# Patient Record
Sex: FEMALE | Race: WHITE | Employment: OTHER | URBAN - METROPOLITAN AREA
[De-identification: names, ages, dates, MRNs, and addresses within clinical notes are randomized per-mention and may not be internally consistent; named-entity substitution may affect disease eponyms.]

---

## 2017-02-09 ENCOUNTER — APPOINTMENT (OUTPATIENT)
Dept: GENERAL RADIOLOGY | Age: 72
DRG: 189 | End: 2017-02-09
Attending: EMERGENCY MEDICINE
Payer: MEDICARE

## 2017-02-09 ENCOUNTER — HOSPITAL ENCOUNTER (INPATIENT)
Age: 72
LOS: 5 days | Discharge: HOME | DRG: 189 | End: 2017-02-14
Attending: EMERGENCY MEDICINE | Admitting: INTERNAL MEDICINE
Payer: MEDICARE

## 2017-02-09 DIAGNOSIS — J44.1 COPD EXACERBATION (HCC): Primary | ICD-10-CM

## 2017-02-09 PROBLEM — J18.9 PNA (PNEUMONIA): Status: ACTIVE | Noted: 2017-02-09

## 2017-02-09 LAB
ALBUMIN SERPL BCP-MCNC: 3.6 G/DL (ref 3.5–5)
ALBUMIN/GLOB SERPL: 0.9 {RATIO} (ref 1.1–2.2)
ALP SERPL-CCNC: 92 U/L (ref 45–117)
ALT SERPL-CCNC: 41 U/L (ref 12–78)
ANION GAP BLD CALC-SCNC: 11 MMOL/L (ref 5–15)
AST SERPL W P-5'-P-CCNC: 38 U/L (ref 15–37)
ATRIAL RATE: 75 BPM
BASOPHILS # BLD AUTO: 0.1 K/UL (ref 0–0.1)
BASOPHILS # BLD: 1 % (ref 0–1)
BILIRUB SERPL-MCNC: 0.4 MG/DL (ref 0.2–1)
BNP SERPL-MCNC: 132 PG/ML (ref 0–100)
BUN SERPL-MCNC: 26 MG/DL (ref 6–20)
BUN/CREAT SERPL: 20 (ref 12–20)
CALCIUM SERPL-MCNC: 8.8 MG/DL (ref 8.5–10.1)
CALCULATED R AXIS, ECG10: -43 DEGREES
CALCULATED T AXIS, ECG11: 73 DEGREES
CHLORIDE SERPL-SCNC: 101 MMOL/L (ref 97–108)
CO2 SERPL-SCNC: 24 MMOL/L (ref 21–32)
CREAT SERPL-MCNC: 1.3 MG/DL (ref 0.55–1.02)
D DIMER PPP FEU-MCNC: 0.64 MG/L FEU (ref 0–0.65)
DIAGNOSIS, 93000: NORMAL
EOSINOPHIL # BLD: 0.3 K/UL (ref 0–0.4)
EOSINOPHIL NFR BLD: 3 % (ref 0–7)
ERYTHROCYTE [DISTWIDTH] IN BLOOD BY AUTOMATED COUNT: 15.4 % (ref 11.5–14.5)
GLOBULIN SER CALC-MCNC: 3.9 G/DL (ref 2–4)
GLUCOSE BLD STRIP.AUTO-MCNC: 247 MG/DL (ref 65–100)
GLUCOSE BLD STRIP.AUTO-MCNC: 397 MG/DL (ref 65–100)
GLUCOSE SERPL-MCNC: 345 MG/DL (ref 65–100)
HCT VFR BLD AUTO: 39.6 % (ref 35–47)
HGB BLD-MCNC: 12.6 G/DL (ref 11.5–16)
INR PPP: 1.2 (ref 0.9–1.1)
LYMPHOCYTES # BLD AUTO: 17 % (ref 12–49)
LYMPHOCYTES # BLD: 1.6 K/UL (ref 0.8–3.5)
MAGNESIUM SERPL-MCNC: 2.2 MG/DL (ref 1.6–2.4)
MCH RBC QN AUTO: 27.7 PG (ref 26–34)
MCHC RBC AUTO-ENTMCNC: 31.8 G/DL (ref 30–36.5)
MCV RBC AUTO: 87 FL (ref 80–99)
MONOCYTES # BLD: 0.6 K/UL (ref 0–1)
MONOCYTES NFR BLD AUTO: 7 % (ref 5–13)
NEUTS SEG # BLD: 6.8 K/UL (ref 1.8–8)
NEUTS SEG NFR BLD AUTO: 72 % (ref 32–75)
PLATELET # BLD AUTO: 198 K/UL (ref 150–400)
POTASSIUM SERPL-SCNC: 4 MMOL/L (ref 3.5–5.1)
PROT SERPL-MCNC: 7.5 G/DL (ref 6.4–8.2)
PROTHROMBIN TIME: 12.4 SEC (ref 9–11.1)
Q-T INTERVAL, ECG07: 370 MS
QRS DURATION, ECG06: 98 MS
QTC CALCULATION (BEZET), ECG08: 460 MS
RBC # BLD AUTO: 4.55 M/UL (ref 3.8–5.2)
SERVICE CMNT-IMP: ABNORMAL
SERVICE CMNT-IMP: ABNORMAL
SODIUM SERPL-SCNC: 136 MMOL/L (ref 136–145)
TROPONIN I SERPL-MCNC: <0.04 NG/ML
VENTRICULAR RATE, ECG03: 93 BPM
WBC # BLD AUTO: 9.3 K/UL (ref 3.6–11)

## 2017-02-09 PROCEDURE — 93005 ELECTROCARDIOGRAM TRACING: CPT

## 2017-02-09 PROCEDURE — 94761 N-INVAS EAR/PLS OXIMETRY MLT: CPT

## 2017-02-09 PROCEDURE — 77030029684 HC NEB SM VOL KT MONA -A

## 2017-02-09 PROCEDURE — 36415 COLL VENOUS BLD VENIPUNCTURE: CPT | Performed by: EMERGENCY MEDICINE

## 2017-02-09 PROCEDURE — 84484 ASSAY OF TROPONIN QUANT: CPT | Performed by: EMERGENCY MEDICINE

## 2017-02-09 PROCEDURE — A9270 NON-COVERED ITEM OR SERVICE: HCPCS | Performed by: EMERGENCY MEDICINE

## 2017-02-09 PROCEDURE — 99285 EMERGENCY DEPT VISIT HI MDM: CPT

## 2017-02-09 PROCEDURE — 85610 PROTHROMBIN TIME: CPT | Performed by: EMERGENCY MEDICINE

## 2017-02-09 PROCEDURE — 74011000250 HC RX REV CODE- 250: Performed by: INTERNAL MEDICINE

## 2017-02-09 PROCEDURE — 94640 AIRWAY INHALATION TREATMENT: CPT

## 2017-02-09 PROCEDURE — 74011636637 HC RX REV CODE- 636/637: Performed by: EMERGENCY MEDICINE

## 2017-02-09 PROCEDURE — 83880 ASSAY OF NATRIURETIC PEPTIDE: CPT | Performed by: EMERGENCY MEDICINE

## 2017-02-09 PROCEDURE — 74011250636 HC RX REV CODE- 250/636: Performed by: INTERNAL MEDICINE

## 2017-02-09 PROCEDURE — 74011250637 HC RX REV CODE- 250/637: Performed by: INTERNAL MEDICINE

## 2017-02-09 PROCEDURE — 80053 COMPREHEN METABOLIC PANEL: CPT | Performed by: EMERGENCY MEDICINE

## 2017-02-09 PROCEDURE — 82962 GLUCOSE BLOOD TEST: CPT

## 2017-02-09 PROCEDURE — 74011000250 HC RX REV CODE- 250: Performed by: EMERGENCY MEDICINE

## 2017-02-09 PROCEDURE — 74011636637 HC RX REV CODE- 636/637: Performed by: INTERNAL MEDICINE

## 2017-02-09 PROCEDURE — 94660 CPAP INITIATION&MGMT: CPT

## 2017-02-09 PROCEDURE — 83735 ASSAY OF MAGNESIUM: CPT | Performed by: EMERGENCY MEDICINE

## 2017-02-09 PROCEDURE — 85025 COMPLETE CBC W/AUTO DIFF WBC: CPT | Performed by: EMERGENCY MEDICINE

## 2017-02-09 PROCEDURE — 65660000000 HC RM CCU STEPDOWN

## 2017-02-09 PROCEDURE — 85379 FIBRIN DEGRADATION QUANT: CPT | Performed by: EMERGENCY MEDICINE

## 2017-02-09 PROCEDURE — 71020 XR CHEST PA LAT: CPT

## 2017-02-09 PROCEDURE — 77030012879 HC MSK CPAP FLL FAC PHIL -B

## 2017-02-09 RX ORDER — ARFORMOTEROL TARTRATE 15 UG/2ML
15 SOLUTION RESPIRATORY (INHALATION)
Status: DISCONTINUED | OUTPATIENT
Start: 2017-02-09 | End: 2017-02-09 | Stop reason: SDUPTHER

## 2017-02-09 RX ORDER — DEXTROSE 50 % IN WATER (D50W) INTRAVENOUS SYRINGE
12.5-25 AS NEEDED
Status: DISCONTINUED | OUTPATIENT
Start: 2017-02-09 | End: 2017-02-14 | Stop reason: HOSPADM

## 2017-02-09 RX ORDER — ALBUTEROL SULFATE 0.83 MG/ML
2.5 SOLUTION RESPIRATORY (INHALATION)
Status: DISCONTINUED | OUTPATIENT
Start: 2017-02-09 | End: 2017-02-14 | Stop reason: HOSPADM

## 2017-02-09 RX ORDER — ALBUTEROL SULFATE 0.83 MG/ML
2.5 SOLUTION RESPIRATORY (INHALATION)
Status: COMPLETED | OUTPATIENT
Start: 2017-02-09 | End: 2017-02-09

## 2017-02-09 RX ORDER — ONDANSETRON 2 MG/ML
4 INJECTION INTRAMUSCULAR; INTRAVENOUS
Status: DISCONTINUED | OUTPATIENT
Start: 2017-02-09 | End: 2017-02-14 | Stop reason: HOSPADM

## 2017-02-09 RX ORDER — MELATONIN
2000 DAILY
Status: DISCONTINUED | OUTPATIENT
Start: 2017-02-10 | End: 2017-02-14 | Stop reason: HOSPADM

## 2017-02-09 RX ORDER — SERTRALINE HYDROCHLORIDE 50 MG/1
50 TABLET, FILM COATED ORAL
Status: DISCONTINUED | OUTPATIENT
Start: 2017-02-09 | End: 2017-02-14 | Stop reason: HOSPADM

## 2017-02-09 RX ORDER — FLUTICASONE PROPIONATE AND SALMETEROL 250; 50 UG/1; UG/1
1 POWDER RESPIRATORY (INHALATION) EVERY 12 HOURS
Status: DISCONTINUED | OUTPATIENT
Start: 2017-02-09 | End: 2017-02-09

## 2017-02-09 RX ORDER — LEVOFLOXACIN 5 MG/ML
500 INJECTION, SOLUTION INTRAVENOUS EVERY 24 HOURS
Status: DISCONTINUED | OUTPATIENT
Start: 2017-02-09 | End: 2017-02-12

## 2017-02-09 RX ORDER — ALBUTEROL SULFATE 0.83 MG/ML
2.5 SOLUTION RESPIRATORY (INHALATION)
Status: DISCONTINUED | OUTPATIENT
Start: 2017-02-09 | End: 2017-02-12

## 2017-02-09 RX ORDER — MAGNESIUM SULFATE 100 %
4 CRYSTALS MISCELLANEOUS AS NEEDED
Status: DISCONTINUED | OUTPATIENT
Start: 2017-02-09 | End: 2017-02-14 | Stop reason: HOSPADM

## 2017-02-09 RX ORDER — POTASSIUM CHLORIDE 750 MG/1
20 TABLET, FILM COATED, EXTENDED RELEASE ORAL
Status: DISCONTINUED | OUTPATIENT
Start: 2017-02-10 | End: 2017-02-14 | Stop reason: HOSPADM

## 2017-02-09 RX ORDER — BUDESONIDE 0.5 MG/2ML
500 INHALANT ORAL
Status: DISCONTINUED | OUTPATIENT
Start: 2017-02-09 | End: 2017-02-09 | Stop reason: SDUPTHER

## 2017-02-09 RX ORDER — ALPRAZOLAM 0.25 MG/1
.25-.5 TABLET ORAL
Status: DISCONTINUED | OUTPATIENT
Start: 2017-02-09 | End: 2017-02-14 | Stop reason: HOSPADM

## 2017-02-09 RX ORDER — MONTELUKAST SODIUM 10 MG/1
10 TABLET ORAL EVERY EVENING
Status: DISCONTINUED | OUTPATIENT
Start: 2017-02-09 | End: 2017-02-14 | Stop reason: HOSPADM

## 2017-02-09 RX ORDER — BUDESONIDE 0.5 MG/2ML
500 INHALANT ORAL
Status: DISCONTINUED | OUTPATIENT
Start: 2017-02-09 | End: 2017-02-14 | Stop reason: HOSPADM

## 2017-02-09 RX ORDER — METOPROLOL SUCCINATE 25 MG/1
25 TABLET, EXTENDED RELEASE ORAL DAILY
Status: DISCONTINUED | OUTPATIENT
Start: 2017-02-10 | End: 2017-02-14 | Stop reason: HOSPADM

## 2017-02-09 RX ORDER — AZITHROMYCIN 500 MG/1
500 TABLET, FILM COATED ORAL SEE ADMIN INSTRUCTIONS
COMMUNITY
End: 2017-02-14

## 2017-02-09 RX ORDER — THERA TABS 400 MCG
1 TAB ORAL DAILY
Status: DISCONTINUED | OUTPATIENT
Start: 2017-02-10 | End: 2017-02-14 | Stop reason: HOSPADM

## 2017-02-09 RX ORDER — IPRATROPIUM BROMIDE 0.5 MG/2.5ML
0.5 SOLUTION RESPIRATORY (INHALATION)
Status: DISCONTINUED | OUTPATIENT
Start: 2017-02-09 | End: 2017-02-14 | Stop reason: HOSPADM

## 2017-02-09 RX ORDER — FUROSEMIDE 40 MG/1
40 TABLET ORAL EVERY EVENING
Status: DISCONTINUED | OUTPATIENT
Start: 2017-02-09 | End: 2017-02-14 | Stop reason: HOSPADM

## 2017-02-09 RX ORDER — WARFARIN SODIUM 5 MG/1
5 TABLET ORAL ONCE
Status: COMPLETED | OUTPATIENT
Start: 2017-02-09 | End: 2017-02-09

## 2017-02-09 RX ORDER — DILTIAZEM HYDROCHLORIDE 120 MG/1
120 CAPSULE, COATED, EXTENDED RELEASE ORAL DAILY
Status: DISCONTINUED | OUTPATIENT
Start: 2017-02-10 | End: 2017-02-14 | Stop reason: HOSPADM

## 2017-02-09 RX ORDER — INSULIN LISPRO 100 [IU]/ML
INJECTION, SOLUTION INTRAVENOUS; SUBCUTANEOUS
Status: DISCONTINUED | OUTPATIENT
Start: 2017-02-09 | End: 2017-02-11

## 2017-02-09 RX ORDER — MONTELUKAST SODIUM 10 MG/1
10 TABLET ORAL DAILY
COMMUNITY
End: 2018-06-25

## 2017-02-09 RX ORDER — FUROSEMIDE 40 MG/1
80 TABLET ORAL DAILY
Status: DISCONTINUED | OUTPATIENT
Start: 2017-02-10 | End: 2017-02-14 | Stop reason: HOSPADM

## 2017-02-09 RX ORDER — GABAPENTIN 300 MG/1
300 CAPSULE ORAL EVERY EVENING
Status: DISCONTINUED | OUTPATIENT
Start: 2017-02-09 | End: 2017-02-14 | Stop reason: HOSPADM

## 2017-02-09 RX ORDER — NAPROXEN SODIUM 220 MG
880 TABLET ORAL DAILY
COMMUNITY
End: 2017-05-01

## 2017-02-09 RX ORDER — PREDNISONE 20 MG/1
40 TABLET ORAL
Status: COMPLETED | OUTPATIENT
Start: 2017-02-09 | End: 2017-02-09

## 2017-02-09 RX ORDER — ALBUTEROL SULFATE 0.83 MG/ML
SOLUTION RESPIRATORY (INHALATION)
Status: DISPENSED
Start: 2017-02-09 | End: 2017-02-10

## 2017-02-09 RX ORDER — ARFORMOTEROL TARTRATE 15 UG/2ML
15 SOLUTION RESPIRATORY (INHALATION)
Status: DISCONTINUED | OUTPATIENT
Start: 2017-02-09 | End: 2017-02-14 | Stop reason: HOSPADM

## 2017-02-09 RX ADMIN — GABAPENTIN 300 MG: 300 CAPSULE ORAL at 18:46

## 2017-02-09 RX ADMIN — ALBUTEROL SULFATE 2.5 MG: 2.5 SOLUTION RESPIRATORY (INHALATION) at 21:00

## 2017-02-09 RX ADMIN — PREDNISONE 40 MG: 20 TABLET ORAL at 11:49

## 2017-02-09 RX ADMIN — INSULIN LISPRO 6 UNITS: 100 INJECTION, SOLUTION INTRAVENOUS; SUBCUTANEOUS at 21:10

## 2017-02-09 RX ADMIN — ALBUTEROL SULFATE 2.5 MG: 2.5 SOLUTION RESPIRATORY (INHALATION) at 17:30

## 2017-02-09 RX ADMIN — METHYLPREDNISOLONE SODIUM SUCCINATE 125 MG: 125 INJECTION, POWDER, FOR SOLUTION INTRAMUSCULAR; INTRAVENOUS at 18:41

## 2017-02-09 RX ADMIN — ONDANSETRON 4 MG: 2 INJECTION INTRAMUSCULAR; INTRAVENOUS at 21:58

## 2017-02-09 RX ADMIN — ALPRAZOLAM 0.5 MG: 0.25 TABLET ORAL at 21:58

## 2017-02-09 RX ADMIN — IPRATROPIUM BROMIDE 0.5 MG: 0.5 SOLUTION RESPIRATORY (INHALATION) at 21:00

## 2017-02-09 RX ADMIN — ALBUTEROL SULFATE 2.5 MG: 2.5 SOLUTION RESPIRATORY (INHALATION) at 12:37

## 2017-02-09 RX ADMIN — MONTELUKAST SODIUM 10 MG: 10 TABLET, FILM COATED ORAL at 18:46

## 2017-02-09 RX ADMIN — WARFARIN SODIUM 5 MG: 5 TABLET ORAL at 21:01

## 2017-02-09 RX ADMIN — ALBUTEROL SULFATE 2.5 MG: 2.5 SOLUTION RESPIRATORY (INHALATION) at 14:25

## 2017-02-09 RX ADMIN — ALBUTEROL SULFATE 2.5 MG: 2.5 SOLUTION RESPIRATORY (INHALATION) at 11:30

## 2017-02-09 RX ADMIN — FUROSEMIDE 40 MG: 40 TABLET ORAL at 18:40

## 2017-02-09 RX ADMIN — GUAIFENESIN 1200 MG: 600 TABLET, EXTENDED RELEASE ORAL at 18:40

## 2017-02-09 RX ADMIN — LEVOFLOXACIN 500 MG: 5 INJECTION, SOLUTION INTRAVENOUS at 19:06

## 2017-02-09 RX ADMIN — SERTRALINE HYDROCHLORIDE 50 MG: 50 TABLET ORAL at 21:01

## 2017-02-09 RX ADMIN — INSULIN LISPRO 3 UNITS: 100 INJECTION, SOLUTION INTRAVENOUS; SUBCUTANEOUS at 16:16

## 2017-02-09 RX ADMIN — HUMAN INSULIN 20 UNITS: 100 INJECTION, SUSPENSION SUBCUTANEOUS at 21:10

## 2017-02-09 RX ADMIN — BUDESONIDE 500 MCG: 0.5 INHALANT RESPIRATORY (INHALATION) at 21:00

## 2017-02-09 NOTE — PROGRESS NOTES
Care Management-Patient being admitted for asthma exacerbation and possible pneumonia. Met with patient and her daughter-Enma Vences (([phone]) in the room. Patient lives alone, is independent with her ADLs, and still drives. She has a CPAP, nebulizer, and cane at home. She only occasionally uses the cane. Patient said she has used [facility] after her back surgery in July 2015. She has never been to SNF or inpatient rehab. Her family will transport her at discharge. No transition of care needs identified or verbalized, but care manager will be available if needs arise. Care Management Interventions  PCP Verified by CM: Yes (Dr. Edith Metcalf)  Last Visit to PCP: 02/16/17  Mode of Transport at Discharge:  Other (see comment) (family)  Current Support Network: Lives Alone  Confirm Follow Up Transport: Family  Discharge Location  Discharge Placement: [facility]

## 2017-02-09 NOTE — PROGRESS NOTES
Bedside report received from  Harjit Morfin, [address] [place] [address] (offgoing nurse). Assumed care of patient. No immediate concerns, patient resting with call bell within reach.

## 2017-02-09 NOTE — IP AVS SNAPSHOT
[address] [facility] Emerald Herrmann  
[phone] Patient: [facility] MRN: [id number] PJS:[id number] You are allergic to the following Allergen Reactions Latex Itching Codeine Rash Lisinopril Other (comments) Cough, vomiting, Visual disturbances Statins-Hmg-Coa Reductase Inhibitors Other (comments) Muscle enzyme problems Morphine Itching Recent Documentation Height Weight BMI OB Status Smoking Status 1.676 m 137.7 kg 49 kg/m2 Postmenopausal Former Smoker Unresulted Labs Order Current Status CULTURE, RESPIRATORY/SPUTUM/BRONCH W GRAM STAIN Preliminary result Emergency Contacts Name Discharge Info Relation Home Work Mobile Abhijit Richardson  Child [2] [phone] Di Simpson  Child [2]   [phone] About your hospitalization You were admitted on:  February 9, 2017 You last received care in the:  [facility] You were discharged on:  February 14, 2017 Unit phone number:  [phone] Why you were hospitalized Your primary diagnosis was:  Respiratory Failure (Hcc) Your diagnoses also included:  Asthma Exacerbation, Type 2 Diabetes Mellitus Without Complication (Hcc), Pna (Pneumonia), Essential Hypertension With Goal Blood Pressure Less Than 140/90, Morbid Obesity (Hcc), Chronic Atrial Fibrillation (Hcc) Providers Seen During Your Hospitalizations Provider Role Specialty Primary office phone Daxa Moran MD Attending Provider Emergency Medicine [phone] Corbin Corcoran MD Attending Provider Internal Medicine [phone] Your Primary Care Physician (PCP) Primary Care Physician Office Phone Office Fax Odette Cortés [phone] Follow-up Information Follow up With Details Comments Contact Info MD Patricia Huff [place] [address] 
[phone] Your Appointments Friday February 17, 2017  9:00 AM EST  
ESTABLISHED PATIENT with MD Ralph Huff GELA White, [address] [facility] ([address]) [address] [place] [address]  
[phone] Current Discharge Medication List  
  
START taking these medications Dose & Instructions Dispensing Information Comments Morning Noon Evening Bedtime  
 levoFLOXacin 500 mg tablet Commonly known as:  Kristina Radon Your next dose is: Today, Tomorrow Other:  _________ Dose:  500 mg Take 1 Tab by mouth daily for 5 days. Quantity:  5 Tab Refills:  0  
     
   
   
   
  
 predniSONE 10 mg tablet Commonly known as:  Ralf Burden Your next dose is: Today, Tomorrow Other:  _________ Take 6t every day for 4d, then 5t every day for 4d, then 4t QD for 4d, then 3t QD for 4d, then 2t QD for 4d, then 1t QD for 4d. Quantity:  84 Tab Refills:  0 CONTINUE these medications which have CHANGED Dose & Instructions Dispensing Information Comments Morning Noon Evening Bedtime HumuLIN N 100 unit/mL injection Generic drug:  insulin NPH What changed:  See the new instructions. Your next dose is: Today, Tomorrow Other:  _________ INJECT 35 UNITS UNDER THE SKIN IN THE MORNING AND INJECT 25 UNITS AT BEDTIME OR AS DIRECTED Quantity:  30 mL Refills:  11  
     
   
   
   
  
 insulin regular 100 unit/mL injection Commonly known as:  ROHINI Varela What changed:  additional instructions Your next dose is: Today, Tomorrow Other:  _________ Sliding scale:   For -300 use 4 units, 301-400 use 8 units, greater than 400 use 12 units, greater than 500 call MD.  
 Quantity:  3 Vial  
Refills:  11  
     
   
   
   
  
 JARDIANCE 25 mg tablet Generic drug:  empagliflozin What changed:  Another medication with the same name was removed. Continue taking this medication, and follow the directions you see here. Your next dose is: Today, Tomorrow Other:  _________ Dose:  12.5 mg Take 12.5 mg by mouth daily. Refills:  0  
     
   
   
   
  
 potassium chloride 20 mEq tablet Commonly known as:  K-DUR, KLOR-CON What changed:  See the new instructions. Your next dose is: Today, Tomorrow Other:  _________ TAKE 1 TABLET BY MOUTH TWICE DAILY Quantity:  60 Tab Refills:  11  
     
   
   
   
  
 * PROVENTIL HFA 90 mcg/actuation inhaler Generic drug:  albuterol What changed:  Another medication with the same name was added. Make sure you understand how and when to take each. Your next dose is: Today, Tomorrow Other:  _________  
   
   
 inhale 2 puffs four times a day if needed for wheezing Quantity:  3 Inhaler Refills:  3  
     
   
   
   
  
 * albuterol 2.5 mg /3 mL (0.083 %) nebulizer solution Commonly known as:  PROVENTIL VENTOLIN What changed:  Another medication with the same name was added. Make sure you understand how and when to take each. Your next dose is: Today, Tomorrow Other:  _________ Dose:  2.5 mg  
3 mL by Nebulization route four (4) times daily as needed for Wheezing or Shortness of Breath. Quantity:  100 Each Refills:  5  
     
   
   
   
  
 * albuterol 2.5 mg /3 mL (0.083 %) nebulizer solution Commonly known as:  PROVENTIL VENTOLIN What changed: You were already taking a medication with the same name, and this prescription was added. Make sure you understand how and when to take each. Your next dose is: Today, Tomorrow Other:  _________ Dose:  2.5 mg  
3 mL by Nebulization route four (4) times daily as needed for Wheezing or Shortness of Breath. Quantity:  48 Each Refills:  6  
     
   
   
   
  
 sertraline 100 mg tablet Commonly known as:  ZOLOFT What changed:  how much to take Your next dose is: Today, Tomorrow Other:  _________ Dose:  100 mg Take 1 Tab by mouth nightly. Quantity:  90 Tab Refills:  3  
     
   
   
   
  
 warfarin 5 mg tablet Commonly known as:  COUMADIN What changed:  See the new instructions. Your next dose is: Today, Tomorrow Other:  _________ TAKE 1 TABLET BY MOUTH THREE TIMES A WEEK THEN TAKE 1/2 TABLET THE OTHER FOUR DAYS OF THE WEEK Quantity:  30 Tab Refills:  6  
     
   
   
   
  
 * Notice: This list has 3 medication(s) that are the same as other medications prescribed for you. Read the directions carefully, and ask your doctor or other care provider to review them with you. CONTINUE these medications which have NOT CHANGED Dose & Instructions Dispensing Information Comments Morning Noon Evening Bedtime ADVAIR DISKUS 250-50 mcg/dose diskus inhaler Generic drug:  fluticasone-salmeterol Your next dose is: Today, Tomorrow Other:  _________ Dose:  1 Puff Take 1 Puff by inhalation every twelve (12) hours. Refills:  0 ALPRAZolam 0.5 mg tablet Commonly known as:  Star Carp Your next dose is: Today, Tomorrow Other:  _________ Dose:  0.25-0.5 mg Take 0.25-0.5 mg by mouth daily as needed for Anxiety. Refills:  0  
     
   
   
   
  
 ARNUITY ELLIPTA 100 mcg/actuation Dsdv inhaler Generic drug:  fluticasone furoate Your next dose is: Today, Tomorrow Other:  _________ Dose:  1 Puff Take 1 Puff by inhalation daily. Refills:  0  
     
   
   
   
  
 CARTIA  mg ER capsule Generic drug:  dilTIAZem CD Your next dose is: Today, Tomorrow Other:  _________ TAKE 3 CAPSULES BY MOUTH DAILY Quantity:  90 Cap Refills:  11  
     
   
   
   
  
 diclofenac 1 % Gel Commonly known as:  VOLTAREN Your next dose is: Today, Tomorrow Other:  _________ Apply  to affected area four (4) times daily. Quantity:  1500 g Refills:  6 FISH OIL PO Your next dose is: Today, Tomorrow Other:  _________ Dose:  1 Cap Take 1 Cap by mouth daily. Refills:  0  
     
   
   
   
  
 furosemide 40 mg tablet Commonly known as:  LASIX Your next dose is: Today, Tomorrow Other:  _________  
   
   
 take 2 tablets by mouth every morning and take 1 tablet by mouth every evening Quantity:  270 Tab Refills:  3  
     
   
   
   
  
 gabapentin 300 mg capsule Commonly known as:  NEURONTIN Your next dose is: Today, Tomorrow Other:  _________ Dose:  300 mg Take 300 mg by mouth every evening. Refills:  0  
     
   
   
   
  
 metoprolol succinate 25 mg XL tablet Commonly known as:  TOPROL-XL Your next dose is: Today, Tomorrow Other:  _________ TAKE 1 TABLET BY MOUTH EVERY DAY Quantity:  90 Tab Refills:  3  
     
   
   
   
  
 naproxen sodium 220 mg tablet Commonly known as:  NAPROSYN Your next dose is: Today, Tomorrow Other:  _________ Dose:  880 mg Take 880 mg by mouth daily. Indications: OSTEOARTHRITIS Refills:  0 SINGULAIR 10 mg tablet Generic drug:  montelukast  
   
Your next dose is: Today, Tomorrow Other:  _________ Dose:  10 mg Take 10 mg by mouth daily. Refills:  0 SPIRIVA WITH HANDIHALER 18 mcg inhalation capsule Generic drug:  tiotropium Your next dose is: Today, Tomorrow Other:  _________ Dose:  1 Cap Take 1 Cap by inhalation daily. Refills:  0  
     
   
   
   
  
 therapeutic multivitamin tablet Commonly known as:  [facility] Your next dose is: Today, Tomorrow Other:  _________ Dose:  1 Tab Take 1 Tab by mouth daily. Refills:  0  
     
   
   
   
  
 VITAMIN D3 2,000 unit Tab Generic drug:  cholecalciferol (vitamin D3) Your next dose is: Today, Tomorrow Other:  _________ Dose:  2000 Units Take 2,000 Units by mouth daily. Refills:  0  
     
   
   
   
  
 XOLAIR 150 mg Solr Generic drug:  omalizumab Your next dose is: Today, Tomorrow Other:  _________ Dose:  150 mg  
150 mg by SubCUTAneous route every fourteen (14) days. Next dose due  Refills:  0 STOP taking these medications   
 azithromycin 500 mg Tab Commonly known as:  Ananya Delgado Where to Get Your Medications These medications were sent to [address] [place] [address] [id number] [place] [id number] Phone:  [phone]  
  albuterol 2.5 mg /3 mL (0.083 %) nebulizer solution  
 levoFLOXacin 500 mg tablet  
 predniSONE 10 mg tablet Discharge Instructions Patient Discharge Instructions Shauna Montana / [id number] : 1945 Admitted 2017 Discharged: 2017 Take Home Medications · It is important that you take the medication exactly as they are prescribed. · Keep your medication in the bottles provided by the pharmacist and keep a list of the medication names, dosages, and times to be taken in your wallet. · Do not take other medications without consulting your doctor. What to do at [facility] Recommended diet: Diabetic Diet. Recommended activity: Activity as tolerated. Follow-up with Dr. Cha Guerrero in 1 week. Information obtained by : 
I understand that if any problems occur once I am at home I am to contact my physician. I understand and acknowledge receipt of the instructions indicated above. Physician's or R.N.'s Signature                                                                  Date/Time Patient or Representative Signature                                                          Date/Time Discharge Orders None [employer] Announcement We are excited to announce that we are making your provider's discharge notes available to you in [employer]. You will see these notes when they are completed and signed by the physician that discharged you from your recent hospital stay. If you have any questions or concerns about any information you see in Fit[employer], please call the Health Information Department where you were seen or reach out to your Primary Care Provider for more information about your plan of care. Introducing [facility]! Dear [facility]: 
Thank you for requesting a Fitbay account. Our records indicate that you already have an active Fitbay account. You can access your account anytime at [email or web address] Did you know that you can access your hospital and ER discharge instructions at any time in Fitbay? You can also review all of your test results from your hospital stay or ER visit. Additional Information If you have questions, please visit the Frequently Asked Questions section of the Fitbay website at [email or web address] Remember, [employer] is NOT to be used for urgent needs. For medical emergencies, dial 911. Now available from your iPhone and Android! General Information Please provide this summary of care documentation to your next provider. Patient Signature:  ____________________________________________________________ Date:  ____________________________________________________________  
  
Rani Beaver Dams Provider Signature:  ____________________________________________________________ Date:  ____________________________________________________________

## 2017-02-09 NOTE — ED NOTES
Bedside and Verbal shift change report given to Gonzalo Parmar (oncoming nurse) by Ale Castillo RN (offgoing nurse). Report included the following information SBAR, ED Summary, MAR and Recent Results.

## 2017-02-09 NOTE — Clinical Note
Status[de-identified] Inpatient [101] Type of Bed: Medical [8] Inpatient Hospitalization Certified Necessary for the Following Reasons: 1. Patient Failed outpatient treatment (further clarification in H&P documentation) Admitting Diagnosis: Respiratory failure ([facility] [id number] Admitting Physician: Jesse Toscano Attending Physician: Jesse Toscano Estimated Length of Stay: > or = to 2 Midnights Discharge Plan[de-identified] Home with Office Follow-up

## 2017-02-09 NOTE — ED PROVIDER NOTES
HPI Comments: 66 y.o. female with past medical history significant for atrial fibrillation, HTN, DM, hyperlipidemia, depression, neuropathy, LITA on CPAP, uterine cancer, appendectomy, gastric bypass and cholecystectomy who presents via EMS with chief complaint of shortness of breath. Patient reports that she has felt chronically short of breath since September of last year (5 months ago) and was hospitalized at onset but has never completely felt back to normal. She says that she has been on 3 different courses of antibiotics since onset. Patient arrives today after developing severe shortness of breath which is worse on exertion shortly after waking up this morning. She currently sleeps with a CPAP machine and states that after using the restroom when she returned to the bed she felt short of breath almost to the point of fainting with associated cough. Per EMS, initial sats were in the high 80%s to low 90%s with a blood pressure hovering aroudn 96 systolic. En route, patient was given a nebulizer treatment and was placed on 3L O2; sats improved to ~97%. She was also given IV fluids. Patient reports history of chronic atrial fibrillation for which she takes coumadin. There are no other acute medical concerns at this time. Social hx: Former smoker, 12.5 pack years, quit 25 years ago. PCP: Piper Rahman MD    Note written by edgar Vale, as dictated by Loly Ruelas MD 11:11 AM      The history is provided by the patient.         Past Medical History:   Diagnosis Date    Atrial fibrillation ([facility]     Depression     DM (diabetes mellitus) ([facility]     HTN (hypertension)     Hyperlipidemia     Neuropathy     LITA on CPAP     Other ill-defined conditions(799.89) pneumonia       Past Surgical History:   Procedure Laterality Date    Hx efe and bso       For uterine CA    Hx appendectomy      Hx gastric bypass       Jejunal bypass with subsequent reversal..  Lab Band since    Hx cholecystectomy  Hx other surgical  D & C         Family History:   Problem Relation Age of Onset    Stroke Mother     Diabetes Other     Heart Disease Other        Social History     Social History    Marital status:      Spouse name: N/A    Number of children: N/A    Years of education: N/A     Occupational History    Not on file. Social History Main Topics    Smoking status: Former Smoker     Quit date: 1/1/1992    Smokeless tobacco: Not on file    Alcohol use Yes      Comment: occ wine    Drug use: Not on file    Sexual activity: Not on file     Other Topics Concern    Not on file     Social History Narrative         ALLERGIES: Latex; Codeine; Lisinopril; Statins-hmg-coa reductase inhibitors; and Morphine    Review of Systems   Constitutional: Negative for appetite change, chills and fever. HENT: Negative for rhinorrhea, sore throat and trouble swallowing. Eyes: Negative for photophobia. Respiratory: Positive for cough, shortness of breath and wheezing. Cardiovascular: Negative for chest pain and palpitations. Gastrointestinal: Negative for abdominal pain, nausea and vomiting. Genitourinary: Negative for dysuria, frequency and hematuria. Musculoskeletal: Negative for arthralgias. Neurological: Negative for dizziness, syncope and weakness. Psychiatric/Behavioral: Negative for behavioral problems. The patient is not nervous/anxious. There were no vitals filed for this visit. Physical Exam   Constitutional: She appears well-developed and well-nourished. Obese   HENT:   Head: Normocephalic and atraumatic. Mouth/Throat: Oropharynx is clear and moist.   Eyes: EOM are normal. Pupils are equal, round, and reactive to light. Neck: Normal range of motion. Neck supple. Cardiovascular: Normal rate, regular rhythm, normal heart sounds and intact distal pulses. Exam reveals no gallop and no friction rub. No murmur heard.   Pulmonary/Chest: She is in respiratory distress (Mild to moderate). She has wheezes (Diffuse expiratory wheezes). She has no rales. Abdominal: Soft. There is no tenderness. There is no rebound. Musculoskeletal: Normal range of motion. She exhibits no tenderness. Neurological: She is alert. No cranial nerve deficit. Motor; symmetric   Skin: No erythema. Psychiatric: She has a normal mood and affect. Her behavior is normal.   Nursing note and vitals reviewed. Note written by edgar Raphael, as dictated by Humberto Peterson MD 11:56 AM      MDM  Number of Diagnoses or Management Options  COPD exacerbation [facility]):   Critical Care  Total time providing critical care: 30-74 minutes  Total critical care time spend exclusive of procedures:  40 minutes    ED Course       Procedures  12:39 PM  Patient scooted self up in bed and became extremely short of breath. Pulse ox on 3L 89%. Beginning second nebulizer. Calling hospitalist to admit. CONSULT NOTE:  1:21 PM Humberot Peterson MD spoke with Dr. Brandon Martinez, Consult for Primary Care. Discussed available diagnostic tests and clinical findings. He is in agreement with care plans as outlined and will admit. CONSULT NOTE:  1:28 PM Humberto Peterson MD spoke with Dr. Leopoldo Corado, Consult for Primary Care. Discussed available diagnostic tests and clinical findings. He is in agreement with care plans as outlined. Note: Patient feels a bit more short of breath. This is especially true with minimal exertion. Patient received 2 nebulizer treatments and prednisone. Another nebulizer treatment will be ordered. Patient may need to go to the ICU and I will recontact her primary care physician.  Pulse ox 96 % 3 liters  Humberto Peterson MD  2:18 PM

## 2017-02-09 NOTE — CONSULTS
Initial Pulmonary / Critical Care    Assessment / Plan:      1. Acute on chronic resp failure - acute asthmatic bronchitis with moderate underlying persistant asthma on xolair as outpatient. Possible RLL asdz on CXR  2. LITA on nocturnal cpap  3. afib  4. DM    · Agree with standard care for acute asthma exacerbation   · Agree with levaquin  · Have changed advair to brovana and pulmicort  · mucinex  · Ordered CPAP at 14 based on review of her prior sleep study  · Do not feel that she needs an ICU bed at this time    D/w Dr Lemon Bowdle    History / Subjective:  Hunter Acosta is a 66 y.o.  female who  has a past medical history of Atrial fibrillation ([address] Depression; DM (diabetes mellitus) ([facility] [address] HTN (hypertension); Hyperlipidemia; Neuropathy; LITA on CPAP; and Other ill-defined conditions(799.89) (pneumonia). admitted 2/9/2017 with cough and shortness of breath. Has a h/o asthma followed by Dr Ashwini Yuan and is on xolair injections. Was started on a zpack earlier this week for cough and yellow sputum but has not really improved. Denies fevers or chills. No chest pain. She has a cpap machine that she uses nightly and says she had been using her nebulize more frequently without improvement. CXR shows right base atx vs possible asdz. Has been started on standard care for acute asthmatic bronchitis and possible CAP. On 2 L sats are 98%. She is in a fib with a controlled ventricular rate and BP is stable. Because of her elevated work of breathing it was felt by ER that she might need a higher level bed than medical and an ICU bed has been requested.     Allergies   Allergen Reactions    Latex Itching    Codeine Rash    Lisinopril Other (comments)     Cough, vomiting, Visual disturbances    Statins-Hmg-Coa Reductase Inhibitors Other (comments)     Muscle enzyme problems    Morphine Itching     Past Medical History   Diagnosis Date    Atrial fibrillation (HCC)     Depression     DM (diabetes mellitus) [address] [address]     HTN (hypertension)     Hyperlipidemia     Neuropathy     LITA on CPAP     Other ill-defined conditions(799.89) pneumonia      Past Surgical History   Procedure Laterality Date    Hx efe and bso       For uterine CA    Hx appendectomy      Hx gastric bypass       Jejunal bypass with subsequent reversal..  Lab Band since    Hx cholecystectomy      Hx other surgical  D & C      Prior to Admission medications    Medication Sig Start Date End Date Taking? Authorizing Provider   empagliflozin (JARDIANCE) 25 mg tablet Take 12.5 mg by mouth daily. Yes Historical Provider   fluticasone furoate (ARNUITY ELLIPTA) 100 mcg/actuation dsdv inhaler Take 1 Puff by inhalation daily. Yes Historical Provider   azithromycin (ZITHROMAX) 500 mg tab Take 500 mg by mouth See Admin Instructions. 2 TABLETS DAY 1, THEN ONE TABLET DAILY X4 DAYS, STARTED ON 2/7/17   Yes Historical Provider   montelukast (SINGULAIR) 10 mg tablet Take 10 mg by mouth daily. Yes Historical Provider   naproxen sodium (NAPROSYN) 220 mg tablet Take 880 mg by mouth daily. Indications: OSTEOARTHRITIS   Yes Historical Provider   omalizumab Stuart Bulls) 150 mg solr 150 mg by SubCUTAneous route every fourteen (14) days. Next dose due 2/17   Yes Historical Provider   metoprolol succinate (TOPROL-XL) 25 mg XL tablet TAKE 1 TABLET BY MOUTH EVERY DAY 12/30/16  Yes JOSHUA Metcalf MD   potassium chloride (K-DUR, KLOR-CON) 20 mEq tablet TAKE 1 TABLET BY MOUTH TWICE DAILY  Patient taking differently: TAKE 1 TABLET BY MOUTH DAILY 11/6/16  Yes Edith Metcalf MD   fluticasone-salmeterol (ADVAIR DISKUS) 250-50 mcg/dose diskus inhaler Take 1 Puff by inhalation every twelve (12) hours. Yes Historical Provider   tiotropium (SPIRIVA WITH HANDIHALER) 18 mcg inhalation capsule Take 1 Cap by inhalation daily. Yes Historical Provider   diclofenac (VOLTAREN) 1 % gel Apply  to affected area four (4) times daily.  11/4/16  Yes Edith Metcalf MD albuterol (PROVENTIL VENTOLIN) 2.5 mg /3 mL (0.083 %) nebulizer solution 3 mL by Nebulization route four (4) times daily as needed for Wheezing or Shortness of Breath. 10/8/16  Yes Isabella Fothergill, MD   therapeutic multivitamin [facility]) tablet Take 1 Tab by mouth daily. Yes Historical Provider   CARTIA  mg ER capsule TAKE 3 CAPSULES BY MOUTH DAILY 5/24/16  Yes Sol Alanis MD   sertraline (ZOLOFT) 100 mg tablet Take 1 Tab by mouth nightly. Patient taking differently: Take 50 mg by mouth nightly. 3/16/16  Yes Sol Alanis MD   furosemide (LASIX) 40 mg tablet take 2 tablets by mouth every morning and take 1 tablet by mouth every evening 3/16/16  Yes Sol Alanis MD   insulin regular (NOVOLIN R, HUMULIN R) 100 unit/mL injection Sliding scale: For -300 use 4 units, 301-400 use 8 units, greater than 400 use 12 units, greater than 500 call MD.  Patient taking differently: Inject 18 units in the morning and 15 units in the evening. 8/14/15  Yes JOSHUA Alanis MD   gabapentin (NEURONTIN) 300 mg capsule Take 300 mg by mouth every evening. Yes Historical Provider   PROVENTIL HFA 90 mcg/actuation inhaler inhale 2 puffs four times a day if needed for wheezing 1/17/15  Yes JOSHUA Alanis MD   ALPRAZolam Sherrilyn Cindy) 0.5 mg tablet Take 0.25-0.5 mg by mouth daily as needed for Anxiety. Yes Historical Provider   cholecalciferol, vitamin D3, (VITAMIN D3) 2,000 unit tab Take 2,000 Units by mouth daily. Yes Historical Provider   DOCOSAHEXANOIC ACID/EPA (FISH OIL PO) Take 1 Cap by mouth daily.    Yes Historical Provider   warfarin (COUMADIN) 5 mg tablet TAKE 1 TABLET BY MOUTH THREE TIMES A WEEK THEN TAKE 1/2 TABLET THE OTHER FOUR DAYS OF THE WEEK  Patient taking differently: TAKE 1 TABLET (5 MG)  BY MOUTH EVERY EVENING AT 6PM 11/16/16   Sol Alanis MD   HUMULIN N 100 unit/mL injection INJECT 35 UNITS UNDER THE SKIN IN THE MORNING AND INJECT 25 UNITS AT BEDTIME OR AS DIRECTED  Patient taking differently: INJECT 40 UNITS UNDER THE SKIN IN THE MORNING AND INJECT 20 UNITS AT BEDTIME OR AS DIRECTED 10/9/16   JOSHUA Stewart MD      Family History   Problem Relation Age of Onset    Stroke Mother     Diabetes Other     Heart Disease Other      Social History   Substance Use Topics    Smoking status: Former Smoker     Quit date: 1992    Smokeless tobacco: Not on file    Alcohol use Yes      Comment: occ wine      ROS:  A comprehensive review of systems was negative except for that written in the HPI. Objective:  Patient Vitals for the past 4 hrs:   BP Temp Pulse Resp SpO2   17 1530 117/63 98.8 °F (37.1 °C) 90 23 94 %   17 1425 - - - - 95 %   17 1408 112/81 98.6 °F (37 °C) 84 13 94 %   17 1345 171/59 98.6 °F (37 °C) 82 26 95 %   17 1315 133/46 - 83 27 95 %   17 1300 133/67 - 83 24 94 %     Temp (24hrs), Av.9 °F (37.2 °C), Min:98.6 °F (37 °C), Max:99.4 °F (37.4 °C)    CVP:        No intake or output data in the 24 hours ending 17 1647  Blood Sugar:  Glucose   Date Value Ref Range Status   2017 345 (H) 65 - 100 mg/dL Final   2016 313 (H) 65 - 99 mg/dL Final   10/17/2016 65 65 - 99 mg/dL Final   10/04/2016 315 (H) 65 - 100 mg/dL Final   10/03/2016 381 (H) 65 - 100 mg/dL Final     Exam:  Heavy alert  Mild tachypnea  No accessory use  Able to speak full sentences  Rhonchi with exp wheezes and some exp wheezing over throat  Irreg  Protuberant soft  Trace edema    Lab data was reviewed. Radiology images were independently viewed and available reports were reviewed.     CXR - right base atx    Recent Labs      17   1113   WBC  9.3   HGB  12.6   HCT  39.6   PLT  198     Recent Labs      17   1113   NA  136   K  4.0   CL  101   CO2  24   GLU  345*   BUN  26*   CREA  1.30*   CA  8.8   MG  2.2   ALB  3.6   SGOT  38*   ALT  41   INR  1.2*       Recent Labs      17   1113   TROIQ  <0.04 ABG:  No results for input(s): PHI, PCO2I, PO2I, HCO3I, SO2I, FIO2I in the last 72 hours.       Wili Toussaint MD

## 2017-02-09 NOTE — PROGRESS NOTES
TRANSFER - OUT REPORT:    Verbal report given to PADMINI Vital (name) on [place]  being transferred to [facility](unit) for routine progression of care       Report consisted of patients Situation, Background, Assessment and   Recommendations(SBAR). Information from the following report(s) SBAR, Kardex, ED Summary, Intake/Output, Recent Results and Cardiac Rhythm Afib was reviewed with the receiving nurse. Lines:   Peripheral IV 02/09/17 Right Forearm (Active)   Site Assessment Clean, dry, & intact 2/9/2017  3:47 PM   Phlebitis Assessment 0 2/9/2017  3:47 PM   Infiltration Assessment 0 2/9/2017  3:47 PM   Dressing Status Clean, dry, & intact 2/9/2017  3:47 PM   Dressing Type Tape;Transparent 2/9/2017  3:47 PM   Hub Color/Line Status Pink;Capped 2/9/2017  3:47 PM   Action Taken Open ports on tubing capped 2/9/2017  3:47 PM   Alcohol Cap Used Yes 2/9/2017  3:47 PM        Opportunity for questions and clarification was provided.       Patient transported with:   Monitor  O2 @ 2 liters  Tech

## 2017-02-09 NOTE — H&P
Orders put in for pt with asthma exac & possible PNA by CXR. I will see pt later this afternoon -- Full H&P to follow.

## 2017-02-09 NOTE — IP AVS SNAPSHOT
Current Discharge Medication List  
  
Take these medications at their scheduled times Dose & Instructions Dispensing Information Comments Morning Noon Evening Bedtime ADVAIR DISKUS 250-50 mcg/dose diskus inhaler Generic drug:  fluticasone-salmeterol Your next dose is: Today, Tomorrow Other:  ____________ Dose:  1 Puff Take 1 Puff by inhalation every twelve (12) hours. Refills:  0  
     
   
   
   
  
 ARNUITY ELLIPTA 100 mcg/actuation Dsdv inhaler Generic drug:  fluticasone furoate Your next dose is: Today, Tomorrow Other:  ____________ Dose:  1 Puff Take 1 Puff by inhalation daily. Refills:  0  
     
   
   
   
  
 diclofenac 1 % Gel Commonly known as:  VOLTAREN Your next dose is: Today, Tomorrow Other:  ____________ Apply  to affected area four (4) times daily. Quantity:  1500 g Refills:  6 FISH OIL PO Your next dose is: Today, Tomorrow Other:  ____________ Dose:  1 Cap Take 1 Cap by mouth daily. Refills:  0  
     
   
   
   
  
 gabapentin 300 mg capsule Commonly known as:  NEURONTIN Your next dose is: Today, Tomorrow Other:  ____________ Dose:  300 mg Take 300 mg by mouth every evening. Refills:  0 JARDIANCE 25 mg tablet Generic drug:  empagliflozin Your next dose is: Today, Tomorrow Other:  ____________ Dose:  12.5 mg Take 12.5 mg by mouth daily. Refills:  0  
     
   
   
   
  
 levoFLOXacin 500 mg tablet Commonly known as:  Kristina Radon Your next dose is: Today, Tomorrow Other:  ____________ Dose:  500 mg Take 1 Tab by mouth daily for 5 days. Quantity:  5 Tab Refills:  0  
     
   
   
   
  
 naproxen sodium 220 mg tablet Commonly known as:  NAPROSYN Your next dose is: Today, Tomorrow Other:  ____________ Dose:  880 mg Take 880 mg by mouth daily. Indications: OSTEOARTHRITIS Refills:  0  
     
   
   
   
  
 sertraline 100 mg tablet Commonly known as:  ZOLOFT Your next dose is: Today, Tomorrow Other:  ____________ Dose:  100 mg Take 1 Tab by mouth nightly. Quantity:  90 Tab Refills:  3 SINGULAIR 10 mg tablet Generic drug:  montelukast  
   
Your next dose is: Today, Tomorrow Other:  ____________ Dose:  10 mg Take 10 mg by mouth daily. Refills:  0 SPIRIVA WITH HANDIHALER 18 mcg inhalation capsule Generic drug:  tiotropium Your next dose is: Today, Tomorrow Other:  ____________ Dose:  1 Cap Take 1 Cap by inhalation daily. Refills:  0  
     
   
   
   
  
 therapeutic multivitamin tablet Commonly known as:  [facility] Your next dose is: Today, Tomorrow Other:  ____________ Dose:  1 Tab Take 1 Tab by mouth daily. Refills:  0  
     
   
   
   
  
 VITAMIN D3 2,000 unit Tab Generic drug:  cholecalciferol (vitamin D3) Your next dose is: Today, Tomorrow Other:  ____________ Dose:  2000 Units Take 2,000 Units by mouth daily. Refills:  0  
     
   
   
   
  
 XOLAIR 150 mg Solr Generic drug:  omalizumab Your next dose is: Today, Tomorrow Other:  ____________ Dose:  150 mg  
150 mg by SubCUTAneous route every fourteen (14) days. Next dose due 2/17 Refills:  0 Take these medications as needed Dose & Instructions Dispensing Information Comments Morning Noon Evening Bedtime * albuterol 2.5 mg /3 mL (0.083 %) nebulizer solution Commonly known as:  PROVENTIL VENTOLIN Your next dose is: Today, Tomorrow Other:  ____________  Dose:  2.5 mg  
 3 mL by Nebulization route four (4) times daily as needed for Wheezing or Shortness of Breath. Quantity:  100 Each Refills:  5  
     
   
   
   
  
 * albuterol 2.5 mg /3 mL (0.083 %) nebulizer solution Commonly known as:  PROVENTIL VENTOLIN Your next dose is: Today, Tomorrow Other:  ____________ Dose:  2.5 mg  
3 mL by Nebulization route four (4) times daily as needed for Wheezing or Shortness of Breath. Quantity:  48 Each Refills:  6 ALPRAZolam 0.5 mg tablet Commonly known as:  Everet Kill Your next dose is: Today, Tomorrow Other:  ____________ Dose:  0.25-0.5 mg Take 0.25-0.5 mg by mouth daily as needed for Anxiety. Refills:  0  
     
   
   
   
  
 * Notice: This list has 2 medication(s) that are the same as other medications prescribed for you. Read the directions carefully, and ask your doctor or other care provider to review them with you. Take these medications as directed Dose & Instructions Dispensing Information Comments Morning Noon Evening Bedtime * PROVENTIL HFA 90 mcg/actuation inhaler Generic drug:  albuterol Your next dose is: Today, Tomorrow Other:  ____________  
   
   
 inhale 2 puffs four times a day if needed for wheezing Quantity:  3 Inhaler Refills:  3 CARTIA  mg ER capsule Generic drug:  dilTIAZem CD Your next dose is: Today, Tomorrow Other:  ____________ TAKE 3 CAPSULES BY MOUTH DAILY Quantity:  90 Cap Refills:  11  
     
   
   
   
  
 furosemide 40 mg tablet Commonly known as:  LASIX Your next dose is: Today, Tomorrow Other:  ____________  
   
   
 take 2 tablets by mouth every morning and take 1 tablet by mouth every evening Quantity:  270 Tab Refills:  3 HumuLIN N 100 unit/mL injection Generic drug:  insulin NPH  
   
 Your next dose is: Today, Tomorrow Other:  ____________ INJECT 35 UNITS UNDER THE SKIN IN THE MORNING AND INJECT 25 UNITS AT BEDTIME OR AS DIRECTED Quantity:  30 mL Refills:  11  
     
   
   
   
  
 insulin regular 100 unit/mL injection Commonly known as:  ROHINI Shah Your next dose is: Today, Tomorrow Other:  ____________ Sliding scale: For -300 use 4 units, 301-400 use 8 units, greater than 400 use 12 units, greater than 500 call MD.  
 Quantity:  3 Vial  
Refills:  11  
     
   
   
   
  
 metoprolol succinate 25 mg XL tablet Commonly known as:  TOPROL-XL Your next dose is: Today, Tomorrow Other:  ____________ TAKE 1 TABLET BY MOUTH EVERY DAY Quantity:  90 Tab Refills:  3  
     
   
   
   
  
 potassium chloride 20 mEq tablet Commonly known as:  K-DUR KLOR-CON Your next dose is: Today, Tomorrow Other:  ____________ TAKE 1 TABLET BY MOUTH TWICE DAILY Quantity:  60 Tab Refills:  11  
     
   
   
   
  
 predniSONE 10 mg tablet Commonly known as:  Shade Ok Your next dose is: Today, Tomorrow Other:  ____________ Take 6t every day for 4d, then 5t every day for 4d, then 4t QD for 4d, then 3t QD for 4d, then 2t QD for 4d, then 1t QD for 4d. Quantity:  84 Tab Refills:  0  
     
   
   
   
  
 warfarin 5 mg tablet Commonly known as:  COUMADIN Your next dose is: Today, Tomorrow Other:  ____________ TAKE 1 TABLET BY MOUTH THREE TIMES A WEEK THEN TAKE 1/2 TABLET THE OTHER FOUR DAYS OF THE WEEK Quantity:  30 Tab Refills:  6  
     
   
   
   
  
 * Notice: This list has 1 medication(s) that are the same as other medications prescribed for you. Read the directions carefully, and ask your doctor or other care provider to review them with you. Where to Get Your Medications These medications were sent to [address] [place] [address] [phone] [facility]  [address] [facility], [address] [place] [id number] Phone:  [phone]  
  albuterol 2.5 mg /3 mL (0.083 %) nebulizer solution  
 levoFLOXacin 500 mg tablet  
 predniSONE 10 mg tablet

## 2017-02-09 NOTE — PROGRESS NOTES
Admission Medication Reconciliation:    Information obtained from: patient interview, rx query    Significant PMH/Disease States:   Past Medical History   Diagnosis Date    Atrial fibrillation ([facility] [address]     Depression     DM (diabetes mellitus) ([facility] [address]     HTN (hypertension)     Hyperlipidemia     Neuropathy     LITA on CPAP     Other ill-defined conditions(799.89) pneumonia       Chief Complaint for this Admission:  SOB    Allergies:  Latex; Codeine; Lisinopril; Statins-hmg-coa reductase inhibitors; and Morphine    Prior to Admission Medications:   Prior to Admission Medications   Prescriptions Last Dose Informant Patient Reported? Taking? ALPRAZolam (XANAX) 0.5 mg tablet 2/2/2017 at Unknown time  Yes Yes   Sig: Take 0.25-0.5 mg by mouth daily as needed for Anxiety. CARTIA  mg ER capsule 2/9/2017 at Unknown time  No Yes   Sig: TAKE 3 CAPSULES BY MOUTH DAILY   DOCOSAHEXANOIC ACID/EPA (FISH OIL PO) 2/9/2017 at Unknown time  Yes Yes   Sig: Take 1 Cap by mouth daily. HUMULIN N 100 unit/mL injection   No No   Sig: INJECT 35 UNITS UNDER THE SKIN IN THE MORNING AND INJECT 25 UNITS AT BEDTIME OR AS DIRECTED   Patient taking differently: INJECT 40 UNITS UNDER THE SKIN IN THE MORNING AND INJECT 20 UNITS AT BEDTIME OR AS DIRECTED   PROVENTIL HFA 90 mcg/actuation inhaler 2/9/2017 at Unknown time  No Yes   Sig: inhale 2 puffs four times a day if needed for wheezing   albuterol (PROVENTIL VENTOLIN) 2.5 mg /3 mL (0.083 %) nebulizer solution 2/9/2017 at Unknown time  No Yes   Sig: 3 mL by Nebulization route four (4) times daily as needed for Wheezing or Shortness of Breath. azithromycin (ZITHROMAX) 500 mg tab 2/9/2017 at Unknown time  Yes Yes   Sig: Take 500 mg by mouth See Admin Instructions. 2 TABLETS DAY 1, THEN ONE TABLET DAILY X4 DAYS, STARTED ON 2/7/17   cholecalciferol, vitamin D3, (VITAMIN D3) 2,000 unit tab 2/9/2017 at Unknown time  Yes Yes   Sig: Take 2,000 Units by mouth daily.    diclofenac (VOLTAREN) 1 % gel 2017 at Unknown time  No Yes   Sig: Apply  to affected area four (4) times daily. empagliflozin (JARDIANCE) 25 mg tablet 2017 at Unknown time  Yes Yes   Sig: Take 12.5 mg by mouth daily. fluticasone furoate (ARNUITY ELLIPTA) 100 mcg/actuation dsdv inhaler 2017 at Unknown time  Yes Yes   Sig: Take 1 Puff by inhalation daily. fluticasone-salmeterol (ADVAIR DISKUS) 250-50 mcg/dose diskus inhaler 2017 at Unknown time  Yes Yes   Sig: Take 1 Puff by inhalation every twelve (12) hours. furosemide (LASIX) 40 mg tablet 2017 at Unknown time  No Yes   Sig: take 2 tablets by mouth every morning and take 1 tablet by mouth every evening   gabapentin (NEURONTIN) 300 mg capsule 2017 at Unknown time  Yes Yes   Sig: Take 300 mg by mouth every evening. insulin regular (NOVOLIN R, HUMULIN R) 100 unit/mL injection 2017 at Unknown time  No Yes   Sig: Sliding scale: For -300 use 4 units, 301-400 use 8 units, greater than 400 use 12 units, greater than 500 call MD.   Patient taking differently: Inject 18 units in the morning and 15 units in the evening. metoprolol succinate (TOPROL-XL) 25 mg XL tablet 2017 at Unknown time  No Yes   Sig: TAKE 1 TABLET BY MOUTH EVERY DAY   montelukast (SINGULAIR) 10 mg tablet 2017 at Unknown time  Yes Yes   Sig: Take 10 mg by mouth daily. naproxen sodium (NAPROSYN) 220 mg tablet 2017 at Unknown time  Yes Yes   Sig: Take 880 mg by mouth daily. Indications: OSTEOARTHRITIS   omalizumab Wu Buys) 150 mg solr 2/3/2017  Yes Yes   Si mg by SubCUTAneous route every fourteen (14) days. Next dose due    potassium chloride (K-DUR, KLOR-CON) 20 mEq tablet 2017 at Unknown time  No Yes   Sig: TAKE 1 TABLET BY MOUTH TWICE DAILY   Patient taking differently: TAKE 1 TABLET BY MOUTH DAILY   sertraline (ZOLOFT) 100 mg tablet 2017 at Unknown time  No Yes   Sig: Take 1 Tab by mouth nightly. Patient taking differently: Take 50 mg by mouth nightly. therapeutic multivitamin (THERAGRAN) tablet 2/9/2017 at Unknown time  Yes Yes   Sig: Take 1 Tab by mouth daily. tiotropium (SPIRIVA WITH HANDIHALER) 18 mcg inhalation capsule 2/9/2017 at Unknown time  Yes Yes   Sig: Take 1 Cap by inhalation daily. warfarin (COUMADIN) 5 mg tablet   No No   Sig: TAKE 1 TABLET BY MOUTH THREE TIMES A WEEK THEN TAKE 1/2 TABLET THE OTHER FOUR DAYS OF THE WEEK   Patient taking differently: TAKE 1 TABLET (5 MG)  BY MOUTH EVERY EVENING AT 6PM      Facility-Administered Medications: None     Comments/Recommendations: All medications/allergies have been reviewed and updated; last medication administration times reviewed and recorded. Changes made to Prior to Admission (PTA) Medication List:     Medications Added:  - Arnuity Ellipta (pt is using sample inhaler), Xolair (every 2 week injections), Azithromycin x5 days, Singulair, Aleve     Medications Changed:  - Advair diskus strength updated  - Gabapentin frequency updated  - Warfarin dose updated  - Insulin R + N dose updated     Medications Removed:  - Robitussin DM, Ibuprofen  - None    Thank you for allowing me to participate in the care of this patient. If there are any further questions, please contact the pharmacy at  or the medication reconciliation pharmacist at . Ranjit Gannon., BCPS

## 2017-02-09 NOTE — H&P
History and Physical    Subjective:     [place] is a 66 y.o. white female with asthma who presents with several days of increasing cough & dyspnea. No f/c. She came to the ER where she was given nebs, but she remains \"tight. \"  Pt is being admitted for inpatient management. Pt has a h/o admission for asthma exacerbations in the past.  She is f/b Dr. Jocelyn Jha as an outpt. Pt has a h/o chronic atrial fibrillation for which she is maintained on coumadin. Past Medical History   Diagnosis Date    At[facility] ([facility]     Depression     DM (diabetes mellitus) ([facility]     HTN (hypertension)     Hyperlipidemia     Neuropathy     LITA on CPAP     Other ill-defined conditions(799.63) pneumonia     Allergies   Allergen Reactions    Latex Itching    Codeine Rash    Lisinopril Other (comments)     Cough, vomiting, Visual disturbances    Statins-Hmg-Coa Reductase Inhibitors Other (comments)     Muscle enzyme problems    Morphine Itching     Prior to Admission medications    Medication Sig Start Date End Date Taking? Authorizing Provider   empagliflozin (JARDIANCE) 25 mg tablet Take 12.5 mg by mouth daily. Yes Historical Provider   fluticasone furoate (ARNUITY ELLIPTA) 100 mcg/actuation dsdv inhaler Take 1 Puff by inhalation daily. Yes Historical Provider   azithromycin (ZITHROMAX) 500 mg tab Take 500 mg by mouth See Admin Instructions. 2 TABLETS DAY 1, THEN ONE TABLET DAILY X4 DAYS, STARTED ON 2/7/17   Yes Historical Provider   montelukast (SINGULAIR) 10 mg tablet Take 10 mg by mouth daily. Yes Historical Provider   naproxen sodium (NAPROSYN) 220 mg tablet Take 880 mg by mouth daily. Indications: OSTEOARTHRITIS   Yes Historical Provider   omalizumab Forrestine Pulse) 150 mg solr 150 mg by SubCUTAneous route every fourteen (14) days.  Next dose due 2/17   Yes Historical Provider   metoprolol succinate (TOPROL-XL) 25 mg XL tablet TAKE 1 TABLET BY MOUTH EVERY DAY 12/30/16  Yes JOSHUA Menjivar MD   potassium chloride (K-DUR, KLOR-CON) 20 mEq tablet TAKE 1 TABLET BY MOUTH TWICE DAILY  Patient taking differently: TAKE 1 TABLET BY MOUTH DAILY 11/6/16  Yes Rohit Raymundo MD   fluticasone-salmeterol (ADVAIR DISKUS) 250-50 mcg/dose diskus inhaler Take 1 Puff by inhalation every twelve (12) hours. Yes Historical Provider   tiotropium (SPIRIVA WITH HANDIHALER) 18 mcg inhalation capsule Take 1 Cap by inhalation daily. Yes Historical Provider   diclofenac (VOLTAREN) 1 % gel Apply  to affected area four (4) times daily. 11/4/16  Yes JOSHUA Raymundo MD   albuterol (PROVENTIL VENTOLIN) 2.5 mg /3 mL (0.083 %) nebulizer solution 3 mL by Nebulization route four (4) times daily as needed for Wheezing or Shortness of Breath. 10/8/16  Yes Mónica Church MD   therapeutic multivitamin [facility]) tablet Take 1 Tab by mouth daily. Yes Historical Provider   CARTIA  mg ER capsule TAKE 3 CAPSULES BY MOUTH DAILY 5/24/16  Yes Rohit Raymundo MD   sertraline (ZOLOFT) 100 mg tablet Take 1 Tab by mouth nightly. Patient taking differently: Take 50 mg by mouth nightly. 3/16/16  Yes Rohit Raymundo MD   furosemide (LASIX) 40 mg tablet take 2 tablets by mouth every morning and take 1 tablet by mouth every evening 3/16/16  Yes Rohit Raymundo MD   insulin regular (NOVOLIN R, HUMULIN R) 100 unit/mL injection Sliding scale: For -300 use 4 units, 301-400 use 8 units, greater than 400 use 12 units, greater than 500 call MD.  Patient taking differently: Inject 18 units in the morning and 15 units in the evening. 8/14/15  Yes JOSHUA Raymundo MD   gabapentin (NEURONTIN) 300 mg capsule Take 300 mg by mouth every evening. Yes Historical Provider   PROVENTIL HFA 90 mcg/actuation inhaler inhale 2 puffs four times a day if needed for wheezing 1/17/15  Yes JOSHUA Raymundo MD   ALPRAZolam Dasatif Dilan) 0.5 mg tablet Take 0.25-0.5 mg by mouth daily as needed for Anxiety.    Yes Historical Provider   cholecalciferol, vitamin D3, (VITAMIN D3) 2,000 unit tab Take 2,000 Units by mouth daily. Yes Historical Provider   DOCOSAHEXANOIC ACID/EPA (FISH OIL PO) Take 1 Cap by mouth daily. Yes Historical Provider   warfarin (COUMADIN) 5 mg tablet TAKE 1 TABLET BY MOUTH THREE TIMES A WEEK THEN TAKE 1/2 TABLET THE OTHER FOUR DAYS OF THE WEEK  Patient taking differently: TAKE 1 TABLET (5 MG)  BY MOUTH EVERY EVENING AT 6PM 11/16/16   Adelaide Cooley MD   HUMULIN N 100 unit/mL injection INJECT 35 UNITS UNDER THE SKIN IN THE MORNING AND INJECT 25 UNITS AT BEDTIME OR AS DIRECTED  Patient taking differently: INJECT 40 UNITS UNDER THE SKIN IN THE MORNING AND INJECT 20 UNITS AT BEDTIME OR AS DIRECTED 10/9/16   JOSHUA Cooley MD     Social History   Substance Use Topics    Smoking status: Former Smoker     Quit date: 1/1/1992    Smokeless tobacco: Not on file    Alcohol use Yes      Comment: occ wine     Family History   Problem Relation Age of Onset    Stroke Mother     Diabetes Other     Heart Disease Other                Review of Systems:  As above. Objective:       Physical Exam:   In NAD. A&O. HEENT -- Unremarkable. Neck -- Supple. No JVD. Heart -- Irr Irr (Rate OK). No R/M/G. Lungs -- Few scattered wheezes. .  Abdomen -- Soft. Non-tender. Non-distended. No masses. Bowel sounds present. Extremeties -- Trace to 1+ LE edema b/l.         Data Review:   Recent Results (from the past 24 hour(s))   EKG, 12 LEAD, INITIAL    Collection Time: 02/09/17 11:07 AM   Result Value Ref Range    Ventricular Rate 93 BPM    Atrial Rate 75 BPM    QRS Duration 98 ms    Q-T Interval 370 ms    QTC Calculation (Bezet) 460 ms    Calculated R Axis -43 degrees    Calculated T Axis 73 degrees    Diagnosis       Atrial fibrillation  Left axis deviation  Nonspecific ST and T wave abnormality  When compared with ECG of 30-SEP-2016 20:03,  No significant change    Confirmed by Camelia Flores MD, Sharon Knox ([id number]) on 2/9/2017 4:19:43 PM     CBC WITH AUTOMATED DIFF    Collection Time: 02/09/17 11:13 AM   Result Value Ref Range    WBC 9.3 3.6 - 11.0 K/uL    RBC 4.55 3.80 - 5.20 M/uL    HGB 12.6 11.5 - 16.0 g/dL    HCT 39.6 35.0 - 47.0 %    MCV 87.0 80.0 - 99.0 FL    MCH 27.7 26.0 - 34.0 PG    MCHC 31.8 30.0 - 36.5 g/dL    RDW 15.4 (H) 11.5 - 14.5 %    PLATELET 507 281 - 600 K/uL    NEUTROPHILS 72 32 - 75 %    LYMPHOCYTES 17 12 - 49 %    MONOCYTES 7 5 - 13 %    EOSINOPHILS 3 0 - 7 %    BASOPHILS 1 0 - 1 %    ABS. NEUTROPHILS 6.8 1.8 - 8.0 K/UL    ABS. LYMPHOCYTES 1.6 0.8 - 3.5 K/UL    ABS. MONOCYTES 0.6 0.0 - 1.0 K/UL    ABS. EOSINOPHILS 0.3 0.0 - 0.4 K/UL    ABS. BASOPHILS 0.1 0.0 - 0.1 K/UL   METABOLIC PANEL, COMPREHENSIVE    Collection Time: 02/09/17 11:13 AM   Result Value Ref Range    Sodium 136 136 - 145 mmol/L    Potassium 4.0 3.5 - 5.1 mmol/L    Chloride 101 97 - 108 mmol/L    CO2 24 21 - 32 mmol/L    Anion gap 11 5 - 15 mmol/L    Glucose 345 (H) 65 - 100 mg/dL    BUN 26 (H) 6 - 20 MG/DL    Creatinine 1.30 (H) 0.55 - 1.02 MG/DL    BUN/Creatinine ratio 20 12 - 20      GFR est AA 49 (L) >60 ml/min/1.73m2    GFR est non-AA 40 (L) >60 ml/min/1.73m2    Calcium 8.8 8.5 - 10.1 MG/DL    Bilirubin, total 0.4 0.2 - 1.0 MG/DL    ALT (SGPT) 41 12 - 78 U/L    AST (SGOT) 38 (H) 15 - 37 U/L    Alk.  phosphatase 92 45 - 117 U/L    Protein, total 7.5 6.4 - 8.2 g/dL    Albumin 3.6 3.5 - 5.0 g/dL    Globulin 3.9 2.0 - 4.0 g/dL    A-G Ratio 0.9 (L) 1.1 - 2.2     TROPONIN I    Collection Time: 02/09/17 11:13 AM   Result Value Ref Range    Troponin-I, Qt. <0.04 <0.05 ng/mL   BNP    Collection Time: 02/09/17 11:13 AM   Result Value Ref Range     (H) 0 - 100 pg/mL   PROTHROMBIN TIME + INR    Collection Time: 02/09/17 11:13 AM   Result Value Ref Range    INR 1.2 (H) 0.9 - 1.1      Prothrombin time 12.4 (H) 9.0 - 11.1 sec   D DIMER    Collection Time: 02/09/17 11:13 AM   Result Value Ref Range    D-dimer 0.64 0.00 - 0.65 mg/L FEU   MAGNESIUM Collection Time: 02/09/17 11:13 AM   Result Value Ref Range    Magnesium 2.2 1.6 - 2.4 mg/dL   GLUCOSE, POC    Collection Time: 02/09/17  3:59 PM   Result Value Ref Range    Glucose (POC) 247 (H) 65 - 100 mg/dL    Performed by Harini Stevenson            Chest x-ray -- Possible minimal patch of airspace disease posterior lung base. Assessment:     Principal Problem:    Respiratory failure due to asthma exacerbation      Active Problems:    Asthma exacerbation (1/21/2014)      Morbid obesity ([address] (2/18/2014)      Type 2 diabetes mellitus without complication (HCC) (6/18/4214)      Chronic atrial fibrillation (HCC) (7/17/2015)      Essential hypertension with goal blood pressure less than 140/90 (5/24/2016)      PNA (pneumonia) (2/9/2017)        Plan:     1. Admit tele. (I agree there is not a need for ICU care at this time). 2.  Nebs, IV Solu-medrol, & IV Levaquin. 3.  Accuchecks with SSI. Cont NPH as at home. 4.  Cont Coumadin -- Pharmacy to dose (will prob need a lower dose while on Levaquin).           Signed By: Juany Quigley MD     February 9, 2017

## 2017-02-10 LAB
GLUCOSE BLD STRIP.AUTO-MCNC: 359 MG/DL (ref 65–100)
GLUCOSE BLD STRIP.AUTO-MCNC: 423 MG/DL (ref 65–100)
GLUCOSE BLD STRIP.AUTO-MCNC: 463 MG/DL (ref 65–100)
GLUCOSE BLD STRIP.AUTO-MCNC: 588 MG/DL (ref 65–100)
INR PPP: 1.3 (ref 0.9–1.1)
PROTHROMBIN TIME: 13.3 SEC (ref 9–11.1)
SERVICE CMNT-IMP: ABNORMAL

## 2017-02-10 PROCEDURE — 74011250637 HC RX REV CODE- 250/637: Performed by: INTERNAL MEDICINE

## 2017-02-10 PROCEDURE — 85610 PROTHROMBIN TIME: CPT | Performed by: INTERNAL MEDICINE

## 2017-02-10 PROCEDURE — 94640 AIRWAY INHALATION TREATMENT: CPT

## 2017-02-10 PROCEDURE — 74011250636 HC RX REV CODE- 250/636: Performed by: INTERNAL MEDICINE

## 2017-02-10 PROCEDURE — 65660000000 HC RM CCU STEPDOWN

## 2017-02-10 PROCEDURE — 74011636637 HC RX REV CODE- 636/637: Performed by: INTERNAL MEDICINE

## 2017-02-10 PROCEDURE — 36415 COLL VENOUS BLD VENIPUNCTURE: CPT | Performed by: INTERNAL MEDICINE

## 2017-02-10 PROCEDURE — 77030013140 HC MSK NEB VYRM -A

## 2017-02-10 PROCEDURE — 74011000250 HC RX REV CODE- 250: Performed by: INTERNAL MEDICINE

## 2017-02-10 PROCEDURE — 82962 GLUCOSE BLOOD TEST: CPT

## 2017-02-10 PROCEDURE — 94660 CPAP INITIATION&MGMT: CPT

## 2017-02-10 RX ORDER — INSULIN LISPRO 100 [IU]/ML
25 INJECTION, SOLUTION INTRAVENOUS; SUBCUTANEOUS ONCE
Status: COMPLETED | OUTPATIENT
Start: 2017-02-10 | End: 2017-02-10

## 2017-02-10 RX ORDER — INSULIN LISPRO 100 [IU]/ML
17 INJECTION, SOLUTION INTRAVENOUS; SUBCUTANEOUS ONCE
Status: COMPLETED | OUTPATIENT
Start: 2017-02-10 | End: 2017-02-10

## 2017-02-10 RX ORDER — WARFARIN SODIUM 5 MG/1
5 TABLET ORAL ONCE
Status: COMPLETED | OUTPATIENT
Start: 2017-02-10 | End: 2017-02-10

## 2017-02-10 RX ADMIN — IPRATROPIUM BROMIDE 0.5 MG: 0.5 SOLUTION RESPIRATORY (INHALATION) at 13:08

## 2017-02-10 RX ADMIN — METHYLPREDNISOLONE SODIUM SUCCINATE 125 MG: 125 INJECTION, POWDER, FOR SOLUTION INTRAMUSCULAR; INTRAVENOUS at 06:39

## 2017-02-10 RX ADMIN — IPRATROPIUM BROMIDE 0.5 MG: 0.5 SOLUTION RESPIRATORY (INHALATION) at 20:51

## 2017-02-10 RX ADMIN — ALPRAZOLAM 0.5 MG: 0.25 TABLET ORAL at 23:20

## 2017-02-10 RX ADMIN — FUROSEMIDE 40 MG: 40 TABLET ORAL at 17:12

## 2017-02-10 RX ADMIN — VITAMIN D, TAB 1000IU (100/BT) 2000 UNITS: 25 TAB at 08:33

## 2017-02-10 RX ADMIN — METHYLPREDNISOLONE SODIUM SUCCINATE 90 MG: 125 INJECTION, POWDER, FOR SOLUTION INTRAMUSCULAR; INTRAVENOUS at 17:12

## 2017-02-10 RX ADMIN — ONDANSETRON 4 MG: 2 INJECTION INTRAMUSCULAR; INTRAVENOUS at 23:40

## 2017-02-10 RX ADMIN — ALBUTEROL SULFATE 2.5 MG: 2.5 SOLUTION RESPIRATORY (INHALATION) at 13:08

## 2017-02-10 RX ADMIN — FUROSEMIDE 80 MG: 40 TABLET ORAL at 08:33

## 2017-02-10 RX ADMIN — SERTRALINE HYDROCHLORIDE 50 MG: 50 TABLET ORAL at 23:20

## 2017-02-10 RX ADMIN — INSULIN LISPRO 8 UNITS: 100 INJECTION, SOLUTION INTRAVENOUS; SUBCUTANEOUS at 23:11

## 2017-02-10 RX ADMIN — BUDESONIDE 500 MCG: 0.5 INHALANT RESPIRATORY (INHALATION) at 20:00

## 2017-02-10 RX ADMIN — INSULIN LISPRO 17 UNITS: 100 INJECTION, SOLUTION INTRAVENOUS; SUBCUTANEOUS at 12:54

## 2017-02-10 RX ADMIN — WARFARIN SODIUM 5 MG: 5 TABLET ORAL at 17:11

## 2017-02-10 RX ADMIN — THERA TABS 1 TABLET: TAB at 08:33

## 2017-02-10 RX ADMIN — IPRATROPIUM BROMIDE 0.5 MG: 0.5 SOLUTION RESPIRATORY (INHALATION) at 08:45

## 2017-02-10 RX ADMIN — MONTELUKAST SODIUM 10 MG: 10 TABLET, FILM COATED ORAL at 17:11

## 2017-02-10 RX ADMIN — METHYLPREDNISOLONE SODIUM SUCCINATE 90 MG: 125 INJECTION, POWDER, FOR SOLUTION INTRAMUSCULAR; INTRAVENOUS at 23:21

## 2017-02-10 RX ADMIN — ALBUTEROL SULFATE 2.5 MG: 2.5 SOLUTION RESPIRATORY (INHALATION) at 08:45

## 2017-02-10 RX ADMIN — ALBUTEROL SULFATE 2.5 MG: 2.5 SOLUTION RESPIRATORY (INHALATION) at 17:40

## 2017-02-10 RX ADMIN — GUAIFENESIN 1200 MG: 600 TABLET, EXTENDED RELEASE ORAL at 17:11

## 2017-02-10 RX ADMIN — Medication 1 CAPSULE: at 08:33

## 2017-02-10 RX ADMIN — METOPROLOL SUCCINATE 25 MG: 25 TABLET, EXTENDED RELEASE ORAL at 08:33

## 2017-02-10 RX ADMIN — POTASSIUM CHLORIDE 20 MEQ: 750 TABLET, FILM COATED, EXTENDED RELEASE ORAL at 08:33

## 2017-02-10 RX ADMIN — LEVOFLOXACIN 500 MG: 5 INJECTION, SOLUTION INTRAVENOUS at 17:11

## 2017-02-10 RX ADMIN — METHYLPREDNISOLONE SODIUM SUCCINATE 90 MG: 125 INJECTION, POWDER, FOR SOLUTION INTRAMUSCULAR; INTRAVENOUS at 11:57

## 2017-02-10 RX ADMIN — INSULIN LISPRO 25 UNITS: 100 INJECTION, SOLUTION INTRAVENOUS; SUBCUTANEOUS at 17:23

## 2017-02-10 RX ADMIN — HUMAN INSULIN 35 UNITS: 100 INJECTION, SUSPENSION SUBCUTANEOUS at 23:11

## 2017-02-10 RX ADMIN — ALBUTEROL SULFATE 2.5 MG: 2.5 SOLUTION RESPIRATORY (INHALATION) at 20:51

## 2017-02-10 RX ADMIN — GUAIFENESIN 1200 MG: 600 TABLET, EXTENDED RELEASE ORAL at 08:33

## 2017-02-10 RX ADMIN — BUDESONIDE 500 MCG: 0.5 INHALANT RESPIRATORY (INHALATION) at 08:45

## 2017-02-10 RX ADMIN — METHYLPREDNISOLONE SODIUM SUCCINATE 125 MG: 125 INJECTION, POWDER, FOR SOLUTION INTRAMUSCULAR; INTRAVENOUS at 01:03

## 2017-02-10 RX ADMIN — NIFEDIPINE 120 MG: 10 CAPSULE ORAL at 08:33

## 2017-02-10 RX ADMIN — IPRATROPIUM BROMIDE 0.5 MG: 0.5 SOLUTION RESPIRATORY (INHALATION) at 02:31

## 2017-02-10 RX ADMIN — HUMAN INSULIN 40 UNITS: 100 INJECTION, SUSPENSION SUBCUTANEOUS at 08:38

## 2017-02-10 RX ADMIN — GABAPENTIN 300 MG: 300 CAPSULE ORAL at 17:11

## 2017-02-10 RX ADMIN — INSULIN LISPRO 10 UNITS: 100 INJECTION, SOLUTION INTRAVENOUS; SUBCUTANEOUS at 08:37

## 2017-02-10 NOTE — PROGRESS NOTES
Transition RN to the Bedside to assist Primary RN with patient education, medication educations, discharge instructions, and stroke core measure compliance. Discharge concerns initiated and discussed with patient, including clarification on \"who\" assists the patient at their home and instructions for when the home going patient should call their provider after discharge. Opportunity for questions and clarification was provided. Patient receptive to education: YES  Patient stated: \"Oh! It's the steroids that are making me cry? \"  Barriers to Education: none  Diagnosis Education given:  YES    Length of stay: 1  Expected Day of Discharge: TBD    Education Day #: 1    Medication Education Given:  YES  M in the box Medication name: methylprednisone, levoquin      Stroke Education documented in Patient Education: NO  Core Measures Documented in [facility]:  Risk Factors: NO  Warning signs of stroke: NO  When to Activate 911: NO  Medication Education for Risk Factors: NO  Smoking cessation if applicable: NO  Written Education Given:  NO    Discharge NIH Completed: NO  Score: na    BRAINS: NO    Follow Up Appointment Made: NO  Date/Time if applicable: TBD at [place].

## 2017-02-10 NOTE — PROGRESS NOTES
Pharmacist Note - Warfarin Dosing  Consult provided for this 66 y. o.female to manage warfarin for Atrial Fibrillation    INR Goal: 2 - 3    Home regimen/ tablet size: 5mg po daily per [facility] pharmacist    Drugs that may increase INR: Macrolides and Quinolones  Drugs that may decrease INR: None  Other current anticoagulants/ drugs that may increase bleeding risk: NSAID  Risk factors: Age > 65  Daily INR ordered: YES    Recent Labs      02/09/17   1113   HGB  12.6   INR  1.2*     Date               INR                  Dose  2/1  1.2                                                                                  Assessment/ Plan: Will order warfarin 5 mg PO x 1 dose    Pharmacy will continue to monitor daily and adjust therapy as indicated. Please contact the pharmacist at  for outpatient recommendations if needed.

## 2017-02-10 NOTE — PROGRESS NOTES
Pharmacist Note - Warfarin Dosing  Consult provided for this 66 y. o.female to manage warfarin for Atrial Fibrillation     INR Goal: 2 - 3     Home regimen/ tablet size: 5mg po daily per [facility] pharmacist     Drugs that may increase INR: Macrolides and Quinolones, methylprednisolone  Drugs that may decrease INR: None  Other current anticoagulants/ drugs that may increase bleeding risk: NSAID  Risk factors: Age > 65  Daily INR ordered: YES  Recent Labs      02/10/17   0314  02/09/17   1113   HGB   --   12.6   INR  1.3*  1.2*     Date               INR                  Dose  2/9                 1.2                    5 mg  2/10           1.3  5 mg                                                                                 Assessment/ Plan: Will order warfarin 5 mg PO x 1 dose. Pharmacy will continue to monitor daily and adjust therapy as indicated. Please contact the pharmacist at x [phone] or  for outpatient recommendations if needed.      Devon Garcia, PharmD

## 2017-02-10 NOTE — PROGRESS NOTES
Pt's BS over 500. Candance Huger ... Will give a now dose of 25 units Humalog. Increase NPH to 50 in am, 35 at HS (at least while she is on the steroids).

## 2017-02-10 NOTE — DIABETES MGMT
DTC Progress Note    Recommendations/ Comments: Chart reviewed for hyperglycemia with patient on steroids. If appropriate, please consider changing NPH dosing to 40 units qid to be administered along with q 6 hour solumedrol. Also, please consider changing correction scale to resistant. Patient is a 66 y.o. female with a documented history of diabetes on insulin injections: NPH: 40 units am, 20 units pm, Jardiance and regular insulin (scale) at home. A1c:   Lab Results   Component Value Date/Time    Hemoglobin A1c 10.4 06/11/2015 03:57 PM       Recent Glucose Results:   Lab Results   Component Value Date/Time    GLUCPOC 463 (H) 02/10/2017 11:33 AM    GLUCPOC 359 (H) 02/10/2017 07:24 AM    GLUCPOC 397 (H) 02/09/2017 09:05 PM        Lab Results   Component Value Date/Time    Creatinine 1.30 02/09/2017 11:13 AM       Active Orders   Diet    DIET DIABETIC CONSISTENT CARB Regular        PO intake: No data found. Current hospital DM medication: NPH 40 units am, 20 units pm    Will continue to follow as needed.     Thank you  Keyur Us, MS, RN, CDE

## 2017-02-10 NOTE — PROGRESS NOTES
Bedside shift change report given to [address] [place] [address] Line Jeffery VILLA (oncoming nurse) by Malaika Tomlinson (offgoing nurse). Report included the following information SBAR, Intake/Output, MAR and Cardiac Rhythm Afib.

## 2017-02-10 NOTE — PROGRESS NOTES
Problem: Asthma: Day 1  Goal: Activity/Safety  Outcome: Progressing Towards Goal  Call light left within reach  Goal: Discharge Planning  Outcome: Progressing Towards Goal  Discharge planning initiated

## 2017-02-10 NOTE — INTERDISCIPLINARY ROUNDS
IDR/SLIDR Summary          Patient: Marjorie Park MRN: [id number]    Age: 66 y.o. YOB: 1945 Room/Bed: [facility]   Admit Diagnosis: Respiratory failure ([facility]  Respiratory failure ([facility])  Principal Diagnosis: Respiratory failure ([facility]   Goals: Antibiotics/Nebs  Readmission: NO  Quality Measure: Not applicable  VTE Prophylaxis: Chemical  Influenza Vaccine screening completed? YES  Pneumococcal Vaccine screening completed? YES  Mobility needs: No   Nutrition plan:No  Consults:Respiratory    Financial concerns:No  Escalated to CM? NO  RRAT Score: 16   Interventions:  Testing due for pt today?  NO  LOS: 1 days Expected length of stay 3 days  Discharge plan: Home   PCP: Ivet Field MD  Transportation needs: No    Days before discharge:two or more days before discharge   Discharge disposition: Home    Signed:     Stella Treadwell RN  2/10/2017  9:00 AM

## 2017-02-10 NOTE — PROGRESS NOTES
's Caprice Flores, Lewis Littlejohn & Mj    Admit Date: 2/9/2017    Subjective:     She says her breathing is a little better. No new complaints. Current Facility-Administered Medications   Medication Dose Route Frequency    methylPREDNISolone (PF) (SOLU-MEDROL) injection 90 mg  90 mg IntraVENous Q6H    albuterol (PROVENTIL VENTOLIN) nebulizer solution 2.5 mg  2.5 mg Nebulization Q3H PRN    albuterol (PROVENTIL VENTOLIN) nebulizer solution 2.5 mg  2.5 mg Nebulization QID RT    ALPRAZolam (XANAX) tablet 0.25-0.5 mg  0.25-0.5 mg Oral DAILY PRN    dilTIAZem CD (CARDIZEM CD) capsule 120 mg  120 mg Oral DAILY    cholecalciferol (VITAMIN D3) tablet 2,000 Units  2,000 Units Oral DAILY    . PHARMACY TO SUBSTITUTE PER PROTOCOL    Per Protocol    fish oil-omega-3 fatty acids 340-1,000 mg capsule 1 Cap  1 Cap Oral DAILY    . PHARMACY TO SUBSTITUTE PER PROTOCOL    Per Protocol    furosemide (LASIX) tablet 80 mg  80 mg Oral DAILY    furosemide (LASIX) tablet 40 mg  40 mg Oral QPM    gabapentin (NEURONTIN) capsule 300 mg  300 mg Oral QPM    insulin NPH (NOVOLIN N, HUMULIN N) injection 40 Units  40 Units SubCUTAneous DAILY    insulin NPH (NOVOLIN N, HUMULIN N) injection 20 Units  20 Units SubCUTAneous QHS    metoprolol succinate (TOPROL-XL) XL tablet 25 mg  25 mg Oral DAILY    montelukast (SINGULAIR) tablet 10 mg  10 mg Oral QPM    potassium chloride SR (KLOR-CON 10) tablet 20 mEq  20 mEq Oral DAILY WITH BREAKFAST    sertraline (ZOLOFT) tablet 50 mg  50 mg Oral QHS    therapeutic multivitamin (THERAGRAN) tablet 1 Tab  1 Tab Oral DAILY    ipratropium (ATROVENT) 0.02 % nebulizer solution 0.5 mg  0.5 mg Nebulization Q6H RT    levoFLOXacin (LEVAQUIN) 500 mg in D5W IVPB  500 mg IntraVENous Q24H    insulin lispro (HUMALOG) injection   SubCUTAneous AC&HS    glucose chewable tablet 16 g  4 Tab Oral PRN    dextrose (D50W) injection syrg 12.5-25 g  12.5-25 g IntraVENous PRN    glucagon (GLUCAGEN) injection 1 mg  1 mg IntraMUSCular PRN    guaiFENesin SR (MUCINEX) tablet 1,200 mg  1,200 mg Oral BID    arformoterol (BROVANA) neb solution 15 mcg  15 mcg Nebulization BID RT    And    budesonide (PULMICORT) 500 mcg/2 ml nebulizer suspension  500 mcg Nebulization BID RT    Warfarin dosing per pharmacy   Other Rx Dosing/Monitoring    ondansetron (ZOFRAN) injection 4 mg  4 mg IntraVENous Q4H PRN          Objective:     Patient Vitals for the past 8 hrs:   BP Temp Pulse Resp SpO2 Weight   02/10/17 0700 135/56 98 °F (36.7 °C) 88 18 95 % -   02/10/17 0407 - - - - 94 % -   02/10/17 0300 106/54 98.1 °F (36.7 °C) 79 14 94 % 296 lb 8.3 oz (134.5 kg)   02/09/17 2327 - - - - 99 % -        02/08 1901 - 02/10 0700  In: 240 [P.O.:240]  Out: -     Physical Exam: NAD. A&O. Neck -- Supple. No JVD. Heart -- Irr Irr (Rate OK). Lungs -- Some exp wheezing with decent air movement. Abd -- Benign. Ext -- Trace to 1+ LE edema, b/l.       Data Review   Recent Results (from the past 24 hour(s))   EKG, 12 LEAD, INITIAL    Collection Time: 02/09/17 11:07 AM   Result Value Ref Range    Ventricular Rate 93 BPM    Atrial Rate 75 BPM    QRS Duration 98 ms    Q-T Interval 370 ms    QTC Calculation (Bezet) 460 ms    Calculated R Axis -43 degrees    Calculated T Axis 73 degrees    Diagnosis       Atrial fibrillation  Left axis deviation  Nonspecific ST and T wave abnormality  When compared with ECG of 30-SEP-2016 20:03,  No significant change    Confirmed by Nga Malagon MD, [facility] ([id number]) on 2/9/2017 4:19:43 PM     CBC WITH AUTOMATED DIFF    Collection Time: 02/09/17 11:13 AM   Result Value Ref Range    WBC 9.3 3.6 - 11.0 K/uL    RBC 4.55 3.80 - 5.20 M/uL    HGB 12.6 11.5 - 16.0 g/dL    HCT 39.6 35.0 - 47.0 %    MCV 87.0 80.0 - 99.0 FL    MCH 27.7 26.0 - 34.0 PG    MCHC 31.8 30.0 - 36.5 g/dL    RDW 15.4 (H) 11.5 - 14.5 %    PLATELET 810 314 - 414 K/uL    NEUTROPHILS 72 32 - 75 %    LYMPHOCYTES 17 12 - 49 %    MONOCYTES 7 5 - 13 %    EOSINOPHILS 3 0 - 7 %    BASOPHILS 1 0 - 1 %    ABS. NEUTROPHILS 6.8 1.8 - 8.0 K/UL    ABS. LYMPHOCYTES 1.6 0.8 - 3.5 K/UL    ABS. MONOCYTES 0.6 0.0 - 1.0 K/UL    ABS. EOSINOPHILS 0.3 0.0 - 0.4 K/UL    ABS. BASOPHILS 0.1 0.0 - 0.1 K/UL   METABOLIC PANEL, COMPREHENSIVE    Collection Time: 02/09/17 11:13 AM   Result Value Ref Range    Sodium 136 136 - 145 mmol/L    Potassium 4.0 3.5 - 5.1 mmol/L    Chloride 101 97 - 108 mmol/L    CO2 24 21 - 32 mmol/L    Anion gap 11 5 - 15 mmol/L    Glucose 345 (H) 65 - 100 mg/dL    BUN 26 (H) 6 - 20 MG/DL    Creatinine 1.30 (H) 0.55 - 1.02 MG/DL    BUN/Creatinine ratio 20 12 - 20      GFR est AA 49 (L) >60 ml/min/1.73m2    GFR est non-AA 40 (L) >60 ml/min/1.73m2    Calcium 8.8 8.5 - 10.1 MG/DL    Bilirubin, total 0.4 0.2 - 1.0 MG/DL    ALT (SGPT) 41 12 - 78 U/L    AST (SGOT) 38 (H) 15 - 37 U/L    Alk.  phosphatase 92 45 - 117 U/L    Protein, total 7.5 6.4 - 8.2 g/dL    Albumin 3.6 3.5 - 5.0 g/dL    Globulin 3.9 2.0 - 4.0 g/dL    A-G Ratio 0.9 (L) 1.1 - 2.2     TROPONIN I    Collection Time: 02/09/17 11:13 AM   Result Value Ref Range    Troponin-I, Qt. <0.04 <0.05 ng/mL   BNP    Collection Time: 02/09/17 11:13 AM   Result Value Ref Range     (H) 0 - 100 pg/mL   PROTHROMBIN TIME + INR    Collection Time: 02/09/17 11:13 AM   Result Value Ref Range    INR 1.2 (H) 0.9 - 1.1      Prothrombin time 12.4 (H) 9.0 - 11.1 sec   D DIMER    Collection Time: 02/09/17 11:13 AM   Result Value Ref Range    D-dimer 0.64 0.00 - 0.65 mg/L FEU   MAGNESIUM    Collection Time: 02/09/17 11:13 AM   Result Value Ref Range    Magnesium 2.2 1.6 - 2.4 mg/dL   GLUCOSE, POC    Collection Time: 02/09/17  3:59 PM   Result Value Ref Range    Glucose (POC) 247 (H) 65 - 100 mg/dL    Performed by 1 Eugenio Turcios, POC    Collection Time: 02/09/17  9:05 PM   Result Value Ref Range    Glucose (POC) 397 (H) 65 - 100 mg/dL    Performed by Susan Low    PROTHROMBIN TIME + INR    Collection Time: 02/10/17  3:14 AM   Result Value Ref Range    INR 1.3 (H) 0.9 - 1.1      Prothrombin time 13.3 (H) 9.0 - 11.1 sec           Assessment:     Principal Problem:    Respiratory failure due to asthma exacerbation & probable PNA. Active Problems:    Asthma exacerbation (1/21/2014)      Morbid obesity ([facility] (2/18/2014)      Type 2 diabetes mellitus without complication ([facility] (6/80/1362)      Chronic atrial fibrillation ([facility] (7/17/2015)      Essential hypertension with goal blood pressure less than 140/90 (5/24/2016)      PNA (pneumonia) (2/9/2017)        Plan:     1. Cont Nebs & Levaquin. 2. Decrease Solu-medrol to 90 mg IV QID -- Try to wean to PO Prednisone over w/e.  3. Pharmacy dosing coumadin. 4. Can probably d/c tele tomorrow.           Matthew Naik MD

## 2017-02-10 NOTE — PROGRESS NOTES
Pulmonary, Critical Care, and Sleep Medicine~Progress Note    Name: Harleen Wiseman MRN: [id number]   : 1945 Hospital: [facility]   Date: 2/10/2017 9:31 AM Admission: 2017     IMPRESSION:   · Acute on chronic resp failure secondary to acute asthmatic bronchitis after pediatric sick contact. Possible RLL infiltrate on chest film  · Xolair patient. Followed by Dr Georgiana Faulkner   · LITA, CPAP use   · A fib  · DM II      PLAN:   · On allergy shots, xolair and conventional therapy for asthma. We discussed possibly removing herself from her rodrigo sick contact. If that does not help could consider for Nucala, she may qualify for this   · Nebs  · Diuretics   · Solu medrol, might be able to convert to PO over the weekend   · O2 titration above 90%  · On levaquin  · Warfarin   · PRN over the weekend       Daily Progression:    Breathing better today. + wheeze and congestion    OBJECTIVE:     Vital Signs:       Visit Vitals    /62    Pulse 91    Temp 98 °F (36.7 °C)    Resp 18    Ht 5' 6\" (1.676 m)    Wt 134.5 kg (296 lb 8.3 oz)    SpO2 95%    BMI 47.86 kg/m2      Temp (24hrs), Av.5 °F (36.9 °C), Min:97.9 °F (36.6 °C), Max:99.4 °F (37.4 °C)     Intake/Output:     Last shift:      Last 3 shifts:  1901 - 02/10 0700  In: 240 [P.O.:240]  Out: -         Intake/Output Summary (Last 24 hours) at 02/10/17 0931  Last data filed at 17 1814   Gross per 24 hour   Intake              240 ml   Output                0 ml   Net              240 ml       Imaging:  I have personally reviewed these radiographic films and reports:  17: chest x-ray    \"IMPRESSION:   Possible minimal patch of airspace disease posterior lung base. \"   I have personally reviewed PFTs:   n/a       Ventilation:   Mode Rate Tidal Volume Pressure Suppport FiO2/LPM PEEP Other   NC/CPAP             Physical Exam: Exam Findings Other   General: No resp distress noted, appears stated age    [de-identified]:  No ulcers, JVD not elevated, no cervical LAD    Chest: No pectus deformity, normal chest rise b/l    HEART:  RRR, no murmurs/rubs/gallops    Lungs:  Mild rhonchi     ABD: Soft/NT, non rigid mildly distended    EXT: No cyanosis/clubbing/edema, normal peripheral pulses    Skin: No rashes or ulcers, no mottling    Neuro: A/O x 3        Medications:  Current Facility-Administered Medications   Medication Dose Route Frequency    methylPREDNISolone (PF) (SOLU-MEDROL) injection 90 mg  90 mg IntraVENous Q6H    albuterol (PROVENTIL VENTOLIN) nebulizer solution 2.5 mg  2.5 mg Nebulization Q3H PRN    albuterol (PROVENTIL VENTOLIN) nebulizer solution 2.5 mg  2.5 mg Nebulization QID RT    ALPRAZolam (XANAX) tablet 0.25-0.5 mg  0.25-0.5 mg Oral DAILY PRN    dilTIAZem CD (CARDIZEM CD) capsule 120 mg  120 mg Oral DAILY    cholecalciferol (VITAMIN D3) tablet 2,000 Units  2,000 Units Oral DAILY    . PHARMACY TO SUBSTITUTE PER PROTOCOL    Per Protocol    fish oil-omega-3 fatty acids 340-1,000 mg capsule 1 Cap  1 Cap Oral DAILY    . PHARMACY TO SUBSTITUTE PER PROTOCOL    Per Protocol    furosemide (LASIX) tablet 80 mg  80 mg Oral DAILY    furosemide (LASIX) tablet 40 mg  40 mg Oral QPM    gabapentin (NEURONTIN) capsule 300 mg  300 mg Oral QPM    insulin NPH (NOVOLIN N, HUMULIN N) injection 40 Units  40 Units SubCUTAneous DAILY    insulin NPH (NOVOLIN N, HUMULIN N) injection 20 Units  20 Units SubCUTAneous QHS    metoprolol succinate (TOPROL-XL) XL tablet 25 mg  25 mg Oral DAILY    montelukast (SINGULAIR) tablet 10 mg  10 mg Oral QPM    potassium chloride SR (KLOR-CON 10) tablet 20 mEq  20 mEq Oral DAILY WITH BREAKFAST    sertraline (ZOLOFT) tablet 50 mg  50 mg Oral QHS    therapeutic multivitamin (THERAGRAN) tablet 1 Tab  1 Tab Oral DAILY    ipratropium (ATROVENT) 0.02 % nebulizer solution 0.5 mg  0.5 mg Nebulization Q6H RT    levoFLOXacin (LEVAQUIN) 500 mg in D5W IVPB  500 mg IntraVENous Q24H    insulin lispro (HUMALOG) injection   SubCUTAneous AC&HS    glucose chewable tablet 16 g  4 Tab Oral PRN    dextrose (D50W) injection syrg 12.5-25 g  12.5-25 g IntraVENous PRN    glucagon (GLUCAGEN) injection 1 mg  1 mg IntraMUSCular PRN    guaiFENesin SR (MUCINEX) tablet 1,200 mg  1,200 mg Oral BID    arformoterol (BROVANA) neb solution 15 mcg  15 mcg Nebulization BID RT    And    budesonide (PULMICORT) 500 mcg/2 ml nebulizer suspension  500 mcg Nebulization BID RT    Warfarin dosing per pharmacy   Other Rx Dosing/Monitoring    ondansetron (ZOFRAN) injection 4 mg  4 mg IntraVENous Q4H PRN       Labs:  ABG No results for input(s): PHI, PCO2I, PO2I, HCO3I, SO2I, FIO2I in the last 72 hours.      CBC Recent Labs      02/09/17   1113   WBC  9.3   HGB  12.6   HCT  39.6   PLT  198   MCV  87.0   MCH  93.7        Metabolic  Panel Recent Labs      02/10/17   0314  02/09/17   1113   NA   --   136   K   --   4.0   CL   --   101   CO2   --   24   GLU   --   345*   BUN   --   26*   CREA   --   1.30*   CA   --   8.8   MG   --   2.2   ALB   --   3.6   SGOT   --   38*   ALT   --   41   INR  1.3*  1.2*        Pertinent Labs n/a               MAYTE Chong  2/10/2017

## 2017-02-10 NOTE — PROGRESS NOTES
Bedside and Verbal shift change report given to Lissy Rosa (oncoming nurse) by Colette Mireles (offgoing nurse). Report included the following information SBAR, Kardex, Intake/Output, MAR and Cardiac Rhythm Afib.

## 2017-02-10 NOTE — PROGRESS NOTES
Primary Nurse Faizan Simpson RN and Adams RN, RN performed a dual skin assessment on this patient No impairment noted  Kobi score is 21  Scattered ecchymosis to BUE,R abdomen, R thigh, BLE due to coumadin use

## 2017-02-11 LAB
ANION GAP BLD CALC-SCNC: 10 MMOL/L (ref 5–15)
BUN SERPL-MCNC: 43 MG/DL (ref 6–20)
BUN/CREAT SERPL: 27 (ref 12–20)
CALCIUM SERPL-MCNC: 9.2 MG/DL (ref 8.5–10.1)
CHLORIDE SERPL-SCNC: 98 MMOL/L (ref 97–108)
CO2 SERPL-SCNC: 26 MMOL/L (ref 21–32)
CREAT SERPL-MCNC: 1.57 MG/DL (ref 0.55–1.02)
GLUCOSE BLD STRIP.AUTO-MCNC: 341 MG/DL (ref 65–100)
GLUCOSE BLD STRIP.AUTO-MCNC: 341 MG/DL (ref 65–100)
GLUCOSE BLD STRIP.AUTO-MCNC: 382 MG/DL (ref 65–100)
GLUCOSE BLD STRIP.AUTO-MCNC: 403 MG/DL (ref 65–100)
GLUCOSE SERPL-MCNC: 402 MG/DL (ref 65–100)
INR PPP: 1.7 (ref 0.9–1.1)
POTASSIUM SERPL-SCNC: 4.3 MMOL/L (ref 3.5–5.1)
PROTHROMBIN TIME: 17 SEC (ref 9–11.1)
SERVICE CMNT-IMP: ABNORMAL
SODIUM SERPL-SCNC: 134 MMOL/L (ref 136–145)

## 2017-02-11 PROCEDURE — 74011250637 HC RX REV CODE- 250/637: Performed by: INTERNAL MEDICINE

## 2017-02-11 PROCEDURE — 80048 BASIC METABOLIC PNL TOTAL CA: CPT | Performed by: INTERNAL MEDICINE

## 2017-02-11 PROCEDURE — 87070 CULTURE OTHR SPECIMN AEROBIC: CPT | Performed by: INTERNAL MEDICINE

## 2017-02-11 PROCEDURE — 82962 GLUCOSE BLOOD TEST: CPT

## 2017-02-11 PROCEDURE — 74011636637 HC RX REV CODE- 636/637: Performed by: INTERNAL MEDICINE

## 2017-02-11 PROCEDURE — 74011000250 HC RX REV CODE- 250: Performed by: INTERNAL MEDICINE

## 2017-02-11 PROCEDURE — 77030013140 HC MSK NEB VYRM -A

## 2017-02-11 PROCEDURE — 65660000000 HC RM CCU STEPDOWN

## 2017-02-11 PROCEDURE — 74011250636 HC RX REV CODE- 250/636: Performed by: INTERNAL MEDICINE

## 2017-02-11 PROCEDURE — 87077 CULTURE AEROBIC IDENTIFY: CPT | Performed by: INTERNAL MEDICINE

## 2017-02-11 PROCEDURE — 36415 COLL VENOUS BLD VENIPUNCTURE: CPT | Performed by: INTERNAL MEDICINE

## 2017-02-11 PROCEDURE — 85610 PROTHROMBIN TIME: CPT | Performed by: INTERNAL MEDICINE

## 2017-02-11 PROCEDURE — 87186 SC STD MICRODIL/AGAR DIL: CPT | Performed by: INTERNAL MEDICINE

## 2017-02-11 PROCEDURE — 94640 AIRWAY INHALATION TREATMENT: CPT

## 2017-02-11 RX ORDER — INSULIN LISPRO 100 [IU]/ML
INJECTION, SOLUTION INTRAVENOUS; SUBCUTANEOUS
Status: DISCONTINUED | OUTPATIENT
Start: 2017-02-11 | End: 2017-02-14 | Stop reason: HOSPADM

## 2017-02-11 RX ORDER — WARFARIN 2.5 MG/1
2.5 TABLET ORAL ONCE
Status: COMPLETED | OUTPATIENT
Start: 2017-02-11 | End: 2017-02-11

## 2017-02-11 RX ORDER — DEXTROSE 50 % IN WATER (D50W) INTRAVENOUS SYRINGE
12.5-25 AS NEEDED
Status: DISCONTINUED | OUTPATIENT
Start: 2017-02-11 | End: 2017-02-14 | Stop reason: HOSPADM

## 2017-02-11 RX ORDER — MAGNESIUM SULFATE 100 %
4 CRYSTALS MISCELLANEOUS AS NEEDED
Status: DISCONTINUED | OUTPATIENT
Start: 2017-02-11 | End: 2017-02-14 | Stop reason: HOSPADM

## 2017-02-11 RX ADMIN — IPRATROPIUM BROMIDE 0.5 MG: 0.5 SOLUTION RESPIRATORY (INHALATION) at 01:50

## 2017-02-11 RX ADMIN — METHYLPREDNISOLONE SODIUM SUCCINATE 90 MG: 125 INJECTION, POWDER, FOR SOLUTION INTRAMUSCULAR; INTRAVENOUS at 07:10

## 2017-02-11 RX ADMIN — VITAMIN D, TAB 1000IU (100/BT) 2000 UNITS: 25 TAB at 08:40

## 2017-02-11 RX ADMIN — BENZOCAINE AND MENTHOL 1 LOZENGE: 15; 3.6 LOZENGE ORAL at 10:51

## 2017-02-11 RX ADMIN — THERA TABS 1 TABLET: TAB at 08:40

## 2017-02-11 RX ADMIN — NIFEDIPINE 120 MG: 10 CAPSULE ORAL at 08:39

## 2017-02-11 RX ADMIN — METOPROLOL SUCCINATE 25 MG: 25 TABLET, EXTENDED RELEASE ORAL at 08:39

## 2017-02-11 RX ADMIN — GABAPENTIN 300 MG: 300 CAPSULE ORAL at 17:32

## 2017-02-11 RX ADMIN — WARFARIN SODIUM 2.5 MG: 2.5 TABLET ORAL at 17:32

## 2017-02-11 RX ADMIN — FUROSEMIDE 40 MG: 40 TABLET ORAL at 17:33

## 2017-02-11 RX ADMIN — GUAIFENESIN 1200 MG: 600 TABLET, EXTENDED RELEASE ORAL at 17:33

## 2017-02-11 RX ADMIN — MONTELUKAST SODIUM 10 MG: 10 TABLET, FILM COATED ORAL at 17:32

## 2017-02-11 RX ADMIN — INSULIN LISPRO 10 UNITS: 100 INJECTION, SOLUTION INTRAVENOUS; SUBCUTANEOUS at 17:31

## 2017-02-11 RX ADMIN — METHYLPREDNISOLONE SODIUM SUCCINATE 90 MG: 125 INJECTION, POWDER, FOR SOLUTION INTRAMUSCULAR; INTRAVENOUS at 12:23

## 2017-02-11 RX ADMIN — IPRATROPIUM BROMIDE 0.5 MG: 0.5 SOLUTION RESPIRATORY (INHALATION) at 16:37

## 2017-02-11 RX ADMIN — IPRATROPIUM BROMIDE 0.5 MG: 0.5 SOLUTION RESPIRATORY (INHALATION) at 20:50

## 2017-02-11 RX ADMIN — ALBUTEROL SULFATE 2.5 MG: 2.5 SOLUTION RESPIRATORY (INHALATION) at 20:50

## 2017-02-11 RX ADMIN — HUMAN INSULIN 40 UNITS: 100 INJECTION, SUSPENSION SUBCUTANEOUS at 23:42

## 2017-02-11 RX ADMIN — FUROSEMIDE 80 MG: 40 TABLET ORAL at 08:39

## 2017-02-11 RX ADMIN — IPRATROPIUM BROMIDE 0.5 MG: 0.5 SOLUTION RESPIRATORY (INHALATION) at 07:34

## 2017-02-11 RX ADMIN — SERTRALINE HYDROCHLORIDE 50 MG: 50 TABLET ORAL at 23:39

## 2017-02-11 RX ADMIN — BUDESONIDE 500 MCG: 0.5 INHALANT RESPIRATORY (INHALATION) at 20:49

## 2017-02-11 RX ADMIN — ALBUTEROL SULFATE 2.5 MG: 2.5 SOLUTION RESPIRATORY (INHALATION) at 16:37

## 2017-02-11 RX ADMIN — ALBUTEROL SULFATE 2.5 MG: 2.5 SOLUTION RESPIRATORY (INHALATION) at 07:34

## 2017-02-11 RX ADMIN — LEVOFLOXACIN 500 MG: 5 INJECTION, SOLUTION INTRAVENOUS at 17:32

## 2017-02-11 RX ADMIN — Medication 1 CAPSULE: at 08:39

## 2017-02-11 RX ADMIN — INSULIN LISPRO 15 UNITS: 100 INJECTION, SOLUTION INTRAVENOUS; SUBCUTANEOUS at 12:39

## 2017-02-11 RX ADMIN — ALPRAZOLAM 0.5 MG: 0.25 TABLET ORAL at 23:36

## 2017-02-11 RX ADMIN — HUMAN INSULIN 50 UNITS: 100 INJECTION, SUSPENSION SUBCUTANEOUS at 08:38

## 2017-02-11 RX ADMIN — ALBUTEROL SULFATE 2.5 MG: 2.5 SOLUTION RESPIRATORY (INHALATION) at 11:58

## 2017-02-11 RX ADMIN — BENZOCAINE AND MENTHOL 1 LOZENGE: 15; 3.6 LOZENGE ORAL at 16:23

## 2017-02-11 RX ADMIN — INSULIN LISPRO 10 UNITS: 100 INJECTION, SOLUTION INTRAVENOUS; SUBCUTANEOUS at 08:38

## 2017-02-11 RX ADMIN — GUAIFENESIN 1200 MG: 600 TABLET, EXTENDED RELEASE ORAL at 08:39

## 2017-02-11 RX ADMIN — BUDESONIDE 500 MCG: 0.5 INHALANT RESPIRATORY (INHALATION) at 07:34

## 2017-02-11 RX ADMIN — POTASSIUM CHLORIDE 20 MEQ: 750 TABLET, FILM COATED, EXTENDED RELEASE ORAL at 08:39

## 2017-02-11 RX ADMIN — METHYLPREDNISOLONE SODIUM SUCCINATE 90 MG: 125 INJECTION, POWDER, FOR SOLUTION INTRAMUSCULAR; INTRAVENOUS at 17:31

## 2017-02-11 NOTE — PROGRESS NOTES
Bedside shift change report given to Nicole Florian (oncoming nurse) by Madelaine Michele (offgoing nurse). Report included the following information SBAR, Intake/Output, MAR, Recent Results and Cardiac Rhythm Afib.

## 2017-02-11 NOTE — PROGRESS NOTES
Pharmacist Note - Warfarin Dosing  Consult provided for this 66 y. o.female to manage warfarin for Atrial Fibrillation     INR Goal: 2 - 3     Home regimen/ tablet size: 5mg po daily per medrec     Drugs that may increase INR: Quinolones, methylprednisolone  Drugs that may decrease INR: None  Other current anticoagulants/ drugs that may increase bleeding risk: NSAID  Risk factors: Age > 65  Daily INR ordered: YES  Recent Labs      02/11/17   0347  02/10/17   0314  02/09/17   1113   HGB   --    --   12.6   INR  1.7*  1.3*  1.2*     Date               INR                  Dose  2/9                 1.2                    5 mg  2/10           1.3  5 mg    2/11               1.7                    2.5 mg                                                                             Assessment/ Plan: Will order warfarin 2.5 mg PO x 1 dose. Pharmacy will continue to monitor daily and adjust therapy as indicated. Please contact the pharmacist at x [phone] or  for outpatient recommendations if needed.

## 2017-02-12 LAB
GLUCOSE BLD STRIP.AUTO-MCNC: 234 MG/DL (ref 65–100)
GLUCOSE BLD STRIP.AUTO-MCNC: 297 MG/DL (ref 65–100)
GLUCOSE BLD STRIP.AUTO-MCNC: 308 MG/DL (ref 65–100)
GLUCOSE BLD STRIP.AUTO-MCNC: 327 MG/DL (ref 65–100)
INR PPP: 2 (ref 0.9–1.1)
PROTHROMBIN TIME: 20.6 SEC (ref 9–11.1)
SERVICE CMNT-IMP: ABNORMAL

## 2017-02-12 PROCEDURE — 85610 PROTHROMBIN TIME: CPT | Performed by: INTERNAL MEDICINE

## 2017-02-12 PROCEDURE — 74011250637 HC RX REV CODE- 250/637: Performed by: INTERNAL MEDICINE

## 2017-02-12 PROCEDURE — 74011000250 HC RX REV CODE- 250: Performed by: INTERNAL MEDICINE

## 2017-02-12 PROCEDURE — 74011636637 HC RX REV CODE- 636/637: Performed by: INTERNAL MEDICINE

## 2017-02-12 PROCEDURE — 82962 GLUCOSE BLOOD TEST: CPT

## 2017-02-12 PROCEDURE — 65660000000 HC RM CCU STEPDOWN

## 2017-02-12 PROCEDURE — 94640 AIRWAY INHALATION TREATMENT: CPT

## 2017-02-12 PROCEDURE — 74011250636 HC RX REV CODE- 250/636: Performed by: INTERNAL MEDICINE

## 2017-02-12 PROCEDURE — 36415 COLL VENOUS BLD VENIPUNCTURE: CPT | Performed by: INTERNAL MEDICINE

## 2017-02-12 RX ORDER — LEVOFLOXACIN 250 MG/1
250 TABLET ORAL EVERY 24 HOURS
Status: DISCONTINUED | OUTPATIENT
Start: 2017-02-12 | End: 2017-02-13

## 2017-02-12 RX ORDER — WARFARIN 2.5 MG/1
2.5 TABLET ORAL ONCE
Status: COMPLETED | OUTPATIENT
Start: 2017-02-12 | End: 2017-02-12

## 2017-02-12 RX ORDER — ALBUTEROL SULFATE 0.83 MG/ML
2.5 SOLUTION RESPIRATORY (INHALATION)
Status: DISCONTINUED | OUTPATIENT
Start: 2017-02-12 | End: 2017-02-14 | Stop reason: HOSPADM

## 2017-02-12 RX ADMIN — GABAPENTIN 300 MG: 300 CAPSULE ORAL at 18:53

## 2017-02-12 RX ADMIN — IPRATROPIUM BROMIDE 0.5 MG: 0.5 SOLUTION RESPIRATORY (INHALATION) at 20:45

## 2017-02-12 RX ADMIN — ONDANSETRON 4 MG: 2 INJECTION INTRAMUSCULAR; INTRAVENOUS at 21:05

## 2017-02-12 RX ADMIN — ALBUTEROL SULFATE 2.5 MG: 2.5 SOLUTION RESPIRATORY (INHALATION) at 20:45

## 2017-02-12 RX ADMIN — SERTRALINE HYDROCHLORIDE 50 MG: 50 TABLET ORAL at 22:00

## 2017-02-12 RX ADMIN — FUROSEMIDE 80 MG: 40 TABLET ORAL at 08:26

## 2017-02-12 RX ADMIN — INSULIN LISPRO 55 UNITS: 100 INJECTION, SOLUTION INTRAVENOUS; SUBCUTANEOUS at 08:24

## 2017-02-12 RX ADMIN — IPRATROPIUM BROMIDE 0.5 MG: 0.5 SOLUTION RESPIRATORY (INHALATION) at 01:27

## 2017-02-12 RX ADMIN — THERA TABS 1 TABLET: TAB at 08:26

## 2017-02-12 RX ADMIN — GUAIFENESIN 1200 MG: 600 TABLET, EXTENDED RELEASE ORAL at 08:26

## 2017-02-12 RX ADMIN — GUAIFENESIN 1200 MG: 600 TABLET, EXTENDED RELEASE ORAL at 18:53

## 2017-02-12 RX ADMIN — ONDANSETRON 4 MG: 2 INJECTION INTRAMUSCULAR; INTRAVENOUS at 07:21

## 2017-02-12 RX ADMIN — METHYLPREDNISOLONE SODIUM SUCCINATE 90 MG: 125 INJECTION, POWDER, FOR SOLUTION INTRAMUSCULAR; INTRAVENOUS at 00:00

## 2017-02-12 RX ADMIN — BUDESONIDE 500 MCG: 0.5 INHALANT RESPIRATORY (INHALATION) at 09:15

## 2017-02-12 RX ADMIN — HUMAN INSULIN 45 UNITS: 100 INJECTION, SUSPENSION SUBCUTANEOUS at 21:58

## 2017-02-12 RX ADMIN — METHYLPREDNISOLONE SODIUM SUCCINATE 90 MG: 125 INJECTION, POWDER, FOR SOLUTION INTRAMUSCULAR; INTRAVENOUS at 22:01

## 2017-02-12 RX ADMIN — MONTELUKAST SODIUM 10 MG: 10 TABLET, FILM COATED ORAL at 18:53

## 2017-02-12 RX ADMIN — IPRATROPIUM BROMIDE 0.5 MG: 0.5 SOLUTION RESPIRATORY (INHALATION) at 09:15

## 2017-02-12 RX ADMIN — ONDANSETRON 4 MG: 2 INJECTION INTRAMUSCULAR; INTRAVENOUS at 00:10

## 2017-02-12 RX ADMIN — Medication 1 CAPSULE: at 08:25

## 2017-02-12 RX ADMIN — METHYLPREDNISOLONE SODIUM SUCCINATE 90 MG: 125 INJECTION, POWDER, FOR SOLUTION INTRAMUSCULAR; INTRAVENOUS at 07:21

## 2017-02-12 RX ADMIN — INSULIN LISPRO 7 UNITS: 100 INJECTION, SOLUTION INTRAVENOUS; SUBCUTANEOUS at 12:14

## 2017-02-12 RX ADMIN — ARFORMOTEROL TARTRATE 15 MCG: 15 SOLUTION RESPIRATORY (INHALATION) at 09:15

## 2017-02-12 RX ADMIN — ALBUTEROL SULFATE 2.5 MG: 2.5 SOLUTION RESPIRATORY (INHALATION) at 09:17

## 2017-02-12 RX ADMIN — WARFARIN SODIUM 2.5 MG: 2.5 TABLET ORAL at 18:53

## 2017-02-12 RX ADMIN — HUMAN INSULIN 55 UNITS: 100 INJECTION, SUSPENSION SUBCUTANEOUS at 08:24

## 2017-02-12 RX ADMIN — VITAMIN D, TAB 1000IU (100/BT) 2000 UNITS: 25 TAB at 08:25

## 2017-02-12 RX ADMIN — INSULIN LISPRO 10 UNITS: 100 INJECTION, SOLUTION INTRAVENOUS; SUBCUTANEOUS at 18:53

## 2017-02-12 RX ADMIN — FUROSEMIDE 40 MG: 40 TABLET ORAL at 18:53

## 2017-02-12 RX ADMIN — NIFEDIPINE 120 MG: 10 CAPSULE ORAL at 08:26

## 2017-02-12 RX ADMIN — METOPROLOL SUCCINATE 25 MG: 25 TABLET, EXTENDED RELEASE ORAL at 08:26

## 2017-02-12 RX ADMIN — BUDESONIDE 500 MCG: 0.5 INHALANT RESPIRATORY (INHALATION) at 20:45

## 2017-02-12 RX ADMIN — LEVOFLOXACIN 250 MG: 250 TABLET, FILM COATED ORAL at 12:14

## 2017-02-12 RX ADMIN — POTASSIUM CHLORIDE 20 MEQ: 750 TABLET, FILM COATED, EXTENDED RELEASE ORAL at 08:26

## 2017-02-12 NOTE — ROUTINE PROCESS
Bedside shift change report given to Yeny Mendez RN (oncoming nurse) by Natalia Bhakta RN (offgoing nurse). Report included the following information SBAR.

## 2017-02-12 NOTE — PROGRESS NOTES
Day #4 of Levaquin  Indication:  CAP  Current regimen:  500mg PO daily  ID Following? NO  Recent Labs      17   0347  17   1113   WBC   --   9.3   CREA  1.57*  1.30*   BUN  43*  26*     Est CrCl: 46.5 ml/min  Temp (24hrs), Av.2 °F (36.8 °C), Min:97.7 °F (36.5 °C), Max:98.7 °F (37.1 °C)    Cultures:    resp - pending    Plan:  Will adjust dose to 250mg PO Q24h CrCl 20-49 ml/min

## 2017-02-12 NOTE — PROGRESS NOTES
Medical Progress Note      NAME: [facility]   :  1945  MRM:  [id number]    Date/Time: 2017  10:26 AM         Problem List:     Principal Problem:    Respiratory failure (2014)    Active Problems:    Asthma exacerbation (2014)      Morbid obesity ([facility] (2014)      Type 2 diabetes mellitus without complication ([facility] ()      Chronic atrial fibrillation ([facility] (2015)      Essential hypertension with goal blood pressure less than 140/90 (2016)      PNA (pneumonia) (2017)             Subjective:     Pt feeling better, but still sob    Past Medical History   Diagnosis Date    Atrial fibrillation ([facility]     Depression     DM (diabetes mellitus) ([facility]     HTN (hypertension)     Hyperlipidemia     Neuropathy     LITA on CPAP     Other ill-defined conditions(799.89) pneumonia       ROS:  General: negative for fever, chills, sweats, weakness  Respiratory:  positive for cough and sob  Cardiology:  negative for chest pain, palpitations, orthopnea, PND, edema, syncope   Gastrointestinal: negative for abdominal pain, N/V, dysphagia, change in bowel habits, bleeding         Objective:       Vitals:          Last 24hrs VS reviewed since prior progress note.  Most recent are:    Visit Vitals    /82 (BP 1 Location: Left arm, BP Patient Position: At rest)    Pulse 100    Temp 98.7 °F (37.1 °C)    Resp 18    Ht 5' 6\" (1.676 m)    Wt 304 lb 10.8 oz (138.2 kg)    SpO2 98%    BMI 49.18 kg/m2     SpO2 Readings from Last 6 Encounters:   17 98%   10/04/16 98%   07/10/15 94%   07/07/15 100%   14 98%   14 92%    O2 Flow Rate (L/min): 2 l/min   No intake or output data in the 24 hours ending 17 1026       Exam:     General   Obese 67 yo wf  Respiratory   occas wheeze  Cardiology  irreg  Abdominal  Soft, non-tender, non-distended, positive bowel sounds, no hepatosplenomegaly  Extremities  +1 edema    Lab Data Reviewed: (see below)    Medications Reviewed: (see below)    ______________________________________________________________________    Medications:     Current Facility-Administered Medications   Medication Dose Route Frequency    warfarin (COUMADIN) tablet 2.5 mg  2.5 mg Oral ONCE    insulin NPH (NOVOLIN N, HUMULIN N) injection 45 Units  45 Units SubCUTAneous QHS    albuterol (PROVENTIL VENTOLIN) nebulizer solution 2.5 mg  2.5 mg Nebulization TID RT    methylPREDNISolone (PF) (SOLU-MEDROL) injection 90 mg  90 mg IntraVENous Q12H    levoFLOXacin (LEVAQUIN) tablet 500 mg  500 mg Oral Q24H    insulin NPH (NOVOLIN N, HUMULIN N) injection 55 Units  55 Units SubCUTAneous DAILY    insulin lispro (HUMALOG) injection   SubCUTAneous TIDAC    glucose chewable tablet 16 g  4 Tab Oral PRN    dextrose (D50W) injection syrg 12.5-25 g  12.5-25 g IntraVENous PRN    glucagon (GLUCAGEN) injection 1 mg  1 mg IntraMUSCular PRN    benzocaine-menthol (CEPACOL) lozenge 1 Lozenge  1 Lozenge Mucous Membrane PRN    albuterol (PROVENTIL VENTOLIN) nebulizer solution 2.5 mg  2.5 mg Nebulization Q3H PRN    ALPRAZolam (XANAX) tablet 0.25-0.5 mg  0.25-0.5 mg Oral DAILY PRN    dilTIAZem CD (CARDIZEM CD) capsule 120 mg  120 mg Oral DAILY    cholecalciferol (VITAMIN D3) tablet 2,000 Units  2,000 Units Oral DAILY    fish oil-omega-3 fatty acids 340-1,000 mg capsule 1 Cap  1 Cap Oral DAILY    . PHARMACY TO SUBSTITUTE PER PROTOCOL    Per Protocol    furosemide (LASIX) tablet 80 mg  80 mg Oral DAILY    furosemide (LASIX) tablet 40 mg  40 mg Oral QPM    gabapentin (NEURONTIN) capsule 300 mg  300 mg Oral QPM    metoprolol succinate (TOPROL-XL) XL tablet 25 mg  25 mg Oral DAILY    montelukast (SINGULAIR) tablet 10 mg  10 mg Oral QPM    potassium chloride SR (KLOR-CON 10) tablet 20 mEq  20 mEq Oral DAILY WITH BREAKFAST    sertraline (ZOLOFT) tablet 50 mg  50 mg Oral QHS    therapeutic multivitamin (THERAGRAN) tablet 1 Tab  1 Tab Oral DAILY    ipratropium (ATROVENT) 0.02 % nebulizer solution 0.5 mg  0.5 mg Nebulization Q6H RT    glucose chewable tablet 16 g  4 Tab Oral PRN    dextrose (D50W) injection syrg 12.5-25 g  12.5-25 g IntraVENous PRN    glucagon (GLUCAGEN) injection 1 mg  1 mg IntraMUSCular PRN    guaiFENesin SR (MUCINEX) tablet 1,200 mg  1,200 mg Oral BID    arformoterol (BROVANA) neb solution 15 mcg  15 mcg Nebulization BID RT    And    budesonide (PULMICORT) 500 mcg/2 ml nebulizer suspension  500 mcg Nebulization BID RT    Warfarin dosing per pharmacy   Other Rx Dosing/Monitoring    ondansetron (ZOFRAN) injection 4 mg  4 mg IntraVENous Q4H PRN            Lab Review:     Recent Labs      02/09/17   1113   WBC  9.3   HGB  12.6   HCT  39.6   PLT  198     Recent Labs      02/12/17   0438  02/11/17   0347  02/10/17   0314  02/09/17   1113   NA   --   134*   --   136   K   --   4.3   --   4.0   CL   --   98   --   101   CO2   --   26   --   24   GLU   --   402*   --   345*   BUN   --   43*   --   26*   CREA   --   1.57*   --   1.30*   CA   --   9.2   --   8.8   MG   --    --    --   2.2   ALB   --    --    --   3.6   TBILI   --    --    --   0.4   SGOT   --    --    --   38*   ALT   --    --    --   41   INR  2.0*  1.7*  1.3*  1.2*     Lab Results   Component Value Date/Time    Glucose (POC) 327 02/12/2017 06:57 AM    Glucose (POC) 341 02/11/2017 09:55 PM    Glucose (POC) 341 02/11/2017 04:31 PM    Glucose (POC) 403 02/11/2017 11:43 AM    Glucose (POC) 382 02/11/2017 07:29 AM     No results for input(s): PH, PCO2, PO2, HCO3, FIO2 in the last 72 hours. Recent Labs      02/12/17   0438  02/11/17   0347  02/10/17   0314   INR  2.0*  1.7*  1.3*       Other pertinent lab: NA         Assessment:     Patient Active Problem List   Diagnosis Code    Hyperlipidemia E78.5    Depression F32.9    Neuropathy G62.9    Respiratory failure J96.90    Asthma exacerbation J45. 901    Anemia D64.9    Dizziness R42    Weakness R53.1    Morbid obesity (HCC) E66.01    Type 2 diabetes mellitus without complication (HCC) Z99.3    Cervical stenosis of spine M48.02    Hematoma of neck S10.93XA    Chronic atrial fibrillation (HCC) I48.2    Encounter for long-term (current) drug use Z79.899    Microalbuminuria R80.9    Essential hypertension with goal blood pressure less than 140/90 I10    Asthmatic bronchitis with acute exacerbation J45. 901    PNA (pneumonia) J18.9          Plan:                 1. A fib- on coumadin, inr therapeutic  2. pna- /asthma bronchitis- change levaquin to po and taper steroids  3.  Diabetes- adjust insulin                ___________________________________________________    Attending Physician: Lupillo Garzon MD

## 2017-02-12 NOTE — PROGRESS NOTES
Pharmacist Note - Warfarin Dosing  Consult provided for this 66 y. o.female to manage warfarin for Atrial Fibrillation     INR Goal: 2 - 3     Home regimen/ tablet size: 5mg po daily per medrec     Drugs that may increase INR: Quinolones, methylprednisolone  Drugs that may decrease INR: None  Other current anticoagulants/ drugs that may increase bleeding risk: NSAID  Risk factors: Age > 65  Daily INR ordered: YES  Recent Labs      02/12/17   0438  02/11/17   0347  02/10/17   0314  02/09/17   1113   HGB   --    --    --   12.6   INR  2.0*  1.7*  1.3*  1.2*     Date               INR                  Dose  2/9                 1.2                    5 mg  2/10           1.3  5 mg    2/11               1.7                    2.5 mg   2/12               2.0                    2.5 mg                                                                            Assessment/ Plan: Will order warfarin 2.5 mg PO x 1 dose. Pharmacy will continue to monitor daily and adjust therapy as indicated. Please contact the pharmacist at x [phone] or  for outpatient recommendations if needed.

## 2017-02-13 ENCOUNTER — APPOINTMENT (OUTPATIENT)
Dept: GENERAL RADIOLOGY | Age: 72
DRG: 189 | End: 2017-02-13
Attending: INTERNAL MEDICINE
Payer: MEDICARE

## 2017-02-13 LAB
ANION GAP BLD CALC-SCNC: 10 MMOL/L (ref 5–15)
BUN SERPL-MCNC: 46 MG/DL (ref 6–20)
BUN/CREAT SERPL: 33 (ref 12–20)
CALCIUM SERPL-MCNC: 8.6 MG/DL (ref 8.5–10.1)
CHLORIDE SERPL-SCNC: 104 MMOL/L (ref 97–108)
CO2 SERPL-SCNC: 26 MMOL/L (ref 21–32)
CREAT SERPL-MCNC: 1.39 MG/DL (ref 0.55–1.02)
GLUCOSE BLD STRIP.AUTO-MCNC: 163 MG/DL (ref 65–100)
GLUCOSE BLD STRIP.AUTO-MCNC: 196 MG/DL (ref 65–100)
GLUCOSE BLD STRIP.AUTO-MCNC: 240 MG/DL (ref 65–100)
GLUCOSE BLD STRIP.AUTO-MCNC: 277 MG/DL (ref 65–100)
GLUCOSE SERPL-MCNC: 229 MG/DL (ref 65–100)
INR PPP: 2.1 (ref 0.9–1.1)
POTASSIUM SERPL-SCNC: 4.4 MMOL/L (ref 3.5–5.1)
PROTHROMBIN TIME: 21.5 SEC (ref 9–11.1)
SERVICE CMNT-IMP: ABNORMAL
SODIUM SERPL-SCNC: 140 MMOL/L (ref 136–145)

## 2017-02-13 PROCEDURE — 85610 PROTHROMBIN TIME: CPT | Performed by: INTERNAL MEDICINE

## 2017-02-13 PROCEDURE — 82962 GLUCOSE BLOOD TEST: CPT

## 2017-02-13 PROCEDURE — 74011250637 HC RX REV CODE- 250/637: Performed by: INTERNAL MEDICINE

## 2017-02-13 PROCEDURE — 74011000250 HC RX REV CODE- 250: Performed by: INTERNAL MEDICINE

## 2017-02-13 PROCEDURE — 65270000032 HC RM SEMIPRIVATE

## 2017-02-13 PROCEDURE — 77030029684 HC NEB SM VOL KT MONA -A

## 2017-02-13 PROCEDURE — 74011636637 HC RX REV CODE- 636/637: Performed by: INTERNAL MEDICINE

## 2017-02-13 PROCEDURE — 94640 AIRWAY INHALATION TREATMENT: CPT

## 2017-02-13 PROCEDURE — 77030012879 HC MSK CPAP FLL FAC PHIL -B

## 2017-02-13 PROCEDURE — 36415 COLL VENOUS BLD VENIPUNCTURE: CPT | Performed by: INTERNAL MEDICINE

## 2017-02-13 PROCEDURE — 71020 XR CHEST PA LAT: CPT

## 2017-02-13 PROCEDURE — 77010033678 HC OXYGEN DAILY

## 2017-02-13 PROCEDURE — 80048 BASIC METABOLIC PNL TOTAL CA: CPT | Performed by: INTERNAL MEDICINE

## 2017-02-13 RX ORDER — WARFARIN 4 MG/1
4 TABLET ORAL ONCE
Status: COMPLETED | OUTPATIENT
Start: 2017-02-13 | End: 2017-02-13

## 2017-02-13 RX ORDER — LEVOFLOXACIN 500 MG/1
500 TABLET, FILM COATED ORAL EVERY 24 HOURS
Status: DISCONTINUED | OUTPATIENT
Start: 2017-02-13 | End: 2017-02-14 | Stop reason: HOSPADM

## 2017-02-13 RX ORDER — PREDNISONE 20 MG/1
60 TABLET ORAL
Status: DISCONTINUED | OUTPATIENT
Start: 2017-02-13 | End: 2017-02-14 | Stop reason: HOSPADM

## 2017-02-13 RX ADMIN — HUMAN INSULIN 55 UNITS: 100 INJECTION, SUSPENSION SUBCUTANEOUS at 09:10

## 2017-02-13 RX ADMIN — WARFARIN SODIUM 4 MG: 4 TABLET ORAL at 17:18

## 2017-02-13 RX ADMIN — INSULIN LISPRO 4 UNITS: 100 INJECTION, SOLUTION INTRAVENOUS; SUBCUTANEOUS at 09:09

## 2017-02-13 RX ADMIN — THERA TABS 1 TABLET: TAB at 09:09

## 2017-02-13 RX ADMIN — IPRATROPIUM BROMIDE 0.5 MG: 0.5 SOLUTION RESPIRATORY (INHALATION) at 01:59

## 2017-02-13 RX ADMIN — HUMAN INSULIN 45 UNITS: 100 INJECTION, SUSPENSION SUBCUTANEOUS at 22:05

## 2017-02-13 RX ADMIN — GABAPENTIN 300 MG: 300 CAPSULE ORAL at 17:18

## 2017-02-13 RX ADMIN — INSULIN LISPRO 7 UNITS: 100 INJECTION, SOLUTION INTRAVENOUS; SUBCUTANEOUS at 12:27

## 2017-02-13 RX ADMIN — INSULIN LISPRO 3 UNITS: 100 INJECTION, SOLUTION INTRAVENOUS; SUBCUTANEOUS at 17:17

## 2017-02-13 RX ADMIN — VITAMIN D, TAB 1000IU (100/BT) 2000 UNITS: 25 TAB at 09:09

## 2017-02-13 RX ADMIN — ARFORMOTEROL TARTRATE 15 MCG: 15 SOLUTION RESPIRATORY (INHALATION) at 20:45

## 2017-02-13 RX ADMIN — IPRATROPIUM BROMIDE 0.5 MG: 0.5 SOLUTION RESPIRATORY (INHALATION) at 14:15

## 2017-02-13 RX ADMIN — ALPRAZOLAM 0.5 MG: 0.25 TABLET ORAL at 22:51

## 2017-02-13 RX ADMIN — GUAIFENESIN 1200 MG: 600 TABLET, EXTENDED RELEASE ORAL at 17:17

## 2017-02-13 RX ADMIN — FUROSEMIDE 80 MG: 40 TABLET ORAL at 09:09

## 2017-02-13 RX ADMIN — BUDESONIDE 500 MCG: 0.5 INHALANT RESPIRATORY (INHALATION) at 20:45

## 2017-02-13 RX ADMIN — ALBUTEROL SULFATE 2.5 MG: 2.5 SOLUTION RESPIRATORY (INHALATION) at 14:18

## 2017-02-13 RX ADMIN — LEVOFLOXACIN 500 MG: 500 TABLET, FILM COATED ORAL at 12:28

## 2017-02-13 RX ADMIN — PREDNISONE 60 MG: 20 TABLET ORAL at 09:58

## 2017-02-13 RX ADMIN — FUROSEMIDE 40 MG: 40 TABLET ORAL at 17:17

## 2017-02-13 RX ADMIN — Medication 1 CAPSULE: at 09:08

## 2017-02-13 RX ADMIN — SERTRALINE HYDROCHLORIDE 50 MG: 50 TABLET ORAL at 21:44

## 2017-02-13 RX ADMIN — GUAIFENESIN 1200 MG: 600 TABLET, EXTENDED RELEASE ORAL at 09:08

## 2017-02-13 RX ADMIN — MONTELUKAST SODIUM 10 MG: 10 TABLET, FILM COATED ORAL at 17:18

## 2017-02-13 RX ADMIN — METOPROLOL SUCCINATE 25 MG: 25 TABLET, EXTENDED RELEASE ORAL at 09:08

## 2017-02-13 RX ADMIN — NIFEDIPINE 120 MG: 10 CAPSULE ORAL at 09:08

## 2017-02-13 RX ADMIN — POTASSIUM CHLORIDE 20 MEQ: 750 TABLET, FILM COATED, EXTENDED RELEASE ORAL at 09:08

## 2017-02-13 RX ADMIN — IPRATROPIUM BROMIDE 0.5 MG: 0.5 SOLUTION RESPIRATORY (INHALATION) at 20:44

## 2017-02-13 NOTE — DIABETES MGMT
DTC Progress Note    Recommendations/ Comments: Chart reviewed for hyperglycemia with patient on steroids. Noted increase in NPH doses and noted that patient is now on a lower dose of po steroids. DTC will follow. Patient is a 66 y.o. female with a documented history of diabetes on insulin injections: NPH: 40 units am, 20 units pm, Jardiance and regular insulin (scale) at home. A1c:   Lab Results   Component Value Date/Time    Hemoglobin A1c 10.4 06/11/2015 03:57 PM       Recent Glucose Results:   Lab Results   Component Value Date/Time     (H) 02/13/2017 03:25 AM    GLUCPOC 277 (H) 02/13/2017 11:25 AM    GLUCPOC 240 (H) 02/13/2017 07:01 AM    GLUCPOC 234 (H) 02/12/2017 09:39 PM        Lab Results   Component Value Date/Time    Creatinine 1.39 02/13/2017 03:25 AM       Active Orders   Diet    DIET DIABETIC CONSISTENT CARB Regular        PO intake:   Patient Vitals for the past 72 hrs:   % Diet Eaten   02/13/17 0800 75 %       Current hospital DM medication: NPH 55 units am, 45 units pm    Will continue to follow as needed.     Thank you  Phillip Quezada, MS, RN, CDE

## 2017-02-13 NOTE — INTERDISCIPLINARY ROUNDS
IDR/SLIDR Summary          Patient: Romeo Silveira MRN: [id number]    Age: 66 y.o. YOB: 1945 Room/Bed: [facility]   Admit Diagnosis: Respiratory failure (HCC)  Respiratory failure (HCC)  Principal Diagnosis: Respiratory failure ([address]   Goals: Improved breathing  Readmission: NO  Quality Measure: Not applicable  VTE Prophylaxis: Chemical  Influenza Vaccine screening completed? YES  Pneumococcal Vaccine screening completed? YES  Mobility needs: No   Nutrition plan:No  Consults:Respiratory and Case Management    Financial concerns:No  Escalated to CM? NO  RRAT Score: 16   Interventions:Home Health  Testing due for pt today?  NO  LOS: 4 days Expected length of stay 4 days  Discharge plan: Home   PCP: Adonis Campos MD  Transportation needs: Yes    Days before discharge:one day until discharge   Discharge disposition: Home    Signed:     Kenyetta Hawley RN  2/13/2017  1:03 AM

## 2017-02-13 NOTE — PROGRESS NOTES
Pharmacist Note - Warfarin Dosing  Consult provided for this 66 y. o.female to manage warfarin for Atrial Fibrillation     INR Goal: 2 - 3     Home regimen/ tablet size: 5mg po daily per medrec     Drugs that may increase INR: Quinolones, methylprednisolone  Drugs that may decrease INR: None  Other current anticoagulants/ drugs that may increase bleeding risk: NSAID  Risk factors: Age > 65  Daily INR ordered: YES  Recent Labs      02/13/17   0325  02/12/17   0438  02/11/17   0347   INR  2.1*  2.0*  1.7*     Date               INR                  Dose  2/9                 1.2                    5 mg  2/10           1.3  5 mg    2/11               1.7                    2.5 mg   2/12               2.0                    2.5 mg     2/13               2.0                    4 mg                                                                               Assessment/ Plan: Will order warfarin 4 mg PO x 1 dose. Pharmacy will continue to monitor daily and adjust therapy as indicated. Please contact the pharmacist at   for outpatient recommendations if needed.

## 2017-02-13 NOTE — PROGRESS NOTES
Bedside shift change report given to Julio C Mike RN (oncoming nurse) by Keila Salgado RN (offgoing nurse). Report included the following information SBAR and MAR. Primary Nurse Lamonte Homans and Loly Vazquez RN performed a dual skin assessment on this patient No impairment noted  Kobi score is 22    Patient has scattered bruising on abdomen, arms and legs. 1350- Called respiratory to request CPAP.

## 2017-02-13 NOTE — PROGRESS NOTES
Rocio Light    Admit Date: 2/9/2017    Subjective:     Still quite a bit of coughing & wheezing, but overall probably improving. No new complaints. Current Facility-Administered Medications   Medication Dose Route Frequency    levoFLOXacin (LEVAQUIN) tablet 500 mg  500 mg Oral Q24H    predniSONE (DELTASONE) tablet 60 mg  60 mg Oral DAILY WITH BREAKFAST    insulin NPH (NOVOLIN N, HUMULIN N) injection 45 Units  45 Units SubCUTAneous QHS    albuterol (PROVENTIL VENTOLIN) nebulizer solution 2.5 mg  2.5 mg Nebulization TID RT    insulin NPH (NOVOLIN N, HUMULIN N) injection 55 Units  55 Units SubCUTAneous DAILY    insulin lispro (HUMALOG) injection   SubCUTAneous TIDAC    glucose chewable tablet 16 g  4 Tab Oral PRN    dextrose (D50W) injection syrg 12.5-25 g  12.5-25 g IntraVENous PRN    glucagon (GLUCAGEN) injection 1 mg  1 mg IntraMUSCular PRN    benzocaine-menthol (CEPACOL) lozenge 1 Lozenge  1 Lozenge Mucous Membrane PRN    albuterol (PROVENTIL VENTOLIN) nebulizer solution 2.5 mg  2.5 mg Nebulization Q3H PRN    ALPRAZolam (XANAX) tablet 0.25-0.5 mg  0.25-0.5 mg Oral DAILY PRN    dilTIAZem CD (CARDIZEM CD) capsule 120 mg  120 mg Oral DAILY    cholecalciferol (VITAMIN D3) tablet 2,000 Units  2,000 Units Oral DAILY    fish oil-omega-3 fatty acids 340-1,000 mg capsule 1 Cap  1 Cap Oral DAILY    . PHARMACY TO SUBSTITUTE PER PROTOCOL    Per Protocol    furosemide (LASIX) tablet 80 mg  80 mg Oral DAILY    furosemide (LASIX) tablet 40 mg  40 mg Oral QPM    gabapentin (NEURONTIN) capsule 300 mg  300 mg Oral QPM    metoprolol succinate (TOPROL-XL) XL tablet 25 mg  25 mg Oral DAILY    montelukast (SINGULAIR) tablet 10 mg  10 mg Oral QPM    potassium chloride SR (KLOR-CON 10) tablet 20 mEq  20 mEq Oral DAILY WITH BREAKFAST    sertraline (ZOLOFT) tablet 50 mg  50 mg Oral QHS    therapeutic multivitamin (THERAGRAN) tablet 1 Tab  1 Tab Oral DAILY    ipratropium (ATROVENT) 0.02 % nebulizer solution 0.5 mg  0.5 mg Nebulization Q6H RT    glucose chewable tablet 16 g  4 Tab Oral PRN    dextrose (D50W) injection syrg 12.5-25 g  12.5-25 g IntraVENous PRN    glucagon (GLUCAGEN) injection 1 mg  1 mg IntraMUSCular PRN    guaiFENesin SR (MUCINEX) tablet 1,200 mg  1,200 mg Oral BID    arformoterol (BROVANA) neb solution 15 mcg  15 mcg Nebulization BID RT    And    budesonide (PULMICORT) 500 mcg/2 ml nebulizer suspension  500 mcg Nebulization BID RT    Warfarin dosing per pharmacy   Other Rx Dosing/Monitoring    ondansetron (ZOFRAN) injection 4 mg  4 mg IntraVENous Q4H PRN          Objective:     Patient Vitals for the past 8 hrs:   BP Temp Pulse Resp SpO2   02/13/17 0300 128/74 98 °F (36.7 °C) 86 15 97 %   02/13/17 0200 - - - - 97 %             Physical Exam: NAD. A&O. Neck -- Supple. No JVD. Heart -- Irr Irr (Rate OK). Lungs -- Continued exp wheezing with decent air movement. Abd -- Benign. Ext -- Trace to 1+ LE edema, b/l.       Data Review   Recent Results (from the past 24 hour(s))   GLUCOSE, POC    Collection Time: 02/12/17 11:40 AM   Result Value Ref Range    Glucose (POC) 297 (H) 65 - 100 mg/dL    Performed by Tammie Clark    GLUCOSE, POC    Collection Time: 02/12/17  5:10 PM   Result Value Ref Range    Glucose (POC) 308 (H) 65 - 100 mg/dL    Performed by Yousif Sandhu, POC    Collection Time: 02/12/17  9:39 PM   Result Value Ref Range    Glucose (POC) 234 (H) 65 - 100 mg/dL    Performed by [facility]    METABOLIC PANEL, BASIC    Collection Time: 02/13/17  3:25 AM   Result Value Ref Range    Sodium 140 136 - 145 mmol/L    Potassium 4.4 3.5 - 5.1 mmol/L    Chloride 104 97 - 108 mmol/L    CO2 26 21 - 32 mmol/L    Anion gap 10 5 - 15 mmol/L    Glucose 229 (H) 65 - 100 mg/dL    BUN 46 (H) 6 - 20 MG/DL    Creatinine 1.39 (H) 0.55 - 1.02 MG/DL    BUN/Creatinine ratio 33 (H) 12 - 20      GFR est AA 45 (L) >60 ml/min/1.73m2    GFR est non-AA 37 (L) >60 ml/min/1.73m2    Calcium 8.6 8.5 - 10.1 MG/DL   PROTHROMBIN TIME + INR    Collection Time: 02/13/17  3:25 AM   Result Value Ref Range    INR 2.1 (H) 0.9 - 1.1      Prothrombin time 21.5 (H) 9.0 - 11.1 sec   GLUCOSE, POC    Collection Time: 02/13/17  7:01 AM   Result Value Ref Range    Glucose (POC) 240 (H) 65 - 100 mg/dL    Performed by Bhavana Luna            Assessment:     Principal Problem:    Respiratory failure due to asthma exacerbation & probable PNA. Active Problems:    Asthma exacerbation (1/21/2014)      Morbid obesity ([facility] (2/18/2014)      Type 2 diabetes mellitus without complication ([facility] [facility] (2/81/5699)      Chronic atrial fibrillation ([facility] (7/17/2015)      Essential hypertension with goal blood pressure less than 140/90 (5/24/2016)      PNA (pneumonia) (2/9/2017)        Plan:     1. Cont Nebs & Levaquin. 2. Change to PO prednisone 60 mg every day. 3. Recheck CXR. 4. Pharmacy dosing coumadin. 5. On higher dose of insulin, due to high blood sugars with steroids. On SSI. 6. D/c tele.           Denise Leigh MD

## 2017-02-13 NOTE — PROGRESS NOTES
Pulmonary, Critical Care, and Sleep Medicine~Progress Note    Name: Jorge Luis Lyon MRN: [id number]   : 1945 Hospital: . [address]   Date: 2017 9:31 AM Admission: 2017     IMPRESSION:   · Acute on chronic resp failure secondary to acute asthmatic bronchitis after pediatric sick contact. Possible RLL infiltrate on chest film  · Xolair patient. Followed by Dr Allen Laurent   · LITA, CPAP use   · A fib  · DM II      PLAN:   · On allergy shots, xolair and conventional therapy for asthma. We discussed possibly removing herself from her rodrigo sick contact. Will need to consider Poppy Reyes, she appears to qualify for this. This can be done on outpt basis   · Nebs  · Diuretics; already on high dose diuretics    · PO steroids   · O2 titration above 90%  · On levaquin  · Warfarin   · Encourage mobility!!  · PRN for tomorrow, we will check back on Wednesday      Daily Progression:    Chest film looks ok today. Breathing appears to be waxing and waning. OBJECTIVE:     Vital Signs:       Visit Vitals    /90 (BP 1 Location: Left arm, BP Patient Position: At rest)    Pulse 88    Temp 97.7 °F (36.5 °C)    Resp 18    Ht 5' 6\" (1.676 m)    Wt 137.7 kg (303 lb 9.6 oz)    SpO2 97%    BMI 49 kg/m2      Temp (24hrs), Av °F (36.7 °C), Min:97.7 °F (36.5 °C), Max:98.2 °F (36.8 °C)     Intake/Output:     Last shift:      Last 3 shifts:        No intake or output data in the 24 hours ending 17 1106    Imaging:  I have personally reviewed these radiographic films and reports:  17: chest x-ray    \"IMPRESSION:   Possible minimal patch of airspace disease posterior lung base. \"   I have personally reviewed PFTs:   n/a       Ventilation:   Mode Rate Tidal Volume Pressure Suppport FiO2/LPM PEEP Other   NC/CPAP             Physical Exam:                                        Exam Findings Other   General: No resp distress noted, appears stated age    [de-identified]:  No ulcers, JVD not elevated, no cervical LAD    Chest: No pectus deformity, normal chest rise b/l    HEART:  RRR, no murmurs/rubs/gallops    Lungs:  Mild rhonchi     ABD: Soft/NT, non rigid mildly distended    EXT: No cyanosis/clubbing/edema, normal peripheral pulses    Skin: No rashes or ulcers, no mottling    Neuro: A/O x 3        Medications:  Current Facility-Administered Medications   Medication Dose Route Frequency    levoFLOXacin (LEVAQUIN) tablet 500 mg  500 mg Oral Q24H    predniSONE (DELTASONE) tablet 60 mg  60 mg Oral DAILY WITH BREAKFAST    insulin NPH (NOVOLIN N, HUMULIN N) injection 45 Units  45 Units SubCUTAneous QHS    albuterol (PROVENTIL VENTOLIN) nebulizer solution 2.5 mg  2.5 mg Nebulization TID RT    insulin NPH (NOVOLIN N, HUMULIN N) injection 55 Units  55 Units SubCUTAneous DAILY    insulin lispro (HUMALOG) injection   SubCUTAneous TIDAC    glucose chewable tablet 16 g  4 Tab Oral PRN    dextrose (D50W) injection syrg 12.5-25 g  12.5-25 g IntraVENous PRN    glucagon (GLUCAGEN) injection 1 mg  1 mg IntraMUSCular PRN    benzocaine-menthol (CEPACOL) lozenge 1 Lozenge  1 Lozenge Mucous Membrane PRN    albuterol (PROVENTIL VENTOLIN) nebulizer solution 2.5 mg  2.5 mg Nebulization Q3H PRN    ALPRAZolam (XANAX) tablet 0.25-0.5 mg  0.25-0.5 mg Oral DAILY PRN    dilTIAZem CD (CARDIZEM CD) capsule 120 mg  120 mg Oral DAILY    cholecalciferol (VITAMIN D3) tablet 2,000 Units  2,000 Units Oral DAILY    fish oil-omega-3 fatty acids 340-1,000 mg capsule 1 Cap  1 Cap Oral DAILY    . PHARMACY TO SUBSTITUTE PER PROTOCOL    Per Protocol    furosemide (LASIX) tablet 80 mg  80 mg Oral DAILY    furosemide (LASIX) tablet 40 mg  40 mg Oral QPM    gabapentin (NEURONTIN) capsule 300 mg  300 mg Oral QPM    metoprolol succinate (TOPROL-XL) XL tablet 25 mg  25 mg Oral DAILY    montelukast (SINGULAIR) tablet 10 mg  10 mg Oral QPM    potassium chloride SR (KLOR-CON 10) tablet 20 mEq  20 mEq Oral DAILY WITH BREAKFAST    sertraline (ZOLOFT) tablet 50 mg  50 mg Oral QHS    therapeutic multivitamin (THERAGRAN) tablet 1 Tab  1 Tab Oral DAILY    ipratropium (ATROVENT) 0.02 % nebulizer solution 0.5 mg  0.5 mg Nebulization Q6H RT    glucose chewable tablet 16 g  4 Tab Oral PRN    dextrose (D50W) injection syrg 12.5-25 g  12.5-25 g IntraVENous PRN    glucagon (GLUCAGEN) injection 1 mg  1 mg IntraMUSCular PRN    guaiFENesin SR (MUCINEX) tablet 1,200 mg  1,200 mg Oral BID    arformoterol (BROVANA) neb solution 15 mcg  15 mcg Nebulization BID RT    And    budesonide (PULMICORT) 500 mcg/2 ml nebulizer suspension  500 mcg Nebulization BID RT    Warfarin dosing per pharmacy   Other Rx Dosing/Monitoring    ondansetron (ZOFRAN) injection 4 mg  4 mg IntraVENous Q4H PRN       Labs:  ABG No results for input(s): PHI, PCO2I, PO2I, HCO3I, SO2I, FIO2I in the last 72 hours. CBC No results for input(s): WBC, HGB, HCT, PLT, MCV, MCH, HGBEXT, HCTEXT, PLTEXT, HGBEXT, HCTEXT, PLTEXT in the last 72 hours.      Metabolic  Panel Recent Labs      02/13/17   0325  02/12/17   0438  02/11/17   0347   NA  140   --   134*   K  4.4   --   4.3   CL  104   --   98   CO2  26   --   26   GLU  229*   --   402*   BUN  46*   --   43*   CREA  1.39*   --   1.57*   CA  8.6   --   9.2   INR  2.1*  2.0*  1.7*        Pertinent Labs n/a               MAYTE Esquivel  2/13/2017

## 2017-02-13 NOTE — PROGRESS NOTES
TRANSFER - OUT REPORT:    Verbal report given to Carla Zavaleta RN(name) on [address]  being transferred to (unit) for routine progression of care       Report consisted of patients Situation, Background, Assessment and   Recommendations(SBAR). Information from the following report(s) SBAR, Kardex, Intake/Output and MAR was reviewed with the receiving nurse. Lines:   Peripheral IV 02/11/17 Right Antecubital (Active)   Site Assessment Clean, dry, & intact 2/13/2017  4:00 AM   Phlebitis Assessment 0 2/13/2017  4:00 AM   Infiltration Assessment 0 2/13/2017  4:00 AM   Dressing Status Clean, dry, & intact 2/13/2017  4:00 AM   Dressing Type Tape;Transparent 2/13/2017  4:00 AM   Hub Color/Line Status Pink;Capped 2/13/2017  4:00 AM   Action Taken Open ports on tubing capped 2/13/2017  4:00 AM   Alcohol Cap Used Yes 2/13/2017  4:00 AM        Opportunity for questions and clarification was provided.       Patient transported with:   [employer]

## 2017-02-14 VITALS
SYSTOLIC BLOOD PRESSURE: 137 MMHG | DIASTOLIC BLOOD PRESSURE: 97 MMHG | RESPIRATION RATE: 16 BRPM | HEART RATE: 93 BPM | OXYGEN SATURATION: 98 % | WEIGHT: 293 LBS | TEMPERATURE: 97.6 F | HEIGHT: 66 IN | BODY MASS INDEX: 47.09 KG/M2

## 2017-02-14 LAB
GLUCOSE BLD STRIP.AUTO-MCNC: 53 MG/DL (ref 65–100)
GLUCOSE BLD STRIP.AUTO-MCNC: 60 MG/DL (ref 65–100)
GLUCOSE BLD STRIP.AUTO-MCNC: 94 MG/DL (ref 65–100)
INR PPP: 2.4 (ref 0.9–1.1)
PROTHROMBIN TIME: 24.4 SEC (ref 9–11.1)
SERVICE CMNT-IMP: ABNORMAL
SERVICE CMNT-IMP: ABNORMAL
SERVICE CMNT-IMP: NORMAL

## 2017-02-14 PROCEDURE — 74011636637 HC RX REV CODE- 636/637: Performed by: INTERNAL MEDICINE

## 2017-02-14 PROCEDURE — 77030013140 HC MSK NEB VYRM -A

## 2017-02-14 PROCEDURE — 74011250637 HC RX REV CODE- 250/637: Performed by: INTERNAL MEDICINE

## 2017-02-14 PROCEDURE — 82962 GLUCOSE BLOOD TEST: CPT

## 2017-02-14 PROCEDURE — 94640 AIRWAY INHALATION TREATMENT: CPT

## 2017-02-14 PROCEDURE — 94660 CPAP INITIATION&MGMT: CPT

## 2017-02-14 PROCEDURE — 74011000250 HC RX REV CODE- 250: Performed by: INTERNAL MEDICINE

## 2017-02-14 PROCEDURE — 36415 COLL VENOUS BLD VENIPUNCTURE: CPT | Performed by: INTERNAL MEDICINE

## 2017-02-14 PROCEDURE — 85610 PROTHROMBIN TIME: CPT | Performed by: INTERNAL MEDICINE

## 2017-02-14 RX ORDER — PREDNISONE 10 MG/1
TABLET ORAL
Qty: 84 TAB | Refills: 0 | Status: ON HOLD | OUTPATIENT
Start: 2017-02-14 | End: 2017-05-01

## 2017-02-14 RX ORDER — LEVOFLOXACIN 500 MG/1
500 TABLET, FILM COATED ORAL DAILY
Qty: 5 TAB | Refills: 0 | Status: SHIPPED | OUTPATIENT
Start: 2017-02-14 | End: 2017-02-19

## 2017-02-14 RX ORDER — ALBUTEROL SULFATE 0.83 MG/ML
2.5 SOLUTION RESPIRATORY (INHALATION)
Qty: 48 EACH | Refills: 6 | Status: SHIPPED | OUTPATIENT
Start: 2017-02-14 | End: 2017-04-23

## 2017-02-14 RX ORDER — WARFARIN 4 MG/1
4 TABLET ORAL ONCE
Status: ACTIVE | OUTPATIENT
Start: 2017-02-14 | End: 2017-02-15

## 2017-02-14 RX ADMIN — ALBUTEROL SULFATE 2.5 MG: 2.5 SOLUTION RESPIRATORY (INHALATION) at 08:20

## 2017-02-14 RX ADMIN — Medication 1 CAPSULE: at 09:08

## 2017-02-14 RX ADMIN — HUMAN INSULIN 40 UNITS: 100 INJECTION, SUSPENSION SUBCUTANEOUS at 09:49

## 2017-02-14 RX ADMIN — THERA TABS 1 TABLET: TAB at 09:08

## 2017-02-14 RX ADMIN — BUDESONIDE 500 MCG: 0.5 INHALANT RESPIRATORY (INHALATION) at 08:15

## 2017-02-14 RX ADMIN — NIFEDIPINE 120 MG: 10 CAPSULE ORAL at 09:08

## 2017-02-14 RX ADMIN — GUAIFENESIN 1200 MG: 600 TABLET, EXTENDED RELEASE ORAL at 09:08

## 2017-02-14 RX ADMIN — IPRATROPIUM BROMIDE 0.5 MG: 0.5 SOLUTION RESPIRATORY (INHALATION) at 01:42

## 2017-02-14 RX ADMIN — POTASSIUM CHLORIDE 20 MEQ: 750 TABLET, FILM COATED, EXTENDED RELEASE ORAL at 07:25

## 2017-02-14 RX ADMIN — PREDNISONE 60 MG: 20 TABLET ORAL at 07:25

## 2017-02-14 RX ADMIN — FUROSEMIDE 80 MG: 40 TABLET ORAL at 09:08

## 2017-02-14 RX ADMIN — VITAMIN D, TAB 1000IU (100/BT) 2000 UNITS: 25 TAB at 09:08

## 2017-02-14 RX ADMIN — ARFORMOTEROL TARTRATE 15 MCG: 15 SOLUTION RESPIRATORY (INHALATION) at 08:15

## 2017-02-14 RX ADMIN — METOPROLOL SUCCINATE 25 MG: 25 TABLET, EXTENDED RELEASE ORAL at 09:08

## 2017-02-14 RX ADMIN — IPRATROPIUM BROMIDE 0.5 MG: 0.5 SOLUTION RESPIRATORY (INHALATION) at 08:15

## 2017-02-14 NOTE — DISCHARGE INSTRUCTIONS
Patient Discharge Instructions    Rosemarie Little / [id number] : 1945    Admitted 2017 Discharged: 2017     Take Home Medications       · It is important that you take the medication exactly as they are prescribed. · Keep your medication in the bottles provided by the pharmacist and keep a list of the medication names, dosages, and times to be taken in your wallet. · Do not take other medications without consulting your doctor. What to do at Home    Recommended diet: Diabetic Diet. Recommended activity: Activity as tolerated. Follow-up with Dr. Georgia Dobbs in 1 week. Information obtained by :  I understand that if any problems occur once I am at home I am to contact my physician. I understand and acknowledge receipt of the instructions indicated above.                                                                                                                                            Physician's or R.N.'s Signature                                                                  Date/Time                                                                                                                                              Patient or Representative Signature                                                          Date/Time

## 2017-02-14 NOTE — PROGRESS NOTES
Pharmacist Note - Warfarin Dosing  Consult provided for this 66 y. o.female to manage warfarin for Atrial Fibrillation     INR Goal: 2 - 3     Home regimen/ tablet size: 5mg po daily per medrec     Drugs that may increase INR: Quinolones, methylprednisolone  Drugs that may decrease INR: None  Other current anticoagulants/ drugs that may increase bleeding risk: NSAID  Risk factors: Age > 65  Daily INR ordered: YES  Recent Labs      02/14/17   0727  02/13/17   0325  02/12/17   0438   INR  2.4*  2.1*  2.0*     Date               INR                  Dose  2/9                 1.2                    5 mg  2/10           1.3  5 mg    2/11               1.7                    2.5 mg   2/12               2                       2.5 mg     2/13               2.1                    4 mg      2/14               2.4                    4 mg                                                                             Assessment/ Plan: Will order warfarin 4 mg PO x 1 dose in the event that discharge is delayed today. Pharmacy will continue to monitor daily and adjust therapy as indicated. Please contact the pharmacist at   for outpatient recommendations if needed.

## 2017-02-14 NOTE — PROGRESS NOTES
Bedside shift change report given to [facility] (oncoming nurse) by Alexandra Gooden (offgoing nurse). Report included the following information SBAR and Kardex.

## 2017-02-14 NOTE — DISCHARGE SUMMARY
Physician Discharge Summary     Patient ID:  Romeo Silveira  [id number]  66 y.o.  1945    Admit date: 2/9/2017    Discharge date and time: 2/14/2017    Briefly, pt was admitted with Respiratory failure ([facility] [address] Respiratory failure ([facility] [address] For details of admission, see H&P. Hospital Course:  Pt was admitted & given nebs. She was started on IV Solu-medrol and Levaquin. She clinically improved, and her steroids were changed to PO Prednisone. Pulmonary followed pt in the hospital.  Pt's BS's were high with the IV steroids, requiring a higher dose of Insulin. I think she can go home on insulin as she was taking at home, as I expect the BS's will improve with tapering of the prednisone. Discharge Dx: Resp failure due to asthma exacerbation. Condition at discharge: improved. Disposition: home    Patient Instructions:   Current Discharge Medication List      START taking these medications    Details   levoFLOXacin (LEVAQUIN) 500 mg tablet Take 1 Tab by mouth daily for 5 days. Qty: 5 Tab, Refills: 0      predniSONE (DELTASONE) 10 mg tablet Take 6t every day for 4d, then 5t every day for 4d, then 4t QD for 4d, then 3t QD for 4d, then 2t QD for 4d, then 1t QD for 4d. Qty: 84 Tab, Refills: 0         CONTINUE these medications which have CHANGED    Details   !! albuterol (PROVENTIL VENTOLIN) 2.5 mg /3 mL (0.083 %) nebulizer solution 3 mL by Nebulization route four (4) times daily as needed for Wheezing or Shortness of Breath. Qty: 48 Each, Refills: 6       !! - Potential duplicate medications found. Please discuss with provider. CONTINUE these medications which have NOT CHANGED    Details   empagliflozin (JARDIANCE) 25 mg tablet Take 12.5 mg by mouth daily. fluticasone furoate (ARNUITY ELLIPTA) 100 mcg/actuation dsdv inhaler Take 1 Puff by inhalation daily. montelukast (SINGULAIR) 10 mg tablet Take 10 mg by mouth daily.       naproxen sodium (NAPROSYN) 220 mg tablet Take 880 mg by mouth daily. Indications: OSTEOARTHRITIS      omalizumab (XOLAIR) 150 mg solr 150 mg by SubCUTAneous route every fourteen (14) days. Next dose due 2/17      metoprolol succinate (TOPROL-XL) 25 mg XL tablet TAKE 1 TABLET BY MOUTH EVERY DAY  Qty: 90 Tab, Refills: 3      potassium chloride (K-DUR, KLOR-CON) 20 mEq tablet TAKE 1 TABLET BY MOUTH TWICE DAILY  Qty: 60 Tab, Refills: 11      fluticasone-salmeterol (ADVAIR DISKUS) 250-50 mcg/dose diskus inhaler Take 1 Puff by inhalation every twelve (12) hours. tiotropium (SPIRIVA WITH HANDIHALER) 18 mcg inhalation capsule Take 1 Cap by inhalation daily. diclofenac (VOLTAREN) 1 % gel Apply  to affected area four (4) times daily. Qty: 1500 g, Refills: 6      !! albuterol (PROVENTIL VENTOLIN) 2.5 mg /3 mL (0.083 %) nebulizer solution 3 mL by Nebulization route four (4) times daily as needed for Wheezing or Shortness of Breath. Qty: 100 Each, Refills: 5      therapeutic multivitamin (THERAGRAN) tablet Take 1 Tab by mouth daily. CARTIA  mg ER capsule TAKE 3 CAPSULES BY MOUTH DAILY  Qty: 90 Cap, Refills: 11      sertraline (ZOLOFT) 100 mg tablet Take 1 Tab by mouth nightly. Qty: 90 Tab, Refills: 3      furosemide (LASIX) 40 mg tablet take 2 tablets by mouth every morning and take 1 tablet by mouth every evening  Qty: 270 Tab, Refills: 3      insulin regular (NOVOLIN R, HUMULIN R) 100 unit/mL injection Sliding scale: For -300 use 4 units, 301-400 use 8 units, greater than 400 use 12 units, greater than 500 call MD. [place] Ego: 3 Vial, Refills: 11      gabapentin (NEURONTIN) 300 mg capsule Take 300 mg by mouth every evening. PROVENTIL HFA 90 mcg/actuation inhaler inhale 2 puffs four times a day if needed for wheezing  Qty: 3 Inhaler, Refills: 3      ALPRAZolam (XANAX) 0.5 mg tablet Take 0.25-0.5 mg by mouth daily as needed for Anxiety. cholecalciferol, vitamin D3, (VITAMIN D3) 2,000 unit tab Take 2,000 Units by mouth daily.       DOCOSAHEXANOIC ACID/EPA (FISH OIL PO) Take 1 Cap by mouth daily. warfarin (COUMADIN) 5 mg tablet TAKE 1 TABLET BY MOUTH THREE TIMES A WEEK THEN TAKE 1/2 TABLET THE OTHER FOUR DAYS OF THE WEEK  Qty: 30 Tab, Refills: 6      HUMULIN N 100 unit/mL injection INJECT 35 UNITS UNDER THE SKIN IN THE MORNING AND INJECT 25 UNITS AT BEDTIME OR AS DIRECTED  Qty: 30 mL, Refills: 11       !! - Potential duplicate medications found. Please discuss with provider. STOP taking these medications       azithromycin (ZITHROMAX) 500 mg tab Comments:   Reason for Stopping: Follow-up with Dr. Georgia Dobbs in 1 week.       Signed:  Sharon Whatley MD  2/14/2017  7:10 AM

## 2017-02-14 NOTE — PROGRESS NOTES
HYPOGLYCEMIC EPISODE DOCUMENTATION    Patient with hypoglycemic episode(s) at 0721(time) on 2/14/17(date). BG value(s) pre-treatment 48   Was patient symptomatic? [] yes, [x] no  Patient was treated with the following rescue medications/treatments: [] D50                [] Glucose tablets                [] Glucagon                [x] 4oz juice                [] 6oz reg soda                [] 8oz low fat milk  BG value post-treatment: 94  Once BG treated and value greater than 80mg/dl, pt was provided with the following:  [x] snack  [x] meal  Name of MD notified:   The following orders were received:

## 2017-02-14 NOTE — PROGRESS NOTES
Pt continues to improve, and she feels ready to go home. Less wheezing on exam.  Home today on slow prednisone taper, and complete course of Levaquin.

## 2017-02-14 NOTE — PROGRESS NOTES
Bedside shift change report given to Zak Ny RN (oncoming nurse) by Yoanna Manuel RN (offgoing nurse). Report included the following information SBAR and MAR .        0932- Paged Dr. Stacey Sandy to notify him of patient's hypoglycemic episode and to clarify this morning's NPH Insulin dose. Per Dr. Stacey Sandy, decrease dose to 40 Units from 55 Units and administer prior to discharge.

## 2017-02-17 LAB
BACTERIA SPEC CULT: ABNORMAL
GRAM STN SPEC: ABNORMAL
SERVICE CMNT-IMP: ABNORMAL

## 2017-02-20 PROBLEM — I15.2 HYPERTENSION COMPLICATING DIABETES (HCC): Status: ACTIVE | Noted: 2017-02-20

## 2017-02-20 PROBLEM — E11.59 HYPERTENSION COMPLICATING DIABETES (HCC): Status: ACTIVE | Noted: 2017-02-20

## 2017-03-20 ENCOUNTER — HOSPITAL ENCOUNTER (EMERGENCY)
Age: 72
Discharge: HOME | End: 2017-03-20
Attending: EMERGENCY MEDICINE
Payer: MEDICARE

## 2017-03-20 ENCOUNTER — APPOINTMENT (OUTPATIENT)
Dept: GENERAL RADIOLOGY | Age: 72
End: 2017-03-20
Attending: EMERGENCY MEDICINE
Payer: MEDICARE

## 2017-03-20 VITALS
TEMPERATURE: 98.9 F | RESPIRATION RATE: 16 BRPM | HEART RATE: 89 BPM | SYSTOLIC BLOOD PRESSURE: 123 MMHG | WEIGHT: 293 LBS | OXYGEN SATURATION: 100 % | BODY MASS INDEX: 45.99 KG/M2 | DIASTOLIC BLOOD PRESSURE: 52 MMHG | HEIGHT: 67 IN
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
## 2017-03-20 LAB
ANION GAP BLD CALC-SCNC: 10 MMOL/L (ref 5–15)
APTT PPP: 38 SEC (ref 22.1–32.5)
BASOPHILS # BLD AUTO: 0.1 K/UL (ref 0–0.1)
BASOPHILS # BLD: 1 % (ref 0–1)
BUN SERPL-MCNC: 23 MG/DL (ref 6–20)
BUN/CREAT SERPL: 15 (ref 12–20)
CALCIUM SERPL-MCNC: 9.5 MG/DL (ref 8.5–10.1)
CHLORIDE SERPL-SCNC: 100 MMOL/L (ref 97–108)
CO2 SERPL-SCNC: 28 MMOL/L (ref 21–32)
CREAT SERPL-MCNC: 1.54 MG/DL (ref 0.55–1.02)
EOSINOPHIL # BLD: 0.2 K/UL (ref 0–0.4)
EOSINOPHIL NFR BLD: 2 % (ref 0–7)
ERYTHROCYTE [DISTWIDTH] IN BLOOD BY AUTOMATED COUNT: 16.9 % (ref 11.5–14.5)
GLUCOSE SERPL-MCNC: 206 MG/DL (ref 65–100)
HCT VFR BLD AUTO: 38.9 % (ref 35–47)
HGB BLD-MCNC: 12.6 G/DL (ref 11.5–16)
INR PPP: 2 (ref 0.9–1.1)
LYMPHOCYTES # BLD AUTO: 19 % (ref 12–49)
LYMPHOCYTES # BLD: 1.8 K/UL (ref 0.8–3.5)
MCH RBC QN AUTO: 28.1 PG (ref 26–34)
MCHC RBC AUTO-ENTMCNC: 32.4 G/DL (ref 30–36.5)
MCV RBC AUTO: 86.6 FL (ref 80–99)
MONOCYTES # BLD: 0.9 K/UL (ref 0–1)
MONOCYTES NFR BLD AUTO: 10 % (ref 5–13)
NEUTS SEG # BLD: 6.7 K/UL (ref 1.8–8)
NEUTS SEG NFR BLD AUTO: 68 % (ref 32–75)
PLATELET # BLD AUTO: 230 K/UL (ref 150–400)
POTASSIUM SERPL-SCNC: 3.5 MMOL/L (ref 3.5–5.1)
PROTHROMBIN TIME: 20.7 SEC (ref 9–11.1)
RBC # BLD AUTO: 4.49 M/UL (ref 3.8–5.2)
SODIUM SERPL-SCNC: 138 MMOL/L (ref 136–145)
THERAPEUTIC RANGE,PTTT: ABNORMAL SECS (ref 58–77)
WBC # BLD AUTO: 9.7 K/UL (ref 3.6–11)

## 2017-03-20 PROCEDURE — 85610 PROTHROMBIN TIME: CPT | Performed by: EMERGENCY MEDICINE

## 2017-03-20 PROCEDURE — 72100 X-RAY EXAM L-S SPINE 2/3 VWS: CPT

## 2017-03-20 PROCEDURE — 85730 THROMBOPLASTIN TIME PARTIAL: CPT | Performed by: EMERGENCY MEDICINE

## 2017-03-20 PROCEDURE — 36415 COLL VENOUS BLD VENIPUNCTURE: CPT | Performed by: EMERGENCY MEDICINE

## 2017-03-20 PROCEDURE — 80048 BASIC METABOLIC PNL TOTAL CA: CPT | Performed by: EMERGENCY MEDICINE

## 2017-03-20 PROCEDURE — 99282 EMERGENCY DEPT VISIT SF MDM: CPT

## 2017-03-20 PROCEDURE — 85025 COMPLETE CBC W/AUTO DIFF WBC: CPT | Performed by: EMERGENCY MEDICINE

## 2017-03-20 NOTE — ED NOTES
Pt given discharge instructions. Questions answered and pt states understanding, no distress noted, pt wheeled out of unit.

## 2017-03-20 NOTE — PROGRESS NOTES
SSED/CM consult received and appreciated. EMR reviewed. History significant for DM, Afib, HTN, hyperlipidemia and neuropathy. Patient presents to the ED w/ leg weakness. Call received from Nursing. CM met w/patient and daughter Le Ma - introduced to role.  verbalizes falls 3 in past 72 hours. Manuel Olvera lives alone [place] is 12 minutes from home and daughter Chun Coreas lives in [place]. Patient has resided in [address] [facility] [facility] since 2013 previously [place]. Prior to neck surgery received OP therapy (patient shared having to drive a long distance to location) and after surgery was able to benefit from [id number] /PT (preference). Manuel Olvera has been able to perform her ADLs independently. DME includes quad cane, CPAP and grabber. Patient verbalized she would \"duck tape her cell on her person . \" CM asked about consideration of a med alert device - patient acknowledged EMS inquired also. Manuel Olvera states she will consider obtaining device.  is PCP. Patient states contacted office prior to ED visit. CM will communicate w/ Nathaniel Nicholas PT regarding PT recommendations. Demographics verified. No additional transition care needs verbalized. Updates provided to  and Linda Guy. Care Management Interventions  PCP Verified by CM: Yes  Last Visit to PCP: 03/06/17  Mode of Transport at Discharge:  Other (see comment) (family vehicle)  Hospital Transport Time of Discharge: 1638  Transition of Care Consult (CM Consult): Discharge Planning  MyChart Signup: Yes  Discharge Durable Medical Equipment: No  Physical Therapy Consult: Yes  Occupational Therapy Consult: No  Speech Therapy Consult: No  Current Support Network: Lives Alone  Confirm Follow Up Transport: Family  Plan discussed with Pt/Family/Caregiver: Yes  Freedom of Choice Offered: Yes  Discharge Location  Discharge Placement: Home

## 2017-03-20 NOTE — ED TRIAGE NOTES
Pt states that her legs have given out on her 3-4 times since Saturday with no warning. Pt states that she has also been taking flexeril since Saturday for a sore back. Godwin Diamond

## 2017-03-20 NOTE — Clinical Note
Please exercise caution when walking around home. Use walker. You should expect a phone call from our senior services team to set up a home physical therapy visit.

## 2017-03-20 NOTE — ED PROVIDER NOTES
HPI Comments: 67 y.o. female with past medical history significant for A-fib, HTN, diabetes, hyperlipidemia, and neuropathy who presents from home with chief complaint of leg weakness. Pt states that she has had increased leg weakness for the past 3 days. She says that her legs have buckled 4 times during the last 3 days. She is also complaining of some back pain that started 4 days ago. When the back pain started she began taking flexeril. She stopped taking the flexeril when her leg weakness began. She has also noticed some tingling in her legs(more than her normal tingling from the neuropathy), some increased leg swelling(last 3 weeks), and some R heel pain. Last time she had the falling incident was when she was taking 2 blood pressure medications and she had some light headedness. She is not experiencing any light headedness currently. She is also denying difficulty urinating, increased urinary frequency, change in BM, chest pain, or SOB. There are no other acute medical concerns at this time. PCP: Maurice Membreno MD    Note written by Lee Rivero, as dictated by Eliza Friend MD 5:25 PM      The history is provided by the patient. No  was used.         Past Medical History:   Diagnosis Date    Atrial fibrillation ([address]     Depression     DM (diabetes mellitus) ([address]     HTN (hypertension)     Hyperlipidemia     Neuropathy     LITA on CPAP     Other ill-defined conditions(799.89) pneumonia       Past Surgical History:   Procedure Laterality Date    HX APPENDECTOMY      HX CHOLECYSTECTOMY      HX GASTRIC BYPASS      Jejunal bypass with subsequent reversal..  Lab Band since    HX OTHER SURGICAL  D & C    HX EMILIANO AND BSO      For uterine CA         Family History:   Problem Relation Age of Onset    Stroke Mother     Diabetes Other     Heart Disease Other        Social History     Social History    Marital status:      Spouse name: N/A    Number of children: N/A    Years of education: N/A     Occupational History    Not on file. Social History Main Topics    Smoking status: Former Smoker     Quit date: 1/1/1992    Smokeless tobacco: Not on file    Alcohol use Yes      Comment: occ wine    Drug use: No    Sexual activity: Not on file     Other Topics Concern    Not on file     Social History Narrative         ALLERGIES: Latex; Codeine; Lisinopril; Statins-hmg-coa reductase inhibitors; and Morphine    Review of Systems   Constitutional: Negative for fever. HENT: Negative for facial swelling. Eyes: Negative for visual disturbance. Respiratory: Negative for chest tightness and shortness of breath. Cardiovascular: Positive for leg swelling. Negative for chest pain. Gastrointestinal: Negative for abdominal pain. Genitourinary: Negative for difficulty urinating, dysuria and frequency. Musculoskeletal: Positive for arthralgias and back pain. Skin: Negative for rash. Neurological: Positive for weakness. Negative for dizziness and light-headedness. Tingling in both legs   Hematological: Negative for adenopathy. Psychiatric/Behavioral: Negative for suicidal ideas. Vitals:    03/20/17 1652   BP: 119/89   Pulse: 86   Resp: 16   Temp: 99.1 °F (37.3 °C)   SpO2: 100%   Weight: 133.4 kg (294 lb)   Height: 5' 6.5\" (1.689 m)            Physical Exam   Constitutional: She is oriented to person, place, and time. She appears well-developed and well-nourished. No distress. Morbidly obese   HENT:   Head: Normocephalic and atraumatic. Mouth/Throat: Oropharynx is clear and moist.   Eyes: Pupils are equal, round, and reactive to light. No scleral icterus. Neck: Normal range of motion. Neck supple. No thyromegaly present. Cardiovascular: Normal rate, regular rhythm, normal heart sounds and intact distal pulses. No murmur heard. Pulmonary/Chest: Effort normal and breath sounds normal. No respiratory distress. Abdominal: Soft. Bowel sounds are normal. She exhibits no distension. There is no tenderness. Musculoskeletal: Normal range of motion. 1+ pitting edema of both LE. Good strength in straight leg raises bilaterally. Normal reflexes. Neurological: She is alert and oriented to person, place, and time. Skin: Skin is warm and dry. No rash noted. She is not diaphoretic. Nursing note and vitals reviewed. Note written by Lee Gutierrez, as dictated by Liliana Ny MD 5:35 PM      MDM  Number of Diagnoses or Management Options  Diagnosis management comments: A:  78yo F with legs collapsing on her over past 3 days. VS normal.  Exam unremarkable. No pain or focal weakness at this time. Pt denies any sig injuries from fall. Pt has h/o chronic back problems and has been taking flexeril for past 3 days. DDx:  Electrolyte problem, msk weakness, lumbar back disease/sciatica. P:  Labs  Lumber xray  Ambulate patient    ED Course       Procedures    Labs unremarkable. Xray with no acute changes. Pt's symptoms possibly related to flexeril use. Pt advised to stop flexeril. Ambulated independently in ED with nurse. Safe for discharge home.   Senior service referral placed for home PT.

## 2017-03-20 NOTE — DISCHARGE INSTRUCTIONS
Preventing Falls: Care Instructions  Your Care Instructions  Getting around your home safely can be a challenge if you have injuries or health problems that make it easy for you to fall. Loose rugs and furniture in walkways are among the dangers for many older people who have problems walking or who have poor eyesight. People who have conditions such as arthritis, osteoporosis, or dementia also have to be careful not to fall. You can make your home safer with a few simple measures. Follow-up care is a key part of your treatment and safety. Be sure to make and go to all appointments, and call your doctor if you are having problems. It's also a good idea to know your test results and keep a list of the medicines you take. How can you care for yourself at home? Taking care of yourself  · You may get dizzy if you do not drink enough water. To prevent dehydration, drink plenty of fluids, enough so that your urine is light yellow or clear like water. Choose water and other caffeine-free clear liquids. If you have kidney, heart, or liver disease and have to limit fluids, talk with your doctor before you increase the amount of fluids you drink. · Exercise regularly to improve your strength, muscle tone, and balance. Walk if you can. Swimming may be a good choice if you cannot walk easily. · Have your vision and hearing checked each year or any time you notice a change. If you have trouble seeing and hearing, you might not be able to avoid objects and could lose your balance. · Know the side effects of the medicines you take. Ask your doctor or pharmacist whether the medicines you take can affect your balance. Sleeping pills or sedatives can affect your balance. · Limit the amount of alcohol you drink. Alcohol can impair your balance and other senses. · Ask your doctor whether calluses or corns on your feet need to be removed.  If you wear loose-fitting shoes because of calluses or corns, you can lose your balance and fall. · Talk to your doctor if you have numbness in your feet. Preventing falls at home  · Remove raised doorway thresholds, throw rugs, and clutter. Repair loose carpet or raised areas in the floor. · Move furniture and electrical cords to keep them out of walking paths. · Use nonskid floor wax, and wipe up spills right away, especially on ceramic tile floors. · If you use a walker or cane, put rubber tips on it. If you use crutches, clean the bottoms of them regularly with an abrasive pad, such as steel wool. · Keep your house well lit, especially [place], and outside walkways. Use night-lights in areas such as hallways and bathrooms. Add extra light switches or use remote switches (such as switches that go on or off when you clap your hands) to make it easier to turn lights on if you have to get up during the night. · Install sturdy handrails on stairways. · Move items in your cabinets so that the things you use a lot are on the lower shelves (about waist level). · Keep a cordless phone and a flashlight with new batteries by your bed. If possible, put a phone in each of the main rooms of your house, or carry a cell phone in case you fall and cannot reach a phone. Or, you can wear a device around your neck or wrist. You push a button that sends a signal for help. · Wear low-heeled shoes that fit well and give your feet good support. Use footwear with nonskid soles. Check the heels and soles of your shoes for wear. Repair or replace worn heels or soles. · Do not wear socks without shoes on wood floors. · Walk on the grass when the sidewalks are slippery. If you live in an area that gets snow and ice in the winter, sprinkle salt on slippery steps and sidewalks. Preventing falls in the bath  · Install grab bars and nonskid mats inside and outside your shower or tub and near the toilet and sinks. · Use shower chairs and bath benches.   · Use a hand-held shower head that will allow you to sit while showering. · Get into a tub or shower by putting the weaker leg in first. Get out of a tub or shower with your strong side first.  · Repair loose toilet seats and consider installing a raised toilet seat to make getting on and off the toilet easier. · Keep your bathroom door unlocked while you are in the shower. Where can you learn more? Go to http://nitesh-juan carlos.info/. Enter [id number] [id number] [phone] in the search box to learn more about \"Preventing Falls: Care Instructions. \"  Current as of: August 4, 2016  Content Version: 11.1  © 6896-8565 [employer]. Care instructions adapted under license by [employer] (which disclaims liability or warranty for this information). If you have questions about a medical condition or this instruction, always ask your healthcare professional. [place] [address] any warranty or liability for your use of this information. We hope that we have addressed all of your medical concerns. The examination and treatment you received in the Emergency Department were for an emergent problem and were not intended as complete care. It is important that you follow up with your healthcare provider(s) for ongoing care. If your symptoms worsen or do not improve as expected, and you are unable to reach your usual health care provider(s), you should return to the Emergency Department. Today's healthcare is undergoing tremendous change, and patient satisfaction surveys are one of the many tools to assess the quality of medical care. You may receive a survey from the [employer] organization regarding your experience in the Emergency Department. I hope that your experience has been completely positive, particularly the medical care that I provided. As such, please participate in the survey; anything less than excellent does not meet my expectations or intentions.         [facility] and [employer] participate in nationally recognized quality of care measures. If your blood pressure is greater than 120/80, as reported below, we urge that you seek medical care to address the potential of high blood pressure, commonly known as hypertension. Hypertension can be hereditary or can be caused by certain medical conditions, pain, stress, or \"white coat syndrome. \"       Please make an appointment with your health care provider(s) for follow up of your Emergency Department visit. VITALS:   Patient Vitals for the past 8 hrs:   Temp Pulse Resp BP SpO2   03/20/17 1652 99.1 °F (37.3 °C) 86 16 119/89 100 %          Thank you for allowing us to provide you with medical care today. We realize that you have many choices for your emergency care needs. Please choose us in the future for any continued health care needs. Sivakumar Todd MD    [facility], [facility].   Office: [phone]            Recent Results (from the past 24 hour(s))   CBC WITH AUTOMATED DIFF    Collection Time: 03/20/17  5:09 PM   Result Value Ref Range    WBC 9.7 3.6 - 11.0 K/uL    RBC 4.49 3.80 - 5.20 M/uL    HGB 12.6 11.5 - 16.0 g/dL    HCT 38.9 35.0 - 47.0 %    MCV 86.6 80.0 - 99.0 FL    MCH 28.1 26.0 - 34.0 PG    MCHC 32.4 30.0 - 36.5 g/dL    RDW 16.9 (H) 11.5 - 14.5 %    PLATELET 220 303 - 810 K/uL    NEUTROPHILS 68 32 - 75 %    LYMPHOCYTES 19 12 - 49 %    MONOCYTES 10 5 - 13 %    EOSINOPHILS 2 0 - 7 %    BASOPHILS 1 0 - 1 %    ABS. NEUTROPHILS 6.7 1.8 - 8.0 K/UL    ABS. LYMPHOCYTES 1.8 0.8 - 3.5 K/UL    ABS. MONOCYTES 0.9 0.0 - 1.0 K/UL    ABS. EOSINOPHILS 0.2 0.0 - 0.4 K/UL    ABS.  BASOPHILS 0.1 0.0 - 0.1 K/UL   METABOLIC PANEL, BASIC    Collection Time: 03/20/17  5:09 PM   Result Value Ref Range    Sodium 138 136 - 145 mmol/L    Potassium 3.5 3.5 - 5.1 mmol/L    Chloride 100 97 - 108 mmol/L    CO2 28 21 - 32 mmol/L    Anion gap 10 5 - 15 mmol/L    Glucose 206 (H) 65 - 100 mg/dL    BUN 23 (H) 6 - 20 MG/DL    Creatinine 1.54 (H) 0.55 - 1.02 MG/DL    BUN/Creatinine ratio 15 12 - 20      GFR est AA 40 (L) >60 ml/min/1.73m2    GFR est non-AA 33 (L) >60 ml/min/1.73m2    Calcium 9.5 8.5 - 10.1 MG/DL   PROTHROMBIN TIME + INR    Collection Time: 03/20/17  5:09 PM   Result Value Ref Range    INR 2.0 (H) 0.9 - 1.1      Prothrombin time 20.7 (H) 9.0 - 11.1 sec   PTT    Collection Time: 03/20/17  5:09 PM   Result Value Ref Range    aPTT 38.0 (H) 22.1 - 32.5 sec    aPTT, therapeutic range     58.0 - 77.0 SECS       Xr Spine Lumb 2 Or 3 V    Result Date: 3/20/2017  EXAM:  XR SPINE LUMB 2 OR 3 V INDICATION:   low back pain, falls COMPARISON: MRI 5/29/2014 FINDINGS: AP, lateral and spot lateral views of the lumbar spine. There is grade 2 anterolisthesis of L5 on S1 measuring approximately 10 mm. There are chronic appearing bilateral L5 pars defects. Mild spondylosis is seen from T11 to L4. There is severe disc space narrowing with near fusion at L5-S1. Vertebral body heights are maintained without evidence of fracture. Surgical anastomosis is seen in the left midabdomen. A hub overlies the right abdomen with a catheter extending to the left. IMPRESSION:  1. Unchanged grade 2 anterolisthesis of L5 on S1 with bilateral L5 chronic pars defects and severe disc space narrowing. 2. Unchanged mild spondylosis from T11 to L4.

## 2017-03-21 NOTE — SENIOR SERVICES NOTE
3/21 1110 After hours [facility] PT consult received and appreciated. Per chart review, patient present to ED with c/o legs \"giving way\" resulting in three falls over a 72hr period. Patient reportedly started taking Flexeril for back pain around the time the LE weakness began. Patient observed amb with steady gait, per RN note, and patient d/c home. Patient contacted via phone ([phone]) today and this writer left . Will await return call. Dayne Gonzales PT, DPT       3/22, 1520  Second phone call attempt a success. Spoke with patient who reports, \"I'm doing just fine now. \"  Pt reports that, with discussion and problem solving with PCP, it was found that she was being \"overmedicated\" with BP meds. \"They decreased my medications and I could tell a difference within 24hrs! \"  Pt reports she was able to drive out for errands today and never had a moment of dizziness or feelings of muscle weakness. Patient took her cane with her due to FOF but did not need it at any point. Patient reportedly has a membership at the [employer] and plans to return there for aquatic aerobics. \"I don't think I need to go to therapy anymore. I feel like a success. \"   Patient reports no further needs or concerns but is quite appreciative of follow up call. Consult to be completed at this time.

## 2017-04-23 ENCOUNTER — HOSPITAL ENCOUNTER (INPATIENT)
Age: 72
LOS: 8 days | Discharge: HOME HEALTH | DRG: 871 | End: 2017-05-01
Attending: EMERGENCY MEDICINE | Admitting: INTERNAL MEDICINE
Payer: MEDICARE

## 2017-04-23 ENCOUNTER — APPOINTMENT (OUTPATIENT)
Dept: GENERAL RADIOLOGY | Age: 72
DRG: 871 | End: 2017-04-23
Attending: EMERGENCY MEDICINE
Payer: MEDICARE

## 2017-04-23 ENCOUNTER — APPOINTMENT (OUTPATIENT)
Dept: CT IMAGING | Age: 72
DRG: 871 | End: 2017-04-23
Attending: EMERGENCY MEDICINE
Payer: MEDICARE

## 2017-04-23 DIAGNOSIS — J18.9 PNEUMONIA DUE TO INFECTIOUS ORGANISM, UNSPECIFIED LATERALITY, UNSPECIFIED PART OF LUNG: Primary | ICD-10-CM

## 2017-04-23 PROBLEM — N17.9 AKI (ACUTE KIDNEY INJURY) (HCC): Status: ACTIVE | Noted: 2017-04-23

## 2017-04-23 LAB
ALBUMIN SERPL BCP-MCNC: 3.4 G/DL (ref 3.5–5)
ALBUMIN/GLOB SERPL: 1.1 {RATIO} (ref 1.1–2.2)
ALP SERPL-CCNC: 78 U/L (ref 45–117)
ALT SERPL-CCNC: 21 U/L (ref 12–78)
ANION GAP BLD CALC-SCNC: 9 MMOL/L (ref 5–15)
APPEARANCE UR: ABNORMAL
APTT PPP: 37.1 SEC (ref 22.1–32.5)
AST SERPL W P-5'-P-CCNC: 8 U/L (ref 15–37)
ATRIAL RATE: 76 BPM
BASOPHILS # BLD AUTO: 0 K/UL (ref 0–0.1)
BASOPHILS # BLD: 0 % (ref 0–1)
BILIRUB SERPL-MCNC: 0.4 MG/DL (ref 0.2–1)
BILIRUB UR QL: NEGATIVE
BUN SERPL-MCNC: 33 MG/DL (ref 6–20)
BUN/CREAT SERPL: 19 (ref 12–20)
CALCIUM SERPL-MCNC: 8.4 MG/DL (ref 8.5–10.1)
CALCULATED R AXIS, ECG10: -38 DEGREES
CALCULATED T AXIS, ECG11: 94 DEGREES
CHLORIDE SERPL-SCNC: 99 MMOL/L (ref 97–108)
CO2 SERPL-SCNC: 27 MMOL/L (ref 21–32)
COLOR UR: ABNORMAL
CREAT SERPL-MCNC: 1.75 MG/DL (ref 0.55–1.02)
DIAGNOSIS, 93000: NORMAL
DIFFERENTIAL METHOD BLD: ABNORMAL
DIGOXIN SERPL-MCNC: 1.2 NG/ML (ref 0.9–2)
EOSINOPHIL # BLD: 0 K/UL (ref 0–0.4)
EOSINOPHIL NFR BLD: 0 % (ref 0–7)
ERYTHROCYTE [DISTWIDTH] IN BLOOD BY AUTOMATED COUNT: 16.2 % (ref 11.5–14.5)
GLOBULIN SER CALC-MCNC: 3.2 G/DL (ref 2–4)
GLUCOSE BLD STRIP.AUTO-MCNC: 242 MG/DL (ref 65–100)
GLUCOSE BLD STRIP.AUTO-MCNC: 267 MG/DL (ref 65–100)
GLUCOSE SERPL-MCNC: 301 MG/DL (ref 65–100)
GLUCOSE UR STRIP.AUTO-MCNC: >1000 MG/DL
HCT VFR BLD AUTO: 41.7 % (ref 35–47)
HGB BLD-MCNC: 13.9 G/DL (ref 11.5–16)
HGB UR QL STRIP: NEGATIVE
INR PPP: 3 (ref 0.9–1.1)
KETONES UR QL STRIP.AUTO: NEGATIVE MG/DL
LACTATE SERPL-SCNC: 2.7 MMOL/L (ref 0.4–2)
LACTATE SERPL-SCNC: 3.2 MMOL/L (ref 0.4–2)
LEUKOCYTE ESTERASE UR QL STRIP.AUTO: NEGATIVE
LYMPHOCYTES # BLD AUTO: 2 % (ref 12–49)
LYMPHOCYTES # BLD: 0.6 K/UL (ref 0.8–3.5)
MCH RBC QN AUTO: 29.2 PG (ref 26–34)
MCHC RBC AUTO-ENTMCNC: 33.3 G/DL (ref 30–36.5)
MCV RBC AUTO: 87.6 FL (ref 80–99)
MONOCYTES # BLD: 1.9 K/UL (ref 0–1)
MONOCYTES NFR BLD AUTO: 6 % (ref 5–13)
NEUTS BAND NFR BLD MANUAL: 11 % (ref 0–6)
NEUTS SEG # BLD: 29.3 K/UL (ref 1.8–8)
NEUTS SEG NFR BLD AUTO: 81 % (ref 32–75)
NITRITE UR QL STRIP.AUTO: NEGATIVE
PH UR STRIP: 5.5 [PH] (ref 5–8)
PLATELET # BLD AUTO: 227 K/UL (ref 150–400)
POTASSIUM SERPL-SCNC: 3.3 MMOL/L (ref 3.5–5.1)
PROT SERPL-MCNC: 6.6 G/DL (ref 6.4–8.2)
PROT UR STRIP-MCNC: NEGATIVE MG/DL
PROTHROMBIN TIME: 31 SEC (ref 9–11.1)
Q-T INTERVAL, ECG07: 334 MS
QRS DURATION, ECG06: 98 MS
QTC CALCULATION (BEZET), ECG08: 433 MS
RBC # BLD AUTO: 4.76 M/UL (ref 3.8–5.2)
RBC MORPH BLD: ABNORMAL
SERVICE CMNT-IMP: ABNORMAL
SERVICE CMNT-IMP: ABNORMAL
SODIUM SERPL-SCNC: 135 MMOL/L (ref 136–145)
SP GR UR REFRACTOMETRY: 1.03 (ref 1–1.03)
THERAPEUTIC RANGE,PTTT: ABNORMAL SECS (ref 58–77)
UROBILINOGEN UR QL STRIP.AUTO: 1 EU/DL (ref 0.2–1)
VENTRICULAR RATE, ECG03: 101 BPM
WBC # BLD AUTO: 31.8 K/UL (ref 3.6–11)

## 2017-04-23 PROCEDURE — 80162 ASSAY OF DIGOXIN TOTAL: CPT | Performed by: EMERGENCY MEDICINE

## 2017-04-23 PROCEDURE — 82962 GLUCOSE BLOOD TEST: CPT

## 2017-04-23 PROCEDURE — 93005 ELECTROCARDIOGRAM TRACING: CPT

## 2017-04-23 PROCEDURE — 77030029684 HC NEB SM VOL KT MONA -A

## 2017-04-23 PROCEDURE — 65660000000 HC RM CCU STEPDOWN

## 2017-04-23 PROCEDURE — 83605 ASSAY OF LACTIC ACID: CPT | Performed by: EMERGENCY MEDICINE

## 2017-04-23 PROCEDURE — 87077 CULTURE AEROBIC IDENTIFY: CPT | Performed by: EMERGENCY MEDICINE

## 2017-04-23 PROCEDURE — 36415 COLL VENOUS BLD VENIPUNCTURE: CPT | Performed by: EMERGENCY MEDICINE

## 2017-04-23 PROCEDURE — 71010 XR CHEST PORT: CPT

## 2017-04-23 PROCEDURE — 74011250636 HC RX REV CODE- 250/636: Performed by: INTERNAL MEDICINE

## 2017-04-23 PROCEDURE — 80053 COMPREHEN METABOLIC PANEL: CPT | Performed by: EMERGENCY MEDICINE

## 2017-04-23 PROCEDURE — 72072 X-RAY EXAM THORAC SPINE 3VWS: CPT

## 2017-04-23 PROCEDURE — 71250 CT THORAX DX C-: CPT

## 2017-04-23 PROCEDURE — 85610 PROTHROMBIN TIME: CPT | Performed by: EMERGENCY MEDICINE

## 2017-04-23 PROCEDURE — 74011250636 HC RX REV CODE- 250/636: Performed by: EMERGENCY MEDICINE

## 2017-04-23 PROCEDURE — 87186 SC STD MICRODIL/AGAR DIL: CPT | Performed by: EMERGENCY MEDICINE

## 2017-04-23 PROCEDURE — 85730 THROMBOPLASTIN TIME PARTIAL: CPT | Performed by: EMERGENCY MEDICINE

## 2017-04-23 PROCEDURE — 74011000258 HC RX REV CODE- 258: Performed by: EMERGENCY MEDICINE

## 2017-04-23 PROCEDURE — 96365 THER/PROPH/DIAG IV INF INIT: CPT

## 2017-04-23 PROCEDURE — 77030011943

## 2017-04-23 PROCEDURE — 74011000250 HC RX REV CODE- 250: Performed by: EMERGENCY MEDICINE

## 2017-04-23 PROCEDURE — 74011250637 HC RX REV CODE- 250/637: Performed by: INTERNAL MEDICINE

## 2017-04-23 PROCEDURE — 74011636637 HC RX REV CODE- 636/637: Performed by: INTERNAL MEDICINE

## 2017-04-23 PROCEDURE — 99284 EMERGENCY DEPT VISIT MOD MDM: CPT

## 2017-04-23 PROCEDURE — 85025 COMPLETE CBC W/AUTO DIFF WBC: CPT | Performed by: EMERGENCY MEDICINE

## 2017-04-23 PROCEDURE — 87040 BLOOD CULTURE FOR BACTERIA: CPT | Performed by: EMERGENCY MEDICINE

## 2017-04-23 PROCEDURE — 87147 CULTURE TYPE IMMUNOLOGIC: CPT | Performed by: EMERGENCY MEDICINE

## 2017-04-23 PROCEDURE — 81003 URINALYSIS AUTO W/O SCOPE: CPT | Performed by: EMERGENCY MEDICINE

## 2017-04-23 PROCEDURE — 72110 X-RAY EXAM L-2 SPINE 4/>VWS: CPT

## 2017-04-23 PROCEDURE — 94640 AIRWAY INHALATION TREATMENT: CPT

## 2017-04-23 RX ORDER — ONDANSETRON 2 MG/ML
4 INJECTION INTRAMUSCULAR; INTRAVENOUS
Status: DISCONTINUED | OUTPATIENT
Start: 2017-04-23 | End: 2017-04-26

## 2017-04-23 RX ORDER — GUAIFENESIN 600 MG/1
600 TABLET, EXTENDED RELEASE ORAL EVERY 12 HOURS
Status: DISCONTINUED | OUTPATIENT
Start: 2017-04-23 | End: 2017-05-01 | Stop reason: HOSPADM

## 2017-04-23 RX ORDER — SODIUM CHLORIDE 0.9 % (FLUSH) 0.9 %
5-10 SYRINGE (ML) INJECTION AS NEEDED
Status: DISCONTINUED | OUTPATIENT
Start: 2017-04-23 | End: 2017-05-01 | Stop reason: HOSPADM

## 2017-04-23 RX ORDER — WARFARIN 2.5 MG/1
2.5 TABLET ORAL ONCE
Status: COMPLETED | OUTPATIENT
Start: 2017-04-23 | End: 2017-04-23

## 2017-04-23 RX ORDER — POTASSIUM CHLORIDE 750 MG/1
20 TABLET, FILM COATED, EXTENDED RELEASE ORAL DAILY
COMMUNITY
End: 2017-10-21 | Stop reason: SDUPTHER

## 2017-04-23 RX ORDER — MONTELUKAST SODIUM 10 MG/1
10 TABLET ORAL DAILY
Status: DISCONTINUED | OUTPATIENT
Start: 2017-04-24 | End: 2017-05-01 | Stop reason: HOSPADM

## 2017-04-23 RX ORDER — FUROSEMIDE 10 MG/ML
40 INJECTION INTRAMUSCULAR; INTRAVENOUS DAILY
Status: DISCONTINUED | OUTPATIENT
Start: 2017-04-24 | End: 2017-04-30

## 2017-04-23 RX ORDER — SERTRALINE HYDROCHLORIDE 50 MG/1
100 TABLET, FILM COATED ORAL EVERY EVENING
Status: DISCONTINUED | OUTPATIENT
Start: 2017-04-23 | End: 2017-05-01 | Stop reason: HOSPADM

## 2017-04-23 RX ORDER — INSULIN LISPRO 100 [IU]/ML
INJECTION, SOLUTION INTRAVENOUS; SUBCUTANEOUS
Status: DISCONTINUED | OUTPATIENT
Start: 2017-04-23 | End: 2017-04-23

## 2017-04-23 RX ORDER — FLUTICASONE PROPIONATE AND SALMETEROL 100; 50 UG/1; UG/1
1 POWDER RESPIRATORY (INHALATION) EVERY 12 HOURS
COMMUNITY
End: 2017-05-07 | Stop reason: SDUPTHER

## 2017-04-23 RX ORDER — POTASSIUM CHLORIDE 750 MG/1
20 TABLET, FILM COATED, EXTENDED RELEASE ORAL DAILY
Status: DISCONTINUED | OUTPATIENT
Start: 2017-04-24 | End: 2017-05-01 | Stop reason: HOSPADM

## 2017-04-23 RX ORDER — GUAIFENESIN 600 MG/1
600 TABLET, EXTENDED RELEASE ORAL EVERY 12 HOURS
Status: DISCONTINUED | OUTPATIENT
Start: 2017-04-23 | End: 2017-04-23 | Stop reason: SDUPTHER

## 2017-04-23 RX ORDER — PANTOPRAZOLE SODIUM 40 MG/1
40 TABLET, DELAYED RELEASE ORAL
Status: DISCONTINUED | OUTPATIENT
Start: 2017-04-23 | End: 2017-05-01 | Stop reason: HOSPADM

## 2017-04-23 RX ORDER — ACETAMINOPHEN 325 MG/1
650 TABLET ORAL
Status: COMPLETED | OUTPATIENT
Start: 2017-04-23 | End: 2017-04-23

## 2017-04-23 RX ORDER — POTASSIUM CHLORIDE 750 MG/1
10 TABLET, FILM COATED, EXTENDED RELEASE ORAL
Status: COMPLETED | OUTPATIENT
Start: 2017-04-23 | End: 2017-04-23

## 2017-04-23 RX ORDER — DEXTROSE 50 % IN WATER (D50W) INTRAVENOUS SYRINGE
12.5-25 AS NEEDED
Status: DISCONTINUED | OUTPATIENT
Start: 2017-04-23 | End: 2017-05-01 | Stop reason: HOSPADM

## 2017-04-23 RX ORDER — SODIUM CHLORIDE 0.9 % (FLUSH) 0.9 %
5-10 SYRINGE (ML) INJECTION EVERY 8 HOURS
Status: DISCONTINUED | OUTPATIENT
Start: 2017-04-23 | End: 2017-05-01 | Stop reason: HOSPADM

## 2017-04-23 RX ORDER — MAGNESIUM SULFATE 100 %
4 CRYSTALS MISCELLANEOUS AS NEEDED
Status: DISCONTINUED | OUTPATIENT
Start: 2017-04-23 | End: 2017-05-01 | Stop reason: HOSPADM

## 2017-04-23 RX ORDER — FLUTICASONE PROPIONATE AND SALMETEROL 100; 50 UG/1; UG/1
1 POWDER RESPIRATORY (INHALATION)
Status: DISCONTINUED | OUTPATIENT
Start: 2017-04-23 | End: 2017-05-01 | Stop reason: HOSPADM

## 2017-04-23 RX ORDER — INSULIN LISPRO 100 [IU]/ML
INJECTION, SOLUTION INTRAVENOUS; SUBCUTANEOUS
Status: DISCONTINUED | OUTPATIENT
Start: 2017-04-24 | End: 2017-04-24

## 2017-04-23 RX ORDER — MAGNESIUM SULFATE 100 %
4 CRYSTALS MISCELLANEOUS AS NEEDED
Status: DISCONTINUED | OUTPATIENT
Start: 2017-04-23 | End: 2017-04-23

## 2017-04-23 RX ORDER — ALPRAZOLAM 0.25 MG/1
.25-.5 TABLET ORAL
Status: DISCONTINUED | OUTPATIENT
Start: 2017-04-23 | End: 2017-05-01 | Stop reason: HOSPADM

## 2017-04-23 RX ORDER — ALBUTEROL SULFATE 0.83 MG/ML
2.5 SOLUTION RESPIRATORY (INHALATION)
Status: DISCONTINUED | OUTPATIENT
Start: 2017-04-23 | End: 2017-04-24 | Stop reason: SDUPTHER

## 2017-04-23 RX ORDER — DIGOXIN 125 MCG
0.12 TABLET ORAL DAILY
Status: DISCONTINUED | OUTPATIENT
Start: 2017-04-24 | End: 2017-05-01 | Stop reason: HOSPADM

## 2017-04-23 RX ORDER — DILTIAZEM HYDROCHLORIDE 120 MG/1
120 CAPSULE, COATED, EXTENDED RELEASE ORAL DAILY
Status: DISCONTINUED | OUTPATIENT
Start: 2017-04-24 | End: 2017-05-01 | Stop reason: HOSPADM

## 2017-04-23 RX ORDER — ACETAMINOPHEN 325 MG/1
650 TABLET ORAL
Status: DISCONTINUED | OUTPATIENT
Start: 2017-04-23 | End: 2017-05-01 | Stop reason: HOSPADM

## 2017-04-23 RX ADMIN — METHYLPREDNISOLONE SODIUM SUCCINATE 125 MG: 125 INJECTION, POWDER, FOR SOLUTION INTRAMUSCULAR; INTRAVENOUS at 22:25

## 2017-04-23 RX ADMIN — GUAIFENESIN 600 MG: 600 TABLET, EXTENDED RELEASE ORAL at 22:25

## 2017-04-23 RX ADMIN — ONDANSETRON 4 MG: 2 INJECTION INTRAMUSCULAR; INTRAVENOUS at 19:49

## 2017-04-23 RX ADMIN — ALBUTEROL SULFATE 1 DOSE: 2.5 SOLUTION RESPIRATORY (INHALATION) at 10:06

## 2017-04-23 RX ADMIN — Medication 10 ML: at 14:42

## 2017-04-23 RX ADMIN — Medication 10 ML: at 22:28

## 2017-04-23 RX ADMIN — WARFARIN SODIUM 2.5 MG: 2.5 TABLET ORAL at 16:47

## 2017-04-23 RX ADMIN — GUAIFENESIN 600 MG: 600 TABLET, EXTENDED RELEASE ORAL at 14:47

## 2017-04-23 RX ADMIN — FLUTICASONE PROPIONATE AND SALMETEROL 1 PUFF: 100; 50 POWDER RESPIRATORY (INHALATION) at 22:27

## 2017-04-23 RX ADMIN — AZITHROMYCIN MONOHYDRATE 500 MG: 500 INJECTION, POWDER, LYOPHILIZED, FOR SOLUTION INTRAVENOUS at 11:39

## 2017-04-23 RX ADMIN — POTASSIUM CHLORIDE 10 MEQ: 750 TABLET, FILM COATED, EXTENDED RELEASE ORAL at 22:25

## 2017-04-23 RX ADMIN — SERTRALINE HYDROCHLORIDE 100 MG: 50 TABLET ORAL at 17:38

## 2017-04-23 RX ADMIN — CEFTRIAXONE 2 G: 2 INJECTION, POWDER, FOR SOLUTION INTRAMUSCULAR; INTRAVENOUS at 10:06

## 2017-04-23 RX ADMIN — ACETAMINOPHEN 650 MG: 325 TABLET, FILM COATED ORAL at 13:24

## 2017-04-23 RX ADMIN — INSULIN LISPRO 5 UNITS: 100 INJECTION, SOLUTION INTRAVENOUS; SUBCUTANEOUS at 16:19

## 2017-04-23 RX ADMIN — HUMAN INSULIN 20 UNITS: 100 INJECTION, SUSPENSION SUBCUTANEOUS at 13:24

## 2017-04-23 RX ADMIN — SODIUM CHLORIDE 3930 ML: 900 INJECTION, SOLUTION INTRAVENOUS at 09:45

## 2017-04-23 NOTE — H&P
History and Physical    Subjective:     [place] is a 67 y.o.  female who presents with a fall today, c/o that her legs gave out. She has been coughing since her last admission in February- and has been on a steroid taper for asthmatic bronchitis. as well as amoxicillinl  Today she had fever and cough productive of blood in her sputum. She has been more sob. Past Medical History:   Diagnosis Date    Asthma     [facility]     Depression     [facility]     HTN (hypertension)     Hyperlipidemia     Morbid obesity [facility]     Neuropathy     LITA on CPAP     Other ill-defined conditions pneumonia     Allergies   Allergen Reactions    Latex Itching    Codeine Rash    Lisinopril Other (comments)     Cough, vomiting, Visual disturbances    Morphine Itching    Statins-Hmg-Coa Reductase Inhibitors Other (comments)     Muscle enzyme problems     Prior to Admission medications    Medication Sig Start Date End Date Taking? Authorizing Provider   potassium chloride SR (KLOR-CON 10) 10 mEq tablet Take 20 mEq by mouth daily. Yes Historical Provider   fluticasone-salmeterol (ADVAIR DISKUS) 100-50 mcg/dose diskus inhaler Take 1 Puff by inhalation every twelve (12) hours. Yes Historical Provider   digoxin (LANOXIN) 0.25 mg tablet Take 1 Tab by mouth daily. 3/29/17  Yes Magdalene Tay MD   sertraline (ZOLOFT) 100 mg tablet TAKE 1 TABLET BY MOUTH EVERY EVENING 3/13/17  Yes JOSHUA Tay MD   gabapentin (NEURONTIN) 300 mg capsule TAKE ONE CAPSULE BY MOUTH THREE TIMES DAILY 3/13/17  Yes JOSHUA Tay MD   predniSONE (DELTASONE) 10 mg tablet Take 6t every day for 4d, then 5t every day for 4d, then 4t QD for 4d, then 3t QD for 4d, then 2t QD for 4d, then 1t QD for 4d. 2/14/17  Yes Magdalene Tay MD   empagliflozin (JARDIANCE) 25 mg tablet Take 12.5 mg by mouth daily.    Yes Historical Provider   montelukast (SINGULAIR) 10 mg tablet Take 10 mg by mouth daily. Yes Historical Provider   naproxen sodium (NAPROSYN) 220 mg tablet Take 880 mg by mouth daily. Indications: OSTEOARTHRITIS   Yes Historical Provider   warfarin (COUMADIN) 5 mg tablet TAKE 1 TABLET BY MOUTH THREE TIMES A WEEK THEN TAKE 1/2 TABLET THE OTHER FOUR DAYS OF THE WEEK 11/16/16  Yes Rj Rubin MD   tiotropium (SPIRIVA WITH HANDIHALER) 18 mcg inhalation capsule Take 1 Cap by inhalation daily. Yes Historical Provider   HUMULIN N 100 unit/mL injection INJECT 35 UNITS UNDER THE SKIN IN THE MORNING AND INJECT 25 UNITS AT BEDTIME OR AS DIRECTED 10/9/16  Yes JOSHUA Rubin MD   albuterol (PROVENTIL VENTOLIN) 2.5 mg /3 mL (0.083 %) nebulizer solution 3 mL by Nebulization route four (4) times daily as needed for Wheezing or Shortness of Breath. 10/8/16  Yes Carolina Adame MD   therapeutic multivitamin [facility]) tablet Take 1 Tab by mouth daily. Yes Historical Provider   CARTIA  mg ER capsule TAKE 3 CAPSULES BY MOUTH DAILY 5/24/16  Yes JOSHUA Rubin MD   furosemide (LASIX) 40 mg tablet take 2 tablets by mouth every morning and take 1 tablet by mouth every evening 3/16/16  Yes Rj Rubin MD   insulin regular (NOVOLIN R, HUMULIN R) 100 unit/mL injection Sliding scale: For -300 use 4 units, 301-400 use 8 units, greater than 400 use 12 units, greater than 500 call MD. 8/14/15  Yes Rj Rubin MD   ALPRAZolam Penne Macho) 0.5 mg tablet Take 0.25-0.5 mg by mouth daily as needed for Anxiety. Yes Historical Provider   cholecalciferol, vitamin D3, (VITAMIN D3) 2,000 unit tab Take 2,000 Units by mouth daily. Yes Historical Provider   DOCOSAHEXANOIC ACID/EPA (FISH OIL PO) Take 1 Cap by mouth daily. Yes Historical Provider   omalizumab Marylou Clap) 150 mg solr 150 mg by SubCUTAneous route every fourteen (14) days. Next dose due 2/17    Historical Provider   diclofenac (VOLTAREN) 1 % gel Apply  to affected area four (4) times daily. 11/4/16   Chaparro Esquivel MD   PROVENTIL HFA 90 mcg/actuation inhaler inhale 2 puffs four times a day if needed for wheezing 1/17/15   JOSHUA Esquivel MD     Social History   Substance Use Topics    Smoking status: Former Smoker     Quit date: 1/1/1992    Smokeless tobacco: Not on file    Alcohol use 1.2 oz/week     2 Glasses of wine per week      Comment: occ wine     Family History   Problem Relation Age of Onset    Stroke Mother     Diabetes Other     Heart Disease Other     Heart Disease Father      congestive heart failure               Review of Systems:  As above. Objective:       Physical Exam: heavy 68 yo wf mild respiratory distress. HEENT -- Pupils round. O/P dry mucosa  Neck -- Supple. No JVD. Heart -- irreg  Lungs -- bilat wheezing and rhonchi  Abdomen -- Soft. Non-tender. Non-distended. No masses. Bowel sounds present. Extremities -- minimal edema.         Data Review:   Recent Results (from the past 24 hour(s))   EKG, 12 LEAD, INITIAL    Collection Time: 04/23/17  9:20 AM   Result Value Ref Range    Ventricular Rate 101 BPM    Atrial Rate 76 BPM    QRS Duration 98 ms    Q-T Interval 334 ms    QTC Calculation (Bezet) 433 ms    Calculated R Axis -38 degrees    Calculated T Axis 94 degrees    Diagnosis       Atrial fibrillation  Left axis deviation  ST & T wave abnormality, consider lateral ischemia  When compared with ECG of 09-FEB-2017 11:07,  Inverted T waves have replaced nonspecific T wave abnormality in Lateral   leads  Confirmed by Ramiro Young M.D., Meeta Standing ([id number]) on 4/23/2017 2:07:34 PM     CBC WITH AUTOMATED DIFF    Collection Time: 04/23/17  9:32 AM   Result Value Ref Range    WBC 31.8 (H) 3.6 - 11.0 K/uL    RBC 4.76 3.80 - 5.20 M/uL    HGB 13.9 11.5 - 16.0 g/dL    HCT 41.7 35.0 - 47.0 %    MCV 87.6 80.0 - 99.0 FL    MCH 29.2 26.0 - 34.0 PG    MCHC 33.3 30.0 - 36.5 g/dL    RDW 16.2 (H) 11.5 - 14.5 %    PLATELET 263 027 - 877 K/uL    NEUTROPHILS 81 (H) 32 - 75 %    BAND NEUTROPHILS 11 (H) 0 - 6 %    LYMPHOCYTES 2 (L) 12 - 49 %    MONOCYTES 6 5 - 13 %    EOSINOPHILS 0 0 - 7 %    BASOPHILS 0 0 - 1 %    ABS. NEUTROPHILS 29.3 (H) 1.8 - 8.0 K/UL    ABS. LYMPHOCYTES 0.6 (L) 0.8 - 3.5 K/UL    ABS. MONOCYTES 1.9 (H) 0.0 - 1.0 K/UL    ABS. EOSINOPHILS 0.0 0.0 - 0.4 K/UL    ABS. BASOPHILS 0.0 0.0 - 0.1 K/UL    DF MANUAL      RBC COMMENTS ANISOCYTOSIS  1+       METABOLIC PANEL, COMPREHENSIVE    Collection Time: 04/23/17  9:32 AM   Result Value Ref Range    Sodium 135 (L) 136 - 145 mmol/L    Potassium 3.3 (L) 3.5 - 5.1 mmol/L    Chloride 99 97 - 108 mmol/L    CO2 27 21 - 32 mmol/L    Anion gap 9 5 - 15 mmol/L    Glucose 301 (H) 65 - 100 mg/dL    BUN 33 (H) 6 - 20 MG/DL    Creatinine 1.75 (H) 0.55 - 1.02 MG/DL    BUN/Creatinine ratio 19 12 - 20      GFR est AA 35 (L) >60 ml/min/1.73m2    GFR est non-AA 28 (L) >60 ml/min/1.73m2    Calcium 8.4 (L) 8.5 - 10.1 MG/DL    Bilirubin, total 0.4 0.2 - 1.0 MG/DL    ALT (SGPT) 21 12 - 78 U/L    AST (SGOT) 8 (L) 15 - 37 U/L    Alk.  phosphatase 78 45 - 117 U/L    Protein, total 6.6 6.4 - 8.2 g/dL    Albumin 3.4 (L) 3.5 - 5.0 g/dL    Globulin 3.2 2.0 - 4.0 g/dL    A-G Ratio 1.1 1.1 - 2.2     LACTIC ACID, PLASMA    Collection Time: 04/23/17  9:32 AM   Result Value Ref Range    Lactic acid 2.7 (HH) 0.4 - 2.0 MMOL/L   PROTHROMBIN TIME + INR    Collection Time: 04/23/17  9:32 AM   Result Value Ref Range    INR 3.0 (H) 0.9 - 1.1      Prothrombin time 31.0 (H) 9.0 - 11.1 sec   PTT    Collection Time: 04/23/17  9:32 AM   Result Value Ref Range    aPTT 37.1 (H) 22.1 - 32.5 sec    aPTT, therapeutic range     58.0 - 77.0 SECS   DIGOXIN    Collection Time: 04/23/17  9:32 AM   Result Value Ref Range    DIGOXIN 1.2 0.9 - 2.0 NG/ML   URINALYSIS W/ RFLX MICROSCOPIC    Collection Time: 04/23/17 10:03 AM   Result Value Ref Range    Color YELLOW/STRAW      Appearance CLOUDY (A) CLEAR      Specific gravity 1.027 1.003 - 1.030      pH (UA) 5.5 5.0 - 8.0      Protein NEGATIVE NEG mg/dL    Glucose >1000 (A) NEG mg/dL    Ketone NEGATIVE  NEG mg/dL    Bilirubin NEGATIVE  NEG      Blood NEGATIVE  NEG      Urobilinogen 1.0 0.2 - 1.0 EU/dL    Nitrites NEGATIVE  NEG      Leukocyte Esterase NEGATIVE  NEG     GLUCOSE, POC    Collection Time: 04/23/17 12:07 PM   Result Value Ref Range    Glucose (POC) 267 (H) 65 - 100 mg/dL    Performed by JOO CREWS    LACTIC ACID, PLASMA    Collection Time: 04/23/17  1:25 PM   Result Value Ref Range    Lactic acid 3.2 (HH) 0.4 - 2.0 MMOL/L   GLUCOSE, POC    Collection Time: 04/23/17  4:06 PM   Result Value Ref Range    Glucose (POC) 242 (H) 65 - 100 mg/dL    Performed by REYES ALEXIS        Chest ct- rll air space disease    Assessment:     Principal Problem:    Pneumonia (4/23/2017)    Active Problems:    Hyperlipidemia ()      Depression ()      Asthma exacerbation (1/21/2014)      Anemia (1/22/2014)      Type 2 diabetes mellitus without complication ([facility] [facility] (7/06/3780)      Chronic atrial fibrillation ([facility] [facility] (7/17/2015)      [place] (acute kidney injury) ([facility] [facility] (4/23/2017)        Plan:P     Sepsis due to pneumonia- with asthma exacerbation- iv rocephin and azithromycin  Steroids, nebs, mucinex. dennys- monitor, decrease lasix, \  Hypotension- decrease cardizem dose.   Check dig level  Diabetes- insulin and ssi  Leukocytosis- probably due to steroids  Atrial fibrillation- hold coumadin due to hemoptysis tonight, pharmacy to dose in am.  Patient requests DNR, does not want to be placed on ventilator      Signed By: Silas Ma MD     April 23, 2017       History and Physical

## 2017-04-23 NOTE — PROGRESS NOTES
Pharmacist Note - Warfarin Dosing  Consult provided for this 67 y. o.female to manage warfarin for Atrial Fibrillation    INR Goal: 2 - 3    Home regimen/ tablet size: 5mg on three days of the week, 2.5mg on all others (last dose confirmed as 4/22/2017)    Drugs that may increase INR: Macrolides  Drugs that may decrease INR: None  Other current anticoagulants/ drugs that may increase bleeding risk: NSAID  Risk factors: Age > 65  Daily INR ordered: YES    Recent Labs      04/23/17   0932   HGB  13.9   INR  3.0*     Date               INR                  Dose  4/23  3                                                                                  Assessment/ Plan: Will order warfarin 2.5 mg PO x 1 dose. Pharmacy will continue to monitor daily and adjust therapy as indicated. Please contact the pharmacist at  for outpatient recommendations if needed.

## 2017-04-23 NOTE — PROGRESS NOTES
notified of patient's lactic acid level after arrival to [facility]. Stated that she was already aware from ED. Also made aware of patient's BP and receiving last ordered liter  bolus from ED. Patient A&Ox3. Easily aroused bout sleepy at times. Able to cough up thick bloody sputum.

## 2017-04-23 NOTE — ED PROVIDER NOTES
HPI Comments: 67 y.o. female with past medical history significant for A-fib, HTN, DM, hyperlipidemia, neuropathy, and pneumonia who presents from home with chief complaint of cough. Pt reports she has been on amoxicillin for bronchitis for the last 2 months, and her cough worsened 1 day ago by producing dark mucus mixed w/ blood. She also c/o fever of 100 F and severe SOB. Pt also reports a recent fall in which her \"legs became wobbly\" and they \"went out from under (her)\". She states she fell directly onto her mid-back on a concrete floor, and she currently c/o 5/10 middle to lower back pain. When asked about leg injuries, Pt notes she \"hurt (her) legs while trying to pull (her)self back up\". Pt states she is on Coumadin. There are no other acute medical concerns at this time. PCP: Sharon Purcell MD    Note written by Lee Beach, as dictated by Adriano Naik MD 9:40 AM    The history is provided by the patient and a relative. Past Medical History:   Diagnosis Date    Atrial fibrillation ([address]     Depression     DM (diabetes mellitus) ([address]     HTN (hypertension)     Hyperlipidemia     Neuropathy     LITA on CPAP     Other ill-defined conditions pneumonia       Past Surgical History:   Procedure Laterality Date    HX APPENDECTOMY      HX CHOLECYSTECTOMY      HX GASTRIC BYPASS      Jejunal bypass with subsequent reversal..  Lab Band since    HX OTHER SURGICAL  D & C    HX EMILIANO AND BSO      For uterine CA         Family History:   Problem Relation Age of Onset    Stroke Mother     Diabetes Other     Heart Disease Other        Social History     Social History    Marital status:      Spouse name: N/A    Number of children: N/A    Years of education: N/A     Occupational History    Not on file.      Social History Main Topics    Smoking status: Former Smoker     Quit date: 1/1/1992    Smokeless tobacco: Not on file    Alcohol use Yes      Comment: occ wine    Drug use: No    Sexual activity: Not on file     Other Topics Concern    Not on file     Social History Narrative         ALLERGIES: Latex; Codeine; Lisinopril; Statins-hmg-coa reductase inhibitors; and Morphine    Review of Systems   Constitutional: Positive for fever. Negative for activity change, appetite change and fatigue. HENT: Negative for ear pain, facial swelling, sore throat and trouble swallowing. Eyes: Negative for pain, discharge and visual disturbance. Respiratory: Positive for cough and shortness of breath. Negative for chest tightness and wheezing. Cardiovascular: Negative for chest pain and palpitations. Gastrointestinal: Negative for abdominal pain, blood in stool, nausea and vomiting. Genitourinary: Negative for difficulty urinating, flank pain and hematuria. Musculoskeletal: Positive for back pain. Negative for arthralgias, joint swelling, myalgias and neck pain. Skin: Negative for color change and rash. Neurological: Negative for dizziness, weakness, numbness and headaches. Hematological: Negative for adenopathy. Does not bruise/bleed easily. Psychiatric/Behavioral: Negative for behavioral problems, confusion and sleep disturbance. All other systems reviewed and are negative. There were no vitals filed for this visit. Physical Exam   Constitutional: She is oriented to person, place, and time. She appears well-developed and well-nourished. No distress. HENT:   Head: Normocephalic and atraumatic. Nose: Nose normal.   Mouth/Throat: Oropharynx is clear and moist.   Eyes: Conjunctivae and EOM are normal. Pupils are equal, round, and reactive to light. No scleral icterus. Neck: Normal range of motion. Neck supple. No JVD present. No tracheal deviation present. No thyromegaly present. No carotid bruits noted. Cardiovascular: Normal rate and intact distal pulses. An irregular rhythm present. Exam reveals no gallop and no friction rub.     No murmur heard.  Pulmonary/Chest: Effort normal. No respiratory distress. She has no wheezes. She has rhonchi. She has no rales. She exhibits no tenderness. Coarse rhonchi w/o consolidation bilaterally. Oxygen saturation 97% on room air (wearing mask). Abdominal: Soft. Bowel sounds are normal. She exhibits no distension and no mass. There is no tenderness. There is no rebound and no guarding. Musculoskeletal: Normal range of motion. She exhibits edema (bilateral LE's) and tenderness. Tenderness over T-spine between shoulder blades, no step-off or spasm. Tender over L-spine, no step-off or spasm. Lymphadenopathy:     She has no cervical adenopathy. Neurological: She is alert and oriented to person, place, and time. She has normal reflexes. No cranial nerve deficit. Coordination normal.   Skin: Skin is warm and dry. No rash noted. No erythema. Small 1-1.5 cm laceration over R arm. Psychiatric: She has a normal mood and affect. Her behavior is normal. Judgment and thought content normal.   Nursing note and vitals reviewed. Note written by Lee Ayala, as dictated by Laureano Moody MD 9:40 AM    MDM  Number of Diagnoses or Management Options     Amount and/or Complexity of Data Reviewed  Clinical lab tests: ordered and reviewed  Tests in the radiology section of CPT®: ordered and reviewed  Decide to obtain previous medical records or to obtain history from someone other than the patient: yes  Review and summarize past medical records: yes  Discuss the patient with other providers: yes  Independent visualization of images, tracings, or specimens: yes    Risk of Complications, Morbidity, and/or Mortality  Presenting problems: high  Diagnostic procedures: high  Management options: high    Patient Progress  Patient progress: stable    ED Course       Procedures    ED MD EKG interpretation: Atrial fib with vent rate 101. LAD noted. IVCD. Poor R wave progression. ST depression mild laterally.  No other acute changes noted. Gopal Fragoso MD    The patient has a fever, coarse rhonchi from bedside, sats t 97 % RA and clinically SOB with fever and initial hypotension. With hemoptysis will rule out coagulopathy vs infectious vs traumatic etiology. Will need to exclude pneumonia and sepsis. She will be treated accordingly pending labs/x rays. Patient will require admission and will treat for suspected sepsis and consult PCP for admission. Patient is speaking in full sentences. CONSULT NOTE:  11:11 AM Gopal Fragoso MD spoke with Dr. Vicente Day, Consult for PCP. Discussed available diagnostic tests and clinical findings. She is in agreement with care plans as outlined. Dr. Vicente Day will admit Pt.

## 2017-04-23 NOTE — PROGRESS NOTES
Admission Medication Reconciliation:    Information obtained from: patient    Significant PMH/Disease States:   Past Medical History:   Diagnosis Date    [facility]     Depression     DM (diabetes mellitus) [facility]     HTN (hypertension)     Hyperlipidemia     Neuropathy     LITA on CPAP     Other ill-defined conditions pneumonia       Chief Complaint for this Admission:    Chief Complaint   Patient presents with    Hemoptysis    Fever    Fatigue    Shortness of Breath         Allergies:  Latex; Codeine; Lisinopril; Morphine; and Statins-hmg-coa reductase inhibitors    Prior to Admission Medications:   Prior to Admission Medications   Prescriptions Last Dose Informant Patient Reported? Taking? ALPRAZolam (XANAX) 0.5 mg tablet   Yes Yes   Sig: Take 0.25-0.5 mg by mouth daily as needed for Anxiety. CARTIA  mg ER capsule 4/22/2017 at Unknown time  No Yes   Sig: TAKE 3 CAPSULES BY MOUTH DAILY   DOCOSAHEXANOIC ACID/EPA (FISH OIL PO) 4/22/2017 at Unknown time  Yes Yes   Sig: Take 1 Cap by mouth daily. HUMULIN N 100 unit/mL injection 4/22/2017 at Unknown time  No Yes   Sig: INJECT 35 UNITS UNDER THE SKIN IN THE MORNING AND INJECT 25 UNITS AT BEDTIME OR AS DIRECTED   PROVENTIL HFA 90 mcg/actuation inhaler   No Yes   Sig: inhale 2 puffs four times a day if needed for wheezing   albuterol (PROVENTIL VENTOLIN) 2.5 mg /3 mL (0.083 %) nebulizer solution 4/23/2017 at Unknown time  No Yes   Sig: 3 mL by Nebulization route four (4) times daily as needed for Wheezing or Shortness of Breath. cholecalciferol, vitamin D3, (VITAMIN D3) 2,000 unit tab 4/22/2017 at Unknown time  Yes Yes   Sig: Take 2,000 Units by mouth daily. diclofenac (VOLTAREN) 1 % gel   No No   Sig: Apply  to affected area four (4) times daily. digoxin (LANOXIN) 0.25 mg tablet 4/22/2017 at Unknown time  No Yes   Sig: Take 1 Tab by mouth daily.    empagliflozin (JARDIANCE) 25 mg tablet 4/22/2017 at Unknown time  Yes Yes   Sig: Take 12.5 mg by mouth daily. fluticasone furoate (ARNUITY ELLIPTA) 100 mcg/actuation dsdv inhaler   Yes Yes   Sig: Take 1 Puff by inhalation daily. fluticasone-salmeterol (ADVAIR DISKUS) 250-50 mcg/dose diskus inhaler   Yes Yes   Sig: Take 1 Puff by inhalation every twelve (12) hours. furosemide (LASIX) 40 mg tablet 2017 at Unknown time  No Yes   Sig: take 2 tablets by mouth every morning and take 1 tablet by mouth every evening   gabapentin (NEURONTIN) 300 mg capsule 2017 at Unknown time  No Yes   Sig: TAKE ONE CAPSULE BY MOUTH THREE TIMES DAILY   insulin regular (NOVOLIN R, HUMULIN R) 100 unit/mL injection 2017 at Unknown time  No Yes   Sig: Sliding scale: For -300 use 4 units, 301-400 use 8 units, greater than 400 use 12 units, greater than 500 call MD.   montelukast (SINGULAIR) 10 mg tablet 2017 at Unknown time  Yes Yes   Sig: Take 10 mg by mouth daily. naproxen sodium (NAPROSYN) 220 mg tablet 2017 at Unknown time  Yes Yes   Sig: Take 880 mg by mouth daily. Indications: OSTEOARTHRITIS   omalizumab Lanora Kub) 150 mg solr 2017 at am  Yes No   Si mg by SubCUTAneous route every fourteen (14) days. Next dose due    potassium chloride SR (KLOR-CON 10) 10 mEq tablet 2017 at Unknown time  Yes Yes   Sig: Take 20 mEq by mouth daily. predniSONE (DELTASONE) 10 mg tablet 2017 at Unknown time  No Yes   Sig: Take 6t every day for 4d, then 5t every day for 4d, then 4t QD for 4d, then 3t QD for 4d, then 2t QD for 4d, then 1t QD for 4d. sertraline (ZOLOFT) 100 mg tablet 2017 at Unknown time  No Yes   Sig: TAKE 1 TABLET BY MOUTH EVERY EVENING   therapeutic multivitamin (THERAGRAN) tablet 2017 at Unknown time  Yes Yes   Sig: Take 1 Tab by mouth daily. tiotropium (SPIRIVA WITH HANDIHALER) 18 mcg inhalation capsule   Yes Yes   Sig: Take 1 Cap by inhalation daily.    warfarin (COUMADIN) 5 mg tablet 2017 at Unknown time  No Yes   Sig: TAKE 1 TABLET BY MOUTH THREE TIMES A WEEK THEN TAKE 1/2 TABLET THE OTHER FOUR DAYS OF THE WEEK      Facility-Administered Medications: None         Comments/Recommendations:   Patient is somewhat sure she has not taken any medications today.   Verified warfarin dose

## 2017-04-23 NOTE — IP AVS SNAPSHOT
[address] 
[phone] Patient: Anna Mullen MRN: [id number] AHU:[birth year] You are allergic to the following Allergen Reactions Latex Itching Codeine Rash Lisinopril Other (comments) Cough, vomiting, Visual disturbances Morphine Itching Statins-Hmg-Coa Reductase Inhibitors Other (comments) Muscle enzyme problems Recent Documentation Height Weight Breastfeeding? BMI OB Status Smoking Status 1.689 m 139.1 kg No 48.75 kg/m2 Postmenopausal Former Smoker Emergency Contacts Name Discharge Info Relation Home Work Mobile [address] [facility] CAREGIVER [3] Child [2] [phone] Jo Chavez  Child [2]   [phone] About your hospitalization You were admitted on:  April 23, 2017 You last received care in the:  [place] [id number] You were discharged on:  May 1, 2017 Unit phone number:  [phone] Why you were hospitalized Your primary diagnosis was:  Pneumonia Your diagnoses also included:  Asthma Exacerbation, Chronic Atrial Fibrillation (Hcc), Type 2 Diabetes Mellitus Without Complication (Hcc), Erasto (Acute Kidney Injury) (Hcc), Hemoptysis Providers Seen During Your Hospitalizations Provider Role Specialty Primary office phone Marcela Singh MD Attending Provider Emergency Medicine [phone] Jacqueline Clifford MD Attending Provider Internal Medicine [phone] Your Primary Care Physician (PCP) Primary Care Physician Office Phone Office Fax Jin Ange [phone] Follow-up Information Follow up With Details Comments Contact Info Jacqueline Clifford MD   [place] [address] 
[phone] [address] On 5/1/2017 For home nursing for IV antibiotics. [address] [place] [address] 
[phone] [place] On 5/1/2017 For home IV antibiotics  Current Discharge Medication List  
  
CONTINUE these medications which have CHANGED Dose & Instructions Dispensing Information Comments Morning Noon Evening Bedtime  
 predniSONE 10 mg tablet Commonly known as:  Moniquelyne Crea What changed:  additional instructions Your last dose was: Your next dose is: Take 4t QD for 4d, then 3t QD for 4d, then 2t QD for 4d, then 1t QD for 4d. Quantity:  40 Tab Refills:  0  
     
   
   
   
  
 warfarin 5 mg tablet Commonly known as:  COUMADIN What changed:  See the new instructions. Your last dose was: Your next dose is:    
   
   
 1/2 tablet 5 days/week (Skip Monday & Friday). Quantity:  30 Tab Refills:  2 CONTINUE these medications which have NOT CHANGED Dose & Instructions Dispensing Information Comments Morning Noon Evening Bedtime ADVAIR DISKUS 100-50 mcg/dose diskus inhaler Generic drug:  fluticasone-salmeterol Your last dose was: Your next dose is:    
   
   
 Dose:  1 Puff Take 1 Puff by inhalation every twelve (12) hours. Refills:  0 ALPRAZolam 0.5 mg tablet Commonly known as:  Ericayenni Riley Your last dose was: Your next dose is:    
   
   
 Dose:  0.25-0.5 mg Take 0.25-0.5 mg by mouth daily as needed for Anxiety. Refills:  0  
     
   
   
   
  
 CARTIA  mg ER capsule Generic drug:  dilTIAZem CD Your last dose was: Your next dose is: TAKE 3 CAPSULES BY MOUTH DAILY Quantity:  90 Cap Refills:  11  
     
   
   
   
  
 diclofenac 1 % Gel Commonly known as:  VOLTAREN Your last dose was: Your next dose is:    
   
   
 Apply  to affected area four (4) times daily. Quantity:  1500 g Refills:  6 digoxin 0.25 mg tablet Commonly known as:  LANOXIN Your last dose was: Your next dose is:    
   
   
 Dose:  0.25 mg Take 1 Tab by mouth daily. Quantity:  30 Tab Refills:  6 FISH OIL PO Your last dose was: Your next dose is:    
   
   
 Dose:  1 Cap Take 1 Cap by mouth daily. Refills:  0  
     
   
   
   
  
 furosemide 40 mg tablet Commonly known as:  LASIX Your last dose was: Your next dose is:    
   
   
 take 2 tablets by mouth every morning and take 1 tablet by mouth every evening Quantity:  270 Tab Refills:  3  
     
   
   
   
  
 gabapentin 300 mg capsule Commonly known as:  NEURONTIN Your last dose was: Your next dose is: TAKE ONE CAPSULE BY MOUTH THREE TIMES DAILY Quantity:  90 Cap Refills:  3 HumuLIN N 100 unit/mL injection Generic drug:  insulin NPH Your last dose was: Your next dose is: INJECT 35 UNITS UNDER THE SKIN IN THE MORNING AND INJECT 25 UNITS AT BEDTIME OR AS DIRECTED Quantity:  30 mL Refills:  11  
     
   
   
   
  
 insulin regular 100 unit/mL injection Commonly known as:  Maxim Carlos HUMULIN R Your last dose was: Your next dose is:    
   
   
 Sliding scale: For -300 use 4 units, 301-400 use 8 units, greater than 400 use 12 units, greater than 500 call MD.  
 Quantity:  3 Vial  
Refills:  11 JARDIANCE 25 mg tablet Generic drug:  empagliflozin Your last dose was: Your next dose is:    
   
   
 Dose:  12.5 mg Take 12.5 mg by mouth daily. Refills:  0  
     
   
   
   
  
 potassium chloride SR 10 mEq tablet Commonly known as:  KLOR-CON 10 Your last dose was: Your next dose is:    
   
   
 Dose:  20 mEq Take 20 mEq by mouth daily. Refills:  0 * PROVENTIL HFA 90 mcg/actuation inhaler Generic drug:  albuterol Your last dose was: Your next dose is:    
   
   
 inhale 2 puffs four times a day if needed for wheezing Quantity:  3 Inhaler Refills:  3  
     
   
   
   
  
 * albuterol 2.5 mg /3 mL (0.083 %) nebulizer solution Commonly known as:  PROVENTIL VENTOLIN Your last dose was: Your next dose is:    
   
   
 Dose:  2.5 mg  
3 mL by Nebulization route four (4) times daily as needed for Wheezing or Shortness of Breath. Quantity:  100 Each Refills:  5  
     
   
   
   
  
 sertraline 100 mg tablet Commonly known as:  ZOLOFT Your last dose was: Your next dose is: TAKE 1 TABLET BY MOUTH EVERY EVENING Quantity:  90 Tab Refills:  3 SINGULAIR 10 mg tablet Generic drug:  montelukast  
   
Your last dose was: Your next dose is:    
   
   
 Dose:  10 mg Take 10 mg by mouth daily. Refills:  0 SPIRIVA WITH HANDIHALER 18 mcg inhalation capsule Generic drug:  tiotropium Your last dose was: Your next dose is:    
   
   
 Dose:  1 Cap Take 1 Cap by inhalation daily. Refills:  0  
     
   
   
   
  
 therapeutic multivitamin tablet Commonly known as:  [facility] Your last dose was: Your next dose is:    
   
   
 Dose:  1 Tab Take 1 Tab by mouth daily. Refills:  0  
     
   
   
   
  
 VITAMIN D3 2,000 unit Tab Generic drug:  cholecalciferol (vitamin D3) Your last dose was: Your next dose is:    
   
   
 Dose:  2000 Units Take 2,000 Units by mouth daily. Refills:  0  
     
   
   
   
  
 XOLAIR 150 mg Solr Generic drug:  omalizumab Your last dose was: Your next dose is:    
   
   
 Dose:  150 mg  
150 mg by SubCUTAneous route every fourteen (14) days. Next dose due 2/17 Refills:  0 * Notice: This list has 2 medication(s) that are the same as other medications prescribed for you. Read the directions carefully, and ask your doctor or other care provider to review them with you. STOP taking these medications   
 naproxen sodium 220 mg tablet Commonly known as:  NAPROSYN Where to Get Your Medications Information on where to get these meds will be given to you by the nurse or doctor. ! Ask your nurse or doctor about these medications  
  predniSONE 10 mg tablet  
 warfarin 5 mg tablet Discharge Instructions Patient Discharge Instructions Mary Ann Becerra / [id number] : 1945 Admitted 2017 Discharged: 2017 Take Home Medications · It is important that you take the medication exactly as they are prescribed. · Keep your medication in the bottles provided by the pharmacist and keep a list of the medication names, dosages, and times to be taken in your wallet. · Do not take other medications without consulting your doctor. What to do at [facility] Recommended diet: Diabetic Diet. Recommended activity: Activity as tolerated. Follow-up with Dr. Gustavo Oakes in 2 weeks. Home health to check INR in 1 week. Home IV Antibiotic Orders 
  
1. Diagnosis: MSSA pneumonia with bacteremia 2. Routine PICC care 3. Antibiotic: Cefazolin 2 grams IV Q 8 hours 4. Lab each Monday: CBC/diff/platelets BMP 5. Lab each Thursday: CBC/diff/platelets BMP 6. Fax lab to Dr. Kena Hendrix @ [phone]. 
7. Call Dr. Kena Hendrix @ [phone] for WBC under 4 or creatinine over 1.9 8. Duration of therapy: 2 weeks 9. Allergies: Latex 10. Call [phone] with name of [address] [place] [address] and to arrange follow up appointment in 10 days 
  Information obtained by : 
I understand that if any problems occur once I am at home I am to contact my physician. I understand and acknowledge receipt of the instructions indicated above. Physician's or R.N.'s Signature                                                                  Date/Time Patient or Representative Signature                                                          Date/Time Discharge Orders None [employer] Announcement We are excited to announce that we are making your provider's discharge notes available to you in Enliken. You will see these notes when they are completed and signed by the physician that discharged you from your recent hospital stay. If you have any questions or concerns about any information you see in Enliken, please call the Health Information Department where you were seen or reach out to your Primary Care Provider for more information about your plan of care. Introducing [facility]! Dear [place]: 
Thank you for requesting a Enliken account. Our records indicate that you already have an active Enliken account. You can access your account anytime at [email or web address] Did you know that you can access your hospital and ER discharge instructions at any time in Enliken? You can also review all of your test results from your hospital stay or ER visit. Additional Information If you have questions, please visit the Frequently Asked Questions section of the Enliken website at [email or web address] Remember, Enliken is NOT to be used for urgent needs. For medical emergencies, dial 911. Now available from your iPhone and Android! General Information Please provide this summary of care documentation to your next provider. Patient Signature:  ____________________________________________________________ Date:  ____________________________________________________________  
  
Ulyess Salaam Provider Signature:  ____________________________________________________________ Date:  ____________________________________________________________

## 2017-04-23 NOTE — ROUTINE PROCESS
TRANSFER - OUT REPORT:    Verbal report given to [employer]) on [place]  being transferred to [facility](unit) for routine progression of care       Report consisted of patients Situation, Background, Assessment and   Recommendations(SBAR). Information from the following report(s) SBAR and ED Summary was reviewed with the receiving nurse. Lines:   Peripheral IV 04/23/17 Right Forearm (Active)   Site Assessment Clean, dry, & intact 4/23/2017  9:37 AM   Phlebitis Assessment 0 4/23/2017  9:37 AM   Infiltration Assessment 0 4/23/2017  9:37 AM   Dressing Status Clean, dry, & intact 4/23/2017  9:37 AM   Dressing Type Transparent 4/23/2017  9:37 AM   Hub Color/Line Status Pink 4/23/2017  9:37 AM        Opportunity for questions and clarification was provided.

## 2017-04-23 NOTE — PROGRESS NOTES
TRANSFER - IN REPORT:    Verbal report received from Ashvin WASHINGTON on [facility]  being received from ER for routine progression of care      Report consisted of patients Situation, Background, Assessment and   Recommendations(SBAR). Information from the following report(s) SBAR, Procedure Summary, MAR, Recent Results and Cardiac Rhythm afib was reviewed with the receiving nurse. Opportunity for questions and clarification was provided. Assessment completed upon patients arrival to unit and care assumed.

## 2017-04-23 NOTE — ED TRIAGE NOTES
Tx for bronchitis since 2/17. Symptoms progressively getting worse with Hemoptysis, SOB, fever, and generalized weakness. Pt placed in negative pressure room.

## 2017-04-23 NOTE — PROGRESS NOTES
Primary Nurse Caitlin Yee RN and Rohit Phillips RN performed a dual skin assessment on this patient No impairment noted  Kobi score is 19

## 2017-04-23 NOTE — PROGRESS NOTES
Problem: Pressure Ulcer - Risk of  Goal: *Prevention of pressure ulcer  Outcome: Progressing Towards Goal  No signs of skin break down from pressure; dual skin check by two RN

## 2017-04-23 NOTE — ED NOTES
Pt states that she did not take her insulin this morning, a telephone order was received from Dr. Neto Da Silva to give her 20 units of insulin NPH subq.

## 2017-04-23 NOTE — ED NOTES
Pt was able to use the bedside commode to have BM, pt got mildly SOB upon transferring, however, pt states that she was able to tolerate it and pt had no complaints.

## 2017-04-24 PROBLEM — R04.2 HEMOPTYSIS: Status: ACTIVE | Noted: 2017-04-24

## 2017-04-24 LAB
ALBUMIN SERPL BCP-MCNC: 2.7 G/DL (ref 3.5–5)
ALBUMIN/GLOB SERPL: 0.8 {RATIO} (ref 1.1–2.2)
ALP SERPL-CCNC: 74 U/L (ref 45–117)
ALT SERPL-CCNC: 17 U/L (ref 12–78)
ANION GAP BLD CALC-SCNC: 8 MMOL/L (ref 5–15)
AST SERPL W P-5'-P-CCNC: 8 U/L (ref 15–37)
BASOPHILS # BLD AUTO: 0 K/UL (ref 0–0.1)
BASOPHILS # BLD: 0 % (ref 0–1)
BILIRUB SERPL-MCNC: 0.6 MG/DL (ref 0.2–1)
BUN SERPL-MCNC: 29 MG/DL (ref 6–20)
BUN/CREAT SERPL: 21 (ref 12–20)
CALCIUM SERPL-MCNC: 8 MG/DL (ref 8.5–10.1)
CHLORIDE SERPL-SCNC: 105 MMOL/L (ref 97–108)
CO2 SERPL-SCNC: 26 MMOL/L (ref 21–32)
CREAT SERPL-MCNC: 1.36 MG/DL (ref 0.55–1.02)
DIFFERENTIAL METHOD BLD: ABNORMAL
DIGOXIN SERPL-MCNC: 1.4 NG/ML (ref 0.9–2)
EOSINOPHIL # BLD: 0 K/UL (ref 0–0.4)
EOSINOPHIL NFR BLD: 0 % (ref 0–7)
ERYTHROCYTE [DISTWIDTH] IN BLOOD BY AUTOMATED COUNT: 16.6 % (ref 11.5–14.5)
GLOBULIN SER CALC-MCNC: 3.4 G/DL (ref 2–4)
GLUCOSE BLD STRIP.AUTO-MCNC: 204 MG/DL (ref 65–100)
GLUCOSE BLD STRIP.AUTO-MCNC: 286 MG/DL (ref 65–100)
GLUCOSE BLD STRIP.AUTO-MCNC: 311 MG/DL (ref 65–100)
GLUCOSE BLD STRIP.AUTO-MCNC: 338 MG/DL (ref 65–100)
GLUCOSE SERPL-MCNC: 211 MG/DL (ref 65–100)
HCT VFR BLD AUTO: 40.9 % (ref 35–47)
HGB BLD-MCNC: 13.3 G/DL (ref 11.5–16)
INR PPP: 2.7 (ref 0.9–1.1)
LACTATE SERPL-SCNC: 1 MMOL/L (ref 0.4–2)
LYMPHOCYTES # BLD AUTO: 4 % (ref 12–49)
LYMPHOCYTES # BLD: 1 K/UL (ref 0.8–3.5)
MCH RBC QN AUTO: 29.4 PG (ref 26–34)
MCHC RBC AUTO-ENTMCNC: 32.5 G/DL (ref 30–36.5)
MCV RBC AUTO: 90.3 FL (ref 80–99)
MONOCYTES # BLD: 0.8 K/UL (ref 0–1)
MONOCYTES NFR BLD AUTO: 3 % (ref 5–13)
NEUTS BAND NFR BLD MANUAL: 5 % (ref 0–6)
NEUTS SEG # BLD: 23.9 K/UL (ref 1.8–8)
NEUTS SEG NFR BLD AUTO: 88 % (ref 32–75)
PLATELET # BLD AUTO: 106 K/UL (ref 150–400)
POTASSIUM SERPL-SCNC: 3.9 MMOL/L (ref 3.5–5.1)
PROT SERPL-MCNC: 6.1 G/DL (ref 6.4–8.2)
PROTHROMBIN TIME: 27.8 SEC (ref 9–11.1)
RBC # BLD AUTO: 4.53 M/UL (ref 3.8–5.2)
RBC MORPH BLD: ABNORMAL
SERVICE CMNT-IMP: ABNORMAL
SODIUM SERPL-SCNC: 139 MMOL/L (ref 136–145)
TROPONIN I SERPL-MCNC: <0.04 NG/ML
WBC # BLD AUTO: 25.7 K/UL (ref 3.6–11)

## 2017-04-24 PROCEDURE — 74011250636 HC RX REV CODE- 250/636: Performed by: INTERNAL MEDICINE

## 2017-04-24 PROCEDURE — 87077 CULTURE AEROBIC IDENTIFY: CPT | Performed by: INTERNAL MEDICINE

## 2017-04-24 PROCEDURE — 80162 ASSAY OF DIGOXIN TOTAL: CPT | Performed by: INTERNAL MEDICINE

## 2017-04-24 PROCEDURE — 84484 ASSAY OF TROPONIN QUANT: CPT | Performed by: INTERNAL MEDICINE

## 2017-04-24 PROCEDURE — 87070 CULTURE OTHR SPECIMN AEROBIC: CPT | Performed by: INTERNAL MEDICINE

## 2017-04-24 PROCEDURE — 74011000258 HC RX REV CODE- 258: Performed by: INTERNAL MEDICINE

## 2017-04-24 PROCEDURE — 82962 GLUCOSE BLOOD TEST: CPT

## 2017-04-24 PROCEDURE — 85025 COMPLETE CBC W/AUTO DIFF WBC: CPT | Performed by: INTERNAL MEDICINE

## 2017-04-24 PROCEDURE — 77030029684 HC NEB SM VOL KT MONA -A

## 2017-04-24 PROCEDURE — 77010033678 HC OXYGEN DAILY

## 2017-04-24 PROCEDURE — 87186 SC STD MICRODIL/AGAR DIL: CPT | Performed by: INTERNAL MEDICINE

## 2017-04-24 PROCEDURE — 97161 PT EVAL LOW COMPLEX 20 MIN: CPT

## 2017-04-24 PROCEDURE — 85610 PROTHROMBIN TIME: CPT | Performed by: INTERNAL MEDICINE

## 2017-04-24 PROCEDURE — 74011250637 HC RX REV CODE- 250/637: Performed by: INTERNAL MEDICINE

## 2017-04-24 PROCEDURE — 74011636637 HC RX REV CODE- 636/637: Performed by: INTERNAL MEDICINE

## 2017-04-24 PROCEDURE — 83605 ASSAY OF LACTIC ACID: CPT | Performed by: INTERNAL MEDICINE

## 2017-04-24 PROCEDURE — 80053 COMPREHEN METABOLIC PANEL: CPT | Performed by: INTERNAL MEDICINE

## 2017-04-24 PROCEDURE — 65660000000 HC RM CCU STEPDOWN

## 2017-04-24 PROCEDURE — 36415 COLL VENOUS BLD VENIPUNCTURE: CPT | Performed by: INTERNAL MEDICINE

## 2017-04-24 PROCEDURE — 94640 AIRWAY INHALATION TREATMENT: CPT

## 2017-04-24 PROCEDURE — 74011000250 HC RX REV CODE- 250: Performed by: INTERNAL MEDICINE

## 2017-04-24 RX ORDER — WARFARIN 2.5 MG/1
2.5 TABLET ORAL ONCE
Status: COMPLETED | OUTPATIENT
Start: 2017-04-24 | End: 2017-04-24

## 2017-04-24 RX ORDER — ALBUTEROL SULFATE 0.83 MG/ML
2.5 SOLUTION RESPIRATORY (INHALATION)
Status: DISCONTINUED | OUTPATIENT
Start: 2017-04-24 | End: 2017-04-27

## 2017-04-24 RX ORDER — INSULIN LISPRO 100 [IU]/ML
INJECTION, SOLUTION INTRAVENOUS; SUBCUTANEOUS
Status: DISCONTINUED | OUTPATIENT
Start: 2017-04-24 | End: 2017-05-01 | Stop reason: HOSPADM

## 2017-04-24 RX ADMIN — FLUTICASONE PROPIONATE AND SALMETEROL 1 PUFF: 100; 50 POWDER RESPIRATORY (INHALATION) at 21:27

## 2017-04-24 RX ADMIN — MONTELUKAST SODIUM 10 MG: 10 TABLET, FILM COATED ORAL at 08:41

## 2017-04-24 RX ADMIN — INSULIN LISPRO 5 UNITS: 100 INJECTION, SOLUTION INTRAVENOUS; SUBCUTANEOUS at 21:27

## 2017-04-24 RX ADMIN — INSULIN LISPRO 10 UNITS: 100 INJECTION, SOLUTION INTRAVENOUS; SUBCUTANEOUS at 12:28

## 2017-04-24 RX ADMIN — NIFEDIPINE 120 MG: 10 CAPSULE ORAL at 08:41

## 2017-04-24 RX ADMIN — SERTRALINE HYDROCHLORIDE 100 MG: 50 TABLET ORAL at 17:48

## 2017-04-24 RX ADMIN — ONDANSETRON 4 MG: 2 INJECTION INTRAMUSCULAR; INTRAVENOUS at 12:29

## 2017-04-24 RX ADMIN — INSULIN LISPRO 4 UNITS: 100 INJECTION, SOLUTION INTRAVENOUS; SUBCUTANEOUS at 07:13

## 2017-04-24 RX ADMIN — WARFARIN SODIUM 2.5 MG: 2.5 TABLET ORAL at 17:48

## 2017-04-24 RX ADMIN — ALBUTEROL SULFATE 2.5 MG: 2.5 SOLUTION RESPIRATORY (INHALATION) at 22:48

## 2017-04-24 RX ADMIN — ALBUTEROL SULFATE 2.5 MG: 2.5 SOLUTION RESPIRATORY (INHALATION) at 16:19

## 2017-04-24 RX ADMIN — CEFTRIAXONE 2 G: 2 INJECTION, POWDER, FOR SOLUTION INTRAMUSCULAR; INTRAVENOUS at 11:06

## 2017-04-24 RX ADMIN — POTASSIUM CHLORIDE 20 MEQ: 750 TABLET, FILM COATED, EXTENDED RELEASE ORAL at 08:39

## 2017-04-24 RX ADMIN — FLUTICASONE PROPIONATE AND SALMETEROL 1 PUFF: 100; 50 POWDER RESPIRATORY (INHALATION) at 08:40

## 2017-04-24 RX ADMIN — DIGOXIN 0.12 MG: 125 TABLET ORAL at 08:39

## 2017-04-24 RX ADMIN — FUROSEMIDE 40 MG: 10 INJECTION, SOLUTION INTRAMUSCULAR; INTRAVENOUS at 08:40

## 2017-04-24 RX ADMIN — GUAIFENESIN 600 MG: 600 TABLET, EXTENDED RELEASE ORAL at 21:26

## 2017-04-24 RX ADMIN — METHYLPREDNISOLONE SODIUM SUCCINATE 125 MG: 125 INJECTION, POWDER, FOR SOLUTION INTRAMUSCULAR; INTRAVENOUS at 21:27

## 2017-04-24 RX ADMIN — Medication 10 ML: at 14:00

## 2017-04-24 RX ADMIN — PANTOPRAZOLE SODIUM 40 MG: 40 TABLET, DELAYED RELEASE ORAL at 07:13

## 2017-04-24 RX ADMIN — HUMAN INSULIN 40 UNITS: 100 INJECTION, SUSPENSION SUBCUTANEOUS at 07:13

## 2017-04-24 RX ADMIN — ALBUTEROL SULFATE 2.5 MG: 2.5 SOLUTION RESPIRATORY (INHALATION) at 13:23

## 2017-04-24 RX ADMIN — Medication 10 ML: at 21:27

## 2017-04-24 RX ADMIN — METHYLPREDNISOLONE SODIUM SUCCINATE 125 MG: 125 INJECTION, POWDER, FOR SOLUTION INTRAMUSCULAR; INTRAVENOUS at 14:33

## 2017-04-24 RX ADMIN — AZITHROMYCIN MONOHYDRATE 500 MG: 500 INJECTION, POWDER, LYOPHILIZED, FOR SOLUTION INTRAVENOUS at 12:34

## 2017-04-24 RX ADMIN — GUAIFENESIN 600 MG: 600 TABLET, EXTENDED RELEASE ORAL at 08:41

## 2017-04-24 RX ADMIN — INSULIN LISPRO 7 UNITS: 100 INJECTION, SOLUTION INTRAVENOUS; SUBCUTANEOUS at 17:47

## 2017-04-24 NOTE — PROGRESS NOTES
Pharmacist Note - Warfarin Dosing  Consult provided for this 67 y. o.female to manage warfarin for Atrial Fibrillation    INR Goal: 2 - 3  Home regimen/ tablet size: 5mg on three days of the week, 2.5mg on all others (last dose confirmed as 4/22/2017)    Drugs that may increase INR: Macrolides  Drugs that may decrease INR: None  Other current anticoagulants/ drugs that may increase bleeding risk: NSAID  Risk factors: Age > 65  Daily INR ordered: YES  Recent Labs      04/24/17   0130  04/23/17   0932   HGB  13.3  13.9   INR  2.7*  3.0*     Date               INR                  Dose  4/23  3.0                   2.5 mg  4/24  2.7  2.5 mg                                                                                 Assessment/ Plan: Will order warfarin 2.5 mg PO x 1 dose. Pharmacy will continue to monitor daily and adjust therapy as indicated. Please contact the pharmacist at  for outpatient recommendations if needed.

## 2017-04-24 NOTE — CDMP QUERY
Dr. Roselyn Brunner,     Please clarify if this patient is being treated/managed for:    =>Possible Streptococcus pneumonia in the setting of asthma & LITA requiring IV abxs  =>Other Explanation of clinical findings  =>Unable to Determine (no explanation of clinical findings)    The medical record reflects the following clinical findings, treatment, and risk factors:    Risk Factors: 66 y/o pt with hx of asthma admitted with CAP  Clinical Indicators: +bcx, hemoptysis  Treatment: IV abxs    Please clarify and document your clinical opinion in the progress notes and discharge summary including the definitive and/or presumptive diagnosis, (suspected or probable), related to the above clinical findings. Please include clinical findings supporting your diagnosis.     Thank you,   Jenaro Landeros, [address] [facility]

## 2017-04-24 NOTE — CONSULTS
Initial Pulmonary / Critical Care Consultation    Assessment / Plan:      1. Hemoptysis - secondary to pneumonia and in setting of anticoagulation with INR 3 on admission - clinically better with abx and holding coumadin   2. CAP - has been on abx fairly recently - was having cough prior to fall - but suppose with her fall on her back may have sustained a pulmonary contusion (while on anticoagulation)  3. Asthma - chronic - on xolair, advair, spiriva, singulair, and albuterol - followed by Dr Balbir Traore - not actively bronchospastic  4. LITA on nocturnal cpap  5. Blood cx 1/4 Gm pos cocci - follow up final identification of organism (? 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  female who  has a past medical history of Asthma; Atrial fibrillation ([facility] Depression; DM (diabetes mellitus) ([facility] HTN (hypertension); Hyperlipidemia; Morbid obesity ([facility] Neuropathy; LITA on CPAP; and Other ill-defined conditions (pneumonia). admitted 4/23/2017 with cough and hemoptysis. Pt has had recent URI sx and is on coumadin for afib and was coughing up bloody mucus. Less than a cup by patient report and has decreased since she has been in hospital.  INR was elevated a bit. No fevers or chills. Chest CT with RLL infiltrate. No chest pain. No nausea, vomiting, or difficulty swallowing. She is normally followed by Dr Balbir Traore and is maintained on xolair, advair 100/50, singulair, spiriva, and albuterol for her asthma.   She is also on an autotitrating CPAP (5-20 cm H2O with average pressure of 10 on last cpap report). Allergies   Allergen Reactions    Latex Itching    Codeine Rash    Lisinopril Other (comments)     Cough, vomiting, Visual disturbances    Morphine Itching    Statins-Hmg-Coa Reductase Inhibitors Other (comments)     Muscle enzyme problems     Past Medical History:   Diagnosis Date    Asthma     Atrial fibrillation ([facility]     Depression     DM (diabetes mellitus) ([facility]     HTN (hypertension)     Hyperlipidemia     Morbid obesity ([facility]     Neuropathy     LITA on CPAP     Other ill-defined conditions pneumonia      Past Surgical History:   Procedure Laterality Date    HX APPENDECTOMY      HX CHOLECYSTECTOMY      HX GASTRIC BYPASS      Jejunal bypass with subsequent reversal..  Lab Band since    HX OTHER SURGICAL  D & C    HX EMILIANO AND BSO      For uterine CA      Prior to Admission medications    Medication Sig Start Date End Date Taking? Authorizing Provider   potassium chloride SR (KLOR-CON 10) 10 mEq tablet Take 20 mEq by mouth daily. Yes Historical Provider   fluticasone-salmeterol (ADVAIR DISKUS) 100-50 mcg/dose diskus inhaler Take 1 Puff by inhalation every twelve (12) hours. Yes Historical Provider   digoxin (LANOXIN) 0.25 mg tablet Take 1 Tab by mouth daily. 3/29/17  Yes Nicki Li MD   sertraline (ZOLOFT) 100 mg tablet TAKE 1 TABLET BY MOUTH EVERY EVENING 3/13/17  Yes JOSHUA Li MD   gabapentin (NEURONTIN) 300 mg capsule TAKE ONE CAPSULE BY MOUTH THREE TIMES DAILY 3/13/17  Yes JOSHUA Li MD   predniSONE (DELTASONE) 10 mg tablet Take 6t every day for 4d, then 5t every day for 4d, then 4t QD for 4d, then 3t QD for 4d, then 2t QD for 4d, then 1t QD for 4d. 2/14/17  Yes Nicki Li MD   empagliflozin (JARDIANCE) 25 mg tablet Take 12.5 mg by mouth daily. Yes Historical Provider   montelukast (SINGULAIR) 10 mg tablet Take 10 mg by mouth daily.    Yes Historical Provider   naproxen sodium (NAPROSYN) 220 mg tablet Take 880 mg by mouth daily. Indications: OSTEOARTHRITIS   Yes Historical Provider   warfarin (COUMADIN) 5 mg tablet TAKE 1 TABLET BY MOUTH THREE TIMES A WEEK THEN TAKE 1/2 TABLET THE OTHER FOUR DAYS OF THE WEEK 11/16/16  Yes Chris Shook MD   tiotropium (SPIRIVA WITH HANDIHALER) 18 mcg inhalation capsule Take 1 Cap by inhalation daily. Yes Historical Provider   HUMULIN N 100 unit/mL injection INJECT 35 UNITS UNDER THE SKIN IN THE MORNING AND INJECT 25 UNITS AT BEDTIME OR AS DIRECTED 10/9/16  Yes JOSHUA Shook MD   therapeutic multivitamin [facility]) tablet Take 1 Tab by mouth daily. Yes Historical Provider   CARTIA  mg ER capsule TAKE 3 CAPSULES BY MOUTH DAILY 5/24/16  Yes JOSHUA Shook MD   furosemide (LASIX) 40 mg tablet take 2 tablets by mouth every morning and take 1 tablet by mouth every evening 3/16/16  Yes Chris Shook MD   insulin regular (NOVOLIN R, HUMULIN R) 100 unit/mL injection Sliding scale: For -300 use 4 units, 301-400 use 8 units, greater than 400 use 12 units, greater than 500 call MD. 8/14/15  Yes Chris Shook MD   ALPRAZolam Gerlene Arbour) 0.5 mg tablet Take 0.25-0.5 mg by mouth daily as needed for Anxiety. Yes Historical Provider   cholecalciferol, vitamin D3, (VITAMIN D3) 2,000 unit tab Take 2,000 Units by mouth daily. Yes Historical Provider   DOCOSAHEXANOIC ACID/EPA (FISH OIL PO) Take 1 Cap by mouth daily. Yes Historical Provider   omalizumab Benjy Alfredo) 150 mg solr 150 mg by SubCUTAneous route every fourteen (14) days. Next dose due 2/17    Historical Provider   diclofenac (VOLTAREN) 1 % gel Apply  to affected area four (4) times daily. 11/4/16   Chris Shook MD   albuterol (PROVENTIL VENTOLIN) 2.5 mg /3 mL (0.083 %) nebulizer solution 3 mL by Nebulization route four (4) times daily as needed for Wheezing or Shortness of Breath.  10/8/16   MD ARELI Lopes HFA 90 mcg/actuation inhaler inhale 2 puffs four times a day if needed for wheezing 1/17/15   Kylie Pulido MD      Family History   Problem Relation Age of Onset    Stroke Mother     Diabetes Other     Heart Disease Other     Heart Disease Father      congestive heart failure     Social History   Substance Use Topics    Smoking status: Former Smoker     Quit date: 1992    Smokeless tobacco: Not on file    Alcohol use 1.2 oz/week     2 Glasses of wine per week      Comment: occ wine      ROS:  A comprehensive review of systems was negative except for that written in the HPI. Objective:  Patient Vitals for the past 4 hrs:   BP Temp Pulse Resp SpO2   17 0754 119/62 97.6 °F (36.4 °C) 93 18 95 %     Temp (24hrs), Av °F (37.2 °C), Min:97.6 °F (36.4 °C), Max:100.6 °F (38.1 °C)    CVP:          Intake/Output Summary (Last 24 hours) at 17 1003  Last data filed at 17 2321   Gross per 24 hour   Intake              310 ml   Output              500 ml   Net             -190 ml     Blood Sugar:  Glucose   Date Value Ref Range Status   2017 211 (H) 65 - 100 mg/dL Final   2017 301 (H) 65 - 100 mg/dL Final   2017 206 (H) 65 - 100 mg/dL Final   2017 229 (H) 65 - 100 mg/dL Final   2017 402 (H) 65 - 100 mg/dL Final     Exam:     General:  Heavy, No acute distress   HEENT:  Sclera anicteric, Mucous membranes moist   Neck:  No accessory muscle use, No JVD, Supple   Chest:  Symmetrical in expansion   Lungs: Rhonchi   Heart:  Irreg   Abdomen:  Soft, ND / NT, Bowel sounds present   Extremities:  No cyanosis, No clubbing, No edema   Skin:  Warm / Dry   Neuro:  Alert, Grossly non-focal    Lab data was reviewed. Radiology images were independently viewed and available reports were reviewed.     CXR - no acute process    Chest CT - RLL asdz, left base atx, evidence of old granulomatous disease    Recent Labs      17   0130  17   0932   WBC  25.7*  31.8*   HGB  13.3  13.9   HCT  40.9  41.7   PLT  106*  227     Recent Labs 04/24/17   0130  04/23/17   0932   NA  139  135*   K  3.9  3.3*   CL  105  99   CO2  26  27   GLU  211*  301*   BUN  29*  33*   CREA  1.36*  1.75*   CA  8.0*  8.4*   ALB  2.7*  3.4*   SGOT  8*  8*   ALT  17  21   INR  2.7*  3.0*       Recent Labs      04/24/17   0130   TROIQ  <0.04         Clemente Gonzalez MD

## 2017-04-24 NOTE — PROGRESS NOTES
Bedside shift change report given to Vivian Leyva (oncoming nurse) by Callie Carlos (offgoing nurse). Report included the following information SBAR, Procedure Summary, Intake/Output, MAR and Cardiac Rhythm A fib.

## 2017-04-24 NOTE — PROGRESS NOTES
Rocio Worley, [facility]    Admit Date: 4/23/2017    Subjective:     Events noted. The gross hemoptysis seems to be improving. No new complaints.       Current Facility-Administered Medications   Medication Dose Route Frequency    methylPREDNISolone (PF) (SOLU-MEDROL) injection 125 mg  125 mg IntraVENous Q8H    insulin lispro (HUMALOG) injection   SubCUTAneous AC&HS    sodium chloride (NS) flush 5-10 mL  5-10 mL IntraVENous PRN    cefTRIAXone (ROCEPHIN) 2 g in 0.9% sodium chloride (MBP/ADV) 50 mL  2 g IntraVENous Q24H    albuterol (PROVENTIL VENTOLIN) nebulizer solution 2.5 mg  2.5 mg Nebulization QID PRN    ALPRAZolam (XANAX) tablet 0.25-0.5 mg  0.25-0.5 mg Oral DAILY PRN    dilTIAZem CD (CARDIZEM CD) capsule 120 mg  120 mg Oral DAILY    digoxin (LANOXIN) tablet 0.125 mg  0.125 mg Oral DAILY    insulin NPH (NOVOLIN N, HUMULIN N) injection 40 Units  40 Units SubCUTAneous 7am    montelukast (SINGULAIR) tablet 10 mg  10 mg Oral DAILY    potassium chloride SR (KLOR-CON 10) tablet 20 mEq  20 mEq Oral DAILY    sertraline (ZOLOFT) tablet 100 mg  100 mg Oral QPM    tiotropium bromide (SPIRIVA RESPIMAT) 1.25 mcg/ actuation  2 Puff Inhalation DAILY    sodium chloride (NS) flush 5-10 mL  5-10 mL IntraVENous Q8H    sodium chloride (NS) flush 5-10 mL  5-10 mL IntraVENous PRN    azithromycin (ZITHROMAX) 500 mg in 0.9% sodium chloride (MBP/ADV) 250 mL  500 mg IntraVENous Q24H    guaiFENesin ER (MUCINEX) tablet 600 mg  600 mg Oral Q12H    acetaminophen (TYLENOL) tablet 650 mg  650 mg Oral Q4H PRN    dextrose (D50W) injection syrg 12.5-25 g  12.5-25 g IntraVENous PRN    pantoprazole (PROTONIX) tablet 40 mg  40 mg Oral ACB    Warfarin dosing per pharmacy   Other Rx Dosing/Monitoring    glucose chewable tablet 16 g  4 Tab Oral PRN    dextrose (D50W) injection syrg 12.5-25 g  12.5-25 g IntraVENous PRN    glucagon (GLUCAGEN) injection 1 mg  1 mg IntraMUSCular PRN    furosemide (LASIX) injection 40 mg  40 mg IntraVENous DAILY    ondansetron (ZOFRAN) injection 4 mg  4 mg IntraVENous Q6H PRN    fluticasone-salmeterol (ADVAIR) 100mcg-50mcg/puff  1 Puff Inhalation BID RT          Objective:     Patient Vitals for the past 8 hrs:   BP Temp Pulse Resp SpO2 Weight   04/24/17 0754 119/62 97.6 °F (36.4 °C) 93 18 95 % -   04/24/17 0354 148/58 97.8 °F (36.6 °C) 97 18 97 % 302 lb 14.6 oz (137.4 kg)        04/22 1901 - 04/24 0700  In: 310 [P.O.:60; I.V.:250]  Out: 500 [Urine:500]    Physical Exam: NAD. A&O. Neck -- Supple. No JVD. Heart -- Irr Irr (Rate OK). Lungs -- Crackles/bronchial sounds at RLL. Abd -- Benign. Ext -- No LE edema, b/l.       Data Review   Recent Results (from the past 24 hour(s))   EKG, 12 LEAD, INITIAL    Collection Time: 04/23/17  9:20 AM   Result Value Ref Range    Ventricular Rate 101 BPM    Atrial Rate 76 BPM    QRS Duration 98 ms    Q-T Interval 334 ms    QTC Calculation (Bezet) 433 ms    Calculated R Axis -38 degrees    Calculated T Axis 94 degrees    Diagnosis       Atrial fibrillation  Left axis deviation  ST & T wave abnormality, consider lateral ischemia  When compared with ECG of 09-FEB-2017 11:07,  Inverted T waves have replaced nonspecific T wave abnormality in Lateral   leads  Confirmed by Malena Power M.D., Alondra Love ([id number]) on 4/23/2017 2:07:34 PM     CBC WITH AUTOMATED DIFF    Collection Time: 04/23/17  9:32 AM   Result Value Ref Range    WBC 31.8 (H) 3.6 - 11.0 K/uL    RBC 4.76 3.80 - 5.20 M/uL    HGB 13.9 11.5 - 16.0 g/dL    HCT 41.7 35.0 - 47.0 %    MCV 87.6 80.0 - 99.0 FL    MCH 29.2 26.0 - 34.0 PG    MCHC 33.3 30.0 - 36.5 g/dL    RDW 16.2 (H) 11.5 - 14.5 %    PLATELET 771 982 - 207 K/uL    NEUTROPHILS 81 (H) 32 - 75 %    BAND NEUTROPHILS 11 (H) 0 - 6 %    LYMPHOCYTES 2 (L) 12 - 49 %    MONOCYTES 6 5 - 13 %    EOSINOPHILS 0 0 - 7 % BASOPHILS 0 0 - 1 %    ABS. NEUTROPHILS 29.3 (H) 1.8 - 8.0 K/UL    ABS. LYMPHOCYTES 0.6 (L) 0.8 - 3.5 K/UL    ABS. MONOCYTES 1.9 (H) 0.0 - 1.0 K/UL    ABS. EOSINOPHILS 0.0 0.0 - 0.4 K/UL    ABS. BASOPHILS 0.0 0.0 - 0.1 K/UL    DF MANUAL      RBC COMMENTS ANISOCYTOSIS  1+       METABOLIC PANEL, COMPREHENSIVE    Collection Time: 04/23/17  9:32 AM   Result Value Ref Range    Sodium 135 (L) 136 - 145 mmol/L    Potassium 3.3 (L) 3.5 - 5.1 mmol/L    Chloride 99 97 - 108 mmol/L    CO2 27 21 - 32 mmol/L    Anion gap 9 5 - 15 mmol/L    Glucose 301 (H) 65 - 100 mg/dL    BUN 33 (H) 6 - 20 MG/DL    Creatinine 1.75 (H) 0.55 - 1.02 MG/DL    BUN/Creatinine ratio 19 12 - 20      GFR est AA 35 (L) >60 ml/min/1.73m2    GFR est non-AA 28 (L) >60 ml/min/1.73m2    Calcium 8.4 (L) 8.5 - 10.1 MG/DL    Bilirubin, total 0.4 0.2 - 1.0 MG/DL    ALT (SGPT) 21 12 - 78 U/L    AST (SGOT) 8 (L) 15 - 37 U/L    Alk.  phosphatase 78 45 - 117 U/L    Protein, total 6.6 6.4 - 8.2 g/dL    Albumin 3.4 (L) 3.5 - 5.0 g/dL    Globulin 3.2 2.0 - 4.0 g/dL    A-G Ratio 1.1 1.1 - 2.2     LACTIC ACID, PLASMA    Collection Time: 04/23/17  9:32 AM   Result Value Ref Range    Lactic acid 2.7 (HH) 0.4 - 2.0 MMOL/L   PROTHROMBIN TIME + INR    Collection Time: 04/23/17  9:32 AM   Result Value Ref Range    INR 3.0 (H) 0.9 - 1.1      Prothrombin time 31.0 (H) 9.0 - 11.1 sec   PTT    Collection Time: 04/23/17  9:32 AM   Result Value Ref Range    aPTT 37.1 (H) 22.1 - 32.5 sec    aPTT, therapeutic range     58.0 - 77.0 SECS   DIGOXIN    Collection Time: 04/23/17  9:32 AM   Result Value Ref Range    DIGOXIN 1.2 0.9 - 2.0 NG/ML   CULTURE, BLOOD, PAIRED    Collection Time: 04/23/17  9:46 AM   Result Value Ref Range    Special Requests: NO SPECIAL REQUESTS      Culture result: (A)       GRAM POSITIVE COCCI  IN CLUSTERS  GROWING IN 1 OF 4 BOTTLES DRAWN  SITE= RFA AT 0931      Culture result: REMAINING BOTTLE(S) HAS/HAVE NO GROWTH SO FAR      Culture result: (A)       PRELIMINARY REPORT OF  GRAM POSITIVE COCCI  IN CLUSTERS  GROWING IN 1 OF 4 BOTTLES DRAWN  CALLED TO AND READ BACK BY  PADMINI ATWOOD AT [facility] 4/24/17 LAS     URINALYSIS W/ RFLX MICROSCOPIC    Collection Time: 04/23/17 10:03 AM   Result Value Ref Range    Color YELLOW/STRAW      Appearance CLOUDY (A) CLEAR      Specific gravity 1.027 1.003 - 1.030      pH (UA) 5.5 5.0 - 8.0      Protein NEGATIVE  NEG mg/dL    Glucose >1000 (A) NEG mg/dL    Ketone NEGATIVE  NEG mg/dL    Bilirubin NEGATIVE  NEG      Blood NEGATIVE  NEG      Urobilinogen 1.0 0.2 - 1.0 EU/dL    Nitrites NEGATIVE  NEG      Leukocyte Esterase NEGATIVE  NEG     GLUCOSE, POC    Collection Time: 04/23/17 12:07 PM   Result Value Ref Range    Glucose (POC) 267 (H) 65 - 100 mg/dL    Performed by JOO CREWS    LACTIC ACID, PLASMA    Collection Time: 04/23/17  1:25 PM   Result Value Ref Range    Lactic acid 3.2 (HH) 0.4 - 2.0 MMOL/L   GLUCOSE, POC    Collection Time: 04/23/17  4:06 PM   Result Value Ref Range    Glucose (POC) 242 (H) 65 - 100 mg/dL    Performed by REYES ALEXIS    TROPONIN I    Collection Time: 04/24/17  1:30 AM   Result Value Ref Range    Troponin-I, Qt. <0.04 <7.06 ng/mL   METABOLIC PANEL, COMPREHENSIVE    Collection Time: 04/24/17  1:30 AM   Result Value Ref Range    Sodium 139 136 - 145 mmol/L    Potassium 3.9 3.5 - 5.1 mmol/L    Chloride 105 97 - 108 mmol/L    CO2 26 21 - 32 mmol/L    Anion gap 8 5 - 15 mmol/L    Glucose 211 (H) 65 - 100 mg/dL    BUN 29 (H) 6 - 20 MG/DL    Creatinine 1.36 (H) 0.55 - 1.02 MG/DL    BUN/Creatinine ratio 21 (H) 12 - 20      GFR est AA 46 (L) >60 ml/min/1.73m2    GFR est non-AA 38 (L) >60 ml/min/1.73m2    Calcium 8.0 (L) 8.5 - 10.1 MG/DL    Bilirubin, total 0.6 0.2 - 1.0 MG/DL    ALT (SGPT) 17 12 - 78 U/L    AST (SGOT) 8 (L) 15 - 37 U/L    Alk.  phosphatase 74 45 - 117 U/L    Protein, total 6.1 (L) 6.4 - 8.2 g/dL    Albumin 2.7 (L) 3.5 - 5.0 g/dL    Globulin 3.4 2.0 - 4.0 g/dL    A-G Ratio 0.8 (L) 1.1 - 2.2     CBC WITH AUTOMATED DIFF    Collection Time: 04/24/17  1:30 AM   Result Value Ref Range    WBC 25.7 (H) 3.6 - 11.0 K/uL    RBC 4.53 3.80 - 5.20 M/uL    HGB 13.3 11.5 - 16.0 g/dL    HCT 40.9 35.0 - 47.0 %    MCV 90.3 80.0 - 99.0 FL    MCH 29.4 26.0 - 34.0 PG    MCHC 32.5 30.0 - 36.5 g/dL    RDW 16.6 (H) 11.5 - 14.5 %    PLATELET 940 (L) 060 - 400 K/uL    NEUTROPHILS 88 (H) 32 - 75 %    BAND NEUTROPHILS 5 0 - 6 %    LYMPHOCYTES 4 (L) 12 - 49 %    MONOCYTES 3 (L) 5 - 13 %    EOSINOPHILS 0 0 - 7 %    BASOPHILS 0 0 - 1 %    ABS. NEUTROPHILS 23.9 (H) 1.8 - 8.0 K/UL    ABS. LYMPHOCYTES 1.0 0.8 - 3.5 K/UL    ABS. MONOCYTES 0.8 0.0 - 1.0 K/UL    ABS. EOSINOPHILS 0.0 0.0 - 0.4 K/UL    ABS. BASOPHILS 0.0 0.0 - 0.1 K/UL    DF MANUAL      RBC COMMENTS ANISOCYTOSIS  1+       PROTHROMBIN TIME + INR    Collection Time: 04/24/17  1:30 AM   Result Value Ref Range    INR 2.7 (H) 0.9 - 1.1      Prothrombin time 27.8 (H) 9.0 - 11.1 sec   DIGOXIN    Collection Time: 04/24/17  1:30 AM   Result Value Ref Range    DIGOXIN 1.4 0.9 - 2.0 NG/ML   LACTIC ACID, PLASMA    Collection Time: 04/24/17  1:30 AM   Result Value Ref Range    Lactic acid 1.0 0.4 - 2.0 MMOL/L   GLUCOSE, POC    Collection Time: 04/24/17  7:08 AM   Result Value Ref Range    Glucose (POC) 204 (H) 65 - 100 mg/dL    Performed by Nika Echavarria            Assessment:     Principal Problem:    Pneumonia (4/23/2017)      Active Problems:    Asthma exacerbation (1/21/2014)      Type 2 diabetes mellitus without complication ([facility] (9/67/6147)      Chronic atrial fibrillation ([facility] (7/17/2015)      [place] (acute kidney injury) -- Due to volume depletion, sepsis. Better. Hemoptysis (4/24/2017)        Plan:     1. Cont Rocephin & IV Zithromax. 2. Cont IV Solu-medrol. 3. Will ask pulmonary to see -- apparently, the hemoptysis was quite significant. ... ? need for bronch? 4. She will eventually need f/u chest CT.   5. PT.          Carline Abel MD              For coding/billing purposes:  =>Possible Streptococcus pneumonia in the setting of asthma & LITA requiring IV abxs

## 2017-04-24 NOTE — PROGRESS NOTES
Bedside and Verbal shift change report given to [facility] [id number] (oncoming nurse) by Yoon Nolan (offgoing nurse). Report included the following information SBAR, Kardex, Intake/Output, MAR, Recent Results and Cardiac Rhythm afib.

## 2017-04-24 NOTE — PROGRESS NOTES
Problem: Pressure Ulcer - Risk of  Goal: *Prevention of pressure ulcer  Outcome: Progressing Towards Goal  Skin on pressure points cdi with no apparent breakdown

## 2017-04-24 NOTE — PROGRESS NOTES
TRANSFER - OUT REPORT:    Verbal report given to PADMINI Hurley(name) on [place]  being transferred to [address] [place] [address] room 515(unit) for routine progression of care       Report consisted of patients Situation, Background, Assessment and   Recommendations(SBAR). Information from the following report(s) SBAR, Kardex, Intake/Output, MAR, Accordion, Recent Results and Cardiac Rhythm a-fib was reviewed with the receiving nurse. Lines:   Peripheral IV 04/23/17 Right Forearm (Active)   Site Assessment Clean, dry, & intact 4/24/2017  3:54 AM   Phlebitis Assessment 0 4/24/2017  3:54 AM   Infiltration Assessment 0 4/24/2017  3:54 AM   Dressing Status Clean, dry, & intact 4/24/2017  3:54 AM   Dressing Type Transparent;Tape 4/24/2017  3:54 AM   Hub Color/Line Status Pink;Capped 4/24/2017  3:54 AM   Action Taken Open ports on tubing capped 4/24/2017  3:54 AM   Alcohol Cap Used Yes 4/24/2017  3:54 AM        Opportunity for questions and clarification was provided.       Patient transported with:   [employer]

## 2017-04-24 NOTE — PROGRESS NOTES
Problem: Falls - Risk of  Goal: *Absence of falls  Outcome: Progressing Towards Goal  Pt alert and oriented. Call bell and belongings within reach. Bed alarm in place. Family member at bedside.

## 2017-04-24 NOTE — DIABETES MGMT
DTC Progress Note    Recommendations/ Comments: Chart reviewed due to hyperglycemia likely due to steroids. Pt has required 9 unit of correction insulin over the last 24 hours and blood sugars have been >200. Noted correction scale was changed to resistant scale and NPH was increases to 40 unit at breakfast. DTC to continue to follow. Chart reviewed on [address]. Patient is a 67 y.o. female with known diabetes on Jardiance 12.5 mg and Regular Insulin sliding scale at home. A1c:   Lab Results   Component Value Date/Time    Hemoglobin A1c 10.4 06/11/2015 03:57 PM       Recent Glucose Results:   Lab Results   Component Value Date/Time     (H) 04/24/2017 01:30 AM    GLUCPOC 311 (H) 04/24/2017 11:43 AM    GLUCPOC 204 (H) 04/24/2017 07:08 AM    GLUCPOC 242 (H) 04/23/2017 04:06 PM        Lab Results   Component Value Date/Time    Creatinine 1.36 04/24/2017 01:30 AM       Active Orders   Diet    DIET DIABETIC CONSISTENT CARB Regular        PO intake: No data found. Current hospital DM medication: NPH 40 units at breakfast and correction scale Humalog, resistant scale. Will continue to follow as needed.     Thank you  Sarah Sanchez RD, CDE

## 2017-04-24 NOTE — PROGRESS NOTES
Spiritual Care Partner Volunteer visited patient in [facility]. on 04/24/17. Documented by:    Micha Waller M.S. M.Div.   [address] [place] [address] [phone])

## 2017-04-24 NOTE — PROGRESS NOTES
physical Therapy EVALUATION/DISCHARGE  Patient: Carlos Bishop (67 y.o. female)  Date: 4/24/2017  Primary Diagnosis: Pneumonia        Precautions: fall     ASSESSMENT :  Based on the objective data described below, the patient presents with overall modified independence with mobility as seen in bed mobility, transfers and gait. Gait is slow but steady without any assistive device. Main complaint at this time is right mid thoracis back pain/spasms. This was very sensitive to touch and at end of session ice packs positioned. Instructed patient to work on shoulder rolls and ROM of UE's to assist with pain. Feel she is safe to ambulate with staff in halls and to the bathroom and that skilled PT is not indicated. Monitored oxygen saturations on room air with gait and upon return to room, patient SOB but saturation 94% just as when on oxygen. .    Skilled physical therapy is not indicated at this time.      PLAN :  Discharge Recommendations: None  Further Equipment Recommendations for Discharge: none     SUBJECTIVE:   Patient stated I can walk with nursing can't I?  .    OBJECTIVE DATA SUMMARY:   HISTORY:    Past Medical History:   Diagnosis Date    Asthma     [facility]     Depression     DM [facility]diabetes mellitus) ([facility]     HTN (hypertension)     Hyperlipidemia     Morbid obesity ([facility]     Neuropathy     LITA on CPAP     Other ill-defined conditions pneumonia     Past Surgical History:   Procedure Laterality Date    HX APPENDECTOMY      HX CHOLECYSTECTOMY      HX GASTRIC BYPASS      Jejunal bypass with subsequent reversal..  Lab Band since    HX OTHER SURGICAL  D & C    HX EMILIANO AND BSO      For uterine CA     Prior Level of Function/Home Situation: modified independent  Personal factors and/or comorbidities impacting plan of care:     Home Situation  Home Environment: Apartment  # Steps to Enter: 0  One/Two Story Residence: One story  Living Alone: Yes  Support Systems: Friends \ neighbors  Patient Expects to be Discharged to[de-identified] Apartment  Current DME Used/Available at Home: Cane, straight  Tub or Shower Type: Shower    EXAMINATION/PRESENTATION/DECISION MAKING:   Critical Behavior:  Neurologic State: Alert, Appropriate for age  Orientation Level: Oriented X4  Cognition: Appropriate decision making, Appropriate safety awareness     Hearing:   Auditory  Auditory Impairment: None  Skin:  See nursing notes    Range Of Motion:  AROM: Generally decreased, functional           PROM: Generally decreased, functional           Strength:    Strength: Generally decreased, functional                    Tone & Sensation:   Tone: Normal              Sensation: Intact               Coordination:  Coordination: Within functional limits  Vision:      Functional Mobility:  Bed Mobility:     Supine to Sit: Modified independent  Sit to Supine: Modified independent     Transfers:  Sit to Stand: Modified independent  Stand to Sit: Modified independent                       Balance:   Sitting: Intact  Standing: Intact  Ambulation/Gait Training:  Distance (ft): 50 Feet (ft)  Assistive Device: Gait belt  Ambulation - Level of Assistance: Supervision     Gait Description (WDL): Exceptions to WDL           Base of Support: Widened     Speed/Liz: Slow  Step Length: Right shortened;Left shortened            Therapeutic Exercises:   Instructed on shoulder rolls and UE ROM     Functional Measure:  Tinetti test:    Sitting Balance: 1  Arises: 1  Attempts to Rise: 2  Immediate Standing Balance: 2  Standing Balance: 1  Nudged: 2  Eyes Closed: 1  Turn 360 Degrees - Continuous/Discontinuous: 1  Turn 360 Degrees - Steady/Unsteady: 1  Sitting Down: 2  Balance Score: 14  Indication of Gait: 1  R Step Length/Height: 1  L Step Length/Height: 1  R Foot Clearance: 1  L Foot Clearance: 1  Step Symmetry: 1  Step Continuity: 1  Path: 2  Trunk: 2  Walking Time: 0  Gait Score: 11  Total Score: 25       Tinetti Test and G-code impairment scale:  Percentage of Impairment CH    0%   CI    1-19% CJ    20-39% CK    40-59% CL    60-79% CM    80-99% CN     100%   Tinetti  Score 0-28 28 23-27 17-22 12-16 6-11 1-5 0       Tinetti Tool Score Risk of Falls  <19 = High Fall Risk  19-24 = Moderate Fall Risk  25-28 = Low Fall Risk  Tinetti ME. Performance-Oriented Assessment of Mobility Problems in Elderly Patients. [facility]; [id number]. (Scoring Description: PT Bulletin Feb. 10, 1993)    Older adults: Josiah Naik et al, 2009; n = 1000 [facility] elderly evaluated with ABC, JOSE, ADL, and IADL)  · Mean JOSE score for males aged 69-68 years = 26.21(3.40)  · Mean JOSE score for females age 69-68 years = 25.16(4.30)  · Mean JOSE score for males over 80 years = 23.29(6.02)  · Mean JOSE score for females over 80 years = 17.20(8.32)         G codes: In compliance with CMSs Claims Based Outcome Reporting, the following G-code set was chosen for this patient based on their primary functional limitation being treated: The outcome measure chosen to determine the severity of the functional limitation was the Tinetti with a score of 25/28 which was correlated with the impairment scale. ? Mobility - Walking and Moving Around:     - CURRENT STATUS: CI - 1%-19% impaired, limited or restricted    - GOAL STATUS: CI - 1%-19% impaired, limited or restricted    - D/C STATUS:  CI - 1%-19% impaired, limited or restricted      Pain:  Pain Scale 1: Numeric (0 - 10)  Pain Intensity 1: 0     Activity Tolerance:   VSS  Please refer to the flowsheet for vital signs taken during this treatment.   After treatment:   []   Patient left in no apparent distress sitting up in chair  [x]   Patient left in no apparent distress in bed  [x]   Call bell left within reach  [x]   Nursing notified  []   Caregiver present  []   Bed alarm activated    COMMUNICATION/EDUCATION:   Communication/Collaboration:  []   Fall prevention education was provided and the patient/caregiver indicated understanding. [x]   Patient/family have participated as able and agree with findings and recommendations. []   Patient is unable to participate in plan of care at this time.   Findings and recommendations were discussed with: Registered Nurse    Thank you for this referral.  Azalea Stiles, PT   Time Calculation: 18 mins

## 2017-04-24 NOTE — PROGRESS NOTES
SPEECH THERAPY SCREENING:  SERVICES ARE NOT INDICATED AT THIS TIME    [facility] triggered for speech therapy based on results obtained during the nursing admission assessment. The patients chart was reviewed and the patient is not appropriate for a skilled therapy evaluation at this time. Please consult speech therapy if any therapy needs arise. Thank you.     Noel Navas, SLP

## 2017-04-25 LAB
ANION GAP BLD CALC-SCNC: 10 MMOL/L (ref 5–15)
BASOPHILS # BLD AUTO: 0 K/UL (ref 0–0.1)
BASOPHILS # BLD: 0 % (ref 0–1)
BUN SERPL-MCNC: 32 MG/DL (ref 6–20)
BUN/CREAT SERPL: 23 (ref 12–20)
CALCIUM SERPL-MCNC: 9.2 MG/DL (ref 8.5–10.1)
CHLORIDE SERPL-SCNC: 102 MMOL/L (ref 97–108)
CO2 SERPL-SCNC: 26 MMOL/L (ref 21–32)
CREAT SERPL-MCNC: 1.41 MG/DL (ref 0.55–1.02)
DIFFERENTIAL METHOD BLD: ABNORMAL
EOSINOPHIL # BLD: 0 K/UL (ref 0–0.4)
EOSINOPHIL NFR BLD: 0 % (ref 0–7)
ERYTHROCYTE [DISTWIDTH] IN BLOOD BY AUTOMATED COUNT: 16.1 % (ref 11.5–14.5)
GLUCOSE BLD STRIP.AUTO-MCNC: 340 MG/DL (ref 65–100)
GLUCOSE BLD STRIP.AUTO-MCNC: 418 MG/DL (ref 65–100)
GLUCOSE BLD STRIP.AUTO-MCNC: 448 MG/DL (ref 65–100)
GLUCOSE BLD STRIP.AUTO-MCNC: 456 MG/DL (ref 65–100)
GLUCOSE SERPL-MCNC: 340 MG/DL (ref 65–100)
HCT VFR BLD AUTO: 36.1 % (ref 35–47)
HGB BLD-MCNC: 12 G/DL (ref 11.5–16)
INR PPP: 2.1 (ref 0.9–1.1)
LYMPHOCYTES # BLD AUTO: 1 % (ref 12–49)
LYMPHOCYTES # BLD: 0.2 K/UL (ref 0.8–3.5)
MCH RBC QN AUTO: 28.6 PG (ref 26–34)
MCHC RBC AUTO-ENTMCNC: 33.2 G/DL (ref 30–36.5)
MCV RBC AUTO: 86 FL (ref 80–99)
MONOCYTES # BLD: 0.2 K/UL (ref 0–1)
MONOCYTES NFR BLD AUTO: 1 % (ref 5–13)
NEUTS BAND NFR BLD MANUAL: 6 % (ref 0–6)
NEUTS SEG # BLD: 20.5 K/UL (ref 1.8–8)
NEUTS SEG NFR BLD AUTO: 92 % (ref 32–75)
PLATELET # BLD AUTO: 162 K/UL (ref 150–400)
POTASSIUM SERPL-SCNC: 4 MMOL/L (ref 3.5–5.1)
PROTHROMBIN TIME: 21.1 SEC (ref 9–11.1)
RBC # BLD AUTO: 4.2 M/UL (ref 3.8–5.2)
RBC MORPH BLD: ABNORMAL
SERVICE CMNT-IMP: ABNORMAL
SODIUM SERPL-SCNC: 138 MMOL/L (ref 136–145)
WBC # BLD AUTO: 20.9 K/UL (ref 3.6–11)

## 2017-04-25 PROCEDURE — 80048 BASIC METABOLIC PNL TOTAL CA: CPT | Performed by: INTERNAL MEDICINE

## 2017-04-25 PROCEDURE — 85025 COMPLETE CBC W/AUTO DIFF WBC: CPT | Performed by: INTERNAL MEDICINE

## 2017-04-25 PROCEDURE — 82962 GLUCOSE BLOOD TEST: CPT

## 2017-04-25 PROCEDURE — 74011250637 HC RX REV CODE- 250/637: Performed by: INTERNAL MEDICINE

## 2017-04-25 PROCEDURE — 74011250636 HC RX REV CODE- 250/636: Performed by: INTERNAL MEDICINE

## 2017-04-25 PROCEDURE — 74011636637 HC RX REV CODE- 636/637: Performed by: INTERNAL MEDICINE

## 2017-04-25 PROCEDURE — 65660000000 HC RM CCU STEPDOWN

## 2017-04-25 PROCEDURE — 77010033678 HC OXYGEN DAILY

## 2017-04-25 PROCEDURE — 74011000258 HC RX REV CODE- 258: Performed by: INTERNAL MEDICINE

## 2017-04-25 PROCEDURE — 74011000250 HC RX REV CODE- 250: Performed by: INTERNAL MEDICINE

## 2017-04-25 PROCEDURE — 85610 PROTHROMBIN TIME: CPT | Performed by: INTERNAL MEDICINE

## 2017-04-25 PROCEDURE — 36415 COLL VENOUS BLD VENIPUNCTURE: CPT | Performed by: INTERNAL MEDICINE

## 2017-04-25 PROCEDURE — 94640 AIRWAY INHALATION TREATMENT: CPT

## 2017-04-25 PROCEDURE — 74011636637 HC RX REV CODE- 636/637: Performed by: FAMILY MEDICINE

## 2017-04-25 RX ORDER — WARFARIN SODIUM 5 MG/1
5 TABLET ORAL ONCE
Status: COMPLETED | OUTPATIENT
Start: 2017-04-25 | End: 2017-04-25

## 2017-04-25 RX ORDER — VANCOMYCIN 2 GRAM/500 ML IN 0.9 % SODIUM CHLORIDE INTRAVENOUS
2000 ONCE
Status: COMPLETED | OUTPATIENT
Start: 2017-04-25 | End: 2017-04-25

## 2017-04-25 RX ORDER — VANCOMYCIN 1.75 GRAM/500 ML IN 0.9 % SODIUM CHLORIDE INTRAVENOUS
1750 EVERY 24 HOURS
Status: DISCONTINUED | OUTPATIENT
Start: 2017-04-26 | End: 2017-04-28

## 2017-04-25 RX ADMIN — ACETAMINOPHEN 650 MG: 325 TABLET, FILM COATED ORAL at 14:46

## 2017-04-25 RX ADMIN — METHYLPREDNISOLONE SODIUM SUCCINATE 125 MG: 125 INJECTION, POWDER, FOR SOLUTION INTRAMUSCULAR; INTRAVENOUS at 06:56

## 2017-04-25 RX ADMIN — PANTOPRAZOLE SODIUM 40 MG: 40 TABLET, DELAYED RELEASE ORAL at 06:56

## 2017-04-25 RX ADMIN — DIGOXIN 0.12 MG: 125 TABLET ORAL at 09:00

## 2017-04-25 RX ADMIN — GUAIFENESIN 600 MG: 600 TABLET, EXTENDED RELEASE ORAL at 09:00

## 2017-04-25 RX ADMIN — GUAIFENESIN 600 MG: 600 TABLET, EXTENDED RELEASE ORAL at 21:00

## 2017-04-25 RX ADMIN — FLUTICASONE PROPIONATE AND SALMETEROL 1 PUFF: 100; 50 POWDER RESPIRATORY (INHALATION) at 10:13

## 2017-04-25 RX ADMIN — VANCOMYCIN HYDROCHLORIDE 2000 MG: 10 INJECTION, POWDER, LYOPHILIZED, FOR SOLUTION INTRAVENOUS at 12:03

## 2017-04-25 RX ADMIN — Medication 10 ML: at 14:47

## 2017-04-25 RX ADMIN — METHYLPREDNISOLONE SODIUM SUCCINATE 90 MG: 125 INJECTION, POWDER, FOR SOLUTION INTRAMUSCULAR; INTRAVENOUS at 21:18

## 2017-04-25 RX ADMIN — POTASSIUM CHLORIDE 20 MEQ: 750 TABLET, FILM COATED, EXTENDED RELEASE ORAL at 09:00

## 2017-04-25 RX ADMIN — ALBUTEROL SULFATE 2.5 MG: 2.5 SOLUTION RESPIRATORY (INHALATION) at 17:44

## 2017-04-25 RX ADMIN — ALBUTEROL SULFATE 2.5 MG: 2.5 SOLUTION RESPIRATORY (INHALATION) at 10:12

## 2017-04-25 RX ADMIN — FUROSEMIDE 40 MG: 10 INJECTION, SOLUTION INTRAMUSCULAR; INTRAVENOUS at 09:00

## 2017-04-25 RX ADMIN — ALBUTEROL SULFATE 2.5 MG: 2.5 SOLUTION RESPIRATORY (INHALATION) at 12:43

## 2017-04-25 RX ADMIN — METHYLPREDNISOLONE SODIUM SUCCINATE 90 MG: 125 INJECTION, POWDER, FOR SOLUTION INTRAMUSCULAR; INTRAVENOUS at 14:47

## 2017-04-25 RX ADMIN — INSULIN LISPRO 14 UNITS: 100 INJECTION, SOLUTION INTRAVENOUS; SUBCUTANEOUS at 16:30

## 2017-04-25 RX ADMIN — Medication 10 ML: at 21:19

## 2017-04-25 RX ADMIN — Medication 10 ML: at 06:57

## 2017-04-25 RX ADMIN — HUMAN INSULIN 20 UNITS: 100 INJECTION, SUSPENSION SUBCUTANEOUS at 22:34

## 2017-04-25 RX ADMIN — HUMAN INSULIN 40 UNITS: 100 INJECTION, SUSPENSION SUBCUTANEOUS at 06:57

## 2017-04-25 RX ADMIN — AZITHROMYCIN MONOHYDRATE 500 MG: 500 INJECTION, POWDER, LYOPHILIZED, FOR SOLUTION INTRAVENOUS at 11:58

## 2017-04-25 RX ADMIN — MONTELUKAST SODIUM 10 MG: 10 TABLET, FILM COATED ORAL at 09:00

## 2017-04-25 RX ADMIN — WARFARIN SODIUM 5 MG: 5 TABLET ORAL at 17:40

## 2017-04-25 RX ADMIN — INSULIN LISPRO 10 UNITS: 100 INJECTION, SOLUTION INTRAVENOUS; SUBCUTANEOUS at 06:57

## 2017-04-25 RX ADMIN — INSULIN LISPRO 16 UNITS: 100 INJECTION, SOLUTION INTRAVENOUS; SUBCUTANEOUS at 11:56

## 2017-04-25 RX ADMIN — CEFTRIAXONE 2 G: 2 INJECTION, POWDER, FOR SOLUTION INTRAMUSCULAR; INTRAVENOUS at 09:00

## 2017-04-25 RX ADMIN — ALBUTEROL SULFATE 2.5 MG: 2.5 SOLUTION RESPIRATORY (INHALATION) at 22:49

## 2017-04-25 RX ADMIN — ALPRAZOLAM 0.25 MG: 0.25 TABLET ORAL at 22:33

## 2017-04-25 RX ADMIN — FLUTICASONE PROPIONATE AND SALMETEROL 1 PUFF: 100; 50 POWDER RESPIRATORY (INHALATION) at 22:50

## 2017-04-25 RX ADMIN — NIFEDIPINE 120 MG: 10 CAPSULE ORAL at 09:00

## 2017-04-25 RX ADMIN — INSULIN LISPRO 14 UNITS: 100 INJECTION, SOLUTION INTRAVENOUS; SUBCUTANEOUS at 22:33

## 2017-04-25 RX ADMIN — SERTRALINE HYDROCHLORIDE 100 MG: 50 TABLET ORAL at 17:41

## 2017-04-25 NOTE — PROGRESS NOTES
Rocio Machado [facility]    Admit Date: 4/23/2017    Subjective:     She seems to be breathing better. No more hemoptysis. No new complaints.       Current Facility-Administered Medications   Medication Dose Route Frequency    methylPREDNISolone (PF) (SOLU-MEDROL) injection 90 mg  90 mg IntraVENous Q8H    insulin NPH (NOVOLIN N, HUMULIN N) injection 20 Units  20 Units SubCUTAneous QHS    insulin lispro (HUMALOG) injection   SubCUTAneous AC&HS    albuterol (PROVENTIL VENTOLIN) nebulizer solution 2.5 mg  2.5 mg Nebulization QID RT    sodium chloride (NS) flush 5-10 mL  5-10 mL IntraVENous PRN    cefTRIAXone (ROCEPHIN) 2 g in 0.9% sodium chloride (MBP/ADV) 50 mL  2 g IntraVENous Q24H    ALPRAZolam (XANAX) tablet 0.25-0.5 mg  0.25-0.5 mg Oral DAILY PRN    dilTIAZem CD (CARDIZEM CD) capsule 120 mg  120 mg Oral DAILY    digoxin (LANOXIN) tablet 0.125 mg  0.125 mg Oral DAILY    insulin NPH (NOVOLIN N, HUMULIN N) injection 40 Units  40 Units SubCUTAneous 7am    montelukast (SINGULAIR) tablet 10 mg  10 mg Oral DAILY    potassium chloride SR (KLOR-CON 10) tablet 20 mEq  20 mEq Oral DAILY    sertraline (ZOLOFT) tablet 100 mg  100 mg Oral QPM    tiotropium bromide (SPIRIVA RESPIMAT) 1.25 mcg/ actuation  2 Puff Inhalation DAILY    sodium chloride (NS) flush 5-10 mL  5-10 mL IntraVENous Q8H    sodium chloride (NS) flush 5-10 mL  5-10 mL IntraVENous PRN    azithromycin (ZITHROMAX) 500 mg in 0.9% sodium chloride (MBP/ADV) 250 mL  500 mg IntraVENous Q24H    guaiFENesin ER (MUCINEX) tablet 600 mg  600 mg Oral Q12H    acetaminophen (TYLENOL) tablet 650 mg  650 mg Oral Q4H PRN    dextrose (D50W) injection syrg 12.5-25 g  12.5-25 g IntraVENous PRN    pantoprazole (PROTONIX) tablet 40 mg  40 mg Oral ACB    Warfarin dosing per pharmacy   Other Rx Dosing/Monitoring    glucose chewable tablet 16 g  4 Tab Oral PRN    dextrose (D50W) injection syrg 12.5-25 g  12.5-25 g IntraVENous PRN    glucagon (GLUCAGEN) injection 1 mg  1 mg IntraMUSCular PRN    furosemide (LASIX) injection 40 mg  40 mg IntraVENous DAILY    ondansetron (ZOFRAN) injection 4 mg  4 mg IntraVENous Q6H PRN    fluticasone-salmeterol (ADVAIR) 100mcg-50mcg/puff  1 Puff Inhalation BID RT          Objective:     Patient Vitals for the past 8 hrs:   BP Temp Pulse Resp SpO2 Weight   04/25/17 0633 - - - - - 294 lb 14.4 oz (133.8 kg)   04/25/17 0439 104/66 97.8 °F (36.6 °C) 80 24 96 % -   04/25/17 0029 126/75 - 87 - - -   04/25/17 0007 96/49 98.6 °F (37 °C) 90 18 94 % -        04/23 0701 - 04/24 1900  In: 550 [P.O.:300; I.V.:250]  Out: 500 [Urine:500]    Physical Exam: NAD. A&O. Neck -- Supple. No JVD. Heart -- Irr Irr (Rate OK). Lungs -- CTA (RLL sounds better). Abd -- Benign. Ext -- No significant LE edema, b/l.       Data Review   Recent Results (from the past 24 hour(s))   GLUCOSE, POC    Collection Time: 04/24/17  7:08 AM   Result Value Ref Range    Glucose (POC) 204 (H) 65 - 100 mg/dL    Performed by [address] [place] [address], POC    Collection Time: 04/24/17 11:43 AM   Result Value Ref Range    Glucose (POC) 311 (H) 65 - 100 mg/dL    Performed by [address] [place] [address], RESPIRATORY/SPUTUM/BRONCH Heather Bares STAIN    Collection Time: 04/24/17 12:27 PM   Result Value Ref Range    Special Requests: NO SPECIAL REQUESTS      GRAM STAIN 3+  WBCS SEEN        GRAM STAIN 2+  GRAM POSITIVE COCCI  IN CLUSTERS        GRAM STAIN 2+  APPARENT  GRAM NEGATIVE RODS        Culture result: PENDING    GLUCOSE, POC    Collection Time: 04/24/17  4:48 PM   Result Value Ref Range    Glucose (POC) 286 (H) 65 - 100 mg/dL    Performed by Henry Vivas    GLUCOSE, POC    Collection Time: 04/24/17  9:03 PM   Result Value Ref Range    Glucose (POC) 338 (H) 65 - 100 mg/dL    Performed by LUPE WHITEHEAD    CBC WITH AUTOMATED DIFF    Collection Time: 04/25/17  3:40 AM   Result Value Ref Range    WBC 20.9 (H) 3.6 - 11.0 K/uL    RBC 4.20 3.80 - 5.20 M/uL    HGB 12.0 11.5 - 16.0 g/dL    HCT 36.1 35.0 - 47.0 %    MCV 86.0 80.0 - 99.0 FL    MCH 28.6 26.0 - 34.0 PG    MCHC 33.2 30.0 - 36.5 g/dL    RDW 16.1 (H) 11.5 - 14.5 %    PLATELET 802 184 - 224 K/uL    NEUTROPHILS 92 (H) 32 - 75 %    BAND NEUTROPHILS 6 0 - 6 %    LYMPHOCYTES 1 (L) 12 - 49 %    MONOCYTES 1 (L) 5 - 13 %    EOSINOPHILS 0 0 - 7 %    BASOPHILS 0 0 - 1 %    ABS. NEUTROPHILS 20.5 (H) 1.8 - 8.0 K/UL    ABS. LYMPHOCYTES 0.2 (L) 0.8 - 3.5 K/UL    ABS. MONOCYTES 0.2 0.0 - 1.0 K/UL    ABS. EOSINOPHILS 0.0 0.0 - 0.4 K/UL    ABS. BASOPHILS 0.0 0.0 - 0.1 K/UL    DF MANUAL      RBC COMMENTS ANISOCYTOSIS  1+       METABOLIC PANEL, BASIC    Collection Time: 04/25/17  3:40 AM   Result Value Ref Range    Sodium 138 136 - 145 mmol/L    Potassium 4.0 3.5 - 5.1 mmol/L    Chloride 102 97 - 108 mmol/L    CO2 26 21 - 32 mmol/L    Anion gap 10 5 - 15 mmol/L    Glucose 340 (H) 65 - 100 mg/dL    BUN 32 (H) 6 - 20 MG/DL    Creatinine 1.41 (H) 0.55 - 1.02 MG/DL    BUN/Creatinine ratio 23 (H) 12 - 20      GFR est AA 44 (L) >60 ml/min/1.73m2    GFR est non-AA 37 (L) >60 ml/min/1.73m2    Calcium 9.2 8.5 - 10.1 MG/DL   PROTHROMBIN TIME + INR    Collection Time: 04/25/17  3:40 AM   Result Value Ref Range    INR 2.1 (H) 0.9 - 1.1      Prothrombin time 21.1 (H) 9.0 - 11.1 sec   GLUCOSE, POC    Collection Time: 04/25/17  6:16 AM   Result Value Ref Range    Glucose (POC) 340 (H) 65 - 100 mg/dL    Performed by Jean-Claude Lerner            Assessment:     Principal Problem:    Pneumonia (4/23/2017)      Active Problems:    Asthma exacerbation (1/21/2014)      Type 2 diabetes mellitus without complication ([facility] (8/93/7483)      Chronic atrial fibrillation (HCC) (7/17/2015)      DENNYS (acute kidney injury) -- Due to volume depletion, sepsis. Better.       Hemoptysis (4/24/2017)      1/4 GPC in clusters on blood cx -- Suspect contaminate, doubt significance. Plan:     1. Cont Rocephin & IV Zithromax. 2. Decrease IV Solu-medrol to 90 mg TID. Zulma Apley Dr. Lieutenant Amsler input. 4. She will eventually need f/u chest CT. 5. Resume bedtime NPH (was not ordered). Cont SSI. 6. Pharmacy dosing Coumadin.           Godwin Otero MD

## 2017-04-25 NOTE — PROGRESS NOTES
Respiratory Educator    Pneumonia, COPD, Asthma education provided to this 76yo female to review home respiratory care regimen with purpose to prevent exacerbation / pneumonia with goal to prevent readmission for the same. S: \"I'm breathing much better than when I came in but still having a had time\". Pt appears SOB simply lying ion bed. O/A: Pt awake in bed on RA with O2 Sats of 92%, HR: 88, RR: 18, BS: Diminished t/o. No wheezes, No rhonchi, No Rales. Cough is suppressed by pt. Pt states she suppresses her cough because it hurts and it is difficult to stop once she begins. Pt states she no longer has of blood in her sputum. Pts previous admissions:    2/9/2017 Resp. Failure,    9/26/2017 Asthma  Pts home medications per pt:   Advair, 1puff by inhalation, Q 12h   Spiriva, 1 capsule, by inhalation, Q day   Albuterol inhaler, 2puffs, QID PRN for wheezing   Albuterol Nebulizer, 2.5mg, QID PRN for SOB  Pt state she is also receiving Xolair    Discussion of identifying possible exacerbation / pneumonia trigering factors or events occurred. Pt states she feels that her home may have a role in her respiratory conditioning worsening over the last few years. Pt states she lived in [place] and the climate was dryer. States she did not have any respiratory problems as she does here in [place]. States her respiratory problems began when she moved to [place] and then to [place] to liver near her daughters. 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 States she uses HEPA filters in her [facility] and air purifiers. States that the filters become black in a short period of time. Also states he floor is concrete and when she steams her floor a black substance forms on the surface. Pt states she feels this may play in role in her worsening respiratory condition. Pt wants to find a way to determine if this presents a danger to her.     Pt stated she has not not provided this information to her physicians. P: COPD / Pneumonia packets provided  1. Advised pt. To communicate her home environmental concerns and suspicions to her Drs. Pt stated she would do so  2. Reviewed pts home respiratory medication schedule. Pt appears to be informed and active in her respiratory care at home. 3. Discussed possible avenues to have her environmental concerns checked  4. Discussed need to continue to adhere to physician's orders and to f/up with Drs as soon as possible after discharge. 5. Discussed the continued telephone COPD f/up calls.      Mason Romero, RRT  Respiratory Care  Pulmonary Disease case Manager

## 2017-04-25 NOTE — DIABETES MGMT
DTC Progress Note    Recommendations/ Comments: Chart reviewed due to hyperglycemia likely due to steroids. Pt has required 38 unit of correction insulin over the last 24 hours and blood sugars have been >300. Noted that 20 units of  NPH was added hs. Also note steroids are being weaned. . DTC to continue to follow. Chart reviewed on [address]. Patient is a 67 y.o. female with known diabetes on Jardiance 12.5 mg and Regular Insulin sliding scale at home. A1c:   Lab Results   Component Value Date/Time    Hemoglobin A1c 10.4 06/11/2015 03:57 PM       Recent Glucose Results:   Lab Results   Component Value Date/Time     (H) 04/25/2017 03:40 AM    GLUCPOC 448 (H) 04/25/2017 11:20 AM    GLUCPOC 340 (H) 04/25/2017 06:16 AM    GLUCPOC 338 (H) 04/24/2017 09:03 PM        Lab Results   Component Value Date/Time    Creatinine 1.41 04/25/2017 03:40 AM       Active Orders   Diet    DIET DIABETIC CONSISTENT CARB Regular        PO intake:   Patient Vitals for the past 72 hrs:   % Diet Eaten   04/25/17 0907 100 %   04/24/17 1228 100 %       Current hospital DM medication: NPH 40 units at breakfast and 20 units at bedtime and correction scale Humalog, resistant scale. Will continue to follow as needed.     Thank you  Blessing Steiner RD, CDE

## 2017-04-25 NOTE — PROGRESS NOTES
Bedside shift change report given to PADMINI Abad (oncoming nurse) by Karin He (offgoing nurse). Report included the following information SBAR, Kardex, Intake/Output, MAR and Recent Results.

## 2017-04-25 NOTE — PROGRESS NOTES
Care Management: patient resting in bed with oxygen on. Alert and oriented with concern about CPAP machine no connected. CPAP machine from home, need ES to check out system.  on [address] called and has ticket #. Patient using CPAP for naps and night time. Patient lives alone with daughter close by for shopping and transportation. Obtains medications from [facility] . Will follow for transition of care and any further needs. Care Management Interventions  PCP Verified by CM: Yes (next week visit for coumadin eval)  Last Visit to PCP: 04/14/17  Mode of Transport at Discharge: Other (see comment) (auto with daughter)  Transition of Care Consult (CM Consult):  Other (home / daughter lives near by)  [address]: No  Discharge Durable Medical Equipment: No  Physical Therapy Consult: Yes (seen by PT / read note)  Occupational Therapy Consult: No  Speech Therapy Consult: No  Current Support Network: Lives Alone (daughter near by)  Confirm Follow Up Transport: Other (see comment) (auto / daughter)  Plan discussed with Pt/Family/Caregiver: Yes  Discharge Location  Discharge Placement: [address] Barragan RN BSN CM

## 2017-04-25 NOTE — PROGRESS NOTES
Pharmacist Note - Warfarin Dosing  Consult provided for this 67 y. o.female to manage warfarin for Atrial Fibrillation    INR Goal: 2 - 3  Home regimen/ tablet size: 5mg on three days of the week, 2.5mg on all others (last dose confirmed as 4/22/2017)    Drugs that may increase INR: Macrolides  Drugs that may decrease INR: None  Other current anticoagulants/ drugs that may increase bleeding risk: NSAID  Risk factors: Age > 65  Daily INR ordered: YES  Recent Labs      04/25/17   0340  04/24/17   0130  04/23/17   0932   HGB  12.0  13.3  13.9   INR  2.1*  2.7*  3.0*     Date               INR                  Dose  4/23  3.0                   2.5 mg  4/24  2.7  2.5 mg  4/25  2.1  5 mg                                                                                 Assessment/ Plan: Will order warfarin 5 mg PO x 1 dose. INR dropping likely d/t to one missed dose. Pharmacy will continue to monitor daily and adjust therapy as indicated. Please contact the pharmacist at  for outpatient recommendations if needed.

## 2017-04-25 NOTE — PROGRESS NOTES
Pharmacist Note - Vancomycin Dosing    Consult provided for this 67 y.o. female for indication of S.aureus bacteremia. Antibiotic regimen(s): Vancomycin, Rocephin, and Azithromycin  - \"Hemoptysis secondary to RLL pneumonia with staph aureus in sputum and 1/4 blood cx staph aureus - clinically better on rocephin and azithromycin. Alison Velázquez Continue abx - add vancomycin pending sensitivity pattern on staph species\"    Recent Labs      17   0340  17   0130  17   0932   WBC  20.9*  25.7*  31.8*   CREA  1.41*  1.36*  1.75*   BUN  32*  29*  33*   Frequency of BMP: Daily  Height: 168.9 cm  Weight: 133.8 kg  Est CrCl: 34.1 ml/min (using IBW); UO: Not assessed  Temp (24hrs), Av.3 °F (36.8 °C), Min:97.8 °F (36.6 °C), Max:98.6 °F (37 °C)    Cultures:   - Blood - Probable S.aureus in 1/4 bottles - pending   - Sputum - Heavy apparent S.aureus and 2+ GNR - pending    Goal trough = 15 - 20 mcg/mL    Therapy will be initiated with a loading dose of 2000 mg IV x 1 to be followed by a maintenance dose of 1750 mg IV every 24 hours. Pharmacy to follow patient daily and order levels / make dose adjustments as appropriate.

## 2017-04-25 NOTE — ROUTINE PROCESS
Bedside and Verbal shift change report given to Corinne RN (oncoming nurse) by Leydi Renteria (offgoing nurse).  Report included the following information SBAR, Kardex, Intake/Output, MAR, Accordion and Recent Results. '

## 2017-04-25 NOTE — PROGRESS NOTES
Intensivist / Pulmonary / Critical Care  Assessment / Plan:      1. Hemoptysis secondary to RLL pneumonia with staph aureus in sputum and  blood cx staph aureus - clinically better on rocephin and azithromycin  2. CAP  3. Staph aureus bacteremia with staph aureus in sputum  4. Asthma - clinically controlled - normally on xolair, singulair, advair, spiriva and albuterol - Dr Poly Betancur  5. LITA on nocturnal cpap  6. Hyperglycemia on steroids    · Continue abx - add vancomycin pending sensitivity pattern on staph species  · Continue inhaled meds and singulair  · Agree with decreased steroids per PCP  · May need ID eval for duration of IV therapy for staph aureus bacteremia    D/w Dr Jet Hunt    History / Subjective:  Hospital Day: 3 - Interval history and notes reviewed   Says she is feeling better  No further hemoptysis  Afebrile. WBC trending down  Blood culture  Staph aureus  Sputum cx - staph aureus    Objective:  Patient Vitals for the past 4 hrs:   BP Temp Pulse Resp SpO2 Weight   17 0833 107/73 98.2 °F (36.8 °C) 94 20 92 % -   17 0633 - - - - - 133.8 kg (294 lb 14.4 oz)   Temp (24hrs), Av.3 °F (36.8 °C), Min:97.8 °F (36.6 °C), Max:98.6 °F (37 °C)    Intake/Output Summary (Last 24 hours) at 17 0921  Last data filed at 17 2369   Gross per 24 hour   Intake              740 ml   Output                0 ml   Net              740 ml     Lab Results   Component Value Date/Time    Glucose (POC) 340 2017 06:16 AM    Glucose (POC) 338 2017 09:03 PM    Glucose (POC) 286 2017 04:48 PM    Glucose (POC) 311 2017 11:43 AM    Glucose (POC) 204 2017 07:08 AM     Exam:  Up in chair on RA  No distress  No accessory use  Few rhonchi  Irreg  Soft  Warm and dry    Data:   Interval lab and diagnostic data was reviewed. Interval radiology images were independently viewed and available reports were reviewed.      Recent Labs      17   0340  17   0130 04/23/17   0932   WBC  20.9*  25.7*  31.8*   HGB  12.0  13.3  13.9   HCT  36.1  40.9  41.7   PLT  162  106*  227     Recent Labs      04/25/17   0340  04/24/17   0130  04/23/17   0932   NA  138  139  135*   K  4.0  3.9  3.3*   CL  102  105  99   CO2  26  26  27   GLU  340*  211*  301*   BUN  32*  29*  33*   CREA  1.41*  1.36*  1.75*   CA  9.2  8.0*  8.4*   ALB   --   2.7*  3.4*   SGOT   --   8*  8*   ALT   --   17 21   INR  2.1*  2.7*  3.0*       Recent Labs      04/24/17   0130   TROIQ  <0.04     All Micro Results     Procedure Component Value Units Date/Time    CULTURE, RESPIRATORY/SPUTUM/BRONCH Olivier Delude STAIN [[id number]]  (Abnormal) Collected:  04/24/17 1227    Order Status:  Completed Specimen:  Sputum from Sputum Updated:  04/25/17 0839     Special Requests: NO SPECIAL REQUESTS        GRAM STAIN 3+  WBCS SEEN         2+  GRAM POSITIVE COCCI  IN CLUSTERS         2+  APPARENT  GRAM NEGATIVE RODS        Culture result: HEAVY  PROBABLE  STAPHYLOCOCCUS AUREUS   (A)       MODERATE  NORMAL RESPIRATORY DEBBIE       CULTURE, BLOOD, PAIRED [[id number]]  (Abnormal) Collected:  04/23/17 0946    Order Status:  Completed Specimen:  Blood Updated:  04/25/17 0827     Special Requests: NO SPECIAL REQUESTS        Culture result:       PROBABLE  STAPHYLOCOCCUS AUREUS  GROWING IN 1 OF 4 BOTTLES DRAWN  SITE= [facility] AT 0931   (A)            PRELIMINARY REPORT OF  GRAM POSITIVE COCCI  IN CLUSTERS  GROWING IN 1 OF 4 BOTTLES DRAWN  CALLED TO AND READ BACK BY  PADMINI ATWOOD AT 7493 4/24/17 LAS   (A)              REMAINING BOTTLE(S) HAS/HAVE NO GROWTH SO FAR    LEGIONELLA PNEUMOPHILA AG, URINE  [[id number]]     Order Status:  Sent Specimen:  Urine from Urine     MYCOPLASMA AB, IGG/IGM [[id number]]     Order Status:  Sent Specimen:  Eri Olivares MD

## 2017-04-26 ENCOUNTER — APPOINTMENT (OUTPATIENT)
Dept: GENERAL RADIOLOGY | Age: 72
DRG: 871 | End: 2017-04-26
Attending: PHYSICIAN ASSISTANT
Payer: MEDICARE

## 2017-04-26 LAB
ANION GAP BLD CALC-SCNC: 6 MMOL/L (ref 5–15)
BUN SERPL-MCNC: 30 MG/DL (ref 6–20)
BUN/CREAT SERPL: 24 (ref 12–20)
CALCIUM SERPL-MCNC: 9.1 MG/DL (ref 8.5–10.1)
CHLORIDE SERPL-SCNC: 103 MMOL/L (ref 97–108)
CO2 SERPL-SCNC: 28 MMOL/L (ref 21–32)
CREAT SERPL-MCNC: 1.24 MG/DL (ref 0.55–1.02)
GLUCOSE BLD STRIP.AUTO-MCNC: 263 MG/DL (ref 65–100)
GLUCOSE BLD STRIP.AUTO-MCNC: 329 MG/DL (ref 65–100)
GLUCOSE BLD STRIP.AUTO-MCNC: 395 MG/DL (ref 65–100)
GLUCOSE BLD STRIP.AUTO-MCNC: 424 MG/DL (ref 65–100)
GLUCOSE SERPL-MCNC: 317 MG/DL (ref 65–100)
INR PPP: 2.5 (ref 0.9–1.1)
POTASSIUM SERPL-SCNC: 3.8 MMOL/L (ref 3.5–5.1)
PROTHROMBIN TIME: 25.7 SEC (ref 9–11.1)
SERVICE CMNT-IMP: ABNORMAL
SODIUM SERPL-SCNC: 137 MMOL/L (ref 136–145)

## 2017-04-26 PROCEDURE — 74011250637 HC RX REV CODE- 250/637: Performed by: INTERNAL MEDICINE

## 2017-04-26 PROCEDURE — 36415 COLL VENOUS BLD VENIPUNCTURE: CPT | Performed by: INTERNAL MEDICINE

## 2017-04-26 PROCEDURE — 71010 XR CHEST PORT: CPT

## 2017-04-26 PROCEDURE — 74011636637 HC RX REV CODE- 636/637: Performed by: INTERNAL MEDICINE

## 2017-04-26 PROCEDURE — 74011250636 HC RX REV CODE- 250/636: Performed by: INTERNAL MEDICINE

## 2017-04-26 PROCEDURE — 74011000258 HC RX REV CODE- 258: Performed by: INTERNAL MEDICINE

## 2017-04-26 PROCEDURE — 74011636637 HC RX REV CODE- 636/637: Performed by: FAMILY MEDICINE

## 2017-04-26 PROCEDURE — 74011000250 HC RX REV CODE- 250: Performed by: INTERNAL MEDICINE

## 2017-04-26 PROCEDURE — 82962 GLUCOSE BLOOD TEST: CPT

## 2017-04-26 PROCEDURE — 65660000000 HC RM CCU STEPDOWN

## 2017-04-26 PROCEDURE — 80048 BASIC METABOLIC PNL TOTAL CA: CPT | Performed by: INTERNAL MEDICINE

## 2017-04-26 PROCEDURE — 94640 AIRWAY INHALATION TREATMENT: CPT

## 2017-04-26 PROCEDURE — 77010033678 HC OXYGEN DAILY

## 2017-04-26 PROCEDURE — 85610 PROTHROMBIN TIME: CPT | Performed by: INTERNAL MEDICINE

## 2017-04-26 RX ORDER — ONDANSETRON 2 MG/ML
4 INJECTION INTRAMUSCULAR; INTRAVENOUS
Status: DISCONTINUED | OUTPATIENT
Start: 2017-04-26 | End: 2017-05-01 | Stop reason: HOSPADM

## 2017-04-26 RX ADMIN — METHYLPREDNISOLONE SODIUM SUCCINATE 90 MG: 125 INJECTION, POWDER, FOR SOLUTION INTRAMUSCULAR; INTRAVENOUS at 07:06

## 2017-04-26 RX ADMIN — GUAIFENESIN 600 MG: 600 TABLET, EXTENDED RELEASE ORAL at 09:50

## 2017-04-26 RX ADMIN — ONDANSETRON 4 MG: 2 INJECTION INTRAMUSCULAR; INTRAVENOUS at 18:37

## 2017-04-26 RX ADMIN — INSULIN LISPRO 14 UNITS: 100 INJECTION, SOLUTION INTRAVENOUS; SUBCUTANEOUS at 11:51

## 2017-04-26 RX ADMIN — CEFTRIAXONE 2 G: 2 INJECTION, POWDER, FOR SOLUTION INTRAMUSCULAR; INTRAVENOUS at 09:46

## 2017-04-26 RX ADMIN — GUAIFENESIN 600 MG: 600 TABLET, EXTENDED RELEASE ORAL at 22:01

## 2017-04-26 RX ADMIN — Medication 10 ML: at 22:02

## 2017-04-26 RX ADMIN — DIGOXIN 0.12 MG: 125 TABLET ORAL at 09:59

## 2017-04-26 RX ADMIN — WARFARIN SODIUM 3 MG: 2 TABLET ORAL at 17:18

## 2017-04-26 RX ADMIN — METHYLPREDNISOLONE SODIUM SUCCINATE 90 MG: 125 INJECTION, POWDER, FOR SOLUTION INTRAMUSCULAR; INTRAVENOUS at 15:27

## 2017-04-26 RX ADMIN — ALBUTEROL SULFATE 2.5 MG: 2.5 SOLUTION RESPIRATORY (INHALATION) at 13:29

## 2017-04-26 RX ADMIN — FLUTICASONE PROPIONATE AND SALMETEROL 1 PUFF: 100; 50 POWDER RESPIRATORY (INHALATION) at 20:37

## 2017-04-26 RX ADMIN — AZITHROMYCIN MONOHYDRATE 500 MG: 500 INJECTION, POWDER, LYOPHILIZED, FOR SOLUTION INTRAVENOUS at 13:12

## 2017-04-26 RX ADMIN — HUMAN INSULIN 20 UNITS: 100 INJECTION, SUSPENSION SUBCUTANEOUS at 22:01

## 2017-04-26 RX ADMIN — ONDANSETRON 4 MG: 2 INJECTION INTRAMUSCULAR; INTRAVENOUS at 22:19

## 2017-04-26 RX ADMIN — METHYLPREDNISOLONE SODIUM SUCCINATE 90 MG: 125 INJECTION, POWDER, FOR SOLUTION INTRAMUSCULAR; INTRAVENOUS at 21:59

## 2017-04-26 RX ADMIN — ALBUTEROL SULFATE 2.5 MG: 2.5 SOLUTION RESPIRATORY (INHALATION) at 16:09

## 2017-04-26 RX ADMIN — ALBUTEROL SULFATE 2.5 MG: 2.5 SOLUTION RESPIRATORY (INHALATION) at 10:40

## 2017-04-26 RX ADMIN — ALBUTEROL SULFATE 2.5 MG: 2.5 SOLUTION RESPIRATORY (INHALATION) at 20:39

## 2017-04-26 RX ADMIN — Medication 10 ML: at 15:29

## 2017-04-26 RX ADMIN — SERTRALINE HYDROCHLORIDE 100 MG: 50 TABLET ORAL at 18:02

## 2017-04-26 RX ADMIN — PANTOPRAZOLE SODIUM 40 MG: 40 TABLET, DELAYED RELEASE ORAL at 07:04

## 2017-04-26 RX ADMIN — FLUTICASONE PROPIONATE AND SALMETEROL 1 PUFF: 100; 50 POWDER RESPIRATORY (INHALATION) at 09:55

## 2017-04-26 RX ADMIN — HUMAN INSULIN 40 UNITS: 100 INJECTION, SUSPENSION SUBCUTANEOUS at 07:04

## 2017-04-26 RX ADMIN — FUROSEMIDE 40 MG: 10 INJECTION, SOLUTION INTRAMUSCULAR; INTRAVENOUS at 10:02

## 2017-04-26 RX ADMIN — INSULIN LISPRO 5 UNITS: 100 INJECTION, SOLUTION INTRAVENOUS; SUBCUTANEOUS at 22:01

## 2017-04-26 RX ADMIN — POTASSIUM CHLORIDE 20 MEQ: 750 TABLET, FILM COATED, EXTENDED RELEASE ORAL at 09:49

## 2017-04-26 RX ADMIN — INSULIN LISPRO 7 UNITS: 100 INJECTION, SOLUTION INTRAVENOUS; SUBCUTANEOUS at 07:05

## 2017-04-26 RX ADMIN — Medication 10 ML: at 18:37

## 2017-04-26 RX ADMIN — Medication 10 ML: at 07:06

## 2017-04-26 RX ADMIN — INSULIN LISPRO 12 UNITS: 100 INJECTION, SOLUTION INTRAVENOUS; SUBCUTANEOUS at 17:16

## 2017-04-26 RX ADMIN — VANCOMYCIN HYDROCHLORIDE 1750 MG: 10 INJECTION, POWDER, LYOPHILIZED, FOR SOLUTION INTRAVENOUS at 11:06

## 2017-04-26 RX ADMIN — NIFEDIPINE 120 MG: 10 CAPSULE ORAL at 09:50

## 2017-04-26 RX ADMIN — ACETAMINOPHEN 650 MG: 325 TABLET, FILM COATED ORAL at 07:12

## 2017-04-26 RX ADMIN — MONTELUKAST SODIUM 10 MG: 10 TABLET, FILM COATED ORAL at 09:50

## 2017-04-26 NOTE — ROUTINE PROCESS
Bedside shift change report given to Henrry Murguia RN (oncoming nurse) by Lynnette Larson RN (offgoing nurse). Report included the following information SBAR, Kardex, MAR, Accordion and Recent Results.

## 2017-04-26 NOTE — PROGRESS NOTES
Pt  at 5pm; spoke to Dr Abreu Members and rcvd order for 14units Humalog now and to modify scale 350-400 12u; 400-450 14u and greater than 450 call MD

## 2017-04-26 NOTE — PROGRESS NOTES
Pulmonary, Critical Care, and Sleep Medicine~Progress Note    Name: Helene Anne MRN: [id number]   : 1945 Hospital: [address]   Date: 2017 10:32 AM Admission: 2017     IMPRESSION:   · RLL PNA with staph aureus  · Submassive Hemoptysis  · Asthma; Xolair, bronchodilators, Singulair   · LITA, CPAP compliant  · DENNYS, improving    · Chronic afib       PLAN:   · Sputum cultures with pending sensitivities; ID consulted  · Reducing steroids as able  · Blood in sputum is still infrequent and mixed in with regular sputum. Continue to monitor   · O2 titration above 90%  · On lasix  · Bronchodilators   · Zithromax/rocephin/vanc  · Coumadin      Daily Progression:    Doing ok. But not back to baseline     OBJECTIVE:     Vital Signs:       Visit Vitals    /71 (BP 1 Location: Left arm, BP Patient Position: At rest;Sitting)    Pulse 90    Temp 99.8 °F (37.7 °C)    Resp 18    Ht 5' 6.5\" (1.689 m)    Wt 134 kg (295 lb 6.4 oz)    SpO2 96%    Breastfeeding No    BMI 46.96 kg/m2      Temp (24hrs), Av.8 °F (37.1 °C), Min:98 °F (36.7 °C), Max:99.8 °F (37.7 °C)     Intake/Output:     Last shift:      Last 3 shifts:  1901 -  0700  In: 2200 [P.O.:1600; I.V.:600]  Out: 1 [Urine:1]        Intake/Output Summary (Last 24 hours) at 17 1032  Last data filed at 17 2041   Gross per 24 hour   Intake             1700 ml   Output                1 ml   Net             1699 ml       Imaging:  I have personally reviewed these radiographic films and reports:  17 chest x-ray   \"IMPRESSION:     Increased small right pleural effusion and right basilar opacity representing infection/atelectasis. Follow-up to resolution is recommended. \"   I have personally reviewed PFTs:   n/a        Physical Exam:                                        Exam Findings Other   General: No resp distress noted, appears stated age HEENT:  No ulcers, JVD not elevated, no cervical LAD    Chest: No pectus deformity, normal chest rise b/l    HEART:  RRR, no murmurs/rubs/gallops    Lungs:  CTA b/l, no rhonchi/crackles/wheeze, diminished BS at bases    ABD: Soft/NT, non rigid mildly distended    EXT: No cyanosis/clubbing/edema, normal peripheral pulses    Skin: No rashes or ulcers, no mottling    Neuro: A/O x 3        Medications:  Current Facility-Administered Medications   Medication Dose Route Frequency    methylPREDNISolone (PF) (SOLU-MEDROL) injection 90 mg  90 mg IntraVENous Q8H    insulin NPH (NOVOLIN N, HUMULIN N) injection 20 Units  20 Units SubCUTAneous QHS    Vancomycin - Pharmacy to Dose   Other Rx Dosing/Monitoring    vancomycin (VANCOCIN) 1750 mg in  ml infusion  1,750 mg IntraVENous Q24H    insulin lispro (HUMALOG) injection   SubCUTAneous AC&HS    albuterol (PROVENTIL VENTOLIN) nebulizer solution 2.5 mg  2.5 mg Nebulization QID RT    sodium chloride (NS) flush 5-10 mL  5-10 mL IntraVENous PRN    cefTRIAXone (ROCEPHIN) 2 g in 0.9% sodium chloride (MBP/ADV) 50 mL  2 g IntraVENous Q24H    ALPRAZolam (XANAX) tablet 0.25-0.5 mg  0.25-0.5 mg Oral DAILY PRN    dilTIAZem CD (CARDIZEM CD) capsule 120 mg  120 mg Oral DAILY    digoxin (LANOXIN) tablet 0.125 mg  0.125 mg Oral DAILY    insulin NPH (NOVOLIN N, HUMULIN N) injection 40 Units  40 Units SubCUTAneous 7am    montelukast (SINGULAIR) tablet 10 mg  10 mg Oral DAILY    potassium chloride SR (KLOR-CON 10) tablet 20 mEq  20 mEq Oral DAILY    sertraline (ZOLOFT) tablet 100 mg  100 mg Oral QPM    tiotropium bromide (SPIRIVA RESPIMAT) 1.25 mcg/ actuation  2 Puff Inhalation DAILY    sodium chloride (NS) flush 5-10 mL  5-10 mL IntraVENous Q8H    sodium chloride (NS) flush 5-10 mL  5-10 mL IntraVENous PRN    azithromycin (ZITHROMAX) 500 mg in 0.9% sodium chloride (MBP/ADV) 250 mL  500 mg IntraVENous Q24H    guaiFENesin ER (MUCINEX) tablet 600 mg  600 mg Oral Q12H    acetaminophen (TYLENOL) tablet 650 mg  650 mg Oral Q4H PRN    dextrose (D50W) injection syrg 12.5-25 g  12.5-25 g IntraVENous PRN    pantoprazole (PROTONIX) tablet 40 mg  40 mg Oral ACB    Warfarin dosing per pharmacy   Other Rx Dosing/Monitoring    glucose chewable tablet 16 g  4 Tab Oral PRN    dextrose (D50W) injection syrg 12.5-25 g  12.5-25 g IntraVENous PRN    glucagon (GLUCAGEN) injection 1 mg  1 mg IntraMUSCular PRN    furosemide (LASIX) injection 40 mg  40 mg IntraVENous DAILY    ondansetron (ZOFRAN) injection 4 mg  4 mg IntraVENous Q6H PRN    fluticasone-salmeterol (ADVAIR) 100mcg-50mcg/puff  1 Puff Inhalation BID RT       Labs:  ABG No results for input(s): PHI, PCO2I, PO2I, HCO3I, SO2I, FIO2I in the last 72 hours.      CBC Recent Labs      04/25/17   0340  04/24/17   0130   WBC  20.9*  25.7*   HGB  12.0  13.3   HCT  36.1  40.9   PLT  162  106*   MCV  86.0  90.3   MCH  28.6  99.3        Metabolic  Panel Recent Labs      04/26/17   0338  04/25/17   0340  04/24/17   0130   NA  137  138  139   K  3.8  4.0  3.9   CL  103  102  105   CO2  28  26  26   GLU  317*  340*  211*   BUN  30*  32*  29*   CREA  1.24*  1.41*  1.36*   CA  9.1  9.2  8.0*   ALB   --    --   2.7*   SGOT   --    --   8*   ALT   --    --   17   INR  2.5*  2.1*  2.7*        Pertinent Labs                MAYTE Dorsey  4/26/2017

## 2017-04-26 NOTE — PROGRESS NOTES
Rocio Royal, [facility]    Admit Date: 4/23/2017    Subjective:     Pt does not feel as well today. She says she had a little more hemoptysis. ... Probable Staph aureus growing in 1/4 blood cx, and same in sputum. Sensitivities pending.         Current Facility-Administered Medications   Medication Dose Route Frequency    methylPREDNISolone (PF) (SOLU-MEDROL) injection 90 mg  90 mg IntraVENous Q8H    insulin NPH (NOVOLIN N, HUMULIN N) injection 20 Units  20 Units SubCUTAneous QHS    Vancomycin - Pharmacy to Dose   Other Rx Dosing/Monitoring    vancomycin (VANCOCIN) 1750 mg in  ml infusion  1,750 mg IntraVENous Q24H    insulin lispro (HUMALOG) injection   SubCUTAneous AC&HS    albuterol (PROVENTIL VENTOLIN) nebulizer solution 2.5 mg  2.5 mg Nebulization QID RT    sodium chloride (NS) flush 5-10 mL  5-10 mL IntraVENous PRN    cefTRIAXone (ROCEPHIN) 2 g in 0.9% sodium chloride (MBP/ADV) 50 mL  2 g IntraVENous Q24H    ALPRAZolam (XANAX) tablet 0.25-0.5 mg  0.25-0.5 mg Oral DAILY PRN    dilTIAZem CD (CARDIZEM CD) capsule 120 mg  120 mg Oral DAILY    digoxin (LANOXIN) tablet 0.125 mg  0.125 mg Oral DAILY    insulin NPH (NOVOLIN N, HUMULIN N) injection 40 Units  40 Units SubCUTAneous 7am    montelukast (SINGULAIR) tablet 10 mg  10 mg Oral DAILY    potassium chloride SR (KLOR-CON 10) tablet 20 mEq  20 mEq Oral DAILY    sertraline (ZOLOFT) tablet 100 mg  100 mg Oral QPM    tiotropium bromide (SPIRIVA RESPIMAT) 1.25 mcg/ actuation  2 Puff Inhalation DAILY    sodium chloride (NS) flush 5-10 mL  5-10 mL IntraVENous Q8H    sodium chloride (NS) flush 5-10 mL  5-10 mL IntraVENous PRN    azithromycin (ZITHROMAX) 500 mg in 0.9% sodium chloride (MBP/ADV) 250 mL  500 mg IntraVENous Q24H    guaiFENesin ER (MUCINEX) tablet 600 mg  600 mg Oral Q12H    acetaminophen (TYLENOL) tablet 650 mg  650 mg Oral Q4H PRN    dextrose (D50W) injection syrg 12.5-25 g  12.5-25 g IntraVENous PRN    pantoprazole (PROTONIX) tablet 40 mg  40 mg Oral ACB    Warfarin dosing per pharmacy   Other Rx Dosing/Monitoring    glucose chewable tablet 16 g  4 Tab Oral PRN    dextrose (D50W) injection syrg 12.5-25 g  12.5-25 g IntraVENous PRN    glucagon (GLUCAGEN) injection 1 mg  1 mg IntraMUSCular PRN    furosemide (LASIX) injection 40 mg  40 mg IntraVENous DAILY    ondansetron (ZOFRAN) injection 4 mg  4 mg IntraVENous Q6H PRN    fluticasone-salmeterol (ADVAIR) 100mcg-50mcg/puff  1 Puff Inhalation BID RT          Objective:     Patient Vitals for the past 8 hrs:   BP Temp Pulse Resp SpO2   04/26/17 0353 120/72 98 °F (36.7 °C) 85 16 94 %   04/25/17 2312 131/74 98.7 °F (37.1 °C) 85 16 93 %   04/25/17 2251 - - - - 95 %     04/25 1901 - 04/26 0700  In: 600 [I.V.:600]  Out: 1 [Urine:1]  04/24 0701 - 04/25 1900  In: 6209 [P.O.:1840]  Out: -     Physical Exam: NAD. A&O. Neck -- Supple. No JVD. Heart -- Irr Irr (Rate OK). Lungs -- Relatively CTA. Abd -- Benign. Ext -- No significant LE edema, b/l.       Data Review   Recent Results (from the past 24 hour(s))   GLUCOSE, POC    Collection Time: 04/25/17 11:20 AM   Result Value Ref Range    Glucose (POC) 448 (H) 65 - 100 mg/dL    Performed by Robinson Coronado    GLUCOSE, POC    Collection Time: 04/25/17  4:53 PM   Result Value Ref Range    Glucose (POC) 456 (H) 65 - 100 mg/dL    Performed by [facility] [address], POC    Collection Time: 04/25/17  9:12 PM   Result Value Ref Range    Glucose (POC) 418 (H) 65 - 100 mg/dL    Performed by WHITNEY MAN    PROTHROMBIN TIME + INR    Collection Time: 04/26/17  3:38 AM   Result Value Ref Range    INR 2.5 (H) 0.9 - 1.1      Prothrombin time 25.7 (H) 9.0 - 36.8 sec   METABOLIC PANEL, BASIC    Collection Time: 04/26/17  3:38 AM   Result Value Ref Range    Sodium 137 136 - 145 mmol/L    Potassium 3.8 3.5 - 5.1 mmol/L    Chloride 103 97 - 108 mmol/L    CO2 28 21 - 32 mmol/L    Anion gap 6 5 - 15 mmol/L    Glucose 317 (H) 65 - 100 mg/dL    BUN 30 (H) 6 - 20 MG/DL    Creatinine 1.24 (H) 0.55 - 1.02 MG/DL    BUN/Creatinine ratio 24 (H) 12 - 20      GFR est AA 51 (L) >60 ml/min/1.73m2    GFR est non-AA 42 (L) >60 ml/min/1.73m2    Calcium 9.1 8.5 - 10.1 MG/DL   GLUCOSE, POC    Collection Time: 04/26/17  6:22 AM   Result Value Ref Range    Glucose (POC) 263 (H) 65 - 100 mg/dL    Performed by Con Foyer            Assessment:     Principal Problem:    Pneumonia (4/23/2017)      Active Problems:    Asthma exacerbation (1/21/2014)      Type 2 diabetes mellitus without complication (HCC) (8/79/8155)      Chronic atrial fibrillation (HCC) (7/17/2015)      DENNYS (acute kidney injury) -- Due to volume depletion, sepsis. Better. Hemoptysis (4/24/2017)      1/4 probable staph aureus in blood cx with same in spurum. Plan:     1. Cont Rocephin & IV Zithromax. 2. Agree with addition of Vanc pending ID/sensitivities. 3. Will ask ID to see -- Further w/u? Duration of IV abx? 4. Continue IV Solu-medrol to 90 mg TID. 5. She will eventually need f/u chest CT. 6. Cont NPH. Cont SSI. 7. Pharmacy dosing Coumadin.           Akilah Escudero MD

## 2017-04-26 NOTE — CONSULTS
Infectious Disease Consult    Today's Date: 4/26/2017   Admit Date: 4/23/2017    Impression:   · Hemoptysis  · MSSA from sputum and 1/4 blood cultures  · Will need couple weeks IV therapy    Plan:   · Adjust meds based on cultures  · Will need PICC, etc  · Follow up blood cultures to be sent    Anti-infectives:     Inpat Anti-Infectives     Start     Ordered Stop    04/26/17 1000  vancomycin (VANCOCIN) 1750 mg in  ml infusion  1,750 mg,   IntraVENous,   EVERY 24 HOURS      04/25/17 0956 --    04/25/17 0927  Vancomycin - Pharmacy to Dose  Other,   RX DOSING/MONITORING      04/25/17 0927 --    04/24/17 1200  azithromycin (ZITHROMAX) 500 mg in 0.9% sodium chloride (MBP/ADV) 250 mL  500 mg,   IntraVENous,   EVERY 24 HOURS      04/23/17 1439 --    04/24/17 1000  cefTRIAXone (ROCEPHIN) 2 g in 0.9% sodium chloride (MBP/ADV) 50 mL  2 g,   IntraVENous,   EVERY 24 HOURS     Comments:  First dose Stat    04/23/17 1439 --          Subjective:   Date of Consultation:  April 26, 2017  Referring Physician: Dr Lieutenant Mckeon    Patient is a 67 y.o. female admitted with a fall and submassive hemoptysis. She has been found to have staph in sputum and blood. She is on broad antibiotic coverage and we are asked to see her in consultation. She has a long history of asthma and has been a smoker in the past, as well. Patient Active Problem List   Diagnosis Code    Hyperlipidemia E78.5    Depression F32.9    Neuropathy G62.9    Respiratory failure J96.90    Asthma exacerbation J45. 901    Anemia D64.9    Dizziness R42    Weakness R53.1    Morbid obesity ([facility]) E66.01    Type 2 diabetes mellitus without complication ([facility]) Q80.2    Cervical stenosis of spine M48.02    Hematoma of neck S10.93XA    Chronic atrial fibrillation ([facility]) I48.2    Encounter for long-term (current) drug use Z79.899    Microalbuminuria R80.9    Asthmatic bronchitis with acute exacerbation J45. 901    PNA (pneumonia) J18.9    Hypertension complicating diabetes [facility] E11.59, I10    Pneumonia J18.9    DENNYS (acute kidney injury) ([facility] N17.9    Hemoptysis R04.2     Past Medical History:   Diagnosis Date    Asthma     Atrial fibrillation ([facility]     Depression     DM (diabetes mellitus) ([facility]     HTN (hypertension)     Hyperlipidemia     Morbid obesity ([facility]     Neuropathy     LITA on CPAP     Other ill-defined conditions pneumonia      Family History   Problem Relation Age of Onset    Stroke Mother     Diabetes Other     Heart Disease Other     Heart Disease Father      congestive heart failure      Social History   Substance Use Topics    Smoking status: Former Smoker     Quit date: 1/1/1992    Smokeless tobacco: Not on file    Alcohol use 1.2 oz/week     2 Glasses of wine per week      Comment: occ wine     Past Surgical History:   Procedure Laterality Date    HX APPENDECTOMY      HX CHOLECYSTECTOMY      HX GASTRIC BYPASS      Jejunal bypass with subsequent reversal..  Lab Band since    HX OTHER SURGICAL  D & C    HX EMILIANO AND BSO      For uterine CA      Prior to Admission medications    Medication Sig Start Date End Date Taking? Authorizing Provider   potassium chloride SR (KLOR-CON 10) 10 mEq tablet Take 20 mEq by mouth daily. Yes Historical Provider   fluticasone-salmeterol (ADVAIR DISKUS) 100-50 mcg/dose diskus inhaler Take 1 Puff by inhalation every twelve (12) hours. Yes Historical Provider   digoxin (LANOXIN) 0.25 mg tablet Take 1 Tab by mouth daily.  3/29/17  Yes Kylie Pulido MD   sertraline (ZOLOFT) 100 mg tablet TAKE 1 TABLET BY MOUTH EVERY EVENING 3/13/17  Yes JOSHUA Pulido MD   gabapentin (NEURONTIN) 300 mg capsule TAKE ONE CAPSULE BY MOUTH THREE TIMES DAILY 3/13/17  Yes JOSHUA Pulido MD   predniSONE (DELTASONE) 10 mg tablet Take 6t every day for 4d, then 5t every day for 4d, then 4t QD for 4d, then 3t QD for 4d, then 2t QD for 4d, then 1t QD for 4d. 2/14/17  Yes Kylie Pulido MD empagliflozin (JARDIANCE) 25 mg tablet Take 12.5 mg by mouth daily. Yes Historical Provider   montelukast (SINGULAIR) 10 mg tablet Take 10 mg by mouth daily. Yes Historical Provider   naproxen sodium (NAPROSYN) 220 mg tablet Take 880 mg by mouth daily. Indications: OSTEOARTHRITIS   Yes Historical Provider   warfarin (COUMADIN) 5 mg tablet TAKE 1 TABLET BY MOUTH THREE TIMES A WEEK THEN TAKE 1/2 TABLET THE OTHER FOUR DAYS OF THE WEEK 11/16/16  Yes Tuyet Bailon MD   tiotropium (SPIRIVA WITH HANDIHALER) 18 mcg inhalation capsule Take 1 Cap by inhalation daily. Yes Historical Provider   HUMULIN N 100 unit/mL injection INJECT 35 UNITS UNDER THE SKIN IN THE MORNING AND INJECT 25 UNITS AT BEDTIME OR AS DIRECTED 10/9/16  Yes JOSHUA Bailon MD   therapeutic multivitamin [facility]) tablet Take 1 Tab by mouth daily. Yes Historical Provider   CARTIA  mg ER capsule TAKE 3 CAPSULES BY MOUTH DAILY 5/24/16  Yes JOSHUA Bailon MD   furosemide (LASIX) 40 mg tablet take 2 tablets by mouth every morning and take 1 tablet by mouth every evening 3/16/16  Yes Tuyet Bailon MD   insulin regular (NOVOLIN R, HUMULIN R) 100 unit/mL injection Sliding scale: For -300 use 4 units, 301-400 use 8 units, greater than 400 use 12 units, greater than 500 call MD. 8/14/15  Yes Tuyet Bailon MD   ALPRAZolam Cydne Chester) 0.5 mg tablet Take 0.25-0.5 mg by mouth daily as needed for Anxiety. Yes Historical Provider   cholecalciferol, vitamin D3, (VITAMIN D3) 2,000 unit tab Take 2,000 Units by mouth daily. Yes Historical Provider   DOCOSAHEXANOIC ACID/EPA (FISH OIL PO) Take 1 Cap by mouth daily. Yes Historical Provider   omalizumab [facility]) 150 mg solr 150 mg by SubCUTAneous route every fourteen (14) days. Next dose due 2/17    Historical Provider   diclofenac (VOLTAREN) 1 % gel Apply  to affected area four (4) times daily.  11/4/16   Tuyet Bailon MD   albuterol (PROVENTIL VENTOLIN) 2.5 mg /3 mL (0.083 %) nebulizer solution 3 mL by Nebulization route four (4) times daily as needed for Wheezing or Shortness of Breath. 10/8/16   Lila Grey MD   PROVENTIL HFA 90 mcg/actuation inhaler inhale 2 puffs four times a day if needed for wheezing 1/17/15   Heather Collier MD       Allergies   Allergen Reactions    Latex Itching    Codeine Rash    Lisinopril Other (comments)     Cough, vomiting, Visual disturbances    Morphine Itching    Statins-Hmg-Coa Reductase Inhibitors Other (comments)     Muscle enzyme problems        Review of Systems:  A comprehensive review of systems was negative except for: Constitutional: positive for fevers  Respiratory: positive for cough or hemoptysis    Objective:     Visit Vitals    /79 (BP 1 Location: Left arm, BP Patient Position: At rest)    Pulse 91    Temp 99 °F (37.2 °C)    Resp 18    Ht 5' 6.5\" (1.689 m)    Wt 134 kg (295 lb 6.4 oz)    SpO2 98%    Breastfeeding No    BMI 46.96 kg/m2     Temp (24hrs), Av.9 °F (37.2 °C), Min:98 °F (36.7 °C), Max:99.8 °F (37.7 °C)       Lines:  Peripheral IV:            Physical Exam:  Neck:  normal and no erythema or exudates noted. Lungs:  rales R base, L base  Heart:  regular rate and rhythm  Abdomen:  soft, non-tender.  Bowel sounds normal. No masses,  no organomegaly  Skin:  no rash or abnormalities    Data Review:     CBC:  Recent Labs      17   WBC  20.9*  25.7*   GRANS  92*  88*   MONOS  1*  3*   EOS  0  0   ANEU  20.5*  23.9*   ABL  0.2*  1.0   HGB  12.0  13.3   HCT  36.1  40.9   PLT  162  106*       BMP:  Recent Labs      17   0338  17   013   CREA  1.24*  1.41*  1.36*   BUN  30*  32*  29*   NA  137  138  139   K  3.8  4.0  3.9   CL  103  102  105   CO2  28  26  26   AGAP  6  10  8   GLU  317*  340*  211*       LFTS:  Recent Labs      17   0130   TBILI  0.6   ALT  17   SGOT  8*   AP  74   TP  6.1*   ALB  2.7* Microbiology:     All Micro Results     Procedure Component Value Units Date/Time    CULTURE, RESPIRATORY/SPUTUM/BRONCH Bina Wilburn STAIN [[id number]]  (Abnormal)  (Susceptibility) Collected:  04/24/17 1227    Order Status:  Completed Specimen:  Sputum from Sputum Updated:  04/26/17 1108     Special Requests: NO SPECIAL REQUESTS        GRAM STAIN 3+  WBCS SEEN         2+  GRAM POSITIVE COCCI  IN CLUSTERS         2+  APPARENT  GRAM NEGATIVE RODS        Culture result: HEAVY  STAPHYLOCOCCUS AUREUS   (A)       HEAVY  POSSIBLE  GRAM NEGATIVE RODS   (A)       HEAVY  NORMAL RESPIRATORY DEBBIE       CULTURE, BLOOD, PAIRED [[id number]]  (Abnormal)  (Susceptibility) Collected:  04/23/17 0946    Order Status:  Completed Specimen:  Blood Updated:  04/26/17 1034     Special Requests: NO SPECIAL REQUESTS        Culture result:       STAPHYLOCOCCUS AUREUS  GROWING IN 1 OF 4 BOTTLES DRAWN  (SITE= R FA AT 09:31)   (A)            PRELIMINARY REPORT OF  GRAM POSITIVE COCCI  IN CLUSTERS  GROWING IN 1 OF 4 BOTTLES DRAWN  CALLED TO AND READ BACK BY  PADMINI ATWOOD AT 9402 4/24/17 LAS   (A)              REMAINING BOTTLE(S) HAS/HAVE NO GROWTH SO FAR    LEGIONELLA PNEUMOPHILA AG, URINE  [[id number]]     Order Status:  Sent Specimen:  Urine from Urine     MYCOPLASMA AB, IGG/IGM [[id number]]     Order Status:  Sent Specimen:  Serum           Imaging:   Results noted    Signed By: Dottie Hankins MD     April 26, 2017

## 2017-04-26 NOTE — PROGRESS NOTES
Pharmacist Note - Warfarin Dosing  Consult provided for this 67 y. o.female to manage warfarin for Atrial Fibrillation    INR Goal: 2 - 3  Home regimen/ tablet size: 5mg on three days of the week, 2.5mg on all others (last dose confirmed as 4/22/2017)    Drugs that may increase INR: Macrolides  Drugs that may decrease INR: None  Other current anticoagulants/ drugs that may increase bleeding risk: NSAID  Risk factors: Age > 65  Daily INR ordered: YES  Recent Labs      04/26/17   0338  04/25/17   0340  04/24/17   0130   HGB   --   12.0  13.3   INR  2.5*  2.1*  2.7*     Date               INR                  Dose  4/23  3.0                   2.5 mg  4/24  2.7  2.5 mg  4/25  2.1  5 mg  4/26  2.5  3 mg                                                                                 Assessment/ Plan: Will order warfarin 3 mg PO x 1 dose. Pharmacy will continue to monitor daily and adjust therapy as indicated. Please contact the pharmacist at  for outpatient recommendations if needed.

## 2017-04-26 NOTE — PROGRESS NOTES
Call and spoke with Dr Miller Blank concerning clarification concerningf insulin dose for blood sugar result was 418. New order given to give humalog insulin sliding scale same for day and night for the following parameters.   350-400=12 units  400-450=14 units  Blood sugar greater than 450 call MD

## 2017-04-26 NOTE — DIABETES MGMT
DTC Progress Note    Recommendations/ Comments: Pt's chart reviewed due to hyperglycemia. Pt has required 49 units of correction insulin over the last 24 hours and blood sugars have been 263-456 mg/dl. If appropriate, please consider:  1. If patient is going to continue to Solumederol 90 mg three times per day- consider increasing NPH to 55 units in the morning and 35 units in the evening. 2. Adding meal time Humalog, 10 units at meals. Chart reviewed on [address] [facility]. Patient is a 67 y.o. female with known diabetes on Jardiance 12.5 mg and Regular Insulin sliding scale at home.     A1c:   Lab Results   Component Value Date/Time    Hemoglobin A1c 10.4 06/11/2015 03:57 PM       Recent Glucose Results: Lab Results   Component Value Date/Time     (H) 04/26/2017 03:38 AM    GLUCPOC 424 (H) 04/26/2017 11:35 AM    GLUCPOC 263 (H) 04/26/2017 06:22 AM    GLUCPOC 418 (H) 04/25/2017 09:12 PM        Lab Results   Component Value Date/Time    Creatinine 1.24 04/26/2017 03:38 AM       Active Orders   Diet    DIET DIABETIC CONSISTENT CARB Regular        PO intake: Patient Vitals for the past 72 hrs:   % Diet Eaten   04/25/17 1745 100 %   04/25/17 1437 100 %   04/25/17 0907 100 %   04/24/17 1228 100 %       Current hospital DM medication: NPH 40 units at breakfast and 20 units at bedtime and correction scale Humalog, resistant scale. Will continue to follow as needed.     Thank you  Nila Nolan, NABIL, CDE

## 2017-04-27 LAB
ANION GAP BLD CALC-SCNC: 8 MMOL/L (ref 5–15)
BASOPHILS # BLD AUTO: 0 K/UL (ref 0–0.1)
BASOPHILS # BLD: 0 % (ref 0–1)
BUN SERPL-MCNC: 32 MG/DL (ref 6–20)
BUN/CREAT SERPL: 26 (ref 12–20)
CALCIUM SERPL-MCNC: 9.3 MG/DL (ref 8.5–10.1)
CHLORIDE SERPL-SCNC: 105 MMOL/L (ref 97–108)
CO2 SERPL-SCNC: 26 MMOL/L (ref 21–32)
CREAT SERPL-MCNC: 1.25 MG/DL (ref 0.55–1.02)
DIFFERENTIAL METHOD BLD: ABNORMAL
EOSINOPHIL # BLD: 0 K/UL (ref 0–0.4)
EOSINOPHIL NFR BLD: 0 % (ref 0–7)
ERYTHROCYTE [DISTWIDTH] IN BLOOD BY AUTOMATED COUNT: 16.3 % (ref 11.5–14.5)
GLUCOSE BLD STRIP.AUTO-MCNC: 162 MG/DL (ref 65–100)
GLUCOSE BLD STRIP.AUTO-MCNC: 221 MG/DL (ref 65–100)
GLUCOSE BLD STRIP.AUTO-MCNC: 242 MG/DL (ref 65–100)
GLUCOSE BLD STRIP.AUTO-MCNC: 282 MG/DL (ref 65–100)
GLUCOSE SERPL-MCNC: 241 MG/DL (ref 65–100)
HCT VFR BLD AUTO: 38.9 % (ref 35–47)
HGB BLD-MCNC: 13 G/DL (ref 11.5–16)
INR PPP: 4.6 (ref 0.9–1.1)
LYMPHOCYTES # BLD AUTO: 6 % (ref 12–49)
LYMPHOCYTES # BLD: 0.8 K/UL (ref 0.8–3.5)
MCH RBC QN AUTO: 28.5 PG (ref 26–34)
MCHC RBC AUTO-ENTMCNC: 33.4 G/DL (ref 30–36.5)
MCV RBC AUTO: 85.3 FL (ref 80–99)
MONOCYTES # BLD: 0.5 K/UL (ref 0–1)
MONOCYTES NFR BLD AUTO: 4 % (ref 5–13)
NEUTS SEG # BLD: 11.4 K/UL (ref 1.8–8)
NEUTS SEG NFR BLD AUTO: 90 % (ref 32–75)
PLATELET # BLD AUTO: 201 K/UL (ref 150–400)
POTASSIUM SERPL-SCNC: 4.2 MMOL/L (ref 3.5–5.1)
PROTHROMBIN TIME: 47 SEC (ref 9–11.1)
RBC # BLD AUTO: 4.56 M/UL (ref 3.8–5.2)
RBC MORPH BLD: ABNORMAL
SERVICE CMNT-IMP: ABNORMAL
SODIUM SERPL-SCNC: 139 MMOL/L (ref 136–145)
WBC # BLD AUTO: 12.7 K/UL (ref 3.6–11)

## 2017-04-27 PROCEDURE — 74011636637 HC RX REV CODE- 636/637: Performed by: FAMILY MEDICINE

## 2017-04-27 PROCEDURE — 36415 COLL VENOUS BLD VENIPUNCTURE: CPT | Performed by: INTERNAL MEDICINE

## 2017-04-27 PROCEDURE — 74011250636 HC RX REV CODE- 250/636: Performed by: INTERNAL MEDICINE

## 2017-04-27 PROCEDURE — 80048 BASIC METABOLIC PNL TOTAL CA: CPT | Performed by: INTERNAL MEDICINE

## 2017-04-27 PROCEDURE — 94640 AIRWAY INHALATION TREATMENT: CPT

## 2017-04-27 PROCEDURE — 74011636637 HC RX REV CODE- 636/637: Performed by: INTERNAL MEDICINE

## 2017-04-27 PROCEDURE — 74011250637 HC RX REV CODE- 250/637: Performed by: INTERNAL MEDICINE

## 2017-04-27 PROCEDURE — 74011000258 HC RX REV CODE- 258: Performed by: INTERNAL MEDICINE

## 2017-04-27 PROCEDURE — 36569 INSJ PICC 5 YR+ W/O IMAGING: CPT | Performed by: INTERNAL MEDICINE

## 2017-04-27 PROCEDURE — 77030018846 HC SOL IRR STRL H20 ICUM -A

## 2017-04-27 PROCEDURE — 74011000250 HC RX REV CODE- 250: Performed by: INTERNAL MEDICINE

## 2017-04-27 PROCEDURE — 85025 COMPLETE CBC W/AUTO DIFF WBC: CPT | Performed by: INTERNAL MEDICINE

## 2017-04-27 PROCEDURE — 65660000000 HC RM CCU STEPDOWN

## 2017-04-27 PROCEDURE — 85610 PROTHROMBIN TIME: CPT | Performed by: INTERNAL MEDICINE

## 2017-04-27 PROCEDURE — 82962 GLUCOSE BLOOD TEST: CPT

## 2017-04-27 RX ORDER — PREDNISONE 20 MG/1
60 TABLET ORAL
Status: DISCONTINUED | OUTPATIENT
Start: 2017-04-27 | End: 2017-04-30

## 2017-04-27 RX ORDER — POLYETHYLENE GLYCOL 3350 17 G/17G
17 POWDER, FOR SOLUTION ORAL
Status: DISCONTINUED | OUTPATIENT
Start: 2017-04-27 | End: 2017-05-01 | Stop reason: HOSPADM

## 2017-04-27 RX ORDER — ALBUTEROL SULFATE 0.83 MG/ML
2.5 SOLUTION RESPIRATORY (INHALATION)
Status: DISCONTINUED | OUTPATIENT
Start: 2017-04-27 | End: 2017-04-28

## 2017-04-27 RX ADMIN — ALBUTEROL SULFATE 2.5 MG: 2.5 SOLUTION RESPIRATORY (INHALATION) at 09:55

## 2017-04-27 RX ADMIN — DIGOXIN 0.12 MG: 125 TABLET ORAL at 09:26

## 2017-04-27 RX ADMIN — CEFTRIAXONE 2 G: 2 INJECTION, POWDER, FOR SOLUTION INTRAMUSCULAR; INTRAVENOUS at 09:44

## 2017-04-27 RX ADMIN — FLUTICASONE PROPIONATE AND SALMETEROL 1 PUFF: 100; 50 POWDER RESPIRATORY (INHALATION) at 21:30

## 2017-04-27 RX ADMIN — HUMAN INSULIN 40 UNITS: 100 INJECTION, SUSPENSION SUBCUTANEOUS at 06:32

## 2017-04-27 RX ADMIN — INSULIN LISPRO 7 UNITS: 100 INJECTION, SOLUTION INTRAVENOUS; SUBCUTANEOUS at 12:26

## 2017-04-27 RX ADMIN — NIFEDIPINE 120 MG: 10 CAPSULE ORAL at 09:26

## 2017-04-27 RX ADMIN — PANTOPRAZOLE SODIUM 40 MG: 40 TABLET, DELAYED RELEASE ORAL at 06:32

## 2017-04-27 RX ADMIN — HUMAN INSULIN 10 UNITS: 100 INJECTION, SUSPENSION SUBCUTANEOUS at 09:19

## 2017-04-27 RX ADMIN — POTASSIUM CHLORIDE 20 MEQ: 750 TABLET, FILM COATED, EXTENDED RELEASE ORAL at 09:26

## 2017-04-27 RX ADMIN — FLUTICASONE PROPIONATE AND SALMETEROL 1 PUFF: 100; 50 POWDER RESPIRATORY (INHALATION) at 09:04

## 2017-04-27 RX ADMIN — GUAIFENESIN 600 MG: 600 TABLET, EXTENDED RELEASE ORAL at 09:38

## 2017-04-27 RX ADMIN — POLYETHYLENE GLYCOL 3350 17 G: 17 POWDER, FOR SOLUTION ORAL at 11:18

## 2017-04-27 RX ADMIN — METHYLPREDNISOLONE SODIUM SUCCINATE 90 MG: 125 INJECTION, POWDER, FOR SOLUTION INTRAMUSCULAR; INTRAVENOUS at 06:36

## 2017-04-27 RX ADMIN — ONDANSETRON 4 MG: 2 INJECTION INTRAMUSCULAR; INTRAVENOUS at 06:40

## 2017-04-27 RX ADMIN — SERTRALINE HYDROCHLORIDE 100 MG: 50 TABLET ORAL at 18:14

## 2017-04-27 RX ADMIN — INSULIN LISPRO 4 UNITS: 100 INJECTION, SOLUTION INTRAVENOUS; SUBCUTANEOUS at 17:15

## 2017-04-27 RX ADMIN — INSULIN LISPRO 4 UNITS: 100 INJECTION, SOLUTION INTRAVENOUS; SUBCUTANEOUS at 06:37

## 2017-04-27 RX ADMIN — Medication 10 ML: at 06:37

## 2017-04-27 RX ADMIN — FUROSEMIDE 40 MG: 10 INJECTION, SOLUTION INTRAMUSCULAR; INTRAVENOUS at 09:00

## 2017-04-27 RX ADMIN — ALBUTEROL SULFATE 2.5 MG: 2.5 SOLUTION RESPIRATORY (INHALATION) at 21:28

## 2017-04-27 RX ADMIN — MONTELUKAST SODIUM 10 MG: 10 TABLET, FILM COATED ORAL at 09:26

## 2017-04-27 RX ADMIN — VANCOMYCIN HYDROCHLORIDE 1750 MG: 10 INJECTION, POWDER, LYOPHILIZED, FOR SOLUTION INTRAVENOUS at 11:28

## 2017-04-27 RX ADMIN — PREDNISONE 60 MG: 20 TABLET ORAL at 09:25

## 2017-04-27 RX ADMIN — AZITHROMYCIN MONOHYDRATE 500 MG: 500 INJECTION, POWDER, LYOPHILIZED, FOR SOLUTION INTRAVENOUS at 14:23

## 2017-04-27 NOTE — PROGRESS NOTES
Chart reviewed. CM noted orders for [facility] for IV abx. CM met with pt to offer freedom of choice. Pt prefers to use [facility] and [facility] for infusion. CM sent referral to [facility] and [facility] via [employer]. Anticipate pt may discharge home tomorrow. Daughter will transport her home via car. CM following. CM spoke with Demar Cutler with [facility]. They are on delay for [facility] nursing until Saturday. CM will follow up with [facility] tomorrow to determine if they are able to accept this pt pending HH delays and discharge date.      ABILIO TraceyW/CRM

## 2017-04-27 NOTE — PROGRESS NOTES
Bedside shift change report given to Alvarado Lim RN (oncoming nurse) by Dannie Hook RN (offgoing nurse). Report included the following information SBAR, Kardex, Intake/Output and MAR.

## 2017-04-27 NOTE — PROGRESS NOTES
Pharmacist Note - Warfarin Dosing  Consult provided for this 67 y. o.female to manage warfarin for Atrial Fibrillation    INR Goal: 2 - 3  Home regimen/ tablet size: 5mg on three days of the week, 2.5mg on all others (last dose confirmed as 4/22/2017)    Drugs that may increase INR: Macrolides  Drugs that may decrease INR: None  Other current anticoagulants/ drugs that may increase bleeding risk: NSAID  Risk factors: Age > 65  Daily INR ordered: YES  Recent Labs      04/27/17   0648  04/26/17   0338  04/25/17   0340   HGB  13.0   --   12.0   INR  4.6*  2.5*  2.1*     Date               INR                  Dose  4/23  3.0                   2.5 mg  4/24  2.7  2.5 mg  4/25  2.1  5 mg  4/26  2.5  3 mg  4/27  4.6  HOLD                                                                                 Assessment/ Plan:   INR rise likely due to macrolide therapy, mild renal dysfunction. Given patient weight and home regimen would not have expected such a steep rise. Will HOLD warfarin Today. Pharmacy will continue to monitor daily and adjust therapy as indicated. Please contact the pharmacist at  for outpatient recommendations if needed.

## 2017-04-27 NOTE — PROGRESS NOTES
Have call in to MD regarding elevated pt/inr and picc placement    Spoke to Dr Erica Simpson regarding inr/pt; will hold coumadin today and will hold off on PICC placement until tomorrow  PICC team notified.

## 2017-04-27 NOTE — PROGRESS NOTES
Rocio Saab    Admit Date: 4/23/2017    Subjective:     Pt feeling somewhat better today. MSSA growing in 1/4 blood cx, and same in sputum.         Current Facility-Administered Medications   Medication Dose Route Frequency    predniSONE (DELTASONE) tablet 60 mg  60 mg Oral DAILY WITH BREAKFAST    ondansetron (ZOFRAN) injection 4 mg  4 mg IntraVENous Q4H PRN    insulin NPH (NOVOLIN N, HUMULIN N) injection 20 Units  20 Units SubCUTAneous QHS    Vancomycin - Pharmacy to Dose   Other Rx Dosing/Monitoring    vancomycin (VANCOCIN) 1750 mg in  ml infusion  1,750 mg IntraVENous Q24H    insulin lispro (HUMALOG) injection   SubCUTAneous AC&HS    albuterol (PROVENTIL VENTOLIN) nebulizer solution 2.5 mg  2.5 mg Nebulization QID RT    sodium chloride (NS) flush 5-10 mL  5-10 mL IntraVENous PRN    cefTRIAXone (ROCEPHIN) 2 g in 0.9% sodium chloride (MBP/ADV) 50 mL  2 g IntraVENous Q24H    ALPRAZolam (XANAX) tablet 0.25-0.5 mg  0.25-0.5 mg Oral DAILY PRN    dilTIAZem CD (CARDIZEM CD) capsule 120 mg  120 mg Oral DAILY    digoxin (LANOXIN) tablet 0.125 mg  0.125 mg Oral DAILY    insulin NPH (NOVOLIN N, HUMULIN N) injection 40 Units  40 Units SubCUTAneous 7am    montelukast (SINGULAIR) tablet 10 mg  10 mg Oral DAILY    potassium chloride SR (KLOR-CON 10) tablet 20 mEq  20 mEq Oral DAILY    sertraline (ZOLOFT) tablet 100 mg  100 mg Oral QPM    tiotropium bromide (SPIRIVA RESPIMAT) 1.25 mcg/ actuation  2 Puff Inhalation DAILY    sodium chloride (NS) flush 5-10 mL  5-10 mL IntraVENous Q8H    sodium chloride (NS) flush 5-10 mL  5-10 mL IntraVENous PRN    azithromycin (ZITHROMAX) 500 mg in 0.9% sodium chloride (MBP/ADV) 250 mL  500 mg IntraVENous Q24H    guaiFENesin ER (MUCINEX) tablet 600 mg  600 mg Oral Q12H    acetaminophen (TYLENOL) tablet 650 mg  650 mg Oral Q4H PRN    dextrose (D50W) injection syrg 12.5-25 g  12.5-25 g IntraVENous PRN    pantoprazole (PROTONIX) tablet 40 mg  40 mg Oral ACB    Warfarin dosing per pharmacy   Other Rx Dosing/Monitoring    glucose chewable tablet 16 g  4 Tab Oral PRN    dextrose (D50W) injection syrg 12.5-25 g  12.5-25 g IntraVENous PRN    glucagon (GLUCAGEN) injection 1 mg  1 mg IntraMUSCular PRN    furosemide (LASIX) injection 40 mg  40 mg IntraVENous DAILY    fluticasone-salmeterol (ADVAIR) 100mcg-50mcg/puff  1 Puff Inhalation BID RT          Objective:     Patient Vitals for the past 8 hrs:   BP Temp Pulse Resp SpO2 Weight   04/27/17 0631 - - - - - 299 lb 14.4 oz (136 kg)   04/27/17 0501 147/84 97.9 °F (36.6 °C) 73 18 90 % -   04/27/17 0057 147/80 98.2 °F (36.8 °C) 81 18 91 % -        04/25 1901 - 04/27 0700  In: 600 [I.V.:600]  Out: 1 [Urine:1]    Physical Exam: NAD. A&O. Neck -- Supple. No JVD. Heart -- Irr Irr (Rate OK). Lungs -- Relatively CTA. Abd -- Benign. Ext -- Trace LE edema, b/l.       Data Review   Recent Results (from the past 24 hour(s))   GLUCOSE, POC    Collection Time: 04/26/17 11:35 AM   Result Value Ref Range    Glucose (POC) 424 (H) 65 - 100 mg/dL    Performed by Rashad Roach    GLUCOSE, POC    Collection Time: 04/26/17  4:48 PM   Result Value Ref Range    Glucose (POC) 395 (H) 65 - 100 mg/dL    Performed by Marco A Valadez    GLUCOSE, POC    Collection Time: 04/26/17  9:36 PM   Result Value Ref Range    Glucose (POC) 329 (H) 65 - 100 mg/dL    Performed by Narciso Schumacher    GLUCOSE, POC    Collection Time: 04/27/17  6:28 AM   Result Value Ref Range    Glucose (POC) 242 (H) 65 - 100 mg/dL    Performed by Yanet Perry    CBC WITH AUTOMATED DIFF    Collection Time: 04/27/17  6:48 AM   Result Value Ref Range    WBC 12.7 (H) 3.6 - 11.0 K/uL    RBC 4.56 3.80 - 5.20 M/uL    HGB 13.0 11.5 - 16.0 g/dL    HCT 38.9 35.0 - 47.0 %    MCV 85.3 80.0 - 99.0 FL    MCH 28.5 26.0 - 34.0 PG MCHC 33.4 30.0 - 36.5 g/dL    RDW 16.3 (H) 11.5 - 14.5 %    PLATELET 609 769 - 946 K/uL    NEUTROPHILS PENDING %    LYMPHOCYTES PENDING %    MONOCYTES PENDING %    EOSINOPHILS PENDING %    BASOPHILS PENDING %    ABS. NEUTROPHILS PENDING K/UL    ABS. LYMPHOCYTES PENDING K/UL    ABS. MONOCYTES PENDING K/UL    ABS. EOSINOPHILS PENDING K/UL    ABS. BASOPHILS PENDING K/UL    DF PENDING    METABOLIC PANEL, BASIC    Collection Time: 04/27/17  6:48 AM   Result Value Ref Range    Sodium 139 136 - 145 mmol/L    Potassium 4.2 3.5 - 5.1 mmol/L    Chloride 105 97 - 108 mmol/L    CO2 26 21 - 32 mmol/L    Anion gap 8 5 - 15 mmol/L    Glucose 241 (H) 65 - 100 mg/dL    BUN 32 (H) 6 - 20 MG/DL    Creatinine 1.25 (H) 0.55 - 1.02 MG/DL    BUN/Creatinine ratio 26 (H) 12 - 20      GFR est AA 51 (L) >60 ml/min/1.73m2    GFR est non-AA 42 (L) >60 ml/min/1.73m2    Calcium 9.3 8.5 - 10.1 MG/DL           Assessment:     Principal Problem:    Pneumonia (4/23/2017)      Active Problems:    Asthma exacerbation (1/21/2014)      Type 2 diabetes mellitus without complication (HCC) (2/96/2791)      Chronic atrial fibrillation (HCC) (7/17/2015)      DENNYS (acute kidney injury) -- Due to volume depletion, sepsis. Better. Hemoptysis (4/24/2017)      1/4 probable staph aureus in blood cx with same in spurum. Plan:     1. Cont Rocephin & IV Zithromax. 2. Currently on Vanc. 3. ?what Abx to discharge pt home on, if she goes home tomorrow? 4. PICC line placement today. 5. Aim for discharge home tomorrow with [place] for IV abx.  6. Change steroids to Prednisone 60 mg PO every day. 7. She will eventually need f/u chest CT. 8. Increase morning NPH. 9. Pharmacy dosing Coumadin.           Prakash Drummond MD

## 2017-04-27 NOTE — PROGRESS NOTES
ID Progress Note  2017    Subjective:     Feeling much better today    Objective:     Antibiotics:  1. Vancomycin   2. Rocephin       Vitals:   Visit Vitals    /63    Pulse 81    Temp 98.4 °F (36.9 °C)    Resp 18    Ht 5' 6.5\" (1.689 m)    Wt 136 kg (299 lb 14.4 oz)    SpO2 95%    Breastfeeding No    BMI 47.68 kg/m2        Tmax:  Temp (24hrs), Av.3 °F (36.8 °C), Min:97.9 °F (36.6 °C), Max:98.6 °F (37 °C)      Exam:  Lungs:  clear to auscultation bilaterally  Heart:  regular rate and rhythm  Abdomen:  soft, non-tender. Bowel sounds normal. No masses,  no organomegaly    Labs:      Recent Labs      17   0648  17   0338  17   0340   WBC  12.7*   --   20.9*   HGB  13.0   --   12.0   PLT  201   --   162   BUN  32*  30*  32*   CREA  1.25*  1.24*  1.41*       Cultures:     No results found for: [facility]  Lab Results   Component Value Date/Time    Culture result: HEAVY  STAPHYLOCOCCUS AUREUS   2017 12:27 PM    Culture result: HEAVY  POSSIBLE  GRAM NEGATIVE RODS   2017 12:27 PM    Culture result: HEAVY  NORMAL RESPIRATORY DEBBIE   2017 12:27 PM       Radiology:     Line/Insert Date:           Assessment:     1. Staph pneumonia  2. Staph bacteremia    Objective:     1.  Continue current therapy--final antibiotic recs pending final culture results    Dottie Hankins MD

## 2017-04-27 NOTE — PROGRESS NOTES
Bedside and Verbal shift change report given to Gurdeep Walter (oncoming nurse) by Sterling Denney RN (offgoing nurse). Report included the following information SBAR, Kardex, ED Summary, Intake/Output, MAR and Recent Results.

## 2017-04-27 NOTE — DIABETES MGMT
DTC Progress Note    Recommendations/ Comments: Pt's chart reviewed due to hyperglycemia. Pt has required 38 units of correction insulin over the last 24 hours and blood sugars have been 263-424 mg/dl. If appropriate, please consider:  Steroids - was on Solu Medrol. This changes to Prednisone 60 mg daily today      If appropriate please consider  1. Increasing NPH to 45 units in the morning and 30 units in the evening. 2. Adding meal time Humalog, 10 units at meals. Chart reviewed on [address] [facility]. Patient is a 67 y.o. female with known diabetes on the following at home  Jardiance 12.5 mg,   NPH 35 units each AM and 25 units each PM  and   Regular Insulin sliding scale 200 - 300 - 4 units; 301 - 400 - 8 units; above 400 12 units     A1c:   Lab Results   Component Value Date/Time    Hemoglobin A1c 10.4 06/11/2015 03:57 PM       Recent Glucose Results:   Lab Results   Component Value Date/Time     (H) 04/27/2017 06:48 AM    GLUCPOC 282 (H) 04/27/2017 11:56 AM    GLUCPOC 242 (H) 04/27/2017 06:28 AM    GLUCPOC 329 (H) 04/26/2017 09:36 PM        Lab Results   Component Value Date/Time    Creatinine 1.25 04/27/2017 06:48 AM       Active Orders   Diet    DIET DIABETIC CONSISTENT CARB Regular        PO intake:   Patient Vitals for the past 72 hrs:   % Diet Eaten   04/25/17 1745 100 %   04/25/17 1437 100 %   04/25/17 0907 100 %       Current hospital DM medication: NPH 40 units at breakfast and 20 units at bedtime and correction scale Humalog, resistant scale. Will continue to follow as needed.     Thank you  Freedom Tirado RN, CDE

## 2017-04-27 NOTE — PROGRESS NOTES
Pulmonary, Critical Care, and Sleep Medicine~Progress Note    Name: Asher Ma MRN: [id number]   : 1945 [facility]   Date: 2017 10:32 AM Admission: 2017     IMPRESSION:   · RLL PNA with staph aureus  · Submassive Hemoptysis  · Asthma; Xolair, bronchodilators, Singulair   · LITA, CPAP compliant  · DENNYS, improving    · Chronic afib       PLAN:   · ID involved   · Reducing steroids as able  · Blood in sputum is still infrequent and mixed in with regular sputum. Continue to monitor   · O2 titration above 90%  · On lasix  · Bronchodilators   · Zithromax/rocephin/vanc  · Coumadin, INR up today    · We need follow up in 3-4 wks   · We will be available again to see if needed      Daily Progression:    Doing ok. No blood tinged sputum. Awaiting for Picc placement     OBJECTIVE:     Vital Signs:       Visit Vitals    /68 (BP 1 Location: Left arm, BP Patient Position: At rest;Sitting)    Pulse 92    Temp 98.6 °F (37 °C)    Resp 18    Ht 5' 6.5\" (1.689 m)    Wt 136 kg (299 lb 14.4 oz)    SpO2 98%    Breastfeeding No    BMI 47.68 kg/m2      Temp (24hrs), Av.4 °F (36.9 °C), Min:97.9 °F (36.6 °C), Max:99 °F (37.2 °C)     Intake/Output:     Last shift:      Last 3 shifts:  190 -  0700  In: 600 [I.V.:600]  Out: 1 [Urine:1]      No intake or output data in the 24 hours ending 17 1040    Imaging:  I have personally reviewed these radiographic films and reports:  17 chest x-ray   \"IMPRESSION:     Increased small right pleural effusion and right basilar opacity representing infection/atelectasis. Follow-up to resolution is recommended. \"   I have personally reviewed PFTs:   n/a        Physical Exam:                                        Exam Findings Other   General: No resp distress noted, appears stated age    [de-identified]:  No ulcers, JVD not elevated, no cervical LAD    Chest: No pectus deformity, normal chest rise b/l    HEART:  RRR, no murmurs/rubs/gallops    Lungs:  CTA b/l, no rhonchi/crackles/wheeze, diminished BS at bases    ABD: Soft/NT, non rigid mildly distended    EXT: No cyanosis/clubbing/edema, normal peripheral pulses    Skin: No rashes or ulcers, no mottling    Neuro: A/O x 3        Medications:  Current Facility-Administered Medications   Medication Dose Route Frequency    predniSONE (DELTASONE) tablet 60 mg  60 mg Oral DAILY WITH BREAKFAST    [START ON 4/28/2017] insulin NPH (NOVOLIN N, HUMULIN N) injection 50 Units  50 Units SubCUTAneous 7am    polyethylene glycol (MIRALAX) packet 17 g  17 g Oral BID PRN    Warfarin - HOLD dose Today, Thursday 4/27/2017   Other ONCE    ondansetron (ZOFRAN) injection 4 mg  4 mg IntraVENous Q4H PRN    insulin NPH (NOVOLIN N, HUMULIN N) injection 20 Units  20 Units SubCUTAneous QHS    Vancomycin - Pharmacy to Dose   Other Rx Dosing/Monitoring    vancomycin (VANCOCIN) 1750 mg in  ml infusion  1,750 mg IntraVENous Q24H    insulin lispro (HUMALOG) injection   SubCUTAneous AC&HS    albuterol (PROVENTIL VENTOLIN) nebulizer solution 2.5 mg  2.5 mg Nebulization QID RT    sodium chloride (NS) flush 5-10 mL  5-10 mL IntraVENous PRN    cefTRIAXone (ROCEPHIN) 2 g in 0.9% sodium chloride (MBP/ADV) 50 mL  2 g IntraVENous Q24H    ALPRAZolam (XANAX) tablet 0.25-0.5 mg  0.25-0.5 mg Oral DAILY PRN    dilTIAZem CD (CARDIZEM CD) capsule 120 mg  120 mg Oral DAILY    digoxin (LANOXIN) tablet 0.125 mg  0.125 mg Oral DAILY    montelukast (SINGULAIR) tablet 10 mg  10 mg Oral DAILY    potassium chloride SR (KLOR-CON 10) tablet 20 mEq  20 mEq Oral DAILY    sertraline (ZOLOFT) tablet 100 mg  100 mg Oral QPM    tiotropium bromide (SPIRIVA RESPIMAT) 1.25 mcg/ actuation  2 Puff Inhalation DAILY    sodium chloride (NS) flush 5-10 mL  5-10 mL IntraVENous Q8H    sodium chloride (NS) flush 5-10 mL  5-10 mL IntraVENous PRN    azithromycin (ZITHROMAX) 500 mg in 0.9% sodium chloride (MBP/ADV) 250 mL  500 mg IntraVENous Q24H    guaiFENesin ER (MUCINEX) tablet 600 mg  600 mg Oral Q12H    acetaminophen (TYLENOL) tablet 650 mg  650 mg Oral Q4H PRN    dextrose (D50W) injection syrg 12.5-25 g  12.5-25 g IntraVENous PRN    pantoprazole (PROTONIX) tablet 40 mg  40 mg Oral ACB    Warfarin dosing per pharmacy   Other Rx Dosing/Monitoring    glucose chewable tablet 16 g  4 Tab Oral PRN    dextrose (D50W) injection syrg 12.5-25 g  12.5-25 g IntraVENous PRN    glucagon (GLUCAGEN) injection 1 mg  1 mg IntraMUSCular PRN    furosemide (LASIX) injection 40 mg  40 mg IntraVENous DAILY    fluticasone-salmeterol (ADVAIR) 100mcg-50mcg/puff  1 Puff Inhalation BID RT       Labs:  ABG No results for input(s): PHI, PCO2I, PO2I, HCO3I, SO2I, FIO2I in the last 72 hours.      CBC Recent Labs      04/27/17   0648  04/25/17   0340   WBC  12.7*  20.9*   HGB  13.0  12.0   HCT  38.9  36.1   PLT  201  162   MCV  85.3  86.0   MCH  28.5  48.0        Metabolic  Panel Recent Labs      04/27/17   0648  04/26/17   0338  04/25/17   0340   NA  139  137  138   K  4.2  3.8  4.0   CL  105  103  102   CO2  26  28  26   GLU  241*  317*  340*   BUN  32*  30*  32*   CREA  1.25*  1.24*  1.41*   CA  9.3  9.1  9.2   INR  4.6*  2.5*  2.1*        Pertinent Labs                MAYTE Adkins  4/27/2017

## 2017-04-28 ENCOUNTER — HOME HEALTH ADMISSION (OUTPATIENT)
Dept: HOME HEALTH SERVICES | Facility: HOME HEALTH | Age: 72
End: 2017-04-28
Payer: MEDICARE

## 2017-04-28 LAB
ANION GAP BLD CALC-SCNC: 4 MMOL/L (ref 5–15)
BACTERIA SPEC CULT: ABNORMAL
BUN SERPL-MCNC: 35 MG/DL (ref 6–20)
BUN/CREAT SERPL: 29 (ref 12–20)
CALCIUM SERPL-MCNC: 8.8 MG/DL (ref 8.5–10.1)
CHLORIDE SERPL-SCNC: 107 MMOL/L (ref 97–108)
CO2 SERPL-SCNC: 29 MMOL/L (ref 21–32)
CREAT SERPL-MCNC: 1.19 MG/DL (ref 0.55–1.02)
GLUCOSE BLD STRIP.AUTO-MCNC: 174 MG/DL (ref 65–100)
GLUCOSE BLD STRIP.AUTO-MCNC: 180 MG/DL (ref 65–100)
GLUCOSE BLD STRIP.AUTO-MCNC: 189 MG/DL (ref 65–100)
GLUCOSE BLD STRIP.AUTO-MCNC: 189 MG/DL (ref 65–100)
GLUCOSE SERPL-MCNC: 213 MG/DL (ref 65–100)
INR PPP: 5.4 (ref 0.9–1.1)
POTASSIUM SERPL-SCNC: 4.3 MMOL/L (ref 3.5–5.1)
PROTHROMBIN TIME: 54.3 SEC (ref 9–11.1)
SERVICE CMNT-IMP: ABNORMAL
SODIUM SERPL-SCNC: 140 MMOL/L (ref 136–145)

## 2017-04-28 PROCEDURE — 74011000258 HC RX REV CODE- 258: Performed by: INTERNAL MEDICINE

## 2017-04-28 PROCEDURE — 85610 PROTHROMBIN TIME: CPT | Performed by: INTERNAL MEDICINE

## 2017-04-28 PROCEDURE — 74011636637 HC RX REV CODE- 636/637: Performed by: INTERNAL MEDICINE

## 2017-04-28 PROCEDURE — 77030018846 HC SOL IRR STRL H20 ICUM -A

## 2017-04-28 PROCEDURE — 74011250637 HC RX REV CODE- 250/637: Performed by: INTERNAL MEDICINE

## 2017-04-28 PROCEDURE — 80048 BASIC METABOLIC PNL TOTAL CA: CPT | Performed by: INTERNAL MEDICINE

## 2017-04-28 PROCEDURE — 65660000000 HC RM CCU STEPDOWN

## 2017-04-28 PROCEDURE — 74011000250 HC RX REV CODE- 250: Performed by: INTERNAL MEDICINE

## 2017-04-28 PROCEDURE — 74011250636 HC RX REV CODE- 250/636: Performed by: INTERNAL MEDICINE

## 2017-04-28 PROCEDURE — 36415 COLL VENOUS BLD VENIPUNCTURE: CPT | Performed by: INTERNAL MEDICINE

## 2017-04-28 PROCEDURE — 77030027138 HC INCENT SPIROMETER -A

## 2017-04-28 PROCEDURE — 94640 AIRWAY INHALATION TREATMENT: CPT

## 2017-04-28 PROCEDURE — 74011636637 HC RX REV CODE- 636/637: Performed by: FAMILY MEDICINE

## 2017-04-28 PROCEDURE — 82962 GLUCOSE BLOOD TEST: CPT

## 2017-04-28 RX ORDER — CEFAZOLIN SODIUM IN 0.9 % NACL 2 G/50 ML
2 INTRAVENOUS SOLUTION, PIGGYBACK (ML) INTRAVENOUS EVERY 8 HOURS
Status: DISCONTINUED | OUTPATIENT
Start: 2017-04-28 | End: 2017-05-01 | Stop reason: HOSPADM

## 2017-04-28 RX ORDER — ALBUTEROL SULFATE 0.83 MG/ML
2.5 SOLUTION RESPIRATORY (INHALATION)
Status: DISCONTINUED | OUTPATIENT
Start: 2017-04-28 | End: 2017-05-01 | Stop reason: HOSPADM

## 2017-04-28 RX ADMIN — CEFTRIAXONE 2 G: 2 INJECTION, POWDER, FOR SOLUTION INTRAMUSCULAR; INTRAVENOUS at 09:29

## 2017-04-28 RX ADMIN — ALBUTEROL SULFATE 2.5 MG: 2.5 SOLUTION RESPIRATORY (INHALATION) at 21:14

## 2017-04-28 RX ADMIN — Medication 10 ML: at 13:58

## 2017-04-28 RX ADMIN — MONTELUKAST SODIUM 10 MG: 10 TABLET, FILM COATED ORAL at 08:24

## 2017-04-28 RX ADMIN — VANCOMYCIN HYDROCHLORIDE 1750 MG: 10 INJECTION, POWDER, LYOPHILIZED, FOR SOLUTION INTRAVENOUS at 09:30

## 2017-04-28 RX ADMIN — DIGOXIN 0.12 MG: 125 TABLET ORAL at 08:24

## 2017-04-28 RX ADMIN — ACETAMINOPHEN 650 MG: 325 TABLET, FILM COATED ORAL at 14:06

## 2017-04-28 RX ADMIN — Medication 10 ML: at 08:41

## 2017-04-28 RX ADMIN — ALBUTEROL SULFATE 2.5 MG: 2.5 SOLUTION RESPIRATORY (INHALATION) at 14:05

## 2017-04-28 RX ADMIN — HUMAN INSULIN 50 UNITS: 100 INJECTION, SUSPENSION SUBCUTANEOUS at 08:40

## 2017-04-28 RX ADMIN — GUAIFENESIN 600 MG: 600 TABLET, EXTENDED RELEASE ORAL at 08:24

## 2017-04-28 RX ADMIN — Medication 10 ML: at 19:54

## 2017-04-28 RX ADMIN — CEFAZOLIN 2 G: 1 INJECTION, POWDER, FOR SOLUTION INTRAMUSCULAR; INTRAVENOUS; PARENTERAL at 17:36

## 2017-04-28 RX ADMIN — ALBUTEROL SULFATE 2.5 MG: 2.5 SOLUTION RESPIRATORY (INHALATION) at 10:11

## 2017-04-28 RX ADMIN — PREDNISONE 60 MG: 20 TABLET ORAL at 08:39

## 2017-04-28 RX ADMIN — INSULIN LISPRO 3 UNITS: 100 INJECTION, SOLUTION INTRAVENOUS; SUBCUTANEOUS at 13:58

## 2017-04-28 RX ADMIN — HUMAN INSULIN 20 UNITS: 100 INJECTION, SUSPENSION SUBCUTANEOUS at 01:22

## 2017-04-28 RX ADMIN — INSULIN LISPRO 3 UNITS: 100 INJECTION, SOLUTION INTRAVENOUS; SUBCUTANEOUS at 17:35

## 2017-04-28 RX ADMIN — POTASSIUM CHLORIDE 20 MEQ: 750 TABLET, FILM COATED, EXTENDED RELEASE ORAL at 08:25

## 2017-04-28 RX ADMIN — GUAIFENESIN 600 MG: 600 TABLET, EXTENDED RELEASE ORAL at 22:20

## 2017-04-28 RX ADMIN — FLUTICASONE PROPIONATE AND SALMETEROL 1 PUFF: 100; 50 POWDER RESPIRATORY (INHALATION) at 21:15

## 2017-04-28 RX ADMIN — FLUTICASONE PROPIONATE AND SALMETEROL 1 PUFF: 100; 50 POWDER RESPIRATORY (INHALATION) at 14:00

## 2017-04-28 RX ADMIN — HUMAN INSULIN 20 UNITS: 100 INJECTION, SUSPENSION SUBCUTANEOUS at 22:20

## 2017-04-28 RX ADMIN — ONDANSETRON 4 MG: 2 INJECTION INTRAMUSCULAR; INTRAVENOUS at 19:55

## 2017-04-28 RX ADMIN — PANTOPRAZOLE SODIUM 40 MG: 40 TABLET, DELAYED RELEASE ORAL at 08:39

## 2017-04-28 RX ADMIN — NIFEDIPINE 120 MG: 10 CAPSULE ORAL at 08:24

## 2017-04-28 RX ADMIN — SERTRALINE HYDROCHLORIDE 100 MG: 50 TABLET ORAL at 17:47

## 2017-04-28 RX ADMIN — GUAIFENESIN 600 MG: 600 TABLET, EXTENDED RELEASE ORAL at 01:21

## 2017-04-28 RX ADMIN — FUROSEMIDE 40 MG: 10 INJECTION, SOLUTION INTRAMUSCULAR; INTRAVENOUS at 08:24

## 2017-04-28 RX ADMIN — Medication 10 ML: at 19:55

## 2017-04-28 RX ADMIN — INSULIN LISPRO 3 UNITS: 100 INJECTION, SOLUTION INTRAVENOUS; SUBCUTANEOUS at 08:40

## 2017-04-28 NOTE — PROGRESS NOTES
Bedside and Verbal shift change report given to Esvin Marquez (oncoming nurse) by Steve Bright RN (offgoing nurse). Report included the following information SBAR, Kardex, ED Summary, Intake/Output, MAR and Recent Results.

## 2017-04-28 NOTE — PROGRESS NOTES
Bedside shift change report given to PADMINI High (oncoming nurse) by Wei Aguirre RN (offgoing nurse). Report included the following information SBAR, Kardex, Intake/Output and MAR.

## 2017-04-28 NOTE — PROGRESS NOTES
ID Progress Note  2017    Subjective:     Feeling much better today    Objective:     Antibiotics:  1. Vancomycin   2. Rocephin       Vitals:   Visit Vitals    /74 (BP 1 Location: Left arm, BP Patient Position: At rest)    Pulse 92    Temp 98.6 °F (37 °C)    Resp 19    Ht 5' 6.5\" (1.689 m)    Wt 136.2 kg (300 lb 3.2 oz)    SpO2 94%    Breastfeeding No    BMI 47.73 kg/m2        Tmax:  Temp (24hrs), Av.1 °F (36.7 °C), Min:97.4 °F (36.3 °C), Max:98.6 °F (37 °C)      Exam:  Lungs:  clear to auscultation bilaterally  Heart:  regular rate and rhythm  Abdomen:  soft, non-tender. Bowel sounds normal. No masses,  no organomegaly    Labs:      Recent Labs      17   0531  17   0648  17   0338   WBC   --   12.7*   --    HGB   --   13.0   --    PLT   --   201   --    BUN  35*  32*  30*   CREA  1.19*  1.25*  1.24*       Cultures:     No results found for: [facility]  Lab Results   Component Value Date/Time    Culture result: HEAVY  STAPHYLOCOCCUS AUREUS   2017 12:27 PM    Culture result: HEAVY  POSSIBLE  GRAM NEGATIVE RODS   2017 12:27 PM    Culture result: HEAVY  NORMAL RESPIRATORY DEBBIE   2017 12:27 PM       Radiology:     Line/Insert Date:           Assessment:     1. Staph pneumonia  2. Staph bacteremia    Objective:     1.  Change to cefazolin--will go home on this once arranged--orders written--OK for discharge from my standpoint    Gilma King MD

## 2017-04-28 NOTE — PROGRESS NOTES
Pharmacist Note - Warfarin Dosing  Consult provided for this 67 y. o.female to manage warfarin for Atrial Fibrillation    INR Goal: 2 - 3  Home regimen/ tablet size: 5mg on three days of the week, 2.5mg on all others (last dose confirmed as 4/22/2017)    Drugs that may increase INR: None  Drugs that may decrease INR: None  Other current anticoagulants/ drugs that may increase bleeding risk: NSAID  Risk factors: Age > 65  Daily INR ordered: YES  Recent Labs      04/28/17   0531  04/27/17   0648  04/26/17   0338   HGB   --   13.0   --    INR  5.4*  4.6*  2.5*     Date               INR                  Dose  4/23  3.0                   2.5 mg  4/24  2.7  2.5 mg  4/25  2.1  5 mg  4/26  2.5  3 mg  4/27  4.6  HOLD  4/28  5.4  HOLD                                                                                   Assessment/ Plan:   INR rise likely due to macrolide therapy (now discontinued) and mild renal dysfunction. Given patient weight and home regimen would not have expected such a steep rise. Will HOLD warfarin again Today. Pharmacy will continue to monitor daily and adjust therapy as indicated. Please contact the pharmacist at  for outpatient recommendations if needed.

## 2017-04-28 NOTE — PROGRESS NOTES
[facility]    Admit Date: 4/23/2017    Subjective:     No new c/o's. PICC not placed yesterday due to elev INR. Unfortunately, INR 5.4 this am....           Current Facility-Administered Medications   Medication Dose Route Frequency    predniSONE (DELTASONE) tablet 60 mg  60 mg Oral DAILY WITH BREAKFAST    insulin NPH (NOVOLIN N, HUMULIN N) injection 50 Units  50 Units SubCUTAneous 7am    polyethylene glycol (MIRALAX) packet 17 g  17 g Oral BID PRN    albuterol (PROVENTIL VENTOLIN) nebulizer solution 2.5 mg  2.5 mg Nebulization TID RT    ondansetron (ZOFRAN) injection 4 mg  4 mg IntraVENous Q4H PRN    insulin NPH (NOVOLIN N, HUMULIN N) injection 20 Units  20 Units SubCUTAneous QHS    Vancomycin - Pharmacy to Dose   Other Rx Dosing/Monitoring    vancomycin (VANCOCIN) 1750 mg in  ml infusion  1,750 mg IntraVENous Q24H    insulin lispro (HUMALOG) injection   SubCUTAneous AC&HS    sodium chloride (NS) flush 5-10 mL  5-10 mL IntraVENous PRN    cefTRIAXone (ROCEPHIN) 2 g in 0.9% sodium chloride (MBP/ADV) 50 mL  2 g IntraVENous Q24H    ALPRAZolam (XANAX) tablet 0.25-0.5 mg  0.25-0.5 mg Oral DAILY PRN    dilTIAZem CD (CARDIZEM CD) capsule 120 mg  120 mg Oral DAILY    digoxin (LANOXIN) tablet 0.125 mg  0.125 mg Oral DAILY    montelukast (SINGULAIR) tablet 10 mg  10 mg Oral DAILY    potassium chloride SR (KLOR-CON 10) tablet 20 mEq  20 mEq Oral DAILY    sertraline (ZOLOFT) tablet 100 mg  100 mg Oral QPM    tiotropium bromide (SPIRIVA RESPIMAT) 1.25 mcg/ actuation  2 Puff Inhalation DAILY    sodium chloride (NS) flush 5-10 mL  5-10 mL IntraVENous Q8H    sodium chloride (NS) flush 5-10 mL  5-10 mL IntraVENous PRN    guaiFENesin ER (MUCINEX) tablet 600 mg  600 mg Oral Q12H    acetaminophen (TYLENOL) tablet 650 mg  650 mg Oral Q4H PRN    dextrose (D50W) injection syrg 12.5-25 g  12.5-25 g IntraVENous PRN    pantoprazole (PROTONIX) tablet 40 mg  40 mg Oral ACB  Warfarin dosing per pharmacy   Other Rx Dosing/Monitoring    glucose chewable tablet 16 g  4 Tab Oral PRN    dextrose (D50W) injection syrg 12.5-25 g  12.5-25 g IntraVENous PRN    glucagon (GLUCAGEN) injection 1 mg  1 mg IntraMUSCular PRN    furosemide (LASIX) injection 40 mg  40 mg IntraVENous DAILY    fluticasone-salmeterol (ADVAIR) 100mcg-50mcg/puff  1 Puff Inhalation BID RT          Objective:     Patient Vitals for the past 8 hrs:   BP Temp Pulse Resp SpO2 Weight   04/28/17 0536 - - - - - 300 lb 3.2 oz (136.2 kg)   04/28/17 0526 117/53 97.4 °F (36.3 °C) 88 18 94 % -   04/28/17 0027 144/81 98.2 °F (36.8 °C) 77 16 96 % -             Physical Exam: NAD. A&O. Neck -- Supple. No JVD. Heart -- Irr Irr (Rate OK). Lungs -- Relatively CTA. Abd -- Benign. Ext -- Trace LE edema, b/l.       Data Review   Recent Results (from the past 24 hour(s))   GLUCOSE, POC    Collection Time: 04/27/17 11:56 AM   Result Value Ref Range    Glucose (POC) 282 (H) 65 - 100 mg/dL    Performed by Eligio Stapleton, POC    Collection Time: 04/27/17  5:05 PM   Result Value Ref Range    Glucose (POC) 221 (H) 65 - 100 mg/dL    Performed by Desiree Viramontes    GLUCOSE, POC    Collection Time: 04/27/17  9:36 PM   Result Value Ref Range    Glucose (POC) 162 (H) 65 - 100 mg/dL    Performed by Desiree Viramontes    PROTHROMBIN TIME + INR    Collection Time: 04/28/17  5:31 AM   Result Value Ref Range    INR 5.4 (HH) 0.9 - 1.1      Prothrombin time 54.3 (H) 9.0 - 51.9 sec   METABOLIC PANEL, BASIC    Collection Time: 04/28/17  5:31 AM   Result Value Ref Range    Sodium 140 136 - 145 mmol/L    Potassium 4.3 3.5 - 5.1 mmol/L    Chloride 107 97 - 108 mmol/L    CO2 29 21 - 32 mmol/L    Anion gap 4 (L) 5 - 15 mmol/L    Glucose 213 (H) 65 - 100 mg/dL    BUN 35 (H) 6 - 20 MG/DL    Creatinine 1.19 (H) 0.55 - 1.02 MG/DL    BUN/Creatinine ratio 29 (H) 12 - 20      GFR est AA 54 (L) >60 ml/min/1.73m2    GFR est non-AA 44 (L) >60 ml/min/1.73m2    Calcium 8.8 8.5 - 10.1 MG/DL   GLUCOSE, POC    Collection Time: 04/28/17  6:33 AM   Result Value Ref Range    Glucose (POC) 180 (H) 65 - 100 mg/dL    Performed by Farhana Swanson            Assessment:     Principal Problem:    Pneumonia (4/23/2017)      Active Problems:    Asthma exacerbation (1/21/2014)      Type 2 diabetes mellitus without complication ([facility] (8/63/7828)      Chronic atrial fibrillation ([facility] (7/17/2015)      DENNYS (acute kidney injury) -- Due to volume depletion, sepsis. Better. Hemoptysis (4/24/2017)      1/4 probable staph aureus in blood cx with same in spurum. Plan:     1. Cont Rocephin & IV Zithromax. 2. Currently on Vanc. 3. ?what Abx to discharge pt home on?  (Will need PICC first). 4. PICC line placement when able. ... INR up.  5. [facility].  6. Cont Prednisone 60 mg PO every day. 7. She will eventually need f/u chest CT. 8. Pharmacy dosing Coumadin.           Indy Sanchez MD

## 2017-04-28 NOTE — PROGRESS NOTES
Home IV Antibiotic Orders    1. Diagnosis:  MSSA pneumonia with bacteremia  2. Routine PICC care  3. Antibiotic:  Cefazolin 2 grams IV Q 8 hours  4. Lab each Monday:   CBC/diff/platelets   BMP  5. Lab each Thursday:   CBC/diff/platelets   BMP  6. Fax lab to Dr. Dilan Moralez @ [phone].  7.  Call Dr. Dilan Moralez @ [phone] for WBC under 4 or creatinine over 1.9  8. Duration of therapy: 2 weeks  9. Allergies: Latex   10.  Call [phone] with name of Willie Solano and to arrange follow up appointment in 10 days    Soraya Mitchell MD

## 2017-04-28 NOTE — PROGRESS NOTES
Chart reviewed. DEEPA spoke with [facility] with [facility]. They are willing to accept pt. DEEPA spoke with Otto De La O with [facility] ([phone]). They are following. Pt discussed in rounds. Anticipate pt may be here through the weekend. Care management will continue to follow for discharge planning.     Verlan Babinski, BSW/CRM

## 2017-04-28 NOTE — PROGRESS NOTES
Bedside shift change report given to Chad Gu (oncoming nurse) by Vaibhav Hutner (offgoing nurse). Report included the following information SBAR.

## 2017-04-29 LAB
GLUCOSE BLD STRIP.AUTO-MCNC: 116 MG/DL (ref 65–100)
GLUCOSE BLD STRIP.AUTO-MCNC: 183 MG/DL (ref 65–100)
GLUCOSE BLD STRIP.AUTO-MCNC: 201 MG/DL (ref 65–100)
GLUCOSE BLD STRIP.AUTO-MCNC: 64 MG/DL (ref 65–100)
GLUCOSE BLD STRIP.AUTO-MCNC: 68 MG/DL (ref 65–100)
GLUCOSE BLD STRIP.AUTO-MCNC: 98 MG/DL (ref 65–100)
INR PPP: 3.9 (ref 0.9–1.1)
PROTHROMBIN TIME: 39.5 SEC (ref 9–11.1)
SERVICE CMNT-IMP: ABNORMAL
SERVICE CMNT-IMP: NORMAL
SERVICE CMNT-IMP: NORMAL

## 2017-04-29 PROCEDURE — 74011250637 HC RX REV CODE- 250/637: Performed by: INTERNAL MEDICINE

## 2017-04-29 PROCEDURE — 82962 GLUCOSE BLOOD TEST: CPT

## 2017-04-29 PROCEDURE — 65660000000 HC RM CCU STEPDOWN

## 2017-04-29 PROCEDURE — 74011636637 HC RX REV CODE- 636/637: Performed by: INTERNAL MEDICINE

## 2017-04-29 PROCEDURE — 94640 AIRWAY INHALATION TREATMENT: CPT

## 2017-04-29 PROCEDURE — 74011250636 HC RX REV CODE- 250/636: Performed by: INTERNAL MEDICINE

## 2017-04-29 PROCEDURE — 74011636637 HC RX REV CODE- 636/637: Performed by: FAMILY MEDICINE

## 2017-04-29 PROCEDURE — 36415 COLL VENOUS BLD VENIPUNCTURE: CPT | Performed by: INTERNAL MEDICINE

## 2017-04-29 PROCEDURE — 85610 PROTHROMBIN TIME: CPT | Performed by: INTERNAL MEDICINE

## 2017-04-29 RX ADMIN — GUAIFENESIN 600 MG: 600 TABLET, EXTENDED RELEASE ORAL at 22:23

## 2017-04-29 RX ADMIN — PREDNISONE 60 MG: 20 TABLET ORAL at 07:10

## 2017-04-29 RX ADMIN — CEFAZOLIN 2 G: 1 INJECTION, POWDER, FOR SOLUTION INTRAMUSCULAR; INTRAVENOUS; PARENTERAL at 02:36

## 2017-04-29 RX ADMIN — PANTOPRAZOLE SODIUM 40 MG: 40 TABLET, DELAYED RELEASE ORAL at 07:10

## 2017-04-29 RX ADMIN — POLYETHYLENE GLYCOL 3350 17 G: 17 POWDER, FOR SOLUTION ORAL at 17:39

## 2017-04-29 RX ADMIN — SERTRALINE HYDROCHLORIDE 100 MG: 50 TABLET ORAL at 16:42

## 2017-04-29 RX ADMIN — FUROSEMIDE 40 MG: 10 INJECTION, SOLUTION INTRAMUSCULAR; INTRAVENOUS at 08:36

## 2017-04-29 RX ADMIN — HUMAN INSULIN 50 UNITS: 100 INJECTION, SUSPENSION SUBCUTANEOUS at 08:36

## 2017-04-29 RX ADMIN — FLUTICASONE PROPIONATE AND SALMETEROL 1 PUFF: 100; 50 POWDER RESPIRATORY (INHALATION) at 21:34

## 2017-04-29 RX ADMIN — GUAIFENESIN 600 MG: 600 TABLET, EXTENDED RELEASE ORAL at 08:49

## 2017-04-29 RX ADMIN — MONTELUKAST SODIUM 10 MG: 10 TABLET, FILM COATED ORAL at 08:35

## 2017-04-29 RX ADMIN — Medication 10 ML: at 07:12

## 2017-04-29 RX ADMIN — INSULIN LISPRO 4 UNITS: 100 INJECTION, SOLUTION INTRAVENOUS; SUBCUTANEOUS at 17:39

## 2017-04-29 RX ADMIN — HUMAN INSULIN 20 UNITS: 100 INJECTION, SUSPENSION SUBCUTANEOUS at 22:24

## 2017-04-29 RX ADMIN — ACETAMINOPHEN 650 MG: 325 TABLET, FILM COATED ORAL at 17:38

## 2017-04-29 RX ADMIN — CEFAZOLIN 2 G: 1 INJECTION, POWDER, FOR SOLUTION INTRAMUSCULAR; INTRAVENOUS; PARENTERAL at 17:39

## 2017-04-29 RX ADMIN — CEFAZOLIN 2 G: 1 INJECTION, POWDER, FOR SOLUTION INTRAMUSCULAR; INTRAVENOUS; PARENTERAL at 12:25

## 2017-04-29 RX ADMIN — FLUTICASONE PROPIONATE AND SALMETEROL 1 PUFF: 100; 50 POWDER RESPIRATORY (INHALATION) at 08:50

## 2017-04-29 RX ADMIN — Medication 10 ML: at 22:25

## 2017-04-29 RX ADMIN — Medication 10 ML: at 14:32

## 2017-04-29 RX ADMIN — DIGOXIN 0.12 MG: 125 TABLET ORAL at 08:35

## 2017-04-29 RX ADMIN — NIFEDIPINE 120 MG: 10 CAPSULE ORAL at 08:35

## 2017-04-29 RX ADMIN — POTASSIUM CHLORIDE 20 MEQ: 750 TABLET, FILM COATED, EXTENDED RELEASE ORAL at 08:34

## 2017-04-29 NOTE — PROGRESS NOTES
HYPOGLYCEMIC EPISODE DOCUMENTATION    Patient with hypoglycemic episode(s) at 0704(time) on 04/29(date). BG value(s) pre-treatment 59    Was patient symptomatic? [] yes, [x] no  Patient was treated with the following rescue medications/treatments: [] D50                [] Glucose tablets                [] Glucagon                [x] 4oz juice                [] 6oz reg soda                [] 8oz low fat milk  BG value post-treatment: 86 Paty Varela DOCUMENTATION    Patient with hypoglycemic episode(s) at 0718(time) on 4/29(date). BG value(s) pre-treatment 068    Was patient symptomatic? [] yes, [x] no  Patient was treated with the following rescue medications/treatments: [] D50                [] Glucose tablets                [] Glucagon                [x] 4oz juice                [] 6oz reg soda                [] 8oz low fat milk  BG value post-treatment: 98  Once BG treated and value greater than 80mg/dl, pt was provided with the following:  [] snack  [x] meal      Bedside shift change report given to Rikki Paul RN (oncoming nurse) by Juliana Middleton RN (offgoing nurse). Report included the following information SBAR, Intake/Output and MAR.

## 2017-04-29 NOTE — PROGRESS NOTES
Pharmacist Note - Warfarin Dosing  Consult provided for this 67 y. o.female to manage warfarin for Atrial Fibrillation    INR Goal: 2 - 3  Home regimen/ tablet size: 5mg on three days of the week, 2.5mg on all others (last dose confirmed as 4/22/2017)    Drugs that may increase INR: None  Drugs that may decrease INR: None  Other current anticoagulants/ drugs that may increase bleeding risk: NSAID  Risk factors: Age > 65  Daily INR ordered: YES  Recent Labs      04/29/17   0404  04/28/17   0531  04/27/17   0648   HGB   --    --   13.0   INR  3.9*  5.4*  4.6*     Date               INR                  Dose  4/23  3.0                   2.5 mg  4/24  2.7  2.5 mg  4/25  2.1  5 mg  4/26  2.5  3 mg  4/27  4.6  HOLD  4/28  5.4  HOLD  4/29                3.9                   HOLD                                                                                 Assessment/ Plan:  Warfarin hold x1 today    Pharmacy will continue to monitor daily and adjust therapy as indicated. Please contact the pharmacist at  for outpatient recommendations if needed.

## 2017-04-29 NOTE — PROGRESS NOTES
General Daily Progress Note    Plan:   Awaiting PICC by venous access team      Assessment:   INR 3.9 today    Principal Problem:    Pneumonia (4/23/2017)    Active Problems:    Type 2 diabetes mellitus without complication ([facility] (0/30/0893)      Asthma exacerbation (1/21/2014)      [facility] (7/17/2015)      [place] (acute kidney injury) ([facility] (4/23/2017)      Hemoptysis (4/24/2017)          4/29/2017    Admit Date: 4/23/2017    Subjective:   Anxious to go home where she lives alone with help from neighbors and daughter,  Reports on going hemoptysis with old dark blood      Objective:     Patient Vitals for the past 8 hrs:   BP Temp Pulse Resp SpO2 Weight   04/29/17 0716 - - - - - 304 lb 7.3 oz (138.1 kg)   04/29/17 0354 141/81 97.8 °F (36.6 °C) 65 16 95 % -   04/29/17 0116 141/84 97.8 °F (36.6 °C) 80 16 95 % -             Physical Exam:  Lungs -clear    Heart -a fib  Abdomen-obese  Extremities-    Data Review   Recent Results (from the past 24 hour(s))   GLUCOSE, POC    Collection Time: 04/28/17 11:29 AM   Result Value Ref Range    Glucose (POC) 189 (H) 65 - 100 mg/dL    Performed by Eligio , POC    Collection Time: 04/28/17  4:43 PM   Result Value Ref Range    Glucose (POC) 174 (H) 65 - 100 mg/dL    Performed by [id number], POC    Collection Time: 04/28/17  9:28 PM   Result Value Ref Range    Glucose (POC) 189 (H) 65 - 100 mg/dL    Performed by Kasia 50 + INR    Collection Time: 04/29/17  4:04 AM   Result Value Ref Range    INR 3.9 (H) 0.9 - 1.1      Prothrombin time 39.5 (H) 9.0 - 11.1 sec   GLUCOSE, POC    Collection Time: 04/29/17  7:04 AM   Result Value Ref Range    Glucose (POC) 64 (L) 65 - 100 mg/dL    Performed by [address] [place] [address], POC    Collection Time: 04/29/17  7:18 AM   Result Value Ref Range    Glucose (POC) 68 65 - 100 mg/dL    Performed by Francine 60:    No results found for: MADELYN Lab Results   Component Value Date/Time    Culture result: HEAVY  STAPHYLOCOCCUS AUREUS   04/24/2017 12:27 PM    Culture result: HEAVY  POSSIBLE  GRAM NEGATIVE RODS   04/24/2017 12:27 PM    Culture result: HEAVY  NORMAL RESPIRATORY DEBBIE   04/24/2017 12:27 PM                                                  JESSICA Dao

## 2017-04-30 ENCOUNTER — APPOINTMENT (OUTPATIENT)
Dept: GENERAL RADIOLOGY | Age: 72
DRG: 871 | End: 2017-04-30
Attending: INTERNAL MEDICINE
Payer: MEDICARE

## 2017-04-30 LAB
BACTERIA SPEC CULT: ABNORMAL
GLUCOSE BLD STRIP.AUTO-MCNC: 116 MG/DL (ref 65–100)
GLUCOSE BLD STRIP.AUTO-MCNC: 168 MG/DL (ref 65–100)
GLUCOSE BLD STRIP.AUTO-MCNC: 171 MG/DL (ref 65–100)
GRAM STN SPEC: ABNORMAL
INR PPP: 2.9 (ref 0.9–1.1)
PROTHROMBIN TIME: 30.3 SEC (ref 9–11.1)
SERVICE CMNT-IMP: ABNORMAL

## 2017-04-30 PROCEDURE — 76937 US GUIDE VASCULAR ACCESS: CPT

## 2017-04-30 PROCEDURE — 82962 GLUCOSE BLOOD TEST: CPT

## 2017-04-30 PROCEDURE — 74011250636 HC RX REV CODE- 250/636: Performed by: INTERNAL MEDICINE

## 2017-04-30 PROCEDURE — C1894 INTRO/SHEATH, NON-LASER: HCPCS

## 2017-04-30 PROCEDURE — 77030018719 HC DRSG PTCH ANTIMIC J&J -A

## 2017-04-30 PROCEDURE — 94640 AIRWAY INHALATION TREATMENT: CPT

## 2017-04-30 PROCEDURE — 74011250637 HC RX REV CODE- 250/637: Performed by: INTERNAL MEDICINE

## 2017-04-30 PROCEDURE — 74011636637 HC RX REV CODE- 636/637: Performed by: FAMILY MEDICINE

## 2017-04-30 PROCEDURE — 02HV33Z INSERTION OF INFUSION DEVICE INTO SUPERIOR VENA CAVA, PERCUTANEOUS APPROACH: ICD-10-PCS | Performed by: INTERNAL MEDICINE

## 2017-04-30 PROCEDURE — C1751 CATH, INF, PER/CENT/MIDLINE: HCPCS

## 2017-04-30 PROCEDURE — 77030020847 HC STATLOK BARD -A

## 2017-04-30 PROCEDURE — 74011636637 HC RX REV CODE- 636/637: Performed by: INTERNAL MEDICINE

## 2017-04-30 PROCEDURE — 36415 COLL VENOUS BLD VENIPUNCTURE: CPT | Performed by: INTERNAL MEDICINE

## 2017-04-30 PROCEDURE — 77030020365 HC SOL INJ SOD CL 0.9% 50ML

## 2017-04-30 PROCEDURE — 65660000000 HC RM CCU STEPDOWN

## 2017-04-30 PROCEDURE — 94664 DEMO&/EVAL PT USE INHALER: CPT

## 2017-04-30 PROCEDURE — 85610 PROTHROMBIN TIME: CPT | Performed by: INTERNAL MEDICINE

## 2017-04-30 PROCEDURE — 71010 XR CHEST PORT: CPT

## 2017-04-30 RX ORDER — SODIUM CHLORIDE 0.9 % (FLUSH) 0.9 %
10 SYRINGE (ML) INJECTION EVERY 24 HOURS
Status: DISCONTINUED | OUTPATIENT
Start: 2017-04-30 | End: 2017-05-01 | Stop reason: HOSPADM

## 2017-04-30 RX ORDER — SODIUM CHLORIDE 0.9 % (FLUSH) 0.9 %
10 SYRINGE (ML) INJECTION AS NEEDED
Status: DISCONTINUED | OUTPATIENT
Start: 2017-04-30 | End: 2017-05-01 | Stop reason: HOSPADM

## 2017-04-30 RX ORDER — SODIUM CHLORIDE 0.9 % (FLUSH) 0.9 %
10 SYRINGE (ML) INJECTION EVERY 8 HOURS
Status: DISCONTINUED | OUTPATIENT
Start: 2017-04-30 | End: 2017-05-01 | Stop reason: HOSPADM

## 2017-04-30 RX ORDER — SODIUM CHLORIDE 0.9 % (FLUSH) 0.9 %
20 SYRINGE (ML) INJECTION AS NEEDED
Status: DISCONTINUED | OUTPATIENT
Start: 2017-04-30 | End: 2017-05-01 | Stop reason: HOSPADM

## 2017-04-30 RX ORDER — FUROSEMIDE 40 MG/1
80 TABLET ORAL DAILY
Status: DISCONTINUED | OUTPATIENT
Start: 2017-05-01 | End: 2017-05-01 | Stop reason: HOSPADM

## 2017-04-30 RX ORDER — PREDNISONE 20 MG/1
40 TABLET ORAL
Status: DISCONTINUED | OUTPATIENT
Start: 2017-05-01 | End: 2017-05-01 | Stop reason: HOSPADM

## 2017-04-30 RX ORDER — BACITRACIN 500 UNIT/G
1 PACKET (EA) TOPICAL AS NEEDED
Status: DISCONTINUED | OUTPATIENT
Start: 2017-04-30 | End: 2017-05-01 | Stop reason: HOSPADM

## 2017-04-30 RX ADMIN — HUMAN INSULIN 50 UNITS: 100 INJECTION, SUSPENSION SUBCUTANEOUS at 07:05

## 2017-04-30 RX ADMIN — Medication 10 ML: at 07:07

## 2017-04-30 RX ADMIN — CEFAZOLIN 2 G: 1 INJECTION, POWDER, FOR SOLUTION INTRAMUSCULAR; INTRAVENOUS; PARENTERAL at 02:00

## 2017-04-30 RX ADMIN — SERTRALINE HYDROCHLORIDE 100 MG: 50 TABLET ORAL at 17:23

## 2017-04-30 RX ADMIN — ALPRAZOLAM 0.5 MG: 0.25 TABLET ORAL at 09:41

## 2017-04-30 RX ADMIN — CEFAZOLIN 2 G: 1 INJECTION, POWDER, FOR SOLUTION INTRAMUSCULAR; INTRAVENOUS; PARENTERAL at 17:23

## 2017-04-30 RX ADMIN — FLUTICASONE PROPIONATE AND SALMETEROL 1 PUFF: 100; 50 POWDER RESPIRATORY (INHALATION) at 08:45

## 2017-04-30 RX ADMIN — FLUTICASONE PROPIONATE AND SALMETEROL 1 PUFF: 100; 50 POWDER RESPIRATORY (INHALATION) at 19:50

## 2017-04-30 RX ADMIN — PANTOPRAZOLE SODIUM 40 MG: 40 TABLET, DELAYED RELEASE ORAL at 07:05

## 2017-04-30 RX ADMIN — NIFEDIPINE 120 MG: 10 CAPSULE ORAL at 09:00

## 2017-04-30 RX ADMIN — Medication 10 ML: at 15:00

## 2017-04-30 RX ADMIN — HUMAN INSULIN 20 UNITS: 100 INJECTION, SUSPENSION SUBCUTANEOUS at 22:54

## 2017-04-30 RX ADMIN — MONTELUKAST SODIUM 10 MG: 10 TABLET, FILM COATED ORAL at 09:00

## 2017-04-30 RX ADMIN — GUAIFENESIN 600 MG: 600 TABLET, EXTENDED RELEASE ORAL at 20:40

## 2017-04-30 RX ADMIN — Medication 10 ML: at 20:41

## 2017-04-30 RX ADMIN — GUAIFENESIN 600 MG: 600 TABLET, EXTENDED RELEASE ORAL at 09:00

## 2017-04-30 RX ADMIN — FUROSEMIDE 40 MG: 10 INJECTION, SOLUTION INTRAMUSCULAR; INTRAVENOUS at 08:59

## 2017-04-30 RX ADMIN — CEFAZOLIN 2 G: 1 INJECTION, POWDER, FOR SOLUTION INTRAMUSCULAR; INTRAVENOUS; PARENTERAL at 09:30

## 2017-04-30 RX ADMIN — DIGOXIN 0.12 MG: 125 TABLET ORAL at 08:59

## 2017-04-30 RX ADMIN — INSULIN LISPRO 3 UNITS: 100 INJECTION, SOLUTION INTRAVENOUS; SUBCUTANEOUS at 17:23

## 2017-04-30 RX ADMIN — PREDNISONE 60 MG: 20 TABLET ORAL at 07:05

## 2017-04-30 RX ADMIN — POTASSIUM CHLORIDE 20 MEQ: 750 TABLET, FILM COATED, EXTENDED RELEASE ORAL at 09:00

## 2017-04-30 NOTE — PROGRESS NOTES
Pharmacist Note - Warfarin Dosing  Consult provided for this 67 y. o.female to manage warfarin for Atrial Fibrillation    INR Goal: 2 - 3  Home regimen/ tablet size: 5mg on three days of the week, 2.5mg on all others (last dose confirmed as 4/22/2017)    Drugs that may increase INR: None  Drugs that may decrease INR: None  Other current anticoagulants/ drugs that may increase bleeding risk: NSAID  Risk factors: Age > 65  Daily INR ordered: YES  Recent Labs      04/30/17   0414  04/29/17   0404  04/28/17   0531   INR  2.9*  3.9*  5.4*     Date               INR                  Dose  4/23  3.0                   2.5 mg  4/24  2.7  2.5 mg  4/25  2.1  5 mg  4/26  2.5  3 mg  4/27  4.6  HOLD  4/28  5.4  HOLD  4/29                3.9                   HOLD  4/30                2.9                    Hold                                                                              Assessment/ Plan:  Warfarin hold x1 today. Patient is expected to get a PICC so will hold for one more day to have procedure done. Pharmacy will continue to monitor daily and adjust therapy as indicated. Please contact the pharmacist at  for outpatient recommendations if needed.

## 2017-04-30 NOTE — PROCEDURES
PICC Progress Note. Attempted x2 to place PICC line in the right upper as a second Vascular Acces Team member after JOSEFINA Armstrong Mai, RN was not successful. Case was aborted and decision was made to try the left upper arm. PRE-PROCEDURE VERIFICATION  Correct Procedure: yes  Correct Site:  yes  Temperature: Temp: 98 °F (36.7 °C), Temperature Source: Temp Source: Oral  Recent Labs      04/30/17   0414   04/28/17   0531   BUN   --    --   35*   CREA   --    --   1.19*   INR  2.9*   < >  5.4*    < > = values in this interval not displayed. Allergies: Latex; Codeine; Lisinopril; Morphine; and Statins-hmg-coa reductase inhibitors    PROCEDURE DETAIL   The following documentation is in addition to the PICC properties in the lines/airways flowsheet : The vein was accessed with a 21g introducer needle using ultrasound guidance and a guidewire was not easily thread.    Was xylocaine 1% used intradermally:  Yes x2  Vein Selection for PICC:right basilic  Central Line Bundle followed yes  Complication Related to Insertion: other: Able to acces right basioic vein x2 but resistance felt with advancing the guidewire each time

## 2017-04-30 NOTE — PROCEDURES
PICC Placement Note    PRE-PROCEDURE VERIFICATION  Correct Procedure: yes  Correct Site:  yes  Temperature: Temp: 98 °F (36.7 °C), Temperature Source: Temp Source: Oral  Recent Labs      04/30/17   0414   04/28/17   0531   BUN   --    --   35*   CREA   --    --   1.19*   INR  2.9*   < >  5.4*    < > = values in this interval not displayed. Allergies: Latex; Codeine; Lisinopril; Morphine; and Statins-hmg-coa reductase inhibitors  Education materials, including PICC Booklet, for PICC Care given to patient: yes. See Patient Education activity for further details. PROCEDURE DETAIL  A single lumen PICC line was started for antibiotic therapy. The following documentation is in addition to the PICC properties in the lines/airways flowsheet :  Lot #: [id number]  Was xylocaine 1% used intradermally:  yes  Catheter Length: 47 (cm)  Vein Selection for PICC:left basilic  Central Line Bundle followed yes  Complication Related to Insertion: Attempted right basilic vein. Had trouble accessing and then threading wire. Aborted attempt and placed on left side without difficulty. The placement was verified by X-ray:  The x-ray results state the tip location is on the left side and the tip is in the  superior vena cava. Line is okay to use. Report given to nurse.     Annie Paiz, RN, BSN, [facility]  Sharif Barajas

## 2017-04-30 NOTE — PROGRESS NOTES
0900 Paged MD for PICC line order clarification. [phone] Dr. Mcdonald Patient returned page and patient will be D/c'd after PICC line insertion. Notified PICC team.     [phone] Paged Case management for discharge planning and patient questions. [phone] Case Management returned page and will follow patient, contact [facility] for discharge planning. [phone] notified this RN that patient would not be seen today and would best be discharged in AM when [facility] could deliver and set up home Antibiotics with patient. Notified MD. Patient to receive PICC line and be D/c'd tomorrow. Notified Jagdeep with [facility] of this change at [phone].

## 2017-04-30 NOTE — PROGRESS NOTES
General Daily Progress Note    Plan:   Decrease prednisone 40mg qam  Change lasix 80mg po every day  Defer discharge until Monday as home delivery antibiotics not possible Sunday      Assessment:     Principal Problem:    Pneumonia (4/23/2017)    Active Problems:    Type 2 diabetes mellitus without complication ([facility] (3/27/4655)      Asthma exacerbation (1/21/2014)      Chronic atrial fibrillation ([facility] (7/17/2015)      [place] (acute kidney injury) ([facility] (4/23/2017)      Hemoptysis (4/24/2017)          4/30/2017    Admit Date: 4/23/2017    Subjective:    In good spirits anxious to go home      Objective:     Patient Vitals for the past 8 hrs:   BP Temp Pulse Resp SpO2 Weight   04/30/17 0845 - - - - 98 % -   04/30/17 0815 148/80 98 °F (36.7 °C) 69 16 98 % -   04/30/17 0729 - - - - - 306 lb 10.6 oz (139.1 kg)   04/30/17 0403 150/83 97.5 °F (36.4 °C) 65 16 97 % -     04/30 0701 - 04/30 1900  In: -   Out: 350 [Urine:350]  04/28 1901 - 04/30 0700  In: 240 [P.O.:240]  Out: -     Physical Exam:  Lungs -clear    Heart -a fib  Abdomen-  Extremities-    Data Review   Recent Results (from the past 24 hour(s))   GLUCOSE, POC    Collection Time: 04/29/17 11:14 AM   Result Value Ref Range    Glucose (POC) 116 (H) 65 - 100 mg/dL    Performed by [address] [place] [address] St, POC    Collection Time: 04/29/17  4:53 PM   Result Value Ref Range    Glucose (POC) 201 (H) 65 - 100 mg/dL    Performed by Libertad Hayden    GLUCOSE, POC    Collection Time: 04/29/17  9:03 PM   Result Value Ref Range    Glucose (POC) 183 (H) 65 - 100 mg/dL    Performed by Libertad Hayden    PROTHROMBIN TIME + INR    Collection Time: 04/30/17  4:14 AM   Result Value Ref Range    INR 2.9 (H) 0.9 - 1.1      Prothrombin time 30.3 (H) 9.0 - 11.1 sec   GLUCOSE, POC    Collection Time: 04/30/17  6:28 AM   Result Value Ref Range    Glucose (POC) 116 (H) 65 - 100 mg/dL    Performed by Francine 60:    No results found for: SDES Lab Results   Component Value Date/Time    Culture result: HEAVY  STAPHYLOCOCCUS AUREUS   04/24/2017 12:27 PM    Culture result: HEAVY  ALCALIGENES XYLOSOXIDANS   04/24/2017 12:27 PM    Culture result:  04/24/2017 12:27 PM     HEAVY  ALCALIGENES XYLOSOXIDANS   SECOND STRAIN/COLONY TYPE      Culture result: HEAVY  NORMAL RESPIRATORY DEBBIE   04/24/2017 12:27 PM                                                  JESSICA Diana

## 2017-04-30 NOTE — PROGRESS NOTES
Spiritual Care Partner Volunteer visited patient in [address] [place] [address] on 4/29/17. Documented by:  Mandeep Arteaga M.Div.    Paging Service [phone])

## 2017-04-30 NOTE — PROGRESS NOTES
Nani from IV access team called. Patient INR 2.9 is conducive to PICC placement today if the MD is agreeable for patient DC today. Requests if patient not to be DC'd today to place PICC in AM.  Message passed on to day shift RN in report. RN will discuss with MD.      Bedside shift change report given to Jonh Causey RN (oncoming nurse) by Dean Bentley RN (offgoing nurse). Report included the following information SBAR, Intake/Output and MAR.

## 2017-04-30 NOTE — ROUTINE PROCESS
Bedside and Verbal shift change report given to Kassie (oncoming nurse) by Rachael Rios (offgoing nurse). Report included the following information SBAR, Kardex, Intake/Output, MAR, Accordion and Recent Results. All opportunity for clarification/questions given.

## 2017-04-30 NOTE — PROGRESS NOTES
Cm contacted Jagdeep (on call nurse at [facility]) and informed him that the patient is being discharged this afternoon. Cm gave Melonie Burks the unit's information and staff nurse information to contact the patient to provide information. Luis also contacted [facility] and informed them of the discharge and [facility] this [facility].

## 2017-05-01 VITALS
HEART RATE: 95 BPM | RESPIRATION RATE: 16 BRPM | TEMPERATURE: 98 F | DIASTOLIC BLOOD PRESSURE: 68 MMHG | BODY MASS INDEX: 45.99 KG/M2 | WEIGHT: 293 LBS | HEIGHT: 67 IN | OXYGEN SATURATION: 95 % | SYSTOLIC BLOOD PRESSURE: 119 MMHG

## 2017-05-01 LAB
GLUCOSE BLD STRIP.AUTO-MCNC: 114 MG/DL (ref 65–100)
GLUCOSE BLD STRIP.AUTO-MCNC: 159 MG/DL (ref 65–100)
INR PPP: 2 (ref 0.9–1.1)
PROTHROMBIN TIME: 21 SEC (ref 9–11.1)
SERVICE CMNT-IMP: ABNORMAL
SERVICE CMNT-IMP: ABNORMAL

## 2017-05-01 PROCEDURE — 85610 PROTHROMBIN TIME: CPT | Performed by: INTERNAL MEDICINE

## 2017-05-01 PROCEDURE — 74011250636 HC RX REV CODE- 250/636: Performed by: INTERNAL MEDICINE

## 2017-05-01 PROCEDURE — 74011636637 HC RX REV CODE- 636/637: Performed by: INTERNAL MEDICINE

## 2017-05-01 PROCEDURE — 74011250637 HC RX REV CODE- 250/637: Performed by: INTERNAL MEDICINE

## 2017-05-01 PROCEDURE — 36415 COLL VENOUS BLD VENIPUNCTURE: CPT | Performed by: INTERNAL MEDICINE

## 2017-05-01 PROCEDURE — 82962 GLUCOSE BLOOD TEST: CPT

## 2017-05-01 PROCEDURE — 74011636637 HC RX REV CODE- 636/637: Performed by: FAMILY MEDICINE

## 2017-05-01 RX ORDER — WARFARIN SODIUM 5 MG/1
5 TABLET ORAL ONCE
Status: DISCONTINUED | OUTPATIENT
Start: 2017-05-01 | End: 2017-05-01 | Stop reason: HOSPADM

## 2017-05-01 RX ORDER — WARFARIN SODIUM 5 MG/1
TABLET ORAL
Qty: 30 TAB | Refills: 2 | Status: ON HOLD
Start: 2017-05-01 | End: 2017-05-22

## 2017-05-01 RX ORDER — PREDNISONE 10 MG/1
TABLET ORAL
Qty: 40 TAB | Refills: 0 | Status: SHIPPED | OUTPATIENT
Start: 2017-05-01 | End: 2017-07-10

## 2017-05-01 RX ORDER — FLUCONAZOLE 100 MG/1
150 TABLET ORAL
Status: COMPLETED | OUTPATIENT
Start: 2017-05-01 | End: 2017-05-01

## 2017-05-01 RX ADMIN — INSULIN LISPRO 3 UNITS: 100 INJECTION, SOLUTION INTRAVENOUS; SUBCUTANEOUS at 12:05

## 2017-05-01 RX ADMIN — DIGOXIN 0.12 MG: 125 TABLET ORAL at 08:00

## 2017-05-01 RX ADMIN — GUAIFENESIN 600 MG: 600 TABLET, EXTENDED RELEASE ORAL at 08:00

## 2017-05-01 RX ADMIN — FLUCONAZOLE 150 MG: 100 TABLET ORAL at 08:00

## 2017-05-01 RX ADMIN — Medication 10 ML: at 10:00

## 2017-05-01 RX ADMIN — PANTOPRAZOLE SODIUM 40 MG: 40 TABLET, DELAYED RELEASE ORAL at 07:30

## 2017-05-01 RX ADMIN — CEFAZOLIN 2 G: 1 INJECTION, POWDER, FOR SOLUTION INTRAMUSCULAR; INTRAVENOUS; PARENTERAL at 10:10

## 2017-05-01 RX ADMIN — FLUTICASONE PROPIONATE AND SALMETEROL 1 PUFF: 100; 50 POWDER RESPIRATORY (INHALATION) at 07:45

## 2017-05-01 RX ADMIN — NIFEDIPINE 120 MG: 10 CAPSULE ORAL at 08:00

## 2017-05-01 RX ADMIN — FUROSEMIDE 80 MG: 40 TABLET ORAL at 08:00

## 2017-05-01 RX ADMIN — POTASSIUM CHLORIDE 20 MEQ: 750 TABLET, FILM COATED, EXTENDED RELEASE ORAL at 08:00

## 2017-05-01 RX ADMIN — CEFAZOLIN 2 G: 1 INJECTION, POWDER, FOR SOLUTION INTRAMUSCULAR; INTRAVENOUS; PARENTERAL at 03:00

## 2017-05-01 RX ADMIN — MONTELUKAST SODIUM 10 MG: 10 TABLET, FILM COATED ORAL at 08:00

## 2017-05-01 RX ADMIN — HUMAN INSULIN 50 UNITS: 100 INJECTION, SUSPENSION SUBCUTANEOUS at 08:01

## 2017-05-01 RX ADMIN — PREDNISONE 40 MG: 20 TABLET ORAL at 07:17

## 2017-05-01 RX ADMIN — Medication 10 ML: at 04:00

## 2017-05-01 NOTE — DISCHARGE SUMMARY
Physician Discharge Summary     Patient ID:  Ayaka Johnson  [id number]  67 y.o.  1945    Admit date: 4/23/2017    Discharge date and time: 5/1/2017    Briefly, pt was admitted with Pneumonia. For details of admission, see H&P. Hospital Course:  Pt was placed on IV abx. She had some gross hemoptysis that is felt to be due to the PNA & elevated INR, etc.  Pulmonary followed pt in consultation. The hemoptysis resolved. 1/4 blood cx and sputum cx grew MSSA. ID saw pt, and she will need 2 weeks of IV Ancef -- ordered, to be administered by home health. Pt's breathing/wheezing improved with prednisone, and this will be tapered slowly. Discharge Dx: MSSE PNA with bacteremia. Condition at discharge: improved. Disposition: home    Patient Instructions:   Current Discharge Medication List      CONTINUE these medications which have CHANGED    Details   predniSONE (DELTASONE) 10 mg tablet Take 4t QD for 4d, then 3t QD for 4d, then 2t QD for 4d, then 1t QD for 4d. Qty: 40 Tab, Refills: 0      warfarin (COUMADIN) 5 mg tablet 1/2 tablet 5 days/week (Skip Monday & Friday). Qty: 30 Tab, Refills: 2         CONTINUE these medications which have NOT CHANGED    Details   potassium chloride SR (KLOR-CON 10) 10 mEq tablet Take 20 mEq by mouth daily. fluticasone-salmeterol (ADVAIR DISKUS) 100-50 mcg/dose diskus inhaler Take 1 Puff by inhalation every twelve (12) hours. digoxin (LANOXIN) 0.25 mg tablet Take 1 Tab by mouth daily. Qty: 30 Tab, Refills: 6    Associated Diagnoses: Chronic atrial fibrillation (HCC)      sertraline (ZOLOFT) 100 mg tablet TAKE 1 TABLET BY MOUTH EVERY EVENING  Qty: 90 Tab, Refills: 3      gabapentin (NEURONTIN) 300 mg capsule TAKE ONE CAPSULE BY MOUTH THREE TIMES DAILY  Qty: 90 Cap, Refills: 3      empagliflozin (JARDIANCE) 25 mg tablet Take 12.5 mg by mouth daily. montelukast (SINGULAIR) 10 mg tablet Take 10 mg by mouth daily.       tiotropium ([address] [place] [address]) 18 mcg inhalation capsule Take 1 Cap by inhalation daily. HUMULIN N 100 unit/mL injection INJECT 35 UNITS UNDER THE SKIN IN THE MORNING AND INJECT 25 UNITS AT BEDTIME OR AS DIRECTED  Qty: 30 mL, Refills: 11      therapeutic multivitamin (THERAGRAN) tablet Take 1 Tab by mouth daily. CARTIA  mg ER capsule TAKE 3 CAPSULES BY MOUTH DAILY  Qty: 90 Cap, Refills: 11      furosemide (LASIX) 40 mg tablet take 2 tablets by mouth every morning and take 1 tablet by mouth every evening  Qty: 270 Tab, Refills: 3      insulin regular (NOVOLIN R, HUMULIN R) 100 unit/mL injection Sliding scale: For -300 use 4 units, 301-400 use 8 units, greater than 400 use 12 units, greater than 500 call MD.  Bertram Osvaldo: 3 Vial, Refills: 11      ALPRAZolam (XANAX) 0.5 mg tablet Take 0.25-0.5 mg by mouth daily as needed for Anxiety. cholecalciferol, vitamin D3, (VITAMIN D3) 2,000 unit tab Take 2,000 Units by mouth daily. DOCOSAHEXANOIC ACID/EPA (FISH OIL PO) Take 1 Cap by mouth daily. omalizumab (XOLAIR) 150 mg solr 150 mg by SubCUTAneous route every fourteen (14) days. Next dose due 2/17      diclofenac (VOLTAREN) 1 % gel Apply  to affected area four (4) times daily. Qty: 1500 g, Refills: 6      albuterol (PROVENTIL VENTOLIN) 2.5 mg /3 mL (0.083 %) nebulizer solution 3 mL by Nebulization route four (4) times daily as needed for Wheezing or Shortness of Breath. Qty: 100 Each, Refills: 5      PROVENTIL HFA 90 mcg/actuation inhaler inhale 2 puffs four times a day if needed for wheezing  Qty: 3 Inhaler, Refills: 3         STOP taking these medications       naproxen sodium (NAPROSYN) 220 mg tablet Comments:   Reason for Stopping:         potassium chloride (K-DUR, KLOR-CON) 20 mEq tablet Comments:   Reason for Stopping:             Home health to administer IV Ancef per Dr. Ross Angeles' orders. Follow-up with Dr. Carlos Douglas in 2 weeks.       Signed:  Jacqueline Clifford MD  5/1/2017  7:42 AM

## 2017-05-01 NOTE — PROGRESS NOTES
Chart reviewed. CM noted discharge orders. CM sent IV abx orders to [facility] via [employer]. Awaiting response from [facility] to find out when they are able to send RN for IV teaching. DEEPA called Via Daryl Julian with [facility] and informed her that pt will discharge today. DEEPA met with pt to discuss discharge plan. Pt's daughter will be coming to transport her home today. Pt stated daughter lives close by and will be checking on her. DEEPA spoke with Kerry Glass with [facility]. She will be here at noon to do teaching.        Prince Garzon, BSW/CRM

## 2017-05-01 NOTE — PROGRESS NOTES
Pt improving and is anxious to go home. Home today with IV Ancef orders per Dr. Maria Dolores Mckinley' instructions. Slow Prednisone taper.

## 2017-05-01 NOTE — PROGRESS NOTES
Pharmacist Note - Warfarin Dosing  Consult provided for this 67 y. o.female to manage warfarin for Atrial Fibrillation    INR Goal: 2 - 3  Home regimen/ tablet size: 5mg on three days of the week, 2.5mg on all others (last dose confirmed as 4/22/2017)    Drugs that may increase INR: None  Drugs that may decrease INR: None  Other current anticoagulants/ drugs that may increase bleeding risk: sertraline  Risk factors: Age > 65  Daily INR ordered: YES  Recent Labs      05/01/17   0310  04/30/17   0414  04/29/17   0404   INR  2.0*  2.9*  3.9*     Date               INR                  Dose  4/23  3.0                   2.5 mg  4/24  2.7  2.5 mg  4/25  2.1  5 mg  4/26  2.5  3 mg  4/27  4.6  HOLD  4/28  5.4  HOLD  4/29                3.9                   HOLD  4/30                2.9                    Hold               5/1                  2.0                    5 mg                                                        Assessment/ Plan: Will order 5 mg PO x 1 (dose held yesterday for PICC placement). Pharmacy will continue to monitor daily and adjust therapy as indicated. Please contact the pharmacist at  for outpatient recommendations if needed.      Lili De La Torre, MannyD, BCPS

## 2017-05-01 NOTE — DISCHARGE INSTRUCTIONS
Patient Discharge Instructions    Annika Nita / [id number] : 1945    Admitted 2017 Discharged: 2017     Take Home Medications       · It is important that you take the medication exactly as they are prescribed. · Keep your medication in the bottles provided by the pharmacist and keep a list of the medication names, dosages, and times to be taken in your wallet. · Do not take other medications without consulting your doctor. What to do at Home    Recommended diet: Diabetic Diet. Recommended activity: Activity as tolerated. Follow-up with Dr. Sean Clinton in 2 weeks. Home health to check INR in 1 week. Home IV Antibiotic Orders     1. Diagnosis: MSSA pneumonia with bacteremia  2. Routine PICC care  3. Antibiotic: Cefazolin 2 grams IV Q 8 hours  4. Lab each Monday:  CBC/diff/platelets  BMP  5. Lab each Thursday:  CBC/diff/platelets  BMP  6. Fax lab to Dr. Ashanti Driver @ [phone].  7. Call Dr. Ashanti Driver @ [phone] for WBC under 4 or creatinine over 1.9  8. Duration of therapy: 2 weeks  9. Allergies: Latex   10. Call [phone] with name of [address] [place] [address] and to arrange follow up appointment in 10 days             Information obtained by :  I understand that if any problems occur once I am at home I am to contact my physician. I understand and acknowledge receipt of the instructions indicated above.                                                                                                                                            Physician's or R.N.'s Signature                                                                  Date/Time                                                                                                                                              Patient or Representative Signature                                                          Date/Time

## 2017-05-02 ENCOUNTER — HOME CARE VISIT (OUTPATIENT)
Dept: SCHEDULING | Facility: HOME HEALTH | Age: 72
End: 2017-05-02
Payer: MEDICARE

## 2017-05-02 PROCEDURE — G0299 HHS/HOSPICE OF RN EA 15 MIN: HCPCS

## 2017-05-02 PROCEDURE — 400013 HH SOC

## 2017-05-02 PROCEDURE — 3331090002 HH PPS REVENUE DEBIT

## 2017-05-02 PROCEDURE — 3331090001 HH PPS REVENUE CREDIT

## 2017-05-03 PROCEDURE — 3331090001 HH PPS REVENUE CREDIT

## 2017-05-03 PROCEDURE — 3331090002 HH PPS REVENUE DEBIT

## 2017-05-04 ENCOUNTER — HOME CARE VISIT (OUTPATIENT)
Dept: HOME HEALTH SERVICES | Facility: HOME HEALTH | Age: 72
End: 2017-05-04
Payer: MEDICARE

## 2017-05-04 PROCEDURE — 3331090001 HH PPS REVENUE CREDIT

## 2017-05-04 PROCEDURE — G0299 HHS/HOSPICE OF RN EA 15 MIN: HCPCS

## 2017-05-04 PROCEDURE — 3331090002 HH PPS REVENUE DEBIT

## 2017-05-05 PROCEDURE — 3331090001 HH PPS REVENUE CREDIT

## 2017-05-05 PROCEDURE — 3331090002 HH PPS REVENUE DEBIT

## 2017-05-06 PROCEDURE — 3331090002 HH PPS REVENUE DEBIT

## 2017-05-06 PROCEDURE — 3331090001 HH PPS REVENUE CREDIT

## 2017-05-07 VITALS
RESPIRATION RATE: 18 BRPM | HEART RATE: 84 BPM | SYSTOLIC BLOOD PRESSURE: 150 MMHG | OXYGEN SATURATION: 95 % | DIASTOLIC BLOOD PRESSURE: 80 MMHG | TEMPERATURE: 98.2 F

## 2017-05-07 PROCEDURE — 3331090002 HH PPS REVENUE DEBIT

## 2017-05-07 PROCEDURE — 3331090001 HH PPS REVENUE CREDIT

## 2017-05-08 PROCEDURE — 3331090001 HH PPS REVENUE CREDIT

## 2017-05-08 PROCEDURE — 3331090002 HH PPS REVENUE DEBIT

## 2017-05-09 ENCOUNTER — HOME CARE VISIT (OUTPATIENT)
Dept: SCHEDULING | Facility: HOME HEALTH | Age: 72
End: 2017-05-09
Payer: MEDICARE

## 2017-05-09 PROCEDURE — G0299 HHS/HOSPICE OF RN EA 15 MIN: HCPCS

## 2017-05-09 PROCEDURE — 3331090002 HH PPS REVENUE DEBIT

## 2017-05-09 PROCEDURE — 3331090001 HH PPS REVENUE CREDIT

## 2017-05-10 PROCEDURE — 3331090001 HH PPS REVENUE CREDIT

## 2017-05-10 PROCEDURE — 3331090002 HH PPS REVENUE DEBIT

## 2017-05-11 ENCOUNTER — HOME CARE VISIT (OUTPATIENT)
Dept: SCHEDULING | Facility: HOME HEALTH | Age: 72
End: 2017-05-11
Payer: MEDICARE

## 2017-05-11 PROCEDURE — G0300 HHS/HOSPICE OF LPN EA 15 MIN: HCPCS

## 2017-05-11 PROCEDURE — 3331090002 HH PPS REVENUE DEBIT

## 2017-05-11 PROCEDURE — 3331090001 HH PPS REVENUE CREDIT

## 2017-05-12 VITALS
SYSTOLIC BLOOD PRESSURE: 142 MMHG | TEMPERATURE: 99.4 F | OXYGEN SATURATION: 97 % | RESPIRATION RATE: 20 BRPM | DIASTOLIC BLOOD PRESSURE: 70 MMHG | HEART RATE: 100 BPM

## 2017-05-12 VITALS
RESPIRATION RATE: 18 BRPM | DIASTOLIC BLOOD PRESSURE: 80 MMHG | SYSTOLIC BLOOD PRESSURE: 128 MMHG | OXYGEN SATURATION: 96 % | HEART RATE: 90 BPM | TEMPERATURE: 98.3 F

## 2017-05-12 PROCEDURE — 3331090002 HH PPS REVENUE DEBIT

## 2017-05-12 PROCEDURE — 3331090001 HH PPS REVENUE CREDIT

## 2017-05-13 PROCEDURE — 3331090002 HH PPS REVENUE DEBIT

## 2017-05-13 PROCEDURE — 3331090001 HH PPS REVENUE CREDIT

## 2017-05-14 PROCEDURE — 3331090001 HH PPS REVENUE CREDIT

## 2017-05-14 PROCEDURE — 3331090002 HH PPS REVENUE DEBIT

## 2017-05-15 ENCOUNTER — HOSPITAL ENCOUNTER (OUTPATIENT)
Dept: GENERAL RADIOLOGY | Age: 72
Discharge: HOME | End: 2017-05-15
Attending: INTERNAL MEDICINE
Payer: MEDICARE

## 2017-05-15 DIAGNOSIS — J15.211 PNEUMONIA DUE TO METHICILLIN SUSCEPTIBLE STAPHYLOCOCCUS AUREUS, UNSPECIFIED LATERALITY, UNSPECIFIED PART OF LUNG: ICD-10-CM

## 2017-05-15 PROCEDURE — 71020 XR CHEST PA LAT: CPT

## 2017-05-15 PROCEDURE — 3331090001 HH PPS REVENUE CREDIT

## 2017-05-15 PROCEDURE — 3331090002 HH PPS REVENUE DEBIT

## 2017-05-16 ENCOUNTER — HOME CARE VISIT (OUTPATIENT)
Dept: SCHEDULING | Facility: HOME HEALTH | Age: 72
End: 2017-05-16
Payer: MEDICARE

## 2017-05-16 VITALS
RESPIRATION RATE: 18 BRPM | DIASTOLIC BLOOD PRESSURE: 62 MMHG | TEMPERATURE: 98.5 F | HEART RATE: 82 BPM | SYSTOLIC BLOOD PRESSURE: 128 MMHG | OXYGEN SATURATION: 96 %

## 2017-05-16 PROCEDURE — 3331090002 HH PPS REVENUE DEBIT

## 2017-05-16 PROCEDURE — 3331090001 HH PPS REVENUE CREDIT

## 2017-05-16 PROCEDURE — G0299 HHS/HOSPICE OF RN EA 15 MIN: HCPCS

## 2017-05-17 PROCEDURE — 3331090002 HH PPS REVENUE DEBIT

## 2017-05-17 PROCEDURE — 3331090001 HH PPS REVENUE CREDIT

## 2017-05-18 PROCEDURE — 3331090002 HH PPS REVENUE DEBIT

## 2017-05-18 PROCEDURE — 3331090001 HH PPS REVENUE CREDIT

## 2017-05-19 ENCOUNTER — APPOINTMENT (OUTPATIENT)
Dept: CT IMAGING | Age: 72
DRG: 312 | End: 2017-05-19
Attending: EMERGENCY MEDICINE
Payer: MEDICARE

## 2017-05-19 ENCOUNTER — HOSPITAL ENCOUNTER (INPATIENT)
Age: 72
LOS: 5 days | Discharge: ANOTHER HOSPITAL | DRG: 312 | End: 2017-05-25
Attending: EMERGENCY MEDICINE | Admitting: INTERNAL MEDICINE
Payer: MEDICARE

## 2017-05-19 ENCOUNTER — HOME CARE VISIT (OUTPATIENT)
Dept: SCHEDULING | Facility: HOME HEALTH | Age: 72
End: 2017-05-19
Payer: MEDICARE

## 2017-05-19 DIAGNOSIS — R55 SYNCOPE AND COLLAPSE: Primary | ICD-10-CM

## 2017-05-19 DIAGNOSIS — R90.89 ABNORMAL FINDING ON MRI OF BRAIN: ICD-10-CM

## 2017-05-19 DIAGNOSIS — R93.7 ABNORMAL MRI, THORACIC SPINE: ICD-10-CM

## 2017-05-19 DIAGNOSIS — G62.9 NEUROPATHY: ICD-10-CM

## 2017-05-19 DIAGNOSIS — M51.04 THORACIC DISC DISORDER WITH MYELOPATHY: ICD-10-CM

## 2017-05-19 DIAGNOSIS — E11.42 DIABETIC POLYNEUROPATHY ASSOCIATED WITH TYPE 2 DIABETES MELLITUS (HCC): ICD-10-CM

## 2017-05-19 DIAGNOSIS — R26.9 GAIT ABNORMALITY: ICD-10-CM

## 2017-05-19 LAB
ALBUMIN SERPL BCP-MCNC: 2.6 G/DL (ref 3.5–5)
ALBUMIN/GLOB SERPL: 0.6 {RATIO} (ref 1.1–2.2)
ALP SERPL-CCNC: 95 U/L (ref 45–117)
ALT SERPL-CCNC: 13 U/L (ref 12–78)
ANION GAP BLD CALC-SCNC: 10 MMOL/L (ref 5–15)
APTT PPP: 27.6 SEC (ref 22.1–32.5)
AST SERPL W P-5'-P-CCNC: 19 U/L (ref 15–37)
BASOPHILS # BLD AUTO: 0 K/UL (ref 0–0.1)
BASOPHILS # BLD: 0 % (ref 0–1)
BILIRUB SERPL-MCNC: 0.3 MG/DL (ref 0.2–1)
BUN SERPL-MCNC: 18 MG/DL (ref 6–20)
BUN/CREAT SERPL: 17 (ref 12–20)
CALCIUM SERPL-MCNC: 8.1 MG/DL (ref 8.5–10.1)
CHLORIDE SERPL-SCNC: 101 MMOL/L (ref 97–108)
CK SERPL-CCNC: 59 U/L (ref 26–192)
CO2 SERPL-SCNC: 27 MMOL/L (ref 21–32)
CREAT SERPL-MCNC: 1.09 MG/DL (ref 0.55–1.02)
EOSINOPHIL # BLD: 0.4 K/UL (ref 0–0.4)
EOSINOPHIL NFR BLD: 4 % (ref 0–7)
ERYTHROCYTE [DISTWIDTH] IN BLOOD BY AUTOMATED COUNT: 15.6 % (ref 11.5–14.5)
GLOBULIN SER CALC-MCNC: 4.2 G/DL (ref 2–4)
GLUCOSE SERPL-MCNC: 118 MG/DL (ref 65–100)
HCT VFR BLD AUTO: 36.5 % (ref 35–47)
HGB BLD-MCNC: 11.6 G/DL (ref 11.5–16)
INR PPP: 1.2 (ref 0.9–1.1)
LYMPHOCYTES # BLD AUTO: 20 % (ref 12–49)
LYMPHOCYTES # BLD: 2 K/UL (ref 0.8–3.5)
MCH RBC QN AUTO: 28.3 PG (ref 26–34)
MCHC RBC AUTO-ENTMCNC: 31.8 G/DL (ref 30–36.5)
MCV RBC AUTO: 89 FL (ref 80–99)
MONOCYTES # BLD: 0.7 K/UL (ref 0–1)
MONOCYTES NFR BLD AUTO: 7 % (ref 5–13)
NEUTS SEG # BLD: 7.2 K/UL (ref 1.8–8)
NEUTS SEG NFR BLD AUTO: 69 % (ref 32–75)
PLATELET # BLD AUTO: 277 K/UL (ref 150–400)
POTASSIUM SERPL-SCNC: 3.3 MMOL/L (ref 3.5–5.1)
PROT SERPL-MCNC: 6.8 G/DL (ref 6.4–8.2)
PROTHROMBIN TIME: 12.6 SEC (ref 9–11.1)
RBC # BLD AUTO: 4.1 M/UL (ref 3.8–5.2)
SODIUM SERPL-SCNC: 138 MMOL/L (ref 136–145)
THERAPEUTIC RANGE,PTTT: NORMAL SECS (ref 58–77)
TROPONIN I SERPL-MCNC: <0.04 NG/ML
WBC # BLD AUTO: 10.3 K/UL (ref 3.6–11)

## 2017-05-19 PROCEDURE — 82550 ASSAY OF CK (CPK): CPT | Performed by: EMERGENCY MEDICINE

## 2017-05-19 PROCEDURE — 93005 ELECTROCARDIOGRAM TRACING: CPT

## 2017-05-19 PROCEDURE — 3331090002 HH PPS REVENUE DEBIT

## 2017-05-19 PROCEDURE — G0299 HHS/HOSPICE OF RN EA 15 MIN: HCPCS

## 2017-05-19 PROCEDURE — 36415 COLL VENOUS BLD VENIPUNCTURE: CPT | Performed by: EMERGENCY MEDICINE

## 2017-05-19 PROCEDURE — 85730 THROMBOPLASTIN TIME PARTIAL: CPT | Performed by: EMERGENCY MEDICINE

## 2017-05-19 PROCEDURE — 99285 EMERGENCY DEPT VISIT HI MDM: CPT

## 2017-05-19 PROCEDURE — 84484 ASSAY OF TROPONIN QUANT: CPT | Performed by: EMERGENCY MEDICINE

## 2017-05-19 PROCEDURE — 70450 CT HEAD/BRAIN W/O DYE: CPT

## 2017-05-19 PROCEDURE — 80053 COMPREHEN METABOLIC PANEL: CPT | Performed by: EMERGENCY MEDICINE

## 2017-05-19 PROCEDURE — 3331090001 HH PPS REVENUE CREDIT

## 2017-05-19 PROCEDURE — 85610 PROTHROMBIN TIME: CPT | Performed by: EMERGENCY MEDICINE

## 2017-05-19 PROCEDURE — 85025 COMPLETE CBC W/AUTO DIFF WBC: CPT | Performed by: EMERGENCY MEDICINE

## 2017-05-19 NOTE — ED TRIAGE NOTES
Pt brought by EMS from home for syncopal episode, is on warfarin, does not remember fall. Still c/o dizziness/lightheadedness. States 10 d ago discharged after pneumonia/staph infection. VSS . Pt states she knew she was going to fall, does not remember falling, does not know how long she was on the floor.

## 2017-05-20 ENCOUNTER — APPOINTMENT (OUTPATIENT)
Dept: GENERAL RADIOLOGY | Age: 72
DRG: 312 | End: 2017-05-20
Attending: INTERNAL MEDICINE
Payer: MEDICARE

## 2017-05-20 ENCOUNTER — APPOINTMENT (OUTPATIENT)
Dept: MRI IMAGING | Age: 72
DRG: 312 | End: 2017-05-20
Attending: INTERNAL MEDICINE
Payer: MEDICARE

## 2017-05-20 ENCOUNTER — APPOINTMENT (OUTPATIENT)
Dept: CT IMAGING | Age: 72
DRG: 312 | End: 2017-05-20
Attending: INTERNAL MEDICINE
Payer: MEDICARE

## 2017-05-20 PROBLEM — R55 SYNCOPE: Status: ACTIVE | Noted: 2017-05-20

## 2017-05-20 PROBLEM — R55 SYNCOPE AND COLLAPSE: Status: ACTIVE | Noted: 2017-05-20

## 2017-05-20 LAB
ALBUMIN SERPL BCP-MCNC: 2.5 G/DL (ref 3.5–5)
ALBUMIN/GLOB SERPL: 0.6 {RATIO} (ref 1.1–2.2)
ALP SERPL-CCNC: 96 U/L (ref 45–117)
ALT SERPL-CCNC: 11 U/L (ref 12–78)
ANION GAP BLD CALC-SCNC: 7 MMOL/L (ref 5–15)
APPEARANCE UR: CLEAR
AST SERPL W P-5'-P-CCNC: 17 U/L (ref 15–37)
BACTERIA URNS QL MICRO: NEGATIVE /HPF
BASOPHILS # BLD AUTO: 0.1 K/UL (ref 0–0.1)
BASOPHILS # BLD: 1 % (ref 0–1)
BILIRUB SERPL-MCNC: 0.5 MG/DL (ref 0.2–1)
BILIRUB UR QL: NEGATIVE
BUN SERPL-MCNC: 18 MG/DL (ref 6–20)
BUN/CREAT SERPL: 17 (ref 12–20)
CALCIUM SERPL-MCNC: 8.9 MG/DL (ref 8.5–10.1)
CHLORIDE SERPL-SCNC: 100 MMOL/L (ref 97–108)
CK MB CFR SERPL CALC: NORMAL % (ref 0–2.5)
CK MB SERPL-MCNC: <1 NG/ML (ref 5–25)
CK SERPL-CCNC: 65 U/L (ref 26–192)
CO2 SERPL-SCNC: 31 MMOL/L (ref 21–32)
COLOR UR: ABNORMAL
CREAT SERPL-MCNC: 1.05 MG/DL (ref 0.55–1.02)
DIGOXIN SERPL-MCNC: 0.7 NG/ML (ref 0.9–2)
EOSINOPHIL # BLD: 0.4 K/UL (ref 0–0.4)
EOSINOPHIL NFR BLD: 4 % (ref 0–7)
EPITH CASTS URNS QL MICRO: ABNORMAL /LPF
ERYTHROCYTE [DISTWIDTH] IN BLOOD BY AUTOMATED COUNT: 15.5 % (ref 11.5–14.5)
EST. AVERAGE GLUCOSE BLD GHB EST-MCNC: 240 MG/DL
GLOBULIN SER CALC-MCNC: 4.1 G/DL (ref 2–4)
GLUCOSE BLD STRIP.AUTO-MCNC: 196 MG/DL (ref 65–100)
GLUCOSE BLD STRIP.AUTO-MCNC: 219 MG/DL (ref 65–100)
GLUCOSE BLD STRIP.AUTO-MCNC: 312 MG/DL (ref 65–100)
GLUCOSE BLD STRIP.AUTO-MCNC: 322 MG/DL (ref 65–100)
GLUCOSE SERPL-MCNC: 153 MG/DL (ref 65–100)
GLUCOSE UR STRIP.AUTO-MCNC: >1000 MG/DL
HBA1C MFR BLD: 10 % (ref 4.2–6.3)
HCT VFR BLD AUTO: 36.8 % (ref 35–47)
HGB BLD-MCNC: 11.6 G/DL (ref 11.5–16)
HGB UR QL STRIP: NEGATIVE
HYALINE CASTS URNS QL MICRO: ABNORMAL /LPF (ref 0–5)
KETONES UR QL STRIP.AUTO: NEGATIVE MG/DL
LEUKOCYTE ESTERASE UR QL STRIP.AUTO: NEGATIVE
LYMPHOCYTES # BLD AUTO: 21 % (ref 12–49)
LYMPHOCYTES # BLD: 2.1 K/UL (ref 0.8–3.5)
MAGNESIUM SERPL-MCNC: 2 MG/DL (ref 1.6–2.4)
MCH RBC QN AUTO: 28.1 PG (ref 26–34)
MCHC RBC AUTO-ENTMCNC: 31.5 G/DL (ref 30–36.5)
MCV RBC AUTO: 89.1 FL (ref 80–99)
MONOCYTES # BLD: 0.9 K/UL (ref 0–1)
MONOCYTES NFR BLD AUTO: 9 % (ref 5–13)
NEUTS SEG # BLD: 6.6 K/UL (ref 1.8–8)
NEUTS SEG NFR BLD AUTO: 65 % (ref 32–75)
NITRITE UR QL STRIP.AUTO: NEGATIVE
PH UR STRIP: 5 [PH] (ref 5–8)
PHOSPHATE SERPL-MCNC: 3.8 MG/DL (ref 2.6–4.7)
PLATELET # BLD AUTO: 273 K/UL (ref 150–400)
POTASSIUM SERPL-SCNC: 3.3 MMOL/L (ref 3.5–5.1)
PROT SERPL-MCNC: 6.6 G/DL (ref 6.4–8.2)
PROT UR STRIP-MCNC: NEGATIVE MG/DL
RBC # BLD AUTO: 4.13 M/UL (ref 3.8–5.2)
RBC #/AREA URNS HPF: ABNORMAL /HPF (ref 0–5)
SERVICE CMNT-IMP: ABNORMAL
SODIUM SERPL-SCNC: 138 MMOL/L (ref 136–145)
SP GR UR REFRACTOMETRY: 1.03 (ref 1–1.03)
TROPONIN I SERPL-MCNC: <0.04 NG/ML
TSH SERPL DL<=0.05 MIU/L-ACNC: 1.01 UIU/ML (ref 0.36–3.74)
UROBILINOGEN UR QL STRIP.AUTO: 0.2 EU/DL (ref 0.2–1)
WBC # BLD AUTO: 10.1 K/UL (ref 3.6–11)
WBC URNS QL MICRO: ABNORMAL /HPF (ref 0–4)

## 2017-05-20 PROCEDURE — 84443 ASSAY THYROID STIM HORMONE: CPT | Performed by: INTERNAL MEDICINE

## 2017-05-20 PROCEDURE — 36415 COLL VENOUS BLD VENIPUNCTURE: CPT | Performed by: INTERNAL MEDICINE

## 2017-05-20 PROCEDURE — 70551 MRI BRAIN STEM W/O DYE: CPT

## 2017-05-20 PROCEDURE — 84484 ASSAY OF TROPONIN QUANT: CPT | Performed by: INTERNAL MEDICINE

## 2017-05-20 PROCEDURE — 85025 COMPLETE CBC W/AUTO DIFF WBC: CPT | Performed by: INTERNAL MEDICINE

## 2017-05-20 PROCEDURE — 82550 ASSAY OF CK (CPK): CPT | Performed by: INTERNAL MEDICINE

## 2017-05-20 PROCEDURE — 73610 X-RAY EXAM OF ANKLE: CPT

## 2017-05-20 PROCEDURE — 93306 TTE W/DOPPLER COMPLETE: CPT

## 2017-05-20 PROCEDURE — 74011250637 HC RX REV CODE- 250/637: Performed by: INTERNAL MEDICINE

## 2017-05-20 PROCEDURE — 3331090002 HH PPS REVENUE DEBIT

## 2017-05-20 PROCEDURE — 83735 ASSAY OF MAGNESIUM: CPT | Performed by: INTERNAL MEDICINE

## 2017-05-20 PROCEDURE — 95816 EEG AWAKE AND DROWSY: CPT | Performed by: INTERNAL MEDICINE

## 2017-05-20 PROCEDURE — 80053 COMPREHEN METABOLIC PANEL: CPT | Performed by: INTERNAL MEDICINE

## 2017-05-20 PROCEDURE — 82962 GLUCOSE BLOOD TEST: CPT

## 2017-05-20 PROCEDURE — 84100 ASSAY OF PHOSPHORUS: CPT | Performed by: INTERNAL MEDICINE

## 2017-05-20 PROCEDURE — 93005 ELECTROCARDIOGRAM TRACING: CPT

## 2017-05-20 PROCEDURE — 72192 CT PELVIS W/O DYE: CPT

## 2017-05-20 PROCEDURE — 94660 CPAP INITIATION&MGMT: CPT

## 2017-05-20 PROCEDURE — 73600 X-RAY EXAM OF ANKLE: CPT

## 2017-05-20 PROCEDURE — 70544 MR ANGIOGRAPHY HEAD W/O DYE: CPT

## 2017-05-20 PROCEDURE — 97161 PT EVAL LOW COMPLEX 20 MIN: CPT

## 2017-05-20 PROCEDURE — 83036 HEMOGLOBIN GLYCOSYLATED A1C: CPT | Performed by: INTERNAL MEDICINE

## 2017-05-20 PROCEDURE — 80162 ASSAY OF DIGOXIN TOTAL: CPT | Performed by: INTERNAL MEDICINE

## 2017-05-20 PROCEDURE — 81001 URINALYSIS AUTO W/SCOPE: CPT | Performed by: INTERNAL MEDICINE

## 2017-05-20 PROCEDURE — 93880 EXTRACRANIAL BILAT STUDY: CPT

## 2017-05-20 PROCEDURE — 97116 GAIT TRAINING THERAPY: CPT

## 2017-05-20 PROCEDURE — 72125 CT NECK SPINE W/O DYE: CPT

## 2017-05-20 PROCEDURE — G8979 MOBILITY GOAL STATUS: HCPCS

## 2017-05-20 PROCEDURE — 65660000000 HC RM CCU STEPDOWN

## 2017-05-20 PROCEDURE — G8978 MOBILITY CURRENT STATUS: HCPCS

## 2017-05-20 PROCEDURE — 3331090001 HH PPS REVENUE CREDIT

## 2017-05-20 PROCEDURE — 74011636637 HC RX REV CODE- 636/637: Performed by: INTERNAL MEDICINE

## 2017-05-20 RX ORDER — DEXTROSE 50 % IN WATER (D50W) INTRAVENOUS SYRINGE
12.5-25 AS NEEDED
Status: DISCONTINUED | OUTPATIENT
Start: 2017-05-20 | End: 2017-05-20 | Stop reason: CLARIF

## 2017-05-20 RX ORDER — GABAPENTIN 300 MG/1
300 CAPSULE ORAL 3 TIMES DAILY
Status: DISCONTINUED | OUTPATIENT
Start: 2017-05-20 | End: 2017-05-25 | Stop reason: HOSPADM

## 2017-05-20 RX ORDER — MONTELUKAST SODIUM 10 MG/1
10 TABLET ORAL DAILY
Status: DISCONTINUED | OUTPATIENT
Start: 2017-05-20 | End: 2017-05-25 | Stop reason: HOSPADM

## 2017-05-20 RX ORDER — BUDESONIDE 0.5 MG/2ML
500 INHALANT ORAL
Status: DISCONTINUED | OUTPATIENT
Start: 2017-05-20 | End: 2017-05-25 | Stop reason: HOSPADM

## 2017-05-20 RX ORDER — SODIUM CHLORIDE 0.9 % (FLUSH) 0.9 %
5-10 SYRINGE (ML) INJECTION EVERY 8 HOURS
Status: DISCONTINUED | OUTPATIENT
Start: 2017-05-20 | End: 2017-05-25 | Stop reason: HOSPADM

## 2017-05-20 RX ORDER — FLUTICASONE PROPIONATE AND SALMETEROL 100; 50 UG/1; UG/1
1 POWDER RESPIRATORY (INHALATION)
Status: DISCONTINUED | OUTPATIENT
Start: 2017-05-20 | End: 2017-05-20 | Stop reason: CLARIF

## 2017-05-20 RX ORDER — DOCUSATE SODIUM 100 MG/1
100 CAPSULE, LIQUID FILLED ORAL 2 TIMES DAILY
Status: DISCONTINUED | OUTPATIENT
Start: 2017-05-20 | End: 2017-05-25 | Stop reason: HOSPADM

## 2017-05-20 RX ORDER — MAGNESIUM SULFATE 100 %
4 CRYSTALS MISCELLANEOUS AS NEEDED
Status: DISCONTINUED | OUTPATIENT
Start: 2017-05-20 | End: 2017-05-25 | Stop reason: HOSPADM

## 2017-05-20 RX ORDER — WARFARIN 4 MG/1
4 TABLET ORAL ONCE
Status: COMPLETED | OUTPATIENT
Start: 2017-05-20 | End: 2017-05-20

## 2017-05-20 RX ORDER — SODIUM CHLORIDE 0.9 % (FLUSH) 0.9 %
5-10 SYRINGE (ML) INJECTION AS NEEDED
Status: DISCONTINUED | OUTPATIENT
Start: 2017-05-20 | End: 2017-05-25 | Stop reason: HOSPADM

## 2017-05-20 RX ORDER — ALBUTEROL SULFATE 0.83 MG/ML
2.5 SOLUTION RESPIRATORY (INHALATION)
Status: DISCONTINUED | OUTPATIENT
Start: 2017-05-20 | End: 2017-05-25 | Stop reason: HOSPADM

## 2017-05-20 RX ORDER — SERTRALINE HYDROCHLORIDE 50 MG/1
100 TABLET, FILM COATED ORAL DAILY
Status: DISCONTINUED | OUTPATIENT
Start: 2017-05-20 | End: 2017-05-25 | Stop reason: HOSPADM

## 2017-05-20 RX ORDER — ACETAMINOPHEN 325 MG/1
650 TABLET ORAL
Status: DISCONTINUED | OUTPATIENT
Start: 2017-05-20 | End: 2017-05-25 | Stop reason: HOSPADM

## 2017-05-20 RX ORDER — ONDANSETRON 2 MG/ML
4 INJECTION INTRAMUSCULAR; INTRAVENOUS
Status: DISCONTINUED | OUTPATIENT
Start: 2017-05-20 | End: 2017-05-25 | Stop reason: HOSPADM

## 2017-05-20 RX ORDER — THERA TABS 400 MCG
1 TAB ORAL DAILY
Status: DISCONTINUED | OUTPATIENT
Start: 2017-05-20 | End: 2017-05-25 | Stop reason: HOSPADM

## 2017-05-20 RX ORDER — POTASSIUM CHLORIDE 750 MG/1
40 TABLET, FILM COATED, EXTENDED RELEASE ORAL
Status: COMPLETED | OUTPATIENT
Start: 2017-05-20 | End: 2017-05-20

## 2017-05-20 RX ORDER — ALPRAZOLAM 0.25 MG/1
.25-.5 TABLET ORAL
Status: DISCONTINUED | OUTPATIENT
Start: 2017-05-20 | End: 2017-05-25 | Stop reason: HOSPADM

## 2017-05-20 RX ORDER — FUROSEMIDE 40 MG/1
80 TABLET ORAL DAILY
Status: DISCONTINUED | OUTPATIENT
Start: 2017-05-20 | End: 2017-05-20

## 2017-05-20 RX ORDER — DIGOXIN 250 MCG
0.25 TABLET ORAL DAILY
Status: DISCONTINUED | OUTPATIENT
Start: 2017-05-20 | End: 2017-05-25 | Stop reason: HOSPADM

## 2017-05-20 RX ORDER — ARFORMOTEROL TARTRATE 15 UG/2ML
15 SOLUTION RESPIRATORY (INHALATION)
Status: DISCONTINUED | OUTPATIENT
Start: 2017-05-20 | End: 2017-05-25 | Stop reason: HOSPADM

## 2017-05-20 RX ORDER — DILTIAZEM HYDROCHLORIDE 180 MG/1
360 CAPSULE, COATED, EXTENDED RELEASE ORAL DAILY
Status: DISCONTINUED | OUTPATIENT
Start: 2017-05-20 | End: 2017-05-25 | Stop reason: HOSPADM

## 2017-05-20 RX ORDER — MELATONIN
2000 DAILY
Status: DISCONTINUED | OUTPATIENT
Start: 2017-05-20 | End: 2017-05-25 | Stop reason: HOSPADM

## 2017-05-20 RX ORDER — POTASSIUM CHLORIDE 750 MG/1
20 TABLET, FILM COATED, EXTENDED RELEASE ORAL DAILY
Status: DISCONTINUED | OUTPATIENT
Start: 2017-05-20 | End: 2017-05-25 | Stop reason: HOSPADM

## 2017-05-20 RX ORDER — INSULIN LISPRO 100 [IU]/ML
INJECTION, SOLUTION INTRAVENOUS; SUBCUTANEOUS
Status: DISCONTINUED | OUTPATIENT
Start: 2017-05-20 | End: 2017-05-25 | Stop reason: HOSPADM

## 2017-05-20 RX ADMIN — Medication 10 ML: at 18:13

## 2017-05-20 RX ADMIN — SERTRALINE HYDROCHLORIDE 100 MG: 50 TABLET ORAL at 09:20

## 2017-05-20 RX ADMIN — GABAPENTIN 300 MG: 300 CAPSULE ORAL at 09:20

## 2017-05-20 RX ADMIN — Medication 10 ML: at 06:20

## 2017-05-20 RX ADMIN — MONTELUKAST SODIUM 10 MG: 10 TABLET, FILM COATED ORAL at 09:20

## 2017-05-20 RX ADMIN — GABAPENTIN 300 MG: 300 CAPSULE ORAL at 22:29

## 2017-05-20 RX ADMIN — GABAPENTIN 300 MG: 300 CAPSULE ORAL at 18:13

## 2017-05-20 RX ADMIN — DOCUSATE SODIUM 100 MG: 100 CAPSULE, LIQUID FILLED ORAL at 18:13

## 2017-05-20 RX ADMIN — INSULIN LISPRO 2 UNITS: 100 INJECTION, SOLUTION INTRAVENOUS; SUBCUTANEOUS at 09:19

## 2017-05-20 RX ADMIN — VITAMIN D, TAB 1000IU (100/BT) 2000 UNITS: 25 TAB at 09:20

## 2017-05-20 RX ADMIN — FUROSEMIDE 80 MG: 40 TABLET ORAL at 09:20

## 2017-05-20 RX ADMIN — WARFARIN SODIUM 4 MG: 4 TABLET ORAL at 09:20

## 2017-05-20 RX ADMIN — POTASSIUM CHLORIDE 20 MEQ: 750 TABLET, FILM COATED, EXTENDED RELEASE ORAL at 09:20

## 2017-05-20 RX ADMIN — THERA TABS 1 TABLET: TAB at 09:20

## 2017-05-20 RX ADMIN — POTASSIUM CHLORIDE 40 MEQ: 750 TABLET, FILM COATED, EXTENDED RELEASE ORAL at 06:19

## 2017-05-20 RX ADMIN — DILTIAZEM HYDROCHLORIDE 360 MG: 180 CAPSULE, EXTENDED RELEASE ORAL at 09:20

## 2017-05-20 RX ADMIN — DIGOXIN 0.25 MG: 250 TABLET ORAL at 09:20

## 2017-05-20 RX ADMIN — INSULIN LISPRO 4 UNITS: 100 INJECTION, SOLUTION INTRAVENOUS; SUBCUTANEOUS at 23:42

## 2017-05-20 RX ADMIN — INSULIN LISPRO 7 UNITS: 100 INJECTION, SOLUTION INTRAVENOUS; SUBCUTANEOUS at 18:12

## 2017-05-20 RX ADMIN — Medication 10 ML: at 22:30

## 2017-05-20 NOTE — PROGRESS NOTES
Pharmacist Note - Warfarin Dosing  Consult provided for this 67 y. o.female to manage warfarin for Atrial Fibrillation    INR Goal: 2 - 3    Home regimen/ tablet size: 2.5 mg 5xweek     Drugs that may increase INR: None  Drugs that may decrease INR: None  Other current anticoagulants/ drugs that may increase bleeding risk: None  Risk factors: Age > 65  Daily INR ordered: YES    Recent Labs      05/20/17   0348  05/19/17 1957   HGB  11.6  11.6   INR   --   1.2*     Date               INR                  Dose  5/19  1.2  --  5/20                 --                     4 mg                                                                                Assessment/ Plan: Will order warfarin 4 mg PO x 1 dose. Pharmacy will continue to monitor daily and adjust therapy as indicated. Please contact the pharmacist at  for outpatient recommendations if needed.      Will give dose early (0900) since patient missed dose 5/19/17 and INR subtherapeutic

## 2017-05-20 NOTE — CONSULTS
[address]   [address] [facility], [address]   [address] [facility]       Name:  Bailey Grant   MR#:  [id number]   :  1945   Account #:  [de-identified]    Date of Consultation:  2017   Date of Adm:  2017       REQUESTING PHYSICIAN: Moshe Denise MD    REASON FOR EVALUATION: Syncope. HISTORY: The patient is a 77-year-old female with past medical   history of atrial fibrillation, hypertension, diabetes, dyslipidemia,   depression, neuropathy and morbid obesity. She recently was   discharged from the hospital after she was admitted for pneumonia. She completed a course of antibiotics, and her recent imaging showed   that she may have some residual fluid in her lungs. She was at her   home and had been sitting for some time. She got up to go to the   kitchen, and as she was trying to reach into an overhead head cabinet,   she started to feel very lightheaded. She called out for her sister, who   quickly came, and by then she collapsed onto the floor. She felt her   legs giving out from underneath her. She was not out for more than a   few seconds. Sister noticed that her eyes rolled back, but there was no   convulsion. No tongue bite or bladder incontinence. She came to and   knew where she was, and was making sense. She was brought into   the hospital and was admitted for further workup. There was no   change in her vision or speech. No focal motor or sensory deficits. She reports that she has been having walking difficulties for quite   some time. She has had extensive workup in this regard, including MRI   of the thoracic spine, which showed degenerative disk bulge at T10-  T11, with mild cord compression. There are no changes in bladder or   bowel habits. No change in her recent medication list.    PAST MEDICAL HISTORY: As mentioned above. SOCIAL HISTORY: The patient is . She used to smoke, quit in   , may drink 2 glasses of wine per week.     FAMILY HISTORY: Positive for heart disease in father and stroke in   mother. ALLERGIES   1. LATEX. 2. CODEINE. 3. LISINOPRIL. 4. MORPHINE. 5. STATIN DRUGS. HOME MEDICATION LIST, WHICH INCLUDES   1. Potassium. 2. Digoxin. 3. Sertraline. 4. Gabapentin. 5. Singulair. 6. Cartia  mg capsule 3 capsules daily. 7. Lasix 40 mg 2 tablets by mouth every morning and 1 every evening. 8. Advair Diskus. 9. Vitamin D3.    REVIEW OF SYSTEMS: As mentioned above. She denies any   headache, chest pain, shortness of breath, palpitations, nausea or   vomiting. She reports fatigue and balance issues at baseline. PHYSICAL EXAMINATION   GENERAL: The patient is alert, fully oriented. VITAL SIGNS: Blood pressure 150/65, temperature 98.6, pulse is 74. NEUROLOGIC: Speech is clear. Comprehension is normal. Pupils are   equal, round and reactive. Extraocular movements are full. Face is   symmetric. Tongue is midline. Hearing is baseline. Muscle tone and   bulk is normal. Strength is normal in all extremities. Deep tendon   reflexes are 2/2 and symmetric, except ankle jerks, which were difficult   to elicit. Sensation is impaired to light touch, temperature sensation   and vibration distally in both feet. Romberg is positive. Gait is baseline,   she needs assistance. HEART: Regular rate. CHEST: Clear. ABDOMEN: Soft, nontender, positive bowel sounds. EXTREMITIES: No edema. LABORATORY DATA: CBC is unremarkable. Chemistries: Sodium   138, potassium 3.3, BUN 18, creatinine 1.05. DIAGNOSTIC DATA: CT brain was unremarkable. 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 A followup MRI in 6-12 months was recommended at that   time. MRI of the thoracic spine, as mentioned above.      ASSESSMENT AND PLAN: A 41-year-old female with multiple medical   issues, who is admitted after an episode of brief syncope. Based on   the description, this was most likely due to orthostatic drop in blood   pressure or vasovagal phenomena. She has been deconditioned from   a recent sickness, has underlying diabetes, which can cause   autonomic insufficiency, and is on multiple medications which can drop   blood pressure. She should continue to drink plenty of fluids, should sit   or lie down if she starts to feel lightheaded after standing up. She   should benefit from a course of physical therapy for deconditioning and   balance training. 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 Agree with MRI of the brain to follow up on the abnormalities noted a   couple of years ago, and if the workup is negative, we can discharge   her home. Please feel free to call me with any further questions. Thank you for   this consultation.         Karyle Ivory, MD      AS / [facility]   D:  05/20/2017   09:53   T:  05/20/2017   14:16   Job #:  [id number]

## 2017-05-20 NOTE — PROGRESS NOTES
Events noted. I have placed pt on my svc (Hospitalist's admission appreciated!). I agree that pt's syncope probably due to low BP/orthostasis. Will d/c Lasix. MRI brain & carotid dopplers OK. EEG being done. L ankle pain -- Xray. I spoke with pt's dtr (present). We agree that pt will probably be best served with a stay at SNF for rehab before going home. Order PT/OT.

## 2017-05-20 NOTE — PROGRESS NOTES
TRANSFER - IN REPORT:    Verbal report received from RocíoRN(name) on [place]  being received from ED(unit) for routine progression of care      Report consisted of patients Situation, Background, Assessment and   Recommendations(SBAR). Information from the following report(s) SBAR, Kardex, ED Summary, Intake/Output, MAR, Recent Results and Cardiac Rhythm AFib was reviewed with the receiving nurse. Opportunity for questions and clarification was provided. Assessment completed upon patients arrival to unit and care assumed.

## 2017-05-20 NOTE — PROGRESS NOTES
Problem: Mobility Impaired (Adult and Pediatric)  Goal: *Acute Goals and Plan of Care (Insert Text)  Physical Therapy Goals  Initiated 5/20/2017  1. Patient will move from supine to sit and sit to supine in bed with modified independence within 7 day(s). 2. Patient will transfer from bed to chair and chair to bed with modified independence using the least restrictive device within 7 day(s). 3. Patient will perform sit to stand with modified independence within 7 day(s). 4. Patient will ambulate with modified independence for 150 feet with the least restrictive device within 7 day(s). PHYSICAL THERAPY EVALUATION  Patient: Lj Kimble (28 y.o. female)  Date: 5/20/2017  Primary Diagnosis: Syncope        Precautions: Fall         ASSESSMENT :  Pt is cleared for PT by RN. Based on the objective data described below, the patient presents with decreased activity tolerance due to fatigue and 6/10 pain L foot/ankle, decreased independence with functional mobility, and decreased balance as compared to baseline (14/28 on TInetti). VSS with mobility. Pt states she uses a rollator at home for mobility, but that \"it doesn't help. \" Pt insisted initially that she did not need a RW for mobility, but was unable to safely stand unsupported today. Gait is antalgic with decreased stance time L due to pain L foot/ankle. Tender to palpation dorsal surface of foot and lateral malleolus. Mild edema noted around malleolus. RN alerted. Pt rec'd up in chair and requested to return to bed after Tx. Will continue to follow. Further rehab needs TBD pending pt progress. Patient will benefit from skilled intervention to address the above impairments.   Patients rehabilitation potential is considered to be Good  Factors which may influence rehabilitation potential include:   [ ]         None noted  [ ]         Mental ability/status  [ ]         Medical condition  [ ]         Home/family situation and support systems  [ ] Safety awareness  [X]         Pain tolerance/management  [ ]         Other:        PLAN :  Recommendations and Planned Interventions:  [X]           Bed Mobility Training             [ ]    Neuromuscular Re-Education  [X]           Transfer Training                   [ ]    Orthotic/Prosthetic Training  [X]           Gait Training                         [ ]    Modalities  [X]           Therapeutic Exercises           [ ]    Edema Management/Control  [X]           Therapeutic Activities            [X]    Patient and Family Training/Education  [ ]           Other (comment):     Frequency/Duration: Patient will be followed by physical therapy  5 times a week to address goals. Discharge Recommendations: Rehab vs. Home Health  Further Equipment Recommendations for Discharge: Pt has rollator for home use. SUBJECTIVE:   Patient stated The walker doesn't keep me from falling. I just fall out.       OBJECTIVE DATA SUMMARY:   HISTORY:    Past Medical History:   Diagnosis Date    Asthma      [facility]      Depression      DM (diabetes mellitus) ([facility]      HTN (hypertension)      Hyperlipidemia      Morbid obesity ([facility]      Neuropathy      LITA on CPAP      Other ill-defined conditions pneumonia     Past Surgical History:   Procedure Laterality Date    HX APPENDECTOMY        HX CHOLECYSTECTOMY        HX GASTRIC BYPASS         Jejunal bypass with subsequent reversal..  Lab Band since    HX OTHER SURGICAL   D & C    HX EMILIANO AND BSO         For uterine CA     Prior Level of Function/Home Situation: Pt lives alone in a 1 floor apartment with elevator to enter. Pt uses a rollator for mobility prior to admission. Pt has experienced several falls due to syncopal episodes.    Personal factors and/or comorbidities impacting plan of care:      Home Situation  Home Environment: Apartment  # Steps to Enter: 0  One/Two Story Residence: One story  Living Alone: Yes  Support Systems: Child(alina)  Patient Expects to be Discharged to[de-identified] Apartment  Current DME Used/Available at Home: Geraldine Gala, quad, CPAP     EXAMINATION/PRESENTATION/DECISION MAKING:   Critical Behavior:  Neurologic State: Alert, Eyes open spontaneously  Orientation Level: Oriented X4  Cognition: Appropriate decision making, Appropriate for age attention/concentration, Appropriate safety awareness, Follows commands     Hearing: Auditory  Auditory Impairment: Hard of hearing, bilateral  Skin:  Bruising noted dorsal surface of L foot. Edema: Mild edema noted L lateral malleolus. Range Of Motion:  AROM: Within functional limits                       Strength:    Strength: Generally decreased, functional                  Coordination:  Coordination: Within functional limits  Vision:      Functional Mobility:  Bed Mobility:  Rolling: Supervision     Sit to Supine:  Moderate assistance     Transfers:  Sit to Stand: Contact guard assistance  Stand to Sit: Contact guard assistance  Stand Pivot Transfers: Contact guard assistance                    Balance:   Sitting: Intact  Standing: Impaired  Standing - Static: Fair  Standing - Dynamic : Fair  Ambulation/Gait Training:  Distance (ft): 30 Feet (ft)  Assistive Device: Gait belt;Walker, rolling  Ambulation - Level of Assistance: Contact guard assistance;Minimal assistance        Gait Abnormalities: Antalgic;Decreased step clearance           Stance: Left decreased  Speed/Liz: Pace decreased (<100 feet/min)              Functional Measure:  Tinetti test:      Sitting Balance: 1  Arises: 1  Attempts to Rise: 2  Immediate Standing Balance: 1  Standing Balance: 1  Nudged: 0  Eyes Closed: 0  Turn 360 Degrees - Continuous/Discontinuous: 0  Turn 360 Degrees - Steady/Unsteady: 0  Sitting Down: 1  Balance Score: 7  Indication of Gait: 1  R Step Length/Height: 1  L Step Length/Height: 1  R Foot Clearance: 1  L Foot Clearance: 1  Step Symmetry: 0  Step Continuity: 1  Path: 1  Trunk: 0  Walking Time: 0  Gait Score: 7  Total Score: 14         Tinetti Test and G-code impairment scale:  Percentage of Impairment CH     0%    CI     1-19% CJ     20-39% CK     40-59% CL     60-79% CM     80-99% CN      100%   Tinetti  Score 0-28 28 23-27 17-22 12-16 6-11 1-5 0          Tinetti Tool Score Risk of Falls  <19 = High Fall Risk  19-24 = Moderate Fall Risk  25-28 = Low Fall Risk  Tinetti ME. Performance-Oriented Assessment of Mobility Problems in Elderly Patients. [address]; [id number]. (Scoring Description: PT Bulletin Feb. 10, 1993)     Older adults: Bindu Rinaldi et al, 2009; n = 1000 [facility] elderly evaluated with ABC, JOSE, ADL, and IADL)  · Mean JOSE score for males aged 69-68 years = 26.21(3.40)  · Mean JOSE score for females age 69-68 years = 25.16(4.30)  · Mean JOSE score for males over 80 years = 23.29(6.02)  · Mean JOSE score for females over 80 years = 17.20(8.32)         G codes: In compliance with CMSs Claims Based Outcome Reporting, the following G-code set was chosen for this patient based on their primary functional limitation being treated: The outcome measure chosen to determine the severity of the functional limitation was the Tinetti with a score of 14/28 which was correlated with the impairment scale.       · Mobility - Walking and Moving Around:               - CURRENT STATUS:    CK - 40%-59% impaired, limited or restricted               - GOAL STATUS:           CJ - 20%-39% impaired, limited or restricted               - D/C STATUS:                       ---------------To be determined---------------      Physical Therapy Evaluation Charge Determination   History Examination Presentation Decision-Making   HIGH Complexity :3+ comorbidities / personal factors will impact the outcome/ POC  MEDIUM Complexity : 3 Standardized tests and measures addressing body structure, function, activity limitation and / or participation in recreation  MEDIUM Complexity : Evolving with changing characteristics  Other outcome measures Tinetti  MEDIUM      Based on the above components, the patient evaluation is determined to be of the following complexity level: MEDIUM     Pain:  Pain Scale 1: Numeric (0 - 10)  Pain Intensity 1: 0  Pain Location 1: Back;Knee           Activity Tolerance:   Limited by pain L ankle/foot and fatigue  Please refer to the flowsheet for vital signs taken during this treatment. After treatment:   [ ]         Patient left in no apparent distress sitting up in chair  [X]         Patient left in no apparent distress in bed  [X]         Call bell left within reach  [X]         Nursing notified  [ ]         Caregiver present  [ ]         Bed alarm activated      COMMUNICATION/EDUCATION:   The patients plan of care was discussed with: Registered Nurse and Rehabilitation Attendant. [X]         Fall prevention education was provided and the patient/caregiver indicated understanding. [X]         Patient/family have participated as able in goal setting and plan of care. [X]         Patient/family agree to work toward stated goals and plan of care. [ ]         Patient understands intent and goals of therapy, but is neutral about his/her participation. [ ]         Patient is unable to participate in goal setting and plan of care.      Thank you for this referral.  Nika Godoy, PT   Time Calculation: 21 mins

## 2017-05-20 NOTE — PROGRESS NOTES
Bedside shift change report given to Sofi Kim (oncoming nurse) by Cecelia Grider (offgoing nurse). Report included the following information SBAR, Kardex, Intake/Output, MAR, Recent Results and Cardiac Rhythm NSR.

## 2017-05-20 NOTE — ED NOTES
2341: Received bedside report in SBAR format, updated on [facility], recent results and plan of care from [place], [address] [place] [address]. Assumed care of patient. 0045: RT paged to place pt on home cpap, per family she usually wears Cpap at night. Dr. Sharon Pitts aware, placing orders. 0130: Pt resting comfortably on stretcher. Denies needs at this time. Call light within reach, patient instructed on use.

## 2017-05-20 NOTE — PROGRESS NOTES
Primary Nurse Jeronimo Monzon and PADMINI Guerra performed a dual skin assessment on this patient No impairment noted  Kobi score is 21

## 2017-05-20 NOTE — PROCEDURES
[facility]  *** FINAL REPORT ***    Name: Contreras Almendarez  MRN: [id number]    Inpatient  : 15 Mar 1945  HIS Order #: [id number]  [address] [facility] Visit #: [id number]  Date: 20 May 2017    TYPE OF TEST: Cerebrovascular Duplex    REASON FOR TEST  Syncope    Right Carotid:-             Proximal               Mid                 Distal  cm/s  Systolic  Diastolic  Systolic  Diastolic  Systolic  Diastolic  CCA:     02.1      10.0                            76.0      13.0  Bulb:  ECA:    106.0  ICA:     59.0      15.0                           110.0      26.0  ICA/CCA:  0.8       1.2    ICA Stenosis:    Right Vertebral:-  Finding: Antegrade  Sys:       68.0  Humera:    Right Subclavian:    Left Carotid:-            Proximal                Mid                 Distal  cm/s  Systolic  Diastolic  Systolic  Diastolic  Systolic  Diastolic  CCA:     74.3      16.0                            94.0      11.0  Bulb:  ECA:    113.0  ICA:     76.0      21.0                            85.0      26.0  ICA/CCA:  0.8       1.9    ICA Stenosis:    Left Vertebral:-  Finding: Antegrade  Sys:       61.0  Humera:    Left Subclavian:    INTERPRETATION/FINDINGS  PROCEDURE:  Color duplex ultrasound imaging of extracranial  cerebrovascular arteries. FINDINGS:       Right:  Internal carotid velocity is within normal limits. There   is narrowing of the flow channel on color Doppler imaging and mixed  density plaque on B-mode imaging, consistent with less than 50 percent   stenosis. The common and external carotid arteries are patent and  without evidence of hemodynamically significant stenosis. The  internal carotid is tortuous distally. Left:   Internal carotid velocity is within normal limits. There   is narrowing of the flow channel on color Doppler imaging and mixed  density plaque on B-mode imaging, consistent with less than 50 percent   stenosis.   The common and external carotid arteries are patent and  without evidence of hemodynamically significant stenosis. IMPRESSION:  Consistent with less than 50% stenosis of the right  internal carotid and less than 50% stenosis of the left internal  carotid. Vertebrals are patent with antegrade flow. ADDITIONAL COMMENTS    I have personally reviewed the data relevant to the interpretation of  this  study.     TECHNOLOGIST: Ubaldo Mckeon RVT  Signed: 05/20/2017 12:41 PM    PHYSICIAN: Orly Chopra MD  Signed: 05/22/2017 05:55 PM

## 2017-05-20 NOTE — H&P
[facility] [place] [facility] [address]   HISTORY AND PHYSICAL       Name:  Elisa Elam   MR#:  [id number]   :  1945   Account #:  [de-identified]        Date of Adm:  2017       PRIMARY CARE PHYSICIAN: Dr. Sabrina Lamb. SOURCE OF INFORMATION: The patient and patient's daughter who   was present at the bedside, the patient's sister, who was also present   at the bedside. CHIEF COMPLAINT: Passed out. HISTORY OF PRESENT ILLNESS: This is a 74-year-old woman with   a past medical history significant for chronic atrial fibrillation,   hypertension, type 2 diabetes, dyslipidemia, depression, diabetic   neuropathy, asthma, morbid obesity, obstructive sleep apnea on   CPAP, was in her usual state of health until the day of the presentation   at the emergency room when patient passed out at home. According to   the patient, she was reaching into an overhead cabinet when all of a   sudden she developed dizziness and passed out. The patient passed   out for a few minutes. According to her, she found her sister beside her   trying to lift her off the ground. It was also reported that the patient's   eyes rolled backward and soon after that the patient became   responsive. This is the second episode of syncope within a week. The   patient was last admitted to [facility] and discharged from   the hospital 18 days ago after being treated for pneumonia and a staph   infection in the blood. The patient denies chest pain, no fever, no   rigors, no chills. When the patient arrived at the emergency room, CT   scan of the head was obtained. It was negative for acute pathology. The patient was subsequently referred to the hospitalist service for   evaluation for admission.     PAST MEDICAL HISTORY: Atrial fibrillation, hypertension, type 2   diabetes, dyslipidemia, anxiety/depression, diabetic neuropathy,   asthma, morbid obesity, obstructive sleep apnea on CPAP.    ALLERGIES: THE PATIENT IS ALLERGIC TO   1. CODEINE. 2. LISINOPRIL. 3. MORPHINE. 4. LATEX. MEDICATIONS   1. Advair 100/50 inhalation twice daily. 2. Albuterol nebulizer 4 times daily as needed for wheezing or   shortness of breath. 3. Xanax 0.5 mg daily as needed for anxiety. 4. Cartia 120 mg 3 capsules daily. 5. Digoxin 0.25 mg daily. 6. Lasix 40 mg daily. 7. Neurontin 300 mg 3 times daily. 8. NPH insulin 35 units subcutaneously daily in the morning and 25   units subcutaneously daily at bedtime. 9. Sliding scale with insulin coverage. 10. Singular 10 mg daily. 11. Potassium chloride 20 mEq daily. 12. Prednisone dosage as directed. 13. Zoloft 100 mg daily. 14. Multivitamin 1 tablet daily. 15. Coumadin dosage as directed. FAMILY HISTORY: This was reviewed. Her mother had a stroke. Father had congestive heart failure. PAST SURGICAL HISTORY: This is significant for appendectomy,   cholecystectomy, gastric bypass, total abdominal hysterectomy. SOCIAL HISTORY: The patient is a former smoker, quit tobacco abuse   in January 1992, admits to social consumption of alcohol. REVIEW OF SYSTEMS   HEAD, EYES, EARS, NOSE AND THROAT: This is positive for   syncope. No headache, no blurring of vision, no photophobia. RESPIRATORY SYSTEM: This is positive for shortness of breath. No   cough. No hemoptysis. CARDIOVASCULAR SYSTEM: No chest pain, no orthopnea, no   palpitation. GASTROINTESTINAL SYSTEM: No nausea and vomiting, no   diarrhea, no constipation. GENITOURINARY: No dysuria, no urgency, and no frequency. All other systems are reviewed and they are negative. PHYSICAL EXAMINATION   GENERAL APPEARANCE: The patient appeared ill, in moderate   distress. VITAL SIGNS: On arrival at the emergency room, temperature 98.3,   pulse 91, respiratory rate 12, blood pressure 149/66, oxygen saturation   97% on room air. HEAD: Normocephalic, atraumatic.    EYES: Normal eye movement. No redness, no drainage, no discharge. EARS: Normal external ears with no obvious drainage. NOSE: No deformity and no drainage. MOUTH AND THROAT: No visible oral lesion. NECK: Neck is supple. No JVD, no thyromegaly. CHEST: Few expiratory wheezes. No crackles. HEART: Normal S1 and S2, irregularly irregular. No clinically   appreciable murmur. ABDOMEN: Soft, obese, nontender, normal bowel sounds. CENTRAL NERVOUS SYSTEM: Alert, oriented x3. No gross focal   neurological deficits. EXTREMITIES: No edema and pulses 2+ bilaterally. DIAGNOSTIC DATA: EKG shows atrial fibrillation and a nonspecific   ST and T-wave abnormality. CT scan of the head without contrast   negative for acute pathology. LABORATORY DATA: Chemistry: Sodium 138, potassium 3.3,   chloride 101, CO2 27, glucose 118, BUN 18, creatinine 1.09, calcium   8.1, bilirubin total 0.3, ALT 13, AST 19, alkaline phosphatase 95, total   protein 6.8, albumin level 2.6. Cardiac profile: Troponin less than 0.04. Coagulation profile: INR 1.2, PT 12.6, PTT 27.6. Hematology: WBC   10.3, hemoglobin 11.6, hematocrit 36.5, platelets at 500. ASSESSMENT   1. Syncope. 2. Persistent atrial fibrillation. 3. Hypokalemia. 4. Obstructive sleep apnea. 5. Fall. 6. Type 2 diabetes. 7. Asthma. 8. Depression. 9. Morbid obesity. PLAN   1. Syncope: Will admit the patient for further evaluation and treatment. Will obtain an MRI, MRA of the brain, carotid Doppler of the neck, as   well as an echocardiogram to evaluate the patient for stroke as a   possible cause of her syncope. Will also obtain an EEG to evaluate the   patient for atypical seizure. Neurology consult will be requested to   assist in evaluation and treatment. Will check cardiac markers to rule   out acute myocardial infarction as a possible cause of the syncope. The patient is already on Coumadin for atrial fibrillation.  Because of   that, we will not evaluate the patient for thromboembolism as a   possible cause of her syncope. Will check a TSH level. 2. Persistent atrial fibrillation: Will resume her preadmission medication   including digoxin for rate control. Will check a digoxin level. 3. Hypokalemia: Will replace her potassium. Will repeat a potassium   level. Will also check magnesium level. 4. Obstructive sleep apnea: Will continue with CPAP with home   setting. 5. Fall: The patient has had multiple falls since she was discharged   from the hospital 18 days ago. CT scan of the head is negative. Will   check a CT scan of the cervical spine and pelvis for fracture. 6. Type 2 diabetes: Will resume her preadmission medication. The   patient will also be placed on sliding scale. Will check hemoglobin A1c   level. 7. Asthma: Will resume her home medication. 8. Depression: Will continue with the preadmission medication. 9. Morbid obesity: Dietary consult will be requested for dietary   counseling. OTHER ISSUES   CODE STATUS: The patient is a FULL CODE. The patient is already on Coumadin. Because of that, there is no need   for DVT prophylaxis with Lovenox.         MD ELIDA Ugarte / Ines Bray   D:  05/20/2017   01:09   T:  05/20/2017   02:31   Job #:  [id number]

## 2017-05-20 NOTE — CONSULTS
Consult dictated. Syncope most likely due to orthostatic drop in BP. If w/u negative may DC home.   Kate Lopez MD

## 2017-05-20 NOTE — ROUTINE PROCESS
Patient left on cardiac monitor from ED to inpatient bed, accompanied by RN. Patient's VS at the time of transfer were /56, 99 on RA, denies pain at this time, 98.0 orally, HR 90 rhythm on the monitor. Patient was alert and oriented x 4 and denies further needs at time of transfer. Patient's family went home prior to transfer to floor. TRANSFER - OUT REPORT:    Verbal report given to [facility], RN(name) on [place]  being transferred to NSTU(unit) for routine progression of care       Report consisted of patients Situation, Background, Assessment and   Recommendations(SBAR). Information from the following report(s) SBAR, Kardex, ED Summary, [facility] and Recent Results was reviewed with the receiving nurse. Opportunity for questions and clarification was provided. 0210: Pt transported to CT. Will transport patient to NSTU after CT.

## 2017-05-21 PROBLEM — S93.402A LEFT ANKLE SPRAIN: Status: ACTIVE | Noted: 2017-05-21

## 2017-05-21 PROBLEM — I95.9 HYPOTENSION: Status: ACTIVE | Noted: 2017-05-21

## 2017-05-21 LAB
ANION GAP BLD CALC-SCNC: 8 MMOL/L (ref 5–15)
ATRIAL RATE: 182 BPM
ATRIAL RATE: 96 BPM
BASOPHILS # BLD AUTO: 0 K/UL (ref 0–0.1)
BASOPHILS # BLD: 0 % (ref 0–1)
BUN SERPL-MCNC: 18 MG/DL (ref 6–20)
BUN/CREAT SERPL: 16 (ref 12–20)
CALCIUM SERPL-MCNC: 8.6 MG/DL (ref 8.5–10.1)
CALCULATED R AXIS, ECG10: -37 DEGREES
CALCULATED R AXIS, ECG10: -43 DEGREES
CALCULATED T AXIS, ECG11: 111 DEGREES
CALCULATED T AXIS, ECG11: 84 DEGREES
CHLORIDE SERPL-SCNC: 100 MMOL/L (ref 97–108)
CO2 SERPL-SCNC: 29 MMOL/L (ref 21–32)
CREAT SERPL-MCNC: 1.11 MG/DL (ref 0.55–1.02)
DIAGNOSIS, 93000: NORMAL
DIAGNOSIS, 93000: NORMAL
EOSINOPHIL # BLD: 0.2 K/UL (ref 0–0.4)
EOSINOPHIL NFR BLD: 2 % (ref 0–7)
ERYTHROCYTE [DISTWIDTH] IN BLOOD BY AUTOMATED COUNT: 15.4 % (ref 11.5–14.5)
GLUCOSE BLD STRIP.AUTO-MCNC: 222 MG/DL (ref 65–100)
GLUCOSE BLD STRIP.AUTO-MCNC: 291 MG/DL (ref 65–100)
GLUCOSE BLD STRIP.AUTO-MCNC: 295 MG/DL (ref 65–100)
GLUCOSE BLD STRIP.AUTO-MCNC: 364 MG/DL (ref 65–100)
GLUCOSE SERPL-MCNC: 231 MG/DL (ref 65–100)
HCT VFR BLD AUTO: 36.1 % (ref 35–47)
HGB BLD-MCNC: 11.4 G/DL (ref 11.5–16)
INR PPP: 1.3 (ref 0.9–1.1)
LYMPHOCYTES # BLD AUTO: 17 % (ref 12–49)
LYMPHOCYTES # BLD: 1.5 K/UL (ref 0.8–3.5)
MCH RBC QN AUTO: 28.1 PG (ref 26–34)
MCHC RBC AUTO-ENTMCNC: 31.6 G/DL (ref 30–36.5)
MCV RBC AUTO: 89.1 FL (ref 80–99)
MONOCYTES # BLD: 1 K/UL (ref 0–1)
MONOCYTES NFR BLD AUTO: 11 % (ref 5–13)
NEUTS SEG # BLD: 6.4 K/UL (ref 1.8–8)
NEUTS SEG NFR BLD AUTO: 70 % (ref 32–75)
PLATELET # BLD AUTO: 252 K/UL (ref 150–400)
POTASSIUM SERPL-SCNC: 3.9 MMOL/L (ref 3.5–5.1)
PROTHROMBIN TIME: 12.8 SEC (ref 9–11.1)
Q-T INTERVAL, ECG07: 362 MS
Q-T INTERVAL, ECG07: 370 MS
QRS DURATION, ECG06: 100 MS
QRS DURATION, ECG06: 98 MS
QTC CALCULATION (BEZET), ECG08: 442 MS
QTC CALCULATION (BEZET), ECG08: 455 MS
RBC # BLD AUTO: 4.05 M/UL (ref 3.8–5.2)
SERVICE CMNT-IMP: ABNORMAL
SODIUM SERPL-SCNC: 137 MMOL/L (ref 136–145)
VENTRICULAR RATE, ECG03: 90 BPM
VENTRICULAR RATE, ECG03: 91 BPM
WBC # BLD AUTO: 9.2 K/UL (ref 3.6–11)

## 2017-05-21 PROCEDURE — 74011636637 HC RX REV CODE- 636/637: Performed by: INTERNAL MEDICINE

## 2017-05-21 PROCEDURE — 85610 PROTHROMBIN TIME: CPT | Performed by: INTERNAL MEDICINE

## 2017-05-21 PROCEDURE — 74011250637 HC RX REV CODE- 250/637: Performed by: INTERNAL MEDICINE

## 2017-05-21 PROCEDURE — 74011000250 HC RX REV CODE- 250: Performed by: INTERNAL MEDICINE

## 2017-05-21 PROCEDURE — 77030029684 HC NEB SM VOL KT MONA -A

## 2017-05-21 PROCEDURE — 94640 AIRWAY INHALATION TREATMENT: CPT

## 2017-05-21 PROCEDURE — 3331090001 HH PPS REVENUE CREDIT

## 2017-05-21 PROCEDURE — 65660000000 HC RM CCU STEPDOWN

## 2017-05-21 PROCEDURE — 85025 COMPLETE CBC W/AUTO DIFF WBC: CPT | Performed by: INTERNAL MEDICINE

## 2017-05-21 PROCEDURE — 82962 GLUCOSE BLOOD TEST: CPT

## 2017-05-21 PROCEDURE — 3331090002 HH PPS REVENUE DEBIT

## 2017-05-21 PROCEDURE — 80048 BASIC METABOLIC PNL TOTAL CA: CPT | Performed by: INTERNAL MEDICINE

## 2017-05-21 PROCEDURE — 36415 COLL VENOUS BLD VENIPUNCTURE: CPT | Performed by: INTERNAL MEDICINE

## 2017-05-21 PROCEDURE — 94660 CPAP INITIATION&MGMT: CPT

## 2017-05-21 RX ORDER — WARFARIN 4 MG/1
4 TABLET ORAL ONCE
Status: COMPLETED | OUTPATIENT
Start: 2017-05-21 | End: 2017-05-21

## 2017-05-21 RX ORDER — INSULIN LISPRO 100 [IU]/ML
10 INJECTION, SOLUTION INTRAVENOUS; SUBCUTANEOUS ONCE
Status: COMPLETED | OUTPATIENT
Start: 2017-05-21 | End: 2017-05-21

## 2017-05-21 RX ADMIN — Medication 10 ML: at 22:00

## 2017-05-21 RX ADMIN — GABAPENTIN 300 MG: 300 CAPSULE ORAL at 21:20

## 2017-05-21 RX ADMIN — INSULIN LISPRO 3 UNITS: 100 INJECTION, SOLUTION INTRAVENOUS; SUBCUTANEOUS at 22:26

## 2017-05-21 RX ADMIN — DIGOXIN 0.25 MG: 250 TABLET ORAL at 08:51

## 2017-05-21 RX ADMIN — ACETAMINOPHEN 650 MG: 325 TABLET, FILM COATED ORAL at 06:52

## 2017-05-21 RX ADMIN — Medication 10 ML: at 14:19

## 2017-05-21 RX ADMIN — GABAPENTIN 300 MG: 300 CAPSULE ORAL at 18:23

## 2017-05-21 RX ADMIN — SERTRALINE HYDROCHLORIDE 100 MG: 50 TABLET ORAL at 08:52

## 2017-05-21 RX ADMIN — BUDESONIDE 500 MCG: 0.5 INHALANT RESPIRATORY (INHALATION) at 20:06

## 2017-05-21 RX ADMIN — POTASSIUM CHLORIDE 20 MEQ: 750 TABLET, FILM COATED, EXTENDED RELEASE ORAL at 08:52

## 2017-05-21 RX ADMIN — DILTIAZEM HYDROCHLORIDE 360 MG: 180 CAPSULE, EXTENDED RELEASE ORAL at 08:52

## 2017-05-21 RX ADMIN — MONTELUKAST SODIUM 10 MG: 10 TABLET, FILM COATED ORAL at 08:52

## 2017-05-21 RX ADMIN — INSULIN LISPRO 10 UNITS: 100 INJECTION, SOLUTION INTRAVENOUS; SUBCUTANEOUS at 18:22

## 2017-05-21 RX ADMIN — Medication 10 ML: at 05:16

## 2017-05-21 RX ADMIN — INSULIN LISPRO 5 UNITS: 100 INJECTION, SOLUTION INTRAVENOUS; SUBCUTANEOUS at 14:17

## 2017-05-21 RX ADMIN — GABAPENTIN 300 MG: 300 CAPSULE ORAL at 08:52

## 2017-05-21 RX ADMIN — DOCUSATE SODIUM 100 MG: 100 CAPSULE, LIQUID FILLED ORAL at 08:51

## 2017-05-21 RX ADMIN — VITAMIN D, TAB 1000IU (100/BT) 2000 UNITS: 25 TAB at 08:52

## 2017-05-21 RX ADMIN — INSULIN LISPRO 3 UNITS: 100 INJECTION, SOLUTION INTRAVENOUS; SUBCUTANEOUS at 08:51

## 2017-05-21 RX ADMIN — BUDESONIDE 500 MCG: 0.5 INHALANT RESPIRATORY (INHALATION) at 08:38

## 2017-05-21 RX ADMIN — WARFARIN SODIUM 4 MG: 4 TABLET ORAL at 18:23

## 2017-05-21 RX ADMIN — ARFORMOTEROL TARTRATE 15 MCG: 15 SOLUTION RESPIRATORY (INHALATION) at 20:43

## 2017-05-21 RX ADMIN — DOCUSATE SODIUM 100 MG: 100 CAPSULE, LIQUID FILLED ORAL at 18:23

## 2017-05-21 RX ADMIN — THERA TABS 1 TABLET: TAB at 08:52

## 2017-05-21 RX ADMIN — ARFORMOTEROL TARTRATE 15 MCG: 15 SOLUTION RESPIRATORY (INHALATION) at 08:38

## 2017-05-21 NOTE — PROGRESS NOTES
Bedside and Verbal shift change report given to Sarah Taylor RN (oncoming nurse) by Michelle Sharif RN (offgoing nurse). Report included the following information SBAR, Kardex, Intake/Output, MAR and Recent Results.

## 2017-05-21 NOTE — PROGRESS NOTES
[facility] [place], [facility]    Admit Date: 5/19/2017    Subjective:     Pt says she is probably feeling a little better. No new complaints. MRI/MRA brain unremarkable, carotid dopplers OK, and EEG unremarkable. L ankle Xray suggests a sprain, but no fx.         Current Facility-Administered Medications   Medication Dose Route Frequency    warfarin (COUMADIN) tablet 4 mg  4 mg Oral ONCE    albuterol (PROVENTIL VENTOLIN) nebulizer solution 2.5 mg  2.5 mg Nebulization QID PRN    ALPRAZolam (XANAX) tablet 0.25-0.5 mg  0.25-0.5 mg Oral DAILY PRN    dilTIAZem CD (CARDIZEM CD) capsule 360 mg  360 mg Oral DAILY    cholecalciferol (VITAMIN D3) tablet 2,000 Units  2,000 Units Oral DAILY    digoxin (LANOXIN) tablet 0.25 mg  0.25 mg Oral DAILY    gabapentin (NEURONTIN) capsule 300 mg  300 mg Oral TID    montelukast (SINGULAIR) tablet 10 mg  10 mg Oral DAILY    potassium chloride SR (KLOR-CON 10) tablet 20 mEq  20 mEq Oral DAILY    sertraline (ZOLOFT) tablet 100 mg  100 mg Oral DAILY    therapeutic multivitamin (THERAGRAN) tablet 1 Tab  1 Tab Oral DAILY    sodium chloride (NS) flush 5-10 mL  5-10 mL IntraVENous Q8H    sodium chloride (NS) flush 5-10 mL  5-10 mL IntraVENous PRN    acetaminophen (TYLENOL) tablet 650 mg  650 mg Oral Q4H PRN    ondansetron (ZOFRAN) injection 4 mg  4 mg IntraVENous Q4H PRN    docusate sodium (COLACE) capsule 100 mg  100 mg Oral BID    insulin lispro (HUMALOG) injection   SubCUTAneous AC&HS    glucose chewable tablet 16 g  4 Tab Oral PRN    glucagon (GLUCAGEN) injection 1 mg  1 mg IntraMUSCular PRN    dextrose 10 % infusion 125-250 mL  125-250 mL IntraVENous PRN    arformoterol (BROVANA) neb solution 15 mcg  15 mcg Nebulization BID RT    And    budesonide (PULMICORT) 500 mcg/2 ml nebulizer suspension  500 mcg Nebulization BID RT    Warfarin - Pharmacy dosing   Other Rx Dosing/Monitoring          Objective:     Patient Vitals for the past 8 hrs:   BP Temp Pulse Resp SpO2 Height Weight   05/21/17 0852 111/53 - 86 - - - -   05/21/17 0840 - - - - 99 % - -   05/21/17 0700 126/62 98.3 °F (36.8 °C) 70 18 99 % - -   05/21/17 0300 132/55 98.5 °F (36.9 °C) 77 21 96 % 5' 6\" (1.676 m) 276 lb 3.8 oz (125.3 kg)             Physical Exam: NAD. A&O. Neck -- Supple. No JVD. Heart -- Irr Irr (Rate OK). Lungs -- CTA. Abd -- Benign. Ext -- Trace to 1+ LE edema, b/l. Some L ankle swelling. Data Review   Recent Results (from the past 24 hour(s))   GLUCOSE, POC    Collection Time: 05/20/17 12:02 PM   Result Value Ref Range    Glucose (POC) 219 (H) 65 - 100 mg/dL    Performed by Kaushal Canales    GLUCOSE, POC    Collection Time: 05/20/17  5:01 PM   Result Value Ref Range    Glucose (POC) 312 (H) 65 - 100 mg/dL    Performed by Kaushal Canales    GLUCOSE, POC    Collection Time: 05/20/17 10:28 PM   Result Value Ref Range    Glucose (POC) 322 (H) 65 - 100 mg/dL    Performed by Devante Garza    CBC WITH AUTOMATED DIFF    Collection Time: 05/21/17  2:53 AM   Result Value Ref Range    WBC 9.2 3.6 - 11.0 K/uL    RBC 4.05 3.80 - 5.20 M/uL    HGB 11.4 (L) 11.5 - 16.0 g/dL    HCT 36.1 35.0 - 47.0 %    MCV 89.1 80.0 - 99.0 FL    MCH 28.1 26.0 - 34.0 PG    MCHC 31.6 30.0 - 36.5 g/dL    RDW 15.4 (H) 11.5 - 14.5 %    PLATELET 203 485 - 620 K/uL    NEUTROPHILS 70 32 - 75 %    LYMPHOCYTES 17 12 - 49 %    MONOCYTES 11 5 - 13 %    EOSINOPHILS 2 0 - 7 %    BASOPHILS 0 0 - 1 %    ABS. NEUTROPHILS 6.4 1.8 - 8.0 K/UL    ABS. LYMPHOCYTES 1.5 0.8 - 3.5 K/UL    ABS. MONOCYTES 1.0 0.0 - 1.0 K/UL    ABS. EOSINOPHILS 0.2 0.0 - 0.4 K/UL    ABS.  BASOPHILS 0.0 0.0 - 0.1 K/UL   METABOLIC PANEL, BASIC    Collection Time: 05/21/17  2:53 AM   Result Value Ref Range    Sodium 137 136 - 145 mmol/L    Potassium 3.9 3.5 - 5.1 mmol/L    Chloride 100 97 - 108 mmol/L    CO2 29 21 - 32 mmol/L    Anion gap 8 5 - 15 mmol/L    Glucose 231 (H) 65 - 100 mg/dL    BUN 18 6 - 20 MG/DL    Creatinine 1.11 (H) 0.55 - 1.02 MG/DL    BUN/Creatinine ratio 16 12 - 20      GFR est AA 58 (L) >60 ml/min/1.73m2    GFR est non-AA 48 (L) >60 ml/min/1.73m2    Calcium 8.6 8.5 - 10.1 MG/DL   PROTHROMBIN TIME + INR    Collection Time: 05/21/17  2:53 AM   Result Value Ref Range    INR 1.3 (H) 0.9 - 1.1      Prothrombin time 12.8 (H) 9.0 - 11.1 sec   GLUCOSE, POC    Collection Time: 05/21/17  7:35 AM   Result Value Ref Range    Glucose (POC) 222 (H) 65 - 100 mg/dL    Performed by Giselle Marr            Assessment:     Principal Problem:    Hypotension, probably as the cause of the syncope      Active Problems:    Type 2 diabetes mellitus without complication ([facility]) (8/71/2501)      Chronic atrial fibrillation ([facility] [address] (7/17/2015)      Hypertension complicating diabetes ([facility] [address] (2/20/2017)      Pneumonia with MRSA -- Finished IV Ancef recently. Syncope and collapse (5/20/2017)      Left ankle sprain (5/21/2017)      Recent problems with her \"legs giving out\"      Plan:     1. Continue to hold Lasix. Hopefully, her edema will not worsen. 2. Pt has an appt with neurology on Tuesday for evaluation of her legs recurrently \"giving out\". ... Neurology following here -- ?further w/u here? ..... ? L-spine MRI?  ?EMG? 3. Pharmacy dosing coumadin for chronic atrial fibrillation. 4. PT.  5. Conservative mgmt (ice, etc) to L ankle. 6. I think pt would benefit from some inpatient rehab at discharge. .... Consult [facility]. Considering pt's multiple active medical issues, I think pt fits criteria for inpatient status.       Geneva Dominguez MD

## 2017-05-21 NOTE — PROCEDURES
[address] [facility] [place] [address], [address]   EEG       Name:  Onelia Burdick   MR#:  [id number]   :  1945   Account #:  [de-identified]    Date of Procedure:  2017   Date of Adm:  2017       DATE OF SERVICE: 2017. REQUESTING PHYSICIAN: Jocelynn Le MD.    HISTORY: The patient is a 76-year-old female who has been   evaluated after an episode of syncope. DESCRIPTION: This is an 18 channel EEG performed on an awake   and drowsy patient. During wakefulness the dominant background   rhythm consists of symmetric wave, well modulated, medium voltage   rhythms in the 9 Hz frequency range in the posterior head region, reactive   to eye opening and closure. Drowsiness is characterized by slowing and   vertex waves. Photic stimulation elicits a symmetric driving response,   hypoventilation was not performed. EEG SUMMARY: Normal study. CLINICAL INTERPRETATION: This was a normal EEG during awake   and drowsy states in the patient. No lateralizing or epileptiform features   were noted.         MD PADMINI Marroquin / CYN   D:  2017   16:01   T:  2017   07:14   Job #:  [id number]

## 2017-05-21 NOTE — PROGRESS NOTES
Pharmacist Note - Warfarin Dosing  Consult provided for this 67 y. o.female to manage warfarin for Atrial Fibrillation    INR Goal: 2 - 3    Home regimen/ tablet size: 2.5 mg 5xweek     Drugs that may increase INR: None  Drugs that may decrease INR: None  Other current anticoagulants/ drugs that may increase bleeding risk: None  Risk factors: Age > 65  Daily INR ordered: YES    Recent Labs      05/21/17   0253  05/20/17   0348  05/19/17   1957   HGB  11.4*  11.6  11.6   INR  1.3*   --   1.2*     Date               INR                  Dose  5/19  1.2  --  5/20                 --                     4 mg   5/21                 1.3                   4 mg                                                                               Assessment/ Plan: Will order warfarin 4 mg PO x 1 dose. Pharmacy will continue to monitor daily and adjust therapy as indicated. Please contact the pharmacist at  for outpatient recommendations if needed.

## 2017-05-21 NOTE — PROGRESS NOTES
Bedside shift change report given to Carmen Morris RN (oncoming nurse) by Ajay Burciaga RN (offgoing nurse). Report included the following information SBAR, Kardex, ED Summary, Procedure Summary, Intake/Output, MAR, Accordion, Recent Results, Med Rec Status and Cardiac Rhythm A-Fib.

## 2017-05-22 ENCOUNTER — APPOINTMENT (OUTPATIENT)
Dept: MRI IMAGING | Age: 72
DRG: 312 | End: 2017-05-22
Attending: PSYCHIATRY & NEUROLOGY
Payer: MEDICARE

## 2017-05-22 ENCOUNTER — HOME CARE VISIT (OUTPATIENT)
Dept: HOME HEALTH SERVICES | Facility: HOME HEALTH | Age: 72
End: 2017-05-22
Payer: MEDICARE

## 2017-05-22 PROBLEM — M51.04 THORACIC DISC DISORDER WITH MYELOPATHY: Status: ACTIVE | Noted: 2017-05-22

## 2017-05-22 PROBLEM — R26.9 GAIT ABNORMALITY: Status: ACTIVE | Noted: 2017-05-22

## 2017-05-22 LAB
GLUCOSE BLD STRIP.AUTO-MCNC: 221 MG/DL (ref 65–100)
GLUCOSE BLD STRIP.AUTO-MCNC: 246 MG/DL (ref 65–100)
GLUCOSE BLD STRIP.AUTO-MCNC: 289 MG/DL (ref 65–100)
GLUCOSE BLD STRIP.AUTO-MCNC: 304 MG/DL (ref 65–100)
INR PPP: 1.3 (ref 0.9–1.1)
PROTHROMBIN TIME: 13.2 SEC (ref 9–11.1)
SERVICE CMNT-IMP: ABNORMAL

## 2017-05-22 PROCEDURE — 82962 GLUCOSE BLOOD TEST: CPT

## 2017-05-22 PROCEDURE — 72146 MRI CHEST SPINE W/O DYE: CPT

## 2017-05-22 PROCEDURE — 36415 COLL VENOUS BLD VENIPUNCTURE: CPT | Performed by: INTERNAL MEDICINE

## 2017-05-22 PROCEDURE — 85610 PROTHROMBIN TIME: CPT | Performed by: INTERNAL MEDICINE

## 2017-05-22 PROCEDURE — 74011000250 HC RX REV CODE- 250: Performed by: INTERNAL MEDICINE

## 2017-05-22 PROCEDURE — 3331090001 HH PPS REVENUE CREDIT

## 2017-05-22 PROCEDURE — 74011636637 HC RX REV CODE- 636/637: Performed by: INTERNAL MEDICINE

## 2017-05-22 PROCEDURE — 94660 CPAP INITIATION&MGMT: CPT

## 2017-05-22 PROCEDURE — 74011250637 HC RX REV CODE- 250/637: Performed by: INTERNAL MEDICINE

## 2017-05-22 PROCEDURE — 65660000000 HC RM CCU STEPDOWN

## 2017-05-22 PROCEDURE — 3331090002 HH PPS REVENUE DEBIT

## 2017-05-22 PROCEDURE — 94640 AIRWAY INHALATION TREATMENT: CPT

## 2017-05-22 RX ORDER — WARFARIN SODIUM 5 MG/1
5 TABLET ORAL ONCE
Status: COMPLETED | OUTPATIENT
Start: 2017-05-22 | End: 2017-05-22

## 2017-05-22 RX ORDER — FUROSEMIDE 40 MG/1
20 TABLET ORAL DAILY
Qty: 270 TAB | Refills: 3 | Status: SHIPPED
Start: 2017-05-22 | End: 2017-08-30 | Stop reason: SDUPTHER

## 2017-05-22 RX ORDER — WARFARIN SODIUM 5 MG/1
2.5 TABLET ORAL DAILY
Qty: 30 TAB | Refills: 2 | Status: SHIPPED
Start: 2017-05-22 | End: 2019-10-26 | Stop reason: SDUPTHER

## 2017-05-22 RX ADMIN — ACETAMINOPHEN 650 MG: 325 TABLET, FILM COATED ORAL at 17:22

## 2017-05-22 RX ADMIN — DIGOXIN 0.25 MG: 250 TABLET ORAL at 08:19

## 2017-05-22 RX ADMIN — BUDESONIDE 500 MCG: 0.5 INHALANT RESPIRATORY (INHALATION) at 21:20

## 2017-05-22 RX ADMIN — VITAMIN D, TAB 1000IU (100/BT) 2000 UNITS: 25 TAB at 08:23

## 2017-05-22 RX ADMIN — INSULIN LISPRO 7 UNITS: 100 INJECTION, SOLUTION INTRAVENOUS; SUBCUTANEOUS at 11:31

## 2017-05-22 RX ADMIN — INSULIN LISPRO 3 UNITS: 100 INJECTION, SOLUTION INTRAVENOUS; SUBCUTANEOUS at 08:31

## 2017-05-22 RX ADMIN — ARFORMOTEROL TARTRATE 15 MCG: 15 SOLUTION RESPIRATORY (INHALATION) at 21:20

## 2017-05-22 RX ADMIN — GABAPENTIN 300 MG: 300 CAPSULE ORAL at 22:36

## 2017-05-22 RX ADMIN — DILTIAZEM HYDROCHLORIDE 360 MG: 180 CAPSULE, EXTENDED RELEASE ORAL at 08:23

## 2017-05-22 RX ADMIN — INSULIN LISPRO 3 UNITS: 100 INJECTION, SOLUTION INTRAVENOUS; SUBCUTANEOUS at 17:22

## 2017-05-22 RX ADMIN — THERA TABS 1 TABLET: TAB at 08:20

## 2017-05-22 RX ADMIN — HUMAN INSULIN 30 UNITS: 100 INJECTION, SUSPENSION SUBCUTANEOUS at 08:31

## 2017-05-22 RX ADMIN — GABAPENTIN 300 MG: 300 CAPSULE ORAL at 17:22

## 2017-05-22 RX ADMIN — POTASSIUM CHLORIDE 20 MEQ: 750 TABLET, FILM COATED, EXTENDED RELEASE ORAL at 08:20

## 2017-05-22 RX ADMIN — Medication 10 ML: at 06:00

## 2017-05-22 RX ADMIN — DOCUSATE SODIUM 100 MG: 100 CAPSULE, LIQUID FILLED ORAL at 08:23

## 2017-05-22 RX ADMIN — DOCUSATE SODIUM 100 MG: 100 CAPSULE, LIQUID FILLED ORAL at 17:22

## 2017-05-22 RX ADMIN — BUDESONIDE 500 MCG: 0.5 INHALANT RESPIRATORY (INHALATION) at 10:01

## 2017-05-22 RX ADMIN — INSULIN LISPRO 3 UNITS: 100 INJECTION, SOLUTION INTRAVENOUS; SUBCUTANEOUS at 22:48

## 2017-05-22 RX ADMIN — MONTELUKAST SODIUM 10 MG: 10 TABLET, FILM COATED ORAL at 08:23

## 2017-05-22 RX ADMIN — ARFORMOTEROL TARTRATE 15 MCG: 15 SOLUTION RESPIRATORY (INHALATION) at 10:01

## 2017-05-22 RX ADMIN — HUMAN INSULIN 20 UNITS: 100 INJECTION, SUSPENSION SUBCUTANEOUS at 18:00

## 2017-05-22 RX ADMIN — Medication 5 ML: at 14:00

## 2017-05-22 RX ADMIN — SERTRALINE HYDROCHLORIDE 100 MG: 50 TABLET ORAL at 08:19

## 2017-05-22 RX ADMIN — Medication 10 ML: at 22:36

## 2017-05-22 RX ADMIN — WARFARIN SODIUM 5 MG: 5 TABLET ORAL at 17:22

## 2017-05-22 RX ADMIN — GABAPENTIN 300 MG: 300 CAPSULE ORAL at 08:23

## 2017-05-22 NOTE — PROGRESS NOTES
[facility]    Admit Date: 5/19/2017    Subjective:       No new complaints.           Current Facility-Administered Medications   Medication Dose Route Frequency    insulin NPH (NOVOLIN N, HUMULIN N) injection 30 Units  30 Units SubCUTAneous DAILY    insulin NPH (NOVOLIN N, HUMULIN N) injection 20 Units  20 Units SubCUTAneous QPM    albuterol (PROVENTIL VENTOLIN) nebulizer solution 2.5 mg  2.5 mg Nebulization QID PRN    ALPRAZolam (XANAX) tablet 0.25-0.5 mg  0.25-0.5 mg Oral DAILY PRN    dilTIAZem CD (CARDIZEM CD) capsule 360 mg  360 mg Oral DAILY    cholecalciferol (VITAMIN D3) tablet 2,000 Units  2,000 Units Oral DAILY    digoxin (LANOXIN) tablet 0.25 mg  0.25 mg Oral DAILY    gabapentin (NEURONTIN) capsule 300 mg  300 mg Oral TID    montelukast (SINGULAIR) tablet 10 mg  10 mg Oral DAILY    potassium chloride SR (KLOR-CON 10) tablet 20 mEq  20 mEq Oral DAILY    sertraline (ZOLOFT) tablet 100 mg  100 mg Oral DAILY    therapeutic multivitamin (THERAGRAN) tablet 1 Tab  1 Tab Oral DAILY    sodium chloride (NS) flush 5-10 mL  5-10 mL IntraVENous Q8H    sodium chloride (NS) flush 5-10 mL  5-10 mL IntraVENous PRN    acetaminophen (TYLENOL) tablet 650 mg  650 mg Oral Q4H PRN    ondansetron (ZOFRAN) injection 4 mg  4 mg IntraVENous Q4H PRN    docusate sodium (COLACE) capsule 100 mg  100 mg Oral BID    insulin lispro (HUMALOG) injection   SubCUTAneous AC&HS    glucose chewable tablet 16 g  4 Tab Oral PRN    glucagon (GLUCAGEN) injection 1 mg  1 mg IntraMUSCular PRN    dextrose 10 % infusion 125-250 mL  125-250 mL IntraVENous PRN    arformoterol (BROVANA) neb solution 15 mcg  15 mcg Nebulization BID RT    And    budesonide (PULMICORT) 500 mcg/2 ml nebulizer suspension  500 mcg Nebulization BID RT    Warfarin - Pharmacy dosing   Other Rx Dosing/Monitoring          Objective:     Patient Vitals for the past 8 hrs:   BP Temp Pulse Resp SpO2 Weight   05/22/17 0513 114/44 98.4 °F (36.9 °C) 87 22 95 % -   05/22/17 0235 - - 81 - 96 % 274 lb 0.5 oz (124.3 kg)        05/20 1901 - 05/22 0700  In: 240 [P.O.:240]  Out: -     Physical Exam: NAD. A&O. Neck -- Supple. No JVD. Heart -- Irr Irr (Rate OK). Lungs -- CTA. Abd -- Benign. Ext -- Trace to 1+ LE edema, b/l. Some L ankle swelling. Data Review   Recent Results (from the past 24 hour(s))   GLUCOSE, POC    Collection Time: 05/21/17 11:15 AM   Result Value Ref Range    Glucose (POC) 291 (H) 65 - 100 mg/dL    Performed by Mattie Palacios    GLUCOSE, POC    Collection Time: 05/21/17  5:35 PM   Result Value Ref Range    Glucose (POC) 364 (H) 65 - 100 mg/dL    Performed by Delmer Beaver    GLUCOSE, POC    Collection Time: 05/21/17 10:20 PM   Result Value Ref Range    Glucose (POC) 295 (H) 65 - 100 mg/dL    Performed by [address] [place] [address] + INR    Collection Time: 05/22/17  5:22 AM   Result Value Ref Range    INR 1.3 (H) 0.9 - 1.1      Prothrombin time 13.2 (H) 9.0 - 11.1 sec   GLUCOSE, POC    Collection Time: 05/22/17  7:37 AM   Result Value Ref Range    Glucose (POC) 221 (H) 65 - 100 mg/dL    Performed by Jael Escalera            Assessment:     Principal Problem:    Hypotension, probably as the cause of the syncope      Active Problems:    Type 2 diabetes mellitus without complication ([facility] [address] (9/46/7305)      Chronic atrial fibrillation ([facility] [address] (7/17/2015)      Hypertension complicating diabetes ([facility] [address] (2/20/2017)      Pneumonia with MRSA -- Finished IV Ancef recently. Syncope and collapse (5/20/2017)      Left ankle sprain (5/21/2017)      Recent problems with her \"legs giving out\"      Plan:     1. At discharge, will resume Lasix at only 20 mg every day. Hopefully, her edema will not worsen.   2. Pt has an appt with neurology on Tuesday for evaluation of her legs recurrently \"giving out\". ... Neurology following here -- ?further w/u here? ..... ? L-spine MRI?  ?EMG? 3. Pharmacy dosing coumadin for chronic atrial fibrillation. 4. PT.  5. Conservative mgmt (ice, etc) to L ankle. 6. I think pt would benefit from some inpatient rehab at discharge. .... Consult [facility]. She can be discharged today. Considering pt's multiple active medical issues, I think pt fits criteria for inpatient status.       Elliott Waldron MD

## 2017-05-22 NOTE — ROUTINE PROCESS
Bedside and Verbal shift change report given to Chelsea Ling RN (oncoming nurse) by Angelique Stroud RN (offgoing nurse). Report included the following information SBAR, Kardex, Intake/Output, MAR and Recent Results.

## 2017-05-22 NOTE — DIABETES MGMT
DTC Progress Note    Recommendations/ Comments: Chart reviewed due to hyperglycemia. Blood sugars 221-364 mg/dl and pt have required 14 units of correction insulin over the last 24 hours. Noted NPH 30 units in the morning and 20 units in the evening started. DTC to continue to follow. Chart reviewed on [address]. Patient is a 67 y.o. female with known diabetes on Jardiance 25 mg, Humulin 35 units in the morning and 25 units in the evening and Regular insulin sliding scale at home. A1c:   Lab Results   Component Value Date/Time    Hemoglobin A1c 10.0 05/20/2017 03:48 AM    Hemoglobin A1c 10.4 06/11/2015 03:57 PM       Recent Glucose Results:   Lab Results   Component Value Date/Time    GLUCPOC 221 (H) 05/22/2017 07:37 AM    GLUCPOC 295 (H) 05/21/2017 10:20 PM    GLUCPOC 364 (H) 05/21/2017 05:35 PM        Lab Results   Component Value Date/Time    Creatinine 1.11 05/21/2017 02:53 AM     Estimated Creatinine Clearance: 60.8 mL/min (based on Cr of 1.11). Active Orders   Diet    DIET DIABETIC CONSISTENT CARB Regular        PO intake: No data found. Current hospital DM medication: NPH 30 units in the morning and 20 units in the evening and correction scale Humalog, normal sensitivity. Will continue to follow as needed.     Thank you  Traci Turner RD, CDE

## 2017-05-22 NOTE — INTERDISCIPLINARY ROUNDS
IDR/SLIDR Summary          Patient: Quin Sousa MRN: [id number]    Age: 67 y.o. YOB: 1945 Room/Bed: [facility]   Admit Diagnosis: Syncope  Principal Diagnosis: Hypotension   Goals: Rehab eval  Readmission: YES  Quality Measure: Not applicable  VTE Prophylaxis: Chemical  Influenza Vaccine screening completed? YES  Pneumococcal Vaccine screening completed? YES  Mobility needs: Yes   Nutrition plan:Yes  Consults:P.T, O.T. and Case Management    Financial concerns:Yes  Escalated to CM? YES  RRAT Score: 21  Testing due for pt today?  YES  LOS: 2 days Expected length of stay 2 days  Discharge plan: Rehab   PCP: Godwin Otero MD  Transportation needs: Yes    Days before discharge:ready for discharge  Discharge disposition: Rehab    Signed:     Kamlesh Soler RN  5/22/2017  09:00 AM

## 2017-05-22 NOTE — PROGRESS NOTES
Jose Herrera is a 67 y.o. female admitted after an episode of brief syncope. This was likely due to hypotension. She has had walking difficulty for the past 3 years and this has been progressively getting worse. Had an MRI in 2015 on thoracic spine that showed T10-T11 disc with mass effect on spinal cord. C spine surgery did not help much with walking difficulty. EXAM  Exam:  Visit Vitals    /55 (BP 1 Location: Right arm, BP Patient Position: At rest)    Pulse 93    Temp 98.6 °F (37 °C)    Resp 22    Ht 5' 6\" (1.676 m)    Wt 124.3 kg (274 lb 0.5 oz)    SpO2 95%    Breastfeeding No    BMI 44.23 kg/m2     Gen:Alert  CV: RRR  Lungs: non labored breathing  Abd: non distending  Neuro: A&O x 3, no dysarthria or aphasia  CN II-XII: PERRL, EOMI, face symmetric, tongue/palate midline  Motor: strength 5/5 all four ext. Sensory: intact to LT. Mild impaired vibration. Gait: Ataxic. Walks with a walker. LABS/ IMAGING  MRI Results (most recent):    Results from Hospital Encounter encounter on 05/19/17   MRA BRAIN WO CONT   Narrative CLINICAL HISTORY: Syncope, altered mental status     INDICATION: syncope. COMPARISON:  5/19/2017      CORRELATION:  NA      TECHNIQUE: MR examination of the brain includes axial and sagittal T1, axial T2,  axial FLAIR, axial gradient echo, axial DWI, coronal T2. Coronal T2    Contrast:   None    Next,  3-D time-of-flight MRA of the brain was performed. Multiplanar reconstructions  were obtained. FINDINGS:     There is no intracranial mass, hemorrhage or evidence of acute infarction. Sublenticular cyst left basal ganglia. Minimal sulcal and ventricular  prominence. There is no Chiari or syrinx. The pituitary and infundibulum are grossly  unremarkable. There is no skull base mass. Cerebellopontine angles are grossly  unremarkable. The major intracranial vascular flow-voids are unremarkable. The  cavernous sinuses are symmetric.  Optic chiasm and infundibulum grossly  unremarkable. Orbits are grossly symmetric. Dural venous sinuses are grossly  patent. The brain architecture is normal. There is no evidence of midline shift or  mass-effect. There are no extra-axial fluid collections. The mastoid air cells and are well pneumatized and clear. The paranasal sinuses are clear. The A2 segments are within normal limits. The A1 segment on the right is  hypoplastic. M1 segments within normal limits. Symmetric arborization. Tiny  posterior to indicating artery on the left. The P1 segments are widely patent. The right vertebral artery is slightly larger than the left. . The basilar artery  and its branches are normal. The internal carotid, anterior cerebral, and middle  cerebral arteries are patent. There is no flow-limiting intracranial stenosis. There is no aneurysm. Impression IMPRESSION:   Normal MRI of the brain for patient age. No intracranial mass, hemorrhage or evidence of acute infarction. No aneurysm, dissection or evidence of hemodynamically significant stenosis. ASSESSMENT  Hospital Problems  Date Reviewed: 5/20/2017          Codes Class Noted POA    Thoracic disc disorder with myelopathy ICD-10-CM: M51.04  ICD-9-CM: 722.72  5/22/2017 Unknown        Gait abnormality ICD-10-CM: R26.9  ICD-9-CM: 781.2  5/22/2017 Unknown        Syncope and collapse ICD-10-CM: R55  ICD-9-CM: 780.2  5/20/2017 Yes               PLAN  Gait abnormality is likely multifactorial-body habitus, mild diabetic peripheral neuropathy and possibly thoracic myelopathy due to DDD. Was evaluated by Dr. Ramos Singh in 2015 and had C spine surgery. Will repeat MRI T-spine and have ortho reevaluate re: possible myelopathy causing progressively worsening gait.   There is coexistent diabetic peripheral neuropathy but it should not be causing ataxia to this degree with fairly intact sensory exam.  EMG can be done as OP    Lukasz Room, MD

## 2017-05-22 NOTE — PROGRESS NOTES
Occupational Therapy Note 1419    Orders acknowledged, chart reviewed. Attempted to see patient, currently off the unit to MRI. Will follow. Thank you.     Hardeep Fam, OTR/SHA

## 2017-05-22 NOTE — PROGRESS NOTES
Problem: Discharge Planning  Goal: *Discharge to safe environment  Outcome: Progressing Towards Goal  Discharge to inpatient rehab/ Snf

## 2017-05-22 NOTE — PROGRESS NOTES
Transition RN to the Bedside to assist Primary RN with patient education, medication educations, discharge instructions, and stroke core measure compliance. Discharge concerns initiated and discussed with patient, including clarification on \"who\" assists the patient at their home and instructions for when the home going patient should call their provider after discharge. Opportunity for questions and clarification was provided. Patient receptive to education: YES  Patient stated: im here for low blood pressure  Barriers to Education: none  Diagnosis Education given:  YES    Length of stay: 2  Expected Day of Discharge: today  Ask if they have \"Help at Home\" & add to white board?   YES    Education Day #: 1    Medication Education Given:  YES  M in the box Medication name: coreg, lasix      Stroke Education documented in Patient Education: NO  Core Measures Documented in [facility]:  Risk Factors: NO  Warning signs of stroke: NO  When to Activate 911: NO  Medication Education for Risk Factors: NO  Smoking cessation if applicable: NO  Written Education Given:  NO    Discharge NIH Completed: NO  Score: n/a    BRAINS: NO    Follow Up Appointment Made: yes  Date/Time if applicable: see avs

## 2017-05-22 NOTE — PROGRESS NOTES
CM reviewed chart and noted patient came to ED after passing out at home--second episode of syncope in a week  Patient has hx of as-fib hypertension diabetic neuropathy, asthma, obstructive sleep apnea (CPAP) and diabetes. PCP is Dr Tory Aguilar and he is following patient in the hospital.  Patient AMD in chart  --daughters--Enma [phone] and Srinath Thorne [phone] are MPOA. CM met with patient in her room to introduce self and explain role. Patient was alert and oriented and confirmed the above-  Patient lives in a one level apartment with elevator to the apt on the second floor. Patient is self care and independent prior to admission-- she does continue to drive. She has rollator for mobility. Has CPAP at home as well. Daughter, Olivia Dumont lives in [place] and sister in [place] from [place],  for a short time to assist patient. Her daughter Srinath Thorne lives in [place]. Patient is retired (  in [address] [place] [address]) and moved to [place] 2013 to be closer to her daughters. She has medicare and BCBS supplement insurance. Secures medications at [facility]. Therapy is recommending rehab when discharged. She would like to go to [facility] location. Referral sent to the facility via [employer]. [place] of choice letter completed and placed in chart. Enedelia Crowley is the liaison --[phone] and CM talked with her to confirm the referral received. [facility] liaison will be reviewing medicals  [phone]. If patient is not accepted to inpatient rehab, Snf referrals will be sent out. Patient did not want to discuss choice at this point. CM will follow and assist with transition of care plan-- rehab. Will need ambulance for transport. Care Management Interventions  PCP Verified by CM:  Yes  Palliative Care Consult (Criteria: CHF and RRAT>21): No  Mode of Transport at Discharge:  (TBD)  Transition of Care Consult (CM Consult): Discharge Planning  Physical Therapy Consult: Yes  Occupational Therapy Consult: Yes  Current Support Network: Lives Alone (lives alone in one levle apt-- elevator to second floor.   AMD scanned in chart.  good family suppport-- independent -self care prior to admission)  Confirm Follow Up Transport:  (family and self when able)  Plan discussed with Pt/Family/Caregiver: Yes  Freedom of Choice Offered: Yes (completed freedom of choice letter in chart)  Discharge Location  Discharge Placement:  (rehab--inpatient rehab or Snf)

## 2017-05-22 NOTE — PROGRESS NOTES
Pharmacist Note - Warfarin Dosing  Consult provided for this 67 y. o.female to manage warfarin for Atrial Fibrillation    INR Goal: 2 - 3    Home regimen/ tablet size: 2.5 mg 5 x week    Drugs that may increase INR: None  Drugs that may decrease INR: None  Other current anticoagulants/ drugs that may increase bleeding risk: None  Risk factors: Age > 65  Daily INR ordered: YES    Recent Labs      05/22/17   0522  05/21/17   0253  05/20/17   0348  05/19/17 1957   HGB   --   11.4*  11.6  11.6   INR  1.3*  1.3*   --   1.2*     Date               INR                  Dose  5/19  1.2  --   5/20  --  4 mg  5/21  1.3  4 mg  5/22  1.3  5 mg                                                                                 Assessment/ Plan: Will order warfarin 5 mg PO x 1 dose. Pharmacy will continue to monitor daily and adjust therapy as indicated. Please contact the pharmacist at  for outpatient recommendations if needed.

## 2017-05-23 ENCOUNTER — APPOINTMENT (OUTPATIENT)
Dept: MRI IMAGING | Age: 72
DRG: 312 | End: 2017-05-23
Attending: PHYSICIAN ASSISTANT
Payer: MEDICARE

## 2017-05-23 ENCOUNTER — APPOINTMENT (OUTPATIENT)
Dept: GENERAL RADIOLOGY | Age: 72
DRG: 312 | End: 2017-05-23
Attending: RADIOLOGY
Payer: MEDICARE

## 2017-05-23 ENCOUNTER — APPOINTMENT (OUTPATIENT)
Dept: CT IMAGING | Age: 72
DRG: 312 | End: 2017-05-23
Attending: INTERNAL MEDICINE
Payer: MEDICARE

## 2017-05-23 ENCOUNTER — APPOINTMENT (OUTPATIENT)
Dept: ULTRASOUND IMAGING | Age: 72
DRG: 312 | End: 2017-05-23
Attending: INTERNAL MEDICINE
Payer: MEDICARE

## 2017-05-23 LAB
APPEARANCE FLD: ABNORMAL
COLOR FLD: ABNORMAL
EOSINOPHIL # FLD: 2 %
GLUCOSE BLD STRIP.AUTO-MCNC: 179 MG/DL (ref 65–100)
GLUCOSE BLD STRIP.AUTO-MCNC: 208 MG/DL (ref 65–100)
GLUCOSE BLD STRIP.AUTO-MCNC: 223 MG/DL (ref 65–100)
GLUCOSE BLD STRIP.AUTO-MCNC: 293 MG/DL (ref 65–100)
GLUCOSE FLD-MCNC: 144 MG/DL
INR PPP: 1.4 (ref 0.9–1.1)
LDH FLD L TO P-CCNC: 1012 U/L
LYMPHOCYTES NFR FLD: 4 %
MONOS+MACROS NFR FLD: 25 %
NEUTS SEG NFR FLD: 69 %
NUC CELL # FLD: 333 /CU MM (ref 0–5)
PROT FLD-MCNC: 2.7 G/DL
PROTHROMBIN TIME: 14 SEC (ref 9–11.1)
RBC # FLD: >100 /CU MM
SERVICE CMNT-IMP: ABNORMAL
SPECIMEN SOURCE FLD: ABNORMAL
SPECIMEN SOURCE FLD: NORMAL

## 2017-05-23 PROCEDURE — 71010 XR CHEST PORT: CPT

## 2017-05-23 PROCEDURE — 84157 ASSAY OF PROTEIN OTHER: CPT | Performed by: INTERNAL MEDICINE

## 2017-05-23 PROCEDURE — 97535 SELF CARE MNGMENT TRAINING: CPT

## 2017-05-23 PROCEDURE — 72148 MRI LUMBAR SPINE W/O DYE: CPT

## 2017-05-23 PROCEDURE — 71250 CT THORAX DX C-: CPT

## 2017-05-23 PROCEDURE — 87205 SMEAR GRAM STAIN: CPT | Performed by: INTERNAL MEDICINE

## 2017-05-23 PROCEDURE — 77030012390 HC DRN CHST BTL GTNG -B

## 2017-05-23 PROCEDURE — C1729 CATH, DRAINAGE: HCPCS

## 2017-05-23 PROCEDURE — 82962 GLUCOSE BLOOD TEST: CPT

## 2017-05-23 PROCEDURE — 88112 CYTOPATH CELL ENHANCE TECH: CPT | Performed by: INTERNAL MEDICINE

## 2017-05-23 PROCEDURE — 89050 BODY FLUID CELL COUNT: CPT | Performed by: INTERNAL MEDICINE

## 2017-05-23 PROCEDURE — 65660000000 HC RM CCU STEPDOWN

## 2017-05-23 PROCEDURE — 83615 LACTATE (LD) (LDH) ENZYME: CPT | Performed by: INTERNAL MEDICINE

## 2017-05-23 PROCEDURE — 36415 COLL VENOUS BLD VENIPUNCTURE: CPT | Performed by: INTERNAL MEDICINE

## 2017-05-23 PROCEDURE — 77030005208 HC CATH HLDR GLWC -A

## 2017-05-23 PROCEDURE — 74011636637 HC RX REV CODE- 636/637: Performed by: INTERNAL MEDICINE

## 2017-05-23 PROCEDURE — 87116 MYCOBACTERIA CULTURE: CPT | Performed by: INTERNAL MEDICINE

## 2017-05-23 PROCEDURE — 74011000250 HC RX REV CODE- 250: Performed by: INTERNAL MEDICINE

## 2017-05-23 PROCEDURE — 32555 ASPIRATE PLEURA W/ IMAGING: CPT

## 2017-05-23 PROCEDURE — 74011250637 HC RX REV CODE- 250/637: Performed by: INTERNAL MEDICINE

## 2017-05-23 PROCEDURE — 97166 OT EVAL MOD COMPLEX 45 MIN: CPT

## 2017-05-23 PROCEDURE — 94660 CPAP INITIATION&MGMT: CPT

## 2017-05-23 PROCEDURE — 82945 GLUCOSE OTHER FLUID: CPT | Performed by: INTERNAL MEDICINE

## 2017-05-23 PROCEDURE — 72141 MRI NECK SPINE W/O DYE: CPT

## 2017-05-23 PROCEDURE — 85610 PROTHROMBIN TIME: CPT | Performed by: INTERNAL MEDICINE

## 2017-05-23 PROCEDURE — 0W9930Z DRAINAGE OF RIGHT PLEURAL CAVITY WITH DRAINAGE DEVICE, PERCUTANEOUS APPROACH: ICD-10-PCS | Performed by: RADIOLOGY

## 2017-05-23 PROCEDURE — 94640 AIRWAY INHALATION TREATMENT: CPT

## 2017-05-23 RX ORDER — SODIUM CHLORIDE 0.9 % (FLUSH) 0.9 %
SYRINGE (ML) INJECTION
Status: DISPENSED
Start: 2017-05-23 | End: 2017-05-24

## 2017-05-23 RX ORDER — WARFARIN SODIUM 5 MG/1
5 TABLET ORAL ONCE
Status: COMPLETED | OUTPATIENT
Start: 2017-05-23 | End: 2017-05-23

## 2017-05-23 RX ADMIN — GABAPENTIN 300 MG: 300 CAPSULE ORAL at 17:52

## 2017-05-23 RX ADMIN — BUDESONIDE 500 MCG: 0.5 INHALANT RESPIRATORY (INHALATION) at 22:27

## 2017-05-23 RX ADMIN — ARFORMOTEROL TARTRATE 15 MCG: 15 SOLUTION RESPIRATORY (INHALATION) at 22:27

## 2017-05-23 RX ADMIN — DIGOXIN 0.25 MG: 250 TABLET ORAL at 09:05

## 2017-05-23 RX ADMIN — GABAPENTIN 300 MG: 300 CAPSULE ORAL at 21:13

## 2017-05-23 RX ADMIN — THERA TABS 1 TABLET: TAB at 09:05

## 2017-05-23 RX ADMIN — DOCUSATE SODIUM 100 MG: 100 CAPSULE, LIQUID FILLED ORAL at 08:56

## 2017-05-23 RX ADMIN — Medication 10 ML: at 14:00

## 2017-05-23 RX ADMIN — Medication 10 ML: at 06:00

## 2017-05-23 RX ADMIN — SERTRALINE HYDROCHLORIDE 100 MG: 50 TABLET ORAL at 09:05

## 2017-05-23 RX ADMIN — POTASSIUM CHLORIDE 20 MEQ: 750 TABLET, FILM COATED, EXTENDED RELEASE ORAL at 09:05

## 2017-05-23 RX ADMIN — BUDESONIDE 500 MCG: 0.5 INHALANT RESPIRATORY (INHALATION) at 08:39

## 2017-05-23 RX ADMIN — GABAPENTIN 300 MG: 300 CAPSULE ORAL at 09:05

## 2017-05-23 RX ADMIN — WARFARIN SODIUM 5 MG: 5 TABLET ORAL at 17:52

## 2017-05-23 RX ADMIN — ARFORMOTEROL TARTRATE 15 MCG: 15 SOLUTION RESPIRATORY (INHALATION) at 08:39

## 2017-05-23 RX ADMIN — INSULIN LISPRO 5 UNITS: 100 INJECTION, SOLUTION INTRAVENOUS; SUBCUTANEOUS at 12:32

## 2017-05-23 RX ADMIN — Medication 10 ML: at 22:00

## 2017-05-23 RX ADMIN — ALPRAZOLAM 0.25 MG: 0.25 TABLET ORAL at 21:13

## 2017-05-23 RX ADMIN — INSULIN LISPRO 3 UNITS: 100 INJECTION, SOLUTION INTRAVENOUS; SUBCUTANEOUS at 17:52

## 2017-05-23 RX ADMIN — HUMAN INSULIN 20 UNITS: 100 INJECTION, SUSPENSION SUBCUTANEOUS at 17:53

## 2017-05-23 RX ADMIN — VITAMIN D, TAB 1000IU (100/BT) 2000 UNITS: 25 TAB at 09:05

## 2017-05-23 RX ADMIN — HUMAN INSULIN 30 UNITS: 100 INJECTION, SUSPENSION SUBCUTANEOUS at 09:03

## 2017-05-23 RX ADMIN — DOCUSATE SODIUM 100 MG: 100 CAPSULE, LIQUID FILLED ORAL at 17:52

## 2017-05-23 RX ADMIN — INSULIN LISPRO 3 UNITS: 100 INJECTION, SOLUTION INTRAVENOUS; SUBCUTANEOUS at 09:03

## 2017-05-23 RX ADMIN — DILTIAZEM HYDROCHLORIDE 360 MG: 180 CAPSULE, EXTENDED RELEASE ORAL at 08:56

## 2017-05-23 RX ADMIN — MONTELUKAST SODIUM 10 MG: 10 TABLET, FILM COATED ORAL at 09:05

## 2017-05-23 NOTE — CONSULTS
Initial Pulmonary / Critical Care Consultation    Assessment / Plan:      1. R pleural effusion - possible parapneumonic with recent staph pneumonia   2. Afib  3. LITA - on nocturnal cpap    --ultrasound thoracentesis - diagnostic and therapeutic  --if INR of 1.4 too high will need to hold coumadin but hopefully not    D/w Dr Gustavo Oakes    History / Subjective:  Reason:  Pleural effusion  Requesting Provider:  Dr Iline Sandhoff is a 67 y.o.  female who  has a past medical history of Asthma; Atrial fibrillation ([facility] Depression; DM (diabetes mellitus) ([facility] HTN (hypertension); Hyperlipidemia; Morbid obesity ([facility] Neuropathy; LITA on CPAP; and Other ill-defined conditions (pneumonia). admitted 5/19/2017 with passing out. Has a h/o afib and lita on cpap had recent staph pneumonia  Chest CT shows a R pleural effusion and chronic granulomatous process. She reports some right sided chest discomfort and shortness of breath. Denies hemoptysis. No fevers or chills. Echo EF 55-60% no WMA and normal RV size and function      Allergies   Allergen Reactions    Latex Itching    Codeine Rash    Lisinopril Other (comments)     Cough, vomiting, Visual disturbances    Morphine Itching    Statins-Hmg-Coa Reductase Inhibitors Other (comments)     Muscle enzyme problems     Past Medical History:   Diagnosis Date    Asthma     Atrial fibrillation ([facility]     Depression     DM (diabetes mellitus) ([facility]     HTN (hypertension)     Hyperlipidemia     Morbid obesity ([facility]     Neuropathy     LITA on CPAP     Other ill-defined conditions pneumonia      Past Surgical History:   Procedure Laterality Date    HX APPENDECTOMY      HX CHOLECYSTECTOMY      HX GASTRIC BYPASS      Jejunal bypass with subsequent reversal..  Lab Band since    HX OTHER SURGICAL  D & C    HX EMILIANO AND BSO      For uterine CA      Prior to Admission medications    Medication Sig Start Date End Date Taking?  Authorizing Provider furosemide (LASIX) 40 mg tablet Take 0.5 Tabs by mouth daily. 5/22/17  Yes Indy Sanchez MD   warfarin (COUMADIN) 5 mg tablet Take 1 Tab by mouth daily. 5/22/17  Yes Indy Sanchez MD   ADVAIR DISKUS 100-50 mcg/dose diskus inhaler USE 1 INHALATION BY MOUTH TWICE DAILY 5/7/17  Yes JOSHUA Sanchez MD   potassium chloride SR (KLOR-CON 10) 10 mEq tablet Take 20 mEq by mouth daily. Yes Historical Provider   digoxin (LANOXIN) 0.25 mg tablet Take 1 Tab by mouth daily. 3/29/17  Yes Indy Sanchez MD   sertraline (ZOLOFT) 100 mg tablet TAKE 1 TABLET BY MOUTH EVERY EVENING 3/13/17  Yes Indy Sanchez MD   gabapentin (NEURONTIN) 300 mg capsule TAKE ONE CAPSULE BY MOUTH THREE TIMES DAILY 3/13/17  Yes JOSHUA Sanchez MD   empagliflozin (JARDIANCE) 25 mg tablet Take 12.5 mg by mouth daily. Yes Historical Provider   montelukast (SINGULAIR) 10 mg tablet Take 10 mg by mouth daily. Yes Historical Provider   omalizumab Keyshawn Axon) 150 mg solr 150 mg by SubCUTAneous route every fourteen (14) days. Next dose due 2/17   Yes Historical Provider   tiotropium (SPIRIVA WITH HANDIHALER) 18 mcg inhalation capsule Take 1 Cap by inhalation daily. Yes Historical Provider   diclofenac (VOLTAREN) 1 % gel Apply  to affected area four (4) times daily. 11/4/16  Yes Indy Sanchez MD   HUMULIN N 100 unit/mL injection INJECT 35 UNITS UNDER THE SKIN IN THE MORNING AND INJECT 25 UNITS AT BEDTIME OR AS DIRECTED 10/9/16  Yes JOSHUA Sanchez MD   albuterol (PROVENTIL VENTOLIN) 2.5 mg /3 mL (0.083 %) nebulizer solution 3 mL by Nebulization route four (4) times daily as needed for Wheezing or Shortness of Breath. 10/8/16  Yes Iron Richardson MD   therapeutic multivitamin [facility]) tablet Take 1 Tab by mouth daily.    Yes Historical Provider   CARTIA  mg ER capsule TAKE 3 CAPSULES BY MOUTH DAILY 5/24/16  Yes JOSHUA Sanchez MD   insulin regular (NOVOLIN R, HUMULIN R) 100 unit/mL injection Sliding scale: For -300 use 4 units, 301-400 use 8 units, greater than 400 use 12 units, greater than 500 call MD. 8/14/15  Yes Jacqueline Plata MD   PROVENTIL HFA 90 mcg/actuation inhaler inhale 2 puffs four times a day if needed for wheezing 1/17/15  Yes J Jacqueline Plata MD   cholecalciferol, vitamin D3, (VITAMIN D3) 2,000 unit tab Take 2,000 Units by mouth daily. Yes Historical Provider   DOCOSAHEXANOIC ACID/EPA (FISH OIL PO) Take 1 Cap by mouth daily. Yes Historical Provider   predniSONE (DELTASONE) 10 mg tablet Take 4t QD for 4d, then 3t QD for 4d, then 2t QD for 4d, then 1t QD for 4d. 17   Jacqueline Plata MD   ALPRAZolam Nestor Graces) 0.5 mg tablet Take 0.25-0.5 mg by mouth daily as needed for Anxiety. Historical Provider      Family History   Problem Relation Age of Onset    Stroke Mother     Diabetes Other     Heart Disease Other     Heart Disease Father      congestive heart failure     Social History   Substance Use Topics    Smoking status: Former Smoker     Quit date: 1992    Smokeless tobacco: Not on file    Alcohol use 1.2 oz/week     2 Glasses of wine per week      Comment: occ wine      ROS:  A comprehensive review of systems was negative except for that written in the HPI.     Objective:  Patient Vitals for the past 4 hrs:   BP Temp Pulse Resp SpO2   17 1100 115/48 99.4 °F (37.4 °C) 88 21 99 %     Temp (24hrs), Av.6 °F (37 °C), Min:97.9 °F (36.6 °C), Max:99.4 °F (37.4 °C)    CVP:          Intake/Output Summary (Last 24 hours) at 17 1403  Last data filed at 17 2237   Gross per 24 hour   Intake              360 ml   Output                0 ml   Net              360 ml     Blood Sugar:  Glucose   Date Value Ref Range Status   2017 231 (H) 65 - 100 mg/dL Final   2017 153 (H) 65 - 100 mg/dL Final   2017 118 (H) 65 - 100 mg/dL Final   2017 213 (H) 65 - 100 mg/dL Final   2017 241 (H) 65 - 100 mg/dL Final Exam:  Stocky  No distress  No accessory use  MMM  Anicteric  Diminished bs R base  RRR  Soft   Warm and dry    Lab data was reviewed. Radiology images were independently viewed and available reports were reviewed.     Chest CT - borderline adenopathy calcified nodes c/w chronic granulomatous process and moderate R effusion    Lab:  Recent Labs      05/23/17 0222 05/22/17 0522 05/21/17   0253   WBC   --    --   9.2   HGB   --    --   11.4*   PLT   --    --   252   NA   --    --   137   K   --    --   3.9   CL   --    --   100   CO2   --    --   29   BUN   --    --   18   CREA   --    --   1.11*   GLU   --    --   231*   CA   --    --   8.6   INR  1.4*  1.3*  1.3*     All Micro Results     None            Arya Holman MD

## 2017-05-23 NOTE — PROGRESS NOTES
Chest CT shows increasing R pleural effusion compared to 3 weeks ago. ... I would have expected this to have improved (or at least not worsened), since her recent admission for MSSA PNA. Will ask pulmonary to f/u -- ? Diagnostic thoracentesis?

## 2017-05-23 NOTE — PROGRESS NOTES
CM was informed by [place] [facility] liaison [phone], that patient has been evaluated by Dr Macy Ferrera, physician with [facility], for acceptance to inpatient rehab. At this point she is recommending Snf placement. . If ortho determines patient will need surgery, she will reevaluate patient for admission. Note--patient has cervical fusion in 2015. CM met with patient, daughter, Andrea Burns [phone], and sister, [facility]  in patient's room to inform her of this. . All understood and chose [facility]  [phone] for Snf if patient is discharged without having surgery. Referral sent via CC link to the facility. [place] of choice letter completed and placed in chart    Daughter confirmed that she is looking for another apartment for patient-- she looked at [employer] in the [place] [facility] and is going to [place] as patient can receive meals at both independent livings as well as van service to physician appointments and to grocery store. CM provided daughter with card for [employer], ,  who assist with finding housing for the elderly.       CM will follow patient for completion of discharge plan-- inpatient rehab (if patient has surgery and reevaluation by [facility])  or Snf.

## 2017-05-23 NOTE — CONSULTS
ORTHO CONSULT NOTE    NAME: Yanira Ann   :  1945   MRN:  [id number]   DATE:             2017    CHIEF COMPLAINT: I keep passing out    SUBJECTIVE:  Patient presented to the ED with a complaint of 2 syncopal episodes in the past week. Recent history of pneumonia with sepsis. The last episode involved her reaching up to a kitchen cabinet. She became lightheaded and collapsed onto the kitchen floor. She stated she felt her legs go out. She was out for a couple of seconds per her sister who observed the episode. Ms. Mary Ann Lim is known to us having undergone an anterior cervical fusion C4-6 in . She has done well from that. She was last seen in our office 2016 for a complaint of low back pain. Patient is lying in the bed with the head of the bed elevated and appears well. Bilateral low back pain. No leg pain or numbness, but describes a sense of heaviness in the legs bilaterally. She states she has been unsteady in her gait for some time. No arm pain or numbness. She denies bowel/bladder incontinence, weakness. Afebrile/VSS. Lab Results   Component Value Date/Time    HGB 11.4 2017 02:53 AM       Recent Labs      17   0222   17   0253   HGB   --    --   11.4*   HCT   --    --   36.1   INR  1.4*   < >  1.3*   NA   --    --   137   K   --    --   3.9   CL   --    --   100   CO2   --    --   29   BUN   --    --   18   CREA   --    --   1.11*   GLU   --    --   231*    < > = values in this interval not displayed.      Patient Vitals for the past 12 hrs:   BP Temp Pulse Resp SpO2 Weight   17 0938 (!) 137/37 - 87 - - -   17 0932 152/44 - 86 - - -   17 0903 116/51 - 98 - - -   17 0856 - - 98 - - -   17 0841 - - - - 98 % -   17 0659 119/54 98 °F (36.7 °C) 85 18 96 % -   17 0239 114/49 97.9 °F (36.6 °C) 85 22 98 % 123.6 kg (272 lb 7.8 oz)   17 - - - - 98 % -     EXAM:  Actively moving bilateral upper and lower extremities  Bilateral lower extremity motor function is considered normal and 5/5 in all major muscle groups including hip flexors, extensors, adductors, abductors, quadricpes, and knee flexors. Ankle dorsiflexion, plantar flexion, and eversion demonstrate 5/5 strength. Bilateral upper extremity motor function is considered normal and 5/5 in all major muscle groups including biceps, triceps, deltoid, supraspinatus, hand intrinsics and wrist flexors/extensors. PLAN:  T- spine MRI IMPRESSION:   1. Moderate-sized central disc protrusion T10-11 slightly indents the ventral   spinal cord with equivocal intramedullary edema at this level. 2. Complex probably loculated right-sided pleural effusion.   MRI C-spine and L-spine   Treatment plan will follow accordingly     Vin Hernandez PA-C

## 2017-05-23 NOTE — INTERDISCIPLINARY ROUNDS
IDR/SLIDR Summary          Patient: Mary Ann Becerra MRN: [id number]    Age: 67 y.o. YOB: 1945 Room/Bed: [facility]   Admit Diagnosis: Syncope  Principal Diagnosis: <principal problem not specified>   Goals:PT/OT  Readmission: NO  Quality Measure: Not applicable  VTE Prophylaxis: Chemical  Influenza Vaccine screening completed? YES  Pneumococcal Vaccine screening completed? YES  Mobility needs: Yes   Nutrition plan:No  Consults: P. T and O.T. Financial concerns:No  Escalated to CM? NO  RRAT Score: 21   Interventions:  Testing due for pt today?  YES  LOS: 3 days Expected length of stay 4 days  Discharge plan: Rehab   PCP: Michael Boone MD  Transportation needs: Yes    Days before discharge:one day until discharge   Discharge disposition: Rehab    Signed:     Rani Cuellar RN  5/23/2017  90:00 AM

## 2017-05-23 NOTE — DIABETES MGMT
DTC Progress Note    Recommendations/ Comments: Chart reviewed due to hyperglycemia. Blood sugars 208-289 mg/dl and pt have required 21 units of correction insulin over the last 24 hours. PLease obtain PO intake    If appropriate, consider:  - If pt's PO intake > 50%, consider adding Humalog 5 units AC   - Increasing NPH AM dose to 35 units and PM dose to 25 units  - Changing correction insulin to insulin resistant scale    [facility] Patient is a 67 y.o. female with known diabetes on Jardiance 25 mg, Humulin 35 units in the morning and 25 units in the evening and Regular insulin sliding scale at home. A1c:   Lab Results   Component Value Date/Time    Hemoglobin A1c 10.0 05/20/2017 03:48 AM    Hemoglobin A1c 10.4 06/11/2015 03:57 PM       Recent Glucose Results:   Lab Results   Component Value Date/Time    GLUCPOC 293 (H) 05/23/2017 11:41 AM    GLUCPOC 208 (H) 05/23/2017 07:16 AM    GLUCPOC 289 (H) 05/22/2017 09:35 PM        Lab Results   Component Value Date/Time    Creatinine 1.11 05/21/2017 02:53 AM     Estimated Creatinine Clearance: 60.6 mL/min (based on Cr of 1.11). Active Orders   Diet    DIET DIABETIC CONSISTENT CARB Regular        PO intake: No data found. Current hospital DM medication: NPH 30 units in the morning and 20 units in the evening and correction scale Humalog, normal sensitivity. Will continue to follow as needed.     Thank you  Gómez Oh RD

## 2017-05-23 NOTE — PROGRESS NOTES
Problem: Self Care Deficits Care Plan (Adult)  Goal: *Acute Goals and Plan of Care (Insert Text)  Occupational Therapy Goals  Initiated 5/23/2017  1. Patient will perform grooming in standing with no more than 1 seated rest break with modified independence within 7 day(s). 2. Patient will perform lower body dressing with supervision/set-up using AE for energy conservation within 7 day(s). 3. Patient will perform shower transfer with best AE supervision/set-up within 7 day(s). 4. Patient will perform toilet transfers with modified independence within 7 day(s). 5. Patient will perform all aspects of toileting with modified independence within 7 day(s). 6. Patient will participate in upper extremity therapeutic exercise/activities with supervision/set-up for 15 minutes within 7 day(s). 7. Patient will utilize energy conservation techniques during functional activities with min verbal cues within 7 day(s). 8. Patient will stand for greater than 5 minutes in order to increase safety with all functional transfers within 7 days. OCCUPATIONAL THERAPY EVALUATION  Patient: Camille Hogan (67 y.o. female)  Date: 5/23/2017  Primary Diagnosis: Syncope        Precautions:   Fall  Cardiac monitor      ASSESSMENT :  Based on the objective data described below, the patient presents with very poor activity tolerance, 2 falls in the past week at home, general weakness, and impaired mobility all leading to a need for assistance with basic self care. She lives alone in an elevator accessible 2nd floor apartment. She still drives and does all her own IADL but her sister came in from [facility] to help her after she was discharged from the hospital a few weeks ago with pneumonia. She has a daughter who lives in the [facility] area as well. She will require skilled OT to maximize her safety and independence and return her to a Mod I level for all ADL and IADL. She will require rehab before being able to return home.       Patient will benefit from skilled intervention to address the above impairments. Patients rehabilitation potential is considered to be Good  Factors which may influence rehabilitation potential include:   [ ]             None noted  [ ]             Mental ability/status  [X]             Medical condition -multiple issues  [ ]             Home/family situation and support systems  [ ]             Safety awareness  [ ]             Pain tolerance/management  [ ]             Other:        PLAN :  Recommendations and Planned Interventions:  [X]               Self Care Training                  [X]        Therapeutic Activities  [X]               Functional Mobility Training    [ ]        Cognitive Retraining  [X]               Therapeutic Exercises           [X]        Endurance Activities  [ ]               Balance Training                   [ ]        Neuromuscular Re-Education  [ ]               Visual/Perceptual Training     [X]   Home Safety Training  [X]               Patient Education                 [X]        Family Training/Education  [X]               Other (comment): energy conservation     Frequency/Duration: Patient will be followed by occupational therapy 5 times a week to address goals. Discharge Recommendations: Rehab  Further Equipment Recommendations for Discharge: likely will need a shower chair       SUBJECTIVE:   Patient stated I just get so tired.       OBJECTIVE DATA SUMMARY:   HISTORY:   Past Medical History:   Diagnosis Date    Asthma      Atrial fibrillation ([facility]      Depression      [facility]      HTN (hypertension)      Hyperlipidemia      Morbid obesity ([facility]      Neuropathy      LITA on CPAP      Other ill-defined conditions pneumonia     Past Surgical History:   Procedure Laterality Date    HX APPENDECTOMY        HX CHOLECYSTECTOMY        HX GASTRIC BYPASS         Jejunal bypass with subsequent reversal..  Lab Band since    HX OTHER SURGICAL   D & C    HX EMILIANO AND BSO         For uterine CA        Prior Level of Function/Home Situation: independent living at home alone. Expanded or extensive additional review of patient history:  Patient has limits due to habitus, back pain, neuropathy, and recent pnemonia     Home Situation  Home Environment: Apartment (rented [address])  # Steps to Enter: 0  One/Two Story Residence: One story (second floor condo but has elevator)  Living Alone: Yes (sister is staying to help out temporarily)  Support Systems: Child(alina), Family member(s) (sister from [facility], 2 daughters (1 here, 1 in [place]))  Patient Expects to be Discharged to[de-identified] Rehabilitation facility  Current DME Used/Available at Home: Randy Rimes, rollator, CPAP, Grab bars, Cane, quad, Raised toilet seat  Tub or Shower Type: Tub/Shower combination  [X]  Right hand dominant             [ ]  Left hand dominant     EXAMINATION OF PERFORMANCE DEFICITS:  Cognitive/Behavioral Status:  Neurologic State: Alert  Orientation Level: Oriented to person;Oriented to place;Oriented to situation;Oriented to time  Cognition: Follows commands  Perception: Appears intact     Safety/Judgement: Decreased insight into deficits; Decreased awareness of need for assistance     Skin: dry but intact bilateral UE     Edema: none noted in UE     Hearing: Auditory  Auditory Impairment: Hard of hearing, bilateral (right ear is better)     Vision/Perceptual:                      Diplopia: No    Acuity: Able to read clock/calendar on wall without difficulty; Able to read newspaper without difficulty; Able to read employee name badge without difficulty (plans to get cataract surgery)    Corrective Lenses: Glasses     Range of Motion:  Intact bilateral UE                             Strength:  Fair in UE 4/5 generally but fatigues very fast  Strength: Generally decreased, functional (in UE)                 Coordination:  Coordination: Within functional limits (bilateral UE)  Fine Motor Skills-Upper: Left Intact; Right Intact    Gross Motor Skills-Upper: Left Intact; Right Intact     Tone & Sensation:  Normal in UE- she reports no numbness or tingling in arms or hands; neuropathy in feet                             Balance:  Sitting: Intact     Functional Mobility and Transfers for ADLs:  Bed Mobility:  Rolling: Modified independent  Supine to Sit: Modified independent  Sit to Supine: Modified independent (uses an electric bed at home)     Transfers:  Sit to Stand: Contact guard assistance  Toilet Transfer : Minimum assistance     ADL Assessment:  Feeding: Independent     Oral Facial Hygiene/Grooming: Supervision (i9n standing at the sink)     Bathing: Minimum assistance     Upper Body Dressing: Setup     Lower Body Dressing: Minimum assistance     Toileting: Minimum assistance                 ADL Intervention and task modifications:     Educated on fall prevention and strongly encouraged her to call for the nurse for all bathroom transfers. Educated on potential injuries including hitting her head or breaking her arm and how this would complicate her recovery. She verbalized good understanding. Lower Body Dressing Assistance  Socks: Modified independent (frog leg sat on edge of bed )           Cognitive Retraining  Safety/Judgement: Decreased insight into deficits; Decreased awareness of need for assistance     Therapeutic Exercise:  Need a home program     Functional Measure:  Functional Reach:      Completed:  [X] standing       [ ] seated           Average: 10.83         Functional Reach Test and G-code impairment scale:    For inches: Measure in cm and divide by 2.54  Percentage of Impairment CH     0%    CI     1-19% CJ     20-39% CK     40-59% CL     60-79% CM     80-99% CN      100%   Functional Reach  Score 15-25 cm 25 24 22-23 20-21 18-19 16-17 < 15   Functional Reach  Score 6-10 in 10           < 6           Functional reach: (standing)  Reaches £  6 in = High Fall Risk  Reaches 6-10 in = Moderate Fall Risk Reaches ³  10 in = Low Fall Risk  Flakito Estrella et al. Functional reach: a new clinical measure of balance. [facility] Justa Fields; 39: [id number]. Modified Functional Reach: (seated)  Age: Men: Women:   21-39 17.9\" 17.6\"   40-59 17.5\" 15.9\"   60-79 14.4\" 13.2\"   80-97 14.0\" 12.5\"         Vinita Calloway Forward and Lateral Sitting Functional Reach in Younger, Middle-aged, and Older Adults. J Geriatric Phys Ther. 2007; 30:43-48            G codes: In compliance with CMSs Claims Based Outcome Reporting, the following G-code set was chosen for this patient based on their primary functional limitation being treated: The outcome measure chosen to determine the severity of the functional limitation was the Barthel with a score of 10.83\" which was correlated with the impairment scale, but by the third one she was very fatigued and out of breath      · Self Care:               - CURRENT STATUS:    CH - 0% impaired, limited or restricted               - GOAL STATUS:           CH - 0% impaired, limited or restricted               - D/C STATUS:                       ---------------To be determined---------------      Occupational Therapy Evaluation Charge Determination   History Examination Decision-Making   MEDIUM Complexity : Expanded review of history including physical, cognitive and psychosocial  history  MEDIUM Complexity : 3-5 performance deficits relating to physical, cognitive , or psychosocial skils that result in activity limitations and / or participation restrictions MEDIUM Complexity : Patient may present with comorbidities that affect occupational performnce.  Miniml to moderate modification of tasks or assistance (eg, physical or verbal ) with assesment(s) is necessary to enable patient to complete evaluation       Based on the above components, the patient evaluation is determined to be of the following complexity level: MEDIUM  Pain:  Pain Scale 1: Numeric (0 - 10)  Pain Intensity 1: 0              Activity Tolerance:   Poor- gets short of breath very quickly  Please refer to the flowsheet for vital signs taken during this treatment. After treatment:   [ ] Patient left in no apparent distress sitting up in chair  [X] Patient left in no apparent distress in bed  [X] Call bell left within reach  [X] Nursing notified  [ ] Caregiver present  [ ] Bed alarm activated      COMMUNICATION/EDUCATION:   The patients plan of care was discussed with: Physical Therapist and Registered Nurse.  [X] Home safety education was provided and the patient/caregiver indicated understanding. [X] Patient/family have participated as able in goal setting and plan of care. [X] Patient/family agree to work toward stated goals and plan of care. [ ] Patient understands intent and goals of therapy, but is neutral about his/her participation. [ ] Patient is unable to participate in goal setting and plan of care. This patients plan of care is appropriate for delegation to \[facility] [place] [facility]\"".      Thank you for this referral.  Samara Jesus OT  Time Calculation: 26 mins

## 2017-05-23 NOTE — PROGRESS NOTES
[facility]    Admit Date: 5/19/2017    Subjective:       MRI T-spine shows:  1. Moderate-sized central disc protrusion T10-11 slightly indents the ventral  spinal cord with equivocal intramedullary edema at this level. 2. Complex probably loculated right-sided pleural effusion. No new c/o's.           Current Facility-Administered Medications   Medication Dose Route Frequency    insulin NPH (NOVOLIN N, HUMULIN N) injection 30 Units  30 Units SubCUTAneous DAILY    insulin NPH (NOVOLIN N, HUMULIN N) injection 20 Units  20 Units SubCUTAneous QPM    albuterol (PROVENTIL VENTOLIN) nebulizer solution 2.5 mg  2.5 mg Nebulization QID PRN    ALPRAZolam (XANAX) tablet 0.25-0.5 mg  0.25-0.5 mg Oral DAILY PRN    dilTIAZem CD (CARDIZEM CD) capsule 360 mg  360 mg Oral DAILY    cholecalciferol (VITAMIN D3) tablet 2,000 Units  2,000 Units Oral DAILY    digoxin (LANOXIN) tablet 0.25 mg  0.25 mg Oral DAILY    gabapentin (NEURONTIN) capsule 300 mg  300 mg Oral TID    montelukast (SINGULAIR) tablet 10 mg  10 mg Oral DAILY    potassium chloride SR (KLOR-CON 10) tablet 20 mEq  20 mEq Oral DAILY    sertraline (ZOLOFT) tablet 100 mg  100 mg Oral DAILY    therapeutic multivitamin (THERAGRAN) tablet 1 Tab  1 Tab Oral DAILY    sodium chloride (NS) flush 5-10 mL  5-10 mL IntraVENous Q8H    sodium chloride (NS) flush 5-10 mL  5-10 mL IntraVENous PRN    acetaminophen (TYLENOL) tablet 650 mg  650 mg Oral Q4H PRN    ondansetron (ZOFRAN) injection 4 mg  4 mg IntraVENous Q4H PRN    docusate sodium (COLACE) capsule 100 mg  100 mg Oral BID    insulin lispro (HUMALOG) injection   SubCUTAneous AC&HS    glucose chewable tablet 16 g  4 Tab Oral PRN    glucagon (GLUCAGEN) injection 1 mg  1 mg IntraMUSCular PRN    dextrose 10 % infusion 125-250 mL  125-250 mL IntraVENous PRN    arformoterol (BROVANA) neb solution 15 mcg  15 mcg Nebulization BID RT    And    budesonide (PULMICORT) 500 mcg/2 ml nebulizer suspension  500 mcg Nebulization BID RT    Warfarin - Pharmacy dosing   Other Rx Dosing/Monitoring          Objective:     Patient Vitals for the past 8 hrs:   BP Temp Pulse Resp SpO2 Weight   05/23/17 0239 114/49 97.9 °F (36.6 °C) 85 22 98 % 272 lb 7.8 oz (123.6 kg)   05/23/17 0228 - - - - 98 % -     05/22 1901 - 05/23 0700  In: 360 [P.O.:360]  Out: -   05/21 0701 - 05/22 1900  In: 240 [P.O.:240]  Out: -     Physical Exam: NAD. A&O. Neck -- Supple. No JVD. Heart -- Irr Irr (Rate OK). Lungs -- CTA. Abd -- Benign. Ext -- Trace to 1+ LE edema, b/l. Data Review   Recent Results (from the past 24 hour(s))   GLUCOSE, POC    Collection Time: 05/22/17  7:37 AM   Result Value Ref Range    Glucose (POC) 221 (H) 65 - 100 mg/dL    Performed by Shanita Carrillo    GLUCOSE, POC    Collection Time: 05/22/17 11:26 AM   Result Value Ref Range    Glucose (POC) 304 (H) 65 - 100 mg/dL    Performed by Tuyet Henry    GLUCOSE, POC    Collection Time: 05/22/17  4:25 PM   Result Value Ref Range    Glucose (POC) 246 (H) 65 - 100 mg/dL    Performed by Elmo Bateman    GLUCOSE, POC    Collection Time: 05/22/17  9:35 PM   Result Value Ref Range    Glucose (POC) 289 (H) 65 - 100 mg/dL    Performed by George Butterfield + INR    Collection Time: 05/23/17  2:22 AM   Result Value Ref Range    INR 1.4 (H) 0.9 - 1.1      Prothrombin time 14.0 (H) 9.0 - 11.1 sec           Assessment:     Principal Problem:    Hypotension, probably as the cause of the syncope      Active Problems:    Type 2 diabetes mellitus without complication (HCC) (1/91/8934)      Chronic [facility] fibrillation ([facility] (7/17/2015)      Hypertension complicating diabetes ([facility] (2/20/2017)      Pneumonia with MRSA -- Finished IV Ancef recently.       Syncope and collapse (5/20/2017)      Left ankle sprain (5/21/2017)      Recent problems with her \"legs giving out\"      Plan:     1. Resume Lasix 20 mg every day. Hopefully her edema will not become too bad with this lower dose. 2. Will ask Dr. Benjy Garza to see pt for T-spine finding on MRI -- I am not sure this is much different from a couple of years ago (I believe pt saw Dr. Benjy Garza then); but if it is felt this is contributing to her recent episodes of \"legs giving out,\" then surgical intervention may be appropriate. 3. Pharmacy dosing coumadin for chronic atrial fibrillation. 4. Recheck chest CT for R pleural effusion -- She had recent MSSA PNA there about 3 weeks ago. 5. PT.  6. Conservative mgmt (ice, etc) to L ankle. 7. I think pt would benefit from some inpatient rehab at discharge. .... Consult [facility]. If no surgery planned, could be discharged late today or tomorrow.           Heather Collier MD

## 2017-05-23 NOTE — PROGRESS NOTES
TRANSFER - OUT REPORT:    Verbal report given to Rocio Ardon RN on [facility]  being transferred to [id number] for routine progression of care       Report consisted of patients Situation, Background, Assessment and   Recommendations(SBAR). Information from the following report(s) SBAR and Procedure Summary was reviewed with the receiving nurse. Lines:   Peripheral IV 05/19/17 Left Arm (Active)   Site Assessment Clean, dry, & intact 5/23/2017  4:00 PM   Phlebitis Assessment 0 5/23/2017  4:00 PM   Infiltration Assessment 0 5/23/2017  4:00 PM   Dressing Status Clean, dry, & intact 5/23/2017  4:00 PM   Dressing Type Transparent;Tape 5/23/2017  4:00 PM   Hub Color/Line Status Pink;Capped 5/23/2017  4:00 PM   Action Taken Open ports on tubing capped 5/23/2017  4:00 PM   Alcohol Cap Used Yes 5/23/2017  4:00 PM        Patient transferred to [address] [place] [address],[address] via stretcher for right sided thoracentesis. Less than 5cc of fluid obtained. Dr. Omar Pitts notified by FLROENCE Alexandra of results and Dr. Omar Pitts ordered a chest tube to be placed to pleuravac drainage with recommendation to instill TPA 5/24/17 as ordered by Pulmonary MD. Consult order placed to General Surgery per request Dr. Memo Reeves. Opportunity for questions and clarification was provided.       Patient transported with:   [employer]

## 2017-05-23 NOTE — CONSULTS
PHYSICAL MEDICINE AND REHABILITATION CONSULT      Patient: Doris Hernandez Date: 5/19/2017  Admit Diagnosis: Syncope  Admitting Physician: Nikunj Christianson MD  Referring Physician: Nikunj Christianson MD  Primary Care Physician: Akilah Escudero MD  Treatment Team: Attending Provider: Akilah Escudero MD; Consulting Provider: Nikunj Christianson MD; Consulting Provider: Eri Root MD; Utilization Review: Bahman Collazo RN; Consulting Provider: Akilah Escudero MD; Care Manager: REID Limon; Consulting Provider: Lainey Beranl MD; Consulting Provider: Casi Barnhart MD    Date of Consultation:  May 23, 2017    Reason for consultation: We are being asked to render an opinion regarding the patient's rehab needs. IMPRESSION:   Thoracic myelopathy secondary to [facility] with edema, due to T10-T11 large central disk herniation  Syncope, vasovagal vs orthostatic hypotension  Unsteady gait and balance, with multiple falls, 8 since March 2017, most likely due to #1, though deconditioning and  neuropathy in the setting of uncontrolled DM contributing,   Left ankle sprain  Complex loculated right pleural effusion/granuloma  DM type II, uncontrolled with hyperglycemia, D8F 10, with complications including diabetic neuropathy  Depression, appears aggravated by ongoing medical illness   Morbid obesity, BMI 44  Essential hypertension  Chronic Atrial fibrillation  Asthma, not in AE  Recent MSSA pneumonia and bacteremia  LITA on CPAP    Recommendations:   1) Continue physical therapy and await OT eval.  At this point, patient is not ready for acute in-patient rehab because thoracic cord compression causing multiple falls needs to be addressed. She will continue to have this problem and be at high risk for serious injury until this is treated and intensive rehab will not make a lot of difference. In addition, blood glucose is uncontrolled.   We will follow her course and suggest to optimize her medically to prepare for inevitable spine surgery. Options are either subacute rehab at SNF or  home health/out-patient rehab program to improve endurance and prevent further deconditioning. 2) DVT prophylaxis, Afib: warfarin, mobilization. 3) Bowel program: Continue current bowel regimen. 4) Pain management: continue prn APAP and Gabapentin  5) Await Dr. Arnulfo Rosales input. 6) Defer to Dr. Cheryle Gasman re: adjustment of insulin and hypoglycemics. Discussed with Nursing, Case management, and patient. Thank you for this consultation. .    CHIEF COMPLAINT: falls    HISTORY OF PRESENT ILLNESS:    Patient is a 67 y.o., right handed, , female,admitted for above . Records reviewed. Patient with a PMH of C4-6 fusion, thoracic cord compression T10-T11 due to large disc protrusion (3/27/17 MRI), chronic AFib, HTN, DM, asthma, admitted to [facility] 5/20/17 for syncope. She had been sitting awhile then stood up to reach for something in an overhead cabinet when this occurred, her sister who was with her reports only a few seconds of LOC without confusion, incontinence, seizure or neurologic symptoms after. Imaging including HCT, brain MRI/MRA, cervical spine and pelvic CT scans were normal. Carotid dopplers <50% stenosis of BICA. CXR revealed a right pleural effusion & basilar atelectasis and chest CT done today showed this was c/w granulomatous changes. Neurology consulted and their impression that syncope is due to orthostasis or vasovagal episode. She had twisted her ankle, xray negative for fracture but revealed small effusion and fasciitis. Pain better and is 1/10 currently. -304 past 24h and being on steroids has aggravated this. She has chronic mid-back pain 4/10 and LBP 6-7/10 , with tingling down BLE for at least past 2 years. She cannot tell me if it is worse. She consulted an orthopedist in 2-14 who sent her for PT which did not help.   No numbness, focal weakness, bowel or bladder dysfunction. However, she feels quite unsteady when walking, with multiple falls despite use of rollator , about 8 since March 2017 with fortunately no serious injury. She describes legs as shakey and just suddenly buckle. She is feeling frustrated about all this. Thoracic MRI 5/20/17 revealed once again T10-11 moderate-sized central disc protrusion indenting ventral spinal cord with equivocal intramedullary edema  at this level. Cervical and lumbar MRI ordered as well and pending. Dr. Martha Haro has been consulted. Past Medical History:   Diagnosis Date    Asthma, last hospitalized 2/9 to 2/14/17 at [facility] for exacerbation     [facility]     Depression     DM [facility]diabetes mellitus) [facility]     HTN (hypertension)     Hyperlipidemia     Morbid obesity [facility]     Neuropathy     LITA on CPAP     Other ill-defined conditions  MSSA pneumonia/bacteremia , 4/23 to 5/1/17 at [facility], completed IV Ancef x 2 weeks pneumonia       Past Surgical History:   Procedure Laterality Date    C4-C6 instrumented fusion  HX APPENDECTOMY      HX CHOLECYSTECTOMY      HX GASTRIC BYPASS      Jejunal bypass with subsequent reversal..  Lab Band since    HX OTHER SURGICAL  D & C    HX EMILIANO AND BSO      For uterine CA      Family History   Problem Relation Age of Onset    Stroke Mother     Diabetes Other     Heart Disease Other     Heart Disease Father      congestive heart failure      Social History: lives alone 1 [address]ry apartment, elevator access. Sister from [place] staying with her while she is recovering still and daughter check on her almost daily.     Substance Use Topics    Smoking status: Former Smoker     Quit date: 1/1/1992    Smokeless tobacco: No    Alcohol use 1.2 oz/week     2 Glasses of wine per week      Comment: occ wine       Functional History:   Premorbid - Independent in ADLs and ambulation with RL, driving   Current level of function -  ADLs - pending OT eval    Bed Mobility Training  Rolling: Modified independent  Supine to Sit: Modified independent  Sit to Supine: Modified independent (uses an electric bed at home)  Transfer Training  Sit to Stand: Contact guard assistance  Stand to Sit: Contact guard assistance      Gait Training  Assistive Device: Gait belt, Walker, rolling  Ambulation - Level of Assistance: Contact guard assistance, Minimal assistance  Distance (ft): 30 Feet (ft)                      Allergies   Allergen Reactions    Latex Itching    Codeine Rash    Lisinopril Other (comments)     Cough, vomiting, Visual disturbances    Morphine Itching    Statins-Hmg-Coa Reductase Inhibitors Other (comments)     Muscle enzyme problems      Prior to Admission medications    Medication Sig Start Date End Date Taking? Authorizing Provider   furosemide (LASIX) 40 mg tablet Take 0.5 Tabs by mouth daily. 5/22/17  Yes Chris Mendoza MD   warfarin (COUMADIN) 5 mg tablet Take 1 Tab by mouth daily. 5/22/17  Yes Chris Mendoza MD   ADVAIR DISKUS 100-50 mcg/dose diskus inhaler USE 1 INHALATION BY MOUTH TWICE DAILY 5/7/17  Yes JOSHUA Mendoza MD   potassium chloride SR (KLOR-CON 10) 10 mEq tablet Take 20 mEq by mouth daily. Yes Historical Provider   digoxin (LANOXIN) 0.25 mg tablet Take 1 Tab by mouth daily. 3/29/17  Yes Chris Mendoza MD   sertraline (ZOLOFT) 100 mg tablet TAKE 1 TABLET BY MOUTH EVERY EVENING 3/13/17  Yes Chris Mendoza MD   gabapentin (NEURONTIN) 300 mg capsule TAKE ONE CAPSULE BY MOUTH THREE TIMES DAILY 3/13/17  Yes JOSHUA Mendoza MD   empagliflozin (JARDIANCE) 25 mg tablet Take 12.5 mg by mouth daily. Yes Historical Provider   montelukast (SINGULAIR) 10 mg tablet Take 10 mg by mouth daily. Yes Historical Provider   omalizumab Alfrieda Pittston) 150 mg solr 150 mg by SubCUTAneous route every fourteen (14) days.  Next dose due 2/17   Yes Historical Provider   tiotropium (SPIRIVA WITH HANDIHALER) 18 mcg inhalation capsule Take 1 Cap by inhalation daily. Yes Historical Provider   diclofenac (VOLTAREN) 1 % gel Apply  to affected area four (4) times daily. 11/4/16  Yes Rosetta Stevenson MD   HUMULIN N 100 unit/mL injection INJECT 35 UNITS UNDER THE SKIN IN THE MORNING AND INJECT 25 UNITS AT BEDTIME OR AS DIRECTED 10/9/16  Yes JOSHUA Stevenson MD   albuterol (PROVENTIL VENTOLIN) 2.5 mg /3 mL (0.083 %) nebulizer solution 3 mL by Nebulization route four (4) times daily as needed for Wheezing or Shortness of Breath. 10/8/16  Yes Elizabeth Burger MD   therapeutic multivitamin [facility]) tablet Take 1 Tab by mouth daily. Yes Historical Provider   CARTIA  mg ER capsule TAKE 3 CAPSULES BY MOUTH DAILY 5/24/16  Yes JOSHUA Stevenson MD   insulin regular (NOVOLIN R, HUMULIN R) 100 unit/mL injection Sliding scale: For -300 use 4 units, 301-400 use 8 units, greater than 400 use 12 units, greater than 500 call MD. 8/14/15  Yes Rosetta Stevenson MD   PROVENTIL HFA 90 mcg/actuation inhaler inhale 2 puffs four times a day if needed for wheezing 1/17/15  Yes JOSHUA Stevenson MD   cholecalciferol, vitamin D3, (VITAMIN D3) 2,000 unit tab Take 2,000 Units by mouth daily. Yes Historical Provider   DOCOSAHEXANOIC ACID/EPA (FISH OIL PO) Take 1 Cap by mouth daily. Yes Historical Provider   predniSONE (DELTASONE) 10 mg tablet Take 4t QD for 4d, then 3t QD for 4d, then 2t QD for 4d, then 1t QD for 4d. 5/1/17   Rosetta Stevenson MD   ALPRAZolam Vershikha Velez) 0.5 mg tablet Take 0.25-0.5 mg by mouth daily as needed for Anxiety.     Historical Provider     Current Facility-Administered Medications   Medication Dose Route Frequency    insulin NPH (NOVOLIN N, HUMULIN N) injection 30 Units  30 Units SubCUTAneous DAILY    insulin NPH (NOVOLIN N, HUMULIN N) injection 20 Units  20 Units SubCUTAneous QPM    albuterol (PROVENTIL VENTOLIN) nebulizer solution 2.5 mg  2.5 mg Nebulization QID PRN    ALPRAZolam (XANAX) tablet 0.25-0.5 mg 0. 25-0.5 mg Oral DAILY PRN    dilTIAZem CD (CARDIZEM CD) capsule 360 mg  360 mg Oral DAILY    cholecalciferol (VITAMIN D3) tablet 2,000 Units  2,000 Units Oral DAILY    digoxin (LANOXIN) tablet 0.25 mg  0.25 mg Oral DAILY    gabapentin (NEURONTIN) capsule 300 mg  300 mg Oral TID    montelukast (SINGULAIR) tablet 10 mg  10 mg Oral DAILY    potassium chloride SR (KLOR-CON 10) tablet 20 mEq  20 mEq Oral DAILY    sertraline (ZOLOFT) tablet 100 mg  100 mg Oral DAILY    therapeutic multivitamin (THERAGRAN) tablet 1 Tab  1 Tab Oral DAILY    sodium chloride (NS) flush 5-10 mL  5-10 mL IntraVENous Q8H    sodium chloride (NS) flush 5-10 mL  5-10 mL IntraVENous PRN    acetaminophen (TYLENOL) tablet 650 mg  650 mg Oral Q4H PRN    ondansetron (ZOFRAN) injection 4 mg  4 mg IntraVENous Q4H PRN    docusate sodium (COLACE) capsule 100 mg  100 mg Oral BID    insulin lispro (HUMALOG) injection   SubCUTAneous AC&HS    glucose chewable tablet 16 g  4 Tab Oral PRN    glucagon (GLUCAGEN) injection 1 mg  1 mg IntraMUSCular PRN    dextrose 10 % infusion 125-250 mL  125-250 mL IntraVENous PRN    arformoterol (BROVANA) neb solution 15 mcg  15 mcg Nebulization BID RT    And    budesonide (PULMICORT) 500 mcg/2 ml nebulizer suspension  500 mcg Nebulization BID RT    Warfarin - Pharmacy dosing   Other Rx Dosing/Monitoring       Review of Systems:     []             Unable to obtain ROS due to     []            mental status   []            sedated   []            intubated   [x]             Total of 12 systems reviewed as follows:  Constitutional: negative fever, negative chills, negative weight loss, appetite fair, not very compliant with diet  Eyes:   negative visual changes  ENT:   negative difficulty swallowing, sore throat, tongue or lip swelling  Respiratory:  negative cough, negative dyspnea  Cards:  negative for chest pain, palpitations, lower extremity edema  GI:   negative for nausea, vomiting, diarrhea, and abdominal pain, LBM =5/23  , flatus  :  negative for frequency, dysuria or hematuria  Integument:  negative for rash and pruritus  Heme:  negative for easy bruising and bleeding  Musculoskel: negative for myalgias, neck pain, + back pain   Neuro:  negative for headaches,  vertigo, numbness or tingling, weakness, + dizziness- no recurrence  Psych:  +feelings of anxiety, depression     Physical Exam:  General:  Vital Signs:      Skin:   HEENT:  Neck: Alert, not in distress. Blood pressure (!) 137/37, pulse 87, temperature 98 °F (36.7 °C), resp. rate 18, height 5' 6\" (1.676 m), weight 123.6 kg (272 lb 7.8 oz), SpO2 98 %, not currently breastfeeding., BMI (calculated): 44 (05/23/17 0239)   No rashes, lesions  PERRL, anicteric sclerae, oropharynx clear  Supple, thyroid not palpable   Lungs:   Clear to auscultation bilaterally. Heart:  Irregular rate and rhythm, no murmur, click, rub or gallop. Abdomen:   Soft, non-tender. Bowel sounds present. Genitourinary:  Musculoskeletal: No guadarrama . Cervical, thoracic and lumbar spine nontender. Calves soft, nontender. Very mild tenderness left lateral ankle. Trace  lower extremity edema. ROM within functional limits for all limbs. Neuro:            Psych:     Speech and language clear and fluent, Oriented x4, memory and cognition intact, follows all 1- step verbal directives. Cranial nerves II-XII: intact. Sensory: intact to light touch in all limbs, did not assess proprioception or vibration (impaired per Neuro); Motor: 5/5 in all 4 extremities, no pronator drift or footdrop. Reflexes: bilaterally with down going toes, no ankle clonus, or Tromner's sign. No tremors.   Pleasant and cooperative, became tearful when discussing her medical condition       Labs/Studies:  Recent Labs      05/21/17 0253   WBC  9.2   HGB  11.4*   HCT  36.1   PLT  252     Recent Labs      05/23/17 0222  05/22/17 0522 05/21/17 0253   NA   --    --   137   K   --    --   3.9   CL   -- --   100   CO2   --    --   29   GLU   --    --   231*   BUN   --    --   18   CREA   --    --   1.11*   CA   --    --   8.6   INR  1.4*  1.3*  1.3*     Lab Results   Component Value Date/Time    Hemoglobin A1c 10.0 2017 03:48 AM    Hemoglobin A1c (POC) 8.5 2016 10:37 AM     Lab Results   Component Value Date/Time    Color YELLOW/STRAW 2017 09:31 AM    Appearance CLEAR 2017 09:31 AM    Specific gravity 1.029 2017 09:31 AM    pH (UA) 5.0 2017 09:31 AM    Protein NEGATIVE  2017 09:31 AM    Glucose >1000 2017 09:31 AM    Ketone NEGATIVE  2017 09:31 AM    Bilirubin NEGATIVE  2017 09:31 AM    Urobilinogen 0.2 2017 09:31 AM    Nitrites NEGATIVE  2017 09:31 AM    Leukocyte Esterase NEGATIVE  2017 09:31 AM    Epithelial cells FEW 2017 09:31 AM    Bacteria NEGATIVE  2017 09:31 AM    WBC 0-4 2017 09:31 AM    RBC 0-5 2017 09:31 AM       IMAGIN17 Chest CT - There is right pleural effusion with right basilar atelectasis. Granulomatous changes are noted. There is a borderline enlarged pretracheal lymph node most likely reactive. 17 Thoracic MRI -  1. Moderate-sized central disc protrusion T10-11 slightly indents the ventral spinal cord with equivocal intramedullary edema at this level. 2. Complex probably loculated right-sided pleural effusion     17   Brain MRI/ MRA - normal    Carotid duplex - Consistent with less than 50% stenosis of the bilateral nternal carotid and vertebrals are patent with antegrade flow. CT cervical spine - No acute fracture or subluxation. Stable degenerative changes and anterior fixation hardware at C4-C6    CTpelvis - No acute fracture    Left ankle xray - Acute left lateral ankle sprain and left-sided calcific Plantar fasciitis    17  Head CT - No hemorrhage or acute intracranial abnormality.       Signed By: Kevin Hernandez MD     May 23, 2017

## 2017-05-23 NOTE — PROGRESS NOTES
Bedside shift change report given to [address] [place] [address] (oncoming nurse) by Trace Moss (offgoing nurse). Report included the following information SBAR, Intake/Output, MAR, Recent Results and Cardiac Rhythm afib.

## 2017-05-23 NOTE — PROGRESS NOTES
Edilma Drake is a 67 y.o. female admitted after an episode of brief syncope. This was likely due to hypotension. She has had walking difficulty for the past 3 years and this has been progressively getting worse. Had an MRI in 2015 on thoracic spine that showed T10-T11 disc with mass effect on spinal cord. Repeat MRI yesterday shows similar finding with equivocal edema in the cord. EXAM  Exam:  Visit Vitals    /47 (BP 1 Location: Right arm, BP Patient Position: At rest)    Pulse 88    Temp 99.5 °F (37.5 °C)    Resp 20    Ht 5' 6\" (1.676 m)    Wt 123.6 kg (272 lb 7.8 oz)    SpO2 94%    Breastfeeding No    BMI 43.98 kg/m2     Gen:Alert  CV: RRR  Lungs: non labored breathing  Abd: non distending  Neuro: A&O x 3, no dysarthria or aphasia  CN II-XII: PERRL, EOMI, face symmetric, tongue/palate midline  Motor: strength 5/5 all four ext. Sensory: intact to LT. Mild impaired vibration. Gait: Ataxic. Walks with a walker. LABS/ IMAGING  MRI Results (most recent):    Results from Hospital Encounter encounter on 05/19/17    [facility] CONT   Narrative INDICATION:  DDD/Myelopathy     EXAMINATION:  MR THORACIC SPINE    COMPARISON: None    TECHNIQUE: MR imaging of the thoracic spine was performed with the following  sequences: sagittal T1, T2, stir; axial T1, T2.     CONTRAST:  None. FINDINGS:    ALIGNMENT:  Normal.   VERTEBRAL BODIES:  No compression fracture. MARROW SIGNAL: Mild reactive endplate changes at several levels including T6-7,  T7-8, T10-11. SPINAL CORD:  Possible subtle intramedullary T2 hyperintense signal at T10-11. SPINAL CANAL/FORAMINAL STENOSIS:  Moderate-sized, broad-based central disc  protrusion at T10-11 causes mild spinal canal stenosis and slightly deforms the  ventral spinal cord. No significant spinal canal or foraminal stenosis at any  level.    ADDITIONAL COMMENTS:  Moderate to large, septated and probably loculated right  pleural effusion partly visualized. Impression IMPRESSION:    1. Moderate-sized central disc protrusion T10-11 slightly indents the ventral  spinal cord with equivocal intramedullary edema at this level. 2. Complex probably loculated right-sided pleural effusion. ASSESSMENT  Hospital Problems  Date Reviewed: 5/20/2017          Codes Class Noted POA    Thoracic disc disorder with myelopathy ICD-10-CM: M51.04  ICD-9-CM: 722.72  5/22/2017 Unknown        Gait abnormality ICD-10-CM: R26.9  ICD-9-CM: 781.2  5/22/2017 Unknown        Syncope and collapse ICD-10-CM: R55  ICD-9-CM: 780.2  5/20/2017 Yes               PLAN  Gait abnormality is likely multifactorial-body habitus, obesity, mild diabetic peripheral neuropathy and possibly thoracic myelopathy due to DDD. The mass effect on the cord possibly causing myelopathic picture/ataxia. Ortho evaluation noted  There is coexistent diabetic peripheral neuropathy but it should not be causing ataxia to this degree with fairly intact sensory exam.  EMG as OP.  She already has an appointment    Chel Almonte MD

## 2017-05-23 NOTE — PROGRESS NOTES
Pharmacist Note - Warfarin Dosing  Consult provided for this 67 y. o.female to manage warfarin for Atrial Fibrillation    INR Goal: 2 - 3    Home regimen/ tablet size: 2.5 mg 5 x week    Drugs that may increase INR: None  Drugs that may decrease INR: None  Other current anticoagulants/ drugs that may increase bleeding risk: None  Risk factors: Age > 65  Daily INR ordered: YES    Recent Labs      05/23/17   0222  05/22/17   0522  05/21/17   0253   HGB   --    --   11.4*   INR  1.4*  1.3*  1.3*     Date               INR                  Dose  5/19  1.2  --   5/20  --  4 mg  5/21  1.3  4 mg  5/22  1.3  5 mg  5/23  1.4  5 mg                                                                                   Assessment/ Plan: Will order warfarin 5 mg PO x 1 dose. Pharmacy will continue to monitor daily and adjust therapy as indicated. Please contact the pharmacist at  for outpatient recommendations if needed.

## 2017-05-24 LAB
GLUCOSE BLD STRIP.AUTO-MCNC: 151 MG/DL (ref 65–100)
GLUCOSE BLD STRIP.AUTO-MCNC: 158 MG/DL (ref 65–100)
GLUCOSE BLD STRIP.AUTO-MCNC: 202 MG/DL (ref 65–100)
GLUCOSE BLD STRIP.AUTO-MCNC: 235 MG/DL (ref 65–100)
INR PPP: 1.8 (ref 0.9–1.1)
PROTHROMBIN TIME: 18.4 SEC (ref 9–11.1)
SERVICE CMNT-IMP: ABNORMAL

## 2017-05-24 PROCEDURE — 74011250637 HC RX REV CODE- 250/637: Performed by: INTERNAL MEDICINE

## 2017-05-24 PROCEDURE — 82962 GLUCOSE BLOOD TEST: CPT

## 2017-05-24 PROCEDURE — 94640 AIRWAY INHALATION TREATMENT: CPT

## 2017-05-24 PROCEDURE — 97116 GAIT TRAINING THERAPY: CPT

## 2017-05-24 PROCEDURE — 74011000250 HC RX REV CODE- 250: Performed by: INTERNAL MEDICINE

## 2017-05-24 PROCEDURE — 74011636637 HC RX REV CODE- 636/637: Performed by: INTERNAL MEDICINE

## 2017-05-24 PROCEDURE — 85610 PROTHROMBIN TIME: CPT | Performed by: INTERNAL MEDICINE

## 2017-05-24 PROCEDURE — 36415 COLL VENOUS BLD VENIPUNCTURE: CPT | Performed by: INTERNAL MEDICINE

## 2017-05-24 PROCEDURE — 65660000000 HC RM CCU STEPDOWN

## 2017-05-24 RX ADMIN — ARFORMOTEROL TARTRATE 15 MCG: 15 SOLUTION RESPIRATORY (INHALATION) at 22:00

## 2017-05-24 RX ADMIN — DIGOXIN 0.25 MG: 250 TABLET ORAL at 09:10

## 2017-05-24 RX ADMIN — INSULIN LISPRO 3 UNITS: 100 INJECTION, SOLUTION INTRAVENOUS; SUBCUTANEOUS at 17:50

## 2017-05-24 RX ADMIN — Medication 10 ML: at 21:23

## 2017-05-24 RX ADMIN — GABAPENTIN 300 MG: 300 CAPSULE ORAL at 09:10

## 2017-05-24 RX ADMIN — INSULIN LISPRO 2 UNITS: 100 INJECTION, SOLUTION INTRAVENOUS; SUBCUTANEOUS at 09:08

## 2017-05-24 RX ADMIN — BUDESONIDE 500 MCG: 0.5 INHALANT RESPIRATORY (INHALATION) at 22:00

## 2017-05-24 RX ADMIN — BUDESONIDE 500 MCG: 0.5 INHALANT RESPIRATORY (INHALATION) at 08:14

## 2017-05-24 RX ADMIN — HUMAN INSULIN 30 UNITS: 100 INJECTION, SUSPENSION SUBCUTANEOUS at 09:08

## 2017-05-24 RX ADMIN — VITAMIN D, TAB 1000IU (100/BT) 2000 UNITS: 25 TAB at 09:10

## 2017-05-24 RX ADMIN — Medication 10 ML: at 05:27

## 2017-05-24 RX ADMIN — ACETAMINOPHEN 650 MG: 325 TABLET, FILM COATED ORAL at 11:00

## 2017-05-24 RX ADMIN — INSULIN LISPRO 3 UNITS: 100 INJECTION, SOLUTION INTRAVENOUS; SUBCUTANEOUS at 12:40

## 2017-05-24 RX ADMIN — POTASSIUM CHLORIDE 20 MEQ: 750 TABLET, FILM COATED, EXTENDED RELEASE ORAL at 09:10

## 2017-05-24 RX ADMIN — ACETAMINOPHEN 650 MG: 325 TABLET, FILM COATED ORAL at 21:23

## 2017-05-24 RX ADMIN — DILTIAZEM HYDROCHLORIDE 360 MG: 180 CAPSULE, EXTENDED RELEASE ORAL at 09:10

## 2017-05-24 RX ADMIN — GABAPENTIN 300 MG: 300 CAPSULE ORAL at 15:19

## 2017-05-24 RX ADMIN — SERTRALINE HYDROCHLORIDE 100 MG: 50 TABLET ORAL at 09:10

## 2017-05-24 RX ADMIN — DOCUSATE SODIUM 100 MG: 100 CAPSULE, LIQUID FILLED ORAL at 17:50

## 2017-05-24 RX ADMIN — HUMAN INSULIN 20 UNITS: 100 INJECTION, SUSPENSION SUBCUTANEOUS at 17:50

## 2017-05-24 RX ADMIN — GABAPENTIN 300 MG: 300 CAPSULE ORAL at 21:23

## 2017-05-24 RX ADMIN — DOCUSATE SODIUM 100 MG: 100 CAPSULE, LIQUID FILLED ORAL at 09:10

## 2017-05-24 RX ADMIN — MONTELUKAST SODIUM 10 MG: 10 TABLET, FILM COATED ORAL at 09:10

## 2017-05-24 RX ADMIN — Medication 10 ML: at 14:00

## 2017-05-24 RX ADMIN — ARFORMOTEROL TARTRATE 15 MCG: 15 SOLUTION RESPIRATORY (INHALATION) at 08:14

## 2017-05-24 RX ADMIN — THERA TABS 1 TABLET: TAB at 09:10

## 2017-05-24 NOTE — PROGRESS NOTES
Pulmonary, Critical Care, and Sleep Medicine~Progress Note    Name: Vin Looney MRN: [id number]   : 1945 Hospital: [facility]   Date: 2017 9:04 AM Admission: 2017     IMPRESSION:   · Right loculated pleural effusion; likely sequelae of recent staph PNA  · Asthma. Not bronchospastic. xolair patient   · Chronic A fib   · LITA, CPAP compliant       PLAN:   · Pleural effusion still loculated with minimal output via pigtail. I discussed with patient about potentially doing tPA and dornase through chest tube vs VATS decortication. She verbalized a clear understanding of the situation. She elected to move to thoracic involvement  · Discussed with PCP and Dr Bernardino Lara her primary pulmonologist   · O2 titration above 90%   · IS  · Nebs     Daily Progression:    Still with pleuritic pains     OBJECTIVE:     Vital Signs:       Visit Vitals    /60 (BP 1 Location: Right arm, BP Patient Position: At rest)    Pulse 80    Temp 98.9 °F (37.2 °C)    Resp 18    Ht 5' 6\" (1.676 m)    Wt 123.4 kg (272 lb 0.8 oz)    SpO2 98%    Breastfeeding No    BMI 43.91 kg/m2      Temp (24hrs), Av.6 °F (37.6 °C), Min:98.9 °F (37.2 °C), Max:100.9 °F (38.3 °C)     Intake/Output:     Last shift:      Last 3 shifts:  1901 -  0700  In: 360 [P.O.:360]  Out: 5 [Drains:5]        Intake/Output Summary (Last 24 hours) at 17 8218  Last data filed at 17 0400   Gross per 24 hour   Intake                0 ml   Output                5 ml   Net               -5 ml       Imaging:  I have personally reviewed these radiographic films and reports:  17: chest x-ray    IMPRESSION:     \"Right pleural catheter is in good position without pneumothorax. Small loculated right pleural effusion. Right lower lobe atelectasis versus pneumonia. \"   I have personally reviewed PFTs:          Ventilation:   Mode Rate Tidal Volume Pressure Suppport FiO2/LPM PEEP Other               Peak airway pressure:      Minute ventilation: 10 l/min    Trilogy:        Physical Exam:                                        Exam Findings Other   General: No resp distress noted, appears stated age    [de-identified]:  No ulcers, JVD not elevated, no cervical LAD    Chest: No pectus deformity, normal chest rise b/l    HEART:  RRR, no murmurs/rubs/gallops    Lungs:  diminished BS at bases    ABD: Soft/NT, non rigid mildly distended    EXT: No cyanosis/clubbing/edema, normal peripheral pulses    Skin: No rashes or ulcers, no mottling    Neuro: A/O x 3        Medications:  Current Facility-Administered Medications   Medication Dose Route Frequency    insulin NPH (NOVOLIN N, HUMULIN N) injection 30 Units  30 Units SubCUTAneous DAILY    insulin NPH (NOVOLIN N, HUMULIN N) injection 20 Units  20 Units SubCUTAneous QPM    albuterol (PROVENTIL VENTOLIN) nebulizer solution 2.5 mg  2.5 mg Nebulization QID PRN    ALPRAZolam (XANAX) tablet 0.25-0.5 mg  0.25-0.5 mg Oral DAILY PRN    dilTIAZem CD (CARDIZEM CD) capsule 360 mg  360 mg Oral DAILY    cholecalciferol (VITAMIN D3) tablet 2,000 Units  2,000 Units Oral DAILY    digoxin (LANOXIN) tablet 0.25 mg  0.25 mg Oral DAILY    gabapentin (NEURONTIN) capsule 300 mg  300 mg Oral TID    montelukast (SINGULAIR) tablet 10 mg  10 mg Oral DAILY    potassium chloride SR (KLOR-CON 10) tablet 20 mEq  20 mEq Oral DAILY    sertraline (ZOLOFT) tablet 100 mg  100 mg Oral DAILY    therapeutic multivitamin (THERAGRAN) tablet 1 Tab  1 Tab Oral DAILY    sodium chloride (NS) flush 5-10 mL  5-10 mL IntraVENous Q8H    sodium chloride (NS) flush 5-10 mL  5-10 mL IntraVENous PRN    acetaminophen (TYLENOL) tablet 650 mg  650 mg Oral Q4H PRN    ondansetron (ZOFRAN) injection 4 mg  4 mg IntraVENous Q4H PRN    docusate sodium (COLACE) capsule 100 mg  100 mg Oral BID    insulin lispro (HUMALOG) injection   SubCUTAneous AC&HS    glucose chewable tablet 16 g  4 Tab Oral PRN    glucagon (GLUCAGEN) injection 1 mg  1 mg IntraMUSCular PRN    dextrose 10 % infusion 125-250 mL  125-250 mL IntraVENous PRN    arformoterol (BROVANA) neb solution 15 mcg  15 mcg Nebulization BID RT    And    budesonide (PULMICORT) 500 mcg/2 ml nebulizer suspension  500 mcg Nebulization BID RT       Labs:  ABG No results for input(s): PHI, PCO2I, PO2I, HCO3I, SO2I, FIO2I in the last 72 hours. CBC No results for input(s): WBC, HGB, HCT, PLT, MCV, MCH, HGBEXT, HCTEXT, PLTEXT in the last 72 hours.      Metabolic  Panel Recent Labs      05/24/17   0239  05/23/17   0222  05/22/17   0522   INR  1.8*  1.4*  1.3*        Pertinent Labs                [place], [place]  5/24/2017

## 2017-05-24 NOTE — PROGRESS NOTES
ORTHOPAEDIC PROGRESS NOTE    NAME: Lj Kimble   :  1945   MRN:  [id number]   DATE:  2017    SUBJECTIVE:    Patient is ambulating from the bathroom back to the bed. We were consulted yesterday for evaluation based on the thoracic spine MRI findings. She has a few month history of unsteady gait. No clear leg pain. Although, she notes a heaviness in the bilateral legs with a subjective sense of weakness. She has persistent numbness and tingling in the legs which she has contributed to a peripheral neuropathy. Sensation is decreased below the level of the umbilicus. No arm pain, numbness, paresthesias or weakness. MRI of the cervical and lumbar spine were completed yesterday. OBJECTIVE:  Recent Labs      17   0239   INR  1.8*     Patient Vitals for the past 12 hrs:   BP Temp Pulse Resp SpO2 Weight   17 0700 140/60 98.9 °F (37.2 °C) 80 18 98 % -   17 0300 131/49 99 °F (37.2 °C) 79 19 98 % 123.4 kg (272 lb 0.8 oz)   17 2300 115/53 99.7 °F (37.6 °C) 82 18 99 % -   17 2227 - - - - 98 % -       EXAM:  Positive strength/ROM bilat lower ext. Sensation is decreased at the level of the umbilicus  Patient actively moving bilateral upper extremities without difficulty      IMAGING:  MRI thoracic spine IMPRESSION (17):  1. Moderate-sized central disc protrusion T10-11 slightly indents the ventral  spinal cord with equivocal intramedullary edema at this level. 2. Complex probably loculated right-sided pleural effusion. MRI cervical spine IMPRESSION (17):  1. C6-C7 moderate central spinal canal stenosis and mild bilateral foraminal  stenosis is unchanged given difference in technique. 2. C4-C6 anterior fusion is unchanged. 3. No cord compression or cord signal abnormality. MRI lumbar spine IMPRESSION (17):  1. L2-3 superimposed central disc extrusion causes increased moderate-severe  central spinal canal stenosis.   2. T10-T11 moderate central spinal canal stenosis is partially imaged and likely  unchanged. 3. Unchanged grade 2 anterolisthesis of L5-S1 secondary to bilateral L5  spondylolysis. ASSESSMENT/PLAN:  Unsteady gait   Although findings on all 3 MRI scans, symptoms and history most consistent with thoracic  spine findings of a disc herniation at T10-T11 with cord compression. Will require surgical  intervention at some point in time, although has several other ongoing medical issues. Will  discuss plan of care and possible surgical intervention timing with Dr. Wiley.       MAYTE Osborne

## 2017-05-24 NOTE — PROGRESS NOTES
Reviewed chart. Attended rounds. Consulted with Physician regarding how to access [facility] transfer. Patient now approved for transfer, but we await notice of a bed. CRM following for completion of d/c plan.   Darrell Brantley LCSW, CCM

## 2017-05-24 NOTE — PROGRESS NOTES
Bedside shift change report given to [address] [place] [address] (oncoming nurse) by Mary Alice Zepeda (offgoing nurse). Report included the following information SBAR, Intake/Output, MAR, Recent Results and Cardiac Rhythm Afib.

## 2017-05-24 NOTE — DISCHARGE SUMMARY
Physician Discharge Summary     Patient ID:  Radha Whatley  [id number]  67 y.o.  1945    Admit date: 5/19/2017    Discharge date and time: 5/24/2017    Briefly, pt was admitted with Syncope. For details of admission, see H&P. Hospital Course:  Pt was admitted with a syncopal spell. She ended up having an aggressive w/u with brain YUDI/MRA & carotid dopplers & EEG, all of which were unremarkable. Neurology saw pt in consultation. It is felt pt's syncope was due to orthostasis, and her lasix was held and resumed at a lower dose as below. She had a recent (about 3 weeks ago) L PNA with MSSA bacteremia. It is felt the MSSA bacteremia was from the PNA, and pt was treated with over 2 weeks of IV Ancef (with home health). She had clinically improved, but while in the hospital this time, a chest CT showed enlarging R pleural effusion. Pulmonary saw pt, and an attempted thoracentesis by IR was made; but the effusion is loculated, so this did not work. A pigtail catheter is left in with consideration of administering TPA, but it is felt pt would do best with more aggressive intervention by thoracic surgeon. Since [facility] has no thoracic surgeon at present, pt is being transferred to [facility] for further care of this. She has been on coumadin for atrial fibrillation, and this was held starting today. Unrelated, she has had some recent falls where her legs \"give way\". .... There is probably some contribution from her peripheral neuropathy & body habitus -- But the main issue is a moderately large T10-11 disc protrusion partially indenting the ventral cord. This can be addressed electively later -- Dr. Jonh Chatterjee (orthopedic spine surgeon) has seen pt for this. Discharge Dx: Loculated R pleural effusion, orthostasis, T10-11 disc protrusion. Condition at discharge: same    Disposition: Transferring to [facility].     Patient Instructions:   Current Discharge Medication List      CONTINUE these medications which have CHANGED    Details   furosemide (LASIX) 40 mg tablet Take 0.5 Tabs by mouth daily. Qty: 270 Tab, Refills: 3      warfarin (COUMADIN) 5 mg tablet Take 1 Tab by mouth daily. Qty: 30 Tab, Refills: 2         CONTINUE these medications which have NOT CHANGED    Details   ADVAIR DISKUS 100-50 mcg/dose diskus inhaler USE 1 INHALATION BY MOUTH TWICE DAILY  Qty: 1 Inhaler, Refills: 11      potassium chloride SR (KLOR-CON 10) 10 mEq tablet Take 20 mEq by mouth daily. digoxin (LANOXIN) 0.25 mg tablet Take 1 Tab by mouth daily. Qty: 30 Tab, Refills: 6    Associated Diagnoses: Chronic atrial fibrillation (HCC)      sertraline (ZOLOFT) 100 mg tablet TAKE 1 TABLET BY MOUTH EVERY EVENING  Qty: 90 Tab, Refills: 3      gabapentin (NEURONTIN) 300 mg capsule TAKE ONE CAPSULE BY MOUTH THREE TIMES DAILY  Qty: 90 Cap, Refills: 3      empagliflozin (JARDIANCE) 25 mg tablet Take 12.5 mg by mouth daily. montelukast (SINGULAIR) 10 mg tablet Take 10 mg by mouth daily. omalizumab (XOLAIR) 150 mg solr 150 mg by SubCUTAneous route every fourteen (14) days. Next dose due 2/17      tiotropium (SPIRIVA WITH HANDIHALER) 18 mcg inhalation capsule Take 1 Cap by inhalation daily. diclofenac (VOLTAREN) 1 % gel Apply  to affected area four (4) times daily. Qty: 1500 g, Refills: 6      HUMULIN N 100 unit/mL injection INJECT 35 UNITS UNDER THE SKIN IN THE MORNING AND INJECT 25 UNITS AT BEDTIME OR AS DIRECTED  Qty: 30 mL, Refills: 11      albuterol (PROVENTIL VENTOLIN) 2.5 mg /3 mL (0.083 %) nebulizer solution 3 mL by Nebulization route four (4) times daily as needed for Wheezing or Shortness of Breath. Qty: 100 Each, Refills: 5      therapeutic multivitamin (THERAGRAN) tablet Take 1 Tab by mouth daily. CARTIA  mg ER capsule TAKE 3 CAPSULES BY MOUTH DAILY  Qty: 90 Cap, Refills: 11      insulin regular (NOVOLIN R, HUMULIN R) 100 unit/mL injection Sliding scale:   For -300 use 4 units, 301-400 use 8 units, greater than 400 use 12 units, greater than 500 call MD.  Maury Frisk: 3 Vial, Refills: 11      PROVENTIL HFA 90 mcg/actuation inhaler inhale 2 puffs four times a day if needed for wheezing  Qty: 3 Inhaler, Refills: 3      cholecalciferol, vitamin D3, (VITAMIN D3) 2,000 unit tab Take 2,000 Units by mouth daily. DOCOSAHEXANOIC ACID/EPA (FISH OIL PO) Take 1 Cap by mouth daily. predniSONE (DELTASONE) 10 mg tablet Take 4t QD for 4d, then 3t QD for 4d, then 2t QD for 4d, then 1t QD for 4d. Qty: 40 Tab, Refills: 0      ALPRAZolam (XANAX) 0.5 mg tablet Take 0.25-0.5 mg by mouth daily as needed for Anxiety. Follow-up with Dr. Cheryle Gasman after discharge.       Signed:  Rj Rubin MD  5/24/2017  1:21 PM

## 2017-05-24 NOTE — PROGRESS NOTES
[facility]    Admit Date: 5/19/2017    Subjective:       Events noted. R pleural effusion is loculated. Pigtail catheter in, but there is no thoracic surgeon here that can help with TPA administration, surgery, etc.....           Current Facility-Administered Medications   Medication Dose Route Frequency    insulin NPH (NOVOLIN N, HUMULIN N) injection 30 Units  30 Units SubCUTAneous DAILY    insulin NPH (NOVOLIN N, HUMULIN N) injection 20 Units  20 Units SubCUTAneous QPM    albuterol (PROVENTIL VENTOLIN) nebulizer solution 2.5 mg  2.5 mg Nebulization QID PRN    ALPRAZolam (XANAX) tablet 0.25-0.5 mg  0.25-0.5 mg Oral DAILY PRN    dilTIAZem CD (CARDIZEM CD) capsule 360 mg  360 mg Oral DAILY    cholecalciferol (VITAMIN D3) tablet 2,000 Units  2,000 Units Oral DAILY    digoxin (LANOXIN) tablet 0.25 mg  0.25 mg Oral DAILY    gabapentin (NEURONTIN) capsule 300 mg  300 mg Oral TID    montelukast (SINGULAIR) tablet 10 mg  10 mg Oral DAILY    potassium chloride SR (KLOR-CON 10) tablet 20 mEq  20 mEq Oral DAILY    sertraline (ZOLOFT) tablet 100 mg  100 mg Oral DAILY    therapeutic multivitamin (THERAGRAN) tablet 1 Tab  1 Tab Oral DAILY    sodium chloride (NS) flush 5-10 mL  5-10 mL IntraVENous Q8H    sodium chloride (NS) flush 5-10 mL  5-10 mL IntraVENous PRN    acetaminophen (TYLENOL) tablet 650 mg  650 mg Oral Q4H PRN    ondansetron (ZOFRAN) injection 4 mg  4 mg IntraVENous Q4H PRN    docusate sodium (COLACE) capsule 100 mg  100 mg Oral BID    insulin lispro (HUMALOG) injection   SubCUTAneous AC&HS    glucose chewable tablet 16 g  4 Tab Oral PRN    glucagon (GLUCAGEN) injection 1 mg  1 mg IntraMUSCular PRN    dextrose 10 % infusion 125-250 mL  125-250 mL IntraVENous PRN    arformoterol (BROVANA) neb solution 15 mcg  15 mcg Nebulization BID RT    And    budesonide (PULMICORT) 500 mcg/2 ml nebulizer suspension  500 mcg Nebulization BID RT          Objective: Patient Vitals for the past 8 hrs:   BP Temp Pulse Resp SpO2 Weight   05/24/17 0700 140/60 98.9 °F (37.2 °C) 80 18 98 % -   05/24/17 0300 131/49 99 °F (37.2 °C) 79 19 98 % 272 lb 0.8 oz (123.4 kg)        05/22 1901 - 05/24 0700  In: 360 [P.O.:360]  Out: 5 [Drains:5]    Physical Exam: NAD. A&O. Neck -- Supple. No JVD. Heart -- Irr Irr (Rate OK). Lungs -- CTA. Abd -- Benign. Ext -- Trace to 1+ LE edema, b/l. Data Review   Recent Results (from the past 24 hour(s))   GLUCOSE, POC    Collection Time: 05/23/17 11:41 AM   Result Value Ref Range    Glucose (POC) 293 (H) 65 - 100 mg/dL    Performed by Familia ALLEN, BODY FLUID Marielle Marti STAIN    Collection Time: 05/23/17  4:00 PM   Result Value Ref Range    Special Requests: NO SPECIAL REQUESTS      GRAM STAIN Culture performed on Fluid swab specimen      GRAM STAIN NO WBC'S SEEN      GRAM STAIN NO ORGANISMS SEEN      Culture result: PENDING    CELL COUNT, BODY FLUID    Collection Time: 05/23/17  4:45 PM   Result Value Ref Range    BODY FLUID TYPE PLEURAL FLUID      FLUID COLOR RED      FLUID APPEARANCE TURBID      FLUID RBC COUNT >100 /cu mm    FLD NUCLEATED CELLS 333 (H) 0 - 5 /cu mm    FLD SEGS 69 %    FLD LYMPHS 4 %    FLD MONO/MACROPHAGE 25 %    FLD EOSINS 2 %   LDH, BODY FLUID    Collection Time: 05/23/17  4:45 PM   Result Value Ref Range    Fluid Type: PLEURAL FLUID      LD, body fld. 1012 U/L   PROTEIN TOTAL, FLUID    Collection Time: 05/23/17  4:45 PM   Result Value Ref Range    Fluid Type: PLEURAL FLUID      Protein total, body fld. 2.7 g/dL   GLUCOSE, FLUID    Collection Time: 05/23/17  4:45 PM   Result Value Ref Range    Fluid Type: PLEURAL FLUID      Glucose, body fld.  144 MG/DL   GLUCOSE, POC    Collection Time: 05/23/17  5:47 PM   Result Value Ref Range    Glucose (POC) 223 (H) 65 - 100 mg/dL Performed by Nelida Hardy POC    Collection Time: 05/23/17  9:08 PM   Result Value Ref Range    Glucose (POC) 179 (H) 65 - 100 mg/dL    Performed by Jamila Fontana + INR    Collection Time: 05/24/17  2:39 AM   Result Value Ref Range    INR 1.8 (H) 0.9 - 1.1      Prothrombin time 18.4 (H) 9.0 - 11.1 sec   GLUCOSE, POC    Collection Time: 05/24/17  7:05 AM   Result Value Ref Range    Glucose (POC) 151 (H) 65 - 100 mg/dL    Performed by Mika Field            Assessment:     Principal Problem:    Hypotension, probably as the cause of the syncope      Active Problems:    Type 2 diabetes mellitus without complication ([facility] (7/13/0072)      Chronic atrial fibrillation ([facility] (7/17/2015)      Hypertension complicating diabetes ([facility] (2/20/2017)      Pneumonia with MSSA -- Finished IV Ancef recently. Loculated R pleural effusion after recent MSSA PNA. Syncope and collapse (5/20/2017)      Left ankle sprain (5/21/2017)      Recent problems with her \"legs giving out\" -- I agree this is multifactorial, but the Lumbar/thoracic disc disease/spinal stenosis may well be the main cause. I told her she may eventually need surgery for this, but the lung issue needs to be taken care of first.        Plan:     1. I agree that the loculated pleural effusion probably needs to be more aggressively managed. ... May need to transfer to [facility] for this. Will d/w Dr. Yana Brennan about this later. 2. Note -- I have held coumadin in anticipation for probable surgery.           Rj Rubin MD

## 2017-05-24 NOTE — CONSULTS
Chief Complaint:  Right pleural effusion    HPI:  68 yo woman with hx a-fib, HTN, DM, morbid obesity, LITA who was recently treated for pneumonia and recently has had recurrent syncopal episodes. Pt reported right pleuritic chest pain with deep inspiratory. Pt had chest CT performed which showed a right fluid. Pt had right pleural pigtail catheter placed and General surgery was consulted to assist with management of TPA infusion. Past Medical History:   Diagnosis Date    Asthma     [facility]     Depression     DM [facility]diabetes mellitus) [facility]     HTN (hypertension)     Hyperlipidemia     Morbid obesity [facility]     Neuropathy     LITA on CPAP     Other ill-defined conditions pneumonia       Past Surgical History:   Procedure Laterality Date    HX APPENDECTOMY      HX CHOLECYSTECTOMY      HX GASTRIC BYPASS      Jejunal bypass with subsequent reversal..  Lab Band since    HX OTHER SURGICAL  D & C    HX EMILIANO AND BSO      For uterine CA       No current facility-administered medications on file prior to encounter. Current Outpatient Prescriptions on File Prior to Encounter   Medication Sig Dispense Refill    ADVAIR DISKUS 100-50 mcg/dose diskus inhaler USE 1 INHALATION BY MOUTH TWICE DAILY 1 Inhaler 11    potassium chloride SR (KLOR-CON 10) 10 mEq tablet Take 20 mEq by mouth daily.  digoxin (LANOXIN) 0.25 mg tablet Take 1 Tab by mouth daily. 30 Tab 6    sertraline (ZOLOFT) 100 mg tablet TAKE 1 TABLET BY MOUTH EVERY EVENING 90 Tab 3    gabapentin (NEURONTIN) 300 mg capsule TAKE ONE CAPSULE BY MOUTH THREE TIMES DAILY 90 Cap 3    empagliflozin (JARDIANCE) 25 mg tablet Take 12.5 mg by mouth daily.  montelukast (SINGULAIR) 10 mg tablet Take 10 mg by mouth daily.  omalizumab (XOLAIR) 150 mg solr 150 mg by SubCUTAneous route every fourteen (14) days. Next dose due 2/17      tiotropium (SPIRIVA WITH HANDIHALER) 18 mcg inhalation capsule Take 1 Cap by inhalation daily.       diclofenac (VOLTAREN) 1 % gel Apply  to affected area four (4) times daily. 1500 g 6    HUMULIN N 100 unit/mL injection INJECT 35 UNITS UNDER THE SKIN IN THE MORNING AND INJECT 25 UNITS AT BEDTIME OR AS DIRECTED 30 mL 11    albuterol (PROVENTIL VENTOLIN) 2.5 mg /3 mL (0.083 %) nebulizer solution 3 mL by Nebulization route four (4) times daily as needed for Wheezing or Shortness of Breath. 100 Each 5    therapeutic multivitamin (THERAGRAN) tablet Take 1 Tab by mouth daily.  CARTIA  mg ER capsule TAKE 3 CAPSULES BY MOUTH DAILY 90 Cap 11    insulin regular (NOVOLIN R, HUMULIN R) 100 unit/mL injection Sliding scale: For -300 use 4 units, 301-400 use 8 units, greater than 400 use 12 units, greater than 500 call MD. 3 Vial 11    PROVENTIL HFA 90 mcg/actuation inhaler inhale 2 puffs four times a day if needed for wheezing 3 Inhaler 3    cholecalciferol, vitamin D3, (VITAMIN D3) 2,000 unit tab Take 2,000 Units by mouth daily.  DOCOSAHEXANOIC ACID/EPA (FISH OIL PO) Take 1 Cap by mouth daily.  predniSONE (DELTASONE) 10 mg tablet Take 4t QD for 4d, then 3t QD for 4d, then 2t QD for 4d, then 1t QD for 4d. 40 Tab 0    ALPRAZolam (XANAX) 0.5 mg tablet Take 0.25-0.5 mg by mouth daily as needed for Anxiety.          Allergies   Allergen Reactions    Latex Itching    Codeine Rash    Lisinopril Other (comments)     Cough, vomiting, Visual disturbances    Morphine Itching    Statins-Hmg-Coa Reductase Inhibitors Other (comments)     Muscle enzyme problems       Review of Systems - General ROS: negative  Psychological ROS: negative  Respiratory ROS: dyspnea  Cardiovascular ROS: negative  Gastrointestinal ROS: negative      Visit Vitals    BP (!) 126/33 (BP 1 Location: Right arm, BP Patient Position: During activity)    Pulse 87    Temp (!) 100.9 °F (38.3 °C)    Resp 20    Ht 5' 6\" (1.676 m)    Wt 272 lb 7.8 oz (123.6 kg)    SpO2 98%    Breastfeeding No    BMI 43.98 kg/m2         Physical Exam:    Gen:  NAD  Pulm:  Unlabored/diminished R > L  Abd:  S/ND/Non-TTP    Recent Results (from the past 24 hour(s))   PROTHROMBIN TIME + INR    Collection Time: 05/23/17  2:22 AM   Result Value Ref Range    INR 1.4 (H) 0.9 - 1.1      Prothrombin time 14.0 (H) 9.0 - 11.1 sec   GLUCOSE, POC    Collection Time: 05/23/17  7:16 AM   Result Value Ref Range    Glucose (POC) 208 (H) 65 - 100 mg/dL    Performed by Aditya Fam    GLUCOSE, POC    Collection Time: 05/23/17 11:41 AM   Result Value Ref Range    Glucose (POC) 293 (H) 65 - 100 mg/dL    Performed by Dale Cheng    CULTURE, BODY FLUID Edmonia Georgia STAIN    Collection Time: 05/23/17  4:00 PM   Result Value Ref Range    Special Requests: NO SPECIAL REQUESTS      GRAM STAIN Culture performed on Fluid swab specimen      GRAM STAIN NO WBC'S SEEN      GRAM STAIN NO ORGANISMS SEEN      Culture result: PENDING    CELL COUNT, BODY FLUID    Collection Time: 05/23/17  4:45 PM   Result Value Ref Range    BODY FLUID TYPE PLEURAL FLUID      FLUID COLOR RED      FLUID APPEARANCE TURBID      FLUID RBC COUNT >100 /cu mm    FLD NUCLEATED CELLS 333 (H) 0 - 5 /cu mm    FLD SEGS 69 %    FLD LYMPHS 4 %    FLD MONO/MACROPHAGE 25 %    FLD EOSINS 2 %   LDH, BODY FLUID    Collection Time: 05/23/17  4:45 PM   Result Value Ref Range    Fluid Type: PLEURAL FLUID      LD, body fld. 1012 U/L   PROTEIN TOTAL, FLUID    Collection Time: 05/23/17  4:45 PM   Result Value Ref Range    Fluid Type: PLEURAL FLUID      Protein total, body fld. 2.7 g/dL   GLUCOSE, FLUID    Collection Time: 05/23/17  4:45 PM   Result Value Ref Range    Fluid Type: PLEURAL FLUID      Glucose, body fld.  144 MG/DL   GLUCOSE, POC    Collection Time: 05/23/17  5:47 PM   Result Value Ref Range    Glucose (POC) 223 (H) 65 - 100 mg/dL    Performed by Dale Cheng    GLUCOSE, POC    Collection Time: 05/23/17  9:08 PM   Result Value Ref Range    Glucose (POC) 179 (H) 65 - 100 mg/dL    Performed by FREAY HARO        AP: 72 yo woman with right pleural effusion consulted for assistance with management of TPA infusion through chest tube    - Right pleural effusion:  Pigtail catheter was placed and there is very minimal drainage. There was no evidence PTX.    - Given possible loculated effusion that may evolve into empyema, pt is being considered for TPA treatment. I would not feel comfortable assisting with management of TPA infusion through chest tube as there is limited experience from General Surgery standpoint for this treatment algorthym. Although CT does not suggest urgent need for VATS, I believe patient is better served being transferred to [facility] where thoracic surgeon is available for VATS and decortication if indicated.

## 2017-05-24 NOTE — PROGRESS NOTES
Bedside shift change report given to Chase Tejada (oncoming nurse) by Crystal Mcallister (offgoing nurse). Report included the following information SBAR, Kardex, MAR, Accordion, Recent Results, Cardiac Rhythm A Fib and Alarm Parameters .

## 2017-05-24 NOTE — DIABETES MGMT
DTC Progress Note    Recommendations/ Comments: Chart reviewed due to hyperglycemia. Blood sugars improving, FBS today was 1511 mg/dl. Pt received 16 units of correction insulin in the past 24 hours. Please obtain PO intake    If appropriate, consider:  - Consider adding Humalog 5 units AC   - Changing correction insulin to insulin resistant scale    Chart reviewed on [address]. Patient is a 67 y.o. female with known diabetes on Jardiance 25 mg, Humulin 35 units in the morning and 25 units in the evening and Regular insulin sliding scale at home. A1c:   Lab Results   Component Value Date/Time    Hemoglobin A1c 10.0 05/20/2017 03:48 AM    Hemoglobin A1c 10.4 06/11/2015 03:57 PM       Recent Glucose Results:   Lab Results   Component Value Date/Time    GLUCPOC 235 (H) 05/24/2017 11:27 AM    GLUCPOC 151 (H) 05/24/2017 07:05 AM    GLUCPOC 179 (H) 05/23/2017 09:08 PM        Lab Results   Component Value Date/Time    Creatinine 1.11 05/21/2017 02:53 AM     Estimated Creatinine Clearance: 60.5 mL/min (based on Cr of 1.11). Active Orders   Diet    DIET DIABETIC CONSISTENT CARB Regular        PO intake:   Patient Vitals for the past 72 hrs:   % Diet Eaten   05/24/17 0954 75 %   05/23/17 1859 50 %       Current hospital DM medication: NPH 30 units in the morning and 20 units in the evening and correction scale Humalog, normal sensitivity. Will continue to follow as needed.     Thank you  Ronel Oh RD

## 2017-05-24 NOTE — PROGRESS NOTES
PM & R Progress Note    NAME: Helene Anne   :  1945   MRN:  [id number]   DATE: 2017    Treatment Team: Attending Provider: Olga Shirley MD; Consulting Provider: Chayito Woodruff MD; Consulting Provider: Shira Jacques MD; Utilization Review: Poly Cannon RN; Consulting Provider: Olga Shirley MD; Care Manager: REID Anderson; Consulting Provider: Bebeto Haider MD; Consulting Provider: Bay Mc MD; Consulting Provider: Rea Rendon MD; Consulting Provider: Kami Mejia MD         Assessment :    Plan:  1. 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 L2-L3 stenosis, moderate-severe secondary to disc extrusion  3. Syncope, vasovagal vs orthostatic hypotension  4. Unsteady gait and balance, with multiple falls, 8 since 2017, most likely due to #1 & 2, though deconditioning and neuropathy in the setting of uncontrolled DM contributing,   5. Left ankle sprain, resolving  6. Complex loculated right pleural effusion/granuloma s/p thoracentesis and pigtail cath 17  7. DM type II, uncontrolled with hyperglycemia, G8E 10, with complications including diabetic neuropathy, improving  8. Depression, appears aggravated by ongoing medical illness   9. Morbid obesity, BMI 44  10. Essential hypertension  11. Chronic Atrial fibrillation  12. Asthma, not in AE  13. Recent MSSA pneumonia and bacteremia  14. LITA on CPAP 1) Continue PT and OT. She will be most appropriate for acute in-patient after spine surgery, the timing of this to be determined as other medical issues (pulmo) need to be prioritized and treated. She will likely be discharged to [facility] for VATS procedure. Discussed rehab options after VATS, barring any major post-op problems and she prefers an out-patient rehab program to improve endurance, strength, prevent further deconditioning and hopefully minimize fall risk.   2) DVT prophylaxis, Afib: warfarin, mobilization. 3) Bowel program: Continue current bowel regimen. 4) Pain management: continue prn APAP and Gabapentin     Discussed with patient and case management. Will sign off. Thank you for allowing us to participate in patient's care       Subjective:     Patient s/p US guided right thoracentesis and pigtail cath with scant amount of fluid. Michael has seen her. Note plans for VATS, to be done at [facility]. Spine MRI done and Dr. Anny Cooley notes need for surgery at some point. Patient c/o pain at pigtail cath site as well as back pain. Last took APAP 5/22/17. BG better 151 this morning. Low grade fever 100.9F last night, no chills. SOB at times. Voiding ok. BM 5/24.       Current Facility-Administered Medications   Medication Dose Route Frequency    insulin NPH (NOVOLIN N, HUMULIN N) injection 30 Units  30 Units SubCUTAneous DAILY    insulin NPH (NOVOLIN N, HUMULIN N) injection 20 Units  20 Units SubCUTAneous QPM    albuterol (PROVENTIL VENTOLIN) nebulizer solution 2.5 mg  2.5 mg Nebulization QID PRN    ALPRAZolam (XANAX) tablet 0.25-0.5 mg  0.25-0.5 mg Oral DAILY PRN    dilTIAZem CD (CARDIZEM CD) capsule 360 mg  360 mg Oral DAILY    cholecalciferol (VITAMIN D3) tablet 2,000 Units  2,000 Units Oral DAILY    digoxin (LANOXIN) tablet 0.25 mg  0.25 mg Oral DAILY    gabapentin (NEURONTIN) capsule 300 mg  300 mg Oral TID    montelukast (SINGULAIR) tablet 10 mg  10 mg Oral DAILY    potassium chloride SR (KLOR-CON 10) tablet 20 mEq  20 mEq Oral DAILY    sertraline (ZOLOFT) tablet 100 mg  100 mg Oral DAILY    therapeutic multivitamin (THERAGRAN) tablet 1 Tab  1 Tab Oral DAILY    sodium chloride (NS) flush 5-10 mL  5-10 mL IntraVENous Q8H    sodium chloride (NS) flush 5-10 mL  5-10 mL IntraVENous PRN    acetaminophen (TYLENOL) tablet 650 mg  650 mg Oral Q4H PRN    ondansetron (ZOFRAN) injection 4 mg  4 mg IntraVENous Q4H PRN    docusate sodium (COLACE) capsule 100 mg  100 mg Oral BID    insulin lispro (HUMALOG) injection   SubCUTAneous AC&HS    glucose chewable tablet 16 g  4 Tab Oral PRN    glucagon (GLUCAGEN) injection 1 mg  1 mg IntraMUSCular PRN    dextrose 10 % infusion 125-250 mL  125-250 mL IntraVENous PRN    arformoterol (BROVANA) neb solution 15 mcg  15 mcg Nebulization BID RT    And    budesonide (PULMICORT) 500 mcg/2 ml nebulizer suspension  500 mcg Nebulization BID RT       Objective:     VITALS:   Last 24hrs VS reviewed since prior progress note. Most recent are:  Visit Vitals    /60 (BP 1 Location: Right arm, BP Patient Position: At rest)    Pulse 91    Temp 98.9 °F (37.2 °C)    Resp 18    Ht 5' 6\" (1.676 m)    Wt 123.4 kg (272 lb 0.8 oz)    SpO2 98%    Breastfeeding No    BMI 43.91 kg/m2       PHYSICAL EXAM:  General: Alert, cooperative, not in acute distress    Skin:  Good Turgor, no rashes or ulcers. Resp:  Clear but decreased BS at bases. Rght pigtail cath in place  CV:  Regular  rhythm,  No murmurs  GI:  Soft, Non distended, Non tender.  +Bowel sounds  Musculoskeletal:  Calves soft, nontender, trace BLE edema, mild left ankle tenderness laterally  Neurologic:  Sensory: intact to light touch in all limbs    Motor: no focal weakness, no footdrop  Psych:   Oriented x3, Not anxious or agitated.   Functional:   ADLs   Feeding: Independent    Oral Facial Hygiene/Grooming: Supervision (standing at the sink)    Bathing: Minimum assistance    Upper Body Dressing: Setup    Lower Body Dressing: Minimum assistance    Toileting: Minimum assistance     Bed Mobility Training  Rolling: Modified independent  Supine to Sit: Modified independent  Sit to Supine: Modified independent (uses an electric bed at home),  Transfer Training  Sit to Stand: Contact guard assistance  Stand to Sit: Contact guard assistance,  ,  Gait Training  Assistive Device: Gait belt, Walker, rolling  Ambulation - Level of Assistance: Contact guard assistance, Minimal assistance  Distance (ft): 30 Feet (ft),   ,       LABS:    Recent Labs      17   0239  17   0222  17   0522   INR  1.8*  1.4*  1.3*     Lab Results   Component Value Date/Time    Culture result: right pleural fluid PENDING 2017 04:00 PM       [id number]   Cervical MRI   1. C6-C7 moderate central spinal canal stenosis and mild bilateral foraminal stenosis is unchanged given difference in technique. 2. C4-C6 anterior fusion is unchanged. 3. No cord compression or cord signal abnormality.     Lumbar MRI     1. L2-3 superimposed central disc extrusion causes increased moderate-severe central spinal canal stenosis. 2. T10-T11 moderate central spinal canal stenosis is partially imaged and likely unchanged. 3. Unchanged grade 2 anterolisthesis of L5-S1 secondary to bilateral L5 spondylolysis. CXR - Right pleural catheter is in good position without pneumothorax. Small loculated right pleural effusion. Right lower lobe atelectasis versus pneumonia.       Prince Anderson MD  2017

## 2017-05-24 NOTE — PROGRESS NOTES
I spoke with Dr. Jerry Jenkins (thoracic surgeon at [facility]) -- He agrees to take pt in transfer. Orders being put in.

## 2017-05-24 NOTE — INTERDISCIPLINARY ROUNDS
IDR/SLIDR Summary          Patient: Fernando Shaw MRN: [id number]    Age: 67 y.o. YOB: 1945 Room/Bed: Formerly named [facility]   Admit Diagnosis: Syncope  Principal Diagnosis: <principal problem not specified>   Goals: PT  Readmission: NO  Quality Measure: Not applicable  VTE Prophylaxis: Chemical  Influenza Vaccine screening completed? YES  Pneumococcal Vaccine screening completed? YES  Mobility needs: Yes   Nutrition plan:No  Consults: P. T    Financial concerns:No  Escalated to CM? NO  RRAT Score: 21   Interventions:  Testing due for pt today?  NO  LOS: 4 days Expected length of stay 8 days  Discharge plan: TBD   PCP: Prakash Drummond MD  Transportation needs: Yes    Days before discharge:two or more days before discharge   Discharge disposition: TBD    Signed:     Winnie Gale RN  5/24/2017  9:00 AM

## 2017-05-24 NOTE — DISCHARGE INSTRUCTIONS
Patient Discharge Instructions    Jorge Hermosillo / [id number] : 1945    Admitted 2017 Discharged: 2017     Take Home Medications       · It is important that you take the medication exactly as they are prescribed. · Keep your medication in the bottles provided by the pharmacist and keep a list of the medication names, dosages, and times to be taken in your wallet. · Do not take other medications without consulting your doctor. What to do at Home    Recommended diet: Diabetic Diet. Recommended activity: Activity as tolerated and PT/OT Eval and Treat. Follow-up with Dr. Marcelo Guan 1 week after discharge from rehab. Information obtained by :  I understand that if any problems occur once I am at home I am to contact my physician. I understand and acknowledge receipt of the instructions indicated above.                                                                                                                                            Physician's or R.N.'s Signature                                                                  Date/Time                                                                                                                                              Patient or Representative Signature                                                          Date/Time

## 2017-05-24 NOTE — PROGRESS NOTES
Problem: Mobility Impaired (Adult and Pediatric)  Goal: *Acute Goals and Plan of Care (Insert Text)  Physical Therapy Goals  Initiated 5/20/2017  1. Patient will move from supine to sit and sit to supine in bed with modified independence within 7 day(s). 2. Patient will transfer from bed to chair and chair to bed with modified independence using the least restrictive device within 7 day(s). 3. Patient will perform sit to stand with modified independence within 7 day(s). 4. Patient will ambulate with modified independence for 150 feet with the least restrictive device within 7 day(s). PHYSICAL THERAPY TREATMENT  Patient: [facility] (67 y.o. female)  Date: 5/24/2017  Diagnosis: Syncope <principal problem not specified>       Precautions: Fall      ASSESSMENT:  Patient progressing with functional mobility with improved distance and mild CASTANEDA. Assessed orthostatics and BP stable with activity today. She reports that she had a LOC with her last fall but that the ones prior to this were un-related. She has been using a rollator for mobility recently but reports continued falls. Agree with recommendation for rehab dispo when medically stable. Progression toward goals:  [X]    Improving appropriately and progressing toward goals  [ ]    Improving slowly and progressing toward goals  [ ]    Not making progress toward goals and plan of care will be adjusted       PLAN:  Patient continues to benefit from skilled intervention to address the above impairments. Continue treatment per established plan of care. Discharge Recommendations:  Rehab  Further Equipment Recommendations for Discharge:  to be determined          SUBJECTIVE:   Patient stated I'm not too short of breath.       OBJECTIVE DATA SUMMARY:   Critical Behavior:  Neurologic State: Alert  Orientation Level: Oriented X4  Cognition: Appropriate for age attention/concentration, Follows commands  Safety/Judgement: Decreased insight into deficits, Decreased awareness of need for assistance  Functional Mobility Training:  Bed Mobility:  Rolling: Modified independent  Supine to Sit: Modified independent  Sit to Supine: Modified independent           Transfers:  Sit to Stand: Contact guard assistance                                Balance:  Sitting: Intact  Standing: Impaired  Standing - Static: Fair  Standing - Dynamic : Fair  Ambulation/Gait Training:  Distance (ft): 120 Feet (ft)  Assistive Device: Gait belt;Walker, rolling  Ambulation - Level of Assistance: Contact guard assistance        Gait Abnormalities: Decreased step clearance;Trunk sway increased              Speed/Liz: Pace decreased (<100 feet/min)              One standing rest break d/t fatigue and mild CASTANEDA during gait  Vitals:  BP assessed and responded appropriately with position changes  Pain:  Pain Scale 1: Numeric (0 - 10)  Pain Intensity 1: 0  Pain Location 1: Head  Pain Orientation 1: Anterior  Pain Description 1: Aching  Pain Intervention(s) 1: Medication (see MAR)     After treatment:   [ ]    Patient left in no apparent distress sitting up in chair  [X]    Patient left in no apparent distress in bed  [X]    Call bell left within reach  [X]    Nursing notified  [ ]    Caregiver present  [ ]    Bed alarm activated      COMMUNICATION/COLLABORATION:   The patients plan of care was discussed with: Registered Nurse     Roma Lara, PT, DPT   Time Calculation: 29 mins

## 2017-05-25 VITALS
TEMPERATURE: 99.1 F | DIASTOLIC BLOOD PRESSURE: 46 MMHG | OXYGEN SATURATION: 100 % | SYSTOLIC BLOOD PRESSURE: 122 MMHG | BODY MASS INDEX: 46.06 KG/M2 | RESPIRATION RATE: 20 BRPM | WEIGHT: 286.6 LBS | HEART RATE: 84 BPM | HEIGHT: 66 IN

## 2017-05-25 PROBLEM — N18.1 TYPE 2 DIABETES MELLITUS WITH STAGE 1 CHRONIC KIDNEY DISEASE, WITH LONG-TERM CURRENT USE OF INSULIN (HCC): Status: ACTIVE | Noted: 2017-05-25

## 2017-05-25 PROBLEM — Z79.4 TYPE 2 DIABETES MELLITUS WITH STAGE 1 CHRONIC KIDNEY DISEASE, WITH LONG-TERM CURRENT USE OF INSULIN (HCC): Status: ACTIVE | Noted: 2017-05-25

## 2017-05-25 PROBLEM — E11.22 TYPE 2 DIABETES MELLITUS WITH STAGE 1 CHRONIC KIDNEY DISEASE, WITH LONG-TERM CURRENT USE OF INSULIN (HCC): Status: ACTIVE | Noted: 2017-05-25

## 2017-05-25 LAB
ANION GAP BLD CALC-SCNC: 8 MMOL/L (ref 5–15)
BASOPHILS # BLD AUTO: 0 K/UL (ref 0–0.1)
BASOPHILS # BLD: 0 % (ref 0–1)
BUN SERPL-MCNC: 13 MG/DL (ref 6–20)
BUN/CREAT SERPL: 14 (ref 12–20)
CALCIUM SERPL-MCNC: 8.7 MG/DL (ref 8.5–10.1)
CHLORIDE SERPL-SCNC: 103 MMOL/L (ref 97–108)
CO2 SERPL-SCNC: 25 MMOL/L (ref 21–32)
CREAT SERPL-MCNC: 0.91 MG/DL (ref 0.55–1.02)
EOSINOPHIL # BLD: 0.2 K/UL (ref 0–0.4)
EOSINOPHIL NFR BLD: 2 % (ref 0–7)
ERYTHROCYTE [DISTWIDTH] IN BLOOD BY AUTOMATED COUNT: 15.1 % (ref 11.5–14.5)
GLUCOSE BLD STRIP.AUTO-MCNC: 169 MG/DL (ref 65–100)
GLUCOSE BLD STRIP.AUTO-MCNC: 239 MG/DL (ref 65–100)
GLUCOSE SERPL-MCNC: 155 MG/DL (ref 65–100)
HCT VFR BLD AUTO: 32.8 % (ref 35–47)
HGB BLD-MCNC: 10.3 G/DL (ref 11.5–16)
INR PPP: 1.9 (ref 0.9–1.1)
LYMPHOCYTES # BLD AUTO: 16 % (ref 12–49)
LYMPHOCYTES # BLD: 1.5 K/UL (ref 0.8–3.5)
MCH RBC QN AUTO: 27.8 PG (ref 26–34)
MCHC RBC AUTO-ENTMCNC: 31.4 G/DL (ref 30–36.5)
MCV RBC AUTO: 88.6 FL (ref 80–99)
MONOCYTES # BLD: 1 K/UL (ref 0–1)
MONOCYTES NFR BLD AUTO: 10 % (ref 5–13)
NEUTS SEG # BLD: 7 K/UL (ref 1.8–8)
NEUTS SEG NFR BLD AUTO: 72 % (ref 32–75)
PLATELET # BLD AUTO: 245 K/UL (ref 150–400)
POTASSIUM SERPL-SCNC: 4.3 MMOL/L (ref 3.5–5.1)
PROTHROMBIN TIME: 18.9 SEC (ref 9–11.1)
RBC # BLD AUTO: 3.7 M/UL (ref 3.8–5.2)
SERVICE CMNT-IMP: ABNORMAL
SERVICE CMNT-IMP: ABNORMAL
SODIUM SERPL-SCNC: 136 MMOL/L (ref 136–145)
WBC # BLD AUTO: 9.6 K/UL (ref 3.6–11)

## 2017-05-25 PROCEDURE — 36415 COLL VENOUS BLD VENIPUNCTURE: CPT | Performed by: INTERNAL MEDICINE

## 2017-05-25 PROCEDURE — 94640 AIRWAY INHALATION TREATMENT: CPT

## 2017-05-25 PROCEDURE — 85025 COMPLETE CBC W/AUTO DIFF WBC: CPT | Performed by: INTERNAL MEDICINE

## 2017-05-25 PROCEDURE — 74011250637 HC RX REV CODE- 250/637: Performed by: INTERNAL MEDICINE

## 2017-05-25 PROCEDURE — 74011000250 HC RX REV CODE- 250: Performed by: INTERNAL MEDICINE

## 2017-05-25 PROCEDURE — 74011636637 HC RX REV CODE- 636/637: Performed by: INTERNAL MEDICINE

## 2017-05-25 PROCEDURE — 85610 PROTHROMBIN TIME: CPT | Performed by: INTERNAL MEDICINE

## 2017-05-25 PROCEDURE — 80048 BASIC METABOLIC PNL TOTAL CA: CPT | Performed by: INTERNAL MEDICINE

## 2017-05-25 PROCEDURE — 82962 GLUCOSE BLOOD TEST: CPT

## 2017-05-25 RX ADMIN — Medication 10 ML: at 16:24

## 2017-05-25 RX ADMIN — POTASSIUM CHLORIDE 20 MEQ: 750 TABLET, FILM COATED, EXTENDED RELEASE ORAL at 10:53

## 2017-05-25 RX ADMIN — DIGOXIN 0.25 MG: 250 TABLET ORAL at 10:57

## 2017-05-25 RX ADMIN — DOCUSATE SODIUM 100 MG: 100 CAPSULE, LIQUID FILLED ORAL at 10:57

## 2017-05-25 RX ADMIN — BUDESONIDE 500 MCG: 0.5 INHALANT RESPIRATORY (INHALATION) at 07:51

## 2017-05-25 RX ADMIN — ARFORMOTEROL TARTRATE 15 MCG: 15 SOLUTION RESPIRATORY (INHALATION) at 07:51

## 2017-05-25 RX ADMIN — Medication 10 ML: at 06:34

## 2017-05-25 RX ADMIN — GABAPENTIN 300 MG: 300 CAPSULE ORAL at 10:57

## 2017-05-25 RX ADMIN — THERA TABS 1 TABLET: TAB at 10:53

## 2017-05-25 RX ADMIN — INSULIN LISPRO 2 UNITS: 100 INJECTION, SOLUTION INTRAVENOUS; SUBCUTANEOUS at 10:58

## 2017-05-25 RX ADMIN — DILTIAZEM HYDROCHLORIDE 360 MG: 180 CAPSULE, EXTENDED RELEASE ORAL at 10:53

## 2017-05-25 RX ADMIN — MONTELUKAST SODIUM 10 MG: 10 TABLET, FILM COATED ORAL at 10:53

## 2017-05-25 RX ADMIN — SERTRALINE HYDROCHLORIDE 100 MG: 50 TABLET ORAL at 10:57

## 2017-05-25 RX ADMIN — HUMAN INSULIN 30 UNITS: 100 INJECTION, SUSPENSION SUBCUTANEOUS at 10:58

## 2017-05-25 RX ADMIN — INSULIN LISPRO 3 UNITS: 100 INJECTION, SOLUTION INTRAVENOUS; SUBCUTANEOUS at 13:28

## 2017-05-25 RX ADMIN — VITAMIN D, TAB 1000IU (100/BT) 2000 UNITS: 25 TAB at 10:57

## 2017-05-25 RX ADMIN — GABAPENTIN 300 MG: 300 CAPSULE ORAL at 16:24

## 2017-05-25 NOTE — PROGRESS NOTES
Report was given to Omalley Lakeland on [address] [facility] ([phone]) about pt awaiting transfer this afternoon. I reviewed systems, medical history, recent vital signs, mobility, skin integrity, chest tube and recent results. I aerbalizednswered all questions and provided a callback number if more arose. The receiving nurse verbalized understanding and all questions were answered.     Pt will be transported by ambulance due to arrive at 5 pm.      Room was changed to [facility]

## 2017-05-25 NOTE — ROUTINE PROCESS
TRANSFER - OUT REPORT:    Verbal report given to Tosha Parkinson at [facility] (name) on [place]  being transferred to [facility] (unit) for ordered procedure       Report consisted of patients Situation, Background, Assessment and   Recommendations(SBAR). Information from the following report(s) SBAR, Kardex, Accordion, Recent Results and Cardiac Rhythm Afib was reviewed with the receiving nurse. Lines:   Peripheral IV 05/19/17 Left Arm (Active)   Site Assessment Clean, dry, & intact 5/25/2017 12:00 PM   Phlebitis Assessment 0 5/25/2017 12:00 PM   Infiltration Assessment 0 5/25/2017 12:00 PM   Dressing Status Clean, dry, & intact 5/25/2017 12:00 PM   Dressing Type Transparent;Tape 5/25/2017 12:00 PM   Hub Color/Line Status Pink;Capped 5/25/2017 12:00 PM   Action Taken Open ports on tubing capped 5/25/2017 12:00 PM   Alcohol Cap Used Yes 5/25/2017 12:00 PM        Opportunity for questions and clarification was provided.       Patient transported with:   {TRANSPORTDETAILS:[id number]}

## 2017-05-25 NOTE — INTERDISCIPLINARY ROUNDS
IDR/SLIDR Summary          Patient: Chip San MRN: [id number]    Age: 67 y.o. YOB: 1945 Room/Bed: [facility]   Admit Diagnosis: Syncope  Principal Diagnosis: <principal problem not specified>   Goals: Transfer  Readmission: NO  Quality Measure: Not applicable  VTE Prophylaxis: Chemical  Influenza Vaccine screening completed? NO  Pneumococcal Vaccine screening completed? NO  Mobility needs: Yes   Nutrition plan:No  Consults: P. T    Financial concerns:No  Escalated to CM? NO  RRAT Score: 21   Interventions:  Testing due for pt today?  NO  LOS: 5 days Expected length of stay 8 days  Discharge plan: Transfer to [place]   PCP: Quirino Amador MD  Transportation needs: Yes    Days before discharge:ready for discharge and discharge pending  Discharge disposition: Transfer to [facility] for progression of care    Signed:     Tish Guthrie  5/25/2017

## 2017-05-25 NOTE — PROGRESS NOTES
Pulmonary, Critical Care, and Sleep Medicine~Progress Note    Name: Anna Mullen MRN: [id number]   : 1945 Hospital: [address]   Date: 2017 9:04 AM Admission: 2017     IMPRESSION:   · Right loculated pleural effusion; likely sequelae of recent staph PNA  · Asthma. Not bronchospastic. xolair patient   · Chronic A fib   · LITA, CPAP compliant       PLAN:   · Pleural effusion still loculated with minimal output via pigtail. I discussed with patient about potentially doing tPA and dornase through chest tube vs VATS decortication. She verbalized a clear understanding of the situation. She elected to move to thoracic involvement; [place] [facility] · No WBC and afebrile. No signs of infection. Can watch off of abx at this time   · O2 titration above 90%   · IS  · Nebs  · Discussed with attending      Daily Progression:    No acute complaints     OBJECTIVE:     Vital Signs:       Visit Vitals    /50 (BP 1 Location: Right arm, BP Patient Position: At rest;Supine; Hip tilt, left)    Pulse 80    Temp 98.7 °F (37.1 °C)    Resp 20    Ht 5' 6\" (1.676 m)    Wt 130 kg (286 lb 9.6 oz)    SpO2 100%    Breastfeeding No    BMI 46.26 kg/m2      Temp (24hrs), Av.7 °F (37.1 °C), Min:98.2 °F (36.8 °C), Max:99.3 °F (37.4 °C)     Intake/Output:     Last shift:      Last 3 shifts:  1901 -  0700  In: -   Out: 6 [Drains:6]          Intake/Output Summary (Last 24 hours) at 17 1128  Last data filed at 17 0400   Gross per 24 hour   Intake                0 ml   Output                1 ml   Net               -1 ml       Imaging:  I have personally reviewed these radiographic films and reports:  17: chest x-ray    IMPRESSION:     \"Right pleural catheter is in good position without pneumothorax. Small loculated right pleural effusion. Right lower lobe atelectasis versus pneumonia. \"   I have personally reviewed PFTs:          Ventilation:   Mode Rate Tidal Volume Pressure Suppport FiO2/LPM PEEP Other               Peak airway pressure:      Minute ventilation: 10 l/min    Trilogy:        Physical Exam:                                        Exam Findings Other   General: No resp distress noted, appears stated age    [de-identified]:  No ulcers, JVD not elevated, no cervical LAD    Chest: No pectus deformity, normal chest rise b/l    HEART:  RRR, no murmurs/rubs/gallops    Lungs:  diminished BS at bases    ABD: Soft/NT, non rigid mildly distended    EXT: No cyanosis/clubbing/edema, normal peripheral pulses    Skin: No rashes or ulcers, no mottling    Neuro: A/O x 3        Medications:  Current Facility-Administered Medications   Medication Dose Route Frequency    insulin NPH (NOVOLIN N, HUMULIN N) injection 30 Units  30 Units SubCUTAneous DAILY    insulin NPH (NOVOLIN N, HUMULIN N) injection 20 Units  20 Units SubCUTAneous QPM    albuterol (PROVENTIL VENTOLIN) nebulizer solution 2.5 mg  2.5 mg Nebulization QID PRN    ALPRAZolam (XANAX) tablet 0.25-0.5 mg  0.25-0.5 mg Oral DAILY PRN    dilTIAZem CD (CARDIZEM CD) capsule 360 mg  360 mg Oral DAILY    cholecalciferol (VITAMIN D3) tablet 2,000 Units  2,000 Units Oral DAILY    digoxin (LANOXIN) tablet 0.25 mg  0.25 mg Oral DAILY    gabapentin (NEURONTIN) capsule 300 mg  300 mg Oral TID    montelukast (SINGULAIR) tablet 10 mg  10 mg Oral DAILY    potassium chloride SR (KLOR-CON 10) tablet 20 mEq  20 mEq Oral DAILY    sertraline (ZOLOFT) tablet 100 mg  100 mg Oral DAILY    therapeutic multivitamin (THERAGRAN) tablet 1 Tab  1 Tab Oral DAILY    sodium chloride (NS) flush 5-10 mL  5-10 mL IntraVENous Q8H    sodium chloride (NS) flush 5-10 mL  5-10 mL IntraVENous PRN    acetaminophen (TYLENOL) tablet 650 mg  650 mg Oral Q4H PRN    ondansetron (ZOFRAN) injection 4 mg  4 mg IntraVENous Q4H PRN    docusate sodium (COLACE) capsule 100 mg  100 mg Oral BID    insulin lispro (HUMALOG) injection   SubCUTAneous AC&HS    glucose chewable tablet 16 g  4 Tab Oral PRN    glucagon (GLUCAGEN) injection 1 mg  1 mg IntraMUSCular PRN    dextrose 10 % infusion 125-250 mL  125-250 mL IntraVENous PRN    arformoterol (BROVANA) neb solution 15 mcg  15 mcg Nebulization BID RT    And    budesonide (PULMICORT) 500 mcg/2 ml nebulizer suspension  500 mcg Nebulization BID RT       Labs:  ABG No results for input(s): PHI, PCO2I, PO2I, HCO3I, SO2I, FIO2I in the last 72 hours.      CBC Recent Labs      05/25/17 0301   WBC  9.6   HGB  10.3*   HCT  32.8*   PLT  245   MCV  88.6   MCH  25.8        Metabolic  Panel Recent Labs      05/25/17   0301 05/24/17   0239  05/23/17 0222   NA  136   --    --    K  4.3   --    --    CL  103   --    --    CO2  25   --    --    GLU  155*   --    --    BUN  13   --    --    CREA  0.91   --    --    CA  8.7   --    --    INR  1.9*  1.8*  1.4*        Pertinent Labs                Barrett Pearle Habermann, [id number]  5/25/2017

## 2017-05-25 NOTE — PROGRESS NOTES
DEEPA was informed that this pt is being transferred to [facility] today. DEEPA called the Transfer center at [facility] and spoke with [facility]. She requested that clinicals be faxed to [phone]. Cm did fax the chart to this number. CM set up ALS transport with [facility] for 5pm today. Pt's nurse will call report and complete EMTALA.  Mikael Freedman

## 2017-05-25 NOTE — PROGRESS NOTES
Bedside shift change report given to June (oncoming nurse) by Yolie Stevens (offgoing nurse). Report included the following information SBAR, Kardex, Intake/Output, MAR, Accordion, Recent Results, Cardiac Rhythm A Fib and Alarm Parameters    .

## 2017-05-25 NOTE — DIABETES MGMT
DTC Progress Note    Recommendations/ Comments: Chart reviewed d/t elevated BG's- typically before lunch and dinner. If appropriate, please consider increasing her AM dose of NPH 35 units tomorrow. Chart reviewed on [address]. Patient is a 67 y.o. female with known Type 2 Diabetes on Jardiance 25 mg, Humulin 35 units in the morning and 25 units in the evening and Regular insulin sliding scale at home    A1c:   Lab Results   Component Value Date/Time    Hemoglobin A1c 10.0 05/20/2017 03:48 AM    Hemoglobin A1c 10.4 06/11/2015 03:57 PM       Recent Glucose Results: Lab Results   Component Value Date/Time     (H) 05/25/2017 03:01 AM    GLUCPOC 239 (H) 05/25/2017 11:57 AM    GLUCPOC 169 (H) 05/25/2017 07:48 AM    GLUCPOC 158 (H) 05/24/2017 09:41 PM        Lab Results   Component Value Date/Time    Creatinine 0.91 05/25/2017 03:01 AM     Estimated Creatinine Clearance: 76.1 mL/min (based on Cr of 0.91). Active Orders   Diet    DIET DIABETIC CONSISTENT CARB Regular        PO intake: Patient Vitals for the past 72 hrs:   % Diet Eaten   05/24/17 0954 75 %   05/23/17 1859 50 %       Current hospital DM medication: NPH- 30 units AM and 20 units PM; correctional lispro-normal sensitivity     Will continue to follow as needed. Thank you  Sandy Roy RD, CDE

## 2017-05-25 NOTE — PROGRESS NOTES
Rocio Motley, Raynelle Libman    Admit Date: 5/19/2017    Subjective:       Pt still here as there have apparently been no beds available at [facility] (though she has been accepted for transfer there). No new c/o's.           Current Facility-Administered Medications   Medication Dose Route Frequency    insulin NPH (NOVOLIN N, HUMULIN N) injection 30 Units  30 Units SubCUTAneous DAILY    insulin NPH (NOVOLIN N, HUMULIN N) injection 20 Units  20 Units SubCUTAneous QPM    albuterol (PROVENTIL VENTOLIN) nebulizer solution 2.5 mg  2.5 mg Nebulization QID PRN    ALPRAZolam (XANAX) tablet 0.25-0.5 mg  0.25-0.5 mg Oral DAILY PRN    dilTIAZem CD (CARDIZEM CD) capsule 360 mg  360 mg Oral DAILY    cholecalciferol (VITAMIN D3) tablet 2,000 Units  2,000 Units Oral DAILY    digoxin (LANOXIN) tablet 0.25 mg  0.25 mg Oral DAILY    gabapentin (NEURONTIN) capsule 300 mg  300 mg Oral TID    montelukast (SINGULAIR) tablet 10 mg  10 mg Oral DAILY    potassium chloride SR (KLOR-CON 10) tablet 20 mEq  20 mEq Oral DAILY    sertraline (ZOLOFT) tablet 100 mg  100 mg Oral DAILY    therapeutic multivitamin (THERAGRAN) tablet 1 Tab  1 Tab Oral DAILY    sodium chloride (NS) flush 5-10 mL  5-10 mL IntraVENous Q8H    sodium chloride (NS) flush 5-10 mL  5-10 mL IntraVENous PRN    acetaminophen (TYLENOL) tablet 650 mg  650 mg Oral Q4H PRN    ondansetron (ZOFRAN) injection 4 mg  4 mg IntraVENous Q4H PRN    docusate sodium (COLACE) capsule 100 mg  100 mg Oral BID    insulin lispro (HUMALOG) injection   SubCUTAneous AC&HS    glucose chewable tablet 16 g  4 Tab Oral PRN    glucagon (GLUCAGEN) injection 1 mg  1 mg IntraMUSCular PRN    dextrose 10 % infusion 125-250 mL  125-250 mL IntraVENous PRN    arformoterol (BROVANA) neb solution 15 mcg  15 mcg Nebulization BID RT    And    budesonide (PULMICORT) 500 mcg/2 ml nebulizer suspension  500 mcg Nebulization BID RT          Objective:     Patient Vitals for the past 8 hrs:   BP Temp Pulse Resp SpO2 Weight   05/25/17 0752 - - - - 95 % -   05/25/17 0655 128/59 98.4 °F (36.9 °C) 75 19 95 % -   05/25/17 0257 119/46 98.2 °F (36.8 °C) 79 22 94 % 286 lb 9.6 oz (130 kg)        05/23 1901 - 05/25 0700  In: -   Out: 6 [Drains:6]    Physical Exam: NAD. A&O. Neck -- Supple. No JVD. Heart -- Irr Irr (Rate OK). Lungs -- CTA. Abd -- Benign. Ext -- Trace to 1+ LE edema, b/l. Data Review   Recent Results (from the past 24 hour(s))   GLUCOSE, POC    Collection Time: 05/24/17 11:27 AM   Result Value Ref Range    Glucose (POC) 235 (H) 65 - 100 mg/dL    Performed by Myra Kasper    GLUCOSE, POC    Collection Time: 05/24/17  4:47 PM   Result Value Ref Range    Glucose (POC) 202 (H) 65 - 100 mg/dL    Performed by Marisol Ivan    GLUCOSE, POC    Collection Time: 05/24/17  9:41 PM   Result Value Ref Range    Glucose (POC) 158 (H) 65 - 100 mg/dL    Performed by Jeffery + INR    Collection Time: 05/25/17  3:01 AM   Result Value Ref Range    INR 1.9 (H) 0.9 - 1.1      Prothrombin time 18.9 (H) 9.0 - 11.1 sec   CBC WITH AUTOMATED DIFF    Collection Time: 05/25/17  3:01 AM   Result Value Ref Range    WBC 9.6 3.6 - 11.0 K/uL    RBC 3.70 (L) 3.80 - 5.20 M/uL    HGB 10.3 (L) 11.5 - 16.0 g/dL    HCT 32.8 (L) 35.0 - 47.0 %    MCV 88.6 80.0 - 99.0 FL    MCH 27.8 26.0 - 34.0 PG    MCHC 31.4 30.0 - 36.5 g/dL    RDW 15.1 (H) 11.5 - 14.5 %    PLATELET 900 184 - 905 K/uL    NEUTROPHILS 72 32 - 75 %    LYMPHOCYTES 16 12 - 49 %    MONOCYTES 10 5 - 13 %    EOSINOPHILS 2 0 - 7 %    BASOPHILS 0 0 - 1 %    ABS. NEUTROPHILS 7.0 1.8 - 8.0 K/UL    ABS. LYMPHOCYTES 1.5 0.8 - 3.5 K/UL    ABS. MONOCYTES 1.0 0.0 - 1.0 K/UL    ABS. EOSINOPHILS 0.2 0.0 - 0.4 K/UL    ABS.  BASOPHILS 0.0 0.0 - 0.1 K/UL   METABOLIC PANEL, BASIC    Collection Time: 05/25/17  3:01 AM   Result Value Ref Range    Sodium 136 136 - 145 mmol/L    Potassium 4.3 3.5 - 5.1 mmol/L    Chloride 103 97 - 108 mmol/L    CO2 25 21 - 32 mmol/L    Anion gap 8 5 - 15 mmol/L    Glucose 155 (H) 65 - 100 mg/dL    BUN 13 6 - 20 MG/DL    Creatinine 0.91 0.55 - 1.02 MG/DL    BUN/Creatinine ratio 14 12 - 20      GFR est AA >60 >60 ml/min/1.73m2    GFR est non-AA >60 >60 ml/min/1.73m2    Calcium 8.7 8.5 - 10.1 MG/DL   GLUCOSE, POC    Collection Time: 05/25/17  7:48 AM   Result Value Ref Range    Glucose (POC) 169 (H) 65 - 100 mg/dL    Performed by Luigi Malagon            Assessment:     Principal Problem:    Hypotension, probably as the cause of the syncope      Active Problems:    Type 2 diabetes mellitus without complication (HCC) (3/36/8454)      Chronic atrial fibrillation ([facility] [address] (7/17/2015)      Hypertension complicating diabetes ([facility] [address] (2/20/2017)      Pneumonia with MSSA -- Finished IV Ancef recently. Loculated R pleural effusion after recent MSSA PNA. Syncope and collapse (5/20/2017)      Left ankle sprain (5/21/2017)      Recent problems with her \"legs giving out\" -- I agree this is multifactorial, but the Lumbar/thoracic disc disease/spinal stenosis may well be the main cause. I told her she may eventually need surgery for this, but the lung issue needs to be taken care of first.        Plan:     1. Transfer to [facility] for surgical management of loculated pleural effusion when bed available. 2. At present, pt not on abx -- ?need to place back on Ancef? (for the MSSA bacteremia last month, felt due to MSSA PNA) -- Defer to pulmonary. 3. Spoke with Dr. Anny Cooley yesterday -- He agrees she needs surgery on the T10-11 disk lesion, but the lung issue needs to be taken care of first.  4. Coumadin (for chronic afib) on hold for upcoming surgery.           Rosalba Mercado MD

## 2017-05-25 NOTE — PROGRESS NOTES
Kelly Dennison 278 - Patient new bed assignment will be [facility] ([place], [place]), [address] [place] [address].     Call report to [phone]

## 2017-05-27 LAB
BACTERIA SPEC CULT: NORMAL
GRAM STN SPEC: NORMAL
SERVICE CMNT-IMP: NORMAL

## 2017-05-31 VITALS
OXYGEN SATURATION: 98 % | HEART RATE: 76 BPM | RESPIRATION RATE: 18 BRPM | DIASTOLIC BLOOD PRESSURE: 68 MMHG | SYSTOLIC BLOOD PRESSURE: 120 MMHG | TEMPERATURE: 98.3 F

## 2017-06-08 ENCOUNTER — TELEPHONE (OUTPATIENT)
Dept: RESPIRATORY THERAPY | Age: 72
End: 2017-06-08

## 2017-06-23 ENCOUNTER — HOME HEALTH ADMISSION (OUTPATIENT)
Dept: HOME HEALTH SERVICES | Facility: HOME HEALTH | Age: 72
End: 2017-06-23

## 2017-07-05 ENCOUNTER — OFFICE VISIT (OUTPATIENT)
Dept: NEUROLOGY | Age: 72
End: 2017-07-05

## 2017-07-05 VITALS
WEIGHT: 272.8 LBS | HEART RATE: 73 BPM | SYSTOLIC BLOOD PRESSURE: 118 MMHG | RESPIRATION RATE: 16 BRPM | OXYGEN SATURATION: 96 % | BODY MASS INDEX: 43.84 KG/M2 | HEIGHT: 66 IN | DIASTOLIC BLOOD PRESSURE: 74 MMHG

## 2017-07-05 DIAGNOSIS — E11.42 DIABETIC POLYNEUROPATHY ASSOCIATED WITH TYPE 2 DIABETES MELLITUS (HCC): ICD-10-CM

## 2017-07-05 DIAGNOSIS — R26.9 GAIT ABNORMALITY: ICD-10-CM

## 2017-07-05 DIAGNOSIS — M51.04 THORACIC DISC DISORDER WITH MYELOPATHY: Primary | ICD-10-CM

## 2017-07-05 RX ORDER — CYCLOBENZAPRINE HCL 10 MG
TABLET ORAL
COMMUNITY
End: 2018-11-20 | Stop reason: SDUPTHER

## 2017-07-05 RX ORDER — EPINEPHRINE 0.3 MG/.3ML
0.3 INJECTION SUBCUTANEOUS
COMMUNITY

## 2017-07-05 RX ORDER — ASCORBIC ACID 250 MG
TABLET ORAL
COMMUNITY
End: 2019-08-05

## 2017-07-05 RX ORDER — GLUCOSAMINE/CHONDR SU A SOD 750-600 MG
1 TABLET ORAL DAILY
COMMUNITY

## 2017-07-05 RX ORDER — MULTIVIT WITH MINERALS/HERBS
1 TABLET ORAL DAILY
COMMUNITY
End: 2022-01-11

## 2017-07-05 RX ORDER — METOPROLOL SUCCINATE 25 MG/1
TABLET, EXTENDED RELEASE ORAL DAILY
COMMUNITY
End: 2017-08-25

## 2017-07-05 NOTE — MR AVS SNAPSHOT
Visit Information Date & Time Provider Department Dept. Phone Encounter #  
 7/5/2017 10:40 AM Marce Calixto MD [facility] at [address] [id number] Your Appointments 7/10/2017 10:30 AM  
ESTABLISHED PATIENT with MD Stephany Chavez TURNER, [address] [facility] ([address]) Appt Note: follow up from rehab  
 [address] [facility] 7 [id number] [phone]  
  
   
 [address] [facility] 7 [id number] Upcoming Health Maintenance Date Due  
 FOOT EXAM Q1 3/15/1954 DTaP/Tdap/Td series (1 - Tdap) 3/15/1965 BREAST CANCER SCRN MAMMOGRAM 3/15/1994 ZOSTER VACCINE AGE 60> 3/15/2004 OSTEOPOROSIS SCREENING (DEXA) 3/15/2009 MEDICARE YEARLY EXAM 3/15/2009 Pneumococcal 65+ Low/Medium Risk (2 of 2 - PPSV23) 12/1/2016 MICROALBUMIN Q1 3/14/2017 LIPID PANEL Q1 3/14/2017 INFLUENZA AGE 9 TO ADULT 8/1/2017 HEMOGLOBIN A1C Q6M 11/20/2017 EYE EXAM RETINAL OR DILATED Q1 11/21/2017 COLONOSCOPY 6/1/2018 GLAUCOMA SCREENING Q2Y 11/21/2018 Allergies as of 7/5/2017  Review Complete On: 7/5/2017 By: Marce Calixto MD  
  
 Severity Noted Reaction Type Reactions Latex  06/11/2015   Systemic Itching Codeine Low 01/15/2014   Side Effect Rash Lisinopril Low 09/26/2016   Side Effect Other (comments) Cough, vomiting, Visual disturbances Morphine Low 07/09/2015   Topical Itching Statins-hmg-coa Reductase Inhibitors Low 01/15/2014   Side Effect Other (comments) Muscle enzyme problems Current Immunizations  Reviewed on 2/13/2017 Name Date Influenza Vaccine 10/1/2015, 12/1/2013 Pneumococcal Conjugate (PCV-13)  Incomplete Pneumococcal Vaccine (Unspecified Type) 12/1/2011 Not reviewed this visit You Were Diagnosed With   
  
 Codes Comments  Thoracic disc disorder with myelopathy    -  Primary ICD-10-CM: M51.04 
ICD-9-CM: 722.72   
 Gait abnormality     ICD-10-CM: R26.9 ICD-9-CM: 781.2 Diabetic polyneuropathy associated with type 2 diabetes mellitus ([facility]     ICD-10-CM: E11.42 
ICD-9-CM: 250.60, 357.2 Vitals BP Pulse Resp Height(growth percentile) Weight(growth percentile) SpO2  
 118/74 73 16 5' 6\" (1.676 m) 272 lb 12.8 oz (123.7 kg) 96% BMI OB Status Smoking Status 44.03 kg/m2 Postmenopausal Former Smoker Vitals History BMI and BSA Data Body Mass Index Body Surface Area 44.03 kg/m 2 2.4 m 2 Preferred Pharmacy Pharmacy Name Phone [facility] [address] [facility] [address] [place] [address] [phone] Your Updated Medication List  
  
   
This list is accurate as of: 7/5/17 10:48 AM.  Always use your most recent med list.  
  
  
  
  
 ADVAIR DISKUS 100-50 mcg/dose diskus inhaler Generic drug:  fluticasone-salmeterol USE 1 INHALATION BY MOUTH TWICE DAILY ALPRAZolam 0.5 mg tablet Commonly known as:  Juventino Flack Take 0.25-0.5 mg by mouth daily as needed for Anxiety. b complex vitamins tablet Take 1 Tab by mouth daily. Biotin 2,500 mcg Cap Take  by mouth. CARTIA  mg ER capsule Generic drug:  dilTIAZem CD  
TAKE 3 CAPSULES BY MOUTH DAILY  
  
 cyclobenzaprine 10 mg tablet Commonly known as:  FLEXERIL Take  by mouth three (3) times daily as needed for Muscle Spasm(s). diclofenac 1 % Gel Commonly known as:  VOLTAREN Apply  to affected area four (4) times daily. digoxin 0.25 mg tablet Commonly known as:  LANOXIN Take 1 Tab by mouth daily. EPIPEN 0.3 mg/0.3 mL injection Generic drug:  EPINEPHrine  
0.3 mg by IntraMUSCular route once as needed. FISH OIL PO Take 1 Cap by mouth daily. * furosemide 40 mg tablet Commonly known as:  LASIX Take 0.5 Tabs by mouth daily. * furosemide 40 mg tablet Commonly known as:  LASIX TAKE 2 TABLETS BY MOUTH EVERY MORNING AND TAKE 1 TABLET BY MOUTH EVERY EVENING  
  
 gabapentin 300 mg capsule Commonly known as:  NEURONTIN  
TAKE ONE CAPSULE BY MOUTH THREE TIMES DAILY HumuLIN N 100 unit/mL injection Generic drug:  insulin NPH INJECT 35 UNITS UNDER THE SKIN IN THE MORNING AND INJECT 25 UNITS AT BEDTIME OR AS DIRECTED  
  
 insulin regular 100 unit/mL injection Commonly known as:  Arsalan Diaz HUMULIN R Sliding scale: For -300 use 4 units, 301-400 use 8 units, greater than 400 use 12 units, greater than 500 call MD.  
  
 JARDIANCE 25 mg tablet Generic drug:  empagliflozin Take 12.5 mg by mouth daily. metoprolol succinate 25 mg XL tablet Commonly known as:  TOPROL-XL Take  by mouth daily. [address] Take  by mouth.  
  
 potassium chloride SR 10 mEq tablet Commonly known as:  KLOR-CON 10 Take 20 mEq by mouth daily. predniSONE 10 mg tablet Commonly known as:  Alan Morton Take 4t QD for 4d, then 3t QD for 4d, then 2t QD for 4d, then 1t QD for 4d. * PROVENTIL HFA 90 mcg/actuation inhaler Generic drug:  albuterol  
inhale 2 puffs four times a day if needed for wheezing * albuterol 2.5 mg /3 mL (0.083 %) nebulizer solution Commonly known as:  PROVENTIL VENTOLIN  
3 mL by Nebulization route four (4) times daily as needed for Wheezing or Shortness of Breath. sertraline 100 mg tablet Commonly known as:  ZOLOFT  
TAKE 1 TABLET BY MOUTH EVERY EVENING  
  
 SINGULAIR 10 mg tablet Generic drug:  montelukast  
Take 10 mg by mouth daily. SLOW-MAG 71.5 mg tablet Generic drug:  magnesium chloride Take 71.5 mg by mouth daily. SPIRIVA WITH HANDIHALER 18 mcg inhalation capsule Generic drug:  tiotropium Take 1 Cap by inhalation daily. therapeutic multivitamin tablet Commonly known as:  [facility] Take 1 Tab by mouth daily. VITAMIN C 250 mg tablet Generic drug:  ascorbic acid (vitamin C) Take  by mouth. VITAMIN D3 2,000 unit Tab Generic drug:  cholecalciferol (vitamin D3) Take 2,000 Units by mouth daily. warfarin 5 mg tablet Commonly known as:  COUMADIN Take 1 Tab by mouth daily. XOLAIR 150 mg Solr Generic drug:  omalizumab  
150 mg by SubCUTAneous route every fourteen (14) days. Next dose due 2/17 * Notice: This list has 4 medication(s) that are the same as other medications prescribed for you. Read the directions carefully, and ask your doctor or other care provider to review them with you. To-Do List   
 07/11/2017 Neurology:  EMG NCV MOTOR WO F/WAVE PER NERVE Patient Instructions A Healthy Lifestyle: Care Instructions Your Care Instructions A healthy lifestyle can help you feel good, stay at a healthy weight, and have plenty of energy for both work and play. A healthy lifestyle is something you can share with your whole family. A healthy lifestyle also can lower your risk for serious health problems, such as high blood pressure, heart disease, and diabetes. You can follow a few steps listed below to improve your health and the health of your family. Follow-up care is a key part of your treatment and safety. Be sure to make and go to all appointments, and call your doctor if you are having problems. Its also a good idea to know your test results and keep a list of the medicines you take. How can you care for yourself at home? · Do not eat too much sugar, fat, or fast foods. You can still have dessert and treats now and then. The goal is moderation. · Start small to improve your eating habits. Pay attention to portion sizes, drink less juice and soda pop, and eat more fruits and vegetables. ¨ Eat a healthy amount of food. A 3-ounce serving of meat, for example, is about the size of a deck of cards. Fill the rest of your plate with vegetables and whole grains. ¨ Limit the amount of soda and sports drinks you have every day. Drink more water when you are thirsty. ¨ Eat at least 5 servings of fruits and vegetables every day. It may seem like a lot, but it is not hard to reach this goal. A serving or helping is 1 piece of fruit, 1 cup of vegetables, or 2 cups of leafy, raw vegetables. Have an apple or some carrot sticks as an afternoon snack instead of a candy bar. Try to have fruits and/or vegetables at every meal. 
· Make exercise part of your daily routine. You may want to start with simple activities, such as walking, bicycling, or slow swimming. Try to be active 30 to 60 minutes every day. You do not need to do all 30 to 60 minutes all at once. For example, you can exercise 3 times a day for 10 or 20 minutes. Moderate exercise is safe for most people, but it is always a good idea to talk to your doctor before starting an exercise program. 
· Keep moving. Lonjaimee Davalos the lawn, work in the garden, or [employer]. Take the stairs instead of the elevator at work. · If you smoke, quit. People who smoke have an increased risk for heart attack, stroke, cancer, and other lung illnesses. Quitting is hard, but there are ways to boost your chance of quitting tobacco for good. ¨ Use nicotine gum, patches, or lozenges. ¨ Ask your doctor about stop-smoking programs and medicines. ¨ Keep trying. In addition to reducing your risk of diseases in the future, you will notice some benefits soon after you stop using tobacco. If you have shortness of breath or asthma symptoms, they will likely get better within a few weeks after you quit. · Limit how much alcohol you drink. Moderate amounts of alcohol (up to 2 drinks a day for men, 1 drink a day for women) are okay. But drinking too much can lead to liver problems, high blood pressure, and other health problems. Family health If you have a family, there are many things you can do together to improve your health. · Eat meals together as a family as often as possible. · Eat healthy foods. This includes fruits, vegetables, lean meats and dairy, and whole grains. · Include your family in your fitness plan. Most people think of activities such as jogging or tennis as the way to fitness, but there are many ways you and your family can be more active. Anything that makes you breathe hard and gets your heart pumping is exercise. Here are some tips: 
¨ Walk to do errands or to take your child to school or the bus. ¨ Go for a family bike ride after dinner instead of watching TV. Where can you learn more? Go to http://niteshWeather [employer] Technologiesjuan carlos.info/. Enter X501 in the search box to learn more about \"A Healthy Lifestyle: Care Instructions. \" Current as of: July 26, 2016 Content Version: 11.3 © 6771-0441 [employer]. Care instructions adapted under license by [employer] (which disclaims liability or warranty for this information). If you have questions about a medical condition or this instruction, always ask your healthcare professional. [place] [address] any warranty or liability for your use of this information. Introducing [address]! Dear [place]: 
Thank you for requesting a [employer] account. Our records indicate that you already have an active [employer] account. You can access your account anytime at [email or web address] Did you know that you can access your hospital and ER discharge instructions at any time in [employer]? You can also review all of your test results from your hospital stay or ER visit. Additional Information If you have questions, please visit the Frequently Asked Questions section of the [employer] website at [email or web address] Remember, [employer] is NOT to be used for urgent needs. For medical emergencies, dial 911. Now available from your iPhone and Android! Please provide this summary of care documentation to your next provider. Your primary care clinician is listed as Екатерина. If you have any questions after today's visit, please call [phone].

## 2017-07-05 NOTE — PROGRESS NOTES
Patient is here for hospital follow up    Did therapy for 3 weeks- had thoracic surgery  Doing ok since surgery

## 2017-07-05 NOTE — PATIENT INSTRUCTIONS

## 2017-07-05 NOTE — PROGRESS NOTES
Chief Complaint   Patient presents with    Loss of Consciousness     hospital follow up         [address] is a 67 y.o. female  who was recently evaluated by me in the hospital when she was admitted after syncope. Her neurological workup was negative and this was believed to be related to hypotension. She is also had several falls over the past year and has unsteady gait. Further workup revealed that she had moderately large disc bulge at T10-T11 which could be contributing to her ataxia/causing myelopathy. She was evaluated by orthopedic surgery and no surgical intervention was planned at this time due to other medical issues. She recently underwent thoracotomy for pleural effusion. She was in rehab and is overall feeling better. However her balance is still off and she has had 2 falls since discharge from the hospital.   Denies any significant numbness and tingling in her feet. She has been noticing mild tremor in her hands when she tries to do something. Past Medical History:   Diagnosis Date    Asthma     Atrial fibrillation ([facility]     Depression     DM (diabetes mellitus) ([facility]     HTN (hypertension)     Hyperlipidemia     Morbid obesity (HCC)     Neuropathy     LITA on CPAP     Other ill-defined conditions pneumonia     Current Outpatient Prescriptions   Medication Sig    cyclobenzaprine (FLEXERIL) 10 mg tablet Take  by mouth three (3) times daily as needed for Muscle Spasm(s).  EPINEPHrine (EPIPEN) 0.3 mg/0.3 mL injection 0.3 mg by IntraMUSCular route once as needed.  metoprolol succinate (TOPROL-XL) 25 mg XL tablet Take  by mouth daily.  Biotin 2,500 mcg cap Take  by mouth.  GLUCOSAM/CHOND/HYALU/CF BORATE (MOVE FREE JOINT HEALTH PO) Take  by mouth.  magnesium chloride (SLOW-MAG) 71.5 mg tablet Take 71.5 mg by mouth daily.  b complex vitamins tablet Take 1 Tab by mouth daily.     ascorbic acid, vitamin C, (VITAMIN C) 250 mg tablet Take  by mouth.  furosemide (LASIX) 40 mg tablet Take 0.5 Tabs by mouth daily.  warfarin (COUMADIN) 5 mg tablet Take 1 Tab by mouth daily.  ADVAIR DISKUS 100-50 mcg/dose diskus inhaler USE 1 INHALATION BY MOUTH TWICE DAILY    potassium chloride SR (KLOR-CON 10) 10 mEq tablet Take 20 mEq by mouth daily.  sertraline (ZOLOFT) 100 mg tablet TAKE 1 TABLET BY MOUTH EVERY EVENING    gabapentin (NEURONTIN) 300 mg capsule TAKE ONE CAPSULE BY MOUTH THREE TIMES DAILY    empagliflozin (JARDIANCE) 25 mg tablet Take 12.5 mg by mouth daily.  montelukast (SINGULAIR) 10 mg tablet Take 10 mg by mouth daily.  tiotropium (SPIRIVA WITH HANDIHALER) 18 mcg inhalation capsule Take 1 Cap by inhalation daily.  HUMULIN N 100 unit/mL injection INJECT 35 UNITS UNDER THE SKIN IN THE MORNING AND INJECT 25 UNITS AT BEDTIME OR AS DIRECTED    therapeutic multivitamin (THERAGRAN) tablet Take 1 Tab by mouth daily.  CARTIA  mg ER capsule TAKE 3 CAPSULES BY MOUTH DAILY    insulin regular (NOVOLIN R, HUMULIN R) 100 unit/mL injection Sliding scale: For -300 use 4 units, 301-400 use 8 units, greater than 400 use 12 units, greater than 500 call MD.   Awilda Phelan ALPRAZolam (XANAX) 0.5 mg tablet Take 0.25-0.5 mg by mouth daily as needed for Anxiety.  cholecalciferol, vitamin D3, (VITAMIN D3) 2,000 unit tab Take 2,000 Units by mouth daily.  DOCOSAHEXANOIC ACID/EPA (FISH OIL PO) Take 1 Cap by mouth daily.  furosemide (LASIX) 40 mg tablet TAKE 2 TABLETS BY MOUTH EVERY MORNING AND TAKE 1 TABLET BY MOUTH EVERY EVENING    predniSONE (DELTASONE) 10 mg tablet Take 4t QD for 4d, then 3t QD for 4d, then 2t QD for 4d, then 1t QD for 4d.  digoxin (LANOXIN) 0.25 mg tablet Take 1 Tab by mouth daily.  omalizumab (XOLAIR) 150 mg solr 150 mg by SubCUTAneous route every fourteen (14) days. Next dose due 2/17    diclofenac (VOLTAREN) 1 % gel Apply  to affected area four (4) times daily.     albuterol (PROVENTIL VENTOLIN) 2.5 mg /3 mL (0.083 %) nebulizer solution 3 mL by Nebulization route four (4) times daily as needed for Wheezing or Shortness of Breath.  PROVENTIL HFA 90 mcg/actuation inhaler inhale 2 puffs four times a day if needed for wheezing     No current facility-administered medications for this visit. Allergies   Allergen Reactions    Latex Itching    Codeine Rash    Lisinopril Other (comments)     Cough, vomiting, Visual disturbances    Morphine Itching    Statins-Hmg-Coa Reductase Inhibitors Other (comments)     Muscle enzyme problems     Family History   Problem Relation Age of Onset    Stroke Mother     Diabetes Other     Heart Disease Other     Heart Disease Father      congestive heart failure     Social History   Substance Use Topics    Smoking status: Former Smoker     Quit date: 1/1/1992    Smokeless tobacco: Never Used    Alcohol use 1.2 oz/week     2 Glasses of wine per week      Comment: occ wine     Past Surgical History:   Procedure Laterality Date    HX APPENDECTOMY      HX CHOLECYSTECTOMY      HX GASTRIC BYPASS      Jejunal bypass with subsequent reversal..  Lab Band since    HX OTHER SURGICAL  D & C    HX EMILIANO AND BSO      For uterine CA         PHYSICAL EXAMINATION:    Visit Vitals    /74    Pulse 73    Resp 16    Ht 5' 6\" (1.676 m)    Wt 123.7 kg (272 lb 12.8 oz)    SpO2 96%    BMI 44.03 kg/m2       NEUROLOGICAL EXAMINATION:     Mental Status:   Alert and oriented to person, place, and time with recent and remote memory intact. Attention span and concentration are normal. Speech is fluent with a full fund of knowledge. Cranial Nerves:    II, III, IV, VI:  Visual acuity grossly intact. Visual fields are normal.    Pupils are equal, round, and reactive to light and accommodation. Extra-ocular movements are full and fluid. Fundoscopic exam was benign, no ptosis or nystagmus. V-XII: Hearing is grossly intact.   Facial features are symmetric, with normal sensation and strength. The palate rises symmetrically and the tongue protrudes midline. Sternocleidomastoids 5/5. Motor Examination: Normal tone, bulk, and strength. 5/5 muscle strength throughout. No cogwheel rigidity or clonus present. Sensory exam:  Normal throughout to pinprick, temperature. Impaired vibration sense at ankles . Normal proprioception. Coordination:  Heel-to-shin was smooth and symmetrical bilaterally. Finger to nose and rapid arm movement testing was normal.   Minimal postural tremor was seen in the right hand when outstretched     Gait and Station: Unsteady and walks with a Rollator . No Rhomberg or pronator drift. No muscle wasting or fasiculations noted. Reflexes:  DTRs 2+ throughout. Toes downgoing. LABS / IMAGING  MRI Results (most recent):    Results from Hospital Encounter encounter on 05/19/17   MRI LUMB SPINE WO CONT   Narrative EXAM:  MRI LUMB SPINE WO CONT    INDICATION:  Progressive difficulty walking, low back pain. .    COMPARISON: MRI lumbar spine on 5/29/2014    TECHNIQUE: MR imaging of the lumbar spine was performed using the following  sequences: sagittal T1, T2, STIR;  axial T1, T2  performed on the 3 Alexa  magnet. CONTRAST:  None. FINDINGS:    8 mm grade 2 anterolisthesis of L5 on S1 is unchanged. Bilateral L5  spondylolysis is unchanged. Multilevel facet arthrosis is unchanged. No new  subluxation. Vertebral body heights are maintained. Degenerative bone marrow  edema is new within the L2 vertebral body. Disc desiccation is increased and  greatest at L2-3 and L5-S1. The conus medullaris terminates at L1. Signal and caliber of the distal spinal  cord are within normal limits. Moderate atrophy of the paraspinal musculature. Epidural lipomatosis is  unchanged. Lower thoracic spine: Moderate central spinal canal stenosis at T10-T11 is  partially imaged and likely unchanged.     L1-L2:  Diffuse disc bulge, facet arthrosis, and thickened ligamentum flavum. Minimal central spinal canal stenosis. No change. L2-L3:  Diffuse disc bulge, facet arthrosis, and thickened ligamentum flavum. Superimposed central disc extrusion migrates 13 mm cranially. Moderate-severe  central spinal canal stenosis is increased. Mild bilateral foraminal stenosis is  new. Eura Izaiah L3-L4:  Diffuse disc bulge, facet arthrosis, and thickened ligamentum flavum. Mild central spinal canal stenosis. No change. L4-L5:  No herniation or stenosis. No change. L5-S1:  Uncovered disc osteophyte complex. Facet arthrosis. Mild bilateral  foraminal stenosis. No change. Impression IMPRESSION:    1. L2-3 superimposed central disc extrusion causes increased moderate-severe  central spinal canal stenosis. 2. T10-T11 moderate central spinal canal stenosis is partially imaged and likely  unchanged. 3. Unchanged grade 2 anterolisthesis of L5-S1 secondary to bilateral L5  spondylolysis. ASSESSMENT    ICD-10-CM ICD-9-CM    1. Thoracic disc disorder with myelopathy M51.04 722.72    2. Gait abnormality R26.9 781. 2 EMG NCV MOTOR WO F/WAVE PER NERVE   3. Diabetic polyneuropathy associated with type 2 diabetes mellitus (HCC) E11.42 250.60 EMG NCV MOTOR WO F/WAVE PER NERVE     357.2        DISCUSSION  Ms. Juan J Feliz has ataxia which is multifactorial including body habitus, diabetic polyneuropathy and possibly thoracic cord myelopathy.    No significant peripheral neuropathy by exam but will schedule an EMG/NCV for further diagnostic clarification  She should follow-up with spine surgery/neurosurgery to see if she would not be surgical candidate for spinal decompression as her medical issues have subsided    Aimee West MD  Diplomate, American Board of Psychiatry & Neurology (Neurology)  [place] Fabry Board of Psychiatry & Neurology (Clinical Neurophysiology)  Diplomate, American Board of Electrodiagnostic Medicine

## 2017-07-07 LAB
ACID FAST STN SPEC: NEGATIVE
MYCOBACTERIUM SPEC QL CULT: NEGATIVE
SPECIMEN PREPARATION: NORMAL
SPECIMEN SOURCE: NORMAL

## 2017-07-10 NOTE — ED TRIAGE NOTES
Triage Note: Patient arrives via EMS with complaints of shortness of breath upon awaking this morning. Increased with exertion. Crackles and Wheezes. Patient received about 1/2 of duoneb but coughing prevented finishing the treatment. Pulse ox initially high 80 % on RA and up to mid 90 % on 3 LNC after treatment. Patient denies chest pain. Head atraumatic, normal cephalic shape.

## 2017-07-25 ENCOUNTER — OFFICE VISIT (OUTPATIENT)
Dept: NEUROLOGY | Age: 72
End: 2017-07-25

## 2017-07-25 DIAGNOSIS — M51.04 THORACIC DISC DISORDER WITH MYELOPATHY: ICD-10-CM

## 2017-07-25 DIAGNOSIS — E11.42 DIABETIC POLYNEUROPATHY ASSOCIATED WITH TYPE 2 DIABETES MELLITUS (HCC): Primary | ICD-10-CM

## 2017-07-25 NOTE — MR AVS SNAPSHOT
Visit Information Date & Time Provider Department Dept. Phone Encounter #  
 7/25/2017 11:00 AM  [facility] at [address] [id number] Your Appointments 7/31/2017 10:00 AM  
ESTABLISHED PATIENT with MD Laurie Diggs TURNER, [address] ([facility]) Appt Note: protime check  
 [id number] [facility] [address]  
[phone]  
  
   
 [address] [facility] 7 [id number] Upcoming Health Maintenance Date Due  
 FOOT EXAM Q1 3/15/1954 DTaP/Tdap/Td series (1 - Tdap) 3/15/1965 BREAST CANCER SCRN MAMMOGRAM 3/15/1994 ZOSTER VACCINE AGE 60> 1/15/2004 OSTEOPOROSIS SCREENING (DEXA) 3/15/2009 MEDICARE YEARLY EXAM 3/15/2009 Pneumococcal 65+ Low/Medium Risk (2 of 2 - PPSV23) 12/1/2016 MICROALBUMIN Q1 3/14/2017 LIPID PANEL Q1 3/14/2017 INFLUENZA AGE 9 TO ADULT 8/1/2017 HEMOGLOBIN A1C Q6M 11/20/2017 EYE EXAM RETINAL OR DILATED Q1 11/21/2017 COLONOSCOPY 6/1/2018 GLAUCOMA SCREENING Q2Y 11/21/2018 Allergies as of 7/25/2017  Review Complete On: 7/10/2017 By: Lee Ann Mcdermott MD  
  
 Severity Noted Reaction Type Reactions Latex  06/11/2015   Systemic Itching Codeine Low 01/15/2014   Side Effect Rash Lisinopril Low 09/26/2016   Side Effect Other (comments) Cough, vomiting, Visual disturbances Morphine Low 07/09/2015   Topical Itching Statins-hmg-coa Reductase Inhibitors Low 01/15/2014   Side Effect Other (comments) Muscle enzyme problems Current Immunizations  Reviewed on 2/13/2017 Name Date Influenza Vaccine 10/1/2015, 12/1/2013 Pneumococcal Conjugate (PCV-13)  Incomplete Pneumococcal Vaccine (Unspecified Type) 12/1/2011 Not reviewed this visit You Were Diagnosed With   
  
 Codes Comments  Diabetic polyneuropathy associated with type 2 diabetes mellitus ([facility] [address]    -  Primary ICD-10-CM: E11.42 
 ICD-9-CM: 250.60, 357.2 Thoracic disc disorder with myelopathy     ICD-10-CM: M51.04 
ICD-9-CM: 722.72 Vitals OB Status Smoking Status Postmenopausal Former Smoker Preferred Pharmacy Pharmacy Name Phone [facility] [address] [place] [id number] [address] [facility] [place] [phone] Your Updated Medication List  
  
   
This list is accurate as of: 7/25/17 12:49 PM.  Always use your most recent med list.  
  
  
  
  
 ADVAIR DISKUS 100-50 mcg/dose diskus inhaler Generic drug:  fluticasone-salmeterol USE 1 INHALATION BY MOUTH TWICE DAILY ALLOPURINOL PO Take  by mouth. ALPRAZolam 0.5 mg tablet Commonly known as:  Corrie Peppers Take 0.25-0.5 mg by mouth daily as needed for Anxiety. b complex vitamins tablet Take 1 Tab by mouth daily. Biotin 2,500 mcg Cap Take  by mouth. CARTIA  mg ER capsule Generic drug:  dilTIAZem CD  
TAKE 3 CAPSULES BY MOUTH DAILY  
  
 cyclobenzaprine 10 mg tablet Commonly known as:  FLEXERIL Take  by mouth three (3) times daily as needed for Muscle Spasm(s). diclofenac 1 % Gel Commonly known as:  VOLTAREN Apply  to affected area four (4) times daily. digoxin 0.25 mg tablet Commonly known as:  LANOXIN Take 1 Tab by mouth daily. EPIPEN 0.3 mg/0.3 mL injection Generic drug:  EPINEPHrine  
0.3 mg by IntraMUSCular route once as needed. FISH OIL PO Take 1 Cap by mouth daily. * furosemide 40 mg tablet Commonly known as:  LASIX Take 0.5 Tabs by mouth daily. * furosemide 40 mg tablet Commonly known as:  LASIX TAKE 2 TABLETS BY MOUTH EVERY MORNING AND TAKE 1 TABLET BY MOUTH EVERY EVENING  
  
 gabapentin 300 mg capsule Commonly known as:  NEURONTIN  
TAKE ONE CAPSULE BY MOUTH THREE TIMES DAILY HumuLIN N 100 unit/mL injection Generic drug:  insulin NPH  
 INJECT 35 UNITS UNDER THE SKIN IN THE MORNING AND INJECT 25 UNITS AT BEDTIME OR AS DIRECTED  
  
 insulin regular 100 unit/mL injection Commonly known as:  ROHINI Cadena Sliding scale: For -300 use 4 units, 301-400 use 8 units, greater than 400 use 12 units, greater than 500 call MD.  
  
 JARDIANCE 25 mg tablet Generic drug:  empagliflozin Take 12.5 mg by mouth daily. metoprolol succinate 25 mg XL tablet Commonly known as:  TOPROL-XL Take  by mouth daily. [address] Take  by mouth.  
  
 potassium chloride SR 10 mEq tablet Commonly known as:  KLOR-CON 10 Take 20 mEq by mouth daily. * PROVENTIL HFA 90 mcg/actuation inhaler Generic drug:  albuterol  
inhale 2 puffs four times a day if needed for wheezing * albuterol 2.5 mg /3 mL (0.083 %) nebulizer solution Commonly known as:  PROVENTIL VENTOLIN  
3 mL by Nebulization route four (4) times daily as needed for Wheezing or Shortness of Breath. sertraline 100 mg tablet Commonly known as:  ZOLOFT  
TAKE 1 TABLET BY MOUTH EVERY EVENING  
  
 SINGULAIR 10 mg tablet Generic drug:  montelukast  
Take 10 mg by mouth daily. SLOW-MAG 71.5 mg tablet Generic drug:  magnesium chloride Take 71.5 mg by mouth daily. SPIRIVA WITH HANDIHALER 18 mcg inhalation capsule Generic drug:  tiotropium Take 1 Cap by inhalation daily. therapeutic multivitamin tablet Commonly known as:  [facility] Take 1 Tab by mouth daily. VITAMIN C 250 mg tablet Generic drug:  ascorbic acid (vitamin C) Take  by mouth. VITAMIN D3 2,000 unit Tab Generic drug:  cholecalciferol (vitamin D3) Take 2,000 Units by mouth daily. warfarin 5 mg tablet Commonly known as:  COUMADIN Take 1 Tab by mouth daily. XOLAIR 150 mg Solr Generic drug:  omalizumab  
150 mg by SubCUTAneous route every fourteen (14) days. Next dose due 2/17 * Notice: This list has 4 medication(s) that are the same as other medications prescribed for you. Read the directions carefully, and ask your doctor or other care provider to review them with you. Introducing [facility]! Dear [facility]: 
Thank you for requesting a [employer] account. Our records indicate that you already have an active [employer] account. You can access your account anytime at [email or web address]. [email or web address] Did you know that you can access your hospital and ER discharge instructions at any time in [employer]? You can also review all of your test results from your hospital stay or ER visit. Additional Information If you have questions, please visit the Frequently Asked Questions section of the [employer] website at [email or web address] Remember, [employer] is NOT to be used for urgent needs. For medical emergencies, dial 911. Now available from your iPhone and Android! Please provide this summary of care documentation to your next provider. Your primary care clinician is listed as Екатерина. If you have any questions after today's visit, please call [phone].

## 2017-07-25 NOTE — PROGRESS NOTES
[address] [facility]  [address] [place] [address] Johan Cano, [address] [place] [address]  Phone [phone] Fax [phone]  Test Date:  2017    Patient: Ankit Tijerina : 1945 Physician: Italo Beckett MD   Sex: Female Height: 5' 6\" Ref Sonja Hamilton   ID#: [id number] Weight: 274 lbs. Technician: Herminia Allison     Patient Complaints:  diabetic neuropathy    Patient History / Exam:  62-year-old female who is being evaluated for numbness in both feet and balance problem that has been going on for the past year. She has had several falls and ambulates with the help of a wheeled walker. Her workup has revealed large disc bulge at T10-T11 and there is a suspicion if it could be causing myelopathy/gait instability. EMG is being performed to obtain a baseline and to assess the severity of neuropathy. On exam: Alert and fully oriented. Cranial nerves II through XII intact. Muscle tone and bulk normal.  Strength normal in all extremities. DTRs 2 x 2 except ankles that are 1 x 2. Sensation impaired to vibration sense at the ankles but intact to light touch and pinprick. Romberg negative. Gait unsteady. NCV & EMG Findings:  Evaluation of the left peroneal motor and the right peroneal motor nerves showed no response (Ankle), no response (B Fib), and no response (Poplt). The left tibial motor and the right tibial motor nerves showed reduced amplitude (L1.7, R1.1 mV). The left Sup Peroneal sensory and the right Sup Peroneal sensory nerves showed no response (14 cm). The left sural sensory and the right sural sensory nerves showed no response (Calf). Left vs. Right side comparison data for the tibial motor nerve indicates abnormal L-R latency difference (2.4 ms).       Needle evaluation of the left anterior tibialis, the left flexor digitorum longus, the right anterior tibialis, the right gastroc, and the right flexor digitorum longus muscles showed increased motor unit amplitude. The left gastroc muscle showed increased insertional activity, increased motor unit amplitude, and diminished recruitment. All remaining muscles (as indicated in the following table) showed no evidence of electrical instability. Impression:  The electrodiagnostic testing shows absent motor and sensory responses in the lower extremities indicating severe, distal, symmetric, length dependent polyneuropathy with axonal features. This could be due to underlying diabetes. In addition there is mild acute and moderate chronic neurogenic changes seen in the left gastrocnemius which could indicate an acute and chronic radiculopathy at the S1 level on the left. Please correlate clinically and with the imaging studies.       ___________________________  Mark Vance MD        Nerve Conduction Studies  Anti Sensory Summary Table     Stim Site NR Peak (ms) Norm Peak (ms) P-T Amp (µV) Norm P-T Amp Site1 Site2 Delta-P (ms) Dist (cm) Jaziel (m/s) Norm Jaziel (m/s)   Left Sup Peroneal Anti Sensory (Ant Lat Mall)  32.8°C   14 cm NR  <4.4  >5.0 14 cm Ant Lat Mall  14.0  >32   Right Sup Peroneal Anti Sensory (Ant Lat Mall)  34.6°C   14 cm NR  <4.4  >5.0 14 cm Ant Lat Mall  14.0  >32   Left Sural Anti Sensory (Lat Mall)  32.7°C   Calf NR  <4.0  >5.0 Calf Lat Mall  14.0  >35   Right Sural Anti Sensory (Lat Mall)  34.5°C   Calf NR  <4.0  >5.0 Calf Lat Mall  14.0  >35     Motor Summary Table     Stim Site NR Onset (ms) Norm Onset (ms) O-P Amp (mV) Norm O-P Amp Site1 Site2 Delta-0 (ms) Dist (cm) Jaziel (m/s) Norm Jaziel (m/s)   Left Peroneal Motor (Ext Dig Brev)  32.1°C   Ankle NR  <6.1  >2.5 B Fib Ankle  0.0  >38   B Fib NR     Poplt B Fib  0.0  >40   Poplt NR             Right Peroneal Motor (Ext Dig Brev)  33.2°C   Ankle NR  <6.1  >2.5 B Fib Ankle  0.0  >38   B Fib NR     Poplt B Fib  0.0  >40   Poplt NR             Left Tibial Motor (Abd Penn Brev)  32.3°C   Ankle    3.7 <6.1 1.7 >3.0 Knee Ankle 2.7 0.0  >35   Knee    1.0  1.2          Right Tibial Motor (Abd Penn Brev)  34°C   Ankle    6.1 <6.1 1.1 >3.0 Knee Ankle 7.7 0.0  >35   Knee    13.8  0.0            EMG     Side Muscle Nerve Root Ins Act Fibs Psw Amp Dur Poly Recrt Int Coreen Dredge Comment   Left AntTibialis Dp Br Peronel L4-5 Nml Nml Nml Incr Nml 0 Nml Nml    Left Peroneus Long Sup Br Peronel L5-S1 Nml Nml Nml Nml Nml 0 Nml Nml    Left Gastroc Tibial S1-2 Incr Nml Nml Incr Nml 0 Reduced Nml    Left Flex Dig Long Tibial L5-S2 Nml Nml Nml Incr Nml 0 Nml Nml    Left VastusLat Femoral L2-4 Nml Nml Nml Nml Nml 0 Nml Nml    Right AntTibialis Dp Br Peronel L4-5 Nml Nml Nml Incr Nml 0 Nml Nml    Right Peroneus Long Sup Br Peronel L5-S1 Nml Nml Nml Nml Nml 0 Nml Nml    Right Gastroc Tibial S1-2 Nml Nml Nml Incr Nml 0 Nml Nml    Right Flex Dig Long Tibial L5-S2 Nml Nml Nml Incr Nml 0 Nml Nml    Right VastusLat Femoral L2-4 Nml Nml Nml Nml Nml 0 Nml Nml          Waveforms:

## 2017-08-02 ENCOUNTER — TELEPHONE (OUTPATIENT)
Dept: RESPIRATORY THERAPY | Age: 72
End: 2017-08-02

## 2017-09-07 ENCOUNTER — HOSPITAL ENCOUNTER (OUTPATIENT)
Dept: MAMMOGRAPHY | Age: 72
Discharge: HOME | End: 2017-09-07
Attending: INTERNAL MEDICINE
Payer: MEDICARE

## 2017-09-07 DIAGNOSIS — Z12.31 VISIT FOR SCREENING MAMMOGRAM: ICD-10-CM

## 2017-09-07 PROCEDURE — 77067 SCR MAMMO BI INCL CAD: CPT

## 2017-09-08 ENCOUNTER — TELEPHONE (OUTPATIENT)
Dept: RESPIRATORY THERAPY | Age: 72
End: 2017-09-08

## 2017-10-06 ENCOUNTER — TELEPHONE (OUTPATIENT)
Dept: RESPIRATORY THERAPY | Age: 72
End: 2017-10-06

## 2017-10-17 ENCOUNTER — HOSPITAL ENCOUNTER (OUTPATIENT)
Dept: CT IMAGING | Age: 72
Discharge: HOME | End: 2017-10-17
Attending: ORTHOPAEDIC SURGERY
Payer: MEDICARE

## 2017-10-17 ENCOUNTER — HOSPITAL ENCOUNTER (OUTPATIENT)
Dept: GENERAL RADIOLOGY | Age: 72
Discharge: HOME | End: 2017-10-17
Attending: ORTHOPAEDIC SURGERY
Payer: MEDICARE

## 2017-10-17 VITALS
TEMPERATURE: 97.8 F | HEART RATE: 80 BPM | SYSTOLIC BLOOD PRESSURE: 150 MMHG | DIASTOLIC BLOOD PRESSURE: 61 MMHG | OXYGEN SATURATION: 98 % | RESPIRATION RATE: 20 BRPM

## 2017-10-17 DIAGNOSIS — M54.50 LOW BACK PAIN: ICD-10-CM

## 2017-10-17 DIAGNOSIS — Z98.1 S/P CERVICAL SPINAL FUSION: ICD-10-CM

## 2017-10-17 DIAGNOSIS — M51.24 THORACIC DISC HERNIATION: ICD-10-CM

## 2017-10-17 DIAGNOSIS — M50.90 CERVICAL DISC DISORDER: ICD-10-CM

## 2017-10-17 DIAGNOSIS — M51.36 DEGENERATION OF LUMBAR INTERVERTEBRAL DISC: ICD-10-CM

## 2017-10-17 PROCEDURE — 72129 CT CHEST SPINE W/DYE: CPT

## 2017-10-17 PROCEDURE — 62305 MYELOGRAPHY LUMBAR INJECTION: CPT

## 2017-10-17 PROCEDURE — 72132 CT LUMBAR SPINE W/DYE: CPT

## 2017-10-17 PROCEDURE — 74011636320 HC RX REV CODE- 636/320: Performed by: RADIOLOGY

## 2017-10-17 PROCEDURE — 72126 CT NECK SPINE W/DYE: CPT

## 2017-10-17 RX ADMIN — IOHEXOL 20 ML: 240 INJECTION, SOLUTION INTRATHECAL; INTRAVASCULAR; INTRAVENOUS; ORAL at 11:29

## 2017-10-17 NOTE — DISCHARGE INSTRUCTIONS
POST LUMBAR MYELOGRAM DISCHARGE INSTRUCTIONS    Myelogram:     A Myelogram involves a lumbar puncture, and instead of removing fluid, contrast will be injected into the sac surrounding the spinal column. It is done to visualize the spinal column, nerve roots, spinal canal, vertebral discs and disc space. It is usually done to diagnose back pain with unknown cause or in preparation for surgery. After the injection, a CT scan will be done, usually within two hours of the injection. Call If:     You should call your Physician and/or the Radiology Nurse if you develop a headache that is not relieved by Tylenol, and worsens when you stand and eases when you lie down, you need to call. You may have developed what is referred to as a spinal headache. Our physician's will probably advise you to be on strict bed rest for 24 hours, to drink lots of fluids and caffeine. If this does not help the head pain, call again the next day. You should call if you have bleeding other than a small spot on your bandage. You should call if you have any numbness, tingling, weakness, fever, chills, urinary retention, severe itching, rash, welts, swelling, or confusion. Follow-Up Instructions: See the doctor who ordered your procedure as he/she has instructed. If you had a Lumbar Puncture or Myelogram, your results should be available to your ordering doctor in 3-5 business days. You can remove your dressing in 24 hours and shower regularly. Do not bathe or swim for 72 hours. To Reach Us:  Side effects of sedation medications and other medications used today have been reviewed. Notify us of nausea, itching, hives, dizziness, or anything else out of the ordinary. Should you experience any of these significant changes, please call [phone] between the hours of 7:30 am and 10 pm or [phone] after hours.  After hours, ask the  to page the [address] [place] [address] Technologist, and describe the problem to the technologist.       Patient Signature:  Date: 10/17/2017  Discharging Nurse: Annelise Gan RN

## 2017-10-17 NOTE — ROUTINE PROCESS
Pt discharged to home with belongings, CD, and instructions via wheelchair with daughter. Back site dry and intact. Pt at baseline walking. Pt verbalized understanding of instructions.

## 2017-10-19 PROBLEM — Z79.899 ENCOUNTER FOR LONG-TERM (CURRENT) DRUG USE: Status: ACTIVE | Noted: 2017-10-19

## 2017-10-23 ENCOUNTER — HOSPITAL ENCOUNTER (OUTPATIENT)
Dept: CT IMAGING | Age: 72
Discharge: HOME | End: 2017-10-23
Attending: INTERNAL MEDICINE
Payer: MEDICARE

## 2017-10-23 DIAGNOSIS — R10.10 UPPER ABDOMINAL PAIN: ICD-10-CM

## 2017-10-23 LAB — CREAT BLD-MCNC: 1 MG/DL (ref 0.6–1.3)

## 2017-10-23 PROCEDURE — 74011000258 HC RX REV CODE- 258: Performed by: INTERNAL MEDICINE

## 2017-10-23 PROCEDURE — 74177 CT ABD & PELVIS W/CONTRAST: CPT

## 2017-10-23 PROCEDURE — 74011636320 HC RX REV CODE- 636/320: Performed by: INTERNAL MEDICINE

## 2017-10-23 PROCEDURE — 82565 ASSAY OF CREATININE: CPT

## 2017-10-23 RX ORDER — SODIUM CHLORIDE 0.9 % (FLUSH) 0.9 %
10 SYRINGE (ML) INJECTION
Status: COMPLETED | OUTPATIENT
Start: 2017-10-23 | End: 2017-10-23

## 2017-10-23 RX ADMIN — IOPAMIDOL 100 ML: 755 INJECTION, SOLUTION INTRAVENOUS at 14:14

## 2017-10-23 RX ADMIN — SODIUM CHLORIDE 100 ML: 900 INJECTION, SOLUTION INTRAVENOUS at 14:14

## 2017-10-23 RX ADMIN — IOHEXOL 50 ML: 240 INJECTION, SOLUTION INTRATHECAL; INTRAVASCULAR; INTRAVENOUS; ORAL at 12:03

## 2017-10-23 RX ADMIN — Medication 10 ML: at 14:14

## 2018-01-09 PROBLEM — F33.9 RECURRENT DEPRESSION (HCC): Status: ACTIVE | Noted: 2018-01-09

## 2018-01-09 PROBLEM — E11.40 TYPE 2 DIABETES MELLITUS WITH DIABETIC NEUROPATHY (HCC): Status: ACTIVE | Noted: 2018-01-09

## 2018-02-23 ENCOUNTER — HOSPITAL ENCOUNTER (OUTPATIENT)
Dept: GENERAL RADIOLOGY | Age: 73
Discharge: HOME | End: 2018-02-23
Attending: INTERNAL MEDICINE
Payer: MEDICARE

## 2018-02-23 DIAGNOSIS — R05.9 COUGH: ICD-10-CM

## 2018-02-23 PROCEDURE — 71046 X-RAY EXAM CHEST 2 VIEWS: CPT

## 2018-06-14 ENCOUNTER — OFFICE VISIT (OUTPATIENT)
Dept: SURGERY | Age: 73
End: 2018-06-14

## 2018-06-14 VITALS
WEIGHT: 275 LBS | BODY MASS INDEX: 44.2 KG/M2 | SYSTOLIC BLOOD PRESSURE: 136 MMHG | HEART RATE: 74 BPM | DIASTOLIC BLOOD PRESSURE: 66 MMHG | HEIGHT: 66 IN

## 2018-06-14 DIAGNOSIS — R23.4 BREAST SKIN CHANGES: Primary | ICD-10-CM

## 2018-06-14 NOTE — COMMUNICATION BODY
HISTORY OF PRESENT ILLNESS  [place] CHRIS Garcia is a 75 y.o. female. HPI NEW patient referral for consultation by Dr. Priya Rouse for a BILATERAL breast rash. One month ago she had a bright red rash between the breasts which resolved spontaneously. A few days later the LEFT upper and lateral breast became very itchy. She was treated for a yeast infection with diflucan and Nystatin cream.  The rash has spread to the RIGHT breast and to her upper arms. It is extremely itchy. She tried Benadryl spray which helped temporarily. The patient denies any palpable lumps, nipple inversion or discharge. OB History      Para Term  AB Living    2 2 2   2    SAB TAB Ectopic Molar Multiple Live Births                 Obstetric Comments    Menarche 5, LMP 11, # of children 2, age of 4st delivery 31, Hysterectomy/oophorectomy yes/yes Breast bx no, history of breast feeding yes, BCP no, Hormone therapy no         No family history of breast or ovarian cancer  Mammogram - screening 2017 BI RADS 1    Review of Systems   Constitutional: Negative. HENT: Negative. Eyes: Negative. Respiratory: Negative. Cardiovascular: Negative. Gastrointestinal: Negative. Genitourinary: Negative. Musculoskeletal: Negative. Skin: Negative. Neurological: Negative. Endo/Heme/Allergies: Negative. Psychiatric/Behavioral: Positive for depression. The patient is nervous/anxious. Physical Exam   Pulmonary/Chest: Right breast exhibits skin change (macular rash with small scabs (from scratching)). Right breast exhibits no mass, no nipple discharge and no tenderness. Left breast exhibits skin change. Left breast exhibits no mass, no nipple discharge and no tenderness. Breasts are symmetrical.       Lymphadenopathy:     She has no cervical adenopathy. She has no axillary adenopathy. Right: No supraclavicular adenopathy present. Left: No supraclavicular adenopathy present.      BREAST ULTRASOUND  Indication: Bilateral  breast skin lesion, upper breasts. Minimal erythema with small scabs from scratching  Technique: The area was scanned using a high-frequency linear-array near-field transducer  Findings: 2 cm axillary lymph node seen in each axillay. No thickened skin  Impression: probably reactive lymphadenopathy. No underlying breast disorder  Disposition: No worrisome finding on ultrasound  ASSESSMENT and PLAN    ICD-10-CM ICD-9-CM    1. Breast skin changes R23.4 782.8      Rash covering bilateral breasts. No worrisome finding on ultrasound. No underlying breast disorder. Likely reactive axillary lymphadenopathy. I don't think this is a yeast infection because it has not responded to antifungal medication. She does not have any evidence of yeast at the inframammary fold where it is typically seen. I suggest that she try hydrocortisone cream.  I tried to get her an appointment with a dermatologist ([facility]) and she was offered an appt in October. It was suggested she call at 9 am to see if another patient has cancelled.   I also gave her the number for [facility]

## 2018-06-14 NOTE — PROGRESS NOTES
HISTORY OF PRESENT ILLNESS  [place] CHRIS Mijares is a 75 y.o. female. HPI NEW patient referral for consultation by Dr. Allie Strange for a BILATERAL breast rash. One month ago she had a bright red rash between the breasts which resolved spontaneously. A few days later the LEFT upper and lateral breast became very itchy. She was treated for a yeast infection with diflucan and Nystatin cream.  The rash has spread to the RIGHT breast and to her upper arms. It is extremely itchy. She tried Benadryl spray which helped temporarily. The patient denies any palpable lumps, nipple inversion or discharge. OB History      Para Term  AB Living    2 2 2   2    SAB TAB Ectopic Molar Multiple Live Births                 Obstetric Comments    Menarche 5, LMP 11, # of children 2, age of 4st delivery 31, Hysterectomy/oophorectomy yes/yes Breast bx no, history of breast feeding yes, BCP no, Hormone therapy no         No family history of breast or ovarian cancer  Mammogram - screening 2017 BI RADS 1    Review of Systems   Constitutional: Negative. HENT: Negative. Eyes: Negative. Respiratory: Negative. Cardiovascular: Negative. Gastrointestinal: Negative. Genitourinary: Negative. Musculoskeletal: Negative. Skin: Negative. Neurological: Negative. Endo/Heme/Allergies: Negative. Psychiatric/Behavioral: Positive for depression. The patient is nervous/anxious. Physical Exam   Pulmonary/Chest: Right breast exhibits skin change (macular rash with small scabs (from scratching)). Right breast exhibits no mass, no nipple discharge and no tenderness. Left breast exhibits skin change. Left breast exhibits no mass, no nipple discharge and no tenderness. Breasts are symmetrical.       Lymphadenopathy:     She has no cervical adenopathy. She has no axillary adenopathy. Right: No supraclavicular adenopathy present. Left: No supraclavicular adenopathy present.      BREAST ULTRASOUND  Indication: Bilateral  breast skin lesion, upper breasts. Minimal erythema with small scabs from scratching  Technique: The area was scanned using a high-frequency linear-array near-field transducer  Findings: 2 cm axillary lymph node seen in each axillay. No thickened skin  Impression: probably reactive lymphadenopathy. No underlying breast disorder  Disposition: No worrisome finding on ultrasound  ASSESSMENT and PLAN    ICD-10-CM ICD-9-CM    1. Breast skin changes R23.4 782.8      Rash covering bilateral breasts. No worrisome finding on ultrasound. No underlying breast disorder. Likely reactive axillary lymphadenopathy. I don't think this is a yeast infection because it has not responded to antifungal medication. She does not have any evidence of yeast at the inframammary fold where it is typically seen. I suggest that she try hydrocortisone cream.  I tried to get her an appointment with a dermatologist ([facility]) and she was offered an appt in October. It was suggested she call at 9 am to see if another patient has cancelled.   I also gave her the number for [facility]

## 2018-06-25 ENCOUNTER — HOSPITAL ENCOUNTER (OUTPATIENT)
Dept: GENERAL RADIOLOGY | Age: 73
Discharge: HOME | End: 2018-06-25
Attending: INTERNAL MEDICINE
Payer: MEDICARE

## 2018-06-25 DIAGNOSIS — R06.09 DOE (DYSPNEA ON EXERTION): ICD-10-CM

## 2018-06-25 PROCEDURE — 71046 X-RAY EXAM CHEST 2 VIEWS: CPT

## 2018-07-30 ENCOUNTER — HOSPITAL ENCOUNTER (OUTPATIENT)
Dept: MRI IMAGING | Age: 73
Discharge: HOME | End: 2018-07-30
Attending: ORTHOPAEDIC SURGERY
Payer: MEDICARE

## 2018-07-30 DIAGNOSIS — M54.50 LOW BACK PAIN: ICD-10-CM

## 2018-07-30 DIAGNOSIS — M53.3 COCCYX PAIN: ICD-10-CM

## 2018-07-30 DIAGNOSIS — M54.16 LUMBAR RADICULOPATHY: ICD-10-CM

## 2018-07-30 DIAGNOSIS — M43.00 SPONDYLOLYSIS: ICD-10-CM

## 2018-07-30 PROCEDURE — 72148 MRI LUMBAR SPINE W/O DYE: CPT

## 2018-09-30 ENCOUNTER — APPOINTMENT (OUTPATIENT)
Dept: GENERAL RADIOLOGY | Age: 73
End: 2018-09-30
Attending: EMERGENCY MEDICINE
Payer: MEDICARE

## 2018-09-30 ENCOUNTER — HOSPITAL ENCOUNTER (EMERGENCY)
Age: 73
Discharge: HOME | End: 2018-09-30
Attending: EMERGENCY MEDICINE | Admitting: EMERGENCY MEDICINE
Payer: MEDICARE

## 2018-09-30 VITALS
DIASTOLIC BLOOD PRESSURE: 51 MMHG | SYSTOLIC BLOOD PRESSURE: 131 MMHG | OXYGEN SATURATION: 98 % | HEART RATE: 60 BPM | RESPIRATION RATE: 11 BRPM

## 2018-09-30 DIAGNOSIS — S52.001A CLOSED FRACTURE OF PROXIMAL END OF RIGHT ULNA, UNSPECIFIED FRACTURE MORPHOLOGY, INITIAL ENCOUNTER: ICD-10-CM
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
## 2018-09-30 LAB
ALBUMIN SERPL-MCNC: 3.3 G/DL (ref 3.5–5)
ALBUMIN/GLOB SERPL: 0.8 {RATIO} (ref 1.1–2.2)
ALP SERPL-CCNC: 82 U/L (ref 45–117)
ALT SERPL-CCNC: 21 U/L (ref 12–78)
ANION GAP SERPL CALC-SCNC: 8 MMOL/L (ref 5–15)
AST SERPL-CCNC: 12 U/L (ref 15–37)
BASOPHILS # BLD: 0.1 K/UL (ref 0–0.1)
BASOPHILS NFR BLD: 1 % (ref 0–1)
BILIRUB SERPL-MCNC: 0.3 MG/DL (ref 0.2–1)
BUN SERPL-MCNC: 19 MG/DL (ref 6–20)
BUN/CREAT SERPL: 16 (ref 12–20)
CALCIUM SERPL-MCNC: 8.8 MG/DL (ref 8.5–10.1)
CHLORIDE SERPL-SCNC: 101 MMOL/L (ref 97–108)
CO2 SERPL-SCNC: 30 MMOL/L (ref 21–32)
COMMENT, HOLDF: NORMAL
CREAT SERPL-MCNC: 1.19 MG/DL (ref 0.55–1.02)
DIFFERENTIAL METHOD BLD: ABNORMAL
EOSINOPHIL # BLD: 0.3 K/UL (ref 0–0.4)
EOSINOPHIL NFR BLD: 3 % (ref 0–7)
ERYTHROCYTE [DISTWIDTH] IN BLOOD BY AUTOMATED COUNT: 13.7 % (ref 11.5–14.5)
GLOBULIN SER CALC-MCNC: 4.2 G/DL (ref 2–4)
GLUCOSE SERPL-MCNC: 122 MG/DL (ref 65–100)
HCT VFR BLD AUTO: 41.5 % (ref 35–47)
HGB BLD-MCNC: 13.5 G/DL (ref 11.5–16)
IMM GRANULOCYTES # BLD: 0.1 K/UL (ref 0–0.04)
IMM GRANULOCYTES NFR BLD AUTO: 1 % (ref 0–0.5)
LYMPHOCYTES # BLD: 1.8 K/UL (ref 0.8–3.5)
LYMPHOCYTES NFR BLD: 14 % (ref 12–49)
MCH RBC QN AUTO: 29.3 PG (ref 26–34)
MCHC RBC AUTO-ENTMCNC: 32.5 G/DL (ref 30–36.5)
MCV RBC AUTO: 90 FL (ref 80–99)
MONOCYTES # BLD: 0.7 K/UL (ref 0–1)
MONOCYTES NFR BLD: 6 % (ref 5–13)
NEUTS SEG # BLD: 9.8 K/UL (ref 1.8–8)
NEUTS SEG NFR BLD: 76 % (ref 32–75)
NRBC # BLD: 0 K/UL (ref 0–0.01)
NRBC BLD-RTO: 0 PER 100 WBC
PLATELET # BLD AUTO: 239 K/UL (ref 150–400)
PMV BLD AUTO: 9 FL (ref 8.9–12.9)
POTASSIUM SERPL-SCNC: 3.4 MMOL/L (ref 3.5–5.1)
PROT SERPL-MCNC: 7.5 G/DL (ref 6.4–8.2)
RBC # BLD AUTO: 4.61 M/UL (ref 3.8–5.2)
SAMPLES BEING HELD,HOLD: NORMAL
SODIUM SERPL-SCNC: 139 MMOL/L (ref 136–145)
WBC # BLD AUTO: 12.8 K/UL (ref 3.6–11)

## 2018-09-30 PROCEDURE — 80053 COMPREHEN METABOLIC PANEL: CPT | Performed by: EMERGENCY MEDICINE

## 2018-09-30 PROCEDURE — 73562 X-RAY EXAM OF KNEE 3: CPT

## 2018-09-30 PROCEDURE — 99284 EMERGENCY DEPT VISIT MOD MDM: CPT

## 2018-09-30 PROCEDURE — 73130 X-RAY EXAM OF HAND: CPT

## 2018-09-30 PROCEDURE — 93005 ELECTROCARDIOGRAM TRACING: CPT

## 2018-09-30 PROCEDURE — 74011250637 HC RX REV CODE- 250/637: Performed by: EMERGENCY MEDICINE

## 2018-09-30 PROCEDURE — 85025 COMPLETE CBC W/AUTO DIFF WBC: CPT | Performed by: EMERGENCY MEDICINE

## 2018-09-30 PROCEDURE — 36415 COLL VENOUS BLD VENIPUNCTURE: CPT | Performed by: EMERGENCY MEDICINE

## 2018-09-30 PROCEDURE — 73080 X-RAY EXAM OF ELBOW: CPT

## 2018-09-30 PROCEDURE — 75810000053 HC SPLINT APPLICATION

## 2018-09-30 RX ORDER — ACETAMINOPHEN 500 MG
1000 TABLET ORAL
Status: COMPLETED | OUTPATIENT
Start: 2018-09-30 | End: 2018-09-30

## 2018-09-30 RX ADMIN — ACETAMINOPHEN 1000 MG: 500 TABLET ORAL at 17:16

## 2018-09-30 NOTE — ED TRIAGE NOTES
Pt brought to ED for GLF. Pt was walking in home to bathroom when she felt shaky all over. States she caught herself on limbs and reports pain in right elbow, shoulder, and both knees. No LOC. HX afib and DM. . Pt currently on warfarin

## 2018-09-30 NOTE — DISCHARGE INSTRUCTIONS
Broken Arm: Care Instructions  Your Care Instructions  Fractures can range from a small, hairline crack, to a bone or bones broken into two or more pieces. Your treatment depends on how bad the break is. Your doctor may have put your arm in a splint or cast to allow it to heal or to keep it stable until you see another doctor. It may take weeks or months for your arm to heal. You can help your arm heal with some care at home. You heal best when you take good care of yourself. Eat a variety of healthy foods, and don't smoke. You may have had a sedative to help you relax. You may be unsteady after having sedation. It can take a few hours for the medicine's effects to wear off. Common side effects of sedation include nausea, vomiting, and feeling sleepy or tired. The doctor has checked you carefully, but problems can develop later. If you notice any problems or new symptoms, get medical treatment right away. Follow-up care is a key part of your treatment and safety. Be sure to make and go to all appointments, and call your doctor if you are having problems. It's also a good idea to know your test results and keep a list of the medicines you take. How can you care for yourself at home? · If the doctor gave you a sedative:  ¨ For 24 hours, don't do anything that requires attention to detail. It takes time for the medicine's effects to completely wear off. ¨ For your safety, do not drive or operate any machinery that could be dangerous. Wait until the medicine wears off and you can think clearly and react easily. · Put ice or a cold pack on your arm for 10 to 20 minutes at a time. Try to do this every 1 to 2 hours for the next 3 days (when you are awake). Put a thin cloth between the ice and your cast or splint. Keep the cast or splint dry. · Follow the cast care instructions your doctor gives you. If you have a splint, do not take it off unless your doctor tells you to. · Be safe with medicines.  Take pain medicines exactly as directed. ¨ If the doctor gave you a prescription medicine for pain, take it as prescribed. ¨ If you are not taking a prescription pain medicine, ask your doctor if you can take an over-the-counter medicine. · Prop up your arm on pillows when you sit or lie down in the first few days after the injury. Keep the arm higher than the level of your heart. This will help reduce swelling. · Follow instructions for exercises to keep your arm strong. · Wiggle your fingers and wrist often to reduce swelling and stiffness. When should you call for help? Call 911 anytime you think you may need emergency care. For example, call if:    · You are very sleepy and you have trouble waking up.    Call your doctor now or seek immediate medical care if:    · You have new or worse nausea or vomiting.     · You have new or worse pain.     · Your hand or fingers are cool or pale or change color.     · Your cast or splint feels too tight.     · You have tingling, weakness, or numbness in your hand or fingers.    Watch closely for changes in your health, and be sure to contact your doctor if:    · You do not get better as expected.     · You have problems with your cast or splint. Where can you learn more? Go to http://nitesh-juan carlos.info/. Enter X219 in the search box to learn more about \"Broken Arm: Care Instructions. \"  Current as of: November 29, 2017  Content Version: 11.7  © 9481-9130 [employer]. Care instructions adapted under license by [employer] (which disclaims liability or warranty for this information). If you have questions about a medical condition or this instruction, always ask your healthcare professional. [address] any warranty or liability for your use of this information.

## 2018-09-30 NOTE — ED PROVIDER NOTES
Patient is a 75 y.o. female presenting with fall. The history is provided by the patient. Fall   The accident occurred less than 1 hour ago. The fall occurred while walking (to the bathroom, felt a little lightheaded and came down on knees and outstretched right hand). She fell from a height of 1 - 2 ft. She landed on hard floor. The point of impact was the right knee, left knee and right wrist. The pain is mild. She was not ambulatory at the scene. There was no entrapment after the fall. There was no drug use involved in the accident. There was no alcohol use involved in the accident. Pertinent negatives include no visual change, no fever, no numbness, no bowel incontinence, no vomiting, no headaches, no extremity weakness, no loss of consciousness and no laceration. The symptoms are aggravated by activity. She has tried nothing for the symptoms. Past Medical History:   Diagnosis Date    Asthma     Atrial fibrillation ([address]     Depression     DM (diabetes mellitus) ([address]     HTN (hypertension)     Hyperlipidemia     Morbid obesity ([address]     Neuropathy     LITA on CPAP     Other ill-defined conditions(799.89) pneumonia       Past Surgical History:   Procedure Laterality Date    HX APPENDECTOMY      HX CHOLECYSTECTOMY      HX GASTRIC BYPASS      Jejunal bypass with subsequent reversal..  Lab Band since    HX OTHER SURGICAL  D & C    HX EMILIANO AND BSO      For uterine CA         Family History:   Problem Relation Age of Onset    Stroke Mother     Diabetes Other     Heart Disease Other     Heart Disease Father      congestive heart failure       Social History     Social History    Marital status:      Spouse name: N/A    Number of children: N/A    Years of education: N/A     Occupational History    Not on file.      Social History Main Topics    Smoking status: Former Smoker     Quit date: 1/1/1992    Smokeless tobacco: Never Used    Alcohol use 1.2 oz/week     2 Glasses of wine per week      Comment: occ wine    Drug use: No    Sexual activity: No     Other Topics Concern    Not on file     Social History Narrative         ALLERGIES: Latex; Codeine; Lisinopril; Morphine; and Statins-hmg-coa reductase inhibitors    Review of Systems   Constitutional: Negative for fever. Respiratory: Negative for cough and shortness of breath. Gastrointestinal: Negative for bowel incontinence and vomiting. Musculoskeletal: Positive for arthralgias. Negative for extremity weakness and joint swelling. Neurological: Negative for loss of consciousness, numbness and headaches. All other systems reviewed and are negative. There were no vitals filed for this visit. Physical Exam   Constitutional: She is oriented to person, place, and time. She appears well-developed and well-nourished. No distress. HENT:   Head: Normocephalic and atraumatic. Eyes: Conjunctivae and EOM are normal.   Neck: Neck supple. Cardiovascular: Normal rate, regular rhythm and normal heart sounds. Pulmonary/Chest: Effort normal and breath sounds normal. No stridor. No respiratory distress. Abdominal: She exhibits no distension. There is no tenderness. There is no rebound and no guarding. Musculoskeletal: Normal range of motion. Tenderness and contusion of bilateral knees, right hand tenderness and right elbow tenderness   Neurological: She is alert and oriented to person, place, and time. No cranial nerve deficit. Coordination normal.   Skin: Skin is warm and dry. No laceration noted. Psychiatric: She has a normal mood and affect. Nursing note and vitals reviewed. MDM    75 y.o. female presents with fall from standing earlier today. Plain films of painful areas demonstrate small olecranon fracture. Splinted, ortho f/u recommended. No significant other abnormalities are present to warrant further inpatient stabilization.  Plan to follow up with PCP as needed and return precautions discussed for worsening or new concerning symptoms. ED Course       Procedures  ED EKG interpretation:  Rhythm: atrial fib; and irregular. Rate (approx.): 62; Axis: left axis deviation  Note written by Annia Gold, as dictated by Juan Reza MD 10:30 PM.      5:21 PM  Patient's results have been reviewed with them. Patient and/or family have verbally conveyed their understanding and agreement of the patient's signs, symptoms, diagnosis, treatment and prognosis and additionally agree to follow up as recommended or return to the Emergency Room should their condition change prior to follow-up. Discharge instructions have also been provided to the patient with some educational information regarding their diagnosis as well a list of reasons why they would want to return to the ER prior to their follow-up appointment should their condition change.

## 2018-10-01 LAB
ATRIAL RATE: 88 BPM
CALCULATED R AXIS, ECG10: -46 DEGREES
CALCULATED T AXIS, ECG11: 51 DEGREES
DIAGNOSIS, 93000: NORMAL
Q-T INTERVAL, ECG07: 408 MS
QRS DURATION, ECG06: 112 MS
QTC CALCULATION (BEZET), ECG08: 414 MS
VENTRICULAR RATE, ECG03: 62 BPM

## 2018-10-02 NOTE — CALL BACK NOTE
[facility] Emergency Department Follow Up Call Record    Discharged to : Home/Family Home/Home Health/Skilled Facility/Rehab/Assisted Living/Other__home_____  1) Did you receive your discharge instructions? Yes This writer spoke with ROSE MARIE Darling. [place] reports she has Ortho follow up today. Pain is controlled. 2) Do you understand them? Yes         3) Are you able to follow them? Yes          If NO, what can I clarify for you? 4) Do you understand your diagnosis? Yes         5) Do you know which symptoms should prompt you to call the doctor? Yes     6) Were you able to fill and  any medications that were prescribed? Yes     7) You were prescribed _motrin__________for __arm pain__________________. Common side effects of this medication are____________________. This is not a complete list so please review the forms given from the pharmacy for a complete list.      8) Are there any questions about your medications? No            Have you scheduled any recommended doctors appointments (specialty, PCP) YES. Follow up with Dr. Mazin Gerber (Ortho) today 10/2/18. If NO, what barriers are you encountering (transportation/lost contact info/cost/  didnt think necessary/no PCP  9) If discharged with Home Health, has the agency contacted you to schedule visit? Not applicable   10) Is there anyone available to help you at home (meals, errands, transportation    monitoring) (adult children, neighbors, private duty companions) Yes  Patient reports living with her daughter, but daughter has been very busy this week at work. Not always available to assist [place] . [place] does reports some difficulty with her ADL's concerning her immobility of affected arm. Today [place] will speak to the Orthopedist about this or hr PCP Dr. Sun Ac. 11) Are you on a special diet? DM         If YES, do you understand the requirements for this diet? No      Education provided?  YES  12) If presented with cough, bronchitis, COPD, asthma, is it ok to ask that the   respiratory disease management educator call you? Not applicable      13)  A) If presented with fall, were you issued an assistive device in the ED    Are you using? Yes, patient using sling. B) If given RX for device, have you obtained? Not applicable       If NO, barriers? C) Therapist recommended:NO   Are you able to implement the suggestions? Not applicable        If NO, barriers to implementation? D) Are you having any difficulties with mobility inside your home?     (steps, bed, tub)No   If YES, ask if the [facility] PT can contact patient and good time and number?  14)  At the end of your discharge instructions, there is information about accessing [facility], have you had a chance to review those? Yes         Do you have any questions about signing up for this service? No   We encourage our patients to be active participants in their healthcare and this site is one of the ways to do that. It will allow you to access parts of your medical record, email your doctors office, schedule appointments, and request medications refills . 15) Are there any other questions that I can answer for you regarding    your Emergency department visit?  NO             Estimated Call Time:____11:18 AM  _______________ Date/Time:_______________

## 2018-10-30 ENCOUNTER — HOSPITAL ENCOUNTER (OUTPATIENT)
Dept: MAMMOGRAPHY | Age: 73
Discharge: HOME | End: 2018-10-30
Attending: INTERNAL MEDICINE
Payer: MEDICARE

## 2018-10-30 DIAGNOSIS — Z12.31 VISIT FOR SCREENING MAMMOGRAM: ICD-10-CM

## 2018-10-30 PROCEDURE — 77067 SCR MAMMO BI INCL CAD: CPT

## 2018-11-26 NOTE — IP AVS SNAPSHOT
"Requested Prescriptions   Pending Prescriptions Disp Refills     amLODIPine (NORVASC) 5 MG tablet [Pharmacy Med Name: AMLODIPINE BESYLATE 5MG TABS] 30 tablet 1    Last Written Prescription Date:  9/24/18  Last Fill Quantity: 30,  # refills: 1   Last office visit: 9/24/2018 with prescribing provider:     Future Office Visit:   Next 5 appointments (look out 90 days)     Jan 16, 2019  3:30 PM CST   Return Visit with Fabricio Zhou MD   [facility] ([facility]    [address] [facility] [address]   [phone]                  Sig: TAKE ONE TABLET BY MOUTH EVERY DAY    Calcium Channel Blockers Protocol  Failed    11/25/2018  9:56 PM       Failed - Blood pressure under 140/90 in past 12 months    BP Readings from Last 3 Encounters:   09/24/18 147/80   09/19/18 140/82   04/25/18 125/89                Passed - Recent (12 mo) or future (30 days) visit within the authorizing provider's specialty    Patient had office visit in the last 12 months or has a visit in the next 30 days with authorizing provider or within the authorizing provider's specialty.  See \"Patient Info\" tab in inbasket, or \"Choose Columns\" in Meds & Orders section of the refill encounter.             Passed - Patient is age 18 or older       Passed - No active pregnancy on record       Passed - Normal serum creatinine on file in past 12 months    Recent Labs   Lab Test  01/08/18   1514   CR  0.69            Passed - No positive pregnancy test in past 12 months          " [address] [address] 
[phone] Patient: [facility] MRN: [id number] RMB:9/76/3811 You are allergic to the following Allergen Reactions Latex Itching Codeine Rash Lisinopril Other (comments) Cough, vomiting, Visual disturbances Statins-Hmg-Coa Reductase Inhibitors Other (comments) Muscle enzyme problems Morphine Itching Recent Documentation Height Weight BMI OB Status Smoking Status 1.676 m 137.7 kg 49 kg/m2 Postmenopausal Former Smoker Unresulted Labs Order Current Status CULTURE, RESPIRATORY/SPUTUM/BRONCH W GRAM STAIN Preliminary result Emergency Contacts  (Rel.) Home Phone Work Phone Mobile Phone [address] [facility] (Child) [phone] -- [phone] Delgado Arora (Child) -- -- [phone] About your hospitalization You were admitted on:  February 9, 2017 You last received care in the:  [facility] You were discharged on:  February 14, 2017 Why you were hospitalized Your primary diagnosis was:  Respiratory Failure (Hcc) Your diagnoses also included:  Asthma Exacerbation, Type 2 Diabetes Mellitus Without Complication (Hcc), Pna (Pneumonia), Essential Hypertension With Goal Blood Pressure Less Than 140/90, Morbid Obesity (Hcc), Chronic Atrial Fibrillation (Hcc) Providers Seen During Your Hospitalizations Provider Role Specialty Primary office phone Love Grant MD Attending Provider Emergency Medicine [phone] Matthew Naik MD Attending Provider Internal Medicine [phone] Your Primary Care Physician (PCP) Primary Care Physician Office Phone Office Fax Oc Estrada [phone] Follow-up Information Follow up With Details Comments Contact Info Matthew Naik MD   [facility] [place] [address] 
[phone] Appointments for Next 14 days 2/17/2017  9:00 AM  ESTABLISHED PATIENT RADHA CAMERON TURNER, [address] Current Discharge Medication List  
  
START taking these medications Dose & Instructions Dispensing Information Comments Morning Noon Evening Bedtime  
 levoFLOXacin 500 mg tablet Commonly known as:  Franklin Avinger Your next dose is: Today, Tomorrow Other:  _________ Dose:  500 mg Take 1 Tab by mouth daily for 5 days. Quantity:  5 Tab Refills:  0  
     
   
   
   
  
 predniSONE 10 mg tablet Commonly known as:  Robert Gutierrez Your next dose is: Today, Tomorrow Other:  _________ Take 6t every day for 4d, then 5t every day for 4d, then 4t QD for 4d, then 3t QD for 4d, then 2t QD for 4d, then 1t QD for 4d. Quantity:  84 Tab Refills:  0 CONTINUE these medications which have CHANGED Dose & Instructions Dispensing Information Comments Morning Noon Evening Bedtime HumuLIN N 100 unit/mL injection Generic drug:  insulin NPH What changed:  See the new instructions. Your next dose is: Today, Tomorrow Other:  _________ INJECT 35 UNITS UNDER THE SKIN IN THE MORNING AND INJECT 25 UNITS AT BEDTIME OR AS DIRECTED Quantity:  30 mL Refills:  11  
     
   
   
   
  
 insulin regular 100 unit/mL injection Commonly known as:  Winferd Severs, HUMULIN R What changed:  additional instructions Your next dose is: Today, Tomorrow Other:  _________ Sliding scale: For -300 use 4 units, 301-400 use 8 units, greater than 400 use 12 units, greater than 500 call MD.  
 Quantity:  3 Vial  
Refills:  11 JARDIANCE 25 mg tablet Generic drug:  empagliflozin What changed:  Another medication with the same name was removed.  Continue taking this medication, and follow the directions you see here. Your next dose is: Today, Tomorrow Other:  _________ Dose:  12.5 mg Take 12.5 mg by mouth daily. Refills:  0  
     
   
   
   
  
 potassium chloride 20 mEq tablet Commonly known as:  K-DUR, KLOR-CON What changed:  See the new instructions. Your next dose is: Today, Tomorrow Other:  _________ TAKE 1 TABLET BY MOUTH TWICE DAILY Quantity:  60 Tab Refills:  11  
     
   
   
   
  
 * PROVENTIL HFA 90 mcg/actuation inhaler Generic drug:  albuterol What changed:  Another medication with the same name was added. Make sure you understand how and when to take each. Your next dose is: Today, Tomorrow Other:  _________  
   
   
 inhale 2 puffs four times a day if needed for wheezing Quantity:  3 Inhaler Refills:  3  
     
   
   
   
  
 * albuterol 2.5 mg /3 mL (0.083 %) nebulizer solution Commonly known as:  PROVENTIL VENTOLIN What changed:  Another medication with the same name was added. Make sure you understand how and when to take each. Your next dose is: Today, Tomorrow Other:  _________ Dose:  2.5 mg  
3 mL by Nebulization route four (4) times daily as needed for Wheezing or Shortness of Breath. Quantity:  100 Each Refills:  5  
     
   
   
   
  
 * albuterol 2.5 mg /3 mL (0.083 %) nebulizer solution Commonly known as:  PROVENTIL VENTOLIN What changed: You were already taking a medication with the same name, and this prescription was added. Make sure you understand how and when to take each. Your next dose is: Today, Tomorrow Other:  _________ Dose:  2.5 mg  
3 mL by Nebulization route four (4) times daily as needed for Wheezing or Shortness of Breath. Quantity:  48 Each Refills:  6  
     
   
   
   
  
 sertraline 100 mg tablet Commonly known as:  ZOLOFT What changed:  how much to take Your next dose is: Today, Tomorrow Other:  _________ Dose:  100 mg Take 1 Tab by mouth nightly. Quantity:  90 Tab Refills:  3  
     
   
   
   
  
 warfarin 5 mg tablet Commonly known as:  COUMADIN What changed:  See the new instructions. Your next dose is: Today, Tomorrow Other:  _________ TAKE 1 TABLET BY MOUTH THREE TIMES A WEEK THEN TAKE 1/2 TABLET THE OTHER FOUR DAYS OF THE WEEK Quantity:  30 Tab Refills:  6  
     
   
   
   
  
 * Notice: This list has 3 medication(s) that are the same as other medications prescribed for you. Read the directions carefully, and ask your doctor or other care provider to review them with you. CONTINUE these medications which have NOT CHANGED Dose & Instructions Dispensing Information Comments Morning Noon Evening Bedtime ADVAIR DISKUS 250-50 mcg/dose diskus inhaler Generic drug:  fluticasone-salmeterol Your next dose is: Today, Tomorrow Other:  _________ Dose:  1 Puff Take 1 Puff by inhalation every twelve (12) hours. Refills:  0 ALPRAZolam 0.5 mg tablet Commonly known as:  Sterling January Your next dose is: Today, Tomorrow Other:  _________ Dose:  0.25-0.5 mg Take 0.25-0.5 mg by mouth daily as needed for Anxiety. Refills:  0  
     
   
   
   
  
 ARNUITY ELLIPTA 100 mcg/actuation Dsdv inhaler Generic drug:  fluticasone furoate Your next dose is: Today, Tomorrow Other:  _________ Dose:  1 Puff Take 1 Puff by inhalation daily. Refills:  0  
     
   
   
   
  
 CARTIA  mg ER capsule Generic drug:  dilTIAZem CD Your next dose is: Today, Tomorrow Other:  _________ TAKE 3 CAPSULES BY MOUTH DAILY Quantity:  90 Cap Refills:  11  
     
   
   
   
  
 diclofenac 1 % Gel Commonly known as:  VOLTAREN Your next dose is: Today, Tomorrow Other:  _________ Apply  to affected area four (4) times daily. Quantity:  1500 g Refills:  6 FISH OIL PO Your next dose is: Today, Tomorrow Other:  _________ Dose:  1 Cap Take 1 Cap by mouth daily. Refills:  0  
     
   
   
   
  
 furosemide 40 mg tablet Commonly known as:  LASIX Your next dose is: Today, Tomorrow Other:  _________  
   
   
 take 2 tablets by mouth every morning and take 1 tablet by mouth every evening Quantity:  270 Tab Refills:  3  
     
   
   
   
  
 gabapentin 300 mg capsule Commonly known as:  NEURONTIN Your next dose is: Today, Tomorrow Other:  _________ Dose:  300 mg Take 300 mg by mouth every evening. Refills:  0  
     
   
   
   
  
 metoprolol succinate 25 mg XL tablet Commonly known as:  TOPROL-XL Your next dose is: Today, Tomorrow Other:  _________ TAKE 1 TABLET BY MOUTH EVERY DAY Quantity:  90 Tab Refills:  3  
     
   
   
   
  
 naproxen sodium 220 mg tablet Commonly known as:  NAPROSYN Your next dose is: Today, Tomorrow Other:  _________ Dose:  880 mg Take 880 mg by mouth daily. Indications: OSTEOARTHRITIS Refills:  0 SINGULAIR 10 mg tablet Generic drug:  montelukast  
   
Your next dose is: Today, Tomorrow Other:  _________ Dose:  10 mg Take 10 mg by mouth daily. Refills:  0 SPIRIVA WITH HANDIHALER 18 mcg inhalation capsule Generic drug:  tiotropium Your next dose is: Today, Tomorrow Other:  _________ Dose:  1 Cap Take 1 Cap by inhalation daily. Refills:  0  
     
   
   
   
  
 therapeutic multivitamin tablet Commonly known as:  [facility] Your next dose is: Today, Tomorrow Other:  _________ Dose:  1 Tab Take 1 Tab by mouth daily. Refills:  0 VITAMIN D3 2,000 unit Tab Generic drug:  cholecalciferol (vitamin D3) Your next dose is: Today, Tomorrow Other:  _________ Dose:  2000 Units Take 2,000 Units by mouth daily. Refills:  0  
     
   
   
   
  
 XOLAIR 150 mg Solr Generic drug:  omalizumab Your next dose is: Today, Tomorrow Other:  _________ Dose:  150 mg  
150 mg by SubCUTAneous route every fourteen (14) days. Next dose due  Refills:  0 STOP taking these medications   
 azithromycin 500 mg Tab Commonly known as:  Yanira Titus Where to Get Your Medications These medications were sent to [address] [place] [address]  [facility], [address] [place] [id number] Phone:  [phone]  
  albuterol 2.5 mg /3 mL (0.083 %) nebulizer solution  
 levoFLOXacin 500 mg tablet  
 predniSONE 10 mg tablet Discharge Instructions Patient Discharge Instructions Tanda Boxer / [id number] : 1945 Admitted 2017 Discharged: 2017 Take Home Medications · It is important that you take the medication exactly as they are prescribed. · Keep your medication in the bottles provided by the pharmacist and keep a list of the medication names, dosages, and times to be taken in your wallet. · Do not take other medications without consulting your doctor. What to do at [facility] Recommended diet: Diabetic Diet. Recommended activity: Activity as tolerated. Follow-up with Dr. Stacey Sandy in 1 week. Information obtained by : 
I understand that if any problems occur once I am at home I am to contact my physician. I understand and acknowledge receipt of the instructions indicated above. Physician's or R.N.'s Signature                                                                  Date/Time Patient or Representative Signature                                                          Date/Time Discharge Orders None [facility] Announcement We are excited to announce that we are making your provider's discharge notes available to you in GetMyBoat. You will see these notes when they are completed and signed by the physician that discharged you from your recent hospital stay. If you have any questions or concerns about any information you see in GetMyBoat, please call the Health Information Department where you were seen or reach out to your Primary Care Provider for more information about your plan of care. Introducing [facility] SERVICES! Dear [place]: 
Thank you for requesting a GetMyBoat account. Our records indicate that you already have an active GetMyBoat account. You can access your account anytime at [email or web address] Did you know that you can access your hospital and ER discharge instructions at any time in GetMyBoat? You can also review all of your test results from your hospital stay or ER visit. Additional Information If you have questions, please visit the Frequently Asked Questions section of the GetMyBoat website at [email or web address] Remember, GetMyBoat is NOT to be used for urgent needs. For medical emergencies, dial 911. Now available from your iPhone and Android! General Information Please provide this summary of care documentation to your next provider. Patient Signature:  ____________________________________________________________ Date:  ____________________________________________________________  
  
Scharlene Alosa Provider Signature:  ____________________________________________________________ Date:  ____________________________________________________________

## 2018-11-30 ENCOUNTER — OFFICE VISIT (OUTPATIENT)
Dept: NEUROLOGY | Age: 73
End: 2018-11-30

## 2018-11-30 VITALS
WEIGHT: 275 LBS | RESPIRATION RATE: 16 BRPM | OXYGEN SATURATION: 95 % | HEART RATE: 80 BPM | HEIGHT: 66 IN | DIASTOLIC BLOOD PRESSURE: 90 MMHG | SYSTOLIC BLOOD PRESSURE: 150 MMHG | BODY MASS INDEX: 44.2 KG/M2

## 2018-11-30 DIAGNOSIS — G50.0 TRIGEMINAL NEURALGIA OF LEFT SIDE OF FACE: ICD-10-CM

## 2018-11-30 DIAGNOSIS — G50.0 TRIGEMINAL NEURALGIA OF LEFT SIDE OF FACE: Primary | ICD-10-CM

## 2018-11-30 DIAGNOSIS — E11.42 DIABETIC POLYNEUROPATHY ASSOCIATED WITH TYPE 2 DIABETES MELLITUS (HCC): ICD-10-CM

## 2018-11-30 DIAGNOSIS — G44.52 NEW DAILY PERSISTENT HEADACHE: ICD-10-CM

## 2018-11-30 RX ORDER — OXCARBAZEPINE 150 MG/1
TABLET, FILM COATED ORAL
Qty: 60 TAB | Refills: 1 | Status: SHIPPED | OUTPATIENT
Start: 2018-11-30 | End: 2018-11-30 | Stop reason: SDUPTHER

## 2018-11-30 RX ORDER — OXCARBAZEPINE 150 MG/1
TABLET, FILM COATED ORAL
Qty: 300 TAB | Refills: 1 | Status: SHIPPED | OUTPATIENT
Start: 2018-11-30 | End: 2019-08-05

## 2018-11-30 NOTE — PROGRESS NOTES
Chief Complaint   Patient presents with    Neurologic Problem    Neuropathy         HISTORY OF PRESENT ILLNESS  [place] CHRIS [place] who was last seen over a year and a half ago for diabetic polyneuropathy, multifactorial ataxia. She has a moderately large disc bulge at T10-T11 but she is not a surgical candidate. She comes in today for a new problem and she thinks she has trigeminal neuralgia. About 3 weeks ago she started having shooting, electrical type pains traveling from above her left ear into her cheek. It lasts a few seconds and then subsides. Comes multiple times per day. She was taking gabapentin and she has tried increasing the dose to 600 mg 3 times a day which has provided some relief but not enough. She has also been experiencing headache which is diffuse, moderate to severe intensity for the past 3 weeks. She denies any previous history of headaches or migraines. No associated migrainous features. She remembers that she had trigeminal neuralgia on the right side of her face that did not last very long and this was years ago. She cannot remember if she was taking medication for it. Denies any difficulties with visual focusing or trouble swallowing. Current Outpatient Medications   Medication Sig    OXcarbazepine (TRILEPTAL) 150 mg tablet 1 BID X wk, then 2 bid    cyclobenzaprine (FLEXERIL) 10 mg tablet Take 1 Tab by mouth three (3) times daily as needed for Muscle Spasm(s).  DIGOX 250 mcg tablet TAKE 1 TABLET BY MOUTH DAILY    hydrOXYzine HCl (ATARAX) 10 mg tablet TAKE 1 TABLET BY MOUTH FOUR TIMES DAILY AS NEEDED FOR ITCHING    allopurinol (ZYLOPRIM) 100 mg tablet TAKE 1 TABLET BY MOUTH DAILY    CARTIA  mg ER capsule TAKE 3 CAPSULES BY MOUTH DAILY    ALPRAZolam (XANAX) 0.5 mg tablet Take 0.5-1 Tabs by mouth daily as needed for Anxiety.     triamcinolone acetonide (KENALOG) 0.1 % topical cream Apply  to affected area two (2) times daily as needed for Skin Irritation or Itching. use thin layer    furosemide (LASIX) 40 mg tablet TAKE 2 TABLETS BY MOUTH EVERY MORNING AND TAKE 1 TABLET BY MOUTH EVERY EVENING    sertraline (ZOLOFT) 100 mg tablet TAKE 1 TABLET BY MOUTH EVERY EVENING (Patient taking differently: TAKE 1/2 TABLET BY MOUTH EVERY EVENING)    gabapentin (NEURONTIN) 300 mg capsule TAKE 1 CAPSULE BY MOUTH THREE TIMES DAILY (Patient taking differently: TAKE 1 CAPSULE BY MOUTH QHS)    diclofenac (VOLTAREN) 1 % gel APPLY TO THE AFFECTED AREA FOUR TIMES DAILY    warfarin (COUMADIN) 5 mg tablet TAKE 1 TABLET BY MOUTH THREE TIMES A WEEK THEN TAKE 1/2 TABLET THE OTHER FOUR DAYS OF THE WEEK    insulin regular (NOVOLIN R, HUMULIN R) 100 unit/mL injection Sliding scale: For -300 use 4 units, 301-400 use 8 units, greater than 400 use 12 units, greater than 500 call MD.   [place] insulin NPH (HUMULIN N) 100 unit/mL injection INJECT 35 UNITS UNDER THE SKIN IN THE MORNING AND INJECT 25 UNITS AT BEDTIME OR AS DIRECTED (Patient taking differently: INJECT 35 UNITS in am, 40 hs)    fluticasone-salmeterol (ADVAIR DISKUS) 100-50 mcg/dose diskus inhaler USE 1 INHALATION BY MOUTH TWICE DAILY    potassium chloride (K-DUR, KLOR-CON) 20 mEq tablet TAKE 1 TABLET BY MOUTH TWICE DAILY (Patient taking differently: 20 mEq daily. TAKE 1 TABLET BY MOUTH TWICE DAILY)    EPINEPHrine (EPIPEN) 0.3 mg/0.3 mL injection 0.3 mg by IntraMUSCular route once as needed.  Biotin 2,500 mcg cap Take  by mouth.  GLUCOSAM/CHOND/HYALU/CF BORATE (MOVE FREE JOINT HEALTH PO) Take  by mouth.  b complex vitamins tablet Take 1 Tab by mouth daily.  ascorbic acid, vitamin C, (VITAMIN C) 250 mg tablet Take  by mouth.  warfarin (COUMADIN) 5 mg tablet Take 1 Tab by mouth daily.  empagliflozin (JARDIANCE) 25 mg tablet Take 12.5 mg by mouth daily.  omalizumab (XOLAIR) 150 mg solr 150 mg by SubCUTAneous route every fourteen (14) days.  Next dose due 2/17    albuterol (PROVENTIL VENTOLIN) 2.5 mg /3 mL (0.083 %) nebulizer solution 3 mL by Nebulization route four (4) times daily as needed for Wheezing or Shortness of Breath.  therapeutic multivitamin (THERAGRAN) tablet Take 1 Tab by mouth daily.  PROVENTIL HFA 90 mcg/actuation inhaler inhale 2 puffs four times a day if needed for wheezing    cholecalciferol, vitamin D3, (VITAMIN D3) 2,000 unit tab Take 2,000 Units by mouth daily.  DOCOSAHEXANOIC ACID/EPA (FISH OIL PO) Take 1 Cap by mouth daily. No current facility-administered medications for this visit. Allergies   Allergen Reactions    Latex Itching    Codeine Rash    Lisinopril Other (comments)     Cough, vomiting, Visual disturbances    Morphine Itching    Statins-Hmg-Coa Reductase Inhibitors Other (comments)     Muscle enzyme problems         PHYSICAL EXAMINATION:    Visit Vitals  /90   Pulse 80   Resp 16   Ht 5' 6\" (1.676 m)   Wt 124.7 kg (275 lb)   SpO2 95%   BMI 44.39 kg/m²       NEUROLOGICAL EXAMINATION:     Mental Status:   Alert and oriented to person, place, and time. Attention span and concentration are normal. Speech is fluent     Cranial Nerves:    II, III, IV, VI:  Visual acuity grossly intact. Visual fields are normal.    Pupils are equal, round, and reactive to light and accommodation. Extra-ocular movements are full and fluid. V-XII: Hearing is grossly intact. Facial features are symmetric, with normal sensation and strength. The palate rises symmetrically and the tongue protrudes midline. Sternocleidomastoids 5/5. Motor Examination: Normal tone, bulk, and strength. 5/5 muscle strength throughout. Sensory exam:  Normal throughout to pinprick, temperature. Impaired vibration sense at ankles . Normal proprioception. Coordination:  Finger to nose and rapid arm movement testing was normal.   Minimal postural tremor was seen in the right hand when outstretched     Gait and Station: Steady . No Rhomberg or pronator drift.    No muscle wasting or fasiculations noted. Reflexes:  DTRs 2+ throughout. Toes downgoing. LABS / IMAGING  Last MRI brain in May 2017 was normal    ASSESSMENT    ICD-10-CM ICD-9-CM    1. Trigeminal neuralgia of left side of face G50.0 350.1 OXcarbazepine (TRILEPTAL) 150 mg tablet      MRI BRAIN W WO CONT   2. Diabetic polyneuropathy associated with type 2 diabetes mellitus (HCC) E11.42 250.60      357.2    3. New daily persistent headache G44.52 339.42 MRI BRAIN W WO CONT       DISCUSSION  Ms. Summer Fonseca has has new onset pain in the left face which has characteristics of a neuralgia. This is also associated with a headache which is atypical.  I have recommended brain imaging to rule out a structural cause   We will try her on oxcarbazepine 150 mg twice a day for 1 week and then increase to 300 mg twice a day   She may continue gabapentin as well but if oxcarbazepine provides adequate relief, she may wean down and she takes it primarily for peripheral neuropathy     Shad Woodall MD  Diplomate, American Board of Psychiatry & Neurology (Neurology)  Diplomate, American Board of Psychiatry & Neurology (Clinical Neurophysiology)  Diplomate, American Board of Electrodiagnostic Medicine      This note will not be viewable in [address]

## 2018-12-03 ENCOUNTER — TELEPHONE (OUTPATIENT)
Dept: NEUROLOGY | Age: 73
End: 2018-12-03

## 2018-12-03 NOTE — TELEPHONE ENCOUNTER
Spoke with patient, states she hasn't seen an improvement with oxcabazepine 150mg twice a day for her facial pain and it hurts a lot today. She wants to know if anything else can be done about the pain.

## 2018-12-03 NOTE — TELEPHONE ENCOUNTER
Spoke with patient, informed her per -She should increase the dose to 300 mg twice a day.  We may need to go up further, if needed. She verbalized understanding.

## 2018-12-04 ENCOUNTER — HOSPITAL ENCOUNTER (OUTPATIENT)
Dept: MRI IMAGING | Age: 73
Discharge: HOME | End: 2018-12-04
Attending: PSYCHIATRY & NEUROLOGY
Payer: MEDICARE

## 2018-12-04 VITALS — WEIGHT: 264 LBS | BODY MASS INDEX: 42.61 KG/M2

## 2018-12-04 DIAGNOSIS — G44.52 NEW DAILY PERSISTENT HEADACHE: ICD-10-CM

## 2018-12-04 DIAGNOSIS — G50.0 TRIGEMINAL NEURALGIA OF LEFT SIDE OF FACE: ICD-10-CM

## 2018-12-04 PROCEDURE — 70553 MRI BRAIN STEM W/O & W/DYE: CPT

## 2018-12-04 PROCEDURE — 74011250636 HC RX REV CODE- 250/636: Performed by: PSYCHIATRY & NEUROLOGY

## 2018-12-04 PROCEDURE — A9575 INJ GADOTERATE MEGLUMI 0.1ML: HCPCS | Performed by: PSYCHIATRY & NEUROLOGY

## 2018-12-04 RX ORDER — SODIUM CHLORIDE 0.9 % (FLUSH) 0.9 %
10 SYRINGE (ML) INJECTION
Status: COMPLETED | OUTPATIENT
Start: 2018-12-04 | End: 2018-12-04

## 2018-12-04 RX ORDER — GADOTERATE MEGLUMINE 376.9 MG/ML
20 INJECTION INTRAVENOUS
Status: COMPLETED | OUTPATIENT
Start: 2018-12-04 | End: 2018-12-04

## 2018-12-04 RX ADMIN — GADOTERATE MEGLUMINE 20 ML: 376.9 INJECTION INTRAVENOUS at 10:52

## 2018-12-04 RX ADMIN — Medication 10 ML: at 10:52

## 2018-12-07 ENCOUNTER — TELEPHONE (OUTPATIENT)
Dept: NEUROLOGY | Age: 73
End: 2018-12-07

## 2018-12-07 NOTE — TELEPHONE ENCOUNTER
----- Message from Tomy Connor MD sent at 12/4/2018  1:22 PM EST -----  MRI of the brain was okay. Please inform.

## 2019-02-28 ENCOUNTER — OFFICE VISIT (OUTPATIENT)
Dept: NEUROLOGY | Age: 74
End: 2019-02-28

## 2019-02-28 VITALS
HEIGHT: 66 IN | SYSTOLIC BLOOD PRESSURE: 144 MMHG | OXYGEN SATURATION: 96 % | RESPIRATION RATE: 18 BRPM | BODY MASS INDEX: 42.61 KG/M2 | HEART RATE: 85 BPM | DIASTOLIC BLOOD PRESSURE: 82 MMHG

## 2019-02-28 DIAGNOSIS — G50.0 TRIGEMINAL NEURALGIA OF RIGHT SIDE OF FACE: Primary | ICD-10-CM

## 2019-02-28 NOTE — PROGRESS NOTES
Chief Complaint   Patient presents with    Neurologic Problem         HISTORY OF PRESENT ILLNESS  [place] CHRIS Shel[place] who was last seen in November 2018 with the left-sided facial pain which was suspected to be trigeminal neuralgia. She was tried on oxcarbazepine which she took for about a month and then the pain stopped. She also takes gabapentin for peripheral neuropathy and she takes 600 mg at bedtime. She had an MRI scan of the brain which was unremarkable. Today she comes in with a new problem which is pain in the right side of her face. She is experiencing intermittent, sharp, burning type pain that starts at the angle of the jaw and travels down along the jaw line. Chewing or eating on that side triggers the pain. Denies any recent dental procedures. Denies any difficulty with visual focusing or swallowing. 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 Current Outpatient Medications   Medication Sig    lorcaserin (BELVIQ) 10 mg tab Take 10 mg by mouth two (2) times a day. Max Daily Amount: 20 mg.    DIGOX 250 mcg tablet TAKE 1 TABLET BY MOUTH DAILY    OXcarbazepine (TRILEPTAL) 150 mg tablet TAKE 1 TABLET BY MOUTH TWICE DAILY FOR 1 WEEK THEN 2 TABLETS BY MOUTH TWICE DAILY    cyclobenzaprine (FLEXERIL) 10 mg tablet Take 1 Tab by mouth three (3) times daily as needed for Muscle Spasm(s).  hydrOXYzine HCl (ATARAX) 10 mg tablet TAKE 1 TABLET BY MOUTH FOUR TIMES DAILY AS NEEDED FOR ITCHING    allopurinol (ZYLOPRIM) 100 mg tablet TAKE 1 TABLET BY MOUTH DAILY    CARTIA  mg ER capsule TAKE 3 CAPSULES BY MOUTH DAILY    ALPRAZolam (XANAX) 0.5 mg tablet Take 0.5-1 Tabs by mouth daily as needed for Anxiety.  triamcinolone acetonide (KENALOG) 0.1 % topical cream Apply  to affected area two (2) times daily as needed for Skin Irritation or Itching.  use thin layer    furosemide (LASIX) 40 mg tablet TAKE 2 TABLETS BY MOUTH EVERY MORNING AND TAKE 1 TABLET BY MOUTH EVERY EVENING    sertraline (ZOLOFT) 100 mg tablet TAKE 1 TABLET BY MOUTH EVERY EVENING (Patient taking differently: TAKE 1/2 TABLET BY MOUTH EVERY EVENING)    gabapentin (NEURONTIN) 300 mg capsule TAKE 1 CAPSULE BY MOUTH THREE TIMES DAILY (Patient taking differently: TAKE 1 CAPSULE BY MOUTH QHS)    diclofenac (VOLTAREN) 1 % gel APPLY TO THE AFFECTED AREA FOUR TIMES DAILY    warfarin (COUMADIN) 5 mg tablet TAKE 1 TABLET BY MOUTH THREE TIMES A WEEK THEN TAKE 1/2 TABLET THE OTHER FOUR DAYS OF THE WEEK    insulin regular (NOVOLIN R, HUMULIN R) 100 unit/mL injection Sliding scale: For -300 use 4 units, 301-400 use 8 units, greater than 400 use 12 units, greater than 500 call MD.   [facility] insulin NPH (HUMULIN N) 100 unit/mL injection INJECT 35 UNITS UNDER THE SKIN IN THE MORNING AND INJECT 25 UNITS AT BEDTIME OR AS DIRECTED (Patient taking differently: INJECT 35 UNITS in am, 40 hs)    fluticasone-salmeterol (ADVAIR DISKUS) 100-50 mcg/dose diskus inhaler USE 1 INHALATION BY MOUTH TWICE DAILY    potassium chloride (K-DUR, KLOR-CON) 20 mEq tablet TAKE 1 TABLET BY MOUTH TWICE DAILY (Patient taking differently: 20 mEq daily. TAKE 1 TABLET BY MOUTH TWICE DAILY)    EPINEPHrine (EPIPEN) 0.3 mg/0.3 mL injection 0.3 mg by IntraMUSCular route once as needed.  Biotin 2,500 mcg cap Take  by mouth.  GLUCOSAM/CHOND/HYALU/CF BORATE (MOVE FREE JOINT HEALTH PO) Take  by mouth.  b complex vitamins tablet Take 1 Tab by mouth daily.  ascorbic acid, vitamin C, (VITAMIN C) 250 mg tablet Take  by mouth.  warfarin (COUMADIN) 5 mg tablet Take 1 Tab by mouth daily.  empagliflozin (JARDIANCE) 25 mg tablet Take 12.5 mg by mouth daily.  omalizumab (XOLAIR) 150 mg solr 150 mg by SubCUTAneous route every fourteen (14) days.  Next dose due 2/17    albuterol (PROVENTIL VENTOLIN) 2.5 mg /3 mL (0.083 %) nebulizer solution 3 mL by Nebulization route four (4) times daily as needed for Wheezing or Shortness of Breath.  therapeutic multivitamin (THERAGRAN) tablet Take 1 Tab by mouth daily.  PROVENTIL HFA 90 mcg/actuation inhaler inhale 2 puffs four times a day if needed for wheezing    cholecalciferol, vitamin D3, (VITAMIN D3) 2,000 unit tab Take 2,000 Units by mouth daily.  DOCOSAHEXANOIC ACID/EPA (FISH OIL PO) Take 1 Cap by mouth daily. No current facility-administered medications for this visit. Allergies   Allergen Reactions    Latex Itching    Codeine Rash    Lisinopril Other (comments)     Cough, vomiting, Visual disturbances    Morphine Itching    Statins-Hmg-Coa Reductase Inhibitors Other (comments)     Muscle enzyme problems         PHYSICAL EXAMINATION:    Visit Vitals  /82   Pulse 85   Resp 18   Ht 5' 6\" (1.676 m)   SpO2 96%   BMI 42.61 kg/m²       NEUROLOGICAL EXAMINATION:     Mental Status:   Alert and oriented to person, place, and time. Attention span and concentration are normal. Speech is fluent     Cranial Nerves:    II, III, IV, VI:  Visual acuity grossly intact. Visual fields are normal.    Pupils are equal, round, and reactive to light and accommodation. Extra-ocular movements are full and fluid. V-XII: Hearing is grossly intact. Facial features are symmetric, with normal sensation and strength. The palate rises symmetrically and the tongue protrudes midline. Sternocleidomastoids 5/5. Motor Examination: Normal tone, bulk, and strength. 5/5 muscle strength throughout. Sensory exam:  Normal throughout to pinprick, temperature. Impaired vibration sense at ankles . Normal proprioception. Coordination:  Finger to nose and rapid arm movement testing was normal.   Minimal postural tremor was seen in the right hand when outstretched     Gait and Station: Steady . No Rhomberg or pronator drift. No muscle wasting or fasiculations noted.       Reflexes:  DTRs 2+ throughout. Toes downgoing. LABS / IMAGING  MRI Results (most recent):  Results from Hospital Encounter encounter on 12/04/18   MRI BRAIN W WO CONT    Narrative EXAM:  MRI BRAIN W WO CONT  INDICATION:  New onset HA, left face pain, left trigeminal neuralgia, G 50.0, G  44.5 to,  TECHNIQUE:  Whole head sagittal T1, axial T2 and FLAIR images followed by thin 2 mm axial  T1-weighted images of the temporal bone and posterior fossa, then intravenous  infusion 20 mL Dotarem with repeat and axial and coronal T1-weighted images. Axial diffusion weighted images and axial T2 cisternogram images were obtained. Pre and post gadolinium whole head T1.  COMPARISON: MRI of the head 5/20/17, CT 5/19/17 and MRI 2/18/14  FINDINGS:  There is still a tiny lipoma adjacent to the left superior colliculus and pineal  gland with the previously defined mass 2/18/14 and significantly smaller as it  was also demonstrated 5/20/17. The tiny lipomas within the falx are unchanged. Ventricular size and configuration are within normal limits. No definite areas of abnormal signal or abnormal enhancement in the brain. No evidence of acute intracranial hemorrhage, infarct, mass or abnormal  extra-axial fluid collections. Particular attention to the skull base fails to demonstrate abnormality in the  left cavernous sinus or along the course of the left 5th cranial nerve. No  abnormality in the brainstem or adjacent cistern. No findings to explain  patient's left trigeminal neuralgia. Other structures of the skull base including paranasal sinuses and orbits are  unremarkable. Craniocervical junction is unremarkable. CP angle and temporal bones are normal.      Impression IMPRESSION:  1. No abnormality in the posterior fossa or skull base to explain source of the  patient's left trigeminal neuralgia.   2. Tiny lipomas again demonstrated with tiny lipoma posterior to the midbrain  unchanged since 2017 and adjacent small mass diminished in size 2014.  3. No acute abnormality demonstrated. Brain images reviewed    ASSESSMENT    ICD-10-CM ICD-9-CM    1. Trigeminal neuralgia of right side of face G50.0 350.1        DISCUSSION  Ms. Jai Singh has has new onset pain in the right face which has characteristics of a neuralgia. She was recently treated for left-sided trigeminal neuralgia and responded well to oxcarbazepine  I have recommended increasing gabapentin to 300 mg 3 times a day  If it does not provide any significant relief, she will need to restart oxcarbazepine  Follow-up if symptoms do not improve or resolve  Tiny lipomas are noted on brain MRI which are not of any clinical significance and have remained stable  Deferring any further follow-up for now unless there are symptoms    Lisa Do MD  Diplomate, American Board of Psychiatry & Neurology (Neurology)  Diplomate, American Board of Psychiatry & Neurology (Clinical Neurophysiology)  Diplomate, American Board of Electrodiagnostic Medicine  This note will not be viewable in [address]

## 2019-02-28 NOTE — PATIENT INSTRUCTIONS
A Healthy Lifestyle: Care Instructions  Your Care Instructions    A healthy lifestyle can help you feel good, stay at a healthy weight, and have plenty of energy for both work and play. A healthy lifestyle is something you can share with your whole family. A healthy lifestyle also can lower your risk for serious health problems, such as high blood pressure, heart disease, and diabetes. You can follow a few steps listed below to improve your health and the health of your family. Follow-up care is a key part of your treatment and safety. Be sure to make and go to all appointments, and call your doctor if you are having problems. It's also a good idea to know your test results and keep a list of the medicines you take. How can you care for yourself at home? · Do not eat too much sugar, fat, or fast foods. You can still have dessert and treats now and then. The goal is moderation. · Start small to improve your eating habits. Pay attention to portion sizes, drink less juice and soda pop, and eat more fruits and vegetables. ? Eat a healthy amount of food. A 3-ounce serving of meat, for example, is about the size of a deck of cards. Fill the rest of your plate with vegetables and whole grains. ? Limit the amount of soda and sports drinks you have every day. Drink more water when you are thirsty. ? Eat at least 5 servings of fruits and vegetables every day. It may seem like a lot, but it is not hard to reach this goal. A serving or helping is 1 piece of fruit, 1 cup of vegetables, or 2 cups of leafy, raw vegetables. Have an apple or some carrot sticks as an afternoon snack instead of a candy bar. Try to have fruits and/or vegetables at every meal.  · Make exercise part of your daily routine. You may want to start with simple activities, such as walking, bicycling, or slow swimming. Try to be active 30 to 60 minutes every day. You do not need to do all 30 to 60 minutes all at once.  For example, you can exercise 3 times a day for 10 or 20 minutes. Moderate exercise is safe for most people, but it is always a good idea to talk to your doctor before starting an exercise program.  · Keep moving. Elpidio Blotter the lawn, work in the garden, or [employer]. Take the stairs instead of the elevator at work. · If you smoke, quit. People who smoke have an increased risk for heart attack, stroke, cancer, and other lung illnesses. Quitting is hard, but there are ways to boost your chance of quitting tobacco for good. ? Use nicotine gum, patches, or lozenges. ? Ask your doctor about stop-smoking programs and medicines. ? Keep trying. In addition to reducing your risk of diseases in the future, you will notice some benefits soon after you stop using tobacco. If you have shortness of breath or asthma symptoms, they will likely get better within a few weeks after you quit. · Limit how much alcohol you drink. Moderate amounts of alcohol (up to 2 drinks a day for men, 1 drink a day for women) are okay. But drinking too much can lead to liver problems, high blood pressure, and other health problems. Family health  If you have a family, there are many things you can do together to improve your health. · Eat meals together as a family as often as possible. · Eat healthy foods. This includes fruits, vegetables, lean meats and dairy, and whole grains. · Include your family in your fitness plan. Most people think of activities such as jogging or tennis as the way to fitness, but there are many ways you and your family can be more active. Anything that makes you breathe hard and gets your heart pumping is exercise. Here are some tips:  ? Walk to do errands or to take your child to school or the bus.  ? Go for a family bike ride after dinner instead of watching TV. Where can you learn more? Go to http://nitesh-juan carlos.info/. Enter V979 in the search box to learn more about \"A Healthy Lifestyle: Care Instructions. \"  Current as of: September 11, 2018  Content Version: 11.9  © 6148-6106 [employer]. Care instructions adapted under license by [employer] (which disclaims liability or warranty for this information). If you have questions about a medical condition or this instruction, always ask your healthcare professional. [address] any warranty or liability for your use of this information. [address] [facility]   Statement to Patients  April 1, 2014      In an effort to ensure the large volume of patient prescription refills is processed in the most efficient and expeditious manner, we are asking our patients to assist us by calling your Pharmacy for all prescription refills, this will include also your  Mail Order Pharmacy. The pharmacy will contact our office electronically to continue the refill process. Please do not wait until the last minute to call your pharmacy. We need at least 48 hours (2days) to fill prescriptions. We also encourage you to call your pharmacy before going to  your prescription to make sure it is ready. With regard to controlled substance prescription refill requests (narcotic refills) that need to be picked up at our office, we ask your cooperation by providing us with at least 72 hours (3days) notice that you will need a refill. We will not refill narcotic prescription refill requests after 4:00pm on any weekday, Monday through Thursday, or after 2:00pm on Fridays, or on the weekends. We encourage everyone to explore another way of getting your prescription refill request processed using [employer], our patient web portal through our electronic medical record system. [employer] is an efficient and effective way to communicate your medication request directly to the office and  downloadable as an alexus on your smart phone .  [employer] also features a review functionality that allows you to view your medication list as well as leave messages for your physician. Are you ready to get connected? If so please review the attatched instructions or speak to any of our staff to get you set up right away! Thank you so much for your cooperation. Should you have any questions please contact our Practice Administrator.     The Physicians and Staff,  [facility]

## 2019-03-13 PROBLEM — N17.9 AKI (ACUTE KIDNEY INJURY) (HCC): Status: RESOLVED | Noted: 2017-04-23 | Resolved: 2019-03-13

## 2019-08-05 PROBLEM — R55 SYNCOPE AND COLLAPSE: Status: RESOLVED | Noted: 2017-05-20 | Resolved: 2019-08-05

## 2019-08-05 PROBLEM — I95.9 HYPOTENSION: Status: RESOLVED | Noted: 2017-05-21 | Resolved: 2019-08-05

## 2019-08-05 PROBLEM — R55 SYNCOPE: Status: RESOLVED | Noted: 2017-05-20 | Resolved: 2019-08-05

## 2019-10-28 ENCOUNTER — HOSPITAL ENCOUNTER (OUTPATIENT)
Age: 74
Setting detail: OUTPATIENT SURGERY
Discharge: HOME | End: 2019-10-28
Attending: INTERNAL MEDICINE | Admitting: INTERNAL MEDICINE
Payer: MEDICARE

## 2019-10-28 ENCOUNTER — ANESTHESIA (OUTPATIENT)
Dept: ENDOSCOPY | Age: 74
End: 2019-10-28
Payer: MEDICARE

## 2019-10-28 ENCOUNTER — ANESTHESIA EVENT (OUTPATIENT)
Dept: ENDOSCOPY | Age: 74
End: 2019-10-28
Payer: MEDICARE

## 2019-10-28 VITALS
BODY MASS INDEX: 42.11 KG/M2 | HEIGHT: 66 IN | DIASTOLIC BLOOD PRESSURE: 64 MMHG | SYSTOLIC BLOOD PRESSURE: 139 MMHG | OXYGEN SATURATION: 90 % | WEIGHT: 262 LBS | HEART RATE: 75 BPM | TEMPERATURE: 96.8 F

## 2019-10-28 PROCEDURE — 74011250636 HC RX REV CODE- 250/636: Performed by: NURSE ANESTHETIST, CERTIFIED REGISTERED

## 2019-10-28 PROCEDURE — 77030021593 HC FCPS BIOP ENDOSC BSC -A: Performed by: INTERNAL MEDICINE

## 2019-10-28 PROCEDURE — 76040000019: Performed by: INTERNAL MEDICINE

## 2019-10-28 PROCEDURE — 74011000250 HC RX REV CODE- 250: Performed by: NURSE ANESTHETIST, CERTIFIED REGISTERED

## 2019-10-28 PROCEDURE — 88305 TISSUE EXAM BY PATHOLOGIST: CPT

## 2019-10-28 PROCEDURE — 76060000031 HC ANESTHESIA FIRST 0.5 HR: Performed by: INTERNAL MEDICINE

## 2019-10-28 RX ORDER — ATROPINE SULFATE 0.1 MG/ML
0.5 INJECTION INTRAVENOUS
Status: DISCONTINUED | OUTPATIENT
Start: 2019-10-28 | End: 2019-10-28 | Stop reason: HOSPADM

## 2019-10-28 RX ORDER — FLUMAZENIL 0.1 MG/ML
0.2 INJECTION INTRAVENOUS
Status: DISCONTINUED | OUTPATIENT
Start: 2019-10-28 | End: 2019-10-28 | Stop reason: HOSPADM

## 2019-10-28 RX ORDER — SODIUM CHLORIDE 0.9 % (FLUSH) 0.9 %
5-40 SYRINGE (ML) INJECTION EVERY 8 HOURS
Status: DISCONTINUED | OUTPATIENT
Start: 2019-10-28 | End: 2019-10-28 | Stop reason: HOSPADM

## 2019-10-28 RX ORDER — DEXTROMETHORPHAN/PSEUDOEPHED 2.5-7.5/.8
1.2 DROPS ORAL
Status: DISCONTINUED | OUTPATIENT
Start: 2019-10-28 | End: 2019-10-28 | Stop reason: HOSPADM

## 2019-10-28 RX ORDER — CYCLOBENZAPRINE HCL 10 MG
TABLET ORAL
COMMUNITY
End: 2019-12-13

## 2019-10-28 RX ORDER — SODIUM CHLORIDE 9 MG/ML
INJECTION, SOLUTION INTRAVENOUS
Status: DISCONTINUED | OUTPATIENT
Start: 2019-10-28 | End: 2019-10-28 | Stop reason: HOSPADM

## 2019-10-28 RX ORDER — EPINEPHRINE 0.1 MG/ML
1 INJECTION INTRACARDIAC; INTRAVENOUS
Status: DISCONTINUED | OUTPATIENT
Start: 2019-10-28 | End: 2019-10-28 | Stop reason: HOSPADM

## 2019-10-28 RX ORDER — LIDOCAINE HYDROCHLORIDE 20 MG/ML
INJECTION, SOLUTION EPIDURAL; INFILTRATION; INTRACAUDAL; PERINEURAL AS NEEDED
Status: DISCONTINUED | OUTPATIENT
Start: 2019-10-28 | End: 2019-10-28 | Stop reason: HOSPADM

## 2019-10-28 RX ORDER — SODIUM CHLORIDE 0.9 % (FLUSH) 0.9 %
5-40 SYRINGE (ML) INJECTION AS NEEDED
Status: DISCONTINUED | OUTPATIENT
Start: 2019-10-28 | End: 2019-10-28 | Stop reason: HOSPADM

## 2019-10-28 RX ORDER — SODIUM CHLORIDE 9 MG/ML
50 INJECTION, SOLUTION INTRAVENOUS CONTINUOUS
Status: DISCONTINUED | OUTPATIENT
Start: 2019-10-28 | End: 2019-10-28 | Stop reason: HOSPADM

## 2019-10-28 RX ORDER — NALOXONE HYDROCHLORIDE 0.4 MG/ML
0.4 INJECTION, SOLUTION INTRAMUSCULAR; INTRAVENOUS; SUBCUTANEOUS
Status: DISCONTINUED | OUTPATIENT
Start: 2019-10-28 | End: 2019-10-28 | Stop reason: HOSPADM

## 2019-10-28 RX ORDER — PROPOFOL 10 MG/ML
INJECTION, EMULSION INTRAVENOUS AS NEEDED
Status: DISCONTINUED | OUTPATIENT
Start: 2019-10-28 | End: 2019-10-28 | Stop reason: HOSPADM

## 2019-10-28 RX ADMIN — LIDOCAINE HYDROCHLORIDE 40 MG: 20 INJECTION, SOLUTION EPIDURAL; INFILTRATION; INTRACAUDAL; PERINEURAL at 15:15

## 2019-10-28 RX ADMIN — PROPOFOL 50 MG: 10 INJECTION, EMULSION INTRAVENOUS at 15:25

## 2019-10-28 RX ADMIN — PROPOFOL 50 MG: 10 INJECTION, EMULSION INTRAVENOUS at 15:15

## 2019-10-28 RX ADMIN — PROPOFOL 50 MG: 10 INJECTION, EMULSION INTRAVENOUS at 15:19

## 2019-10-28 RX ADMIN — PROPOFOL 50 MG: 10 INJECTION, EMULSION INTRAVENOUS at 15:14

## 2019-10-28 RX ADMIN — SODIUM CHLORIDE: 900 INJECTION, SOLUTION INTRAVENOUS at 15:04

## 2019-10-28 NOTE — ANESTHESIA PREPROCEDURE EVALUATION
Relevant Problems   No relevant active problems       Anesthetic History               Review of Systems / Medical History  Patient summary reviewed, nursing notes reviewed and pertinent labs reviewed    Pulmonary        Sleep apnea    Asthma        Neuro/Psych         Psychiatric history     Cardiovascular    Hypertension        Dysrhythmias       Exercise tolerance: >4 METS     GI/Hepatic/Renal                Endo/Other    Diabetes    Morbid obesity     Other Findings              Physical Exam    Airway  Mallampati: II           Cardiovascular    Rhythm: regular  Rate: normal         Dental  No notable dental hx       Pulmonary  Breath sounds clear to auscultation               Abdominal         Other Findings            Anesthetic Plan    ASA: 3  Anesthesia type: MAC          Induction: Intravenous  Anesthetic plan and risks discussed with: Patient

## 2019-10-28 NOTE — ANESTHESIA POSTPROCEDURE EVALUATION
Post-Anesthesia Evaluation and Assessment    Patient: Rossy Santiago MRN: [id number]  SSN: [id number]    YOB: 1945  Age: 75 y.o. Sex: female      I have evaluated the patient and they are stable and ready for discharge from the PACU. Cardiovascular Function/Vital Signs  Visit Vitals  /69   Pulse 66   Temp 36 °C (96.8 °F)   Resp 22   Ht 5' 6\" (1.676 m)   Wt 118.8 kg (262 lb)   SpO2 100%   Breastfeeding? No   BMI 42.29 kg/m²       Patient is status post MAC anesthesia for Procedure(s):  COLONOSCOPY AND EGD  ESOPHAGOGASTRODUODENOSCOPY (EGD)  ESOPHAGOGASTRODUODENAL (EGD) BIOPSY. Nausea/Vomiting: None    Postoperative hydration reviewed and adequate. Pain:  Pain Scale 1: Numeric (0 - 10) (10/28/19 1458)  Pain Intensity 1: 0 (10/28/19 1458)   Managed    Neurological Status: At baseline    Mental Status, Level of Consciousness: Alert and  oriented to person, place, and time    Pulmonary Status:   O2 Device: Nasal cannula (10/28/19 1530)   Adequate oxygenation and airway patent    Complications related to anesthesia: None    Post-anesthesia assessment completed. No concerns    Signed By: Rosey Shepherd MD     October 28, 2019              Procedure(s):  COLONOSCOPY AND EGD  ESOPHAGOGASTRODUODENOSCOPY (EGD)  ESOPHAGOGASTRODUODENAL (EGD) BIOPSY. MAC    <BSHSIANPOST>    Vitals Value Taken Time   BP 96/58 10/28/2019  3:41 PM   Temp 36 °C (96.8 °F) 10/28/2019  3:30 PM   Pulse 73 10/28/2019  3:42 PM   Resp 13 10/28/2019  3:42 PM   SpO2 93 % 10/28/2019  3:42 PM   Vitals shown include unvalidated device data.

## 2019-10-28 NOTE — DISCHARGE INSTRUCTIONS
118 [facility].  217 [facility] [address], [address]  22 Diana Sommer  [id number]  1945    COLON DISCHARGE INSTRUCTIONS    DISCOMFORT:  Redness at IV site- apply warm compress to area; if redness or soreness persist- contact your physician  There may be a slight amount of blood passed from the rectum  Gaseous discomfort- walking, belching will help relieve any discomfort  You may not operate a vehicle for 12 hours  You may not  engage in an occupation involving machinery or appliances for rest of today  You may not  drink alcoholic beverages for at least 12 hours  Avoid making any critical decisions for at least 24 hour    DIET:   High fiber diet. - however -  remember your colon is empty and a heavy meal will produce gas. Avoid these foods:  vegetables, fried / greasy foods, carbonated drinks for today     ACTIVITY:  It is recommended that you spend the remainder of the day resting -  avoid any strenuous activity. CALL M.D. ANY SIGN OF:   Increasing pain, nausea, vomiting  Abdominal distension (swelling)  New increased bleeding (oral or rectal)  Fever (chills)  Pain in chest area  Bloody discharge from nose or mouth  Shortness of breath    You may not  take any Advil, Aspirin, Ibuprofen, Motrin, Aleve, or Goodys for 5 days, ONLY  Tylenol as needed for pain. Post procedure diagnosis: 1.gastritis      Follow-up Instructions:    Call Dr. Nemesio Aguirre for any questions or problems. If we took a biopsy please call the office within 2 weeks to discuss your  pathology results.  Telephone # [phone]

## 2019-10-28 NOTE — H&P
[facility]  217 [facility] 140 [facility], [address]  [phone]                                History and Physical     NAME: Mikala Alva   :  1945   MRN:  [id number]     HPI:  The patient was seen and examined. Past Surgical History:   Procedure Laterality Date    HX APPENDECTOMY      HX CHOLECYSTECTOMY      HX GASTRIC BYPASS      Jejunal bypass with subsequent reversal..  Lab Band since    HX OTHER SURGICAL  D & C    HX EMILIANO AND BSO      For uterine CA     Past Medical History:   Diagnosis Date    Asthma     [facility] fibrillation ([facility]     Depression     DM (diabetes mellitus) [facility]     HTN (hypertension)     Hyperlipidemia     Morbid obesity (HCC)     Neuropathy     LITA on CPAP     Other ill-defined conditions(799.89) pneumonia     Social History     Tobacco Use    Smoking status: Former Smoker     Last attempt to quit: 1992     Years since quittin.8    Smokeless tobacco: Never Used   Substance Use Topics    Alcohol use:  Yes     Alcohol/week: 2.0 standard drinks     Types: 2 Glasses of wine per week     Comment: occ wine    Drug use: No     Allergies   Allergen Reactions    Latex Itching    Codeine Rash    Lisinopril Other (comments)     Cough, vomiting, Visual disturbances    Morphine Itching    Statins-Hmg-Coa Reductase Inhibitors Other (comments)     Muscle enzyme problems     Family History   Problem Relation Age of Onset    Stroke Mother     Diabetes Other     Heart Disease Other     Heart Disease Father         congestive heart failure     Current Facility-Administered Medications   Medication Dose Route Frequency    0.9% sodium chloride infusion  50 mL/hr IntraVENous CONTINUOUS    sodium chloride (NS) flush 5-40 mL  5-40 mL IntraVENous Q8H    sodium chloride (NS) flush 5-40 mL  5-40 mL IntraVENous PRN    naloxone (NARCAN) injection 0.4 mg  0.4 mg IntraVENous Multiple    flumazenil (ROMAZICON) 0.1 mg/mL injection 0.2 mg 0.2 mg IntraVENous Multiple    simethicone (MYLICON) 85JH/5.4MF oral drops 80 mg  1.2 mL Oral Multiple    atropine injection 0.5 mg  0.5 mg IntraVENous ONCE PRN    EPINEPHrine (ADRENALIN) 0.1 mg/mL syringe 1 mg  1 mg Endoscopically ONCE PRN         PHYSICAL EXAM:  General: WD, WN. Alert, cooperative, no acute distress    HEENT: NC, Atraumatic. PERRLA, EOMI. Anicteric sclerae. Lungs:  CTA Bilaterally. No Wheezing/Rhonchi/Rales. Heart:  Regular  rhythm,  No murmur, No Rubs, No Gallops  Abdomen: Soft, Non distended, Non tender.  +Bowel sounds, no HSM  Extremities: No c/c/e  Neurologic:  CN 2-12 gi, Alert and oriented X 3. No acute neurological distress   Psych:   Good insight. Not anxious nor agitated. The heart, lungs and mental status were satisfactory for the administration of MAC sedation and for the procedure.       Mallampati score: 3       Assessment:   · Epigastric discomfort  · Bloating  · Constipation    Plan:   · Endoscopic procedure  · MAC sedation   ·

## 2019-10-28 NOTE — ROUTINE PROCESS
[place]  1945  [id number]    Situation:  Verbal report received from: Renita  Procedure: Procedure(s):  COLONOSCOPY AND EGD  ESOPHAGOGASTRODUODENOSCOPY (EGD)  ESOPHAGOGASTRODUODENAL (EGD) BIOPSY    Background:    Preoperative diagnosis: EPIGASTRIC DISCOMFORT  CONSTIPATION  BLOATING SYMPTOM  Postoperative diagnosis: 1.gastritis    :  Dr. Anatoliy Langford  Assistant(s): Endoscopy Technician-1: Raza Pardo  Endoscopy RN-1: Evans Garza    Specimens:   ID Type Source Tests Collected by Time Destination   1 : gastric bx Preservative Gastric  Arlie Heimlich, MD 10/28/2019 1521 Pathology     H. Pylori  no    Assessment:  Intra-procedure medications   Anesthesia gave intra-procedure sedation and medications, see anesthesia flow sheet yes    Intravenous fluids: NS@ KVO     Vital signs stable     Abdominal assessment: round and soft     Recommendation:  Discharge patient per MD order\.     Family or Friend   Permission to share finding with family or friend yes

## 2019-11-14 ENCOUNTER — HOSPITAL ENCOUNTER (OUTPATIENT)
Dept: CT IMAGING | Age: 74
Discharge: HOME | End: 2019-11-14
Attending: INTERNAL MEDICINE
Payer: MEDICARE

## 2019-11-14 DIAGNOSIS — R10.13 EPIGASTRIC DISCOMFORT: ICD-10-CM

## 2019-11-14 DIAGNOSIS — K59.00 CONSTIPATION: ICD-10-CM

## 2019-11-14 DIAGNOSIS — R14.0 BLOATING SYMPTOM: ICD-10-CM

## 2019-11-14 PROCEDURE — 74011636320 HC RX REV CODE- 636/320: Performed by: RADIOLOGY

## 2019-11-14 PROCEDURE — 74011000258 HC RX REV CODE- 258: Performed by: RADIOLOGY

## 2019-11-14 PROCEDURE — 82565 ASSAY OF CREATININE: CPT

## 2019-11-14 PROCEDURE — 74160 CT ABDOMEN W/CONTRAST: CPT

## 2019-11-14 RX ORDER — SODIUM CHLORIDE 0.9 % (FLUSH) 0.9 %
10 SYRINGE (ML) INJECTION
Status: COMPLETED | OUTPATIENT
Start: 2019-11-14 | End: 2019-11-14

## 2019-11-14 RX ADMIN — IOPAMIDOL 100 ML: 755 INJECTION, SOLUTION INTRAVENOUS at 08:27

## 2019-11-14 RX ADMIN — SODIUM CHLORIDE 100 ML: 900 INJECTION, SOLUTION INTRAVENOUS at 08:27

## 2019-11-14 RX ADMIN — Medication 10 ML: at 08:27

## 2019-11-18 LAB — CREAT BLD-MCNC: 0.9 MG/DL (ref 0.6–1.3)

## 2019-12-13 ENCOUNTER — APPOINTMENT (OUTPATIENT)
Dept: GENERAL RADIOLOGY | Age: 74
DRG: 202 | End: 2019-12-13
Attending: STUDENT IN AN ORGANIZED HEALTH CARE EDUCATION/TRAINING PROGRAM
Payer: MEDICARE

## 2019-12-13 ENCOUNTER — HOSPITAL ENCOUNTER (INPATIENT)
Age: 74
LOS: 3 days | Discharge: HOME | DRG: 202 | End: 2019-12-16
Attending: STUDENT IN AN ORGANIZED HEALTH CARE EDUCATION/TRAINING PROGRAM | Admitting: INTERNAL MEDICINE
Payer: MEDICARE

## 2019-12-13 DIAGNOSIS — J45.31 MILD PERSISTENT ASTHMA WITH ACUTE EXACERBATION: ICD-10-CM

## 2019-12-13 DIAGNOSIS — R73.9 HYPERGLYCEMIA: Primary | ICD-10-CM

## 2019-12-13 LAB
ALBUMIN SERPL-MCNC: 3.5 G/DL (ref 3.5–5)
ALBUMIN/GLOB SERPL: 1 {RATIO} (ref 1.1–2.2)
ALP SERPL-CCNC: 79 U/L (ref 45–117)
ALT SERPL-CCNC: 31 U/L (ref 12–78)
ANION GAP SERPL CALC-SCNC: 7 MMOL/L (ref 5–15)
ARTERIAL PATENCY WRIST A: NO
AST SERPL-CCNC: 17 U/L (ref 15–37)
BASE EXCESS BLD CALC-SCNC: 1 MMOL/L
BASOPHILS # BLD: 0 K/UL (ref 0–0.1)
BASOPHILS NFR BLD: 0 % (ref 0–1)
BDY SITE: ABNORMAL
BILIRUB SERPL-MCNC: 0.3 MG/DL (ref 0.2–1)
BNP SERPL-MCNC: 737 PG/ML
BUN SERPL-MCNC: 33 MG/DL (ref 6–20)
BUN/CREAT SERPL: 20 (ref 12–20)
CALCIUM SERPL-MCNC: 8.7 MG/DL (ref 8.5–10.1)
CHLORIDE SERPL-SCNC: 99 MMOL/L (ref 97–108)
CO2 SERPL-SCNC: 26 MMOL/L (ref 21–32)
COMMENT, HOLDF: NORMAL
CREAT SERPL-MCNC: 1.64 MG/DL (ref 0.55–1.02)
DIFFERENTIAL METHOD BLD: ABNORMAL
EOSINOPHIL # BLD: 0 K/UL (ref 0–0.4)
EOSINOPHIL NFR BLD: 0 % (ref 0–7)
ERYTHROCYTE [DISTWIDTH] IN BLOOD BY AUTOMATED COUNT: 14.6 % (ref 11.5–14.5)
GAS FLOW.O2 O2 DELIVERY SYS: ABNORMAL L/MIN
GLOBULIN SER CALC-MCNC: 3.6 G/DL (ref 2–4)
GLUCOSE BLD STRIP.AUTO-MCNC: 490 MG/DL (ref 65–100)
GLUCOSE BLD STRIP.AUTO-MCNC: 491 MG/DL (ref 65–100)
GLUCOSE BLD STRIP.AUTO-MCNC: 572 MG/DL (ref 65–100)
GLUCOSE SERPL-MCNC: 537 MG/DL (ref 65–100)
HCO3 BLD-SCNC: 25.5 MMOL/L (ref 22–26)
HCT VFR BLD AUTO: 38.3 % (ref 35–47)
HGB BLD-MCNC: 12.4 G/DL (ref 11.5–16)
IMM GRANULOCYTES # BLD AUTO: 0.2 K/UL (ref 0–0.04)
IMM GRANULOCYTES NFR BLD AUTO: 1 % (ref 0–0.5)
INR PPP: 3.3 (ref 0.9–1.1)
LYMPHOCYTES # BLD: 0.6 K/UL (ref 0.8–3.5)
LYMPHOCYTES NFR BLD: 4 % (ref 12–49)
MCH RBC QN AUTO: 30.2 PG (ref 26–34)
MCHC RBC AUTO-ENTMCNC: 32.4 G/DL (ref 30–36.5)
MCV RBC AUTO: 93.2 FL (ref 80–99)
MONOCYTES # BLD: 0.2 K/UL (ref 0–1)
MONOCYTES NFR BLD: 1 % (ref 5–13)
NEUTS SEG # BLD: 15.1 K/UL (ref 1.8–8)
NEUTS SEG NFR BLD: 94 % (ref 32–75)
NRBC # BLD: 0 K/UL (ref 0–0.01)
NRBC BLD-RTO: 0 PER 100 WBC
PCO2 BLD: 37.9 MMHG (ref 35–45)
PH BLD: 7.44 [PH] (ref 7.35–7.45)
PLATELET # BLD AUTO: 240 K/UL (ref 150–400)
PMV BLD AUTO: 9.2 FL (ref 8.9–12.9)
PO2 BLD: 69 MMHG (ref 80–100)
POTASSIUM SERPL-SCNC: 4.4 MMOL/L (ref 3.5–5.1)
PROT SERPL-MCNC: 7.1 G/DL (ref 6.4–8.2)
PROTHROMBIN TIME: 31.2 SEC (ref 9–11.1)
RBC # BLD AUTO: 4.11 M/UL (ref 3.8–5.2)
RBC MORPH BLD: ABNORMAL
SAMPLES BEING HELD,HOLD: NORMAL
SAO2 % BLD: 94 % (ref 92–97)
SERVICE CMNT-IMP: ABNORMAL
SODIUM SERPL-SCNC: 132 MMOL/L (ref 136–145)
SPECIMEN TYPE: ABNORMAL
TROPONIN I SERPL-MCNC: <0.05 NG/ML
WBC # BLD AUTO: 16.1 K/UL (ref 3.6–11)

## 2019-12-13 PROCEDURE — 74011636637 HC RX REV CODE- 636/637: Performed by: INTERNAL MEDICINE

## 2019-12-13 PROCEDURE — 82803 BLOOD GASES ANY COMBINATION: CPT

## 2019-12-13 PROCEDURE — 96374 THER/PROPH/DIAG INJ IV PUSH: CPT

## 2019-12-13 PROCEDURE — 74011250636 HC RX REV CODE- 250/636: Performed by: INTERNAL MEDICINE

## 2019-12-13 PROCEDURE — 77030029684 HC NEB SM VOL KT MONA -A

## 2019-12-13 PROCEDURE — 82962 GLUCOSE BLOOD TEST: CPT

## 2019-12-13 PROCEDURE — 83880 ASSAY OF NATRIURETIC PEPTIDE: CPT

## 2019-12-13 PROCEDURE — 74011250637 HC RX REV CODE- 250/637: Performed by: INTERNAL MEDICINE

## 2019-12-13 PROCEDURE — 74011250636 HC RX REV CODE- 250/636: Performed by: STUDENT IN AN ORGANIZED HEALTH CARE EDUCATION/TRAINING PROGRAM

## 2019-12-13 PROCEDURE — 80053 COMPREHEN METABOLIC PANEL: CPT

## 2019-12-13 PROCEDURE — 74011000250 HC RX REV CODE- 250: Performed by: STUDENT IN AN ORGANIZED HEALTH CARE EDUCATION/TRAINING PROGRAM

## 2019-12-13 PROCEDURE — 94640 AIRWAY INHALATION TREATMENT: CPT

## 2019-12-13 PROCEDURE — 71045 X-RAY EXAM CHEST 1 VIEW: CPT

## 2019-12-13 PROCEDURE — 84484 ASSAY OF TROPONIN QUANT: CPT

## 2019-12-13 PROCEDURE — 85610 PROTHROMBIN TIME: CPT

## 2019-12-13 PROCEDURE — 74011636637 HC RX REV CODE- 636/637: Performed by: STUDENT IN AN ORGANIZED HEALTH CARE EDUCATION/TRAINING PROGRAM

## 2019-12-13 PROCEDURE — 36600 WITHDRAWAL OF ARTERIAL BLOOD: CPT

## 2019-12-13 PROCEDURE — 65270000032 HC RM SEMIPRIVATE

## 2019-12-13 PROCEDURE — 74011000258 HC RX REV CODE- 258: Performed by: STUDENT IN AN ORGANIZED HEALTH CARE EDUCATION/TRAINING PROGRAM

## 2019-12-13 PROCEDURE — 93005 ELECTROCARDIOGRAM TRACING: CPT

## 2019-12-13 PROCEDURE — 36415 COLL VENOUS BLD VENIPUNCTURE: CPT

## 2019-12-13 PROCEDURE — 85025 COMPLETE CBC W/AUTO DIFF WBC: CPT

## 2019-12-13 PROCEDURE — 99283 EMERGENCY DEPT VISIT LOW MDM: CPT

## 2019-12-13 RX ORDER — GABAPENTIN 300 MG/1
300 CAPSULE ORAL
COMMUNITY
End: 2021-01-17

## 2019-12-13 RX ORDER — SODIUM CHLORIDE 9 MG/ML
75 INJECTION, SOLUTION INTRAVENOUS CONTINUOUS
Status: DISPENSED | OUTPATIENT
Start: 2019-12-13 | End: 2019-12-14

## 2019-12-13 RX ORDER — DILTIAZEM HYDROCHLORIDE 180 MG/1
360 CAPSULE, COATED, EXTENDED RELEASE ORAL DAILY
Status: DISCONTINUED | OUTPATIENT
Start: 2019-12-14 | End: 2019-12-16 | Stop reason: HOSPADM

## 2019-12-13 RX ORDER — DULOXETIN HYDROCHLORIDE 60 MG/1
60 CAPSULE, DELAYED RELEASE ORAL DAILY
Status: DISCONTINUED | OUTPATIENT
Start: 2019-12-14 | End: 2019-12-16 | Stop reason: HOSPADM

## 2019-12-13 RX ORDER — DIGOXIN 125 MCG
0.25 TABLET ORAL DAILY
Status: DISCONTINUED | OUTPATIENT
Start: 2019-12-13 | End: 2019-12-16 | Stop reason: HOSPADM

## 2019-12-13 RX ORDER — BUDESONIDE 0.5 MG/2ML
500 INHALANT ORAL
Status: DISCONTINUED | OUTPATIENT
Start: 2019-12-13 | End: 2019-12-16 | Stop reason: HOSPADM

## 2019-12-13 RX ORDER — SERTRALINE HYDROCHLORIDE 50 MG/1
100 TABLET, FILM COATED ORAL EVERY EVENING
Status: DISCONTINUED | OUTPATIENT
Start: 2019-12-14 | End: 2019-12-16 | Stop reason: HOSPADM

## 2019-12-13 RX ORDER — FLUTICASONE PROPIONATE AND SALMETEROL 500; 50 UG/1; UG/1
1 POWDER RESPIRATORY (INHALATION) 2 TIMES DAILY
Status: DISCONTINUED | OUTPATIENT
Start: 2019-12-13 | End: 2019-12-13 | Stop reason: CLARIF

## 2019-12-13 RX ORDER — MAGNESIUM SULFATE 100 %
4 CRYSTALS MISCELLANEOUS AS NEEDED
Status: DISCONTINUED | OUTPATIENT
Start: 2019-12-13 | End: 2019-12-16 | Stop reason: HOSPADM

## 2019-12-13 RX ORDER — SERTRALINE HYDROCHLORIDE 100 MG/1
100 TABLET, FILM COATED ORAL DAILY
COMMUNITY
End: 2020-05-06

## 2019-12-13 RX ORDER — ARFORMOTEROL TARTRATE 15 UG/2ML
15 SOLUTION RESPIRATORY (INHALATION)
Status: DISCONTINUED | OUTPATIENT
Start: 2019-12-13 | End: 2019-12-16 | Stop reason: HOSPADM

## 2019-12-13 RX ORDER — GABAPENTIN 300 MG/1
600 CAPSULE ORAL
COMMUNITY
End: 2020-12-01

## 2019-12-13 RX ORDER — ALPRAZOLAM 0.5 MG/1
.25-.5 TABLET ORAL
Status: DISCONTINUED | OUTPATIENT
Start: 2019-12-13 | End: 2019-12-16 | Stop reason: HOSPADM

## 2019-12-13 RX ORDER — DIGOXIN 250 MCG
0.25 TABLET ORAL EVERY EVENING
COMMUNITY
End: 2020-01-28

## 2019-12-13 RX ORDER — DEXTROSE MONOHYDRATE 100 MG/ML
0-250 INJECTION, SOLUTION INTRAVENOUS AS NEEDED
Status: DISCONTINUED | OUTPATIENT
Start: 2019-12-13 | End: 2019-12-16 | Stop reason: HOSPADM

## 2019-12-13 RX ORDER — WARFARIN 2.5 MG/1
2.5 TABLET ORAL
Status: ON HOLD | COMMUNITY
End: 2020-09-28 | Stop reason: SDUPTHER

## 2019-12-13 RX ORDER — INSULIN LISPRO 100 [IU]/ML
25 INJECTION, SOLUTION INTRAVENOUS; SUBCUTANEOUS ONCE
Status: COMPLETED | OUTPATIENT
Start: 2019-12-13 | End: 2019-12-13

## 2019-12-13 RX ORDER — ONDANSETRON 2 MG/ML
4 INJECTION INTRAMUSCULAR; INTRAVENOUS
Status: DISCONTINUED | OUTPATIENT
Start: 2019-12-13 | End: 2019-12-16 | Stop reason: HOSPADM

## 2019-12-13 RX ORDER — INSULIN LISPRO 100 [IU]/ML
10 INJECTION, SOLUTION INTRAVENOUS; SUBCUTANEOUS ONCE
Status: COMPLETED | OUTPATIENT
Start: 2019-12-13 | End: 2019-12-13

## 2019-12-13 RX ORDER — ALBUTEROL SULFATE 0.83 MG/ML
2.5 SOLUTION RESPIRATORY (INHALATION)
Status: DISCONTINUED | OUTPATIENT
Start: 2019-12-13 | End: 2019-12-14

## 2019-12-13 RX ORDER — ZOLPIDEM TARTRATE 5 MG/1
5 TABLET ORAL
Status: DISCONTINUED | OUTPATIENT
Start: 2019-12-13 | End: 2019-12-16 | Stop reason: HOSPADM

## 2019-12-13 RX ORDER — SERTRALINE HYDROCHLORIDE 50 MG/1
100 TABLET, FILM COATED ORAL DAILY
Status: DISCONTINUED | OUTPATIENT
Start: 2019-12-14 | End: 2019-12-13

## 2019-12-13 RX ORDER — WARFARIN SODIUM 5 MG/1
5 TABLET ORAL
Status: ON HOLD | COMMUNITY
End: 2020-09-28 | Stop reason: SDUPTHER

## 2019-12-13 RX ORDER — INSULIN LISPRO 100 [IU]/ML
INJECTION, SOLUTION INTRAVENOUS; SUBCUTANEOUS
Status: DISCONTINUED | OUTPATIENT
Start: 2019-12-13 | End: 2019-12-16 | Stop reason: HOSPADM

## 2019-12-13 RX ORDER — FUROSEMIDE 40 MG/1
40 TABLET ORAL EVERY EVENING
Status: DISCONTINUED | OUTPATIENT
Start: 2019-12-13 | End: 2019-12-16 | Stop reason: HOSPADM

## 2019-12-13 RX ORDER — FUROSEMIDE 40 MG/1
80 TABLET ORAL DAILY
Status: DISCONTINUED | OUTPATIENT
Start: 2019-12-14 | End: 2019-12-16 | Stop reason: HOSPADM

## 2019-12-13 RX ORDER — FUROSEMIDE 40 MG/1
80 TABLET ORAL DAILY
COMMUNITY
End: 2020-02-27

## 2019-12-13 RX ORDER — GABAPENTIN 300 MG/1
300 CAPSULE ORAL 3 TIMES DAILY
Status: DISCONTINUED | OUTPATIENT
Start: 2019-12-13 | End: 2019-12-16 | Stop reason: HOSPADM

## 2019-12-13 RX ORDER — ACETAMINOPHEN 325 MG/1
650 TABLET ORAL
Status: DISCONTINUED | OUTPATIENT
Start: 2019-12-13 | End: 2019-12-16 | Stop reason: HOSPADM

## 2019-12-13 RX ORDER — FLUTICASONE PROPIONATE AND SALMETEROL 500; 50 UG/1; UG/1
1 POWDER RESPIRATORY (INHALATION) 2 TIMES DAILY
COMMUNITY
End: 2022-08-23 | Stop reason: ALTCHOICE

## 2019-12-13 RX ORDER — FUROSEMIDE 40 MG/1
40 TABLET ORAL EVERY EVENING
COMMUNITY
End: 2020-02-27

## 2019-12-13 RX ADMIN — INSULIN LISPRO 25 UNITS: 100 INJECTION, SOLUTION INTRAVENOUS; SUBCUTANEOUS at 23:53

## 2019-12-13 RX ADMIN — METHYLPREDNISOLONE SODIUM SUCCINATE 80 MG: 40 INJECTION, POWDER, FOR SOLUTION INTRAMUSCULAR; INTRAVENOUS at 19:02

## 2019-12-13 RX ADMIN — ALBUTEROL SULFATE 1 DOSE: 2.5 SOLUTION RESPIRATORY (INHALATION) at 14:14

## 2019-12-13 RX ADMIN — INSULIN LISPRO 10 UNITS: 100 INJECTION, SOLUTION INTRAVENOUS; SUBCUTANEOUS at 13:53

## 2019-12-13 RX ADMIN — SODIUM CHLORIDE 75 ML/HR: 900 INJECTION, SOLUTION INTRAVENOUS at 20:18

## 2019-12-13 RX ADMIN — FUROSEMIDE 40 MG: 40 TABLET ORAL at 19:01

## 2019-12-13 RX ADMIN — DIGOXIN 0.25 MG: 125 TABLET ORAL at 23:50

## 2019-12-13 RX ADMIN — HUMAN INSULIN 25 UNITS: 100 INJECTION, SUSPENSION SUBCUTANEOUS at 23:56

## 2019-12-13 RX ADMIN — GABAPENTIN 300 MG: 300 CAPSULE ORAL at 23:48

## 2019-12-13 RX ADMIN — DOXYCYCLINE 100 MG: 100 INJECTION, POWDER, LYOPHILIZED, FOR SOLUTION INTRAVENOUS at 14:42

## 2019-12-13 NOTE — H&P
History and Physical    Subjective:     [place] is a 75 y.o. white female with asthma & DM. She has been treated by Dr. Baltazar Mena (pulmonary) as an outpt for the past couple of weeks for an asthma exacerbation. Pt has been treated with nebs, prednisone, & abx. She has not significantly improved, and she has failed outpt mgmt. She was sent to the ER for admission. No f/c. No other new c/o's. She is maintained on coumadin for chronic atrial fibrillation. Past Medical History:   Diagnosis Date    Asthma     [facility] ([facility]     Depression     DM (diabetes mellitus) ([facility] [facility]     HTN (hypertension)     Hyperlipidemia     Morbid obesity (HCC)     Neuropathy     LITA on CPAP     Other ill-defined conditions(799.32) pneumonia     Allergies   Allergen Reactions    Latex Itching    Kiwi Unknown (comments)    Codeine Rash    Lisinopril Other (comments)     Cough, vomiting, Visual disturbances    Morphine Itching    Statins-Hmg-Coa Reductase Inhibitors Other (comments)     Muscle enzyme problems     Prior to Admission medications    Medication Sig Start Date End Date Taking? Authorizing Provider   fluticasone propion-salmeterol (WIXELA INHUB) 500-50 mcg/dose diskus inhaler Take 1 Puff by inhalation two (2) times a day. Yes Provider, Historical   warfarin (COUMADIN) 5 mg tablet Take 5 mg by mouth every Monday, Wednesday, Friday. M-W-F: 5 mg  Tu-Th-Sat-Sun: 2.5 mg   Yes Provider, Historical   warfarin (COUMADIN) 2.5 mg tablet Take 2.5 mg by mouth every Tuesday, Thursday, Saturday & Sunday. M-W-F: 5 mg  Tu-Th-Sat-Sun: 2.5 mg   Yes Provider, Historical   digoxin (LANOXIN) 0.25 mg tablet Take 0.25 mg by mouth every evening. Yes Provider, Historical   furosemide (LASIX) 40 mg tablet Take 40 mg by mouth every evening. Yes Provider, Historical   furosemide (LASIX) 40 mg tablet Take 80 mg by mouth daily.    Yes Provider, Historical   gabapentin (NEURONTIN) 300 mg capsule Take 300 mg by mouth daily. Yes Provider, Historical   gabapentin (NEURONTIN) 300 mg capsule Take 600 mg by mouth nightly. Yes Provider, Historical   sertraline (ZOLOFT) 100 mg tablet Take 100 mg by mouth daily. Yes Provider, Historical   dilTIAZem CD (CARDIZEM CD) 120 mg ER capsule TAKE 3 CAPSULES EVERY DAY 9/4/19  Yes Pieter Burden MD   DULoxetine (CYMBALTA) 60 mg capsule Take 60 mg by mouth daily. Yes Provider, Historical   OTHER Allergy shots weekly. Yes Provider, Historical   ALPRAZolam (XANAX) 0.5 mg tablet Take 0.5-1 Tabs by mouth daily as needed for Anxiety. 7/10/18  Yes Pieter Burden MD   insulin regular (NOVOLIN R, HUMULIN R) 100 unit/mL injection Sliding scale: For -300 use 4 units, 301-400 use 8 units, greater than 400 use 12 units, greater than 500 call MD.  Patient taking differently: 15 units qam, 15 noon, 10 qpm 10/24/17  Yes Pieter Burden MD   insulin NPH (HUMULIN N) 100 unit/mL injection INJECT 35 UNITS UNDER THE SKIN IN THE MORNING AND INJECT 25 UNITS AT BEDTIME OR AS DIRECTED  Patient taking differently: INJECT 35 UNITS in am, 25 pm 10/24/17  Yes Pieter Burden MD   EPINEPHrine (EPIPEN) 0.3 mg/0.3 mL injection 0.3 mg by IntraMUSCular route once as needed. Yes Provider, Historical   Biotin 2,500 mcg cap Take 1 Cap by mouth daily. Yes Provider, Historical   b complex vitamins tablet Take 1 Tab by mouth daily. Yes Provider, Historical   albuterol (PROVENTIL VENTOLIN) 2.5 mg /3 mL (0.083 %) nebulizer solution 3 mL by Nebulization route four (4) times daily as needed for Wheezing or Shortness of Breath. 10/8/16  Yes Caitlin Munoz MD   therapeutic multivitamin [facility]) tablet Take 1 Tab by mouth daily. Yes Provider, Historical   PROVENTIL HFA 90 mcg/actuation inhaler inhale 2 puffs four times a day if needed for wheezing 1/17/15  Yes Pieter Burden MD   [facility] ACID/EPA (FISH OIL PO) Take 1 Cap by mouth daily.    Yes Provider, Historical   omalizumab (XOLAIR) 150 mg solr 150 mg by SubCUTAneous route every fourteen (14) days. Next dose due     Provider, Historical   cholecalciferol, vitamin D3, (VITAMIN D3) 2,000 unit tab Take 2,000 Units by mouth daily. Provider, Historical     Social History     Tobacco Use    Smoking status: Former Smoker     Last attempt to quit: 1992     Years since quittin.9    Smokeless tobacco: Never Used   Substance Use Topics    Alcohol use: Yes     Alcohol/week: 2.0 standard drinks     Types: 2 Glasses of wine per week     Comment: occ wine     Family History   Problem Relation Age of Onset    Stroke Mother     Diabetes Other     Heart Disease Other     Heart Disease Father         congestive heart failure               Review of Systems:  As above. Objective:       Physical Exam:   In NAD. A&O. HEENT -- Pupils round. O/P Clear. Neck -- Supple. No JVD. Heart -- Irr Irr (Rate controlled). No R/M/G. Lungs -- CTA. Abdomen -- Soft. Non-tender. Non-distended. No masses (except for lap band device). Bowel sounds present. Extremeties -- No edema. Data Review:   Recent Results (from the past 24 hour(s))   GLUCOSE, POC    Collection Time: 19 12:18 PM   Result Value Ref Range    Glucose (POC) 491 (H) 65 - 100 mg/dL    Performed by [employer]    CBC WITH AUTOMATED DIFF    Collection Time: 19 12:49 PM   Result Value Ref Range    WBC 16.1 (H) 3.6 - 11.0 K/uL    RBC 4.11 3.80 - 5.20 M/uL    HGB 12.4 11.5 - 16.0 g/dL    HCT 38.3 35.0 - 47.0 %    MCV 93.2 80.0 - 99.0 FL    MCH 30.2 26.0 - 34.0 PG    MCHC 32.4 30.0 - 36.5 g/dL    RDW 14.6 (H) 11.5 - 14.5 %    PLATELET 855 333 - 366 K/uL    MPV 9.2 8.9 - 12.9 FL    NRBC 0.0 0  WBC    ABSOLUTE NRBC 0.00 0.00 - 0.01 K/uL    NEUTROPHILS 94 (H) 32 - 75 %    LYMPHOCYTES 4 (L) 12 - 49 %    MONOCYTES 1 (L) 5 - 13 %    EOSINOPHILS 0 0 - 7 %    BASOPHILS 0 0 - 1 %    IMMATURE GRANULOCYTES 1 (H) 0.0 - 0.5 %    ABS.  NEUTROPHILS 15.1 (H) 1.8 - 8.0 K/UL    ABS. LYMPHOCYTES 0.6 (L) 0.8 - 3.5 K/UL    ABS. MONOCYTES 0.2 0.0 - 1.0 K/UL    ABS. EOSINOPHILS 0.0 0.0 - 0.4 K/UL    ABS. BASOPHILS 0.0 0.0 - 0.1 K/UL    ABS. IMM. GRANS. 0.2 (H) 0.00 - 0.04 K/UL    DF SMEAR SCANNED      RBC COMMENTS NORMOCYTIC, NORMOCHROMIC     METABOLIC PANEL, COMPREHENSIVE    Collection Time: 12/13/19 12:49 PM   Result Value Ref Range    Sodium 132 (L) 136 - 145 mmol/L    Potassium 4.4 3.5 - 5.1 mmol/L    Chloride 99 97 - 108 mmol/L    CO2 26 21 - 32 mmol/L    Anion gap 7 5 - 15 mmol/L    Glucose 537 (H) 65 - 100 mg/dL    BUN 33 (H) 6 - 20 MG/DL    Creatinine 1.64 (H) 0.55 - 1.02 MG/DL    BUN/Creatinine ratio 20 12 - 20      GFR est AA 37 (L) >60 ml/min/1.73m2    GFR est non-AA 31 (L) >60 ml/min/1.73m2    Calcium 8.7 8.5 - 10.1 MG/DL    Bilirubin, total 0.3 0.2 - 1.0 MG/DL    ALT (SGPT) 31 12 - 78 U/L    AST (SGOT) 17 15 - 37 U/L    Alk. phosphatase 79 45 - 117 U/L    Protein, total 7.1 6.4 - 8.2 g/dL    Albumin 3.5 3.5 - 5.0 g/dL    Globulin 3.6 2.0 - 4.0 g/dL    A-G Ratio 1.0 (L) 1.1 - 2.2     TROPONIN I    Collection Time: 12/13/19 12:49 PM   Result Value Ref Range    Troponin-I, Qt. <0.05 <0.05 ng/mL   SAMPLES BEING HELD    Collection Time: 12/13/19 12:49 PM   Result Value Ref Range    SAMPLES BEING HELD 1RED     COMMENT        Add-on orders for these samples will be processed based on acceptable specimen integrity and analyte stability, which may vary by analyte.    PROTHROMBIN TIME + INR    Collection Time: 12/13/19 12:49 PM   Result Value Ref Range    INR 3.3 (H) 0.9 - 1.1      Prothrombin time 31.2 (H) 9.0 - 11.1 sec   NT-PRO BNP    Collection Time: 12/13/19 12:49 PM   Result Value Ref Range    NT pro- (H) <450 PG/ML   POC G3 - PUL    Collection Time: 12/13/19 12:57 PM   Result Value Ref Range    pH (POC) 7.436 7.35 - 7.45      pCO2 (POC) 37.9 35.0 - 45.0 MMHG    pO2 (POC) 69 (L) 80 - 100 MMHG    HCO3 (POC) 25.5 22 - 26 MMOL/L    sO2 (POC) 94 92 - 97 % Base excess (POC) 1 mmol/L    Site RIGHT RADIAL      Device: ROOM AIR      Allens test (POC) NO      Specimen type (POC) ARTERIAL     GLUCOSE, POC    Collection Time: 12/13/19  2:52 PM   Result Value Ref Range    Glucose (POC) 490 (H) 65 - 100 mg/dL    Performed by Avelino Sever        Chest x-ray -- No acute process. Assessment:     Principal Problem:    Asthma exacerbation (1/21/2014)      Active Problems:    Hypertension complicating diabetes ([facility] [address] (2/20/2017)      Type 2 diabetes mellitus with stage 1 chronic kidney disease, with long-term current use of insulin ([facility] [address] (5/25/2017)        Plan:     1. Nebs & IV Solumedrol. I don't see a need for more abx at this point. ... no purulent phlegm, no fevers, etc.  Leukocytosis is due to steroids. 2.  Pharmacy to dose coumadin. 3.  BS's are up, likely due to steroids she has been getting. Cont NPH as is & do Accuchecks with SSI. 4.  Gentle IVF & recheck creatinine in am.        D/w dtr, present.       Signed By: Emory Burkett MD     December 13, 2019

## 2019-12-13 NOTE — PROGRESS NOTES
Admission Medication Reconciliation:    Information obtained from:  Patient  RxQuery data available¹:  YES    Comments/Recommendations: Updated PTA meds/reviewed patient's allergies. Patient provided medication list and history, provided details despite her SOB. Daughter at bedside. Attempting to reach hospitalist to update re: changes to PTA med list: [phone]    Notes:  1. Warfarin schedule:   M-W-F: 5 mg  Tu-Th-Sat-Sun: 2.5 mg    Medication changes (since last review): Adjusted  1. Furosemide : takes 80 mg in morning and 40 mg in the evening  2. Gabapentin: takes 300 mg in the morning and 600 mg in the evening  3. Warfarin schedule (see above)    Removed  1. Cyclobenzaprine  2. Diclofenac gel  3. Hydroxyzine  4. Kenalog cream    Thank you for allowing me to participate in the care of your patient. Bienvenido Rolle PharmD, RN #[id number] [facility] benefit data reflects medications filled and processed through the patient's insurance, however   this data does NOT capture whether the medication was picked up or is currently being taken by the patient. Allergies:  Latex; Kiwi; Codeine; Lisinopril; Morphine; and Statins-hmg-coa reductase inhibitors    Significant PMH/Disease States:   Past Medical History:   Diagnosis Date    Asthma     [facility] ([facility] [address]     Depression     DM (diabetes mellitus) ([facility] [address] [facility]     HTN (hypertension)     Hyperlipidemia     Morbid obesity ([facility] [address]     Neuropathy     LITA on CPAP     Other ill-defined conditions(799.89) pneumonia     Chief Complaint for this Admission:    Chief Complaint   Patient presents with    Referral / Consult     Prior to Admission Medications:   Prior to Admission Medications   Prescriptions Last Dose Informant Patient Reported? Taking? ALPRAZolam (XANAX) 0.5 mg tablet 11/13/2019 at Unknown time  No Yes   Sig: Take 0.5-1 Tabs by mouth daily as needed for Anxiety.    Biotin 2,500 mcg cap 12/13/2019 at Unknown time  Yes Yes   Sig: Take 1 Cap by mouth daily. DOCOSAHEXANOIC ACID/EPA (FISH OIL PO) 2019 at Unknown time  Yes Yes   Sig: Take 1 Cap by mouth daily. DULoxetine (CYMBALTA) 60 mg capsule 2019 at Unknown time  Yes Yes   Sig: Take 60 mg by mouth daily. EPINEPHrine (EPIPEN) 0.3 mg/0.3 mL injection   Yes Yes   Si.3 mg by IntraMUSCular route once as needed. OTHER   Yes Yes   Sig: Allergy shots weekly. PROVENTIL HFA 90 mcg/actuation inhaler 2019 at Unknown time  No Yes   Sig: inhale 2 puffs four times a day if needed for wheezing   albuterol (PROVENTIL VENTOLIN) 2.5 mg /3 mL (0.083 %) nebulizer solution 2019 at Unknown time  No Yes   Sig: 3 mL by Nebulization route four (4) times daily as needed for Wheezing or Shortness of Breath.   b complex vitamins tablet 2019 at Unknown time  Yes Yes   Sig: Take 1 Tab by mouth daily. cholecalciferol, vitamin D3, (VITAMIN D3) 2,000 unit tab   Yes No   Sig: Take 2,000 Units by mouth daily. digoxin (LANOXIN) 0.25 mg tablet 2019 at Unknown time  Yes Yes   Sig: Take 0.25 mg by mouth every evening. dilTIAZem CD (CARDIZEM CD) 120 mg ER capsule 2019 at Unknown time  No Yes   Sig: TAKE 3 CAPSULES EVERY DAY   fluticasone propion-salmeterol (WIXELA INHUB) 500-50 mcg/dose diskus inhaler 2019 at Unknown time  Yes Yes   Sig: Take 1 Puff by inhalation two (2) times a day. furosemide (LASIX) 40 mg tablet 2019 at Unknown time  Yes Yes   Sig: Take 40 mg by mouth every evening. furosemide (LASIX) 40 mg tablet 2019 at Unknown time  Yes Yes   Sig: Take 80 mg by mouth daily. gabapentin (NEURONTIN) 300 mg capsule 2019 at Unknown time  Yes Yes   Sig: Take 300 mg by mouth daily. gabapentin (NEURONTIN) 300 mg capsule 2019 at Unknown time  Yes Yes   Sig: Take 600 mg by mouth nightly.    insulin NPH (HUMULIN N) 100 unit/mL injection 2019 at Unknown time  No Yes   Sig: INJECT 35 UNITS UNDER THE SKIN IN THE MORNING AND INJECT 25 UNITS AT BEDTIME OR AS DIRECTED   Patient taking differently: INJECT 35 UNITS in am, 25 pm   insulin regular (NOVOLIN R, HUMULIN R) 100 unit/mL injection 2019 at Unknown time  No Yes   Sig: Sliding scale: For -300 use 4 units, 301-400 use 8 units, greater than 400 use 12 units, greater than 500 call MD.   Patient taking differently: 15 units qam, 15 noon, 10 qpm   omalizumab (XOLAIR) 150 mg solr 2019  Yes No   Si mg by SubCUTAneous route every fourteen (14) days. Next dose due    sertraline (ZOLOFT) 100 mg tablet 2019 at Unknown time  Yes Yes   Sig: Take 100 mg by mouth daily. therapeutic multivitamin (THERAGRAN) tablet 2019 at Unknown time  Yes Yes   Sig: Take 1 Tab by mouth daily. warfarin (COUMADIN) 2.5 mg tablet 2019 at Unknown time  Yes Yes   Sig: Take 2.5 mg by mouth every Tuesday, Thursday, Saturday & . M-W-F: 5 mg  --Sat-Sun: 2.5 mg   warfarin (COUMADIN) 5 mg tablet 2019  Yes Yes   Sig: Take 5 mg by mouth every Monday, Wednesday, Friday. M-W-F: 5 mg  --Sat-Sun: 2.5 mg      Facility-Administered Medications: None       Please contact the main inpatient pharmacy with any questions or concerns at [phone] and we will direct you to the clinical pharmacist covering this patient's care while in-house.    TEO Martin

## 2019-12-13 NOTE — PROGRESS NOTES
Dr. Mixon has discussed and recommended the following surgical procedure(s):       Surgery scheduling requirements include:    Procedure: Bilateral endoluminal radiofrequency ablation with left leg microincisional avulsion stab phlebectomies    Facility: [facility] [place]    Admission Type: Outpatient Surgery    Time Needed: 90 min    Anesthesia: General    Surgical Assist: yes, SA    Co-Surgeon: no    Special Equipment: None      Pre-Op H&P: will do at the facility    Pre-Op labs: None    Schedule post-op appointment: Reception to schedule after discharge      Patient Prep Instructions: No bowel prep needed    Additional Instructions:      - PATIENT TAKING asa  ALIN ROTH NP TO ADDRESS      Facility Orders:  - Thigh high sarah   Pharmacist Note - Warfarin Dosing  Consult provided for this 75 y. o.female to manage warfarin for Atrial Fibrillation    INR Goal: 2 - 3    Home regimen/ tablet size: M-W-F: 5 mg  Tu-Th-Sat-Sun: 2.5 mg    Drugs that may increase INR: None  Drugs that may decrease INR: None  Other current anticoagulants/ drugs that may increase bleeding risk: None  Risk factors: Age > 65  Daily INR ordered: YES    Recent Labs     12/13/19  1249   HGB 12.4   INR 3.3*     Date               INR                  Dose  1/13  3.3  ---                                                                                Assessment/ Plan:  Hold warfarin tonight due to supratherapeutic INR. Pharmacy will continue to monitor daily and adjust therapy as indicated. Please contact the pharmacist at w [phone] or  for outpatient recommendations if needed.

## 2019-12-13 NOTE — ED PROVIDER NOTES
75 y.o. female with past medical history significant for asthma, A-fib, depression, DM ,HTN, HLD, neuropathy, and LITA who presents via POV with chief complaint of cough. Pt c/o a cough and shortness of breath for the past 3 weeks. She was seen at 's office with [facility] and advised to come to the ED for \"IVs.\" Pt received 80 of solu medrol in office today, and used nebulizer treatment there and at home at 0500. She has previously been seen for this and been given prednisone, abx, and a new inhaler with no relief. She endorses accompanying nasal congestion, fatigue, and fever though she does not have a fever currently. She has hx of pneumonia, systemic staph infection, and pleural effusion that was treated at [facility]. Pt is anticoagulated on warfarin. She notes she has a chest X-ray 10 days ago that was normal. There are no other acute medical concerns at this time. Social hx: former tobacco smoker (quit 1992), endorses EtOH use (2 drink per week), denies illicit drug use. PCP: Bob Velez MD      Note written by Lee Addison, as dictated by Marion Wood MD 12:30 PM      The history is provided by the patient. No  was used.         Past Medical History:   Diagnosis Date    Asthma     Atrial fibrillation ([address]     Depression     DM (diabetes mellitus) ([address]     HTN (hypertension)     Hyperlipidemia     Morbid obesity ([address]     Neuropathy     LITA on CPAP     Other ill-defined conditions(799.89) pneumonia       Past Surgical History:   Procedure Laterality Date    COLONOSCOPY Left 10/28/2019    COLONOSCOPY AND EGD [facility]li [facility] at [facility] ENDOSCOPY    HX APPENDECTOMY      HX CHOLECYSTECTOMY      HX GASTRIC BYPASS      Jejunal bypass with subsequent reversal..  Lab Band since    HX OTHER SURGICAL  D & C    HX EMILIANO AND BSO      For uterine CA         Family History:   Problem Relation Age of Onset    Stroke Mother     Diabetes Other     Heart Disease Other     Heart Disease Father         congestive heart failure       Social History     Socioeconomic History    Marital status:      Spouse name: Not on file    Number of children: Not on file    Years of education: Not on file    Highest education level: Not on file   Occupational History    Not on file   Social Needs    Financial resource strain: Not on file    Food insecurity:     Worry: Not on file     Inability: Not on file    Transportation needs:     Medical: Not on file     Non-medical: Not on file   Tobacco Use    Smoking status: Former Smoker     Last attempt to quit: 1992     Years since quittin.9    Smokeless tobacco: Never Used   Substance and Sexual Activity    Alcohol use: Yes     Alcohol/week: 2.0 standard drinks     Types: 2 Glasses of wine per week     Comment: occ wine    Drug use: No    Sexual activity: Never   Lifestyle    Physical activity:     Days per week: Not on file     Minutes per session: Not on file    Stress: Not on file   Relationships    Social connections:     Talks on phone: Not on file     Gets together: Not on file     Attends Muslim service: Not on file     Active member of club or organization: Not on file     Attends meetings of clubs or organizations: Not on file     Relationship status: Not on file    Intimate partner violence:     Fear of current or ex partner: Not on file     Emotionally abused: Not on file     Physically abused: Not on file     Forced sexual activity: Not on file   Other Topics Concern    Not on file   Social History Narrative    Not on file         ALLERGIES: Latex; Kiwi; Codeine; Lisinopril; Morphine; and Statins-hmg-coa reductase inhibitors    Review of Systems   Constitutional: Positive for fatigue and fever. Negative for chills. HENT: Positive for congestion. Negative for sore throat. Respiratory: Positive for cough and shortness of breath.     Cardiovascular: Negative for chest pain and leg swelling. Gastrointestinal: Negative for abdominal pain and vomiting. Genitourinary: Negative for dysuria. Musculoskeletal: Negative for back pain. Skin: Negative for rash. Neurological: Negative for syncope and headaches. All other systems reviewed and are negative. Vitals:    12/13/19 1202   BP: 140/64   Pulse: 84   Resp: 22   Temp: 98.2 °F (36.8 °C)   SpO2: 97%   Weight: 124.5 kg (274 lb 8 oz)   Height: 5' 7\" (1.702 m)            Physical Exam  Vitals signs and nursing note reviewed. Constitutional:       General: She is not in acute distress. Appearance: She is well-developed. She is morbidly obese. HENT:      Head: Normocephalic and atraumatic. Nose: Nose normal.      Mouth/Throat:      Mouth: Mucous membranes are moist.      Pharynx: Oropharynx is clear. Eyes:      Conjunctiva/sclera: Conjunctivae normal.   Neck:      Musculoskeletal: Normal range of motion and neck supple. Cardiovascular:      Rate and Rhythm: Normal rate and regular rhythm. Heart sounds: Normal heart sounds. Pulmonary:      Effort: Pulmonary effort is normal.      Breath sounds: Rhonchi (R>L) present. Comments: Speaking in full sentences  Abdominal:      Palpations: Abdomen is soft. Tenderness: There is no tenderness. There is no guarding. Musculoskeletal: Normal range of motion. Skin:     General: Skin is warm and dry. Neurological:      Mental Status: She is alert and oriented to person, place, and time. Motor: No abnormal muscle tone. Psychiatric:         Speech: Speech normal.      Note written by Lee Brasher, as dictated by Shakeel Navarrete MD 12:38 PM      MDM       Procedures    ED EKG interpretation: 1239  Rhythm: atrial fib; and irregular. Rate (approx.): 72; Axis: left axis deviation; ST/T wave: normal; no STEMI.     Note written by Lee Brasher, as dictated by Shakeel Navarrete MD 1:19 PM    I spoke with  Marilee Laurent, patient's primary care physician at 1401 p.m. he accepts the patient for admission. Agrees with plan.

## 2019-12-14 LAB
ANION GAP SERPL CALC-SCNC: 9 MMOL/L (ref 5–15)
BASOPHILS # BLD: 0 K/UL (ref 0–0.1)
BASOPHILS NFR BLD: 0 % (ref 0–1)
BUN SERPL-MCNC: 35 MG/DL (ref 6–20)
BUN/CREAT SERPL: 25 (ref 12–20)
CALCIUM SERPL-MCNC: 8.9 MG/DL (ref 8.5–10.1)
CHLORIDE SERPL-SCNC: 101 MMOL/L (ref 97–108)
CO2 SERPL-SCNC: 26 MMOL/L (ref 21–32)
CREAT SERPL-MCNC: 1.42 MG/DL (ref 0.55–1.02)
DIFFERENTIAL METHOD BLD: ABNORMAL
EOSINOPHIL # BLD: 0 K/UL (ref 0–0.4)
EOSINOPHIL NFR BLD: 0 % (ref 0–7)
ERYTHROCYTE [DISTWIDTH] IN BLOOD BY AUTOMATED COUNT: 14.6 % (ref 11.5–14.5)
EST. AVERAGE GLUCOSE BLD GHB EST-MCNC: 212 MG/DL
GLUCOSE BLD STRIP.AUTO-MCNC: 306 MG/DL (ref 65–100)
GLUCOSE BLD STRIP.AUTO-MCNC: 339 MG/DL (ref 65–100)
GLUCOSE BLD STRIP.AUTO-MCNC: 375 MG/DL (ref 65–100)
GLUCOSE BLD STRIP.AUTO-MCNC: 424 MG/DL (ref 65–100)
GLUCOSE BLD STRIP.AUTO-MCNC: 426 MG/DL (ref 65–100)
GLUCOSE SERPL-MCNC: 388 MG/DL (ref 65–100)
HBA1C MFR BLD: 9 % (ref 4–5.6)
HCT VFR BLD AUTO: 37.1 % (ref 35–47)
HGB BLD-MCNC: 12 G/DL (ref 11.5–16)
IMM GRANULOCYTES # BLD AUTO: 0.1 K/UL (ref 0–0.04)
IMM GRANULOCYTES NFR BLD AUTO: 1 % (ref 0–0.5)
INR PPP: 2.1 (ref 0.9–1.1)
LYMPHOCYTES # BLD: 0.9 K/UL (ref 0.8–3.5)
LYMPHOCYTES NFR BLD: 7 % (ref 12–49)
MCH RBC QN AUTO: 29.6 PG (ref 26–34)
MCHC RBC AUTO-ENTMCNC: 32.3 G/DL (ref 30–36.5)
MCV RBC AUTO: 91.6 FL (ref 80–99)
MONOCYTES # BLD: 0.3 K/UL (ref 0–1)
MONOCYTES NFR BLD: 2 % (ref 5–13)
NEUTS SEG # BLD: 11.5 K/UL (ref 1.8–8)
NEUTS SEG NFR BLD: 90 % (ref 32–75)
NRBC # BLD: 0 K/UL (ref 0–0.01)
NRBC BLD-RTO: 0 PER 100 WBC
PLATELET # BLD AUTO: 217 K/UL (ref 150–400)
PMV BLD AUTO: 9 FL (ref 8.9–12.9)
POTASSIUM SERPL-SCNC: 4.2 MMOL/L (ref 3.5–5.1)
PROTHROMBIN TIME: 20.7 SEC (ref 9–11.1)
RBC # BLD AUTO: 4.05 M/UL (ref 3.8–5.2)
SERVICE CMNT-IMP: ABNORMAL
SODIUM SERPL-SCNC: 136 MMOL/L (ref 136–145)
WBC # BLD AUTO: 12.8 K/UL (ref 3.6–11)

## 2019-12-14 PROCEDURE — 94640 AIRWAY INHALATION TREATMENT: CPT

## 2019-12-14 PROCEDURE — 65270000032 HC RM SEMIPRIVATE

## 2019-12-14 PROCEDURE — 74011250636 HC RX REV CODE- 250/636: Performed by: INTERNAL MEDICINE

## 2019-12-14 PROCEDURE — 82962 GLUCOSE BLOOD TEST: CPT

## 2019-12-14 PROCEDURE — 74011000250 HC RX REV CODE- 250: Performed by: INTERNAL MEDICINE

## 2019-12-14 PROCEDURE — 36415 COLL VENOUS BLD VENIPUNCTURE: CPT

## 2019-12-14 PROCEDURE — 94660 CPAP INITIATION&MGMT: CPT

## 2019-12-14 PROCEDURE — 80048 BASIC METABOLIC PNL TOTAL CA: CPT

## 2019-12-14 PROCEDURE — 74011250637 HC RX REV CODE- 250/637: Performed by: INTERNAL MEDICINE

## 2019-12-14 PROCEDURE — 5A09357 ASSISTANCE WITH RESPIRATORY VENTILATION, LESS THAN 24 CONSECUTIVE HOURS, CONTINUOUS POSITIVE AIRWAY PRESSURE: ICD-10-PCS | Performed by: INTERNAL MEDICINE

## 2019-12-14 PROCEDURE — 74011636637 HC RX REV CODE- 636/637: Performed by: INTERNAL MEDICINE

## 2019-12-14 PROCEDURE — 83036 HEMOGLOBIN GLYCOSYLATED A1C: CPT

## 2019-12-14 PROCEDURE — 85025 COMPLETE CBC W/AUTO DIFF WBC: CPT

## 2019-12-14 PROCEDURE — 85610 PROTHROMBIN TIME: CPT

## 2019-12-14 RX ORDER — ALBUTEROL SULFATE 0.83 MG/ML
2.5 SOLUTION RESPIRATORY (INHALATION) 3 TIMES DAILY
Status: DISCONTINUED | OUTPATIENT
Start: 2019-12-14 | End: 2019-12-16 | Stop reason: HOSPADM

## 2019-12-14 RX ORDER — WARFARIN SODIUM 5 MG/1
5 TABLET ORAL ONCE
Status: COMPLETED | OUTPATIENT
Start: 2019-12-14 | End: 2019-12-14

## 2019-12-14 RX ADMIN — METHYLPREDNISOLONE SODIUM SUCCINATE 60 MG: 40 INJECTION, POWDER, FOR SOLUTION INTRAMUSCULAR; INTRAVENOUS at 14:31

## 2019-12-14 RX ADMIN — SERTRALINE HYDROCHLORIDE 100 MG: 50 TABLET ORAL at 17:32

## 2019-12-14 RX ADMIN — ALBUTEROL SULFATE 2.5 MG: 2.5 SOLUTION RESPIRATORY (INHALATION) at 00:18

## 2019-12-14 RX ADMIN — HUMAN INSULIN 25 UNITS: 100 INJECTION, SUSPENSION SUBCUTANEOUS at 23:11

## 2019-12-14 RX ADMIN — DILTIAZEM HYDROCHLORIDE 360 MG: 180 CAPSULE, COATED, EXTENDED RELEASE ORAL at 08:29

## 2019-12-14 RX ADMIN — SODIUM CHLORIDE 75 ML/HR: 900 INJECTION, SOLUTION INTRAVENOUS at 11:25

## 2019-12-14 RX ADMIN — ALBUTEROL SULFATE 2.5 MG: 2.5 SOLUTION RESPIRATORY (INHALATION) at 08:08

## 2019-12-14 RX ADMIN — BUDESONIDE 500 MCG: 0.5 INHALANT RESPIRATORY (INHALATION) at 20:20

## 2019-12-14 RX ADMIN — ALBUTEROL SULFATE 2.5 MG: 2.5 SOLUTION RESPIRATORY (INHALATION) at 15:38

## 2019-12-14 RX ADMIN — BUDESONIDE 500 MCG: 0.5 INHALANT RESPIRATORY (INHALATION) at 08:08

## 2019-12-14 RX ADMIN — GABAPENTIN 300 MG: 300 CAPSULE ORAL at 08:30

## 2019-12-14 RX ADMIN — GABAPENTIN 300 MG: 300 CAPSULE ORAL at 23:17

## 2019-12-14 RX ADMIN — INSULIN LISPRO 9 UNITS: 100 INJECTION, SOLUTION INTRAVENOUS; SUBCUTANEOUS at 23:14

## 2019-12-14 RX ADMIN — DIGOXIN 0.25 MG: 125 TABLET ORAL at 23:30

## 2019-12-14 RX ADMIN — METHYLPREDNISOLONE SODIUM SUCCINATE 80 MG: 40 INJECTION, POWDER, FOR SOLUTION INTRAMUSCULAR; INTRAVENOUS at 06:53

## 2019-12-14 RX ADMIN — ALBUTEROL SULFATE 2.5 MG: 2.5 SOLUTION RESPIRATORY (INHALATION) at 04:00

## 2019-12-14 RX ADMIN — INSULIN LISPRO 15 UNITS: 100 INJECTION, SOLUTION INTRAVENOUS; SUBCUTANEOUS at 12:49

## 2019-12-14 RX ADMIN — FUROSEMIDE 40 MG: 40 TABLET ORAL at 17:32

## 2019-12-14 RX ADMIN — WARFARIN SODIUM 5 MG: 5 TABLET ORAL at 17:32

## 2019-12-14 RX ADMIN — SERTRALINE HYDROCHLORIDE 100 MG: 50 TABLET ORAL at 00:38

## 2019-12-14 RX ADMIN — METHYLPREDNISOLONE SODIUM SUCCINATE 60 MG: 40 INJECTION, POWDER, FOR SOLUTION INTRAMUSCULAR; INTRAVENOUS at 23:01

## 2019-12-14 RX ADMIN — HUMAN INSULIN 35 UNITS: 100 INJECTION, SUSPENSION SUBCUTANEOUS at 09:00

## 2019-12-14 RX ADMIN — DULOXETINE HYDROCHLORIDE 60 MG: 60 CAPSULE, DELAYED RELEASE ORAL at 08:29

## 2019-12-14 RX ADMIN — METHYLPREDNISOLONE SODIUM SUCCINATE 80 MG: 40 INJECTION, POWDER, FOR SOLUTION INTRAMUSCULAR; INTRAVENOUS at 00:38

## 2019-12-14 RX ADMIN — FUROSEMIDE 80 MG: 40 TABLET ORAL at 08:29

## 2019-12-14 RX ADMIN — INSULIN LISPRO 9 UNITS: 100 INJECTION, SOLUTION INTRAVENOUS; SUBCUTANEOUS at 07:03

## 2019-12-14 RX ADMIN — ALBUTEROL SULFATE 2.5 MG: 2.5 SOLUTION RESPIRATORY (INHALATION) at 11:58

## 2019-12-14 RX ADMIN — INSULIN LISPRO 12 UNITS: 100 INJECTION, SOLUTION INTRAVENOUS; SUBCUTANEOUS at 17:33

## 2019-12-14 RX ADMIN — GABAPENTIN 300 MG: 300 CAPSULE ORAL at 16:11

## 2019-12-14 RX ADMIN — ALBUTEROL SULFATE 2.5 MG: 2.5 SOLUTION RESPIRATORY (INHALATION) at 20:20

## 2019-12-14 NOTE — PROGRESS NOTES
Upon admission, patient requesting Zoloft, CPAP, Warfarin, and Digoxin. RN paged provider, Dr. Lauryn Thomas, who ordered RN to order Zoloft at strength patient takes and change Digoxin to give at bedtime. Provider ordered CPAP and ordered to hold Warfarin due to elevated INR. RN paged respiratory to set up CPAP and administered medications. Patient . RN paged provider who ordered Humalog Insulin 25 units sub-q one time and to give the bedtime NPH Insulin 25 units. Provider ordered RN to recheck BG in 2 hours after administering Insulin and call if BG above 400 or below 100. Patient BG at 2am was 424.  RN paged provider who ordered to recheck BG in the morning before breakfast.

## 2019-12-14 NOTE — PROGRESS NOTES
Problem: Falls - Risk of  Goal: *Absence of Falls  Description  Document [employer] Fall Risk and appropriate interventions in the flowsheet. Outcome: Progressing Towards Goal  Note: Fall Risk Interventions:            Medication Interventions: Patient to call before getting OOB, Teach patient to arise slowly                   Problem: Patient Education: Go to Patient Education Activity  Goal: Patient/Family Education  Outcome: Progressing Towards Goal     Problem: Diabetes Self-Management  Goal: *Disease process and treatment process  Description  Define diabetes and identify own type of diabetes; list 3 options for treating diabetes. Outcome: Progressing Towards Goal  Goal: *Incorporating nutritional management into lifestyle  Description  Describe effect of type, amount and timing of food on blood glucose; list 3 methods for planning meals. Outcome: Progressing Towards Goal  Goal: *Incorporating physical activity into lifestyle  Description  State effect of exercise on blood glucose levels. Outcome: Progressing Towards Goal  Goal: *Developing strategies to promote health/change behavior  Description  Define the ABC's of diabetes; identify appropriate screenings, schedule and personal plan for screenings. Outcome: Progressing Towards Goal  Goal: *Using medications safely  Description  State effect of diabetes medications on diabetes; name diabetes medication taking, action and side effects. Outcome: Progressing Towards Goal  Goal: *Monitoring blood glucose, interpreting and using results  Description  Identify recommended blood glucose targets  and personal targets. Outcome: Progressing Towards Goal  Goal: *Prevention, detection, treatment of acute complications  Description  List symptoms of hyper- and hypoglycemia; describe how to treat low blood sugar and actions for lowering  high blood glucose level.   Outcome: Progressing Towards Goal  Goal: *Prevention, detection and treatment of chronic complications  Description  Define the natural course of diabetes and describe the relationship of blood glucose levels to long term complications of diabetes.   Outcome: Progressing Towards Goal  Goal: *Developing strategies to address psychosocial issues  Description  Describe feelings about living with diabetes; identify support needed and support network  Outcome: Progressing Towards Goal  Goal: *Insulin pump training  Outcome: Progressing Towards Goal  Goal: *Sick day guidelines  Outcome: Progressing Towards Goal  Goal: *Patient Specific Goal (EDIT GOAL, INSERT TEXT)  Outcome: Progressing Towards Goal     Problem: Patient Education: Go to Patient Education Activity  Goal: Patient/Family Education  Outcome: Progressing Towards Goal

## 2019-12-14 NOTE — PROGRESS NOTES
Pharmacist Note - Warfarin Dosing  Consult provided for this 75 y. o.female to manage warfarin for Atrial Fibrillation    INR Goal: 2 - 3    Home regimen/ tablet size: 5 mg MWF; 2.5 mg Tu-Th-Sa-Su    Drugs that may increase INR: None  Drugs that may decrease INR: None  Other current anticoagulants/ drugs that may increase bleeding risk: None  Risk factors: Age > 65  Daily INR ordered: YES    Recent Labs     12/14/19  0356 12/13/19  1249   HGB 12.0 12.4   INR 2.1* 3.3*     Date               INR                  Dose  12/13  3.3  Held   12/14  2.1  5 mg                                                                                 Assessment/ Plan: Will order warfarin 5 mg PO x 1 dose. Pharmacy will continue to monitor daily and adjust therapy as indicated. Please contact the pharmacist at [facility] for outpatient recommendations if needed.

## 2019-12-14 NOTE — PROGRESS NOTES
Medical Progress Note      NAME: [facility]   :  1945  MRM:  [id number]    Date/Time: 2019           Problem List:     Principal Problem:    Asthma exacerbation (2014)    Active Problems:    Hypertension complicating diabetes ([facility] (2017)      Type 2 diabetes mellitus with stage 1 chronic kidney disease, with long-term current use of insulin (formerly [facility]) (2017)             Subjective:     Breathing , chest tightness, and cough improved. Cough now non productive . DM , on steroids, hyperglycemia responding to extra Insulin     Past Medical History:   Diagnosis Date    Asthma     Atrial fibrillation ([facility]     Depression     DM [facility]diabetes mellitus) ([facility]     HTN (hypertension)     Hyperlipidemia     Morbid obesity ([facility]     Neuropathy     LITA on CPAP     Other ill-defined conditions(799.89) pneumonia            Objective:         Vitals:      Last 24hrs VS reviewed since prior progress note. Most recent are:    Visit Vitals  /68 (BP 1 Location: Left arm, BP Patient Position: At rest)   Pulse 80   Temp 97.1 °F (36.2 °C)   Resp 17   Ht 5' 7\" (1.702 m)   Wt 274 lb 8 oz (124.5 kg)   SpO2 98%   BMI 42.99 kg/m²     SpO2 Readings from Last 6 Encounters:   19 98%   10/28/19 90%   19 97%   19 96%   18 95%   18 98%            Intake/Output Summary (Last 24 hours) at 2019 0736  Last data filed at 2019 0330  Gross per 24 hour   Intake 1000 ml   Output 850 ml   Net 150 ml                  Exam:      General:  Alert, cooperative, no distress, appears stated age. Lungs:   Clear to auscultation bilaterally. Heart:  Irregular rate and rhythm, S1, S2 normal, no murmur, click, rub or gallop. Abdomen:   Soft, non-tender. Bowel sounds normal. No masses,  No organomegaly. Extremities: B/l ankles trace  edema.      Lab Data Reviewed: (see below)  Recent Results (from the past 24 hour(s))   GLUCOSE, POC    Collection Time: 19 12:18 PM   Result Value Ref Range    Glucose (POC) 491 (H) 65 - 100 mg/dL    Performed by Bear Tran    CBC WITH AUTOMATED DIFF    Collection Time: 12/13/19 12:49 PM   Result Value Ref Range    WBC 16.1 (H) 3.6 - 11.0 K/uL    RBC 4.11 3.80 - 5.20 M/uL    HGB 12.4 11.5 - 16.0 g/dL    HCT 38.3 35.0 - 47.0 %    MCV 93.2 80.0 - 99.0 FL    MCH 30.2 26.0 - 34.0 PG    MCHC 32.4 30.0 - 36.5 g/dL    RDW 14.6 (H) 11.5 - 14.5 %    PLATELET 351 088 - 176 K/uL    MPV 9.2 8.9 - 12.9 FL    NRBC 0.0 0  WBC    ABSOLUTE NRBC 0.00 0.00 - 0.01 K/uL    NEUTROPHILS 94 (H) 32 - 75 %    LYMPHOCYTES 4 (L) 12 - 49 %    MONOCYTES 1 (L) 5 - 13 %    EOSINOPHILS 0 0 - 7 %    BASOPHILS 0 0 - 1 %    IMMATURE GRANULOCYTES 1 (H) 0.0 - 0.5 %    ABS. NEUTROPHILS 15.1 (H) 1.8 - 8.0 K/UL    ABS. LYMPHOCYTES 0.6 (L) 0.8 - 3.5 K/UL    ABS. MONOCYTES 0.2 0.0 - 1.0 K/UL    ABS. EOSINOPHILS 0.0 0.0 - 0.4 K/UL    ABS. BASOPHILS 0.0 0.0 - 0.1 K/UL    ABS. IMM. GRANS. 0.2 (H) 0.00 - 0.04 K/UL    DF SMEAR SCANNED      RBC COMMENTS NORMOCYTIC, NORMOCHROMIC     METABOLIC PANEL, COMPREHENSIVE    Collection Time: 12/13/19 12:49 PM   Result Value Ref Range    Sodium 132 (L) 136 - 145 mmol/L    Potassium 4.4 3.5 - 5.1 mmol/L    Chloride 99 97 - 108 mmol/L    CO2 26 21 - 32 mmol/L    Anion gap 7 5 - 15 mmol/L    Glucose 537 (H) 65 - 100 mg/dL    BUN 33 (H) 6 - 20 MG/DL    Creatinine 1.64 (H) 0.55 - 1.02 MG/DL    BUN/Creatinine ratio 20 12 - 20      GFR est AA 37 (L) >60 ml/min/1.73m2    GFR est non-AA 31 (L) >60 ml/min/1.73m2    Calcium 8.7 8.5 - 10.1 MG/DL    Bilirubin, total 0.3 0.2 - 1.0 MG/DL    ALT (SGPT) 31 12 - 78 U/L    AST (SGOT) 17 15 - 37 U/L    Alk.  phosphatase 79 45 - 117 U/L    Protein, total 7.1 6.4 - 8.2 g/dL    Albumin 3.5 3.5 - 5.0 g/dL    Globulin 3.6 2.0 - 4.0 g/dL    A-G Ratio 1.0 (L) 1.1 - 2.2     TROPONIN I    Collection Time: 12/13/19 12:49 PM   Result Value Ref Range    Troponin-I, Qt. <0.05 <0.05 ng/mL   SAMPLES BEING HELD    Collection Time: 12/13/19 12:49 PM   Result Value Ref Range    SAMPLES BEING HELD 1RED     COMMENT        Add-on orders for these samples will be processed based on acceptable specimen integrity and analyte stability, which may vary by analyte. PROTHROMBIN TIME + INR    Collection Time: 12/13/19 12:49 PM   Result Value Ref Range    INR 3.3 (H) 0.9 - 1.1      Prothrombin time 31.2 (H) 9.0 - 11.1 sec   NT-PRO BNP    Collection Time: 12/13/19 12:49 PM   Result Value Ref Range    NT pro- (H) <450 PG/ML   POC G3 - PUL    Collection Time: 12/13/19 12:57 PM   Result Value Ref Range    pH (POC) 7.436 7.35 - 7.45      pCO2 (POC) 37.9 35.0 - 45.0 MMHG    pO2 (POC) 69 (L) 80 - 100 MMHG    HCO3 (POC) 25.5 22 - 26 MMOL/L    sO2 (POC) 94 92 - 97 %    Base excess (POC) 1 mmol/L    Site RIGHT RADIAL      Device: ROOM AIR      Allens test (POC) NO      Specimen type (POC) ARTERIAL     GLUCOSE, POC    Collection Time: 12/13/19  2:52 PM   Result Value Ref Range    Glucose (POC) 490 (H) 65 - 100 mg/dL    Performed by [address] [place] [address], POC    Collection Time: 12/13/19 10:20 PM   Result Value Ref Range    Glucose (POC) 572 (H) 65 - 100 mg/dL    Performed by Kwabena Wagner    GLUCOSE, POC    Collection Time: 12/14/19  1:58 AM   Result Value Ref Range    Glucose (POC) 424 (H) 65 - 100 mg/dL    Performed by Munira Diaz    CBC WITH AUTOMATED DIFF    Collection Time: 12/14/19  3:56 AM   Result Value Ref Range    WBC 12.8 (H) 3.6 - 11.0 K/uL    RBC 4.05 3.80 - 5.20 M/uL    HGB 12.0 11.5 - 16.0 g/dL    HCT 37.1 35.0 - 47.0 %    MCV 91.6 80.0 - 99.0 FL    MCH 29.6 26.0 - 34.0 PG    MCHC 32.3 30.0 - 36.5 g/dL    RDW 14.6 (H) 11.5 - 14.5 %    PLATELET 860 514 - 875 K/uL    MPV 9.0 8.9 - 12.9 FL    NRBC 0.0 0  WBC    ABSOLUTE NRBC 0.00 0.00 - 0.01 K/uL    NEUTROPHILS 90 (H) 32 - 75 %    LYMPHOCYTES 7 (L) 12 - 49 %    MONOCYTES 2 (L) 5 - 13 %    EOSINOPHILS 0 0 - 7 %    BASOPHILS 0 0 - 1 %    IMMATURE GRANULOCYTES 1 (H) 0.0 - 0.5 %    ABS.  NEUTROPHILS 11. 5 (H) 1.8 - 8.0 K/UL    ABS. LYMPHOCYTES 0.9 0.8 - 3.5 K/UL    ABS. MONOCYTES 0.3 0.0 - 1.0 K/UL    ABS. EOSINOPHILS 0.0 0.0 - 0.4 K/UL    ABS. BASOPHILS 0.0 0.0 - 0.1 K/UL    ABS. IMM.  GRANS. 0.1 (H) 0.00 - 0.04 K/UL    DF AUTOMATED     METABOLIC PANEL, BASIC    Collection Time: 12/14/19  3:56 AM   Result Value Ref Range    Sodium 136 136 - 145 mmol/L    Potassium 4.2 3.5 - 5.1 mmol/L    Chloride 101 97 - 108 mmol/L    CO2 26 21 - 32 mmol/L    Anion gap 9 5 - 15 mmol/L    Glucose 388 (H) 65 - 100 mg/dL    BUN 35 (H) 6 - 20 MG/DL    Creatinine 1.42 (H) 0.55 - 1.02 MG/DL    BUN/Creatinine ratio 25 (H) 12 - 20      GFR est AA 44 (L) >60 ml/min/1.73m2    GFR est non-AA 36 (L) >60 ml/min/1.73m2    Calcium 8.9 8.5 - 10.1 MG/DL   PROTHROMBIN TIME + INR    Collection Time: 12/14/19  3:56 AM   Result Value Ref Range    INR 2.1 (H) 0.9 - 1.1      Prothrombin time 20.7 (H) 9.0 - 11.1 sec   GLUCOSE, POC    Collection Time: 12/14/19  6:38 AM   Result Value Ref Range    Glucose (POC) 306 (H) 65 - 100 mg/dL    Performed by [facility]        Medications Reviewed: (see below)    ______________________________________________________________________    Medications:     Current Facility-Administered Medications   Medication Dose Route Frequency    methylPREDNISolone (PF) (SOLU-MEDROL) injection 60 mg  60 mg IntraVENous Q8H    albuterol (PROVENTIL VENTOLIN) nebulizer solution 2.5 mg  2.5 mg Nebulization Q4H RT    ALPRAZolam (XANAX) tablet 0.25-0.5 mg  0.25-0.5 mg Oral DAILY PRN    digoxin (LANOXIN) tablet 0.25 mg  0.25 mg Oral DAILY    dilTIAZem CD (CARDIZEM CD) capsule 360 mg  360 mg Oral DAILY    DULoxetine (CYMBALTA) capsule 60 mg  60 mg Oral DAILY    furosemide (LASIX) tablet 80 mg  80 mg Oral DAILY    furosemide (LASIX) tablet 40 mg  40 mg Oral QPM    gabapentin (NEURONTIN) capsule 300 mg  300 mg Oral TID    insulin NPH (NOVOLIN N, HUMULIN N) injection 35 Units  35 Units SubCUTAneous DAILY    insulin NPH (NOVOLIN N, HUMULIN N) injection 25 Units  25 Units SubCUTAneous QHS    ondansetron (ZOFRAN) injection 4 mg  4 mg IntraVENous Q6H PRN    zolpidem (AMBIEN) tablet 5 mg  5 mg Oral QHS PRN    acetaminophen (TYLENOL) tablet 650 mg  650 mg Oral Q4H PRN    0.9% sodium chloride infusion  75 mL/hr IntraVENous CONTINUOUS    glucose chewable tablet 16 g  4 Tab Oral PRN    glucagon (GLUCAGEN) injection 1 mg  1 mg IntraMUSCular PRN    dextrose 10% infusion 0-250 mL  0-250 mL IntraVENous PRN    insulin lispro (HUMALOG) injection   SubCUTAneous AC&HS    Warfarin - Pharmacy to Dose   Other Rx Dosing/Monitoring    arformoterol (BROVANA) neb solution 15 mcg  15 mcg Nebulization BID RT    And    budesonide (PULMICORT) 500 mcg/2 ml nebulizer suspension  500 mcg Nebulization BID RT    sertraline (ZOLOFT) tablet 100 mg  100 mg Oral QPM                   Assessment:   Asthma Exacerbation improving. Reduce Solumedrol to 60 mg IV Q8 h    Type 2 DM with hyperglycemia on steroids. Continue NPH Insulin and SS Lispro     Leukocytosis improved     Sodium level now nl with IVF. Continue IVF     Serum Cr improved with hydration     Patient Active Problem List   Diagnosis Code    Hyperlipidemia E78.5    Depression F32.9    Neuropathy G62.9    Respiratory failure J96.90    Asthma exacerbation J45. 901    Anemia D64.9    Dizziness R42    Weakness R53.1    Morbid obesity (HCC) E66.01    Cervical stenosis of spine M48.02    Hematoma of neck S10.93XA    Chronic atrial fibrillation I48.20    Long-term insulin use in type 2 diabetes (HCC) E11.9, Z79.4    Microalbuminuria R80.9    Asthmatic bronchitis with acute exacerbation J45. 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  F/u labs in am                                                        ___________________________________________________      Attending Physician: Liv Staley MD

## 2019-12-14 NOTE — PROGRESS NOTES
Bedside shift change report given to Carmen Ugalde RN (oncoming nurse) by Nohelia Osborn RN (offgoing nurse). Report included the following information SBAR, Kardex, Intake/Output, MAR and Recent Results.

## 2019-12-14 NOTE — PROGRESS NOTES
Bedside and Verbal shift change report given to [facility] RN  (oncoming nurse) by Melissa Williamson RN  (offgoing nurse). Report included the following information SBAR.

## 2019-12-15 LAB
ANION GAP SERPL CALC-SCNC: 9 MMOL/L (ref 5–15)
APPEARANCE UR: CLEAR
ATRIAL RATE: 326 BPM
BACTERIA URNS QL MICRO: NEGATIVE /HPF
BILIRUB UR QL: NEGATIVE
BUN SERPL-MCNC: 43 MG/DL (ref 6–20)
BUN/CREAT SERPL: 31 (ref 12–20)
CALCIUM SERPL-MCNC: 9.2 MG/DL (ref 8.5–10.1)
CALCULATED R AXIS, ECG10: -39 DEGREES
CALCULATED T AXIS, ECG11: 73 DEGREES
CHLORIDE SERPL-SCNC: 103 MMOL/L (ref 97–108)
CO2 SERPL-SCNC: 26 MMOL/L (ref 21–32)
COLOR UR: ABNORMAL
CREAT SERPL-MCNC: 1.4 MG/DL (ref 0.55–1.02)
DIAGNOSIS, 93000: NORMAL
EPITH CASTS URNS QL MICRO: ABNORMAL /LPF
GLUCOSE BLD STRIP.AUTO-MCNC: 292 MG/DL (ref 65–100)
GLUCOSE BLD STRIP.AUTO-MCNC: 329 MG/DL (ref 65–100)
GLUCOSE BLD STRIP.AUTO-MCNC: 369 MG/DL (ref 65–100)
GLUCOSE BLD STRIP.AUTO-MCNC: 375 MG/DL (ref 65–100)
GLUCOSE SERPL-MCNC: 395 MG/DL (ref 65–100)
GLUCOSE UR STRIP.AUTO-MCNC: 250 MG/DL
HGB UR QL STRIP: NEGATIVE
HYALINE CASTS URNS QL MICRO: ABNORMAL /LPF (ref 0–5)
INR PPP: 1.8 (ref 0.9–1.1)
KETONES UR QL STRIP.AUTO: NEGATIVE MG/DL
LEUKOCYTE ESTERASE UR QL STRIP.AUTO: NEGATIVE
NITRITE UR QL STRIP.AUTO: NEGATIVE
PH UR STRIP: 5.5 [PH] (ref 5–8)
POTASSIUM SERPL-SCNC: 4.2 MMOL/L (ref 3.5–5.1)
PROT UR STRIP-MCNC: ABNORMAL MG/DL
PROTHROMBIN TIME: 17.5 SEC (ref 9–11.1)
Q-T INTERVAL, ECG07: 374 MS
QRS DURATION, ECG06: 98 MS
QTC CALCULATION (BEZET), ECG08: 409 MS
RBC #/AREA URNS HPF: ABNORMAL /HPF (ref 0–5)
SERVICE CMNT-IMP: ABNORMAL
SODIUM SERPL-SCNC: 138 MMOL/L (ref 136–145)
SP GR UR REFRACTOMETRY: 1.01 (ref 1–1.03)
UA: UC IF INDICATED,UAUC: ABNORMAL
UROBILINOGEN UR QL STRIP.AUTO: 0.2 EU/DL (ref 0.2–1)
VENTRICULAR RATE, ECG03: 72 BPM
WBC URNS QL MICRO: ABNORMAL /HPF (ref 0–4)

## 2019-12-15 PROCEDURE — 74011250637 HC RX REV CODE- 250/637: Performed by: INTERNAL MEDICINE

## 2019-12-15 PROCEDURE — 74011000258 HC RX REV CODE- 258: Performed by: INTERNAL MEDICINE

## 2019-12-15 PROCEDURE — 82962 GLUCOSE BLOOD TEST: CPT

## 2019-12-15 PROCEDURE — 94640 AIRWAY INHALATION TREATMENT: CPT

## 2019-12-15 PROCEDURE — 80048 BASIC METABOLIC PNL TOTAL CA: CPT

## 2019-12-15 PROCEDURE — 81001 URINALYSIS AUTO W/SCOPE: CPT

## 2019-12-15 PROCEDURE — 74011250636 HC RX REV CODE- 250/636: Performed by: INTERNAL MEDICINE

## 2019-12-15 PROCEDURE — 74011000250 HC RX REV CODE- 250: Performed by: INTERNAL MEDICINE

## 2019-12-15 PROCEDURE — 36415 COLL VENOUS BLD VENIPUNCTURE: CPT

## 2019-12-15 PROCEDURE — 74011636637 HC RX REV CODE- 636/637: Performed by: INTERNAL MEDICINE

## 2019-12-15 PROCEDURE — 5A09357 ASSISTANCE WITH RESPIRATORY VENTILATION, LESS THAN 24 CONSECUTIVE HOURS, CONTINUOUS POSITIVE AIRWAY PRESSURE: ICD-10-PCS | Performed by: INTERNAL MEDICINE

## 2019-12-15 PROCEDURE — 85610 PROTHROMBIN TIME: CPT

## 2019-12-15 PROCEDURE — 65270000032 HC RM SEMIPRIVATE

## 2019-12-15 PROCEDURE — 94660 CPAP INITIATION&MGMT: CPT

## 2019-12-15 RX ORDER — WARFARIN SODIUM 5 MG/1
5 TABLET ORAL ONCE
Status: COMPLETED | OUTPATIENT
Start: 2019-12-15 | End: 2019-12-15

## 2019-12-15 RX ORDER — GUAIFENESIN 600 MG/1
600 TABLET, EXTENDED RELEASE ORAL 2 TIMES DAILY
Status: DISCONTINUED | OUTPATIENT
Start: 2019-12-15 | End: 2019-12-16 | Stop reason: HOSPADM

## 2019-12-15 RX ADMIN — FUROSEMIDE 80 MG: 40 TABLET ORAL at 09:35

## 2019-12-15 RX ADMIN — PIPERACILLIN SODIUM AND TAZOBACTAM SODIUM 3.38 G: 3; .375 INJECTION, POWDER, LYOPHILIZED, FOR SOLUTION INTRAVENOUS at 09:34

## 2019-12-15 RX ADMIN — HUMAN INSULIN 25 UNITS: 100 INJECTION, SUSPENSION SUBCUTANEOUS at 22:46

## 2019-12-15 RX ADMIN — BUDESONIDE 500 MCG: 0.5 INHALANT RESPIRATORY (INHALATION) at 08:24

## 2019-12-15 RX ADMIN — DILTIAZEM HYDROCHLORIDE 360 MG: 180 CAPSULE, COATED, EXTENDED RELEASE ORAL at 09:36

## 2019-12-15 RX ADMIN — WARFARIN SODIUM 5 MG: 5 TABLET ORAL at 17:47

## 2019-12-15 RX ADMIN — INSULIN LISPRO 12 UNITS: 100 INJECTION, SOLUTION INTRAVENOUS; SUBCUTANEOUS at 07:12

## 2019-12-15 RX ADMIN — METHYLPREDNISOLONE SODIUM SUCCINATE 80 MG: 125 INJECTION, POWDER, FOR SOLUTION INTRAMUSCULAR; INTRAVENOUS at 16:01

## 2019-12-15 RX ADMIN — INSULIN LISPRO 14 UNITS: 100 INJECTION, SOLUTION INTRAVENOUS; SUBCUTANEOUS at 13:03

## 2019-12-15 RX ADMIN — METHYLPREDNISOLONE SODIUM SUCCINATE 80 MG: 125 INJECTION, POWDER, FOR SOLUTION INTRAMUSCULAR; INTRAVENOUS at 22:45

## 2019-12-15 RX ADMIN — BUDESONIDE 500 MCG: 0.5 INHALANT RESPIRATORY (INHALATION) at 20:38

## 2019-12-15 RX ADMIN — GABAPENTIN 300 MG: 300 CAPSULE ORAL at 22:44

## 2019-12-15 RX ADMIN — DULOXETINE HYDROCHLORIDE 60 MG: 60 CAPSULE, DELAYED RELEASE ORAL at 09:36

## 2019-12-15 RX ADMIN — ALBUTEROL SULFATE 2.5 MG: 2.5 SOLUTION RESPIRATORY (INHALATION) at 08:24

## 2019-12-15 RX ADMIN — GABAPENTIN 300 MG: 300 CAPSULE ORAL at 09:36

## 2019-12-15 RX ADMIN — ALBUTEROL SULFATE 2.5 MG: 2.5 SOLUTION RESPIRATORY (INHALATION) at 20:38

## 2019-12-15 RX ADMIN — Medication 1 CAPSULE: at 09:36

## 2019-12-15 RX ADMIN — PIPERACILLIN SODIUM AND TAZOBACTAM SODIUM 3.38 G: 3; .375 INJECTION, POWDER, LYOPHILIZED, FOR SOLUTION INTRAVENOUS at 17:48

## 2019-12-15 RX ADMIN — ARFORMOTEROL TARTRATE 15 MCG: 15 SOLUTION RESPIRATORY (INHALATION) at 20:38

## 2019-12-15 RX ADMIN — FUROSEMIDE 40 MG: 40 TABLET ORAL at 17:47

## 2019-12-15 RX ADMIN — ALBUTEROL SULFATE 2.5 MG: 2.5 SOLUTION RESPIRATORY (INHALATION) at 13:02

## 2019-12-15 RX ADMIN — METHYLPREDNISOLONE SODIUM SUCCINATE 60 MG: 40 INJECTION, POWDER, FOR SOLUTION INTRAMUSCULAR; INTRAVENOUS at 07:07

## 2019-12-15 RX ADMIN — INSULIN LISPRO 11 UNITS: 100 INJECTION, SOLUTION INTRAVENOUS; SUBCUTANEOUS at 17:47

## 2019-12-15 RX ADMIN — SERTRALINE HYDROCHLORIDE 100 MG: 50 TABLET ORAL at 17:47

## 2019-12-15 RX ADMIN — INSULIN LISPRO 8 UNITS: 100 INJECTION, SOLUTION INTRAVENOUS; SUBCUTANEOUS at 22:45

## 2019-12-15 RX ADMIN — GUAIFENESIN 600 MG: 600 TABLET, EXTENDED RELEASE ORAL at 09:36

## 2019-12-15 RX ADMIN — GABAPENTIN 300 MG: 300 CAPSULE ORAL at 16:01

## 2019-12-15 RX ADMIN — DIGOXIN 0.25 MG: 125 TABLET ORAL at 22:44

## 2019-12-15 RX ADMIN — HUMAN INSULIN 35 UNITS: 100 INJECTION, SUSPENSION SUBCUTANEOUS at 09:34

## 2019-12-15 RX ADMIN — GUAIFENESIN 600 MG: 600 TABLET, EXTENDED RELEASE ORAL at 17:47

## 2019-12-15 NOTE — PROGRESS NOTES
Pharmacist Note - Warfarin Dosing  Consult provided for this 75 y. o.female to manage warfarin for Atrial Fibrillation    INR Goal: 2 - 3    Home regimen/ tablet size: 5 mg MWF; 2.5 mg Tu-Th-Sa-Su    Drugs that may increase INR: None  Drugs that may decrease INR: None  Other current anticoagulants/ drugs that may increase bleeding risk: None  Risk factors: Age > 65  Daily INR ordered: YES    Recent Labs     12/15/19  0418 12/14/19  0356 12/13/19  1249   HGB  --  12.0 12.4   INR 1.8* 2.1* 3.3*     Date               INR                  Dose  12/13  3.3  Held   12/14  2.1  5 mg    12/15  1.8  5 mg                                                                                 Assessment/ Plan: Will order warfarin 5 mg PO x 1 dose. Pharmacy will continue to monitor daily and adjust therapy as indicated. Please contact the pharmacist at  for outpatient recommendations if needed.

## 2019-12-15 NOTE — PROGRESS NOTES
Medical Progress Note      NAME: [facility]   :  1945  MRM:  [id number]    Date/Time: 12/15/2019           Problem List:     Principal Problem:    Asthma exacerbation (2014)    Active Problems:    Hypertension complicating diabetes ([facility] (2017)      Type 2 diabetes mellitus with stage 1 chronic kidney disease, with long-term current use of insulin ([facility] (2017)             Subjective:     C/o increased chest tightness and more yellow thick post nasal drip. Cough non productive. Breathing improved. Past Medical History:   Diagnosis Date    Asthma     Atrial fibrillation ([facility]     Depression     DM (diabetes mellitus) ([facility]     HTN (hypertension)     Hyperlipidemia     Morbid obesity ([facility]     Neuropathy     LITA on CPAP     Other ill-defined conditions(799.89) pneumonia            Objective:         Vitals:      Last 24hrs VS reviewed since prior progress note. Most recent are:    Visit Vitals  /71 (BP 1 Location: Left arm, BP Patient Position: Sitting)   Pulse 85   Temp 98.5 °F (36.9 °C)   Resp 16   Ht 5' 7\" (1.702 m)   Wt 274 lb 8 oz (124.5 kg)   SpO2 99%   BMI 42.99 kg/m²     SpO2 Readings from Last 6 Encounters:   12/15/19 99%   10/28/19 90%   19 97%   19 96%   18 95%   18 98%            Intake/Output Summary (Last 24 hours) at 12/15/2019 0849  Last data filed at 12/15/2019 0415  Gross per 24 hour   Intake 640 ml   Output --   Net 640 ml                  Exam:      General:  Alert, cooperative, no distress, appears stated age. Throat clear    Lungs:   Clear to auscultation bilaterally. Heart:  Regular rate and rhythm, S1, S2 normal, no murmur, click, rub or gallop. Abdomen:   Soft, non-tender. Bowel sounds normal.   Extremities: Trace b/l ankle  edema.      Lab Data Reviewed: (see below)  Recent Results (from the past 24 hour(s))   GLUCOSE, POC    Collection Time: 19 11:52 AM   Result Value Ref Range    Glucose (POC) 426 (H) 65 - 100 mg/dL    Performed by [address], POC    Collection Time: 12/14/19  4:45 PM   Result Value Ref Range    Glucose (POC) 375 (H) 65 - 100 mg/dL    Performed by [address] , POC    Collection Time: 12/14/19  9:37 PM   Result Value Ref Range    Glucose (POC) 339 (H) 65 - 100 mg/dL    Performed by [address] [facility] [employer], BASIC    Collection Time: 12/15/19  4:16 AM   Result Value Ref Range    Sodium 138 136 - 145 mmol/L    Potassium 4.2 3.5 - 5.1 mmol/L    Chloride 103 97 - 108 mmol/L    CO2 26 21 - 32 mmol/L    Anion gap 9 5 - 15 mmol/L    Glucose 395 (H) 65 - 100 mg/dL    BUN 43 (H) 6 - 20 MG/DL    Creatinine 1.40 (H) 0.55 - 1.02 MG/DL    BUN/Creatinine ratio 31 (H) 12 - 20      GFR est AA 44 (L) >60 ml/min/1.73m2    GFR est non-AA 37 (L) >60 ml/min/1.73m2    Calcium 9.2 8.5 - 10.1 MG/DL   PROTHROMBIN TIME + INR    Collection Time: 12/15/19  4:18 AM   Result Value Ref Range    INR 1.8 (H) 0.9 - 1.1      Prothrombin time 17.5 (H) 9.0 - 11.1 sec   GLUCOSE, POC    Collection Time: 12/15/19  6:53 AM   Result Value Ref Range    Glucose (POC) 369 (H) 65 - 100 mg/dL    Performed by Duncan Dunham        Medications Reviewed: (see below)    ______________________________________________________________________    Medications:     Current Facility-Administered Medications   Medication Dose Route Frequency    guaiFENesin ER (MUCINEX) tablet 600 mg  600 mg Oral BID    methylPREDNISolone (PF) (SOLU-MEDROL) injection 80 mg  80 mg IntraVENous Q8H    piperacillin-tazobactam (ZOSYN) 3.375 g in 0.9% sodium chloride (MBP/ADV) 100 mL  3.375 g IntraVENous Q8H    lactobac ac& pc-s.therm-b.anim (DEBBIE Q/RISAQUAD)  1 Cap Oral DAILY    albuterol (PROVENTIL VENTOLIN) nebulizer solution 2.5 mg  2.5 mg Nebulization TID    ALPRAZolam (XANAX) tablet 0.25-0.5 mg  0.25-0.5 mg Oral DAILY PRN    digoxin (LANOXIN) tablet 0.25 mg  0.25 mg Oral DAILY    dilTIAZem CD (CARDIZEM CD) capsule 360 mg  360 mg Oral DAILY    DULoxetine (CYMBALTA) capsule 60 mg  60 mg Oral DAILY    furosemide (LASIX) tablet 80 mg  80 mg Oral DAILY    furosemide (LASIX) tablet 40 mg  40 mg Oral QPM    gabapentin (NEURONTIN) capsule 300 mg  300 mg Oral TID    insulin NPH (NOVOLIN N, HUMULIN N) injection 35 Units  35 Units SubCUTAneous DAILY    insulin NPH (NOVOLIN N, HUMULIN N) injection 25 Units  25 Units SubCUTAneous QHS    ondansetron (ZOFRAN) injection 4 mg  4 mg IntraVENous Q6H PRN    zolpidem (AMBIEN) tablet 5 mg  5 mg Oral QHS PRN    acetaminophen (TYLENOL) tablet 650 mg  650 mg Oral Q4H PRN    glucose chewable tablet 16 g  4 Tab Oral PRN    glucagon (GLUCAGEN) injection 1 mg  1 mg IntraMUSCular PRN    dextrose 10% infusion 0-250 mL  0-250 mL IntraVENous PRN    insulin lispro (HUMALOG) injection   SubCUTAneous AC&HS    Warfarin - Pharmacy to Dose   Other Rx Dosing/Monitoring    arformoterol (BROVANA) neb solution 15 mcg  15 mcg Nebulization BID RT    And    budesonide (PULMICORT) 500 mcg/2 ml nebulizer suspension  500 mcg Nebulization BID RT    sertraline (ZOLOFT) tablet 100 mg  100 mg Oral QPM                   Assessment:   Acute Asthmatic Bronchitis. P: increase Solumedrol back to 80 mg IV Q8 . Add Zosyn and probiotic and Mucinex. Continue J/A    Type 2 DM aggravated by steroids. Increase Humalog SS. Continue IVF     Elevated SCr on admission improving with IVF. Check Urinalysis. Continue IVF      Patient Active Problem List   Diagnosis Code    Hyperlipidemia E78.5    Depression F32.9    Neuropathy G62.9    Respiratory failure J96.90    Asthma exacerbation J45. 901    Anemia D64.9    Dizziness R42    Weakness R53.1    Morbid obesity (HCC) E66.01    Cervical stenosis of spine M48.02    Hematoma of neck S10.93XA    Chronic atrial fibrillation I48.20    Long-term insulin use in type 2 diabetes (HCC) E11.9, Z79.4    Microalbuminuria R80.9    Asthmatic bronchitis with acute exacerbation J45. 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    Gait abnormality R26.9    Type 2 diabetes mellitus with stage 1 chronic kidney disease, with long-term current use of insulin (HCC) E11.22, N18.1, Z79.4    Diabetic polyneuropathy associated with type 2 diabetes mellitus ([facility] E11.42    Encounter for long-term (current) drug use Z79.899    Recurrent depression (HCC) F33.9    Type 2 diabetes mellitus with diabetic neuropathy (HCC) E11.40          Plan:     F/u labs in am                                                       ___________________________________________________      Attending Physician: Edith Xavier MD

## 2019-12-15 NOTE — PHYSICIAN ADVISORY
Letter of Status Determination:   Recommend hospitalization status remain  INPATIENT  Status     Pt Name:  Gerald Palacios   MR#   [facility] # [id number] /  [id number]  Payor: Idolina Babinski / Plan: [address] [place] [address] / Product Type: Medicare /    DAYLIN#  [id number]   Room and Hospital  515/02  @ [facility]   Hospitalization date  12/13/2019 12:13 PM   Current Attending Physician  Brad Still, *   Principal diagnosis  Asthma exacerbation [J45.901]  DM2 with hyperglycemia, in the setting of steroids. Morbid obesity  lesvia on cpap     Clinicals  75 y.o. y.o  female hospitalized with above diagnosis     Patient has been treated by Dr. Eros Patton (pulmonary) as an outpt for the past couple of weeks for an asthma exacerbation. In the outpatient setting, she was treated with nebs, prednisone, & abx. She did not significantly improve, and she has failed outpt mgmt. Having failed outpatient management, she was admitted and is presently on iv antibiotics, iv steroids, and scheduled nebulizer treatments to be administered per RT. Milliman (MCG) criteria   Does apply See above   STATUS DETERMINATION  inpatient       Additional comments     Payor: Idolina Babinski / Plan: [place] PART [place] & B / Product Type: Medicare /           Dionisio Mcgee M.D. Physician [address], [address] [place] [address]  C: [address] [place] [address]. [email or web address]

## 2019-12-15 NOTE — PROGRESS NOTES
Bedside and Verbal shift change report given to Tanya Lopez RN (oncoming nurse) by Marjan Ndiaye RN (offgoing nurse). Report included the following information SBAR, Kardex, Procedure Summary, Intake/Output, MAR and Recent Results.

## 2019-12-15 NOTE — PROGRESS NOTES
Bedside shift change report given to [facility] PADMINI KLINE (oncoming nurse) by Debra Yu RN (offgoing nurse). Report included the following information SBAR, Kardex, Intake/Output, MAR and Recent Results.

## 2019-12-16 VITALS
HEIGHT: 67 IN | SYSTOLIC BLOOD PRESSURE: 156 MMHG | WEIGHT: 274.5 LBS | DIASTOLIC BLOOD PRESSURE: 85 MMHG | TEMPERATURE: 98.2 F | RESPIRATION RATE: 16 BRPM | BODY MASS INDEX: 43.08 KG/M2 | OXYGEN SATURATION: 92 % | HEART RATE: 91 BPM

## 2019-12-16 LAB
ANION GAP SERPL CALC-SCNC: 8 MMOL/L (ref 5–15)
BASOPHILS # BLD: 0 K/UL (ref 0–0.1)
BASOPHILS NFR BLD: 0 % (ref 0–1)
BUN SERPL-MCNC: 42 MG/DL (ref 6–20)
BUN/CREAT SERPL: 29 (ref 12–20)
CALCIUM SERPL-MCNC: 9.1 MG/DL (ref 8.5–10.1)
CHLORIDE SERPL-SCNC: 103 MMOL/L (ref 97–108)
CO2 SERPL-SCNC: 25 MMOL/L (ref 21–32)
CREAT SERPL-MCNC: 1.47 MG/DL (ref 0.55–1.02)
DIFFERENTIAL METHOD BLD: ABNORMAL
EOSINOPHIL # BLD: 0 K/UL (ref 0–0.4)
EOSINOPHIL NFR BLD: 0 % (ref 0–7)
ERYTHROCYTE [DISTWIDTH] IN BLOOD BY AUTOMATED COUNT: 14.8 % (ref 11.5–14.5)
GLUCOSE BLD STRIP.AUTO-MCNC: 271 MG/DL (ref 65–100)
GLUCOSE BLD STRIP.AUTO-MCNC: 357 MG/DL (ref 65–100)
GLUCOSE SERPL-MCNC: 277 MG/DL (ref 65–100)
HCT VFR BLD AUTO: 38.4 % (ref 35–47)
HGB BLD-MCNC: 12.3 G/DL (ref 11.5–16)
IMM GRANULOCYTES # BLD AUTO: 0.2 K/UL (ref 0–0.04)
IMM GRANULOCYTES NFR BLD AUTO: 1 % (ref 0–0.5)
INR PPP: 2.3 (ref 0.9–1.1)
LYMPHOCYTES # BLD: 0.7 K/UL (ref 0.8–3.5)
LYMPHOCYTES NFR BLD: 4 % (ref 12–49)
MCH RBC QN AUTO: 29.4 PG (ref 26–34)
MCHC RBC AUTO-ENTMCNC: 32 G/DL (ref 30–36.5)
MCV RBC AUTO: 91.9 FL (ref 80–99)
MONOCYTES # BLD: 0.5 K/UL (ref 0–1)
MONOCYTES NFR BLD: 3 % (ref 5–13)
NEUTS SEG # BLD: 15.3 K/UL (ref 1.8–8)
NEUTS SEG NFR BLD: 92 % (ref 32–75)
NRBC # BLD: 0 K/UL (ref 0–0.01)
NRBC BLD-RTO: 0 PER 100 WBC
PLATELET # BLD AUTO: 242 K/UL (ref 150–400)
PMV BLD AUTO: 9.2 FL (ref 8.9–12.9)
POTASSIUM SERPL-SCNC: 4 MMOL/L (ref 3.5–5.1)
PROTHROMBIN TIME: 22.7 SEC (ref 9–11.1)
RBC # BLD AUTO: 4.18 M/UL (ref 3.8–5.2)
RBC MORPH BLD: ABNORMAL
RBC MORPH BLD: ABNORMAL
SERVICE CMNT-IMP: ABNORMAL
SERVICE CMNT-IMP: ABNORMAL
SODIUM SERPL-SCNC: 136 MMOL/L (ref 136–145)
WBC # BLD AUTO: 16.7 K/UL (ref 3.6–11)

## 2019-12-16 PROCEDURE — 94760 N-INVAS EAR/PLS OXIMETRY 1: CPT

## 2019-12-16 PROCEDURE — 82962 GLUCOSE BLOOD TEST: CPT

## 2019-12-16 PROCEDURE — 74011636637 HC RX REV CODE- 636/637: Performed by: INTERNAL MEDICINE

## 2019-12-16 PROCEDURE — 74011000250 HC RX REV CODE- 250: Performed by: INTERNAL MEDICINE

## 2019-12-16 PROCEDURE — 36415 COLL VENOUS BLD VENIPUNCTURE: CPT

## 2019-12-16 PROCEDURE — 94640 AIRWAY INHALATION TREATMENT: CPT

## 2019-12-16 PROCEDURE — 80048 BASIC METABOLIC PNL TOTAL CA: CPT

## 2019-12-16 PROCEDURE — 85610 PROTHROMBIN TIME: CPT

## 2019-12-16 PROCEDURE — 74011250636 HC RX REV CODE- 250/636: Performed by: INTERNAL MEDICINE

## 2019-12-16 PROCEDURE — 74011000258 HC RX REV CODE- 258: Performed by: INTERNAL MEDICINE

## 2019-12-16 PROCEDURE — 85025 COMPLETE CBC W/AUTO DIFF WBC: CPT

## 2019-12-16 RX ORDER — PREDNISONE 10 MG/1
TABLET ORAL
Qty: 84 TAB | Refills: 0 | Status: SHIPPED | OUTPATIENT
Start: 2019-12-16 | End: 2020-01-09

## 2019-12-16 RX ADMIN — INSULIN LISPRO 8 UNITS: 100 INJECTION, SOLUTION INTRAVENOUS; SUBCUTANEOUS at 07:44

## 2019-12-16 RX ADMIN — PIPERACILLIN SODIUM AND TAZOBACTAM SODIUM 3.38 G: 3; .375 INJECTION, POWDER, LYOPHILIZED, FOR SOLUTION INTRAVENOUS at 01:41

## 2019-12-16 RX ADMIN — BUDESONIDE 500 MCG: 0.5 INHALANT RESPIRATORY (INHALATION) at 08:51

## 2019-12-16 RX ADMIN — METHYLPREDNISOLONE SODIUM SUCCINATE 80 MG: 125 INJECTION, POWDER, FOR SOLUTION INTRAMUSCULAR; INTRAVENOUS at 07:46

## 2019-12-16 RX ADMIN — ALBUTEROL SULFATE 2.5 MG: 2.5 SOLUTION RESPIRATORY (INHALATION) at 08:51

## 2019-12-16 NOTE — PROGRESS NOTES
She is feeling much better, and she really wants to go home. Discharge home today with a slow prednisone taper.   Continue nebs, etc.

## 2019-12-16 NOTE — DISCHARGE INSTRUCTIONS
Patient Discharge Instructions    Aylin Pillai / [id number] : 1945    Admitted 2019 Discharged: 2019     Take Home Medications       · It is important that you take the medication exactly as they are prescribed. · Keep your medication in the bottles provided by the pharmacist and keep a list of the medication names, dosages, and times to be taken in your wallet. · Do not take other medications without consulting your doctor. What to do at Home    Recommended diet: Diabetic Diet. Check blood sugars 4 times a day (before meals & at bedtime) -- Use sliding scale insulin as you have done before. Recommended activity: Activity as tolerated. Follow-up with Dr. Luis Eduardo Mc within 2 weeks. Information obtained by :  I understand that if any problems occur once I am at home I am to contact my physician. I understand and acknowledge receipt of the instructions indicated above.                                                                                                                                            Physician's or R.N.'s Signature                                                                  Date/Time                                                                                                                                              Patient or Representative Signature                                                          Date/Time

## 2019-12-16 NOTE — DISCHARGE SUMMARY
Physician Discharge Summary     Patient ID:  Carolyn Fowler  [id number]  75 y.o.  1945    Admit date: 12/13/2019    Discharge date and time: 12/16/2019    Briefly, pt was admitted with Asthma exacerbation [J45.901]. For details of admission, see H&P. Hospital Course:  Pt was admitted with an asthma exacerbation that had failed outpt mgmt with Dr. Hari Viera, pt's pulmonologist.  In the hospital, she was treated with IV steroids & nebs. She clinically improved, and she was anxious to be discharged. She was discharged home on a slow prednisone taper with continued nebs. It is felt she does not need abx -- no fevers. She had recently been treated with abx as an outpt before being admitted. Her WBC was up, but this is felt to be due to the steroids. She is to f/u with me within a couple of weeks. Discharge Dx: same    Condition at discharge: improved. Disposition: home    Patient Instructions:   Cannot display discharge medications since this patient is not currently admitted. Follow-up with Dr. Joann carvajal 2 weeks.       Signed:  Wang Aguiar MD  12/16/2019  4:38 PM

## 2019-12-16 NOTE — PROGRESS NOTES
Bedside shift change report given to [address] [place] [address] (oncoming nurse) by Sussy Hernandez RN (offgoing nurse). Report included the following information SBAR and Kardex.

## 2019-12-16 NOTE — PROGRESS NOTES
Discharge instructions reviewed with patient, she had no further questions at this time and stated she will drive herself home.  Patient declined wheelchair and walked out of hospital.

## 2019-12-16 NOTE — PROGRESS NOTES
Pharmacist Note - Warfarin Dosing  Consult provided for this 75 y. o.female to manage warfarin for Atrial Fibrillation    INR Goal: 2 - 3    Home regimen/ tablet size: 5 mg MWF; 2.5 mg Tu-Th-Sa-[id number]    Drugs that may increase INR: None  Drugs that may decrease INR: None  Other current anticoagulants/ drugs that may increase bleeding risk: None  Risk factors: Age > 65  Daily INR ordered: YES    Recent Labs     12/16/19  0340 12/15/19  0418 12/14/19  0356 12/13/19  1249   HGB 12.3  --  12.0 12.4   INR 2.3* 1.8* 2.1* 3.3*     Date               INR                  Dose  12/13  3.3  Held   12/14  2.1  5 mg    12/15  1.8  5 mg    12/16  2.3  2.5 mg                                                                                 Assessment/ Plan: Will order warfarin 2.5 mg PO x 1 dose. Pharmacy will continue to monitor daily and adjust therapy as indicated. Please contact the pharmacist at  for outpatient recommendations if needed.

## 2019-12-16 NOTE — PROGRESS NOTES
Bedside and Verbal shift change report given to Franchesca Howe RN (oncoming nurse) by  [facility] PADMINI KLINE(offgoing nurse). Report included the following information SBAR, Kardex, Procedure Summary, Intake/Output, MAR and Recent Results.

## 2020-01-24 NOTE — ED PROVIDER NOTES
Confirmed pharmacy as [facility]   HPI Comments: 67 y.o. female with past medical history significant for A-fib, HTN, DM, hyperlipidemia, depression, neuropathy, asthma, and morbid obesity who presents from home via EMS with chief complaint of syncope. Pt reports she has been having increased fatigue and multiple falls since she was discharged 18 days ago after admission for PNA and staph infection in her blood. She states she was reaching into an overhead cabinet earlier today when she \"became shaky\", called for her sister, and passed out. Pt's sister notes Pt fell directly backwards onto her back, she hit the back of her head on the floor, her \"eyes rolled back\", and she had dyspnea for several \"seconds\". Per Pt's sister, Pt was able to speak after awakening, and she [place] making sense within minutes\". Pt's sister notes today's syncopal episode is the 2nd episode this week. Pt currently states she is having some difficulty remembering things. Pt's sister denies seizure-like activity. There are no other acute medical concerns at this time. PCP: Kylie Pulido MD    Note written by Lee Garcia, as dictated by Rajinder Londono MD 8:09 PM    The history is provided by the patient.         Past Medical History:   Diagnosis Date    Asthma     Atrial fibrillation ([address]     Depression     DM (diabetes mellitus) ([address]     HTN (hypertension)     Hyperlipidemia     Morbid obesity ([address]     Neuropathy     LITA on CPAP     Other ill-defined conditions pneumonia       Past Surgical History:   Procedure Laterality Date    HX APPENDECTOMY      HX CHOLECYSTECTOMY      HX GASTRIC BYPASS      Jejunal bypass with subsequent reversal..  Lab Band since    HX OTHER SURGICAL  D & C    HX EMILIANO AND BSO      For uterine CA         Family History:   Problem Relation Age of Onset    Stroke Mother     Diabetes Other     Heart Disease Other     Heart Disease Father      congestive heart failure       Social History     Social History    Marital status:      Spouse name: N/A    Number of children: N/A    Years of education: N/A     Occupational History    Not on file. Social History Main Topics    Smoking status: Former Smoker     Quit date: 1/1/1992    Smokeless tobacco: Not on file    Alcohol use 1.2 oz/week     2 Glasses of wine per week      Comment: occ wine    Drug use: No    Sexual activity: No     Other Topics Concern    Not on file     Social History Narrative         ALLERGIES: Latex; Codeine; Lisinopril; Morphine; and Statins-hmg-coa reductase inhibitors    Review of Systems   Constitutional: Positive for fatigue. Neurological: Positive for syncope. Negative for seizures. All other systems reviewed and are negative. There were no vitals filed for this visit. Physical Exam   Constitutional: She is oriented to person, place, and time. She appears well-developed and well-nourished. No distress. HENT:   Head: Normocephalic and atraumatic. Eyes: Conjunctivae are normal. No scleral icterus. Neck: Neck supple. No tracheal deviation present. Cardiovascular: Normal rate, regular rhythm, normal heart sounds and intact distal pulses. Exam reveals no gallop and no friction rub. No murmur heard. Pulmonary/Chest: Effort normal and breath sounds normal. She has no wheezes. She has no rales. Abdominal: Soft. She exhibits no distension. There is no tenderness. There is no rebound and no guarding. Musculoskeletal: She exhibits no edema. Neurological: She is alert and oriented to person, place, and time. Skin: Skin is warm and dry. No rash noted. Psychiatric: She has a normal mood and affect. Nursing note and vitals reviewed.   Note written by Lee Ramirez, as dictated by Chi Warren MD 8:09 PM    [facility]  Number of Diagnoses or Management Options  Syncope and collapse:      Amount and/or Complexity of Data Reviewed  Clinical lab tests: ordered and reviewed  Tests in the radiology section of CPT®: ordered and reviewed  Tests in the medicine section of CPT®: ordered and reviewed  Discussion of test results with the performing providers: yes  Obtain history from someone other than the patient: yes  Discuss the patient with other providers: yes    Risk of Complications, Morbidity, and/or Mortality  Presenting problems: high  Diagnostic procedures: high  Management options: high    Total critical care time spend exclusive of procedures: 35 minutes  ED Course       Procedures      ED EKG interpretation:  Rhythm: atrial fib; Rate (approx.): 90; Axis: left axis deviation; ST/T wave: non-specific changes; no significant changes in comparison to EKG from April 23, 2017. Note written by Lee Draper, as dictated by Sandrita Aguilera MD 7:52 PM    PROGRESS NOTE:  10:11 PM  Pt had a syncopal episode today of uncertain etiology. She has significant abnormalities on EKG that are unchanged. Will admit to telemetry for Dr. Mary Alice Nascimento. CONSULT NOTE:  10:32 PM Sandrita Aguilera MD spoke with Dr. Marin Garcia, Consult for Hospitalist.  Discussed available diagnostic tests and clinical findings. He is in agreement with care plans as outlined. Dr. Marin Garcia will admit Pt.

## 2020-02-18 LAB
COMMENT, HOLDF: NORMAL
SAMPLES BEING HELD,HOLD: NORMAL

## 2020-02-18 PROCEDURE — 93005 ELECTROCARDIOGRAM TRACING: CPT

## 2020-02-18 PROCEDURE — 96374 THER/PROPH/DIAG INJ IV PUSH: CPT

## 2020-02-18 PROCEDURE — 85025 COMPLETE CBC W/AUTO DIFF WBC: CPT

## 2020-02-18 PROCEDURE — 80053 COMPREHEN METABOLIC PANEL: CPT

## 2020-02-18 PROCEDURE — 36415 COLL VENOUS BLD VENIPUNCTURE: CPT

## 2020-02-18 PROCEDURE — 99285 EMERGENCY DEPT VISIT HI MDM: CPT

## 2020-02-18 PROCEDURE — 94640 AIRWAY INHALATION TREATMENT: CPT

## 2020-02-18 PROCEDURE — 87040 BLOOD CULTURE FOR BACTERIA: CPT

## 2020-02-18 PROCEDURE — 96365 THER/PROPH/DIAG IV INF INIT: CPT

## 2020-02-18 PROCEDURE — 85610 PROTHROMBIN TIME: CPT

## 2020-02-18 PROCEDURE — 96375 TX/PRO/DX INJ NEW DRUG ADDON: CPT

## 2020-02-18 RX ORDER — ACETAMINOPHEN 325 MG/1
975 TABLET ORAL
Status: COMPLETED | OUTPATIENT
Start: 2020-02-18 | End: 2020-02-19

## 2020-02-19 ENCOUNTER — HOSPITAL ENCOUNTER (OUTPATIENT)
Dept: GENERAL RADIOLOGY | Age: 75
Discharge: HOME | End: 2020-02-19
Attending: EMERGENCY MEDICINE

## 2020-02-19 ENCOUNTER — HOSPITAL ENCOUNTER (INPATIENT)
Age: 75
LOS: 8 days | Discharge: HOME | DRG: 871 | End: 2020-02-27
Attending: EMERGENCY MEDICINE | Admitting: STUDENT IN AN ORGANIZED HEALTH CARE EDUCATION/TRAINING PROGRAM
Payer: MEDICARE

## 2020-02-19 DIAGNOSIS — J18.9 PNEUMONIA OF BOTH LOWER LOBES DUE TO INFECTIOUS ORGANISM: Primary | ICD-10-CM

## 2020-02-19 DIAGNOSIS — J96.01 ACUTE HYPOXEMIC RESPIRATORY FAILURE (HCC): ICD-10-CM

## 2020-02-19 DIAGNOSIS — J45.901 ASTHMA WITH ACUTE EXACERBATION, UNSPECIFIED ASTHMA SEVERITY, UNSPECIFIED WHETHER PERSISTENT: ICD-10-CM

## 2020-02-19 LAB
ALBUMIN SERPL-MCNC: 4 G/DL (ref 3.5–5)
ALBUMIN/GLOB SERPL: 1 {RATIO} (ref 1.1–2.2)
ALP SERPL-CCNC: 71 U/L (ref 45–117)
ALT SERPL-CCNC: 38 U/L (ref 12–78)
ANION GAP SERPL CALC-SCNC: 7 MMOL/L (ref 5–15)
ARTERIAL PATENCY WRIST A: YES
AST SERPL-CCNC: 36 U/L (ref 15–37)
ATRIAL RATE: 159 BPM
B PERT DNA SPEC QL NAA+PROBE: NOT DETECTED
BASE DEFICIT BLD-SCNC: 2 MMOL/L
BASOPHILS # BLD: 0.2 K/UL (ref 0–0.1)
BASOPHILS NFR BLD: 2 % (ref 0–1)
BDY SITE: ABNORMAL
BILIRUB SERPL-MCNC: 0.4 MG/DL (ref 0.2–1)
BORDETELLA PARAPERTUSSIS PCR, BORPAR: NOT DETECTED
BUN SERPL-MCNC: 24 MG/DL (ref 6–20)
BUN/CREAT SERPL: 17 (ref 12–20)
C PNEUM DNA SPEC QL NAA+PROBE: NOT DETECTED
CA-I BLD-SCNC: 1.25 MMOL/L (ref 1.12–1.32)
CALCIUM SERPL-MCNC: 9.9 MG/DL (ref 8.5–10.1)
CALCULATED R AXIS, ECG10: -40 DEGREES
CALCULATED T AXIS, ECG11: 119 DEGREES
CHLORIDE SERPL-SCNC: 102 MMOL/L (ref 97–108)
CO2 SERPL-SCNC: 24 MMOL/L (ref 21–32)
CREAT SERPL-MCNC: 1.4 MG/DL (ref 0.55–1.02)
DIAGNOSIS, 93000: NORMAL
DIFFERENTIAL METHOD BLD: ABNORMAL
EOSINOPHIL # BLD: 0.3 K/UL (ref 0–0.4)
EOSINOPHIL NFR BLD: 3 % (ref 0–7)
ERYTHROCYTE [DISTWIDTH] IN BLOOD BY AUTOMATED COUNT: 15.2 % (ref 11.5–14.5)
FLUAV AG NPH QL IA: NEGATIVE
FLUAV H1 2009 PAND RNA SPEC QL NAA+PROBE: DETECTED
FLUAV H1 RNA SPEC QL NAA+PROBE: NOT DETECTED
FLUAV H3 RNA SPEC QL NAA+PROBE: NOT DETECTED
FLUAV SUBTYP SPEC NAA+PROBE: NOT DETECTED
FLUBV AG NOSE QL IA: NEGATIVE
FLUBV RNA SPEC QL NAA+PROBE: NOT DETECTED
GAS FLOW.O2 O2 DELIVERY SYS: ABNORMAL L/MIN
GAS FLOW.O2 SETTING OXYMISER: 3 L/M
GLOBULIN SER CALC-MCNC: 3.9 G/DL (ref 2–4)
GLUCOSE BLD STRIP.AUTO-MCNC: 254 MG/DL (ref 65–100)
GLUCOSE BLD STRIP.AUTO-MCNC: 286 MG/DL (ref 65–100)
GLUCOSE BLD STRIP.AUTO-MCNC: 339 MG/DL (ref 65–100)
GLUCOSE BLD STRIP.AUTO-MCNC: 363 MG/DL (ref 65–100)
GLUCOSE BLD STRIP.AUTO-MCNC: 412 MG/DL (ref 65–100)
GLUCOSE BLD STRIP.AUTO-MCNC: 416 MG/DL (ref 65–100)
GLUCOSE BLD STRIP.AUTO-MCNC: 433 MG/DL (ref 65–100)
GLUCOSE SERPL-MCNC: 205 MG/DL (ref 65–100)
HADV DNA SPEC QL NAA+PROBE: NOT DETECTED
HCO3 BLD-SCNC: 22.9 MMOL/L (ref 22–26)
HCOV 229E RNA SPEC QL NAA+PROBE: NOT DETECTED
HCOV HKU1 RNA SPEC QL NAA+PROBE: NOT DETECTED
HCOV NL63 RNA SPEC QL NAA+PROBE: NOT DETECTED
HCOV OC43 RNA SPEC QL NAA+PROBE: NOT DETECTED
HCT VFR BLD AUTO: 37.8 % (ref 35–47)
HGB BLD-MCNC: 11.9 G/DL (ref 11.5–16)
HMPV RNA SPEC QL NAA+PROBE: NOT DETECTED
HPIV1 RNA SPEC QL NAA+PROBE: NOT DETECTED
HPIV2 RNA SPEC QL NAA+PROBE: NOT DETECTED
HPIV3 RNA SPEC QL NAA+PROBE: NOT DETECTED
HPIV4 RNA SPEC QL NAA+PROBE: NOT DETECTED
IMM GRANULOCYTES # BLD AUTO: 0 K/UL
IMM GRANULOCYTES NFR BLD AUTO: 0 %
INR PPP: 2.3 (ref 0.9–1.1)
LYMPHOCYTES # BLD: 0.4 K/UL (ref 0.8–3.5)
LYMPHOCYTES NFR BLD: 4 % (ref 12–49)
M PNEUMO DNA SPEC QL NAA+PROBE: NOT DETECTED
MCH RBC QN AUTO: 29 PG (ref 26–34)
MCHC RBC AUTO-ENTMCNC: 31.5 G/DL (ref 30–36.5)
MCV RBC AUTO: 92 FL (ref 80–99)
MONOCYTES # BLD: 0.9 K/UL (ref 0–1)
MONOCYTES NFR BLD: 9 % (ref 5–13)
NEUTS BAND NFR BLD MANUAL: 3 % (ref 0–6)
NEUTS SEG # BLD: 8.1 K/UL (ref 1.8–8)
NEUTS SEG NFR BLD: 79 % (ref 32–75)
NRBC # BLD: 0 K/UL (ref 0–0.01)
NRBC BLD-RTO: 0 PER 100 WBC
PCO2 BLD: 40.1 MMHG (ref 35–45)
PH BLD: 7.37 [PH] (ref 7.35–7.45)
PLATELET # BLD AUTO: 220 K/UL (ref 150–400)
PLATELET COMMENTS,PCOM: ABNORMAL
PMV BLD AUTO: 9.6 FL (ref 8.9–12.9)
PO2 BLD: 73 MMHG (ref 80–100)
POTASSIUM SERPL-SCNC: 4.1 MMOL/L (ref 3.5–5.1)
PROT SERPL-MCNC: 7.9 G/DL (ref 6.4–8.2)
PROTHROMBIN TIME: 22.4 SEC (ref 9–11.1)
Q-T INTERVAL, ECG07: 308 MS
QRS DURATION, ECG06: 104 MS
QTC CALCULATION (BEZET), ECG08: 447 MS
RBC # BLD AUTO: 4.11 M/UL (ref 3.8–5.2)
RBC MORPH BLD: ABNORMAL
RBC MORPH BLD: ABNORMAL
RSV RNA SPEC QL NAA+PROBE: NOT DETECTED
RV+EV RNA SPEC QL NAA+PROBE: NOT DETECTED
SAO2 % BLD: 94 % (ref 92–97)
SERVICE CMNT-IMP: ABNORMAL
SODIUM SERPL-SCNC: 133 MMOL/L (ref 136–145)
SPECIMEN TYPE: ABNORMAL
VENTRICULAR RATE, ECG03: 127 BPM
WBC # BLD AUTO: 9.9 K/UL (ref 3.6–11)

## 2020-02-19 PROCEDURE — 94640 AIRWAY INHALATION TREATMENT: CPT

## 2020-02-19 PROCEDURE — 65660000000 HC RM CCU STEPDOWN

## 2020-02-19 PROCEDURE — 87804 INFLUENZA ASSAY W/OPTIC: CPT

## 2020-02-19 PROCEDURE — 94760 N-INVAS EAR/PLS OXIMETRY 1: CPT

## 2020-02-19 PROCEDURE — 74011250637 HC RX REV CODE- 250/637: Performed by: INTERNAL MEDICINE

## 2020-02-19 PROCEDURE — 77010033678 HC OXYGEN DAILY

## 2020-02-19 PROCEDURE — 74011636637 HC RX REV CODE- 636/637: Performed by: INTERNAL MEDICINE

## 2020-02-19 PROCEDURE — 77030029684 HC NEB SM VOL KT MONA -A

## 2020-02-19 PROCEDURE — 77030013140 HC MSK NEB VYRM -A

## 2020-02-19 PROCEDURE — 82803 BLOOD GASES ANY COMBINATION: CPT

## 2020-02-19 PROCEDURE — 74011250636 HC RX REV CODE- 250/636: Performed by: STUDENT IN AN ORGANIZED HEALTH CARE EDUCATION/TRAINING PROGRAM

## 2020-02-19 PROCEDURE — 74011000250 HC RX REV CODE- 250: Performed by: STUDENT IN AN ORGANIZED HEALTH CARE EDUCATION/TRAINING PROGRAM

## 2020-02-19 PROCEDURE — 0100U RESPIRATORY PANEL,PCR,NASOPHARYNGEAL: CPT

## 2020-02-19 PROCEDURE — 77030038269 HC DRN EXT URIN PURWCK BARD -A

## 2020-02-19 PROCEDURE — 74011000250 HC RX REV CODE- 250: Performed by: EMERGENCY MEDICINE

## 2020-02-19 PROCEDURE — 71046 X-RAY EXAM CHEST 2 VIEWS: CPT

## 2020-02-19 PROCEDURE — 82962 GLUCOSE BLOOD TEST: CPT

## 2020-02-19 PROCEDURE — 74011250637 HC RX REV CODE- 250/637: Performed by: STUDENT IN AN ORGANIZED HEALTH CARE EDUCATION/TRAINING PROGRAM

## 2020-02-19 PROCEDURE — 74011636637 HC RX REV CODE- 636/637: Performed by: STUDENT IN AN ORGANIZED HEALTH CARE EDUCATION/TRAINING PROGRAM

## 2020-02-19 PROCEDURE — 74011250637 HC RX REV CODE- 250/637: Performed by: EMERGENCY MEDICINE

## 2020-02-19 PROCEDURE — 36600 WITHDRAWAL OF ARTERIAL BLOOD: CPT

## 2020-02-19 PROCEDURE — 74011250636 HC RX REV CODE- 250/636: Performed by: EMERGENCY MEDICINE

## 2020-02-19 PROCEDURE — 94664 DEMO&/EVAL PT USE INHALER: CPT

## 2020-02-19 RX ORDER — MAGNESIUM SULFATE 100 %
4 CRYSTALS MISCELLANEOUS AS NEEDED
Status: DISCONTINUED | OUTPATIENT
Start: 2020-02-19 | End: 2020-02-27 | Stop reason: HOSPADM

## 2020-02-19 RX ORDER — THERA TABS 400 MCG
1 TAB ORAL DAILY
Status: DISCONTINUED | OUTPATIENT
Start: 2020-02-19 | End: 2020-02-27 | Stop reason: HOSPADM

## 2020-02-19 RX ORDER — WARFARIN SODIUM 5 MG/1
5 TABLET ORAL
Status: DISCONTINUED | OUTPATIENT
Start: 2020-02-19 | End: 2020-02-19 | Stop reason: DRUGHIGH

## 2020-02-19 RX ORDER — FUROSEMIDE 40 MG/1
40 TABLET ORAL EVERY EVENING
Status: DISCONTINUED | OUTPATIENT
Start: 2020-02-19 | End: 2020-02-20

## 2020-02-19 RX ORDER — MULTIVIT WITH MINERALS/HERBS
1 TABLET ORAL DAILY
Status: DISCONTINUED | OUTPATIENT
Start: 2020-02-19 | End: 2020-02-27 | Stop reason: HOSPADM

## 2020-02-19 RX ORDER — SODIUM CHLORIDE 0.9 % (FLUSH) 0.9 %
5-40 SYRINGE (ML) INJECTION AS NEEDED
Status: DISCONTINUED | OUTPATIENT
Start: 2020-02-19 | End: 2020-02-27 | Stop reason: HOSPADM

## 2020-02-19 RX ORDER — DULOXETIN HYDROCHLORIDE 60 MG/1
60 CAPSULE, DELAYED RELEASE ORAL DAILY
Status: DISCONTINUED | OUTPATIENT
Start: 2020-02-19 | End: 2020-02-27 | Stop reason: HOSPADM

## 2020-02-19 RX ORDER — GABAPENTIN 300 MG/1
600 CAPSULE ORAL
Status: DISCONTINUED | OUTPATIENT
Start: 2020-02-19 | End: 2020-02-27 | Stop reason: HOSPADM

## 2020-02-19 RX ORDER — BIOTIN 5 MG
5 TABLET ORAL DAILY
Status: DISCONTINUED | OUTPATIENT
Start: 2020-02-20 | End: 2020-02-27 | Stop reason: HOSPADM

## 2020-02-19 RX ORDER — HYDRALAZINE HYDROCHLORIDE 20 MG/ML
20 INJECTION INTRAMUSCULAR; INTRAVENOUS
Status: DISCONTINUED | OUTPATIENT
Start: 2020-02-19 | End: 2020-02-27 | Stop reason: HOSPADM

## 2020-02-19 RX ORDER — AZITHROMYCIN 100 MG/ML
INJECTION, POWDER, LYOPHILIZED, FOR SOLUTION INTRAVENOUS
Status: COMPLETED
Start: 2020-02-19 | End: 2020-02-19

## 2020-02-19 RX ORDER — SODIUM CHLORIDE 9 MG/ML
75 INJECTION, SOLUTION INTRAVENOUS CONTINUOUS
Status: DISPENSED | OUTPATIENT
Start: 2020-02-19 | End: 2020-02-20

## 2020-02-19 RX ORDER — ACETAMINOPHEN 325 MG/1
650 TABLET ORAL
Status: DISCONTINUED | OUTPATIENT
Start: 2020-02-19 | End: 2020-02-27 | Stop reason: HOSPADM

## 2020-02-19 RX ORDER — INSULIN LISPRO 100 [IU]/ML
INJECTION, SOLUTION INTRAVENOUS; SUBCUTANEOUS
Status: DISCONTINUED | OUTPATIENT
Start: 2020-02-19 | End: 2020-02-27 | Stop reason: HOSPADM

## 2020-02-19 RX ORDER — GABAPENTIN 300 MG/1
300 CAPSULE ORAL DAILY
Status: DISCONTINUED | OUTPATIENT
Start: 2020-02-19 | End: 2020-02-27 | Stop reason: HOSPADM

## 2020-02-19 RX ORDER — DEXTROSE MONOHYDRATE 100 MG/ML
0-250 INJECTION, SOLUTION INTRAVENOUS AS NEEDED
Status: DISCONTINUED | OUTPATIENT
Start: 2020-02-19 | End: 2020-02-27 | Stop reason: HOSPADM

## 2020-02-19 RX ORDER — WARFARIN 2.5 MG/1
2.5 TABLET ORAL
Status: DISCONTINUED | OUTPATIENT
Start: 2020-02-20 | End: 2020-02-19 | Stop reason: DRUGHIGH

## 2020-02-19 RX ORDER — DIGOXIN 125 MCG
0.25 TABLET ORAL DAILY
Status: DISCONTINUED | OUTPATIENT
Start: 2020-02-19 | End: 2020-02-27 | Stop reason: HOSPADM

## 2020-02-19 RX ORDER — MAGNESIUM SULFATE HEPTAHYDRATE 40 MG/ML
2 INJECTION, SOLUTION INTRAVENOUS ONCE
Status: COMPLETED | OUTPATIENT
Start: 2020-02-19 | End: 2020-02-19

## 2020-02-19 RX ORDER — GUAIFENESIN 600 MG/1
600 TABLET, EXTENDED RELEASE ORAL EVERY 12 HOURS
Status: DISCONTINUED | OUTPATIENT
Start: 2020-02-19 | End: 2020-02-27 | Stop reason: HOSPADM

## 2020-02-19 RX ORDER — DILTIAZEM HYDROCHLORIDE 120 MG/1
120 CAPSULE, COATED, EXTENDED RELEASE ORAL DAILY
Status: DISCONTINUED | OUTPATIENT
Start: 2020-02-19 | End: 2020-02-20

## 2020-02-19 RX ORDER — SODIUM CHLORIDE 0.9 % (FLUSH) 0.9 %
5-40 SYRINGE (ML) INJECTION EVERY 8 HOURS
Status: DISCONTINUED | OUTPATIENT
Start: 2020-02-19 | End: 2020-02-27 | Stop reason: HOSPADM

## 2020-02-19 RX ORDER — SERTRALINE HYDROCHLORIDE 50 MG/1
100 TABLET, FILM COATED ORAL DAILY
Status: DISCONTINUED | OUTPATIENT
Start: 2020-02-19 | End: 2020-02-27 | Stop reason: HOSPADM

## 2020-02-19 RX ORDER — ALPRAZOLAM 0.5 MG/1
.25-.5 TABLET ORAL
Status: DISCONTINUED | OUTPATIENT
Start: 2020-02-19 | End: 2020-02-19 | Stop reason: ALTCHOICE

## 2020-02-19 RX ORDER — WARFARIN 2.5 MG/1
2.5 TABLET ORAL ONCE
Status: COMPLETED | OUTPATIENT
Start: 2020-02-19 | End: 2020-02-19

## 2020-02-19 RX ORDER — PREDNISONE 20 MG/1
40 TABLET ORAL DAILY
Status: DISCONTINUED | OUTPATIENT
Start: 2020-02-19 | End: 2020-02-20

## 2020-02-19 RX ORDER — CEFTRIAXONE 1 G/1
INJECTION, POWDER, FOR SOLUTION INTRAMUSCULAR; INTRAVENOUS
Status: COMPLETED
Start: 2020-02-19 | End: 2020-02-19

## 2020-02-19 RX ORDER — IPRATROPIUM BROMIDE AND ALBUTEROL SULFATE 2.5; .5 MG/3ML; MG/3ML
3 SOLUTION RESPIRATORY (INHALATION)
Status: DISCONTINUED | OUTPATIENT
Start: 2020-02-19 | End: 2020-02-20

## 2020-02-19 RX ORDER — MELATONIN
2000 DAILY
Status: DISCONTINUED | OUTPATIENT
Start: 2020-02-19 | End: 2020-02-27 | Stop reason: HOSPADM

## 2020-02-19 RX ADMIN — SODIUM CHLORIDE 75 ML/HR: 900 INJECTION, SOLUTION INTRAVENOUS at 10:09

## 2020-02-19 RX ADMIN — INSULIN LISPRO 15 UNITS: 100 INJECTION, SOLUTION INTRAVENOUS; SUBCUTANEOUS at 09:31

## 2020-02-19 RX ADMIN — MAGNESIUM SULFATE 2 G: 2 INJECTION INTRAVENOUS at 01:40

## 2020-02-19 RX ADMIN — HUMAN INSULIN 35 UNITS: 100 INJECTION, SUSPENSION SUBCUTANEOUS at 07:51

## 2020-02-19 RX ADMIN — MELATONIN 2 TABLET: at 09:31

## 2020-02-19 RX ADMIN — Medication 10 ML: at 22:46

## 2020-02-19 RX ADMIN — IPRATROPIUM BROMIDE AND ALBUTEROL SULFATE 3 ML: .5; 3 SOLUTION RESPIRATORY (INHALATION) at 19:44

## 2020-02-19 RX ADMIN — DULOXETINE HYDROCHLORIDE 60 MG: 60 CAPSULE, DELAYED RELEASE ORAL at 11:17

## 2020-02-19 RX ADMIN — HUMAN INSULIN 15 UNITS: 100 INJECTION, SOLUTION SUBCUTANEOUS at 09:30

## 2020-02-19 RX ADMIN — ACETAMINOPHEN 650 MG: 325 TABLET ORAL at 07:54

## 2020-02-19 RX ADMIN — Medication 10 ML: at 06:44

## 2020-02-19 RX ADMIN — ACETAMINOPHEN 975 MG: 325 TABLET ORAL at 00:10

## 2020-02-19 RX ADMIN — INSULIN LISPRO 6 UNITS: 100 INJECTION, SOLUTION INTRAVENOUS; SUBCUTANEOUS at 17:46

## 2020-02-19 RX ADMIN — IPRATROPIUM BROMIDE AND ALBUTEROL SULFATE 3 ML: .5; 3 SOLUTION RESPIRATORY (INHALATION) at 23:00

## 2020-02-19 RX ADMIN — ACETAMINOPHEN 650 MG: 325 TABLET ORAL at 20:52

## 2020-02-19 RX ADMIN — IPRATROPIUM BROMIDE AND ALBUTEROL SULFATE 3 ML: .5; 3 SOLUTION RESPIRATORY (INHALATION) at 05:40

## 2020-02-19 RX ADMIN — GUAIFENESIN 600 MG: 600 TABLET, EXTENDED RELEASE ORAL at 06:39

## 2020-02-19 RX ADMIN — INSULIN LISPRO 12 UNITS: 100 INJECTION, SOLUTION INTRAVENOUS; SUBCUTANEOUS at 13:23

## 2020-02-19 RX ADMIN — IPRATROPIUM BROMIDE AND ALBUTEROL SULFATE 3 ML: .5; 3 SOLUTION RESPIRATORY (INHALATION) at 11:33

## 2020-02-19 RX ADMIN — PREDNISONE 40 MG: 20 TABLET ORAL at 09:31

## 2020-02-19 RX ADMIN — ALBUTEROL SULFATE 1 DOSE: 2.5 SOLUTION RESPIRATORY (INHALATION) at 01:46

## 2020-02-19 RX ADMIN — FUROSEMIDE 40 MG: 40 TABLET ORAL at 17:44

## 2020-02-19 RX ADMIN — GUAIFENESIN 600 MG: 600 TABLET, EXTENDED RELEASE ORAL at 20:46

## 2020-02-19 RX ADMIN — METHYLPREDNISOLONE SODIUM SUCCINATE 125 MG: 125 INJECTION, POWDER, FOR SOLUTION INTRAMUSCULAR; INTRAVENOUS at 01:38

## 2020-02-19 RX ADMIN — DILTIAZEM HYDROCHLORIDE 120 MG: 120 CAPSULE, COATED, EXTENDED RELEASE ORAL at 11:18

## 2020-02-19 RX ADMIN — IPRATROPIUM BROMIDE AND ALBUTEROL SULFATE 3 ML: .5; 3 SOLUTION RESPIRATORY (INHALATION) at 08:37

## 2020-02-19 RX ADMIN — HUMAN INSULIN 25 UNITS: 100 INJECTION, SUSPENSION SUBCUTANEOUS at 05:32

## 2020-02-19 RX ADMIN — INSULIN LISPRO 6 UNITS: 100 INJECTION, SOLUTION INTRAVENOUS; SUBCUTANEOUS at 22:43

## 2020-02-19 RX ADMIN — AZITHROMYCIN MONOHYDRATE 500 MG: 500 INJECTION, POWDER, LYOPHILIZED, FOR SOLUTION INTRAVENOUS at 02:06

## 2020-02-19 RX ADMIN — HUMAN INSULIN 10 UNITS: 100 INJECTION, SOLUTION SUBCUTANEOUS at 17:47

## 2020-02-19 RX ADMIN — HUMAN INSULIN 25 UNITS: 100 INJECTION, SUSPENSION SUBCUTANEOUS at 22:00

## 2020-02-19 RX ADMIN — CEFTRIAXONE 1 G: 1 INJECTION, POWDER, FOR SOLUTION INTRAMUSCULAR; INTRAVENOUS at 02:36

## 2020-02-19 RX ADMIN — GABAPENTIN 300 MG: 300 CAPSULE ORAL at 11:24

## 2020-02-19 RX ADMIN — WARFARIN SODIUM 2.5 MG: 2.5 TABLET ORAL at 17:46

## 2020-02-19 RX ADMIN — GABAPENTIN 600 MG: 300 CAPSULE ORAL at 22:42

## 2020-02-19 RX ADMIN — DIGOXIN 0.25 MG: 125 TABLET ORAL at 11:17

## 2020-02-19 RX ADMIN — ACETAMINOPHEN 650 MG: 325 TABLET ORAL at 13:24

## 2020-02-19 RX ADMIN — SERTRALINE HYDROCHLORIDE 100 MG: 50 TABLET ORAL at 11:17

## 2020-02-19 RX ADMIN — Medication 10 ML: at 13:38

## 2020-02-19 RX ADMIN — THERA TABS 1 TABLET: TAB at 09:31

## 2020-02-19 RX ADMIN — SODIUM CHLORIDE 75 ML/HR: 900 INJECTION, SOLUTION INTRAVENOUS at 04:30

## 2020-02-19 RX ADMIN — HUMAN INSULIN 15 UNITS: 100 INJECTION, SOLUTION SUBCUTANEOUS at 13:22

## 2020-02-19 RX ADMIN — Medication 1 TABLET: at 11:17

## 2020-02-19 RX ADMIN — IPRATROPIUM BROMIDE AND ALBUTEROL SULFATE 3 ML: .5; 3 SOLUTION RESPIRATORY (INHALATION) at 16:19

## 2020-02-19 NOTE — ED NOTES
Assisted patient into changing out of jeans and bottoms. Wet pad removed.  Placed patient on purewick

## 2020-02-19 NOTE — ED NOTES
The documentation for this period is being entered following the guidelines as defined in the [facility] policy by Jaswinder Renteria RN.

## 2020-02-19 NOTE — ED NOTES
Spoke with patient in regards to warfarin dose:    Pt states Monday Wednesday Friday she takes 4 tablets of 5mg dose    Sunday Tuesday Thursday Saturday takes 3 tablets of 2.5mg dose

## 2020-02-19 NOTE — ED NOTES
During downtime, I brought patient to room via wheelchair. Pt very unsteady on her feet and required assistance. Pt placed on 3L oxygen via [place]. Tolerated well. Given ice packs to help with her feeling \"feverish\".

## 2020-02-19 NOTE — ED TRIAGE NOTES
Arrived from home, after a flight, reporting Chest burning sensation earlier this morning, accompanied by SOB, and increased weakness.      Also reports productive cough    Febrile and tachycardic upon arrival. Oriented x4

## 2020-02-19 NOTE — PROGRESS NOTES
Primary Nurse Lynn Yang RN and Jaleel Cameron RN performed a dual skin assessment on this patient No impairment noted  Kobi score is 19

## 2020-02-19 NOTE — PROGRESS NOTES
TRANSFER - OUT REPORT:    Verbal report given to Augustine Zepeda RN (name) on [place]  being transferred to [employer] (unit) for routine progression of care       Report consisted of patients Situation, Background, Assessment and   Recommendations(SBAR). Information from the following report(s) SBAR was reviewed with the receiving nurse. Lines:   Peripheral IV 02/18/20 Left Wrist (Active)   Site Assessment Clean, dry, & intact 2/18/2020 11:42 PM   Phlebitis Assessment 0 2/18/2020 11:42 PM   Infiltration Assessment 0 2/18/2020 11:42 PM   Dressing Status Clean, dry, & intact 2/18/2020 11:42 PM   Dressing Type Transparent 2/18/2020 11:42 PM   Hub Color/Line Status Pink;Capped;Flushed;Patent 2/18/2020 11:42 PM   Action Taken Blood drawn 2/18/2020 11:42 PM       Peripheral IV 02/18/20 Left Hand (Active)   Site Assessment Clean, dry, & intact 2/18/2020 11:43 PM   Phlebitis Assessment 0 2/18/2020 11:43 PM   Infiltration Assessment 0 2/18/2020 11:43 PM   Dressing Status Clean, dry, & intact 2/18/2020 11:43 PM   Dressing Type Transparent 2/18/2020 11:43 PM   Hub Color/Line Status Pink;Capped;Flushed;Patent 2/18/2020 11:43 PM   Action Taken Blood drawn 2/18/2020 11:43 PM        Opportunity for questions and clarification was provided.       Patient transported with:   [email or web address]

## 2020-02-19 NOTE — CONSULTS
[facility]  Pulmonary, Critical Care, and Sleep Medicine    Initial Patient Consult    Name: Reji Jimenez MRN: [id number]   : 1945 [facility]    Date: 2020        IMPRESSION:   · [place] hypoxic resp failure- suspect atypical/viral PNA exacerbating asthma  · fever  · PAF- coumadin  · Depression/anxiety  · OSAS      RECOMMENDATIONS:   · Duonebs/pulmicort and IV steroids  · Agree with rocephin/azithro  · Agree with resp viral panel  · Follows with Dr. Yanet Iniguez - for T2 ( atopic ) asthma and we may need to consider anti- IL5 biologic to maintain better baseline control ( which is poor). · Pt is acutely ill. Subjective: This patient has been seen and evaluated at the request of Dr. Ronaldo Madrigal for SOB. Patient is a 75 y.o. female   H/o asthma ( annalise + advair) who was admitted today with 3 days cough, SOB in setting of cold she caught on plane from [place] last Friday. Pt states nebs have led to improvement and she has mild word dyspnea and tearful. Cough now productive colored sputum and T > 101. No n/v/d. Past Medical History:   Diagnosis Date    Asthma     Atrial fibrillation ([address]     Depression     DM (diabetes mellitus) ([address]     HTN (hypertension)     Hyperlipidemia     Morbid obesity ([address]     Neuropathy     LITA on CPAP     Other ill-defined conditions(799.89) pneumonia      Past Surgical History:   Procedure Laterality Date    COLONOSCOPY Left 10/28/2019    COLONOSCOPY AND EGD performed by Alejandro Weiss MD at [address] HX APPENDECTOMY      HX CHOLECYSTECTOMY      HX GASTRIC BYPASS      Jejunal bypass with subsequent reversal..  Lab Band since    HX OTHER SURGICAL  D & C    HX EMILIANO AND BSO      For uterine CA      Prior to Admission medications    Medication Sig Start Date End Date Taking? Authorizing Provider   insulin NPH (NOVOLIN N, HUMULIN N) 100 unit/mL injection 35 Units by SubCUTAneous route every morning. INJECT 35 UNITS UNDER THE SKIN IN THE MORNING AND INJECT 25 UNITS AT BEDTIME OR AS DIRECTED   Yes Provider, Historical   insulin NPH (NOVOLIN N, HUMULIN N) 100 unit/mL injection 25 Units by SubCUTAneous route nightly. INJECT 35 UNITS UNDER THE SKIN IN THE MORNING AND INJECT 25 UNITS AT BEDTIME OR AS DIRECTED   Yes Provider, Historical   insulin regular (NOVOLIN R REGULAR U-100 INSULN) 100 unit/mL injection 15 Units by SubCUTAneous route Before breakfast, lunch, and dinner. Yes Provider, Historical   digoxin (LANOXIN) 0.25 mg tablet TAKE 1 TABLET BY MOUTH EVERY DAY 1/28/20  Yes Heather Mckeon MD   albuterol (PROVENTIL VENTOLIN) 2.5 mg /3 mL (0.083 %) nebu 3 mL by Nebulization route four (4) times daily as needed for Wheezing or Shortness of Breath. 12/17/19  Yes Heather Mckeon MD   fluticasone prop[facility]salmeterol [facility]) 500-50 mcg/dose diskus inhaler Take 1 Puff by inhalation two (2) times a day. Yes Provider, Historical   warfarin (COUMADIN) 5 mg tablet Take 5 mg by mouth four (4) days a week. M-W-F: 2.5 mg  Tu-Th-Sat-Sun: 5 mg   Yes Provider, Historical   warfarin (COUMADIN) 2.5 mg tablet Take 2.5 mg by mouth every Monday, Wednesday, Friday. M-W-F: 2.5 mg  Tu-Th-Sat-Sun: 5 mg   Yes Provider, Historical   furosemide (LASIX) 40 mg tablet Take 40 mg by mouth every evening. Yes Provider, Historical   furosemide (LASIX) 40 mg tablet Take 80 mg by mouth daily. Yes Provider, Historical   gabapentin (NEURONTIN) 300 mg capsule Take 300 mg by mouth daily. Yes Provider, Historical   gabapentin (NEURONTIN) 300 mg capsule Take 600 mg by mouth nightly. Yes Provider, Historical   sertraline (ZOLOFT) 100 mg tablet Take 100 mg by mouth daily. Yes Provider, Historical   dilTIAZem CD (CARDIZEM CD) 120 mg ER capsule TAKE 3 CAPSULES EVERY DAY 9/4/19  Yes Heather Mckeon MD   DULoxetine (CYMBALTA) 60 mg capsule Take 60 mg by mouth daily.    Yes Provider, Historical   OTHER Allergy shots weekly. Yes Provider, Historical   EPINEPHrine (EPIPEN) 0.3 mg/0.3 mL injection 0.3 mg by IntraMUSCular route once as needed. Yes Provider, Historical   Biotin 2,500 mcg cap Take 1 Cap by mouth daily. Yes Provider, Historical   b complex vitamins tablet Take 1 Tab by mouth daily. Yes Provider, Historical   omalizumab Jamila Keke) 150 mg solr 150 mg by SubCUTAneous route every fourteen (14) days. Next dose due    Yes Provider, Historical   therapeutic multivitamin (THERAGRAN) tablet Take 1 Tab by mouth daily. Yes Provider, Historical   PROVENTIL HFA 90 mcg/actuation inhaler inhale 2 puffs four times a day if needed for wheezing 1/17/15  Yes Maria Laurent MD   cholecalciferol, vitamin D3, (VITAMIN D3) 2,000 unit tab Take 2,000 Units by mouth daily. Yes Provider, Historical   DOCOSAHEXANOIC ACID/EPA (FISH OIL PO) Take 1 Cap by mouth daily. Yes Provider, Historical     Allergies   Allergen Reactions    Latex Itching    Kiwi Unknown (comments)    Codeine Rash    Lisinopril Other (comments)     Cough, vomiting, Visual disturbances    Morphine Itching    Statins-Hmg-Coa Reductase Inhibitors Other (comments)     Muscle enzyme problems      Social History     Tobacco Use    Smoking status: Former Smoker     Last attempt to quit: 1992     Years since quittin.1    Smokeless tobacco: Never Used   Substance Use Topics    Alcohol use:  Yes     Alcohol/week: 2.0 standard drinks     Types: 2 Glasses of wine per week     Comment: occ wine      Family History   Problem Relation Age of Onset    Stroke Mother     Diabetes Other     Heart Disease Other     Heart Disease Father         congestive heart failure        Current Facility-Administered Medications   Medication Dose Route Frequency    vitamin B complex tablet  1 Tab Oral DAILY    cholecalciferol (VITAMIN D3) (1000 Units /25 mcg) tablet 2 Tab  2,000 Units Oral DAILY    digoxin (LANOXIN) tablet 0.25 mg  0.25 mg Oral DAILY    dilTIAZem CD (CARDIZEM CD) capsule 120 mg  120 mg Oral DAILY    DULoxetine (CYMBALTA) capsule 60 mg  60 mg Oral DAILY    furosemide (LASIX) tablet 40 mg  40 mg Oral QPM    gabapentin (NEURONTIN) capsule 300 mg  300 mg Oral DAILY    gabapentin (NEURONTIN) capsule 600 mg  600 mg Oral QHS    warfarin (COUMADIN) tablet 5 mg  5 mg Oral Q MON, WED & FRI    [START ON 2/20/2020] warfarin (COUMADIN) tablet 2.5 mg  2.5 mg Oral EVERY TUES,THUR,SAT,SUN    therapeutic multivitamin (THERAGRAN) tablet 1 Tab  1 Tab Oral DAILY    sodium chloride (NS) flush 5-40 mL  5-40 mL IntraVENous Q8H    [START ON 2/20/2020] azithromycin (ZITHROMAX) 500 mg in 0.9% sodium chloride (MBP/ADV) 250 mL  500 mg IntraVENous Q24H    [START ON 2/20/2020] cefTRIAXone (ROCEPHIN) 1 g in 0.9% sodium chloride (MBP/ADV) 50 mL  1 g IntraVENous Q24H    guaiFENesin ER (MUCINEX) tablet 600 mg  600 mg Oral Q12H    albuterol-ipratropium (DUO-NEB) 2.5 MG-0.5 MG/3 ML  3 mL Nebulization Q4H RT    0.9% sodium chloride infusion  75 mL/hr IntraVENous CONTINUOUS    insulin NPH (NOVOLIN N, HUMULIN N) injection 35 Units  35 Units SubCUTAneous 7am    insulin NPH (NOVOLIN N, HUMULIN N) injection 25 Units  25 Units SubCUTAneous QHS    WARFARIN INFORMATION NOTE (COUMADIN)   Other Rx Dosing/Monitoring    sertraline (ZOLOFT) tablet 100 mg  100 mg Oral DAILY    insulin regular (NOVOLIN R, HUMULIN R) injection 15 Units  15 Units SubCUTAneous ACB    insulin regular (NOVOLIN R, HUMULIN R) injection 15 Units  15 Units SubCUTAneous ACL    insulin regular (NOVOLIN R, HUMULIN R) injection 10 Units  10 Units SubCUTAneous QHS    insulin lispro (HUMALOG) injection   SubCUTAneous AC&HS    predniSONE (DELTASONE) tablet 40 mg  40 mg Oral DAILY       Review of Systems:  Review of systems not obtained due to patient factors.     Objective:   Vital Signs:    Visit Vitals  /54   Pulse (!) 0   Temp 98.3 °F (36.8 °C)   Resp 19   SpO2 95%       O2 Device: Nasal cannula   O2 Flow Rate (L/min): 3 l/min   Temp (24hrs), Av.6 °F (37.6 °C), Min:98.3 °F (36.8 °C), Max:101.2 °F (38.4 °C)       Intake/Output:   Last shift:      No intake/output data recorded. Last 3 shifts:  1901 -  0700  In: 50 [I.V.:50]  Out: -     Intake/Output Summary (Last 24 hours) at 2020 1212  Last data filed at 2020 0200  Gross per 24 hour   Intake 50 ml   Output --   Net 50 ml      Physical Exam:   General:  Alert, cooperative, no distress, appears stated age. Head:  Normocephalic, without obvious abnormality, atraumatic. Eyes:  Conjunctivae/corneas clear. Nose: Nares normal. Septum midline       Neck: Supple, symmetrical, trachea midline       Lungs:   Clear to auscultation bilaterally. Heart:  Regular rate and rhythm, S1, S2 normal, no murmur, click, rub or gallop. Abdomen:   Soft, non-tender. Bowel sounds normal. No masses,  No organomegaly. Extremities: Extremities normal, atraumatic, no cyanosis or edema. Pulses: 2+ and symmetric all extremities. Skin: Skin color, texture, turgor normal. No rashes or lesions             Data review:     Recent Results (from the past 24 hour(s))   SAMPLES BEING HELD    Collection Time: 20 11:15 PM   Result Value Ref Range    SAMPLES BEING HELD 1LAV 1BLUE 1PST 1RED     COMMENT        Add-on orders for these samples will be processed based on acceptable specimen integrity and analyte stability, which may vary by analyte.    PROTHROMBIN TIME + INR    Collection Time: 20 11:15 PM   Result Value Ref Range    INR 2.3 (H) 0.9 - 1.1      Prothrombin time 22.4 (H) 9.0 - 11.1 sec   EKG, 12 LEAD, INITIAL    Collection Time: 20 11:23 PM   Result Value Ref Range    Ventricular Rate 127 BPM    Atrial Rate 159 BPM    QRS Duration 104 ms    Q-T Interval 308 ms    QTC Calculation (Bezet) 447 ms    Calculated R Axis -40 degrees    Calculated T Axis 119 degrees    Diagnosis       Atrial fibrillation  Left axis deviation  ST & T wave abnormality, consider lateral ischemia  When compared with ECG of 13-DEC-2019 12:39,  Vent. rate has increased BY  55 BPM  Confirmed by Lynn Lion M.D., [facility] ([id number]) on 2/19/2020 7:12:01 AM     CBC WITH AUTOMATED DIFF    Collection Time: 02/18/20 11:36 PM   Result Value Ref Range    WBC 9.9 3.6 - 11.0 K/uL    RBC 4.11 3.80 - 5.20 M/uL    HGB 11.9 11.5 - 16.0 g/dL    HCT 37.8 35.0 - 47.0 %    MCV 92.0 80.0 - 99.0 FL    MCH 29.0 26.0 - 34.0 PG    MCHC 31.5 30.0 - 36.5 g/dL    RDW 15.2 (H) 11.5 - 14.5 %    PLATELET 212 627 - 560 K/uL    MPV 9.6 8.9 - 12.9 FL    NRBC 0.0 0  WBC    ABSOLUTE NRBC 0.00 0.00 - 0.01 K/uL    NEUTROPHILS 79 (H) 32 - 75 %    BAND NEUTROPHILS 3 0 - 6 %    LYMPHOCYTES 4 (L) 12 - 49 %    MONOCYTES 9 5 - 13 %    EOSINOPHILS 3 0 - 7 %    BASOPHILS 2 (H) 0 - 1 %    IMMATURE GRANULOCYTES 0 %    ABS. NEUTROPHILS 8.1 (H) 1.8 - 8.0 K/UL    ABS. LYMPHOCYTES 0.4 (L) 0.8 - 3.5 K/UL    ABS. MONOCYTES 0.9 0.0 - 1.0 K/UL    ABS. EOSINOPHILS 0.3 0.0 - 0.4 K/UL    ABS. BASOPHILS 0.2 (H) 0.0 - 0.1 K/UL    ABS. IMM. GRANS. 0.0 K/UL    DF MANUAL      PLATELET COMMENTS Large Platelets      RBC COMMENTS ANISOCYTOSIS  1+        RBC COMMENTS MICROCYTOSIS  1+       METABOLIC PANEL, COMPREHENSIVE    Collection Time: 02/18/20 11:36 PM   Result Value Ref Range    Sodium 133 (L) 136 - 145 mmol/L    Potassium 4.1 3.5 - 5.1 mmol/L    Chloride 102 97 - 108 mmol/L    CO2 24 21 - 32 mmol/L    Anion gap 7 5 - 15 mmol/L    Glucose 205 (H) 65 - 100 mg/dL    BUN 24 (H) 6 - 20 MG/DL    Creatinine 1.40 (H) 0.55 - 1.02 MG/DL    BUN/Creatinine ratio 17 12 - 20      GFR est AA 44 (L) >60 ml/min/1.73m2    GFR est non-AA 37 (L) >60 ml/min/1.73m2    Calcium 9.9 8.5 - 10.1 MG/DL    Bilirubin, total 0.4 0.2 - 1.0 MG/DL    ALT (SGPT) 38 12 - 78 U/L    AST (SGOT) 36 15 - 37 U/L    Alk.  phosphatase 71 45 - 117 U/L    Protein, total 7.9 6.4 - 8.2 g/dL    Albumin 4.0 3.5 - 5.0 g/dL    Globulin 3.9 2.0 - 4.0 g/dL    A-G Ratio 1.0 (L) 1.1 - 2.2     INFLUENZA A+B VIRAL AGS    Collection Time: 02/19/20 12:12 AM   Result Value Ref Range    Influenza A Antigen NEGATIVE  NEG      Influenza B Antigen NEGATIVE  NEG     POC EG7    Collection Time: 02/19/20  4:12 AM   Result Value Ref Range    Calcium, ionized (POC) 1.25 1.12 - 1.32 mmol/L    pH (POC) 7.365 7.35 - 7.45      pCO2 (POC) 40.1 35.0 - 45.0 MMHG    pO2 (POC) 73 (L) 80 - 100 MMHG    HCO3 (POC) 22.9 22 - 26 MMOL/L    Base deficit (POC) 2 mmol/L    sO2 (POC) 94 92 - 97 %    Site RIGHT RADIAL      Device: NASAL CANNULA      Flow rate (POC) 3 L/M    Allens test (POC) YES      Specimen type (POC) ARTERIAL     GLUCOSE, POC    Collection Time: 02/19/20  4:55 AM   Result Value Ref Range    Glucose (POC) 339 (H) 65 - 100 mg/dL    Performed by Mary Jewels, POC    Collection Time: 02/19/20  6:41 AM   Result Value Ref Range    Glucose (POC) 412 (H) 65 - 100 mg/dL    Performed by Mary Jewels, POC    Collection Time: 02/19/20  7:38 AM   Result Value Ref Range    Glucose (POC) 433 (H) 65 - 100 mg/dL    Performed by Mary Jewels, POC    Collection Time: 02/19/20  9:16 AM   Result Value Ref Range    Glucose (POC) 416 (H) 65 - 100 mg/dL    Performed by Yamileth HADDAD        Imaging:  I have personally reviewed the patients radiographs and have reviewed the reports:  CXR with diffuse basilar reticular/patchy changes no effusions        Cristiana Barrios MD

## 2020-02-19 NOTE — ED NOTES
During downtime, MD Rivera gave orders for azithromycin 500mg IV and ceftriaxone 1 gram IV. Mixed medications myself and administered during downtime. Pt tolerated well.

## 2020-02-19 NOTE — ED PROVIDER NOTES
This patient woke up this morning with a dry cough. It then progressed to a productive cough producing brown sputum. She also has had difficulty breathing. She has a history of asthma according to her daughter. She was in [place] the past few days and got sick while she was down there. She did have a flu vaccination this year. She started having muscle aches, chills and fever about 7 or 8 hours ago. Tried no medication. She has chronic atrial fibrillation. She is on Coumadin. Mild to moderate shortness of breath with exertion. Some tightness in her chest but very mild. She has intense severe body aches and muscle pain. She just got off plane from [place] earlier today.              Past Medical History:   Diagnosis Date    Asthma     Atrial fibrillation ([address]     Depression     DM [facility]) [facility]     HTN (hypertension)     Hyperlipidemia     Morbid obesity ([facility]     Neuropathy     LITA on CPAP     Other ill-defined conditions(799.89) pneumonia       Past Surgical History:   Procedure Laterality Date    COLONOSCOPY Left 10/28/2019    COLONOSCOPY [facility]GD [facility] by Tamera [facility]omew [facility] ENDOSCOPY    HX APPENDECTOMY      HX CHOLECYSTECTOMY      HX GASTRIC BYPASS      Jejunal bypass with subsequent reversal..  Lab Band since    HX OTHER SURGICAL  D & C    HX EMILIANO AND BSO      For uterine CA         Family History:   Problem Relation Age of Onset    Stroke Mother     Diabetes Other     Heart Disease Other     Heart Disease Father         congestive heart failure       Social History     Socioeconomic History    Marital status:      Spouse name: Not on file    Number of children: Not on file    Years of education: Not on file    Highest education level: Not on file   Occupational History    Not on file   Social Needs    Financial resource strain: Not on file    Food insecurity:     Worry: Not on file     Inability: Not on file   [employer] needs: Medical: Not on file     Non-medical: Not on file   Tobacco Use    Smoking status: Former Smoker     Last attempt to quit: 1992     Years since quittin.1    Smokeless tobacco: Never Used   Substance and Sexual Activity    Alcohol use: Yes     Alcohol/week: 2.0 standard drinks     Types: 2 Glasses of wine per week     Comment: occ wine    Drug use: No    Sexual activity: Never   Lifestyle    Physical activity:     Days per week: Not on file     Minutes per session: Not on file    Stress: Not on file   Relationships    Social connections:     Talks on phone: Not on file     Gets together: Not on file     Attends [employer] service: Not on file     Active member of club or organization: Not on file     Attends meetings of clubs or organizations: Not on file     Relationship status: Not on file    Intimate partner violence:     Fear of current or ex partner: Not on file     Emotionally abused: Not on file     Physically abused: Not on file     Forced sexual activity: Not on file   Other Topics Concern    Not on file   Social History Narrative    Not on file         ALLERGIES: Latex; Kiwi; Codeine; Lisinopril; Morphine; and Statins-hmg-coa reductase inhibitors    Review of Systems   All other systems reviewed and are negative. Vitals:    20 2310   BP: 179/78   Pulse: (!) 145   Resp: 20   Temp: (!) 101.2 °F (38.4 °C)   SpO2: (!) 89%            Physical Exam  Constitutional:       Appearance: She is obese. HENT:      Head: Normocephalic. Nose: Nose normal.      Mouth/Throat:      Mouth: Mucous membranes are moist.   Eyes:      Pupils: Pupils are equal, round, and reactive to light. Cardiovascular:      Rate and Rhythm: Tachycardia present. Rhythm irregular. Pulses: Normal pulses. Pulmonary:      Effort: Respiratory distress (Mild) present. Comments: Diminished lung sounds bilaterally with faint wheezes  Abdominal:      Palpations: Abdomen is soft. Tenderness: There is no abdominal tenderness. Musculoskeletal:         General: Swelling (1+ edema bilaterally) present. Skin:     General: Skin is warm and dry. Capillary Refill: Capillary refill takes less than 2 seconds. Neurological:      General: No focal deficit present. Mental Status: She is alert. Psychiatric:         Mood and Affect: Mood normal.          MDM  Number of Diagnoses or Management Options  Critical Care  Total time providing critical care: 30-74 minutes       30 minutes      Procedures    EKG interpreted by me: Atrial fibrillation with rapid response, irregular, left axis deviation. Rate of 127. Nonspecific ST-T wave changes noted. Manju Nash DO    Reviewed chest x-ray images    She has rapid A. fib but it is primarily due to fever. She was given Tylenol. She will be given nebulizers as well as steroids. Referred pt to hospitalist for admission    Has bilateral PNA - could be atypical or viral in origin  Given IV CTX and doxy        Recent Results (from the past 12 hour(s))   SAMPLES BEING HELD    Collection Time: 02/18/20 11:15 PM   Result Value Ref Range    SAMPLES BEING HELD 1LAV 1BLUE 1PST 1RED     COMMENT        Add-on orders for these samples will be processed based on acceptable specimen integrity and analyte stability, which may vary by analyte. PROTHROMBIN TIME + INR    Collection Time: 02/18/20 11:15 PM   Result Value Ref Range    INR 2.3 (H) 0.9 - 1.1      Prothrombin time 22.4 (H) 9.0 - 11.1 sec   EKG, 12 LEAD, INITIAL    Collection Time: 02/18/20 11:23 PM   Result Value Ref Range    Ventricular Rate 127 BPM    Atrial Rate 159 BPM    QRS Duration 104 ms    Q-T Interval 308 ms    QTC Calculation (Bezet) 447 ms    Calculated R Axis -40 degrees    Calculated T Axis 119 degrees    Diagnosis       Atrial fibrillation  Left axis deviation  ST & T wave abnormality, consider lateral ischemia  When compared with ECG of 13-DEC-2019 12:39,  Vent.  rate has increased BY  55 BPM     CBC WITH AUTOMATED DIFF    Collection Time: 02/18/20 11:36 PM   Result Value Ref Range    WBC 9.9 3.6 - 11.0 K/uL    RBC 4.11 3.80 - 5.20 M/uL    HGB 11.9 11.5 - 16.0 g/dL    HCT 37.8 35.0 - 47.0 %    MCV 92.0 80.0 - 99.0 FL    MCH 29.0 26.0 - 34.0 PG    MCHC 31.5 30.0 - 36.5 g/dL    RDW 15.2 (H) 11.5 - 14.5 %    PLATELET 899 066 - 589 K/uL    MPV 9.6 8.9 - 12.9 FL    NRBC 0.0 0  WBC    ABSOLUTE NRBC 0.00 0.00 - 0.01 K/uL    NEUTROPHILS 79 (H) 32 - 75 %    BAND NEUTROPHILS 3 0 - 6 %    LYMPHOCYTES 4 (L) 12 - 49 %    MONOCYTES 9 5 - 13 %    EOSINOPHILS 3 0 - 7 %    BASOPHILS 2 (H) 0 - 1 %    IMMATURE GRANULOCYTES 0 %    ABS. NEUTROPHILS 8.1 (H) 1.8 - 8.0 K/UL    ABS. LYMPHOCYTES 0.4 (L) 0.8 - 3.5 K/UL    ABS. MONOCYTES 0.9 0.0 - 1.0 K/UL    ABS. EOSINOPHILS 0.3 0.0 - 0.4 K/UL    ABS. BASOPHILS 0.2 (H) 0.0 - 0.1 K/UL    ABS. IMM. GRANS. 0.0 K/UL    DF MANUAL      PLATELET COMMENTS Large Platelets      RBC COMMENTS ANISOCYTOSIS  1+        RBC COMMENTS MICROCYTOSIS  1+       METABOLIC PANEL, COMPREHENSIVE    Collection Time: 02/18/20 11:36 PM   Result Value Ref Range    Sodium 133 (L) 136 - 145 mmol/L    Potassium 4.1 3.5 - 5.1 mmol/L    Chloride 102 97 - 108 mmol/L    CO2 24 21 - 32 mmol/L    Anion gap 7 5 - 15 mmol/L    Glucose 205 (H) 65 - 100 mg/dL    BUN 24 (H) 6 - 20 MG/DL    Creatinine 1.40 (H) 0.55 - 1.02 MG/DL    BUN/Creatinine ratio 17 12 - 20      GFR est AA 44 (L) >60 ml/min/1.73m2    GFR est non-AA 37 (L) >60 ml/min/1.73m2    Calcium 9.9 8.5 - 10.1 MG/DL    Bilirubin, total 0.4 0.2 - 1.0 MG/DL    ALT (SGPT) 38 12 - 78 U/L    AST (SGOT) 36 15 - 37 U/L    Alk.  phosphatase 71 45 - 117 U/L    Protein, total 7.9 6.4 - 8.2 g/dL    Albumin 4.0 3.5 - 5.0 g/dL    Globulin 3.9 2.0 - 4.0 g/dL    A-G Ratio 1.0 (L) 1.1 - 2.2     INFLUENZA A+B VIRAL AGS    Collection Time: 02/19/20 12:12 AM   Result Value Ref Range    Influenza A Antigen NEGATIVE  NEG      Influenza B Antigen NEGATIVE  NEG

## 2020-02-19 NOTE — PROGRESS NOTES
Events noted. Cont nebs, Rocephin & IV Zithromax. She received a dose of IV Solu-medrol on arrival, as she was apparently quite wheezy -- so will given her Prednisone 40 mg every day. Pt known to Dr. Nathaniel Nunez -- Will ask him to see her.

## 2020-02-19 NOTE — ED NOTES
Dr Fischer [place] with me and patient at bedside. Informed him of Pt's high and persistent glucose levels and steroid doses.  MD states he will adjust all her insulin doses

## 2020-02-19 NOTE — H&P
HISTORY AND PHYSICAL  Julio Goncalves MD        PCP: Nusrat Minor MD    Please note that this dictation was completed with [employer], the computer voice recognition software. Quite often unanticipated grammatical, syntax, homophones, and other interpretive errors are inadvertently transcribed by the computer software. Please disregard these errors. Please excuse any errors that have escaped final proofreading. CHIEF COMPLAINTS   Shortness of breath        HISTORY OF PRESENT ILLNESS  Patient is a 76-year-old female with medical history significant for asthma, hypertension, atrial fibrillation, type 2 diabetes and morbid obesity who presents to the emergency department with chief complaint of trouble of breathing. Patient reports she woke up this morning with dry cough which later became productive of scanty brownish sputum associated with fever and chills. She also reports chest tightness and generalized body ache but denies any nausea, vomiting, chest pain, palpitation, syncope or presyncope, urinary or bowel complaints. In the ED, V/S: Pulse in 140s: /51 T101.2 °F SPO2 88% RA. Lab work-ups: No leukocytosis or lactic acidosis, creatinine 1.4 (baseline 1), glucose 205. Chest x-ray: New mild bilateral interstitial opacities may represent atypical/viral pneumonia.        PMH/PSH:  Past Medical History:   Diagnosis Date    Asthma     Atrial fibrillation ([address]     Depression     DM (diabetes mellitus) ([address]     HTN (hypertension)     Hyperlipidemia     Morbid obesity ([address]     Neuropathy     LITA on CPAP     Other ill-defined conditions(799.89) pneumonia     Past Surgical History:   Procedure Laterality Date    COLONOSCOPY Left 10/28/2019    COLONOSCOPY AND EGD performed by Ti Nicholas MD at [address] HX APPENDECTOMY      HX CHOLECYSTECTOMY      HX GASTRIC BYPASS      Jejunal bypass with subsequent reversal..  Lab Band since    HX OTHER SURGICAL  D & C    HX EMILIANO AND BSO      For uterine CA       Home meds:   Prior to Admission medications    Medication Sig Start Date End Date Taking? Authorizing Provider   digoxin (LANOXIN) 0.25 mg tablet TAKE 1 TABLET BY MOUTH EVERY DAY 1/28/20   Laura Olson MD   albuterol (PROVENTIL VENTOLIN) 2.5 mg /3 mL (0.083 %) nebu 3 mL by Nebulization route four (4) times daily as needed for Wheezing or Shortness of Breath. 12/17/19   Laura Olson MD   fluticasone propion-salmeterol [facility] IN[facility]UB) 500-50 mcg/dose diskus inhaler Take 1 Puff by inhalation two (2) times a day. Provider, Historical   warfarin (COUMADIN) 5 mg tablet Take 5 mg by mouth every Monday, Wednesday, Friday. M-W-F: 5 mg  Tu-Th-Sat-Sun: 2.5 mg    Provider, Historical   warfarin (COUMADIN) 2.5 mg tablet Take 2.5 mg by mouth every Tuesday, Thursday, Saturday & Sunday. M-W-F: 5 mg  Tu-Th-Sat-Sun: 2.5 mg    Provider, Historical   furosemide (LASIX) 40 mg tablet Take 40 mg by mouth every evening. Provider, Historical   furosemide (LASIX) 40 mg tablet Take 80 mg by mouth daily. Provider, Historical   gabapentin (NEURONTIN) 300 mg capsule Take 300 mg by mouth daily. Provider, Historical   gabapentin (NEURONTIN) 300 mg capsule Take 600 mg by mouth nightly. Provider, Historical   sertraline (ZOLOFT) 100 mg tablet Take 100 mg by mouth daily. Provider, Historical   dilTIAZem CD (CARDIZEM CD) 120 mg ER capsule TAKE 3 CAPSULES EVERY DAY 9/4/19   Laura Olson MD   DULoxetine (CYMBALTA) 60 mg capsule Take 60 mg by mouth daily. Provider, Historical   OTHER Allergy shots weekly. Provider, Historical   ALPRAZolam (XANAX) 0.5 mg tablet Take 0.5-1 Tabs by mouth daily as needed for Anxiety. 7/10/18   Laura Olson MD   insulin regular (NOVOLIN R, HUMULIN R) 100 unit/mL injection Sliding scale:   For -300 use 4 units, 301-400 use 8 units, greater than 400 use 12 units, greater than 500 call MD.  Patient taking differently: 15 units qam, 15 noon, 10 qpm 10/24/17   La Evans MD   insulin NPH (HUMULIN N) 100 unit/mL injection INJECT 35 UNITS UNDER THE SKIN IN THE MORNING AND INJECT 25 UNITS AT BEDTIME OR AS DIRECTED  Patient taking differently: INJECT 35 UNITS in am, 25 pm 10/24/17   La Evans MD   EPINEPHrine (EPIPEN) 0.3 mg/0.3 mL injection 0.3 mg by IntraMUSCular route once as needed. Provider, Historical   Biotin 2,500 mcg cap Take 1 Cap by mouth daily. Provider, Historical   b complex vitamins tablet Take 1 Tab by mouth daily. Provider, Historical   omalizumab Madina Kisha) 150 mg solr 150 mg by SubCUTAneous route every fourteen (14) days. Next dose due     Provider, Historical   therapeutic multivitamin [facility]) tablet Take 1 Tab by mouth daily. Provider, Historical   PROVENTIL HFA 90 mcg/actuation inhaler inhale 2 puffs four times a day if needed for wheezing 1/17/15   La Evans MD   cholecalciferol, vitamin D3, (VITAMIN D3) 2,000 unit tab Take 2,000 Units by mouth daily. Provider, Historical   DOCOSAHEXANOIC ACID/EPA (FISH OIL PO) Take 1 Cap by mouth daily. Provider, Historical       Allergies: Allergies   Allergen Reactions    Latex Itching    Kiwi Unknown (comments)    Codeine Rash    Lisinopril Other (comments)     Cough, vomiting, Visual disturbances    Morphine Itching    Statins-Hmg-Coa Reductase Inhibitors Other (comments)     Muscle enzyme problems       FH:  Family History   Problem Relation Age of Onset    Stroke Mother     Diabetes Other     Heart Disease Other     Heart Disease Father         congestive heart failure       SH:  Social History     Tobacco Use    Smoking status: Former Smoker     Last attempt to quit: 1992     Years since quittin.1    Smokeless tobacco: Never Used   Substance Use Topics    Alcohol use:  Yes     Alcohol/week: 2.0 standard drinks     Types: 2 Glasses of wine per week     Comment: occ wine       ROS: A comprehensive review of systems was negative except for that written in the HPI. PHYSICAL EXAM:  Visit Vitals  /71   Pulse (!) 107   Temp 99.9 °F (37.7 °C)   Resp 21   SpO2 95%       General:          Alert, cooperative, in mild distress, appears stated age. HEENT:           Atraumatic, anicteric sclerae, pink conjunctivae                          No oral ulcers, mucosa moist, throat clear, dentition fair  Neck:               Supple, symmetrical,  thyroid: non tender  Lungs:             Clear to auscultation bilaterally. No Wheezing or Rhonchi. No rales. Chest wall:      No tenderness  No Accessory muscle use. Heart:              Regular  rhythm,  No  murmur   No edema  Abdomen:        Soft, non-tender. Not distended. Bowel sounds normal  Extremities:     No cyanosis. No clubbing,                            Skin turgor normal, Capillary refill normal, Radial dial pulse 2+  Skin:                Not pale. Not Jaundiced  No rashes   Psych:             Not anxious or agitated. Neurologic:     Alert and oriented to PPT, CNII-XII intact. Motor and sensory exam grossly intact. Labs/Imaging:  Recent Results (from the past 24 hour(s))   SAMPLES BEING HELD    Collection Time: 02/18/20 11:15 PM   Result Value Ref Range    SAMPLES BEING HELD 1LAV 1BLUE 1PST 1RED     COMMENT        Add-on orders for these samples will be processed based on acceptable specimen integrity and analyte stability, which may vary by analyte.    PROTHROMBIN TIME + INR    Collection Time: 02/18/20 11:15 PM   Result Value Ref Range    INR 2.3 (H) 0.9 - 1.1      Prothrombin time 22.4 (H) 9.0 - 11.1 sec   EKG, 12 LEAD, INITIAL    Collection Time: 02/18/20 11:23 PM   Result Value Ref Range    Ventricular Rate 127 BPM    Atrial Rate 159 BPM    QRS Duration 104 ms    Q-T Interval 308 ms    QTC Calculation (Bezet) 447 ms    Calculated R Axis -40 degrees    Calculated T Axis 119 degrees    Diagnosis       Atrial fibrillation  Left axis deviation  ST & T wave abnormality, consider lateral ischemia  When compared with ECG of 13-DEC-2019 12:39,  Vent. rate has increased BY  55 BPM     CBC WITH AUTOMATED DIFF    Collection Time: 02/18/20 11:36 PM   Result Value Ref Range    WBC 9.9 3.6 - 11.0 K/uL    RBC 4.11 3.80 - 5.20 M/uL    HGB 11.9 11.5 - 16.0 g/dL    HCT 37.8 35.0 - 47.0 %    MCV 92.0 80.0 - 99.0 FL    MCH 29.0 26.0 - 34.0 PG    MCHC 31.5 30.0 - 36.5 g/dL    RDW 15.2 (H) 11.5 - 14.5 %    PLATELET 918 669 - 478 K/uL    MPV 9.6 8.9 - 12.9 FL    NRBC 0.0 0  WBC    ABSOLUTE NRBC 0.00 0.00 - 0.01 K/uL    NEUTROPHILS 79 (H) 32 - 75 %    BAND NEUTROPHILS 3 0 - 6 %    LYMPHOCYTES 4 (L) 12 - 49 %    MONOCYTES 9 5 - 13 %    EOSINOPHILS 3 0 - 7 %    BASOPHILS 2 (H) 0 - 1 %    IMMATURE GRANULOCYTES 0 %    ABS. NEUTROPHILS 8.1 (H) 1.8 - 8.0 K/UL    ABS. LYMPHOCYTES 0.4 (L) 0.8 - 3.5 K/UL    ABS. MONOCYTES 0.9 0.0 - 1.0 K/UL    ABS. EOSINOPHILS 0.3 0.0 - 0.4 K/UL    ABS. BASOPHILS 0.2 (H) 0.0 - 0.1 K/UL    ABS. IMM. GRANS. 0.0 K/UL    DF MANUAL      PLATELET COMMENTS Large Platelets      RBC COMMENTS ANISOCYTOSIS  1+        RBC COMMENTS MICROCYTOSIS  1+       METABOLIC PANEL, COMPREHENSIVE    Collection Time: 02/18/20 11:36 PM   Result Value Ref Range    Sodium 133 (L) 136 - 145 mmol/L    Potassium 4.1 3.5 - 5.1 mmol/L    Chloride 102 97 - 108 mmol/L    CO2 24 21 - 32 mmol/L    Anion gap 7 5 - 15 mmol/L    Glucose 205 (H) 65 - 100 mg/dL    BUN 24 (H) 6 - 20 MG/DL    Creatinine 1.40 (H) 0.55 - 1.02 MG/DL    BUN/Creatinine ratio 17 12 - 20      GFR est AA 44 (L) >60 ml/min/1.73m2    GFR est non-AA 37 (L) >60 ml/min/1.73m2    Calcium 9.9 8.5 - 10.1 MG/DL    Bilirubin, total 0.4 0.2 - 1.0 MG/DL    ALT (SGPT) 38 12 - 78 U/L    AST (SGOT) 36 15 - 37 U/L    Alk.  phosphatase 71 45 - 117 U/L    Protein, total 7.9 6.4 - 8.2 g/dL    Albumin 4.0 3.5 - 5.0 g/dL    Globulin 3.9 2.0 - 4.0 g/dL    A-G Ratio 1.0 (L) 1.1 - 2.2     INFLUENZA A+B VIRAL AGS    Collection Time: 02/19/20 12:12 AM   Result Value Ref Range    Influenza A Antigen NEGATIVE  NEG      Influenza B Antigen NEGATIVE  NEG         Recent Labs     02/18/20  2336   WBC 9.9   HGB 11.9   HCT 37.8        Recent Labs     02/18/20  2336   *   K 4.1      CO2 24   BUN 24*   CREA 1.40*   *   CA 9.9     Recent Labs     02/18/20  2336   SGOT 36   ALT 38   AP 71   TBILI 0.4   TP 7.9   ALB 4.0   GLOB 3.9       No results for input(s): CPK, CKNDX, TROIQ in the last 72 hours. No lab exists for component: CPKMB    Recent Labs     02/18/20  2315   INR 2.3*   PTP 22.4*        No results for input(s): PH, PCO2, PO2 in the last 72 hours. XR CHEST PA LAT  Narrative: INDICATION: Cough and fever    COMPARISON: December 13, 2019    FINDINGS: PA and lateral views of the chest demonstrate a stable  cardiomediastinal silhouette. There are new mild bilateral reticulonodular  interstitial opacities. No focal consolidation or pleural effusion is evident. The visualized osseous structures are unremarkable. Impression: IMPRESSION: New mild bilateral interstitial opacities may represent  atypical/viral pneumonia.           Assessment & Plan:   ===================  Sepsis reassessment completed    Sepsis likely due to pneumonia  Presenting with fever, tachycardia and possible viral/atypical pneumonia on imaging   IV fluid  Empiric ceftriaxone and azithromycin  Influenza test negative , viral culture pending  Neurochecks  Continue to monitor    Asthma exacerbation likely due to Possible pneumonia , viral/atypical  Presenting with fever, tachycardia, increased shortness of breath and productive cough  CXR in ED revealed possible viral/atypical pneumonia   continue steroids  nebs scheduled every 4 hr  empiric ceftriaxone and azithromycin  Influenza test negative , viral culture pending  ivf  prn mucolytics   O2 to keep sats > 90%    Hyponatremia   Likely from volume depletion   IVF for now   Consider further work up if no improvement     [place]   Cr 1.4 ( baseline ~1 )  IVF for now   Consider further work up if no improvement     Type 2 diabetes, uncontrolled with hyperglycemia  Continue home insulin  SSI  Accu-Checks    Chronic A. Fib  Rate controlled  Continue home diltiazem and warfarin    Hypertension  Stable  Continue home meds    Other comorbidities continue home meds adjust as needed    Patient's Baseline: Ambulates with walking  DVT ppx: On warfarin  Code status: Full  Disposition: TBD  Care plan discussed with patient/family and nurse.                 Signed By: Isaac Talamantes MD     February 19, 2020

## 2020-02-19 NOTE — PROGRESS NOTES
Pharmacist Note - Warfarin Dosing  Consult provided for this 75 y. o.female to manage warfarin for Atrial Fibrillation    INR Goal: 2 - 3    Home regimen/ tablet size: M-W-F: 2.5 mg; Tu-Th-Sat-Sun: 5 mg    Drugs that may increase INR: Macrolides  Drugs that may decrease INR: None  Other current anticoagulants/ drugs that may increase bleeding risk: None  Risk factors: Age > 65  Daily INR ordered: YES    Recent Labs     02/18/20  2336 02/18/20  2315   HGB 11.9  --    INR  --  2.3*     Date               INR                  Dose  2/18  2.3  5mg (pta)   2/19                 --                     2.5 mg                                                                               Assessment/ Plan: Will order 2.5 mg x 1 dose    Pharmacy will continue to monitor daily and adjust therapy as indicated. Please contact the pharmacist [phone] for outpatient recommendations if needed.

## 2020-02-19 NOTE — PROGRESS NOTES
Admission Medication Reconciliation:    Information obtained from:  patient, rx query, chart review  RxQuery data available¹:  YES    Comments/Recommendations: Updated PTA meds/reviewed patient's allergies. 1)  Discussed home medications with patient. Confirmed dosing of warfarin and insulin products x 3 utilizing repeat back method. 2)  Medication changes (since last review): Added  - none    Adjusted  - warfarin from 2.5 mg 4x/week and 5 mg 3x/week TO 5 mg four days a week and 2.5 mg on MWF; patient noted \"I used to be on the opposite, but my blood was too thick so they adjusted it. \"  - insulin regular from sliding scale to 15 units at each meal; patient noted she has novolin at home and was able to decipher between long acting and short acting based on the N or the R    Removed  - xanax    3) Patient resting comfortably in bed at end of encounter. Provided with additional blanket. ¹RxQuery pharmacy benefit data reflects medications filled and processed through the patient's insurance, however   this data does NOT capture whether the medication was picked up or is currently being taken by the patient. Allergies:  Latex; Kiwi; Codeine; Lisinopril; Morphine; and Statins-hmg-coa reductase inhibitors    Prior to Admission Medications:   Prior to Admission Medications   Prescriptions Last Dose Informant Taking? Biotin 2,500 mcg cap 2020  Yes   Sig: Take 1 Cap by mouth daily. DOCOSAHEXANOIC ACID/EPA (FISH OIL PO) 2020  Yes   Sig: Take 1 Cap by mouth daily. DULoxetine (CYMBALTA) 60 mg capsule 2020  Yes   Sig: Take 60 mg by mouth daily. EPINEPHrine (EPIPEN) 0.3 mg/0.3 mL injection   Yes   Si.3 mg by IntraMUSCular route once as needed. OTHER   Yes   Sig: Allergy shots weekly.    PROVENTIL HFA 90 mcg/actuation inhaler 2020  Yes   Sig: inhale 2 puffs four times a day if needed for wheezing   albuterol (PROVENTIL VENTOLIN) 2.5 mg /3 mL (0.083 %) nebu 2020  Yes   Sig: 3 mL by Nebulization route four (4) times daily as needed for Wheezing or Shortness of Breath.   b complex vitamins tablet 2020  Yes   Sig: Take 1 Tab by mouth daily. cholecalciferol, vitamin D3, (VITAMIN D3) 2,000 unit tab 2020  Yes   Sig: Take 2,000 Units by mouth daily. digoxin (LANOXIN) 0.25 mg tablet 2020  Yes   Sig: TAKE 1 TABLET BY MOUTH EVERY DAY   dilTIAZem CD (CARDIZEM CD) 120 mg ER capsule 2020  Yes   Sig: TAKE 3 CAPSULES EVERY DAY   fluticasone propion-salmeterol ([facility] INHUB) 500-50 mcg/dose diskus inhaler 2020  Yes   Sig: Take 1 Puff by inhalation two (2) times a day. furosemide (LASIX) 40 mg tablet 2020  Yes   Sig: Take 40 mg by mouth every evening. furosemide (LASIX) 40 mg tablet 2020  Yes   Sig: Take 80 mg by mouth daily. gabapentin (NEURONTIN) 300 mg capsule 2020  Yes   Sig: Take 300 mg by mouth daily. gabapentin (NEURONTIN) 300 mg capsule 2020  Yes   Sig: Take 600 mg by mouth nightly. insulin NPH (NOVOLIN N, HUMULIN N) 100 unit/mL injection 2020 at Unknown time  Yes   Si Units by SubCUTAneous route every morning. INJECT 35 UNITS UNDER THE SKIN IN THE MORNING AND INJECT 25 UNITS AT BEDTIME OR AS DIRECTED   insulin NPH (NOVOLIN N, HUMULIN N) 100 unit/mL injection 2020 at Unknown time  Yes   Si Units by SubCUTAneous route nightly. INJECT 35 UNITS UNDER THE SKIN IN THE MORNING AND INJECT 25 UNITS AT BEDTIME OR AS DIRECTED   insulin regular (NOVOLIN R REGULAR U-100 INSULN) 100 unit/mL injection 2020 at Unknown time  Yes   Sig: 15 Units by SubCUTAneous route Before breakfast, lunch, and dinner. omalizumab Laura Buddle) 150 mg solr 2020  Yes   Si mg by SubCUTAneous route every fourteen (14) days. Next dose due    sertraline (ZOLOFT) 100 mg tablet 2020  Yes   Sig: Take 100 mg by mouth daily. therapeutic multivitamin [facility]) tablet 2020  Yes   Sig: Take 1 Tab by mouth daily.    warfarin (COUMADIN) 2.5 mg tablet 2/17/2020  Yes   Sig: Take 2.5 mg by mouth every Monday, Wednesday, Friday. M-W-F: 2.5 mg  Tu-Th-Sat-Sun: 5 mg   warfarin (COUMADIN) 5 mg tablet 2/18/2020  Yes   Sig: Take 5 mg by mouth four (4) days a week. M-W-F: 2.5 mg  Tu-Th-Sat-Sun: 5 mg      Facility-Administered Medications: None         Please contact the main inpatient pharmacy with any questions or concerns at [phone] and we will direct you to the clinical pharmacist covering this patient's care while in-house.    Ehsan Abraham

## 2020-02-19 NOTE — PROGRESS NOTES
Bedside and Verbal shift change report given to Sharon Riddle RN (oncoming nurse) by Rohini Love RN (offgoing nurse).  Report included the following information SBAR, Kardex, Intake/Output, MAR and Recent Results.

## 2020-02-20 ENCOUNTER — APPOINTMENT (OUTPATIENT)
Dept: GENERAL RADIOLOGY | Age: 75
DRG: 871 | End: 2020-02-20
Attending: PHYSICIAN ASSISTANT
Payer: MEDICARE

## 2020-02-20 PROBLEM — J96.00 ACUTE RESPIRATORY FAILURE (HCC): Status: ACTIVE | Noted: 2020-02-20

## 2020-02-20 PROBLEM — J11.1 INFLUENZA: Status: ACTIVE | Noted: 2020-02-20

## 2020-02-20 LAB
ANION GAP SERPL CALC-SCNC: 8 MMOL/L (ref 5–15)
BUN SERPL-MCNC: 27 MG/DL (ref 6–20)
BUN/CREAT SERPL: 20 (ref 12–20)
CALCIUM SERPL-MCNC: 8.9 MG/DL (ref 8.5–10.1)
CHLORIDE SERPL-SCNC: 104 MMOL/L (ref 97–108)
CO2 SERPL-SCNC: 24 MMOL/L (ref 21–32)
CREAT SERPL-MCNC: 1.34 MG/DL (ref 0.55–1.02)
ERYTHROCYTE [DISTWIDTH] IN BLOOD BY AUTOMATED COUNT: 15.1 % (ref 11.5–14.5)
GLUCOSE BLD STRIP.AUTO-MCNC: 196 MG/DL (ref 65–100)
GLUCOSE BLD STRIP.AUTO-MCNC: 210 MG/DL (ref 65–100)
GLUCOSE BLD STRIP.AUTO-MCNC: 224 MG/DL (ref 65–100)
GLUCOSE BLD STRIP.AUTO-MCNC: 230 MG/DL (ref 65–100)
GLUCOSE BLD STRIP.AUTO-MCNC: 271 MG/DL (ref 65–100)
GLUCOSE SERPL-MCNC: 257 MG/DL (ref 65–100)
HCT VFR BLD AUTO: 37.1 % (ref 35–47)
HGB BLD-MCNC: 11.5 G/DL (ref 11.5–16)
INR PPP: 1.6 (ref 0.9–1.1)
MCH RBC QN AUTO: 28.8 PG (ref 26–34)
MCHC RBC AUTO-ENTMCNC: 31 G/DL (ref 30–36.5)
MCV RBC AUTO: 93 FL (ref 80–99)
NRBC # BLD: 0 K/UL (ref 0–0.01)
NRBC BLD-RTO: 0 PER 100 WBC
PLATELET # BLD AUTO: 210 K/UL (ref 150–400)
PMV BLD AUTO: 9.3 FL (ref 8.9–12.9)
POTASSIUM SERPL-SCNC: 3.9 MMOL/L (ref 3.5–5.1)
PROTHROMBIN TIME: 16.1 SEC (ref 9–11.1)
RBC # BLD AUTO: 3.99 M/UL (ref 3.8–5.2)
SERVICE CMNT-IMP: ABNORMAL
SODIUM SERPL-SCNC: 136 MMOL/L (ref 136–145)
WBC # BLD AUTO: 10.2 K/UL (ref 3.6–11)

## 2020-02-20 PROCEDURE — 94640 AIRWAY INHALATION TREATMENT: CPT

## 2020-02-20 PROCEDURE — 85610 PROTHROMBIN TIME: CPT

## 2020-02-20 PROCEDURE — 74011250636 HC RX REV CODE- 250/636: Performed by: INTERNAL MEDICINE

## 2020-02-20 PROCEDURE — 74011250637 HC RX REV CODE- 250/637: Performed by: STUDENT IN AN ORGANIZED HEALTH CARE EDUCATION/TRAINING PROGRAM

## 2020-02-20 PROCEDURE — 94762 N-INVAS EAR/PLS OXIMTRY CONT: CPT

## 2020-02-20 PROCEDURE — 85027 COMPLETE CBC AUTOMATED: CPT

## 2020-02-20 PROCEDURE — 74011250636 HC RX REV CODE- 250/636: Performed by: STUDENT IN AN ORGANIZED HEALTH CARE EDUCATION/TRAINING PROGRAM

## 2020-02-20 PROCEDURE — 65660000000 HC RM CCU STEPDOWN

## 2020-02-20 PROCEDURE — 74011000250 HC RX REV CODE- 250: Performed by: STUDENT IN AN ORGANIZED HEALTH CARE EDUCATION/TRAINING PROGRAM

## 2020-02-20 PROCEDURE — 82962 GLUCOSE BLOOD TEST: CPT

## 2020-02-20 PROCEDURE — 80048 BASIC METABOLIC PNL TOTAL CA: CPT

## 2020-02-20 PROCEDURE — 77010033678 HC OXYGEN DAILY

## 2020-02-20 PROCEDURE — 94760 N-INVAS EAR/PLS OXIMETRY 1: CPT

## 2020-02-20 PROCEDURE — 71045 X-RAY EXAM CHEST 1 VIEW: CPT

## 2020-02-20 PROCEDURE — 74011000250 HC RX REV CODE- 250: Performed by: PHYSICIAN ASSISTANT

## 2020-02-20 PROCEDURE — 74011250637 HC RX REV CODE- 250/637: Performed by: INTERNAL MEDICINE

## 2020-02-20 PROCEDURE — 36415 COLL VENOUS BLD VENIPUNCTURE: CPT

## 2020-02-20 PROCEDURE — 74011636637 HC RX REV CODE- 636/637: Performed by: INTERNAL MEDICINE

## 2020-02-20 PROCEDURE — 74011000258 HC RX REV CODE- 258: Performed by: STUDENT IN AN ORGANIZED HEALTH CARE EDUCATION/TRAINING PROGRAM

## 2020-02-20 PROCEDURE — 74011636637 HC RX REV CODE- 636/637: Performed by: STUDENT IN AN ORGANIZED HEALTH CARE EDUCATION/TRAINING PROGRAM

## 2020-02-20 RX ORDER — ALPRAZOLAM 0.5 MG/1
0.5 TABLET ORAL
Status: DISCONTINUED | OUTPATIENT
Start: 2020-02-20 | End: 2020-02-27 | Stop reason: HOSPADM

## 2020-02-20 RX ORDER — DILTIAZEM HYDROCHLORIDE 120 MG/1
120 CAPSULE, COATED, EXTENDED RELEASE ORAL 2 TIMES DAILY
Status: DISCONTINUED | OUTPATIENT
Start: 2020-02-20 | End: 2020-02-20

## 2020-02-20 RX ORDER — ARFORMOTEROL TARTRATE 15 UG/2ML
15 SOLUTION RESPIRATORY (INHALATION)
Status: DISCONTINUED | OUTPATIENT
Start: 2020-02-20 | End: 2020-02-27 | Stop reason: HOSPADM

## 2020-02-20 RX ORDER — ALBUTEROL SULFATE 0.83 MG/ML
2.5 SOLUTION RESPIRATORY (INHALATION)
Status: DISCONTINUED | OUTPATIENT
Start: 2020-02-20 | End: 2020-02-27 | Stop reason: HOSPADM

## 2020-02-20 RX ORDER — BUDESONIDE 0.5 MG/2ML
500 INHALANT ORAL
Status: DISCONTINUED | OUTPATIENT
Start: 2020-02-20 | End: 2020-02-27 | Stop reason: HOSPADM

## 2020-02-20 RX ORDER — FUROSEMIDE 10 MG/ML
40 INJECTION INTRAMUSCULAR; INTRAVENOUS EVERY 12 HOURS
Status: DISCONTINUED | OUTPATIENT
Start: 2020-02-20 | End: 2020-02-23

## 2020-02-20 RX ORDER — OSELTAMIVIR PHOSPHATE 30 MG/1
30 CAPSULE ORAL 2 TIMES DAILY
Status: DISCONTINUED | OUTPATIENT
Start: 2020-02-20 | End: 2020-02-21

## 2020-02-20 RX ORDER — OSELTAMIVIR PHOSPHATE 75 MG/1
75 CAPSULE ORAL 2 TIMES DAILY
Status: DISCONTINUED | OUTPATIENT
Start: 2020-02-20 | End: 2020-02-20

## 2020-02-20 RX ORDER — IPRATROPIUM BROMIDE AND ALBUTEROL SULFATE 2.5; .5 MG/3ML; MG/3ML
3 SOLUTION RESPIRATORY (INHALATION)
Status: DISCONTINUED | OUTPATIENT
Start: 2020-02-20 | End: 2020-02-27 | Stop reason: HOSPADM

## 2020-02-20 RX ORDER — WARFARIN SODIUM 5 MG/1
5 TABLET ORAL ONCE
Status: COMPLETED | OUTPATIENT
Start: 2020-02-20 | End: 2020-02-20

## 2020-02-20 RX ORDER — ACETYLCYSTEINE 200 MG/ML
2 SOLUTION ORAL; RESPIRATORY (INHALATION)
Status: DISPENSED | OUTPATIENT
Start: 2020-02-20 | End: 2020-02-21

## 2020-02-20 RX ORDER — DILTIAZEM HYDROCHLORIDE 180 MG/1
360 CAPSULE, COATED, EXTENDED RELEASE ORAL DAILY
Status: DISCONTINUED | OUTPATIENT
Start: 2020-02-21 | End: 2020-02-27 | Stop reason: HOSPADM

## 2020-02-20 RX ORDER — DILTIAZEM HYDROCHLORIDE 120 MG/1
120 CAPSULE, COATED, EXTENDED RELEASE ORAL ONCE
Status: COMPLETED | OUTPATIENT
Start: 2020-02-20 | End: 2020-02-20

## 2020-02-20 RX ADMIN — METHYLPREDNISOLONE SODIUM SUCCINATE 125 MG: 125 INJECTION, POWDER, FOR SOLUTION INTRAMUSCULAR; INTRAVENOUS at 09:00

## 2020-02-20 RX ADMIN — BUDESONIDE INHALATION 500 MCG: 0.5 SUSPENSION RESPIRATORY (INHALATION) at 19:37

## 2020-02-20 RX ADMIN — OSELTAMIVIR PHOSPHATE 30 MG: 30 CAPSULE ORAL at 21:10

## 2020-02-20 RX ADMIN — IPRATROPIUM BROMIDE AND ALBUTEROL SULFATE 3 ML: .5; 3 SOLUTION RESPIRATORY (INHALATION) at 19:37

## 2020-02-20 RX ADMIN — HUMAN INSULIN 25 UNITS: 100 INJECTION, SUSPENSION SUBCUTANEOUS at 22:13

## 2020-02-20 RX ADMIN — ACETYLCYSTEINE 400 MG: 200 SOLUTION ORAL; RESPIRATORY (INHALATION) at 13:03

## 2020-02-20 RX ADMIN — DULOXETINE HYDROCHLORIDE 60 MG: 60 CAPSULE, DELAYED RELEASE ORAL at 09:00

## 2020-02-20 RX ADMIN — METHYLPREDNISOLONE SODIUM SUCCINATE 125 MG: 125 INJECTION, POWDER, FOR SOLUTION INTRAMUSCULAR; INTRAVENOUS at 13:17

## 2020-02-20 RX ADMIN — INSULIN LISPRO 3 UNITS: 100 INJECTION, SOLUTION INTRAVENOUS; SUBCUTANEOUS at 17:03

## 2020-02-20 RX ADMIN — INSULIN LISPRO 3 UNITS: 100 INJECTION, SOLUTION INTRAVENOUS; SUBCUTANEOUS at 09:02

## 2020-02-20 RX ADMIN — GABAPENTIN 600 MG: 300 CAPSULE ORAL at 21:10

## 2020-02-20 RX ADMIN — FUROSEMIDE 40 MG: 10 INJECTION, SOLUTION INTRAMUSCULAR; INTRAVENOUS at 21:09

## 2020-02-20 RX ADMIN — GUAIFENESIN 600 MG: 600 TABLET, EXTENDED RELEASE ORAL at 09:00

## 2020-02-20 RX ADMIN — INSULIN LISPRO 6 UNITS: 100 INJECTION, SOLUTION INTRAVENOUS; SUBCUTANEOUS at 22:13

## 2020-02-20 RX ADMIN — Medication 10 ML: at 21:09

## 2020-02-20 RX ADMIN — WARFARIN SODIUM 5 MG: 5 TABLET ORAL at 17:02

## 2020-02-20 RX ADMIN — DILTIAZEM HYDROCHLORIDE 120 MG: 120 CAPSULE, COATED, EXTENDED RELEASE ORAL at 21:10

## 2020-02-20 RX ADMIN — IPRATROPIUM BROMIDE AND ALBUTEROL SULFATE 3 ML: .5; 3 SOLUTION RESPIRATORY (INHALATION) at 07:17

## 2020-02-20 RX ADMIN — Medication 10 ML: at 06:33

## 2020-02-20 RX ADMIN — AZITHROMYCIN MONOHYDRATE 500 MG: 500 INJECTION, POWDER, LYOPHILIZED, FOR SOLUTION INTRAVENOUS at 02:43

## 2020-02-20 RX ADMIN — ACETYLCYSTEINE 400 MG: 200 SOLUTION ORAL; RESPIRATORY (INHALATION) at 19:37

## 2020-02-20 RX ADMIN — SERTRALINE HYDROCHLORIDE 100 MG: 50 TABLET ORAL at 08:59

## 2020-02-20 RX ADMIN — IPRATROPIUM BROMIDE AND ALBUTEROL SULFATE 3 ML: .5; 3 SOLUTION RESPIRATORY (INHALATION) at 13:03

## 2020-02-20 RX ADMIN — FUROSEMIDE 40 MG: 10 INJECTION, SOLUTION INTRAMUSCULAR; INTRAVENOUS at 13:17

## 2020-02-20 RX ADMIN — GUAIFENESIN 600 MG: 600 TABLET, EXTENDED RELEASE ORAL at 21:11

## 2020-02-20 RX ADMIN — THERA TABS 1 TABLET: TAB at 08:59

## 2020-02-20 RX ADMIN — SODIUM CHLORIDE 75 ML/HR: 900 INJECTION, SOLUTION INTRAVENOUS at 02:06

## 2020-02-20 RX ADMIN — MELATONIN 2 TABLET: at 08:59

## 2020-02-20 RX ADMIN — CEFTRIAXONE 1 G: 1 INJECTION, POWDER, FOR SOLUTION INTRAMUSCULAR; INTRAVENOUS at 02:06

## 2020-02-20 RX ADMIN — OSELTAMIVIR PHOSPHATE 75 MG: 75 CAPSULE ORAL at 09:00

## 2020-02-20 RX ADMIN — HUMAN INSULIN 15 UNITS: 100 INJECTION, SOLUTION SUBCUTANEOUS at 12:45

## 2020-02-20 RX ADMIN — GABAPENTIN 300 MG: 300 CAPSULE ORAL at 09:00

## 2020-02-20 RX ADMIN — HUMAN INSULIN 35 UNITS: 100 INJECTION, SUSPENSION SUBCUTANEOUS at 07:27

## 2020-02-20 RX ADMIN — DILTIAZEM HYDROCHLORIDE 120 MG: 120 CAPSULE, COATED, EXTENDED RELEASE ORAL at 08:59

## 2020-02-20 RX ADMIN — Medication 10 ML: at 13:18

## 2020-02-20 RX ADMIN — DILTIAZEM HYDROCHLORIDE 120 MG: 120 CAPSULE, COATED, EXTENDED RELEASE ORAL at 22:13

## 2020-02-20 RX ADMIN — DIGOXIN 0.25 MG: 125 TABLET ORAL at 08:59

## 2020-02-20 RX ADMIN — ALPRAZOLAM 0.5 MG: 0.5 TABLET ORAL at 11:18

## 2020-02-20 RX ADMIN — HUMAN INSULIN 10 UNITS: 100 INJECTION, SOLUTION SUBCUTANEOUS at 17:04

## 2020-02-20 RX ADMIN — METHYLPREDNISOLONE SODIUM SUCCINATE 125 MG: 125 INJECTION, POWDER, FOR SOLUTION INTRAMUSCULAR; INTRAVENOUS at 21:09

## 2020-02-20 RX ADMIN — HUMAN INSULIN 15 UNITS: 100 INJECTION, SOLUTION SUBCUTANEOUS at 09:01

## 2020-02-20 RX ADMIN — Medication 1 TABLET: at 08:59

## 2020-02-20 NOTE — ROUTINE PROCESS
Bedside and Verbal shift change report given to Yoon Ramirez (oncoming nurse) by Анна Fuller (offgoing nurse). Report included the following information SBAR, Kardex, Intake/Output, MAR, Recent Results, Med Rec Status and Cardiac Rhythm Afib.

## 2020-02-20 NOTE — CONSULTS
Cardiology Consult Note    CC:Afib with RVR  Reason for consult:  Afib with RVR  Requesting MD:  Dr. Juan Hernandez     Subjective:      Date of  Admission: 2/19/2020 12:46 AM     Admission type:Emergency    Thomas Nazario is a 75 y.o. female admitted for Asthma exacerbation [J45.901]  Pneumonia [J18.9]. Patient complains of SOB over one week worsening CASTANEDA and wheezes. She was rule in for flu now and I was asked to see her for Afib with RVR. Her Afib is permanent. Compared to my previous visit with her she has gained lots of weight and notes her PAU which has been gradually worse according to her over two years. She was give venous insufficiency as diagnosis. She admits to orthopnea now. No productive cough. Denies any CP or awareness of palpitations.     Patient Active Problem List    Diagnosis Date Noted    Acute respiratory failure ([facility] 02/20/2020    Influenza 02/20/2020    Recurrent depression ([facility] 01/09/2018    Type 2 diabetes mellitus with diabetic neuropathy ([address] 01/09/2018    Encounter for long-term (current) drug use 10/19/2017    Diabetic polyneuropathy associated with type 2 diabetes mellitus ([address] 07/05/2017    Type 2 diabetes mellitus with stage 1 chronic kidney disease, with long-term current use of insulin ([address] 05/25/2017    Thoracic disc disorder with myelopathy 05/22/2017    Gait abnormality 05/22/2017    Left ankle sprain 05/21/2017    Hemoptysis 04/24/2017    Pneumonia 04/23/2017    Hypertension complicating diabetes ([address] 02/20/2017    PNA (pneumonia) 02/09/2017    Asthmatic bronchitis with acute exacerbation 09/26/2016    Microalbuminuria 05/24/2016    Long-term insulin use in type 2 diabetes ([address] 11/10/2015    Chronic atrial fibrillation 07/17/2015    Hematoma of neck 07/09/2015    Cervical stenosis of spine 07/06/2015    Morbid obesity (HCC) 02/18/2014    Dizziness 02/17/2014    Weakness 02/17/2014    Anemia 01/22/2014    Respiratory failure 01/21/2014    Asthma exacerbation 01/21/2014    Hyperlipidemia     Depression     Neuropathy       Keila Duarte MD  Past Medical History:   Diagnosis Date    Asthma     Atrial fibrillation ([facility]     Depression     DM (diabetes mellitus) ([facility]     HTN (hypertension)     Hyperlipidemia     Morbid obesity ([facility]     Neuropathy     LITA on CPAP     Other ill-defined conditions(799.89) pneumonia      Past Surgical History:   Procedure Laterality Date    COLONOSCOPY Left 10/28/2019    COLONOSCOPY AND EGD performed by Esha Jiménez MD at [address] HX APPENDECTOMY      HX CHOLECYSTECTOMY      HX GASTRIC BYPASS      Jejunal bypass with subsequent reversal..  Lab Band since    HX OTHER SURGICAL  D & C    HX EMILIANO AND BSO      For uterine CA     Allergies   Allergen Reactions    Latex Itching    Kiwi Unknown (comments)    Codeine Rash    Lisinopril Other (comments)     Cough, vomiting, Visual disturbances    Morphine Itching    Statins-Hmg-Coa Reductase Inhibitors Other (comments)     Muscle enzyme problems      Family History   Problem Relation Age of Onset    Stroke Mother     Diabetes Other     Heart Disease Other     Heart Disease Father         congestive heart failure      Current Facility-Administered Medications   Medication Dose Route Frequency    albuterol-ipratropium (DUO-NEB) 2.5 MG-0.5 MG/3 ML  3 mL Nebulization Q6H RT    methylPREDNISolone (PF) (Solu-MEDROL) injection 125 mg  125 mg IntraVENous Q6H    albuterol (PROVENTIL VENTOLIN) nebulizer solution 2.5 mg  2.5 mg Nebulization Q3H PRN    warfarin (COUMADIN) tablet 5 mg  5 mg Oral ONCE    ALPRAZolam (XANAX) tablet 0.5 mg  0.5 mg Oral DAILY PRN    arformoteroL (BROVANA) neb solution 15 mcg  15 mcg Nebulization BID RT    budesonide (PULMICORT) 500 mcg/2 ml nebulizer suspension  500 mcg Nebulization BID RT    acetylcysteine (MUCOMYST) 200 mg/mL (20 %) solution 400 mg  2 mL Nebulization TID RT    oseltamivir (TAMIFLU) capsule 30 mg  30 mg Oral BID    dilTIAZem CD (CARDIZEM CD) capsule 120 mg  120 mg Oral BID    furosemide (LASIX) injection 40 mg  40 mg IntraVENous Q12H    vitamin B complex tablet  1 Tab Oral DAILY    biotin (VITAMIN B7) tablet 5 mg  5 mg Oral DAILY    cholecalciferol (VITAMIN D3) (1000 Units /25 mcg) tablet 2 Tab  2,000 Units Oral DAILY    digoxin (LANOXIN) tablet 0.25 mg  0.25 mg Oral DAILY    DULoxetine (CYMBALTA) capsule 60 mg  60 mg Oral DAILY    gabapentin (NEURONTIN) capsule 300 mg  300 mg Oral DAILY    gabapentin (NEURONTIN) capsule 600 mg  600 mg Oral QHS    therapeutic multivitamin (THERAGRAN) tablet 1 Tab  1 Tab Oral DAILY    sodium chloride (NS) flush 5-40 mL  5-40 mL IntraVENous Q8H    sodium chloride (NS) flush 5-40 mL  5-40 mL IntraVENous PRN    azithromycin (ZITHROMAX) 500 mg in 0.9% sodium chloride (MBP/ADV) 250 mL  500 mg IntraVENous Q24H    cefTRIAXone (ROCEPHIN) 1 g in 0.9% sodium chloride (MBP/ADV) 50 mL  1 g IntraVENous Q24H    acetaminophen (TYLENOL) tablet 650 mg  650 mg Oral Q4H PRN    guaiFENesin ER (MUCINEX) tablet 600 mg  600 mg Oral Q12H    glucose chewable tablet 16 g  4 Tab Oral PRN    glucagon (GLUCAGEN) injection 1 mg  1 mg IntraMUSCular PRN    dextrose 10% infusion 0-250 mL  0-250 mL IntraVENous PRN    hydrALAZINE (APRESOLINE) 20 mg/mL injection 20 mg  20 mg IntraVENous Q6H PRN    insulin NPH (NOVOLIN N, HUMULIN N) injection 35 Units  35 Units SubCUTAneous 7am    insulin NPH (NOVOLIN N, HUMULIN N) injection 25 Units  25 Units SubCUTAneous QHS    Warfarin - pharmacy to dose   Other Rx Dosing/Monitoring    sertraline (ZOLOFT) tablet 100 mg  100 mg Oral DAILY    insulin regular (NOVOLIN R, HUMULIN R) injection 15 Units  15 Units SubCUTAneous ACB    insulin regular (NOVOLIN R, HUMULIN R) injection 15 Units  15 Units SubCUTAneous ACL    insulin regular (NOVOLIN R, HUMULIN R) injection 10 Units  10 Units SubCUTAneous ACD    insulin lispro (HUMALOG) injection SubCUTAneous AC&HS        Prior to Admission Medications:  Prior to Admission medications    Medication Sig Start Date End Date Taking? Authorizing Provider   insulin NPH (NOVOLIN N, HUMULIN N) 100 unit/mL injection 35 Units by SubCUTAneous route every morning. INJECT 35 UNITS UNDER THE SKIN IN THE MORNING AND INJECT 25 UNITS AT BEDTIME OR AS DIRECTED   Yes Provider, Historical   insulin NPH (NOVOLIN N, HUMULIN N) 100 unit/mL injection 25 Units by SubCUTAneous route nightly. INJECT 35 UNITS UNDER THE SKIN IN THE MORNING AND INJECT 25 UNITS AT BEDTIME OR AS DIRECTED   Yes Provider, Historical   insulin regular (NOVOLIN R REGULAR U-100 INSULN) 100 unit/mL injection 15 Units by SubCUTAneous route Before breakfast, lunch, and dinner. Yes Provider, Historical   digoxin (LANOXIN) 0.25 mg tablet TAKE 1 TABLET BY MOUTH EVERY DAY 1/28/20  Yes Ricky Sutherland MD   albuterol (PROVENTIL VENTOLIN) 2.5 mg /3 mL (0.083 %) nebu 3 mL by Nebulization route four (4) times daily as needed for Wheezing or Shortness of Breath. 12/17/19  Yes Ricky Sutherland MD   fluticasone propion-salmeterol [facility] INHUB) 500-50 mcg/dose diskus inhaler Take 1 Puff by inhalation two (2) times a day. Yes Provider, Historical   warfarin (COUMADIN) 5 mg tablet Take 5 mg by mouth four (4) days a week. M-W-F: 2.5 mg  Tu-Th-Sat-Sun: 5 mg   Yes Provider, Historical   warfarin (COUMADIN) 2.5 mg tablet Take 2.5 mg by mouth every Monday, Wednesday, Friday. M-W-F: 2.5 mg  Tu-Th-Sat-Sun: 5 mg   Yes Provider, Historical   furosemide (LASIX) 40 mg tablet Take 40 mg by mouth every evening. Yes Provider, Historical   furosemide (LASIX) 40 mg tablet Take 80 mg by mouth daily. Yes Provider, Historical   gabapentin (NEURONTIN) 300 mg capsule Take 300 mg by mouth daily. Yes Provider, Historical   gabapentin (NEURONTIN) 300 mg capsule Take 600 mg by mouth nightly.    Yes Provider, Historical   sertraline (ZOLOFT) 100 mg tablet Take 100 mg by mouth daily. Yes Provider, Historical   dilTIAZem CD (CARDIZEM CD) 120 mg ER capsule TAKE 3 CAPSULES EVERY DAY 19  Yes Ed Steele MD   DULoxetine (CYMBALTA) 60 mg capsule Take 60 mg by mouth daily. Yes Provider, Historical   OTHER Allergy shots weekly. Yes Provider, Historical   EPINEPHrine (EPIPEN) 0.3 mg/0.3 mL injection 0.3 mg by IntraMUSCular route once as needed. Yes Provider, Historical   Biotin 2,500 mcg cap Take 1 Cap by mouth daily. Yes Provider, Historical   b complex vitamins tablet Take 1 Tab by mouth daily. Yes Provider, Historical   omalizumab Xochitl Homme) 150 mg solr 150 mg by SubCUTAneous route every fourteen (14) days. Next dose due    Yes Provider, Historical   therapeutic multivitamin (THERAGRAN) tablet Take 1 Tab by mouth daily. Yes Provider, Historical   PROVENTIL HFA 90 mcg/actuation inhaler inhale 2 puffs four times a day if needed for wheezing 1/17/15  Yes Ed Steele MD   cholecalciferol, vitamin D3, (VITAMIN D3) 2,000 unit tab Take 2,000 Units by mouth daily. Yes Provider, Historical   DOCOSAHEXANOIC ACID/EPA (FISH OIL PO) Take 1 Cap by mouth daily. Yes Provider, Historical        Review of Symptoms:  Except as noted in HPI, patient denies recent fever or chills, nausea, vomiting, diarrhea, hemoptysis, hematemesis, dysuria, myalgias, focal neurologic symptoms, ecchymosis, angioedema, odynophagia, dysphagia, sore throat, earache,rash, melena, hematochezia, depression, GERD, cold intolerance, petechia, bleeding gums, or significant weight loss. A comprehensive review of systems was negative except for that written in the HPI.      Subjective:    24 hr VS reviewed, overall VSSAF  Temp (24hrs), Av.9 °F (36.6 °C), Min:97.3 °F (36.3 °C), Max:98.1 °F (36.7 °C)    Patient Vitals for the past 8 hrs:   Pulse   20 1119 (!) 112   20 0807 86   20 0633 (!) 103    Patient Vitals for the past 8 hrs:   Resp   20 1119 20   20 0807 20    Patient Vitals for the past 8 hrs:   BP   02/20/20 1119 163/88   02/20/20 0807 102/73   02/20/20 0633 93/51          Intake/Output Summary (Last 24 hours) at 2/20/2020 1150  Last data filed at 2/20/2020 0859  Gross per 24 hour   Intake 2782.5 ml   Output 600 ml   Net 2182.5 ml         Physical Exam (complete single organ system exam)    Cons: The patient is no distress. Appears stated age. HEENT: Normal conjunctivae and palate. No xanthelasma. Neck: Flat JVP without appreciable HJR. Resp: Normal respiratory effort with clear lungs bilaterally. CV: Regular rate and rhythm. PMI not palpated. Normal S1,S2  No gallop or rubs appreciated. No murmur apprciated. Intact carotid upstroke bilaterally without appreciated bruits. Abdominal aorta not palpated; no abdominal bruit noted. Normal femoral pulses without bruits. Intact pedal pulses. No peripheral edema. GI: No abd mass noted, soft; no organomegaly noted. Bowel sounds present. Muscular:  No significant kyphosis. Strength WNL for age. Ext: No cyanosis, clubbing, or stigmata of peripheral embolization. Derm: No ulcers or stasis dermatitis of lower extremities. Neuro: Alert and oriented x 3;  Grossly non-focal. Normal mood and affect. Visit Vitals  /88 (BP 1 Location: Right arm, BP Patient Position: At rest)   Pulse (!) 112   Temp 98.1 °F (36.7 °C)   Resp 20   Wt 284 lb (128.8 kg)   SpO2 90%   BMI 44.48 kg/m²     General Appearance:  Well developed, well nourished,alert and oriented x 3, and individual moderate acute respiratory distress. Ears/Nose/Mouth/Throat:   Hearing grossly normal.         Neck: Supple. Chest:   Lungs wheezes and rales   Cardiovascular:  irregular rate and rhythm, S1, S2 normal, no murmur. Abdomen:   Soft, non-tender, bowel sounds are active. Extremities: 2+ edema bilaterally. Skin: Warm and dry.                Cardiographics    Telemetry: AFIB  ECG: atrial fibrillation, rate 120s  Echocardiogram: Not done    Labs:   Recent Results (from the past 24 hour(s))   GLUCOSE, POC    Collection Time: 02/19/20  1:14 PM   Result Value Ref Range    Glucose (POC) 363 (H) 65 - 100 mg/dL    Performed by Norma Cano    Collection Time: 02/19/20  2:53 PM   Result Value Ref Range    Adenovirus NOT DETECTED NOTD      Coronavirus 229E NOT DETECTED NOTD      Coronavirus HKU1 NOT DETECTED NOTD      Coronavirus CVNL63 NOT DETECTED NOTD      Coronavirus OC43 NOT DETECTED NOTD      Metapneumovirus NOT DETECTED NOTD      Rhinovirus and Enterovirus NOT DETECTED NOTD      Influenza A NOT DETECTED NOTD      Influenza A, subtype H1 NOT DETECTED NOTD      Influenza A, subtype H3 NOT DETECTED NOTD      INFLUENZA A H1N1 PCR DETECTED (A) NOTD      Influenza B NOT DETECTED NOTD      Parainfluenza 1 NOT DETECTED NOTD      Parainfluenza 2 NOT DETECTED NOTD      Parainfluenza 3 NOT DETECTED NOTD      Parainfluenza virus 4 NOT DETECTED NOTD      RSV by PCR NOT DETECTED NOTD      B. parapertussis, PCR NOT DETECTED NOTD      Bordetella pertussis - PCR NOT DETECTED NOTD      Chlamydophila pneumoniae DNA, QL, PCR NOT DETECTED NOTD      Mycoplasma pneumoniae DNA, QL, PCR NOT DETECTED NOTD     GLUCOSE, POC    Collection Time: 02/19/20  5:15 PM   Result Value Ref Range    Glucose (POC) 286 (H) 65 - 100 mg/dL    Performed by Alexandro De Paz    GLUCOSE, POC    Collection Time: 02/19/20 10:00 PM   Result Value Ref Range    Glucose (POC) 254 (H) 65 - 100 mg/dL    Performed by Brandon Porter (CON)    METABOLIC PANEL, BASIC    Collection Time: 02/20/20  3:52 AM   Result Value Ref Range    Sodium 136 136 - 145 mmol/L    Potassium 3.9 3.5 - 5.1 mmol/L    Chloride 104 97 - 108 mmol/L    CO2 24 21 - 32 mmol/L    Anion gap 8 5 - 15 mmol/L    Glucose 257 (H) 65 - 100 mg/dL    BUN 27 (H) 6 - 20 MG/DL    Creatinine 1.34 (H) 0.55 - 1.02 MG/DL    BUN/Creatinine ratio 20 12 - 20      GFR est AA 47 (L) >60 ml/min/1.73m2    GFR est non-AA 39 (L) >60 ml/min/1.73m2    Calcium 8.9 8.5 - 10.1 MG/DL   CBC W/O DIFF    Collection Time: 02/20/20  3:52 AM   Result Value Ref Range    WBC 10.2 3.6 - 11.0 K/uL    RBC 3.99 3.80 - 5.20 M/uL    HGB 11.5 11.5 - 16.0 g/dL    HCT 37.1 35.0 - 47.0 %    MCV 93.0 80.0 - 99.0 FL    MCH 28.8 26.0 - 34.0 PG    MCHC 31.0 30.0 - 36.5 g/dL    RDW 15.1 (H) 11.5 - 14.5 %    PLATELET 371 456 - 800 K/uL    MPV 9.3 8.9 - 12.9 FL    NRBC 0.0 0  WBC    ABSOLUTE NRBC 0.00 0.00 - 0.01 K/uL   PROTHROMBIN TIME + INR    Collection Time: 02/20/20  3:52 AM   Result Value Ref Range    INR 1.6 (H) 0.9 - 1.1      Prothrombin time 16.1 (H) 9.0 - 11.1 sec   GLUCOSE, POC    Collection Time: 02/20/20  6:44 AM   Result Value Ref Range    Glucose (POC) 230 (H) 65 - 100 mg/dL    Performed by Robyn Amyaa    GLUCOSE, POC    Collection Time: 02/20/20  8:47 AM   Result Value Ref Range    Glucose (POC) 224 (H) 65 - 100 mg/dL    Performed by Marcelina Wilson    GLUCOSE, POC    Collection Time: 02/20/20 11:43 AM   Result Value Ref Range    Glucose (POC) 196 (H) 65 - 100 mg/dL    Performed by Marcelina Wilson         Assessment:     Assessment:   Afib with RVR; due to acute respiratory failure  Flu  CHF;  I suspect she has acute diastolic HF as well  Morbid obesity      Plan:   Tele  Echo   Increase Cardizem  Agree with Digoxin for now  IV Mary Lou Keane MD

## 2020-02-20 NOTE — PROGRESS NOTES
Problem: Falls - Risk of  Goal: *Absence of Falls  Description  Document Obey Pantoja Fall Risk and appropriate interventions in the flowsheet. Outcome: Progressing Towards Goal  Note: Fall Risk Interventions:  Mobility Interventions: Patient to call before getting OOB    Mentation Interventions: Adequate sleep, hydration, pain control    Medication Interventions: Patient to call before getting OOB, Teach patient to arise slowly    Elimination Interventions: Call light in reach              Problem: Patient Education: Go to Patient Education Activity  Goal: Patient/Family Education  Outcome: Progressing Towards Goal     Problem: Risk for Spread of Infection  Goal: Prevent transmission of infectious organism to others  Description  Prevent the transmission of infectious organisms to other patients, staff members, and visitors. Outcome: Progressing Towards Goal     Problem: Patient Education:  Go to Education Activity  Goal: Patient/Family Education  Outcome: Progressing Towards Goal     Problem: Pneumonia: Day 2  Goal: Nutrition/Diet  Outcome: Progressing Towards Goal  Goal: Medications  Outcome: Progressing Towards Goal  Goal: Treatments/Interventions/Procedures  Outcome: Progressing Towards Goal  Goal: *Oxygen saturation within defined limits  Outcome: Progressing Towards Goal     Problem: Pressure Injury - Risk of  Goal: *Prevention of pressure injury  Description  Document Kobi Scale and appropriate interventions in the flowsheet. Outcome: Progressing Towards Goal  Note: Pressure Injury Interventions:             Activity Interventions: Pressure redistribution bed/mattress(bed type), Increase time out of bed    Mobility Interventions: HOB 30 degrees or less, Pressure redistribution bed/mattress (bed type)    Nutrition Interventions: Document food/fluid/supplement intake    Friction and Shear Interventions: Lift sheet, HOB 30 degrees or less                Problem: Patient Education: Go to Patient Education Activity  Goal: Patient/Family Education  Outcome: Progressing Towards Goal

## 2020-02-20 NOTE — PROGRESS NOTES
Problem: Falls - Risk of  Goal: *Absence of Falls  Description  Document Clide Failing Fall Risk and appropriate interventions in the flowsheet. Outcome: Progressing Towards Goal  Note: Fall Risk Interventions:  Mobility Interventions: Patient to call before getting OOB, Communicate number of staff needed for ambulation/transfer    Mentation Interventions: Adequate sleep, hydration, pain control    Medication Interventions: Patient to call before getting OOB, Teach patient to arise slowly    Elimination Interventions: Call light in reach              Problem: Risk for Spread of Infection  Goal: Prevent transmission of infectious organism to others  Description  Prevent the transmission of infectious organisms to other patients, staff members, and visitors.   Outcome: Progressing Towards Goal     Problem: Pneumonia: Day 1  Goal: Activity/Safety  Outcome: Progressing Towards Goal  Goal: Consults, if ordered  Outcome: Progressing Towards Goal  Goal: Diagnostic Test/Procedures  Outcome: Progressing Towards Goal  Goal: Nutrition/Diet  Outcome: Progressing Towards Goal  Goal: Medications  Outcome: Progressing Towards Goal  Goal: Respiratory  Outcome: Progressing Towards Goal  Goal: Treatments/Interventions/Procedures  Outcome: Progressing Towards Goal  Goal: Psychosocial  Outcome: Progressing Towards Goal  Goal: *Oxygen saturation within defined limits  Outcome: Progressing Towards Goal  Goal: *Influenza vaccine administered (October-March)  Outcome: Progressing Towards Goal  Goal: *Pneumoccocal vaccine administered  Outcome: Progressing Towards Goal  Goal: *Hemodynamically stable  Outcome: Progressing Towards Goal  Goal: *Demonstrates progressive activity  Outcome: Progressing Towards Goal  Goal: *Tolerating diet  Outcome: Progressing Towards Goal

## 2020-02-20 NOTE — PROGRESS NOTES
Pharmacist Note - Warfarin Dosing  Consult provided for this 75 y. o.female to manage warfarin for Atrial Fibrillation    INR Goal: 2 - 3    Home regimen/ tablet size: M-W-F: 2.5 mg; Tu-Th-Sat-Sun: 5 mg    Drugs that may increase INR: Macrolides  Drugs that may decrease INR: None  Other current anticoagulants/ drugs that may increase bleeding risk: None  Risk factors: Age > 65  Daily INR ordered: NO    Recent Labs     02/20/20  0352 02/18/20  2336 02/18/20  2315   HGB 11.5 11.9  --    INR 1.6*  --  2.3*     Date               INR                  Dose  2/18  2.3  5 mg (PTA)   2/19  --  2.5 mg   2/20  1.6  5 mg                                                                                  Assessment/ Plan: Will order warfarin 5 mg PO x 1 dose. Pharmacy will continue to monitor daily and adjust therapy as indicated. Please contact the pharmacist at [phone] or  for outpatient recommendations if needed. worsening shortness of breath 4 dasy

## 2020-02-20 NOTE — PROGRESS NOTES
Rocio Storey, Lewis Varela & Mj    Admit Date: 2/19/2020    Subjective:     Pt rather dyspneic with any exertion. She is Influenza A positive. No other new complaints.       Current Facility-Administered Medications   Medication Dose Route Frequency    albuterol-ipratropium (DUO-NEB) 2.5 MG-0.5 MG/3 ML  3 mL Nebulization Q6H RT    oseltamivir (TAMIFLU) capsule 75 mg  75 mg Oral BID    methylPREDNISolone (PF) (Solu-MEDROL) injection 125 mg  125 mg IntraVENous Q6H    albuterol (PROVENTIL VENTOLIN) nebulizer solution 2.5 mg  2.5 mg Nebulization Q3H PRN    vitamin B complex tablet  1 Tab Oral DAILY    biotin (VITAMIN B7) tablet 5 mg  5 mg Oral DAILY    cholecalciferol (VITAMIN D3) (1000 Units /25 mcg) tablet 2 Tab  2,000 Units Oral DAILY    digoxin (LANOXIN) tablet 0.25 mg  0.25 mg Oral DAILY    dilTIAZem CD (CARDIZEM CD) capsule 120 mg  120 mg Oral DAILY    DULoxetine (CYMBALTA) capsule 60 mg  60 mg Oral DAILY    furosemide (LASIX) tablet 40 mg  40 mg Oral QPM    gabapentin (NEURONTIN) capsule 300 mg  300 mg Oral DAILY    gabapentin (NEURONTIN) capsule 600 mg  600 mg Oral QHS    therapeutic multivitamin (THERAGRAN) tablet 1 Tab  1 Tab Oral DAILY    sodium chloride (NS) flush 5-40 mL  5-40 mL IntraVENous Q8H    sodium chloride (NS) flush 5-40 mL  5-40 mL IntraVENous PRN    azithromycin (ZITHROMAX) 500 mg in 0.9% sodium chloride (MBP/ADV) 250 mL  500 mg IntraVENous Q24H    cefTRIAXone (ROCEPHIN) 1 g in 0.9% sodium chloride (MBP/ADV) 50 mL  1 g IntraVENous Q24H    acetaminophen (TYLENOL) tablet 650 mg  650 mg Oral Q4H PRN    guaiFENesin ER (MUCINEX) tablet 600 mg  600 mg Oral Q12H    glucose chewable tablet 16 g  4 Tab Oral PRN    glucagon (GLUCAGEN) injection 1 mg  1 mg IntraMUSCular PRN    dextrose 10% infusion 0-250 mL  0-250 mL IntraVENous PRN    hydrALAZINE (APRESOLINE) 20 mg/mL injection 20 mg  20 mg IntraVENous Q6H PRN    insulin NPH (NOVOLIN N, HUMULIN N) injection 35 Units  35 Units SubCUTAneous 7am    insulin NPH (NOVOLIN N, HUMULIN N) injection 25 Units  25 Units SubCUTAneous QHS    Warfarin - pharmacy to dose   Other Rx Dosing/Monitoring    sertraline (ZOLOFT) tablet 100 mg  100 mg Oral DAILY    insulin regular (NOVOLIN R, HUMULIN R) injection 15 Units  15 Units SubCUTAneous ACB    insulin regular (NOVOLIN R, HUMULIN R) injection 15 Units  15 Units SubCUTAneous ACL    insulin regular (NOVOLIN R, HUMULIN R) injection 10 Units  10 Units SubCUTAneous ACD    insulin lispro (HUMALOG) injection   SubCUTAneous AC&HS          Objective:     Patient Vitals for the past 8 hrs:   BP Temp Pulse Resp SpO2 Weight   02/20/20 0633 93/51 -- (!) 103 -- 94 % --   02/20/20 0345 158/86 97.3 °F (36.3 °C) 83 20 99 % 284 lb (128.8 kg)   02/20/20 0046 149/74 98 °F (36.7 °C) 88 20 97 % --     No intake/output data recorded. 02/18 1901 - 02/20 0700  In: 2472.5 [P.O.:360; I.V.:2112.5]  Out: 600 [Urine:600]    Physical Exam: NAD. A&O. Neck -- Supple. No JVD. Heart -- Irr Irr (Rate about 100). Lungs -- Scattered exp wheezing. Abd -- Benign. Ext -- Trace, if any, LE edema, b/l.       Data Review   Recent Results (from the past 24 hour(s))   GLUCOSE, POC    Collection Time: 02/19/20  9:16 AM   Result Value Ref Range    Glucose (POC) 416 (H) 65 - 100 mg/dL    Performed by Michelet Darnell, POC    Collection Time: 02/19/20  1:14 PM   Result Value Ref Range    Glucose (POC) 363 (H) 65 - 100 mg/dL    Performed by [address]a [address]    Collection Time: 02/19/20  2:53 PM   Result Value Ref Range    Adenovirus NOT DETECTED NOTD      Coronavirus 229E NOT DETECTED NOTD      Coronavirus HKU1 NOT DETECTED NOTD      Coronavirus CVNL63 NOT DETECTED NOTD      Coronavirus OC43 NOT DETECTED NOTD      Metapneumovirus NOT DETECTED NOTD Rhinovirus and Enterovirus NOT DETECTED NOTD      Influenza A NOT DETECTED NOTD      Influenza A, subtype H1 NOT DETECTED NOTD      Influenza A, subtype H3 NOT DETECTED NOTD      INFLUENZA A H1N1 PCR DETECTED (A) NOTD      Influenza B NOT DETECTED NOTD      Parainfluenza 1 NOT DETECTED NOTD      Parainfluenza 2 NOT DETECTED NOTD      Parainfluenza 3 NOT DETECTED NOTD      Parainfluenza virus 4 NOT DETECTED NOTD      RSV by PCR NOT DETECTED NOTD      B. parapertussis, PCR NOT DETECTED NOTD      Bordetella pertussis - PCR NOT DETECTED NOTD      Chlamydophila pneumoniae DNA, QL, PCR NOT DETECTED NOTD      Mycoplasma pneumoniae DNA, QL, PCR NOT DETECTED NOTD     GLUCOSE, POC    Collection Time: 02/19/20  5:15 PM   Result Value Ref Range    Glucose (POC) 286 (H) 65 - 100 mg/dL    Performed by MOBEXO Sadie    GLUCOSE, POC    Collection Time: 02/19/20 10:00 PM   Result Value Ref Range    Glucose (POC) 254 (H) 65 - 100 mg/dL    Performed by Pardeep Halo (CON)    METABOLIC PANEL, BASIC    Collection Time: 02/20/20  3:52 AM   Result Value Ref Range    Sodium 136 136 - 145 mmol/L    Potassium 3.9 3.5 - 5.1 mmol/L    Chloride 104 97 - 108 mmol/L    CO2 24 21 - 32 mmol/L    Anion gap 8 5 - 15 mmol/L    Glucose 257 (H) 65 - 100 mg/dL    BUN 27 (H) 6 - 20 MG/DL    Creatinine 1.34 (H) 0.55 - 1.02 MG/DL    BUN/Creatinine ratio 20 12 - 20      GFR est AA 47 (L) >60 ml/min/1.73m2    GFR est non-AA 39 (L) >60 ml/min/1.73m2    Calcium 8.9 8.5 - 10.1 MG/DL   CBC W/O DIFF    Collection Time: 02/20/20  3:52 AM   Result Value Ref Range    WBC 10.2 3.6 - 11.0 K/uL    RBC 3.99 3.80 - 5.20 M/uL    HGB 11.5 11.5 - 16.0 g/dL    HCT 37.1 35.0 - 47.0 %    MCV 93.0 80.0 - 99.0 FL    MCH 28.8 26.0 - 34.0 PG    MCHC 31.0 30.0 - 36.5 g/dL    RDW 15.1 (H) 11.5 - 14.5 %    PLATELET 141 142 - 799 K/uL    MPV 9.3 8.9 - 12.9 FL    NRBC 0.0 0  WBC    ABSOLUTE NRBC 0.00 0.00 - 0.01 K/uL   PROTHROMBIN TIME + INR    Collection Time: 02/20/20 3:52 AM   Result Value Ref Range    INR 1.6 (H) 0.9 - 1.1      Prothrombin time 16.1 (H) 9.0 - 11.1 sec   GLUCOSE, POC    Collection Time: 02/20/20  6:44 AM   Result Value Ref Range    Glucose (POC) 230 (H) 65 - 100 mg/dL    Performed by [facility]            Assessment:     Principal Problem:    Acute respiratory failure due to Influenza, ? PNA, and associated asthma exacerbation. Active Problems:    Asthma exacerbation (1/21/2014)      Chronic atrial fibrillation (7/17/2015)      Pneumonia -- ?atypical/?viral      Type 2 diabetes mellitus with diabetic neuropathy ([facility] [address] (1/9/2018)      Influenza A        Plan:     1. She is on Rocephin & IV Zithromax. Add Tamiflu. 2. Change Prednisone to IV Solumedrol 125 mg IV QID. 3. Cont Nebs. 4. Pharmacy dosing Coumadin for chronic afib. 5. PT as able.           Juany Quigley MD

## 2020-02-20 NOTE — PROGRESS NOTES
TRANSITIONS OF CARE PLAN:   1. DESTINATION: Own home  2. TRANSPORT: Daughter  3. ADDITIONAL SUPPORT: daughter  3. DME: rollator, cane, cpap, nebulizer, glucometer  5. HOME HEALTH: declined h2h for pna  6. CODE STATUS/AMD STATUS: full code; on file  7. FOLLOW UP APPOINTMENTS: pcp, pul  8. STILL NEEDS: Echo, IV Lasix, IV Steroids, Duonebs, Cultures pending, O2 support will need to be weane, IV ABX, Tamiflu, pt consult    Reason for Admission:   Asthma Exacerbation, PNA                  RUR Score:     25%             Do you (patient/family) have any concerns for transition/discharge? None              Plan for utilizing home health:   Declined H2H    Current Advanced Directive/Advance Care Plan:  Full; on file            Transition of Care Plan:      Has distant hx of  and rehab but can't recall names of facilities. Patient was becoming SOB with speaking and was very lethargic during assessment. [facility] preference [facility]. Patient has declined [facility] for PNA at this time. Patient does not use O2 at baseline, so O2 will need to be weaned. Care Management Interventions  PCP Verified by CM: Yes(followed by Dr. Wiley)  Last Visit to PCP: 02/12/20  Mode of Transport at Discharge:  Other (see comment)(daughter to transport)  Transition of Care Consult (CM Consult): Discharge Planning  MyChart Signup: No  Discharge Durable Medical Equipment: No  Health Maintenance Reviewed: Yes(cm met with patient, with patient alert and oriented x 4)  Physical Therapy Consult: Yes  Occupational Therapy Consult: No  Speech Therapy Consult: No  Current Support Network: Own Home, Lives Alone, Family Lives Nearby  Confirm Follow Up Transport: Self(independent in adls to include living alone and driving)  The Patient and/or Patient Representative was Provided with a Choice of Provider and Agrees with the Discharge Plan?: Yes(declined H2H for PNA)  Name of the Patient Representative Who was Provided with a Choice of Provider and Agrees with the Discharge Plan: self; patient  Freedom of Choice List was Provided with Basic Dialogue that Supports the Patient's Individualized Plan of Care/Goals, Treatment Preferences and Shares the Quality Data Associated with the Providers?: Yes  [place] Resource Information Provided?: No  Discharge Location  Discharge Placement: Unable to determine at this time(lives alone in a 6th floor apartment; halfway community [place] apartment, elevator access)    CRM: Pieter Guillen, MPH, [address] [place] [address]; Z: [phone]

## 2020-02-20 NOTE — PROGRESS NOTES
Pulmonary, Critical Care, and Sleep Medicine~Progress Note    Name: Leon Adams MRN: [id number]   : 1945 [facility]   Date: 2020 10:35 AM Admission: 2020     Impression Plan   1. Acute hypoxic resp failure secondary to below  2. AE of Asthma secondary flu A +: followed by Dr Lee Jaramillo   3. Abnormal chest x-ray: worrisome for viral PNA, pulmonary edema? 4. P a fib~ on coumadin  5. LITA  6. Depression/anxiety  1. Repeat chest x-ray today  2. diuretics started yesterday  3. Azithromycin/rocephin   4. High dose steroids   5. Duonebs. Add brovana/pulmicort   6. Few doses of mucomyst   7. Sputum culture if possible  8. PAP therapy at night   9. O2 titration above 90%   10. She is at high risk of decline and for MDROs     Daily Progression:    Feels bad. I have reviewed the labs and previous days notes. Pertinent items are noted in HPI. OBJECTIVE:     Vital Signs:       Visit Vitals  /73 (BP 1 Location: Right arm, BP Patient Position: At rest)   Pulse 86   Temp 97.8 °F (36.6 °C)   Resp 20   Wt 128.8 kg (284 lb)   SpO2 94%   BMI 44.48 kg/m²      Temp (24hrs), Av.8 °F (36.6 °C), Min:97.3 °F (36.3 °C), Max:98.1 °F (36.7 °C)     Intake/Output:     Last shift: No intake/output data recorded.     Last 3 shifts:  1901 -  0700  In: 2472.5 [P.O.:360; I.V.:2112.5]  Out: 600 [Urine:600]          Intake/Output Summary (Last 24 hours) at 2020 1035  Last data filed at 2020 0400  Gross per 24 hour   Intake 2422.5 ml   Output 600 ml   Net 1822.5 ml       Physical Exam:                                        Exam Findings Other   General: No resp distress noted, appears stated age    HEENT:  No ulcers, JVD not elevated, no cervical LAD    Chest: No pectus deformity, normal chest rise b/l    HEART:  RRR, no murmurs/rubs/gallops    Lungs:  Coarse     ABD: Soft/NT, non rigid mildly distended EXT: No cyanosis/clubbing/edema, normal peripheral pulses    Skin: No rashes or ulcers, no mottling    Neuro: A/O x 3        Medications:  Current Facility-Administered Medications   Medication Dose Route Frequency    albuterol-ipratropium (DUO-NEB) 2.5 MG-0.5 MG/3 ML  3 mL Nebulization Q6H RT    oseltamivir (TAMIFLU) capsule 75 mg  75 mg Oral BID    methylPREDNISolone (PF) (Solu-MEDROL) injection 125 mg  125 mg IntraVENous Q6H    albuterol (PROVENTIL VENTOLIN) nebulizer solution 2.5 mg  2.5 mg Nebulization Q3H PRN    vitamin B complex tablet  1 Tab Oral DAILY    biotin (VITAMIN B7) tablet 5 mg  5 mg Oral DAILY    cholecalciferol (VITAMIN D3) (1000 Units /25 mcg) tablet 2 Tab  2,000 Units Oral DAILY    digoxin (LANOXIN) tablet 0.25 mg  0.25 mg Oral DAILY    dilTIAZem CD (CARDIZEM CD) capsule 120 mg  120 mg Oral DAILY    DULoxetine (CYMBALTA) capsule 60 mg  60 mg Oral DAILY    furosemide (LASIX) tablet 40 mg  40 mg Oral QPM    gabapentin (NEURONTIN) capsule 300 mg  300 mg Oral DAILY    gabapentin (NEURONTIN) capsule 600 mg  600 mg Oral QHS    therapeutic multivitamin (THERAGRAN) tablet 1 Tab  1 Tab Oral DAILY    sodium chloride (NS) flush 5-40 mL  5-40 mL IntraVENous Q8H    sodium chloride (NS) flush 5-40 mL  5-40 mL IntraVENous PRN    azithromycin (ZITHROMAX) 500 mg in 0.9% sodium chloride (MBP/ADV) 250 mL  500 mg IntraVENous Q24H    cefTRIAXone (ROCEPHIN) 1 g in 0.9% sodium chloride (MBP/ADV) 50 mL  1 g IntraVENous Q24H    acetaminophen (TYLENOL) tablet 650 mg  650 mg Oral Q4H PRN    guaiFENesin ER (MUCINEX) tablet 600 mg  600 mg Oral Q12H    glucose chewable tablet 16 g  4 Tab Oral PRN    glucagon (GLUCAGEN) injection 1 mg  1 mg IntraMUSCular PRN    dextrose 10% infusion 0-250 mL  0-250 mL IntraVENous PRN    hydrALAZINE (APRESOLINE) 20 mg/mL injection 20 mg  20 mg IntraVENous Q6H PRN    insulin NPH (NOVOLIN N, HUMULIN N) injection 35 Units  35 Units SubCUTAneous 7am    insulin NPH (NOVOLIN N, HUMULIN N) injection 25 Units  25 Units SubCUTAneous QHS    Warfarin - pharmacy to dose   Other Rx Dosing/Monitoring    sertraline (ZOLOFT) tablet 100 mg  100 mg Oral DAILY    insulin regular (NOVOLIN R, HUMULIN R) injection 15 Units  15 Units SubCUTAneous ACB    insulin regular (NOVOLIN R, HUMULIN R) injection 15 Units  15 Units SubCUTAneous ACL    insulin regular (NOVOLIN R, HUMULIN R) injection 10 Units  10 Units SubCUTAneous ACD    insulin lispro (HUMALOG) injection   SubCUTAneous AC&HS       Labs:  ABG Recent Labs     02/19/20  0412   PHI 7.365   PCO2I 40.1   PO2I 73*   HCO3I 22.9   SO2I 94        CBC Recent Labs     02/20/20  0352 02/18/20  2336   WBC 10.2 9.9   HGB 11.5 11.9   HCT 37.1 37.8    220   MCV 93.0 92.0   MCH 28.8 53.3        Metabolic  Panel Recent Labs     02/20/20  0352 02/18/20  2336 02/18/20  2315    133*  --    K 3.9 4.1  --     102  --    CO2 24 24  --    * 205*  --    BUN 27* 24*  --    CREA 1.34* 1.40*  --    CA 8.9 9.9  --    ALB  --  4.0  --    SGOT  --  36  --    ALT  --  38  --    INR 1.6*  --  2.3*        Pertinent Labs                Kade Santos PA-C  2/20/2020

## 2020-02-21 ENCOUNTER — APPOINTMENT (OUTPATIENT)
Dept: NON INVASIVE DIAGNOSTICS | Age: 75
DRG: 871 | End: 2020-02-21
Attending: INTERNAL MEDICINE
Payer: MEDICARE

## 2020-02-21 LAB
ANION GAP SERPL CALC-SCNC: 6 MMOL/L (ref 5–15)
BUN SERPL-MCNC: 26 MG/DL (ref 6–20)
BUN/CREAT SERPL: 24 (ref 12–20)
CALCIUM SERPL-MCNC: 8.6 MG/DL (ref 8.5–10.1)
CHLORIDE SERPL-SCNC: 105 MMOL/L (ref 97–108)
CO2 SERPL-SCNC: 25 MMOL/L (ref 21–32)
CREAT SERPL-MCNC: 1.09 MG/DL (ref 0.55–1.02)
GLUCOSE BLD STRIP.AUTO-MCNC: 238 MG/DL (ref 65–100)
GLUCOSE BLD STRIP.AUTO-MCNC: 313 MG/DL (ref 65–100)
GLUCOSE BLD STRIP.AUTO-MCNC: 377 MG/DL (ref 65–100)
GLUCOSE BLD STRIP.AUTO-MCNC: 405 MG/DL (ref 65–100)
GLUCOSE BLD STRIP.AUTO-MCNC: 426 MG/DL (ref 65–100)
GLUCOSE SERPL-MCNC: 342 MG/DL (ref 65–100)
INR PPP: 1.7 (ref 0.9–1.1)
POTASSIUM SERPL-SCNC: 4.3 MMOL/L (ref 3.5–5.1)
PROTHROMBIN TIME: 17.2 SEC (ref 9–11.1)
SERVICE CMNT-IMP: ABNORMAL
SODIUM SERPL-SCNC: 136 MMOL/L (ref 136–145)

## 2020-02-21 PROCEDURE — 74011636637 HC RX REV CODE- 636/637: Performed by: INTERNAL MEDICINE

## 2020-02-21 PROCEDURE — 36415 COLL VENOUS BLD VENIPUNCTURE: CPT

## 2020-02-21 PROCEDURE — 74011250636 HC RX REV CODE- 250/636: Performed by: STUDENT IN AN ORGANIZED HEALTH CARE EDUCATION/TRAINING PROGRAM

## 2020-02-21 PROCEDURE — 85610 PROTHROMBIN TIME: CPT

## 2020-02-21 PROCEDURE — 74011000250 HC RX REV CODE- 250: Performed by: PHYSICIAN ASSISTANT

## 2020-02-21 PROCEDURE — 74011000250 HC RX REV CODE- 250: Performed by: STUDENT IN AN ORGANIZED HEALTH CARE EDUCATION/TRAINING PROGRAM

## 2020-02-21 PROCEDURE — 94640 AIRWAY INHALATION TREATMENT: CPT

## 2020-02-21 PROCEDURE — 65660000000 HC RM CCU STEPDOWN

## 2020-02-21 PROCEDURE — 74011250636 HC RX REV CODE- 250/636: Performed by: PHYSICIAN ASSISTANT

## 2020-02-21 PROCEDURE — 97161 PT EVAL LOW COMPLEX 20 MIN: CPT

## 2020-02-21 PROCEDURE — 74011636637 HC RX REV CODE- 636/637: Performed by: STUDENT IN AN ORGANIZED HEALTH CARE EDUCATION/TRAINING PROGRAM

## 2020-02-21 PROCEDURE — 80048 BASIC METABOLIC PNL TOTAL CA: CPT

## 2020-02-21 PROCEDURE — 74011250637 HC RX REV CODE- 250/637: Performed by: STUDENT IN AN ORGANIZED HEALTH CARE EDUCATION/TRAINING PROGRAM

## 2020-02-21 PROCEDURE — 74011000258 HC RX REV CODE- 258: Performed by: STUDENT IN AN ORGANIZED HEALTH CARE EDUCATION/TRAINING PROGRAM

## 2020-02-21 PROCEDURE — 97530 THERAPEUTIC ACTIVITIES: CPT

## 2020-02-21 PROCEDURE — 82962 GLUCOSE BLOOD TEST: CPT

## 2020-02-21 PROCEDURE — 74011250637 HC RX REV CODE- 250/637: Performed by: INTERNAL MEDICINE

## 2020-02-21 PROCEDURE — 93306 TTE W/DOPPLER COMPLETE: CPT

## 2020-02-21 PROCEDURE — 74011250636 HC RX REV CODE- 250/636: Performed by: INTERNAL MEDICINE

## 2020-02-21 RX ORDER — WARFARIN SODIUM 5 MG/1
5 TABLET ORAL ONCE
Status: COMPLETED | OUTPATIENT
Start: 2020-02-21 | End: 2020-02-21

## 2020-02-21 RX ORDER — OSELTAMIVIR PHOSPHATE 75 MG/1
75 CAPSULE ORAL 2 TIMES DAILY
Status: DISCONTINUED | OUTPATIENT
Start: 2020-02-21 | End: 2020-02-23 | Stop reason: DRUGHIGH

## 2020-02-21 RX ADMIN — GUAIFENESIN 600 MG: 600 TABLET, EXTENDED RELEASE ORAL at 08:44

## 2020-02-21 RX ADMIN — FUROSEMIDE 40 MG: 10 INJECTION, SOLUTION INTRAMUSCULAR; INTRAVENOUS at 20:42

## 2020-02-21 RX ADMIN — ARFORMOTEROL TARTRATE 15 MCG: 15 SOLUTION RESPIRATORY (INHALATION) at 20:24

## 2020-02-21 RX ADMIN — INSULIN LISPRO 9 UNITS: 100 INJECTION, SOLUTION INTRAVENOUS; SUBCUTANEOUS at 17:39

## 2020-02-21 RX ADMIN — HUMAN INSULIN 10 UNITS: 100 INJECTION, SOLUTION SUBCUTANEOUS at 17:39

## 2020-02-21 RX ADMIN — METHYLPREDNISOLONE SODIUM SUCCINATE 125 MG: 125 INJECTION, POWDER, FOR SOLUTION INTRAMUSCULAR; INTRAVENOUS at 01:59

## 2020-02-21 RX ADMIN — Medication 10 ML: at 22:47

## 2020-02-21 RX ADMIN — IPRATROPIUM BROMIDE AND ALBUTEROL SULFATE 3 ML: .5; 3 SOLUTION RESPIRATORY (INHALATION) at 01:23

## 2020-02-21 RX ADMIN — MELATONIN 2 TABLET: at 08:44

## 2020-02-21 RX ADMIN — METHYLPREDNISOLONE SODIUM SUCCINATE 60 MG: 125 INJECTION, POWDER, FOR SOLUTION INTRAMUSCULAR; INTRAVENOUS at 20:42

## 2020-02-21 RX ADMIN — CEFTRIAXONE 1 G: 1 INJECTION, POWDER, FOR SOLUTION INTRAMUSCULAR; INTRAVENOUS at 03:07

## 2020-02-21 RX ADMIN — FUROSEMIDE 40 MG: 10 INJECTION, SOLUTION INTRAMUSCULAR; INTRAVENOUS at 08:46

## 2020-02-21 RX ADMIN — SERTRALINE HYDROCHLORIDE 100 MG: 50 TABLET ORAL at 08:46

## 2020-02-21 RX ADMIN — BUDESONIDE INHALATION 500 MCG: 0.5 SUSPENSION RESPIRATORY (INHALATION) at 07:17

## 2020-02-21 RX ADMIN — IPRATROPIUM BROMIDE AND ALBUTEROL SULFATE 3 ML: .5; 3 SOLUTION RESPIRATORY (INHALATION) at 07:17

## 2020-02-21 RX ADMIN — METHYLPREDNISOLONE SODIUM SUCCINATE 60 MG: 125 INJECTION, POWDER, FOR SOLUTION INTRAMUSCULAR; INTRAVENOUS at 13:17

## 2020-02-21 RX ADMIN — Medication 10 ML: at 13:20

## 2020-02-21 RX ADMIN — THERA TABS 1 TABLET: TAB at 08:46

## 2020-02-21 RX ADMIN — OSELTAMIVIR PHOSPHATE 30 MG: 30 CAPSULE ORAL at 08:44

## 2020-02-21 RX ADMIN — HUMAN INSULIN 15 UNITS: 100 INJECTION, SOLUTION SUBCUTANEOUS at 12:22

## 2020-02-21 RX ADMIN — Medication 5 MG: at 08:43

## 2020-02-21 RX ADMIN — ACETYLCYSTEINE 400 MG: 200 SOLUTION ORAL; RESPIRATORY (INHALATION) at 07:17

## 2020-02-21 RX ADMIN — AZITHROMYCIN MONOHYDRATE 500 MG: 500 INJECTION, POWDER, LYOPHILIZED, FOR SOLUTION INTRAVENOUS at 01:58

## 2020-02-21 RX ADMIN — INSULIN LISPRO 15 UNITS: 100 INJECTION, SOLUTION INTRAVENOUS; SUBCUTANEOUS at 12:21

## 2020-02-21 RX ADMIN — Medication 10 ML: at 07:06

## 2020-02-21 RX ADMIN — GABAPENTIN 600 MG: 300 CAPSULE ORAL at 22:44

## 2020-02-21 RX ADMIN — GUAIFENESIN 600 MG: 600 TABLET, EXTENDED RELEASE ORAL at 20:44

## 2020-02-21 RX ADMIN — HUMAN INSULIN 15 UNITS: 100 INJECTION, SOLUTION SUBCUTANEOUS at 08:42

## 2020-02-21 RX ADMIN — OSELTAMIVIR PHOSPHATE 75 MG: 75 CAPSULE ORAL at 20:44

## 2020-02-21 RX ADMIN — DILTIAZEM HYDROCHLORIDE 360 MG: 180 CAPSULE, EXTENDED RELEASE ORAL at 08:43

## 2020-02-21 RX ADMIN — INSULIN LISPRO 15 UNITS: 100 INJECTION, SOLUTION INTRAVENOUS; SUBCUTANEOUS at 08:43

## 2020-02-21 RX ADMIN — IPRATROPIUM BROMIDE AND ALBUTEROL SULFATE 3 ML: .5; 3 SOLUTION RESPIRATORY (INHALATION) at 15:26

## 2020-02-21 RX ADMIN — DULOXETINE HYDROCHLORIDE 60 MG: 60 CAPSULE, DELAYED RELEASE ORAL at 08:44

## 2020-02-21 RX ADMIN — Medication 1 TABLET: at 08:44

## 2020-02-21 RX ADMIN — BUDESONIDE INHALATION 500 MCG: 0.5 SUSPENSION RESPIRATORY (INHALATION) at 20:24

## 2020-02-21 RX ADMIN — DIGOXIN 0.25 MG: 125 TABLET ORAL at 08:44

## 2020-02-21 RX ADMIN — HUMAN INSULIN 35 UNITS: 100 INJECTION, SUSPENSION SUBCUTANEOUS at 07:10

## 2020-02-21 RX ADMIN — INSULIN LISPRO 3 UNITS: 100 INJECTION, SOLUTION INTRAVENOUS; SUBCUTANEOUS at 22:45

## 2020-02-21 RX ADMIN — WARFARIN SODIUM 5 MG: 5 TABLET ORAL at 17:40

## 2020-02-21 RX ADMIN — METHYLPREDNISOLONE SODIUM SUCCINATE 125 MG: 125 INJECTION, POWDER, FOR SOLUTION INTRAMUSCULAR; INTRAVENOUS at 08:46

## 2020-02-21 RX ADMIN — HUMAN INSULIN 25 UNITS: 100 INJECTION, SUSPENSION SUBCUTANEOUS at 22:46

## 2020-02-21 RX ADMIN — GABAPENTIN 300 MG: 300 CAPSULE ORAL at 08:46

## 2020-02-21 NOTE — PROGRESS NOTES
Problem: Falls - Risk of  Goal: *Absence of Falls  Description  Document Maurice Rushing Fall Risk and appropriate interventions in the flowsheet. Outcome: Progressing Towards Goal  Note: Fall Risk Interventions:  Mobility Interventions: Patient to call before getting OOB    Mentation Interventions: Adequate sleep, hydration, pain control, Toileting rounds    Medication Interventions: Teach patient to arise slowly, Patient to call before getting OOB    Elimination Interventions: Call light in reach, Patient to call for help with toileting needs              Problem: Patient Education: Go to Patient Education Activity  Goal: Patient/Family Education  Outcome: Progressing Towards Goal     Problem: Risk for Spread of Infection  Goal: Prevent transmission of infectious organism to others  Description  Prevent the transmission of infectious organisms to other patients, staff members, and visitors. Outcome: Progressing Towards Goal     Problem: Patient Education:  Go to Education Activity  Goal: Patient/Family Education  Outcome: Progressing Towards Goal     Problem: Pneumonia: Day 3  Goal: Activity/Safety  Outcome: Progressing Towards Goal  Goal: Nutrition/Diet  Outcome: Progressing Towards Goal  Goal: Medications  Outcome: Progressing Towards Goal  Goal: Respiratory  Outcome: Progressing Towards Goal  Goal: Psychosocial  Outcome: Progressing Towards Goal  Goal: *Oxygen saturation within defined limits  Outcome: Progressing Towards Goal  Goal: *Tolerating diet  Outcome: Progressing Towards Goal     Problem: Pressure Injury - Risk of  Goal: *Prevention of pressure injury  Description  Document Kobi Scale and appropriate interventions in the flowsheet. Outcome: Progressing Towards Goal  Note: Pressure Injury Interventions:             Activity Interventions: Increase time out of bed    Mobility Interventions: Assess need for specialty bed, HOB 30 degrees or less, Pressure redistribution bed/mattress (bed type)    Nutrition Interventions: Document food/fluid/supplement intake    Friction and Shear Interventions: Minimize layers, Lift sheet, HOB 30 degrees or less                Problem: Patient Education: Go to Patient Education Activity  Goal: Patient/Family Education  Outcome: Progressing Towards Goal

## 2020-02-21 NOTE — PROGRESS NOTES
Pulmonary, Critical Care, and Sleep Medicine~Progress Note    Name: Elly Nunes MRN: [id number]   : 1945 Hospital: [address]   Date: 2020 10:35 AM Admission: 2020     Impression Plan   1. Acute hypoxic resp failure secondary to below  2. AE of Asthma secondary flu A +: followed by Dr Andres Gorman   3. Abnormal chest x-ray: worrisome for viral PNA, but likely pulmonary edema with small pleural effusions   4. P a fib~ on coumadin; recent RVR  5. LITA  6. Depression/anxiety  1. Suggest chest x-ray over the weekend   2. diuretics started yesterday  3. ECHO pending   4. Azithromycin/rocephin   5. Can start to wean steroids    6. Duonebs/ brovana/pulmicort   7. Few more doses of mucomyst   8. Sputum culture if possible  9. PAP therapy at night, if possible    10. O2 titration above 90%   11. She is at high risk of decline and for MDROs  12. Prn over the weekend      Daily Progression:    Feels bad, but not as bad. I have reviewed the labs and previous days notes. Pertinent items are noted in HPI. OBJECTIVE:     Vital Signs:       Visit Vitals  /84   Pulse 88   Temp 98 °F (36.7 °C)   Resp 20   Wt 126.3 kg (278 lb 8 oz)   SpO2 96%   BMI 43.62 kg/m²      Temp (24hrs), Av.2 °F (36.8 °C), Min:98 °F (36.7 °C), Max:98.5 °F (36.9 °C)     Intake/Output:     Last shift: No intake/output data recorded.     Last 3 shifts:  1901 -  0700  In: 3322.5 [P.O.:960; I.V.:2362.5]  Out: 400 [Urine:400]          Intake/Output Summary (Last 24 hours) at 2020 1100  Last data filed at 2020 0307  Gross per 24 hour   Intake 660 ml   Output 400 ml   Net 260 ml       Physical Exam:                                        Exam Findings Other   General: No resp distress noted, appears stated age    HEENT:  No ulcers, JVD not elevated, no cervical LAD    Chest: No pectus deformity, normal chest rise b/l    HEART:  IRR, no murmurs/rubs/gallops    Lungs:  Less coarse today      ABD: Soft/NT, non rigid mildly distended    EXT: No cyanosis/clubbing/edema, normal peripheral pulses    Skin: No rashes or ulcers, no mottling    Neuro: A/O x 3        Medications:  Current Facility-Administered Medications   Medication Dose Route Frequency    warfarin (COUMADIN) tablet 5 mg  5 mg Oral ONCE    albuterol-ipratropium (DUO-NEB) 2.5 MG-0.5 MG/3 ML  3 mL Nebulization Q6H RT    methylPREDNISolone (PF) (Solu-MEDROL) injection 125 mg  125 mg IntraVENous Q6H    albuterol (PROVENTIL VENTOLIN) nebulizer solution 2.5 mg  2.5 mg Nebulization Q3H PRN    ALPRAZolam (XANAX) tablet 0.5 mg  0.5 mg Oral DAILY PRN    arformoteroL (BROVANA) neb solution 15 mcg  15 mcg Nebulization BID RT    budesonide (PULMICORT) 500 mcg/2 ml nebulizer suspension  500 mcg Nebulization BID RT    acetylcysteine (MUCOMYST) 200 mg/mL (20 %) solution 400 mg  2 mL Nebulization TID RT    oseltamivir (TAMIFLU) capsule 30 mg  30 mg Oral BID    furosemide (LASIX) injection 40 mg  40 mg IntraVENous Q12H    dilTIAZem CD (CARDIZEM CD) capsule 360 mg  360 mg Oral DAILY    vitamin B complex tablet  1 Tab Oral DAILY    biotin (VITAMIN B7) tablet 5 mg  5 mg Oral DAILY    cholecalciferol (VITAMIN D3) (1000 Units /25 mcg) tablet 2 Tab  2,000 Units Oral DAILY    digoxin (LANOXIN) tablet 0.25 mg  0.25 mg Oral DAILY    DULoxetine (CYMBALTA) capsule 60 mg  60 mg Oral DAILY    gabapentin (NEURONTIN) capsule 300 mg  300 mg Oral DAILY    gabapentin (NEURONTIN) capsule 600 mg  600 mg Oral QHS    therapeutic multivitamin (THERAGRAN) tablet 1 Tab  1 Tab Oral DAILY    sodium chloride (NS) flush 5-40 mL  5-40 mL IntraVENous Q8H    sodium chloride (NS) flush 5-40 mL  5-40 mL IntraVENous PRN    azithromycin (ZITHROMAX) 500 mg in 0.9% sodium chloride (MBP/ADV) 250 mL  500 mg IntraVENous Q24H    cefTRIAXone (ROCEPHIN) 1 g in 0.9% sodium chloride (MBP/ADV) 50 mL  1 g IntraVENous Q24H    acetaminophen (TYLENOL) tablet 650 mg  650 mg Oral Q4H PRN    guaiFENesin ER (MUCINEX) tablet 600 mg  600 mg Oral Q12H    glucose chewable tablet 16 g  4 Tab Oral PRN    glucagon (GLUCAGEN) injection 1 mg  1 mg IntraMUSCular PRN    dextrose 10% infusion 0-250 mL  0-250 mL IntraVENous PRN    hydrALAZINE (APRESOLINE) 20 mg/mL injection 20 mg  20 mg IntraVENous Q6H PRN    insulin NPH (NOVOLIN N, HUMULIN N) injection 35 Units  35 Units SubCUTAneous 7am    insulin NPH (NOVOLIN N, HUMULIN N) injection 25 Units  25 Units SubCUTAneous QHS    Warfarin - pharmacy to dose   Other Rx Dosing/Monitoring    sertraline (ZOLOFT) tablet 100 mg  100 mg Oral DAILY    insulin regular (NOVOLIN R, HUMULIN R) injection 15 Units  15 Units SubCUTAneous ACB    insulin regular (NOVOLIN R, HUMULIN R) injection 15 Units  15 Units SubCUTAneous ACL    insulin regular (NOVOLIN R, HUMULIN R) injection 10 Units  10 Units SubCUTAneous ACD    insulin lispro (HUMALOG) injection   SubCUTAneous AC&HS       Labs:  ABG Recent Labs     02/19/20  0412   PHI 7.365   PCO2I 40.1   PO2I 73*   HCO3I 22.9   SO2I 94        CBC Recent Labs     02/20/20  0352 02/18/20  2336   WBC 10.2 9.9   HGB 11.5 11.9   HCT 37.1 37.8    220   MCV 93.0 92.0   MCH 28.8 38.0        Metabolic  Panel Recent Labs     02/21/20  0318 02/20/20  0352 02/18/20  2336 02/18/20  2315    136 133*  --    K 4.3 3.9 4.1  --     104 102  --    CO2 25 24 24  --    * 257* 205*  --    BUN 26* 27* 24*  --    CREA 1.09* 1.34* 1.40*  --    CA 8.6 8.9 9.9  --    ALB  --   --  4.0  --    SGOT  --   --  36  --    ALT  --   --  38  --    INR 1.7* 1.6*  --  2.3*        Pertinent Labs                Kade Bang PA-C  2/21/2020

## 2020-02-21 NOTE — PROGRESS NOTES
[facility]    Admit Date: 2/19/2020    Subjective:     Pt still rather dyspneic with any exertion. She had some rapid afib yesterday, and Dr. Keyshawn Barker consulted Dr. Jcarlos Pabon. It turns out she takes 360 mg of Dilt every day (not 120 mg). HR better now. ECHO pending. No other new complaints.       Current Facility-Administered Medications   Medication Dose Route Frequency    warfarin (COUMADIN) tablet 5 mg  5 mg Oral ONCE    albuterol-ipratropium (DUO-NEB) 2.5 MG-0.5 MG/3 ML  3 mL Nebulization Q6H RT    methylPREDNISolone (PF) (Solu-MEDROL) injection 125 mg  125 mg IntraVENous Q6H    albuterol (PROVENTIL VENTOLIN) nebulizer solution 2.5 mg  2.5 mg Nebulization Q3H PRN    ALPRAZolam (XANAX) tablet 0.5 mg  0.5 mg Oral DAILY PRN    arformoteroL (BROVANA) neb solution 15 mcg  15 mcg Nebulization BID RT    budesonide (PULMICORT) 500 mcg/2 ml nebulizer suspension  500 mcg Nebulization BID RT    acetylcysteine (MUCOMYST) 200 mg/mL (20 %) solution 400 mg  2 mL Nebulization TID RT    oseltamivir (TAMIFLU) capsule 30 mg  30 mg Oral BID    furosemide (LASIX) injection 40 mg  40 mg IntraVENous Q12H    dilTIAZem CD (CARDIZEM CD) capsule 360 mg  360 mg Oral DAILY    vitamin B complex tablet  1 Tab Oral DAILY    biotin (VITAMIN B7) tablet 5 mg  5 mg Oral DAILY    cholecalciferol (VITAMIN D3) (1000 Units /25 mcg) tablet 2 Tab  2,000 Units Oral DAILY    digoxin (LANOXIN) tablet 0.25 mg  0.25 mg Oral DAILY    DULoxetine (CYMBALTA) capsule 60 mg  60 mg Oral DAILY    gabapentin (NEURONTIN) capsule 300 mg  300 mg Oral DAILY    gabapentin (NEURONTIN) capsule 600 mg  600 mg Oral QHS    therapeutic multivitamin (THERAGRAN) tablet 1 Tab  1 Tab Oral DAILY    sodium chloride (NS) flush 5-40 mL  5-40 mL IntraVENous Q8H    sodium chloride (NS) flush 5-40 mL  5-40 mL IntraVENous PRN    azithromycin (ZITHROMAX) 500 mg in 0.9% sodium chloride (MBP/ADV) 250 mL  500 mg IntraVENous Q24H    cefTRIAXone (ROCEPHIN) 1 g in 0.9% sodium chloride (MBP/ADV) 50 mL  1 g IntraVENous Q24H    acetaminophen (TYLENOL) tablet 650 mg  650 mg Oral Q4H PRN    guaiFENesin ER (MUCINEX) tablet 600 mg  600 mg Oral Q12H    glucose chewable tablet 16 g  4 Tab Oral PRN    glucagon (GLUCAGEN) injection 1 mg  1 mg IntraMUSCular PRN    dextrose 10% infusion 0-250 mL  0-250 mL IntraVENous PRN    hydrALAZINE (APRESOLINE) 20 mg/mL injection 20 mg  20 mg IntraVENous Q6H PRN    insulin NPH (NOVOLIN N, HUMULIN N) injection 35 Units  35 Units SubCUTAneous 7am    insulin NPH (NOVOLIN N, HUMULIN N) injection 25 Units  25 Units SubCUTAneous QHS    Warfarin - pharmacy to dose   Other Rx Dosing/Monitoring    sertraline (ZOLOFT) tablet 100 mg  100 mg Oral DAILY    insulin regular (NOVOLIN R, HUMULIN R) injection 15 Units  15 Units SubCUTAneous ACB    insulin regular (NOVOLIN R, HUMULIN R) injection 15 Units  15 Units SubCUTAneous ACL    insulin regular (NOVOLIN R, HUMULIN R) injection 10 Units  10 Units SubCUTAneous ACD    insulin lispro (HUMALOG) injection   SubCUTAneous AC&HS          Objective:     Patient Vitals for the past 8 hrs:   BP Temp Pulse Resp SpO2 Weight   02/21/20 0747 151/84 98 °F (36.7 °C) 88 20 96 % --   02/21/20 0717 -- -- -- -- 96 % --   02/21/20 0650 -- -- -- -- -- 278 lb 8 oz (126.3 kg)   02/21/20 0312 112/68 98.1 °F (36.7 °C) 84 20 96 % --   02/21/20 0123 -- -- -- -- 97 % --   02/21/20 0017 124/80 98.5 °F (36.9 °C) 91 22 96 % --     No intake/output data recorded. 02/19 1901 - 02/21 0700  In: 3322.5 [P.O.:960; I.V.:2362.5]  Out: 400 [Urine:400]    Physical Exam: NAD. A&O. Neck -- Supple. No JVD. Heart -- Irr Irr (Rate controlled). Lungs -- Scattered exp wheezing. Mild crackles at R base. Abd -- Benign. Ext -- Trace, if any, LE edema, b/l.       Data Review   Recent Results (from the past 24 hour(s))   GLUCOSE, POC    Collection Time: 02/20/20  8:47 AM   Result Value Ref Range    Glucose (POC) 224 (H) 65 - 100 mg/dL    Performed by Cyndi Mims    GLUCOSE, POC    Collection Time: 02/20/20 11:43 AM   Result Value Ref Range    Glucose (POC) 196 (H) 65 - 100 mg/dL    Performed by Cyndi Mims    GLUCOSE, POC    Collection Time: 02/20/20  4:51 PM   Result Value Ref Range    Glucose (POC) 210 (H) 65 - 100 mg/dL    Performed by Ondina Castillo    GLUCOSE, POC    Collection Time: 02/20/20  9:50 PM   Result Value Ref Range    Glucose (POC) 271 (H) 65 - 100 mg/dL    Performed by Moriah Amaya    PROTHROMBIN TIME + INR    Collection Time: 02/21/20  3:18 AM   Result Value Ref Range    INR 1.7 (H) 0.9 - 1.1      Prothrombin time 17.2 (H) 9.0 - 12.4 sec   METABOLIC PANEL, BASIC    Collection Time: 02/21/20  3:18 AM   Result Value Ref Range    Sodium 136 136 - 145 mmol/L    Potassium 4.3 3.5 - 5.1 mmol/L    Chloride 105 97 - 108 mmol/L    CO2 25 21 - 32 mmol/L    Anion gap 6 5 - 15 mmol/L    Glucose 342 (H) 65 - 100 mg/dL    BUN 26 (H) 6 - 20 MG/DL    Creatinine 1.09 (H) 0.55 - 1.02 MG/DL    BUN/Creatinine ratio 24 (H) 12 - 20      GFR est AA 59 (L) >60 ml/min/1.73m2    GFR est non-AA 49 (L) >60 ml/min/1.73m2    Calcium 8.6 8.5 - 10.1 MG/DL   GLUCOSE, POC    Collection Time: 02/21/20  7:03 AM   Result Value Ref Range    Glucose (POC) 377 (H) 65 - 100 mg/dL    Performed by Moriah Amaya            Assessment:     Principal Problem:    Acute respiratory failure due to Influenza, ? PNA, and associated asthma exacerbation. Active Problems:    Asthma exacerbation (1/21/2014)      Chronic atrial fibrillation with brief RVR (Rate better now). Pneumonia -- ?atypical/?viral      Type 2 diabetes mellitus with diabetic neuropathy ([facility] [address] (1/9/2018)      Influenza A        Plan:     1. Cont Rocephin & IV Zithromax & Tamiflu.   2. Cont IV Solumedrol 125 mg IV QID -- Taper as able, but not yet. 3. Cont Nebs. 4. Agree with IV diuresis. 5. F/u ECHO -- To be done. 6. Pharmacy dosing Coumadin for chronic afib. 7. PT as able.           Linsey Escalante MD

## 2020-02-21 NOTE — PROGRESS NOTES
Cardiology Progress Note                                        Admit Date: 2/19/2020    Assessment/Plan:     CHF; more or less volume overload but I suspect acute diastolic HF as well; improving; noted a good weight loss  Afib; rate is fine; will continue current dose of Cardizem; echo is pending    [place] is a 75 y.o. female with     PROBLEM LIST:  Patient Active Problem List    Diagnosis Date Noted    [facility] 02/20/2020    Influenza 02/20/2020    Recurrent depression ([facility] [address] 01/09/2018    Type 2 diabetes mellitus with diabetic neuropathy ([facility] [address] 01/09/2018    Encounter for long-term (current) drug use 10/19/2017    Diabetic polyneuropathy associated with type 2 diabetes mellitus ([address] 07/05/2017    Type 2 diabetes mellitus with stage 1 chronic kidney disease, with long-term current use of insulin ([facility] [address] 05/25/2017    Thoracic disc disorder with myelopathy 05/22/2017    Gait abnormality 05/22/2017    Left ankle sprain 05/21/2017    Hemoptysis 04/24/2017    Pneumonia 04/23/2017    Hypertension complicating diabetes ([address] 02/20/2017    PNA (pneumonia) 02/09/2017    Asthmatic bronchitis with acute exacerbation 09/26/2016    Microalbuminuria 05/24/2016    Long-term insulin use in type 2 diabetes ([facility] [address] 11/10/2015    Chronic atrial fibrillation 07/17/2015    Hematoma of neck 07/09/2015    Cervical stenosis of spine 07/06/2015    Morbid obesity ([facility] [address] 02/18/2014    Dizziness 02/17/2014    Weakness 02/17/2014    Anemia 01/22/2014    Respiratory failure 01/21/2014    Asthma exacerbation 01/21/2014    Hyperlipidemia     Depression     Neuropathy          Subjective:     Kallie Hernandez reports dyspnea.     Visit Vitals  /68 (BP 1 Location: Right arm, BP Patient Position: At rest)   Pulse 84   Temp 98.1 °F (36.7 °C)   Resp 20   Wt 278 lb 8 oz (126.3 kg)   SpO2 96%   BMI 43.62 kg/m²       Intake/Output Summary (Last 24 hours) at 2/21/2020 3957  Last data filed at 2/21/2020 0307  Gross per 24 hour   Intake 1020 ml   Output 400 ml   Net 620 ml       Objective:      Physical Exam:  HEENT: [place], EOMI  Neck: No JVD,  No thyroidmegaly  Resp: CTA bilaterally;  No wheezes or rales  CV: irregular s1s2 No murmur no s3  Abd:Soft, Nontender  Ext: 2+ edema  Neuro: Alert and oriented; Nonfocal  Skin: Warm, Dry, Intact  Pulses: 2+ DP/PT/Rad      Telemetry: AFIB    Current Facility-Administered Medications   Medication Dose Route Frequency    warfarin (COUMADIN) tablet 5 mg  5 mg Oral ONCE    albuterol-ipratropium (DUO-NEB) 2.5 MG-0.5 MG/3 ML  3 mL Nebulization Q6H RT    methylPREDNISolone (PF) (Solu-MEDROL) injection 125 mg  125 mg IntraVENous Q6H    albuterol (PROVENTIL VENTOLIN) nebulizer solution 2.5 mg  2.5 mg Nebulization Q3H PRN    ALPRAZolam (XANAX) tablet 0.5 mg  0.5 mg Oral DAILY PRN    arformoteroL (BROVANA) neb solution 15 mcg  15 mcg Nebulization BID RT    budesonide (PULMICORT) 500 mcg/2 ml nebulizer suspension  500 mcg Nebulization BID RT    acetylcysteine (MUCOMYST) 200 mg/mL (20 %) solution 400 mg  2 mL Nebulization TID RT    oseltamivir (TAMIFLU) capsule 30 mg  30 mg Oral BID    furosemide (LASIX) injection 40 mg  40 mg IntraVENous Q12H    dilTIAZem CD (CARDIZEM CD) capsule 360 mg  360 mg Oral DAILY    vitamin B complex tablet  1 Tab Oral DAILY    biotin (VITAMIN B7) tablet 5 mg  5 mg Oral DAILY    cholecalciferol (VITAMIN D3) (1000 Units /25 mcg) tablet 2 Tab  2,000 Units Oral DAILY    digoxin (LANOXIN) tablet 0.25 mg  0.25 mg Oral DAILY    DULoxetine (CYMBALTA) capsule 60 mg  60 mg Oral DAILY    gabapentin (NEURONTIN) capsule 300 mg  300 mg Oral DAILY    gabapentin (NEURONTIN) capsule 600 mg  600 mg Oral QHS    therapeutic multivitamin (THERAGRAN) tablet 1 Tab  1 Tab Oral DAILY    sodium chloride (NS) flush 5-40 mL  5-40 mL IntraVENous Q8H    sodium chloride (NS) flush 5-40 mL  5-40 mL IntraVENous PRN    azithromycin (ZITHROMAX) 500 mg in 0.9% sodium chloride (MBP/ADV) 250 mL  500 mg IntraVENous Q24H    cefTRIAXone (ROCEPHIN) 1 g in 0.9% sodium chloride (MBP/ADV) 50 mL  1 g IntraVENous Q24H    acetaminophen (TYLENOL) tablet 650 mg  650 mg Oral Q4H PRN    guaiFENesin ER (MUCINEX) tablet 600 mg  600 mg Oral Q12H    glucose chewable tablet 16 g  4 Tab Oral PRN    glucagon (GLUCAGEN) injection 1 mg  1 mg IntraMUSCular PRN    dextrose 10% infusion 0-250 mL  0-250 mL IntraVENous PRN    hydrALAZINE (APRESOLINE) 20 mg/mL injection 20 mg  20 mg IntraVENous Q6H PRN    insulin NPH (NOVOLIN N, HUMULIN N) injection 35 Units  35 Units SubCUTAneous 7am    insulin NPH (NOVOLIN N, HUMULIN N) injection 25 Units  25 Units SubCUTAneous QHS    Warfarin - pharmacy to dose   Other Rx Dosing/Monitoring    sertraline (ZOLOFT) tablet 100 mg  100 mg Oral DAILY    insulin regular (NOVOLIN R, HUMULIN R) injection 15 Units  15 Units SubCUTAneous ACB    insulin regular (NOVOLIN R, HUMULIN R) injection 15 Units  15 Units SubCUTAneous ACL    insulin regular (NOVOLIN R, HUMULIN R) injection 10 Units  10 Units SubCUTAneous ACD    insulin lispro (HUMALOG) injection   SubCUTAneous AC&HS         Data Review:   Labs:    Recent Results (from the past 24 hour(s))   GLUCOSE, POC    Collection Time: 02/20/20  8:47 AM   Result Value Ref Range    Glucose (POC) 224 (H) 65 - 100 mg/dL    Performed by Sheree Tomlinson    GLUCOSE, POC    Collection Time: 02/20/20 11:43 AM   Result Value Ref Range    Glucose (POC) 196 (H) 65 - 100 mg/dL    Performed by Delfina Golden, POC    Collection Time: 02/20/20  4:51 PM   Result Value Ref Range    Glucose (POC) 210 (H) 65 - 100 mg/dL    Performed by Bruna Soliman    GLUCOSE, POC    Collection Time: 02/20/20  9:50 PM   Result Value Ref Range    Glucose (POC) 271 (H) 65 - 100 mg/dL    Performed by Quin Amaya    PROTHROMBIN TIME + INR    Collection Time: 02/21/20  3:18 AM   Result Value Ref Range    INR 1.7 (H) 0.9 - 1.1 Prothrombin time 17.2 (H) 9.0 - 38.9 sec   METABOLIC PANEL, BASIC    Collection Time: 02/21/20  3:18 AM   Result Value Ref Range    Sodium 136 136 - 145 mmol/L    Potassium 4.3 3.5 - 5.1 mmol/L    Chloride 105 97 - 108 mmol/L    CO2 25 21 - 32 mmol/L    Anion gap 6 5 - 15 mmol/L    Glucose 342 (H) 65 - 100 mg/dL    BUN 26 (H) 6 - 20 MG/DL    Creatinine 1.09 (H) 0.55 - 1.02 MG/DL    BUN/Creatinine ratio 24 (H) 12 - 20      GFR est AA 59 (L) >60 ml/min/1.73m2    GFR est non-AA 49 (L) >60 ml/min/1.73m2    Calcium 8.6 8.5 - 10.1 MG/DL   GLUCOSE, POC    Collection Time: 02/21/20  7:03 AM   Result Value Ref Range    Glucose (POC) 377 (H) 65 - 100 mg/dL    Performed by [place]

## 2020-02-21 NOTE — PROGRESS NOTES
2/21/2020 -   SHIRLEY:  - RUR: 25%  - Patient continues with IV ABX, IV Steroids, and Tamiflu  - Patient continues with nebs  - Patient continues with IV diuretics  - PT Consult pending for today, 2/21  - Patient continues with O2 Support at 4L via NC  NOTE: Patient does not use O2 at baseline, so O2 will need to be weaned  - Echo is pending  - NOTE: patient declined [facility] for PNA  CRM: Priscila Liu, MPH, [address] [place] [address]; Z: [phone]

## 2020-02-21 NOTE — PROGRESS NOTES
Day #2 of oseltamivir  Indication:  influenza treatment  Current regimen:  30 mg BID  Recent Labs     20  0318 20  0352 20  2336   WBC  --  10.2 9.9   CREA 1.09* 1.34* 1.40*   BUN 26* 27* 24*     Est CrCl: 61 ml/min  Temp (24hrs), Av.3 °F (36.8 °C), Min:98 °F (36.7 °C), Max:98.5 °F (36.9 °C)      Plan: Renal function improved. Change to 75 mg BID.

## 2020-02-21 NOTE — PROGRESS NOTES
Bedside verbal shift change report given to oncoming nurse Tosin Oneill R.N. Opportunity for questions and clarifications provided.

## 2020-02-21 NOTE — PROGRESS NOTES
Problem: Mobility Impaired (Adult and Pediatric)  Goal: *Acute Goals and Plan of Care (Insert Text)  Description  FUNCTIONAL STATUS PRIOR TO ADMISSION: Patient was modified independent using a rollator, single point cane and grocery cart (which she keeps in car) for functional mobility. Feels more steady w/ rollator. Does not use stairs. HOME SUPPORT PRIOR TO ADMISSION: The patient lived alone with neighbors to provide assistance. Lives in [facility]. Physical Therapy Goals  Initiated 2/21/2020    1. Patient will perform sit to stand with modified independence within 7 day(s). 2.  Patient will ambulate with modified independence for 100 feet with the least restrictive device and no CASTANEDA on RA within 7 day(s). 3.  Patient will ascend/descend 3 stairs with bilateral handrail(s) with contact guard assist in order to promote community reintegration within 7 day(s). 4. Patient will improve Tinetti outcome measure by 4 points in order to decrease fall risk within 7 days. Outcome: Not Met   PHYSICAL THERAPY EVALUATION  Patient: [facility] (75 y.o. female)  Date: 2/21/2020  Primary Diagnosis: Asthma exacerbation [J45.901]  Pneumonia [J18.9]        Precautions:   Fall      ASSESSMENT  Based on the objective data described below, the patient presents with CASTANEDA, decreased activity tolerance, LE edema, fall history, and decreased functional mobility and is currently functioning below baseline. Pt endorses frequent fall history (at least 3 this year - most recently in doctors office). Pt requires 4L via NC, cueing for pacing, and self initiated standing rest breaks to maintain sats during ambulation today. Pt sats remain >90% during session today. Pt has LOB ascending from chair and x 1 during ambulation that requires min assist to correct. Pt will benefit from skilled PT in order to address above impairments and progress towards PLOF.      Current Level of Function Impacting Discharge (mobility/balance): min assist for functional mobility, requires 4L O2 for short distance ambulation     Functional Outcome Measure: The patient scored 16/28 on the Tinetti outcome measure which is indicative of high fall risk. Other factors to consider for discharge: does not have O2 at home, fall history      Patient will benefit from skilled therapy intervention to address the above noted impairments. PLAN :  Recommendations and Planned Interventions: transfer training, gait training, therapeutic exercises, patient and family training/education, and therapeutic activities      Frequency/Duration: Patient will be followed by physical therapy:  5 times a week to address goals. Recommendation for discharge: (in order for the patient to meet his/her long term goals)  Physical therapy at least 2 days/week in the home AND ensure assist and/or supervision for safety with functional mobility     This discharge recommendation:  Has not yet been discussed the attending provider and/or case management    IF patient discharges home will need the following DME: supplemental O2          SUBJECTIVE:   Patient stated I don't have any kind of O2 at home.     OBJECTIVE DATA SUMMARY:   HISTORY:    Past Medical History:   Diagnosis Date    Asthma     Atrial fibrillation ([facility] [address]     Depression     DM (diabetes mellitus) ([facility] [address]     HTN (hypertension)     Hyperlipidemia     Morbid obesity ([facility] [address]     Neuropathy     LITA on CPAP     Other ill-defined conditions(799.89) pneumonia     Past Surgical History:   Procedure Laterality Date    COLONOSCOPY Left 10/28/2019    COLONOSCOPY AND EGD performed by Jose Miguel Dhaliwal MD at [facility] ENDOSCOPY    HX APPENDECTOMY      HX CHOLECYSTECTOMY      HX GASTRIC BYPASS      Jejunal bypass with subsequent reversal..  Lab Band since    HX OTHER SURGICAL  D & C    HX EMILIANO AND BSO      For uterine CA       Personal factors and/or comorbidities impacting plan of care: COPD, DM, obesity    Home Situation  Home Environment: Private residence  # Steps to Enter: 0(ramps to entrance)  One/Two Story Residence: (6th floor condo - elevators)  Living Alone: Yes  Support Systems: Child(alina)  Patient Expects to be Discharged to[de-identified] Private residence  Current DME Used/Available at Home: Maribell Mohan, lucyator, Ivette Nolan, quad, Grab bars  Tub or Shower Type: Tub/Shower combination    EXAMINATION/PRESENTATION/DECISION MAKING:   Critical Behavior:  Neurologic State: Alert  Orientation Level: Oriented X4  Cognition: Follows commands, Appropriate safety awareness, Appropriate decision making     Hearing: Auditory  Auditory Impairment: None    Range Of Motion:  AROM: Within functional limits           PROM: Within functional limits           Strength:    Strength: Generally decreased, functional                    Tone & Sensation:                  Sensation: Impaired(On dorsum and big toe of both feet )             Functional Mobility:  Bed Mobility:   Pt received siting up in chair            Transfers:  Sit to Stand: Minimum assistance;Assist x1(loses balance and requires min a to correct )  Stand to Sit: Contact guard assistance;Assist x1                       Balance:   Sitting: Intact  Standing: Intact; With support  Standing - Static: Good;Constant support  Standing - Dynamic : Fair;Constant support  Ambulation/Gait Training:  Distance (ft): 50 Feet (ft)  Assistive Device: Gait belt;Walker, rolling  Ambulation - Level of Assistance: Minimal assistance;Assist x1;Adaptive equipment; Additional time(RW)     Gait Description (WDL): Exceptions to WDL  Gait Abnormalities: Path deviations;Trunk sway increased   Pt given education on pacing and PLB in order to decrease SOB during ambulation      Base of Support: Widened     Speed/Liz: Slow;Pace decreased (<100 feet/min)  Step Length: Right shortened;Left shortened               Functional Measure:  Tinetti test:    Sitting Balance: 1  Arises: 1  Attempts to Rise: 2  Immediate Standing Balance: 1  Standing Balance: 1  Nudged: 0  Eyes Closed: 1  Turn 360 Degrees - Continuous/Discontinuous: 1(excessively slow)  Turn 360 Degrees - Steady/Unsteady: 1(excessively slow)  Sitting Down: 1  Balance Score: 10 Balance total score  Indication of Gait: 1  R Step Length/Height: 1  L Step Length/Height: 1  R Foot Clearance: 1  L Foot Clearance: 1  Step Symmetry: 0(step length varies )  Step Continuity: 0(stops due to SOB)  Path: 1  Trunk: 0  Walking Time: 0  Gait Score: 6 Gait total score  Total Score: 16/28 Overall total score         Tinetti Tool Score Risk of Falls  <19 = High Fall Risk  19-24 = Moderate Fall Risk  25-28 = Low Fall Risk  Tinetti ME. Performance-Oriented Assessment of Mobility Problems in Elderly Patients. [address]; [id number]. (Scoring Description: PT Bulletin Feb. 10, 1993)    Older adults: Erica Joshi et al, 2009; n = 1000 [facility] elderly evaluated with ABC, JOSE, ADL, and IADL)  · Mean JOSE score for males aged 69-68 years = 26.21(3.40)  · Mean JOSE score for females age 69-68 years = 25.16(4.30)  · Mean JOSE score for males over 80 years = 23.29(6.02)  · Mean JOSE score for females over 80 years = 17.20(8.32)            Physical Therapy Evaluation Charge Determination   History Examination Presentation Decision-Making   MEDIUM  Complexity : 1-2 comorbidities / personal factors will impact the outcome/ POC  LOW Complexity : 1-2 Standardized tests and measures addressing body structure, function, activity limitation and / or participation in recreation  LOW Complexity : Stable, uncomplicated  LOW Complexity : FOTO score of       Based on the above components, the patient evaluation is determined to be of the following complexity level: LOW     Pain Rating:  Pt denies pain during session    Activity Tolerance:   Poor, requires frequent rest breaks on 4L O2, and observed SOB with activity  Please refer to the flowsheet for vital signs taken during this treatment.     After treatment patient left in no apparent distress:   Sitting in chair, Call bell within reach, and Bed / chair alarm activated    COMMUNICATION/EDUCATION:   The patients plan of care was discussed with: Registered Nurse. Fall prevention education was provided and the patient/caregiver indicated understanding., Patient/family have participated as able in goal setting and plan of care. , and Patient/family agree to work toward stated goals and plan of care. Regarding student involvement in patient care:  A student participated in this treatment session. Per CMS Medicare statements and APTA guidelines I certify that the following was true:  1. I was present and directly observed the entire session. 2. I made all skilled judgments and clinical decisions regarding care. 3. I am the practitioner responsible for assessment, treatment, and documentation.       Thank you for this referral.  Quan Pathak

## 2020-02-21 NOTE — PROGRESS NOTES
Problem: Falls - Risk of  Goal: *Absence of Falls  Description  Document Juan Stuart Fall Risk and appropriate interventions in the flowsheet. Outcome: Progressing Towards Goal  Note: Fall Risk Interventions:  Mobility Interventions: Patient to call before getting OOB    Mentation Interventions: Adequate sleep, hydration, pain control    Medication Interventions: Teach patient to arise slowly, Patient to call before getting OOB, Evaluate medications/consider consulting pharmacy    Elimination Interventions: Call light in reach              Problem: Pneumonia: Day 2  Goal: Activity/Safety  Outcome: Progressing Towards Goal  Goal: Consults, if ordered  Outcome: Progressing Towards Goal  Goal: Diagnostic Test/Procedures  Outcome: Progressing Towards Goal  Goal: Nutrition/Diet  Outcome: Progressing Towards Goal  Goal: Discharge Planning  Outcome: Progressing Towards Goal  Goal: Medications  Outcome: Progressing Towards Goal     Problem: Pressure Injury - Risk of  Goal: *Prevention of pressure injury  Description  Document Kobi Scale and appropriate interventions in the flowsheet. Outcome: Progressing Towards Goal  Note: Pressure Injury Interventions:     Activity Interventions: Increase time out of bed    Mobility Interventions: HOB 30 degrees or less, Pressure redistribution bed/mattress (bed type)    Nutrition Interventions: Document food/fluid/supplement intake    Friction and Shear Interventions: Lift sheet, HOB 30 degrees or less

## 2020-02-21 NOTE — PROGRESS NOTES
Pharmacist Note - Warfarin Dosing  Consult provided for this 75 y. o.female to manage warfarin for Atrial Fibrillation    INR Goal: 2 - 3    Home regimen/ tablet size: M-W-F: 2.5 mg; Tu-Th-Sat-Sun: 5 mg    Drugs that may increase INR: Macrolides  Drugs that may decrease INR: None  Other current anticoagulants/ drugs that may increase bleeding risk: Sertraline  Risk factors: Age > 65  Daily INR ordered: YES    Recent Labs     02/21/20  0318 02/20/20  0352 02/18/20  2336 02/18/20  2315   HGB  --  11.5 11.9  --    INR 1.7* 1.6*  --  2.3*     Date               INR                  Dose  2/18  2.3  5 mg (PTA)   2/19  --  2.5 mg   2/20  1.6  5 mg     2/21  1.7  5 mg                                                                                 Assessment/ Plan: Will order warfarin 5 mg PO x 1 dose. Pharmacy will continue to monitor daily and adjust therapy as indicated. Please contact the pharmacist at  for outpatient recommendations if needed.

## 2020-02-21 NOTE — PROGRESS NOTES
2130: Patient told this nurse that she takes 360mg of cardizem once daily. The cardizem dose that was ordered when she first got admitted was 120mg once daily and it was increased to 120mg bid today after she had Afib with RVR. This nurse checked patient's PTA med list and the patient was right, the dose is listed as 360mg once daily. Spoke with Dr. Ion Richmond regarding this and received order to administer cardizem 120mg now and start the patient on 360mg once daily starting tomorrow. 0740: Bedside and Verbal shift change report given to Amparo Boo (oncoming nurse) by Nimco Castro (offgoing nurse). Report included the following information SBAR, Kardex, Intake/Output, MAR, Recent Results, Med Rec Status and Cardiac Rhythm Afib.

## 2020-02-22 LAB
ANION GAP SERPL CALC-SCNC: 6 MMOL/L (ref 5–15)
BASOPHILS # BLD: 0 K/UL (ref 0–0.1)
BASOPHILS NFR BLD: 0 % (ref 0–1)
BUN SERPL-MCNC: 37 MG/DL (ref 6–20)
BUN/CREAT SERPL: 35 (ref 12–20)
CALCIUM SERPL-MCNC: 8.5 MG/DL (ref 8.5–10.1)
CHLORIDE SERPL-SCNC: 106 MMOL/L (ref 97–108)
CO2 SERPL-SCNC: 24 MMOL/L (ref 21–32)
CREAT SERPL-MCNC: 1.07 MG/DL (ref 0.55–1.02)
DIFFERENTIAL METHOD BLD: ABNORMAL
EOSINOPHIL # BLD: 0 K/UL (ref 0–0.4)
EOSINOPHIL NFR BLD: 0 % (ref 0–7)
ERYTHROCYTE [DISTWIDTH] IN BLOOD BY AUTOMATED COUNT: 15.3 % (ref 11.5–14.5)
GLUCOSE BLD STRIP.AUTO-MCNC: 221 MG/DL (ref 65–100)
GLUCOSE BLD STRIP.AUTO-MCNC: 290 MG/DL (ref 65–100)
GLUCOSE BLD STRIP.AUTO-MCNC: 310 MG/DL (ref 65–100)
GLUCOSE BLD STRIP.AUTO-MCNC: 315 MG/DL (ref 65–100)
GLUCOSE BLD STRIP.AUTO-MCNC: 414 MG/DL (ref 65–100)
GLUCOSE SERPL-MCNC: 282 MG/DL (ref 65–100)
HCT VFR BLD AUTO: 38.2 % (ref 35–47)
HGB BLD-MCNC: 11.9 G/DL (ref 11.5–16)
IMM GRANULOCYTES # BLD AUTO: 0.1 K/UL (ref 0–0.04)
IMM GRANULOCYTES NFR BLD AUTO: 1 % (ref 0–0.5)
INR PPP: 2.1 (ref 0.9–1.1)
LYMPHOCYTES # BLD: 0.8 K/UL (ref 0.8–3.5)
LYMPHOCYTES NFR BLD: 6 % (ref 12–49)
MCH RBC QN AUTO: 28.7 PG (ref 26–34)
MCHC RBC AUTO-ENTMCNC: 31.2 G/DL (ref 30–36.5)
MCV RBC AUTO: 92 FL (ref 80–99)
MONOCYTES # BLD: 0.4 K/UL (ref 0–1)
MONOCYTES NFR BLD: 3 % (ref 5–13)
NEUTS SEG # BLD: 12 K/UL (ref 1.8–8)
NEUTS SEG NFR BLD: 90 % (ref 32–75)
NRBC # BLD: 0 K/UL (ref 0–0.01)
NRBC BLD-RTO: 0 PER 100 WBC
PLATELET # BLD AUTO: 222 K/UL (ref 150–400)
PMV BLD AUTO: 9.4 FL (ref 8.9–12.9)
POTASSIUM SERPL-SCNC: 3.8 MMOL/L (ref 3.5–5.1)
PROTHROMBIN TIME: 20.4 SEC (ref 9–11.1)
RBC # BLD AUTO: 4.15 M/UL (ref 3.8–5.2)
RBC MORPH BLD: ABNORMAL
SERVICE CMNT-IMP: ABNORMAL
SODIUM SERPL-SCNC: 136 MMOL/L (ref 136–145)
WBC # BLD AUTO: 13.3 K/UL (ref 3.6–11)

## 2020-02-22 PROCEDURE — 74011000250 HC RX REV CODE- 250: Performed by: STUDENT IN AN ORGANIZED HEALTH CARE EDUCATION/TRAINING PROGRAM

## 2020-02-22 PROCEDURE — 77010033678 HC OXYGEN DAILY

## 2020-02-22 PROCEDURE — 74011250637 HC RX REV CODE- 250/637: Performed by: INTERNAL MEDICINE

## 2020-02-22 PROCEDURE — 74011250636 HC RX REV CODE- 250/636: Performed by: STUDENT IN AN ORGANIZED HEALTH CARE EDUCATION/TRAINING PROGRAM

## 2020-02-22 PROCEDURE — 74011250636 HC RX REV CODE- 250/636: Performed by: PHYSICIAN ASSISTANT

## 2020-02-22 PROCEDURE — 85025 COMPLETE CBC W/AUTO DIFF WBC: CPT

## 2020-02-22 PROCEDURE — 74011636637 HC RX REV CODE- 636/637: Performed by: INTERNAL MEDICINE

## 2020-02-22 PROCEDURE — 36415 COLL VENOUS BLD VENIPUNCTURE: CPT

## 2020-02-22 PROCEDURE — 80048 BASIC METABOLIC PNL TOTAL CA: CPT

## 2020-02-22 PROCEDURE — 82962 GLUCOSE BLOOD TEST: CPT

## 2020-02-22 PROCEDURE — 74011250636 HC RX REV CODE- 250/636: Performed by: INTERNAL MEDICINE

## 2020-02-22 PROCEDURE — 94760 N-INVAS EAR/PLS OXIMETRY 1: CPT

## 2020-02-22 PROCEDURE — 87070 CULTURE OTHR SPECIMN AEROBIC: CPT

## 2020-02-22 PROCEDURE — 74011636637 HC RX REV CODE- 636/637: Performed by: STUDENT IN AN ORGANIZED HEALTH CARE EDUCATION/TRAINING PROGRAM

## 2020-02-22 PROCEDURE — 94640 AIRWAY INHALATION TREATMENT: CPT

## 2020-02-22 PROCEDURE — 85610 PROTHROMBIN TIME: CPT

## 2020-02-22 PROCEDURE — 65660000000 HC RM CCU STEPDOWN

## 2020-02-22 PROCEDURE — 74011000258 HC RX REV CODE- 258: Performed by: STUDENT IN AN ORGANIZED HEALTH CARE EDUCATION/TRAINING PROGRAM

## 2020-02-22 PROCEDURE — 74011000250 HC RX REV CODE- 250: Performed by: PHYSICIAN ASSISTANT

## 2020-02-22 PROCEDURE — 74011250637 HC RX REV CODE- 250/637: Performed by: STUDENT IN AN ORGANIZED HEALTH CARE EDUCATION/TRAINING PROGRAM

## 2020-02-22 RX ORDER — WARFARIN SODIUM 5 MG/1
5 TABLET ORAL ONCE
Status: COMPLETED | OUTPATIENT
Start: 2020-02-22 | End: 2020-02-22

## 2020-02-22 RX ORDER — POTASSIUM CHLORIDE 750 MG/1
20 TABLET, FILM COATED, EXTENDED RELEASE ORAL 2 TIMES DAILY
Status: DISCONTINUED | OUTPATIENT
Start: 2020-02-22 | End: 2020-02-25

## 2020-02-22 RX ORDER — METOLAZONE 5 MG/1
5 TABLET ORAL ONCE
Status: COMPLETED | OUTPATIENT
Start: 2020-02-22 | End: 2020-02-22

## 2020-02-22 RX ADMIN — DILTIAZEM HYDROCHLORIDE 360 MG: 180 CAPSULE, EXTENDED RELEASE ORAL at 08:39

## 2020-02-22 RX ADMIN — METHYLPREDNISOLONE SODIUM SUCCINATE 60 MG: 125 INJECTION, POWDER, FOR SOLUTION INTRAMUSCULAR; INTRAVENOUS at 02:19

## 2020-02-22 RX ADMIN — Medication 10 ML: at 23:19

## 2020-02-22 RX ADMIN — HUMAN INSULIN 15 UNITS: 100 INJECTION, SOLUTION SUBCUTANEOUS at 11:22

## 2020-02-22 RX ADMIN — IPRATROPIUM BROMIDE AND ALBUTEROL SULFATE 3 ML: .5; 3 SOLUTION RESPIRATORY (INHALATION) at 14:45

## 2020-02-22 RX ADMIN — GABAPENTIN 600 MG: 300 CAPSULE ORAL at 23:19

## 2020-02-22 RX ADMIN — METHYLPREDNISOLONE SODIUM SUCCINATE 60 MG: 125 INJECTION, POWDER, FOR SOLUTION INTRAMUSCULAR; INTRAVENOUS at 08:00

## 2020-02-22 RX ADMIN — INSULIN LISPRO 9 UNITS: 100 INJECTION, SOLUTION INTRAVENOUS; SUBCUTANEOUS at 11:30

## 2020-02-22 RX ADMIN — INSULIN LISPRO 9 UNITS: 100 INJECTION, SOLUTION INTRAVENOUS; SUBCUTANEOUS at 23:17

## 2020-02-22 RX ADMIN — GUAIFENESIN 600 MG: 600 TABLET, EXTENDED RELEASE ORAL at 20:25

## 2020-02-22 RX ADMIN — GABAPENTIN 300 MG: 300 CAPSULE ORAL at 10:00

## 2020-02-22 RX ADMIN — HUMAN INSULIN 15 UNITS: 100 INJECTION, SOLUTION SUBCUTANEOUS at 07:14

## 2020-02-22 RX ADMIN — BUDESONIDE INHALATION 500 MCG: 0.5 SUSPENSION RESPIRATORY (INHALATION) at 19:43

## 2020-02-22 RX ADMIN — BUDESONIDE INHALATION 500 MCG: 0.5 SUSPENSION RESPIRATORY (INHALATION) at 07:09

## 2020-02-22 RX ADMIN — AZITHROMYCIN MONOHYDRATE 500 MG: 500 INJECTION, POWDER, LYOPHILIZED, FOR SOLUTION INTRAVENOUS at 02:20

## 2020-02-22 RX ADMIN — HUMAN INSULIN 25 UNITS: 100 INJECTION, SUSPENSION SUBCUTANEOUS at 23:18

## 2020-02-22 RX ADMIN — INSULIN LISPRO 3 UNITS: 100 INJECTION, SOLUTION INTRAVENOUS; SUBCUTANEOUS at 16:30

## 2020-02-22 RX ADMIN — OSELTAMIVIR PHOSPHATE 75 MG: 75 CAPSULE ORAL at 08:47

## 2020-02-22 RX ADMIN — CEFTRIAXONE 1 G: 1 INJECTION, POWDER, FOR SOLUTION INTRAMUSCULAR; INTRAVENOUS at 02:21

## 2020-02-22 RX ADMIN — IPRATROPIUM BROMIDE AND ALBUTEROL SULFATE 3 ML: .5; 3 SOLUTION RESPIRATORY (INHALATION) at 02:16

## 2020-02-22 RX ADMIN — THERA TABS 1 TABLET: TAB at 08:38

## 2020-02-22 RX ADMIN — Medication 1 TABLET: at 08:38

## 2020-02-22 RX ADMIN — Medication 10 ML: at 14:00

## 2020-02-22 RX ADMIN — Medication 5 MG: at 08:47

## 2020-02-22 RX ADMIN — POTASSIUM CHLORIDE 20 MEQ: 750 TABLET, FILM COATED, EXTENDED RELEASE ORAL at 17:30

## 2020-02-22 RX ADMIN — FUROSEMIDE 40 MG: 10 INJECTION, SOLUTION INTRAMUSCULAR; INTRAVENOUS at 20:25

## 2020-02-22 RX ADMIN — POTASSIUM CHLORIDE 20 MEQ: 750 TABLET, FILM COATED, EXTENDED RELEASE ORAL at 13:17

## 2020-02-22 RX ADMIN — HUMAN INSULIN 35 UNITS: 100 INJECTION, SUSPENSION SUBCUTANEOUS at 07:15

## 2020-02-22 RX ADMIN — METHYLPREDNISOLONE SODIUM SUCCINATE 60 MG: 125 INJECTION, POWDER, FOR SOLUTION INTRAMUSCULAR; INTRAVENOUS at 20:24

## 2020-02-22 RX ADMIN — DIGOXIN 0.25 MG: 125 TABLET ORAL at 08:39

## 2020-02-22 RX ADMIN — OSELTAMIVIR PHOSPHATE 75 MG: 75 CAPSULE ORAL at 20:30

## 2020-02-22 RX ADMIN — METHYLPREDNISOLONE SODIUM SUCCINATE 60 MG: 125 INJECTION, POWDER, FOR SOLUTION INTRAMUSCULAR; INTRAVENOUS at 13:17

## 2020-02-22 RX ADMIN — ALPRAZOLAM 0.5 MG: 0.5 TABLET ORAL at 02:54

## 2020-02-22 RX ADMIN — FUROSEMIDE 40 MG: 10 INJECTION, SOLUTION INTRAMUSCULAR; INTRAVENOUS at 08:38

## 2020-02-22 RX ADMIN — SERTRALINE HYDROCHLORIDE 100 MG: 50 TABLET ORAL at 08:38

## 2020-02-22 RX ADMIN — METOLAZONE 5 MG: 5 TABLET ORAL at 13:17

## 2020-02-22 RX ADMIN — MELATONIN 2 TABLET: at 08:38

## 2020-02-22 RX ADMIN — WARFARIN SODIUM 5 MG: 5 TABLET ORAL at 17:30

## 2020-02-22 RX ADMIN — GUAIFENESIN 600 MG: 600 TABLET, EXTENDED RELEASE ORAL at 09:00

## 2020-02-22 RX ADMIN — Medication 10 ML: at 07:16

## 2020-02-22 RX ADMIN — IPRATROPIUM BROMIDE AND ALBUTEROL SULFATE 3 ML: .5; 3 SOLUTION RESPIRATORY (INHALATION) at 19:43

## 2020-02-22 RX ADMIN — DULOXETINE HYDROCHLORIDE 60 MG: 60 CAPSULE, DELAYED RELEASE ORAL at 08:38

## 2020-02-22 RX ADMIN — HUMAN INSULIN 10 UNITS: 100 INJECTION, SOLUTION SUBCUTANEOUS at 17:29

## 2020-02-22 RX ADMIN — IPRATROPIUM BROMIDE AND ALBUTEROL SULFATE 3 ML: .5; 3 SOLUTION RESPIRATORY (INHALATION) at 07:09

## 2020-02-22 RX ADMIN — INSULIN LISPRO 6 UNITS: 100 INJECTION, SOLUTION INTRAVENOUS; SUBCUTANEOUS at 08:46

## 2020-02-22 NOTE — PROGRESS NOTES
Bedside and Verbal shift change report given to Karina Ibanez (oncoming nurse) by Nilton Berg (offgoing nurse).  Report included the following information SBAR, Kardex, Procedure Summary, Intake/Output, MAR, Recent Results and Med Rec Status

## 2020-02-22 NOTE — PROGRESS NOTES
Bedside shift change report given to [address] [place] [address] (oncoming nurse) by Fabiola Brandt RN (offgoing nurse). Report included the following information SBAR, Kardex, Intake/Output, MAR and Cardiac Rhythm A-Fib.

## 2020-02-22 NOTE — PROGRESS NOTES
Pharmacist Note - Warfarin Dosing  Consult provided for this 75 y. o.female to manage warfarin for Atrial Fibrillation    INR Goal: 2 - 3    Home regimen/ tablet size: M-W-F: 2.5 mg; Tu-Th-Sat-Sun: 5 mg    Drugs that may increase INR: Macrolides  Drugs that may decrease INR: None  Other current anticoagulants/ drugs that may increase bleeding risk: Sertraline  Risk factors: Age > 65  Daily INR ordered: YES    Recent Labs     02/22/20  0247 02/21/20  0318 02/20/20  0352   HGB 11.9  --  11.5   INR 2.1* 1.7* 1.6*     Date               INR                  Dose  2/18  2.3  5 mg (PTA)   2/19  --  2.5 mg   2/20  1.6  5 mg     2/21  1.7  5 mg  2/22                 2.1                                                                                 Assessment/ Plan: Will order warfarin 5 mg PO x 1 dose. Pharmacy will continue to monitor daily and adjust therapy as indicated. Please contact the pharmacist at  for outpatient recommendations if needed.

## 2020-02-22 NOTE — PROGRESS NOTES
Medical Progress Note      NAME: [facility]   :  1945  MRM:  [id number]    Date/Time: 2020           Problem List:     Principal Problem:    Acute respiratory failure ([address] (2020)    Active Problems:    Asthma exacerbation (2014)      Chronic atrial fibrillation (2015)      Pneumonia (2017)      Type 2 diabetes mellitus with diabetic neuropathy ([facility] (2018)      Influenza (2020)             Subjective:     Cough and breathing better. Mild sore throat     Past Medical History:   Diagnosis Date    Asthma     Atrial fibrillation ([facility]     Depression     DM (diabetes mellitus) ([facility]     HTN (hypertension)     Hyperlipidemia     Morbid obesity ([facility]     Neuropathy     LITA on CPAP     Other ill-defined conditions(799.89) pneumonia            Objective:         Vitals:      Last 24hrs VS reviewed since prior progress note. Most recent are:    Visit Vitals  /59 (BP 1 Location: Right arm, BP Patient Position: At rest)   Pulse 84   Temp 97.7 °F (36.5 °C)   Resp 18   Ht 5' 2\" (1.575 m)   Wt 279 lb 8.7 oz (126.8 kg)   SpO2 97%   BMI 51.13 kg/m²     SpO2 Readings from Last 6 Encounters:   20 97%   19 92%   10/28/19 90%   19 97%   19 96%   18 95%    O2 Flow Rate (L/min): 4 l/min       Intake/Output Summary (Last 24 hours) at 2020 0820  Last data filed at 2020 0354  Gross per 24 hour   Intake 420 ml   Output 1350 ml   Net -930 ml                  Exam:      General:  Alert, cooperative, no distress, appears stated age. ENT: throat : slight erythema ; no neck nodes felt     Lungs:   Mild exp wheezes on  auscultation bilaterally. Heart:  Irregular rate and rhythm, S1, S2 normal, no murmur, click, rub or gallop. Abdomen:   Soft, non-tender. Bowel sounds normal.    Extremities: B/l pretibial trace  edema.      Lab Data Reviewed: (see below)  Recent Results (from the past 24 hour(s))   GLUCOSE, POC    Collection Time: 20 11:37 AM   Result Value Ref Range    Glucose (POC) 426 (H) 65 - 100 mg/dL    Performed by Veronica Cheney    ECHO ADULT COMPLETE    Collection Time: 02/21/20  2:50 PM   Result Value Ref Range    LA Volume 147.87 22 - 52 mL    Ao Root D 3.21 cm    Aortic Valve Systolic Peak Velocity 492.50 cm/s    Aortic Valve Area by Continuity of Peak Velocity 1.2 cm2    AoV PG 14.1 mmHg    LVIDd 5.70 (A) 3.9 - 5.3 cm    LVPWd 0.80 0.6 - 0.9 cm    LVIDs 3.62 cm    IVSd 0.81 0.6 - 0.9 cm    LVOT d 1.81 cm    LVOT Peak Velocity 85.53 cm/s    LVOT Peak Gradient 2.9 mmHg    MV E Jaziel 141.75 cm/s    Left Atrium to Aortic Root Ratio 1.84     Inferior Vena Cava Diameter Sniffing 3.21 cm    LA Vol 4C 129.45 (A) 22 - 52 mL    LA Vol 2C 139.76 (A) 22 - 52 mL    LA Area 4C 35.6 cm2    LV Mass .4 (A) 67 - 162 g    LV Mass AL Index 90.6 43 - 95 g/m2    Left Atrium Major Axis 5.91 cm    Triscuspid Valve Regurgitation Peak Gradient 30.6 mmHg    Pulmonic Valve Max Velocity 82.55 cm/s    TR Max Velocity 276.55 cm/s    LA Vol Index 67.21 16 - 28 ml/m2    LA Vol Index 63.52 16 - 28 ml/m2    LA Vol Index 58.84 16 - 28 ml/m2    Left Ventricular Fractional Shortening by 2D [id number] %    AV Velocity Ratio 0.45     PV peak gradient 2.7 mmHg   GLUCOSE, POC    Collection Time: 02/21/20  4:45 PM   Result Value Ref Range    Glucose (POC) 313 (H) 65 - 100 mg/dL    Performed by Katy Nunes    GLUCOSE, POC    Collection Time: 02/21/20 10:06 PM   Result Value Ref Range    Glucose (POC) 238 (H) 65 - 100 mg/dL    Performed by Katy Nunes    PROTHROMBIN TIME + INR    Collection Time: 02/22/20  2:47 AM   Result Value Ref Range    INR 2.1 (H) 0.9 - 1.1      Prothrombin time 20.4 (H) 9.0 - 29.1 sec   METABOLIC PANEL, BASIC    Collection Time: 02/22/20  2:47 AM   Result Value Ref Range    Sodium 136 136 - 145 mmol/L    Potassium 3.8 3.5 - 5.1 mmol/L    Chloride 106 97 - 108 mmol/L    CO2 24 21 - 32 mmol/L    Anion gap 6 5 - 15 mmol/L    Glucose 282 (H) 65 - 100 mg/dL    BUN 37 (H) 6 - 20 MG/DL    Creatinine 1.07 (H) 0.55 - 1.02 MG/DL    BUN/Creatinine ratio 35 (H) 12 - 20      GFR est AA >60 >60 ml/min/1.73m2    GFR est non-AA 50 (L) >60 ml/min/1.73m2    Calcium 8.5 8.5 - 10.1 MG/DL   CBC WITH AUTOMATED DIFF    Collection Time: 02/22/20  2:47 AM   Result Value Ref Range    WBC 13.3 (H) 3.6 - 11.0 K/uL    RBC 4.15 3.80 - 5.20 M/uL    HGB 11.9 11.5 - 16.0 g/dL    HCT 38.2 35.0 - 47.0 %    MCV 92.0 80.0 - 99.0 FL    MCH 28.7 26.0 - 34.0 PG    MCHC 31.2 30.0 - 36.5 g/dL    RDW 15.3 (H) 11.5 - 14.5 %    PLATELET 999 618 - 738 K/uL    MPV 9.4 8.9 - 12.9 FL    NRBC 0.0 0  WBC    ABSOLUTE NRBC 0.00 0.00 - 0.01 K/uL    NEUTROPHILS 90 (H) 32 - 75 %    LYMPHOCYTES 6 (L) 12 - 49 %    MONOCYTES 3 (L) 5 - 13 %    EOSINOPHILS 0 0 - 7 %    BASOPHILS 0 0 - 1 %    IMMATURE GRANULOCYTES 1 (H) 0.0 - 0.5 %    ABS. NEUTROPHILS 12.0 (H) 1.8 - 8.0 K/UL    ABS. LYMPHOCYTES 0.8 0.8 - 3.5 K/UL    ABS. MONOCYTES 0.4 0.0 - 1.0 K/UL    ABS. EOSINOPHILS 0.0 0.0 - 0.4 K/UL    ABS. BASOPHILS 0.0 0.0 - 0.1 K/UL    ABS. IMM.  GRANS. 0.1 (H) 0.00 - 0.04 K/UL    DF SMEAR SCANNED      RBC COMMENTS ANISOCYTOSIS  1+        RBC COMMENTS OVALOCYTES  PRESENT        RBC COMMENTS SALVATORE CELLS  PRESENT       GLUCOSE, POC    Collection Time: 02/22/20  6:42 AM   Result Value Ref Range    Glucose (POC) 290 (H) 65 - 100 mg/dL    Performed by Daquan Maria        Medications Reviewed: (see below)    ______________________________________________________________________    Medications:     Current Facility-Administered Medications   Medication Dose Route Frequency    warfarin (COUMADIN) tablet 5 mg  5 mg Oral ONCE    methylPREDNISolone (PF) (Solu-MEDROL) injection 60 mg  60 mg IntraVENous Q6H    oseltamivir (TAMIFLU) capsule 75 mg  75 mg Oral BID    albuterol-ipratropium (DUO-NEB) 2.5 MG-0.5 MG/3 ML  3 mL Nebulization Q6H RT    albuterol (PROVENTIL VENTOLIN) nebulizer solution 2.5 mg  2.5 mg Nebulization Q3H PRN    ALPRAZolam (XANAX) tablet 0.5 mg  0.5 mg Oral DAILY PRN    arformoteroL (BROVANA) neb solution 15 mcg  15 mcg Nebulization BID RT    budesonide (PULMICORT) 500 mcg/2 ml nebulizer suspension  500 mcg Nebulization BID RT    furosemide (LASIX) injection 40 mg  40 mg IntraVENous Q12H    dilTIAZem CD (CARDIZEM CD) capsule 360 mg  360 mg Oral DAILY    vitamin B complex tablet  1 Tab Oral DAILY    biotin (VITAMIN B7) tablet 5 mg  5 mg Oral DAILY    cholecalciferol (VITAMIN D3) (1000 Units /25 mcg) tablet 2 Tab  2,000 Units Oral DAILY    digoxin (LANOXIN) tablet 0.25 mg  0.25 mg Oral DAILY    DULoxetine (CYMBALTA) capsule 60 mg  60 mg Oral DAILY    gabapentin (NEURONTIN) capsule 300 mg  300 mg Oral DAILY    gabapentin (NEURONTIN) capsule 600 mg  600 mg Oral QHS    therapeutic multivitamin (THERAGRAN) tablet 1 Tab  1 Tab Oral DAILY    sodium chloride (NS) flush 5-40 mL  5-40 mL IntraVENous Q8H    sodium chloride (NS) flush 5-40 mL  5-40 mL IntraVENous PRN    azithromycin (ZITHROMAX) 500 mg in 0.9% sodium chloride (MBP/ADV) 250 mL  500 mg IntraVENous Q24H    cefTRIAXone (ROCEPHIN) 1 g in 0.9% sodium chloride (MBP/ADV) 50 mL  1 g IntraVENous Q24H    acetaminophen (TYLENOL) tablet 650 mg  650 mg Oral Q4H PRN    guaiFENesin ER (MUCINEX) tablet 600 mg  600 mg Oral Q12H    glucose chewable tablet 16 g  4 Tab Oral PRN    glucagon (GLUCAGEN) injection 1 mg  1 mg IntraMUSCular PRN    dextrose 10% infusion 0-250 mL  0-250 mL IntraVENous PRN    hydrALAZINE (APRESOLINE) 20 mg/mL injection 20 mg  20 mg IntraVENous Q6H PRN    insulin NPH (NOVOLIN N, HUMULIN N) injection 35 Units  35 Units SubCUTAneous 7am    insulin NPH (NOVOLIN N, HUMULIN N) injection 25 Units  25 Units SubCUTAneous QHS    Warfarin - pharmacy to dose   Other Rx Dosing/Monitoring    sertraline (ZOLOFT) tablet 100 mg  100 mg Oral DAILY    insulin regular (NOVOLIN R, HUMULIN R) injection 15 Units  15 Units SubCUTAneous ACB  insulin regular (NOVOLIN R, HUMULIN R) injection 15 Units  15 Units SubCUTAneous ACL    insulin regular (NOVOLIN R, HUMULIN R) injection 10 Units  10 Units SubCUTAneous ACD    insulin lispro (HUMALOG) injection   SubCUTAneous AC&HS                   Assessment:     Acute Hypoxic Resp Failure due to Flu A+, AE Asthma, possible PNA; Overall slowly improving. Keep steroids same today   Other rx the same     Type 2 DM aggravated by Steroids . Continue NPH Insulin and Regular Insulin     Permanent Afib. Rate improved . Warfarin per Pharmacy dosing    Input of all appreciated   Patient Active Problem List   Diagnosis Code    Hyperlipidemia E78.5    Depression F32.9    Neuropathy G62.9    Respiratory failure J96.90    Asthma exacerbation J45. 901    Anemia D64.9    Dizziness R42    Weakness R53.1    Morbid obesity ([facility]) E66.01    Cervical stenosis of spine M48.02    Hematoma of neck S10.93XA    Chronic atrial fibrillation I48.20    Long-term insulin use in type 2 diabetes (HCC) E11.9, Z79.4    Microalbuminuria R80.9    Asthmatic bronchitis with acute exacerbation J45. 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    Gait abnormality R26.9    Type 2 diabetes mellitus with stage 1 chronic kidney disease, with long-term current use of insulin ([facility]) E11.22, N18.1, Z79.4    Diabetic polyneuropathy associated with type 2 diabetes mellitus ([facility] E11.42    Encounter for long-term (current) drug use Z79.899    Recurrent depression ([facility]) F33.9    Type 2 diabetes mellitus with diabetic neuropathy ([facility]) E11.40    Acute respiratory failure (HCC) J96.00    Influenza J11.1          Plan:     As above                                                        ___________________________________________________      Attending Physician: Tori Vazquez Rhoda Treviño MD

## 2020-02-22 NOTE — PROGRESS NOTES
Cardiology Progress Note                                        Admit Date: 2/19/2020    Assessment/Plan:     CHF; acute diastolic; will give a dose of Zaroxolyn while continuing IV Lasix, start replacing K  Permanent Afib; rate is well controlled    Karie Catherine is a 75 y.o. female with     PROBLEM LIST:  Patient Active Problem List    Diagnosis Date Noted    [facility] failure [facility] 02/20/2020    Influenza 02/20/2020    Recurrent depression ([facility] 01/09/2018    Type 2 diabetes mellitus with diabetic neuropathy ([facility] 01/09/2018    Encounter for long-term (current) drug use 10/19/2017    Diabetic polyneuropathy associated with type 2 diabetes mellitus ([address] 07/05/2017    Type 2 diabetes mellitus with stage 1 chronic kidney disease, with long-term current use of insulin ([facility] 05/25/2017    Thoracic disc disorder with myelopathy 05/22/2017    Gait abnormality 05/22/2017    Left ankle sprain 05/21/2017    Hemoptysis 04/24/2017    Pneumonia 04/23/2017    Hypertension complicating diabetes ([address] 02/20/2017    PNA (pneumonia) 02/09/2017    Asthmatic bronchitis with acute exacerbation 09/26/2016    Microalbuminuria 05/24/2016    Long-term insulin use in type 2 diabetes ([facility] 11/10/2015    Chronic atrial fibrillation 07/17/2015    Hematoma of neck 07/09/2015    Cervical stenosis of spine 07/06/2015    Morbid obesity ([facility] 02/18/2014    Dizziness 02/17/2014    Weakness 02/17/2014    Anemia 01/22/2014    Respiratory failure 01/21/2014    Asthma exacerbation 01/21/2014    Hyperlipidemia     Depression     Neuropathy          Subjective:     [facility] reports none.     Visit Vitals  /86   Pulse 82   Temp 97.7 °F (36.5 °C)   Resp 20   Ht 5' 2\" (1.575 m)   Wt 279 lb 8.7 oz (126.8 kg)   SpO2 97%   BMI 51.13 kg/m²       Intake/Output Summary (Last 24 hours) at 2/22/2020 1143  Last data filed at 2/22/2020 1125  Gross per 24 hour   Intake 420 ml   Output 2450 ml   Net -2030 ml Objective:      Physical Exam:  HEENT: Perrla, EOMI  Neck: No JVD,  No thyroidmegaly  Resp: CTA bilaterally;  No wheezes or rales  CV: irregular s1s2 No murmur no s3  Abd:Soft, Nontender  Ext: 2+ edema  Neuro: Alert and oriented; Nonfocal  Skin: Warm, Dry, Intact  Pulses: 2+ DP/PT/Rad      Telemetry: AFIB    Current Facility-Administered Medications   Medication Dose Route Frequency    warfarin (COUMADIN) tablet 5 mg  5 mg Oral ONCE    metOLazone (ZAROXOLYN) tablet 5 mg  5 mg Oral ONCE    potassium chloride SR (KLOR-CON 10) tablet 20 mEq  20 mEq Oral BID    methylPREDNISolone (PF) (Solu-MEDROL) injection 60 mg  60 mg IntraVENous Q6H    oseltamivir (TAMIFLU) capsule 75 mg  75 mg Oral BID    albuterol-ipratropium (DUO-NEB) 2.5 MG-0.5 MG/3 ML  3 mL Nebulization Q6H RT    albuterol (PROVENTIL VENTOLIN) nebulizer solution 2.5 mg  2.5 mg Nebulization Q3H PRN    ALPRAZolam (XANAX) tablet 0.5 mg  0.5 mg Oral DAILY PRN    arformoteroL (BROVANA) neb solution 15 mcg  15 mcg Nebulization BID RT    budesonide (PULMICORT) 500 mcg/2 ml nebulizer suspension  500 mcg Nebulization BID RT    furosemide (LASIX) injection 40 mg  40 mg IntraVENous Q12H    dilTIAZem CD (CARDIZEM CD) capsule 360 mg  360 mg Oral DAILY    vitamin B complex tablet  1 Tab Oral DAILY    biotin (VITAMIN B7) tablet 5 mg  5 mg Oral DAILY    cholecalciferol (VITAMIN D3) (1000 Units /25 mcg) tablet 2 Tab  2,000 Units Oral DAILY    digoxin (LANOXIN) tablet 0.25 mg  0.25 mg Oral DAILY    DULoxetine (CYMBALTA) capsule 60 mg  60 mg Oral DAILY    gabapentin (NEURONTIN) capsule 300 mg  300 mg Oral DAILY    gabapentin (NEURONTIN) capsule 600 mg  600 mg Oral QHS    therapeutic multivitamin (THERAGRAN) tablet 1 Tab  1 Tab Oral DAILY    sodium chloride (NS) flush 5-40 mL  5-40 mL IntraVENous Q8H    sodium chloride (NS) flush 5-40 mL  5-40 mL IntraVENous PRN    azithromycin (ZITHROMAX) 500 mg in 0.9% sodium chloride (MBP/ADV) 250 mL  500 mg IntraVENous Q24H    cefTRIAXone (ROCEPHIN) 1 g in 0.9% sodium chloride (MBP/ADV) 50 mL  1 g IntraVENous Q24H    acetaminophen (TYLENOL) tablet 650 mg  650 mg Oral Q4H PRN    guaiFENesin ER (MUCINEX) tablet 600 mg  600 mg Oral Q12H    glucose chewable tablet 16 g  4 Tab Oral PRN    glucagon (GLUCAGEN) injection 1 mg  1 mg IntraMUSCular PRN    dextrose 10% infusion 0-250 mL  0-250 mL IntraVENous PRN    hydrALAZINE (APRESOLINE) 20 mg/mL injection 20 mg  20 mg IntraVENous Q6H PRN    insulin NPH (NOVOLIN N, HUMULIN N) injection 35 Units  35 Units SubCUTAneous 7am    insulin NPH (NOVOLIN N, HUMULIN N) injection 25 Units  25 Units SubCUTAneous QHS    Warfarin - pharmacy to dose   Other Rx Dosing/Monitoring    sertraline (ZOLOFT) tablet 100 mg  100 mg Oral DAILY    insulin regular (NOVOLIN R, HUMULIN R) injection 15 Units  15 Units SubCUTAneous ACB    insulin regular (NOVOLIN R, HUMULIN R) injection 15 Units  15 Units SubCUTAneous ACL    insulin regular (NOVOLIN R, HUMULIN R) injection 10 Units  10 Units SubCUTAneous ACD    insulin lispro (HUMALOG) injection   SubCUTAneous AC&HS         Data Review:   Labs:    Recent Results (from the past 24 hour(s))   ECHO ADULT COMPLETE    Collection Time: 02/21/20  2:50 PM   Result Value Ref Range    LA Volume 147.87 22 - 52 mL    Ao Root D 3.21 cm    Aortic Valve Systolic Peak Velocity 066.78 cm/s    Aortic Valve Area by Continuity of Peak Velocity 1.2 cm2    AoV PG 14.1 mmHg    LVIDd 5.70 (A) 3.9 - 5.3 cm    LVPWd 0.80 0.6 - 0.9 cm    LVIDs 3.62 cm    IVSd 0.81 0.6 - 0.9 cm    LVOT d 1.81 cm    LVOT Peak Velocity 85.53 cm/s    LVOT Peak Gradient 2.9 mmHg    MV E Jaziel 141.75 cm/s    Left Atrium to Aortic Root Ratio 1.84     Inferior Vena Cava Diameter Sniffing 3.21 cm    LA Vol 4C 129.45 (A) 22 - 52 mL    LA Vol 2C 139.76 (A) 22 - 52 mL    LA Area 4C 35.6 cm2    LV Mass .4 (A) 67 - 162 g    LV Mass AL Index 90.6 43 - 95 g/m2    Left Atrium Major Axis 5.91 cm Triscuspid Valve Regurgitation Peak Gradient 30.6 mmHg    Pulmonic Valve Max Velocity 82.55 cm/s    TR Max Velocity 276.55 cm/s    LA Vol Index 67.21 16 - 28 ml/m2    LA Vol Index 63.52 16 - 28 ml/m2    LA Vol Index 58.84 16 - 28 ml/m2    Left Ventricular Fractional Shortening by 2D 50.168718994 %    AV Velocity Ratio 0.45     PV peak gradient 2.7 mmHg   GLUCOSE, POC    Collection Time: 02/21/20  4:45 PM   Result Value Ref Range    Glucose (POC) 313 (H) 65 - 100 mg/dL    Performed by Katy Nunes    GLUCOSE, POC    Collection Time: 02/21/20 10:06 PM   Result Value Ref Range    Glucose (POC) 238 (H) 65 - 100 mg/dL    Performed by Katy Nunes    PROTHROMBIN TIME + INR    Collection Time: 02/22/20  2:47 AM   Result Value Ref Range    INR 2.1 (H) 0.9 - 1.1      Prothrombin time 20.4 (H) 9.0 - 59.5 sec   METABOLIC PANEL, BASIC    Collection Time: 02/22/20  2:47 AM   Result Value Ref Range    Sodium 136 136 - 145 mmol/L    Potassium 3.8 3.5 - 5.1 mmol/L    Chloride 106 97 - 108 mmol/L    CO2 24 21 - 32 mmol/L    Anion gap 6 5 - 15 mmol/L    Glucose 282 (H) 65 - 100 mg/dL    BUN 37 (H) 6 - 20 MG/DL    Creatinine 1.07 (H) 0.55 - 1.02 MG/DL    BUN/Creatinine ratio 35 (H) 12 - 20      GFR est AA >60 >60 ml/min/1.73m2    GFR est non-AA 50 (L) >60 ml/min/1.73m2    Calcium 8.5 8.5 - 10.1 MG/DL   CBC WITH AUTOMATED DIFF    Collection Time: 02/22/20  2:47 AM   Result Value Ref Range    WBC 13.3 (H) 3.6 - 11.0 K/uL    RBC 4.15 3.80 - 5.20 M/uL    HGB 11.9 11.5 - 16.0 g/dL    HCT 38.2 35.0 - 47.0 %    MCV 92.0 80.0 - 99.0 FL    MCH 28.7 26.0 - 34.0 PG    MCHC 31.2 30.0 - 36.5 g/dL    RDW 15.3 (H) 11.5 - 14.5 %    PLATELET 074 081 - 847 K/uL    MPV 9.4 8.9 - 12.9 FL    NRBC 0.0 0  WBC    ABSOLUTE NRBC 0.00 0.00 - 0.01 K/uL    NEUTROPHILS 90 (H) 32 - 75 %    LYMPHOCYTES 6 (L) 12 - 49 %    MONOCYTES 3 (L) 5 - 13 %    EOSINOPHILS 0 0 - 7 %    BASOPHILS 0 0 - 1 %    IMMATURE GRANULOCYTES 1 (H) 0.0 - 0.5 %    ABS.  NEUTROPHILS 12.0 (H) 1.8 - 8.0 K/UL    ABS. LYMPHOCYTES 0.8 0.8 - 3.5 K/UL    ABS. MONOCYTES 0.4 0.0 - 1.0 K/UL    ABS. EOSINOPHILS 0.0 0.0 - 0.4 K/UL    ABS. BASOPHILS 0.0 0.0 - 0.1 K/UL    ABS. IMM.  GRANS. 0.1 (H) 0.00 - 0.04 K/UL    DF SMEAR SCANNED      RBC COMMENTS ANISOCYTOSIS  1+        RBC COMMENTS OVALOCYTES  PRESENT        RBC COMMENTS SALVATORE CELLS  PRESENT       GLUCOSE, POC    Collection Time: 02/22/20  6:42 AM   Result Value Ref Range    Glucose (POC) 290 (H) 65 - 100 mg/dL    Performed by Willie Colón, POC    Collection Time: 02/22/20 11:03 AM   Result Value Ref Range    Glucose (POC) 315 (H) 65 - 100 mg/dL    Performed by Reshma Gregory

## 2020-02-23 LAB
ANION GAP SERPL CALC-SCNC: 6 MMOL/L (ref 5–15)
BASOPHILS # BLD: 0 K/UL (ref 0–0.1)
BASOPHILS NFR BLD: 0 % (ref 0–1)
BUN SERPL-MCNC: 39 MG/DL (ref 6–20)
BUN/CREAT SERPL: 33 (ref 12–20)
CALCIUM SERPL-MCNC: 8.8 MG/DL (ref 8.5–10.1)
CHLORIDE SERPL-SCNC: 103 MMOL/L (ref 97–108)
CO2 SERPL-SCNC: 28 MMOL/L (ref 21–32)
CREAT SERPL-MCNC: 1.19 MG/DL (ref 0.55–1.02)
DIFFERENTIAL METHOD BLD: ABNORMAL
EOSINOPHIL # BLD: 0 K/UL (ref 0–0.4)
EOSINOPHIL NFR BLD: 0 % (ref 0–7)
ERYTHROCYTE [DISTWIDTH] IN BLOOD BY AUTOMATED COUNT: 14.8 % (ref 11.5–14.5)
GLUCOSE BLD STRIP.AUTO-MCNC: 290 MG/DL (ref 65–100)
GLUCOSE BLD STRIP.AUTO-MCNC: 305 MG/DL (ref 65–100)
GLUCOSE BLD STRIP.AUTO-MCNC: 344 MG/DL (ref 65–100)
GLUCOSE BLD STRIP.AUTO-MCNC: 371 MG/DL (ref 65–100)
GLUCOSE SERPL-MCNC: 337 MG/DL (ref 65–100)
HCT VFR BLD AUTO: 39.8 % (ref 35–47)
HGB BLD-MCNC: 12.8 G/DL (ref 11.5–16)
IMM GRANULOCYTES # BLD AUTO: 0.2 K/UL (ref 0–0.04)
IMM GRANULOCYTES NFR BLD AUTO: 1 % (ref 0–0.5)
INR PPP: 2.5 (ref 0.9–1.1)
LYMPHOCYTES # BLD: 0.9 K/UL (ref 0.8–3.5)
LYMPHOCYTES NFR BLD: 7 % (ref 12–49)
MCH RBC QN AUTO: 28.9 PG (ref 26–34)
MCHC RBC AUTO-ENTMCNC: 32.2 G/DL (ref 30–36.5)
MCV RBC AUTO: 89.8 FL (ref 80–99)
MONOCYTES # BLD: 0.5 K/UL (ref 0–1)
MONOCYTES NFR BLD: 4 % (ref 5–13)
NEUTS SEG # BLD: 10.9 K/UL (ref 1.8–8)
NEUTS SEG NFR BLD: 88 % (ref 32–75)
NRBC # BLD: 0 K/UL (ref 0–0.01)
NRBC BLD-RTO: 0 PER 100 WBC
PLATELET # BLD AUTO: 242 K/UL (ref 150–400)
PMV BLD AUTO: 9.3 FL (ref 8.9–12.9)
POTASSIUM SERPL-SCNC: 3.9 MMOL/L (ref 3.5–5.1)
PROTHROMBIN TIME: 24 SEC (ref 9–11.1)
RBC # BLD AUTO: 4.43 M/UL (ref 3.8–5.2)
SERVICE CMNT-IMP: ABNORMAL
SODIUM SERPL-SCNC: 137 MMOL/L (ref 136–145)
WBC # BLD AUTO: 12.5 K/UL (ref 3.6–11)

## 2020-02-23 PROCEDURE — 74011250637 HC RX REV CODE- 250/637: Performed by: INTERNAL MEDICINE

## 2020-02-23 PROCEDURE — 74011250636 HC RX REV CODE- 250/636: Performed by: STUDENT IN AN ORGANIZED HEALTH CARE EDUCATION/TRAINING PROGRAM

## 2020-02-23 PROCEDURE — 74011000250 HC RX REV CODE- 250: Performed by: PHYSICIAN ASSISTANT

## 2020-02-23 PROCEDURE — 74011000250 HC RX REV CODE- 250: Performed by: STUDENT IN AN ORGANIZED HEALTH CARE EDUCATION/TRAINING PROGRAM

## 2020-02-23 PROCEDURE — 36415 COLL VENOUS BLD VENIPUNCTURE: CPT

## 2020-02-23 PROCEDURE — 74011250637 HC RX REV CODE- 250/637: Performed by: STUDENT IN AN ORGANIZED HEALTH CARE EDUCATION/TRAINING PROGRAM

## 2020-02-23 PROCEDURE — 74011000258 HC RX REV CODE- 258: Performed by: STUDENT IN AN ORGANIZED HEALTH CARE EDUCATION/TRAINING PROGRAM

## 2020-02-23 PROCEDURE — 85610 PROTHROMBIN TIME: CPT

## 2020-02-23 PROCEDURE — 74011250636 HC RX REV CODE- 250/636: Performed by: INTERNAL MEDICINE

## 2020-02-23 PROCEDURE — 77010033678 HC OXYGEN DAILY

## 2020-02-23 PROCEDURE — 74011636637 HC RX REV CODE- 636/637: Performed by: STUDENT IN AN ORGANIZED HEALTH CARE EDUCATION/TRAINING PROGRAM

## 2020-02-23 PROCEDURE — 74011250636 HC RX REV CODE- 250/636: Performed by: PHYSICIAN ASSISTANT

## 2020-02-23 PROCEDURE — 65660000000 HC RM CCU STEPDOWN

## 2020-02-23 PROCEDURE — 94760 N-INVAS EAR/PLS OXIMETRY 1: CPT

## 2020-02-23 PROCEDURE — 85025 COMPLETE CBC W/AUTO DIFF WBC: CPT

## 2020-02-23 PROCEDURE — 80048 BASIC METABOLIC PNL TOTAL CA: CPT

## 2020-02-23 PROCEDURE — 94640 AIRWAY INHALATION TREATMENT: CPT

## 2020-02-23 PROCEDURE — 82962 GLUCOSE BLOOD TEST: CPT

## 2020-02-23 PROCEDURE — 74011636637 HC RX REV CODE- 636/637: Performed by: INTERNAL MEDICINE

## 2020-02-23 RX ORDER — DOCUSATE SODIUM 100 MG/1
100 CAPSULE, LIQUID FILLED ORAL 2 TIMES DAILY
Status: DISCONTINUED | OUTPATIENT
Start: 2020-02-23 | End: 2020-02-27 | Stop reason: HOSPADM

## 2020-02-23 RX ORDER — BUMETANIDE 1 MG/1
2 TABLET ORAL 2 TIMES DAILY
Status: DISCONTINUED | OUTPATIENT
Start: 2020-02-23 | End: 2020-02-25

## 2020-02-23 RX ORDER — POLYETHYLENE GLYCOL 3350 17 G/17G
17 POWDER, FOR SOLUTION ORAL
Status: DISCONTINUED | OUTPATIENT
Start: 2020-02-23 | End: 2020-02-27 | Stop reason: HOSPADM

## 2020-02-23 RX ORDER — OSELTAMIVIR PHOSPHATE 30 MG/1
30 CAPSULE ORAL EVERY 12 HOURS
Status: COMPLETED | OUTPATIENT
Start: 2020-02-23 | End: 2020-02-24

## 2020-02-23 RX ORDER — WARFARIN 2.5 MG/1
2.5 TABLET ORAL ONCE
Status: COMPLETED | OUTPATIENT
Start: 2020-02-23 | End: 2020-02-23

## 2020-02-23 RX ADMIN — HUMAN INSULIN 35 UNITS: 100 INJECTION, SUSPENSION SUBCUTANEOUS at 07:59

## 2020-02-23 RX ADMIN — Medication 1 TABLET: at 08:06

## 2020-02-23 RX ADMIN — DULOXETINE HYDROCHLORIDE 60 MG: 60 CAPSULE, DELAYED RELEASE ORAL at 08:06

## 2020-02-23 RX ADMIN — SERTRALINE HYDROCHLORIDE 100 MG: 50 TABLET ORAL at 08:06

## 2020-02-23 RX ADMIN — INSULIN LISPRO 12 UNITS: 100 INJECTION, SOLUTION INTRAVENOUS; SUBCUTANEOUS at 12:24

## 2020-02-23 RX ADMIN — POTASSIUM CHLORIDE 20 MEQ: 750 TABLET, FILM COATED, EXTENDED RELEASE ORAL at 08:07

## 2020-02-23 RX ADMIN — DIGOXIN 0.25 MG: 125 TABLET ORAL at 09:00

## 2020-02-23 RX ADMIN — METHYLPREDNISOLONE SODIUM SUCCINATE 60 MG: 125 INJECTION, POWDER, FOR SOLUTION INTRAMUSCULAR; INTRAVENOUS at 08:07

## 2020-02-23 RX ADMIN — GUAIFENESIN 600 MG: 600 TABLET, EXTENDED RELEASE ORAL at 08:06

## 2020-02-23 RX ADMIN — HUMAN INSULIN 10 UNITS: 100 INJECTION, SOLUTION SUBCUTANEOUS at 16:48

## 2020-02-23 RX ADMIN — BUDESONIDE INHALATION 500 MCG: 0.5 SUSPENSION RESPIRATORY (INHALATION) at 20:43

## 2020-02-23 RX ADMIN — HUMAN INSULIN 15 UNITS: 100 INJECTION, SOLUTION SUBCUTANEOUS at 07:59

## 2020-02-23 RX ADMIN — Medication 10 ML: at 08:00

## 2020-02-23 RX ADMIN — Medication 10 ML: at 14:00

## 2020-02-23 RX ADMIN — DILTIAZEM HYDROCHLORIDE 360 MG: 180 CAPSULE, EXTENDED RELEASE ORAL at 08:07

## 2020-02-23 RX ADMIN — HUMAN INSULIN 25 UNITS: 100 INJECTION, SUSPENSION SUBCUTANEOUS at 22:21

## 2020-02-23 RX ADMIN — INSULIN LISPRO 9 UNITS: 100 INJECTION, SOLUTION INTRAVENOUS; SUBCUTANEOUS at 07:59

## 2020-02-23 RX ADMIN — ARFORMOTEROL TARTRATE 15 MCG: 15 SOLUTION RESPIRATORY (INHALATION) at 20:43

## 2020-02-23 RX ADMIN — WARFARIN SODIUM 2.5 MG: 2.5 TABLET ORAL at 16:49

## 2020-02-23 RX ADMIN — GUAIFENESIN 600 MG: 600 TABLET, EXTENDED RELEASE ORAL at 20:12

## 2020-02-23 RX ADMIN — Medication 5 MG: at 08:12

## 2020-02-23 RX ADMIN — Medication 10 ML: at 21:00

## 2020-02-23 RX ADMIN — GABAPENTIN 300 MG: 300 CAPSULE ORAL at 08:05

## 2020-02-23 RX ADMIN — MELATONIN 2 TABLET: at 08:06

## 2020-02-23 RX ADMIN — HUMAN INSULIN 15 UNITS: 100 INJECTION, SOLUTION SUBCUTANEOUS at 12:25

## 2020-02-23 RX ADMIN — DOCUSATE SODIUM 100 MG: 100 CAPSULE, LIQUID FILLED ORAL at 12:25

## 2020-02-23 RX ADMIN — INSULIN LISPRO 6 UNITS: 100 INJECTION, SOLUTION INTRAVENOUS; SUBCUTANEOUS at 22:20

## 2020-02-23 RX ADMIN — METHYLPREDNISOLONE SODIUM SUCCINATE 60 MG: 125 INJECTION, POWDER, FOR SOLUTION INTRAMUSCULAR; INTRAVENOUS at 02:32

## 2020-02-23 RX ADMIN — BUMETANIDE 2 MG: 1 TABLET ORAL at 08:07

## 2020-02-23 RX ADMIN — METHYLPREDNISOLONE SODIUM SUCCINATE 40 MG: 40 INJECTION, POWDER, FOR SOLUTION INTRAMUSCULAR; INTRAVENOUS at 20:12

## 2020-02-23 RX ADMIN — IPRATROPIUM BROMIDE AND ALBUTEROL SULFATE 3 ML: .5; 3 SOLUTION RESPIRATORY (INHALATION) at 01:19

## 2020-02-23 RX ADMIN — METHYLPREDNISOLONE SODIUM SUCCINATE 40 MG: 40 INJECTION, POWDER, FOR SOLUTION INTRAMUSCULAR; INTRAVENOUS at 13:31

## 2020-02-23 RX ADMIN — BUDESONIDE INHALATION 500 MCG: 0.5 SUSPENSION RESPIRATORY (INHALATION) at 07:10

## 2020-02-23 RX ADMIN — THERA TABS 1 TABLET: TAB at 08:05

## 2020-02-23 RX ADMIN — DOCUSATE SODIUM 100 MG: 100 CAPSULE, LIQUID FILLED ORAL at 16:51

## 2020-02-23 RX ADMIN — INSULIN LISPRO 12 UNITS: 100 INJECTION, SOLUTION INTRAVENOUS; SUBCUTANEOUS at 16:48

## 2020-02-23 RX ADMIN — IPRATROPIUM BROMIDE AND ALBUTEROL SULFATE 3 ML: .5; 3 SOLUTION RESPIRATORY (INHALATION) at 14:30

## 2020-02-23 RX ADMIN — BUMETANIDE 2 MG: 1 TABLET ORAL at 16:51

## 2020-02-23 RX ADMIN — OSELTAMIVIR PHOSPHATE 30 MG: 30 CAPSULE ORAL at 20:12

## 2020-02-23 RX ADMIN — GABAPENTIN 600 MG: 300 CAPSULE ORAL at 22:20

## 2020-02-23 RX ADMIN — POTASSIUM CHLORIDE 20 MEQ: 750 TABLET, FILM COATED, EXTENDED RELEASE ORAL at 16:50

## 2020-02-23 RX ADMIN — AZITHROMYCIN MONOHYDRATE 500 MG: 500 INJECTION, POWDER, LYOPHILIZED, FOR SOLUTION INTRAVENOUS at 02:31

## 2020-02-23 RX ADMIN — IPRATROPIUM BROMIDE AND ALBUTEROL SULFATE 3 ML: .5; 3 SOLUTION RESPIRATORY (INHALATION) at 07:10

## 2020-02-23 RX ADMIN — OSELTAMIVIR PHOSPHATE 30 MG: 30 CAPSULE ORAL at 09:00

## 2020-02-23 RX ADMIN — CEFTRIAXONE 1 G: 1 INJECTION, POWDER, FOR SOLUTION INTRAMUSCULAR; INTRAVENOUS at 02:30

## 2020-02-23 NOTE — PROGRESS NOTES
Bedside and Verbal shift change report given to Westley Banegas (oncoming nurse) by Fidencio Jane (offgoing nurse).  Report included the following information SBAR, Kardex, Procedure Summary, Intake/Output, MAR, Recent Results, Med Rec Status and Cardiac Rhythm

## 2020-02-23 NOTE — PROGRESS NOTES
Medical Progress Note      NAME: [facility]   :  1945  MRM:  [id number]    Date/Time: 2020           Problem List:     Principal Problem:    Acute respiratory failure ([facility] (2020)    Active Problems:    Asthma exacerbation (2014)      Chronic atrial fibrillation (2015)      Pneumonia (2017)      Type 2 diabetes mellitus with diabetic neuropathy ([facility] (2018)      Influenza (2020)             Subjective:     Breathing and cough improving. Cough produces dark sputum. Denies cp, dizziness. Throat dry,  BS elevated -DM on steroids     Past Medical History:   Diagnosis Date    Asthma     Atrial fibrillation ([facility]     Depression     DM (diabetes mellitus) ([facility]     HTN (hypertension)     Hyperlipidemia     Morbid obesity ([facility]     Neuropathy     LITA on CPAP     Other ill-defined conditions(799.89) pneumonia            Objective:         Vitals:      Last 24hrs VS reviewed since prior progress note. Most recent are:    Visit Vitals  /82   Pulse 80   Temp 98.9 °F (37.2 °C)   Resp 18   Ht 5' 2\" (1.575 m)   Wt 278 lb 7.1 oz (126.3 kg)   SpO2 94%   BMI 50.93 kg/m²     SpO2 Readings from Last 6 Encounters:   20 94%   19 92%   10/28/19 90%   19 97%   19 96%   18 95%    O2 Flow Rate (L/min): 1 l/min       Intake/Output Summary (Last 24 hours) at 2020 1123  Last data filed at 2020 8612  Gross per 24 hour   Intake --   Output 3000 ml   Net -3000 ml                  Exam:      General:  Alert, cooperative, no distress, appears stated age. Throat clear    Lungs:   Clear to auscultation bilaterally. Heart:  Irregular rate and rhythm, S1, S2 normal, no murmur, click, rub or gallop. Abdomen:   Soft, non-tender. Bowel sounds normal.   Extremities: Trace b/l pretibial  edema.      Lab Data Reviewed: (see below)  Recent Results (from the past 24 hour(s))   GLUCOSE, POC    Collection Time: 20  5:01 PM   Result Value Ref Range Glucose (POC) 221 (H) 65 - 100 mg/dL    Performed by Sushil Land (CON)    GLUCOSE, POC    Collection Time: 02/22/20 10:37 PM   Result Value Ref Range    Glucose (POC) 414 (H) 65 - 100 mg/dL    Performed by Angely [address], POC    Collection Time: 02/22/20 10:38 PM   Result Value Ref Range    Glucose (POC) 310 (H) 65 - 100 mg/dL    Performed by Jessie Reyes    PROTHROMBIN TIME + INR    Collection Time: 02/23/20  6:28 AM   Result Value Ref Range    INR 2.5 (H) 0.9 - 1.1      Prothrombin time 24.0 (H) 9.0 - 39.8 sec   METABOLIC PANEL, BASIC    Collection Time: 02/23/20  6:28 AM   Result Value Ref Range    Sodium 137 136 - 145 mmol/L    Potassium 3.9 3.5 - 5.1 mmol/L    Chloride 103 97 - 108 mmol/L    CO2 28 21 - 32 mmol/L    Anion gap 6 5 - 15 mmol/L    Glucose 337 (H) 65 - 100 mg/dL    BUN 39 (H) 6 - 20 MG/DL    Creatinine 1.19 (H) 0.55 - 1.02 MG/DL    BUN/Creatinine ratio 33 (H) 12 - 20      GFR est AA 54 (L) >60 ml/min/1.73m2    GFR est non-AA 44 (L) >60 ml/min/1.73m2    Calcium 8.8 8.5 - 10.1 MG/DL   CBC WITH AUTOMATED DIFF    Collection Time: 02/23/20  6:28 AM   Result Value Ref Range    WBC 12.5 (H) 3.6 - 11.0 K/uL    RBC 4.43 3.80 - 5.20 M/uL    HGB 12.8 11.5 - 16.0 g/dL    HCT 39.8 35.0 - 47.0 %    MCV 89.8 80.0 - 99.0 FL    MCH 28.9 26.0 - 34.0 PG    MCHC 32.2 30.0 - 36.5 g/dL    RDW 14.8 (H) 11.5 - 14.5 %    PLATELET 440 485 - 133 K/uL    MPV 9.3 8.9 - 12.9 FL    NRBC 0.0 0  WBC    ABSOLUTE NRBC 0.00 0.00 - 0.01 K/uL    NEUTROPHILS 88 (H) 32 - 75 %    LYMPHOCYTES 7 (L) 12 - 49 %    MONOCYTES 4 (L) 5 - 13 %    EOSINOPHILS 0 0 - 7 %    BASOPHILS 0 0 - 1 %    IMMATURE GRANULOCYTES 1 (H) 0.0 - 0.5 %    ABS. NEUTROPHILS 10.9 (H) 1.8 - 8.0 K/UL    ABS. LYMPHOCYTES 0.9 0.8 - 3.5 K/UL    ABS. MONOCYTES 0.5 0.0 - 1.0 K/UL    ABS. EOSINOPHILS 0.0 0.0 - 0.4 K/UL    ABS. BASOPHILS 0.0 0.0 - 0.1 K/UL    ABS. IMM.  GRANS. 0.2 (H) 0.00 - 0.04 K/UL    DF AUTOMATED     GLUCOSE, POC    Collection Time: 02/23/20 6:56 AM   Result Value Ref Range    Glucose (POC) 305 (H) 65 - 100 mg/dL    Performed by Marybel ALVARADO        Medications Reviewed: (see below)    ______________________________________________________________________    Medications:     Current Facility-Administered Medications   Medication Dose Route Frequency    bumetanide (BUMEX) tablet 2 mg  2 mg Oral BID    warfarin (COUMADIN) tablet 2.5 mg  2.5 mg Oral ONCE    oseltamivir (TAMIFLU) capsule 30 mg  30 mg Oral Q12H    docusate sodium (COLACE) capsule 100 mg  100 mg Oral BID    polyethylene glycol (MIRALAX) packet 17 g  17 g Oral DAILY PRN    methylPREDNISolone (PF) (Solu-MEDROL) injection 40 mg  40 mg IntraVENous Q6H    potassium chloride SR (KLOR-CON 10) tablet 20 mEq  20 mEq Oral BID    albuterol-ipratropium (DUO-NEB) 2.5 MG-0.5 MG/3 ML  3 mL Nebulization Q6H RT    albuterol (PROVENTIL VENTOLIN) nebulizer solution 2.5 mg  2.5 mg Nebulization Q3H PRN    ALPRAZolam (XANAX) tablet 0.5 mg  0.5 mg Oral DAILY PRN    arformoteroL (BROVANA) neb solution 15 mcg  15 mcg Nebulization BID RT    budesonide (PULMICORT) 500 mcg/2 ml nebulizer suspension  500 mcg Nebulization BID RT    dilTIAZem CD (CARDIZEM CD) capsule 360 mg  360 mg Oral DAILY    vitamin B complex tablet  1 Tab Oral DAILY    biotin (VITAMIN B7) tablet 5 mg  5 mg Oral DAILY    cholecalciferol (VITAMIN D3) (1000 Units /25 mcg) tablet 2 Tab  2,000 Units Oral DAILY    digoxin (LANOXIN) tablet 0.25 mg  0.25 mg Oral DAILY    DULoxetine (CYMBALTA) capsule 60 mg  60 mg Oral DAILY    gabapentin (NEURONTIN) capsule 300 mg  300 mg Oral DAILY    gabapentin (NEURONTIN) capsule 600 mg  600 mg Oral QHS    therapeutic multivitamin (THERAGRAN) tablet 1 Tab  1 Tab Oral DAILY    sodium chloride (NS) flush 5-40 mL  5-40 mL IntraVENous Q8H    sodium chloride (NS) flush 5-40 mL  5-40 mL IntraVENous PRN    cefTRIAXone (ROCEPHIN) 1 g in 0.9% sodium chloride (MBP/ADV) 50 mL  1 g IntraVENous Q24H    acetaminophen (TYLENOL) tablet 650 mg  650 mg Oral Q4H PRN    guaiFENesin ER (MUCINEX) tablet 600 mg  600 mg Oral Q12H    glucose chewable tablet 16 g  4 Tab Oral PRN    glucagon (GLUCAGEN) injection 1 mg  1 mg IntraMUSCular PRN    dextrose 10% infusion 0-250 mL  0-250 mL IntraVENous PRN    hydrALAZINE (APRESOLINE) 20 mg/mL injection 20 mg  20 mg IntraVENous Q6H PRN    insulin NPH (NOVOLIN N, HUMULIN N) injection 35 Units  35 Units SubCUTAneous 7am    insulin NPH (NOVOLIN N, HUMULIN N) injection 25 Units  25 Units SubCUTAneous QHS    Warfarin - pharmacy to dose   Other Rx Dosing/Monitoring    sertraline (ZOLOFT) tablet 100 mg  100 mg Oral DAILY    insulin regular (NOVOLIN R, HUMULIN R) injection 15 Units  15 Units SubCUTAneous ACB    insulin regular (NOVOLIN R, HUMULIN R) injection 15 Units  15 Units SubCUTAneous ACL    insulin regular (NOVOLIN R, HUMULIN R) injection 10 Units  10 Units SubCUTAneous ACD    insulin lispro (HUMALOG) injection   SubCUTAneous AC&HS                   Assessment:   See below   Patient Active Problem List   Diagnosis Code    Hyperlipidemia E78.5    Depression F32.9    Neuropathy G62.9    Respiratory failure J96.90    Asthma exacerbation J45. 901    Anemia D64.9    Dizziness R42    Weakness R53.1    Morbid obesity ([facility]) E66.01    Cervical stenosis of spine M48.02    Hematoma of neck S10.93XA    Chronic atrial fibrillation I48.20    Long-term insulin use in type 2 diabetes ([facility]) E11.9, Z79.4    Microalbuminuria R80.9    Asthmatic bronchitis with acute exacerbation J45. 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    Gait abnormality R26.9    Type 2 diabetes mellitus with stage 1 chronic kidney disease, with long-term current use of insulin ([facility]) E11.22, N18.1, Z79.4    Diabetic polyneuropathy associated with type 2 diabetes mellitus ([facility] E11.42    Encounter for long-term (current) drug use Z79.899    Recurrent depression ([facility] F33.9    Type 2 diabetes mellitus with diabetic neuropathy (HCC) E11.40    Acute respiratory failure ([facility] J96.00    Influenza J11.1          Plan:   Acute Hypoxic Resp Failure due to Flu A+ , AE Asthma, Acute Diastolic CHF, Possible PNA. Improving. P reduce Solumedrol dose . Diuretic has been reduced by Dr. Hemal Kovacs     Type 2 DM aggravated by steroids as above. Continue NPH and Regular Insulin. Permanent Afib.  Warfarin per pharmacy     Bowel regimen                                                        ___________________________________________________      Attending Physician: Perry Dalton MD

## 2020-02-23 NOTE — PROGRESS NOTES
Pharmacist Note - Warfarin Dosing  Consult provided for this 75 y. o.female to manage warfarin for Atrial Fibrillation    INR Goal: 2 - 3    Home regimen/ tablet size: M-W-F: 2.5 mg; Tu-Th-Sat-Sun: 5 mg    Drugs that may increase INR: Macrolides  Drugs that may decrease INR: None  Other current anticoagulants/ drugs that may increase bleeding risk: Sertraline  Risk factors: Age > 65  Daily INR ordered: YES    Recent Labs     02/23/20  0628 02/22/20  0247 02/21/20  0318   HGB 12.8 11.9  --    INR 2.5* 2.1* 1.7*     Date               INR                  Dose  2/18  2.3  5 mg (PTA)   2/19  --  2.5 mg   2/20  1.6  5 mg     2/21  1.7  5 mg  2/22                 2.1                  5mg  2/23                 2.5                                                                                                   Assessment/ Plan: Will order warfarin 2.5 mg PO x 1 dose. While this lower dose is normally taken on M/W/F at home, the INR has increased significantly in the past 48 hours. Lower dose today will help avoid a future supra-therapeutic INR. Pharmacy will continue to monitor daily and adjust therapy as indicated. Please contact the pharmacist at [facility] for outpatient recommendations if needed.

## 2020-02-24 ENCOUNTER — APPOINTMENT (OUTPATIENT)
Dept: GENERAL RADIOLOGY | Age: 75
DRG: 871 | End: 2020-02-24
Attending: INTERNAL MEDICINE
Payer: MEDICARE

## 2020-02-24 PROBLEM — I50.31 ACUTE DIASTOLIC CHF (CONGESTIVE HEART FAILURE) (HCC): Status: ACTIVE | Noted: 2020-02-24

## 2020-02-24 LAB
ANION GAP SERPL CALC-SCNC: 9 MMOL/L (ref 5–15)
AV VELOCITY RATIO: 0.45
BACTERIA SPEC CULT: ABNORMAL
BACTERIA SPEC CULT: ABNORMAL
BACTERIA SPEC CULT: NORMAL
BUN SERPL-MCNC: 40 MG/DL (ref 6–20)
BUN/CREAT SERPL: 34 (ref 12–20)
CALCIUM SERPL-MCNC: 9.2 MG/DL (ref 8.5–10.1)
CHLORIDE SERPL-SCNC: 97 MMOL/L (ref 97–108)
CO2 SERPL-SCNC: 30 MMOL/L (ref 21–32)
CREAT SERPL-MCNC: 1.17 MG/DL (ref 0.55–1.02)
ECHO AO ROOT DIAM: 3.21 CM
ECHO AV AREA PEAK VELOCITY: 1.2 CM2
ECHO AV PEAK GRADIENT: 14.1 MMHG
ECHO AV PEAK VELOCITY: 188.05 CM/S
ECHO IVC SNIFF: 3.21 CM
ECHO LA AREA 4C: 35.6 CM2
ECHO LA MAJOR AXIS: 5.91 CM
ECHO LA TO AORTIC ROOT RATIO: 1.84
ECHO LA VOL 2C: 139.76 ML (ref 22–52)
ECHO LA VOL 4C: 129.45 ML (ref 22–52)
ECHO LA VOL BP: 147.87 ML (ref 22–52)
ECHO LA VOL/BSA BIPLANE: 67.21 ML/M2 (ref 16–28)
ECHO LA VOLUME INDEX A2C: 63.52 ML/M2 (ref 16–28)
ECHO LA VOLUME INDEX A4C: 58.84 ML/M2 (ref 16–28)
ECHO LV INTERNAL DIMENSION DIASTOLIC: 5.7 CM (ref 3.9–5.3)
ECHO LV INTERNAL DIMENSION SYSTOLIC: 3.62 CM
ECHO LV IVSD: 0.81 CM (ref 0.6–0.9)
ECHO LV MASS 2D: 199.4 G (ref 67–162)
ECHO LV MASS INDEX 2D: 90.6 G/M2 (ref 43–95)
ECHO LV POSTERIOR WALL DIASTOLIC: 0.8 CM (ref 0.6–0.9)
ECHO LVOT DIAM: 1.81 CM
ECHO LVOT PEAK GRADIENT: 2.9 MMHG
ECHO LVOT PEAK VELOCITY: 85.53 CM/S
ECHO MV E VELOCITY: 141.75 CM/S
ECHO PV MAX VELOCITY: 82.55 CM/S
ECHO PV PEAK GRADIENT: 2.7 MMHG
ECHO TV REGURGITANT MAX VELOCITY: 276.55 CM/S
ECHO TV REGURGITANT PEAK GRADIENT: 30.6 MMHG
GLUCOSE BLD STRIP.AUTO-MCNC: 233 MG/DL (ref 65–100)
GLUCOSE BLD STRIP.AUTO-MCNC: 264 MG/DL (ref 65–100)
GLUCOSE BLD STRIP.AUTO-MCNC: 360 MG/DL (ref 65–100)
GLUCOSE BLD STRIP.AUTO-MCNC: 398 MG/DL (ref 65–100)
GLUCOSE SERPL-MCNC: 243 MG/DL (ref 65–100)
GRAM STN SPEC: ABNORMAL
INR PPP: 3.6 (ref 0.9–1.1)
LVFS 2D: 36.4 %
POTASSIUM SERPL-SCNC: 3.5 MMOL/L (ref 3.5–5.1)
PROTHROMBIN TIME: 33.8 SEC (ref 9–11.1)
SERVICE CMNT-IMP: ABNORMAL
SERVICE CMNT-IMP: NORMAL
SODIUM SERPL-SCNC: 136 MMOL/L (ref 136–145)

## 2020-02-24 PROCEDURE — 94760 N-INVAS EAR/PLS OXIMETRY 1: CPT

## 2020-02-24 PROCEDURE — 65660000000 HC RM CCU STEPDOWN

## 2020-02-24 PROCEDURE — 74011250636 HC RX REV CODE- 250/636: Performed by: INTERNAL MEDICINE

## 2020-02-24 PROCEDURE — 82962 GLUCOSE BLOOD TEST: CPT

## 2020-02-24 PROCEDURE — 97116 GAIT TRAINING THERAPY: CPT | Performed by: PHYSICAL THERAPIST

## 2020-02-24 PROCEDURE — 36415 COLL VENOUS BLD VENIPUNCTURE: CPT

## 2020-02-24 PROCEDURE — 74011000250 HC RX REV CODE- 250: Performed by: INTERNAL MEDICINE

## 2020-02-24 PROCEDURE — 74011250637 HC RX REV CODE- 250/637: Performed by: INTERNAL MEDICINE

## 2020-02-24 PROCEDURE — 74011636637 HC RX REV CODE- 636/637: Performed by: INTERNAL MEDICINE

## 2020-02-24 PROCEDURE — 74011250637 HC RX REV CODE- 250/637: Performed by: STUDENT IN AN ORGANIZED HEALTH CARE EDUCATION/TRAINING PROGRAM

## 2020-02-24 PROCEDURE — 77010033678 HC OXYGEN DAILY

## 2020-02-24 PROCEDURE — 85610 PROTHROMBIN TIME: CPT

## 2020-02-24 PROCEDURE — 74011000258 HC RX REV CODE- 258: Performed by: STUDENT IN AN ORGANIZED HEALTH CARE EDUCATION/TRAINING PROGRAM

## 2020-02-24 PROCEDURE — 74011636637 HC RX REV CODE- 636/637: Performed by: STUDENT IN AN ORGANIZED HEALTH CARE EDUCATION/TRAINING PROGRAM

## 2020-02-24 PROCEDURE — 74011000250 HC RX REV CODE- 250: Performed by: PHYSICIAN ASSISTANT

## 2020-02-24 PROCEDURE — 94640 AIRWAY INHALATION TREATMENT: CPT

## 2020-02-24 PROCEDURE — 74011250636 HC RX REV CODE- 250/636: Performed by: STUDENT IN AN ORGANIZED HEALTH CARE EDUCATION/TRAINING PROGRAM

## 2020-02-24 PROCEDURE — 80048 BASIC METABOLIC PNL TOTAL CA: CPT

## 2020-02-24 PROCEDURE — 74011000250 HC RX REV CODE- 250: Performed by: STUDENT IN AN ORGANIZED HEALTH CARE EDUCATION/TRAINING PROGRAM

## 2020-02-24 PROCEDURE — 71046 X-RAY EXAM CHEST 2 VIEWS: CPT

## 2020-02-24 RX ORDER — ACETYLCYSTEINE 200 MG/ML
200 SOLUTION ORAL; RESPIRATORY (INHALATION) 3 TIMES DAILY
Status: DISCONTINUED | OUTPATIENT
Start: 2020-02-24 | End: 2020-02-25

## 2020-02-24 RX ORDER — PREDNISONE 20 MG/1
60 TABLET ORAL
Status: DISCONTINUED | OUTPATIENT
Start: 2020-02-24 | End: 2020-02-27 | Stop reason: HOSPADM

## 2020-02-24 RX ADMIN — INSULIN LISPRO 6 UNITS: 100 INJECTION, SOLUTION INTRAVENOUS; SUBCUTANEOUS at 17:25

## 2020-02-24 RX ADMIN — Medication 10 ML: at 22:32

## 2020-02-24 RX ADMIN — HUMAN INSULIN 15 UNITS: 100 INJECTION, SOLUTION SUBCUTANEOUS at 12:22

## 2020-02-24 RX ADMIN — BUMETANIDE 2 MG: 1 TABLET ORAL at 17:26

## 2020-02-24 RX ADMIN — Medication 10 ML: at 17:27

## 2020-02-24 RX ADMIN — GUAIFENESIN 600 MG: 600 TABLET, EXTENDED RELEASE ORAL at 22:32

## 2020-02-24 RX ADMIN — BUDESONIDE INHALATION 500 MCG: 0.5 SUSPENSION RESPIRATORY (INHALATION) at 08:00

## 2020-02-24 RX ADMIN — DILTIAZEM HYDROCHLORIDE 360 MG: 180 CAPSULE, EXTENDED RELEASE ORAL at 08:49

## 2020-02-24 RX ADMIN — Medication 5 MG: at 08:58

## 2020-02-24 RX ADMIN — POTASSIUM CHLORIDE 20 MEQ: 750 TABLET, FILM COATED, EXTENDED RELEASE ORAL at 17:26

## 2020-02-24 RX ADMIN — INSULIN LISPRO 12 UNITS: 100 INJECTION, SOLUTION INTRAVENOUS; SUBCUTANEOUS at 12:21

## 2020-02-24 RX ADMIN — Medication 1 TABLET: at 08:47

## 2020-02-24 RX ADMIN — PREDNISONE 60 MG: 20 TABLET ORAL at 08:49

## 2020-02-24 RX ADMIN — BUMETANIDE 2 MG: 1 TABLET ORAL at 08:48

## 2020-02-24 RX ADMIN — IPRATROPIUM BROMIDE AND ALBUTEROL SULFATE 3 ML: .5; 3 SOLUTION RESPIRATORY (INHALATION) at 19:56

## 2020-02-24 RX ADMIN — GUAIFENESIN 600 MG: 600 TABLET, EXTENDED RELEASE ORAL at 08:48

## 2020-02-24 RX ADMIN — HUMAN INSULIN 10 UNITS: 100 INJECTION, SOLUTION SUBCUTANEOUS at 17:25

## 2020-02-24 RX ADMIN — OSELTAMIVIR PHOSPHATE 30 MG: 30 CAPSULE ORAL at 21:29

## 2020-02-24 RX ADMIN — IPRATROPIUM BROMIDE AND ALBUTEROL SULFATE 3 ML: .5; 3 SOLUTION RESPIRATORY (INHALATION) at 07:59

## 2020-02-24 RX ADMIN — DIGOXIN 0.25 MG: 125 TABLET ORAL at 08:47

## 2020-02-24 RX ADMIN — HUMAN INSULIN 25 UNITS: 100 INJECTION, SUSPENSION SUBCUTANEOUS at 22:32

## 2020-02-24 RX ADMIN — THERA TABS 1 TABLET: TAB at 08:48

## 2020-02-24 RX ADMIN — MELATONIN 2 TABLET: at 08:50

## 2020-02-24 RX ADMIN — DOCUSATE SODIUM 100 MG: 100 CAPSULE, LIQUID FILLED ORAL at 17:26

## 2020-02-24 RX ADMIN — IPRATROPIUM BROMIDE AND ALBUTEROL SULFATE 3 ML: .5; 3 SOLUTION RESPIRATORY (INHALATION) at 13:47

## 2020-02-24 RX ADMIN — ACETYLCYSTEINE 200 MG: 200 SOLUTION ORAL; RESPIRATORY (INHALATION) at 19:57

## 2020-02-24 RX ADMIN — INSULIN LISPRO 12 UNITS: 100 INJECTION, SOLUTION INTRAVENOUS; SUBCUTANEOUS at 07:08

## 2020-02-24 RX ADMIN — HUMAN INSULIN 15 UNITS: 100 INJECTION, SOLUTION SUBCUTANEOUS at 08:43

## 2020-02-24 RX ADMIN — DULOXETINE HYDROCHLORIDE 60 MG: 60 CAPSULE, DELAYED RELEASE ORAL at 08:48

## 2020-02-24 RX ADMIN — POLYETHYLENE GLYCOL 3350 17 G: 17 POWDER, FOR SOLUTION ORAL at 17:27

## 2020-02-24 RX ADMIN — HUMAN INSULIN 35 UNITS: 100 INJECTION, SUSPENSION SUBCUTANEOUS at 07:09

## 2020-02-24 RX ADMIN — GABAPENTIN 600 MG: 300 CAPSULE ORAL at 22:31

## 2020-02-24 RX ADMIN — Medication 10 ML: at 02:46

## 2020-02-24 RX ADMIN — CEFTRIAXONE 1 G: 1 INJECTION, POWDER, FOR SOLUTION INTRAMUSCULAR; INTRAVENOUS at 02:44

## 2020-02-24 RX ADMIN — INSULIN LISPRO 3 UNITS: 100 INJECTION, SOLUTION INTRAVENOUS; SUBCUTANEOUS at 22:32

## 2020-02-24 RX ADMIN — GABAPENTIN 300 MG: 300 CAPSULE ORAL at 12:21

## 2020-02-24 RX ADMIN — SERTRALINE HYDROCHLORIDE 100 MG: 50 TABLET ORAL at 08:48

## 2020-02-24 RX ADMIN — BUDESONIDE INHALATION 500 MCG: 0.5 SUSPENSION RESPIRATORY (INHALATION) at 19:56

## 2020-02-24 RX ADMIN — OSELTAMIVIR PHOSPHATE 30 MG: 30 CAPSULE ORAL at 08:49

## 2020-02-24 RX ADMIN — POTASSIUM CHLORIDE 20 MEQ: 750 TABLET, FILM COATED, EXTENDED RELEASE ORAL at 08:48

## 2020-02-24 RX ADMIN — METHYLPREDNISOLONE SODIUM SUCCINATE 40 MG: 40 INJECTION, POWDER, FOR SOLUTION INTRAMUSCULAR; INTRAVENOUS at 02:45

## 2020-02-24 RX ADMIN — IPRATROPIUM BROMIDE AND ALBUTEROL SULFATE 3 ML: .5; 3 SOLUTION RESPIRATORY (INHALATION) at 02:17

## 2020-02-24 RX ADMIN — DOCUSATE SODIUM 100 MG: 100 CAPSULE, LIQUID FILLED ORAL at 08:46

## 2020-02-24 NOTE — PROGRESS NOTES
Pharmacist Note - Warfarin Dosing  Consult provided for this 75 y. o.female to manage warfarin for Atrial Fibrillation    INR Goal: 2 - 3    Home regimen/ tablet size: M-W-F: 2.5 mg; Tu-Th-Sat-Sun: 5 mg    Drugs that may increase INR: Macrolides  Drugs that may decrease INR: None  Other current anticoagulants/ drugs that may increase bleeding risk: Sertraline  Risk factors: Age > 65  Daily INR ordered: YES    Recent Labs     02/24/20  0251 02/23/20  0628 02/22/20  0247   HGB  --  12.8 11.9   INR 3.6* 2.5* 2.1*     Date               INR                  Dose  2/18  2.3  5 mg (PTA)   2/19  --  2.5 mg   2/20  1.6  5 mg     2/21  1.7  5 mg  2/22                 2.1                  5mg  2/23                 2.5                  2.5 mg  2/24  3.6  hold                                                                                   Assessment/ Plan:  Hold warfarin today for supratherapeutic INR. Pharmacy will continue to monitor daily and adjust therapy as indicated. Please contact the pharmacist at  for outpatient recommendations if needed.

## 2020-02-24 NOTE — PROGRESS NOTES
2/24/2020 -   SHIRLEY:  - RUR: 27%  - Patient continues with IV ABX and Tamiflu  - IV Steroid has been switched to PO  - Nebs continue  - NOTE: patient has [facility] recommendation, but patient had declined [facility] services  CRM: Jasbir Mckay, MPH, [address] [place] [address]; Z: [phone]

## 2020-02-24 NOTE — PROGRESS NOTES
Bedside and Verbal shift change report given to Angelica Ambrose (oncoming nurse) by Lizandro Claros RN (offgoing nurse). Report included the following information SBAR, Kardex, Intake/Output, MAR, Recent Results and Cardiac Rhythm AFIB.

## 2020-02-24 NOTE — PROGRESS NOTES
Cardiology Progress Note                                        Admit Date: 2/19/2020    Assessment/Plan:     CHf improving; lost more weight with a good diuresis  Afib; permanent; rate controlled    Hunter Acosta is a 75 y.o. female with     PROBLEM LIST:  Patient Active Problem List    Diagnosis Date Noted    Acute diastolic CHF (congestive heart failure) ([facility] 02/24/2020    Acute respiratory failure ([facility] 02/20/2020    Influenza 02/20/2020    Recurrent depression ([address] 01/09/2018    Type 2 diabetes mellitus with diabetic neuropathy ([facility] 01/09/2018    Encounter for long-term (current) drug use 10/19/2017    Diabetic polyneuropathy associated with type 2 diabetes mellitus ([address] 07/05/2017    Type 2 diabetes mellitus with stage 1 chronic kidney disease, with long-term current use of insulin ([facility] 05/25/2017    Thoracic disc disorder with myelopathy 05/22/2017    Gait abnormality 05/22/2017    Left ankle sprain 05/21/2017    Hemoptysis 04/24/2017    Pneumonia 04/23/2017    Hypertension complicating diabetes ([address] 02/20/2017    PNA (pneumonia) 02/09/2017    Asthmatic bronchitis with acute exacerbation 09/26/2016    Microalbuminuria 05/24/2016    Long-term insulin use in type 2 diabetes ([facility] 11/10/2015    Chronic atrial fibrillation 07/17/2015    Hematoma of neck 07/09/2015    Cervical stenosis of spine 07/06/2015    Morbid obesity ([facility] 02/18/2014    Dizziness 02/17/2014    Weakness 02/17/2014    Anemia 01/22/2014    Respiratory failure 01/21/2014    Asthma exacerbation 01/21/2014    Hyperlipidemia     Depression     Neuropathy          Subjective:     Kallie Clement [place] reports none.     Visit Vitals  /68 (BP 1 Location: Right arm, BP Patient Position: Sitting)   Pulse 90   Temp 98 °F (36.7 °C)   Resp 18   Ht 5' 2\" (1.575 m)   Wt 272 lb 11.3 oz (123.7 kg)   SpO2 98%   BMI 49.88 kg/m²       Intake/Output Summary (Last 24 hours) at 2/24/2020 0288  Last data filed at 2/24/2020 0301  Gross per 24 hour   Intake --   Output 3200 ml   Net -3200 ml       Objective:      Physical Exam:  HEENT: [place], EOMI  Neck: No JVD,  No thyroidmegaly  Resp: CTA bilaterally;  No wheezes or rales  CV: irregular  s1s2 No murmur no s3  Abd:Soft, Nontender  Ext: 2+ edema  Neuro: Alert and oriented; Nonfocal  Skin: Warm, Dry, Intact  Pulses: 2+ DP/PT/Rad      Telemetry: AFIB    Current Facility-Administered Medications   Medication Dose Route Frequency    acetylcysteine (MUCOMYST) 200 mg/mL (20 %) solution 200 mg  200 mg Nebulization TID    predniSONE (DELTASONE) tablet 60 mg  60 mg Oral DAILY WITH BREAKFAST    bumetanide (BUMEX) tablet 2 mg  2 mg Oral BID    oseltamivir (TAMIFLU) capsule 30 mg  30 mg Oral Q12H    docusate sodium (COLACE) capsule 100 mg  100 mg Oral BID    polyethylene glycol (MIRALAX) packet 17 g  17 g Oral DAILY PRN    potassium chloride SR (KLOR-CON 10) tablet 20 mEq  20 mEq Oral BID    albuterol-ipratropium (DUO-NEB) 2.5 MG-0.5 MG/3 ML  3 mL Nebulization Q6H RT    albuterol (PROVENTIL VENTOLIN) nebulizer solution 2.5 mg  2.5 mg Nebulization Q3H PRN    ALPRAZolam (XANAX) tablet 0.5 mg  0.5 mg Oral DAILY PRN    arformoteroL (BROVANA) neb solution 15 mcg  15 mcg Nebulization BID RT    budesonide (PULMICORT) 500 mcg/2 ml nebulizer suspension  500 mcg Nebulization BID RT    dilTIAZem CD (CARDIZEM CD) capsule 360 mg  360 mg Oral DAILY    vitamin B complex tablet  1 Tab Oral DAILY    biotin (VITAMIN B7) tablet 5 mg  5 mg Oral DAILY    cholecalciferol (VITAMIN D3) (1000 Units /25 mcg) tablet 2 Tab  2,000 Units Oral DAILY    digoxin (LANOXIN) tablet 0.25 mg  0.25 mg Oral DAILY    DULoxetine (CYMBALTA) capsule 60 mg  60 mg Oral DAILY    gabapentin (NEURONTIN) capsule 300 mg  300 mg Oral DAILY    gabapentin (NEURONTIN) capsule 600 mg  600 mg Oral QHS    therapeutic multivitamin (THERAGRAN) tablet 1 Tab  1 Tab Oral DAILY    sodium chloride (NS) flush 5-40 mL  5-40 mL IntraVENous Q8H  sodium chloride (NS) flush 5-40 mL  5-40 mL IntraVENous PRN    cefTRIAXone (ROCEPHIN) 1 g in 0.9% sodium chloride (MBP/ADV) 50 mL  1 g IntraVENous Q24H    acetaminophen (TYLENOL) tablet 650 mg  650 mg Oral Q4H PRN    guaiFENesin ER (MUCINEX) tablet 600 mg  600 mg Oral Q12H    glucose chewable tablet 16 g  4 Tab Oral PRN    glucagon (GLUCAGEN) injection 1 mg  1 mg IntraMUSCular PRN    dextrose 10% infusion 0-250 mL  0-250 mL IntraVENous PRN    hydrALAZINE (APRESOLINE) 20 mg/mL injection 20 mg  20 mg IntraVENous Q6H PRN    insulin NPH (NOVOLIN N, HUMULIN N) injection 35 Units  35 Units SubCUTAneous 7am    insulin NPH (NOVOLIN N, HUMULIN N) injection 25 Units  25 Units SubCUTAneous QHS    Warfarin - pharmacy to dose   Other Rx Dosing/Monitoring    sertraline (ZOLOFT) tablet 100 mg  100 mg Oral DAILY    insulin regular (NOVOLIN R, HUMULIN R) injection 15 Units  15 Units SubCUTAneous ACB    insulin regular (NOVOLIN R, HUMULIN R) injection 15 Units  15 Units SubCUTAneous ACL    insulin regular (NOVOLIN R, HUMULIN R) injection 10 Units  10 Units SubCUTAneous ACD    insulin lispro (HUMALOG) injection   SubCUTAneous AC&HS         Data Review:   Labs:    Recent Results (from the past 24 hour(s))   GLUCOSE, POC    Collection Time: 02/23/20 12:10 PM   Result Value Ref Range    Glucose (POC) 371 (H) 65 - 100 mg/dL    Performed by Pawan Knight, POC    Collection Time: 02/23/20  4:34 PM   Result Value Ref Range    Glucose (POC) 344 (H) 65 - 100 mg/dL    Performed by Obdulia Rodriguez, POC    Collection Time: 02/23/20 10:06 PM   Result Value Ref Range    Glucose (POC) 290 (H) 65 - 100 mg/dL    Performed by Nichole Martinez    PROTHROMBIN TIME + INR    Collection Time: 02/24/20  2:51 AM   Result Value Ref Range    INR 3.6 (H) 0.9 - 1.1      Prothrombin time 33.8 (H) 9.0 - 53.5 sec   METABOLIC PANEL, BASIC    Collection Time: 02/24/20  2:51 AM   Result Value Ref Range    Sodium 136 136 - 145 mmol/L    Potassium 3.5 3.5 - 5.1 mmol/L    Chloride 97 97 - 108 mmol/L    CO2 30 21 - 32 mmol/L    Anion gap 9 5 - 15 mmol/L    Glucose 243 (H) 65 - 100 mg/dL    BUN 40 (H) 6 - 20 MG/DL    Creatinine 1.17 (H) 0.55 - 1.02 MG/DL    BUN/Creatinine ratio 34 (H) 12 - 20      GFR est AA 55 (L) >60 ml/min/1.73m2    GFR est non-AA 45 (L) >60 ml/min/1.73m2    Calcium 9.2 8.5 - 10.1 MG/DL   GLUCOSE, POC    Collection Time: 02/24/20  6:52 AM   Result Value Ref Range    Glucose (POC) 398 (H) 65 - 100 mg/dL    Performed by Ronni Tay

## 2020-02-24 NOTE — PROGRESS NOTES
Problem: Mobility Impaired (Adult and Pediatric)  Goal: *Acute Goals and Plan of Care (Insert Text)  Description  FUNCTIONAL STATUS PRIOR TO ADMISSION: Patient was modified independent using a rollator, single point cane and grocery cart (which she keeps in car) for functional mobility. Feels more steady w/ rollator. Does not use stairs. HOME SUPPORT PRIOR TO ADMISSION: The patient lived alone with neighbors to provide assistance. Lives in [facility]. Physical Therapy Goals  Initiated 2/21/2020    1. Patient will perform sit to stand with modified independence within 7 day(s). 2.  Patient will ambulate with modified independence for 100 feet with the least restrictive device and no CASTANEDA on RA within 7 day(s). 3.  Patient will ascend/descend 3 stairs with bilateral handrail(s) with contact guard assist in order to promote community reintegration within 7 day(s). 4. Patient will improve Tinetti outcome measure by 4 points in order to decrease fall risk within 7 days. Outcome: Progressing Towards Goal   PHYSICAL THERAPY TREATMENT  Patient: [place] (35 y.o. female)  Date: 2/24/2020  Diagnosis: Asthma exacerbation [J45.901]  Pneumonia [J18.9]   Acute respiratory failure [facility])       Precautions: Fall  Chart, physical therapy assessment, plan of care and goals were reviewed. ASSESSMENT  Patient continues with skilled PT services and is progressing towards goals. Patient needing only SBA for transfer this session but continues to be limited by SOB with activity. Able to increase gait distance to approx 125 feet with no rest breaks needed. However, patient did desat to 86% on room from 98% on RA at baseline. (She does have on 'gel nails' and unsure how accurate reading is). Will likely benefit from some follow up PT at [place] to progress patient to more independent level of function.     Current Level of Function Impacting Discharge (mobility/balance): CGA-SBA for transfers, CGA for gait without AD    Other factors to consider for discharge: at risk for falls, below functional baseline         PLAN :  Patient continues to benefit from skilled intervention to address the above impairments. Continue treatment per established plan of care. to address goals. Recommendation for discharge: (in order for the patient to meet his/her long term goals)  Physical therapy at least 2 days/week in the home       IF patient discharges home will need the following DME: may potentially need RW       SUBJECTIVE:   Patient stated I'm doing better. They said I might get to go home tmrw.     OBJECTIVE DATA SUMMARY:   Critical Behavior:  Neurologic State: Alert, Appropriate for age  Orientation Level: Oriented X4  Cognition: Follows commands     Functional Mobility Training:  Bed Mobility:   Not tested, up in chair                 Transfers:  Sit to Stand: Stand-by assistance  Stand to Sit: Stand-by assistance                             Balance:  Sitting: Intact  Standing: Intact; With support  Standing - Static: Constant support;Good  Standing - Dynamic : Constant support; Fair  Ambulation/Gait Training:  Distance (ft): 120 Feet (ft)  Assistive Device: Gait belt  Ambulation - Level of Assistance: Contact guard assistance; Additional time;Assist x1        Gait Abnormalities: Decreased step clearance;Shuffling gait        Base of Support: Widened     Speed/Liz: Pace decreased (<100 feet/min); Slow  Step Length: Left shortened;Right shortened       Pain Rating:  No c/o pain    Activity Tolerance:   Fair, desaturates with exertion and requires oxygen, and observed SOB with activity  Please refer to the flowsheet for vital signs taken during this treatment.     After treatment patient left in no apparent distress:   Sitting in chair, Call bell within reach, and Caregiver / family present    COMMUNICATION/COLLABORATION:   The patients plan of care was discussed with: Physical Therapist, Occupational Therapist, and Registered Nurse    Jasper Andres, PT, DPT   Time Calculation: 18 mins

## 2020-02-24 NOTE — PROGRESS NOTES
Rocio Davidson    Admit Date: 2/19/2020    Subjective:     Pt seems to be going in the right direction. No other new complaints.       Current Facility-Administered Medications   Medication Dose Route Frequency    acetylcysteine (MUCOMYST) 200 mg/mL (20 %) solution 200 mg  200 mg Nebulization TID    predniSONE (DELTASONE) tablet 60 mg  60 mg Oral DAILY WITH BREAKFAST    bumetanide (BUMEX) tablet 2 mg  2 mg Oral BID    oseltamivir (TAMIFLU) capsule 30 mg  30 mg Oral Q12H    docusate sodium (COLACE) capsule 100 mg  100 mg Oral BID    polyethylene glycol (MIRALAX) packet 17 g  17 g Oral DAILY PRN    potassium chloride SR (KLOR-CON 10) tablet 20 mEq  20 mEq Oral BID    albuterol-ipratropium (DUO-NEB) 2.5 MG-0.5 MG/3 ML  3 mL Nebulization Q6H RT    albuterol (PROVENTIL VENTOLIN) nebulizer solution 2.5 mg  2.5 mg Nebulization Q3H PRN    ALPRAZolam (XANAX) tablet 0.5 mg  0.5 mg Oral DAILY PRN    arformoteroL (BROVANA) neb solution 15 mcg  15 mcg Nebulization BID RT    budesonide (PULMICORT) 500 mcg/2 ml nebulizer suspension  500 mcg Nebulization BID RT    dilTIAZem CD (CARDIZEM CD) capsule 360 mg  360 mg Oral DAILY    vitamin B complex tablet  1 Tab Oral DAILY    biotin (VITAMIN B7) tablet 5 mg  5 mg Oral DAILY    cholecalciferol (VITAMIN D3) (1000 Units /25 mcg) tablet 2 Tab  2,000 Units Oral DAILY    digoxin (LANOXIN) tablet 0.25 mg  0.25 mg Oral DAILY    DULoxetine (CYMBALTA) capsule 60 mg  60 mg Oral DAILY    gabapentin (NEURONTIN) capsule 300 mg  300 mg Oral DAILY    gabapentin (NEURONTIN) capsule 600 mg  600 mg Oral QHS    therapeutic multivitamin (THERAGRAN) tablet 1 Tab  1 Tab Oral DAILY    sodium chloride (NS) flush 5-40 mL  5-40 mL IntraVENous Q8H    sodium chloride (NS) flush 5-40 mL  5-40 mL IntraVENous PRN    cefTRIAXone (ROCEPHIN) 1 g in 0.9% sodium chloride (MBP/ADV) 50 mL  1 g IntraVENous Q24H    acetaminophen (TYLENOL) tablet 650 mg  650 mg Oral Q4H PRN    guaiFENesin ER (MUCINEX) tablet 600 mg  600 mg Oral Q12H    glucose chewable tablet 16 g  4 Tab Oral PRN    glucagon (GLUCAGEN) injection 1 mg  1 mg IntraMUSCular PRN    dextrose 10% infusion 0-250 mL  0-250 mL IntraVENous PRN    hydrALAZINE (APRESOLINE) 20 mg/mL injection 20 mg  20 mg IntraVENous Q6H PRN    insulin NPH (NOVOLIN N, HUMULIN N) injection 35 Units  35 Units SubCUTAneous 7am    insulin NPH (NOVOLIN N, HUMULIN N) injection 25 Units  25 Units SubCUTAneous QHS    Warfarin - pharmacy to dose   Other Rx Dosing/Monitoring    sertraline (ZOLOFT) tablet 100 mg  100 mg Oral DAILY    insulin regular (NOVOLIN R, HUMULIN R) injection 15 Units  15 Units SubCUTAneous ACB    insulin regular (NOVOLIN R, HUMULIN R) injection 15 Units  15 Units SubCUTAneous ACL    insulin regular (NOVOLIN R, HUMULIN R) injection 10 Units  10 Units SubCUTAneous ACD    insulin lispro (HUMALOG) injection   SubCUTAneous AC&HS          Objective:     Patient Vitals for the past 8 hrs:   BP Temp Pulse Resp SpO2 Weight   02/24/20 0800 -- -- -- -- 98 % --   02/24/20 0604 -- -- -- -- -- 272 lb 11.3 oz (123.7 kg)   02/24/20 0359 130/78 98 °F (36.7 °C) 83 16 98 % --   02/24/20 0217 -- -- -- -- 95 % --     No intake/output data recorded. 02/22 1901 - 02/24 0700  In: -   Out: 1389 [Urine:4900]    Physical Exam: NAD. A&O. Neck -- Supple. No JVD. Heart -- Irr Irr (Rate controlled). Lungs -- Decreased exp wheezing. Mild crackles at bases. Abd -- Benign. Ext -- Trace, if any, LE edema, b/l.       Data Review   Recent Results (from the past 24 hour(s))   GLUCOSE, POC    Collection Time: 02/23/20 12:10 PM   Result Value Ref Range    Glucose (POC) 371 (H) 65 - 100 mg/dL    Performed by Ikro, POC    Collection Time: 02/23/20  4:34 PM   Result Value Ref Range    Glucose (POC) 344 (H) 65 - 100 mg/dL    Performed by Taran Hdz, POC    Collection Time: 02/23/20 10:06 PM   Result Value Ref Range    Glucose (POC) 290 (H) 65 - 100 mg/dL    Performed by Kp Turner    PROTHROMBIN TIME + INR    Collection Time: 02/24/20  2:51 AM   Result Value Ref Range    INR 3.6 (H) 0.9 - 1.1      Prothrombin time 33.8 (H) 9.0 - 60.7 sec   METABOLIC PANEL, BASIC    Collection Time: 02/24/20  2:51 AM   Result Value Ref Range    Sodium 136 136 - 145 mmol/L    Potassium 3.5 3.5 - 5.1 mmol/L    Chloride 97 97 - 108 mmol/L    CO2 30 21 - 32 mmol/L    Anion gap 9 5 - 15 mmol/L    Glucose 243 (H) 65 - 100 mg/dL    BUN 40 (H) 6 - 20 MG/DL    Creatinine 1.17 (H) 0.55 - 1.02 MG/DL    BUN/Creatinine ratio 34 (H) 12 - 20      GFR est AA 55 (L) >60 ml/min/1.73m2    GFR est non-AA 45 (L) >60 ml/min/1.73m2    Calcium 9.2 8.5 - 10.1 MG/DL   GLUCOSE, POC    Collection Time: 02/24/20  6:52 AM   Result Value Ref Range    Glucose (POC) 398 (H) 65 - 100 mg/dL    Performed by Kp Turner            Assessment:     Principal Problem:    Acute respiratory failure due to Influenza, ? PNA, and associated asthma exacerbation. Active Problems:    Asthma exacerbation (1/21/2014)      Chronic atrial fibrillation with brief RVR (Rate better now). Pneumonia -- ?atypical/?viral      Type 2 diabetes mellitus with diabetic neuropathy ([facility] [address] (1/9/2018)      Influenza A        Plan:     1. Cont Rocephin & Tamiflu. She has completed Zithromax. 2. Change Solu-medrol to Prednisone 60 mg every day. 3. Cont Nebs, and add Mucomyst at her request.  4. Recheck CXR today. 5. On PO Bumex, and she had received some Zaroxolyn over the weekend. 6. Pharmacy dosing Coumadin for chronic afib. 7. PT as able.           Belen Dowling MD

## 2020-02-24 NOTE — NURSE NAVIGATOR
Chart reviewed by Heart Failure Nurse Navigator. Heart Failure database completed. EF:  50/55     ACEi/ARB/ARNi: Not indicated    BB: Not indicated    Aldosterone Antagonist: Not indicated    Obstructive Sleep Apnea Screening: YES/CPAP   STOP-BANG score:   Referred to Sleep Medicine:     CRT not indicated    NYHA Functional Class requested via provider message on [email or web address]     Heart Failure Teach Back in Patient Education. Heart Failure Avoiding Triggers on Discharge Instructions. Cardiologist: [facility]      Post discharge follow up phone call to be made within 48-72 hours of discharge.

## 2020-02-24 NOTE — PROGRESS NOTES
Pulmonary, Critical Care, and Sleep Medicine~Progress Note    Name: Romeo Silveira MRN: [id number]   : 1945 [facility]: [facility]    Date: 2020 10:35 AM Admission: 2020     Impression Plan   1. Acute hypoxic resp failure secondary to below  2. AE of Asthma secondary flu A +: followed by Dr Vianney Donovan   3. Abnormal chest x-ray: worrisome for viral PNA, but likely pulmonary edema with small pleural effusions   4. P a fib~ on coumadin; recent RVR  5. LITA  6. Depression/anxiety  1. ECHO noted   2. rocephin   3. Wean po steroids    4. Duonebs/ brovana/pulmicort   5. on mucomyst   6. PAP therapy at night, if possible    7. O2 titration above 90%   8. From pulm aspect she can be discharged whenever primary is ready  9. Follow up with annalise in 2 wks      Daily Progression:    Feeling better      I have reviewed the labs and previous days notes. Pertinent items are noted in HPI. OBJECTIVE:     Vital Signs:       Visit Vitals  /68 (BP 1 Location: Right arm, BP Patient Position: Sitting)   Pulse 90   Temp 98 °F (36.7 °C)   Resp 18   Ht 5' 2\" (1.575 m)   Wt 123.7 kg (272 lb 11.3 oz)   SpO2 98%   BMI 49.88 kg/m²      Temp (24hrs), Av °F (36.7 °C), Min:97.8 °F (36.6 °C), Max:98.3 °F (36.8 °C)     Intake/Output:     Last shift: No intake/output data recorded.     Last 3 shifts:  1901 -  0700  In: -   Out: 4900 [Urine:4900]          Intake/Output Summary (Last 24 hours) at 2020 1034  Last data filed at 2020 0301  Gross per 24 hour   Intake --   Output 3200 ml   Net -3200 ml       Physical Exam:                                        Exam Findings Other   General: No resp distress noted, appears stated age    HEENT:  No ulcers, JVD not elevated, no cervical LAD    Chest: No pectus deformity, normal chest rise b/l    HEART:  IRR, no murmurs/rubs/gallops    Lungs:  Less coarse today      ABD: Soft/NT, non rigid mildly distended    EXT: No cyanosis/clubbing/edema, normal peripheral pulses    Skin: No rashes or ulcers, no mottling    Neuro: A/O x 3        Medications:  Current Facility-Administered Medications   Medication Dose Route Frequency    acetylcysteine (MUCOMYST) 200 mg/mL (20 %) solution 200 mg  200 mg Nebulization TID    predniSONE (DELTASONE) tablet 60 mg  60 mg Oral DAILY WITH BREAKFAST    bumetanide (BUMEX) tablet 2 mg  2 mg Oral BID    oseltamivir (TAMIFLU) capsule 30 mg  30 mg Oral Q12H    docusate sodium (COLACE) capsule 100 mg  100 mg Oral BID    polyethylene glycol (MIRALAX) packet 17 g  17 g Oral DAILY PRN    potassium chloride SR (KLOR-CON 10) tablet 20 mEq  20 mEq Oral BID    albuterol-ipratropium (DUO-NEB) 2.5 MG-0.5 MG/3 ML  3 mL Nebulization Q6H RT    albuterol (PROVENTIL VENTOLIN) nebulizer solution 2.5 mg  2.5 mg Nebulization Q3H PRN    ALPRAZolam (XANAX) tablet 0.5 mg  0.5 mg Oral DAILY PRN    arformoteroL (BROVANA) neb solution 15 mcg  15 mcg Nebulization BID RT    budesonide (PULMICORT) 500 mcg/2 ml nebulizer suspension  500 mcg Nebulization BID RT    dilTIAZem CD (CARDIZEM CD) capsule 360 mg  360 mg Oral DAILY    vitamin B complex tablet  1 Tab Oral DAILY    biotin (VITAMIN B7) tablet 5 mg  5 mg Oral DAILY    cholecalciferol (VITAMIN D3) (1000 Units /25 mcg) tablet 2 Tab  2,000 Units Oral DAILY    digoxin (LANOXIN) tablet 0.25 mg  0.25 mg Oral DAILY    DULoxetine (CYMBALTA) capsule 60 mg  60 mg Oral DAILY    gabapentin (NEURONTIN) capsule 300 mg  300 mg Oral DAILY    gabapentin (NEURONTIN) capsule 600 mg  600 mg Oral QHS    therapeutic multivitamin (THERAGRAN) tablet 1 Tab  1 Tab Oral DAILY    sodium chloride (NS) flush 5-40 mL  5-40 mL IntraVENous Q8H    sodium chloride (NS) flush 5-40 mL  5-40 mL IntraVENous PRN    cefTRIAXone (ROCEPHIN) 1 g in 0.9% sodium chloride (MBP/ADV) 50 mL  1 g IntraVENous Q24H    acetaminophen (TYLENOL) tablet 650 mg  650 mg Oral Q4H PRN    guaiFENesin ER (MUCINEX) tablet 600 mg  600 mg Oral Q12H    glucose chewable tablet 16 g  4 Tab Oral PRN    glucagon (GLUCAGEN) injection 1 mg  1 mg IntraMUSCular PRN    dextrose 10% infusion 0-250 mL  0-250 mL IntraVENous PRN    hydrALAZINE (APRESOLINE) 20 mg/mL injection 20 mg  20 mg IntraVENous Q6H PRN    insulin NPH (NOVOLIN N, HUMULIN N) injection 35 Units  35 Units SubCUTAneous 7am    insulin NPH (NOVOLIN N, HUMULIN N) injection 25 Units  25 Units SubCUTAneous QHS    Warfarin - pharmacy to dose   Other Rx Dosing/Monitoring    sertraline (ZOLOFT) tablet 100 mg  100 mg Oral DAILY    insulin regular (NOVOLIN R, HUMULIN R) injection 15 Units  15 Units SubCUTAneous ACB    insulin regular (NOVOLIN R, HUMULIN R) injection 15 Units  15 Units SubCUTAneous ACL    insulin regular (NOVOLIN R, HUMULIN R) injection 10 Units  10 Units SubCUTAneous ACD    insulin lispro (HUMALOG) injection   SubCUTAneous AC&HS       Labs:  ABG No results for input(s): PHI, PCO2I, PO2I, HCO3I, SO2I, FIO2I in the last 72 hours.      CBC Recent Labs     02/23/20  0628 02/22/20  0247   WBC 12.5* 13.3*   HGB 12.8 11.9   HCT 39.8 38.2    222   MCV 89.8 92.0   MCH 28.9 38.7        Metabolic  Panel Recent Labs     02/24/20  0251 02/23/20  0628 02/22/20  0247    137 136   K 3.5 3.9 3.8   CL 97 103 106   CO2 30 28 24   * 337* 282*   BUN 40* 39* 37*   CREA 1.17* 1.19* 1.07*   CA 9.2 8.8 8.5   INR 3.6* 2.5* 2.1*        Pertinent Labs                Kade Bang PA-C  2/24/2020

## 2020-02-25 LAB
ANION GAP SERPL CALC-SCNC: 6 MMOL/L (ref 5–15)
BUN SERPL-MCNC: 42 MG/DL (ref 6–20)
BUN/CREAT SERPL: 33 (ref 12–20)
CALCIUM SERPL-MCNC: 9.5 MG/DL (ref 8.5–10.1)
CHLORIDE SERPL-SCNC: 94 MMOL/L (ref 97–108)
CO2 SERPL-SCNC: 34 MMOL/L (ref 21–32)
CREAT SERPL-MCNC: 1.28 MG/DL (ref 0.55–1.02)
ERYTHROCYTE [DISTWIDTH] IN BLOOD BY AUTOMATED COUNT: 14.3 % (ref 11.5–14.5)
GLUCOSE BLD STRIP.AUTO-MCNC: 111 MG/DL (ref 65–100)
GLUCOSE BLD STRIP.AUTO-MCNC: 170 MG/DL (ref 65–100)
GLUCOSE BLD STRIP.AUTO-MCNC: 188 MG/DL (ref 65–100)
GLUCOSE BLD STRIP.AUTO-MCNC: 282 MG/DL (ref 65–100)
GLUCOSE SERPL-MCNC: 211 MG/DL (ref 65–100)
HCT VFR BLD AUTO: 47.7 % (ref 35–47)
HGB BLD-MCNC: 15.2 G/DL (ref 11.5–16)
INR PPP: 3.3 (ref 0.9–1.1)
MCH RBC QN AUTO: 28.4 PG (ref 26–34)
MCHC RBC AUTO-ENTMCNC: 31.9 G/DL (ref 30–36.5)
MCV RBC AUTO: 89 FL (ref 80–99)
NRBC # BLD: 0 K/UL (ref 0–0.01)
NRBC BLD-RTO: 0 PER 100 WBC
PLATELET # BLD AUTO: 343 K/UL (ref 150–400)
PMV BLD AUTO: 8.9 FL (ref 8.9–12.9)
POTASSIUM SERPL-SCNC: 3.4 MMOL/L (ref 3.5–5.1)
PROTHROMBIN TIME: 31.8 SEC (ref 9–11.1)
RBC # BLD AUTO: 5.36 M/UL (ref 3.8–5.2)
SERVICE CMNT-IMP: ABNORMAL
SODIUM SERPL-SCNC: 134 MMOL/L (ref 136–145)
WBC # BLD AUTO: 21.4 K/UL (ref 3.6–11)

## 2020-02-25 PROCEDURE — 74011250637 HC RX REV CODE- 250/637: Performed by: INTERNAL MEDICINE

## 2020-02-25 PROCEDURE — 97116 GAIT TRAINING THERAPY: CPT

## 2020-02-25 PROCEDURE — 74011636637 HC RX REV CODE- 636/637: Performed by: STUDENT IN AN ORGANIZED HEALTH CARE EDUCATION/TRAINING PROGRAM

## 2020-02-25 PROCEDURE — 74011000250 HC RX REV CODE- 250: Performed by: STUDENT IN AN ORGANIZED HEALTH CARE EDUCATION/TRAINING PROGRAM

## 2020-02-25 PROCEDURE — 82962 GLUCOSE BLOOD TEST: CPT

## 2020-02-25 PROCEDURE — 74011636637 HC RX REV CODE- 636/637: Performed by: INTERNAL MEDICINE

## 2020-02-25 PROCEDURE — 80048 BASIC METABOLIC PNL TOTAL CA: CPT

## 2020-02-25 PROCEDURE — 94640 AIRWAY INHALATION TREATMENT: CPT

## 2020-02-25 PROCEDURE — 74011250637 HC RX REV CODE- 250/637: Performed by: STUDENT IN AN ORGANIZED HEALTH CARE EDUCATION/TRAINING PROGRAM

## 2020-02-25 PROCEDURE — 74011000250 HC RX REV CODE- 250: Performed by: PHYSICIAN ASSISTANT

## 2020-02-25 PROCEDURE — 85610 PROTHROMBIN TIME: CPT

## 2020-02-25 PROCEDURE — 74011000258 HC RX REV CODE- 258: Performed by: INTERNAL MEDICINE

## 2020-02-25 PROCEDURE — 97530 THERAPEUTIC ACTIVITIES: CPT

## 2020-02-25 PROCEDURE — 85027 COMPLETE CBC AUTOMATED: CPT

## 2020-02-25 PROCEDURE — 74011250636 HC RX REV CODE- 250/636: Performed by: INTERNAL MEDICINE

## 2020-02-25 PROCEDURE — 65660000000 HC RM CCU STEPDOWN

## 2020-02-25 PROCEDURE — 36415 COLL VENOUS BLD VENIPUNCTURE: CPT

## 2020-02-25 PROCEDURE — 74011000250 HC RX REV CODE- 250: Performed by: INTERNAL MEDICINE

## 2020-02-25 RX ORDER — POTASSIUM CHLORIDE 750 MG/1
30 TABLET, FILM COATED, EXTENDED RELEASE ORAL 2 TIMES DAILY
Status: DISCONTINUED | OUTPATIENT
Start: 2020-02-25 | End: 2020-02-27 | Stop reason: HOSPADM

## 2020-02-25 RX ORDER — BUMETANIDE 1 MG/1
2 TABLET ORAL DAILY
Status: DISCONTINUED | OUTPATIENT
Start: 2020-02-26 | End: 2020-02-27 | Stop reason: HOSPADM

## 2020-02-25 RX ORDER — WARFARIN 1 MG/1
1 TABLET ORAL ONCE
Status: COMPLETED | OUTPATIENT
Start: 2020-02-25 | End: 2020-02-25

## 2020-02-25 RX ADMIN — GUAIFENESIN 600 MG: 600 TABLET, EXTENDED RELEASE ORAL at 22:01

## 2020-02-25 RX ADMIN — HUMAN INSULIN 10 UNITS: 100 INJECTION, SOLUTION SUBCUTANEOUS at 17:33

## 2020-02-25 RX ADMIN — IPRATROPIUM BROMIDE AND ALBUTEROL SULFATE 3 ML: .5; 3 SOLUTION RESPIRATORY (INHALATION) at 09:13

## 2020-02-25 RX ADMIN — Medication 1 TABLET: at 09:55

## 2020-02-25 RX ADMIN — BUDESONIDE INHALATION 500 MCG: 0.5 SUSPENSION RESPIRATORY (INHALATION) at 09:13

## 2020-02-25 RX ADMIN — BUDESONIDE INHALATION 500 MCG: 0.5 SUSPENSION RESPIRATORY (INHALATION) at 21:10

## 2020-02-25 RX ADMIN — HUMAN INSULIN 15 UNITS: 100 INJECTION, SOLUTION SUBCUTANEOUS at 09:47

## 2020-02-25 RX ADMIN — CEFTRIAXONE 1 G: 1 INJECTION, POWDER, FOR SOLUTION INTRAMUSCULAR; INTRAVENOUS at 02:19

## 2020-02-25 RX ADMIN — GABAPENTIN 600 MG: 300 CAPSULE ORAL at 22:00

## 2020-02-25 RX ADMIN — DOCUSATE SODIUM 100 MG: 100 CAPSULE, LIQUID FILLED ORAL at 18:00

## 2020-02-25 RX ADMIN — POTASSIUM CHLORIDE 30 MEQ: 750 TABLET, FILM COATED, EXTENDED RELEASE ORAL at 17:33

## 2020-02-25 RX ADMIN — PREDNISONE 60 MG: 20 TABLET ORAL at 09:53

## 2020-02-25 RX ADMIN — Medication 10 ML: at 22:02

## 2020-02-25 RX ADMIN — DULOXETINE HYDROCHLORIDE 60 MG: 60 CAPSULE, DELAYED RELEASE ORAL at 09:52

## 2020-02-25 RX ADMIN — IPRATROPIUM BROMIDE AND ALBUTEROL SULFATE 3 ML: .5; 3 SOLUTION RESPIRATORY (INHALATION) at 02:34

## 2020-02-25 RX ADMIN — Medication 10 ML: at 13:09

## 2020-02-25 RX ADMIN — ACETYLCYSTEINE 200 MG: 200 SOLUTION ORAL; RESPIRATORY (INHALATION) at 09:16

## 2020-02-25 RX ADMIN — HUMAN INSULIN 25 UNITS: 100 INJECTION, SUSPENSION SUBCUTANEOUS at 22:01

## 2020-02-25 RX ADMIN — GABAPENTIN 300 MG: 300 CAPSULE ORAL at 09:52

## 2020-02-25 RX ADMIN — DOCUSATE SODIUM 100 MG: 100 CAPSULE, LIQUID FILLED ORAL at 09:52

## 2020-02-25 RX ADMIN — WARFARIN SODIUM 1 MG: 1 TABLET ORAL at 17:00

## 2020-02-25 RX ADMIN — IPRATROPIUM BROMIDE AND ALBUTEROL SULFATE 3 ML: .5; 3 SOLUTION RESPIRATORY (INHALATION) at 21:10

## 2020-02-25 RX ADMIN — SERTRALINE HYDROCHLORIDE 100 MG: 50 TABLET ORAL at 09:55

## 2020-02-25 RX ADMIN — MELATONIN 2 TABLET: at 09:50

## 2020-02-25 RX ADMIN — GUAIFENESIN 600 MG: 600 TABLET, EXTENDED RELEASE ORAL at 09:53

## 2020-02-25 RX ADMIN — BUMETANIDE 2 MG: 1 TABLET ORAL at 09:49

## 2020-02-25 RX ADMIN — POTASSIUM CHLORIDE 30 MEQ: 750 TABLET, FILM COATED, EXTENDED RELEASE ORAL at 09:53

## 2020-02-25 RX ADMIN — DIGOXIN 0.25 MG: 125 TABLET ORAL at 09:51

## 2020-02-25 RX ADMIN — IPRATROPIUM BROMIDE AND ALBUTEROL SULFATE 3 ML: .5; 3 SOLUTION RESPIRATORY (INHALATION) at 12:57

## 2020-02-25 RX ADMIN — Medication 10 ML: at 07:07

## 2020-02-25 RX ADMIN — DILTIAZEM HYDROCHLORIDE 360 MG: 180 CAPSULE, EXTENDED RELEASE ORAL at 09:52

## 2020-02-25 RX ADMIN — Medication 5 MG: at 11:01

## 2020-02-25 RX ADMIN — HUMAN INSULIN 15 UNITS: 100 INJECTION, SOLUTION SUBCUTANEOUS at 13:04

## 2020-02-25 RX ADMIN — THERA TABS 1 TABLET: TAB at 09:55

## 2020-02-25 RX ADMIN — INSULIN LISPRO 6 UNITS: 100 INJECTION, SOLUTION INTRAVENOUS; SUBCUTANEOUS at 22:01

## 2020-02-25 RX ADMIN — HUMAN INSULIN 35 UNITS: 100 INJECTION, SUSPENSION SUBCUTANEOUS at 11:01

## 2020-02-25 NOTE — PROGRESS NOTES
[facility] Transitional Care Team: initial HUG Note    Date of Assessment: 02/25/20  Time of Assessment:  3:35 PM    Assessment & Plan   SHIRLEY Diagnoses:  Acute respiratory failure due to Influenza, ? PNA, and associated asthma exacerbation. Results for Peg Reid (MRN [id number]) as of 2/26/2020 12:47   Ref. Range 2/19/2020 04:12   pH (POC) Latest Ref Range: 7.35 - 7.45   7.365   pCO2 (POC) Latest Ref Range: 35.0 - 45.0 MMHG 40.1   pO2 (POC) Latest Ref Range: 80 - 100 MMHG 73 (L)   HCO3 (POC) Latest Ref Range: 22 - 26 MMOL/L 22.9   sO2 (POC) Latest Ref Range: 92 - 97 % 94   Base deficit (POC) Latest Units: mmol/L 2   Specimen type (POC) Latest Units:   ARTERIAL   Flow rate (POC) Latest Units: L/M 3   Device: Latest Units:   NASAL CANNULA     -was on oxygen at 4-5 lpm NC on 2/20--weaned down from 2 lpm to room air on 2/26  -DuoNeb Q6h and albuterol J/N Q3h PRN wheezing, Brovana and Pulmicort BID  -prednisone 60 mg daily with bkfst    Asthma exacerbation  -as above     Chronic atrial fibrillation with brief RVR (rate better now)  -Digoxin 0.25 mg daily  -Cardizem  mg daily    Pneumonia -- ?atypical/?viral  -CXR 2/24--patchy airspace disease has essentially resolved  -ceftriaxone 1 gm IV daily through 3/2/20     Influenza A  -resp panel 2/19--influenza A H1N1 PCR +         Readmission Risks: moderate to high      1. Older adult with multiple co-morbidities  2. Full code status  3. Potential fall risk--recommended to have [facility] but declines        Nurse Navigator: no [facility] Nurse assigned yet  SHIRLEY appointment with Dr. Marycruz Hartman on 3/10 @ 2:30 PM    Code status: Full  Recommended Disposition: discharge to her home with daughter to transport; declines HH per CM notes         Number of Admissions this year:  only current so far in 2020    Heart Failure Bundle:  Yes    In to see patient but she appears to be asleep. Will try to F/U in the next 1-2 days, or will call daughter.     Advance Care Plan: the one on file is incomplete; will offer to help patient complete new one while here if possible    Medication reconciliation was performed on admission by LTAC, [facility] within [facility]. Discharge Needs: recommended to have [place] at discharge but patient declines     Awareness of medical conditions: LITZY today      Patient's willingness to go to SNF or inpt rehab if necessary: not willing     HUG NP will leave patient with HUG calender/follow up appointments/ Ambulatory Nurse Navigation information when patient ready for discharge, as appropriate. [facility] information given to will be given to patient with explanation of services. Continue to follow CM documentation. [place] is a 75 y.o. female inpatient at [address] [place] [address] admitted with productive cough, difficulty breathing, and fever on 2/19/20. Chart reviewed by Levell Cranker NP and [facility] Understanding of Goals) program will be introduced to patient/family. The Transitional Care Team bridges the gaps in care and education surrounding discharge from the acute care facility. The objective is to empower the patient and family in taking a proactive role in the task of preventing readmission within the first thirty days after discharge from the acute care setting. The team is also involved in the efforts to reduce readmission to the acute care setting after stabilization and discharge from the acute care environment either to the skilled nursing facilities or community.        Past Medical History:   Diagnosis Date    Asthma     Atrial fibrillation ([address]     Depression     DM (diabetes mellitus) ([address]     HTN (hypertension)     Hyperlipidemia     Morbid obesity ([facility] [address]     Neuropathy     LITA on CPAP     Other ill-defined conditions(799.89) pneumonia       Advance Care Planning 2/19/2020   Patient's Healthcare Decision Maker is: -   Primary Decision Maker Name -   Primary Decision Maker Phone Number -   Primary Decision Maker Relationship to Patient - Confirm Advance Directive Yes, on file   Does the patient have other document types -

## 2020-02-25 NOTE — PROGRESS NOTES
Problem: Falls - Risk of  Goal: *Absence of Falls  Description  Document Lizz Polo Fall Risk and appropriate interventions in the flowsheet.   Outcome: Progressing Towards Goal  Note: Fall Risk Interventions:  Mobility Interventions: Patient to call before getting OOB, Communicate number of staff needed for ambulation/transfer, Bed/chair exit alarm    Mentation Interventions: Adequate sleep, hydration, pain control    Medication Interventions: Assess postural VS orthostatic hypotension, Patient to call before getting OOB    Elimination Interventions: Call light in reach

## 2020-02-25 NOTE — PROGRESS NOTES
Pulmonary, Critical Care, and Sleep Medicine~Progress Note    Name: Nava Mckeon MRN: [id number]   : 1945 Hospital: [address]   Date: 2020 10:35 AM Admission: 2020     Impression Plan   1. Acute hypoxic resp failure secondary to below  2. AE of Asthma secondary flu A +: followed by Dr Rigo Mederos   3. Abnormal chest x-ray: worrisome for viral PNA, but likely pulmonary edema with small pleural effusions   4. P a fib~ on coumadin; recent RVR  5. LITA  6. Depression/anxiety  1. ECHO noted   2. Rocephin, tomorrow is day 7.    3. Wean po steroids; agree that WBC jump may be from steroids. 4. Duonebs/ brovana/pulmicort   5. on mucomyst   6. PAP therapy at night, if possible    7. O2 titration above 90%   8. From pulm aspect she can be discharged whenever primary is ready  9. Follow up with annalise in 2 wks   10. We will be available again to see if needed      Daily Progression:    Feeling better, less congestion       I have reviewed the labs and previous days notes. Pertinent items are noted in HPI. OBJECTIVE:     Vital Signs:       Visit Vitals  /72 (BP 1 Location: Right arm, BP Patient Position: At rest)   Pulse 95   Temp 98.2 °F (36.8 °C)   Resp 16   Ht 5' 2\" (1.575 m)   Wt 121.2 kg (267 lb 3.2 oz)   SpO2 98%   BMI 48.87 kg/m²      Temp (24hrs), Av.2 °F (36.8 °C), Min:98 °F (36.7 °C), Max:98.4 °F (36.9 °C)     Intake/Output:     Last shift: No intake/output data recorded. Last 3 shifts:  1901 -  0700  In: 870 [P.O.:720;  I.V.:150]  Out: 6500 [Urine:6500]          Intake/Output Summary (Last 24 hours) at 2020 0900  Last data filed at 2020 9293  Gross per 24 hour   Intake 870 ml   Output 4300 ml   Net -3430 ml       Physical Exam:                                        Exam Findings Other   General: No resp distress noted, appears stated age    [de-identified]:  No ulcers, JVD not elevated, no cervical LAD    Chest: No pectus deformity, normal chest rise b/l    HEART:  IRR, no murmurs/rubs/gallops    Lungs:  Less coarse today      ABD: Soft/NT, non rigid mildly distended    EXT: No cyanosis/clubbing/edema, normal peripheral pulses    Skin: No rashes or ulcers, no mottling    Neuro: A/O x 3        Medications:  Current Facility-Administered Medications   Medication Dose Route Frequency    potassium chloride SR (KLOR-CON 10) tablet 30 mEq  30 mEq Oral BID    warfarin (COUMADIN) tablet 1 mg  1 mg Oral ONCE    acetylcysteine (MUCOMYST) 200 mg/mL (20 %) solution 200 mg  200 mg Nebulization TID    predniSONE (DELTASONE) tablet 60 mg  60 mg Oral DAILY WITH BREAKFAST    bumetanide (BUMEX) tablet 2 mg  2 mg Oral BID    docusate sodium (COLACE) capsule 100 mg  100 mg Oral BID    polyethylene glycol (MIRALAX) packet 17 g  17 g Oral DAILY PRN    albuterol-ipratropium (DUO-NEB) 2.5 MG-0.5 MG/3 ML  3 mL Nebulization Q6H RT    albuterol (PROVENTIL VENTOLIN) nebulizer solution 2.5 mg  2.5 mg Nebulization Q3H PRN    ALPRAZolam (XANAX) tablet 0.5 mg  0.5 mg Oral DAILY PRN    arformoteroL (BROVANA) neb solution 15 mcg  15 mcg Nebulization BID RT    budesonide (PULMICORT) 500 mcg/2 ml nebulizer suspension  500 mcg Nebulization BID RT    dilTIAZem CD (CARDIZEM CD) capsule 360 mg  360 mg Oral DAILY    vitamin B complex tablet  1 Tab Oral DAILY    biotin (VITAMIN B7) tablet 5 mg  5 mg Oral DAILY    cholecalciferol (VITAMIN D3) (1000 Units /25 mcg) tablet 2 Tab  2,000 Units Oral DAILY    digoxin (LANOXIN) tablet 0.25 mg  0.25 mg Oral DAILY    DULoxetine (CYMBALTA) capsule 60 mg  60 mg Oral DAILY    gabapentin (NEURONTIN) capsule 300 mg  300 mg Oral DAILY    gabapentin (NEURONTIN) capsule 600 mg  600 mg Oral QHS    therapeutic multivitamin (THERAGRAN) tablet 1 Tab  1 Tab Oral DAILY    sodium chloride (NS) flush 5-40 mL  5-40 mL IntraVENous Q8H    sodium chloride (NS) flush 5-40 mL  5-40 mL IntraVENous PRN    cefTRIAXone (ROCEPHIN) 1 g in 0.9% sodium chloride (MBP/ADV) 50 mL  1 g IntraVENous Q24H    acetaminophen (TYLENOL) tablet 650 mg  650 mg Oral Q4H PRN    guaiFENesin ER (MUCINEX) tablet 600 mg  600 mg Oral Q12H    glucose chewable tablet 16 g  4 Tab Oral PRN    glucagon (GLUCAGEN) injection 1 mg  1 mg IntraMUSCular PRN    dextrose 10% infusion 0-250 mL  0-250 mL IntraVENous PRN    hydrALAZINE (APRESOLINE) 20 mg/mL injection 20 mg  20 mg IntraVENous Q6H PRN    insulin NPH (NOVOLIN N, HUMULIN N) injection 35 Units  35 Units SubCUTAneous 7am    insulin NPH (NOVOLIN N, HUMULIN N) injection 25 Units  25 Units SubCUTAneous QHS    Warfarin - pharmacy to dose   Other Rx Dosing/Monitoring    sertraline (ZOLOFT) tablet 100 mg  100 mg Oral DAILY    insulin regular (NOVOLIN R, HUMULIN R) injection 15 Units  15 Units SubCUTAneous ACB    insulin regular (NOVOLIN R, HUMULIN R) injection 15 Units  15 Units SubCUTAneous ACL    insulin regular (NOVOLIN R, HUMULIN R) injection 10 Units  10 Units SubCUTAneous ACD    insulin lispro (HUMALOG) injection   SubCUTAneous AC&HS       Labs:  ABG No results for input(s): PHI, PCO2I, PO2I, HCO3I, SO2I, FIO2I in the last 72 hours.      CBC Recent Labs     02/25/20  0206 02/23/20  0628   WBC 21.4* 12.5*   HGB 15.2 12.8   HCT 47.7* 39.8    242   MCV 89.0 89.8   MCH 28.4 39.0        Metabolic  Panel Recent Labs     02/25/20  0206 02/24/20  0251 02/23/20  0628   * 136 137   K 3.4* 3.5 3.9   CL 94* 97 103   CO2 34* 30 28   * 243* 337*   BUN 42* 40* 39*   CREA 1.28* 1.17* 1.19*   CA 9.5 9.2 8.8   INR 3.3* 3.6* 2.5*        Pertinent Labs                Kade Gomez PA-C  2/25/2020

## 2020-02-25 NOTE — PROGRESS NOTES
Problem: Mobility Impaired (Adult and Pediatric)  Goal: *Acute Goals and Plan of Care (Insert Text)  Description  FUNCTIONAL STATUS PRIOR TO ADMISSION: Patient was modified independent using a rollator, single point cane and grocery cart (which she keeps in car) for functional mobility. Feels more steady w/ rollator. Does not use stairs. HOME SUPPORT PRIOR TO ADMISSION: The patient lived alone with neighbors to provide assistance. Lives in [facility]. Physical Therapy Goals  Initiated 2/21/2020    1. Patient will perform sit to stand with modified independence within 7 day(s). 2.  Patient will ambulate with modified independence for 100 feet with the least restrictive device and no CASTANEDA on RA within 7 day(s). 3.  Patient will ascend/descend 3 stairs with bilateral handrail(s) with contact guard assist in order to promote community reintegration within 7 day(s). 4. Patient will improve Tinetti outcome measure by 4 points in order to decrease fall risk within 7 days. Outcome: Progressing Towards Goal   PHYSICAL THERAPY TREATMENT  Patient: [facility] (75 y.o. female)  Date: 2/25/2020  Diagnosis: Asthma exacerbation [J45.901]  Pneumonia [J18.9]   Acute respiratory failure [facility])       Precautions: Fall, droplet  Chart, physical therapy assessment, plan of care and goals were reviewed. ASSESSMENT  Patient continues with skilled PT services and is progressing towards goals. Pt received in chair with NCO2 @ 1 L. Attempted brief standing trial on RA in attempt to wean O2, however pt demo desat to low/ mid 80s, c/o feeling dizzy. Pt returned to sitting, NCO2 reapplied at 1 L, SpO2 returned to 92-94% almost immediately. Pt mobilized with up to CGA overall. Tolerated amb on unit with O2 in place at 1L and CGA. Noted fatigue and dyspnea by end of gait trial; required return to chair, SpO2 90-92%. Pt motivated to participate in therapy and return to modified [facility] LOF.  Anticipate continued progress toward goals. Pt will benefit from mobility progression with acute PT x additional 1-2 days in attempt to wean O2 in prep for d/c home alone with [facility] PT.     Current Level of Function Impacting Discharge (mobility/balance): transfer SBA, amb CGA    Other factors to consider for discharge: pt remains below functional baseline, lives alone         PLAN :  Patient continues to benefit from skilled intervention to address the above impairments. Continue treatment per established plan of care. to address goals. Recommendation for discharge: (in order for the patient to meet his/her long term goals)  Physical therapy at least 2 days/week in the home     This discharge recommendation:  Has not yet been discussed the attending provider and/or case management    IF patient discharges home will need the following DME: patient owns DME required for discharge       SUBJECTIVE:   Patient stated I've been sitting up and walking to the bathroom.     OBJECTIVE DATA SUMMARY:   Critical Behavior:  Neurologic State: Alert  Orientation Level: Oriented X4  Cognition: Appropriate for age attention/concentration, Appropriate safety awareness     Functional Mobility Training:  Bed Mobility:         Not observed           Transfers:  Sit to Stand: Stand-by assistance  Stand to Sit: Stand-by assistance                             Balance:  Standing - Static: Good;Constant support  Standing - Dynamic : Fair  Ambulation/Gait Training:  Distance (ft): 120 Feet (ft)  Assistive Device: Gait belt  Ambulation - Level of Assistance: Contact guard assistance        Gait Abnormalities: Decreased step clearance;Trunk sway increased        Base of Support: Widened     Speed/Liz: Pace decreased (<100 feet/min)  Step Length: Left shortened;Right shortened     Pain Rating:  No c/o pain    Activity Tolerance:   Fair  Please refer to the flowsheet for vital signs taken during this treatment.     After treatment patient left in no apparent distress:   Sitting in chair and Call bell within reach    COMMUNICATION/COLLABORATION:   The patients plan of care was discussed with: Registered Nurse    Gilmar Hardy PT   Time Calculation: 24 mins

## 2020-02-25 NOTE — PROGRESS NOTES
Pharmacist Note - Warfarin Dosing  Consult provided for this 75 y. o.female to manage warfarin for Atrial Fibrillation    INR Goal: 2 - 3    Home regimen/ tablet size: M-W-F: 2.5 mg; Tu-Th-Sat-Sun: 5 mg    Drugs that may increase INR: None  Drugs that may decrease INR: None  Other current anticoagulants/ drugs that may increase bleeding risk: Sertraline  Risk factors: Age > 65  Daily INR ordered: YES    Recent Labs     02/25/20  0206 02/24/20  0251 02/23/20  0628   HGB 15.2  --  12.8   INR 3.3* 3.6* 2.5*     Date               INR                  Dose  2/18  2.3  5 mg (PTA)   2/19  --  2.5 mg   2/20  1.6  5 mg     2/21  1.7  5 mg  2/22                 2.1                  5mg  2/23                 2.5                  2.5 mg  2/24  3.6  Held  2/25  3.3  1 mg                                                                                   Assessment/ Plan:  Warfarin 1 mg x 1 dose this evening. Pharmacy will continue to monitor daily and adjust therapy as indicated. Please contact the pharmacist at  for outpatient recommendations if needed.

## 2020-02-25 NOTE — PROGRESS NOTES
Cardiology Progress Note                                        Admit Date: 2/19/2020    Assessment/Plan:     CHF; she is improving; will decrease Bumex to one a day  PAF; controlled rate  Chronic AC; INR is adequate    [place] is a 75 y.o. female with     PROBLEM LIST:  Patient Active Problem List    Diagnosis Date Noted    Acute diastolic CHF (congestive heart failure) ([facility] 02/24/2020    Acute respiratory failure ([facility] [address] 02/20/2020    Influenza 02/20/2020    Recurrent depression ([facility] [address] 01/09/2018    Type 2 diabetes mellitus with diabetic neuropathy ([facility] [address] 01/09/2018    Encounter for long-term (current) drug use 10/19/2017    Diabetic polyneuropathy associated with type 2 diabetes mellitus ([address] 07/05/2017    Type 2 diabetes mellitus with stage 1 chronic kidney disease, with long-term current use of insulin ([facility] [address] 05/25/2017    Thoracic disc disorder with myelopathy 05/22/2017    Gait abnormality 05/22/2017    Left ankle sprain 05/21/2017    Hemoptysis 04/24/2017    Pneumonia 04/23/2017    Hypertension complicating diabetes ([address] 02/20/2017    PNA (pneumonia) 02/09/2017    Asthmatic bronchitis with acute exacerbation 09/26/2016    Microalbuminuria 05/24/2016    Long-term insulin use in type 2 diabetes ([address] 11/10/2015    Chronic atrial fibrillation 07/17/2015    Hematoma of neck 07/09/2015    Cervical stenosis of spine 07/06/2015    Morbid obesity ([facility] [address] 02/18/2014    Dizziness 02/17/2014    Weakness 02/17/2014    Anemia 01/22/2014    Respiratory failure 01/21/2014    Asthma exacerbation 01/21/2014    Hyperlipidemia     Depression     Neuropathy          Subjective:     Kallie Brennan Last reports none.     Visit Vitals  /68 (BP 1 Location: Right arm, BP Patient Position: At rest)   Pulse 92   Temp 98.4 °F (36.9 °C)   Resp 18   Ht 5' 2\" (1.575 m)   Wt 267 lb 3.2 oz (121.2 kg)   SpO2 93%   BMI 48.87 kg/m²       Intake/Output Summary (Last 24 hours) at 2/25/2020 1104  Last data filed at 2/25/2020 2961  Gross per 24 hour   Intake 870 ml   Output 4300 ml   Net -3430 ml       Objective:      Physical Exam:  HEENT: Perrla, EOMI  Neck: No JVD,  No thyroidmegaly  Resp: CTA bilaterally;  No wheezes or rales  CV: irregular  s1s2 No murmur no s3  Abd:Soft, Nontender  Ext: No edema  Neuro: Alert and oriented; Nonfocal  Skin: Warm, Dry, Intact  Pulses: 2+ DP/PT/Rad      Telemetry: AFIB    Current Facility-Administered Medications   Medication Dose Route Frequency    potassium chloride SR (KLOR-CON 10) tablet 30 mEq  30 mEq Oral BID    warfarin (COUMADIN) tablet 1 mg  1 mg Oral ONCE    [START ON 2/26/2020] bumetanide (BUMEX) tablet 2 mg  2 mg Oral DAILY    predniSONE (DELTASONE) tablet 60 mg  60 mg Oral DAILY WITH BREAKFAST    docusate sodium (COLACE) capsule 100 mg  100 mg Oral BID    polyethylene glycol (MIRALAX) packet 17 g  17 g Oral DAILY PRN    albuterol-ipratropium (DUO-NEB) 2.5 MG-0.5 MG/3 ML  3 mL Nebulization Q6H RT    albuterol (PROVENTIL VENTOLIN) nebulizer solution 2.5 mg  2.5 mg Nebulization Q3H PRN    ALPRAZolam (XANAX) tablet 0.5 mg  0.5 mg Oral DAILY PRN    arformoteroL (BROVANA) neb solution 15 mcg  15 mcg Nebulization BID RT    budesonide (PULMICORT) 500 mcg/2 ml nebulizer suspension  500 mcg Nebulization BID RT    dilTIAZem CD (CARDIZEM CD) capsule 360 mg  360 mg Oral DAILY    vitamin B complex tablet  1 Tab Oral DAILY    biotin (VITAMIN B7) tablet 5 mg  5 mg Oral DAILY    cholecalciferol (VITAMIN D3) (1000 Units /25 mcg) tablet 2 Tab  2,000 Units Oral DAILY    digoxin (LANOXIN) tablet 0.25 mg  0.25 mg Oral DAILY    DULoxetine (CYMBALTA) capsule 60 mg  60 mg Oral DAILY    gabapentin (NEURONTIN) capsule 300 mg  300 mg Oral DAILY    gabapentin (NEURONTIN) capsule 600 mg  600 mg Oral QHS    therapeutic multivitamin (THERAGRAN) tablet 1 Tab  1 Tab Oral DAILY    sodium chloride (NS) flush 5-40 mL  5-40 mL IntraVENous Q8H    sodium chloride (NS) flush 5-40 mL  5-40 mL IntraVENous PRN    cefTRIAXone (ROCEPHIN) 1 g in 0.9% sodium chloride (MBP/ADV) 50 mL  1 g IntraVENous Q24H    acetaminophen (TYLENOL) tablet 650 mg  650 mg Oral Q4H PRN    guaiFENesin ER (MUCINEX) tablet 600 mg  600 mg Oral Q12H    glucose chewable tablet 16 g  4 Tab Oral PRN    glucagon (GLUCAGEN) injection 1 mg  1 mg IntraMUSCular PRN    dextrose 10% infusion 0-250 mL  0-250 mL IntraVENous PRN    hydrALAZINE (APRESOLINE) 20 mg/mL injection 20 mg  20 mg IntraVENous Q6H PRN    insulin NPH (NOVOLIN N, HUMULIN N) injection 35 Units  35 Units SubCUTAneous 7am    insulin NPH (NOVOLIN N, HUMULIN N) injection 25 Units  25 Units SubCUTAneous QHS    Warfarin - pharmacy to dose   Other Rx Dosing/Monitoring    sertraline (ZOLOFT) tablet 100 mg  100 mg Oral DAILY    insulin regular (NOVOLIN R, HUMULIN R) injection 15 Units  15 Units SubCUTAneous ACB    insulin regular (NOVOLIN R, HUMULIN R) injection 15 Units  15 Units SubCUTAneous ACL    insulin regular (NOVOLIN R, HUMULIN R) injection 10 Units  10 Units SubCUTAneous ACD    insulin lispro (HUMALOG) injection   SubCUTAneous AC&HS         Data Review:   Labs:    Recent Results (from the past 24 hour(s))   GLUCOSE, POC    Collection Time: 02/24/20 12:02 PM   Result Value Ref Range    Glucose (POC) 360 (H) 65 - 100 mg/dL    Performed by Vicente Goncalves    GLUCOSE, POC    Collection Time: 02/24/20  4:24 PM   Result Value Ref Range    Glucose (POC) 264 (H) 65 - 100 mg/dL    Performed by Darryle Hay, POC    Collection Time: 02/24/20 10:39 PM   Result Value Ref Range    Glucose (POC) 233 (H) 65 - 100 mg/dL    Performed by Josh Monteiro (CON)    PROTHROMBIN TIME + INR    Collection Time: 02/25/20  2:06 AM   Result Value Ref Range    INR 3.3 (H) 0.9 - 1.1      Prothrombin time 31.8 (H) 9.0 - 66.1 sec   METABOLIC PANEL, BASIC    Collection Time: 02/25/20  2:06 AM   Result Value Ref Range    Sodium 134 (L) 136 - 145 mmol/L    Potassium 3.4 (L) 3.5 - 5.1 mmol/L    Chloride 94 (L) 97 - 108 mmol/L    CO2 34 (H) 21 - 32 mmol/L    Anion gap 6 5 - 15 mmol/L    Glucose 211 (H) 65 - 100 mg/dL    BUN 42 (H) 6 - 20 MG/DL    Creatinine 1.28 (H) 0.55 - 1.02 MG/DL    BUN/Creatinine ratio 33 (H) 12 - 20      GFR est AA 49 (L) >60 ml/min/1.73m2    GFR est non-AA 41 (L) >60 ml/min/1.73m2    Calcium 9.5 8.5 - 10.1 MG/DL   CBC W/O DIFF    Collection Time: 02/25/20  2:06 AM   Result Value Ref Range    WBC 21.4 (H) 3.6 - 11.0 K/uL    RBC 5.36 (H) 3.80 - 5.20 M/uL    HGB 15.2 11.5 - 16.0 g/dL    HCT 47.7 (H) 35.0 - 47.0 %    MCV 89.0 80.0 - 99.0 FL    MCH 28.4 26.0 - 34.0 PG    MCHC 31.9 30.0 - 36.5 g/dL    RDW 14.3 11.5 - 14.5 %    PLATELET 766 073 - 180 K/uL    MPV 8.9 8.9 - 12.9 FL    NRBC 0.0 0  WBC    ABSOLUTE NRBC 0.00 0.00 - 0.01 K/uL   GLUCOSE, POC    Collection Time: 02/25/20  7:02 AM   Result Value Ref Range    Glucose (POC) 111 (H) 65 - 100 mg/dL    Performed by Wili Morris

## 2020-02-25 NOTE — PROGRESS NOTES
2/25/2020 -   SHIRLEY:  - RUR: 27%  - Patient continues with IV Diuretic  - Plan includes to wean patient's steroids   - Patient continues with IV ABX  - Patient continues with O2 support at 1L via NC; patient does not use O2 at baseline, so O2 needs to be weaned prior to discharge  CRM: Nikolai Collazo, MPH, [address] [facility] [place]; Z: [phone]

## 2020-02-25 NOTE — PROGRESS NOTES
[facility]    Admit Date: 2/19/2020    Subjective:     Pt continues to improve, but still with dyspnea with exertion. WBC 21K, but felt due to steroids. CXR yesterday showed essential resolution of patchy asdz. No other new complaints.       Current Facility-Administered Medications   Medication Dose Route Frequency    potassium chloride SR (KLOR-CON 10) tablet 30 mEq  30 mEq Oral BID    acetylcysteine (MUCOMYST) 200 mg/mL (20 %) solution 200 mg  200 mg Nebulization TID    predniSONE (DELTASONE) tablet 60 mg  60 mg Oral DAILY WITH BREAKFAST    bumetanide (BUMEX) tablet 2 mg  2 mg Oral BID    docusate sodium (COLACE) capsule 100 mg  100 mg Oral BID    polyethylene glycol (MIRALAX) packet 17 g  17 g Oral DAILY PRN    albuterol-ipratropium (DUO-NEB) 2.5 MG-0.5 MG/3 ML  3 mL Nebulization Q6H RT    albuterol (PROVENTIL VENTOLIN) nebulizer solution 2.5 mg  2.5 mg Nebulization Q3H PRN    ALPRAZolam (XANAX) tablet 0.5 mg  0.5 mg Oral DAILY PRN    arformoteroL (BROVANA) neb solution 15 mcg  15 mcg Nebulization BID RT    budesonide (PULMICORT) 500 mcg/2 ml nebulizer suspension  500 mcg Nebulization BID RT    dilTIAZem CD (CARDIZEM CD) capsule 360 mg  360 mg Oral DAILY    vitamin B complex tablet  1 Tab Oral DAILY    biotin (VITAMIN B7) tablet 5 mg  5 mg Oral DAILY    cholecalciferol (VITAMIN D3) (1000 Units /25 mcg) tablet 2 Tab  2,000 Units Oral DAILY    digoxin (LANOXIN) tablet 0.25 mg  0.25 mg Oral DAILY    DULoxetine (CYMBALTA) capsule 60 mg  60 mg Oral DAILY    gabapentin (NEURONTIN) capsule 300 mg  300 mg Oral DAILY    gabapentin (NEURONTIN) capsule 600 mg  600 mg Oral QHS    therapeutic multivitamin (THERAGRAN) tablet 1 Tab  1 Tab Oral DAILY    sodium chloride (NS) flush 5-40 mL  5-40 mL IntraVENous Q8H    sodium chloride (NS) flush 5-40 mL  5-40 mL IntraVENous PRN    cefTRIAXone (ROCEPHIN) 1 g in 0.9% sodium chloride (MBP/ADV) 50 mL  1 g IntraVENous Q24H  acetaminophen (TYLENOL) tablet 650 mg  650 mg Oral Q4H PRN    guaiFENesin ER (MUCINEX) tablet 600 mg  600 mg Oral Q12H    glucose chewable tablet 16 g  4 Tab Oral PRN    glucagon (GLUCAGEN) injection 1 mg  1 mg IntraMUSCular PRN    dextrose 10% infusion 0-250 mL  0-250 mL IntraVENous PRN    hydrALAZINE (APRESOLINE) 20 mg/mL injection 20 mg  20 mg IntraVENous Q6H PRN    insulin NPH (NOVOLIN N, HUMULIN N) injection 35 Units  35 Units SubCUTAneous 7am    insulin NPH (NOVOLIN N, HUMULIN N) injection 25 Units  25 Units SubCUTAneous QHS    Warfarin - pharmacy to dose   Other Rx Dosing/Monitoring    sertraline (ZOLOFT) tablet 100 mg  100 mg Oral DAILY    insulin regular (NOVOLIN R, HUMULIN R) injection 15 Units  15 Units SubCUTAneous ACB    insulin regular (NOVOLIN R, HUMULIN R) injection 15 Units  15 Units SubCUTAneous ACL    insulin regular (NOVOLIN R, HUMULIN R) injection 10 Units  10 Units SubCUTAneous ACD    insulin lispro (HUMALOG) injection   SubCUTAneous AC&HS          Objective:     Patient Vitals for the past 8 hrs:   BP Temp Pulse Resp SpO2 Weight   02/25/20 0330 153/72 98.2 °F (36.8 °C) 95 16 98 % --   02/25/20 0205 -- -- -- -- -- 267 lb 3.2 oz (121.2 kg)   02/24/20 2328 138/67 98.4 °F (36.9 °C) 93 18 93 % --     02/24 1901 - 02/25 0700  In: 630 [P.O.:480; I.V.:150]  Out: 1900 [Urine:1900]  02/23 0701 - 02/24 1900  In: 240 [P.O.:240]  Out: 6600 [Urine:6600]    Physical Exam: NAD. A&O. Neck -- Supple. No JVD. Heart -- Irr Irr (Rate controlled). Lungs -- Decreased exp wheezing. Decreased mild crackles at bases. Abd -- Benign. Ext -- Trace, if any, LE edema, b/l.       Data Review   Recent Results (from the past 24 hour(s))   GLUCOSE, POC    Collection Time: 02/24/20 12:02 PM   Result Value Ref Range    Glucose (POC) 360 (H) 65 - 100 mg/dL    Performed by Fili Longoria    GLUCOSE, POC    Collection Time: 02/24/20  4:24 PM   Result Value Ref Range    Glucose (POC) 264 (H) 65 - 100 mg/dL    Performed by Mamie Keller    GLUCOSE, POC    Collection Time: 02/24/20 10:39 PM   Result Value Ref Range    Glucose (POC) 233 (H) 65 - 100 mg/dL    Performed by Nathaniel Moralez (CON)    PROTHROMBIN TIME + INR    Collection Time: 02/25/20  2:06 AM   Result Value Ref Range    INR 3.3 (H) 0.9 - 1.1      Prothrombin time 31.8 (H) 9.0 - 35.8 sec   METABOLIC PANEL, BASIC    Collection Time: 02/25/20  2:06 AM   Result Value Ref Range    Sodium 134 (L) 136 - 145 mmol/L    Potassium 3.4 (L) 3.5 - 5.1 mmol/L    Chloride 94 (L) 97 - 108 mmol/L    CO2 34 (H) 21 - 32 mmol/L    Anion gap 6 5 - 15 mmol/L    Glucose 211 (H) 65 - 100 mg/dL    BUN 42 (H) 6 - 20 MG/DL    Creatinine 1.28 (H) 0.55 - 1.02 MG/DL    BUN/Creatinine ratio 33 (H) 12 - 20      GFR est AA 49 (L) >60 ml/min/1.73m2    GFR est non-AA 41 (L) >60 ml/min/1.73m2    Calcium 9.5 8.5 - 10.1 MG/DL   CBC W/O DIFF    Collection Time: 02/25/20  2:06 AM   Result Value Ref Range    WBC 21.4 (H) 3.6 - 11.0 K/uL    RBC 5.36 (H) 3.80 - 5.20 M/uL    HGB 15.2 11.5 - 16.0 g/dL    HCT 47.7 (H) 35.0 - 47.0 %    MCV 89.0 80.0 - 99.0 FL    MCH 28.4 26.0 - 34.0 PG    MCHC 31.9 30.0 - 36.5 g/dL    RDW 14.3 11.5 - 14.5 %    PLATELET 380 528 - 865 K/uL    MPV 8.9 8.9 - 12.9 FL    NRBC 0.0 0  WBC    ABSOLUTE NRBC 0.00 0.00 - 0.01 K/uL           Assessment:     Principal Problem:    Acute respiratory failure due to Influenza, ? PNA, and associated asthma exacerbation. Active Problems:    Asthma exacerbation (1/21/2014)      Chronic atrial fibrillation with brief RVR (Rate better now). Pneumonia -- ?atypical/?viral      Type 2 diabetes mellitus with diabetic neuropathy ([facility] [facility] (1/9/2018)      Influenza A        Plan:     1. Cont Rocephin. She has completed Zithromax and Tamiflu. 2. Continue Prednisone 60 mg every day.   3. Cont Nebs, including Mucomyst.  4. On PO Bumex. Increase KCl.  5. Pharmacy dosing Coumadin for chronic afib. 6. PT as able. If she continues to improve, she may be able to be discharged home tomorrow.           Shoaib Emery MD

## 2020-02-25 NOTE — PROGRESS NOTES
Bedside and Verbal shift change report given to PADMINI Washington (oncoming nurse) by Marysol Park (offgoing nurse). Report included the following information SBAR, Kardex, Procedure Summary, Intake/Output, MAR, Recent Results and Cardiac Rhythm A fib.

## 2020-02-25 NOTE — PROGRESS NOTES
1930: Bedside and Verbal shift change report given to Pipe Leone RN (oncoming nurse) by [address], RN (offgoing nurse). Report included the following information SBAR, Kardex, Intake/Output, MAR, Recent Results, Med Rec Status and Cardiac Rhythm A Fib.   2320: Patient placed on 2L nasal cannula d/t low O2 saturation in the 89-91% range. Pt refused to have CPAP placed for sleep. 0745: Bedside and Verbal shift change report given to Avinash Wahl RN (oncoming nurse) by PADMINI SILVA (offgoing nurse). Report included the following information SBAR, Kardex, Intake/Output, MAR, Recent Results, Med Rec Status and Cardiac Rhythm A Fib.

## 2020-02-26 LAB
ANION GAP SERPL CALC-SCNC: 5 MMOL/L (ref 5–15)
BUN SERPL-MCNC: 43 MG/DL (ref 6–20)
BUN/CREAT SERPL: 34 (ref 12–20)
CALCIUM SERPL-MCNC: 9.2 MG/DL (ref 8.5–10.1)
CHLORIDE SERPL-SCNC: 97 MMOL/L (ref 97–108)
CO2 SERPL-SCNC: 33 MMOL/L (ref 21–32)
CREAT SERPL-MCNC: 1.26 MG/DL (ref 0.55–1.02)
GLUCOSE BLD STRIP.AUTO-MCNC: 125 MG/DL (ref 65–100)
GLUCOSE BLD STRIP.AUTO-MCNC: 133 MG/DL (ref 65–100)
GLUCOSE BLD STRIP.AUTO-MCNC: 140 MG/DL (ref 65–100)
GLUCOSE BLD STRIP.AUTO-MCNC: 149 MG/DL (ref 65–100)
GLUCOSE BLD STRIP.AUTO-MCNC: 300 MG/DL (ref 65–100)
GLUCOSE SERPL-MCNC: 160 MG/DL (ref 65–100)
INR PPP: 2.5 (ref 0.9–1.1)
MAGNESIUM SERPL-MCNC: 2.3 MG/DL (ref 1.6–2.4)
POTASSIUM SERPL-SCNC: 3.7 MMOL/L (ref 3.5–5.1)
PROTHROMBIN TIME: 24 SEC (ref 9–11.1)
SERVICE CMNT-IMP: ABNORMAL
SODIUM SERPL-SCNC: 135 MMOL/L (ref 136–145)

## 2020-02-26 PROCEDURE — 74011636637 HC RX REV CODE- 636/637: Performed by: STUDENT IN AN ORGANIZED HEALTH CARE EDUCATION/TRAINING PROGRAM

## 2020-02-26 PROCEDURE — 65660000000 HC RM CCU STEPDOWN

## 2020-02-26 PROCEDURE — 74011000250 HC RX REV CODE- 250: Performed by: PHYSICIAN ASSISTANT

## 2020-02-26 PROCEDURE — 85610 PROTHROMBIN TIME: CPT

## 2020-02-26 PROCEDURE — 74011250637 HC RX REV CODE- 250/637: Performed by: STUDENT IN AN ORGANIZED HEALTH CARE EDUCATION/TRAINING PROGRAM

## 2020-02-26 PROCEDURE — 94760 N-INVAS EAR/PLS OXIMETRY 1: CPT

## 2020-02-26 PROCEDURE — 36415 COLL VENOUS BLD VENIPUNCTURE: CPT

## 2020-02-26 PROCEDURE — 94640 AIRWAY INHALATION TREATMENT: CPT

## 2020-02-26 PROCEDURE — 74011636637 HC RX REV CODE- 636/637: Performed by: INTERNAL MEDICINE

## 2020-02-26 PROCEDURE — 74011250636 HC RX REV CODE- 250/636: Performed by: INTERNAL MEDICINE

## 2020-02-26 PROCEDURE — 74011000258 HC RX REV CODE- 258: Performed by: INTERNAL MEDICINE

## 2020-02-26 PROCEDURE — 83735 ASSAY OF MAGNESIUM: CPT

## 2020-02-26 PROCEDURE — 74011250637 HC RX REV CODE- 250/637: Performed by: INTERNAL MEDICINE

## 2020-02-26 PROCEDURE — 74011000250 HC RX REV CODE- 250: Performed by: STUDENT IN AN ORGANIZED HEALTH CARE EDUCATION/TRAINING PROGRAM

## 2020-02-26 PROCEDURE — 80048 BASIC METABOLIC PNL TOTAL CA: CPT

## 2020-02-26 PROCEDURE — 77010033678 HC OXYGEN DAILY

## 2020-02-26 PROCEDURE — 82962 GLUCOSE BLOOD TEST: CPT

## 2020-02-26 RX ORDER — CLOTRIMAZOLE 10 MG/1
10 LOZENGE ORAL; TOPICAL
Status: DISCONTINUED | OUTPATIENT
Start: 2020-02-26 | End: 2020-02-27 | Stop reason: HOSPADM

## 2020-02-26 RX ORDER — WARFARIN 2.5 MG/1
2.5 TABLET ORAL ONCE
Status: COMPLETED | OUTPATIENT
Start: 2020-02-26 | End: 2020-02-26

## 2020-02-26 RX ADMIN — GABAPENTIN 600 MG: 300 CAPSULE ORAL at 22:28

## 2020-02-26 RX ADMIN — THERA TABS 1 TABLET: TAB at 09:20

## 2020-02-26 RX ADMIN — GABAPENTIN 300 MG: 300 CAPSULE ORAL at 09:19

## 2020-02-26 RX ADMIN — HUMAN INSULIN 15 UNITS: 100 INJECTION, SOLUTION SUBCUTANEOUS at 09:21

## 2020-02-26 RX ADMIN — CLOTRIMAZOLE 10 MG: 10 LOZENGE ORAL; TOPICAL at 15:20

## 2020-02-26 RX ADMIN — Medication 10 ML: at 22:30

## 2020-02-26 RX ADMIN — Medication 10 ML: at 06:16

## 2020-02-26 RX ADMIN — SERTRALINE HYDROCHLORIDE 100 MG: 50 TABLET ORAL at 09:18

## 2020-02-26 RX ADMIN — CLOTRIMAZOLE 10 MG: 10 LOZENGE ORAL; TOPICAL at 12:29

## 2020-02-26 RX ADMIN — BUMETANIDE 2 MG: 1 TABLET ORAL at 09:20

## 2020-02-26 RX ADMIN — IPRATROPIUM BROMIDE AND ALBUTEROL SULFATE 3 ML: .5; 3 SOLUTION RESPIRATORY (INHALATION) at 08:36

## 2020-02-26 RX ADMIN — CEFTRIAXONE 1 G: 1 INJECTION, POWDER, FOR SOLUTION INTRAMUSCULAR; INTRAVENOUS at 03:58

## 2020-02-26 RX ADMIN — HUMAN INSULIN 10 UNITS: 100 INJECTION, SOLUTION SUBCUTANEOUS at 17:08

## 2020-02-26 RX ADMIN — WARFARIN SODIUM 2.5 MG: 2.5 TABLET ORAL at 17:09

## 2020-02-26 RX ADMIN — CLOTRIMAZOLE 10 MG: 10 LOZENGE ORAL; TOPICAL at 17:08

## 2020-02-26 RX ADMIN — BUDESONIDE INHALATION 500 MCG: 0.5 SUSPENSION RESPIRATORY (INHALATION) at 08:36

## 2020-02-26 RX ADMIN — IPRATROPIUM BROMIDE AND ALBUTEROL SULFATE 3 ML: .5; 3 SOLUTION RESPIRATORY (INHALATION) at 14:53

## 2020-02-26 RX ADMIN — PREDNISONE 60 MG: 20 TABLET ORAL at 09:19

## 2020-02-26 RX ADMIN — POTASSIUM CHLORIDE 30 MEQ: 750 TABLET, FILM COATED, EXTENDED RELEASE ORAL at 17:08

## 2020-02-26 RX ADMIN — DULOXETINE HYDROCHLORIDE 60 MG: 60 CAPSULE, DELAYED RELEASE ORAL at 09:20

## 2020-02-26 RX ADMIN — CLOTRIMAZOLE 10 MG: 10 LOZENGE ORAL; TOPICAL at 07:22

## 2020-02-26 RX ADMIN — DIGOXIN 0.25 MG: 125 TABLET ORAL at 09:20

## 2020-02-26 RX ADMIN — DOCUSATE SODIUM 100 MG: 100 CAPSULE, LIQUID FILLED ORAL at 09:20

## 2020-02-26 RX ADMIN — HUMAN INSULIN 25 UNITS: 100 INJECTION, SUSPENSION SUBCUTANEOUS at 22:29

## 2020-02-26 RX ADMIN — DILTIAZEM HYDROCHLORIDE 360 MG: 180 CAPSULE, EXTENDED RELEASE ORAL at 09:00

## 2020-02-26 RX ADMIN — BUDESONIDE INHALATION 500 MCG: 0.5 SUSPENSION RESPIRATORY (INHALATION) at 18:26

## 2020-02-26 RX ADMIN — Medication 1 TABLET: at 09:19

## 2020-02-26 RX ADMIN — IPRATROPIUM BROMIDE AND ALBUTEROL SULFATE 3 ML: .5; 3 SOLUTION RESPIRATORY (INHALATION) at 01:05

## 2020-02-26 RX ADMIN — INSULIN LISPRO 6 UNITS: 100 INJECTION, SOLUTION INTRAVENOUS; SUBCUTANEOUS at 22:29

## 2020-02-26 RX ADMIN — Medication 5 MG: at 09:26

## 2020-02-26 RX ADMIN — POTASSIUM CHLORIDE 30 MEQ: 750 TABLET, FILM COATED, EXTENDED RELEASE ORAL at 09:18

## 2020-02-26 RX ADMIN — IPRATROPIUM BROMIDE AND ALBUTEROL SULFATE 3 ML: .5; 3 SOLUTION RESPIRATORY (INHALATION) at 18:26

## 2020-02-26 RX ADMIN — DOCUSATE SODIUM 100 MG: 100 CAPSULE, LIQUID FILLED ORAL at 17:09

## 2020-02-26 RX ADMIN — HUMAN INSULIN 15 UNITS: 100 INJECTION, SOLUTION SUBCUTANEOUS at 12:29

## 2020-02-26 RX ADMIN — GUAIFENESIN 600 MG: 600 TABLET, EXTENDED RELEASE ORAL at 22:29

## 2020-02-26 RX ADMIN — CLOTRIMAZOLE 10 MG: 10 LOZENGE ORAL; TOPICAL at 22:28

## 2020-02-26 RX ADMIN — GUAIFENESIN 600 MG: 600 TABLET, EXTENDED RELEASE ORAL at 09:20

## 2020-02-26 RX ADMIN — MELATONIN 2 TABLET: at 09:20

## 2020-02-26 NOTE — PROGRESS NOTES
Pharmacist Note - Warfarin Dosing  Consult provided for this 75 y. o.female to manage warfarin for Atrial Fibrillation    INR Goal: 2 - 3    Home regimen/ tablet size: M-W-F: 2.5 mg; Tu-Th-Sat-Sun: 5 mg    Drugs that may increase INR: None  Drugs that may decrease INR: None  Other current anticoagulants/ drugs that may increase bleeding risk: Sertraline  Risk factors: Age > 65  Daily INR ordered: YES    Recent Labs     02/26/20  0306 02/25/20  0206 02/24/20  0251   HGB  --  15.2  --    INR 2.5* 3.3* 3.6*     Date               INR                  Dose  2/18  2.3  5 mg (PTA)   2/19  --  2.5 mg   2/20  1.6  5 mg     2/21  1.7  5 mg  2/22                 2.1                  5mg  2/23                 2.5                  2.5 mg  2/24  3.6  Held  2/25  3.3  1 mg    2/26  2.5  2.5 mg                                                                             Assessment/ Plan:  Warfarin 2.5 mg x 1 dose this evening. Pharmacy will continue to monitor daily and adjust therapy as indicated. Please contact the pharmacist at  for outpatient recommendations if needed.

## 2020-02-26 NOTE — PROGRESS NOTES
2/26/2020 -   SHIRLEY:  - RUR: 28%  - Patient continues with IV ABX and PO steroid, nebs, and mucomyst  - Patient is now on PO diuretic  - Patient is now on room air  - NOTE: patient declined [facility] services  - Disposition includes discharge to own home with transport via daughter  CRM: Saravanan Burroughs, MPH, [address] [place] [address]; Z: [phone]

## 2020-02-26 NOTE — CDMP QUERY
Patient admitted with Acute respiratory failure due to Influenza, ? PNA. Noted documentation in the H&P of \"Sepsis likely due to pneumonia\". Noted that  Sepsis has been dropped from the progress notes. Please clarify in your progress notes and DC summary,  if Sepsis on admission has been confirmed and now resolved or if it has been ruled out. 
 
 
=> Sepsis POA 2/2 PNA confirmed and resolved 
=> Sepsis POA ruled out  
=> Other explanation  
=> Unable to determine The medical record reflects the following:   
   Risk Factors: 74 yo F admitted on 2/19 with Sepsis 2/2 possible PNA Clinical Indicators: 2/18 WBC 9.9 neuts  79 bands 3  Temp 101.2  Pulse 145     Treatment: IV Rocephin, Thank you for your time [facility] RN/BSN, CCDS Desk:   [phone] Other:  [phone]

## 2020-02-26 NOTE — PROGRESS NOTES
Bedside and Verbal shift change report given to Fanny Campos RN (oncoming nurse) by Perez Thomas RN (offgoing nurse). Report included the following information SBAR, Kardex, Procedure Summary, Intake/Output, MAR, Accordion, Recent Results and Med Rec Status.      Last 3 Recorded Weights in this Encounter    02/24/20 0604 02/25/20 0205 02/26/20 0423   Weight: 123.7 kg (272 lb 11.3 oz) 121.2 kg (267 lb 3.2 oz) 120.4 kg (265 lb 6.9 oz)       Problem: Heart Failure: Discharge Outcomes  Goal: *Describes importance of continuing daily weights and changes to report to physician  Outcome: Progressing Towards Goal  Note:   Pt understood that they needed to weight themselves everyday at the same time and when to report to the MD.

## 2020-02-26 NOTE — PROGRESS NOTES
Rocio Nayak, Lewis Courtney & Mj    Admit Date: 2/19/2020    Subjective:     Pt continues to feel \"tight\" & wheezy. She does not feel ready to be discharged yet. She c/o some oral/throat irritation. No other new complaints.       Current Facility-Administered Medications   Medication Dose Route Frequency    clotrimazole (MYCELEX) hitesh 10 mg  10 mg Oral 5XD    potassium chloride SR (KLOR-CON 10) tablet 30 mEq  30 mEq Oral BID    bumetanide (BUMEX) tablet 2 mg  2 mg Oral DAILY    predniSONE (DELTASONE) tablet 60 mg  60 mg Oral DAILY WITH BREAKFAST    docusate sodium (COLACE) capsule 100 mg  100 mg Oral BID    polyethylene glycol (MIRALAX) packet 17 g  17 g Oral DAILY PRN    albuterol-ipratropium (DUO-NEB) 2.5 MG-0.5 MG/3 ML  3 mL Nebulization Q6H RT    albuterol (PROVENTIL VENTOLIN) nebulizer solution 2.5 mg  2.5 mg Nebulization Q3H PRN    ALPRAZolam (XANAX) tablet 0.5 mg  0.5 mg Oral DAILY PRN    arformoteroL (BROVANA) neb solution 15 mcg  15 mcg Nebulization BID RT    budesonide (PULMICORT) 500 mcg/2 ml nebulizer suspension  500 mcg Nebulization BID RT    dilTIAZem CD (CARDIZEM CD) capsule 360 mg  360 mg Oral DAILY    vitamin B complex tablet  1 Tab Oral DAILY    biotin (VITAMIN B7) tablet 5 mg  5 mg Oral DAILY    cholecalciferol (VITAMIN D3) (1000 Units /25 mcg) tablet 2 Tab  2,000 Units Oral DAILY    digoxin (LANOXIN) tablet 0.25 mg  0.25 mg Oral DAILY    DULoxetine (CYMBALTA) capsule 60 mg  60 mg Oral DAILY    gabapentin (NEURONTIN) capsule 300 mg  300 mg Oral DAILY    gabapentin (NEURONTIN) capsule 600 mg  600 mg Oral QHS    therapeutic multivitamin (THERAGRAN) tablet 1 Tab  1 Tab Oral DAILY    sodium chloride (NS) flush 5-40 mL  5-40 mL IntraVENous Q8H    sodium chloride (NS) flush 5-40 mL  5-40 mL IntraVENous PRN    cefTRIAXone (ROCEPHIN) 1 g in 0.9% sodium chloride (MBP/ADV) 50 mL  1 g IntraVENous Q24H    acetaminophen (TYLENOL) tablet 650 mg  650 mg Oral Q4H PRN  guaiFENesin ER (MUCINEX) tablet 600 mg  600 mg Oral Q12H    glucose chewable tablet 16 g  4 Tab Oral PRN    glucagon (GLUCAGEN) injection 1 mg  1 mg IntraMUSCular PRN    dextrose 10% infusion 0-250 mL  0-250 mL IntraVENous PRN    hydrALAZINE (APRESOLINE) 20 mg/mL injection 20 mg  20 mg IntraVENous Q6H PRN    insulin NPH (NOVOLIN N, HUMULIN N) injection 35 Units  35 Units SubCUTAneous 7am    insulin NPH (NOVOLIN N, HUMULIN N) injection 25 Units  25 Units SubCUTAneous QHS    Warfarin - pharmacy to dose   Other Rx Dosing/Monitoring    sertraline (ZOLOFT) tablet 100 mg  100 mg Oral DAILY    insulin regular (NOVOLIN R, HUMULIN R) injection 15 Units  15 Units SubCUTAneous ACB    insulin regular (NOVOLIN R, HUMULIN R) injection 15 Units  15 Units SubCUTAneous ACL    insulin regular (NOVOLIN R, HUMULIN R) injection 10 Units  10 Units SubCUTAneous ACD    insulin lispro (HUMALOG) injection   SubCUTAneous AC&HS          Objective:     Patient Vitals for the past 8 hrs:   BP Temp Pulse Resp SpO2 Weight   02/26/20 0423 -- -- -- -- -- 265 lb 6.9 oz (120.4 kg)   02/26/20 0401 134/81 98.2 °F (36.8 °C) (!) 104 18 99 % --   02/25/20 2334 145/73 98.2 °F (36.8 °C) 90 18 96 % --     02/25 1901 - 02/26 0700  In: 50 [I.V.:50]  Out: -   02/24 0701 - 02/25 1900  In: 393 [P.O.:720; I.V.:150]  Out: 9100 [Urine:9100]    Physical Exam: NAD. A&O. Oral thrush is noted. Neck -- Supple. No JVD. Heart -- Irr Irr (Rate controlled). Lungs -- There is some mild exp wheezes with decent air movement. Mild crackles at bases. Abd -- Benign. Ext -- Trace, if any, LE edema, b/l.       Data Review   Recent Results (from the past 24 hour(s))   GLUCOSE, POC    Collection Time: 02/25/20  7:02 AM   Result Value Ref Range    Glucose (POC) 111 (H) 65 - 100 mg/dL    Performed by ROBERT MURILLO    GLUCOSE, POC    Collection Time: 02/25/20 11:04 AM   Result Value Ref Range    Glucose (POC) 170 (H) 65 - 100 mg/dL    Performed by Ariel LEVI(CON)    GLUCOSE, POC    Collection Time: 02/25/20  4:30 PM   Result Value Ref Range    Glucose (POC) 188 (H) 65 - 100 mg/dL    Performed by GRICELDA LEVI(CON)    GLUCOSE, POC    Collection Time: 02/25/20  9:43 PM   Result Value Ref Range    Glucose (POC) 282 (H) 65 - 100 mg/dL    Performed by Joanna Arredondo    PROTHROMBIN TIME + INR    Collection Time: 02/26/20  3:06 AM   Result Value Ref Range    INR 2.5 (H) 0.9 - 1.1      Prothrombin time 24.0 (H) 9.0 - 59.6 sec   METABOLIC PANEL, BASIC    Collection Time: 02/26/20  3:06 AM   Result Value Ref Range    Sodium 135 (L) 136 - 145 mmol/L    Potassium 3.7 3.5 - 5.1 mmol/L    Chloride 97 97 - 108 mmol/L    CO2 33 (H) 21 - 32 mmol/L    Anion gap 5 5 - 15 mmol/L    Glucose 160 (H) 65 - 100 mg/dL    BUN 43 (H) 6 - 20 MG/DL    Creatinine 1.26 (H) 0.55 - 1.02 MG/DL    BUN/Creatinine ratio 34 (H) 12 - 20      GFR est AA 50 (L) >60 ml/min/1.73m2    GFR est non-AA 41 (L) >60 ml/min/1.73m2    Calcium 9.2 8.5 - 10.1 MG/DL   MAGNESIUM    Collection Time: 02/26/20  3:06 AM   Result Value Ref Range    Magnesium 2.3 1.6 - 2.4 mg/dL   GLUCOSE, POC    Collection Time: 02/26/20  6:49 AM   Result Value Ref Range    Glucose (POC) 140 (H) 65 - 100 mg/dL    Performed by Joanna Arredondo            Assessment:     Principal Problem:    Acute respiratory failure due to Influenza, ? PNA, and associated asthma exacerbation. Active Problems:    Asthma exacerbation (1/21/2014)      Chronic atrial fibrillation with brief RVR (Rate better now). Pneumonia -- ?atypical/?viral      Type 2 diabetes mellitus with diabetic neuropathy ([facility] [address] (1/9/2018)      Influenza A        Plan:     1. Cont Rocephin. She has completed Zithromax and Tamiflu. 2. Continue Prednisone 60 mg every day.   3. Cont Nebs, including Mucomyst.  4. On PO Bumex, 2 mg daily now. 5. Start Clotrimazole troches for oral thrush. 6. Pharmacy dosing Coumadin for chronic afib. 7. PT as able. Still not ready for discharge yet.           Franco Osorio MD

## 2020-02-26 NOTE — PROGRESS NOTES
Physical Therapy Note    Patient declines ambulation at this time but transfer from bed to chair with contact guard assistance. [facility] and states she has a rollator for home use. Will continue to follow.      Thank you,  Azar Prakash DPT

## 2020-02-27 VITALS
HEART RATE: 94 BPM | BODY MASS INDEX: 49.09 KG/M2 | RESPIRATION RATE: 18 BRPM | SYSTOLIC BLOOD PRESSURE: 117 MMHG | TEMPERATURE: 97.6 F | OXYGEN SATURATION: 95 % | DIASTOLIC BLOOD PRESSURE: 75 MMHG | WEIGHT: 266.76 LBS | HEIGHT: 62 IN

## 2020-02-27 LAB
GLUCOSE BLD STRIP.AUTO-MCNC: 173 MG/DL (ref 65–100)
GLUCOSE BLD STRIP.AUTO-MCNC: 194 MG/DL (ref 65–100)
GLUCOSE BLD STRIP.AUTO-MCNC: 206 MG/DL (ref 65–100)
INR PPP: 2.4 (ref 0.9–1.1)
PROTHROMBIN TIME: 23 SEC (ref 9–11.1)
SERVICE CMNT-IMP: ABNORMAL

## 2020-02-27 PROCEDURE — 74011000250 HC RX REV CODE- 250: Performed by: PHYSICIAN ASSISTANT

## 2020-02-27 PROCEDURE — 74011636637 HC RX REV CODE- 636/637: Performed by: INTERNAL MEDICINE

## 2020-02-27 PROCEDURE — 74011250637 HC RX REV CODE- 250/637: Performed by: INTERNAL MEDICINE

## 2020-02-27 PROCEDURE — 94640 AIRWAY INHALATION TREATMENT: CPT

## 2020-02-27 PROCEDURE — 74011000250 HC RX REV CODE- 250: Performed by: STUDENT IN AN ORGANIZED HEALTH CARE EDUCATION/TRAINING PROGRAM

## 2020-02-27 PROCEDURE — 74011250636 HC RX REV CODE- 250/636: Performed by: INTERNAL MEDICINE

## 2020-02-27 PROCEDURE — 85610 PROTHROMBIN TIME: CPT

## 2020-02-27 PROCEDURE — 74011000258 HC RX REV CODE- 258: Performed by: INTERNAL MEDICINE

## 2020-02-27 PROCEDURE — 74011250637 HC RX REV CODE- 250/637: Performed by: STUDENT IN AN ORGANIZED HEALTH CARE EDUCATION/TRAINING PROGRAM

## 2020-02-27 PROCEDURE — 82962 GLUCOSE BLOOD TEST: CPT

## 2020-02-27 PROCEDURE — 36415 COLL VENOUS BLD VENIPUNCTURE: CPT

## 2020-02-27 RX ORDER — DOCUSATE SODIUM 100 MG/1
100 CAPSULE, LIQUID FILLED ORAL 2 TIMES DAILY
Qty: 60 CAP | Refills: 2 | Status: SHIPPED | OUTPATIENT
Start: 2020-02-27 | End: 2020-11-12

## 2020-02-27 RX ORDER — CLOTRIMAZOLE 10 MG/1
10 LOZENGE ORAL; TOPICAL
Qty: 50 TAB | Refills: 2 | Status: SHIPPED | OUTPATIENT
Start: 2020-02-27 | End: 2020-03-08

## 2020-02-27 RX ORDER — FLUCONAZOLE 150 MG/1
150 TABLET ORAL ONCE
Qty: 1 TAB | Refills: 2 | Status: SHIPPED | OUTPATIENT
Start: 2020-02-27 | End: 2020-04-26

## 2020-02-27 RX ORDER — PREDNISONE 10 MG/1
TABLET ORAL
Qty: 63 TAB | Refills: 0 | Status: SHIPPED | OUTPATIENT
Start: 2020-02-27 | End: 2020-03-16

## 2020-02-27 RX ORDER — POTASSIUM CHLORIDE 750 MG/1
20 TABLET, FILM COATED, EXTENDED RELEASE ORAL 2 TIMES DAILY
Qty: 120 TAB | Refills: 5 | Status: SHIPPED | OUTPATIENT
Start: 2020-02-27 | End: 2020-08-23

## 2020-02-27 RX ORDER — BUMETANIDE 2 MG/1
2 TABLET ORAL DAILY
Qty: 30 TAB | Refills: 5 | Status: SHIPPED | OUTPATIENT
Start: 2020-02-27 | End: 2020-06-25

## 2020-02-27 RX ORDER — WARFARIN 2.5 MG/1
2.5 TABLET ORAL ONCE
Status: COMPLETED | OUTPATIENT
Start: 2020-02-27 | End: 2020-02-27

## 2020-02-27 RX ORDER — CEFDINIR 300 MG/1
600 CAPSULE ORAL DAILY
Qty: 8 CAP | Refills: 0 | Status: SHIPPED | OUTPATIENT
Start: 2020-02-27 | End: 2020-03-02

## 2020-02-27 RX ADMIN — INSULIN LISPRO 3 UNITS: 100 INJECTION, SOLUTION INTRAVENOUS; SUBCUTANEOUS at 12:11

## 2020-02-27 RX ADMIN — Medication 1 TABLET: at 09:01

## 2020-02-27 RX ADMIN — MELATONIN 2 TABLET: at 09:00

## 2020-02-27 RX ADMIN — SERTRALINE HYDROCHLORIDE 100 MG: 50 TABLET ORAL at 09:00

## 2020-02-27 RX ADMIN — GABAPENTIN 300 MG: 300 CAPSULE ORAL at 09:01

## 2020-02-27 RX ADMIN — CLOTRIMAZOLE 10 MG: 10 LOZENGE ORAL; TOPICAL at 12:11

## 2020-02-27 RX ADMIN — WARFARIN SODIUM 2.5 MG: 2.5 TABLET ORAL at 12:10

## 2020-02-27 RX ADMIN — DILTIAZEM HYDROCHLORIDE 360 MG: 180 CAPSULE, EXTENDED RELEASE ORAL at 09:00

## 2020-02-27 RX ADMIN — BUDESONIDE INHALATION 500 MCG: 0.5 SUSPENSION RESPIRATORY (INHALATION) at 07:24

## 2020-02-27 RX ADMIN — DOCUSATE SODIUM 100 MG: 100 CAPSULE, LIQUID FILLED ORAL at 09:00

## 2020-02-27 RX ADMIN — IPRATROPIUM BROMIDE AND ALBUTEROL SULFATE 3 ML: .5; 3 SOLUTION RESPIRATORY (INHALATION) at 13:09

## 2020-02-27 RX ADMIN — HUMAN INSULIN 15 UNITS: 100 INJECTION, SOLUTION SUBCUTANEOUS at 09:03

## 2020-02-27 RX ADMIN — PREDNISONE 60 MG: 20 TABLET ORAL at 09:01

## 2020-02-27 RX ADMIN — THERA TABS 1 TABLET: TAB at 09:01

## 2020-02-27 RX ADMIN — Medication 5 MG: at 09:03

## 2020-02-27 RX ADMIN — HUMAN INSULIN 15 UNITS: 100 INJECTION, SOLUTION SUBCUTANEOUS at 12:11

## 2020-02-27 RX ADMIN — DULOXETINE HYDROCHLORIDE 60 MG: 60 CAPSULE, DELAYED RELEASE ORAL at 09:01

## 2020-02-27 RX ADMIN — DIGOXIN 0.25 MG: 125 TABLET ORAL at 09:00

## 2020-02-27 RX ADMIN — CEFTRIAXONE 1 G: 1 INJECTION, POWDER, FOR SOLUTION INTRAMUSCULAR; INTRAVENOUS at 03:50

## 2020-02-27 RX ADMIN — Medication 5 ML: at 06:00

## 2020-02-27 RX ADMIN — IPRATROPIUM BROMIDE AND ALBUTEROL SULFATE 3 ML: .5; 3 SOLUTION RESPIRATORY (INHALATION) at 07:24

## 2020-02-27 RX ADMIN — CLOTRIMAZOLE 10 MG: 10 LOZENGE ORAL; TOPICAL at 07:12

## 2020-02-27 RX ADMIN — BUMETANIDE 2 MG: 1 TABLET ORAL at 09:02

## 2020-02-27 RX ADMIN — POTASSIUM CHLORIDE 30 MEQ: 750 TABLET, FILM COATED, EXTENDED RELEASE ORAL at 09:05

## 2020-02-27 RX ADMIN — GUAIFENESIN 600 MG: 600 TABLET, EXTENDED RELEASE ORAL at 09:03

## 2020-02-27 RX ADMIN — IPRATROPIUM BROMIDE AND ALBUTEROL SULFATE 3 ML: .5; 3 SOLUTION RESPIRATORY (INHALATION) at 02:40

## 2020-02-27 NOTE — DISCHARGE INSTRUCTIONS
DISCHARGE SUMMARY from Nurse    PATIENT INSTRUCTIONS:    After general anesthesia or intravenous sedation, for 24 hours or while taking prescription Narcotics:  · Limit your activities  · Do not drive and operate hazardous machinery  · Do not make important personal or business decisions  · Do  not drink alcoholic beverages  · If you have not urinated within 8 hours after discharge, please contact your surgeon on call. Report the following to your surgeon:  · Excessive pain, swelling, redness or odor of or around the surgical area  · Temperature over 100.5  · Nausea and vomiting lasting longer than 4 hours or if unable to take medications  · Any signs of decreased circulation or nerve impairment to extremity: change in color, persistent  numbness, tingling, coldness or increase pain  · Any questions    What to do at Home:  Recommended activity: Activity as tolerated and Activity as tolerated and no driving for today,     If you experience any of the following symptoms , please follow up with . *  Please give a list of your current medications to your Primary Care Provider. *  Please update this list whenever your medications are discontinued, doses are      changed, or new medications (including over-the-counter products) are added. *  Please carry medication information at all times in case of emergency situations. These are general instructions for a healthy lifestyle:    No smoking/ No tobacco products/ Avoid exposure to second hand smoke  Surgeon General's Warning:  Quitting smoking now greatly reduces serious risk to your health.     Obesity, smoking, and sedentary lifestyle greatly increases your risk for illness    A healthy diet, regular physical exercise & weight monitoring are important for maintaining a healthy lifestyle    You may be retaining fluid if you have a history of heart failure or if you experience any of the following symptoms:  Weight gain of 3 pounds or more overnight or 5 pounds in a week, increased swelling in our hands or feet or shortness of breath while lying flat in bed. Please call your doctor as soon as you notice any of these symptoms; do not wait until your next office visit. The discharge information has been reviewed with the patient. The patient verbalized understanding. Discharge medications reviewed with the patient and appropriate educational materials and side effects teaching were provided. ___________________________________________________________________________________________________________________________________  Patient Discharge Instructions    Chito Naga / [id number] : 1945    Admitted 2020 Discharged: 2020     Take Home Medications       · It is important that you take the medication exactly as they are prescribed. · Keep your medication in the bottles provided by the pharmacist and keep a list of the medication names, dosages, and times to be taken in your wallet. · Do not take other medications without consulting your doctor. What to do at Home    Recommended diet: Cardiac Diet and Diabetic Diet. Recommended activity: Activity as tolerated. Follow-up with Dr. Wayne Ronquilol in 1 week. Follow-up with Dr. Ruby Stephens (pulmonologist) in 1-2 weeks. Information obtained by :  I understand that if any problems occur once I am at home I am to contact my physician. I understand and acknowledge receipt of the instructions indicated above.                                                                                                                                            Physician's or R.N.'s Signature                                                                  Date/Time                                                                                                                                              Patient or Representative Signature Date/Time    HEART FAILURE INSTRUCTIONS    Please weigh daily and report weight gain of 2# in one day or 5# in one week to Dr. Mae Stuart at [phone]  Please adhere to a low-salt (1500-2000mg/day) cardiac diet. Thank you!   Freya ABAD (Healthy Understanding of Goals) Transitional Care Team at [facility]  [phone]

## 2020-02-27 NOTE — PROGRESS NOTES
[facility] Transitional Care Team: Discharge HUG Note    Date of Assessment: 02/27/20  Time of Assessment:  3:35 PM    Assessment & Plan   SHIRLEY Diagnoses:  Influenza, possible Pneumonia, acute asthma exacerbation    Results for Nellie So (MRN [id number]) as of 2/26/2020 12:47   Ref. Range 2/19/2020 04:12   pH (POC) Latest Ref Range: 7.35 - 7.45   7.365   pCO2 (POC) Latest Ref Range: 35.0 - 45.0 MMHG 40.1   pO2 (POC) Latest Ref Range: 80 - 100 MMHG 73 (L)   HCO3 (POC) Latest Ref Range: 22 - 26 MMOL/L 22.9   sO2 (POC) Latest Ref Range: 92 - 97 % 94   Base deficit (POC) Latest Units: mmol/L 2   Specimen type (POC) Latest Units:   ARTERIAL   Flow rate (POC) Latest Units: L/M 3   Device: Latest Units:   NASAL CANNULA     -weaned off oxygen prior to discharge  -DuoNeb Q6h and albuterol J/N Q3h PRN wheezing, Brovana and Pulmicort BID  -discharging on prednisone taper  -discharging on cefdinir 600 mg daily x 4 days    Chronic atrial fibrillation with brief RVR (rate better now)  -Digoxin 0.25 mg daily--continuing at discharge  -Cardizem  mg daily--continuing at discharge    Pneumonia -- ?atypical/?viral  -CXR 2/24--patchy airspace disease has essentially resolved  -D/C'ing on oral abx x 4 days     Influenza A  -resp panel 2/19--influenza A H1N1 PCR +         Readmission Risks: moderate to high      1. Older adult with multiple co-morbidities  2. Full code status  3. Potential fall risk--recommended to have [facility] but declines        Nurse Navigator: no [facility] Nurse assigned yet  RAFAELA BOOKER appointment with Elliot Sage on 3/1/20 in the afternoon--INR to be drawn at this visit and results sent to her PCP  Dr. Carla Colbert on 3/4/20 @ Marcello Nunez on 3/6/20 @ 10:30 AM  Dr. Susan Herron on 3/10 @ 2:30 PM    Code status: Full  Recommended Disposition: discharge to her home with daughter to transport; declines HH per CM notes and HUG NP's conversation with patient.   Advised if she changes her mind,  Acacia Meza can order this for her at F/U         Number of Admissions this year:  only current so far in 2020    Heart Failure Bundle:  Yes    Met with patient on day of discharge. Asked if she would like to complete an AMD and she said \"yes. \"    Advance Care Plan: completed on day of discharge. Left to be scanned into EMR. Left patient with original and 3 copies, as per her request.    Medication reconciliation was performed on admission by [facility] and at discharge by PharmD. AVERY NP asked for recommendation on warfarin dosing and when next INR should be drawn. PharmD recommended patient receive 2.5 mg dose on day of discharge and for next INR to be drawn on 3/1. PCP contacted and agreed with this dosing and INR schedule. AVERY NP contacted PharmD to inform; med ordered and unit nurse gave warfarin 2.5 mg dose at noon prior to patient's discharge. Discharge Needs: recommended to have [facility] at discharge but patient declines. [facility] contacted and arrangements made through their intake staff for INR to be drawn at hospital F/U visit on 3/1 with results sent to Dr. Acacia Meza. Gave her HF patient education booklet and reviewed low-salt diet and daily weight protocol. This information also placed on AVS in discharge instructions. Awareness of medical conditions: she tells HUG NP that she was diagnosed with flu, and that there was possible pneumonia seen on chest CT. States these illnesses led to exacerbation of her asthma. States she was told she has HF but not the \"bad kind\" with pulmonary HTN. [facility]. [facility] information given to patient with explanation of services. [facility] is a 75 y.o. female inpatient at [facility] admitted with productive cough, difficulty breathing, and fever on 2/19/20.   Chart reviewed by Santy Almanza NP and [facility] Understanding of Goals) program will be introduced to patient. The Transitional Care Team bridges the gaps in care and education surrounding discharge from the acute care facility. The objective is to empower the patient and family in taking a proactive role in the task of preventing readmission within the first thirty days after discharge from the acute care setting. The team is also involved in the efforts to reduce readmission to the acute care setting after stabilization and discharge from the acute care environment either to the skilled nursing facilities or community.        Past Medical History:   Diagnosis Date    Asthma     Atrial fibrillation ([facility] [address]     Depression     DM (diabetes mellitus) ([facility] [address]     HTN (hypertension)     Hyperlipidemia     Morbid obesity ([facility] [address]     Neuropathy     LITA on CPAP     Other ill-defined conditions(799.89) pneumonia       Advance Care Planning 2/19/2020   Patient's [place] is: -   Primary Decision Maker Name -   Primary Decision Maker Phone Number -   Primary Decision Maker Relationship to Patient -   Confirm Advance Directive Yes, on file   Does the patient have other document types -

## 2020-02-27 NOTE — NURSE NAVIGATOR
[facility] contacted Aliza/[facility]CS for post hospital followup appt. Awaiting return call.   Bailey Lockhart RN-CHFN/[facility]NN

## 2020-02-27 NOTE — DISCHARGE SUMMARY
Physician Discharge Summary     Patient ID:  Cornelio Yeung  [id number]  75 y.o.  1945    Admit date: 2/19/2020    Discharge date and time: 2/27/2020    Briefly, pt was admitted with Asthma exacerbation [J45.901]; Pneumonia [J18.9]. For details of admission, see H&P. Hospital Course:  Pt was admitted with acute resp failure due to asthma exacerbation. She was given nebs & treated with Rocephin & IV Zithromax for possible PNA. Her viral studies were positive for influenza type A, and she was treated with 5 days of Tamiflu. She was also treated with IV Solumedrol, and this was transitioned to PO Prednisone. Pulmonary followed pt. There was concern for possible pulmonary edema, and cardiology (Dr. Rachel Anderson) saw pt in consultation. ECHO showed nl LV fxn. Pt was diuresed with IV Bumex, and this was transitioned to PO Bumex. Pt clinically improved slowly, and her O2 was weaned off. She is being discharged home on a slow prednisone taper, a few more days of abx in the form of PO Omnicef, & nebs. She is also being treated for oral thrush & vaginal yeast infection. She is to f/u with me in 1 week & Dr. Ishmael Avila in 1-2 weeks. Discharge Dx: Influenza, possible PNA, acute asthma exacerbation. Condition at discharge: improved. Disposition: home    Patient Instructions:   Current Discharge Medication List      START taking these medications    Details   bumetanide (BUMEX) 2 mg tablet Take 1 Tab by mouth daily. Qty: 30 Tab, Refills: 5      clotrimazole (MYCELEX) 10 mg hitesh Take 1 Tab by mouth five (5) times daily for 10 days. Qty: 50 Tab, Refills: 2      cefdinir (OMNICEF) 300 mg capsule Take 2 Caps by mouth daily for 4 days. Qty: 8 Cap, Refills: 0      guaiFENesin ER (MUCINEX) 1,200 mg Ta12 ER tablet Take 1 Tab by mouth two (2) times a day for 10 days. Qty: 20 Tab, Refills: 0      potassium chloride SR (KLOR-CON 10) 10 mEq tablet Take 2 Tabs by mouth two (2) times a day.   Qty: 120 Tab, Refills: 5 predniSONE (DELTASONE) 10 mg tablet Take 60 mg by mouth daily for 3 days, THEN 50 mg daily for 3 days, THEN 40 mg daily for 3 days, THEN 30 mg daily for 3 days, THEN 20 mg daily for 3 days, THEN 10 mg daily for 3 days. Qty: 63 Tab, Refills: 0      docusate sodium (COLACE) 100 mg capsule Take 1 Cap by mouth two (2) times a day for 90 days. Qty: 60 Cap, Refills: 2      fluconazole (DIFLUCAN) 150 mg tablet Take 1 Tab by mouth once for 1 dose. FDA advises cautious prescribing of oral fluconazole in pregnancy. Qty: 1 Tab, Refills: 2         CONTINUE these medications which have NOT CHANGED    Details   !! insulin NPH (NOVOLIN N, HUMULIN N) 100 unit/mL injection 35 Units by SubCUTAneous route every morning. INJECT 35 UNITS UNDER THE SKIN IN THE MORNING AND INJECT 25 UNITS AT BEDTIME OR AS DIRECTED      !! insulin NPH (NOVOLIN N, HUMULIN N) 100 unit/mL injection 25 Units by SubCUTAneous route nightly. INJECT 35 UNITS UNDER THE SKIN IN THE MORNING AND INJECT 25 UNITS AT BEDTIME OR AS DIRECTED      insulin regular (NOVOLIN R REGULAR U-100 INSULN) 100 unit/mL injection 15 Units by SubCUTAneous route Before breakfast, lunch, and dinner. digoxin (LANOXIN) 0.25 mg tablet TAKE 1 TABLET BY MOUTH EVERY DAY  Qty: 90 Tab, Refills: 3      albuterol (PROVENTIL VENTOLIN) 2.5 mg /3 mL (0.083 %) nebu 3 mL by Nebulization route four (4) times daily as needed for Wheezing or Shortness of Breath. Qty: 100 Each, Refills: 5      fluticasone propion-salmeterol (WIXELA INHUB) 500-50 mcg/dose diskus inhaler Take 1 Puff by inhalation two (2) times a day. !! warfarin (COUMADIN) 5 mg tablet Take 5 mg by mouth four (4) days a week. M-W-F: 2.5 mg  Tu-Th-Sat-Sun: 5 mg      !! warfarin (COUMADIN) 2.5 mg tablet Take 2.5 mg by mouth every Monday, Wednesday, Friday. M-W-F: 2.5 mg  Tu-Th-Sat-Sun: 5 mg      !! gabapentin (NEURONTIN) 300 mg capsule Take 300 mg by mouth daily.       !! gabapentin (NEURONTIN) 300 mg capsule Take 600 mg by mouth nightly. sertraline (ZOLOFT) 100 mg tablet Take 100 mg by mouth daily. dilTIAZem CD (CARDIZEM CD) 120 mg ER capsule TAKE 3 CAPSULES EVERY DAY  Qty: 90 Cap, Refills: 11      DULoxetine (CYMBALTA) 60 mg capsule Take 60 mg by mouth daily. OTHER Allergy shots weekly. EPINEPHrine (EPIPEN) 0.3 mg/0.3 mL injection 0.3 mg by IntraMUSCular route once as needed. Biotin 2,500 mcg cap Take 1 Cap by mouth daily. b complex vitamins tablet Take 1 Tab by mouth daily. omalizumab (XOLAIR) 150 mg solr 150 mg by SubCUTAneous route every fourteen (14) days. Next dose due 2/17      therapeutic multivitamin [facility]) tablet Take 1 Tab by mouth daily. PROVENTIL HFA 90 mcg/actuation inhaler inhale 2 puffs four times a day if needed for wheezing  Qty: 3 Inhaler, Refills: 3      cholecalciferol, vitamin D3, (VITAMIN D3) 2,000 unit tab Take 2,000 Units by mouth daily. DOCOSAHEXANOIC ACID/EPA (FISH OIL PO) Take 1 Cap by mouth daily. !! - Potential duplicate medications found. Please discuss with provider. STOP taking these medications       furosemide (LASIX) 40 mg tablet Comments:   Reason for Stopping:         furosemide (LASIX) 40 mg tablet Comments:   Reason for Stopping: Follow-up with Dr. Mariza Masters in 1 week. Follow-up with Dr. Hillary Mckeon in 1-2 weeks.         Signed:  Piper Rahman MD  2/27/2020  8:05 AM

## 2020-02-27 NOTE — ROUTINE PROCESS
Bedside and Verbal shift change report given to 1700 Declan Ruiz (oncoming nurse) by Diane Fair RN (offgoing nurse). Report included the following information SBAR, Kardex, Intake/Output, MAR, Recent Results and Cardiac Rhythm AFIB.

## 2020-02-27 NOTE — PROGRESS NOTES
Pharmacist Discharge Medication Reconciliation    Discharging Provider: Dr. Semaj Nowak PMH:   Past Medical History:   Diagnosis Date    Asthma     Atrial fibrillation ([facility]     Depression     DM (diabetes mellitus) ([facility]     HTN (hypertension)     Hyperlipidemia     Morbid obesity ([facility]     Neuropathy     LITA on CPAP     Other ill-defined conditions(799.89) pneumonia     Chief Complaint for this Admission:   Chief Complaint   Patient presents with    Shortness of Breath    Fatigue     Allergies: Latex; Kiwi; Codeine; Lisinopril; Morphine; and Statins-hmg-coa reductase inhibitors    Discharge Medications:   Current Discharge Medication List        START taking these medications    Details   bumetanide (BUMEX) 2 mg tablet Take 1 Tab by mouth daily. Qty: 30 Tab, Refills: 5      clotrimazole (MYCELEX) 10 mg hitesh Take 1 Tab by mouth five (5) times daily for 10 days. Qty: 50 Tab, Refills: 2      cefdinir (OMNICEF) 300 mg capsule Take 2 Caps by mouth daily for 4 days. Qty: 8 Cap, Refills: 0      guaiFENesin ER (MUCINEX) 1,200 mg Ta12 ER tablet Take 1 Tab by mouth two (2) times a day for 10 days. Qty: 20 Tab, Refills: 0      potassium chloride SR (KLOR-CON 10) 10 mEq tablet Take 2 Tabs by mouth two (2) times a day. Qty: 120 Tab, Refills: 5      predniSONE (DELTASONE) 10 mg tablet Take 60 mg by mouth daily for 3 days, THEN 50 mg daily for 3 days, THEN 40 mg daily for 3 days, THEN 30 mg daily for 3 days, THEN 20 mg daily for 3 days, THEN 10 mg daily for 3 days. Qty: 63 Tab, Refills: 0      docusate sodium (COLACE) 100 mg capsule Take 1 Cap by mouth two (2) times a day for 90 days. Qty: 60 Cap, Refills: 2      fluconazole (DIFLUCAN) 150 mg tablet Take 1 Tab by mouth once for 1 dose. FDA advises cautious prescribing of oral fluconazole in pregnancy.   Qty: 1 Tab, Refills: 2           CONTINUE these medications which have NOT CHANGED    Details   !! insulin NPH (NOVOLIN N, HUMULIN N) 100 unit/mL injection 35 Units by SubCUTAneous route every morning. INJECT 35 UNITS UNDER THE SKIN IN THE MORNING AND INJECT 25 UNITS AT BEDTIME OR AS DIRECTED      !! insulin NPH (NOVOLIN N, HUMULIN N) 100 unit/mL injection 25 Units by SubCUTAneous route nightly. INJECT 35 UNITS UNDER THE SKIN IN THE MORNING AND INJECT 25 UNITS AT BEDTIME OR AS DIRECTED      insulin regular (NOVOLIN R REGULAR U-100 INSULN) 100 unit/mL injection 15 Units by SubCUTAneous route Before breakfast, lunch, and dinner. digoxin (LANOXIN) 0.25 mg tablet TAKE 1 TABLET BY MOUTH EVERY DAY  Qty: 90 Tab, Refills: 3      albuterol (PROVENTIL VENTOLIN) 2.5 mg /3 mL (0.083 %) nebu 3 mL by Nebulization route four (4) times daily as needed for Wheezing or Shortness of Breath. Qty: 100 Each, Refills: 5      fluticasone propion-salmeterol (WIXELA INHUB) 500-50 mcg/dose diskus inhaler Take 1 Puff by inhalation two (2) times a day. !! warfarin (COUMADIN) 5 mg tablet Take 5 mg by mouth four (4) days a week. M-W-F: 2.5 mg  Tu-Th-Sat-Sun: 5 mg      !! warfarin (COUMADIN) 2.5 mg tablet Take 2.5 mg by mouth every Monday, Wednesday, Friday. M-W-F: 2.5 mg  Tu-Th-Sat-Sun: 5 mg      !! gabapentin (NEURONTIN) 300 mg capsule Take 300 mg by mouth daily. !! gabapentin (NEURONTIN) 300 mg capsule Take 600 mg by mouth nightly. sertraline (ZOLOFT) 100 mg tablet Take 100 mg by mouth daily. dilTIAZem CD (CARDIZEM CD) 120 mg ER capsule TAKE 3 CAPSULES EVERY DAY  Qty: 90 Cap, Refills: 11      DULoxetine (CYMBALTA) 60 mg capsule Take 60 mg by mouth daily. OTHER Allergy shots weekly. EPINEPHrine (EPIPEN) 0.3 mg/0.3 mL injection 0.3 mg by IntraMUSCular route once as needed. Biotin 2,500 mcg cap Take 1 Cap by mouth daily. b complex vitamins tablet Take 1 Tab by mouth daily. omalizumab (XOLAIR) 150 mg solr 150 mg by SubCUTAneous route every fourteen (14) days.  Next dose due 2/17      therapeutic multivitamin [facility]) tablet Take 1 Tab by mouth daily.      PROVENTIL HFA 90 mcg/actuation inhaler inhale 2 puffs four times a day if needed for wheezing  Qty: 3 Inhaler, Refills: 3      cholecalciferol, vitamin D3, (VITAMIN D3) 2,000 unit tab Take 2,000 Units by mouth daily. DOCOSAHEXANOIC ACID/EPA (FISH OIL PO) Take 1 Cap by mouth daily. !! - Potential duplicate medications found. Please discuss with provider.         STOP taking these medications       furosemide (LASIX) 40 mg tablet Comments:   Reason for Stopping:         furosemide (LASIX) 40 mg tablet Comments:   Reason for Stopping:               The patient's chart, MAR and AVS were reviewed by Matt Becker, ABHINAVD.

## 2020-02-27 NOTE — PROGRESS NOTES
Hospital follow-up visit has been scheduled with [facility] for Sunday, 3/1/20.   Office will contact patient prior to arrival.  Sia Zavala, Care Management Specialist.

## 2020-02-27 NOTE — PROGRESS NOTES
2/27/2020 -   SHIRLEY:  - RUR: 30%  - Patient has been cleared for discharge today, 2/27  - NOTE: Patient has declined [facility]  - Disposition includes discharge to own home with transport via daughter  - Patient to follow up with PCP, Pulmonary, and Cardio  CRM: Josee Latif, MPH, [address] [place] [address]; Z: [phone]

## 2020-02-27 NOTE — PROGRESS NOTES
For billing/coding purposes:  - Pneumonia POA 2/2 influenza A, possible unspecified bacteria.   - Sepsis POA 2/2 PNA confirmed and resolved

## 2020-02-27 NOTE — PROGRESS NOTES
Pharmacist Note - Warfarin Dosing  Consult provided for this 75 y. o.female to manage warfarin for Atrial Fibrillation    INR Goal: 2 - 3    Home regimen/ tablet size: M-W-F: 2.5 mg; Tu-Th-Sat-Sun: 5 mg    Drugs that may increase INR: None  Drugs that may decrease INR: None  Other current anticoagulants/ drugs that may increase bleeding risk: Sertraline  Risk factors: Age > 65  Daily INR ordered: YES    Recent Labs     02/27/20  0355 02/26/20  0306 02/25/20  0206   HGB  --   --  15.2   INR 2.4* 2.5* 3.3*     Date               INR                  Dose  2/18  2.3  5 mg (PTA)   2/19  --  2.5 mg   2/20  1.6  5 mg     2/21  1.7  5 mg  2/22                 2.1                  5mg  2/23                 2.5                  2.5 mg  2/24  3.6  Held  2/25  3.3  1 mg    2/26  2.5  2.5 mg  2/27  2.4  2.5 mg                                                                             Assessment/ Plan:  Warfarin 2.5 mg x 1 dose today. Patient planning for discharge today- team wanting patient to receive dose prior to discharge so will order dose for noon. Pharmacy will continue to monitor daily and adjust therapy as indicated. Please contact the pharmacist at  for outpatient recommendations if needed.

## 2020-02-27 NOTE — PROGRESS NOTES
Pt feeling better. Still with some wheezing on exam, but no significant crackles heard, and good air movement. She says she feels ready to go home. Will discharge her home on slow prednisone taper, PO Omnicef, & continued albuterol nebs. F/u with me 1 week.

## 2020-02-27 NOTE — PROGRESS NOTES
Bedside and Verbal shift change report given to Joann (oncoming nurse) by Hipolito Boyer (offgoing nurse). Report included the following information SBAR, MAR and Recent Results.

## 2020-02-27 NOTE — CDMP QUERY
Pt admitted with asthma exacerbation and PNA. Pt noted to have  influenza type A as well. If possible, please document in the progress notes and discharge summary if you are evaluating and/or treating any of the following: -         Pneumonia POA 2/2 influenza A ·         Gram negative pneumonia ·         Gram positive pneumonia ·         MRSA or MSSA pneumonia ·         Bacterial pneumonia ·         Viral pneumonia ·         Aspiration pneumonia ·         Hypostatic pneumonia ·         Other, please specify ·         Clinically unable to determine ·         Unknown The medical record reflects the following: 
Risk Factors: 76 yo F admitted with PNA, Asthma exacerbation and acute on chronic diastolic CHF Clinical Indicators: + influenza A, 2/25 PN states \"Pneumonia -- ?atypical/?viral\" Treatment: IV Rocephin, IV Zithromax and tamiflu Note: CAP, HAP, and HCAP indicate where the pneumonia was acquired, not a specific type. Thank you for your time [facility] RN/BSN, CCDS Desk:   [phone] Other:  [phone]

## 2020-02-28 ENCOUNTER — PATIENT OUTREACH (OUTPATIENT)
Dept: CASE MANAGEMENT | Age: 75
End: 2020-02-28

## 2020-02-28 NOTE — PROGRESS NOTES
Hospital Discharge Follow-Up      Date/Time:  2/28/2020 2:48 PM    Susan Kyle RN, [facility], [facility]  Care transitions nurse [phone]  [facility] Coordination Team    Patient was admitted to [facility] on 2/19 and discharged on 2/27/20  for acute respiratory failure, asthma exacerbation - positive for Influenza A. The physician discharge summary was available at the time of outreach. Patient was contacted within 1 business days of discharge. Call to and reached patient who had taken an Hydralazine because of skin itching - attributed to the antibiotic. She was very drowsy on the call - she said the itching had started in the hospital. CTN call to Dr. Valle Section re: pt's reported symptom of itching/ scratching - relieved by Hydralazine. Top Challenges reviewed with the provider   Respiratory failure/ acute asthma exacerbation/ positive for Influenza A  Vaginal yeast infection and thrush - on Diflucan  Leg edema, weight gain - pulmonary effusions  Cardiology, pulmonary consultations  Itching of skin - pt saying it started in hospital- reported to provider Ree Mon will discuss with provider. Advance Care Planning:   Does patient have an Advance Directive:  reviewed and current        Heart Failure Note    Do you have a Scale:    yes   How often do you weigh:  Daily - q am    Daily Weight (document daily weights in flowsheets):   Decrease   Zone:(Pt Reported)  green     EF: 50-55%  on 2/21/2020   Type of HF:   HFpEF   Result status: Final result   · Normal cavity size. Increased wall thickness. Estimated left ventricular ejection fraction is 50 - 55%. · Mildly dilated left atrium. · Mildly dilated right atrium. · Severely elevated central venous pressure (15+ mmHg); IVC diameter is larger than 21 mm and collapses less than 50% with respiration.      Cardiac Device present: none     Heart Failure Medications: Diuretic, Potassium, Anticoagulant     Method of communication with provider Bibiana Malubimal chart routing, staff message, phone, ccLink    Was this a readmission? no   Patient stated reason for the readmission: n/a    Care Transition Nurse (CTN) contacted the patient by telephone to perform post hospital discharge assessment. Verified name and  with patient as identifiers. Provided introduction to self, and explanation of the CTN role. Patient received hospital discharge instructions. CTN reviewed discharge instructions and red flags with patient who verbalized understanding. Patient given an opportunity to ask questions and does not have any further questions or concerns at this time. The patient agrees to contact the PCP office for questions related to their healthcare. CTN provided contact information for future reference. Disease Specific:  Asthma, CHF HFpEF    Patients top risk factors for readmission:  functional physical ability, medical condition    Home Health orders at discharge: patient refused  [address]: n/a H2H offerred/ declined  Date of initial visit: [facility] ordered at discharge: [address] [place] [address] received: none    Medication(s):   New Medications at Discharge: Bumex 2 mg every day, K 10 meq - 2 tabs bid, Omnicef 2 caps for 4 days, Mycelex 10 mg 5x/day for 10 days, Colace 100 mg bid, Diflucan 1 pill; Mucinex 1,200 mg bid, Prednisone tapered, 60, 50, 40, 30, 20, 10 mg for 3 days each -   Changed Medications at Discharge: none  Discontinued Medications at Discharge: Lasix    Medication reconciliation was performed with patient, who verbalizes understanding of administration of home medications. There were no barriers to obtaining medications identified at this time. Referral to Pharm D needed: no     1 Follow up with Greg Shah MD (Internal Medicine)   2 Follow up with Roderick Morales MD (Pulmonary Disease) on 3/6/2020; Please arrive at 10:30 am for hospital follow up. 3 [facility]; Hospital f/u visit Sunday, 3/1/20. Office will contact patient prior to arrival. INR to be drawn at this visit with results delivered to Dr. Halley Verdin Phone: [phone]; Fax: [phone]   4 Follow up with Paco Nelson MD (Cardiology) on 3/4/2020; Cardiology follow up appointment on 3/4/2020 at 12:00 noon. [facility] follow up appointment(s):     [facility]:  information provided as a resource; Visit listed on AVS for visit Ferny 3/2 -     Goals      Attends follow-up appointments as directed. 5 Follow up with Henriette Mcardle, MD (Internal Medicine)   6 Follow up with Wayne Mora MD (Pulmonary Disease) on 3/6/2020; Please arrive at 10:30 am for hospital follow up.   7 Follow up with [facility] (Urgent Care) on 3/1/2020; Hospital f/u visit Sunday, 3/1/20. Office will contact patient prior to arrival. INR to be drawn at this visit with results delivered to Dr. Halley Verdin Phone: [phone]; Fax: [phone]   8 Follow up with Paco Nelson MD (Cardiology) on 3/4/2020; Cardiology follow up appointment on 3/4/2020 at 12:00 noon. Pt is aware of appts next week for follow up  ttk       Knowledge and adherence of prescribed medication (ie. action, side effects, missed dose, etc.).      2/28/2020  Lasix stopped and Bumex started -   Tapered steroid - Prednisone with decreasing dosing for 3 days each. Diflucan for vaginal yeast and thrush. Mucinex for cough / expectorant -       Ms. Ollie Mean Understands red flags post discharge. 2/28/2020  Pt to do daily am weights. Monitor bp and pulse, as possible. Nebulizer tx q 6 h for now - post exacerbation. Fluids, humidifier, warm tea - deep breathing, inspirometer for asthma, influenza, respiratory failure.   ttk          ttk

## 2020-03-11 ENCOUNTER — HOSPITAL ENCOUNTER (OUTPATIENT)
Dept: GENERAL RADIOLOGY | Age: 75
Discharge: HOME | End: 2020-03-11
Payer: MEDICARE

## 2020-03-11 DIAGNOSIS — R05.9 COUGH: ICD-10-CM

## 2020-03-11 PROCEDURE — 71046 X-RAY EXAM CHEST 2 VIEWS: CPT

## 2020-07-11 ENCOUNTER — HOSPITAL ENCOUNTER (OUTPATIENT)
Dept: PREADMISSION TESTING | Age: 75
Discharge: HOME | End: 2020-07-11
Payer: MEDICARE

## 2020-07-11 DIAGNOSIS — Z20.822 ENCOUNTER FOR LABORATORY TESTING FOR COVID-19 VIRUS: ICD-10-CM

## 2020-07-11 PROCEDURE — 87635 SARS-COV-2 COVID-19 AMP PRB: CPT

## 2020-07-12 LAB — SARS-COV-2, COV2NT: NOT DETECTED

## 2020-07-15 ENCOUNTER — ANESTHESIA EVENT (OUTPATIENT)
Dept: ENDOSCOPY | Age: 75
End: 2020-07-15
Payer: MEDICARE

## 2020-07-15 ENCOUNTER — ANESTHESIA (OUTPATIENT)
Dept: ENDOSCOPY | Age: 75
End: 2020-07-15
Payer: MEDICARE

## 2020-07-15 ENCOUNTER — HOSPITAL ENCOUNTER (OUTPATIENT)
Age: 75
Setting detail: OUTPATIENT SURGERY
Discharge: HOME | End: 2020-07-15
Attending: INTERNAL MEDICINE | Admitting: INTERNAL MEDICINE
Payer: MEDICARE

## 2020-07-15 VITALS
TEMPERATURE: 97.8 F | RESPIRATION RATE: 12 BRPM | OXYGEN SATURATION: 100 % | HEIGHT: 67 IN | HEART RATE: 70 BPM | SYSTOLIC BLOOD PRESSURE: 105 MMHG | WEIGHT: 261.5 LBS | BODY MASS INDEX: 41.04 KG/M2 | DIASTOLIC BLOOD PRESSURE: 57 MMHG

## 2020-07-15 PROCEDURE — 76060000032 HC ANESTHESIA 0.5 TO 1 HR: Performed by: INTERNAL MEDICINE

## 2020-07-15 PROCEDURE — 74011250636 HC RX REV CODE- 250/636: Performed by: INTERNAL MEDICINE

## 2020-07-15 PROCEDURE — 88305 TISSUE EXAM BY PATHOLOGIST: CPT

## 2020-07-15 PROCEDURE — 74011250636 HC RX REV CODE- 250/636: Performed by: NURSE ANESTHETIST, CERTIFIED REGISTERED

## 2020-07-15 PROCEDURE — 77030021593 HC FCPS BIOP ENDOSC BSC -A: Performed by: INTERNAL MEDICINE

## 2020-07-15 PROCEDURE — 76040000007: Performed by: INTERNAL MEDICINE

## 2020-07-15 PROCEDURE — 74011000250 HC RX REV CODE- 250: Performed by: NURSE ANESTHETIST, CERTIFIED REGISTERED

## 2020-07-15 RX ORDER — PROPOFOL 10 MG/ML
INJECTION, EMULSION INTRAVENOUS AS NEEDED
Status: DISCONTINUED | OUTPATIENT
Start: 2020-07-15 | End: 2020-07-15 | Stop reason: HOSPADM

## 2020-07-15 RX ORDER — DEXTROMETHORPHAN/PSEUDOEPHED 2.5-7.5/.8
1.2 DROPS ORAL
Status: DISCONTINUED | OUTPATIENT
Start: 2020-07-15 | End: 2020-07-15 | Stop reason: HOSPADM

## 2020-07-15 RX ORDER — NALOXONE HYDROCHLORIDE 0.4 MG/ML
0.4 INJECTION, SOLUTION INTRAMUSCULAR; INTRAVENOUS; SUBCUTANEOUS
Status: DISCONTINUED | OUTPATIENT
Start: 2020-07-15 | End: 2020-07-15 | Stop reason: HOSPADM

## 2020-07-15 RX ORDER — FLUMAZENIL 0.1 MG/ML
0.2 INJECTION INTRAVENOUS
Status: DISCONTINUED | OUTPATIENT
Start: 2020-07-15 | End: 2020-07-15 | Stop reason: HOSPADM

## 2020-07-15 RX ORDER — LIDOCAINE HYDROCHLORIDE 20 MG/ML
INJECTION, SOLUTION EPIDURAL; INFILTRATION; INTRACAUDAL; PERINEURAL AS NEEDED
Status: DISCONTINUED | OUTPATIENT
Start: 2020-07-15 | End: 2020-07-15 | Stop reason: HOSPADM

## 2020-07-15 RX ORDER — SODIUM CHLORIDE 9 MG/ML
INJECTION, SOLUTION INTRAVENOUS
Status: DISCONTINUED | OUTPATIENT
Start: 2020-07-15 | End: 2020-07-15 | Stop reason: HOSPADM

## 2020-07-15 RX ORDER — SODIUM CHLORIDE 0.9 % (FLUSH) 0.9 %
5-40 SYRINGE (ML) INJECTION EVERY 8 HOURS
Status: DISCONTINUED | OUTPATIENT
Start: 2020-07-15 | End: 2020-07-15 | Stop reason: HOSPADM

## 2020-07-15 RX ORDER — SODIUM CHLORIDE 9 MG/ML
50 INJECTION, SOLUTION INTRAVENOUS CONTINUOUS
Status: DISCONTINUED | OUTPATIENT
Start: 2020-07-15 | End: 2020-07-15 | Stop reason: HOSPADM

## 2020-07-15 RX ORDER — SODIUM CHLORIDE 0.9 % (FLUSH) 0.9 %
5-40 SYRINGE (ML) INJECTION AS NEEDED
Status: DISCONTINUED | OUTPATIENT
Start: 2020-07-15 | End: 2020-07-15 | Stop reason: HOSPADM

## 2020-07-15 RX ORDER — ATROPINE SULFATE 0.1 MG/ML
0.5 INJECTION INTRAVENOUS
Status: DISCONTINUED | OUTPATIENT
Start: 2020-07-15 | End: 2020-07-15 | Stop reason: HOSPADM

## 2020-07-15 RX ORDER — EPINEPHRINE 0.1 MG/ML
1 INJECTION INTRACARDIAC; INTRAVENOUS
Status: DISCONTINUED | OUTPATIENT
Start: 2020-07-15 | End: 2020-07-15 | Stop reason: HOSPADM

## 2020-07-15 RX ADMIN — PROPOFOL 50 MG: 10 INJECTION, EMULSION INTRAVENOUS at 10:28

## 2020-07-15 RX ADMIN — PROPOFOL 50 MG: 10 INJECTION, EMULSION INTRAVENOUS at 10:50

## 2020-07-15 RX ADMIN — PROPOFOL 50 MG: 10 INJECTION, EMULSION INTRAVENOUS at 10:53

## 2020-07-15 RX ADMIN — PROPOFOL 50 MG: 10 INJECTION, EMULSION INTRAVENOUS at 10:36

## 2020-07-15 RX ADMIN — PROPOFOL 100 MG: 10 INJECTION, EMULSION INTRAVENOUS at 10:24

## 2020-07-15 RX ADMIN — PROPOFOL 50 MG: 10 INJECTION, EMULSION INTRAVENOUS at 10:46

## 2020-07-15 RX ADMIN — SODIUM CHLORIDE: 900 INJECTION, SOLUTION INTRAVENOUS at 10:20

## 2020-07-15 RX ADMIN — PROPOFOL 50 MG: 10 INJECTION, EMULSION INTRAVENOUS at 10:32

## 2020-07-15 RX ADMIN — PROPOFOL 50 MG: 10 INJECTION, EMULSION INTRAVENOUS at 10:39

## 2020-07-15 RX ADMIN — LIDOCAINE HYDROCHLORIDE 100 MG: 20 INJECTION, SOLUTION EPIDURAL; INFILTRATION; INTRACAUDAL; PERINEURAL at 10:24

## 2020-07-15 RX ADMIN — PROPOFOL 50 MG: 10 INJECTION, EMULSION INTRAVENOUS at 10:43

## 2020-07-15 RX ADMIN — PROPOFOL 50 MG: 10 INJECTION, EMULSION INTRAVENOUS at 10:27

## 2020-07-15 NOTE — H&P
[address]  [address] [facility] [address], [address]  [phone]                                History and Physical     NAME: Twin Coello   :  1945   MRN:  [id number]     HPI:  The patient was seen and examined. Past Surgical History:   Procedure Laterality Date    COLONOSCOPY Left 10/28/2019    COLONOSCOPY AND EGD performed by Andrés Vallecillo MD at [facility] ENDOSCOPY    HX APPENDECTOMY      HX CHOLECYSTECTOMY      HX GASTRIC BYPASS      Jejunal bypass with subsequent reversal..  Lab Band since    HX OTHER SURGICAL  D & C    HX EMILIANO AND BSO      For uterine CA     Past Medical History:   Diagnosis Date    Asthma     [facility]     Depression     DM (diabetes mellitus) ([facility]     HTN (hypertension)     Hyperlipidemia     Morbid obesity ([facility]     Neuropathy     LITA on CPAP      Social History     Tobacco Use    Smoking status: Former Smoker     Last attempt to quit: 1992     Years since quittin.5    Smokeless tobacco: Never Used   Substance Use Topics    Alcohol use:  Yes     Alcohol/week: 2.0 standard drinks     Types: 2 Glasses of wine per week     Comment: occ wine    Drug use: No     Allergies   Allergen Reactions    Latex Itching    Kiwi Unknown (comments)    Codeine Rash    Lisinopril Other (comments)     Cough, vomiting, Visual disturbances    Morphine Itching    Statins-Hmg-Coa Reductase Inhibitors Other (comments)     Muscle enzyme problems     Family History   Problem Relation Age of Onset    Stroke Mother     Diabetes Other     Heart Disease Other     Heart Disease Father         congestive heart failure     Current Facility-Administered Medications   Medication Dose Route Frequency    0.9% sodium chloride infusion  50 mL/hr IntraVENous CONTINUOUS    sodium chloride (NS) flush 5-40 mL  5-40 mL IntraVENous Q8H    sodium chloride (NS) flush 5-40 mL  5-40 mL IntraVENous PRN    naloxone (NARCAN) injection 0.4 mg 0.4 mg IntraVENous Multiple    flumazeniL (ROMAZICON) 0.1 mg/mL injection 0.2 mg  0.2 mg IntraVENous Multiple    simethicone (MYLICON) 08PC/3.4UL oral drops 80 mg  1.2 mL Oral Multiple    atropine injection 0.5 mg  0.5 mg IntraVENous ONCE PRN    EPINEPHrine (ADRENALIN) 0.1 mg/mL syringe 1 mg  1 mg Endoscopically ONCE PRN         PHYSICAL EXAM:  General: WD, WN. Alert, cooperative, no acute distress    HEENT: NC, Atraumatic. PERRLA, EOMI. Anicteric sclerae. Lungs:  CTA Bilaterally. No Wheezing/Rhonchi/Rales. Heart:  Regular  rhythm,  No murmur, No Rubs, No Gallops  Abdomen: Soft, Non distended, Non tender. +Bowel sounds, no HSM  Extremities: No c/c/e  Neurologic:  CN 2-12 gi, Alert and oriented X 3. No acute neurological distress   Psych:   Good insight. Not anxious nor agitated. The heart, lungs and mental status were satisfactory for the administration of MAC sedation and for the procedure. Mallampati score: 3     The patient was counseled at length about the risks of rhianna Covid-19 in the kierra-operative and post-operative states including the recovery window of their procedure. The patient was made aware that rhianna Covid-19 after a surgical procedure may worsen their prognosis for recovering from the virus and lend to a higher morbidity and or mortality risk. The patient was given the options of postponing their procedure. All of the risks, benefits, and alternatives were discussed. The patient does  wish to proceed with the procedure.       Assessment:   · Constipation    Plan:   · Endoscopic procedure  · MAC sedation   ·

## 2020-07-15 NOTE — PERIOP NOTES

## 2020-07-15 NOTE — PROCEDURES
[address]  [address] [facility] [address], [address]  [phone]                              Colonoscopy Procedure Note      Indications:    Constipation     :  Maco Lou MD    Surgical Assistant: None    Implants: none    Referring Provider: Franchesca Alvarez MD    Sedation:  MAC anesthesia    Procedure Details:  After informed consent was obtained with all risks and benefits of procedure explained and preoperative exam completed, the patient was taken to the endoscopy suite and placed in the left lateral decubitus position. Upon sequential sedation as per above, a digital rectal exam was performed  And was normal.  The Olympus videocolonoscope  was inserted in the rectum and carefully advanced to the cecum, which was identified by the ileocecal valve and appendiceal orifice. The quality of preparation was fair. The colonoscope was slowly withdrawn with careful evaluation between folds. Retroflexion in the rectum was performed and was normal.     Findings:   Rectum: 2 Sessile polyp(s), the largest 3 mm in size  Grade 1 internal hemorrhoid(s); Sigmoid: no mucosal lesion appreciated      - Diverticulosis  Descending Colon: no mucosal lesion appreciated  Transverse Colon: no mucosal lesion appreciated  Ascending Colon: 1  Sessile polyp(s), the largest 2 mm in size;  Cecum: 1  Sessile polyp(s), the largest 2 mm in size; Terminal Ileum: not intubated    Interventions:  4 complete polypectomy were performed using cold biopsy forceps and the polyps were  retrieved    Specimen Removed:    ID Type Source Tests Collected by Time Destination   1 : cecal polyp Preservative Cecum  Emerald Merritt MD 7/15/2020 1039 Pathology   2 : ascending colon polyp Preservative Colon, Ascending  Emerald Merritt MD 7/15/2020 1040 Pathology   3 : rectal polyp Preservative Rectum  Emerald Merritt MD 7/15/2020 1055 Pathology       Complications: None.      EBL: None.    Recommendations:   -Await pathology. -If adenoma is present, repeat colonoscopy in 3 years.  -High fiber diet. -Begin Coumadin in 2 days  -NO aspirin for 7 days     Discharge Disposition:  Home in the company of a  when able to ambulate.     Tesha Pena MD  7/15/2020  11:00 AM

## 2020-07-15 NOTE — ANESTHESIA PREPROCEDURE EVALUATION
Relevant Problems   No relevant active problems       Anesthetic History   No history of anesthetic complications            Review of Systems / Medical History  Patient summary reviewed, nursing notes reviewed and pertinent labs reviewed    Pulmonary  Within defined limits      Sleep apnea    Asthma        Neuro/Psych   Within defined limits           Cardiovascular  Within defined limits  Hypertension        Dysrhythmias : atrial fibrillation           GI/Hepatic/Renal  Within defined limits              Endo/Other  Within defined limits  Diabetes    Morbid obesity     Other Findings              Physical Exam    Airway  Mallampati: II  TM Distance: > 6 cm  Neck ROM: normal range of motion   Mouth opening: Normal     Cardiovascular  Regular rate and rhythm,  S1 and S2 normal,  no murmur, click, rub, or gallop             Dental  No notable dental hx       Pulmonary  Breath sounds clear to auscultation               Abdominal  GI exam deferred       Other Findings            Anesthetic Plan    ASA: 3  Anesthesia type: MAC            Anesthetic plan and risks discussed with: Patient

## 2020-07-15 NOTE — ANESTHESIA POSTPROCEDURE EVALUATION
Procedure(s):  COLONOSCOPY :-  ENDOSCOPIC POLYPECTOMY. MAC    <[email or web address]>    INITIAL Post-op Vital signs:   Vitals Value Taken Time   /51 7/15/2020 11:05 AM   Temp     Pulse 81 7/15/2020 11:07 AM   Resp 17 7/15/2020 11:07 AM   SpO2 0 % 7/15/2020 11:07 AM   Vitals shown include unvalidated device data.

## 2020-07-15 NOTE — ROUTINE PROCESS
[place]  1945  [id number]    Situation:  Verbal report received from: Cyrus Clemons  Procedure: Procedure(s):  COLONOSCOPY :-  ENDOSCOPIC POLYPECTOMY    Background:    Preoperative diagnosis: BLOATING SYMPROMT, CONSTIPATION, EPIGASTRIC DISCOMFORT  Postoperative diagnosis: 1)ascending colon polyp  2)cecal polyp  3)rectal polyp  4)diverticulosis  5)internal hemorrhoids      :  Dr. Rose Knowles  Assistant(s): Endoscopy Technician-1: Laurie POTTS  Endoscopy RN-1: Ephraim Mario RN    Specimens:   ID Type Source Tests Collected by Time Destination   1 : cecal polyp Preservative Cecum  Sofia Bauer MD 7/15/2020 1039 Pathology   2 : ascending colon polyp Preservative Colon, Ascending  Sofia Bauer MD 7/15/2020 1040 Pathology   3 : rectal polyp Preservative Rectum  Sofia Bauer MD 7/15/2020 1055 Pathology     H. Pylori -no    Assessment:  Intra-procedure medications     Anesthesia gave intra-procedure sedation and medications, see anesthesia flow sheet -yes    Intravenous fluids: NS@ KVO     Vital signs stable     Abdominal assessment: round and soft    Recommendation:  Discharge patient per MD order    Family or Friend -yes  Permission to share finding with family or friend -yes

## 2020-07-15 NOTE — DISCHARGE INSTRUCTIONS
[address]  [address], [address]  [address]  [id number]  1945    COLON DISCHARGE INSTRUCTIONS    DISCOMFORT:  Redness at IV site- apply warm compress to area; if redness or soreness persist- contact your physician  There may be a slight amount of blood passed from the rectum  Gaseous discomfort- walking, belching will help relieve any discomfort      DIET:   High fiber diet. - however -  remember your colon is empty and a heavy meal will produce gas. Avoid these foods:  vegetables, fried / greasy foods, carbonated drinks for today  You may not  drink alcoholic beverages for at least 12 hours     ACTIVITY:  It is recommended that you spend the remainder of the day resting -  avoid any strenuous activity. You may not operate a vehicle for 12 hours  You may not  engage in an occupation involving machinery or appliances for rest of today    Avoid making any critical decisions for at least 24 hour    CALL M.D. ANY SIGN OF:   Increasing pain, nausea, vomiting  Abdominal distension (swelling)  New increased bleeding (oral or rectal)  Fever (chills)  Pain in chest area  Bloody discharge from nose or mouth  Shortness of breath    You may not  take any Advil, Aspirin, Ibuprofen, Motrin, Aleve, or Goodys for 7 days, ONLY  Tylenol as needed for pain. Start Coumadin in 2 days    Post procedure diagnosis: 1)ascending colon polyp  2)cecal polyp  3)rectal polyp  4)diverticulosis  5)internal hemorrhoids        Follow-up Instructions:    Call Dr. Pebbles Kovacs for any questions or problems. If we took a biopsy please call the office within 2 weeks to discuss your  pathology results. Telephone # [phone]         Learning About Coronavirus [phone])  Coronavirus [phone]): Overview  What is coronavirus (COVID-19)? The coronavirus disease (COVID-19) is caused by a virus.  It is an illness that was first found in [place], [place], in December 2019. It has since spread worldwide. The virus can cause fever, cough, and trouble breathing. In severe cases, it can cause pneumonia and make it hard to breathe without help. It can cause death. Coronaviruses are a large group of viruses. They cause the common cold. They also cause more serious illnesses like Middle East respiratory syndrome (MERS) and severe acute respiratory syndrome (SARS). COVID-19 is caused by a novel coronavirus. That means it's a new type that has not been seen in people before. This virus spreads person-to-person through droplets from coughing and sneezing. It can also spread when you are close to someone who is infected. And it can spread when you touch something that has the virus on it, such as a doorknob or a tabletop. What can you do to protect yourself from coronavirus (COVID-19)? The best way to protect yourself from getting sick is to:  · Avoid areas where there is an outbreak. · Avoid contact with people who may be infected. · Wash your hands often with soap or alcohol-based hand sanitizers. · Avoid crowds and try to stay at least 6 feet away from other people. · Wash your hands often, especially after you cough or sneeze. Use soap and water, and scrub for at least 20 seconds. If soap and water aren't available, use an alcohol-based hand . · Avoid touching your mouth, nose, and eyes. What can you do to avoid spreading the virus to others? To help avoid spreading the virus to others:  · Cover your mouth with a tissue when you cough or sneeze. Then throw the tissue in the trash. · Use a disinfectant to clean things that you touch often. · Stay home if you are sick or have been exposed to the virus. Don't go to school, work, or public areas. And don't use public transportation. · If you are sick:  ? Leave your home only if you need to get medical care. But call the doctor's office first so they know you're coming.  And wear a face mask, if you have one.  ? If you have a face mask, wear it whenever you're around other people. It can help stop the spread of the virus when you cough or sneeze. ? Clean and disinfect your home every day. Use household  and disinfectant wipes or sprays. Take special care to clean things that you grab with your hands. These include doorknobs, remote controls, phones, and handles on your refrigerator and microwave. And don't forget countertops, tabletops, bathrooms, and computer keyboards. When to call for help  Call 911 anytime you think you may need emergency care. For example, call if:  · You have severe trouble breathing. (You can't talk at all.)  · You have constant chest pain or pressure. · You are severely dizzy or lightheaded. · You are confused or can't think clearly. · Your face and lips have a blue color. · You pass out (lose consciousness) or are very hard to wake up. Call your doctor now if you develop symptoms such as:  · Shortness of breath. · Fever. · Cough. If you need to get care, call ahead to the doctor's office for instructions before you go. Make sure you wear a face mask, if you have one, to prevent exposing other people to the virus. Where can you get the latest information? The following health organizations are tracking and studying this virus. Their websites contain the most up-to-date information. [employer] also learn what to do if you think you may have been exposed to the virus. · U.S. Centers for Disease Control and Prevention (CDC): The CDC provides updated news about the disease and travel advice. The website also tells you how to prevent the spread of infection. www.cdc.gov  · World Health Organization [facility]): WHO offers information about the virus outbreaks. WHO also has travel advice. www.who.int  Current as of: April 1, 2020               Content Version: 12.4  © 8396-9923 [employer].    Care instructions adapted under license by your healthcare professional. If you have questions about a medical condition or this instruction, always ask your healthcare professional. [place] [address] any warranty or liability for your use of this information.

## 2020-09-25 ENCOUNTER — APPOINTMENT (OUTPATIENT)
Dept: GENERAL RADIOLOGY | Age: 75
DRG: 291 | End: 2020-09-25
Attending: EMERGENCY MEDICINE
Payer: MEDICARE

## 2020-09-25 ENCOUNTER — HOSPITAL ENCOUNTER (INPATIENT)
Age: 75
LOS: 3 days | Discharge: HOME | DRG: 291 | End: 2020-09-28
Attending: EMERGENCY MEDICINE | Admitting: INTERNAL MEDICINE
Payer: MEDICARE

## 2020-09-25 DIAGNOSIS — I50.9 ACUTE CONGESTIVE HEART FAILURE, UNSPECIFIED HEART FAILURE TYPE (HCC): ICD-10-CM

## 2020-09-25 DIAGNOSIS — R09.02 HYPOXIA: Primary | ICD-10-CM

## 2020-09-25 LAB
ALBUMIN SERPL-MCNC: 3.6 G/DL (ref 3.5–5)
ALBUMIN/GLOB SERPL: 0.8 {RATIO} (ref 1.1–2.2)
ALP SERPL-CCNC: 94 U/L (ref 45–117)
ALT SERPL-CCNC: 40 U/L (ref 12–78)
ANION GAP SERPL CALC-SCNC: 3 MMOL/L (ref 5–15)
APPEARANCE UR: CLEAR
AST SERPL-CCNC: 27 U/L (ref 15–37)
BACTERIA URNS QL MICRO: NEGATIVE /HPF
BASOPHILS # BLD: 0.1 K/UL (ref 0–0.1)
BASOPHILS NFR BLD: 1 % (ref 0–1)
BILIRUB SERPL-MCNC: 0.6 MG/DL (ref 0.2–1)
BILIRUB UR QL: NEGATIVE
BNP SERPL-MCNC: 1186 PG/ML
BUN SERPL-MCNC: 19 MG/DL (ref 6–20)
BUN/CREAT SERPL: 15 (ref 12–20)
CALCIUM SERPL-MCNC: 9.3 MG/DL (ref 8.5–10.1)
CHLORIDE SERPL-SCNC: 105 MMOL/L (ref 97–108)
CO2 SERPL-SCNC: 28 MMOL/L (ref 21–32)
COLOR UR: ABNORMAL
COMMENT, HOLDF: NORMAL
CREAT SERPL-MCNC: 1.28 MG/DL (ref 0.55–1.02)
DIFFERENTIAL METHOD BLD: ABNORMAL
DIGOXIN SERPL-MCNC: 1.4 NG/ML (ref 0.9–2)
EOSINOPHIL # BLD: 0.5 K/UL (ref 0–0.4)
EOSINOPHIL NFR BLD: 4 % (ref 0–7)
EPITH CASTS URNS QL MICRO: ABNORMAL /LPF
ERYTHROCYTE [DISTWIDTH] IN BLOOD BY AUTOMATED COUNT: 14.8 % (ref 11.5–14.5)
EST. AVERAGE GLUCOSE BLD GHB EST-MCNC: 235 MG/DL
GLOBULIN SER CALC-MCNC: 4.5 G/DL (ref 2–4)
GLUCOSE BLD STRIP.AUTO-MCNC: 326 MG/DL (ref 65–100)
GLUCOSE BLD STRIP.AUTO-MCNC: 371 MG/DL (ref 65–100)
GLUCOSE SERPL-MCNC: 151 MG/DL (ref 65–100)
GLUCOSE UR STRIP.AUTO-MCNC: NEGATIVE MG/DL
HBA1C MFR BLD: 9.8 % (ref 4–5.6)
HCT VFR BLD AUTO: 38.2 % (ref 35–47)
HGB BLD-MCNC: 12.1 G/DL (ref 11.5–16)
HGB UR QL STRIP: NEGATIVE
HYALINE CASTS URNS QL MICRO: ABNORMAL /LPF (ref 0–5)
IMM GRANULOCYTES # BLD AUTO: 0.1 K/UL (ref 0–0.04)
IMM GRANULOCYTES NFR BLD AUTO: 1 % (ref 0–0.5)
INR PPP: 5.7 (ref 0.9–1.1)
KETONES UR QL STRIP.AUTO: NEGATIVE MG/DL
LEUKOCYTE ESTERASE UR QL STRIP.AUTO: NEGATIVE
LYMPHOCYTES # BLD: 1.2 K/UL (ref 0.8–3.5)
LYMPHOCYTES NFR BLD: 9 % (ref 12–49)
MCH RBC QN AUTO: 28.4 PG (ref 26–34)
MCHC RBC AUTO-ENTMCNC: 31.7 G/DL (ref 30–36.5)
MCV RBC AUTO: 89.7 FL (ref 80–99)
MONOCYTES # BLD: 0.7 K/UL (ref 0–1)
MONOCYTES NFR BLD: 5 % (ref 5–13)
NEUTS SEG # BLD: 10.4 K/UL (ref 1.8–8)
NEUTS SEG NFR BLD: 80 % (ref 32–75)
NITRITE UR QL STRIP.AUTO: NEGATIVE
NRBC # BLD: 0 K/UL (ref 0–0.01)
NRBC BLD-RTO: 0 PER 100 WBC
PH UR STRIP: 7 [PH] (ref 5–8)
PLATELET # BLD AUTO: 270 K/UL (ref 150–400)
PMV BLD AUTO: 9.1 FL (ref 8.9–12.9)
POTASSIUM SERPL-SCNC: 5 MMOL/L (ref 3.5–5.1)
PROT SERPL-MCNC: 8.1 G/DL (ref 6.4–8.2)
PROT UR STRIP-MCNC: 100 MG/DL
PROTHROMBIN TIME: 51.1 SEC (ref 9–11.1)
RBC # BLD AUTO: 4.26 M/UL (ref 3.8–5.2)
RBC #/AREA URNS HPF: ABNORMAL /HPF (ref 0–5)
SAMPLES BEING HELD,HOLD: NORMAL
SERVICE CMNT-IMP: ABNORMAL
SERVICE CMNT-IMP: ABNORMAL
SODIUM SERPL-SCNC: 136 MMOL/L (ref 136–145)
SP GR UR REFRACTOMETRY: 1.01 (ref 1–1.03)
TROPONIN I SERPL-MCNC: <0.05 NG/ML
UROBILINOGEN UR QL STRIP.AUTO: 0.2 EU/DL (ref 0.2–1)
WBC # BLD AUTO: 12.9 K/UL (ref 3.6–11)
WBC URNS QL MICRO: ABNORMAL /HPF (ref 0–4)

## 2020-09-25 PROCEDURE — 81001 URINALYSIS AUTO W/SCOPE: CPT

## 2020-09-25 PROCEDURE — 74011000250 HC RX REV CODE- 250: Performed by: EMERGENCY MEDICINE

## 2020-09-25 PROCEDURE — 96374 THER/PROPH/DIAG INJ IV PUSH: CPT

## 2020-09-25 PROCEDURE — 77010033678 HC OXYGEN DAILY

## 2020-09-25 PROCEDURE — 36415 COLL VENOUS BLD VENIPUNCTURE: CPT

## 2020-09-25 PROCEDURE — 71045 X-RAY EXAM CHEST 1 VIEW: CPT

## 2020-09-25 PROCEDURE — 74011636637 HC RX REV CODE- 636/637: Performed by: INTERNAL MEDICINE

## 2020-09-25 PROCEDURE — 65660000000 HC RM CCU STEPDOWN

## 2020-09-25 PROCEDURE — 82962 GLUCOSE BLOOD TEST: CPT

## 2020-09-25 PROCEDURE — 83880 ASSAY OF NATRIURETIC PEPTIDE: CPT

## 2020-09-25 PROCEDURE — 84484 ASSAY OF TROPONIN QUANT: CPT

## 2020-09-25 PROCEDURE — 80162 ASSAY OF DIGOXIN TOTAL: CPT

## 2020-09-25 PROCEDURE — 94640 AIRWAY INHALATION TREATMENT: CPT

## 2020-09-25 PROCEDURE — 93005 ELECTROCARDIOGRAM TRACING: CPT

## 2020-09-25 PROCEDURE — 74011250637 HC RX REV CODE- 250/637: Performed by: INTERNAL MEDICINE

## 2020-09-25 PROCEDURE — 83036 HEMOGLOBIN GLYCOSYLATED A1C: CPT

## 2020-09-25 PROCEDURE — 85610 PROTHROMBIN TIME: CPT

## 2020-09-25 PROCEDURE — 74011000250 HC RX REV CODE- 250: Performed by: INTERNAL MEDICINE

## 2020-09-25 PROCEDURE — 94664 DEMO&/EVAL PT USE INHALER: CPT

## 2020-09-25 PROCEDURE — 99285 EMERGENCY DEPT VISIT HI MDM: CPT

## 2020-09-25 PROCEDURE — 85025 COMPLETE CBC W/AUTO DIFF WBC: CPT

## 2020-09-25 PROCEDURE — 80053 COMPREHEN METABOLIC PANEL: CPT

## 2020-09-25 RX ORDER — ONDANSETRON 2 MG/ML
4 INJECTION INTRAMUSCULAR; INTRAVENOUS
Status: DISCONTINUED | OUTPATIENT
Start: 2020-09-25 | End: 2020-09-28 | Stop reason: HOSPADM

## 2020-09-25 RX ORDER — DIGOXIN 250 MCG
0.25 TABLET ORAL DAILY
Status: DISCONTINUED | OUTPATIENT
Start: 2020-09-26 | End: 2020-09-27

## 2020-09-25 RX ORDER — DILTIAZEM HYDROCHLORIDE 180 MG/1
360 CAPSULE, COATED, EXTENDED RELEASE ORAL DAILY
Status: DISCONTINUED | OUTPATIENT
Start: 2020-09-26 | End: 2020-09-28 | Stop reason: HOSPADM

## 2020-09-25 RX ORDER — DULOXETIN HYDROCHLORIDE 60 MG/1
60 CAPSULE, DELAYED RELEASE ORAL DAILY
Status: DISCONTINUED | OUTPATIENT
Start: 2020-09-26 | End: 2020-09-28 | Stop reason: HOSPADM

## 2020-09-25 RX ORDER — SERTRALINE HYDROCHLORIDE 50 MG/1
100 TABLET, FILM COATED ORAL
Status: DISCONTINUED | OUTPATIENT
Start: 2020-09-25 | End: 2020-09-28 | Stop reason: HOSPADM

## 2020-09-25 RX ORDER — POTASSIUM CHLORIDE 750 MG/1
20 TABLET, FILM COATED, EXTENDED RELEASE ORAL 2 TIMES DAILY
Status: DISCONTINUED | OUTPATIENT
Start: 2020-09-25 | End: 2020-09-28 | Stop reason: HOSPADM

## 2020-09-25 RX ORDER — IPRATROPIUM BROMIDE AND ALBUTEROL SULFATE 2.5; .5 MG/3ML; MG/3ML
3 SOLUTION RESPIRATORY (INHALATION)
Status: DISCONTINUED | OUTPATIENT
Start: 2020-09-25 | End: 2020-09-28 | Stop reason: HOSPADM

## 2020-09-25 RX ORDER — DEXTROSE MONOHYDRATE 100 MG/ML
0-250 INJECTION, SOLUTION INTRAVENOUS AS NEEDED
Status: DISCONTINUED | OUTPATIENT
Start: 2020-09-25 | End: 2020-09-28 | Stop reason: HOSPADM

## 2020-09-25 RX ORDER — SERTRALINE HYDROCHLORIDE 50 MG/1
100 TABLET, FILM COATED ORAL DAILY
Status: DISCONTINUED | OUTPATIENT
Start: 2020-09-26 | End: 2020-09-25

## 2020-09-25 RX ORDER — EZETIMIBE 10 MG/1
10 TABLET ORAL DAILY
Status: DISCONTINUED | OUTPATIENT
Start: 2020-09-26 | End: 2020-09-28 | Stop reason: HOSPADM

## 2020-09-25 RX ORDER — CYCLOBENZAPRINE HCL 10 MG
10 TABLET ORAL
Status: DISCONTINUED | OUTPATIENT
Start: 2020-09-25 | End: 2020-09-28 | Stop reason: HOSPADM

## 2020-09-25 RX ORDER — BUDESONIDE 0.5 MG/2ML
1000 INHALANT ORAL
Status: DISCONTINUED | OUTPATIENT
Start: 2020-09-25 | End: 2020-09-28 | Stop reason: HOSPADM

## 2020-09-25 RX ORDER — INSULIN LISPRO 100 [IU]/ML
INJECTION, SOLUTION INTRAVENOUS; SUBCUTANEOUS
Status: DISCONTINUED | OUTPATIENT
Start: 2020-09-25 | End: 2020-09-28 | Stop reason: HOSPADM

## 2020-09-25 RX ORDER — PREDNISONE 20 MG/1
40 TABLET ORAL
Status: DISCONTINUED | OUTPATIENT
Start: 2020-09-25 | End: 2020-09-28 | Stop reason: HOSPADM

## 2020-09-25 RX ORDER — MAGNESIUM SULFATE 100 %
4 CRYSTALS MISCELLANEOUS AS NEEDED
Status: DISCONTINUED | OUTPATIENT
Start: 2020-09-25 | End: 2020-09-28 | Stop reason: HOSPADM

## 2020-09-25 RX ORDER — BUMETANIDE 0.25 MG/ML
2 INJECTION INTRAMUSCULAR; INTRAVENOUS
Status: COMPLETED | OUTPATIENT
Start: 2020-09-25 | End: 2020-09-25

## 2020-09-25 RX ORDER — GABAPENTIN 300 MG/1
600 CAPSULE ORAL
Status: DISCONTINUED | OUTPATIENT
Start: 2020-09-25 | End: 2020-09-28 | Stop reason: HOSPADM

## 2020-09-25 RX ORDER — ALBUTEROL SULFATE 0.83 MG/ML
2.5 SOLUTION RESPIRATORY (INHALATION)
Status: DISCONTINUED | OUTPATIENT
Start: 2020-09-25 | End: 2020-09-28 | Stop reason: HOSPADM

## 2020-09-25 RX ORDER — ARFORMOTEROL TARTRATE 15 UG/2ML
15 SOLUTION RESPIRATORY (INHALATION)
Status: DISCONTINUED | OUTPATIENT
Start: 2020-09-25 | End: 2020-09-28 | Stop reason: HOSPADM

## 2020-09-25 RX ORDER — GABAPENTIN 100 MG/1
300 CAPSULE ORAL DAILY
Status: DISCONTINUED | OUTPATIENT
Start: 2020-09-26 | End: 2020-09-28 | Stop reason: HOSPADM

## 2020-09-25 RX ORDER — BUMETANIDE 1 MG/1
2 TABLET ORAL DAILY
Status: DISCONTINUED | OUTPATIENT
Start: 2020-09-26 | End: 2020-09-26

## 2020-09-25 RX ORDER — ACETAMINOPHEN 325 MG/1
650 TABLET ORAL
Status: DISCONTINUED | OUTPATIENT
Start: 2020-09-25 | End: 2020-09-28 | Stop reason: HOSPADM

## 2020-09-25 RX ADMIN — PREDNISONE 40 MG: 20 TABLET ORAL at 14:56

## 2020-09-25 RX ADMIN — HUMAN INSULIN 10 UNITS: 100 INJECTION, SOLUTION SUBCUTANEOUS at 18:44

## 2020-09-25 RX ADMIN — INSULIN LISPRO 7 UNITS: 100 INJECTION, SOLUTION INTRAVENOUS; SUBCUTANEOUS at 18:44

## 2020-09-25 RX ADMIN — BUDESONIDE 1000 MCG: 0.5 INHALANT RESPIRATORY (INHALATION) at 21:15

## 2020-09-25 RX ADMIN — IPRATROPIUM BROMIDE AND ALBUTEROL SULFATE 3 ML: .5; 3 SOLUTION RESPIRATORY (INHALATION) at 21:15

## 2020-09-25 RX ADMIN — POTASSIUM CHLORIDE 20 MEQ: 750 TABLET, FILM COATED, EXTENDED RELEASE ORAL at 18:44

## 2020-09-25 RX ADMIN — BUMETANIDE 2 MG: 0.25 INJECTION INTRAMUSCULAR; INTRAVENOUS at 10:30

## 2020-09-25 RX ADMIN — GABAPENTIN 600 MG: 300 CAPSULE ORAL at 21:41

## 2020-09-25 RX ADMIN — IPRATROPIUM BROMIDE AND ALBUTEROL SULFATE 3 ML: .5; 3 SOLUTION RESPIRATORY (INHALATION) at 15:42

## 2020-09-25 RX ADMIN — SERTRALINE HYDROCHLORIDE 100 MG: 50 TABLET ORAL at 22:13

## 2020-09-25 RX ADMIN — ARFORMOTEROL TARTRATE 15 MCG: 15 SOLUTION RESPIRATORY (INHALATION) at 21:20

## 2020-09-25 RX ADMIN — INSULIN LISPRO 6 UNITS: 100 INJECTION, SOLUTION INTRAVENOUS; SUBCUTANEOUS at 21:43

## 2020-09-25 RX ADMIN — HUMAN INSULIN 25 UNITS: 100 INJECTION, SUSPENSION SUBCUTANEOUS at 21:42

## 2020-09-25 NOTE — H&P
History and Physical    Subjective:     [place] is a 67 y.o. white female with asthma, chronic atrial fibrillation, h/o diastolic CHF. She presented to the ER with a few days of increasing SOB. No CP. No f/c. In the ER, her CXR showed a small right pleural effusion and minimal interstitial prominence likely reflective of mild pulmonary edema. BMP elevated. Pt was given a dose of IV Bumex. She says she has been doing some cleaning with chemicals the past 3 days? ??  Pt is admitted for inpatient management. Past Medical History:   Diagnosis Date    Asthma     Atrial fibrillation ([facility]     Depression     DM (diabetes mellitus) ([facility]     HTN (hypertension)     Hyperlipidemia     Morbid obesity (HCC)     Neuropathy     LITA on CPAP      Allergies   Allergen Reactions    Latex Itching    Kiwi Unknown (comments)    Codeine Rash    Lisinopril Other (comments)     Cough, vomiting, Visual disturbances    Morphine Itching    Statins-Hmg-Coa Reductase Inhibitors Other (comments)     Muscle enzyme problems     Prior to Admission medications    Medication Sig Start Date End Date Taking? Authorizing Provider   potassium chloride SR (KLOR-CON 10) 10 mEq tablet TAKE 2 TABS BY MOUTH TWO (2) TIMES A DAY. 8/23/20  Yes Riley Infante MD   dilTIAZem ER (CARDIZEM CD) 120 mg capsule TAKE 3 CAPSULES BY MOUTH EVERY DAY 8/8/20  Yes Riley Infante MD   bumetanide (BUMEX) 2 mg tablet TAKE 1 TABLET BY MOUTH EVERY DAY 6/25/20  Yes Riley Infante MD   sertraline (ZOLOFT) 100 mg tablet TAKE 1 TABLET EVERY EVENING 5/6/20  Yes Riley Infante MD   insulin NPH (NOVOLIN N, HUMULIN N) 100 unit/mL injection 35 Units by SubCUTAneous route every morning. INJECT 35 UNITS UNDER THE SKIN IN THE MORNING AND INJECT 25 UNITS AT BEDTIME OR AS DIRECTED   Yes Provider, Historical   insulin NPH (NOVOLIN N, HUMULIN N) 100 unit/mL injection 25 Units by SubCUTAneous route nightly.  INJECT 35 UNITS UNDER THE SKIN IN THE MORNING AND INJECT 25 UNITS AT BEDTIME OR AS DIRECTED   Yes Provider, Historical   insulin regular (NOVOLIN R REGULAR U-100 INSULN) 100 unit/mL injection 15 Units by SubCUTAneous route Before breakfast, lunch, and dinner. Yes Provider, Historical   digoxin (LANOXIN) 0.25 mg tablet TAKE 1 TABLET BY MOUTH EVERY DAY 1/28/20  Yes Randall Mckeon MD   warfarin (COUMADIN) 5 mg tablet Take 5 mg by mouth four (4) days a week. M-W-F: 2.5 mg  Tu-Th-Sat-Sun: 5 mg   Yes Provider, Historical   warfarin (COUMADIN) 2.5 mg tablet Take 2.5 mg by mouth every Monday, Wednesday, Friday. M-W-F: 2.5 mg  Tu-Th-Sat-Sun: 5 mg   Yes Provider, Historical   gabapentin (NEURONTIN) 300 mg capsule Take 600 mg by mouth nightly. Yes Provider, Historical   DULoxetine (CYMBALTA) 60 mg capsule Take 60 mg by mouth daily. Yes Provider, Historical   Biotin 2,500 mcg cap Take 1 Cap by mouth daily. Yes Provider, Historical   b complex vitamins tablet Take 1 Tab by mouth daily. Yes Provider, Historical   omalizumab Marinus Theodore) 150 mg solr 150 mg by SubCUTAneous route every fourteen (14) days. Next dose due 2/17   Yes Provider, Historical   therapeutic multivitamin (THERAGRAN) tablet Take 1 Tab by mouth daily. Yes Provider, Historical   PROVENTIL HFA 90 mcg/actuation inhaler inhale 2 puffs four times a day if needed for wheezing 1/17/15  Yes Randall Mckeon MD   cholecalciferol, vitamin D3, (VITAMIN D3) 2,000 unit tab Take 2,000 Units by mouth daily. Yes Provider, Historical   DOCOSAHEXANOIC ACID/EPA (FISH OIL PO) Take 1 Cap by mouth daily. Yes Provider, Historical   ezetimibe (ZETIA) 10 mg tablet Take 1 Tab by mouth daily.  8/23/20   Randall Mckeon MD   furosemide (LASIX) 40 mg tablet TAKE 2 TABLETS BY MOUTH EVERY MORNING AND 1 TABLET EVERY EVENING 4/28/20   Randall Mckeon MD   fluconazole (DIFLUCAN) 150 mg tablet TAKE 1 TABLET BY MOUTH FOR 1 DOSE 4/26/20   Randall Mckeon MD cyclobenzaprine (FLEXERIL) 10 mg tablet TAKE 1 TABLET BY MOUTH THREE TIMES A DAY AS NEEDED FOR MUSCLE SPASM 20   Zay Jackson MD   albuterol (PROVENTIL VENTOLIN) 2.5 mg /3 mL (0.083 %) nebu 3 mL by Nebulization route four (4) times daily as needed for Wheezing or Shortness of Breath. 19   Zay Jackson MD   fluticasone propion-salmeterol [facility] INHUB) 500-50 mcg/dose diskus inhaler Take 1 Puff by inhalation two (2) times a day. Provider, Historical   gabapentin (NEURONTIN) 300 mg capsule Take 600 mg by mouth nightly. Provider, Historical   OTHER Allergy shots weekly. Provider, Historical   EPINEPHrine (EPIPEN) 0.3 mg/0.3 mL injection 0.3 mg by IntraMUSCular route once as needed. Provider, Historical     Social History     Tobacco Use    Smoking status: Former Smoker     Last attempt to quit: 1992     Years since quittin.7    Smokeless tobacco: Never Used   Substance Use Topics    Alcohol use: Yes     Alcohol/week: 2.0 standard drinks     Types: 2 Glasses of wine per week     Comment: occ wine     Family History   Problem Relation Age of Onset    Stroke Mother     Diabetes Other     Heart Disease Other     Heart Disease Father         congestive heart failure               Review of Systems:  As above. Objective:       Physical Exam:   In NAD. A&O. HEENT -- Pupils round. O/P Clear. Neck -- Supple. No JVD. Heart -- Robinson Persons. Rate OK. No R/M/G. Lungs -- Grossly CTA, decent air movement. Abdomen -- Soft. Non-tender. Non-distended. No masses. Bowel sounds present. Extremeties -- Trace LE edema b/l.         Data Review:   Recent Results (from the past 24 hour(s))   URINALYSIS W/MICROSCOPIC    Collection Time: 20 10:30 AM   Result Value Ref Range    Color YELLOW/STRAW      Appearance CLEAR CLEAR      Specific gravity 1.008 1.003 - 1.030      pH (UA) 7.0 5.0 - 8.0      Protein 100 (A) NEG mg/dL    Glucose Negative NEG mg/dL    Ketone Negative NEG mg/dL    Bilirubin Negative NEG      Blood Negative NEG      Urobilinogen 0.2 0.2 - 1.0 EU/dL    Nitrites Negative NEG      Leukocyte Esterase Negative NEG      WBC 0-4 0 - 4 /hpf    RBC 0-5 0 - 5 /hpf    Epithelial cells FEW FEW /lpf    Bacteria Negative NEG /hpf    Hyaline cast 0-2 0 - 5 /lpf   NT-PRO BNP    Collection Time: 09/25/20 10:30 AM   Result Value Ref Range    NT pro-BNP 1,186 (H) <023 PG/ML   METABOLIC PANEL, COMPREHENSIVE    Collection Time: 09/25/20 10:30 AM   Result Value Ref Range    Sodium 136 136 - 145 mmol/L    Potassium 5.0 3.5 - 5.1 mmol/L    Chloride 105 97 - 108 mmol/L    CO2 28 21 - 32 mmol/L    Anion gap 3 (L) 5 - 15 mmol/L    Glucose 151 (H) 65 - 100 mg/dL    BUN 19 6 - 20 MG/DL    Creatinine 1.28 (H) 0.55 - 1.02 MG/DL    BUN/Creatinine ratio 15 12 - 20      GFR est AA 49 (L) >60 ml/min/1.73m2    GFR est non-AA 41 (L) >60 ml/min/1.73m2    Calcium 9.3 8.5 - 10.1 MG/DL    Bilirubin, total 0.6 0.2 - 1.0 MG/DL    ALT (SGPT) 40 12 - 78 U/L    AST (SGOT) 27 15 - 37 U/L    Alk. phosphatase 94 45 - 117 U/L    Protein, total 8.1 6.4 - 8.2 g/dL    Albumin 3.6 3.5 - 5.0 g/dL    Globulin 4.5 (H) 2.0 - 4.0 g/dL    A-G Ratio 0.8 (L) 1.1 - 2.2     TROPONIN I    Collection Time: 09/25/20 10:30 AM   Result Value Ref Range    Troponin-I, Qt. <0.05 <0.05 ng/mL   SAMPLES BEING HELD    Collection Time: 09/25/20 10:30 AM   Result Value Ref Range    SAMPLES BEING HELD 1RED 1BLU     COMMENT        Add-on orders for these samples will be processed based on acceptable specimen integrity and analyte stability, which may vary by analyte.    CBC WITH AUTOMATED DIFF    Collection Time: 09/25/20 10:30 AM   Result Value Ref Range    WBC 12.9 (H) 3.6 - 11.0 K/uL    RBC 4.26 3.80 - 5.20 M/uL    HGB 12.1 11.5 - 16.0 g/dL    HCT 38.2 35.0 - 47.0 %    MCV 89.7 80.0 - 99.0 FL    MCH 28.4 26.0 - 34.0 PG    MCHC 31.7 30.0 - 36.5 g/dL    RDW 14.8 (H) 11.5 - 14.5 %    PLATELET 387 473 - 506 K/uL    MPV 9.1 8.9 - 12.9 FL    NRBC 0.0 0  WBC    ABSOLUTE NRBC 0.00 0.00 - 0.01 K/uL    NEUTROPHILS 80 (H) 32 - 75 %    LYMPHOCYTES 9 (L) 12 - 49 %    MONOCYTES 5 5 - 13 %    EOSINOPHILS 4 0 - 7 %    BASOPHILS 1 0 - 1 %    IMMATURE GRANULOCYTES 1 (H) 0.0 - 0.5 %    ABS. NEUTROPHILS 10.4 (H) 1.8 - 8.0 K/UL    ABS. LYMPHOCYTES 1.2 0.8 - 3.5 K/UL    ABS. MONOCYTES 0.7 0.0 - 1.0 K/UL    ABS. EOSINOPHILS 0.5 (H) 0.0 - 0.4 K/UL    ABS. BASOPHILS 0.1 0.0 - 0.1 K/UL    ABS. IMM. GRANS. 0.1 (H) 0.00 - 0.04 K/UL    DF AUTOMATED     PROTHROMBIN TIME + INR    Collection Time: 09/25/20 10:30 AM   Result Value Ref Range    INR 5.7 (HH) 0.9 - 1.1      Prothrombin time 51.1 (H) 9.0 - 11.1 sec   DIGOXIN    Collection Time: 09/25/20 10:30 AM   Result Value Ref Range    Digoxin level 1.4 0.90 - 2.00 NG/ML   HEMOGLOBIN A1C WITH EAG    Collection Time: 09/25/20 10:30 AM   Result Value Ref Range    Hemoglobin A1c 9.8 (H) 4.0 - 5.6 %    Est. average glucose 235 mg/dL   EKG, 12 LEAD, INITIAL    Collection Time: 09/25/20 11:45 AM   Result Value Ref Range    Ventricular Rate 102 BPM    Atrial Rate 122 BPM    QRS Duration 98 ms    Q-T Interval 338 ms    QTC Calculation (Bezet) 440 ms    Calculated R Axis -41 degrees    Calculated T Axis 112 degrees    Diagnosis       Atrial fibrillation  Left axis deviation  Nonspecific ST and T wave abnormality  When compared with ECG of 18-FEB-2020 23:23,  ST now depressed in Anterior leads           Chest x-ray -- IMPRESSION: Small right pleural effusion and minimal interstitial prominence  likely reflective of mild pulmonary edema. Assessment:     Principal Problem:    Respiratory failure -- I think this is probably multifactorial.  She is probably a little volume overloaded, and I also feel she is having an asthma exacerbation. No sign of bacterial infection at this point.         Active Problems:    Chronic atrial fibrillation (7/17/2015)      Type 2 diabetes ([facility] (11/10/2015)      Hypertension complicating diabetes ([facility] (2/20/2017)        Plan:     1. Nebs. 2.  She received dose of IV Bumex in ER. Cont PO Bumex, but may increase dose. 3.  Prednisone 40 mg PO every day. 4.  Appreciate pulmonary input, and I have asked Dr. Selina Kim, pt's cardiologist to see. ?need for further w/u? 5. Cont insulin. Accuchecks with SSI. 6.  INR supratherapeutic, and she thinks she may have messed up her coumadin dosing at home. Pharmacy to dose while here.             Signed By: Corrinne Ego, MD     September 25, 2020

## 2020-09-25 NOTE — CONSULTS
Name: [place]: Emilia Stapleton   : 1945 Admit Date: 2020   Phone: [phone]  Room: [facility]/   PCP: Jose Diaz MD  MRN: [id number]   Date: 2020  Code: Full Code        HPI:    4:38 PM       History was obtained from patient. I was asked by Maia Mejia MD to see Devante Burciaga in consultation for a chief complaint of cough. History of Present Illness: 67year old female with past medical history of asthma - on Xolair at [facility] since 2016 (IgE 300's. RAST positive) under Dr Gabriela Abrams supervision and on Immunotherapy under Dr Smith supervision presented to [facility] with increasing shortness of bretah for the past 2 weeks that she thought was due to wearing mask for COVID-19 precautions. 2 days back she used cleaning solution to clean the house and developed cough - dry and wheezing/ chest tightness. She does have complain of lower extremity swelling and takes bumex  1 mg q daily. Data reviewed:  WBC 12.9  HGb 12.1    NT pro BNP 1186  Trop < 0.05  UC - pan sensitive kleb. CXR - mild congestion.       Past Medical History:   Diagnosis Date    Asthma     At[facility] fibrillation ([facility]     Depression     DM (diabetes mellitus) ([facility]     HTN (hypertension)     Hyperlipidemia     Morbid obesity ([facility]     Neuropathy     LITA on CPAP        Past Surgical History:   Procedure Laterality Date    COLONOSCOPY Left 10/28/2019    COLONOSCOPY AND EGD performed by Bill Luna MD at [address] COLONOSCOPY N/A 7/15/2020    COLONOSCOPY :- performed by Bill Luna MD at [facility] ENDOSCOPY    HX APPENDECTOMY      HX CHOLECYSTECTOMY      HX GASTRIC BYPASS      Jejunal bypass with subsequent reversal..  Lab Band since    HX OTHER SURGICAL  D & C    HX EMILIANO AND BSO      For uterine CA       Family History   Problem Relation Age of Onset    Stroke Mother     Diabetes Other     Heart Disease Other     Heart Disease Father         congestive heart failure       Social History     Tobacco Use    Smoking status: Former Smoker     Last attempt to quit: 1992     Years since quittin.7    Smokeless tobacco: Never Used   Substance Use Topics    Alcohol use:  Yes     Alcohol/week: 2.0 standard drinks     Types: 2 Glasses of wine per week     Comment: occ wine       Allergies   Allergen Reactions    Latex Itching    Kiwi Unknown (comments)    Codeine Rash    Lisinopril Other (comments)     Cough, vomiting, Visual disturbances    Morphine Itching    Statins-Hmg-Coa Reductase Inhibitors Other (comments)     Muscle enzyme problems       Current Facility-Administered Medications   Medication Dose Route Frequency    albuterol (PROVENTIL VENTOLIN) nebulizer solution 2.5 mg  2.5 mg Nebulization QID PRN    cyclobenzaprine (FLEXERIL) tablet 10 mg  10 mg Oral TID PRN    [START ON 2020] digoxin (LANOXIN) tablet 0.25 mg  0.25 mg Oral DAILY    [START ON 2020] dilTIAZem ER (CARDIZEM CD) capsule 360 mg  360 mg Oral DAILY    [START ON 2020] DULoxetine (CYMBALTA) capsule 60 mg  60 mg Oral DAILY    [START ON 2020] ezetimibe (ZETIA) tablet 10 mg  10 mg Oral DAILY    arformoteroL (BROVANA) neb solution 15 mcg  15 mcg Nebulization BID RT    And    budesonide (PULMICORT) 500 mcg/2 ml nebulizer suspension  1,000 mcg Nebulization BID RT    [START ON 2020] bumetanide (BUMEX) tablet 2 mg  2 mg Oral DAILY    [START ON 2020] gabapentin (NEURONTIN) capsule 300 mg  300 mg Oral DAILY    gabapentin (NEURONTIN) capsule 600 mg  600 mg Oral QHS    [START ON 2020] insulin NPH (NOVOLIN N, HUMULIN N) injection 35 Units  35 Units SubCUTAneous DAILY    insulin NPH (NOVOLIN N, HUMULIN N) injection 25 Units  25 Units SubCUTAneous QHS    insulin regular (NOVOLIN R, HUMULIN R) injection 10 Units  10 Units SubCUTAneous TIDAC    potassium chloride SR (KLOR-CON 10) tablet 20 mEq  20 mEq Oral BID    [START ON 9/26/2020] sertraline (ZOLOFT) tablet 100 mg  100 mg Oral DAILY    ondansetron (ZOFRAN) injection 4 mg  4 mg IntraVENous Q6H PRN    acetaminophen (TYLENOL) tablet 650 mg  650 mg Oral Q4H PRN    glucose chewable tablet 16 g  4 Tab Oral PRN    glucagon (GLUCAGEN) injection 1 mg  1 mg IntraMUSCular PRN    dextrose 10% infusion 0-250 mL  0-250 mL IntraVENous PRN    insulin lispro (HUMALOG) injection   SubCUTAneous AC&HS    predniSONE (DELTASONE) tablet 40 mg  40 mg Oral DAILY WITH BREAKFAST    albuterol-ipratropium (DUO-NEB) 2.5 MG-0.5 MG/3 ML  3 mL Nebulization QID RT    Warfarin pharmacy to dose   Other Rx Dosing/Monitoring    Hold warfarin today for INR 5.7   Other ONCE         REVIEW OF SYSTEMS   Negative except as stated in the HPI. Physical Exam:   Visit Vitals  BP (!) 165/66 (BP 1 Location: Right arm, BP Patient Position: At rest)   Pulse 70   Temp 97.8 °F (36.6 °C)   Resp 18   SpO2 97%       General:  Alert, cooperative, no distress, appears stated age. Head:  Normocephalic, without obvious abnormality, atraumatic. Eyes:  Conjunctivae/corneas clear. PERRL, EOMs intact. Nose: Nares normal. Septum midline. Mucosa normal. No drainage or sinus tenderness. Throat: Lips, mucosa, and tongue normal. Teeth and gums normal.   Neck: Supple, symmetrical, trachea midline, no adenopathy, thyroid: no enlargment/tenderness/nodules, no carotid bruit and no JVD. Lungs:   Clear to auscultation bilaterally. Heart:  Regular rate and rhythm, S1, S2 normal, no murmur, click, rub or gallop. Abdomen:   Soft, non-tender. Bowel sounds normal. No masses,  No organomegaly. Extremities: Extremities normal, atraumatic, no cyanosis or edema. Pulses: 2+ and symmetric all extremities.            Neurologic: Grossly nonfocal       Lab Results   Component Value Date/Time    Sodium 136 09/25/2020 10:30 AM    Potassium 5.0 09/25/2020 10:30 AM    Chloride 105 09/25/2020 10:30 AM    CO2 28 09/25/2020 10:30 AM BUN 19 09/25/2020 10:30 AM    Creatinine 1.28 (H) 09/25/2020 10:30 AM    Glucose 151 (H) 09/25/2020 10:30 AM    Calcium 9.3 09/25/2020 10:30 AM    Magnesium 2.3 02/26/2020 03:06 AM    Phosphorus 3.8 05/20/2017 03:48 AM    Lactic acid 1.0 04/24/2017 01:30 AM       Lab Results   Component Value Date/Time    WBC 12.9 (H) 09/25/2020 10:30 AM    HGB 12.1 09/25/2020 10:30 AM    PLATELET 744 78/97/4041 10:30 AM    MCV 89.7 09/25/2020 10:30 AM       Lab Results   Component Value Date/Time    INR 5.7 (HH) 09/25/2020 10:30 AM    aPTT 27.6 05/19/2017 07:57 PM    Alk. phosphatase 94 09/25/2020 10:30 AM    Protein, total 8.1 09/25/2020 10:30 AM    Albumin 3.6 09/25/2020 10:30 AM    Globulin 4.5 (H) 09/25/2020 10:30 AM       Lab Results   Component Value Date/Time    Color YELLOW/STRAW 09/25/2020 10:30 AM    Appearance CLEAR 09/25/2020 10:30 AM    pH (UA) 7.0 09/25/2020 10:30 AM    Protein 100 (A) 09/25/2020 10:30 AM    Glucose Negative 09/25/2020 10:30 AM    Ketone Negative 09/25/2020 10:30 AM    Bilirubin Negative 09/25/2020 10:30 AM    Blood Negative 09/25/2020 10:30 AM    Urobilinogen 0.2 09/25/2020 10:30 AM    Nitrites Negative 09/25/2020 10:30 AM    Leukocyte Esterase Negative 09/25/2020 10:30 AM    WBC 0-4 09/25/2020 10:30 AM    RBC 0-5 09/25/2020 10:30 AM    Bacteria Negative 09/25/2020 10:30 AM       IMPRESSION:  ===========  -asthma exacerbation after exposure to cleaning solution.  -acute exacerbation of diastolic heart failure.  -hypoxemic respiratory failure. -LITA. -Allergic rhinitis. PLAN:  =====  -extra dose of bumex to see if that improves shortness of breath.  -short course of Prednisone.  -Albuterol / ipratropium / Skylar Venu / pulmicort nebz. -O2 supplementation. -CPAP at night. Thank you for the consult.     Brook Michael MD

## 2020-09-25 NOTE — PROGRESS NOTES
1500: TRANSFER - IN REPORT:    Verbal report received from Dilia(name) on [place]  being received from ED(unit) for routine progression of care      Report consisted of patients Situation, Background, Assessment and   Recommendations(SBAR). Information from the following report(s) SBAR, Kardex, MAR, Recent Results and Cardiac Rhythm A fib was reviewed with the receiving nurse. Opportunity for questions and clarification was provided. Assessment completed upon patients arrival to unit and care assumed. .  .  1930: Bedside shift change report given to [facility] (oncoming nurse) by Jigar Flynn (offgoing nurse). Report included the following information SBAR, Kardex, MAR, Recent Results and Cardiac Rhythm A fib.

## 2020-09-25 NOTE — ROUTINE PROCESS
TRANSFER - OUT REPORT:    Verbal report given to Karen RN(name) on [address]  being transferred to [facility] (unit) for routine progression of care       Report consisted of patients Situation, Background, Assessment and   Recommendations(SBAR). Information from the following report(s) SBAR, Kardex, ED Summary, [facility] and Recent Results was reviewed with the receiving nurse. Lines:   Peripheral IV 09/25/20 Left Antecubital (Active)   Site Assessment Clean, dry, & intact 09/25/20 1035   Phlebitis Assessment 0 09/25/20 1035   Infiltration Assessment 0 09/25/20 1035   Dressing Status Clean, dry, & intact 09/25/20 1035   Dressing Type Transparent 09/25/20 1035   Hub Color/Line Status Pink 09/25/20 1035        Opportunity for questions and clarification was provided.       Patient transported with:   [place]

## 2020-09-25 NOTE — H&P
Full H&P to follow. Pt being admitted with resp failure. She has advanced asthma, but also prob some CHF component. She received 2mg IV Bumex in er, and she is reportedly feeling a little better. Admit -- Nebs, prednisone. Ask pulm to see (f/b Dr. Yohan Rod), and I will also ask Dr. Jayne Martinez, her cardiologist to see -- ? Need for repeat ECHO &/or further w/u? I will see pt later, & full H&P to follow.

## 2020-09-25 NOTE — ED TRIAGE NOTES
MELLO NOTE:  Patient arrives from home with increasing SOB x a few days. She is 92% on room air upon arrival, tachy, unable to complete sentences. Patient moved back to ER room, hooked up on monitor, 2L via NC applied to patients nares. She denies any COVID contacts, reports chronic medical conditions. She drove herself to the ER this morning.

## 2020-09-25 NOTE — PROGRESS NOTES
Pharmacist Note - Warfarin Dosing  Consult provided for this 67 y. o.female to manage warfarin for Atrial Fibrillation    INR Goal: 2 - 3    Home regimen/ tablet size: 5 mg 3 times a week and 2.5 mg 4 times a week (according to rx query, PTA med list say 2.5 mg 3 x week and 5 mg 4x week) last dose taken 9/24 evening    Drugs that may increase INR: None  Drugs that may decrease INR: None  Other current anticoagulants/ drugs that may increase bleeding risk: None  Risk factors: Age > 65  Daily INR ordered: YES    Recent Labs     09/25/20  1030   HGB 12.1   INR 5.7*     Date               INR                  Dose  9/25  5.7  hold                                                                                Assessment/ Plan: Will hold warfarin today for supratherapuetic INR    Pharmacy will continue to monitor daily and adjust therapy as indicated. Please contact the pharmacist at   for outpatient recommendations if needed.

## 2020-09-25 NOTE — ED PROVIDER NOTES
Please note that this dictation was completed with [employer], the computer voice recognition software.  Quite often unanticipated grammatical, syntax, homophones, and other interpretive errors are inadvertently transcribed by the computer software.  Please disregard these errors.  Please excuse any errors that have escaped final proofreading. 67-year-old female past medical history remarkable for asthma, A. fib, depression, diabetes, hypertension, hyperlipidemia, neuropathy, LITA on CPAP, and morbid obesity, patient questions prior congestive heart failure diagnosis presents ER complaining of gradually worsening shortness of breath x2 weeks. Patient states she initially thought it was \"just due to having to wear a mask for COVID\". Patient states she is becoming more more short of breath recently and as of 3 days ago she was trying to use her Pulmicort normally however she had to begin using her albuterol inhaler every several hours to increase her dyspnea symptomology. Patient states she may have had a slight cough with it but is been afebrile tolerating p.o. normally relates that her urinary output has been decreased also. She denies other current medical concerns other chief complaints currently.   Patient was found to be hypoxic on room air in triage was brought back to ER bed and placed on nasal cannula and \"feels better\"    pt denies HA, vison changes, diff swallowing, CP,  Abd pain, F/Ch, N/V, D/Cons or other current systemic complaints    Social/ PSH reviewed in EMR    EMR Chart Reviewed           Past Medical History:   Diagnosis Date    Asthma     [facility] [address]     Depression     DM (diabetes mellitus) ([address]     HTN (hypertension)     Hyperlipidemia     Morbid obesity ([facility] [address]     Neuropathy     LITA on CPAP        Past Surgical History:   Procedure Laterality Date    COLONOSCOPY Left 10/28/2019    COLONOSCOPY AND EGD performed by Molly Wilks MD at [place] [address]. N/A 7/15/2020    COLONOSCOPY :- performed by Gerda Bledsoe [facility] at [facility] ENDOSCOPY    HX APPENDECTOMY      HX CHOLECYSTECTOMY      HX GASTRIC BYPASS      Jejunal bypass with subsequent reversal..  Lab Band since    HX OTHER SURGICAL  D & C    HX EMILIANO AND BSO      For uterine CA         Family History:   Problem Relation Age of Onset    Stroke Mother     Diabetes Other     Heart Disease Other     Heart Disease Father         congestive heart failure       Social History     Socioeconomic History    Marital status:      Spouse name: Not on file    Number of children: Not on file    Years of education: Not on file    Highest education level: Not on file   Occupational History    Not on file   Social Needs    Financial resource strain: Not on file    Food insecurity     Worry: Not on file     Inability: Not on file    Transportation needs     Medical: Not on file     Non-medical: Not on file   Tobacco Use    Smoking status: Former Smoker     Last attempt to quit: 1992     Years since quittin.7    Smokeless tobacco: Never Used   Substance and Sexual Activity    Alcohol use:  Yes     Alcohol/week: 2.0 standard drinks     Types: 2 Glasses of wine per week     Comment: occ wine    Drug use: No    Sexual activity: Never   Lifestyle    Physical activity     Days per week: Not on file     Minutes per session: Not on file    Stress: Not on file   Relationships    Social connections     Talks on phone: Not on file     Gets together: Not on file     Attends Confucianist service: Not on file     Active member of club or organization: Not on file     Attends meetings of clubs or organizations: Not on file     Relationship status: Not on file    Intimate partner violence     Fear of current or ex partner: Not on file     Emotionally abused: Not on file     Physically abused: Not on file     Forced sexual activity: Not on file   Other Topics Concern    Not on file   Social History Narrative    Not on file         ALLERGIES: Latex; Kiwi; Codeine; Lisinopril; Morphine; and Statins-hmg-coa reductase inhibitors    Review of Systems   Constitutional: Negative for chills and fever. HENT: Negative for drooling, trouble swallowing and voice change. Eyes: Negative for visual disturbance. Respiratory: Positive for cough and shortness of breath. Negative for chest tightness, wheezing and stridor. Cardiovascular: Negative for chest pain, palpitations and leg swelling. Gastrointestinal: Positive for abdominal distention. Negative for abdominal pain, diarrhea, nausea and vomiting. Genitourinary: Negative for dysuria. Musculoskeletal: Negative for back pain. Skin: Negative for rash. Neurological: Negative for facial asymmetry, speech difficulty and numbness. All other systems reviewed and are negative. Vitals:    09/25/20 0959 09/25/20 1100   BP:  (!) 157/95   Pulse: (!) 148 96   Resp: 25 23   Temp: 98.6 °F (37 °C)    SpO2: 99% 100%            Physical Exam  Vitals signs and nursing note reviewed. Constitutional:       General: She is not in acute distress. Appearance: She is well-developed. She is obese. She is not ill-appearing, toxic-appearing or diaphoretic. Comments: NAD, AxOx4, speaking in complete sentences  On NC         HENT:      Head: Normocephalic and atraumatic. Comments: Cn intact         Right Ear: External ear normal.      Left Ear: External ear normal.   Eyes:      General: No scleral icterus. Right eye: No discharge. Left eye: No discharge. Extraocular Movements: Extraocular movements intact. Conjunctiva/sclera: Conjunctivae normal.      Pupils: Pupils are equal, round, and reactive to light. Neck:      Musculoskeletal: Normal range of motion and neck supple. No neck rigidity or muscular tenderness. Vascular: No JVD. Trachea: No tracheal deviation. Cardiovascular:      Rate and Rhythm: Normal rate and regular rhythm. Pulses: Normal pulses. Heart sounds: Normal heart sounds. No murmur. No friction rub. No gallop. Pulmonary:      Effort: Pulmonary effort is normal. No respiratory distress. Breath sounds: Normal breath sounds. No stridor. No wheezing, rhonchi or rales. Chest:      Chest wall: No tenderness. Abdominal:      General: Bowel sounds are normal. There is distension. Palpations: Abdomen is soft. Tenderness: There is no abdominal tenderness. There is no guarding or rebound. Hernia: No hernia is present. Comments: nttp     Genitourinary:     Vagina: No vaginal discharge. Comments: Pt denies urinary/ vaginal complaints  Musculoskeletal: Normal range of motion. General: No swelling, tenderness, deformity or signs of injury. Right lower leg: No edema. Left lower leg: No edema. Skin:     General: Skin is warm and dry. Capillary Refill: Capillary refill takes less than 2 seconds. Coloration: Skin is not jaundiced or pale. Findings: No bruising, erythema, lesion or rash. Neurological:      General: No focal deficit present. Mental Status: She is alert and oriented to person, place, and time. Cranial Nerves: No cranial nerve deficit. Sensory: No sensory deficit. Motor: No weakness or abnormal muscle tone. Coordination: Coordination normal.      Gait: Gait normal.      Deep Tendon Reflexes: Reflexes normal.      Comments: pt has motor/ CV/ Sensation grossly intact to all extremities, R = L in strength;   Psychiatric:         Behavior: Behavior normal.         Thought Content: Thought content normal.          MDM  Number of Diagnoses or Management Options  Acute congestive heart failure, unspecified heart failure type [facility]):    Hypoxia:   Risk of Complications, Morbidity, and/or Mortality  Presenting problems: high  Diagnostic procedures: moderate  Management options: moderate  General comments: 45 min    Patient Progress  Patient progress: improved         Procedures    Chief Complaint   Patient presents with    Shortness of Breath    CHF       9:58 AM  The patients presenting problems have been discussed, and they are in agreement with the care plan formulated and outlined with them. I have encouraged them to ask questions as they arise throughout their visit. MEDICATIONS GIVEN:  Medications - No data to display    LABS REVIEWED:  Labs Reviewed - No data to display    RADIOLOGY RESULTS:  The following have been ordered and reviewed:  _____________________________________________________________________  _____________________________________________________________________    EKG interpretation:   Rhythm: afib rhythm; and ir-regular . Rate (approx.): 100; Axis: normal; P wave: normal; QRS interval: normal ; ST/T wave: normal; Negative acute significant segmental elevations/ compared to study dated 07/10/2020    PROCEDURES:        CONSULTATIONS:       PROGRESS NOTES:      DIAGNOSIS:    No diagnosis found. PLAN:  1-      ED COURSE: The patients hospital course has been uncomplicated. 11:38 AM  Discussed results w/ Pt; agrees w/ evaluation for admission       Perfect Serve Consult for Admission  11:39 AM    ED Room Number: ER12/12  Patient Name and age:  [facility] 67 y.o.  female  Working Diagnosis:   1. Hypoxia    2.  Acute congestive heart failure, unspecified heart failure type ([facility] [address]        COVID-19 Suspicion:  No  Sepsis present:  no  Reassessment needed: yes  Code Status:  Full Code  Readmission: no  Isolation Requirements:  no  Recommended Level of Care:  telemetry  Department:[facility] - 21   Other:  Acute CHF/ new onset hypoxia/ CASTANEDA improved on NC; Dr James Shelton is her cardiologist;     12:31 PM  I spoke with Dr Zeb Spurling, 'I will place admit orders/ see her this evening';

## 2020-09-26 LAB
ALBUMIN SERPL-MCNC: 3.1 G/DL (ref 3.5–5)
ALBUMIN/GLOB SERPL: 0.8 {RATIO} (ref 1.1–2.2)
ALP SERPL-CCNC: 86 U/L (ref 45–117)
ALT SERPL-CCNC: 31 U/L (ref 12–78)
ANION GAP SERPL CALC-SCNC: 7 MMOL/L (ref 5–15)
AST SERPL-CCNC: 19 U/L (ref 15–37)
ATRIAL RATE: 122 BPM
BASOPHILS # BLD: 0.1 K/UL (ref 0–0.1)
BASOPHILS NFR BLD: 0 % (ref 0–1)
BILIRUB SERPL-MCNC: 0.4 MG/DL (ref 0.2–1)
BUN SERPL-MCNC: 27 MG/DL (ref 6–20)
BUN/CREAT SERPL: 19 (ref 12–20)
CALCIUM SERPL-MCNC: 8.7 MG/DL (ref 8.5–10.1)
CALCULATED R AXIS, ECG10: -41 DEGREES
CALCULATED T AXIS, ECG11: 112 DEGREES
CHLORIDE SERPL-SCNC: 103 MMOL/L (ref 97–108)
CO2 SERPL-SCNC: 26 MMOL/L (ref 21–32)
CREAT SERPL-MCNC: 1.42 MG/DL (ref 0.55–1.02)
DIAGNOSIS, 93000: NORMAL
DIFFERENTIAL METHOD BLD: ABNORMAL
EOSINOPHIL # BLD: 0 K/UL (ref 0–0.4)
EOSINOPHIL NFR BLD: 0 % (ref 0–7)
ERYTHROCYTE [DISTWIDTH] IN BLOOD BY AUTOMATED COUNT: 14.5 % (ref 11.5–14.5)
GLOBULIN SER CALC-MCNC: 3.7 G/DL (ref 2–4)
GLUCOSE BLD STRIP.AUTO-MCNC: 226 MG/DL (ref 65–100)
GLUCOSE BLD STRIP.AUTO-MCNC: 293 MG/DL (ref 65–100)
GLUCOSE BLD STRIP.AUTO-MCNC: 330 MG/DL (ref 65–100)
GLUCOSE BLD STRIP.AUTO-MCNC: 404 MG/DL (ref 65–100)
GLUCOSE SERPL-MCNC: 338 MG/DL (ref 65–100)
HCT VFR BLD AUTO: 32 % (ref 35–47)
HGB BLD-MCNC: 10.2 G/DL (ref 11.5–16)
IMM GRANULOCYTES # BLD AUTO: 0.1 K/UL (ref 0–0.04)
IMM GRANULOCYTES NFR BLD AUTO: 1 % (ref 0–0.5)
INR PPP: 5.4 (ref 0.9–1.1)
LYMPHOCYTES # BLD: 1.1 K/UL (ref 0.8–3.5)
LYMPHOCYTES NFR BLD: 10 % (ref 12–49)
MCH RBC QN AUTO: 28.5 PG (ref 26–34)
MCHC RBC AUTO-ENTMCNC: 31.9 G/DL (ref 30–36.5)
MCV RBC AUTO: 89.4 FL (ref 80–99)
MONOCYTES # BLD: 0.4 K/UL (ref 0–1)
MONOCYTES NFR BLD: 3 % (ref 5–13)
NEUTS SEG # BLD: 9.8 K/UL (ref 1.8–8)
NEUTS SEG NFR BLD: 86 % (ref 32–75)
NRBC # BLD: 0 K/UL (ref 0–0.01)
NRBC BLD-RTO: 0 PER 100 WBC
PLATELET # BLD AUTO: 232 K/UL (ref 150–400)
PMV BLD AUTO: 9.5 FL (ref 8.9–12.9)
POTASSIUM SERPL-SCNC: 4.8 MMOL/L (ref 3.5–5.1)
PROT SERPL-MCNC: 6.8 G/DL (ref 6.4–8.2)
PROTHROMBIN TIME: 48.8 SEC (ref 9–11.1)
Q-T INTERVAL, ECG07: 338 MS
QRS DURATION, ECG06: 98 MS
QTC CALCULATION (BEZET), ECG08: 440 MS
RBC # BLD AUTO: 3.58 M/UL (ref 3.8–5.2)
SERVICE CMNT-IMP: ABNORMAL
SODIUM SERPL-SCNC: 136 MMOL/L (ref 136–145)
VENTRICULAR RATE, ECG03: 102 BPM
WBC # BLD AUTO: 11.5 K/UL (ref 3.6–11)

## 2020-09-26 PROCEDURE — 85025 COMPLETE CBC W/AUTO DIFF WBC: CPT

## 2020-09-26 PROCEDURE — 74011000250 HC RX REV CODE- 250: Performed by: INTERNAL MEDICINE

## 2020-09-26 PROCEDURE — 94640 AIRWAY INHALATION TREATMENT: CPT

## 2020-09-26 PROCEDURE — 74011250637 HC RX REV CODE- 250/637: Performed by: INTERNAL MEDICINE

## 2020-09-26 PROCEDURE — 85610 PROTHROMBIN TIME: CPT

## 2020-09-26 PROCEDURE — 82962 GLUCOSE BLOOD TEST: CPT

## 2020-09-26 PROCEDURE — 65660000000 HC RM CCU STEPDOWN

## 2020-09-26 PROCEDURE — 74011636637 HC RX REV CODE- 636/637: Performed by: INTERNAL MEDICINE

## 2020-09-26 PROCEDURE — 80053 COMPREHEN METABOLIC PANEL: CPT

## 2020-09-26 PROCEDURE — 36415 COLL VENOUS BLD VENIPUNCTURE: CPT

## 2020-09-26 RX ORDER — CLONIDINE HYDROCHLORIDE 0.1 MG/1
0.1 TABLET ORAL
Status: DISCONTINUED | OUTPATIENT
Start: 2020-09-26 | End: 2020-09-28 | Stop reason: HOSPADM

## 2020-09-26 RX ORDER — BUMETANIDE 0.25 MG/ML
2 INJECTION INTRAMUSCULAR; INTRAVENOUS DAILY
Status: DISCONTINUED | OUTPATIENT
Start: 2020-09-26 | End: 2020-09-28 | Stop reason: HOSPADM

## 2020-09-26 RX ADMIN — INSULIN LISPRO 8 UNITS: 100 INJECTION, SOLUTION INTRAVENOUS; SUBCUTANEOUS at 21:37

## 2020-09-26 RX ADMIN — BUMETANIDE 2 MG: 1 TABLET ORAL at 09:31

## 2020-09-26 RX ADMIN — IPRATROPIUM BROMIDE AND ALBUTEROL SULFATE 3 ML: .5; 3 SOLUTION RESPIRATORY (INHALATION) at 09:50

## 2020-09-26 RX ADMIN — DILTIAZEM HYDROCHLORIDE 360 MG: 180 CAPSULE, COATED, EXTENDED RELEASE ORAL at 09:30

## 2020-09-26 RX ADMIN — BUMETANIDE 2 MG: 0.25 INJECTION INTRAMUSCULAR; INTRAVENOUS at 12:12

## 2020-09-26 RX ADMIN — BUDESONIDE 1000 MCG: 0.5 INHALANT RESPIRATORY (INHALATION) at 20:37

## 2020-09-26 RX ADMIN — HUMAN INSULIN 25 UNITS: 100 INJECTION, SUSPENSION SUBCUTANEOUS at 21:36

## 2020-09-26 RX ADMIN — DIGOXIN 0.25 MG: 250 TABLET ORAL at 09:32

## 2020-09-26 RX ADMIN — SERTRALINE HYDROCHLORIDE 100 MG: 50 TABLET ORAL at 21:36

## 2020-09-26 RX ADMIN — HUMAN INSULIN 10 UNITS: 100 INJECTION, SOLUTION SUBCUTANEOUS at 12:12

## 2020-09-26 RX ADMIN — BUDESONIDE 1000 MCG: 0.5 INHALANT RESPIRATORY (INHALATION) at 09:50

## 2020-09-26 RX ADMIN — ARFORMOTEROL TARTRATE 15 MCG: 15 SOLUTION RESPIRATORY (INHALATION) at 09:50

## 2020-09-26 RX ADMIN — INSULIN LISPRO 3 UNITS: 100 INJECTION, SOLUTION INTRAVENOUS; SUBCUTANEOUS at 12:12

## 2020-09-26 RX ADMIN — GABAPENTIN 300 MG: 100 CAPSULE ORAL at 09:31

## 2020-09-26 RX ADMIN — DULOXETINE 60 MG: 60 CAPSULE, DELAYED RELEASE ORAL at 09:31

## 2020-09-26 RX ADMIN — POTASSIUM CHLORIDE 20 MEQ: 750 TABLET, FILM COATED, EXTENDED RELEASE ORAL at 18:07

## 2020-09-26 RX ADMIN — IPRATROPIUM BROMIDE AND ALBUTEROL SULFATE 3 ML: .5; 3 SOLUTION RESPIRATORY (INHALATION) at 20:38

## 2020-09-26 RX ADMIN — INSULIN LISPRO 5 UNITS: 100 INJECTION, SOLUTION INTRAVENOUS; SUBCUTANEOUS at 16:35

## 2020-09-26 RX ADMIN — POTASSIUM CHLORIDE 20 MEQ: 750 TABLET, FILM COATED, EXTENDED RELEASE ORAL at 09:31

## 2020-09-26 RX ADMIN — INSULIN LISPRO 7 UNITS: 100 INJECTION, SOLUTION INTRAVENOUS; SUBCUTANEOUS at 07:19

## 2020-09-26 RX ADMIN — GABAPENTIN 600 MG: 300 CAPSULE ORAL at 21:36

## 2020-09-26 RX ADMIN — IPRATROPIUM BROMIDE AND ALBUTEROL SULFATE 3 ML: .5; 3 SOLUTION RESPIRATORY (INHALATION) at 12:42

## 2020-09-26 RX ADMIN — HUMAN INSULIN 10 UNITS: 100 INJECTION, SOLUTION SUBCUTANEOUS at 09:32

## 2020-09-26 RX ADMIN — HUMAN INSULIN 35 UNITS: 100 INJECTION, SUSPENSION SUBCUTANEOUS at 09:32

## 2020-09-26 RX ADMIN — HUMAN INSULIN 10 UNITS: 100 INJECTION, SOLUTION SUBCUTANEOUS at 16:35

## 2020-09-26 RX ADMIN — PREDNISONE 40 MG: 20 TABLET ORAL at 09:31

## 2020-09-26 NOTE — PROGRESS NOTES
Bedside and Verbal shift change report given to [address] [place] [address] Beatriz   (oncoming nurse) by Nacho Fermin (offgoing nurse). Report included the following information SBAR, Kardex, Intake/Output, MAR and Cardiac Rhythm Sinus Tach.

## 2020-09-26 NOTE — PROGRESS NOTES
Pharmacist Note - Warfarin Dosing  Consult provided for this 67 y. o.female to manage warfarin for Atrial Fibrillation    INR Goal: 2 - 3    Home regimen/ tablet size: 5 mg 3 times a week and 2.5 mg 4 times a week (according to rx query, PTA med list say 2.5 mg 3 x week and 5 mg 4x week) last dose taken 9/24 evening    Drugs that may increase INR: None  Drugs that may decrease INR: None  Other current anticoagulants/ drugs that may increase bleeding risk: None  Risk factors: Age > 65  Daily INR ordered: YES    Recent Labs     09/26/20  0426 09/25/20  1030   HGB 10.2* 12.1   INR 5.4* 5.7*     Date               INR                  Dose  9/25  5.7  hold   9/26  5.4  hold                                                                               Assessment/ Plan: Will hold warfarin today for supratherapuetic INR    Pharmacy will continue to monitor daily and adjust therapy as indicated. Please contact the pharmacist at   for outpatient recommendations if needed.       Jyoti Smith, PharmD, BCPP, [facility] Specialist, [facility]

## 2020-09-26 NOTE — PROGRESS NOTES
1930: Bedside shift change report given to [facility] (oncoming nurse) by Margy Hernandez (offgoing nurse). Report included the following information SBAR, Kardex, Intake/Output, MAR and Cardiac Rhythm Afib.     0730: Bedside shift change report given to Margy Sheets (oncoming nurse) by [facility] (offgoing nurse). Report included the following information SBAR, Kardex, Intake/Output, MAR and Cardiac Rhythm Afib.

## 2020-09-26 NOTE — PROGRESS NOTES
Problem: Falls - Risk of  Goal: *Absence of Falls  Description: Document Shad Muñoz Fall Risk and appropriate interventions in the flowsheet. Outcome: Progressing Towards Goal  Note: Fall Risk Interventions:  Mobility Interventions: Patient to call before getting OOB, Communicate number of staff needed for ambulation/transfer         Medication Interventions: Patient to call before getting OOB, Teach patient to arise slowly    Elimination Interventions: Patient to call for help with toileting needs, Stay With Me (per policy), Call light in reach    History of Falls Interventions: Door open when patient unattended, Evaluate medications/consider consulting pharmacy         Problem: Patient Education: Go to Patient Education Activity  Goal: Patient/Family Education  Outcome: Progressing Towards Goal     Problem: Diabetes Self-Management  Goal: *Disease process and treatment process  Description: Define diabetes and identify own type of diabetes; list 3 options for treating diabetes. Outcome: Progressing Towards Goal  Goal: *Incorporating nutritional management into lifestyle  Description: Describe effect of type, amount and timing of food on blood glucose; list 3 methods for planning meals. Outcome: Progressing Towards Goal  Goal: *Incorporating physical activity into lifestyle  Description: State effect of exercise on blood glucose levels. Outcome: Progressing Towards Goal  Goal: *Developing strategies to promote health/change behavior  Description: Define the ABC's of diabetes; identify appropriate screenings, schedule and personal plan for screenings. Outcome: Progressing Towards Goal  Goal: *Using medications safely  Description: State effect of diabetes medications on diabetes; name diabetes medication taking, action and side effects.   Outcome: Progressing Towards Goal  Goal: *Monitoring blood glucose, interpreting and using results  Description: Identify recommended blood glucose targets  and personal targets. Outcome: Progressing Towards Goal  Goal: *Prevention, detection, treatment of acute complications  Description: List symptoms of hyper- and hypoglycemia; describe how to treat low blood sugar and actions for lowering  high blood glucose level. Outcome: Progressing Towards Goal  Goal: *Prevention, detection and treatment of chronic complications  Description: Define the natural course of diabetes and describe the relationship of blood glucose levels to long term complications of diabetes.   Outcome: Progressing Towards Goal  Goal: *Developing strategies to address psychosocial issues  Description: Describe feelings about living with diabetes; identify support needed and support network  Outcome: Progressing Towards Goal  Goal: *Insulin pump training  Outcome: Progressing Towards Goal  Goal: *Sick day guidelines  Outcome: Progressing Towards Goal  Goal: *Patient Specific Goal (EDIT GOAL, INSERT TEXT)  Outcome: Progressing Towards Goal     Problem: Patient Education: Go to Patient Education Activity  Goal: Patient/Family Education  Outcome: Progressing Towards Goal     Problem: Breathing Pattern - Ineffective  Goal: *Absence of hypoxia  Outcome: Progressing Towards Goal  Goal: *Use of effective breathing techniques  Outcome: Progressing Towards Goal  Goal: *PALLIATIVE CARE:  Alleviation of Dyspnea  Outcome: Progressing Towards Goal     Problem: Patient Education: Go to Patient Education Activity  Goal: Patient/Family Education  Outcome: Progressing Towards Goal

## 2020-09-26 NOTE — CONSULTS
S Cardiology Consult Note    Date of consult:  09/26/20  Date of admission: 9/25/2020  Primary Cardiologist: Dr Chrissy Vega  Physician Requesting consult: Dr Nestor Lal / Reason for consult: CHF    History of the presenting illness:  [facility] 67 [facility] who presented with about 3 weeks of worsening dyspnea. Has gained weight. Feels like she is retaining more fluid. More dyspnea with exertion. No chest pain. Has been complaint with her bumex (typically on 2mg daily)    Not having much wheeze. Past Medical History:   Diagnosis Date    Asthma     Atrial fibrillation ([facility]     Depression     DM (diabetes mellitus) ([facility]     HTN (hypertension)     Hyperlipidemia     Morbid obesity ([facility]     Neuropathy     LITA on CPAP        Prior to Admission medications    Medication Sig Start Date End Date Taking? Authorizing Provider   potassium chloride SR (KLOR-CON 10) 10 mEq tablet TAKE 2 TABS BY MOUTH TWO (2) TIMES A DAY. 8/23/20  Yes Dennie Franco, MD   dilTIAZem ER (CARDIZEM CD) 120 mg capsule TAKE 3 CAPSULES BY MOUTH EVERY DAY 8/8/20  Yes Dennie Franco, MD   bumetanide (BUMEX) 2 mg tablet TAKE 1 TABLET BY MOUTH EVERY DAY 6/25/20  Yes Dennie Franco, MD   sertraline (ZOLOFT) 100 mg tablet TAKE 1 TABLET EVERY EVENING 5/6/20  Yes Dennie Franco, MD   insulin NPH (NOVOLIN N, HUMULIN N) 100 unit/mL injection 35 Units by SubCUTAneous route every morning. INJECT 35 UNITS UNDER THE SKIN IN THE MORNING AND INJECT 25 UNITS AT BEDTIME OR AS DIRECTED   Yes Provider, Historical   insulin NPH (NOVOLIN N, HUMULIN N) 100 unit/mL injection 25 Units by SubCUTAneous route nightly. INJECT 35 UNITS UNDER THE SKIN IN THE MORNING AND INJECT 25 UNITS AT BEDTIME OR AS DIRECTED   Yes Provider, Historical   insulin regular (NOVOLIN R REGULAR U-100 INSULN) 100 unit/mL injection 15 Units by SubCUTAneous route Before breakfast, lunch, and dinner.    Yes Provider, Historical   digoxin (LANOXIN) 0.25 mg tablet TAKE 1 TABLET BY MOUTH EVERY DAY 1/28/20  Yes Sheree Broussard MD   warfarin (COUMADIN) 5 mg tablet Take 5 mg by mouth four (4) days a week. M-W-F: 2.5 mg  Tu-Th-Sat-Sun: 5 mg   Yes Provider, Historical   warfarin (COUMADIN) 2.5 mg tablet Take 2.5 mg by mouth every Monday, Wednesday, Friday. M-W-F: 2.5 mg  Tu-Th-Sat-Sun: 5 mg   Yes Provider, Historical   gabapentin (NEURONTIN) 300 mg capsule Take 600 mg by mouth nightly. Yes Provider, Historical   DULoxetine (CYMBALTA) 60 mg capsule Take 60 mg by mouth daily. Yes Provider, Historical   Biotin 2,500 mcg cap Take 1 Cap by mouth daily. Yes Provider, Historical   b complex vitamins tablet Take 1 Tab by mouth daily. Yes Provider, Historical   omalizumab Sundar More) 150 mg solr 150 mg by SubCUTAneous route every fourteen (14) days. Next dose due 2/17   Yes Provider, Historical   therapeutic multivitamin (THERAGRAN) tablet Take 1 Tab by mouth daily. Yes Provider, Historical   PROVENTIL HFA 90 mcg/actuation inhaler inhale 2 puffs four times a day if needed for wheezing 1/17/15  Yes Sheree Broussard MD   cholecalciferol, vitamin D3, (VITAMIN D3) 2,000 unit tab Take 2,000 Units by mouth daily. Yes Provider, Historical   DOCOSAHEXANOIC ACID/EPA (FISH OIL PO) Take 1 Cap by mouth daily. Yes Provider, Historical   ezetimibe (ZETIA) 10 mg tablet Take 1 Tab by mouth daily.  8/23/20   Sheree Broussard MD   furosemide (LASIX) 40 mg tablet TAKE 2 TABLETS BY MOUTH EVERY MORNING AND 1 TABLET EVERY EVENING 4/28/20   Sheree Broussard MD   fluconazole (DIFLUCAN) 150 mg tablet TAKE 1 TABLET BY MOUTH FOR 1 DOSE 4/26/20   Sheree Broussard MD   cyclobenzaprine (FLEXERIL) 10 mg tablet TAKE 1 TABLET BY MOUTH THREE TIMES A DAY AS NEEDED FOR MUSCLE SPASM 4/19/20   Sheree Broussard MD   albuterol (PROVENTIL VENTOLIN) 2.5 mg /3 mL (0.083 %) nebu 3 mL by Nebulization route four (4) times daily as needed for Wheezing or Shortness of Breath. 19   Jacquelin Lyn MD   fluticasone propion-salmeterol [facility]) 500-50 mcg/dose diskus inhaler Take 1 Puff by inhalation two (2) times a day. Provider, Historical   gabapentin (NEURONTIN) 300 mg capsule Take 600 mg by mouth nightly. Provider, Historical   OTHER Allergy shots weekly. Provider, Historical   EPINEPHrine (EPIPEN) 0.3 mg/0.3 mL injection 0.3 mg by IntraMUSCular route once as needed. Provider, Historical       Allergies   Allergen Reactions    Latex Itching    Kiwi Unknown (comments)    Codeine Rash    Lisinopril Other (comments)     Cough, vomiting, Visual disturbances    Morphine Itching    Statins-Hmg-Coa Reductase Inhibitors Other (comments)     Muscle enzyme problems        Family History   Problem Relation Age of Onset    Stroke Mother     Diabetes Other     Heart Disease Other     Heart Disease Father         congestive heart failure       Social History     Socioeconomic History    Marital status:      Spouse name: Not on file    Number of children: Not on file    Years of education: Not on file    Highest education level: Not on file   Occupational History    Not on file   Social Needs    Financial resource strain: Not on file    Food insecurity     Worry: Not on file     Inability: Not on file    Transportation needs     Medical: Not on file     Non-medical: Not on file   Tobacco Use    Smoking status: Former Smoker     Last attempt to quit: 1992     Years since quittin.7    Smokeless tobacco: Never Used   Substance and Sexual Activity    Alcohol use:  Yes     Alcohol/week: 2.0 standard drinks     Types: 2 Glasses of wine per week     Comment: occ wine    Drug use: No    Sexual activity: Not Currently   Lifestyle    Physical activity     Days per week: Not on file     Minutes per session: Not on file    Stress: Not on file   Relationships    Social connections     Talks on phone: Not on file     Gets together: Not on file     Attends [employer] service: Not on file     Active member of club or organization: Not on file     Attends meetings of clubs or organizations: Not on file     Relationship status: Not on file    Intimate partner violence     Fear of current or ex partner: Not on file     Emotionally abused: Not on file     Physically abused: Not on file     Forced sexual activity: Not on file   Other Topics Concern    Not on file   Social History Narrative    Not on file       Review of Systems   Constitutional: Negative for chills and fever. HENT: Negative for hearing loss and nosebleeds. Eyes: Negative for blurred vision and double vision. Respiratory: Positive for shortness of breath. Negative for cough and sputum production. Cardiovascular: Negative for chest pain. Gastrointestinal: Negative for abdominal pain, nausea and vomiting. Genitourinary: Negative for frequency and urgency. Musculoskeletal: Negative for back pain and joint pain. Skin: Negative for itching and rash. Neurological: Negative for dizziness and headaches. Endo/Heme/Allergies: Negative for environmental allergies. Does not bruise/bleed easily. Visit Vitals  BP (!) 151/70 (BP 1 Location: Left arm, BP Patient Position: At rest)   Pulse 72   Temp 97.7 °F (36.5 °C)   Resp 18   Ht 5' 7\" (1.702 m)   Wt 121.2 kg (267 lb 3.2 oz)   SpO2 100%   BMI 41.85 kg/m²     Physical Exam  Constitutional:       Appearance: She is obese. HENT:      Head: Normocephalic and atraumatic. Eyes:      General: No scleral icterus. Conjunctiva/sclera: Conjunctivae normal.   Neck:      Vascular: No JVD. Cardiovascular:      Rate and Rhythm: Normal rate and regular rhythm. Heart sounds: Normal heart sounds. No murmur. No gallop. Pulmonary:      Effort: Pulmonary effort is normal. No respiratory distress. Breath sounds: Normal breath sounds. No stridor. No wheezing. Abdominal:      General: There is no distension. Palpations: Abdomen is soft. Musculoskeletal: Normal range of motion. General: No swelling or deformity. Right lower le+ Pitting Edema present. Left lower le+ Pitting Edema present. Skin:     General: Skin is warm and dry. Neurological:      Mental Status: She is alert and oriented to person, place, and time. Psychiatric:         Mood and Affect: Mood and affect normal.         Lab review:  BMP:   Lab Results   Component Value Date/Time     2020 04:26 AM    K 4.8 2020 04:26 AM     2020 04:26 AM    CO2 26 2020 04:26 AM    AGAP 7 2020 04:26 AM     (H) 2020 04:26 AM    BUN 27 (H) 2020 04:26 AM    CREA 1.42 (H) 2020 04:26 AM    GFRAA 44 (L) 2020 04:26 AM    GFRNA 36 (L) 2020 04:26 AM        CBC:  Lab Results   Component Value Date/Time    WBC 11.5 (H) 2020 04:26 AM    HGB (POC) 13.1 2020 10:33 AM    HGB 10.2 (L) 2020 04:26 AM    HCT (POC) 39.8 2020 10:33 AM    HCT 32.0 (L) 2020 04:26 AM    PLATELET 065  04:26 AM    MCV 89.4 2020 04:26 AM       All Cardiac Markers in the last 24 hours:    Lab Results   Component Value Date/Time    TROIQ <0.05 2020 10:30 AM    BNPNT 1,186 (H) 2020 10:30 AM       Lab Results   Component Value Date/Time    Cholesterol, total 230 (H) 2020 03:34 PM    Cholesterol (POC) 220 (A) 2019 03:07 PM    HDL Cholesterol 50 2020 03:34 PM    HDL Cholesterol (POC) 51 2019 03:07 PM    LDL Cholesterol (POC) 143 (A) 2019 03:07 PM    LDL, calculated 156 (H) 2020 03:34 PM    VLDL, calculated 24 2020 03:34 PM    Triglyceride 122 2020 03:34 PM    Triglycerides (POC) 125 2019 03:07 PM        Data review:  EKG tracing personally reviewed: a.fib, rate 102 bpm. NSTW changes. Echocardiogram:  20   ECHO ADULT COMPLETE 2020    Narrative · Normal cavity size.  Increased wall thickness. Estimated left ventricular   ejection fraction is 50 - 55%. · Mildly dilated left atrium. · Mildly dilated right atrium. · Severely elevated central venous pressure (15+ mmHg); IVC diameter is   larger than 21 mm and collapses less than 50% with respiration. Signed by: Dionte Brooks MD       Other imaging:  CXR   IMPRESSION: Small right pleural effusion and minimal interstitial prominence  likely reflective of mild pulmonary edema. Assessment:    1. Acute on chronic diastolic heart failure  2. Other persistent atrial fibrillation  3. Type II diabetes with diabetic CKD, poorly controlled  4. CKD stage 3  5. Morbid obesity    Recommendations / Plan:    Change bumex to 2mg IV daily   Monitor renal function  Continue other home meds  Adjust warfarin to get INR to goal  Check digoxin level with AM labs    Echo not likely to be helpful - quality would be sub-optimal and likely un-interpretable with her body habitus. Signed:  Sheri WALKER  [facility]  Interventional Cardiology  09/26/20

## 2020-09-26 NOTE — PROGRESS NOTES
0730:Bedside shift change report given to [address] (oncoming nurse) by Bunny Gotti (offgoing nurse). Report included the following information SBAR, Kardex, MAR, Recent Results and Cardiac Rhythm A fib.     1200: Pt /56, pt asymptomatic, Dr. Danial Calderon notified. Will continue to monitor. 1500: Pt -140 for over an hour, pt asymptomatic.  notified, no new orders received. Will continue to monitor. 1530:Bedside shift change report given to [facility] (oncoming nurse) by [address] (offgoing nurse). Report included the following information SBAR, Kardex, MAR, Recent Results and Cardiac Rhythm A fib. Problem: Falls - Risk of  Goal: *Absence of Falls  Description: Document Edmonia Morning Fall Risk and appropriate interventions in the flowsheet. Outcome: Progressing Towards Goal  Note: Fall Risk Interventions:  Mobility Interventions: Patient to call before getting OOB         Medication Interventions: Patient to call before getting OOB, Teach patient to arise slowly    Elimination Interventions: Call light in reach, Patient to call for help with toileting needs    History of Falls Interventions: Door open when patient unattended         Problem: Patient Education: Go to Patient Education Activity  Goal: Patient/Family Education  Outcome: Progressing Towards Goal     Problem: Diabetes Self-Management  Goal: *Disease process and treatment process  Description: Define diabetes and identify own type of diabetes; list 3 options for treating diabetes. Outcome: Progressing Towards Goal  Goal: *Incorporating nutritional management into lifestyle  Description: Describe effect of type, amount and timing of food on blood glucose; list 3 methods for planning meals. Outcome: Progressing Towards Goal  Goal: *Incorporating physical activity into lifestyle  Description: State effect of exercise on blood glucose levels.   Outcome: Progressing Towards Goal  Goal: *Developing strategies to promote health/change behavior  Description: Define the ABC's of diabetes; identify appropriate screenings, schedule and personal plan for screenings. Outcome: Progressing Towards Goal  Goal: *Using medications safely  Description: State effect of diabetes medications on diabetes; name diabetes medication taking, action and side effects. Outcome: Progressing Towards Goal  Goal: *Monitoring blood glucose, interpreting and using results  Description: Identify recommended blood glucose targets  and personal targets. Outcome: Progressing Towards Goal  Goal: *Prevention, detection, treatment of acute complications  Description: List symptoms of hyper- and hypoglycemia; describe how to treat low blood sugar and actions for lowering  high blood glucose level. Outcome: Progressing Towards Goal  Goal: *Prevention, detection and treatment of chronic complications  Description: Define the natural course of diabetes and describe the relationship of blood glucose levels to long term complications of diabetes.   Outcome: Progressing Towards Goal  Goal: *Developing strategies to address psychosocial issues  Description: Describe feelings about living with diabetes; identify support needed and support network  Outcome: Progressing Towards Goal  Goal: *Insulin pump training  Outcome: Progressing Towards Goal  Goal: *Sick day guidelines  Outcome: Progressing Towards Goal  Goal: *Patient Specific Goal (EDIT GOAL, INSERT TEXT)  Outcome: Progressing Towards Goal     Problem: Patient Education: Go to Patient Education Activity  Goal: Patient/Family Education  Outcome: Progressing Towards Goal     Problem: Breathing Pattern - Ineffective  Goal: *Absence of hypoxia  Outcome: Progressing Towards Goal  Goal: *Use of effective breathing techniques  Outcome: Progressing Towards Goal  Goal: *PALLIATIVE CARE:  Alleviation of Dyspnea  Outcome: Progressing Towards Goal     Problem: Patient Education: Go to Patient Education Activity  Goal: Patient/Family Education  Outcome: Progressing Towards Goal

## 2020-09-26 NOTE — PROGRESS NOTES
Rocio Cruz, & Nadia Brown    Admit Date: 9/25/2020    Subjective:     She seems to be feeling better. No new complaints.         Current Facility-Administered Medications   Medication Dose Route Frequency    Warfarin - HELD today due to supratherapeutic INR   Other ONCE    bumetanide (BUMEX) injection 2 mg  2 mg IntraVENous DAILY    albuterol (PROVENTIL VENTOLIN) nebulizer solution 2.5 mg  2.5 mg Nebulization QID PRN    cyclobenzaprine (FLEXERIL) tablet 10 mg  10 mg Oral TID PRN    digoxin (LANOXIN) tablet 0.25 mg  0.25 mg Oral DAILY    dilTIAZem ER (CARDIZEM CD) capsule 360 mg  360 mg Oral DAILY    DULoxetine (CYMBALTA) capsule 60 mg  60 mg Oral DAILY    ezetimibe (ZETIA) tablet 10 mg  10 mg Oral DAILY    arformoteroL (BROVANA) neb solution 15 mcg  15 mcg Nebulization BID RT    And    budesonide (PULMICORT) 500 mcg/2 ml nebulizer suspension  1,000 mcg Nebulization BID RT    gabapentin (NEURONTIN) capsule 300 mg  300 mg Oral DAILY    gabapentin (NEURONTIN) capsule 600 mg  600 mg Oral QHS    insulin NPH (NOVOLIN N, HUMULIN N) injection 35 Units  35 Units SubCUTAneous DAILY    insulin NPH (NOVOLIN N, HUMULIN N) injection 25 Units  25 Units SubCUTAneous QHS    insulin regular (NOVOLIN R, HUMULIN R) injection 10 Units  10 Units SubCUTAneous TIDAC    potassium chloride SR (KLOR-CON 10) tablet 20 mEq  20 mEq Oral BID    ondansetron (ZOFRAN) injection 4 mg  4 mg IntraVENous Q6H PRN    acetaminophen (TYLENOL) tablet 650 mg  650 mg Oral Q4H PRN    glucose chewable tablet 16 g  4 Tab Oral PRN    glucagon (GLUCAGEN) injection 1 mg  1 mg IntraMUSCular PRN    dextrose 10% infusion 0-250 mL  0-250 mL IntraVENous PRN    insulin lispro (HUMALOG) injection   SubCUTAneous AC&HS    predniSONE (DELTASONE) tablet 40 mg  40 mg Oral DAILY WITH BREAKFAST    albuterol-ipratropium (DUO-NEB) 2.5 MG-0.5 MG/3 ML  3 mL Nebulization QID RT    Warfarin pharmacy to dose   Other Rx Dosing/Monitoring  sertraline (ZOLOFT) tablet 100 mg  100 mg Oral QHS          Objective:     Patient Vitals for the past 8 hrs:   BP Temp Pulse Resp SpO2 Weight   09/26/20 1130 (!) 170/56 97.9 °F (36.6 °C) 88 18 100 % --   09/26/20 0815 -- -- -- -- 99 % --   09/26/20 0745 (!) 151/70 97.7 °F (36.5 °C) 72 18 100 % --   09/26/20 0558 -- -- -- -- -- 267 lb 3.2 oz (121.2 kg)     09/26 0701 - 09/26 1900  In: 540 [P.O.:540]  Out: 400 [Urine:400]  09/24 1901 - 09/26 0700  In: 270 [P.O.:270]  Out: 3300 [Urine:3300]    Physical Exam: NAD. Neck -- Supple. No JVD. Heart -- Irr Irr (Rate OK). Lungs -- CTA. Abd -- Benign. Ext -- Trace, if any, LE edema, b/l. Data Review   Recent Results (from the past 24 hour(s))   GLUCOSE, POC    Collection Time: 09/25/20  6:36 PM   Result Value Ref Range    Glucose (POC) 326 (H) 65 - 100 mg/dL    Performed by Sharon Denise    GLUCOSE, POC    Collection Time: 09/25/20  9:27 PM   Result Value Ref Range    Glucose (POC) 371 (H) 65 - 100 mg/dL    Performed by Lynn Flash    METABOLIC PANEL, COMPREHENSIVE    Collection Time: 09/26/20  4:26 AM   Result Value Ref Range    Sodium 136 136 - 145 mmol/L    Potassium 4.8 3.5 - 5.1 mmol/L    Chloride 103 97 - 108 mmol/L    CO2 26 21 - 32 mmol/L    Anion gap 7 5 - 15 mmol/L    Glucose 338 (H) 65 - 100 mg/dL    BUN 27 (H) 6 - 20 MG/DL    Creatinine 1.42 (H) 0.55 - 1.02 MG/DL    BUN/Creatinine ratio 19 12 - 20      GFR est AA 44 (L) >60 ml/min/1.73m2    GFR est non-AA 36 (L) >60 ml/min/1.73m2    Calcium 8.7 8.5 - 10.1 MG/DL    Bilirubin, total 0.4 0.2 - 1.0 MG/DL    ALT (SGPT) 31 12 - 78 U/L    AST (SGOT) 19 15 - 37 U/L    Alk.  phosphatase 86 45 - 117 U/L    Protein, total 6.8 6.4 - 8.2 g/dL    Albumin 3.1 (L) 3.5 - 5.0 g/dL    Globulin 3.7 2.0 - 4.0 g/dL    A-G Ratio 0.8 (L) 1.1 - 2.2     CBC WITH AUTOMATED DIFF Collection Time: 09/26/20  4:26 AM   Result Value Ref Range    WBC 11.5 (H) 3.6 - 11.0 K/uL    RBC 3.58 (L) 3.80 - 5.20 M/uL    HGB 10.2 (L) 11.5 - 16.0 g/dL    HCT 32.0 (L) 35.0 - 47.0 %    MCV 89.4 80.0 - 99.0 FL    MCH 28.5 26.0 - 34.0 PG    MCHC 31.9 30.0 - 36.5 g/dL    RDW 14.5 11.5 - 14.5 %    PLATELET 187 022 - 290 K/uL    MPV 9.5 8.9 - 12.9 FL    NRBC 0.0 0  WBC    ABSOLUTE NRBC 0.00 0.00 - 0.01 K/uL    NEUTROPHILS 86 (H) 32 - 75 %    LYMPHOCYTES 10 (L) 12 - 49 %    MONOCYTES 3 (L) 5 - 13 %    EOSINOPHILS 0 0 - 7 %    BASOPHILS 0 0 - 1 %    IMMATURE GRANULOCYTES 1 (H) 0.0 - 0.5 %    ABS. NEUTROPHILS 9.8 (H) 1.8 - 8.0 K/UL    ABS. LYMPHOCYTES 1.1 0.8 - 3.5 K/UL    ABS. MONOCYTES 0.4 0.0 - 1.0 K/UL    ABS. EOSINOPHILS 0.0 0.0 - 0.4 K/UL    ABS. BASOPHILS 0.1 0.0 - 0.1 K/UL    ABS. IMM. GRANS. 0.1 (H) 0.00 - 0.04 K/UL    DF AUTOMATED     PROTHROMBIN TIME + INR    Collection Time: 09/26/20  4:26 AM   Result Value Ref Range    INR 5.4 (HH) 0.9 - 1.1      Prothrombin time 48.8 (H) 9.0 - 11.1 sec   GLUCOSE, POC    Collection Time: 09/26/20  6:45 AM   Result Value Ref Range    Glucose (POC) 330 (H) 65 - 100 mg/dL    Performed by Bryan Engel, POC    Collection Time: 09/26/20 11:26 AM   Result Value Ref Range    Glucose (POC) 226 (H) 65 - 100 mg/dL    Performed by Sofia Porter (CON)            Assessment:     Principal Problem:    Respiratory failure -- Probably combination of asthma & CHF (acute on chronic diastolic dysfunction). Active Problems:    Chronic atrial fibrillation (7/17/2015)      Long-term insulin use in type 2 diabetes ([facility] (11/10/2015)      Hypertension complicating diabetes ([facility] (2/20/2017)        Plan:     1. On IV Bumex now. Appreciate Dr. Rosetta Dick input. 2. Cont prednisone, nebs. 3. Try to get her off of O2.  4. Pharmacy dosing coumadin.           Papo Cowan MD

## 2020-09-27 LAB
ANION GAP SERPL CALC-SCNC: 5 MMOL/L (ref 5–15)
BUN SERPL-MCNC: 33 MG/DL (ref 6–20)
BUN/CREAT SERPL: 25 (ref 12–20)
CALCIUM SERPL-MCNC: 9.1 MG/DL (ref 8.5–10.1)
CHLORIDE SERPL-SCNC: 103 MMOL/L (ref 97–108)
CO2 SERPL-SCNC: 27 MMOL/L (ref 21–32)
CREAT SERPL-MCNC: 1.34 MG/DL (ref 0.55–1.02)
DIGOXIN SERPL-MCNC: 1.1 NG/ML (ref 0.9–2)
GLUCOSE BLD STRIP.AUTO-MCNC: 185 MG/DL (ref 65–100)
GLUCOSE BLD STRIP.AUTO-MCNC: 271 MG/DL (ref 65–100)
GLUCOSE BLD STRIP.AUTO-MCNC: 339 MG/DL (ref 65–100)
GLUCOSE BLD STRIP.AUTO-MCNC: 394 MG/DL (ref 65–100)
GLUCOSE SERPL-MCNC: 317 MG/DL (ref 65–100)
INR PPP: 5.4 (ref 0.9–1.1)
POTASSIUM SERPL-SCNC: 4.3 MMOL/L (ref 3.5–5.1)
PROTHROMBIN TIME: 48.7 SEC (ref 9–11.1)
SERVICE CMNT-IMP: ABNORMAL
SODIUM SERPL-SCNC: 135 MMOL/L (ref 136–145)

## 2020-09-27 PROCEDURE — 74011636637 HC RX REV CODE- 636/637: Performed by: INTERNAL MEDICINE

## 2020-09-27 PROCEDURE — 82962 GLUCOSE BLOOD TEST: CPT

## 2020-09-27 PROCEDURE — 74011250637 HC RX REV CODE- 250/637: Performed by: INTERNAL MEDICINE

## 2020-09-27 PROCEDURE — 36415 COLL VENOUS BLD VENIPUNCTURE: CPT

## 2020-09-27 PROCEDURE — 74011000250 HC RX REV CODE- 250: Performed by: INTERNAL MEDICINE

## 2020-09-27 PROCEDURE — 80162 ASSAY OF DIGOXIN TOTAL: CPT

## 2020-09-27 PROCEDURE — 65660000000 HC RM CCU STEPDOWN

## 2020-09-27 PROCEDURE — 94664 DEMO&/EVAL PT USE INHALER: CPT

## 2020-09-27 PROCEDURE — 80048 BASIC METABOLIC PNL TOTAL CA: CPT

## 2020-09-27 PROCEDURE — 77010033678 HC OXYGEN DAILY

## 2020-09-27 PROCEDURE — 94640 AIRWAY INHALATION TREATMENT: CPT

## 2020-09-27 PROCEDURE — 85610 PROTHROMBIN TIME: CPT

## 2020-09-27 RX ORDER — DIGOXIN 125 MCG
0.12 TABLET ORAL DAILY
Status: DISCONTINUED | OUTPATIENT
Start: 2020-09-27 | End: 2020-09-28 | Stop reason: HOSPADM

## 2020-09-27 RX ADMIN — HUMAN INSULIN 10 UNITS: 100 INJECTION, SOLUTION SUBCUTANEOUS at 12:42

## 2020-09-27 RX ADMIN — ARFORMOTEROL TARTRATE 15 MCG: 15 SOLUTION RESPIRATORY (INHALATION) at 09:30

## 2020-09-27 RX ADMIN — HUMAN INSULIN 10 UNITS: 100 INJECTION, SOLUTION SUBCUTANEOUS at 09:18

## 2020-09-27 RX ADMIN — DIGOXIN 0.12 MG: 125 TABLET ORAL at 09:18

## 2020-09-27 RX ADMIN — ARFORMOTEROL TARTRATE 15 MCG: 15 SOLUTION RESPIRATORY (INHALATION) at 20:48

## 2020-09-27 RX ADMIN — BUMETANIDE 2 MG: 0.25 INJECTION INTRAMUSCULAR; INTRAVENOUS at 09:17

## 2020-09-27 RX ADMIN — IPRATROPIUM BROMIDE AND ALBUTEROL SULFATE 3 ML: .5; 3 SOLUTION RESPIRATORY (INHALATION) at 16:52

## 2020-09-27 RX ADMIN — HUMAN INSULIN 35 UNITS: 100 INJECTION, SUSPENSION SUBCUTANEOUS at 09:17

## 2020-09-27 RX ADMIN — SERTRALINE HYDROCHLORIDE 100 MG: 50 TABLET ORAL at 22:13

## 2020-09-27 RX ADMIN — HUMAN INSULIN 10 UNITS: 100 INJECTION, SOLUTION SUBCUTANEOUS at 17:48

## 2020-09-27 RX ADMIN — BUDESONIDE 1000 MCG: 0.5 INHALANT RESPIRATORY (INHALATION) at 20:48

## 2020-09-27 RX ADMIN — INSULIN LISPRO 2 UNITS: 100 INJECTION, SOLUTION INTRAVENOUS; SUBCUTANEOUS at 12:42

## 2020-09-27 RX ADMIN — INSULIN LISPRO 7 UNITS: 100 INJECTION, SOLUTION INTRAVENOUS; SUBCUTANEOUS at 17:48

## 2020-09-27 RX ADMIN — POTASSIUM CHLORIDE 20 MEQ: 750 TABLET, FILM COATED, EXTENDED RELEASE ORAL at 09:18

## 2020-09-27 RX ADMIN — IPRATROPIUM BROMIDE AND ALBUTEROL SULFATE 3 ML: .5; 3 SOLUTION RESPIRATORY (INHALATION) at 13:51

## 2020-09-27 RX ADMIN — INSULIN LISPRO 6 UNITS: 100 INJECTION, SOLUTION INTRAVENOUS; SUBCUTANEOUS at 22:13

## 2020-09-27 RX ADMIN — EZETIMIBE 10 MG: 10 TABLET ORAL at 09:18

## 2020-09-27 RX ADMIN — GABAPENTIN 600 MG: 300 CAPSULE ORAL at 22:13

## 2020-09-27 RX ADMIN — HUMAN INSULIN 25 UNITS: 100 INJECTION, SUSPENSION SUBCUTANEOUS at 22:13

## 2020-09-27 RX ADMIN — DULOXETINE 60 MG: 60 CAPSULE, DELAYED RELEASE ORAL at 09:18

## 2020-09-27 RX ADMIN — INSULIN LISPRO 5 UNITS: 100 INJECTION, SOLUTION INTRAVENOUS; SUBCUTANEOUS at 07:46

## 2020-09-27 RX ADMIN — IPRATROPIUM BROMIDE AND ALBUTEROL SULFATE 3 ML: .5; 3 SOLUTION RESPIRATORY (INHALATION) at 09:30

## 2020-09-27 RX ADMIN — POTASSIUM CHLORIDE 20 MEQ: 750 TABLET, FILM COATED, EXTENDED RELEASE ORAL at 17:48

## 2020-09-27 RX ADMIN — GABAPENTIN 300 MG: 100 CAPSULE ORAL at 09:18

## 2020-09-27 RX ADMIN — IPRATROPIUM BROMIDE AND ALBUTEROL SULFATE 3 ML: .5; 3 SOLUTION RESPIRATORY (INHALATION) at 20:54

## 2020-09-27 RX ADMIN — BUDESONIDE 1000 MCG: 0.5 INHALANT RESPIRATORY (INHALATION) at 09:30

## 2020-09-27 RX ADMIN — PREDNISONE 40 MG: 20 TABLET ORAL at 09:18

## 2020-09-27 RX ADMIN — DILTIAZEM HYDROCHLORIDE 360 MG: 180 CAPSULE, COATED, EXTENDED RELEASE ORAL at 09:18

## 2020-09-27 NOTE — PROGRESS NOTES
1950: Bedside shift change report given to [facility] (oncoming nurse) by Nacho Fermin (offgoing nurse). Report included the following information SBAR, Intake/Output, MAR, Accordion, Recent Results, Med Rec Status and Cardiac Rhythm AFib.    2100: Pt 98% on 1L NC, weaned to room air with continuous SPO2 monitoring in place    2115: Pt ambulated to bathroom with standby assist; HR 90-110s with activity post-nebulizer; SPO2 92-94% on RA post activity; pt reports CASTANEDA is much improved     2120: HR decreased to 90s and SPO2 96-99% on RA after a few minutes of rest     2330: -110s w/ ambulation to bathroom; SPO2 remains >93% on RA, even with activity    Bedside shift change report given to [facility] (oncoming nurse) by 1 [facility] Beatriz (offgoing nurse). Report included the following information SBAR, Intake/Output, MAR, Accordion, Recent Results, Med Rec Status and Cardiac Rhythm AFib.

## 2020-09-27 NOTE — PROGRESS NOTES
0730 Bedside shift change report given to [facility], RN (oncoming nurse) by Kevin Muniz RN (offgoing nurse). Report included the following information SBAR, Kardex, Intake/Output, MAR and Recent Results. 1930 Bedside shift change report given to Kevin Muniz RN (oncoming nurse) by [facility], RN (offgoing nurse). Report included the following information SBAR, Kardex, Intake/Output, MAR and Recent Results. 1017 [facility] 240 200 [facility] 
[phone] Patient: Tony Bear MRN: [id number] ZXW:[id number] Visit Information Date & Time Provider Department Dept. Phone Encounter #  
 10/10/2018  8:30 AM Garry Newman  [address] [id number] Your Appointments 10/17/2018 10:00 AM  
Follow Up with Garry Newman MD  
[facility] ([address] [facility]) Appt Note: 1 wk f/up [address] [facility] [address] [facility] 240 [id number] [address] [facility] [address] [facility] Upcoming Health Maintenance Date Due Hepatitis C Screening 1957 DTaP/Tdap/Td series (1 - Tdap) 4/4/1977 PAP AKA CERVICAL CYTOLOGY 4/4/1977 Shingrix Vaccine Age 50> (1 of 2) 4/4/2006 BREAST CANCER SCRN MAMMOGRAM 4/4/2006 FOBT Q 1 YEAR AGE 50-75 4/4/2006 Influenza Age 5 to Adult 8/1/2018 Allergies as of 10/10/2018  Review Complete On: 10/10/2018 By: Garry Newman MD  
 Not on File Current Immunizations  Never Reviewed No immunizations on file. Not reviewed this visit You Were Diagnosed With   
  
 Codes Comments Recurrent ventral hernia    -  Primary ICD-10-CM: I62.6 ICD-9-CM: 553.21 Recurrent left inguinal hernia     ICD-10-CM: K40.91 
ICD-9-CM: 550.91 Right inguinal hernia     ICD-10-CM: K40.90 ICD-9-CM: 550.90 Vitals BP Pulse Temp Resp Height(growth percentile) Weight(growth percentile) 129/82 85 97.6 °F (36.4 °C) (Oral) 18 5' 0.25\" (1.53 m) 164 lb (74.4 kg) SpO2 BMI OB Status Smoking Status 98% 31.76 kg/m2 Postmenopausal Never Smoker Vitals History BMI and BSA Data Body Mass Index Body Surface Area 31.76 kg/m 2 1.78 m 2 Your Updated Medication List  
  
   
 This list is accurate as of 10/10/18 10:09 AM.  Always use your most recent med list. amLODIPine 10 mg tablet Commonly known as:  Angelina Freitas Take  by mouth daily. aspirin delayed-release 325 mg tablet Take  by mouth every six (6) hours as needed for Pain.  
  
 hydroCHLOROthiazide 25 mg tablet Commonly known as:  HYDRODIURIL Take 25 mg by mouth daily. To-Do List   
 As directed Imaging:  CT ABD PELV W CONT Introducing [facility]! [employer] introduces Ufree patient portal. Now you can access parts of your medical record, email your doctor's office, and request medication refills online. 1. In your internet browser, go to [email or web address] 2. Click on the First Time User? Click Here link in the Sign In box. You will see the New Member Sign Up page. 3. Enter your Ufree Access Code exactly as it appears below. You will not need to use this code after youve completed the sign-up process. If you do not sign up before the expiration date, you must request a new code. · Ufree Access Code: [email or web address] Expires: 1/8/2019  8:49 AM 
 
4. Enter the last four digits of your Social Security Number (xxxx) and Date of Birth (mm/dd/yyyy) as indicated and click Submit. You will be taken to the next sign-up page. 5. Create a Ufree ID. This will be your Ufree login ID and cannot be changed, so think of one that is secure and easy to remember. 6. Create a Ufree password. You can change your password at any time. 7. Enter your Password Reset Question and Answer. This can be used at a later time if you forget your password. 8. Enter your e-mail address. You will receive e-mail notification when new information is available in [address] 9. Click Sign Up. You can now view and download portions of your medical record. 10. Click the Download Summary menu link to download a portable copy of your medical information. If you have questions, please visit the Frequently Asked Questions section of the [employer] website. Remember, [employer] is NOT to be used for urgent needs. For medical emergencies, dial 911. Now available from your iPhone and Android! Please provide this summary of care documentation to your next provider. If you have any questions after today's visit, please call [phone].

## 2020-09-27 NOTE — PROGRESS NOTES
Elsas Vivi Martin, & Dann Marshall    Admit Date: 9/25/2020    Subjective:     She says she is continuing to improve. No new complaints.         Current Facility-Administered Medications   Medication Dose Route Frequency    digoxin (LANOXIN) tablet 0.125 mg  0.125 mg Oral DAILY    Warfarin - HELD today due to supratherapeutic INR   Other ONCE    bumetanide (BUMEX) injection 2 mg  2 mg IntraVENous DAILY    cloNIDine HCL (CATAPRES) tablet 0.1 mg  0.1 mg Oral Q4H PRN    albuterol (PROVENTIL VENTOLIN) nebulizer solution 2.5 mg  2.5 mg Nebulization QID PRN    cyclobenzaprine (FLEXERIL) tablet 10 mg  10 mg Oral TID PRN    dilTIAZem ER (CARDIZEM CD) capsule 360 mg  360 mg Oral DAILY    DULoxetine (CYMBALTA) capsule 60 mg  60 mg Oral DAILY    ezetimibe (ZETIA) tablet 10 mg  10 mg Oral DAILY    arformoteroL (BROVANA) neb solution 15 mcg  15 mcg Nebulization BID RT    And    budesonide (PULMICORT) 500 mcg/2 ml nebulizer suspension  1,000 mcg Nebulization BID RT    gabapentin (NEURONTIN) capsule 300 mg  300 mg Oral DAILY    gabapentin (NEURONTIN) capsule 600 mg  600 mg Oral QHS    insulin NPH (NOVOLIN N, HUMULIN N) injection 35 Units  35 Units SubCUTAneous DAILY    insulin NPH (NOVOLIN N, HUMULIN N) injection 25 Units  25 Units SubCUTAneous QHS    insulin regular (NOVOLIN R, HUMULIN R) injection 10 Units  10 Units SubCUTAneous TIDAC    potassium chloride SR (KLOR-CON 10) tablet 20 mEq  20 mEq Oral BID    ondansetron (ZOFRAN) injection 4 mg  4 mg IntraVENous Q6H PRN    acetaminophen (TYLENOL) tablet 650 mg  650 mg Oral Q4H PRN    glucose chewable tablet 16 g  4 Tab Oral PRN    glucagon (GLUCAGEN) injection 1 mg  1 mg IntraMUSCular PRN    dextrose 10% infusion 0-250 mL  0-250 mL IntraVENous PRN    insulin lispro (HUMALOG) injection   SubCUTAneous AC&HS    predniSONE (DELTASONE) tablet 40 mg  40 mg Oral DAILY WITH BREAKFAST    albuterol-ipratropium (DUO-NEB) 2.5 MG-0.5 MG/3 ML  3 mL Nebulization QID RT    Warfarin pharmacy to dose   Other Rx Dosing/Monitoring    sertraline (ZOLOFT) tablet 100 mg  100 mg Oral QHS          Objective:     Patient Vitals for the past 8 hrs:   BP Temp Pulse Resp SpO2   09/27/20 1139 (!) 157/55 98.3 °F (36.8 °C) 83 20 99 %   09/27/20 0821 (!) 149/68 97.9 °F (36.6 °C) 66 18 100 %     09/27 0701 - 09/27 1900  In: 240 [P.O.:240]  Out: 600 [Urine:600]  09/25 1901 - 09/27 0700  In: 6910 [P.O.:1830]  Out: 7837 [Urine:3450]    Physical Exam: NAD. Neck -- Supple. No JVD. Heart -- Irr Irr (Rate OK). Lungs -- CTA. Abd -- Benign. Ext -- Trace, if any, LE edema, b/l.       Data Review   Recent Results (from the past 24 hour(s))   GLUCOSE, POC    Collection Time: 09/26/20  4:13 PM   Result Value Ref Range    Glucose (POC) 293 (H) 65 - 100 mg/dL    Performed by Alison Martinez    GLUCOSE, POC    Collection Time: 09/26/20  9:15 PM   Result Value Ref Range    Glucose (POC) 404 (H) 65 - 100 mg/dL    Performed by [address] [place] [address] + INR    Collection Time: 09/27/20  3:27 AM   Result Value Ref Range    INR 5.4 (HH) 0.9 - 1.1      Prothrombin time 48.7 (H) 9.0 - 11.1 sec   DIGOXIN    Collection Time: 09/27/20  3:27 AM   Result Value Ref Range    Digoxin level 1.1 0.90 - 9.35 NG/ML   METABOLIC PANEL, BASIC    Collection Time: 09/27/20  3:27 AM   Result Value Ref Range    Sodium 135 (L) 136 - 145 mmol/L    Potassium 4.3 3.5 - 5.1 mmol/L    Chloride 103 97 - 108 mmol/L    CO2 27 21 - 32 mmol/L    Anion gap 5 5 - 15 mmol/L    Glucose 317 (H) 65 - 100 mg/dL    BUN 33 (H) 6 - 20 MG/DL    Creatinine 1.34 (H) 0.55 - 1.02 MG/DL    BUN/Creatinine ratio 25 (H) 12 - 20      GFR est AA 47 (L) >60 ml/min/1.73m2    GFR est non-AA 38 (L) >60 ml/min/1.73m2    Calcium 9.1 8.5 - 10.1 MG/DL   GLUCOSE, POC    Collection Time: 09/27/20  6:39 AM   Result Value Ref Range    Glucose (POC) 271 (H) 65 - 100 mg/dL    Performed by Or[place] Kellee (CON)            Assessment:     Principal Problem:    Respiratory failure -- Probably combination of asthma & CHF (acute on chronic diastolic dysfunction). Active Problems:    Chronic atrial fibrillation (7/17/2015)      Long-term insulin use in type 2 diabetes ([facility] (11/10/2015)      Hypertension complicating diabetes ([facility] (2/20/2017)        Plan:     1. On IV Bumex -- Agree with one more day of this. 2. Cont prednisone, nebs. 3. Pharmacy dosing coumadin.           Maddy Hernandez MD

## 2020-09-27 NOTE — PROGRESS NOTES
[facility] Cardiology Progress Note    Date of service: 9/27/2020    Subjective:     Ms Deidra Torres say she is feeling quite a bit better this morning. Breathing easier. Diuresed fairly well with IV bumex. Was singing in the shower this morning. Had some fast rates on telemetry in the afternoon after activity and nebs, but for the most part her a.fib has been well rate controlled. Objective:    Visit Vitals  BP (!) 156/54 (BP 1 Location: Left arm, BP Patient Position: At rest;Post activity)   Pulse 77   Temp 98.1 °F (36.7 °C)   Resp 18   Ht 5' 7\" (1.702 m)   Wt 120.8 kg (266 lb 5.1 oz)   SpO2 97%   BMI 41.71 kg/m²       Physical Exam   Constitutional: She is oriented to person, place, and time. She appears well-developed and well-nourished. HENT:   Head: Normocephalic and atraumatic. Eyes: Conjunctivae are normal. No scleral icterus. Neck: No JVD present. Cardiovascular: Normal rate, regular rhythm and normal heart sounds. Exam reveals no gallop. No murmur heard. Pulmonary/Chest: Effort normal. No stridor. No respiratory distress. She has no wheezes. She has rales in the right lower field and the left lower field. Abdominal: Soft. She exhibits no distension. Musculoskeletal:         General: No deformity or edema. Neurological: She is alert and oriented to person, place, and time. Skin: Skin is warm and dry. Psychiatric: She has a normal mood and affect.  Her behavior is normal.       Data reviewed:  Recent Results (from the past 12 hour(s))   GLUCOSE, POC    Collection Time: 09/26/20  9:15 PM   Result Value Ref Range    Glucose (POC) 404 (H) 65 - 100 mg/dL    Performed by Dom Zamorano    PROTHROMBIN TIME + INR    Collection Time: 09/27/20  3:27 AM   Result Value Ref Range    INR 5.4 (HH) 0.9 - 1.1      Prothrombin time 48.7 (H) 9.0 - 11.1 sec   DIGOXIN    Collection Time: 09/27/20  3:27 AM   Result Value Ref Range    Digoxin level 1.1 0.90 - 6.51 NG/ML   METABOLIC PANEL, BASIC    Collection Time: 09/27/20  3:27 AM   Result Value Ref Range    Sodium 135 (L) 136 - 145 mmol/L    Potassium 4.3 3.5 - 5.1 mmol/L    Chloride 103 97 - 108 mmol/L    CO2 27 21 - 32 mmol/L    Anion gap 5 5 - 15 mmol/L    Glucose 317 (H) 65 - 100 mg/dL    BUN 33 (H) 6 - 20 MG/DL    Creatinine 1.34 (H) 0.55 - 1.02 MG/DL    BUN/Creatinine ratio 25 (H) 12 - 20      GFR est AA 47 (L) >60 ml/min/1.73m2    GFR est non-AA 38 (L) >60 ml/min/1.73m2    Calcium 9.1 8.5 - 10.1 MG/DL   GLUCOSE, POC    Collection Time: 09/27/20  6:39 AM   Result Value Ref Range    Glucose (POC) 271 (H) 65 - 100 mg/dL    Performed by Diana Crowder (МАРИЯ)      Tele: beverly, currently well rate controlled    Assessment:    1. Acute on chronic diastolic heart failure  Improved with IV duiretics  2. Other persistent atrial fibrillation  Dig level 1.1  3. Type II diabetes with diabetic CKD, poorly controlled  4. CKD stage 3  5. Morbid obesity    Plan:    One more day of IV diuretics suggested  Cut digoxin dose to 0.125mg daily (goal level is <1)  She could probably go home tomorrow based on current progress. Signed:  Naresh Smith MD  Interventional Cardiology  9/27/2020

## 2020-09-27 NOTE — PROGRESS NOTES
Pharmacist Note - Warfarin Dosing  Consult provided for this 67 y. o.female to manage warfarin for Atrial Fibrillation    INR Goal: 2 - 3    Home regimen/ tablet size: 5 mg 3 times a week and 2.5 mg 4 times a week (according to rx query, PTA med list say 2.5 mg 3 x week and 5 mg 4x week) last dose taken 9/24 evening    Drugs that may increase INR: None  Drugs that may decrease INR: None  Other current anticoagulants/ drugs that may increase bleeding risk: None  Risk factors: Age > 65  Daily INR ordered: YES    Recent Labs     09/27/20  0327 09/26/20  0426 09/25/20  1030   HGB  --  10.2* 12.1   INR 5.4* 5.4* 5.7*     Date               INR                  Dose  9/25  5.7  hold   9/26  5.4  hold                9/27  5.4  hold                                                                  Assessment/ Plan: Will hold warfarin today for supratherapuetic INR    Pharmacy will continue to monitor daily and adjust therapy as indicated. Please contact the pharmacist at  [phone] for outpatient recommendations if needed.         Eloisa Izaguirre, PharmD, BCPP, [facility] Specialist, [facility]

## 2020-09-27 NOTE — PROGRESS NOTES
1935: Bedside shift change report given to Jessica Maguire (oncoming nurse) by Lencho Castillo (offgoing nurse). Report included the following information SBAR, Intake/Output, MAR, Accordion, Recent Results, Med Rec Status and Cardiac Rhythm AFib.     0740: Bedside shift change report given to [facility] (oncoming nurse) by Jessica Maguire (offgoing nurse). Report included the following information SBAR, Intake/Output, MAR, Accordion, Recent Results, Med Rec Status and Cardiac Rhythm AFib.

## 2020-09-28 VITALS
BODY MASS INDEX: 41.8 KG/M2 | TEMPERATURE: 98.5 F | OXYGEN SATURATION: 98 % | WEIGHT: 266.32 LBS | HEIGHT: 67 IN | HEART RATE: 85 BPM | RESPIRATION RATE: 18 BRPM | SYSTOLIC BLOOD PRESSURE: 143 MMHG | DIASTOLIC BLOOD PRESSURE: 85 MMHG

## 2020-09-28 LAB
ANION GAP SERPL CALC-SCNC: 8 MMOL/L (ref 5–15)
BASOPHILS # BLD: 0.1 K/UL (ref 0–0.1)
BASOPHILS NFR BLD: 0 % (ref 0–1)
BUN SERPL-MCNC: 33 MG/DL (ref 6–20)
BUN/CREAT SERPL: 26 (ref 12–20)
CALCIUM SERPL-MCNC: 10.1 MG/DL (ref 8.5–10.1)
CHLORIDE SERPL-SCNC: 102 MMOL/L (ref 97–108)
CO2 SERPL-SCNC: 26 MMOL/L (ref 21–32)
CREAT SERPL-MCNC: 1.26 MG/DL (ref 0.55–1.02)
DIFFERENTIAL METHOD BLD: ABNORMAL
EOSINOPHIL # BLD: 0 K/UL (ref 0–0.4)
EOSINOPHIL NFR BLD: 0 % (ref 0–7)
ERYTHROCYTE [DISTWIDTH] IN BLOOD BY AUTOMATED COUNT: 14.5 % (ref 11.5–14.5)
GLUCOSE BLD STRIP.AUTO-MCNC: 289 MG/DL (ref 65–100)
GLUCOSE SERPL-MCNC: 316 MG/DL (ref 65–100)
HCT VFR BLD AUTO: 31.6 % (ref 35–47)
HGB BLD-MCNC: 10.3 G/DL (ref 11.5–16)
IMM GRANULOCYTES # BLD AUTO: 0.1 K/UL (ref 0–0.04)
IMM GRANULOCYTES NFR BLD AUTO: 1 % (ref 0–0.5)
INR PPP: 3.9 (ref 0.9–1.1)
LYMPHOCYTES # BLD: 1.6 K/UL (ref 0.8–3.5)
LYMPHOCYTES NFR BLD: 12 % (ref 12–49)
MCH RBC QN AUTO: 29.1 PG (ref 26–34)
MCHC RBC AUTO-ENTMCNC: 32.6 G/DL (ref 30–36.5)
MCV RBC AUTO: 89.3 FL (ref 80–99)
MONOCYTES # BLD: 0.9 K/UL (ref 0–1)
MONOCYTES NFR BLD: 6 % (ref 5–13)
NEUTS SEG # BLD: 10.8 K/UL (ref 1.8–8)
NEUTS SEG NFR BLD: 81 % (ref 32–75)
NRBC # BLD: 0 K/UL (ref 0–0.01)
NRBC BLD-RTO: 0 PER 100 WBC
PLATELET # BLD AUTO: 249 K/UL (ref 150–400)
PMV BLD AUTO: 9.1 FL (ref 8.9–12.9)
POTASSIUM SERPL-SCNC: 4.6 MMOL/L (ref 3.5–5.1)
PROTHROMBIN TIME: 36.5 SEC (ref 9–11.1)
RBC # BLD AUTO: 3.54 M/UL (ref 3.8–5.2)
SERVICE CMNT-IMP: ABNORMAL
SODIUM SERPL-SCNC: 136 MMOL/L (ref 136–145)
WBC # BLD AUTO: 13.4 K/UL (ref 3.6–11)

## 2020-09-28 PROCEDURE — 82962 GLUCOSE BLOOD TEST: CPT

## 2020-09-28 PROCEDURE — 80048 BASIC METABOLIC PNL TOTAL CA: CPT

## 2020-09-28 PROCEDURE — 85610 PROTHROMBIN TIME: CPT

## 2020-09-28 PROCEDURE — 85025 COMPLETE CBC W/AUTO DIFF WBC: CPT

## 2020-09-28 PROCEDURE — 74011636637 HC RX REV CODE- 636/637: Performed by: INTERNAL MEDICINE

## 2020-09-28 PROCEDURE — 74011250637 HC RX REV CODE- 250/637: Performed by: INTERNAL MEDICINE

## 2020-09-28 PROCEDURE — 36415 COLL VENOUS BLD VENIPUNCTURE: CPT

## 2020-09-28 PROCEDURE — 74011000250 HC RX REV CODE- 250: Performed by: INTERNAL MEDICINE

## 2020-09-28 RX ORDER — WARFARIN SODIUM 5 MG/1
5 TABLET ORAL
Qty: 30 TAB | Refills: 3 | Status: SHIPPED
Start: 2020-10-02 | End: 2020-11-05

## 2020-09-28 RX ORDER — DIGOXIN 250 MCG
0.12 TABLET ORAL DAILY
Qty: 45 TAB | Refills: 3 | Status: SHIPPED
Start: 2020-09-28 | End: 2020-11-12

## 2020-09-28 RX ORDER — BUMETANIDE 2 MG/1
3 TABLET ORAL DAILY
Qty: 135 TAB | Refills: 3 | Status: SHIPPED | OUTPATIENT
Start: 2020-09-28 | End: 2021-07-07

## 2020-09-28 RX ORDER — WARFARIN 2.5 MG/1
2.5 TABLET ORAL
Qty: 30 TAB | Refills: 3 | Status: SHIPPED
Start: 2020-09-30

## 2020-09-28 RX ORDER — PREDNISONE 10 MG/1
TABLET ORAL
Qty: 28 TAB | Refills: 0 | Status: SHIPPED | OUTPATIENT
Start: 2020-09-28 | End: 2020-10-08

## 2020-09-28 RX ADMIN — PREDNISONE 40 MG: 20 TABLET ORAL at 08:44

## 2020-09-28 RX ADMIN — DIGOXIN 0.12 MG: 125 TABLET ORAL at 08:44

## 2020-09-28 RX ADMIN — BUMETANIDE 2 MG: 0.25 INJECTION INTRAMUSCULAR; INTRAVENOUS at 08:45

## 2020-09-28 RX ADMIN — POTASSIUM CHLORIDE 20 MEQ: 750 TABLET, FILM COATED, EXTENDED RELEASE ORAL at 08:44

## 2020-09-28 RX ADMIN — INSULIN LISPRO 5 UNITS: 100 INJECTION, SOLUTION INTRAVENOUS; SUBCUTANEOUS at 08:44

## 2020-09-28 RX ADMIN — DULOXETINE 60 MG: 60 CAPSULE, DELAYED RELEASE ORAL at 08:44

## 2020-09-28 RX ADMIN — HUMAN INSULIN 35 UNITS: 100 INJECTION, SUSPENSION SUBCUTANEOUS at 08:43

## 2020-09-28 RX ADMIN — DILTIAZEM HYDROCHLORIDE 360 MG: 180 CAPSULE, COATED, EXTENDED RELEASE ORAL at 08:44

## 2020-09-28 RX ADMIN — GABAPENTIN 300 MG: 100 CAPSULE ORAL at 08:44

## 2020-09-28 NOTE — PROGRESS NOTES
Reason for Admission: Patient admitted for respiratory failure/distress, CHF and asthma exacerbation                  RUR Score:  26% risk of re-admission        PCP: First and Last name: Dr. Judith Stephens    Name of Practice:  [facility]   Are you a current patient: Yes/No: Yes   Approximate date of last visit: 2/24/2020   Can you do a virtual visit with your PCP: N/A             Resources/supports as identified by patient/family:   Patient has daughter as family support                 Top Challenges facing patient (as identified by patient/family and CM): Finances/Medication cost?  None identified, utilizes [facility] pharmacy for prescriptions                   Transportation? Patient drover herself to [facility], plans to drive herself home- patient lives close-by to [facility]              Support system or lack thereof? Daughter is primary support                      Living arrangements? Patient lives in [place] close to [facility] independently               Self-care/ADLs/Cognition? Patient is alert and oriented x4, independent with ADLs and mobility- has cane and rollator PRN           Current Advanced Directive/Advance Care Plan: Full Code, AMD on file- daughter Geraldine Lin is primary Healthcare agent, secondary is [address] for utilizing home health:  No home health needs identified at this time                    Transition of Care Plan:   Home    Medicare pt has received and reviewed 2nd IM letter informing them of their right to appeal the discharge. Copy has been placed on pt bedside chart. The CM met with the patient at bedside in order to introduce the role of CM and assess for patient needs. The patient lives in [place] close to [facility] independently, verified demographics and insurance information. The patient is independent with ADLs and mobility, has cane and rollator at home and utilizes PRN if needed.  The patient drove self to hospital, plans on driving herself home- RN aware. The patient denied having any concerns for transition home. The CM will follow for transitions of care needs.  DORYS Alvares     Care Management Interventions  PCP Verified by CM: Yes(Patient verified PCP as Dr. Madonna Thorpe )  Mode of Transport at Discharge: Self  Transition of Care Consult (CM Consult): Discharge Planning  MyChart Signup: Yes  Physical Therapy Consult: No  Occupational Therapy Consult: No  Current Support Network: Own Home, Lives Alone  Confirm Follow Up Transport: Self  Discharge Location  Discharge Placement: Home

## 2020-09-28 NOTE — PROGRESS NOTES
Pharmacist Note - Warfarin Dosing  Consult provided for this 67 y. o.female to manage warfarin for Atrial Fibrillation    INR Goal: 2 - 3    Home regimen/ tablet size: 5 mg 3 times a week and 2.5 mg 4 times a week (according to rx query, PTA med list say 2.5 mg 3 x week and 5 mg 4x week) last dose taken 9/24 evening    Drugs that may increase INR: None  Drugs that may decrease INR: None  Other current anticoagulants/ drugs that may increase bleeding risk: None  Risk factors: Age > 65  Daily INR ordered: YES    Recent Labs     09/28/20  0310 09/27/20  0327 09/26/20  0426 09/25/20  1030   HGB 10.3*  --  10.2* 12.1   INR 3.9* 5.4* 5.4* 5.7*     Date               INR                  Dose  9/25  5.7  hold   9/26  5.4  hold                9/27  5.4  hold         9/28                3.9                   hold                                                           Assessment/ Plan: Will hold warfarin today for supratherapuetic INR    Pharmacy will continue to monitor daily and adjust therapy as indicated. Please contact the pharmacist at   for outpatient recommendations if needed.

## 2020-09-28 NOTE — DISCHARGE SUMMARY
Physician Discharge Summary     Patient ID:  Graciela Holman  [id number]  67 y.o.  1945    Admit date: 9/25/2020    Discharge date and time: 9/28/2020    Briefly, pt was admitted with Respiratory failure ([facility] [J96.90]. For details of admission, see H&P. Hospital Course:  Pt was admitted with resp failure. This was probably due to combination of CHF & asthma exacerbation. She was given prednisone. Pulmonary saw pt. Cardiology also saw pt. She was IV diuresed. She clinically improved. Her INR was markedly elevated, and she says she may have messed up her coumadin. This is being adjusted. Pt being discharged on higher dose of Bumex, decreased dose of digoxin, prednisone taper and an adjusted coumadin dose. F/u with me in 1 week. Discharge Dx: Resp failure due to combination of acute on chronic diastolic CHF & asthma exacerbation. Condition at discharge: improved. Disposition: home    Patient Instructions:   Current Discharge Medication List      START taking these medications    Details   predniSONE (DELTASONE) 10 mg tablet Take 40 mg by mouth daily (with breakfast) for 3 days, THEN 30 mg daily (with breakfast) for 3 days, THEN 20 mg daily (with breakfast) for 3 days, THEN 10 mg daily (with breakfast) for 1 day. Qty: 28 Tab, Refills: 0         CONTINUE these medications which have CHANGED    Details   bumetanide (BUMEX) 2 mg tablet Take 1.5 Tabs by mouth daily. Qty: 135 Tab, Refills: 3      !! warfarin (COUMADIN) 5 mg tablet Take 1 Tab by mouth two (2) times daily (on Monday and Friday). M-W-F: 2.5 mg  Tu-Th-Sat-Sun: 5 mg  Qty: 30 Tab, Refills: 3      !! warfarin (COUMADIN) 2.5 mg tablet Take 1 Tab by mouth five (5) days a week. M-W-F: 2.5 mg  Tu-Th-Sat-Sun: 5 mg  Qty: 30 Tab, Refills: 3      digoxin (LANOXIN) 0.25 mg tablet Take 0.5 Tabs by mouth daily. Qty: 45 Tab, Refills: 3       !! - Potential duplicate medications found. Please discuss with provider.       CONTINUE these medications which have NOT CHANGED    Details   potassium chloride SR (KLOR-CON 10) 10 mEq tablet TAKE 2 TABS BY MOUTH TWO (2) TIMES A DAY. Qty: 360 Tab, Refills: 3      dilTIAZem ER (CARDIZEM CD) 120 mg capsule TAKE 3 CAPSULES BY MOUTH EVERY DAY  Qty: 270 Cap, Refills: 3      sertraline (ZOLOFT) 100 mg tablet TAKE 1 TABLET EVERY EVENING  Qty: 90 Tab, Refills: 3      !! insulin NPH (NOVOLIN N, HUMULIN N) 100 unit/mL injection 35 Units by SubCUTAneous route every morning. INJECT 35 UNITS UNDER THE SKIN IN THE MORNING AND INJECT 25 UNITS AT BEDTIME OR AS DIRECTED      !! insulin NPH (NOVOLIN N, HUMULIN N) 100 unit/mL injection 25 Units by SubCUTAneous route nightly. INJECT 35 UNITS UNDER THE SKIN IN THE MORNING AND INJECT 25 UNITS AT BEDTIME OR AS DIRECTED      insulin regular (NOVOLIN R REGULAR U-100 INSULN) 100 unit/mL injection 15 Units by SubCUTAneous route Before breakfast, lunch, and dinner. !! gabapentin (NEURONTIN) 300 mg capsule Take 600 mg by mouth nightly. DULoxetine (CYMBALTA) 60 mg capsule Take 60 mg by mouth daily. Biotin 2,500 mcg cap Take 1 Cap by mouth daily. b complex vitamins tablet Take 1 Tab by mouth daily. omalizumab (XOLAIR) 150 mg solr 150 mg by SubCUTAneous route every fourteen (14) days. Next dose due 2/17      therapeutic multivitamin [facility]) tablet Take 1 Tab by mouth daily. PROVENTIL HFA 90 mcg/actuation inhaler inhale 2 puffs four times a day if needed for wheezing  Qty: 3 Inhaler, Refills: 3      cholecalciferol, vitamin D3, (VITAMIN D3) 2,000 unit tab Take 2,000 Units by mouth daily. DOCOSAHEXANOIC ACID/EPA (FISH OIL PO) Take 1 Cap by mouth daily. ezetimibe (ZETIA) 10 mg tablet Take 1 Tab by mouth daily.   Qty: 90 Tab, Refills: 3      fluconazole (DIFLUCAN) 150 mg tablet TAKE 1 TABLET BY MOUTH FOR 1 DOSE  Qty: 1 Tab, Refills: 2    Comments: PT WOULD LIKE 3 INSTEAD OF ONE      cyclobenzaprine (FLEXERIL) 10 mg tablet TAKE 1 TABLET BY MOUTH THREE TIMES A DAY AS NEEDED FOR MUSCLE SPASM  Qty: 30 Tab, Refills: 3      albuterol (PROVENTIL VENTOLIN) 2.5 mg /3 mL (0.083 %) nebu 3 mL by Nebulization route four (4) times daily as needed for Wheezing or Shortness of Breath. Qty: 100 Each, Refills: 5      fluticasone propion-salmeterol (WIXELA INHUB) 500-50 mcg/dose diskus inhaler Take 1 Puff by inhalation two (2) times a day. !! gabapentin (NEURONTIN) 300 mg capsule Take 600 mg by mouth nightly. OTHER Allergy shots weekly. EPINEPHrine (EPIPEN) 0.3 mg/0.3 mL injection 0.3 mg by IntraMUSCular route once as needed. !! - Potential duplicate medications found. Please discuss with provider. STOP taking these medications       dilTIAZem ER (CARDIZEM LA) 360 mg tablet Comments:   Reason for Stopping:         furosemide (LASIX) 40 mg tablet Comments:   Reason for Stopping: Follow-up with Dr. Madonna Thorpe in 1 week.       Signed:  Guillermo Duong MD  9/28/2020  8:38 AM

## 2020-09-28 NOTE — DISCHARGE INSTRUCTIONS
Patient Discharge Instructions    Faiza [facility] / [id number] : 1945    Admitted 2020 Discharged: 2020     Take Home Medications       · It is important that you take the medication exactly as they are prescribed. · Keep your medication in the bottles provided by the pharmacist and keep a list of the medication names, dosages, and times to be taken in your wallet. · Do not take other medications without consulting your doctor. What to do at Home    Recommended diet: Cardiac Diet and Diabetic Diet. Recommended activity: Activity as tolerated. Follow-up with Dr. Franchesca Lemos in 1 week. Information obtained by :  I understand that if any problems occur once I am at home I am to contact my physician. I understand and acknowledge receipt of the instructions indicated above.                                                                                                                                            Physician's or R.N.'s Signature                                                                  Date/Time                                                                                                                                              Patient or Representative Signature                                                          Date/Time

## 2020-09-28 NOTE — ROUTINE PROCESS
Patients PCP, Dr. Tessie Robert, prefers that the patients call and schedule their own appt. Hospital follow-up visit has been scheduled with [facility] for 9/30/2020.   Office will contact patient prior to arrival.  Eddie Todd, Care Management Specialist.

## 2020-09-28 NOTE — PROGRESS NOTES
Bedside and Verbal shift change report given to [facility] (oncoming nurse) by Enrique Rendon (offgoing nurse). Report included the following information SBAR, Kardex, Procedure Summary, Intake/Output, MAR, Recent Results and Cardiac Rhythm AFIB. I have reviewed discharge instructions with the patient. The patient verbalized understanding.

## 2020-09-28 NOTE — PROGRESS NOTES
Pt feeling better & wants to go home. Am discharging her home. Medication changes:  Increase Bumex to 3 mg every day. Decrease dig to 0.125 mg every day. Decrease coumadin dosing. Prednisone taper. F/u with me in 1 week.

## 2020-09-29 ENCOUNTER — PATIENT OUTREACH (OUTPATIENT)
Dept: CASE MANAGEMENT | Age: 75
End: 2020-09-29

## 2020-09-29 NOTE — PROGRESS NOTES
Patient contacted regarding recent discharge and COVID-19 risk. Discussed COVID-19 related testing which was not done at this time. Test results were not done. Patient informed of results, if available? n/a    Care Transition Nurse contacted the patient by telephone to perform post discharge assessment. Verified name and  with patient as identifiers. Patient reports she feeling better, taking medication as directed, will follow up with [facility] on 20. Patient has following risk factors of: heart failure, COPD, asthma, acute respiratory failure, diabetes and obesity,and advanced age. CTN/ACM/LPN reviewed discharge instructions, medical action plan and red flags related to discharge diagnosis. Reviewed and educated them on any new and changed medications related to discharge diagnosis. Advised obtaining a 90-day supply of all daily and as-needed medications. START taking these medications     Details   predniSONE (DELTASONE) 10 mg tablet Take 40 mg by mouth daily (with breakfast) for 3 days, THEN 30 mg daily (with breakfast) for 3 days, THEN 20 mg daily (with breakfast) for 3 days, THEN 10 mg daily (with breakfast) for 1 day. Qty: 28 Tab, Refills: 0     CONTINUE these medications which have CHANGED     Details   bumetanide (BUMEX) 2 mg tablet Take 1.5 Tabs by mouth daily. Qty: 135 Tab, Refills: 3       !! warfarin (COUMADIN) 5 mg tablet Take 1 Tab by mouth two (2) times daily (on Monday and Friday). M-W-F: 2.5 mg  --Sat-Sun: 5 mg  Qty: 30 Tab, Refills: 3       !! warfarin (COUMADIN) 2.5 mg tablet Take 1 Tab by mouth five (5) days a week. M-W-F: 2.5 mg  --Sat-Sun: 5 mg  Qty: 30 Tab, Refills: 3       digoxin (LANOXIN) 0.25 mg tablet Take 0.5 Tabs by mouth daily.   Qty: 45 Tab, Refills: 3   STOP:  furosemide 40 mg tablet  Commonly known as: LASIX    Advance Care Planning:   Does patient have an Advance Directive: yes; reviewed and current     Education provided regarding infection prevention, and signs and symptoms of COVID-19 and when to seek medical attention with patient who verbalized understanding. Discussed exposure protocols and quarantine from [address] you at higher risk for severe illness 2019 and given an opportunity for questions and concerns. The patient agrees to contact the PCP office for questions related to their healthcare. CTN/ACM/LPN provided contact information for future reference. From CDC: Are you at higher risk for severe illness?  Wash your hands often.  Avoid close contact (6 feet, which is about two arm lengths) with people who are sick.  Put distance between yourself and other people if COVID-19 is spreading in your community.  Clean and disinfect frequently touched surfaces.  Avoid all cruise travel and non-essential air travel.  Call your healthcare professional if you have concerns about COVID-19 and your underlying condition or if you are sick. For more information on steps you can take to protect yourself, see CDC's How to Protect Yourself      Patient/family/caregiver given information for [facility] Loop and agrees to enroll yes  Patient's preferred e-mail: [email or web address]   Patient's preferred phone number: [phone]   Based on Loop alert triggers, patient will be contacted by nurse care manager for worsening symptoms. Pt will be further monitored by COVID Loop Team based on severity of symptoms and risk factors.

## 2020-10-02 NOTE — PROGRESS NOTES
Physician Progress Note      Halina Escamilla  CSN #:                  [id number]  :                       1945  ADMIT DATE:       2020 9:56 AM  DISCH DATE:        2020 9:31 AM  RESPONDING  PROVIDER #:        Catrachito Finn MD          QUERY TEXT:    Dear Dr. Renetta Delaney,    Pt admitted with Acute on chronic diastolic CHF and asthma exacerbation. Patient documented have type 2 diabetes with diabetic CKD specifically in the Cardiology consult note. The CKD was stage 3. Patient also has hypertension. If possible, please document in progress notes and discharge summary the relationship, if any, between the CKD 3 and HTN. The medical record reflects the following:  Risk Factors: CKD stage 3, DM, HTN, CHF  Clinical Indicators: Cardiology CN indicates Type II diabetes with diabetic CKD; PCP H&P and PNs inidcate hypertension complicating diabetes; BP generally 150-160s/40-90s during admit; BG = 185-404 during admission; GFR = 36-41 during admit  Treatment: BP monitoring, diuretics (bumex), clonidine, SSI and scheduled insulin,  Cardiology consult    Thank Crystal Garcia RN  [facility]  [phone]  Options provided:  -- CKD stage 3 due to type 2 diabetes and HTN  -- CKD stage 3  due to type 2 diabetes only  -- Other - I will add my own diagnosis  -- Disagree - Not applicable / Not valid  -- Disagree - Clinically unable to determine / Unknown  -- Refer to Clinical Documentation Reviewer    PROVIDER RESPONSE TEXT:    This patient has CKD stage 3 due to type 2 diabetes and HTN. Query created by: Anup Krause on 10/2/2020 10:36 AM      QUERY TEXT:    Dear Dr. Renetta Delaney,    Pt admitted with Acute on chronic diastolic CHF and asthma exacerbation. Noted documentation of respiratory failure which is not specified as acute or chronic, hypoxic or hypercapnic in the H&P and PCP progress notes. Noted documentation of hypoxic respiratory failure in the Pulmonary consult notes.  Pt was placed on O2 at 2 LPM by nasal cannula only, no blood gases were drawn and the pt's pulse oximetry was documented to be between  throughout the admission. Pt was reported to be speaking in complete sentences and generally not to be in distress in the ED. Please indicate one of the following and document in the medical record: The medical record reflects the following:  Risk Factors: Asthma, atrial fibrillation, former smoker  Clinical Indicators: RR = 16-25 during admission; pulse oximetry during admission = % with the low pulse ox recorded as 94% on RA on 9/26 @ 1600; ED Provider notes cough and shortness of breath but normal pulmonary effort with no respiratory distress, pt speaking in complete sentences; Cardiology consult notes indicates moderate acute respiratory distress; no ABGs noted  Treatment: NC O2 at 2 LPM; duo-nebs, Brovanna, Pulmicort, monitoring of respiratory status, pulse oximetry, Pulmonary consult    Thank Kaley Fierro RN  [facility]  [phone]    Reference -  Acute Respiratory Failure Clinical Indicators per 3M MS-DRG Training Guide and Quick Reference Guide:  pO2 < 60 mmHg or SpO2 (pulse oximetry) < 91% breathing room air  pCO2 > 50 and pH < 7.35  P/F ratio (pO2 / FIO2) < 300  pO2 decrease or pCO2 increase by 10 mmHg from baseline (if known)  Supplemental oxygen of 40% or more  Presence of respiratory distress, tachypnea, dyspnea, shortness of breath, wheezing  Unable to speak in complete sentences  Use of accessory muscles to breathe  Extreme anxiety and feeling of impending doom  Tripod position  Confusion/altered mental status/obtunded  Options provided:  -- Acute Hypoxic Respiratory Failure as evidenced by, Please document evidence.   -- Acute Respiratory Failure ruled out after study  -- Other - I will add my own diagnosis  -- Disagree - Not applicable / Not valid  -- Disagree - Clinically unable to determine / Unknown  -- Refer to Clinical Documentation Reviewer    PROVIDER RESPONSE TEXT:    Acute Respiratory Failure has been ruled out after study.     Query created by: Gordo Salas on 10/2/2020 11:14 AM      Electronically signed by:  Tim West MD 10/2/2020 12:58 PM

## 2020-10-05 NOTE — NURSE NAVIGATOR
RE: NYHA on admission and discharge   Received: 3 days ago   Message Contents   MD Kevon Hall RN               NYHA 3 on adm, 2 at disch. Previous Messages     ----- Message -----   From: Kevon Edwards RN   Sent: 10/1/2020  11:26 AM EDT   To: Rosi Xiong MD   Subject: NYHA on admission and discharge                   Admission 9/25/20   Discharged 9/28/20     Could you please addend your discharge summary to reflect the NYHA functional classification of this patient on admission and also at discharge? If you're unable to addend discharge summary, you can respond to this message letting me know the NYHA class on admission and discharge, and I can note it in the chart. This documentation allows tracking of improvement of HF symptoms and is also a [facility] requirement for the Advanced Heart Failure Certification. Thanks,   ABILIO TurnerN, RN-BC, CHFN   Heart Failure Nurse Navigator           HF NN database has been updated to reflect above information.

## 2020-11-20 ENCOUNTER — APPOINTMENT (OUTPATIENT)
Dept: CT IMAGING | Age: 75
End: 2020-11-20
Attending: EMERGENCY MEDICINE
Payer: MEDICARE

## 2020-11-20 ENCOUNTER — HOSPITAL ENCOUNTER (EMERGENCY)
Age: 75
Discharge: HOME | End: 2020-11-21
Attending: EMERGENCY MEDICINE
Payer: MEDICARE
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
## 2020-11-20 DIAGNOSIS — R10.9 FLANK PAIN: ICD-10-CM

## 2020-11-20 LAB
ALBUMIN SERPL-MCNC: 3.5 G/DL (ref 3.5–5)
ALBUMIN/GLOB SERPL: 1 {RATIO} (ref 1.1–2.2)
ALP SERPL-CCNC: 83 U/L (ref 45–117)
ALT SERPL-CCNC: 28 U/L (ref 12–78)
ANION GAP SERPL CALC-SCNC: 5 MMOL/L (ref 5–15)
AST SERPL-CCNC: 20 U/L (ref 15–37)
BASOPHILS # BLD: 0.1 K/UL (ref 0–0.1)
BASOPHILS NFR BLD: 1 % (ref 0–1)
BILIRUB SERPL-MCNC: 0.4 MG/DL (ref 0.2–1)
BUN SERPL-MCNC: 35 MG/DL (ref 6–20)
BUN/CREAT SERPL: 27 (ref 12–20)
CALCIUM SERPL-MCNC: 9.4 MG/DL (ref 8.5–10.1)
CHLORIDE SERPL-SCNC: 100 MMOL/L (ref 97–108)
CO2 SERPL-SCNC: 30 MMOL/L (ref 21–32)
COMMENT, HOLDF: NORMAL
CREAT SERPL-MCNC: 1.3 MG/DL (ref 0.55–1.02)
DIFFERENTIAL METHOD BLD: ABNORMAL
EOSINOPHIL # BLD: 0 K/UL (ref 0–0.4)
EOSINOPHIL NFR BLD: 0 % (ref 0–7)
ERYTHROCYTE [DISTWIDTH] IN BLOOD BY AUTOMATED COUNT: 14.6 % (ref 11.5–14.5)
GLOBULIN SER CALC-MCNC: 3.6 G/DL (ref 2–4)
GLUCOSE SERPL-MCNC: 376 MG/DL (ref 65–100)
HCT VFR BLD AUTO: 36 % (ref 35–47)
HGB BLD-MCNC: 11.8 G/DL (ref 11.5–16)
IMM GRANULOCYTES # BLD AUTO: 0.1 K/UL (ref 0–0.04)
IMM GRANULOCYTES NFR BLD AUTO: 1 % (ref 0–0.5)
LIPASE SERPL-CCNC: 127 U/L (ref 73–393)
LYMPHOCYTES # BLD: 2.1 K/UL (ref 0.8–3.5)
LYMPHOCYTES NFR BLD: 16 % (ref 12–49)
MCH RBC QN AUTO: 29.1 PG (ref 26–34)
MCHC RBC AUTO-ENTMCNC: 32.8 G/DL (ref 30–36.5)
MCV RBC AUTO: 88.7 FL (ref 80–99)
MONOCYTES # BLD: 0.8 K/UL (ref 0–1)
MONOCYTES NFR BLD: 6 % (ref 5–13)
NEUTS SEG # BLD: 9.9 K/UL (ref 1.8–8)
NEUTS SEG NFR BLD: 76 % (ref 32–75)
NRBC # BLD: 0 K/UL (ref 0–0.01)
NRBC BLD-RTO: 0 PER 100 WBC
PLATELET # BLD AUTO: 248 K/UL (ref 150–400)
PMV BLD AUTO: 9.3 FL (ref 8.9–12.9)
POTASSIUM SERPL-SCNC: 4.6 MMOL/L (ref 3.5–5.1)
PROT SERPL-MCNC: 7.1 G/DL (ref 6.4–8.2)
RBC # BLD AUTO: 4.06 M/UL (ref 3.8–5.2)
SAMPLES BEING HELD,HOLD: NORMAL
SODIUM SERPL-SCNC: 135 MMOL/L (ref 136–145)
WBC # BLD AUTO: 13.1 K/UL (ref 3.6–11)

## 2020-11-20 PROCEDURE — 74011250636 HC RX REV CODE- 250/636: Performed by: EMERGENCY MEDICINE

## 2020-11-20 PROCEDURE — 96374 THER/PROPH/DIAG INJ IV PUSH: CPT

## 2020-11-20 PROCEDURE — 74011000258 HC RX REV CODE- 258: Performed by: RADIOLOGY

## 2020-11-20 PROCEDURE — 74177 CT ABD & PELVIS W/CONTRAST: CPT

## 2020-11-20 PROCEDURE — 83690 ASSAY OF LIPASE: CPT

## 2020-11-20 PROCEDURE — 99285 EMERGENCY DEPT VISIT HI MDM: CPT

## 2020-11-20 PROCEDURE — 80053 COMPREHEN METABOLIC PANEL: CPT

## 2020-11-20 PROCEDURE — 85025 COMPLETE CBC W/AUTO DIFF WBC: CPT

## 2020-11-20 PROCEDURE — 36415 COLL VENOUS BLD VENIPUNCTURE: CPT

## 2020-11-20 PROCEDURE — 74011000636 HC RX REV CODE- 636: Performed by: RADIOLOGY

## 2020-11-20 PROCEDURE — 70450 CT HEAD/BRAIN W/O DYE: CPT

## 2020-11-20 PROCEDURE — 96375 TX/PRO/DX INJ NEW DRUG ADDON: CPT

## 2020-11-20 RX ORDER — DICLOFENAC SODIUM 10 MG/G
4 GEL TOPICAL 4 TIMES DAILY
Qty: 100 G | Refills: 0 | Status: SHIPPED | OUTPATIENT
Start: 2020-11-20 | End: 2022-01-11

## 2020-11-20 RX ORDER — FENTANYL CITRATE 50 UG/ML
50 INJECTION, SOLUTION INTRAMUSCULAR; INTRAVENOUS
Status: COMPLETED | OUTPATIENT
Start: 2020-11-20 | End: 2020-11-20

## 2020-11-20 RX ORDER — LIDOCAINE 50 MG/G
PATCH TOPICAL
Qty: 30 EACH | Refills: 0 | Status: SHIPPED | OUTPATIENT
Start: 2020-11-20 | End: 2022-01-11

## 2020-11-20 RX ORDER — NAPROXEN 250 MG/1
250 TABLET ORAL 2 TIMES DAILY WITH MEALS
Qty: 20 TAB | Refills: 0 | Status: SHIPPED | OUTPATIENT
Start: 2020-11-20 | End: 2020-11-30

## 2020-11-20 RX ORDER — KETOROLAC TROMETHAMINE 30 MG/ML
15 INJECTION, SOLUTION INTRAMUSCULAR; INTRAVENOUS ONCE
Status: COMPLETED | OUTPATIENT
Start: 2020-11-20 | End: 2020-11-20

## 2020-11-20 RX ORDER — SODIUM CHLORIDE 0.9 % (FLUSH) 0.9 %
10 SYRINGE (ML) INJECTION
Status: COMPLETED | OUTPATIENT
Start: 2020-11-20 | End: 2020-11-20

## 2020-11-20 RX ADMIN — SODIUM CHLORIDE 500 ML: 900 INJECTION, SOLUTION INTRAVENOUS at 20:25

## 2020-11-20 RX ADMIN — SODIUM CHLORIDE 100 ML: 900 INJECTION, SOLUTION INTRAVENOUS at 21:33

## 2020-11-20 RX ADMIN — KETOROLAC TROMETHAMINE 15 MG: 30 INJECTION, SOLUTION INTRAMUSCULAR at 20:25

## 2020-11-20 RX ADMIN — IOPAMIDOL 100 ML: 755 INJECTION, SOLUTION INTRAVENOUS at 20:27

## 2020-11-20 RX ADMIN — FENTANYL CITRATE 50 MCG: 50 INJECTION, SOLUTION INTRAMUSCULAR; INTRAVENOUS at 21:55

## 2020-11-20 RX ADMIN — Medication 10 ML: at 21:33

## 2020-11-21 VITALS
OXYGEN SATURATION: 94 % | SYSTOLIC BLOOD PRESSURE: 135 MMHG | HEART RATE: 84 BPM | DIASTOLIC BLOOD PRESSURE: 72 MMHG | WEIGHT: 270 LBS | HEIGHT: 67 IN | BODY MASS INDEX: 42.38 KG/M2 | TEMPERATURE: 98.4 F | RESPIRATION RATE: 16 BRPM

## 2020-11-21 NOTE — ED PROVIDER NOTES
43-year-old female with history of asthma, diabetes, hypertension, and obesity presents after she fell at her daughter's house while at the top of the stairs. She fell forward and sideways striking her right flank on the ground on the hardwood floor. She did not fall down the stairs. She denies loss of consciousness. She did not hit her head. She feels that her right knee, which has been giving her trouble recently, gave out at the time. Fall   The fall occurred while walking. She fell from a height of ground level. She landed on hard floor. Associated symptoms include abdominal pain. Pertinent negatives include no visual change, no fever, no numbness, no nausea, no vomiting, no headaches, no extremity weakness, no loss of consciousness, no tingling and no laceration.         Past Medical History:   Diagnosis Date    Asthma     [facility]     Depression     DM (diabetes mellitus) ([facility]     HTN (hypertension)     Hyperlipidemia     Morbid obesity ([facility]     Neuropathy     LITA on CPAP        Past Surgical History:   Procedure Laterality Date    COLONOSCOPY Left 10/28/2019    COLONOSCOPY AND EGD performed by Ayan Quigley MD at [address] COLONOSCOPY N/A 7/15/2020    COLONOSCOPY :- performed by Ayan Quigley [facility] at [facility] ENDOSCOPY    HX APPENDECTOMY      HX CHOLECYSTECTOMY      HX GASTRIC BYPASS      Jejunal bypass with subsequent reversal..  Lab Band since    HX OTHER SURGICAL  D & C    HX EMILIANO AND BSO      For uterine CA         Family History:   Problem Relation Age of Onset    Stroke Mother     Diabetes Other     Heart Disease Other     Heart Disease Father         congestive heart failure       Social History     Socioeconomic History    Marital status:      Spouse name: Not on file    Number of children: Not on file    Years of education: Not on file    Highest education level: Not on file   Occupational History    Not on file   Social Needs    Financial resource strain: Not on file    Food insecurity     Worry: Not on file     Inability: Not on file    Transportation needs     Medical: Not on file     Non-medical: Not on file   Tobacco Use    Smoking status: Former Smoker     Last attempt to quit: 1992     Years since quittin.9    Smokeless tobacco: Never Used   Substance and Sexual Activity    Alcohol use: Yes     Alcohol/week: 2.0 standard drinks     Types: 2 Glasses of wine per week     Comment: occ wine    Drug use: No    Sexual activity: Not Currently   Lifestyle    Physical activity     Days per week: Not on file     Minutes per session: Not on file    Stress: Not on file   Relationships    Social connections     Talks on phone: Not on file     Gets together: Not on file     Attends Christianity service: Not on file     Active member of club or organization: Not on file     Attends meetings of clubs or organizations: Not on file     Relationship status: Not on file    Intimate partner violence     Fear of current or ex partner: Not on file     Emotionally abused: Not on file     Physically abused: Not on file     Forced sexual activity: Not on file   Other Topics Concern    Not on file   Social History Narrative    Not on file         ALLERGIES: Latex; Kiwi; Codeine; Lisinopril; Morphine; and Statins-hmg-coa reductase inhibitors    Review of Systems   Constitutional: Negative for fatigue and fever. HENT: Negative for sneezing and sore throat. Respiratory: Negative for cough and shortness of breath. Cardiovascular: Negative for chest pain and leg swelling. Gastrointestinal: Positive for abdominal pain. Negative for diarrhea, nausea and vomiting. Genitourinary: Negative for difficulty urinating and dysuria. Musculoskeletal: Positive for back pain. Negative for arthralgias, extremity weakness and myalgias. Skin: Negative for color change and rash.    Neurological: Negative for tingling, loss of consciousness, weakness, numbness and headaches. Psychiatric/Behavioral: Negative for agitation and behavioral problems. Vitals:    11/20/20 1953   Pulse: 78   Resp: 18   Temp: 98.7 °F (37.1 °C)   SpO2: 97%   Weight: 122.5 kg (270 lb)   Height: 5' 7\" (1.702 m)            Physical Exam  Vitals signs and nursing note reviewed. Constitutional:       General: She is not in acute distress. Appearance: Normal appearance. She is obese. She is not ill-appearing, toxic-appearing or diaphoretic. HENT:      Head: Normocephalic and atraumatic. Nose: Nose normal.      Mouth/Throat:      Mouth: Mucous membranes are moist.      Pharynx: Oropharynx is clear. Eyes:      Extraocular Movements: Extraocular movements intact. Conjunctiva/sclera: Conjunctivae normal.      Pupils: Pupils are equal, round, and reactive to light. Neck:      Musculoskeletal: Normal range of motion and neck supple. No neck rigidity or muscular tenderness. Cardiovascular:      Rate and Rhythm: Normal rate and regular rhythm. Pulses: Normal pulses. Heart sounds: Normal heart sounds. Pulmonary:      Effort: Pulmonary effort is normal. No respiratory distress. Breath sounds: Normal breath sounds. Abdominal:      General: There is no distension. Palpations: Abdomen is soft. There is no mass. Tenderness: There is abdominal tenderness. There is no guarding or rebound. Musculoskeletal: Normal range of motion. General: No swelling, tenderness, deformity or signs of injury. Back:       Right lower leg: No edema. Left lower leg: No edema. Skin:     General: Skin is warm and dry. Capillary Refill: Capillary refill takes less than 2 seconds. Findings: No laceration. Neurological:      General: No focal deficit present. Mental Status: She is alert and oriented to person, place, and time.    Psychiatric:         Mood and Affect: Mood normal.         Behavior: Behavior normal.          MDM  Number of Diagnoses or Management Options  Diagnosis management comments: 65-year-old female presents as above after a fall. Her work-up has been reassuring with no evidence of intra-abdominal injury. Plan to treat with anti-inflammatory as well as topical treatments and have her follow-up with primary care and return if needed.        Amount and/or Complexity of Data Reviewed  Clinical lab tests: reviewed  Tests in the radiology section of CPT®: reviewed  Decide to obtain previous medical records or to obtain history from someone other than the patient: yes           Procedures

## 2020-11-21 NOTE — ED NOTES
MD  reviewed discharge instructions and options with patient. Patient verbalized understanding. RN reviewed discharge instructions using teach back method. Patient wheeled to exit by wheelchair and no acute signs of distress. Daughter to drive the patient home. No complaints or needs expressed at this time. Patient counseled on medications prescribed at discharge. Vital signs stable. Patient to follow up with PCP in the morning for appointment.

## 2020-11-21 NOTE — ED TRIAGE NOTES
Patient was at her daughters house and was walking up the steps. On the last step fell up onto the floor. Stated that she thinks that her knee gave out. Stated that she fell flat on her back. Unable to sit up due to pain. Denies dizziness, blurred vision, tingling. Reports pain 8/10 pain to the right side of her back. Hematoma on posterior head.     Hx of cervical fusion of 3-4 vertebrae   On warfarin for afib

## 2021-06-23 PROBLEM — J96.00 ACUTE RESPIRATORY FAILURE (HCC): Status: RESOLVED | Noted: 2020-02-20 | Resolved: 2021-06-23

## 2021-08-03 PROBLEM — J18.9 PNA (PNEUMONIA): Status: RESOLVED | Noted: 2017-02-09 | Resolved: 2021-08-03

## 2021-11-02 NOTE — ED TRIAGE NOTES
Triage Note: Patient is coming over from Dr. Deonte Jones office for chest pain and shortness or breath for 3 weeks. Patient received Solu medrol and Albuterol treatment prior to leaving office. no

## 2021-11-08 PROBLEM — I50.31 ACUTE DIASTOLIC CHF (CONGESTIVE HEART FAILURE) (HCC): Status: RESOLVED | Noted: 2020-02-24 | Resolved: 2021-11-08

## 2022-01-11 PROBLEM — D68.69 SECONDARY HYPERCOAGULABLE STATE (HCC): Status: ACTIVE | Noted: 2022-01-11

## 2022-02-07 ENCOUNTER — OFFICE VISIT (OUTPATIENT)
Dept: ORTHOPEDIC SURGERY | Age: 77
End: 2022-02-07
Payer: MEDICARE

## 2022-02-07 VITALS — HEIGHT: 67 IN | WEIGHT: 264 LBS | BODY MASS INDEX: 41.44 KG/M2

## 2022-02-07 DIAGNOSIS — M17.11 ARTHRITIS OF RIGHT KNEE: ICD-10-CM

## 2022-02-07 DIAGNOSIS — M17.12 ARTHRITIS OF LEFT KNEE: Primary | ICD-10-CM

## 2022-02-07 PROCEDURE — 20610 DRAIN/INJ JOINT/BURSA W/O US: CPT | Performed by: PHYSICIAN ASSISTANT

## 2022-02-07 NOTE — PROGRESS NOTES
[place] (: 1945) is a 67 y.o. female patient, here for evaluation of the following chief complaint(s):  Knee Pain (bilateral knee pain)       ASSESSMENT/PLAN:  Below is the assessment and plan developed based on review of pertinent history, physical exam, labs, studies, and medications. 58-year-old female comes in today for bilateral knee pain. She has known tricompartmental osteoarthritic changes to both knees. She has been undergoing conservative treatment. Most recently she had a gel injection over a year ago and has done well. Symptoms have since returned. Would like to try repeat injections. After verbal consent was obtained I injected gel one into the right knee joint using sterile technique. Patient tolerated well. After verbal consent was obtained I injected fel one into the left knee joint using sterile technique. Patient tolerated well. Encouraged weight loss. Encourage physical therapy. Plans to follow-up as needed. 1. Arthritis of left knee  -     DRAIN/INJECT LARGE JOINT/BURSA  -     hyaluronate sodium, cross-linked (GEL-ONE) injection syrg 30 mg; 30 mg, Intra artICUlar, ONCE, 1 dose, On 22 at 1700  2. Arthritis of right knee  -     DRAIN/INJECT LARGE JOINT/BURSA  -     hyaluronate sodium, cross-linked (GEL-ONE) injection syrg 30 mg; 30 mg, Intra artICUlar, ONCE, 1 dose, On 22 at 1700      Encounter Diagnoses   Name Primary?  Arthritis of left knee Yes    Arthritis of right knee         No follow-ups on file. SUBJECTIVE/OBJECTIVE:  [place] (: 1945) is a 67 y.o. female who presents today for the following:  Chief Complaint   Patient presents with    Knee Pain     bilateral knee pain       58-year-old female comes in today for evaluation of bilateral knees. She has known tricompartmental knee osteoarthritis. She has been undergoing conservative treatment.   Most recently she had had gel injections done a year ago and got good relief. Symptoms have since returned. Rates the pain as moderate sharp in nature. She can only walk about 5 blocks without pain. She does use a cane or walking stick for balance. She continues to work on weight loss with physical therapy for strengthening conditioning her knees. IMAGING:  XR Results (most recent):  Results from Hospital Encounter encounter on 09/25/20    XR CHEST PORT    Narrative  EXAM: XR CHEST PORT    INDICATION: hypoxia/ ? CHF;    COMPARISON: Chest x-ray 3/11/2020    FINDINGS: A portable AP radiograph of the chest was obtained at 10:14 hours. The  patient is on a cardiac monitor. There is a small right pleural effusion and  some minimal interstitial prominence with otherwise clear lungs. The cardiac and  mediastinal contours and pulmonary vascularity are normal.  The bones and soft  tissues are grossly within normal limits. Impression  IMPRESSION: Small right pleural effusion and minimal interstitial prominence  likely reflective of mild pulmonary edema. Allergies   Allergen Reactions    Latex Itching    Kiwi Unknown (comments)    Codeine Rash    Lisinopril Other (comments)     Cough, vomiting, Visual disturbances    Morphine Itching    Statins-Hmg-Coa Reductase Inhibitors Other (comments)     Muscle enzyme problems       Current Outpatient Medications   Medication Sig    sertraline (ZOLOFT) 100 mg tablet Take 1.5 Tablets by mouth daily.  bumetanide (BUMEX) 2 mg tablet TAKE 1 AND 1/2 TABLETS DAILY BY MOUTH    albuterol (PROVENTIL HFA, VENTOLIN HFA, PROAIR HFA) 90 mcg/actuation inhaler TAKE 2 (TWO) PUFF PUFF EVERY FOUR HOURS, AS NEEDED    warfarin (COUMADIN) 5 mg tablet TAKE 1 TABLET A DAY FOR 3 DAYS IN THE WEEK AND 1/2 A DAY THE OTHER 4 DAYS, or as directed.  Tiadylt  mg capsule TAKE 1 CAPSULE BY MOUTH EVERY DAY    potassium chloride SR (KLOR-CON 10) 10 mEq tablet TAKE 2 TABS BY MOUTH TWO (2) TIMES A DAY.     warfarin (COUMADIN) 2.5 mg tablet Take 1 Tab by mouth five (5) days a week. M-W-F: 2.5 mg  Tu-Th-Sat-Sun: 5 mg    insulin NPH (NOVOLIN N, HUMULIN N) 100 unit/mL injection 35 Units by SubCUTAneous route every morning. INJECT 35 UNITS UNDER THE SKIN IN THE MORNING AND INJECT 25 UNITS AT BEDTIME OR AS DIRECTED    insulin NPH (NOVOLIN N, HUMULIN N) 100 unit/mL injection 25 Units by SubCUTAneous route nightly. INJECT 35 UNITS UNDER THE SKIN IN THE MORNING AND INJECT 25 UNITS AT BEDTIME OR AS DIRECTED    insulin regular (NOVOLIN R REGULAR U-100 INSULN) 100 unit/mL injection 15 Units by SubCUTAneous route Before breakfast, lunch, and dinner.  albuterol (PROVENTIL VENTOLIN) 2.5 mg /3 mL (0.083 %) nebu 3 mL by Nebulization route four (4) times daily as needed for Wheezing or Shortness of Breath.  OTHER Allergy shots weekly.  EPINEPHrine (EPIPEN) 0.3 mg/0.3 mL injection 0.3 mg by IntraMUSCular route once as needed.  Biotin 2,500 mcg cap Take 1 Cap by mouth daily.  omalizumab (XOLAIR) 150 mg solr 150 mg by SubCUTAneous route every fourteen (14) days. Next dose due 2/17    cholecalciferol, vitamin D3, (VITAMIN D3) 2,000 unit tab Take 2,000 Units by mouth daily.  DOCOSAHEXANOIC ACID/EPA (FISH OIL PO) Take 1 Cap by mouth daily.  ALPRAZolam (XANAX) 0.25 mg tablet Take 1 Tablet by mouth two (2) times daily as needed for Anxiety. Max Daily Amount: 0.5 mg. (Patient not taking: Reported on 2/7/2022)    fluticasone propion-salmeterol (WIXELA INHUB) 500-50 mcg/dose diskus inhaler Take 1 Puff by inhalation two (2) times a day. (Patient not taking: Reported on 2/7/2022)    therapeutic multivitamin [facility]) tablet Take 1 Tab by mouth daily.  (Patient not taking: Reported on 2/7/2022)     Current Facility-Administered Medications   Medication    hyaluronate sodium, cross-linked (GEL-ONE) injection syrg 30 mg    hyaluronate sodium, cross-linked (GEL-ONE) injection syrg 30 mg       Past Medical History:   Diagnosis Date    Asthma     [facility]     Depression     DM (diabetes mellitus) ([facility]     HTN (hypertension)     Hyperlipidemia     Morbid obesity ([facility]     Neuropathy     LITA on CPAP         Past Surgical History:   Procedure Laterality Date    COLONOSCOPY Left 10/28/2019    COLONOSCOPY AND EGD performed by Salma Graham MD at [address] COLONOSCOPY N/A 7/15/2020    COLONOSCOPY :- performed [facility] at [facility] ENDOSCOPY    HX APPENDECTOMY      HX CHOLECYSTECTOMY      HX GASTRIC BYPASS      Jejunal bypass with subsequent reversal..  Lab Band since    HX OTHER SURGICAL  D & C    HX EMILIANO AND BSO      For uterine CA       Family History   Problem Relation Age of Onset    Stroke Mother     Diabetes Other     Heart Disease Other     Heart Disease Father         congestive heart failure        Social History     Tobacco Use    Smoking status: Former Smoker     Quit date: 1992     Years since quittin.1    Smokeless tobacco: Never Used   Substance Use Topics    Alcohol use: Yes     Alcohol/week: 2.0 standard drinks     Types: 2 Glasses of wine per week     Comment: occ wine        All systems reviewed x 12 and were negative with the exception of None      No flowsheet data found. Vitals:  Ht 5' 7\" (1.702 m)   Wt 264 lb (119.7 kg)   BMI 41.35 kg/m²    Body mass index is 41.35 kg/m². Physical Exam    General: NAD, well developed, well nourished, alert and oriented x 3. Cardiac: Extremities well perfused    Respiratory: Nonlabored breathing    LLE: Mild antalgic gait. Mild effusion noted. No previous incisions noted. ROM 0-120 degrees. Grossly stable to varus/valgus stress and anterior/posterior drawer tests. Medial and lateral joint line tenderness. Motor grossly intact. RLE: Mild antalgic gait. Mild effusion noted. No previous incisions noted. ROM 0-120 degrees. Grossly stable to varus/valgus stress and anterior/posterior drawer tests.   Medial and lateral joint line tenderness. Motor grossly intact. Vascular: Palpable pedal pulses, equal bilaterally. Skin: Warm well perfused, cap refill < 2 sec. Gavin Segundo M.D. was available for immediate consultation as the supervising physician. An electronic signature was used to authenticate this note.   -- Michael Steinberg PA-C

## 2022-03-18 PROBLEM — F33.9 RECURRENT DEPRESSION (HCC): Status: ACTIVE | Noted: 2018-01-09

## 2022-03-18 PROBLEM — J18.9 PNEUMONIA: Status: ACTIVE | Noted: 2017-04-23

## 2022-03-18 PROBLEM — Z79.899 ENCOUNTER FOR LONG-TERM (CURRENT) DRUG USE: Status: ACTIVE | Noted: 2017-10-19

## 2022-03-18 PROBLEM — R04.2 HEMOPTYSIS: Status: ACTIVE | Noted: 2017-04-24

## 2022-03-18 PROBLEM — S93.402A LEFT ANKLE SPRAIN: Status: ACTIVE | Noted: 2017-05-21

## 2022-03-19 PROBLEM — E11.42 DIABETIC POLYNEUROPATHY ASSOCIATED WITH TYPE 2 DIABETES MELLITUS (HCC): Status: ACTIVE | Noted: 2017-07-05

## 2022-03-19 PROBLEM — E11.40 TYPE 2 DIABETES MELLITUS WITH DIABETIC NEUROPATHY (HCC): Status: ACTIVE | Noted: 2018-01-09

## 2022-03-19 PROBLEM — J11.1 INFLUENZA: Status: ACTIVE | Noted: 2020-02-20

## 2022-03-19 PROBLEM — M51.04 THORACIC DISC DISORDER WITH MYELOPATHY: Status: ACTIVE | Noted: 2017-05-22

## 2022-03-19 PROBLEM — R26.9 GAIT ABNORMALITY: Status: ACTIVE | Noted: 2017-05-22

## 2022-03-20 PROBLEM — N18.1 TYPE 2 DIABETES MELLITUS WITH STAGE 1 CHRONIC KIDNEY DISEASE, WITH LONG-TERM CURRENT USE OF INSULIN (HCC): Status: ACTIVE | Noted: 2017-05-25

## 2022-03-20 PROBLEM — Z79.4 TYPE 2 DIABETES MELLITUS WITH STAGE 1 CHRONIC KIDNEY DISEASE, WITH LONG-TERM CURRENT USE OF INSULIN (HCC): Status: ACTIVE | Noted: 2017-05-25

## 2022-03-20 PROBLEM — E11.59 HYPERTENSION COMPLICATING DIABETES (HCC): Status: ACTIVE | Noted: 2017-02-20

## 2022-03-20 PROBLEM — I15.2 HYPERTENSION COMPLICATING DIABETES (HCC): Status: ACTIVE | Noted: 2017-02-20

## 2022-03-20 PROBLEM — D68.69 SECONDARY HYPERCOAGULABLE STATE (HCC): Status: ACTIVE | Noted: 2022-01-11

## 2022-03-20 PROBLEM — E11.22 TYPE 2 DIABETES MELLITUS WITH STAGE 1 CHRONIC KIDNEY DISEASE, WITH LONG-TERM CURRENT USE OF INSULIN (HCC): Status: ACTIVE | Noted: 2017-05-25

## 2022-05-03 ENCOUNTER — HOSPITAL ENCOUNTER (OUTPATIENT)
Age: 77
Setting detail: OUTPATIENT SURGERY
Discharge: HOME | End: 2022-05-03
Attending: INTERNAL MEDICINE | Admitting: INTERNAL MEDICINE
Payer: MEDICARE

## 2022-05-03 ENCOUNTER — APPOINTMENT (OUTPATIENT)
Dept: GENERAL RADIOLOGY | Age: 77
End: 2022-05-03
Attending: INTERNAL MEDICINE
Payer: MEDICARE

## 2022-05-03 VITALS
RESPIRATION RATE: 16 BRPM | HEIGHT: 67 IN | SYSTOLIC BLOOD PRESSURE: 110 MMHG | DIASTOLIC BLOOD PRESSURE: 55 MMHG | BODY MASS INDEX: 43.47 KG/M2 | HEART RATE: 82 BPM | TEMPERATURE: 98.3 F | WEIGHT: 277 LBS | OXYGEN SATURATION: 94 %

## 2022-05-03 DIAGNOSIS — I48.19 PERSISTENT ATRIAL FIBRILLATION (HCC): ICD-10-CM

## 2022-05-03 LAB
ANION GAP SERPL CALC-SCNC: 10 MMOL/L (ref 5–15)
BASOPHILS # BLD: 0.1 K/UL (ref 0–0.1)
BASOPHILS NFR BLD: 1 % (ref 0–1)
BUN SERPL-MCNC: 42 MG/DL (ref 6–20)
BUN/CREAT SERPL: 30 (ref 12–20)
CALCIUM SERPL-MCNC: 9.2 MG/DL (ref 8.5–10.1)
CHLORIDE SERPL-SCNC: 108 MMOL/L (ref 97–108)
CO2 SERPL-SCNC: 22 MMOL/L (ref 21–32)
CREAT SERPL-MCNC: 1.38 MG/DL (ref 0.55–1.02)
DIFFERENTIAL METHOD BLD: ABNORMAL
EOSINOPHIL # BLD: 0.3 K/UL (ref 0–0.4)
EOSINOPHIL NFR BLD: 3 % (ref 0–7)
ERYTHROCYTE [DISTWIDTH] IN BLOOD BY AUTOMATED COUNT: 14.4 % (ref 11.5–14.5)
GLUCOSE BLD STRIP.AUTO-MCNC: 112 MG/DL (ref 65–117)
GLUCOSE BLD STRIP.AUTO-MCNC: 67 MG/DL (ref 65–117)
GLUCOSE BLD STRIP.AUTO-MCNC: 82 MG/DL (ref 65–117)
GLUCOSE SERPL-MCNC: 68 MG/DL (ref 65–100)
HCT VFR BLD AUTO: 35.3 % (ref 35–47)
HGB BLD-MCNC: 11.6 G/DL (ref 11.5–16)
IMM GRANULOCYTES # BLD AUTO: 0.1 K/UL (ref 0–0.04)
IMM GRANULOCYTES NFR BLD AUTO: 0 % (ref 0–0.5)
INR BLD: 1.2 (ref 0.9–1.2)
LYMPHOCYTES # BLD: 1.5 K/UL (ref 0.8–3.5)
LYMPHOCYTES NFR BLD: 13 % (ref 12–49)
MCH RBC QN AUTO: 29.5 PG (ref 26–34)
MCHC RBC AUTO-ENTMCNC: 32.9 G/DL (ref 30–36.5)
MCV RBC AUTO: 89.8 FL (ref 80–99)
MONOCYTES # BLD: 0.8 K/UL (ref 0–1)
MONOCYTES NFR BLD: 7 % (ref 5–13)
NEUTS SEG # BLD: 8.6 K/UL (ref 1.8–8)
NEUTS SEG NFR BLD: 76 % (ref 32–75)
NRBC # BLD: 0 K/UL (ref 0–0.01)
NRBC BLD-RTO: 0 PER 100 WBC
PLATELET # BLD AUTO: 234 K/UL (ref 150–400)
PMV BLD AUTO: 9 FL (ref 8.9–12.9)
POTASSIUM SERPL-SCNC: 4 MMOL/L (ref 3.5–5.1)
RBC # BLD AUTO: 3.93 M/UL (ref 3.8–5.2)
SERVICE CMNT-IMP: NORMAL
SODIUM SERPL-SCNC: 140 MMOL/L (ref 136–145)
WBC # BLD AUTO: 11.3 K/UL (ref 3.6–11)

## 2022-05-03 PROCEDURE — 74011000250 HC RX REV CODE- 250

## 2022-05-03 PROCEDURE — 99152 MOD SED SAME PHYS/QHP 5/>YRS: CPT | Performed by: INTERNAL MEDICINE

## 2022-05-03 PROCEDURE — 77030031139 HC SUT VCRL2 J&J -A: Performed by: INTERNAL MEDICINE

## 2022-05-03 PROCEDURE — 77030002996 HC SUT SLK J&J -A: Performed by: INTERNAL MEDICINE

## 2022-05-03 PROCEDURE — 77030041075 HC DRSG AG OPTIFRM MDII -B: Performed by: INTERNAL MEDICINE

## 2022-05-03 PROCEDURE — 71045 X-RAY EXAM CHEST 1 VIEW: CPT

## 2022-05-03 PROCEDURE — 33208 INSRT HEART PM ATRIAL & VENT: CPT | Performed by: INTERNAL MEDICINE

## 2022-05-03 PROCEDURE — 99153 MOD SED SAME PHYS/QHP EA: CPT | Performed by: INTERNAL MEDICINE

## 2022-05-03 PROCEDURE — A4565 SLINGS: HCPCS | Performed by: INTERNAL MEDICINE

## 2022-05-03 PROCEDURE — 85610 PROTHROMBIN TIME: CPT

## 2022-05-03 PROCEDURE — C1786 PMKR, SINGLE, RATE-RESP: HCPCS | Performed by: INTERNAL MEDICINE

## 2022-05-03 PROCEDURE — C1892 INTRO/SHEATH,FIXED,PEEL-AWAY: HCPCS | Performed by: INTERNAL MEDICINE

## 2022-05-03 PROCEDURE — 77030022704 HC SUT VLOC COVD -B: Performed by: INTERNAL MEDICINE

## 2022-05-03 PROCEDURE — 85025 COMPLETE CBC W/AUTO DIFF WBC: CPT

## 2022-05-03 PROCEDURE — 77030018729 HC ELECTRD DEFIB PAD CARD -B: Performed by: INTERNAL MEDICINE

## 2022-05-03 PROCEDURE — 80048 BASIC METABOLIC PNL TOTAL CA: CPT

## 2022-05-03 PROCEDURE — 82962 GLUCOSE BLOOD TEST: CPT

## 2022-05-03 PROCEDURE — 2709999900 HC NON-CHARGEABLE SUPPLY: Performed by: INTERNAL MEDICINE

## 2022-05-03 PROCEDURE — 74011250636 HC RX REV CODE- 250/636: Performed by: INTERNAL MEDICINE

## 2022-05-03 PROCEDURE — 36415 COLL VENOUS BLD VENIPUNCTURE: CPT

## 2022-05-03 PROCEDURE — C1781 MESH (IMPLANTABLE): HCPCS | Performed by: INTERNAL MEDICINE

## 2022-05-03 PROCEDURE — C1898 LEAD, PMKR, OTHER THAN TRANS: HCPCS | Performed by: INTERNAL MEDICINE

## 2022-05-03 PROCEDURE — C1769 GUIDE WIRE: HCPCS | Performed by: INTERNAL MEDICINE

## 2022-05-03 PROCEDURE — 74011000250 HC RX REV CODE- 250: Performed by: INTERNAL MEDICINE

## 2022-05-03 PROCEDURE — C1893 INTRO/SHEATH, FIXED,NON-PEEL: HCPCS | Performed by: INTERNAL MEDICINE

## 2022-05-03 DEVICE — IPG W1SR01 AZURE XT SR MRI USA
Type: IMPLANTABLE DEVICE | Status: FUNCTIONAL
Brand: AZURE™ XT SR MRI SURESCAN™

## 2022-05-03 DEVICE — ENVELOPE CMRM6133 ABSORB LRG MR
Type: IMPLANTABLE DEVICE | Status: FUNCTIONAL
Brand: TYRX™

## 2022-05-03 DEVICE — LEAD 383069 MRI US
Type: IMPLANTABLE DEVICE | Status: FUNCTIONAL
Brand: SELECTSECURE™ MRI SURESCAN™

## 2022-05-03 RX ORDER — FENTANYL CITRATE 50 UG/ML
INJECTION, SOLUTION INTRAMUSCULAR; INTRAVENOUS AS NEEDED
Status: DISCONTINUED | OUTPATIENT
Start: 2022-05-03 | End: 2022-05-03 | Stop reason: HOSPADM

## 2022-05-03 RX ORDER — SODIUM CHLORIDE 9 MG/ML
25 INJECTION, SOLUTION INTRAVENOUS CONTINUOUS
Status: DISCONTINUED | OUTPATIENT
Start: 2022-05-03 | End: 2022-05-03 | Stop reason: HOSPADM

## 2022-05-03 RX ORDER — SODIUM CHLORIDE 0.9 % (FLUSH) 0.9 %
5-40 SYRINGE (ML) INJECTION EVERY 8 HOURS
Status: DISCONTINUED | OUTPATIENT
Start: 2022-05-03 | End: 2022-05-03 | Stop reason: HOSPADM

## 2022-05-03 RX ORDER — SODIUM CHLORIDE 0.9 % (FLUSH) 0.9 %
5-40 SYRINGE (ML) INJECTION AS NEEDED
Status: DISCONTINUED | OUTPATIENT
Start: 2022-05-03 | End: 2022-05-03 | Stop reason: HOSPADM

## 2022-05-03 RX ORDER — MIDAZOLAM HYDROCHLORIDE 1 MG/ML
INJECTION, SOLUTION INTRAMUSCULAR; INTRAVENOUS AS NEEDED
Status: DISCONTINUED | OUTPATIENT
Start: 2022-05-03 | End: 2022-05-03 | Stop reason: HOSPADM

## 2022-05-03 RX ORDER — LIDOCAINE HYDROCHLORIDE AND EPINEPHRINE 5; 5 MG/ML; UG/ML
INJECTION, SOLUTION INFILTRATION; PERINEURAL AS NEEDED
Status: DISCONTINUED | OUTPATIENT
Start: 2022-05-03 | End: 2022-05-03 | Stop reason: HOSPADM

## 2022-05-03 RX ADMIN — SODIUM CHLORIDE 25 ML/HR: 9 INJECTION, SOLUTION INTRAVENOUS at 08:50

## 2022-05-03 NOTE — PROGRESS NOTES
Cardiac Cath Lab Procedure Area Arrival Note:    [place] arrived to Cardiac Cath Lab, Procedure Area. Patient identifiers verified with NAME and DATE OF BIRTH. Procedure verified with patient. Consent forms verified. Allergies verified. Patient informed of procedure and plan of care. Questions answered with review. Patient voiced understanding of procedure and plan of care. Patient on cardiac monitor, non-invasive blood pressure, SPO2 monitor. On ra, placed on O2 @ 2 lpm via nc. IV of ns on pump at 25 ml/hr. Patient status doing well without problems. Patient is A&Ox 4. Patient reports back pain #3. Patient medicated during procedure with orders obtained and verified by Dr. Huma Frye. Refer to patients Cardiac Cath Lab PROCEDURE REPORT for vital signs, assessment, status, and response during procedure, printed at end of case. Printed report on chart or scanned into chart.

## 2022-05-03 NOTE — PROCEDURES
Procedure:  Pacemaker implant left side    Indication: Pacemaker insertion left bundle position prior to planned av node ablation. PROCEDURES PERFORMED:  1. Conscious sedation. 2. Fluoroscopy for lead placement. 3. Permanent Pacemaker Implantation:      A: [employer]onic VVIR Pacemaker Tatianna [id number]      B: Placement of ventricular lead with threshold testing. Lead: [id number]        COMPLICATIONS:None. ESTIMATED BLOOD LOSS: Minimal  SPECIMENS: None  ASSISTANTS: See Shaunna    PROCEDURAL NOTE:  The patient was brought to the laboratory in a sedated postabsorptive state. The left chest wall was prepped and draped in the usual fashion and anesthetized with 20 mL of 2% lidocaine. Incision was made over the deltopectoral groove and extended to the level of the pectoralis fascia. A pocket was made anterior to the pectoralis fascia for in which to implant the device. The left cephalic vein was ligated with one 0- silk tie. A venotomy was created followed by the passing of a guide wire and tear away sheath. With the use of a modified extended hook catheter and a C-315 His sheath a 3830 lead was placed to the His position and advanced aprox 2 cm apically. Based on EKG criteria with avl/ avr discordance and lead II R wave  > III, the lead was placed until a terminal R wave was observed in V1. QRSD was 134   Onset to peak in V5 was  83. The ventricular lead was inserted and advanced to the right ventricular apex. Threshold testing was performed. Once adequate position was obtained the peel-away sheath was removed after a new J-wire was advanced using a retained wire technique with a 7-Malay peel-away sheath. The pacemaker pocket was flushed with antibiotic containing sterile saline  solution. The lead was attached to generator. Lead and generator placed in the pocket with the leads posterior to the generator in an antibiotic eluting pouch . Subcutaneous tissue closed in 2 layers utilizing #2-0 Vicryl.  Skin closed with dermabond glue with an excellent final result. The patient was transferred to the holding area    No complications were noted. IMPRESSION AND RECOMMENDATION:  The patient will be followed up in the Pacemaker [facility] [facility]. 2..

## 2022-05-03 NOTE — H&P
CARDIOLOGY ADMIT                Assessment:     Assessment:       Active Problems:    * No active hospital problems. *       Plan:    1. Afib  Rates poorly contolled  plan pacemaker insertion and eventual av node ablation  In 2 -3 weeks           Subjective:    Date of  Admission: 5/3/2022  7:30 AM     Admission type:Elective    [facility] [place] is a 65 y.o. female admitted for Persistent atrial fibrillation ([address] [I48.19]. sob and palpitations  No recent chest pain or presyncope     Patient Active Problem List    Diagnosis Date Noted    Secondary hypercoagulable state ([address] 01/11/2022    Asthma     Influenza 02/20/2020    Recurrent depression ([address] 01/09/2018    Type 2 diabetes mellitus with diabetic neuropathy ([address] 01/09/2018    Encounter for long-term (current) drug use 10/19/2017    Diabetic polyneuropathy associated with type 2 diabetes mellitus ([address] 07/05/2017    Type 2 diabetes mellitus with stage 1 chronic kidney disease, with long-term current use of insulin ([address] 05/25/2017    Thoracic disc disorder with myelopathy 05/22/2017    Gait abnormality 05/22/2017    Left ankle sprain 05/21/2017    Hemoptysis 04/24/2017    Pneumonia 04/23/2017    Hypertension complicating diabetes ([address] 02/20/2017    Microalbuminuria 05/24/2016    Long-term insulin use in type 2 diabetes ([address] 11/10/2015    Chronic atrial fibrillation 07/17/2015    Hematoma of neck 07/09/2015    Cervical stenosis of spine 07/06/2015    Morbid obesity ([address] 02/18/2014    Dizziness 02/17/2014    Weakness 02/17/2014    Anemia 01/22/2014    Asthma exacerbation 01/21/2014    Hyperlipidemia     Depression     Neuropathy       Analia Reyes MD  Past Medical History:   Diagnosis Date    Asthma     Atrial fibrillation ([address]     Depression     DM (diabetes mellitus) ([address]     HTN (hypertension)     Hyperlipidemia     Morbid obesity ([address]     Neuropathy     LITA on CPAP       Past Surgical History:   Procedure Laterality Date    COLONOSCOPY Left 10/28/2019    COLONOSCOPY AND EGD performed by Marlen Spring MD at [address] COLONOSCOPY N/A 7/15/2020    COLONOSCOPY :- performed by Marlen Spring MD at [address] HX APPENDECTOMY      HX CHOLECYSTECTOMY      HX GASTRIC BYPASS      Jejunal bypass with subsequent reversal..  Lab Band since    HX OTHER SURGICAL  D & C    HX EMILIANO AND BSO      For uterine CA     Allergies   Allergen Reactions    Latex Itching    Kiwi Unknown (comments)    Codeine Rash    Lisinopril Other (comments)     Cough, vomiting, Visual disturbances    Morphine Itching    Statins-Hmg-Coa Reductase Inhibitors Other (comments)     Muscle enzyme problems      Family History   Problem Relation Age of Onset    Stroke Mother     Diabetes Other     Heart Disease Other     Heart Disease Father         congestive heart failure      Current Facility-Administered Medications   Medication Dose Route Frequency    0.9% sodium chloride infusion  25 mL/hr IntraVENous CONTINUOUS         Review of Symptoms:  A comprehensive review of systems was negative. No hemoptysis, hematemesis, epistaxis, melena, hematuria. No fevers,  Rashes, seizures, visual disturbances, difficulty walking, no abdominal pain         Physical Exam    Visit Vitals  BP (!) 135/56 (BP 1 Location: Right upper arm, BP Patient Position: At rest)   Pulse 89   Temp 98.3 °F (36.8 °C)   Resp 14   Ht 5' 7\" (1.702 m)   Wt 125.6 kg (277 lb)   SpO2 100%   Breastfeeding No   BMI 43.38 kg/m²     Skin warm and dry  PERRLA, EOMI  Oropharynx without exudate. Mallampati 2  Neck supple, thyroid not enlarged  Lungs clear  PMI non displaced. Normal S1/ S2   No Mummurs, click or Rubs  No S3 or S4  Abdomen soft and non tender, No Hepatosplenomegaly  Pulses 2+ throughout,    Neuro:    normal facial grimace,  Moves all extremities.    AAAO  unanxious        Recent Results (from the past 24 hour(s))   CBC WITH AUTOMATED DIFF    Collection Time: 05/03/22  8:23 AM   Result Value Ref Range    WBC 11.3 (H) 3.6 - 11.0 K/uL    RBC 3.93 3.80 - 5.20 M/uL    HGB 11.6 11.5 - 16.0 g/dL    HCT 35.3 35.0 - 47.0 %    MCV 89.8 80.0 - 99.0 FL    MCH 29.5 26.0 - 34.0 PG    MCHC 32.9 30.0 - 36.5 g/dL    RDW 14.4 11.5 - 14.5 %    PLATELET 933 871 - 573 K/uL    MPV 9.0 8.9 - 12.9 FL    NRBC 0.0 0  WBC    ABSOLUTE NRBC 0.00 0.00 - 0.01 K/uL    NEUTROPHILS 76 (H) 32 - 75 %    LYMPHOCYTES 13 12 - 49 %    MONOCYTES 7 5 - 13 %    EOSINOPHILS 3 0 - 7 %    BASOPHILS 1 0 - 1 %    IMMATURE GRANULOCYTES 0 0.0 - 0.5 %    ABS. NEUTROPHILS 8.6 (H) 1.8 - 8.0 K/UL    ABS. LYMPHOCYTES 1.5 0.8 - 3.5 K/UL    ABS. MONOCYTES 0.8 0.0 - 1.0 K/UL    ABS. EOSINOPHILS 0.3 0.0 - 0.4 K/UL    ABS. BASOPHILS 0.1 0.0 - 0.1 K/UL    ABS. IMM.  GRANS. 0.1 (H) 0.00 - 0.04 K/UL    DF AUTOMATED     GLUCOSE, POC    Collection Time: 05/03/22  8:25 AM   Result Value Ref Range    Glucose (POC) 67 65 - 117 mg/dL    Performed by Marilou Gan

## 2022-05-03 NOTE — DISCHARGE SUMMARY
CARDIOLOGY SHORT STAY D/C    Patient stable after * pacemaker insertion    LB placement of RV lead     Visit Vitals  BP (!) 135/56 (BP 1 Location: Right upper arm, BP Patient Position: At rest)   Pulse 89   Temp 98.3 °F (36.8 °C)   Resp 14   Ht 5' 7\" (1.702 m)   Wt 125.6 kg (277 lb)   SpO2 100%   Breastfeeding No   BMI 43.38 kg/m²     Cardiovascular: regular rate and rhythm, no edema, no murmurs  Respiratory: clear to ascultation bilaterally  Musculoskeletal: Incision ok,    Interrogation: Ok  Tele: afib     PLAN    1) Discharge to home in stable condition    2)  Followup as below:    2 weeks     3) Discharge meds as below:    Current Discharge Medication List      CONTINUE these medications which have NOT CHANGED    Details   semaglutide (OZEMPIC) 0.25 mg or 0.5 mg/dose (2 mg/1.5 ml) subq pen 0.5 mg by SubCUTAneous route every seven (7) days. Administer every Friday      gabapentin (NEURONTIN) 300 mg capsule TAKE 1 CAPSULE BY MOUTH THREE TIMES A DAY  Qty: 270 Capsule, Refills: 1    Comments: This request is for a new prescription for a controlled substance as required by Federal/State law. Associated Diagnoses: Idiopathic peripheral neuropathy      sertraline (ZOLOFT) 100 mg tablet Take 1.5 Tablets by mouth daily. Qty: 135 Tablet, Refills: 3    Associated Diagnoses: Depression, unspecified depression type      bumetanide (BUMEX) 2 mg tablet TAKE 1 AND 1/2 TABLETS DAILY BY MOUTH  Qty: 135 Tablet, Refills: 0      albuterol (PROVENTIL HFA, VENTOLIN HFA, PROAIR HFA) 90 mcg/actuation inhaler TAKE 2 (TWO) PUFF PUFF EVERY FOUR HOURS, AS NEEDED  Qty: 8.5 Each, Refills: 11    Comments: DX Code Needed  . Associated Diagnoses: Severe persistent asthma, uncomplicated      Tiadylt  mg capsule TAKE 1 CAPSULE BY MOUTH EVERY DAY  Qty: 90 Capsule, Refills: 3      potassium chloride SR (KLOR-CON 10) 10 mEq tablet TAKE 2 TABS BY MOUTH TWO (2) TIMES A DAY.   Qty: 360 Tablet, Refills: 3      !! insulin NPH (Daniel Conklinse N) 100 unit/mL injection 35 Units by SubCUTAneous route every morning. INJECT 35 UNITS UNDER THE SKIN IN THE MORNING AND INJECT 25 UNITS AT BEDTIME OR AS DIRECTED      !! insulin NPH (NOVOLIN N, HUMULIN N) 100 unit/mL injection 25 Units by SubCUTAneous route nightly. INJECT 35 UNITS UNDER THE SKIN IN THE MORNING AND INJECT 25 UNITS AT BEDTIME OR AS DIRECTED      fluticasone propion-salmeterol (WIXELA INHUB) 500-50 mcg/dose diskus inhaler Take 1 Puff by inhalation two (2) times a day. Biotin 2,500 mcg cap Take 1 Cap by mouth daily. cholecalciferol, vitamin D3, (VITAMIN D3) 2,000 unit tab Take 2,000 Units by mouth daily. DOCOSAHEXANOIC ACID/EPA (FISH OIL PO) Take 1 Cap by mouth daily. !! warfarin (COUMADIN) 5 mg tablet TAKE 1 TABLET A DAY FOR 3 DAYS IN THE WEEK AND 1/2 A DAY THE OTHER 4 DAYS, or as directed. Qty: 64 Tablet, Refills: 3      ALPRAZolam (XANAX) 0.25 mg tablet Take 1 Tablet by mouth two (2) times daily as needed for Anxiety. Max Daily Amount: 0.5 mg.  Qty: 30 Tablet, Refills: 2    Associated Diagnoses: Anxiety      !! warfarin (COUMADIN) 2.5 mg tablet Take 1 Tab by mouth five (5) days a week. M-W-F: 2.5 mg  Tu-Th-Sat-Sun: 5 mg  Qty: 30 Tab, Refills: 3      insulin regular (NOVOLIN R REGULAR U-100 INSULN) 100 unit/mL injection 15 Units by SubCUTAneous route Before breakfast, lunch, and dinner. albuterol (PROVENTIL VENTOLIN) 2.5 mg /3 mL (0.083 %) nebu 3 mL by Nebulization route four (4) times daily as needed for Wheezing or Shortness of Breath. Qty: 100 Each, Refills: 5      OTHER Allergy shots weekly. EPINEPHrine (EPIPEN) 0.3 mg/0.3 mL injection 0.3 mg by IntraMUSCular route once as needed. omalizumab (XOLAIR) 150 mg solr 150 mg by SubCUTAneous route every fourteen (14) days. Next dose due 2/17      therapeutic multivitamin [facility]) tablet Take 1 Tab by mouth daily. !! - Potential duplicate medications found. Please discuss with provider.

## 2022-05-03 NOTE — PROGRESS NOTES
TRANSFER - IN REPORT:    Verbal report received from Omi Fletcher RN on [facility]  being received from Electrophysiology Lab  for routine progression of care. Report consisted of patients Situation, Background, Assessment and Recommendations(SBAR). Information from the following report(s) Kardex and Procedure Summary was reviewed with the receiving clinician. Opportunity for questions and clarification was provided. Assessment completed upon patients arrival to [address] [place] [address] and care assumed. Cardiac Cath Lab Recovery Arrival Note:    [place] arrived to [facility] recovery area. Patient procedure= PPI. Patient on cardiac monitor, non-invasive blood pressure, SPO2 monitor. On RA . IV  of NS on pump at 25 ml/hr. Patient status doing well without problems. Patient is A&Ox 3. Patient reports no pain. PROCEDURE SITE CHECK:    Procedure site:without any bleeding and no hematoma, No pain/discomfort reported at procedure site. No change in patient status. Continue to monitor patient and status.

## 2022-05-03 NOTE — PROGRESS NOTES
Cardiac Cath Lab Recovery Arrival Note:      [place] arrived to Cardiac Cath Lab, Recovery Area. Staff introduced to patient. Patient identifiers verified with NAME and DATE OF BIRTH. Procedure verified with patient. Consent forms reviewed and signed by patient or authorized representative and verified. Allergies verified. Patient and family oriented to department. Patient and family informed of procedure and plan of care. Questions answered with review. Patient prepped for procedure, per orders from physician, prior to arrival.    Patient on cardiac monitor, non-invasive blood pressure, SPO2 monitor. On room air. Patient is A&Ox 4. Patient reports no pain. Patient in stretcher, in low position, with side rails up, call bell within reach, patient instructed to call if assistance as needed. Patient prep in: [address], [address] Patient family has pager # [phone]  Family in: [phone].    Prep by: EDITH

## 2022-05-03 NOTE — PROGRESS NOTES
Patient Discharged home via wheelchair with discharge instructions and discharge instructions given to daughter.

## 2022-05-03 NOTE — PROGRESS NOTES
TRANSFER - OUT REPORT:    Verbal report given to [facility] on [facility] being transferred to  for routine progression of care       Report consisted of patients Situation, Background, Assessment and   Recommendations(SBAR). Information from the following report(s) SBAR, Procedure Summary and MAR was reviewed with the receiving nurse. Opportunity for questions and clarification was provided.

## 2022-05-13 PROBLEM — D69.2 SENILE PURPURA (HCC): Status: ACTIVE | Noted: 2022-05-13

## 2022-05-16 ENCOUNTER — OFFICE VISIT (OUTPATIENT)
Dept: ORTHOPEDIC SURGERY | Age: 77
End: 2022-05-16
Payer: MEDICARE

## 2022-05-16 VITALS — WEIGHT: 284 LBS | BODY MASS INDEX: 44.57 KG/M2 | HEIGHT: 67 IN

## 2022-05-16 DIAGNOSIS — M17.11 ARTHRITIS OF RIGHT KNEE: ICD-10-CM

## 2022-05-16 DIAGNOSIS — M25.562 CHRONIC PAIN OF BOTH KNEES: Primary | ICD-10-CM

## 2022-05-16 DIAGNOSIS — G89.29 CHRONIC PAIN OF BOTH KNEES: Primary | ICD-10-CM

## 2022-05-16 DIAGNOSIS — M25.561 CHRONIC PAIN OF BOTH KNEES: Primary | ICD-10-CM

## 2022-05-16 DIAGNOSIS — M17.12 ARTHRITIS OF LEFT KNEE: ICD-10-CM

## 2022-05-16 PROCEDURE — 20610 DRAIN/INJ JOINT/BURSA W/O US: CPT | Performed by: PHYSICIAN ASSISTANT

## 2022-05-16 RX ORDER — TRIAMCINOLONE ACETONIDE 40 MG/ML
40 INJECTION, SUSPENSION INTRA-ARTICULAR; INTRAMUSCULAR ONCE
Status: COMPLETED | OUTPATIENT
Start: 2022-05-16 | End: 2022-05-16

## 2022-05-16 RX ADMIN — TRIAMCINOLONE ACETONIDE 40 MG: 40 INJECTION, SUSPENSION INTRA-ARTICULAR; INTRAMUSCULAR at 16:25

## 2022-05-16 NOTE — PROGRESS NOTES
[place] (: 1945) is a 65 y.o. female patient, here for evaluation of the following chief complaint(s):  Knee Pain (bilateral knee pain)       ASSESSMENT/PLAN:  Below is the assessment and plan developed based on review of pertinent history, physical exam, labs, studies, and medications. 65-year-old female comes in today for bilateral knee pain. She has known tricompartmental osteoarthritic changes of both knees. She recently had a set of gel injections 3+ months ago, states they have since worn off symptoms have returned. Rates pain as severe. She would like to try a steroid injection. Has had these in the past and has done well. After verbal consent was obtained I injected 40 mg triamcinolone into the right knee joint using sterile technique. Patient tolerated well. After verbal consent was obtained I injected 40 mg triamcinolone into the left knee joint using sterile technique. Patient tolerated well. Encouraged ongoing weight loss. Encouraged physical therapy. Plans to follow-up as needed. 1. Chronic pain of both knees  2. Arthritis of left knee  -     DRAIN/INJECT LARGE JOINT/BURSA  -     triamcinolone acetonide (KENALOG-40) 40 mg/mL injection 40 mg; 40 mg, Intra artICUlar, ONCE, 1 dose, On 22 at 1700  3. Arthritis of right knee  -     DRAIN/INJECT LARGE JOINT/BURSA  -     triamcinolone acetonide (KENALOG-40) 40 mg/mL injection 40 mg; 40 mg, Intra artICUlar, ONCE, 1 dose, On Mon 22 at 1700      Encounter Diagnoses   Name Primary?  Chronic pain of both knees Yes    Arthritis of left knee     Arthritis of right knee         No follow-ups on file. SUBJECTIVE/OBJECTIVE:  [place] (: 1945) is a 65 y.o. female who presents today for the following:  Chief Complaint   Patient presents with    Knee Pain     bilateral knee pain       65-year-old female comes in today for follow-up of bilateral knees.   She has known tricompartmental knee osteoarthritis. She is been undergoing conservative treatment. Most recently she has been gel injections 3+ months ago and symptoms have since returned. Rates it as moderate to severe in nature. Affects her daily. Can only walk 1-2 blocks without issues. Has to use a cane. IMAGING:  XR Results (most recent):  Results from Hospital Encounter encounter on 05/03/22    XR CHEST PORT    Narrative  INDICATION: Pacemaker placement    COMPARISON: 9/25/2020    FINDINGS: AP portable imaging of the chest performed at 11:27 AM demonstrates a  stable cardiomediastinal silhouette. Left-sided pacemaker has its lead  terminating at the level of the right ventricle. The lungs are clear  bilaterally. No pneumothorax or pleural effusion is evident. Fusion hardware is  again noted in the lower cervical spine. Impression  No evidence of pneumothorax following pacemaker placement. Allergies   Allergen Reactions    Latex Itching    Kiwi Unknown (comments)    Codeine Rash    Lisinopril Other (comments)     Cough, vomiting, Visual disturbances    Morphine Itching    Statins-Hmg-Coa Reductase Inhibitors Other (comments)     Muscle enzyme problems       Current Outpatient Medications   Medication Sig    dilTIAZem ER (TIAZAC) 240 mg capsule Take 240 mg by mouth two (2) times a day.  semaglutide (OZEMPIC) 0.25 mg or 0.5 mg/dose (2 mg/1.5 ml) subq pen 0.5 mg by SubCUTAneous route every seven (7) days. Administer every Friday    sertraline (ZOLOFT) 100 mg tablet Take 1.5 Tablets by mouth daily.  potassium chloride SR (KLOR-CON 10) 10 mEq tablet TAKE 2 TABS BY MOUTH TWO (2) TIMES A DAY.  insulin NPH (NOVOLIN N, HUMULIN N) 100 unit/mL injection 25 Units by SubCUTAneous route nightly. INJECT 35 UNITS UNDER THE SKIN IN THE MORNING AND INJECT 25 UNITS AT BEDTIME OR AS DIRECTED    insulin regular (NOVOLIN R REGULAR U-100 INSULN) 100 unit/mL injection 15 Units by SubCUTAneous route Before breakfast, lunch, and dinner.  albuterol (PROVENTIL VENTOLIN) 2.5 mg /3 mL (0.083 %) nebu 3 mL by Nebulization route four (4) times daily as needed for Wheezing or Shortness of Breath.  fluticasone propion-salmeterol (WIXELA INHUB) 500-50 mcg/dose diskus inhaler Take 1 Puff by inhalation two (2) times a day.  OTHER Allergy shots weekly.  EPINEPHrine (EPIPEN) 0.3 mg/0.3 mL injection 0.3 mg by IntraMUSCular route once as needed.  Biotin 2,500 mcg cap Take 1 Cap by mouth daily.  omalizumab (XOLAIR) 150 mg solr 150 mg by SubCUTAneous route every fourteen (14) days. Next dose due 2/17    cholecalciferol, vitamin D3, (VITAMIN D3) 2,000 unit tab Take 2,000 Units by mouth daily.  DOCOSAHEXANOIC ACID/EPA (FISH OIL PO) Take 1 Cap by mouth daily.  gabapentin (NEURONTIN) 300 mg capsule TAKE 1 CAPSULE BY MOUTH THREE TIMES A DAY (Patient taking differently: Take 300 mg by mouth nightly.)    bumetanide (BUMEX) 2 mg tablet TAKE 1 AND 1/2 TABLETS DAILY BY MOUTH (Patient taking differently: 2 mg. TAKE 2 tablets in the morning and 2 tablets at 1500 daily)    albuterol (PROVENTIL HFA, VENTOLIN HFA, PROAIR HFA) 90 mcg/actuation inhaler TAKE 2 (TWO) PUFF PUFF EVERY FOUR HOURS, AS NEEDED    warfarin (COUMADIN) 5 mg tablet TAKE 1 TABLET A DAY FOR 3 DAYS IN THE WEEK AND 1/2 A DAY THE OTHER 4 DAYS, or as directed.  warfarin (COUMADIN) 2.5 mg tablet Take 1 Tab by mouth five (5) days a week. M-W-F: 2.5 mg  Tu-Th-Sat-Sun: 5 mg    insulin NPH (NOVOLIN N, HUMULIN N) 100 unit/mL injection 35 Units by SubCUTAneous route every morning.  INJECT 35 UNITS UNDER THE SKIN IN THE MORNING AND INJECT 25 UNITS AT BEDTIME OR AS DIRECTED     Current Facility-Administered Medications   Medication    triamcinolone acetonide (KENALOG-40) 40 mg/mL injection 40 mg    triamcinolone acetonide (KENALOG-40) 40 mg/mL injection 40 mg       Past Medical History:   Diagnosis Date    Asthma     Atrial fibrillation (HCC)     Depression     DM (diabetes mellitus) ([address]  HTN (hypertension)     Hyperlipidemia     [facility]bid [facility]     Neuropathy     LITA on CPAP         Past Surgical History:   Procedure Laterality Date    COLONOSCOPY Left 10/28/2019    COLONOSCOPY AND EGD performed by Rudy Villarreal MD at [address] COLONOSCOPY N/A 7/15/2020    COLONOSCOPY :- [facility] ENDOSCOPY    HX APPENDECTOMY      HX CHOLECYSTECTOMY      HX GASTRIC BYPASS      Jejunal bypass with subsequent reversal..  Lab Band since    HX OTHER SURGICAL  D & C    HX EMILIANO AND BSO      For uterine CA       Family History   Problem Relation Age of Onset    Stroke Mother     Diabetes Other     Heart Disease Other     Heart Disease Father         congestive heart failure        Social History     Tobacco Use    Smoking status: Former Smoker     Quit date: 1992     Years since quittin.3    Smokeless tobacco: Never Used   Substance Use Topics    Alcohol use: Yes     Alcohol/week: 2.0 standard drinks     Types: 2 Glasses of wine per week     Comment: occ wine        All systems reviewed x 12 and were negative with the exception of None      No flowsheet data found. Vitals:  Ht 5' 7\" (1.702 m)   Wt 284 lb (128.8 kg)   BMI 44.48 kg/m²    Body mass index is 44.48 kg/m². Physical Exam    General: NAD, well developed, well nourished, alert and oriented x 3.     Cardiac: Extremities well perfused     Respiratory: Nonlabored breathing     LLE: Mild antalgic gait. Mild effusion noted. No previous incisions noted. ROM 0-120 degrees. Grossly stable to varus/valgus stress and anterior/posterior drawer tests. Medial and lateral joint line tenderness. Motor grossly intact.     RLE: Mild antalgic gait. Mild effusion noted. No previous incisions noted. ROM 0-120 degrees. Grossly stable to varus/valgus stress and anterior/posterior drawer tests. Medial and lateral joint line tenderness.   Motor grossly intact.     Vascular: Palpable pedal pulses, equal bilaterally.     Skin: Warm well perfused, cap refill < 2 sec.     Lopez Campbell M.D. was available for immediate consultation as the supervising physician        An electronic signature was used to authenticate this note.   -- Bri Minor PA-C

## 2022-05-24 ENCOUNTER — HOSPITAL ENCOUNTER (OUTPATIENT)
Age: 77
Setting detail: OBSERVATION
Discharge: HOME | End: 2022-05-25
Attending: INTERNAL MEDICINE | Admitting: INTERNAL MEDICINE
Payer: MEDICARE

## 2022-05-24 DIAGNOSIS — I48.19 PERSISTENT ATRIAL FIBRILLATION (HCC): ICD-10-CM

## 2022-05-24 PROBLEM — I44.2 COMPLETE HEART BLOCK (HCC): Status: ACTIVE | Noted: 2022-05-24

## 2022-05-24 LAB
GLUCOSE BLD STRIP.AUTO-MCNC: 138 MG/DL (ref 65–117)
GLUCOSE BLD STRIP.AUTO-MCNC: 209 MG/DL (ref 65–117)
GLUCOSE BLD STRIP.AUTO-MCNC: 234 MG/DL (ref 65–117)
GLUCOSE BLD STRIP.AUTO-MCNC: 237 MG/DL (ref 65–117)
INR BLD: 2.4 (ref 0.9–1.2)
SERVICE CMNT-IMP: ABNORMAL

## 2022-05-24 PROCEDURE — 2709999900 HC NON-CHARGEABLE SUPPLY: Performed by: INTERNAL MEDICINE

## 2022-05-24 PROCEDURE — 77030025143 HC CBL EP SUPRME 1 STJU -C: Performed by: INTERNAL MEDICINE

## 2022-05-24 PROCEDURE — 74011250637 HC RX REV CODE- 250/637: Performed by: INTERNAL MEDICINE

## 2022-05-24 PROCEDURE — 74011250636 HC RX REV CODE- 250/636: Performed by: INTERNAL MEDICINE

## 2022-05-24 PROCEDURE — 74011000250 HC RX REV CODE- 250: Performed by: INTERNAL MEDICINE

## 2022-05-24 PROCEDURE — G0378 HOSPITAL OBSERVATION PER HR: HCPCS

## 2022-05-24 PROCEDURE — C1760 CLOSURE DEV, VASC: HCPCS | Performed by: INTERNAL MEDICINE

## 2022-05-24 PROCEDURE — 77030018729 HC ELECTRD DEFIB PAD CARD -B: Performed by: INTERNAL MEDICINE

## 2022-05-24 PROCEDURE — 99152 MOD SED SAME PHYS/QHP 5/>YRS: CPT | Performed by: INTERNAL MEDICINE

## 2022-05-24 PROCEDURE — 82962 GLUCOSE BLOOD TEST: CPT

## 2022-05-24 PROCEDURE — 93005 ELECTROCARDIOGRAM TRACING: CPT

## 2022-05-24 PROCEDURE — 93650 ICAR CATH ABLTJ AV NODE FUNC: CPT | Performed by: INTERNAL MEDICINE

## 2022-05-24 PROCEDURE — 74011636637 HC RX REV CODE- 636/637: Performed by: INTERNAL MEDICINE

## 2022-05-24 PROCEDURE — C1893 INTRO/SHEATH, FIXED,NON-PEEL: HCPCS | Performed by: INTERNAL MEDICINE

## 2022-05-24 PROCEDURE — 77030033352 HC TBNG IRR PMP COOL PNT STJU -B: Performed by: INTERNAL MEDICINE

## 2022-05-24 PROCEDURE — 99153 MOD SED SAME PHYS/QHP EA: CPT | Performed by: INTERNAL MEDICINE

## 2022-05-24 PROCEDURE — 77030003390 HC NDL ANGI MRTM -A: Performed by: INTERNAL MEDICINE

## 2022-05-24 PROCEDURE — C1894 INTRO/SHEATH, NON-LASER: HCPCS | Performed by: INTERNAL MEDICINE

## 2022-05-24 PROCEDURE — 85610 PROTHROMBIN TIME: CPT

## 2022-05-24 RX ORDER — SODIUM CHLORIDE 0.9 % (FLUSH) 0.9 %
5-40 SYRINGE (ML) INJECTION EVERY 8 HOURS
Status: DISCONTINUED | OUTPATIENT
Start: 2022-05-24 | End: 2022-05-25 | Stop reason: HOSPADM

## 2022-05-24 RX ORDER — WARFARIN SODIUM 5 MG/1
5 TABLET ORAL
Status: DISCONTINUED | OUTPATIENT
Start: 2022-05-24 | End: 2022-05-25 | Stop reason: HOSPADM

## 2022-05-24 RX ORDER — POTASSIUM CHLORIDE 750 MG/1
20 TABLET, FILM COATED, EXTENDED RELEASE ORAL 2 TIMES DAILY WITH MEALS
Status: DISCONTINUED | OUTPATIENT
Start: 2022-05-24 | End: 2022-05-25 | Stop reason: HOSPADM

## 2022-05-24 RX ORDER — NALOXONE HYDROCHLORIDE 0.4 MG/ML
0.4 INJECTION, SOLUTION INTRAMUSCULAR; INTRAVENOUS; SUBCUTANEOUS AS NEEDED
Status: DISCONTINUED | OUTPATIENT
Start: 2022-05-24 | End: 2022-05-25 | Stop reason: HOSPADM

## 2022-05-24 RX ORDER — BUMETANIDE 1 MG/1
2 TABLET ORAL 2 TIMES DAILY
Status: DISCONTINUED | OUTPATIENT
Start: 2022-05-24 | End: 2022-05-25 | Stop reason: HOSPADM

## 2022-05-24 RX ORDER — WARFARIN 2.5 MG/1
2.5 TABLET ORAL
Status: DISCONTINUED | OUTPATIENT
Start: 2022-05-25 | End: 2022-05-25 | Stop reason: HOSPADM

## 2022-05-24 RX ORDER — MIDAZOLAM HYDROCHLORIDE 1 MG/ML
INJECTION, SOLUTION INTRAMUSCULAR; INTRAVENOUS AS NEEDED
Status: DISCONTINUED | OUTPATIENT
Start: 2022-05-24 | End: 2022-05-24 | Stop reason: HOSPADM

## 2022-05-24 RX ORDER — SODIUM CHLORIDE 0.9 % (FLUSH) 0.9 %
5-40 SYRINGE (ML) INJECTION AS NEEDED
Status: DISCONTINUED | OUTPATIENT
Start: 2022-05-24 | End: 2022-05-25 | Stop reason: HOSPADM

## 2022-05-24 RX ORDER — SERTRALINE HYDROCHLORIDE 50 MG/1
150 TABLET, FILM COATED ORAL DAILY
Status: DISCONTINUED | OUTPATIENT
Start: 2022-05-25 | End: 2022-05-25 | Stop reason: HOSPADM

## 2022-05-24 RX ORDER — FENTANYL CITRATE 50 UG/ML
INJECTION, SOLUTION INTRAMUSCULAR; INTRAVENOUS AS NEEDED
Status: DISCONTINUED | OUTPATIENT
Start: 2022-05-24 | End: 2022-05-24 | Stop reason: HOSPADM

## 2022-05-24 RX ORDER — GABAPENTIN 300 MG/1
300 CAPSULE ORAL
Status: DISCONTINUED | OUTPATIENT
Start: 2022-05-24 | End: 2022-05-25 | Stop reason: HOSPADM

## 2022-05-24 RX ORDER — ALBUTEROL SULFATE 0.83 MG/ML
2.5 SOLUTION RESPIRATORY (INHALATION)
Status: DISCONTINUED | OUTPATIENT
Start: 2022-05-24 | End: 2022-05-25 | Stop reason: HOSPADM

## 2022-05-24 RX ADMIN — SODIUM CHLORIDE, PRESERVATIVE FREE 10 ML: 5 INJECTION INTRAVENOUS at 15:11

## 2022-05-24 RX ADMIN — SODIUM CHLORIDE, PRESERVATIVE FREE 10 ML: 5 INJECTION INTRAVENOUS at 21:17

## 2022-05-24 RX ADMIN — Medication 25 UNITS: at 21:16

## 2022-05-24 RX ADMIN — BUMETANIDE 2 MG: 1 TABLET ORAL at 15:09

## 2022-05-24 RX ADMIN — GABAPENTIN 300 MG: 300 CAPSULE ORAL at 21:16

## 2022-05-24 RX ADMIN — Medication 15 UNITS: at 17:28

## 2022-05-24 RX ADMIN — WARFARIN SODIUM 5 MG: 5 TABLET ORAL at 17:28

## 2022-05-24 RX ADMIN — POTASSIUM CHLORIDE 20 MEQ: 750 TABLET, EXTENDED RELEASE ORAL at 17:28

## 2022-05-24 NOTE — PROGRESS NOTES
2000  Verbal bedside report given to Bhumika Julien and Adel Dandy, RNs oncoming nurses by Donald Valero, RN off-going nurse. Report included current pt status and condition, recent results, hx of present illness, heart rate and rhythm V-Paced , and respiratory status. 1400  Pt arrived from cath lab, VSS, family at bedside. Patient arrived on CVSU via stretcher. Patient oriented to room and call bell, vital signs obtained. Opportunity for questions and clarification was provided. Assessment completed, rate and rhythm documented. 1330  Transferred In:     Verbal report received by JOSEFINA Pittman from [address]  on pt incoming from [facility] for progression of care. Report consisted of SBAR, Kardex, ED Summary and Cardiac Rhythm.

## 2022-05-24 NOTE — ROUTINE PROCESS
Cardiac Cath Lab Recovery Arrival Note:      [place] arrived to Cardiac Cath Lab, Recovery Area. Staff introduced to patient. Patient identifiers verified with NAME and DATE OF BIRTH. Procedure verified with patient. Consent forms reviewed and signed by patient or authorized representative and verified. Allergies verified. Patient and family oriented to department. Patient and family informed of procedure and plan of care. Questions answered with review. Patient prepped for procedure, per orders from physician, prior to arrival.    Patient on cardiac monitor, non-invasive blood pressure, SPO2 monitor. On RA. Patient is A&Ox 4. Patient reports no pain. Patient in stretcher, in low position, with side rails up, call bell within reach, patient instructed to call if assistance as needed. Patient prep in: [address], [place] [address]. Patient family has pager #   Family in: . Prep by: SEAMUS Garcia RN

## 2022-05-24 NOTE — PROCEDURES
AV Node Ablation    ELECTROPHYSIOLOGY (EP) REPORT    DATE OF PROCEDURE: 5/24/2022    STUDY/CINE:    PROCEDURE PERFORMED  1. Ablation of av node   2. Concious sedation     INDICATION: Ms Meenu Robertson has  a history of  permenant atrial fibrillation with a poorly controlled ventricular response who presents for an av node ablation. PROCEDURE: The patient was brought to the cardiac lab and  electrophysiology laboratory prepped and draped in standard usual fashion. Conscious sedation was administered through the standard conscious sedation  protocol with fentanyl and Versed. Catheters were inserted as follows:  1. A 8 [place] an   large curve  3. 5-mm tip catheter was used for mapping   and radiofrequency  ablation. inserted in the right femoral vein and was placed in  hra and av junction. After the catheters were inserted, baseline recordings were obtained. Underlying rhythm was atrial fibrillation with a cycle length of   560 msec. QRS duration was 88  msec with QT intervals 331 msec. Atrial fibrillation was present    HIS-PURKINJE FUNCTION  1. Abnrmal His-Purkinje function. HV interval was 51 msec. Radiofrequency was applied to the av junction at a minimal his recording resulting in complete heart block with an escape rate less than 1200 msec. Paced qrsd was 120 msec     CONCLUSION: Successful AV node ablation. COMPLICATIONS: None. SPECIMEN: None. ESTIMATED BLOOD LOSS: Approximately 10 mL.

## 2022-05-24 NOTE — DISCHARGE SUMMARY
CARDIOLOGY SHORT STAY D/C    Patient stable after av node ablaiton  Observed overnight and reduced hr from 90 to 80 bpm    Visit Vitals  BP (!) 140/61 (BP 1 Location: Left upper arm, BP Patient Position: At rest)   Pulse 94   Temp 98.7 °F (37.1 °C)   Resp 21   Ht 5' 7\" (1.702 m)   Wt 128.8 kg (284 lb)   SpO2 97%   Breastfeeding No   BMI 44.48 kg/m²     Cardiovascular: regular rate and rhythm, no edema, no murmurs  Respiratory: clear to ascultation bilaterally  Musculoskeletal: Incision ok, groin sites ok  Interrogation: Ok  Tele:  afib   V pacing     PLAN    1) Discharge to home in stable condition    2)  Followup as below:    2 weeks     3) Discharge meds as below:    Current Discharge Medication List      CONTINUE these medications which have NOT CHANGED    Details   semaglutide (OZEMPIC) 0.25 mg or 0.5 mg/dose (2 mg/1.5 ml) subq pen 0.5 mg by SubCUTAneous route every seven (7) days. Administer every Friday      gabapentin (NEURONTIN) 300 mg capsule TAKE 1 CAPSULE BY MOUTH THREE TIMES A DAY  Qty: 270 Capsule, Refills: 1    Comments: This request is for a new prescription for a controlled substance as required by Federal/State law. Associated Diagnoses: Idiopathic peripheral neuropathy      sertraline (ZOLOFT) 100 mg tablet Take 1.5 Tablets by mouth daily. Qty: 135 Tablet, Refills: 3    Associated Diagnoses: Depression, unspecified depression type      bumetanide (BUMEX) 2 mg tablet TAKE 1 AND 1/2 TABLETS DAILY BY MOUTH  Qty: 135 Tablet, Refills: 0      albuterol (PROVENTIL HFA, VENTOLIN HFA, PROAIR HFA) 90 mcg/actuation inhaler TAKE 2 (TWO) PUFF PUFF EVERY FOUR HOURS, AS NEEDED  Qty: 8.5 Each, Refills: 11    Comments: DX Code Needed  . Associated Diagnoses: Severe persistent asthma, uncomplicated      !! warfarin (COUMADIN) 5 mg tablet TAKE 1 TABLET A DAY FOR 3 DAYS IN THE WEEK AND 1/2 A DAY THE OTHER 4 DAYS, or as directed.   Qty: 64 Tablet, Refills: 3      potassium chloride SR (KLOR-CON 10) 10 mEq tablet TAKE 2 TABS BY MOUTH TWO (2) TIMES A DAY. Qty: 360 Tablet, Refills: 3      !! warfarin (COUMADIN) 2.5 mg tablet Take 1 Tab by mouth five (5) days a week. M-W-F: 2.5 mg  Tu-Th-Sat-Sun: 5 mg  Qty: 30 Tab, Refills: 3      !! insulin NPH (NOVOLIN N, HUMULIN N) 100 unit/mL injection 35 Units by SubCUTAneous route every morning. INJECT 35 UNITS UNDER THE SKIN IN THE MORNING AND INJECT 25 UNITS AT BEDTIME OR AS DIRECTED      !! insulin NPH (NOVOLIN N, HUMULIN N) 100 unit/mL injection 25 Units by SubCUTAneous route nightly. INJECT 35 UNITS UNDER THE SKIN IN THE MORNING AND INJECT 25 UNITS AT BEDTIME OR AS DIRECTED      insulin regular (NOVOLIN R REGULAR U-100 INSULN) 100 unit/mL injection 15 Units by SubCUTAneous route Before breakfast, lunch, and dinner. albuterol (PROVENTIL VENTOLIN) 2.5 mg /3 mL (0.083 %) nebu 3 mL by Nebulization route four (4) times daily as needed for Wheezing or Shortness of Breath. Qty: 100 Each, Refills: 5      Biotin 2,500 mcg cap Take 1 Cap by mouth daily. omalizumab (XOLAIR) 150 mg solr 150 mg by SubCUTAneous route every fourteen (14) days. Next dose due 2/17      cholecalciferol, vitamin D3, (VITAMIN D3) 2,000 unit tab Take 2,000 Units by mouth daily. DOCOSAHEXANOIC ACID/EPA (FISH OIL PO) Take 1 Cap by mouth daily. fluticasone propion-salmeterol (WIXELA INHUB) 500-50 mcg/dose diskus inhaler Take 1 Puff by inhalation two (2) times a day. OTHER Allergy shots weekly. EPINEPHrine (EPIPEN) 0.3 mg/0.3 mL injection 0.3 mg by IntraMUSCular route once as needed. !! - Potential duplicate medications found. Please discuss with provider.       STOP taking these medications       dilTIAZem ER (TIAZAC) 240 mg capsule Comments:   Reason for Stopping:

## 2022-05-24 NOTE — PROGRESS NOTES
Preceptor Review of RN Work    5/24/2022  - Shift times - 5930 to 1930    The RN documentation of patient care for [facility] by [facility] has been reviewed and approved. All medications have been administered under the direct supervision of the preceptor.     Natalie Logan

## 2022-05-24 NOTE — PROGRESS NOTES
Medicare Outpatient Observation Notice (MOON) provided to patient/representative with verbal explanation of the notice. Time allotted for questions regarding the notice. Patient /representative provided a completed copy of the MOON notice. Copy placed on bedside chart. Deja Castillo, Care Management Assistant.

## 2022-05-24 NOTE — H&P
CARDIOLOGY ADMIT                Assessment:     Assessment:       Active Problems:    * No active hospital problems. *       Plan:      1. Afib:  fro av node ablation  2. Hx pacemaker  3. Diastolic chf  Chronic   4.  anticoag  coumadin          Subjective:    Date of  Admission: 5/24/2022  7:06 AM     Admission type:Elective    [place] is a 65 y.o. female admitted for Persistent atrial fibrillation ([facility] [facility] [I48.19]. h of afib and poorly controlled response   Presents for av node ablaiton  ( previous hx of medtronic vvir pacemaker)    Patient Active Problem List    Diagnosis Date Noted    Senile purpura ([facility] [facility] 05/13/2022    Secondary hypercoagulable state ([facility] [facility] 01/11/2022    Asthma     Influenza 02/20/2020    Recurrent depression ([facility] [facility] 01/09/2018    Type 2 diabetes mellitus with diabetic neuropathy ([facility] [facility] 01/09/2018    Encounter for long-term (current) drug use 10/19/2017    Diabetic polyneuropathy associated with type 2 diabetes mellitus ([facility] [address] [facility] 07/05/2017    Type 2 diabetes mellitus with stage 1 chronic kidney disease, with long-term current use of insulin ([facility] [address] [facility] 05/25/2017    Thoracic disc disorder with myelopathy 05/22/2017    Gait abnormality 05/22/2017    Left ankle sprain 05/21/2017    Hemoptysis 04/24/2017    Pneumonia 04/23/2017    Hypertension complicating diabetes ([facility] [address] [facility] 02/20/2017    Microalbuminuria 05/24/2016    Long-term insulin use in type 2 diabetes ([facility] [facility] 11/10/2015    Chronic atrial fibrillation 07/17/2015    Hematoma of neck 07/09/2015    Cervical stenosis of spine 07/06/2015    Morbid obesity ([address] 02/18/2014    Dizziness 02/17/2014    Weakness 02/17/2014    Anemia 01/22/2014    Asthma exacerbation 01/21/2014    Hyperlipidemia     Depression     Neuropathy       Cadence Mike MD  Past Medical History:   Diagnosis Date    Asthma     Atrial fibrillation ([facility]     Depression     DM (diabetes mellitus) ([facility] [facility]     HTN (hypertension)     Hyperlipidemia     Morbid obesity ([facility] [address]     Neuropathy     LITA on CPAP       Past Surgical History:   Procedure Laterality Date    COLONOSCOPY Left 10/28/2019    COLONOSCOPY AND EGD performed by Columba Ramirez MD at [address] COLONOSCOPY N/A 7/15/2020    COLONOSCOPY :- performed by Columba Ramirez MD at [address] HX APPENDECTOMY      HX CHOLECYSTECTOMY      HX GASTRIC BYPASS      Jejunal bypass with subsequent reversal..  Lab Band since    HX OTHER SURGICAL  D & C    HX EMILIANO AND BSO      For uterine CA     Allergies   Allergen Reactions    Latex Itching    Kiwi Unknown (comments)    Codeine Rash    Lisinopril Other (comments)     Cough, vomiting, Visual disturbances    Morphine Itching    Statins-Hmg-Coa Reductase Inhibitors Other (comments)     Muscle enzyme problems      Family History   Problem Relation Age of Onset    Stroke Mother     Diabetes Other     Heart Disease Other     Heart Disease Father         congestive heart failure      No current facility-administered medications for this encounter. Review of Symptoms:  A comprehensive review of systems was negative. No hemoptysis, hematemesis, epistaxis, melena, hematuria. No fevers,  Rashes, seizures, visual disturbances, difficulty walking, no abdominal pain         Physical Exam    Visit Vitals  BP (!) 140/61 (BP 1 Location: Left upper arm, BP Patient Position: At rest)   Pulse 94   Temp 98.7 °F (37.1 °C)   Resp 21   Ht 5' 7\" (1.702 m)   Wt 128.8 kg (284 lb)   SpO2 97%   Breastfeeding No   BMI 44.48 kg/m²     Skin warm and dry  PERRLA, EOMI  Oropharynx without exudate. Mallampati 2  Neck supple, thyroid not enlarged  Lungs clear  PMI non displaced. Normal S1/ S2   No Mummurs, click or Rubs  No S3 or S4  Abdomen soft and non tender, No Hepatosplenomegaly  Pulses 2+ throughout,    Neuro:    normal facial grimace,  Moves all extremities.    AAAO  unanxious      Cardiographics    Telemetry: afib   Labs:   Recent Results (from the past 24 hour(s))   GLUCOSE, POC    Collection Time: 05/24/22  7:55 AM   Result Value Ref Range    Glucose (POC) 237 (H) 65 - 117 mg/dL    Performed by Alfie Schroeder

## 2022-05-24 NOTE — PROGRESS NOTES
TRANSFER - IN REPORT:    Verbal report received from [facility] on [facility]  being received from procedural area for routine post - op. Report consisted of patients Situation, Background, Assessment and Recommendations(SBAR). Information from the following report(s) Procedure Summary, MAR and Recent Results was reviewed with the receiving clinician. Opportunity for questions and clarification was provided. Assessment completed upon patients arrival to [address] [place] [address]. Cardiac Cath Lab Recovery Arrival Note:    [place] arrived to [facility] recovery area. Patient procedure= AV Node ablation. Patient on cardiac monitor, non-invasive blood pressure, SPO2 monitor. On RA . IV  of NS on pump at 75 ml/hr. Patient status doing well without problems. Patient is A&Ox 3. Patient reports minimal back pain alleviated by positioning. PROCEDURE SITE CHECK:    Procedure site:without any bleeding and hematoma, no pain/discomfort reported at procedure site. No change in patient status. Continue to monitor patient and status.

## 2022-05-24 NOTE — Clinical Note
Patient transported with a Registered Nurse and [address] [place] [address] / [facility].

## 2022-05-25 VITALS
BODY MASS INDEX: 42.32 KG/M2 | HEIGHT: 67 IN | OXYGEN SATURATION: 96 % | WEIGHT: 269.62 LBS | DIASTOLIC BLOOD PRESSURE: 77 MMHG | TEMPERATURE: 98.7 F | HEART RATE: 90 BPM | SYSTOLIC BLOOD PRESSURE: 158 MMHG | RESPIRATION RATE: 16 BRPM

## 2022-05-25 LAB
GLUCOSE BLD STRIP.AUTO-MCNC: 109 MG/DL (ref 65–117)
SERVICE CMNT-IMP: NORMAL

## 2022-05-25 PROCEDURE — G0378 HOSPITAL OBSERVATION PER HR: HCPCS

## 2022-05-25 PROCEDURE — 82962 GLUCOSE BLOOD TEST: CPT

## 2022-05-25 PROCEDURE — 74011250637 HC RX REV CODE- 250/637: Performed by: INTERNAL MEDICINE

## 2022-05-25 PROCEDURE — 74011000250 HC RX REV CODE- 250: Performed by: INTERNAL MEDICINE

## 2022-05-25 PROCEDURE — 74011636637 HC RX REV CODE- 636/637: Performed by: INTERNAL MEDICINE

## 2022-05-25 RX ADMIN — Medication 15 UNITS: at 09:05

## 2022-05-25 RX ADMIN — Medication 35 UNITS: at 09:05

## 2022-05-25 RX ADMIN — POTASSIUM CHLORIDE 20 MEQ: 750 TABLET, EXTENDED RELEASE ORAL at 08:06

## 2022-05-25 RX ADMIN — BUMETANIDE 2 MG: 1 TABLET ORAL at 08:06

## 2022-05-25 RX ADMIN — SODIUM CHLORIDE, PRESERVATIVE FREE 10 ML: 5 INJECTION INTRAVENOUS at 08:09

## 2022-05-25 RX ADMIN — SERTRALINE 150 MG: 50 TABLET, FILM COATED ORAL at 08:06

## 2022-05-25 NOTE — PROGRESS NOTES
0730; Bedside and Verbal shift change report given to PADMINI Phillips and James Pena (oncoming nurse) by Blanca Burden RN and PADMINI Mesa (offgoing nurse). Report included the following information SBAR, Kardex, Intake/Output, MAR, Recent Results and Cardiac Rhythm V-paced. 1030: I have reviewed discharge instructions with the patient. The patient verbalized understanding. Current Discharge Medication List      CONTINUE these medications which have NOT CHANGED    Details   semaglutide (OZEMPIC) 0.25 mg or 0.5 mg/dose (2 mg/1.5 ml) subq pen 0.5 mg by SubCUTAneous route every seven (7) days. Administer every Friday      gabapentin (NEURONTIN) 300 mg capsule TAKE 1 CAPSULE BY MOUTH THREE TIMES A DAY  Qty: 270 Capsule, Refills: 1    Comments: This request is for a new prescription for a controlled substance as required by Federal/State law. Associated Diagnoses: Idiopathic peripheral neuropathy      sertraline (ZOLOFT) 100 mg tablet Take 1.5 Tablets by mouth daily. Qty: 135 Tablet, Refills: 3    Associated Diagnoses: Depression, unspecified depression type      bumetanide (BUMEX) 2 mg tablet TAKE 1 AND 1/2 TABLETS DAILY BY MOUTH  Qty: 135 Tablet, Refills: 0      albuterol (PROVENTIL HFA, VENTOLIN HFA, PROAIR HFA) 90 mcg/actuation inhaler TAKE 2 (TWO) PUFF PUFF EVERY FOUR HOURS, AS NEEDED  Qty: 8.5 Each, Refills: 11    Comments: DX Code Needed  . Associated Diagnoses: Severe persistent asthma, uncomplicated      !! warfarin (COUMADIN) 5 mg tablet TAKE 1 TABLET A DAY FOR 3 DAYS IN THE WEEK AND 1/2 A DAY THE OTHER 4 DAYS, or as directed. Qty: 64 Tablet, Refills: 3      potassium chloride SR (KLOR-CON 10) 10 mEq tablet TAKE 2 TABS BY MOUTH TWO (2) TIMES A DAY. Qty: 360 Tablet, Refills: 3      !! warfarin (COUMADIN) 2.5 mg tablet Take 1 Tab by mouth five (5) days a week.  M-W-F: 2.5 mg  Tu-Th-Sat-Sun: 5 mg  Qty: 30 Tab, Refills: 3      !! insulin NPH (NOVOLIN N, HUMULIN N) 100 unit/mL injection 35 Units by SubCUTAneous route every morning. INJECT 35 UNITS UNDER THE SKIN IN THE MORNING AND INJECT 25 UNITS AT BEDTIME OR AS DIRECTED      !! insulin NPH (NOVOLIN N, HUMULIN N) 100 unit/mL injection 25 Units by SubCUTAneous route nightly. INJECT 35 UNITS UNDER THE SKIN IN THE MORNING AND INJECT 25 UNITS AT BEDTIME OR AS DIRECTED      insulin regular (NOVOLIN R REGULAR U-100 INSULN) 100 unit/mL injection 15 Units by SubCUTAneous route Before breakfast, lunch, and dinner. albuterol (PROVENTIL VENTOLIN) 2.5 mg /3 mL (0.083 %) nebu 3 mL by Nebulization route four (4) times daily as needed for Wheezing or Shortness of Breath. Qty: 100 Each, Refills: 5      Biotin 2,500 mcg cap Take 1 Cap by mouth daily. omalizumab (XOLAIR) 150 mg solr 150 mg by SubCUTAneous route every fourteen (14) days. Next dose due 2/17      cholecalciferol, vitamin D3, (VITAMIN D3) 2,000 unit tab Take 2,000 Units by mouth daily. DOCOSAHEXANOIC ACID/EPA (FISH OIL PO) Take 1 Cap by mouth daily. fluticasone propion-salmeterol (WIXELA INHUB) 500-50 mcg/dose diskus inhaler Take 1 Puff by inhalation two (2) times a day. OTHER Allergy shots weekly. EPINEPHrine (EPIPEN) 0.3 mg/0.3 mL injection 0.3 mg by IntraMUSCular route once as needed. !! - Potential duplicate medications found. Please discuss with provider. STOP taking these medications       dilTIAZem ER (TIAZAC) 240 mg capsule Comments:   Reason for Stopping:             Care plan:  Problem: Diabetes Self-Management  Goal: *Disease process and treatment process  Description: Define diabetes and identify own type of diabetes; list 3 options for treating diabetes. Outcome: Progressing Towards Goal     Problem: Falls - Risk of  Goal: *Absence of Falls  Description: Document Paul Dumont Fall Risk and appropriate interventions in the flowsheet.   Outcome: Progressing Towards Goal  Note: Fall Risk Interventions:            Medication Interventions: Patient to call before getting OOB,Teach patient to arise slowly                   Problem: Patient Education: Go to Patient Education Activity  Goal: Patient/Family Education  Outcome: Progressing Towards Goal

## 2022-05-25 NOTE — PROGRESS NOTES
1930: Bedside and Verbal shift change report given to Ritesh RN and Nella RN (oncoming nurse) by Avel Santos and [address] [place] [address] (offgoing nurse). Report included the following information SBAR, Kardex, MAR, Recent Results, and Cardiac Rhythm Vpaced . Problem: Diabetes Self-Management  Goal: *Disease process and treatment process  Description: Define diabetes and identify own type of diabetes; list 3 options for treating diabetes. Outcome: Progressing Towards Goal  Goal: *Using medications safely  Description: State effect of diabetes medications on diabetes; name diabetes medication taking, action and side effects. Outcome: Progressing Towards Goal  Goal: *Monitoring blood glucose, interpreting and using results  Description: Identify recommended blood glucose targets  and personal targets. Outcome: Progressing Towards Goal     Problem: Patient Education: Go to Patient Education Activity  Goal: Patient/Family Education  Outcome: Progressing Towards Goal     Problem: Falls - Risk of  Goal: *Absence of Falls  Description: Document Karyn Valdovinos Fall Risk and appropriate interventions in the flowsheet.   Outcome: Progressing Towards Goal  Note: Fall Risk Interventions:            Medication Interventions: Teach patient to arise slowly,Patient to call before getting OOB

## 2022-05-25 NOTE — PROGRESS NOTES
Charting and patient care of Hui Sommer by Katlyn Butler RN from 0730 to 2000 was supervised and reviewed by this RN.

## 2022-05-25 NOTE — PROGRESS NOTES
Problem: Diabetes Self-Management  Goal: *Disease process and treatment process  Description: Define diabetes and identify own type of diabetes; list 3 options for treating diabetes. 5/25/2022 0951 by China Cortez RN  Outcome: Progressing Towards Goal  5/25/2022 0901 by China Cortez RN  Outcome: Progressing Towards Goal  Goal: *Incorporating nutritional management into lifestyle  Description: Describe effect of type, amount and timing of food on blood glucose; list 3 methods for planning meals. Outcome: Progressing Towards Goal  Goal: *Incorporating physical activity into lifestyle  Description: State effect of exercise on blood glucose levels. Outcome: Progressing Towards Goal  Goal: *Developing strategies to promote health/change behavior  Description: Define the ABC's of diabetes; identify appropriate screenings, schedule and personal plan for screenings. Outcome: Progressing Towards Goal  Goal: *Using medications safely  Description: State effect of diabetes medications on diabetes; name diabetes medication taking, action and side effects. Outcome: Progressing Towards Goal  Goal: *Monitoring blood glucose, interpreting and using results  Description: Identify recommended blood glucose targets  and personal targets. Outcome: Progressing Towards Goal  Goal: *Prevention, detection, treatment of acute complications  Description: List symptoms of hyper- and hypoglycemia; describe how to treat low blood sugar and actions for lowering  high blood glucose level. Outcome: Progressing Towards Goal  Goal: *Prevention, detection and treatment of chronic complications  Description: Define the natural course of diabetes and describe the relationship of blood glucose levels to long term complications of diabetes.   Outcome: Progressing Towards Goal  Goal: *Developing strategies to address psychosocial issues  Description: Describe feelings about living with diabetes; identify support needed and support network  Outcome: Progressing Towards Goal  Goal: *Insulin pump training  Outcome: Progressing Towards Goal  Goal: *Sick day guidelines  Outcome: Progressing Towards Goal  Goal: *Patient Specific Goal (EDIT GOAL, INSERT TEXT)  Outcome: Progressing Towards Goal

## 2022-05-27 LAB
ATRIAL RATE: 122 BPM
CALCULATED R AXIS, ECG10: 142 DEGREES
CALCULATED T AXIS, ECG11: -133 DEGREES
DIAGNOSIS, 93000: NORMAL
Q-T INTERVAL, ECG07: 432 MS
QRS DURATION, ECG06: 148 MS
QTC CALCULATION (BEZET), ECG08: 525 MS
VENTRICULAR RATE, ECG03: 89 BPM

## 2022-07-08 PROBLEM — N18.31 STAGE 3A CHRONIC KIDNEY DISEASE (HCC): Status: ACTIVE | Noted: 2022-07-08

## 2022-08-23 ENCOUNTER — HOSPITAL ENCOUNTER (OUTPATIENT)
Dept: GENERAL RADIOLOGY | Age: 77
Discharge: HOME | End: 2022-08-23
Attending: INTERNAL MEDICINE
Payer: MEDICARE

## 2022-08-23 DIAGNOSIS — R06.02 SOB (SHORTNESS OF BREATH): ICD-10-CM

## 2022-08-23 PROCEDURE — 71046 X-RAY EXAM CHEST 2 VIEWS: CPT

## 2022-08-24 PROBLEM — Z79.4 INSULIN LONG-TERM USE (HCC): Status: ACTIVE | Noted: 2022-08-24

## 2022-08-26 ENCOUNTER — OFFICE VISIT (OUTPATIENT)
Dept: ORTHOPEDIC SURGERY | Age: 77
End: 2022-08-26
Payer: MEDICARE

## 2022-08-26 VITALS — WEIGHT: 293 LBS | BODY MASS INDEX: 45.99 KG/M2 | HEIGHT: 67 IN

## 2022-08-26 DIAGNOSIS — M17.11 ARTHRITIS OF KNEE, RIGHT: ICD-10-CM

## 2022-08-26 DIAGNOSIS — M17.12 ARTHRITIS OF LEFT KNEE: Primary | ICD-10-CM

## 2022-08-26 PROCEDURE — 20610 DRAIN/INJ JOINT/BURSA W/O US: CPT | Performed by: ORTHOPAEDIC SURGERY

## 2022-08-26 NOTE — PROGRESS NOTES
[place] (: 1945) is a 65 y.o. female patient, here for evaluation of the following chief complaint(s):  Knee Pain (Bilateral knee pain/)       ASSESSMENT/PLAN:  Below is the assessment and plan developed based on review of pertinent history, physical exam, labs, studies, and medications. 65-year-old female comes in today for follow-up. We have seen her in the past for knee pain. She was doing well but has had recent exacerbation of both knees. She said they are both swelling now. Pain is rated as moderate. She wanted to discuss options. We discussed oral over-the-counter medication. She can continue Tylenol as needed. We also discussed weight loss and quad strengthening which she will work on. We also talked about injections. I injected hyaluronic acid, gel 1 into each knee joint using sterile technique patient tolerated very well. Plan for follow-up as needed. 1. Arthritis of left knee  2. Arthritis of knee, right      Encounter Diagnoses   Name Primary? Arthritis of left knee Yes    Arthritis of knee, right         No follow-ups on file. SUBJECTIVE/OBJECTIVE:  [place] (: 1945) is a 65 y.o. female who presents today for the following:  Chief Complaint   Patient presents with    Knee Pain     Bilateral knee pain         65-year-old female comes in today for follow-up. We have seen her in the past for knee pain. She was doing well but has had recent exacerbation of both knees. She said they are both swelling now. Pain is rated as moderate. She is using a cane. She notices a limp. IMAGING:  XR Results (most recent):  Results from Hospital Encounter encounter on 22    XR CHEST PA LAT    Narrative  Indication:  Shortness of breath. Exam: PA and lateral views of the chest.    Direct comparison is made to prior CXR dated May 2022. Findings: Cardiomediastinal silhouette is stable. Intact pacer lead extends to  the right ventricle.  There is minimal right basilar atelectasis. Lungs are  otherwise clear. There is no pleural fluid. There is no pneumothorax. Impression  Minimal right basilar atelectasis. Allergies   Allergen Reactions    Latex Itching    Kiwi Unknown (comments)    Codeine Rash    Lisinopril Other (comments)     Cough, vomiting, Visual disturbances    Morphine Itching    Statins-Hmg-Coa Reductase Inhibitors Other (comments)     Muscle enzyme problems       Current Outpatient Medications   Medication Sig    furosemide (Lasix) 40 mg tablet Take 2 tabs in am, 1 tab in pm.    spironolactone (ALDACTONE) 25 mg tablet     amLODIPine (NORVASC) 5 mg tablet     cyclobenzaprine (FLEXERIL) 10 mg tablet TAKE 1 TABLET BY MOUTH THREE TIMES A DAY AS NEEDED    hydrOXYzine HCL (ATARAX) 10 mg tablet TAKE 1 TABLET BY MOUTH FOUR TIMES DAILY AS NEEDED FOR ITCHING    semaglutide (OZEMPIC) 0.25 mg or 0.5 mg/dose (2 mg/1.5 ml) subq pen 0.5 mg by SubCUTAneous route every seven (7) days. Administer every Friday    sertraline (ZOLOFT) 100 mg tablet Take 1.5 Tablets by mouth daily. potassium chloride SR (KLOR-CON 10) 10 mEq tablet TAKE 2 TABS BY MOUTH TWO (2) TIMES A DAY. (Patient taking differently: Take 20 mEq by mouth daily.)    insulin NPH (NOVOLIN N, HUMULIN N) 100 unit/mL injection 25 Units by SubCUTAneous route nightly. INJECT 35 UNITS UNDER THE SKIN IN THE MORNING AND INJECT 25 UNITS AT BEDTIME OR AS DIRECTED    insulin regular (NOVOLIN R, HUMULIN R) 100 unit/mL injection 15 Units by SubCUTAneous route Before breakfast, lunch, and dinner. albuterol (PROVENTIL VENTOLIN) 2.5 mg /3 mL (0.083 %) nebu 3 mL by Nebulization route four (4) times daily as needed for Wheezing or Shortness of Breath. OTHER Allergy shots weekly. EPINEPHrine (EPIPEN) 0.3 mg/0.3 mL injection 0.3 mg by IntraMUSCular route once as needed. Biotin 2,500 mcg cap Take 1 Cap by mouth daily.     omalizumab (XOLAIR) 150 mg solr 150 mg by SubCUTAneous route every fourteen (14) days. Next dose due 2/17    cholecalciferol, vitamin D3, 50 mcg (2,000 unit) tab Take 2,000 Units by mouth daily. DOCOSAHEXANOIC ACID/EPA (FISH OIL PO) Take 1 Cap by mouth daily. gabapentin (NEURONTIN) 300 mg capsule TAKE 1 CAPSULE BY MOUTH THREE TIMES A DAY (Patient taking differently: Take 300 mg by mouth nightly.)    albuterol (PROVENTIL HFA, VENTOLIN HFA, PROAIR HFA) 90 mcg/actuation inhaler TAKE 2 (TWO) PUFF PUFF EVERY FOUR HOURS, AS NEEDED    warfarin (COUMADIN) 5 mg tablet TAKE 1 TABLET A DAY FOR 3 DAYS IN THE WEEK AND 1/2 A DAY THE OTHER 4 DAYS, or as directed. warfarin (COUMADIN) 2.5 mg tablet Take 1 Tab by mouth five (5) days a week. M-W-F: 2.5 mg  Tu-Th-Sat-Sun: 5 mg    insulin NPH (NOVOLIN N, HUMULIN N) 100 unit/mL injection 35 Units by SubCUTAneous route every morning. INJECT 35 UNITS UNDER THE SKIN IN THE MORNING AND INJECT 25 UNITS AT BEDTIME OR AS DIRECTED     No current facility-administered medications for this visit.        Past Medical History:   Diagnosis Date    Asthma     Atrial fibrillation ([address]     Depression     DM (diabetes mellitus) ([address]     HTN (hypertension)     Hyperlipidemia     Morbid obesity ([address]     Neuropathy     LITA on CPAP         Past Surgical History:   Procedure Laterality Date    COLONOSCOPY Left 10/28/2019    COLONOSCOPY AND EGD performed by Harshad Alston MD at [facility] ENDOSCOPY    COLONOSCOPY N/A 7/15/2020    COLONOSCOPY :- performed by Harshad Alston MD at [facility] ENDOSCOPY    HX APPENDECTOMY      HX CHOLECYSTECTOMY      HX GASTRIC BYPASS      Jejunal bypass with subsequent reversal..  Lab Band since    HX OTHER SURGICAL  D & C    HX EMILIANO AND BSO      For uterine CA       Family History   Problem Relation Age of Onset    Stroke Mother     Diabetes Other     Heart Disease Other     Heart Disease Father         congestive heart failure        Social History     Tobacco Use    Smoking status: Former     Types: Cigarettes     Quit date: 1/1/1992     Years since quittin.6    Smokeless tobacco: Never   Substance Use Topics    Alcohol use: Yes     Alcohol/week: 2.0 standard drinks     Types: 2 Glasses of wine per week     Comment: occ wine        All systems reviewed x 12 and were negative with the exception of None      No flowsheet data found. Vitals:  Ht 5' 7\" (1.702 m)   Wt 297 lb (134.7 kg)   BMI 46.52 kg/m²    Body mass index is 46.52 kg/m². Physical Exam    General: NAD, well developed, well nourished, alert and oriented x 3. Cardiac: Extremities well perfused    Respiratory: Nonlabored breathing    LLE: Mild antalgic gait. Mild effusion noted. No previous incisions noted. ROM 0-110 degrees. Grossly stable to varus/valgus stress and anterior/posterior drawer tests. Medial and lateral joint tenderness. Motor grossly intact. RLE: Mild antalgic gait. Mild effusion noted. No previous incisions noted. ROM 0-110 degrees. Grossly stable to varus/valgus stress and anterior/posterior drawer tests. Medial and lateral joint tenderness. Motor grossly intact. Vascular: Palpable pedal pulses, equal bilaterally. Skin: Warm well perfused, cap refill < 2 sec. An electronic signature was used to authenticate this note.   -- Gabe Perry MD

## 2022-09-09 ENCOUNTER — OFFICE VISIT (OUTPATIENT)
Dept: ORTHOPEDIC SURGERY | Age: 77
End: 2022-09-09
Payer: MEDICARE

## 2022-09-09 VITALS — HEIGHT: 67 IN | BODY MASS INDEX: 45.99 KG/M2 | WEIGHT: 293 LBS

## 2022-09-09 DIAGNOSIS — M17.11 ARTHRITIS OF KNEE, RIGHT: ICD-10-CM

## 2022-09-09 DIAGNOSIS — M17.12 ARTHRITIS OF LEFT KNEE: Primary | ICD-10-CM

## 2022-09-09 PROCEDURE — 20610 DRAIN/INJ JOINT/BURSA W/O US: CPT | Performed by: ORTHOPAEDIC SURGERY

## 2022-09-09 RX ORDER — TRIAMCINOLONE ACETONIDE 40 MG/ML
40 INJECTION, SUSPENSION INTRA-ARTICULAR; INTRAMUSCULAR ONCE
Status: COMPLETED | OUTPATIENT
Start: 2022-09-09 | End: 2022-09-09

## 2022-09-09 RX ADMIN — TRIAMCINOLONE ACETONIDE 40 MG: 40 INJECTION, SUSPENSION INTRA-ARTICULAR; INTRAMUSCULAR at 11:24

## 2022-09-09 NOTE — PROGRESS NOTES
[place] (: 1945) is a 65 y.o. female patient, here for evaluation of the following chief complaint(s):  Knee Pain (Bilateral knee pain/)       ASSESSMENT/PLAN:  Below is the assessment and plan developed based on review of pertinent history, physical exam, labs, studies, and medications. 76-year-old female comes in today for follow-up. She has bilateral knee pain. We have seen her in the past for knee osteoarthritis. She has been working on weight loss. She is actually down to 287. She said this is helping. She has significant bilateral knee osteoarthritis. She came in today to discuss options. We discussed another set of injections. I injected 40 mg triamcinolone in each knee joint using sterile technique. She has diabetes and we discussed this and she knows to check her blood sugars very carefully for the next 4 days. She has done this in the past and has been fine with it. We also broached the subject of potential surgery. She has her BMI down to around 44 which I think would be reasonable. She does have some other health issues so she is going to speak with her primary care physician about whether this is even a possibility. For now we will continue with conservative management      1. Arthritis of left knee  2. Arthritis of knee, right      Encounter Diagnoses   Name Primary? Arthritis of left knee Yes    Arthritis of knee, right         No follow-ups on file. SUBJECTIVE/OBJECTIVE:  [place] (: 1945) is a 65 y.o. female who presents today for the following:  Chief Complaint   Patient presents with    Knee Pain     Bilateral knee pain         76-year-old female comes in today for follow-up. She has bilateral knee pain. We have seen her in the past for knee osteoarthritis. She has been working on weight loss. She is actually down to 287. She said this is helping. She has significant bilateral knee osteoarthritis.   She came in today to discuss options. She says she has moderate pain every day. She has severe pain when pivoting or twisting. Severe pain when going up and down stairs. He says it gives way on her and she does not trust her knees. She is using a cane. IMAGING:  XR Results (most recent):  Results from Hospital Encounter encounter on 08/23/22    XR CHEST PA LAT    Narrative  Indication:  Shortness of breath. Exam: PA and lateral views of the chest.    Direct comparison is made to prior CXR dated May 2022. Findings: Cardiomediastinal silhouette is stable. Intact pacer lead extends to  the right ventricle. There is minimal right basilar atelectasis. Lungs are  otherwise clear. There is no pleural fluid. There is no pneumothorax. Impression  Minimal right basilar atelectasis. Allergies   Allergen Reactions    Latex Itching    Kiwi Unknown (comments)    Codeine Rash    Lisinopril Other (comments)     Cough, vomiting, Visual disturbances    Morphine Itching    Statins-Hmg-Coa Reductase Inhibitors Other (comments)     Muscle enzyme problems       Current Outpatient Medications   Medication Sig    furosemide (Lasix) 40 mg tablet Take 2 tabs in am, 1 tab in pm.    spironolactone (ALDACTONE) 25 mg tablet     amLODIPine (NORVASC) 5 mg tablet     cyclobenzaprine (FLEXERIL) 10 mg tablet TAKE 1 TABLET BY MOUTH THREE TIMES A DAY AS NEEDED    hydrOXYzine HCL (ATARAX) 10 mg tablet TAKE 1 TABLET BY MOUTH FOUR TIMES DAILY AS NEEDED FOR ITCHING    semaglutide (OZEMPIC) 0.25 mg or 0.5 mg/dose (2 mg/1.5 ml) subq pen 0.5 mg by SubCUTAneous route every seven (7) days. Administer every Friday    sertraline (ZOLOFT) 100 mg tablet Take 1.5 Tablets by mouth daily. potassium chloride SR (KLOR-CON 10) 10 mEq tablet TAKE 2 TABS BY MOUTH TWO (2) TIMES A DAY. (Patient taking differently: Take 20 mEq by mouth daily.)    insulin NPH (NOVOLIN N, HUMULIN N) 100 unit/mL injection 25 Units by SubCUTAneous route nightly.  INJECT 35 UNITS UNDER THE SKIN IN THE MORNING AND INJECT 25 UNITS AT BEDTIME OR AS DIRECTED    insulin regular (NOVOLIN R, HUMULIN R) 100 unit/mL injection 15 Units by SubCUTAneous route Before breakfast, lunch, and dinner. albuterol (PROVENTIL VENTOLIN) 2.5 mg /3 mL (0.083 %) nebu 3 mL by Nebulization route four (4) times daily as needed for Wheezing or Shortness of Breath. OTHER Allergy shots weekly. EPINEPHrine (EPIPEN) 0.3 mg/0.3 mL injection 0.3 mg by IntraMUSCular route once as needed. Biotin 2,500 mcg cap Take 1 Cap by mouth daily. omalizumab (XOLAIR) 150 mg solr 150 mg by SubCUTAneous route every fourteen (14) days. Next dose due 2/17    cholecalciferol, vitamin D3, 50 mcg (2,000 unit) tab Take 2,000 Units by mouth daily. DOCOSAHEXANOIC ACID/EPA (FISH OIL PO) Take 1 Cap by mouth daily. gabapentin (NEURONTIN) 300 mg capsule TAKE 1 CAPSULE BY MOUTH THREE TIMES A DAY (Patient taking differently: Take 300 mg by mouth nightly.)    albuterol (PROVENTIL HFA, VENTOLIN HFA, PROAIR HFA) 90 mcg/actuation inhaler TAKE 2 (TWO) PUFF PUFF EVERY FOUR HOURS, AS NEEDED    warfarin (COUMADIN) 5 mg tablet TAKE 1 TABLET A DAY FOR 3 DAYS IN THE WEEK AND 1/2 A DAY THE OTHER 4 DAYS, or as directed. warfarin (COUMADIN) 2.5 mg tablet Take 1 Tab by mouth five (5) days a week. M-W-F: 2.5 mg  Tu-Th-Sat-Sun: 5 mg    insulin NPH (NOVOLIN N, HUMULIN N) 100 unit/mL injection 35 Units by SubCUTAneous route every morning. INJECT 35 UNITS UNDER THE SKIN IN THE MORNING AND INJECT 25 UNITS AT BEDTIME OR AS DIRECTED     No current facility-administered medications for this visit.        Past Medical History:   Diagnosis Date    Asthma     Atrial fibrillation ([facility]     Depression     DM (diabetes mellitus) ([facility]     HTN (hypertension)     Hyperlipidemia     Morbid obesity ([facility]     Neuropathy     LITA on CPAP         Past Surgical History:   Procedure Laterality Date    COLONOSCOPY Left 10/28/2019    COLONOSCOPY AND EGD performed by Andrés Holman MD at [address] [place] [address] ENDOSCOPY    COLONOSCOPY N/A 7/15/2020    COLONOSCOPY :- performed by Harshad Alston MD at [facility] ENDOSCOPY    HX APPENDECTOMY      HX CHOLECYSTECTOMY      HX GASTRIC BYPASS      Jejunal bypass with subsequent reversal..  Lab Band since    HX OTHER SURGICAL  D & C    HX EMILIANO AND BSO      For uterine CA       Family History   Problem Relation Age of Onset    Stroke Mother     Diabetes Other     Heart Disease Other     Heart Disease Father         congestive heart failure        Social History     Tobacco Use    Smoking status: Former     Types: Cigarettes     Quit date: 1992     Years since quittin.7    Smokeless tobacco: Never   Substance Use Topics    Alcohol use: Yes     Alcohol/week: 2.0 standard drinks     Types: 2 Glasses of wine per week     Comment: occ wine        All systems reviewed x 12 and were negative with the exception of None      No flowsheet data found. Vitals:  Ht 5' 7\" (1.702 m)   Wt 297 lb (134.7 kg)   BMI 46.52 kg/m²    Body mass index is 46.52 kg/m². Physical Exam    General: NAD, well developed, well nourished, alert and oriented x 3. Cardiac: Extremities well perfused    Respiratory: Nonlabored breathing    LLE: Antalgic gait. Mild effusion noted. No previous incisions noted. ROM 0-120 degrees. Grossly stable to varus/valgus stress and anterior/posterior drawer tests. Medial and lateral joint tenderness. Positive crepitus. Motor grossly intact. RLE: Antalgic gait. Mild effusion noted. No previous incisions noted. ROM 0-120 degrees. Grossly stable to varus/valgus stress and anterior/posterior drawer tests. Medial and lateral joint tenderness. Positive crepitus. Motor grossly intact. Vascular: Palpable pedal pulses, equal bilaterally. Skin: Warm well perfused, cap refill < 2 sec. An electronic signature was used to authenticate this note.   -- Mino Butler MD

## 2022-09-09 NOTE — LETTER
9/9/2022    Patient: Xin Maguire   YOB: 1945   Date of Visit: 9/9/2022     [address] [facility]    Dear Santosh Coronel MD,      Thank you for referring Ms. Dereck Lima to [facility] for evaluation. My notes for this consultation are attached. If you have questions, please do not hesitate to call me. I look forward to following your patient along with you.       Sincerely,    Luna Page MD

## 2022-10-03 DIAGNOSIS — M17.11 ARTHRITIS OF KNEE, RIGHT: Primary | ICD-10-CM

## 2022-12-19 ENCOUNTER — HOSPITAL ENCOUNTER (OUTPATIENT)
Dept: PREADMISSION TESTING | Age: 77
Discharge: HOME | End: 2022-12-19
Payer: MEDICARE

## 2022-12-19 VITALS
BODY MASS INDEX: 45.99 KG/M2 | HEIGHT: 67 IN | HEART RATE: 75 BPM | SYSTOLIC BLOOD PRESSURE: 146 MMHG | DIASTOLIC BLOOD PRESSURE: 82 MMHG | WEIGHT: 293 LBS | TEMPERATURE: 97.6 F

## 2022-12-19 DIAGNOSIS — Z79.4 TYPE 2 DIABETES MELLITUS WITH DIABETIC NEUROPATHY, WITH LONG-TERM CURRENT USE OF INSULIN (HCC): Primary | ICD-10-CM

## 2022-12-19 DIAGNOSIS — E11.40 TYPE 2 DIABETES MELLITUS WITH DIABETIC NEUROPATHY, WITH LONG-TERM CURRENT USE OF INSULIN (HCC): Primary | ICD-10-CM

## 2022-12-19 LAB
ANION GAP SERPL CALC-SCNC: 5 MMOL/L (ref 5–15)
APPEARANCE UR: CLEAR
BACTERIA URNS QL MICRO: NEGATIVE /HPF
BILIRUB UR QL: NEGATIVE
BUN SERPL-MCNC: 32 MG/DL (ref 6–20)
BUN/CREAT SERPL: 22 (ref 12–20)
CALCIUM SERPL-MCNC: 8.7 MG/DL (ref 8.5–10.1)
CHLORIDE SERPL-SCNC: 105 MMOL/L (ref 97–108)
CO2 SERPL-SCNC: 28 MMOL/L (ref 21–32)
COLOR UR: ABNORMAL
CREAT SERPL-MCNC: 1.44 MG/DL (ref 0.55–1.02)
EPITH CASTS URNS QL MICRO: ABNORMAL /LPF
ERYTHROCYTE [DISTWIDTH] IN BLOOD BY AUTOMATED COUNT: 15.4 % (ref 11.5–14.5)
EST. AVERAGE GLUCOSE BLD GHB EST-MCNC: 189 MG/DL
GLUCOSE SERPL-MCNC: 232 MG/DL (ref 65–100)
GLUCOSE UR STRIP.AUTO-MCNC: >1000 MG/DL
HBA1C MFR BLD: 8.2 % (ref 4–5.6)
HCT VFR BLD AUTO: 39 % (ref 35–47)
HGB BLD-MCNC: 12.2 G/DL (ref 11.5–16)
HGB UR QL STRIP: NEGATIVE
HYALINE CASTS URNS QL MICRO: ABNORMAL /LPF (ref 0–5)
INR PPP: 1.8 (ref 0.9–1.1)
KETONES UR QL STRIP.AUTO: NEGATIVE MG/DL
LEUKOCYTE ESTERASE UR QL STRIP.AUTO: NEGATIVE
MCH RBC QN AUTO: 27.9 PG (ref 26–34)
MCHC RBC AUTO-ENTMCNC: 31.3 G/DL (ref 30–36.5)
MCV RBC AUTO: 89.2 FL (ref 80–99)
NITRITE UR QL STRIP.AUTO: NEGATIVE
NRBC # BLD: 0 K/UL (ref 0–0.01)
NRBC BLD-RTO: 0 PER 100 WBC
PH UR STRIP: 5.5 [PH] (ref 5–8)
PLATELET # BLD AUTO: 214 K/UL (ref 150–400)
PMV BLD AUTO: 9.2 FL (ref 8.9–12.9)
POTASSIUM SERPL-SCNC: 4.8 MMOL/L (ref 3.5–5.1)
PROT UR STRIP-MCNC: ABNORMAL MG/DL
PROTHROMBIN TIME: 18.4 SEC (ref 9–11.1)
RBC # BLD AUTO: 4.37 M/UL (ref 3.8–5.2)
RBC #/AREA URNS HPF: ABNORMAL /HPF (ref 0–5)
SODIUM SERPL-SCNC: 138 MMOL/L (ref 136–145)
SP GR UR REFRACTOMETRY: 1.01 (ref 1–1.03)
UA: UC IF INDICATED,UAUC: ABNORMAL
UROBILINOGEN UR QL STRIP.AUTO: 0.2 EU/DL (ref 0.2–1)
WBC # BLD AUTO: 8.5 K/UL (ref 3.6–11)
WBC URNS QL MICRO: ABNORMAL /HPF (ref 0–4)

## 2022-12-19 PROCEDURE — 36415 COLL VENOUS BLD VENIPUNCTURE: CPT

## 2022-12-19 PROCEDURE — 86900 BLOOD TYPING SEROLOGIC ABO: CPT

## 2022-12-19 PROCEDURE — 81001 URINALYSIS AUTO W/SCOPE: CPT

## 2022-12-19 PROCEDURE — 85027 COMPLETE CBC AUTOMATED: CPT

## 2022-12-19 PROCEDURE — 83036 HEMOGLOBIN GLYCOSYLATED A1C: CPT

## 2022-12-19 PROCEDURE — 85610 PROTHROMBIN TIME: CPT

## 2022-12-19 PROCEDURE — 80048 BASIC METABOLIC PNL TOTAL CA: CPT

## 2022-12-19 RX ORDER — FLUTICASONE PROPIONATE AND SALMETEROL 500; 50 UG/1; UG/1
1 POWDER RESPIRATORY (INHALATION) 2 TIMES DAILY
COMMUNITY

## 2022-12-19 RX ORDER — BUMETANIDE 2 MG/1
2 TABLET ORAL EVERY MORNING
COMMUNITY

## 2022-12-19 RX ORDER — ALPRAZOLAM 0.5 MG/1
TABLET ORAL
COMMUNITY

## 2022-12-19 RX ORDER — BUMETANIDE 2 MG/1
1 TABLET ORAL EVERY EVENING
COMMUNITY

## 2022-12-19 NOTE — PERIOP NOTES
[address]  ORTHOPAEDIC    Surgery Date:   01/04/2022    Your surgeon's office or [facility] staff will call you between 4 PM- 8 PM the day before surgery with your arrival time. If your surgery is on a Monday, you will receive a call the preceding Friday. Please report to [facility] Patient Access/Admitting on the 1st floor. Bring your insurance card, photo identification, and any copayment (if applicable). If you are going home the same day of your surgery, you must have a responsible adult to drive you home. You need to have a responsible adult to stay with you the first 24 hours after surgery and you should not drive a car for 24 hours following your surgery. Do NOT eat any solid foods after midnight the night before surgery including candy, mints or gum. You may drink clear liquids from midnight until 1 hour prior to arrival time. You may drink up to 12 ounces at one time every 4 hours. Do NOT drink alcohol or smoke 24 hours before surgery. STOP smoking for 14 days prior as it helps with breathing and healing after surgery. If your arrival time is 3pm or later, you may eat a light breakfast before 8am (toast, bagel-no butter, black coffee, plain tea, fruit juice-no pulp) Please note special instructions, if applicable, below for medications. If you are being admitted to the hospital,please leave personal belongings/luggage in your car until you have an assigned hospital room number. Please wear comfortable clothes. Wear your glasses instead of contacts. We ask that all money, jewelry and valuables be left at home. Wear no make up, particularly mascara, the day of surgery. All body piercings, rings, and jewelry need to be removed and left at home. Please remove any nail polish or artificial nails from your fingernails. Please wear your hair loose or down. Please no pony-tails, buns, or any metal hair accessories.  If you shower the morning of surgery, please do not apply any lotions or powders afterwards. You may wear deodorant. Do not shave any body area within 24 hours of your surgery. Please follow all instructions to avoid any potential surgical cancellation. Should your physical condition change, (i.e. fever, cold, flu, etc.) please notify your surgeon as soon as possible. It is important to be on time. If a situation occurs where you may be delayed, please call:  [phone] / [phone] on the day of surgery. The Preadmission Testing staff can be reached at [phone]. Special instructions: BRING CPAP WITH YOU    Current Outpatient Medications   Medication Sig    bumetanide (BUMEX) 2 mg tablet Take 2 mg by mouth Every morning. bumetanide (BUMEX) 2 mg tablet Take 1 mg by mouth every evening. ALPRAZolam (Xanax) 0.5 mg tablet Take  by mouth three (3) times daily as needed for Anxiety. fluticasone propion-salmeteroL (Advair Diskus) 500-50 mcg/dose diskus inhaler Take 1 Puff by inhalation two (2) times a day. OTHER daily. NERVIVE    vit A/vit C/vit E/zinc/copper (PRESERVISION AREDS PO) Take  by mouth two (2) times a day. empagliflozin (Jardiance) 10 mg tablet Take  by mouth Every morning. buPROPion SR (WELLBUTRIN SR) 150 mg SR tablet Take 1 Tablet by mouth daily. traMADoL (ULTRAM) 50 mg tablet Take 1 Tablet by mouth three (3) times daily as needed for Pain for up to 90 days. Max Daily Amount: 150 mg.    hydrOXYzine HCL (ATARAX) 10 mg tablet TAKE 1 TABLET BY MOUTH FOUR TIMES DAILY AS NEEDED FOR ITCHING    potassium chloride SR (KLOR-CON 10) 10 mEq tablet TAKE 2 TABS BY MOUTH TWO (2) TIMES A DAY. (Patient taking differently: two (2) times a day.)    gabapentin (NEURONTIN) 300 mg capsule TAKE 1 CAPSULE BY MOUTH THREE TIMES A DAY (Patient taking differently: 300 IN AM  600 HS)    cyclobenzaprine (FLEXERIL) 10 mg tablet TAKE 1 TABLET BY MOUTH THREE TIMES A DAY AS NEEDED    spironolactone (ALDACTONE) 25 mg tablet Every morning. amLODIPine (NORVASC) 5 mg tablet Every morning. sertraline (ZOLOFT) 100 mg tablet Take 1.5 Tablets by mouth daily. (Patient taking differently: Take 150 mg by mouth every evening.)    albuterol (PROVENTIL HFA, VENTOLIN HFA, PROAIR HFA) 90 mcg/actuation inhaler TAKE 2 (TWO) PUFF PUFF EVERY FOUR HOURS, AS NEEDED    warfarin (COUMADIN) 2.5 mg tablet Take 1 Tab by mouth five (5) days a week. M-W-F: 2.5 mg  Tu-Th-Sat-Sun: 5 mg (Patient taking differently: Take 2.5 mg by mouth daily.)    insulin NPH (NOVOLIN N, HUMULIN N) 100 unit/mL injection 35 Units by SubCUTAneous route every morning. INJECT 35-44 UNITS UNDER THE SKIN  TWICE/DAY    insulin regular (NOVOLIN R, HUMULIN R) 100 unit/mL injection 25 Units by SubCUTAneous route two (2) times a day. 25-35 AM, 25 PM    albuterol (PROVENTIL VENTOLIN) 2.5 mg /3 mL (0.083 %) nebu 3 mL by Nebulization route four (4) times daily as needed for Wheezing or Shortness of Breath. OTHER Allergy shots weekly. EPINEPHrine (EPIPEN) 0.3 mg/0.3 mL injection 0.3 mg by IntraMUSCular route once as needed. Biotin 2,500 mcg cap Take 1 Cap by mouth daily. omalizumab (XOLAIR) 150 mg solr 150 mg by SubCUTAneous route every fourteen (14) days. cholecalciferol, vitamin D3, 50 mcg (2,000 unit) tab Take 2,000 Units by mouth daily. DOCOSAHEXANOIC ACID/EPA (FISH OIL PO) Take 1 Cap by mouth daily. No current facility-administered medications for this encounter. YOU MUST ONLY TAKE THESE MEDICATIONS THE MORNING OF SURGERY WITH A SIP OF WATER: GABAPENTIN, INHALERS, AMLODIPINE, BUMEX, SPIRONOLACTONE, SERTRALINE  MEDICATIONS TO TAKE THE MORNING OF SURGERY ONLY IF NEEDED: INHALER,  NO INSULIN OR JARDIANCE MORNING OF SURGERY  HOLD these prescription medications BEFORE Surgery: CHECK WITH PRESCRIBING PHYSICIANS ABOUT INSULIN DOSES AND WARFARIN. Stop any non-steroidal anti-inflammatory drugs (i.e. Ibuprofen, Naproxen, Advil, Aleve) 3 days before surgery. You may take Tylenol.   STOP all vitamins and herbal supplements 1 week prior to  surgery. If you are currently taking Plavix, Coumadin, or any other blood-thinning/anticoagulant medication contact your prescribing physician for instructions. Preventing Infections Before and After - Your Surgery    IMPORTANT INSTRUCTIONS    You play an important role in your health and preparation for surgery. To reduce the germs on your skin you will need to shower with CHG soap (Chorhexidine gluconate 4%) two times before surgery. CHG soap (Hibiclens, Hex-A-Clens or store brand)  CHG soap will be provided at your Preadmission Testing (PAT) appointment. If you do not have a PAT appointment before surgery, you may arrange to  CHG soap from our office or purchase CHG soap at a pharmacy, grocery or department store. You need to purchase TWO 4 ounce bottles to use for your 2 showers. Steps to follow:  Marce Bumps your hair with your normal shampoo and your body with regular soap and rinse well to remove shampoo and soap from your skin. Wet a clean washcloth and turn off the shower. Put CHG soap on washcloth and apply to your entire body from the neck down. Do not use on your head, face or private parts(genitals). Do not use CHG soap on open sores, wounds or areas of skin irritation. Wash you body gently for 5 minutes. Do not wash your skin too hard. This soap does not create lather. Pay special attention to your underarms and from your belly button to your feet. Turn the shower back on and rinse well to get CHG soap off your body. Pat your skin dry with a clean, dry towel. Do not apply lotions or moisturizer. Put on clean clothes and sleep on fresh bed sheets and do not allow pets to sleep with you.     Shower with CHG soap 2 times before your surgery  The evening before your surgery  The morning of your surgery      Tips to help prevent infections after your surgery:  Protect your surgical wound from germs:  Hand washing is the most important thing you and your caregivers can do to prevent infections. Keep your bandage clean and dry! Do not touch your surgical wound. Use clean, freshly washed towels and washcloths every time you shower; do not share bath linens with others. Until your surgical wound is healed, wear clothing and sleep on bed linens each day that are clean and freshly washed. Do not allow pets to sleep in your bed with you or touch your surgical wound. Do not smoke - smoking delays wound healing. This may be a good time to stop smoking. If you have diabetes, it is important for you to manage your blood sugar levels properly before your surgery as well as after your surgery. Poorly managed blood sugar levels slow down wound healing and prevent you from healing completely. Prevention of Infection  Testing for Staphylococcus aureus on your skin before surgery    Staphylococcus aureus (staph) is a common bacteria that is found on the body. It normally does not cause infection on healthy skin. Before surgery, you will be tested to see if you have staph by swabbing the inside of your nose. When you have an incision with surgery, the goal is to protect that incision from infection. Removal of the staph bacteria before surgery can decrease the risk of a surgical site infection. If your nose swab is positive for staph you will be called. Your treatment will include 2 steps:  Prescription for Mupirocin ointment to be used in each nostril twice a day for 5 days. Showering with Chlorhexidine (CHG) liquid soap for 5 days prior to surgery. How to use Mupirocin ointment in your nose   the prescription from your pharmacy. You will receive a large tube of ointment which will be big enough for all of your treatments. You will apply this ointment to each nostril 2 times a day for 5 days. Wash your hands with  gel or soap and water for 20 seconds before using ointment.   Place a pea-sized amount of ointment on a cotton Q-tip. Apply ointment just inside of each nostril with the Q-tip. Do not push Q-tip or ointment deep inside you nose. Press your nostrils together and massage for a few seconds. Wash your hands with  gel or soap and water after you are finished. Do not get ointment near your eyes. If it gets into your eyes, rinse them with cool water. If you need to use nasal spray, clean the tip of the bottle with alcohol before use and do not use both at the same time. If you are scheduled for COVID testing during the 5 days, do NOT apply morning dose until after the COVID test has been performed. How to use Chlorhexidine (CHG) 4% liquid soap  Purchase an 8 ounce bottle of CHG liquid soap (Chlorhexidine 4%, Hibiclens, Hex-A-Clens or store brand) at a pharmacy or grocery store. Wash your hair with your normal shampoo and your body with regular soap and rinse well to remove shampoo and soap from your skin. Wet a clean washcloth and turn off the shower. Put CHG soap on washcloth and apply to your entire body from the neck down. Do not use on your head, face or private parts(genitals). Do not use CHG soap on open sores, wounds or areas of skin irritation. Wash your body gently for 5 minutes. Do not wash your skin too hard. This soap does not create lather. Pay special attention to your underarms and from your belly button to your feet. Turn the shower back on and rinse well to get CHG soap off your body. Pat your skin dry with a clean, dry towel. Do not apply lotions or moisturizer. Put on clean clothes and sleep on fresh bed sheets the night before surgery. Do not allow pets to sleep with you. Eating and Drinking Before Surgery    You may eat a regular dinner at the usual time on the day before your surgery. Do NOT eat any solid foods after midnight unless your arrival time at the hospital is 3pm or later.   You may drink clear liquids only from 12 midnight until 1 hours prior to your arrival time at the hospital on the day of your surgery. Do NOT drink alcohol. Clear liquids include:  Water  Fruit juices without pulp( i.e. apple juice)  Carbonated beverages  Black coffee (no cream/milk)  Tea (no cream/milk)  Gatorade  You may drink up to 12-16 ounces at one time every 4 hours between the hours of midnight and 1 hour before your arrival time at the hospital. Example- if your arrival time at the hospital is 6am, you may drink 12-16 ounces of clear liquids no later than 5am.  If your arrival time at the hospital is 3pm or later, you may eat a light breakfast before 8am.  A light breakfast includes: Toast or bagel (no butter)  Black coffee (no cream/milk)  Tea (no cream/milk)  Fruit juices without pulp ( i.e. apple juice)  Do NOT eat meat, eggs, vegetables or fruit  If you have any questions, please contact your surgeon's office. Patient Information Regarding COVID Restrictions    Day of Procedure    Please park in the parking deck or any designated visitor parking lot. Enter the facility through the Main Entrance of the hospital.  On the day of surgery, please provide the cell phone number of the person who will be waiting for you to the Patient Access representative at the time of registration. Masks are highly recommended in the hospital, but not required. Once your procedure and the immediate recovery period is completed, a nurse in the recovery area will contact your designated visitor to inform them of your room number or to otherwise review other pertinent information regarding your care. Social distancing practices are strongly encouraged in waiting areas and the cafeteria. The patient was contacted in person. She verbalized understanding of all instructions does not  need reinforcement.

## 2022-12-20 LAB
ABO + RH BLD: NORMAL
BACTERIA SPEC CULT: NORMAL
BACTERIA SPEC CULT: NORMAL
BLOOD GROUP ANTIBODIES SERPL: NORMAL
SERVICE CMNT-IMP: NORMAL
SPECIMEN EXP DATE BLD: NORMAL

## 2022-12-20 PROCEDURE — 82985 ASSAY OF GLYCATED PROTEIN: CPT

## 2022-12-20 NOTE — PERIOP NOTES
PAT Nurse Practitioner   Pre-Operative Chart Review/Assessment:-ORTHOPEDIC                Patient Name:  Gary Griffin                                                           Age:   65 y.o.    :  1945     Today's Date:  2022     Date of PAT:   22      Date of Surgery:    23 RESCHEDULED pending cardiac clearance/Diabetes optimization     Procedure(s):  Right Total Knee Arthroplasty     Surgeon:   Dr. Schneider Conception                       PLAN:      1)  Medical Clearance/PCP:  Tahira Miner MD      2)  Cardiac Clearance:  Pt followed by Dr. Melida Chavez. Tru([facility]). LOV 22. Condition stable at that time, no changes to current regimen. Neg ST in Aug 2022, EF 40-45% on ECHO Aug 2022. OV note and EKG on chart and scanned under media. Given significant cardiac hx, clearance/risk stratification requested. 3)  Diabetic Treatment Consult:  Hemoglobin A1c-8.2 in PAT. Fructosamine added on to blood in lab. Fruct-372. Would recommend postponing surgery until A1c <8 or fruct<300 unless procedure deemed urgent by surgeon. Order placed for outpatient referral to [facility]. 4)  Sleep Apnea evaluation:   +LITA dx. Pt uses CPAP.        5) Treatment for MRSA/Staph Aureus:  Neg      6) Additional Concerns:  Former smoker, HTN, CHF(EF 40-45%), A-fib s/p PPM([employer]), Asthma(followed by Dr. Ellie Hines), T2 IDDM, diabetic nephropathy(Cr-1.44 on PAT labs), obesity, depression, [place]                Vital Signs:         Vitals:    22 0835   BP: (!) 146/82   Pulse: 75   Temp: 97.6 °F (36.4 °C)   Weight: 133.9 kg (295 lb 3.1 oz)   Height: 5' 7\" (1.702 m)          Body mass index is 46.23 kg/m².         ____________________________________________  PAST MEDICAL HISTORY  Past Medical History:   Diagnosis Date    Arthritis     Asthma     Atrial fibrillation ([facility]     CAD (coronary artery disease)     Cancer ([facility] 2001    UTERINE    Depression     DM (diabetes mellitus) ([facility] Heart failure ([facility] 2015    HTN (hypertension)     Hyperlipidemia     Morbid obesity (HCC)     Neuropathy     LITA on CPAP       ____________________________________________  PAST SURGICAL HISTORY  Past Surgical History:   Procedure Laterality Date    COLONOSCOPY Left 10/28/2019    COLONOSCOPY AND EGD performed by Mya Mcdermott MD at [facility] ENDOSCOPY    COLONOSCOPY N/A 07/15/2020    COLONOSCOPY :- performed by Mya Mcdermott MD at [facility] ENDOSCOPY    HX APPENDECTOMY      HX BARIATRIC SURGERY  2011    LAP BAND    HX CATARACT REMOVAL Bilateral 2019    HX CERVICAL FUSION      HX CHOLECYSTECTOMY      HX DILATION AND CURETTAGE      HX GASTRIC BYPASS      Jejunal bypass with subsequent reversal.. HX ORTHOPAEDIC  [id number]    All related to Lipitor reaction    HX PACEMAKER  [id number]    HX EMILIANO AND BSO      For uterine CA    IR DRAIN CHEST        ____________________________________________  HOME MEDICATIONS  Current Outpatient Medications   Medication Sig    bumetanide (BUMEX) 2 mg tablet Take 2 mg by mouth Every morning. bumetanide (BUMEX) 2 mg tablet Take 1 mg by mouth every evening. ALPRAZolam (Xanax) 0.5 mg tablet Take  by mouth three (3) times daily as needed for Anxiety. fluticasone propion-salmeteroL (Advair Diskus) 500-50 mcg/dose diskus inhaler Take 1 Puff by inhalation two (2) times a day. OTHER daily. NERVIVE    vit A/vit C/vit E/zinc/copper (PRESERVISION AREDS PO) Take  by mouth two (2) times a day. empagliflozin (Jardiance) 10 mg tablet Take  by mouth Every morning. buPROPion SR (WELLBUTRIN SR) 150 mg SR tablet Take 1 Tablet by mouth daily. traMADoL (ULTRAM) 50 mg tablet Take 1 Tablet by mouth three (3) times daily as needed for Pain for up to 90 days. Max Daily Amount: 150 mg.    hydrOXYzine HCL (ATARAX) 10 mg tablet TAKE 1 TABLET BY MOUTH FOUR TIMES DAILY AS NEEDED FOR ITCHING    potassium chloride SR (KLOR-CON 10) 10 mEq tablet TAKE 2 TABS BY MOUTH TWO (2) TIMES A DAY.  (Patient taking differently: two (2) times a day.)    gabapentin (NEURONTIN) 300 mg capsule TAKE 1 CAPSULE BY MOUTH THREE TIMES A DAY (Patient taking differently: 300 IN AM  600 HS)    cyclobenzaprine (FLEXERIL) 10 mg tablet TAKE 1 TABLET BY MOUTH THREE TIMES A DAY AS NEEDED    spironolactone (ALDACTONE) 25 mg tablet Every morning. amLODIPine (NORVASC) 5 mg tablet Every morning. sertraline (ZOLOFT) 100 mg tablet Take 1.5 Tablets by mouth daily. (Patient taking differently: Take 150 mg by mouth every evening.)    albuterol (PROVENTIL HFA, VENTOLIN HFA, PROAIR HFA) 90 mcg/actuation inhaler TAKE 2 (TWO) PUFF PUFF EVERY FOUR HOURS, AS NEEDED    warfarin (COUMADIN) 2.5 mg tablet Take 1 Tab by mouth five (5) days a week. M-W-F: 2.5 mg  Tu-Th-Sat-Sun: 5 mg (Patient taking differently: Take 2.5 mg by mouth daily.)    insulin NPH (NOVOLIN N, HUMULIN N) 100 unit/mL injection 35 Units by SubCUTAneous route every morning. INJECT 35-44 UNITS UNDER THE SKIN  TWICE/DAY    insulin regular (NOVOLIN R, HUMULIN R) 100 unit/mL injection 25 Units by SubCUTAneous route two (2) times a day. 25-35 AM, 25 PM    albuterol (PROVENTIL VENTOLIN) 2.5 mg /3 mL (0.083 %) nebu 3 mL by Nebulization route four (4) times daily as needed for Wheezing or Shortness of Breath. OTHER Allergy shots weekly. EPINEPHrine (EPIPEN) 0.3 mg/0.3 mL injection 0.3 mg by IntraMUSCular route once as needed. Biotin 2,500 mcg cap Take 1 Cap by mouth daily. omalizumab (XOLAIR) 150 mg solr 150 mg by SubCUTAneous route every fourteen (14) days. cholecalciferol, vitamin D3, 50 mcg (2,000 unit) tab Take 2,000 Units by mouth daily. DOCOSAHEXANOIC ACID/EPA (FISH OIL PO) Take 1 Cap by mouth daily.      No current facility-administered medications for this encounter.      ____________________________________________  ALLERGIES  Allergies   Allergen Reactions    Latex Itching    Codeine Rash    Lisinopril Other (comments)     Cough, Visual disturbances    Morphine Itching    Statins-Hmg-Coa Reductase Inhibitors Other (comments)     Muscle enzyme problems      ____________________________________________  SOCIAL HISTORY  Social History     Tobacco Use    Smoking status: Former     Types: Cigarettes     Quit date: 1992     Years since quittin.9    Smokeless tobacco: Never   Substance Use Topics    Alcohol use: Yes     Comment: RARELY      ____________________________________________   Internal Administration   First Dose COVID-19, PFIZER PURPLE top, DILUTE for use, (age 15 y+), IM, 30mcg/0.3mL  2021   Second Dose COVID-19, PFIZER PURPLE top, DILUTE for use, (age 15 y+), IM, 30mcg/0.3mL  2021      Last COVID Lab SARS-CoV-2 ( )   Date Value   2020 Not Detected                    Labs:     Hospital Outpatient Visit on 2022   Component Date Value Ref Range Status    Sodium 2022 138  136 - 145 mmol/L Final    Potassium 2022 4.8  3.5 - 5.1 mmol/L Final    Chloride 2022 105  97 - 108 mmol/L Final    CO2 2022 28  21 - 32 mmol/L Final    Anion gap 2022 5  5 - 15 mmol/L Final    Glucose 2022 232 (A)  65 - 100 mg/dL Final    BUN 2022 32 (A)  6 - 20 MG/DL Final    Creatinine 2022 1.44 (A)  0.55 - 1.02 MG/DL Final    BUN/Creatinine ratio 2022 22 (A)  12 - 20   Final    eGFR 2022 37 (A)  >60 ml/min/1.73m2 Final    Comment:      Pediatric calculator link: Kyle.at. org/professionals/kdoqi/gfr_calculatorped       These results are not intended for use in patients <25years of age. eGFR results are calculated without a race factor using  the 2021 CKD-EPI equation. Careful clinical correlation is recommended, particularly when comparing to results calculated using previous equations. The CKD-EPI equation is less accurate in patients with extremes of muscle mass, extra-renal metabolism of creatinine, excessive creatine ingestion, or following therapy that affects renal tubular secretion. Calcium 12/19/2022 8.7  8.5 - 10.1 MG/DL Final    WBC 12/19/2022 8.5  3.6 - 11.0 K/uL Final    RBC 12/19/2022 4.37  3.80 - 5.20 M/uL Final    HGB 12/19/2022 12.2  11.5 - 16.0 g/dL Final    HCT 12/19/2022 39.0  35.0 - 47.0 % Final    MCV 12/19/2022 89.2  80.0 - 99.0 FL Final    MCH 12/19/2022 27.9  26.0 - 34.0 PG Final    MCHC 12/19/2022 31.3  30.0 - 36.5 g/dL Final    RDW 12/19/2022 15.4 (A)  11.5 - 14.5 % Final    PLATELET 27/32/2437 418  150 - 400 K/uL Final    MPV 12/19/2022 9.2  8.9 - 12.9 FL Final    NRBC 12/19/2022 0.0  0  WBC Final    ABSOLUTE NRBC 12/19/2022 0.00  0.00 - 0.01 K/uL Final    Crossmatch Expiration 12/19/2022 01/02/2023,2359   Final    ABO/Rh(D) 12/19/2022 A POSITIVE   Final    Antibody screen 12/19/2022 NEG   Final    INR 12/19/2022 1.8 (A)  0.9 - 1.1   Final    A single therapeutic range for Vit K antagonists may not be optimal for all indications - see June, 2008 issue of Chest, American College of Chest Physicians Evidence-Based Clinical Practice Guidelines, 8th Edition.     Prothrombin time 12/19/2022 18.4 (A)  9.0 - 11.1 sec Final    Color 12/19/2022 YELLOW/STRAW    Final    Color Reference Range: Straw, Yellow or Dark Yellow    Appearance 12/19/2022 CLEAR  CLEAR   Final    Specific gravity 12/19/2022 1.013  1.003 - 1.030   Final    pH (UA) 12/19/2022 5.5  5.0 - 8.0   Final    Protein 12/19/2022 TRACE (A)  NEG mg/dL Final    Glucose 12/19/2022 >1,000 (A)  NEG mg/dL Final    Ketone 12/19/2022 Negative  NEG mg/dL Final    Bilirubin 12/19/2022 Negative  NEG   Final    Blood 12/19/2022 Negative  NEG   Final    Urobilinogen 12/19/2022 0.2  0.2 - 1.0 EU/dL Final    Nitrites 12/19/2022 Negative  NEG   Final    Leukocyte Esterase 12/19/2022 Negative  NEG   Final    UA:UC IF INDICATED 12/19/2022 CULTURE NOT INDICATED BY UA RESULT  CNI   Final    WBC 12/19/2022 0-4  0 - 4 /hpf Final    RBC 12/19/2022 0-5  0 - 5 /hpf Final    Epithelial cells 12/19/2022 FEW  FEW /lpf Final    Epithelial cell category consists of squamous cells and /or transitional urothelial cells. Renal tubular cells, if present, are separately identified as such. Bacteria 12/19/2022 Negative  NEG /hpf Final    Hyaline cast 12/19/2022 0-2  0 - 5 /lpf Final    Hemoglobin A1c 12/19/2022 8.2 (A)  4.0 - 5.6 % Final    Comment: NEW METHOD  PLEASE NOTE NEW REFERENCE RANGE  (NOTE)  HbA1C Interpretive Ranges  <5.7              Normal  5.7 - 6.4         Consider Prediabetes  >6.5              Consider Diabetes      Est. average glucose 12/19/2022 189  mg/dL Final    Special Requests: 12/19/2022 NO SPECIAL REQUESTS    Final    Culture result: 12/19/2022 MRSA NOT PRESENT    Final    Culture result: 12/19/2022     Final                    Value:Screening of patient nares for MRSA is for surveillance purposes and, if positive, to facilitate isolation considerations in high risk settings. It is not intended for automatic decolonization interventions per se as regimens are not sufficiently effective to warrant routine use. Fructosamine 12/20/2022 372 (A)  0 - 285 umol/L Final    Comment: (NOTE)  Published reference interval for apparently healthy subjects  between age 21 and 61 is 1 - 285 umol/L and in a poorly  controlled diabetic population is 228 - 563 umol/L with a  mean of 396 umol/L.   Performed At: [address] [id number]  Jimenez Brown MD [id number]     Office Visit on 12/12/2022   Component Date Value Ref Range Status    VALID INTERNAL CONTROL POC 12/12/2022 Yes   Final    Prothrombin time (POC) 12/12/2022 1.6 (A)  8.7 - 11.5 seconds Final    INR POC 12/12/2022 1.6 (A)  0.8 - 1.2 Final   Office Visit on 11/21/2022   Component Date Value Ref Range Status    VALID INTERNAL CONTROL POC 11/21/2022 Yes   Final    INR POC 11/21/2022 4.6 (A)  0.8 - 1.2 Final    WBC 11/21/2022 11.3 (A)  3.4 - 10.8 x10E3/uL Final    RBC 11/21/2022 4.78  3.77 - 5.28 x10E6/uL Final    HGB 11/21/2022 13.5  11.1 - 15.9 g/dL Final    HCT 11/21/2022 42.1  34.0 - 46.6 % Final    MCV 11/21/2022 88  79 - 97 fL Final    MCH 11/21/2022 28.2  26.6 - 33.0 pg Final    MCHC 11/21/2022 32.1  31.5 - 35.7 g/dL Final    RDW 11/21/2022 15.0  11.7 - 15.4 % Final    PLATELET 69/53/9714 381  150 - 450 x10E3/uL Final    NEUTROPHILS 11/21/2022 72  Not Estab. % Final    Lymphocytes 11/21/2022 15  Not Estab. % Final    MONOCYTES 11/21/2022 7  Not Estab. % Final    EOSINOPHILS 11/21/2022 5  Not Estab. % Final    BASOPHILS 11/21/2022 1  Not Estab. % Final    ABS. NEUTROPHILS 11/21/2022 8.0 (A)  1.4 - 7.0 x10E3/uL Final    Abs Lymphocytes 11/21/2022 1.7  0.7 - 3.1 x10E3/uL Final    ABS. MONOCYTES 11/21/2022 0.8  0.1 - 0.9 x10E3/uL Final    ABS. EOSINOPHILS 11/21/2022 0.5 (A)  0.0 - 0.4 x10E3/uL Final    ABS. BASOPHILS 11/21/2022 0.1  0.0 - 0.2 x10E3/uL Final    IMMATURE GRANULOCYTES 11/21/2022 0  Not Estab. % Final    ABS. IMM. GRANS. 11/21/2022 0.0  0.0 - 0.1 x10E3/uL Final    Glucose 11/21/2022 67 (A)  70 - 99 mg/dL Final    BUN 11/21/2022 26  8 - 27 mg/dL Final    Creatinine 11/21/2022 1.13 (A)  0.57 - 1.00 mg/dL Final    eGFR 11/21/2022 50 (A)  >59 mL/min/1.73 Final    BUN/Creatinine ratio 11/21/2022 23  12 - 28 Final    Sodium 11/21/2022 140  134 - 144 mmol/L Final    Potassium 11/21/2022 4.0  3.5 - 5.2 mmol/L Final    Chloride 11/21/2022 104  96 - 106 mmol/L Final    CO2 11/21/2022 17 (A)  20 - 29 mmol/L Final    Calcium 11/21/2022 9.2  8.7 - 10.3 mg/dL Final    Protein, total 11/21/2022 7.3  6.0 - 8.5 g/dL Final    Albumin 11/21/2022 4.7  3.7 - 4.7 g/dL Final    GLOBULIN, TOTAL 11/21/2022 2.6  1.5 - 4.5 g/dL Final    A-G Ratio 11/21/2022 1.8  1.2 - 2.2 Final    Bilirubin, total 11/21/2022 0.2  0.0 - 1.2 mg/dL Final    Alk.  phosphatase 11/21/2022 95  44 - 121 IU/L Final    AST (SGOT) 11/21/2022 27  0 - 40 IU/L Final    ALT (SGPT) 11/21/2022 22  0 - 32 IU/L Final       Skin:     Denies open wounds, cuts, sores, rashes or other areas of concern in PAT assessment.           Cherie Coleman NP  Available via [address] [place] [address]

## 2022-12-21 LAB — FRUCTOSAMINE SERPL-SCNC: 372 UMOL/L (ref 0–285)

## 2023-01-10 ENCOUNTER — APPOINTMENT (OUTPATIENT)
Dept: GENERAL RADIOLOGY | Age: 78
End: 2023-01-10
Attending: EMERGENCY MEDICINE
Payer: MEDICARE

## 2023-01-10 ENCOUNTER — HOSPITAL ENCOUNTER (EMERGENCY)
Age: 78
Discharge: HOME | End: 2023-01-10
Attending: EMERGENCY MEDICINE
Payer: MEDICARE

## 2023-01-10 VITALS
RESPIRATION RATE: 18 BRPM | SYSTOLIC BLOOD PRESSURE: 120 MMHG | OXYGEN SATURATION: 95 % | HEART RATE: 87 BPM | DIASTOLIC BLOOD PRESSURE: 67 MMHG | TEMPERATURE: 98 F

## 2023-01-10 DIAGNOSIS — M54.50 ACUTE MIDLINE LOW BACK PAIN WITHOUT SCIATICA: Primary | ICD-10-CM

## 2023-01-10 PROCEDURE — 74011250637 HC RX REV CODE- 250/637: Performed by: EMERGENCY MEDICINE

## 2023-01-10 PROCEDURE — 99283 EMERGENCY DEPT VISIT LOW MDM: CPT

## 2023-01-10 PROCEDURE — 74011250636 HC RX REV CODE- 250/636: Performed by: EMERGENCY MEDICINE

## 2023-01-10 PROCEDURE — 72100 X-RAY EXAM L-S SPINE 2/3 VWS: CPT

## 2023-01-10 RX ORDER — HYDROCODONE BITARTRATE AND ACETAMINOPHEN 5; 325 MG/1; MG/1
1 TABLET ORAL
Qty: 12 TABLET | Refills: 0 | Status: SHIPPED | OUTPATIENT
Start: 2023-01-10 | End: 2023-01-13

## 2023-01-10 RX ORDER — CYCLOBENZAPRINE HCL 10 MG
10 TABLET ORAL
Status: COMPLETED | OUTPATIENT
Start: 2023-01-10 | End: 2023-01-10

## 2023-01-10 RX ORDER — CYCLOBENZAPRINE HCL 10 MG
10 TABLET ORAL
Qty: 18 TABLET | Refills: 0 | Status: SHIPPED | OUTPATIENT
Start: 2023-01-10 | End: 2023-01-10 | Stop reason: SDUPTHER

## 2023-01-10 RX ORDER — ONDANSETRON 8 MG/1
8 TABLET, ORALLY DISINTEGRATING ORAL
Qty: 12 TABLET | Refills: 0 | Status: SHIPPED | OUTPATIENT
Start: 2023-01-10

## 2023-01-10 RX ORDER — ONDANSETRON 4 MG/1
8 TABLET, ORALLY DISINTEGRATING ORAL
Status: COMPLETED | OUTPATIENT
Start: 2023-01-10 | End: 2023-01-10

## 2023-01-10 RX ORDER — HYDROCODONE BITARTRATE AND ACETAMINOPHEN 5; 325 MG/1; MG/1
1 TABLET ORAL
Status: COMPLETED | OUTPATIENT
Start: 2023-01-10 | End: 2023-01-10

## 2023-01-10 RX ADMIN — HYDROCODONE BITARTRATE AND ACETAMINOPHEN 1 TABLET: 5; 325 TABLET ORAL at 20:52

## 2023-01-10 RX ADMIN — ONDANSETRON 8 MG: 4 TABLET, ORALLY DISINTEGRATING ORAL at 20:52

## 2023-01-10 RX ADMIN — CYCLOBENZAPRINE 10 MG: 10 TABLET, FILM COATED ORAL at 20:51

## 2023-01-10 NOTE — ED TRIAGE NOTES
Pt arrives via EMS from home c/o GLF on Sunday. Pt reports hearing a \"pop\" in lower back following fall. Denies hitting head or LOC.

## 2023-01-11 NOTE — DISCHARGE INSTRUCTIONS
You will need to use medications as directed for pain, spasm and nausea. Use cool compresses the areas of the back that are bothering you 4 times a day for 20 to 30 minutes at a time. After the next 24 to 48 hours you may switch to moist heat instead. Rest and follow-up with Dr. Aayush Robert if you continue to have trouble. We have offered to have physical therapy come to the house and that still may be necessary and you can speak with Dr. Aayush Robert about that if needed. Do not take your tramadol with the Norco.  This may cause some increased drowsiness. Continue to use your Flexeril for muscle spasm.

## 2023-01-16 NOTE — ED PROVIDER NOTES
This is a 65-year-old female with a history of coronary artery disease, atrial fibrillation and diabetes. She also has a history of heart failure and hypertension. She states that 2 days ago she slipped and fell down in the kitchen. She did not actually hit the floor but felt a pop in her back when she went down. Since that time she is complained of some pain in the lower back. She has not had any pain into her legs and has had no difficulty with bowel or bladder. She did not hit her cheek head, chest or abdomen. Her upper extremities are without injury. She has no pain in her hips. Discomfort is primarily in the mid lower back. She voices no other acute complaint. Past Medical History:   Diagnosis Date    Arthritis 1990    Asthma     Atrial fibrillation ([address]     CAD (coronary artery disease) 1996    Cancer ([address] 2001    UTERINE    Depression     DM (diabetes mellitus) ([address]     Heart failure ([address] 2015    HTN (hypertension)     Hyperlipidemia     Morbid obesity ([address]     Neuropathy     LITA on CPAP        Past Surgical History:   Procedure Laterality Date    COLONOSCOPY Left 10/28/2019    COLONOSCOPY AND EGD performed by Terrell Georges MD at [facility] ENDOSCOPY    COLONOSCOPY N/A 07/15/2020    COLONOSCOPY :- performed by Terrell Georges MD at [address] [place] [address]      HX BARIATRIC SURGERY  2011    LAP BAND    HX CATARACT REMOVAL Bilateral 2019    HX CERVICAL FUSION      HX CHOLECYSTECTOMY      HX DILATION AND CURETTAGE      HX GASTRIC BYPASS      Jejunal bypass with subsequent reversal..     HX ORTHOPAEDIC  [id number]    All related to Lipitor reaction    HX PACEMAKER  [id number]    HX EMILIANO AND BSO      For uterine CA    IR DRAIN CHEST           Family History:   Problem Relation Age of Onset    Stroke Mother     OSTEOARTHRITIS Mother     Hypertension Mother     Psychiatric Disorder Mother         Situational Depression late in life    Heart Disease Father         congestive heart failure Alcohol abuse Father     Asthma Father     Hypertension Father     Hypertension Sister     Spondylolisthesis Sister     Anesth Problems Sister         NAUSEA, VOMITING    Cancer Brother         LEUKEMIA    Alcohol abuse Maternal Aunt     Diabetes Maternal Grandmother     Diabetes Other     Heart Disease Other        Social History     Socioeconomic History    Marital status:      Spouse name: Not on file    Number of children: Not on file    Years of education: Not on file    Highest education level: Not on file   Occupational History    Not on file   Tobacco Use    Smoking status: Former     Types: Cigarettes     Quit date: 1992     Years since quittin.0    Smokeless tobacco: Never   Vaping Use    Vaping Use: Never used   Substance and Sexual Activity    Alcohol use: Yes     Comment: RARELY    Drug use: No    Sexual activity: Not Currently     Partners: Male     Birth control/protection: None   Other Topics Concern    Not on file   Social History Narrative    Not on file     Social Determinants of Health     Financial Resource Strain: Not on file   Food Insecurity: Not on file   Transportation Needs: Not on file   Physical Activity: Not on file   Stress: Not on file   Social Connections: Not on file   Intimate Partner Violence: Not on file   Housing Stability: Not on file         ALLERGIES: Latex, Codeine, Lisinopril, Morphine, and Statins-hmg-coa reductase inhibitors    Review of Systems   Constitutional:  Negative for activity change, appetite change, chills and fatigue. HENT:  Negative for ear pain, facial swelling, sore throat and trouble swallowing. Eyes:  Negative for pain, discharge and visual disturbance. Respiratory:  Negative for chest tightness, shortness of breath and wheezing. Cardiovascular:  Negative for chest pain and palpitations. Gastrointestinal:  Negative for abdominal pain, blood in stool, nausea and vomiting.    Genitourinary:  Negative for difficulty urinating, flank pain and hematuria. Musculoskeletal:  Negative for arthralgias, joint swelling, myalgias and neck pain. See HPI   Skin:  Negative for color change and rash. Neurological:  Negative for dizziness, weakness, numbness and headaches. Hematological:  Negative for adenopathy. Does not bruise/bleed easily. Psychiatric/Behavioral:  Negative for behavioral problems, confusion and sleep disturbance. All other systems reviewed and are negative. Vitals:    01/10/23 1830   BP: 120/67   Pulse: 87   Resp: 18   Temp: 98 °F (36.7 °C)   SpO2: 95%            Physical Exam  Vitals and nursing note reviewed. Constitutional:       General: She is not in acute distress. Appearance: She is well-developed. Comments: This is a 69-year-old female who had a near fall on Sunday which was 2 days ago and felt a pop in her lower back. She has since complained of some pain in that area. She has had no difficulty otherwise. Vital signs are as noted   HENT:      Head: Normocephalic and atraumatic. Nose: Nose normal.      Mouth/Throat:      Mouth: Mucous membranes are moist.   Eyes:      General: No scleral icterus. Pupils: Pupils are equal, round, and reactive to light. Neck:      Thyroid: No thyromegaly. Vascular: No JVD. Trachea: No tracheal deviation. Comments: No carotid bruits noted. Cardiovascular:      Rate and Rhythm: Normal rate and regular rhythm. Heart sounds: Normal heart sounds. Pulmonary:      Effort: Pulmonary effort is normal.      Breath sounds: Normal breath sounds. Abdominal:      General: Bowel sounds are normal. There is no distension. Palpations: Abdomen is soft. There is no mass. Tenderness: There is no abdominal tenderness. There is no guarding or rebound. Musculoskeletal:         General: No tenderness. Normal range of motion. Cervical back: Normal range of motion and neck supple. Comments:  There is some mild localized tenderness to palpation of the patient's lower back. No definitive spasm or step-offs are noted. Neurovascular function in the lower extremities unremarkable. Lymphadenopathy:      Cervical: No cervical adenopathy. Skin:     General: Skin is warm and dry. Capillary Refill: Capillary refill takes 2 to 3 seconds. Findings: No erythema or rash. Neurological:      Mental Status: She is alert and oriented to person, place, and time. Cranial Nerves: No cranial nerve deficit. Coordination: Coordination normal.      Deep Tendon Reflexes: Reflexes are normal and symmetric. Psychiatric:         Behavior: Behavior normal.         Thought Content: Thought content normal.         Judgment: Judgment normal.        Medical Decision Making  This is a 79-year-old female who presents after a near fall 2 days ago hearing a pop in her lower back. Since that time she has had some mild pain in that back which got slightly worse. She has had no numbness or tingling in weakness in her legs. She has had no bladder or bowel incontinence or difficulty noted. She denies hitting anywhere else and has no complaint anywhere else. She is evaluated with a simple x-ray of her lower back. Considerations would be bony injury versus muscular ligamentous injury to account for the pop that she heard. She has no findings to suggest disc at this time. She has no other acute physical findings. Risk  Prescription drug management. Procedures      There is a grade 1 anterior listhesis of L5 with respect S1. She has an L5 pars interarticularis defect. No acute injuries appreciated on her lumbar spine x-ray. Patient will be treated conservatively with anti-inflammatories, moist heat and decreased activity as needed. We discussed the possibility of muscular or ligamentous injury that may have accounted for the pop that she heard.   She is advised to follow-up with her own physician if she has continued difficulty or worsening of symptoms. She voices understanding and is discharged ambulatory.

## 2023-04-21 PROBLEM — Z79.4 TYPE 2 DIABETES MELLITUS WITH STAGE 1 CHRONIC KIDNEY DISEASE, WITH LONG-TERM CURRENT USE OF INSULIN (HCC): Status: RESOLVED | Noted: 2017-05-25 | Resolved: 2023-04-21

## 2023-04-21 PROBLEM — S93.402A LEFT ANKLE SPRAIN: Status: RESOLVED | Noted: 2017-05-21 | Resolved: 2023-04-21

## 2023-04-21 PROBLEM — N18.1 TYPE 2 DIABETES MELLITUS WITH STAGE 1 CHRONIC KIDNEY DISEASE, WITH LONG-TERM CURRENT USE OF INSULIN (HCC): Status: RESOLVED | Noted: 2017-05-25 | Resolved: 2023-04-21

## 2023-04-21 PROBLEM — J11.1 INFLUENZA: Status: RESOLVED | Noted: 2020-02-20 | Resolved: 2023-04-21

## 2023-04-21 PROBLEM — E11.22 TYPE 2 DIABETES MELLITUS WITH STAGE 1 CHRONIC KIDNEY DISEASE, WITH LONG-TERM CURRENT USE OF INSULIN (HCC): Status: RESOLVED | Noted: 2017-05-25 | Resolved: 2023-04-21

## 2023-04-21 PROBLEM — J18.9 PNEUMONIA: Status: RESOLVED | Noted: 2017-04-23 | Resolved: 2023-04-21

## 2023-05-04 ENCOUNTER — HOSPITAL ENCOUNTER (OUTPATIENT)
Dept: MRI IMAGING | Age: 78
Discharge: HOME | End: 2023-05-04
Attending: INTERNAL MEDICINE
Payer: MEDICARE

## 2023-05-04 DIAGNOSIS — M54.42 BILATERAL LOW BACK PAIN WITH LEFT-SIDED SCIATICA, UNSPECIFIED CHRONICITY: ICD-10-CM

## 2023-05-04 PROCEDURE — 72148 MRI LUMBAR SPINE W/O DYE: CPT

## 2023-05-07 RX ORDER — BUMETANIDE 2 MG/1
1 TABLET ORAL EVERY EVENING
COMMUNITY
End: 2023-05-31

## 2023-05-31 ENCOUNTER — HOSPITAL ENCOUNTER (OUTPATIENT)
Facility: HOSPITAL | Age: 78
Discharge: HOME | End: 2023-06-03
Payer: MEDICARE

## 2023-05-31 VITALS
WEIGHT: 278 LBS | TEMPERATURE: 98.1 F | SYSTOLIC BLOOD PRESSURE: 135 MMHG | BODY MASS INDEX: 43.63 KG/M2 | DIASTOLIC BLOOD PRESSURE: 61 MMHG | HEIGHT: 67 IN | HEART RATE: 76 BPM

## 2023-05-31 LAB
ABO + RH BLD: NORMAL
ANION GAP SERPL CALC-SCNC: 9 MMOL/L (ref 5–15)
APPEARANCE UR: ABNORMAL
BACTERIA URNS QL MICRO: NEGATIVE /HPF
BILIRUB UR QL: NEGATIVE
BLOOD GROUP ANTIBODIES SERPL: NORMAL
BUN SERPL-MCNC: 42 MG/DL (ref 6–20)
BUN/CREAT SERPL: 28 (ref 12–20)
CALCIUM SERPL-MCNC: 8.9 MG/DL (ref 8.5–10.1)
CAOX CRY URNS QL MICRO: ABNORMAL
CHLORIDE SERPL-SCNC: 106 MMOL/L (ref 97–108)
CO2 SERPL-SCNC: 28 MMOL/L (ref 21–32)
COLOR UR: ABNORMAL
CREAT SERPL-MCNC: 1.49 MG/DL (ref 0.55–1.02)
EPITH CASTS URNS QL MICRO: ABNORMAL /LPF
ERYTHROCYTE [DISTWIDTH] IN BLOOD BY AUTOMATED COUNT: 16.5 % (ref 11.5–14.5)
GLUCOSE SERPL-MCNC: 72 MG/DL (ref 65–100)
GLUCOSE UR STRIP.AUTO-MCNC: NEGATIVE MG/DL
HCT VFR BLD AUTO: 36.7 % (ref 35–47)
HGB BLD-MCNC: 11.3 G/DL (ref 11.5–16)
HGB UR QL STRIP: NEGATIVE
INR PPP: 4 (ref 0.9–1.1)
KETONES UR QL STRIP.AUTO: NEGATIVE MG/DL
LEUKOCYTE ESTERASE UR QL STRIP.AUTO: ABNORMAL
MCH RBC QN AUTO: 29.2 PG (ref 26–34)
MCHC RBC AUTO-ENTMCNC: 30.8 G/DL (ref 30–36.5)
MCV RBC AUTO: 94.8 FL (ref 80–99)
NITRITE UR QL STRIP.AUTO: NEGATIVE
NRBC # BLD: 0 K/UL (ref 0–0.01)
NRBC BLD-RTO: 0 PER 100 WBC
PH UR STRIP: 5 (ref 5–8)
PLATELET # BLD AUTO: 239 K/UL (ref 150–400)
PMV BLD AUTO: 9.2 FL (ref 8.9–12.9)
POTASSIUM SERPL-SCNC: 4.2 MMOL/L (ref 3.5–5.1)
PROT UR STRIP-MCNC: 30 MG/DL
PROTHROMBIN TIME: 38.4 SEC (ref 9–11.1)
RBC # BLD AUTO: 3.87 M/UL (ref 3.8–5.2)
RBC #/AREA URNS HPF: ABNORMAL /HPF (ref 0–5)
SODIUM SERPL-SCNC: 143 MMOL/L (ref 136–145)
SP GR UR REFRACTOMETRY: 1.01 (ref 1–1.03)
SPECIMEN EXP DATE BLD: NORMAL
URINE CULTURE IF INDICATED: ABNORMAL
UROBILINOGEN UR QL STRIP.AUTO: 0.2 EU/DL (ref 0.2–1)
WBC # BLD AUTO: 10.5 K/UL (ref 3.6–11)
WBC URNS QL MICRO: ABNORMAL /HPF (ref 0–4)

## 2023-05-31 PROCEDURE — 86850 RBC ANTIBODY SCREEN: CPT

## 2023-05-31 PROCEDURE — 36415 COLL VENOUS BLD VENIPUNCTURE: CPT

## 2023-05-31 PROCEDURE — 85027 COMPLETE CBC AUTOMATED: CPT

## 2023-05-31 PROCEDURE — 85610 PROTHROMBIN TIME: CPT

## 2023-05-31 PROCEDURE — 80048 BASIC METABOLIC PNL TOTAL CA: CPT

## 2023-05-31 PROCEDURE — 81001 URINALYSIS AUTO W/SCOPE: CPT

## 2023-05-31 PROCEDURE — 86901 BLOOD TYPING SEROLOGIC RH(D): CPT

## 2023-05-31 PROCEDURE — 86900 BLOOD TYPING SEROLOGIC ABO: CPT

## 2023-05-31 ASSESSMENT — PAIN DESCRIPTION - DESCRIPTORS: DESCRIPTORS: ACHING;DULL

## 2023-05-31 ASSESSMENT — PAIN DESCRIPTION - FREQUENCY: FREQUENCY: CONTINUOUS

## 2023-05-31 ASSESSMENT — PAIN DESCRIPTION - ONSET: ONSET: ON-GOING

## 2023-05-31 ASSESSMENT — PAIN DESCRIPTION - LOCATION: LOCATION: KNEE

## 2023-05-31 ASSESSMENT — PAIN DESCRIPTION - ORIENTATION: ORIENTATION: LEFT

## 2023-05-31 ASSESSMENT — PAIN - FUNCTIONAL ASSESSMENT: PAIN_FUNCTIONAL_ASSESSMENT: PREVENTS OR INTERFERES WITH MANY ACTIVE NOT PASSIVE ACTIVITIES

## 2023-05-31 ASSESSMENT — PAIN SCALES - GENERAL: PAINLEVEL_OUTOF10: 2

## 2023-05-31 ASSESSMENT — PAIN DESCRIPTION - PAIN TYPE: TYPE: CHRONIC PAIN

## 2023-06-01 LAB
BACTERIA SPEC CULT: NORMAL
BACTERIA SPEC CULT: NORMAL
SERVICE CMNT-IMP: NORMAL

## 2023-06-01 NOTE — PERIOP NOTE
[facility] INSTRUCTIONS  ORTHOPAEDIC    Surgery Date:   6/5/23    Your surgeon's office or [facility] staff will call you between 4 PM- 8 PM the day before surgery with your arrival time. If your surgery is on a Monday, you will receive a call the preceding Friday. Please report to [facility] Patient Access/Admitting on the 1st floor. Bring your insurance card, photo identification, and any copayment (if applicable). If you are going home the same day of your surgery, you must have a responsible adult to drive you home. You need to have a responsible adult to stay with you the first 24 hours after surgery and you should not drive a car for 24 hours following your surgery. Do NOT eat any solid foods after midnight the night before surgery including candy, mints or gum. You may drink clear liquids from midnight until 1 hour prior to arrival time. You may drink up to 12 ounces at one time every 4 hours. Do NOT drink alcohol or smoke 24 hours before surgery. STOP smoking for 14 days prior as it helps with breathing and healing after surgery. If your arrival time is 3pm or later, you may eat a light breakfast before 8am (toast, bagel-no butter, black coffee, plain tea, fruit juice-no pulp) Please note special instructions, if applicable, below for medications. If you are being admitted to the hospital,please leave personal belongings/luggage in your car until you have an assigned hospital room number. Please wear comfortable clothes. Wear your glasses instead of contacts. We ask that all money, jewelry and valuables be left at home. Wear no make up, particularly mascara, the day of surgery. All body piercings, rings, and jewelry need to be removed and left at home. Please remove any nail polish or artificial nails from your fingernails. Please wear your hair loose or down. Please no pony-tails, buns, or any metal hair accessories.  If you shower the morning of surgery, please do not
COPY OF [employer] PACEMAKER CARD PLACED ON CHART. CALL TO CARDIOLOGY/DR. VELOZ HH/[phone] AND REQ RECENT NOTES AND EKG. NOTES AND EKG FROM 5/1/23 REC'D AND PLACED ON CHART. CALL TO PULMONARY/DR. GRANADOS/PAR/[phone] AND REQ RECENT NOTES. PULMONARY NOTES REC'D AND PLACED ON CHART. PT STATES THAT HER PCP, DR. Kelechi Hughes INSTRUCTED HER TO STOP HER COUMADIN TODAY, 5/31/23. PT STATES THAT HER CARDIOLOGIST IS AWARE AND IS OK WITH THIS PLAN.
Range: Straw, Yellow or Dark Yellow    Appearance 05/31/2023 CLOUDY (A)  CLEAR   Final    Specific Gravity, UA 05/31/2023 1.015  1.003 - 1.030   Final    pH, Urine 05/31/2023 5.0  5.0 - 8.0   Final    Protein, UA 05/31/2023 30 (A)  NEG mg/dL Final    Glucose, UA 05/31/2023 Negative  NEG mg/dL Final    Ketones, Urine 05/31/2023 Negative  NEG mg/dL Final    Bilirubin Urine 05/31/2023 Negative  NEG   Final    Blood, Urine 05/31/2023 Negative  NEG   Final    Urobilinogen, Urine 05/31/2023 0.2  0.2 - 1.0 EU/dL Final    Nitrite, Urine 05/31/2023 Negative  NEG   Final    Leukocyte Esterase, Urine 05/31/2023 TRACE (A)  NEG   Final    WBC, UA 05/31/2023 0-4  0 - 4 /hpf Final    RBC, UA 05/31/2023 0-5  0 - 5 /hpf Final    Epithelial Cells UA 05/31/2023 FEW  FEW /lpf Final    Epithelial cell category consists of squamous cells and /or transitional urothelial cells. Renal tubular cells, if present, are separately identified as such. BACTERIA, URINE 05/31/2023 Negative  NEG /hpf Final    Urine Culture if Indicated 05/31/2023 CULTURE NOT INDICATED BY UA RESULT  CNI   Final    Calcium Oxalate 05/31/2023 1+ (A)  NEG Final    Crossmatch expiration date 05/31/2023 06/08/2023,2359   Final    ABO/Rh 05/31/2023 A POSITIVE   Final    Antibody Screen 05/31/2023 NEG   Final    Special Requests 05/31/2023 NO SPECIAL REQUESTS    Final    Culture 05/31/2023 MRSA NOT PRESENT    Final    Culture 05/31/2023     Final                    Value:Screening of patient nares for MRSA is for surveillance purposes and, if positive, to facilitate isolation considerations in high risk settings. It is not intended for automatic decolonization interventions per se as regimens are not sufficiently effective to warrant routine use.      Office Visit on 05/19/2023   Component Date Value Ref Range Status    POC INR 05/19/2023 4.1   Final   Office Visit on 04/21/2023   Component Date Value Ref Range Status    Glucose 04/21/2023 73  70 - 99 mg/dL Final    BUN

## 2023-06-02 RX ORDER — SENNA AND DOCUSATE SODIUM 50; 8.6 MG/1; MG/1
1 TABLET, FILM COATED ORAL 2 TIMES DAILY
Status: CANCELLED | OUTPATIENT
Start: 2023-06-02

## 2023-06-02 RX ORDER — HYDROXYZINE HYDROCHLORIDE 10 MG/1
10 TABLET, FILM COATED ORAL EVERY 8 HOURS PRN
Status: CANCELLED | OUTPATIENT
Start: 2023-06-02

## 2023-06-02 RX ORDER — SODIUM CHLORIDE 9 MG/ML
INJECTION, SOLUTION INTRAVENOUS PRN
Status: CANCELLED | OUTPATIENT
Start: 2023-06-02

## 2023-06-02 RX ORDER — NALOXONE HYDROCHLORIDE 0.4 MG/ML
0.4 INJECTION, SOLUTION INTRAMUSCULAR; INTRAVENOUS; SUBCUTANEOUS PRN
Status: CANCELLED | OUTPATIENT
Start: 2023-06-02

## 2023-06-02 RX ORDER — BISACODYL 10 MG
10 SUPPOSITORY, RECTAL RECTAL DAILY PRN
Status: CANCELLED | OUTPATIENT
Start: 2023-06-02

## 2023-06-02 RX ORDER — ONDANSETRON 2 MG/ML
4 INJECTION INTRAMUSCULAR; INTRAVENOUS EVERY 6 HOURS PRN
Status: CANCELLED | OUTPATIENT
Start: 2023-06-02

## 2023-06-02 RX ORDER — HYDROMORPHONE HYDROCHLORIDE 1 MG/ML
0.5 INJECTION, SOLUTION INTRAMUSCULAR; INTRAVENOUS; SUBCUTANEOUS
Status: CANCELLED | OUTPATIENT
Start: 2023-06-02

## 2023-06-02 RX ORDER — DEXTROSE MONOHYDRATE 100 MG/ML
INJECTION, SOLUTION INTRAVENOUS CONTINUOUS PRN
Status: CANCELLED | OUTPATIENT
Start: 2023-06-02

## 2023-06-02 RX ORDER — OXYCODONE HYDROCHLORIDE 5 MG/1
5 TABLET ORAL EVERY 4 HOURS PRN
Status: CANCELLED | OUTPATIENT
Start: 2023-06-02

## 2023-06-02 RX ORDER — AMLODIPINE BESYLATE 5 MG/1
5 TABLET ORAL DAILY
Status: CANCELLED | OUTPATIENT
Start: 2023-06-02

## 2023-06-02 RX ORDER — ACETAMINOPHEN 325 MG/1
650 TABLET ORAL EVERY 6 HOURS
Status: CANCELLED | OUTPATIENT
Start: 2023-06-02

## 2023-06-02 RX ORDER — 0.9 % SODIUM CHLORIDE 0.9 %
500 INTRAVENOUS SOLUTION INTRAVENOUS PRN
Status: CANCELLED | OUTPATIENT
Start: 2023-06-02

## 2023-06-02 RX ORDER — SPIRONOLACTONE 25 MG/1
25 TABLET ORAL EVERY MORNING
Status: CANCELLED | OUTPATIENT
Start: 2023-06-02

## 2023-06-02 RX ORDER — ENOXAPARIN SODIUM 150 MG/ML
1 INJECTION SUBCUTANEOUS 2 TIMES DAILY
Status: CANCELLED | OUTPATIENT
Start: 2023-06-06

## 2023-06-02 RX ORDER — BUMETANIDE 1 MG/1
2 TABLET ORAL 2 TIMES DAILY
Status: CANCELLED | OUTPATIENT
Start: 2023-06-02

## 2023-06-02 RX ORDER — SODIUM CHLORIDE 0.9 % (FLUSH) 0.9 %
5-40 SYRINGE (ML) INJECTION EVERY 12 HOURS SCHEDULED
Status: CANCELLED | OUTPATIENT
Start: 2023-06-02

## 2023-06-02 RX ORDER — TRAMADOL HYDROCHLORIDE 50 MG/1
50 TABLET ORAL EVERY 6 HOURS PRN
Status: CANCELLED | OUTPATIENT
Start: 2023-06-02

## 2023-06-02 RX ORDER — INSULIN LISPRO 100 [IU]/ML
0-4 INJECTION, SOLUTION INTRAVENOUS; SUBCUTANEOUS NIGHTLY
Status: CANCELLED | OUTPATIENT
Start: 2023-06-02

## 2023-06-02 RX ORDER — BUPROPION HYDROCHLORIDE 150 MG/1
150 TABLET, EXTENDED RELEASE ORAL DAILY
Status: CANCELLED | OUTPATIENT
Start: 2023-06-02

## 2023-06-02 RX ORDER — SODIUM CHLORIDE 9 MG/ML
INJECTION, SOLUTION INTRAVENOUS CONTINUOUS
Status: CANCELLED | OUTPATIENT
Start: 2023-06-02

## 2023-06-02 RX ORDER — ONDANSETRON 4 MG/1
4 TABLET, ORALLY DISINTEGRATING ORAL EVERY 8 HOURS PRN
Status: CANCELLED | OUTPATIENT
Start: 2023-06-02

## 2023-06-02 RX ORDER — SODIUM CHLORIDE 0.9 % (FLUSH) 0.9 %
5-40 SYRINGE (ML) INJECTION PRN
Status: CANCELLED | OUTPATIENT
Start: 2023-06-02

## 2023-06-02 RX ORDER — FAMOTIDINE 20 MG/1
20 TABLET, FILM COATED ORAL 2 TIMES DAILY
Status: CANCELLED | OUTPATIENT
Start: 2023-06-02

## 2023-06-02 RX ORDER — GABAPENTIN 300 MG/1
300 CAPSULE ORAL
Status: CANCELLED | OUTPATIENT
Start: 2023-06-02

## 2023-06-02 RX ORDER — INSULIN LISPRO 100 [IU]/ML
0-8 INJECTION, SOLUTION INTRAVENOUS; SUBCUTANEOUS
Status: CANCELLED | OUTPATIENT
Start: 2023-06-02

## 2023-06-02 RX ORDER — POLYETHYLENE GLYCOL 3350 17 G/17G
17 POWDER, FOR SOLUTION ORAL DAILY
Status: CANCELLED | OUTPATIENT
Start: 2023-06-02

## 2023-06-02 RX ORDER — WARFARIN SODIUM 2.5 MG/1
2.5 TABLET ORAL EVERY EVENING
Status: CANCELLED | OUTPATIENT
Start: 2023-06-06

## 2023-06-03 RX ORDER — OXYCODONE HYDROCHLORIDE 5 MG/1
5 TABLET ORAL
Status: CANCELLED | OUTPATIENT
Start: 2023-06-03 | End: 2023-06-04

## 2023-06-03 RX ORDER — SODIUM CHLORIDE 0.9 % (FLUSH) 0.9 %
5-40 SYRINGE (ML) INJECTION EVERY 12 HOURS SCHEDULED
Status: CANCELLED | OUTPATIENT
Start: 2023-06-03

## 2023-06-03 RX ORDER — HYDROMORPHONE HYDROCHLORIDE 1 MG/ML
0.5 INJECTION, SOLUTION INTRAMUSCULAR; INTRAVENOUS; SUBCUTANEOUS EVERY 5 MIN PRN
Status: CANCELLED | OUTPATIENT
Start: 2023-06-03

## 2023-06-03 RX ORDER — HYDRALAZINE HYDROCHLORIDE 20 MG/ML
10 INJECTION INTRAMUSCULAR; INTRAVENOUS
Status: CANCELLED | OUTPATIENT
Start: 2023-06-03

## 2023-06-03 RX ORDER — SODIUM CHLORIDE 0.9 % (FLUSH) 0.9 %
5-40 SYRINGE (ML) INJECTION PRN
Status: CANCELLED | OUTPATIENT
Start: 2023-06-03

## 2023-06-03 RX ORDER — ONDANSETRON 2 MG/ML
4 INJECTION INTRAMUSCULAR; INTRAVENOUS
Status: CANCELLED | OUTPATIENT
Start: 2023-06-03 | End: 2023-06-04

## 2023-06-03 RX ORDER — PROCHLORPERAZINE EDISYLATE 5 MG/ML
5 INJECTION INTRAMUSCULAR; INTRAVENOUS
Status: CANCELLED | OUTPATIENT
Start: 2023-06-03 | End: 2023-06-04

## 2023-06-03 RX ORDER — FENTANYL CITRATE 50 UG/ML
25 INJECTION, SOLUTION INTRAMUSCULAR; INTRAVENOUS EVERY 5 MIN PRN
Status: CANCELLED | OUTPATIENT
Start: 2023-06-03

## 2023-06-03 RX ORDER — SODIUM CHLORIDE 9 MG/ML
INJECTION, SOLUTION INTRAVENOUS PRN
Status: CANCELLED | OUTPATIENT
Start: 2023-06-03

## 2023-06-05 ENCOUNTER — HOSPITAL ENCOUNTER (OUTPATIENT)
Facility: HOSPITAL | Age: 78
Setting detail: OBSERVATION
Discharge: HOME | End: 2023-06-05
Attending: ORTHOPAEDIC SURGERY | Admitting: ORTHOPAEDIC SURGERY
Payer: MEDICARE

## 2023-06-05 ENCOUNTER — ANESTHESIA (OUTPATIENT)
Facility: HOSPITAL | Age: 78
End: 2023-06-05
Payer: MEDICARE

## 2023-06-05 ENCOUNTER — ANESTHESIA EVENT (OUTPATIENT)
Facility: HOSPITAL | Age: 78
End: 2023-06-05
Payer: MEDICARE

## 2023-06-05 VITALS
BODY MASS INDEX: 43.63 KG/M2 | HEIGHT: 67 IN | DIASTOLIC BLOOD PRESSURE: 62 MMHG | SYSTOLIC BLOOD PRESSURE: 143 MMHG | HEART RATE: 69 BPM | TEMPERATURE: 98.3 F | OXYGEN SATURATION: 97 % | RESPIRATION RATE: 16 BRPM | WEIGHT: 278 LBS

## 2023-06-05 PROBLEM — M17.12 OSTEOARTHRITIS OF LEFT KNEE, UNSPECIFIED OSTEOARTHRITIS TYPE: Status: ACTIVE | Noted: 2023-06-05

## 2023-06-05 LAB
INR BLD: 3.6
INR BLD: 3.9

## 2023-06-05 PROCEDURE — 85610 PROTHROMBIN TIME: CPT

## 2023-06-05 PROCEDURE — G0378 HOSPITAL OBSERVATION PER HR: HCPCS

## 2023-06-05 RX ORDER — SODIUM CHLORIDE 0.9 % (FLUSH) 0.9 %
5-40 SYRINGE (ML) INJECTION PRN
Status: DISCONTINUED | OUTPATIENT
Start: 2023-06-05 | End: 2023-06-05 | Stop reason: HOSPADM

## 2023-06-05 RX ORDER — SODIUM CHLORIDE 0.9 % (FLUSH) 0.9 %
5-40 SYRINGE (ML) INJECTION EVERY 12 HOURS SCHEDULED
Status: DISCONTINUED | OUTPATIENT
Start: 2023-06-05 | End: 2023-06-05 | Stop reason: HOSPADM

## 2023-06-05 RX ORDER — ACETAMINOPHEN 500 MG
1000 TABLET ORAL ONCE
Status: DISCONTINUED | OUTPATIENT
Start: 2023-06-05 | End: 2023-06-05 | Stop reason: SDUPTHER

## 2023-06-05 RX ORDER — SODIUM CHLORIDE 9 MG/ML
INJECTION, SOLUTION INTRAVENOUS PRN
Status: DISCONTINUED | OUTPATIENT
Start: 2023-06-05 | End: 2023-06-05 | Stop reason: HOSPADM

## 2023-06-05 RX ORDER — LIDOCAINE HYDROCHLORIDE 10 MG/ML
1 INJECTION, SOLUTION EPIDURAL; INFILTRATION; INTRACAUDAL; PERINEURAL
Status: DISCONTINUED | OUTPATIENT
Start: 2023-06-05 | End: 2023-06-05 | Stop reason: HOSPADM

## 2023-06-05 RX ORDER — ACETAMINOPHEN 500 MG
1000 TABLET ORAL ONCE
Status: DISCONTINUED | OUTPATIENT
Start: 2023-06-05 | End: 2023-06-05 | Stop reason: HOSPADM

## 2023-06-05 RX ORDER — PREGABALIN 75 MG/1
75 CAPSULE ORAL ONCE
Status: DISCONTINUED | OUTPATIENT
Start: 2023-06-05 | End: 2023-06-05 | Stop reason: HOSPADM

## 2023-06-05 RX ORDER — MIDAZOLAM HYDROCHLORIDE 2 MG/2ML
2 INJECTION, SOLUTION INTRAMUSCULAR; INTRAVENOUS
Status: DISCONTINUED | OUTPATIENT
Start: 2023-06-05 | End: 2023-06-05 | Stop reason: HOSPADM

## 2023-06-05 RX ORDER — SODIUM CHLORIDE, SODIUM LACTATE, POTASSIUM CHLORIDE, CALCIUM CHLORIDE 600; 310; 30; 20 MG/100ML; MG/100ML; MG/100ML; MG/100ML
INJECTION, SOLUTION INTRAVENOUS CONTINUOUS
Status: DISCONTINUED | OUTPATIENT
Start: 2023-06-05 | End: 2023-06-05 | Stop reason: HOSPADM

## 2023-06-05 RX ORDER — DEXAMETHASONE SODIUM PHOSPHATE 10 MG/ML
8 INJECTION, SOLUTION INTRAMUSCULAR; INTRAVENOUS ONCE
Status: DISCONTINUED | OUTPATIENT
Start: 2023-06-05 | End: 2023-06-05 | Stop reason: HOSPADM

## 2023-06-05 RX ORDER — FENTANYL CITRATE 50 UG/ML
100 INJECTION, SOLUTION INTRAMUSCULAR; INTRAVENOUS
Status: DISCONTINUED | OUTPATIENT
Start: 2023-06-05 | End: 2023-06-05 | Stop reason: HOSPADM

## 2023-06-05 ASSESSMENT — PAIN - FUNCTIONAL ASSESSMENT: PAIN_FUNCTIONAL_ASSESSMENT: 0-10

## 2023-06-05 ASSESSMENT — PAIN DESCRIPTION - DESCRIPTORS: DESCRIPTORS: SHARP;SHOOTING

## 2023-06-05 NOTE — H&P
Update History & Physical    The patient's History and Physical was reviewed with the patient and I examined the patient. There was no change. The surgical site was confirmed by the patient and me. Plan: The risks, benefits, expected outcome, and alternative to the recommended procedure have been discussed with the patient. Patient understands and wants to proceed with the procedure.      Electronically signed by Kamini Alfredo MD on 6/5/2023 at 8:45 AM

## 2023-06-05 NOTE — DISCHARGE INSTRUCTIONS
incision  increased pain during activity or rest  Warning signs of a blood clot in your leg:  increased pain in your calf  tenderness or redness  increased swelling or knee, calf, ankle or foot    Call [phone] after 5pm or on a weekend.  The on call physician will return your phone call  Call your Primary Care Doctor for:   Concerns about your medical conditions such as diabetes, high blood pressure, asthma, congestive heart failure  Blood sugars greater than 180  Persistent headache or dizziness  Coughing or congestion  Constipation or diarrhea  Burning when you go to the bathroom  Abnormal heart rate (fast or  slow)      Call 911 and go to the nearest hospital for:   Sudden increased shortness of breath  Sudden onset of chest pain  Difficulty breathing  Localized chest pain with coughing or taking a deep breath

## 2023-06-05 NOTE — PERIOP NOTE
1030a: Dr Patricia Serrano out of OR to talk w/ Daughter Alexander Padillas and patient re: canceled surgery and rescheduling.

## 2023-06-05 NOTE — ANESTHESIA PRE PROCEDURE
Department of Anesthesiology  Preprocedure Note       Name:  Shirley Alexander   Age:  77 y.o.  :  1945                                          MRN:  [id number]         Date:  2023      Surgeon: Jayant Pina):  Efra Amezquita MD    Procedure: Procedure(s):  LEFT TOTAL KNEE ARTHROPLASTY (VELYS)    Medications prior to admission:   Prior to Admission medications    Medication Sig Start Date End Date Taking? Authorizing Provider   cyclobenzaprine (FLEXERIL) 10 MG tablet Take 1 tablet by mouth 3 times daily as needed  Patient not taking: Reported on 2023   Roopa Chairez MD   allopurinol (ZYLOPRIM) 100 MG tablet TAKE 2 TABLETS BY MOUTH EVERY DAY FOR GOUT  Patient taking differently: Take 1 tablet by mouth 2 times daily 23   Roopa Chairez MD   albuterol sulfate HFA (PROVENTIL;VENTOLIN;PROAIR) 108 (90 Base) MCG/ACT inhaler 2 puffs every 4 hours as needed for Wheezing or Shortness of Breath  Patient not taking: Reported on 2023   Ar Automatic Reconciliation   albuterol (PROVENTIL) (2.5 MG/3ML) 0.083% nebulizer solution Inhale 3 mLs into the lungs 4 times daily as needed  Patient not taking: Reported on 19   Ar Automatic Reconciliation   ALPRAZolam (XANAX) 0.5 MG tablet Take 1 tablet by mouth 3 times daily as needed.   Patient not taking: Reported on 2023    Ar Automatic Reconciliation   amLODIPine (NORVASC) 5 MG tablet Take 1 tablet by mouth daily 22   Ar Automatic Reconciliation   Biotin 2.5 MG CAPS Take 1 capsule by mouth daily    Ar Automatic Reconciliation   bumetanide (BUMEX) 2 MG tablet Take 1 tablet by mouth 2 times daily    Ar Automatic Reconciliation   buPROPion (WELLBUTRIN SR) 150 MG extended release tablet Take 1 tablet by mouth daily 23   Ar Automatic Reconciliation   Cholecalciferol 50 MCG ( UT) TABS Take 1 tablet by mouth daily    Ar Automatic Reconciliation   EPINEPHrine (EPIPEN) 0.3 MG/0.3ML SOAJ injection Inject 0.3 mLs

## 2023-06-21 ENCOUNTER — HOSPITAL ENCOUNTER (INPATIENT)
Facility: HOSPITAL | Age: 78
LOS: 6 days | Discharge: HOME | DRG: 291 | End: 2023-06-27
Attending: STUDENT IN AN ORGANIZED HEALTH CARE EDUCATION/TRAINING PROGRAM | Admitting: HOSPITALIST
Payer: MEDICARE

## 2023-06-21 ENCOUNTER — APPOINTMENT (OUTPATIENT)
Facility: HOSPITAL | Age: 78
DRG: 291 | End: 2023-06-21
Payer: MEDICARE

## 2023-06-21 DIAGNOSIS — I50.9 ACUTE ON CHRONIC CONGESTIVE HEART FAILURE, UNSPECIFIED HEART FAILURE TYPE (HCC): ICD-10-CM

## 2023-06-21 DIAGNOSIS — J90 BILATERAL PLEURAL EFFUSION: ICD-10-CM

## 2023-06-21 DIAGNOSIS — R06.00 DYSPNEA, UNSPECIFIED TYPE: ICD-10-CM

## 2023-06-21 DIAGNOSIS — E11.40 TYPE 2 DIABETES MELLITUS WITH DIABETIC NEUROPATHY, WITH LONG-TERM CURRENT USE OF INSULIN (HCC): Primary | ICD-10-CM

## 2023-06-21 DIAGNOSIS — Z79.4 TYPE 2 DIABETES MELLITUS WITH DIABETIC NEUROPATHY, WITH LONG-TERM CURRENT USE OF INSULIN (HCC): Primary | ICD-10-CM

## 2023-06-21 DIAGNOSIS — I50.43 CHF (CONGESTIVE HEART FAILURE), NYHA CLASS I, ACUTE ON CHRONIC, COMBINED (HCC): ICD-10-CM

## 2023-06-21 LAB
ABO + RH BLD: NORMAL
ALBUMIN SERPL-MCNC: 3.6 G/DL (ref 3.5–5)
ALBUMIN/GLOB SERPL: 0.9 (ref 1.1–2.2)
ALP SERPL-CCNC: 97 U/L (ref 45–117)
ALT SERPL-CCNC: 18 U/L (ref 12–78)
ANION GAP SERPL CALC-SCNC: 6 MMOL/L (ref 5–15)
APTT PPP: 32.5 SEC (ref 22.1–31)
AST SERPL-CCNC: 14 U/L (ref 15–37)
BASOPHILS # BLD: 0.1 K/UL (ref 0–0.1)
BASOPHILS NFR BLD: 1 % (ref 0–1)
BILIRUB SERPL-MCNC: 0.5 MG/DL (ref 0.2–1)
BLOOD GROUP ANTIBODIES SERPL: NORMAL
BUN SERPL-MCNC: 40 MG/DL (ref 6–20)
BUN/CREAT SERPL: 25 (ref 12–20)
CALCIUM SERPL-MCNC: 9.7 MG/DL (ref 8.5–10.1)
CHLORIDE SERPL-SCNC: 110 MMOL/L (ref 97–108)
CO2 SERPL-SCNC: 22 MMOL/L (ref 21–32)
CREAT SERPL-MCNC: 1.63 MG/DL (ref 0.55–1.02)
DIFFERENTIAL METHOD BLD: ABNORMAL
EKG ATRIAL RATE: 82 BPM
EKG DIAGNOSIS: NORMAL
EKG Q-T INTERVAL: 482 MS
EKG QRS DURATION: 150 MS
EKG QTC CALCULATION (BAZETT): 531 MS
EKG R AXIS: -35 DEGREES
EKG T AXIS: 70 DEGREES
EKG VENTRICULAR RATE: 73 BPM
EOSINOPHIL # BLD: 0.5 K/UL (ref 0–0.4)
EOSINOPHIL NFR BLD: 4 % (ref 0–7)
ERYTHROCYTE [DISTWIDTH] IN BLOOD BY AUTOMATED COUNT: 16.1 % (ref 11.5–14.5)
GLOBULIN SER CALC-MCNC: 4.2 G/DL (ref 2–4)
GLUCOSE SERPL-MCNC: 216 MG/DL (ref 65–100)
HCT VFR BLD AUTO: 34.2 % (ref 35–47)
HGB BLD-MCNC: 10.5 G/DL (ref 11.5–16)
IMM GRANULOCYTES # BLD AUTO: 0 K/UL (ref 0–0.04)
IMM GRANULOCYTES NFR BLD AUTO: 0 % (ref 0–0.5)
INR PPP: 1.2 (ref 0.9–1.1)
LYMPHOCYTES # BLD: 0.9 K/UL (ref 0.8–3.5)
LYMPHOCYTES NFR BLD: 8 % (ref 12–49)
MAGNESIUM SERPL-MCNC: 2.1 MG/DL (ref 1.6–2.4)
MCH RBC QN AUTO: 29.3 PG (ref 26–34)
MCHC RBC AUTO-ENTMCNC: 30.7 G/DL (ref 30–36.5)
MCV RBC AUTO: 95.5 FL (ref 80–99)
MONOCYTES # BLD: 0.6 K/UL (ref 0–1)
MONOCYTES NFR BLD: 5 % (ref 5–13)
NEUTS SEG # BLD: 9.3 K/UL (ref 1.8–8)
NEUTS SEG NFR BLD: 82 % (ref 32–75)
NRBC # BLD: 0 K/UL (ref 0–0.01)
NRBC BLD-RTO: 0 PER 100 WBC
NT PRO BNP: 3051 PG/ML
PHOSPHATE SERPL-MCNC: 3.6 MG/DL (ref 2.6–4.7)
PLATELET # BLD AUTO: 235 K/UL (ref 150–400)
PMV BLD AUTO: 9.4 FL (ref 8.9–12.9)
POTASSIUM SERPL-SCNC: 4.6 MMOL/L (ref 3.5–5.1)
PROT SERPL-MCNC: 7.8 G/DL (ref 6.4–8.2)
PROTHROMBIN TIME: 12.3 SEC (ref 9–11.1)
RBC # BLD AUTO: 3.58 M/UL (ref 3.8–5.2)
SODIUM SERPL-SCNC: 138 MMOL/L (ref 136–145)
SPECIMEN EXP DATE BLD: NORMAL
THERAPEUTIC RANGE: ABNORMAL SECS (ref 58–77)
TROPONIN I SERPL HS-MCNC: 15 NG/L (ref 0–37)
WBC # BLD AUTO: 11.4 K/UL (ref 3.6–11)

## 2023-06-21 PROCEDURE — 85025 COMPLETE CBC W/AUTO DIFF WBC: CPT

## 2023-06-21 PROCEDURE — A9540 TC99M MAA: HCPCS | Performed by: STUDENT IN AN ORGANIZED HEALTH CARE EDUCATION/TRAINING PROGRAM

## 2023-06-21 PROCEDURE — 2500000003 HC RX 250 WO HCPCS: Performed by: STUDENT IN AN ORGANIZED HEALTH CARE EDUCATION/TRAINING PROGRAM

## 2023-06-21 PROCEDURE — 85730 THROMBOPLASTIN TIME PARTIAL: CPT

## 2023-06-21 PROCEDURE — 93005 ELECTROCARDIOGRAM TRACING: CPT | Performed by: NURSE PRACTITIONER

## 2023-06-21 PROCEDURE — 83735 ASSAY OF MAGNESIUM: CPT

## 2023-06-21 PROCEDURE — 6360000002 HC RX W HCPCS: Performed by: HOSPITALIST

## 2023-06-21 PROCEDURE — 84100 ASSAY OF PHOSPHORUS: CPT

## 2023-06-21 PROCEDURE — 36415 COLL VENOUS BLD VENIPUNCTURE: CPT

## 2023-06-21 PROCEDURE — 99285 EMERGENCY DEPT VISIT HI MDM: CPT

## 2023-06-21 PROCEDURE — 80053 COMPREHEN METABOLIC PANEL: CPT

## 2023-06-21 PROCEDURE — 2580000003 HC RX 258: Performed by: HOSPITALIST

## 2023-06-21 PROCEDURE — 3430000000 HC RX DIAGNOSTIC RADIOPHARMACEUTICAL: Performed by: STUDENT IN AN ORGANIZED HEALTH CARE EDUCATION/TRAINING PROGRAM

## 2023-06-21 PROCEDURE — 6370000000 HC RX 637 (ALT 250 FOR IP): Performed by: HOSPITALIST

## 2023-06-21 PROCEDURE — 84484 ASSAY OF TROPONIN QUANT: CPT

## 2023-06-21 PROCEDURE — 86850 RBC ANTIBODY SCREEN: CPT

## 2023-06-21 PROCEDURE — 83880 ASSAY OF NATRIURETIC PEPTIDE: CPT

## 2023-06-21 PROCEDURE — 94640 AIRWAY INHALATION TREATMENT: CPT

## 2023-06-21 PROCEDURE — 71046 X-RAY EXAM CHEST 2 VIEWS: CPT

## 2023-06-21 PROCEDURE — 2060000000 HC ICU INTERMEDIATE R&B

## 2023-06-21 PROCEDURE — 93010 ELECTROCARDIOGRAM REPORT: CPT | Performed by: SPECIALIST

## 2023-06-21 PROCEDURE — 86901 BLOOD TYPING SEROLOGIC RH(D): CPT

## 2023-06-21 PROCEDURE — 86900 BLOOD TYPING SEROLOGIC ABO: CPT

## 2023-06-21 PROCEDURE — 85610 PROTHROMBIN TIME: CPT

## 2023-06-21 PROCEDURE — 78580 LUNG PERFUSION IMAGING: CPT

## 2023-06-21 RX ORDER — ALLOPURINOL 100 MG/1
100 TABLET ORAL 2 TIMES DAILY
Status: DISCONTINUED | OUTPATIENT
Start: 2023-06-21 | End: 2023-06-27 | Stop reason: HOSPADM

## 2023-06-21 RX ORDER — ONDANSETRON 4 MG/1
4 TABLET, ORALLY DISINTEGRATING ORAL EVERY 8 HOURS PRN
Status: DISCONTINUED | OUTPATIENT
Start: 2023-06-21 | End: 2023-06-27 | Stop reason: HOSPADM

## 2023-06-21 RX ORDER — POTASSIUM CHLORIDE 750 MG/1
10 TABLET, FILM COATED, EXTENDED RELEASE ORAL 2 TIMES DAILY
Status: DISCONTINUED | OUTPATIENT
Start: 2023-06-21 | End: 2023-06-27 | Stop reason: HOSPADM

## 2023-06-21 RX ORDER — ALBUTEROL SULFATE 2.5 MG/3ML
2.5 SOLUTION RESPIRATORY (INHALATION) EVERY 4 HOURS PRN
Status: DISCONTINUED | OUTPATIENT
Start: 2023-06-21 | End: 2023-06-27 | Stop reason: HOSPADM

## 2023-06-21 RX ORDER — SPIRONOLACTONE 25 MG/1
12.5 TABLET ORAL EVERY MORNING
Status: DISCONTINUED | OUTPATIENT
Start: 2023-06-22 | End: 2023-06-27 | Stop reason: HOSPADM

## 2023-06-21 RX ORDER — SODIUM CHLORIDE 9 MG/ML
INJECTION, SOLUTION INTRAVENOUS PRN
Status: DISCONTINUED | OUTPATIENT
Start: 2023-06-21 | End: 2023-06-27 | Stop reason: HOSPADM

## 2023-06-21 RX ORDER — AMLODIPINE BESYLATE 5 MG/1
5 TABLET ORAL DAILY
Status: DISCONTINUED | OUTPATIENT
Start: 2023-06-21 | End: 2023-06-23

## 2023-06-21 RX ORDER — ENOXAPARIN SODIUM 100 MG/ML
40 INJECTION SUBCUTANEOUS DAILY
Status: DISCONTINUED | OUTPATIENT
Start: 2023-06-21 | End: 2023-06-21 | Stop reason: SDUPTHER

## 2023-06-21 RX ORDER — ONDANSETRON 2 MG/ML
4 INJECTION INTRAMUSCULAR; INTRAVENOUS EVERY 6 HOURS PRN
Status: DISCONTINUED | OUTPATIENT
Start: 2023-06-21 | End: 2023-06-27 | Stop reason: HOSPADM

## 2023-06-21 RX ORDER — HEPARIN SODIUM 5000 [USP'U]/ML
5000 INJECTION, SOLUTION INTRAVENOUS; SUBCUTANEOUS EVERY 8 HOURS
Status: DISCONTINUED | OUTPATIENT
Start: 2023-06-21 | End: 2023-06-22

## 2023-06-21 RX ORDER — EPINEPHRINE 1 MG/ML
0.3 INJECTION, SOLUTION, CONCENTRATE INTRAVENOUS AS NEEDED
Status: DISCONTINUED | OUTPATIENT
Start: 2023-06-21 | End: 2023-06-27 | Stop reason: HOSPADM

## 2023-06-21 RX ORDER — GABAPENTIN 300 MG/1
300 CAPSULE ORAL
Status: DISCONTINUED | OUTPATIENT
Start: 2023-06-21 | End: 2023-06-21

## 2023-06-21 RX ORDER — SODIUM CHLORIDE 0.9 % (FLUSH) 0.9 %
5-40 SYRINGE (ML) INJECTION EVERY 12 HOURS SCHEDULED
Status: DISCONTINUED | OUTPATIENT
Start: 2023-06-21 | End: 2023-06-27 | Stop reason: HOSPADM

## 2023-06-21 RX ORDER — BUMETANIDE 0.25 MG/ML
2 INJECTION INTRAMUSCULAR; INTRAVENOUS DAILY
Status: DISCONTINUED | OUTPATIENT
Start: 2023-06-21 | End: 2023-06-27

## 2023-06-21 RX ORDER — BUMETANIDE 0.25 MG/ML
1 INJECTION INTRAMUSCULAR; INTRAVENOUS ONCE
Status: COMPLETED | OUTPATIENT
Start: 2023-06-21 | End: 2023-06-21

## 2023-06-21 RX ORDER — ALPRAZOLAM 0.5 MG/1
0.5 TABLET ORAL 3 TIMES DAILY PRN
Status: DISCONTINUED | OUTPATIENT
Start: 2023-06-21 | End: 2023-06-21

## 2023-06-21 RX ORDER — ACETAMINOPHEN 325 MG/1
650 TABLET ORAL EVERY 6 HOURS PRN
Status: DISCONTINUED | OUTPATIENT
Start: 2023-06-21 | End: 2023-06-27 | Stop reason: HOSPADM

## 2023-06-21 RX ORDER — ALBUTEROL SULFATE 2.5 MG/3ML
2.5 SOLUTION RESPIRATORY (INHALATION) 4 TIMES DAILY PRN
Status: DISCONTINUED | OUTPATIENT
Start: 2023-06-21 | End: 2023-06-21 | Stop reason: SDUPTHER

## 2023-06-21 RX ORDER — SPIRONOLACTONE 25 MG/1
25 TABLET ORAL EVERY MORNING
Status: DISCONTINUED | OUTPATIENT
Start: 2023-06-22 | End: 2023-06-21

## 2023-06-21 RX ORDER — TRAMADOL HYDROCHLORIDE 50 MG/1
50 TABLET ORAL EVERY 8 HOURS PRN
COMMUNITY

## 2023-06-21 RX ORDER — SODIUM CHLORIDE 0.9 % (FLUSH) 0.9 %
5-40 SYRINGE (ML) INJECTION PRN
Status: DISCONTINUED | OUTPATIENT
Start: 2023-06-21 | End: 2023-06-27 | Stop reason: HOSPADM

## 2023-06-21 RX ORDER — POLYETHYLENE GLYCOL 3350 17 G/17G
17 POWDER, FOR SOLUTION ORAL DAILY PRN
Status: DISCONTINUED | OUTPATIENT
Start: 2023-06-21 | End: 2023-06-27 | Stop reason: HOSPADM

## 2023-06-21 RX ORDER — VITAMIN B COMPLEX
2000 TABLET ORAL DAILY
Status: DISCONTINUED | OUTPATIENT
Start: 2023-06-21 | End: 2023-06-27 | Stop reason: HOSPADM

## 2023-06-21 RX ORDER — HYDROXYZINE HYDROCHLORIDE 10 MG/1
10 TABLET, FILM COATED ORAL 4 TIMES DAILY PRN
Status: DISCONTINUED | OUTPATIENT
Start: 2023-06-21 | End: 2023-06-27 | Stop reason: HOSPADM

## 2023-06-21 RX ORDER — ACETAMINOPHEN 650 MG/1
650 SUPPOSITORY RECTAL EVERY 6 HOURS PRN
Status: DISCONTINUED | OUTPATIENT
Start: 2023-06-21 | End: 2023-06-27 | Stop reason: HOSPADM

## 2023-06-21 RX ORDER — FLUTICASONE PROPIONATE AND SALMETEROL 250; 50 UG/1; UG/1
1 POWDER RESPIRATORY (INHALATION) EVERY 12 HOURS
COMMUNITY

## 2023-06-21 RX ORDER — CYCLOBENZAPRINE HCL 10 MG
5 TABLET ORAL 3 TIMES DAILY PRN
Status: DISCONTINUED | OUTPATIENT
Start: 2023-06-21 | End: 2023-06-27 | Stop reason: HOSPADM

## 2023-06-21 RX ORDER — BUPROPION HYDROCHLORIDE 150 MG/1
150 TABLET, EXTENDED RELEASE ORAL DAILY
Status: DISCONTINUED | OUTPATIENT
Start: 2023-06-21 | End: 2023-06-27 | Stop reason: HOSPADM

## 2023-06-21 RX ADMIN — AMLODIPINE BESYLATE 5 MG: 5 TABLET ORAL at 17:57

## 2023-06-21 RX ADMIN — BUMETANIDE 1 MG: 0.25 INJECTION INTRAMUSCULAR; INTRAVENOUS at 16:05

## 2023-06-21 RX ADMIN — POTASSIUM CHLORIDE 10 MEQ: 750 TABLET, FILM COATED, EXTENDED RELEASE ORAL at 20:42

## 2023-06-21 RX ADMIN — ALLOPURINOL 100 MG: 100 TABLET ORAL at 20:42

## 2023-06-21 RX ADMIN — BUPROPION HYDROCHLORIDE 150 MG: 150 TABLET, EXTENDED RELEASE ORAL at 17:57

## 2023-06-21 RX ADMIN — Medication 35 UNITS: at 19:37

## 2023-06-21 RX ADMIN — SODIUM CHLORIDE, PRESERVATIVE FREE 10 ML: 5 INJECTION INTRAVENOUS at 20:42

## 2023-06-21 RX ADMIN — Medication 2000 UNITS: at 17:57

## 2023-06-21 RX ADMIN — KIT FOR THE PREPARATION OF TECHNETIUM TC 99M ALBUMIN AGGREGATED 4.4 MILLICURIE: 2.5 INJECTION, POWDER, FOR SOLUTION INTRAVENOUS at 14:57

## 2023-06-21 RX ADMIN — HEPARIN SODIUM 5000 UNITS: 5000 INJECTION INTRAVENOUS; SUBCUTANEOUS at 17:58

## 2023-06-21 RX ADMIN — ARFORMOTEROL TARTRATE: 15 SOLUTION RESPIRATORY (INHALATION) at 21:40

## 2023-06-21 RX ADMIN — SERTRALINE HYDROCHLORIDE 150 MG: 50 TABLET ORAL at 20:41

## 2023-06-21 ASSESSMENT — ENCOUNTER SYMPTOMS
COUGH: 0
ABDOMINAL PAIN: 0
SORE THROAT: 0
VOMITING: 0
SHORTNESS OF BREATH: 1
DIARRHEA: 0
EYE REDNESS: 0

## 2023-06-22 ENCOUNTER — APPOINTMENT (OUTPATIENT)
Facility: HOSPITAL | Age: 78
DRG: 291 | End: 2023-06-22
Attending: HOSPITALIST
Payer: MEDICARE

## 2023-06-22 LAB
ALBUMIN SERPL-MCNC: 3.2 G/DL (ref 3.5–5)
ALBUMIN/GLOB SERPL: 0.8 (ref 1.1–2.2)
ALP SERPL-CCNC: 87 U/L (ref 45–117)
ALT SERPL-CCNC: 19 U/L (ref 12–78)
ANION GAP SERPL CALC-SCNC: 7 MMOL/L (ref 5–15)
APPEARANCE UR: CLEAR
AST SERPL-CCNC: 17 U/L (ref 15–37)
BACTERIA URNS QL MICRO: NEGATIVE /HPF
BASOPHILS # BLD: 0.1 K/UL (ref 0–0.1)
BASOPHILS NFR BLD: 1 % (ref 0–1)
BILIRUB SERPL-MCNC: 0.4 MG/DL (ref 0.2–1)
BILIRUB UR QL: NEGATIVE
BUN SERPL-MCNC: 39 MG/DL (ref 6–20)
BUN/CREAT SERPL: 25 (ref 12–20)
CALCIUM SERPL-MCNC: 9.3 MG/DL (ref 8.5–10.1)
CHLORIDE SERPL-SCNC: 113 MMOL/L (ref 97–108)
CO2 SERPL-SCNC: 21 MMOL/L (ref 21–32)
COLOR UR: NORMAL
CREAT SERPL-MCNC: 1.58 MG/DL (ref 0.55–1.02)
CREAT UR-MCNC: 28.1 MG/DL
DIFFERENTIAL METHOD BLD: ABNORMAL
ECHO AV AREA PEAK VELOCITY: 1.5 CM2
ECHO AV AREA PEAK VELOCITY: 226 CM2
ECHO AV AREA VTI: 1.7 CM2
ECHO AV AREA/BSA VTI: 0.7 CM2/M2
ECHO AV MEAN GRADIENT: 16 MMHG
ECHO AV MEAN VELOCITY: 1.9 M/S
ECHO AV PEAK GRADIENT: 0 MMHG
ECHO AV PEAK GRADIENT: 26 MMHG
ECHO AV PEAK VELOCITY: 0 M/S
ECHO AV PEAK VELOCITY: 2.6 M/S
ECHO AV VTI: 55.6 CM
ECHO EST RA PRESSURE: 3 MMHG
ECHO LA DIAMETER INDEX: 2.09 CM/M2
ECHO LA DIAMETER: 4.8 CM
ECHO LV FRACTIONAL SHORTENING: 12 % (ref 28–44)
ECHO LV INTERNAL DIMENSION DIASTOLE INDEX: 2.13 CM/M2
ECHO LV INTERNAL DIMENSION DIASTOLIC: 4.9 CM (ref 3.9–5.3)
ECHO LV INTERNAL DIMENSION SYSTOLIC INDEX: 1.87 CM/M2
ECHO LV INTERNAL DIMENSION SYSTOLIC: 4.3 CM
ECHO LV IVSD: 1.1 CM (ref 0.6–0.9)
ECHO LV MASS 2D: 188.1 G (ref 67–162)
ECHO LV MASS INDEX 2D: 81.8 G/M2 (ref 43–95)
ECHO LV POSTERIOR WALL DIASTOLIC: 1 CM (ref 0.6–0.9)
ECHO LV RELATIVE WALL THICKNESS RATIO: 0.41
ECHO LVOT AREA: 3.8 CM2
ECHO LVOT AV VTI INDEX: 0.46
ECHO LVOT DIAM: 2.2 CM
ECHO LVOT MEAN GRADIENT: 3 MMHG
ECHO LVOT PEAK GRADIENT: 4 MMHG
ECHO LVOT PEAK VELOCITY: 1.1 M/S
ECHO LVOT STROKE VOLUME INDEX: 42.3 ML/M2
ECHO LVOT SV: 97.3 ML
ECHO LVOT VTI: 25.6 CM
ECHO MV A VELOCITY: 0.46 M/S
ECHO MV E VELOCITY: 1.14 M/S
ECHO MV E/A RATIO: 2.48
ECHO MV REGURGITANT ALIASING (NYQUIST) VELOCITY: 43 CM/S
ECHO MV REGURGITANT VELOCITY PISA: 6.1 M/S
ECHO MV REGURGITANT VTIA: 200.2 CM
ECHO RIGHT VENTRICULAR SYSTOLIC PRESSURE (RVSP): 40 MMHG
ECHO RV TAPSE: 1.8 CM (ref 1.7–?)
ECHO TV REGURGITANT MAX VELOCITY: 3.04 M/S
ECHO TV REGURGITANT PEAK GRADIENT: 37 MMHG
EOSINOPHIL # BLD: 0.5 K/UL (ref 0–0.4)
EOSINOPHIL NFR BLD: 5 % (ref 0–7)
EPITH CASTS URNS QL MICRO: NORMAL /LPF
ERYTHROCYTE [DISTWIDTH] IN BLOOD BY AUTOMATED COUNT: 15.9 % (ref 11.5–14.5)
FERRITIN SERPL-MCNC: 128 NG/ML (ref 8–252)
GLOBULIN SER CALC-MCNC: 4 G/DL (ref 2–4)
GLUCOSE BLD STRIP.AUTO-MCNC: 103 MG/DL (ref 65–117)
GLUCOSE BLD STRIP.AUTO-MCNC: 120 MG/DL (ref 65–117)
GLUCOSE BLD STRIP.AUTO-MCNC: 71 MG/DL (ref 65–117)
GLUCOSE BLD STRIP.AUTO-MCNC: 94 MG/DL (ref 65–117)
GLUCOSE SERPL-MCNC: 148 MG/DL (ref 65–100)
GLUCOSE UR STRIP.AUTO-MCNC: NEGATIVE MG/DL
HCT VFR BLD AUTO: 33.1 % (ref 35–47)
HGB BLD-MCNC: 10.1 G/DL (ref 11.5–16)
HGB UR QL STRIP: NEGATIVE
HYALINE CASTS URNS QL MICRO: NORMAL /LPF (ref 0–5)
IMM GRANULOCYTES # BLD AUTO: 0 K/UL (ref 0–0.04)
IMM GRANULOCYTES NFR BLD AUTO: 0 % (ref 0–0.5)
IRON SATN MFR SERPL: 10 % (ref 20–50)
IRON SERPL-MCNC: 37 UG/DL (ref 35–150)
KETONES UR QL STRIP.AUTO: NEGATIVE MG/DL
LEUKOCYTE ESTERASE UR QL STRIP.AUTO: NEGATIVE
LYMPHOCYTES # BLD: 1.4 K/UL (ref 0.8–3.5)
LYMPHOCYTES NFR BLD: 12 % (ref 12–49)
MCH RBC QN AUTO: 29.2 PG (ref 26–34)
MCHC RBC AUTO-ENTMCNC: 30.5 G/DL (ref 30–36.5)
MCV RBC AUTO: 95.7 FL (ref 80–99)
MONOCYTES # BLD: 0.8 K/UL (ref 0–1)
MONOCYTES NFR BLD: 7 % (ref 5–13)
NEUTS SEG # BLD: 8.4 K/UL (ref 1.8–8)
NEUTS SEG NFR BLD: 75 % (ref 32–75)
NITRITE UR QL STRIP.AUTO: NEGATIVE
NRBC # BLD: 0 K/UL (ref 0–0.01)
NRBC BLD-RTO: 0 PER 100 WBC
PH UR STRIP: 5 (ref 5–8)
PLATELET # BLD AUTO: 222 K/UL (ref 150–400)
PMV BLD AUTO: 9 FL (ref 8.9–12.9)
POTASSIUM SERPL-SCNC: 4.4 MMOL/L (ref 3.5–5.1)
PROT SERPL-MCNC: 7.2 G/DL (ref 6.4–8.2)
PROT UR STRIP-MCNC: NEGATIVE MG/DL
PROT UR-MCNC: 21 MG/DL (ref 0–11.9)
PROT/CREAT UR-RTO: 0.7
RBC # BLD AUTO: 3.46 M/UL (ref 3.8–5.2)
RBC #/AREA URNS HPF: NORMAL /HPF (ref 0–5)
SERVICE CMNT-IMP: ABNORMAL
SERVICE CMNT-IMP: NORMAL
SODIUM SERPL-SCNC: 141 MMOL/L (ref 136–145)
SP GR UR REFRACTOMETRY: 1.01 (ref 1–1.03)
TIBC SERPL-MCNC: 384 UG/DL (ref 250–450)
URINE CULTURE IF INDICATED: NORMAL
UROBILINOGEN UR QL STRIP.AUTO: 0.2 EU/DL (ref 0.2–1)
WBC # BLD AUTO: 11.1 K/UL (ref 3.6–11)
WBC URNS QL MICRO: NORMAL /HPF (ref 0–4)

## 2023-06-22 PROCEDURE — 2500000003 HC RX 250 WO HCPCS: Performed by: HOSPITALIST

## 2023-06-22 PROCEDURE — 97165 OT EVAL LOW COMPLEX 30 MIN: CPT

## 2023-06-22 PROCEDURE — 83540 ASSAY OF IRON: CPT

## 2023-06-22 PROCEDURE — 94640 AIRWAY INHALATION TREATMENT: CPT

## 2023-06-22 PROCEDURE — C8929 TTE W OR WO FOL WCON,DOPPLER: HCPCS

## 2023-06-22 PROCEDURE — 97161 PT EVAL LOW COMPLEX 20 MIN: CPT

## 2023-06-22 PROCEDURE — 81001 URINALYSIS AUTO W/SCOPE: CPT

## 2023-06-22 PROCEDURE — 6360000004 HC RX CONTRAST MEDICATION: Performed by: HOSPITALIST

## 2023-06-22 PROCEDURE — 2700000000 HC OXYGEN THERAPY PER DAY

## 2023-06-22 PROCEDURE — 6370000000 HC RX 637 (ALT 250 FOR IP): Performed by: HOSPITALIST

## 2023-06-22 PROCEDURE — 6360000002 HC RX W HCPCS: Performed by: HOSPITALIST

## 2023-06-22 PROCEDURE — 82962 GLUCOSE BLOOD TEST: CPT

## 2023-06-22 PROCEDURE — 97535 SELF CARE MNGMENT TRAINING: CPT

## 2023-06-22 PROCEDURE — 97530 THERAPEUTIC ACTIVITIES: CPT

## 2023-06-22 PROCEDURE — 80053 COMPREHEN METABOLIC PANEL: CPT

## 2023-06-22 PROCEDURE — 83550 IRON BINDING TEST: CPT

## 2023-06-22 PROCEDURE — 82728 ASSAY OF FERRITIN: CPT

## 2023-06-22 PROCEDURE — 84156 ASSAY OF PROTEIN URINE: CPT

## 2023-06-22 PROCEDURE — 2060000000 HC ICU INTERMEDIATE R&B

## 2023-06-22 PROCEDURE — 82570 ASSAY OF URINE CREATININE: CPT

## 2023-06-22 PROCEDURE — 2580000003 HC RX 258: Performed by: HOSPITALIST

## 2023-06-22 PROCEDURE — 85025 COMPLETE CBC W/AUTO DIFF WBC: CPT

## 2023-06-22 PROCEDURE — 36415 COLL VENOUS BLD VENIPUNCTURE: CPT

## 2023-06-22 RX ORDER — INSULIN LISPRO 100 [IU]/ML
0-16 INJECTION, SOLUTION INTRAVENOUS; SUBCUTANEOUS
Status: DISCONTINUED | OUTPATIENT
Start: 2023-06-22 | End: 2023-06-27 | Stop reason: HOSPADM

## 2023-06-22 RX ORDER — DEXTROSE MONOHYDRATE 100 MG/ML
INJECTION, SOLUTION INTRAVENOUS CONTINUOUS PRN
Status: DISCONTINUED | OUTPATIENT
Start: 2023-06-22 | End: 2023-06-27 | Stop reason: HOSPADM

## 2023-06-22 RX ORDER — INSULIN LISPRO 100 [IU]/ML
0-4 INJECTION, SOLUTION INTRAVENOUS; SUBCUTANEOUS NIGHTLY
Status: DISCONTINUED | OUTPATIENT
Start: 2023-06-22 | End: 2023-06-27 | Stop reason: HOSPADM

## 2023-06-22 RX ADMIN — ARFORMOTEROL TARTRATE: 15 SOLUTION RESPIRATORY (INHALATION) at 08:19

## 2023-06-22 RX ADMIN — Medication 2000 UNITS: at 09:25

## 2023-06-22 RX ADMIN — POTASSIUM CHLORIDE 10 MEQ: 750 TABLET, FILM COATED, EXTENDED RELEASE ORAL at 22:14

## 2023-06-22 RX ADMIN — APIXABAN 5 MG: 5 TABLET, FILM COATED ORAL at 22:14

## 2023-06-22 RX ADMIN — BUMETANIDE 2 MG: 0.25 INJECTION INTRAMUSCULAR; INTRAVENOUS at 09:25

## 2023-06-22 RX ADMIN — POTASSIUM CHLORIDE 10 MEQ: 750 TABLET, FILM COATED, EXTENDED RELEASE ORAL at 09:25

## 2023-06-22 RX ADMIN — SODIUM CHLORIDE, PRESERVATIVE FREE 10 ML: 5 INJECTION INTRAVENOUS at 09:26

## 2023-06-22 RX ADMIN — Medication 35 UNITS: at 16:52

## 2023-06-22 RX ADMIN — ALLOPURINOL 100 MG: 100 TABLET ORAL at 22:14

## 2023-06-22 RX ADMIN — Medication 35 UNITS: at 09:25

## 2023-06-22 RX ADMIN — AMLODIPINE BESYLATE 5 MG: 5 TABLET ORAL at 09:26

## 2023-06-22 RX ADMIN — PERFLUTREN 1.5 ML: 6.52 INJECTION, SUSPENSION INTRAVENOUS at 15:31

## 2023-06-22 RX ADMIN — ALLOPURINOL 100 MG: 100 TABLET ORAL at 09:25

## 2023-06-22 RX ADMIN — ARFORMOTEROL TARTRATE: 15 SOLUTION RESPIRATORY (INHALATION) at 22:04

## 2023-06-22 RX ADMIN — BUPROPION HYDROCHLORIDE 150 MG: 150 TABLET, EXTENDED RELEASE ORAL at 09:25

## 2023-06-22 RX ADMIN — HEPARIN SODIUM 5000 UNITS: 5000 INJECTION INTRAVENOUS; SUBCUTANEOUS at 00:36

## 2023-06-22 RX ADMIN — SODIUM CHLORIDE, PRESERVATIVE FREE 10 ML: 5 INJECTION INTRAVENOUS at 22:14

## 2023-06-22 RX ADMIN — SERTRALINE HYDROCHLORIDE 150 MG: 50 TABLET ORAL at 22:14

## 2023-06-22 RX ADMIN — APIXABAN 5 MG: 5 TABLET, FILM COATED ORAL at 09:25

## 2023-06-22 ASSESSMENT — PAIN SCALES - GENERAL
PAINLEVEL_OUTOF10: 0

## 2023-06-23 ENCOUNTER — APPOINTMENT (OUTPATIENT)
Facility: HOSPITAL | Age: 78
DRG: 291 | End: 2023-06-23
Payer: MEDICARE

## 2023-06-23 LAB
ALBUMIN SERPL-MCNC: 3.1 G/DL (ref 3.5–5)
ALBUMIN/GLOB SERPL: 0.8 (ref 1.1–2.2)
ALP SERPL-CCNC: 84 U/L (ref 45–117)
ALT SERPL-CCNC: 16 U/L (ref 12–78)
ANION GAP SERPL CALC-SCNC: 8 MMOL/L (ref 5–15)
AST SERPL-CCNC: 14 U/L (ref 15–37)
BASOPHILS # BLD: 0.1 K/UL (ref 0–0.1)
BASOPHILS NFR BLD: 1 % (ref 0–1)
BILIRUB SERPL-MCNC: 0.4 MG/DL (ref 0.2–1)
BUN SERPL-MCNC: 32 MG/DL (ref 6–20)
BUN/CREAT SERPL: 22 (ref 12–20)
CALCIUM SERPL-MCNC: 9.1 MG/DL (ref 8.5–10.1)
CHLORIDE SERPL-SCNC: 111 MMOL/L (ref 97–108)
CO2 SERPL-SCNC: 22 MMOL/L (ref 21–32)
CREAT SERPL-MCNC: 1.44 MG/DL (ref 0.55–1.02)
DIFFERENTIAL METHOD BLD: ABNORMAL
EOSINOPHIL # BLD: 0.4 K/UL (ref 0–0.4)
EOSINOPHIL NFR BLD: 3 % (ref 0–7)
ERYTHROCYTE [DISTWIDTH] IN BLOOD BY AUTOMATED COUNT: 15.9 % (ref 11.5–14.5)
GLOBULIN SER CALC-MCNC: 4 G/DL (ref 2–4)
GLUCOSE BLD STRIP.AUTO-MCNC: 100 MG/DL (ref 65–117)
GLUCOSE BLD STRIP.AUTO-MCNC: 101 MG/DL (ref 65–117)
GLUCOSE BLD STRIP.AUTO-MCNC: 166 MG/DL (ref 65–117)
GLUCOSE BLD STRIP.AUTO-MCNC: 196 MG/DL (ref 65–117)
GLUCOSE BLD STRIP.AUTO-MCNC: 73 MG/DL (ref 65–117)
GLUCOSE BLD STRIP.AUTO-MCNC: 85 MG/DL (ref 65–117)
GLUCOSE SERPL-MCNC: 56 MG/DL (ref 65–100)
HCT VFR BLD AUTO: 30.6 % (ref 35–47)
HGB BLD-MCNC: 9.6 G/DL (ref 11.5–16)
IMM GRANULOCYTES # BLD AUTO: 0.1 K/UL (ref 0–0.04)
IMM GRANULOCYTES NFR BLD AUTO: 1 % (ref 0–0.5)
LYMPHOCYTES # BLD: 1.4 K/UL (ref 0.8–3.5)
LYMPHOCYTES NFR BLD: 10 % (ref 12–49)
MCH RBC QN AUTO: 29.4 PG (ref 26–34)
MCHC RBC AUTO-ENTMCNC: 31.4 G/DL (ref 30–36.5)
MCV RBC AUTO: 93.9 FL (ref 80–99)
MONOCYTES # BLD: 1.2 K/UL (ref 0–1)
MONOCYTES NFR BLD: 9 % (ref 5–13)
NEUTS SEG # BLD: 10.2 K/UL (ref 1.8–8)
NEUTS SEG NFR BLD: 76 % (ref 32–75)
NRBC # BLD: 0 K/UL (ref 0–0.01)
NRBC BLD-RTO: 0 PER 100 WBC
PLATELET # BLD AUTO: 238 K/UL (ref 150–400)
PMV BLD AUTO: 9.5 FL (ref 8.9–12.9)
POTASSIUM SERPL-SCNC: 3.7 MMOL/L (ref 3.5–5.1)
PROT SERPL-MCNC: 7.1 G/DL (ref 6.4–8.2)
RBC # BLD AUTO: 3.26 M/UL (ref 3.8–5.2)
SERVICE CMNT-IMP: ABNORMAL
SERVICE CMNT-IMP: ABNORMAL
SERVICE CMNT-IMP: NORMAL
SODIUM SERPL-SCNC: 141 MMOL/L (ref 136–145)
WBC # BLD AUTO: 13.3 K/UL (ref 3.6–11)

## 2023-06-23 PROCEDURE — 6360000002 HC RX W HCPCS: Performed by: HOSPITALIST

## 2023-06-23 PROCEDURE — 6360000002 HC RX W HCPCS: Performed by: NURSE PRACTITIONER

## 2023-06-23 PROCEDURE — 85025 COMPLETE CBC W/AUTO DIFF WBC: CPT

## 2023-06-23 PROCEDURE — 70450 CT HEAD/BRAIN W/O DYE: CPT

## 2023-06-23 PROCEDURE — 94640 AIRWAY INHALATION TREATMENT: CPT

## 2023-06-23 PROCEDURE — 6370000000 HC RX 637 (ALT 250 FOR IP): Performed by: NURSE PRACTITIONER

## 2023-06-23 PROCEDURE — 2580000003 HC RX 258: Performed by: NURSE PRACTITIONER

## 2023-06-23 PROCEDURE — 6370000000 HC RX 637 (ALT 250 FOR IP): Performed by: HOSPITALIST

## 2023-06-23 PROCEDURE — 2580000003 HC RX 258: Performed by: HOSPITALIST

## 2023-06-23 PROCEDURE — 82962 GLUCOSE BLOOD TEST: CPT

## 2023-06-23 PROCEDURE — 2700000000 HC OXYGEN THERAPY PER DAY

## 2023-06-23 PROCEDURE — 36415 COLL VENOUS BLD VENIPUNCTURE: CPT

## 2023-06-23 PROCEDURE — 97535 SELF CARE MNGMENT TRAINING: CPT

## 2023-06-23 PROCEDURE — 2500000003 HC RX 250 WO HCPCS: Performed by: HOSPITALIST

## 2023-06-23 PROCEDURE — 2060000000 HC ICU INTERMEDIATE R&B

## 2023-06-23 PROCEDURE — 80053 COMPREHEN METABOLIC PANEL: CPT

## 2023-06-23 RX ORDER — AMLODIPINE BESYLATE 5 MG/1
10 TABLET ORAL DAILY
Status: DISCONTINUED | OUTPATIENT
Start: 2023-06-24 | End: 2023-06-27 | Stop reason: HOSPADM

## 2023-06-23 RX ORDER — GABAPENTIN 100 MG/1
100 CAPSULE ORAL NIGHTLY
Status: DISCONTINUED | OUTPATIENT
Start: 2023-06-23 | End: 2023-06-27 | Stop reason: HOSPADM

## 2023-06-23 RX ADMIN — APIXABAN 5 MG: 5 TABLET, FILM COATED ORAL at 08:17

## 2023-06-23 RX ADMIN — SERTRALINE HYDROCHLORIDE 150 MG: 50 TABLET ORAL at 21:13

## 2023-06-23 RX ADMIN — ARFORMOTEROL TARTRATE: 15 SOLUTION RESPIRATORY (INHALATION) at 20:48

## 2023-06-23 RX ADMIN — ALLOPURINOL 100 MG: 100 TABLET ORAL at 08:17

## 2023-06-23 RX ADMIN — Medication 2000 UNITS: at 08:18

## 2023-06-23 RX ADMIN — SODIUM CHLORIDE, PRESERVATIVE FREE 10 ML: 5 INJECTION INTRAVENOUS at 08:20

## 2023-06-23 RX ADMIN — SODIUM CHLORIDE, PRESERVATIVE FREE 10 ML: 5 INJECTION INTRAVENOUS at 21:16

## 2023-06-23 RX ADMIN — CYCLOBENZAPRINE 5 MG: 10 TABLET, FILM COATED ORAL at 03:30

## 2023-06-23 RX ADMIN — BUMETANIDE 2 MG: 0.25 INJECTION INTRAMUSCULAR; INTRAVENOUS at 08:18

## 2023-06-23 RX ADMIN — ARFORMOTEROL TARTRATE: 15 SOLUTION RESPIRATORY (INHALATION) at 07:39

## 2023-06-23 RX ADMIN — AMLODIPINE BESYLATE 5 MG: 5 TABLET ORAL at 08:17

## 2023-06-23 RX ADMIN — ALLOPURINOL 100 MG: 100 TABLET ORAL at 21:14

## 2023-06-23 RX ADMIN — AZITHROMYCIN DIHYDRATE 500 MG: 500 INJECTION, POWDER, LYOPHILIZED, FOR SOLUTION INTRAVENOUS at 08:18

## 2023-06-23 RX ADMIN — APIXABAN 5 MG: 5 TABLET, FILM COATED ORAL at 21:14

## 2023-06-23 RX ADMIN — WATER 1000 MG: 1 INJECTION INTRAMUSCULAR; INTRAVENOUS; SUBCUTANEOUS at 08:19

## 2023-06-23 RX ADMIN — POTASSIUM CHLORIDE 10 MEQ: 750 TABLET, FILM COATED, EXTENDED RELEASE ORAL at 21:14

## 2023-06-23 RX ADMIN — Medication 30 UNITS: at 16:39

## 2023-06-23 RX ADMIN — GABAPENTIN 100 MG: 100 CAPSULE ORAL at 21:14

## 2023-06-23 RX ADMIN — BUPROPION HYDROCHLORIDE 150 MG: 150 TABLET, EXTENDED RELEASE ORAL at 08:17

## 2023-06-23 RX ADMIN — POTASSIUM CHLORIDE 10 MEQ: 750 TABLET, FILM COATED, EXTENDED RELEASE ORAL at 08:17

## 2023-06-23 RX ADMIN — IRON SUCROSE 300 MG: 20 INJECTION, SOLUTION INTRAVENOUS at 11:39

## 2023-06-23 RX ADMIN — ONDANSETRON 4 MG: 2 INJECTION INTRAMUSCULAR; INTRAVENOUS at 05:45

## 2023-06-23 ASSESSMENT — PAIN SCALES - GENERAL
PAINLEVEL_OUTOF10: 0
PAINLEVEL_OUTOF10: 2
PAINLEVEL_OUTOF10: 5
PAINLEVEL_OUTOF10: 0
PAINLEVEL_OUTOF10: 3
PAINLEVEL_OUTOF10: 4

## 2023-06-23 ASSESSMENT — PAIN DESCRIPTION - LOCATION
LOCATION: FOOT
LOCATION: BACK;FOOT

## 2023-06-23 ASSESSMENT — PAIN DESCRIPTION - ORIENTATION
ORIENTATION: RIGHT;LEFT
ORIENTATION: LEFT;RIGHT
ORIENTATION: RIGHT;LEFT
ORIENTATION: LEFT;RIGHT

## 2023-06-23 ASSESSMENT — PAIN DESCRIPTION - DESCRIPTORS
DESCRIPTORS: ACHING
DESCRIPTORS: STABBING

## 2023-06-23 ASSESSMENT — PAIN DESCRIPTION - PAIN TYPE
TYPE: NEUROPATHIC PAIN

## 2023-06-24 LAB
ALBUMIN SERPL-MCNC: 3.2 G/DL (ref 3.5–5)
ALBUMIN/GLOB SERPL: 0.7 (ref 1.1–2.2)
ALP SERPL-CCNC: 96 U/L (ref 45–117)
ALT SERPL-CCNC: 18 U/L (ref 12–78)
ANION GAP SERPL CALC-SCNC: 7 MMOL/L (ref 5–15)
AST SERPL-CCNC: 19 U/L (ref 15–37)
BASOPHILS # BLD: 0.1 K/UL (ref 0–0.1)
BASOPHILS NFR BLD: 1 % (ref 0–1)
BILIRUB SERPL-MCNC: 0.4 MG/DL (ref 0.2–1)
BUN SERPL-MCNC: 34 MG/DL (ref 6–20)
BUN/CREAT SERPL: 20 (ref 12–20)
CALCIUM SERPL-MCNC: 9 MG/DL (ref 8.5–10.1)
CHLORIDE SERPL-SCNC: 110 MMOL/L (ref 97–108)
CO2 SERPL-SCNC: 23 MMOL/L (ref 21–32)
CREAT SERPL-MCNC: 1.68 MG/DL (ref 0.55–1.02)
DIFFERENTIAL METHOD BLD: ABNORMAL
EOSINOPHIL # BLD: 0.4 K/UL (ref 0–0.4)
EOSINOPHIL NFR BLD: 3 % (ref 0–7)
ERYTHROCYTE [DISTWIDTH] IN BLOOD BY AUTOMATED COUNT: 15.6 % (ref 11.5–14.5)
GLOBULIN SER CALC-MCNC: 4.4 G/DL (ref 2–4)
GLUCOSE BLD STRIP.AUTO-MCNC: 114 MG/DL (ref 65–117)
GLUCOSE BLD STRIP.AUTO-MCNC: 134 MG/DL (ref 65–117)
GLUCOSE BLD STRIP.AUTO-MCNC: 172 MG/DL (ref 65–117)
GLUCOSE BLD STRIP.AUTO-MCNC: 178 MG/DL (ref 65–117)
GLUCOSE BLD STRIP.AUTO-MCNC: 76 MG/DL (ref 65–117)
GLUCOSE SERPL-MCNC: 66 MG/DL (ref 65–100)
HCT VFR BLD AUTO: 32.3 % (ref 35–47)
HGB BLD-MCNC: 10 G/DL (ref 11.5–16)
IMM GRANULOCYTES # BLD AUTO: 0.1 K/UL (ref 0–0.04)
IMM GRANULOCYTES NFR BLD AUTO: 0 % (ref 0–0.5)
LYMPHOCYTES # BLD: 1.2 K/UL (ref 0.8–3.5)
LYMPHOCYTES NFR BLD: 9 % (ref 12–49)
MCH RBC QN AUTO: 29.5 PG (ref 26–34)
MCHC RBC AUTO-ENTMCNC: 31 G/DL (ref 30–36.5)
MCV RBC AUTO: 95.3 FL (ref 80–99)
MONOCYTES # BLD: 1.4 K/UL (ref 0–1)
MONOCYTES NFR BLD: 10 % (ref 5–13)
NEUTS SEG # BLD: 10.5 K/UL (ref 1.8–8)
NEUTS SEG NFR BLD: 77 % (ref 32–75)
NRBC # BLD: 0 K/UL (ref 0–0.01)
NRBC BLD-RTO: 0 PER 100 WBC
PLATELET # BLD AUTO: 217 K/UL (ref 150–400)
PMV BLD AUTO: 9.5 FL (ref 8.9–12.9)
POTASSIUM SERPL-SCNC: 4.4 MMOL/L (ref 3.5–5.1)
PROT SERPL-MCNC: 7.6 G/DL (ref 6.4–8.2)
RBC # BLD AUTO: 3.39 M/UL (ref 3.8–5.2)
SERVICE CMNT-IMP: ABNORMAL
SERVICE CMNT-IMP: NORMAL
SERVICE CMNT-IMP: NORMAL
SODIUM SERPL-SCNC: 140 MMOL/L (ref 136–145)
WBC # BLD AUTO: 13.6 K/UL (ref 3.6–11)

## 2023-06-24 PROCEDURE — 6370000000 HC RX 637 (ALT 250 FOR IP): Performed by: HOSPITALIST

## 2023-06-24 PROCEDURE — 6360000002 HC RX W HCPCS: Performed by: HOSPITALIST

## 2023-06-24 PROCEDURE — 2580000003 HC RX 258: Performed by: HOSPITALIST

## 2023-06-24 PROCEDURE — 2580000003 HC RX 258: Performed by: NURSE PRACTITIONER

## 2023-06-24 PROCEDURE — 94640 AIRWAY INHALATION TREATMENT: CPT

## 2023-06-24 PROCEDURE — 2060000000 HC ICU INTERMEDIATE R&B

## 2023-06-24 PROCEDURE — 6370000000 HC RX 637 (ALT 250 FOR IP): Performed by: NURSE PRACTITIONER

## 2023-06-24 PROCEDURE — 85025 COMPLETE CBC W/AUTO DIFF WBC: CPT

## 2023-06-24 PROCEDURE — 6360000002 HC RX W HCPCS: Performed by: NURSE PRACTITIONER

## 2023-06-24 PROCEDURE — 2500000003 HC RX 250 WO HCPCS: Performed by: HOSPITALIST

## 2023-06-24 PROCEDURE — 80053 COMPREHEN METABOLIC PANEL: CPT

## 2023-06-24 PROCEDURE — 2700000000 HC OXYGEN THERAPY PER DAY

## 2023-06-24 PROCEDURE — 36415 COLL VENOUS BLD VENIPUNCTURE: CPT

## 2023-06-24 PROCEDURE — 82962 GLUCOSE BLOOD TEST: CPT

## 2023-06-24 RX ORDER — BENZONATATE 100 MG/1
100 CAPSULE ORAL 3 TIMES DAILY PRN
Status: DISCONTINUED | OUTPATIENT
Start: 2023-06-24 | End: 2023-06-27 | Stop reason: HOSPADM

## 2023-06-24 RX ADMIN — BENZONATATE 100 MG: 100 CAPSULE ORAL at 17:25

## 2023-06-24 RX ADMIN — ALLOPURINOL 100 MG: 100 TABLET ORAL at 22:07

## 2023-06-24 RX ADMIN — ACETAMINOPHEN 650 MG: 325 TABLET ORAL at 17:25

## 2023-06-24 RX ADMIN — Medication 1 LOZENGE: at 17:25

## 2023-06-24 RX ADMIN — POTASSIUM CHLORIDE 10 MEQ: 750 TABLET, FILM COATED, EXTENDED RELEASE ORAL at 09:17

## 2023-06-24 RX ADMIN — BUPROPION HYDROCHLORIDE 150 MG: 150 TABLET, EXTENDED RELEASE ORAL at 09:17

## 2023-06-24 RX ADMIN — ALLOPURINOL 100 MG: 100 TABLET ORAL at 09:17

## 2023-06-24 RX ADMIN — APIXABAN 5 MG: 5 TABLET, FILM COATED ORAL at 21:30

## 2023-06-24 RX ADMIN — GABAPENTIN 100 MG: 100 CAPSULE ORAL at 21:30

## 2023-06-24 RX ADMIN — APIXABAN 5 MG: 5 TABLET, FILM COATED ORAL at 09:17

## 2023-06-24 RX ADMIN — SERTRALINE HYDROCHLORIDE 150 MG: 50 TABLET ORAL at 22:07

## 2023-06-24 RX ADMIN — SODIUM CHLORIDE, PRESERVATIVE FREE 10 ML: 5 INJECTION INTRAVENOUS at 22:07

## 2023-06-24 RX ADMIN — WATER 1000 MG: 1 INJECTION INTRAMUSCULAR; INTRAVENOUS; SUBCUTANEOUS at 09:17

## 2023-06-24 RX ADMIN — ARFORMOTEROL TARTRATE: 15 SOLUTION RESPIRATORY (INHALATION) at 07:54

## 2023-06-24 RX ADMIN — AZITHROMYCIN DIHYDRATE 500 MG: 500 INJECTION, POWDER, LYOPHILIZED, FOR SOLUTION INTRAVENOUS at 09:18

## 2023-06-24 RX ADMIN — Medication 2000 UNITS: at 09:17

## 2023-06-24 RX ADMIN — IRON SUCROSE 300 MG: 20 INJECTION, SOLUTION INTRAVENOUS at 11:36

## 2023-06-24 RX ADMIN — POTASSIUM CHLORIDE 10 MEQ: 750 TABLET, FILM COATED, EXTENDED RELEASE ORAL at 22:07

## 2023-06-24 RX ADMIN — ARFORMOTEROL TARTRATE: 15 SOLUTION RESPIRATORY (INHALATION) at 19:37

## 2023-06-24 RX ADMIN — AMLODIPINE BESYLATE 10 MG: 5 TABLET ORAL at 09:17

## 2023-06-24 RX ADMIN — SODIUM CHLORIDE, PRESERVATIVE FREE 10 ML: 5 INJECTION INTRAVENOUS at 09:20

## 2023-06-24 RX ADMIN — BUMETANIDE 2 MG: 0.25 INJECTION INTRAMUSCULAR; INTRAVENOUS at 09:17

## 2023-06-24 ASSESSMENT — PAIN SCALES - GENERAL
PAINLEVEL_OUTOF10: 0
PAINLEVEL_OUTOF10: 0

## 2023-06-25 ENCOUNTER — APPOINTMENT (OUTPATIENT)
Facility: HOSPITAL | Age: 78
DRG: 291 | End: 2023-06-25
Payer: MEDICARE

## 2023-06-25 LAB
ALBUMIN SERPL-MCNC: 2.9 G/DL (ref 3.5–5)
ALBUMIN/GLOB SERPL: 0.7 (ref 1.1–2.2)
ALP SERPL-CCNC: 89 U/L (ref 45–117)
ALT SERPL-CCNC: 16 U/L (ref 12–78)
ANION GAP SERPL CALC-SCNC: 7 MMOL/L (ref 5–15)
AST SERPL-CCNC: 14 U/L (ref 15–37)
BILIRUB SERPL-MCNC: 0.3 MG/DL (ref 0.2–1)
BUN SERPL-MCNC: 38 MG/DL (ref 6–20)
BUN/CREAT SERPL: 23 (ref 12–20)
CALCIUM SERPL-MCNC: 8.9 MG/DL (ref 8.5–10.1)
CHLORIDE SERPL-SCNC: 110 MMOL/L (ref 97–108)
CO2 SERPL-SCNC: 20 MMOL/L (ref 21–32)
CREAT SERPL-MCNC: 1.68 MG/DL (ref 0.55–1.02)
ERYTHROCYTE [DISTWIDTH] IN BLOOD BY AUTOMATED COUNT: 15.6 % (ref 11.5–14.5)
GLOBULIN SER CALC-MCNC: 4.3 G/DL (ref 2–4)
GLUCOSE BLD STRIP.AUTO-MCNC: 138 MG/DL (ref 65–117)
GLUCOSE BLD STRIP.AUTO-MCNC: 178 MG/DL (ref 65–117)
GLUCOSE BLD STRIP.AUTO-MCNC: 207 MG/DL (ref 65–117)
GLUCOSE BLD STRIP.AUTO-MCNC: 240 MG/DL (ref 65–117)
GLUCOSE SERPL-MCNC: 186 MG/DL (ref 65–100)
HCT VFR BLD AUTO: 30.8 % (ref 35–47)
HGB BLD-MCNC: 9.3 G/DL (ref 11.5–16)
MAGNESIUM SERPL-MCNC: 2.1 MG/DL (ref 1.6–2.4)
MCH RBC QN AUTO: 29.2 PG (ref 26–34)
MCHC RBC AUTO-ENTMCNC: 30.2 G/DL (ref 30–36.5)
MCV RBC AUTO: 96.9 FL (ref 80–99)
NRBC # BLD: 0 K/UL (ref 0–0.01)
NRBC BLD-RTO: 0 PER 100 WBC
PHOSPHATE SERPL-MCNC: 3.8 MG/DL (ref 2.6–4.7)
PLATELET # BLD AUTO: 224 K/UL (ref 150–400)
PMV BLD AUTO: 9.6 FL (ref 8.9–12.9)
POTASSIUM SERPL-SCNC: 4.5 MMOL/L (ref 3.5–5.1)
PROT SERPL-MCNC: 7.2 G/DL (ref 6.4–8.2)
RBC # BLD AUTO: 3.18 M/UL (ref 3.8–5.2)
SERVICE CMNT-IMP: ABNORMAL
SODIUM SERPL-SCNC: 137 MMOL/L (ref 136–145)
WBC # BLD AUTO: 13.1 K/UL (ref 3.6–11)

## 2023-06-25 PROCEDURE — 94640 AIRWAY INHALATION TREATMENT: CPT

## 2023-06-25 PROCEDURE — 82962 GLUCOSE BLOOD TEST: CPT

## 2023-06-25 PROCEDURE — 2060000000 HC ICU INTERMEDIATE R&B

## 2023-06-25 PROCEDURE — 6360000002 HC RX W HCPCS: Performed by: HOSPITALIST

## 2023-06-25 PROCEDURE — 84100 ASSAY OF PHOSPHORUS: CPT

## 2023-06-25 PROCEDURE — 6370000000 HC RX 637 (ALT 250 FOR IP): Performed by: NURSE PRACTITIONER

## 2023-06-25 PROCEDURE — 2580000003 HC RX 258: Performed by: HOSPITALIST

## 2023-06-25 PROCEDURE — 83735 ASSAY OF MAGNESIUM: CPT

## 2023-06-25 PROCEDURE — 2580000003 HC RX 258: Performed by: NURSE PRACTITIONER

## 2023-06-25 PROCEDURE — 6370000000 HC RX 637 (ALT 250 FOR IP): Performed by: HOSPITALIST

## 2023-06-25 PROCEDURE — 80053 COMPREHEN METABOLIC PANEL: CPT

## 2023-06-25 PROCEDURE — 6360000002 HC RX W HCPCS: Performed by: NURSE PRACTITIONER

## 2023-06-25 PROCEDURE — 36415 COLL VENOUS BLD VENIPUNCTURE: CPT

## 2023-06-25 PROCEDURE — 2500000003 HC RX 250 WO HCPCS: Performed by: HOSPITALIST

## 2023-06-25 PROCEDURE — 85027 COMPLETE CBC AUTOMATED: CPT

## 2023-06-25 PROCEDURE — 71045 X-RAY EXAM CHEST 1 VIEW: CPT

## 2023-06-25 RX ORDER — AZITHROMYCIN 250 MG/1
500 TABLET, FILM COATED ORAL DAILY
Status: COMPLETED | OUTPATIENT
Start: 2023-06-26 | End: 2023-06-27

## 2023-06-25 RX ADMIN — POTASSIUM CHLORIDE 10 MEQ: 750 TABLET, FILM COATED, EXTENDED RELEASE ORAL at 09:26

## 2023-06-25 RX ADMIN — IRON SUCROSE 300 MG: 20 INJECTION, SOLUTION INTRAVENOUS at 12:02

## 2023-06-25 RX ADMIN — ALLOPURINOL 100 MG: 100 TABLET ORAL at 09:26

## 2023-06-25 RX ADMIN — INSULIN LISPRO 4 UNITS: 100 INJECTION, SOLUTION INTRAVENOUS; SUBCUTANEOUS at 12:02

## 2023-06-25 RX ADMIN — AMLODIPINE BESYLATE 10 MG: 5 TABLET ORAL at 09:26

## 2023-06-25 RX ADMIN — Medication 10 UNITS: at 17:07

## 2023-06-25 RX ADMIN — Medication 10 UNITS: at 07:11

## 2023-06-25 RX ADMIN — BUPROPION HYDROCHLORIDE 150 MG: 150 TABLET, EXTENDED RELEASE ORAL at 09:26

## 2023-06-25 RX ADMIN — POTASSIUM CHLORIDE 10 MEQ: 750 TABLET, FILM COATED, EXTENDED RELEASE ORAL at 21:18

## 2023-06-25 RX ADMIN — SODIUM CHLORIDE, PRESERVATIVE FREE 10 ML: 5 INJECTION INTRAVENOUS at 09:28

## 2023-06-25 RX ADMIN — APIXABAN 5 MG: 5 TABLET, FILM COATED ORAL at 09:26

## 2023-06-25 RX ADMIN — BUMETANIDE 2 MG: 0.25 INJECTION INTRAMUSCULAR; INTRAVENOUS at 09:26

## 2023-06-25 RX ADMIN — WATER 1000 MG: 1 INJECTION INTRAMUSCULAR; INTRAVENOUS; SUBCUTANEOUS at 09:27

## 2023-06-25 RX ADMIN — ARFORMOTEROL TARTRATE: 15 SOLUTION RESPIRATORY (INHALATION) at 20:34

## 2023-06-25 RX ADMIN — AZITHROMYCIN DIHYDRATE 500 MG: 500 INJECTION, POWDER, LYOPHILIZED, FOR SOLUTION INTRAVENOUS at 09:27

## 2023-06-25 RX ADMIN — SERTRALINE HYDROCHLORIDE 150 MG: 50 TABLET ORAL at 21:17

## 2023-06-25 RX ADMIN — SODIUM CHLORIDE, PRESERVATIVE FREE 10 ML: 5 INJECTION INTRAVENOUS at 21:18

## 2023-06-25 RX ADMIN — Medication 2000 UNITS: at 09:26

## 2023-06-25 RX ADMIN — ALLOPURINOL 100 MG: 100 TABLET ORAL at 21:17

## 2023-06-25 RX ADMIN — GABAPENTIN 100 MG: 100 CAPSULE ORAL at 21:17

## 2023-06-25 RX ADMIN — APIXABAN 5 MG: 5 TABLET, FILM COATED ORAL at 21:17

## 2023-06-25 ASSESSMENT — PAIN SCALES - GENERAL
PAINLEVEL_OUTOF10: 0

## 2023-06-26 LAB
ALBUMIN SERPL-MCNC: 2.8 G/DL (ref 3.5–5)
ALBUMIN/GLOB SERPL: 0.7 (ref 1.1–2.2)
ALP SERPL-CCNC: 93 U/L (ref 45–117)
ALT SERPL-CCNC: 14 U/L (ref 12–78)
ANION GAP SERPL CALC-SCNC: 4 MMOL/L (ref 5–15)
AST SERPL-CCNC: 15 U/L (ref 15–37)
BILIRUB SERPL-MCNC: 0.3 MG/DL (ref 0.2–1)
BUN SERPL-MCNC: 37 MG/DL (ref 6–20)
BUN/CREAT SERPL: 23 (ref 12–20)
CALCIUM SERPL-MCNC: 8.8 MG/DL (ref 8.5–10.1)
CHLORIDE SERPL-SCNC: 109 MMOL/L (ref 97–108)
CO2 SERPL-SCNC: 25 MMOL/L (ref 21–32)
CREAT SERPL-MCNC: 1.63 MG/DL (ref 0.55–1.02)
ERYTHROCYTE [DISTWIDTH] IN BLOOD BY AUTOMATED COUNT: 15.5 % (ref 11.5–14.5)
GLOBULIN SER CALC-MCNC: 4 G/DL (ref 2–4)
GLUCOSE BLD STRIP.AUTO-MCNC: 177 MG/DL (ref 65–117)
GLUCOSE BLD STRIP.AUTO-MCNC: 177 MG/DL (ref 65–117)
GLUCOSE BLD STRIP.AUTO-MCNC: 179 MG/DL (ref 65–117)
GLUCOSE BLD STRIP.AUTO-MCNC: 181 MG/DL (ref 65–117)
GLUCOSE SERPL-MCNC: 202 MG/DL (ref 65–100)
HCT VFR BLD AUTO: 29.1 % (ref 35–47)
HGB BLD-MCNC: 9.1 G/DL (ref 11.5–16)
MAGNESIUM SERPL-MCNC: 2.1 MG/DL (ref 1.6–2.4)
MCH RBC QN AUTO: 29.5 PG (ref 26–34)
MCHC RBC AUTO-ENTMCNC: 31.3 G/DL (ref 30–36.5)
MCV RBC AUTO: 94.5 FL (ref 80–99)
NRBC # BLD: 0 K/UL (ref 0–0.01)
NRBC BLD-RTO: 0 PER 100 WBC
PHOSPHATE SERPL-MCNC: 3.3 MG/DL (ref 2.6–4.7)
PLATELET # BLD AUTO: 244 K/UL (ref 150–400)
PMV BLD AUTO: 9.9 FL (ref 8.9–12.9)
POTASSIUM SERPL-SCNC: 3.9 MMOL/L (ref 3.5–5.1)
PROT SERPL-MCNC: 6.8 G/DL (ref 6.4–8.2)
RBC # BLD AUTO: 3.08 M/UL (ref 3.8–5.2)
SERVICE CMNT-IMP: ABNORMAL
SODIUM SERPL-SCNC: 138 MMOL/L (ref 136–145)
WBC # BLD AUTO: 11.8 K/UL (ref 3.6–11)

## 2023-06-26 PROCEDURE — 85027 COMPLETE CBC AUTOMATED: CPT

## 2023-06-26 PROCEDURE — 83735 ASSAY OF MAGNESIUM: CPT

## 2023-06-26 PROCEDURE — 84100 ASSAY OF PHOSPHORUS: CPT

## 2023-06-26 PROCEDURE — 6360000002 HC RX W HCPCS: Performed by: HOSPITALIST

## 2023-06-26 PROCEDURE — 2500000003 HC RX 250 WO HCPCS: Performed by: HOSPITALIST

## 2023-06-26 PROCEDURE — 82962 GLUCOSE BLOOD TEST: CPT

## 2023-06-26 PROCEDURE — 2580000003 HC RX 258: Performed by: HOSPITALIST

## 2023-06-26 PROCEDURE — 80053 COMPREHEN METABOLIC PANEL: CPT

## 2023-06-26 PROCEDURE — 6370000000 HC RX 637 (ALT 250 FOR IP): Performed by: HOSPITALIST

## 2023-06-26 PROCEDURE — 6370000000 HC RX 637 (ALT 250 FOR IP): Performed by: NURSE PRACTITIONER

## 2023-06-26 PROCEDURE — 97116 GAIT TRAINING THERAPY: CPT

## 2023-06-26 PROCEDURE — 97530 THERAPEUTIC ACTIVITIES: CPT

## 2023-06-26 PROCEDURE — 36415 COLL VENOUS BLD VENIPUNCTURE: CPT

## 2023-06-26 PROCEDURE — 97535 SELF CARE MNGMENT TRAINING: CPT

## 2023-06-26 PROCEDURE — 94640 AIRWAY INHALATION TREATMENT: CPT

## 2023-06-26 PROCEDURE — 2060000000 HC ICU INTERMEDIATE R&B

## 2023-06-26 PROCEDURE — 2700000000 HC OXYGEN THERAPY PER DAY

## 2023-06-26 RX ADMIN — Medication 2000 UNITS: at 09:29

## 2023-06-26 RX ADMIN — ALLOPURINOL 100 MG: 100 TABLET ORAL at 21:46

## 2023-06-26 RX ADMIN — BUPROPION HYDROCHLORIDE 150 MG: 150 TABLET, EXTENDED RELEASE ORAL at 09:29

## 2023-06-26 RX ADMIN — ARFORMOTEROL TARTRATE: 15 SOLUTION RESPIRATORY (INHALATION) at 20:07

## 2023-06-26 RX ADMIN — APIXABAN 5 MG: 5 TABLET, FILM COATED ORAL at 09:30

## 2023-06-26 RX ADMIN — BUMETANIDE 2 MG: 0.25 INJECTION INTRAMUSCULAR; INTRAVENOUS at 09:30

## 2023-06-26 RX ADMIN — APIXABAN 5 MG: 5 TABLET, FILM COATED ORAL at 21:46

## 2023-06-26 RX ADMIN — GABAPENTIN 100 MG: 100 CAPSULE ORAL at 21:46

## 2023-06-26 RX ADMIN — Medication 10 UNITS: at 09:29

## 2023-06-26 RX ADMIN — SODIUM CHLORIDE, PRESERVATIVE FREE 10 ML: 5 INJECTION INTRAVENOUS at 09:32

## 2023-06-26 RX ADMIN — Medication 10 UNITS: at 18:04

## 2023-06-26 RX ADMIN — AZITHROMYCIN MONOHYDRATE 500 MG: 250 TABLET ORAL at 09:29

## 2023-06-26 RX ADMIN — AMLODIPINE BESYLATE 10 MG: 5 TABLET ORAL at 09:29

## 2023-06-26 RX ADMIN — WATER 1000 MG: 1 INJECTION INTRAMUSCULAR; INTRAVENOUS; SUBCUTANEOUS at 09:30

## 2023-06-26 RX ADMIN — POTASSIUM CHLORIDE 10 MEQ: 750 TABLET, FILM COATED, EXTENDED RELEASE ORAL at 21:46

## 2023-06-26 RX ADMIN — ARFORMOTEROL TARTRATE: 15 SOLUTION RESPIRATORY (INHALATION) at 07:43

## 2023-06-26 RX ADMIN — SODIUM CHLORIDE, PRESERVATIVE FREE 10 ML: 5 INJECTION INTRAVENOUS at 21:46

## 2023-06-26 RX ADMIN — POTASSIUM CHLORIDE 10 MEQ: 750 TABLET, FILM COATED, EXTENDED RELEASE ORAL at 09:30

## 2023-06-26 RX ADMIN — ALLOPURINOL 100 MG: 100 TABLET ORAL at 09:30

## 2023-06-26 RX ADMIN — SERTRALINE HYDROCHLORIDE 150 MG: 50 TABLET ORAL at 21:46

## 2023-06-26 ASSESSMENT — PAIN SCALES - GENERAL
PAINLEVEL_OUTOF10: 0
PAINLEVEL_OUTOF10: 0

## 2023-06-27 VITALS
DIASTOLIC BLOOD PRESSURE: 48 MMHG | RESPIRATION RATE: 14 BRPM | WEIGHT: 269.18 LBS | HEART RATE: 70 BPM | SYSTOLIC BLOOD PRESSURE: 147 MMHG | BODY MASS INDEX: 42.25 KG/M2 | TEMPERATURE: 98.5 F | OXYGEN SATURATION: 95 % | HEIGHT: 67 IN

## 2023-06-27 LAB
ALBUMIN SERPL-MCNC: 2.9 G/DL (ref 3.5–5)
ALBUMIN/GLOB SERPL: 0.7 (ref 1.1–2.2)
ALP SERPL-CCNC: 95 U/L (ref 45–117)
ALT SERPL-CCNC: 16 U/L (ref 12–78)
ANION GAP SERPL CALC-SCNC: 5 MMOL/L (ref 5–15)
AST SERPL-CCNC: 18 U/L (ref 15–37)
BILIRUB SERPL-MCNC: 0.3 MG/DL (ref 0.2–1)
BUN SERPL-MCNC: 37 MG/DL (ref 6–20)
BUN/CREAT SERPL: 25 (ref 12–20)
CALCIUM SERPL-MCNC: 9.3 MG/DL (ref 8.5–10.1)
CHLORIDE SERPL-SCNC: 111 MMOL/L (ref 97–108)
CO2 SERPL-SCNC: 22 MMOL/L (ref 21–32)
CREAT SERPL-MCNC: 1.46 MG/DL (ref 0.55–1.02)
ERYTHROCYTE [DISTWIDTH] IN BLOOD BY AUTOMATED COUNT: 15.5 % (ref 11.5–14.5)
GLOBULIN SER CALC-MCNC: 4.3 G/DL (ref 2–4)
GLUCOSE BLD STRIP.AUTO-MCNC: 163 MG/DL (ref 65–117)
GLUCOSE BLD STRIP.AUTO-MCNC: 172 MG/DL (ref 65–117)
GLUCOSE BLD STRIP.AUTO-MCNC: 213 MG/DL (ref 65–117)
GLUCOSE SERPL-MCNC: 157 MG/DL (ref 65–100)
HCT VFR BLD AUTO: 30.6 % (ref 35–47)
HGB BLD-MCNC: 9.4 G/DL (ref 11.5–16)
MAGNESIUM SERPL-MCNC: 2.1 MG/DL (ref 1.6–2.4)
MCH RBC QN AUTO: 29.4 PG (ref 26–34)
MCHC RBC AUTO-ENTMCNC: 30.7 G/DL (ref 30–36.5)
MCV RBC AUTO: 95.6 FL (ref 80–99)
NRBC # BLD: 0 K/UL (ref 0–0.01)
NRBC BLD-RTO: 0 PER 100 WBC
PHOSPHATE SERPL-MCNC: 2.7 MG/DL (ref 2.6–4.7)
PLATELET # BLD AUTO: 263 K/UL (ref 150–400)
PMV BLD AUTO: 9.8 FL (ref 8.9–12.9)
POTASSIUM SERPL-SCNC: 3.9 MMOL/L (ref 3.5–5.1)
PROT SERPL-MCNC: 7.2 G/DL (ref 6.4–8.2)
RBC # BLD AUTO: 3.2 M/UL (ref 3.8–5.2)
SERVICE CMNT-IMP: ABNORMAL
SODIUM SERPL-SCNC: 138 MMOL/L (ref 136–145)
WBC # BLD AUTO: 12.1 K/UL (ref 3.6–11)

## 2023-06-27 PROCEDURE — 85027 COMPLETE CBC AUTOMATED: CPT

## 2023-06-27 PROCEDURE — 36415 COLL VENOUS BLD VENIPUNCTURE: CPT

## 2023-06-27 PROCEDURE — 2500000003 HC RX 250 WO HCPCS: Performed by: HOSPITALIST

## 2023-06-27 PROCEDURE — 6360000002 HC RX W HCPCS: Performed by: HOSPITALIST

## 2023-06-27 PROCEDURE — 82962 GLUCOSE BLOOD TEST: CPT

## 2023-06-27 PROCEDURE — 6370000000 HC RX 637 (ALT 250 FOR IP): Performed by: HOSPITALIST

## 2023-06-27 PROCEDURE — 84100 ASSAY OF PHOSPHORUS: CPT

## 2023-06-27 PROCEDURE — 80053 COMPREHEN METABOLIC PANEL: CPT

## 2023-06-27 PROCEDURE — 97530 THERAPEUTIC ACTIVITIES: CPT

## 2023-06-27 PROCEDURE — 6370000000 HC RX 637 (ALT 250 FOR IP): Performed by: INTERNAL MEDICINE

## 2023-06-27 PROCEDURE — 6370000000 HC RX 637 (ALT 250 FOR IP): Performed by: NURSE PRACTITIONER

## 2023-06-27 PROCEDURE — 97116 GAIT TRAINING THERAPY: CPT

## 2023-06-27 PROCEDURE — 83735 ASSAY OF MAGNESIUM: CPT

## 2023-06-27 PROCEDURE — 94640 AIRWAY INHALATION TREATMENT: CPT

## 2023-06-27 PROCEDURE — 2580000003 HC RX 258: Performed by: HOSPITALIST

## 2023-06-27 PROCEDURE — 97535 SELF CARE MNGMENT TRAINING: CPT

## 2023-06-27 RX ORDER — ALBUTEROL SULFATE 90 UG/1
2 AEROSOL, METERED RESPIRATORY (INHALATION) EVERY 4 HOURS PRN
Qty: 18 G | Refills: 0 | Status: SHIPPED
Start: 2023-06-27

## 2023-06-27 RX ORDER — GABAPENTIN 100 MG/1
100 CAPSULE ORAL NIGHTLY
Qty: 30 CAPSULE | Refills: 0 | Status: SHIPPED | OUTPATIENT
Start: 2023-06-27 | End: 2023-07-27

## 2023-06-27 RX ORDER — BUMETANIDE 2 MG/1
2 TABLET ORAL 2 TIMES DAILY
Qty: 60 TABLET | Refills: 3 | Status: SHIPPED | OUTPATIENT
Start: 2023-06-27

## 2023-06-27 RX ORDER — AMLODIPINE BESYLATE 10 MG/1
10 TABLET ORAL DAILY
Qty: 30 TABLET | Refills: 1 | Status: SHIPPED | OUTPATIENT
Start: 2023-06-27

## 2023-06-27 RX ORDER — BUMETANIDE 1 MG/1
2 TABLET ORAL 2 TIMES DAILY
Status: DISCONTINUED | OUTPATIENT
Start: 2023-06-27 | End: 2023-06-27 | Stop reason: HOSPADM

## 2023-06-27 RX ORDER — AMOXICILLIN AND CLAVULANATE POTASSIUM 500; 125 MG/1; MG/1
1 TABLET, FILM COATED ORAL 2 TIMES DAILY
Qty: 10 TABLET | Refills: 0 | Status: SHIPPED | OUTPATIENT
Start: 2023-06-27 | End: 2023-07-02

## 2023-06-27 RX ADMIN — ARFORMOTEROL TARTRATE: 15 SOLUTION RESPIRATORY (INHALATION) at 07:24

## 2023-06-27 RX ADMIN — AMLODIPINE BESYLATE 10 MG: 5 TABLET ORAL at 08:54

## 2023-06-27 RX ADMIN — INSULIN LISPRO 4 UNITS: 100 INJECTION, SOLUTION INTRAVENOUS; SUBCUTANEOUS at 11:50

## 2023-06-27 RX ADMIN — Medication 2000 UNITS: at 08:54

## 2023-06-27 RX ADMIN — WATER 1000 MG: 1 INJECTION INTRAMUSCULAR; INTRAVENOUS; SUBCUTANEOUS at 08:54

## 2023-06-27 RX ADMIN — ONDANSETRON 4 MG: 2 INJECTION INTRAMUSCULAR; INTRAVENOUS at 04:23

## 2023-06-27 RX ADMIN — BUMETANIDE 2 MG: 1 TABLET ORAL at 11:50

## 2023-06-27 RX ADMIN — ALLOPURINOL 100 MG: 100 TABLET ORAL at 08:54

## 2023-06-27 RX ADMIN — BUMETANIDE 2 MG: 0.25 INJECTION INTRAMUSCULAR; INTRAVENOUS at 08:54

## 2023-06-27 RX ADMIN — APIXABAN 5 MG: 5 TABLET, FILM COATED ORAL at 08:54

## 2023-06-27 RX ADMIN — POTASSIUM CHLORIDE 10 MEQ: 750 TABLET, FILM COATED, EXTENDED RELEASE ORAL at 08:54

## 2023-06-27 RX ADMIN — BUPROPION HYDROCHLORIDE 150 MG: 150 TABLET, EXTENDED RELEASE ORAL at 08:54

## 2023-06-27 RX ADMIN — AZITHROMYCIN MONOHYDRATE 500 MG: 250 TABLET ORAL at 08:54

## 2023-06-27 RX ADMIN — Medication 10 UNITS: at 07:26

## 2023-06-27 ASSESSMENT — PAIN SCALES - GENERAL
PAINLEVEL_OUTOF10: 0
PAINLEVEL_OUTOF10: 0

## 2023-07-14 NOTE — PROGRESS NOTES
Problem: Falls - Risk of  Goal: *Absence of falls  Outcome: Progressing Towards Goal  Patient weak; needs assistance     Problem: Pneumonia: Day 1  Goal: Activity/Safety  Outcome: Progressing Towards Goal  Patient resting in bed   Goal: Nutrition/Diet  Outcome: Progressing Towards Goal  Cardiac Diet  Goal: Respiratory  Outcome: Progressing Towards Goal  Breathing treatments; sleeps with C Pap .

## 2023-07-30 ENCOUNTER — APPOINTMENT (OUTPATIENT)
Facility: HOSPITAL | Age: 78
DRG: 246 | End: 2023-07-30
Payer: MEDICARE

## 2023-07-30 ENCOUNTER — HOSPITAL ENCOUNTER (INPATIENT)
Facility: HOSPITAL | Age: 78
LOS: 3 days | Discharge: HOME HEALTH | DRG: 246 | End: 2023-08-02
Attending: STUDENT IN AN ORGANIZED HEALTH CARE EDUCATION/TRAINING PROGRAM | Admitting: INTERNAL MEDICINE
Payer: MEDICARE

## 2023-07-30 DIAGNOSIS — R06.02 SOB (SHORTNESS OF BREATH): ICD-10-CM

## 2023-07-30 DIAGNOSIS — D64.9 ANEMIA, UNSPECIFIED TYPE: ICD-10-CM

## 2023-07-30 DIAGNOSIS — N18.9 CHRONIC KIDNEY DISEASE, UNSPECIFIED CKD STAGE: ICD-10-CM

## 2023-07-30 DIAGNOSIS — R09.02 HYPOXIA: ICD-10-CM

## 2023-07-30 DIAGNOSIS — I50.9 ACUTE ON CHRONIC CONGESTIVE HEART FAILURE, UNSPECIFIED HEART FAILURE TYPE (HCC): Primary | ICD-10-CM

## 2023-07-30 PROBLEM — I50.23 ACUTE ON CHRONIC HFREF (HEART FAILURE WITH REDUCED EJECTION FRACTION) (HCC): Status: ACTIVE | Noted: 2023-07-30

## 2023-07-30 LAB
ALBUMIN SERPL-MCNC: 3.6 G/DL (ref 3.5–5)
ALBUMIN/GLOB SERPL: 0.9 (ref 1.1–2.2)
ALP SERPL-CCNC: 88 U/L (ref 45–117)
ALT SERPL-CCNC: 30 U/L (ref 12–78)
ANION GAP SERPL CALC-SCNC: 7 MMOL/L (ref 5–15)
AST SERPL-CCNC: 32 U/L (ref 15–37)
BASOPHILS # BLD: 0.1 K/UL (ref 0–0.1)
BASOPHILS NFR BLD: 1 % (ref 0–1)
BILIRUB SERPL-MCNC: 0.6 MG/DL (ref 0.2–1)
BUN SERPL-MCNC: 38 MG/DL (ref 6–20)
BUN/CREAT SERPL: 25 (ref 12–20)
CALCIUM SERPL-MCNC: 9.5 MG/DL (ref 8.5–10.1)
CHLORIDE SERPL-SCNC: 106 MMOL/L (ref 97–108)
CO2 SERPL-SCNC: 26 MMOL/L (ref 21–32)
COMMENT:: NORMAL
CREAT SERPL-MCNC: 1.51 MG/DL (ref 0.55–1.02)
DIFFERENTIAL METHOD BLD: ABNORMAL
EOSINOPHIL # BLD: 0.7 K/UL (ref 0–0.4)
EOSINOPHIL NFR BLD: 7 % (ref 0–7)
ERYTHROCYTE [DISTWIDTH] IN BLOOD BY AUTOMATED COUNT: 16.9 % (ref 11.5–14.5)
GLOBULIN SER CALC-MCNC: 4.2 G/DL (ref 2–4)
GLUCOSE SERPL-MCNC: 170 MG/DL (ref 65–100)
HCT VFR BLD AUTO: 29.5 % (ref 35–47)
HGB BLD-MCNC: 9 G/DL (ref 11.5–16)
IMM GRANULOCYTES # BLD AUTO: 0 K/UL (ref 0–0.04)
IMM GRANULOCYTES NFR BLD AUTO: 1 % (ref 0–0.5)
LYMPHOCYTES # BLD: 0.9 K/UL (ref 0.8–3.5)
LYMPHOCYTES NFR BLD: 10 % (ref 12–49)
MCH RBC QN AUTO: 30 PG (ref 26–34)
MCHC RBC AUTO-ENTMCNC: 30.5 G/DL (ref 30–36.5)
MCV RBC AUTO: 98.3 FL (ref 80–99)
MONOCYTES # BLD: 0.6 K/UL (ref 0–1)
MONOCYTES NFR BLD: 7 % (ref 5–13)
NEUTS SEG # BLD: 6.6 K/UL (ref 1.8–8)
NEUTS SEG NFR BLD: 74 % (ref 32–75)
NRBC # BLD: 0 K/UL (ref 0–0.01)
NRBC BLD-RTO: 0 PER 100 WBC
NT PRO BNP: 2075 PG/ML
PLATELET # BLD AUTO: 205 K/UL (ref 150–400)
PMV BLD AUTO: 8.6 FL (ref 8.9–12.9)
POTASSIUM SERPL-SCNC: 4.4 MMOL/L (ref 3.5–5.1)
PROT SERPL-MCNC: 7.8 G/DL (ref 6.4–8.2)
RBC # BLD AUTO: 3 M/UL (ref 3.8–5.2)
SODIUM SERPL-SCNC: 139 MMOL/L (ref 136–145)
SPECIMEN HOLD: NORMAL
WBC # BLD AUTO: 8.9 K/UL (ref 3.6–11)

## 2023-07-30 PROCEDURE — 36415 COLL VENOUS BLD VENIPUNCTURE: CPT

## 2023-07-30 PROCEDURE — 85025 COMPLETE CBC W/AUTO DIFF WBC: CPT

## 2023-07-30 PROCEDURE — 2060000000 HC ICU INTERMEDIATE R&B

## 2023-07-30 PROCEDURE — 93005 ELECTROCARDIOGRAM TRACING: CPT

## 2023-07-30 PROCEDURE — 96374 THER/PROPH/DIAG INJ IV PUSH: CPT

## 2023-07-30 PROCEDURE — 2500000003 HC RX 250 WO HCPCS: Performed by: STUDENT IN AN ORGANIZED HEALTH CARE EDUCATION/TRAINING PROGRAM

## 2023-07-30 PROCEDURE — 99285 EMERGENCY DEPT VISIT HI MDM: CPT

## 2023-07-30 PROCEDURE — 80053 COMPREHEN METABOLIC PANEL: CPT

## 2023-07-30 PROCEDURE — 83880 ASSAY OF NATRIURETIC PEPTIDE: CPT

## 2023-07-30 PROCEDURE — 71045 X-RAY EXAM CHEST 1 VIEW: CPT

## 2023-07-30 RX ORDER — BUMETANIDE 0.25 MG/ML
2 INJECTION INTRAMUSCULAR; INTRAVENOUS ONCE
Status: COMPLETED | OUTPATIENT
Start: 2023-07-30 | End: 2023-07-30

## 2023-07-30 RX ADMIN — BUMETANIDE 2 MG: 0.25 INJECTION INTRAMUSCULAR; INTRAVENOUS at 19:49

## 2023-07-30 ASSESSMENT — ENCOUNTER SYMPTOMS
ABDOMINAL PAIN: 0
NAUSEA: 0
EYE DISCHARGE: 0
SHORTNESS OF BREATH: 1
DIARRHEA: 0
COUGH: 1
RHINORRHEA: 0
EYE REDNESS: 0
VOMITING: 0

## 2023-07-30 ASSESSMENT — LIFESTYLE VARIABLES
HOW OFTEN DO YOU HAVE A DRINK CONTAINING ALCOHOL: NEVER
HOW MANY STANDARD DRINKS CONTAINING ALCOHOL DO YOU HAVE ON A TYPICAL DAY: PATIENT DOES NOT DRINK

## 2023-07-30 NOTE — ED NOTES
6:29 PM  I have evaluated the patient as the Provider in Rapid Medical Evaluation (RME). I have reviewed her vital signs and the triage nurse assessment. I have talked with the patient and any available family and advised that I am the provider in triage and have ordered the appropriate study to initiate their work up based on the clinical presentation during my assessment. I have advised that the patient will be accommodated in the Main ED as soon as possible. I have also requested to contact the triage nurse or myself immediately if the patient experiences any changes in their condition during this brief waiting period. Patient reports shortness of breath due to chronic congestive heart failure. The shortness of breath began last night and has been worsening since patient denies any current chest pain. Patient reports a dry hacking cough that began yesterday. Patient states she has gained 4 pounds in the past 2 days. Patient is followed by cardiologist Dr. William Au. She has been using oxygen chronically at home since her last admission.     RIGO Ling PA-C  07/30/23 6302

## 2023-07-30 NOTE — ED TRIAGE NOTES
TRIAGE NOTE:  Patient arrives via wheelchair accompanied by daughter with c/o shortness breath d/t CHF. Patient reports holds fluids in abdomen usually and reports 4lb gain in 2 days.

## 2023-07-30 NOTE — ED PROVIDER NOTES
examination she is in no acute respiratory distress but does have diminished breath sounds throughout and crackles at the bases. There is trace pedal edema bilaterally. Differential diagnosis includes, but not limited to, CHF exacerbation, pneumonia, ACS, PE, progressive CKD. Labs reviewed, notable for chronic anemia with hemoglobin of 9. No leukocytosis. BUN and creatinine elevated at 38 and 1.51, respectively, this is consistent with baseline. BNP is elevated at 2075 and chest x-ray does show interstitial edema. Giving IV Bumex for diuresis. Given her oxygen requirement and significant dyspnea with exertion will admit for continued diuresis. Amount and/or Complexity of Data Reviewed  Radiology: ordered and independent interpretation performed. Decision-making details documented in ED Course. ECG/medicine tests: ordered and independent interpretation performed. Decision-making details documented in ED Course. Risk  Prescription drug management. Decision regarding hospitalization. CONSULTS:  IP CONSULT TO HOSPITALIST    REASSESSMENT     8:30 PM  Updated patient and daughter regarding results. Discussed plan for admission, they are in agreement with this plan. Perfect Serve Consult for Admission  8:36 PM    ED Room Number: DV38/34  Patient Name and age:  [place] 77 y.o.  female  Working Diagnosis:   1. Acute on chronic congestive heart failure, unspecified heart failure type ([address])    2. Hypoxia    3. Anemia, unspecified type    4.  Chronic kidney disease, unspecified CKD stage        COVID-19 Suspicion: No  Sepsis present:  No  Reassessment needed: No  Code Status:  Full Code  Readmission: No  Isolation Requirements: no  Recommended Level of Care: telemetry  Department: [facility] Adult ED - [phone]  Consulting Provider:     Other:  77 y.o. female with a history of HTN, HLD, CHF on as needed oxygen, atrial fibrillation on Eliquis, CAD, s/p pacemaker, FREDERICK on CPAP presented for

## 2023-07-31 LAB
ALBUMIN SERPL-MCNC: 3.2 G/DL (ref 3.5–5)
ALBUMIN/GLOB SERPL: 0.9 (ref 1.1–2.2)
ALP SERPL-CCNC: 80 U/L (ref 45–117)
ALT SERPL-CCNC: 26 U/L (ref 12–78)
ANION GAP SERPL CALC-SCNC: 2 MMOL/L (ref 5–15)
AST SERPL-CCNC: 26 U/L (ref 15–37)
BASOPHILS # BLD: 0.1 K/UL (ref 0–0.1)
BASOPHILS NFR BLD: 1 % (ref 0–1)
BILIRUB SERPL-MCNC: 0.4 MG/DL (ref 0.2–1)
BUN SERPL-MCNC: 37 MG/DL (ref 6–20)
BUN/CREAT SERPL: 23 (ref 12–20)
CALCIUM SERPL-MCNC: 8.9 MG/DL (ref 8.5–10.1)
CHLORIDE SERPL-SCNC: 110 MMOL/L (ref 97–108)
CO2 SERPL-SCNC: 27 MMOL/L (ref 21–32)
CREAT SERPL-MCNC: 1.62 MG/DL (ref 0.55–1.02)
DIFFERENTIAL METHOD BLD: ABNORMAL
EKG ATRIAL RATE: 60 BPM
EKG DIAGNOSIS: NORMAL
EKG Q-T INTERVAL: 544 MS
EKG QRS DURATION: 162 MS
EKG QTC CALCULATION (BAZETT): 591 MS
EKG R AXIS: 23 DEGREES
EKG T AXIS: 98 DEGREES
EKG VENTRICULAR RATE: 71 BPM
EOSINOPHIL # BLD: 0.7 K/UL (ref 0–0.4)
EOSINOPHIL NFR BLD: 8 % (ref 0–7)
ERYTHROCYTE [DISTWIDTH] IN BLOOD BY AUTOMATED COUNT: 17.2 % (ref 11.5–14.5)
EST. AVERAGE GLUCOSE BLD GHB EST-MCNC: 111 MG/DL
FERRITIN SERPL-MCNC: 205 NG/ML (ref 8–252)
FOLATE SERPL-MCNC: 4.2 NG/ML (ref 5–21)
GLOBULIN SER CALC-MCNC: 3.5 G/DL (ref 2–4)
GLUCOSE BLD STRIP.AUTO-MCNC: 119 MG/DL (ref 65–117)
GLUCOSE BLD STRIP.AUTO-MCNC: 133 MG/DL (ref 65–117)
GLUCOSE BLD STRIP.AUTO-MCNC: 144 MG/DL (ref 65–117)
GLUCOSE BLD STRIP.AUTO-MCNC: 183 MG/DL (ref 65–117)
GLUCOSE SERPL-MCNC: 129 MG/DL (ref 65–100)
HBA1C MFR BLD: 5.5 % (ref 4–5.6)
HCT VFR BLD AUTO: 26.8 % (ref 35–47)
HGB BLD-MCNC: 8.2 G/DL (ref 11.5–16)
IMM GRANULOCYTES # BLD AUTO: 0 K/UL (ref 0–0.04)
IMM GRANULOCYTES NFR BLD AUTO: 1 % (ref 0–0.5)
IRON SATN MFR SERPL: 21 % (ref 20–50)
IRON SERPL-MCNC: 61 UG/DL (ref 35–150)
LYMPHOCYTES # BLD: 1.3 K/UL (ref 0.8–3.5)
LYMPHOCYTES NFR BLD: 16 % (ref 12–49)
MAGNESIUM SERPL-MCNC: 2.2 MG/DL (ref 1.6–2.4)
MCH RBC QN AUTO: 29.7 PG (ref 26–34)
MCHC RBC AUTO-ENTMCNC: 30.6 G/DL (ref 30–36.5)
MCV RBC AUTO: 97.1 FL (ref 80–99)
MONOCYTES # BLD: 0.6 K/UL (ref 0–1)
MONOCYTES NFR BLD: 7 % (ref 5–13)
NEUTS SEG # BLD: 5.9 K/UL (ref 1.8–8)
NEUTS SEG NFR BLD: 67 % (ref 32–75)
NRBC # BLD: 0 K/UL (ref 0–0.01)
NRBC BLD-RTO: 0 PER 100 WBC
PHOSPHATE SERPL-MCNC: 3.9 MG/DL (ref 2.6–4.7)
PLATELET # BLD AUTO: 192 K/UL (ref 150–400)
PMV BLD AUTO: 9.1 FL (ref 8.9–12.9)
POTASSIUM SERPL-SCNC: 3.9 MMOL/L (ref 3.5–5.1)
PROT SERPL-MCNC: 6.7 G/DL (ref 6.4–8.2)
RBC # BLD AUTO: 2.76 M/UL (ref 3.8–5.2)
SERVICE CMNT-IMP: ABNORMAL
SODIUM SERPL-SCNC: 139 MMOL/L (ref 136–145)
TIBC SERPL-MCNC: 286 UG/DL (ref 250–450)
TROPONIN I SERPL HS-MCNC: 16 NG/L (ref 0–51)
TSH SERPL DL<=0.05 MIU/L-ACNC: 3.86 UIU/ML (ref 0.36–3.74)
VIT B12 SERPL-MCNC: 573 PG/ML (ref 193–986)
WBC # BLD AUTO: 8.6 K/UL (ref 3.6–11)

## 2023-07-31 PROCEDURE — 2580000003 HC RX 258: Performed by: INTERNAL MEDICINE

## 2023-07-31 PROCEDURE — 82607 VITAMIN B-12: CPT

## 2023-07-31 PROCEDURE — 6370000000 HC RX 637 (ALT 250 FOR IP): Performed by: NURSE PRACTITIONER

## 2023-07-31 PROCEDURE — 36415 COLL VENOUS BLD VENIPUNCTURE: CPT

## 2023-07-31 PROCEDURE — 84100 ASSAY OF PHOSPHORUS: CPT

## 2023-07-31 PROCEDURE — 2700000000 HC OXYGEN THERAPY PER DAY

## 2023-07-31 PROCEDURE — 84443 ASSAY THYROID STIM HORMONE: CPT

## 2023-07-31 PROCEDURE — 83550 IRON BINDING TEST: CPT

## 2023-07-31 PROCEDURE — 84484 ASSAY OF TROPONIN QUANT: CPT

## 2023-07-31 PROCEDURE — 82746 ASSAY OF FOLIC ACID SERUM: CPT

## 2023-07-31 PROCEDURE — 82962 GLUCOSE BLOOD TEST: CPT

## 2023-07-31 PROCEDURE — 97535 SELF CARE MNGMENT TRAINING: CPT | Performed by: OCCUPATIONAL THERAPIST

## 2023-07-31 PROCEDURE — 6370000000 HC RX 637 (ALT 250 FOR IP): Performed by: INTERNAL MEDICINE

## 2023-07-31 PROCEDURE — 82728 ASSAY OF FERRITIN: CPT

## 2023-07-31 PROCEDURE — 2060000000 HC ICU INTERMEDIATE R&B

## 2023-07-31 PROCEDURE — 83735 ASSAY OF MAGNESIUM: CPT

## 2023-07-31 PROCEDURE — 2500000003 HC RX 250 WO HCPCS: Performed by: INTERNAL MEDICINE

## 2023-07-31 PROCEDURE — 83540 ASSAY OF IRON: CPT

## 2023-07-31 PROCEDURE — 80053 COMPREHEN METABOLIC PANEL: CPT

## 2023-07-31 PROCEDURE — 85025 COMPLETE CBC W/AUTO DIFF WBC: CPT

## 2023-07-31 PROCEDURE — 97165 OT EVAL LOW COMPLEX 30 MIN: CPT | Performed by: OCCUPATIONAL THERAPIST

## 2023-07-31 PROCEDURE — 83036 HEMOGLOBIN GLYCOSYLATED A1C: CPT

## 2023-07-31 RX ORDER — SODIUM CHLORIDE 0.9 % (FLUSH) 0.9 %
5-40 SYRINGE (ML) INJECTION EVERY 12 HOURS SCHEDULED
Status: DISCONTINUED | OUTPATIENT
Start: 2023-07-31 | End: 2023-08-02 | Stop reason: HOSPADM

## 2023-07-31 RX ORDER — SODIUM CHLORIDE 0.9 % (FLUSH) 0.9 %
5-40 SYRINGE (ML) INJECTION PRN
Status: DISCONTINUED | OUTPATIENT
Start: 2023-07-31 | End: 2023-08-02 | Stop reason: HOSPADM

## 2023-07-31 RX ORDER — CYCLOBENZAPRINE HCL 10 MG
10 TABLET ORAL 3 TIMES DAILY PRN
Status: DISCONTINUED | OUTPATIENT
Start: 2023-07-31 | End: 2023-08-02 | Stop reason: HOSPADM

## 2023-07-31 RX ORDER — ACETAMINOPHEN 325 MG/1
650 TABLET ORAL EVERY 6 HOURS PRN
Status: DISCONTINUED | OUTPATIENT
Start: 2023-07-31 | End: 2023-08-02 | Stop reason: HOSPADM

## 2023-07-31 RX ORDER — ONDANSETRON 2 MG/ML
4 INJECTION INTRAMUSCULAR; INTRAVENOUS EVERY 6 HOURS PRN
Status: DISCONTINUED | OUTPATIENT
Start: 2023-07-31 | End: 2023-08-02 | Stop reason: HOSPADM

## 2023-07-31 RX ORDER — SODIUM CHLORIDE 9 MG/ML
INJECTION, SOLUTION INTRAVENOUS PRN
Status: DISCONTINUED | OUTPATIENT
Start: 2023-07-31 | End: 2023-08-02 | Stop reason: HOSPADM

## 2023-07-31 RX ORDER — SODIUM CHLORIDE 0.9 % (FLUSH) 0.9 %
5-40 SYRINGE (ML) INJECTION EVERY 12 HOURS SCHEDULED
OUTPATIENT
Start: 2023-07-31

## 2023-07-31 RX ORDER — ALLOPURINOL 100 MG/1
100 TABLET ORAL DAILY
Status: DISCONTINUED | OUTPATIENT
Start: 2023-07-31 | End: 2023-08-02 | Stop reason: HOSPADM

## 2023-07-31 RX ORDER — GABAPENTIN 100 MG/1
100 CAPSULE ORAL NIGHTLY
Status: DISCONTINUED | OUTPATIENT
Start: 2023-07-31 | End: 2023-08-02 | Stop reason: HOSPADM

## 2023-07-31 RX ORDER — ONDANSETRON 4 MG/1
4 TABLET, ORALLY DISINTEGRATING ORAL EVERY 8 HOURS PRN
Status: DISCONTINUED | OUTPATIENT
Start: 2023-07-31 | End: 2023-08-02 | Stop reason: HOSPADM

## 2023-07-31 RX ORDER — CALCIUM CARBONATE 500 MG/1
500 TABLET, CHEWABLE ORAL 3 TIMES DAILY PRN
Status: DISCONTINUED | OUTPATIENT
Start: 2023-07-31 | End: 2023-08-02 | Stop reason: HOSPADM

## 2023-07-31 RX ORDER — BUMETANIDE 0.25 MG/ML
1 INJECTION INTRAMUSCULAR; INTRAVENOUS 2 TIMES DAILY
Status: DISCONTINUED | OUTPATIENT
Start: 2023-07-31 | End: 2023-07-31

## 2023-07-31 RX ORDER — POLYETHYLENE GLYCOL 3350 17 G/17G
17 POWDER, FOR SOLUTION ORAL DAILY PRN
Status: DISCONTINUED | OUTPATIENT
Start: 2023-07-31 | End: 2023-08-02 | Stop reason: HOSPADM

## 2023-07-31 RX ORDER — POTASSIUM CHLORIDE 750 MG/1
20 TABLET, FILM COATED, EXTENDED RELEASE ORAL 2 TIMES DAILY
Status: DISCONTINUED | OUTPATIENT
Start: 2023-07-31 | End: 2023-08-02

## 2023-07-31 RX ORDER — AMLODIPINE BESYLATE 5 MG/1
10 TABLET ORAL DAILY
Status: DISCONTINUED | OUTPATIENT
Start: 2023-07-31 | End: 2023-08-02 | Stop reason: HOSPADM

## 2023-07-31 RX ORDER — INSULIN LISPRO 100 [IU]/ML
0-8 INJECTION, SOLUTION INTRAVENOUS; SUBCUTANEOUS
Status: DISCONTINUED | OUTPATIENT
Start: 2023-07-31 | End: 2023-08-02 | Stop reason: HOSPADM

## 2023-07-31 RX ORDER — ACETAMINOPHEN 650 MG/1
650 SUPPOSITORY RECTAL EVERY 6 HOURS PRN
Status: DISCONTINUED | OUTPATIENT
Start: 2023-07-31 | End: 2023-08-02 | Stop reason: HOSPADM

## 2023-07-31 RX ORDER — DEXTROSE MONOHYDRATE 100 MG/ML
INJECTION, SOLUTION INTRAVENOUS CONTINUOUS PRN
Status: DISCONTINUED | OUTPATIENT
Start: 2023-07-31 | End: 2023-08-02 | Stop reason: HOSPADM

## 2023-07-31 RX ORDER — BUPROPION HYDROCHLORIDE 150 MG/1
150 TABLET, EXTENDED RELEASE ORAL DAILY
Status: DISCONTINUED | OUTPATIENT
Start: 2023-07-31 | End: 2023-07-31 | Stop reason: SDUPTHER

## 2023-07-31 RX ORDER — INSULIN LISPRO 100 [IU]/ML
0-4 INJECTION, SOLUTION INTRAVENOUS; SUBCUTANEOUS NIGHTLY
Status: DISCONTINUED | OUTPATIENT
Start: 2023-07-31 | End: 2023-08-02 | Stop reason: HOSPADM

## 2023-07-31 RX ORDER — ALBUTEROL SULFATE 90 UG/1
2 AEROSOL, METERED RESPIRATORY (INHALATION) EVERY 4 HOURS PRN
Status: DISCONTINUED | OUTPATIENT
Start: 2023-07-31 | End: 2023-08-02 | Stop reason: HOSPADM

## 2023-07-31 RX ORDER — BUPROPION HYDROCHLORIDE 150 MG/1
150 TABLET, EXTENDED RELEASE ORAL DAILY
Status: DISCONTINUED | OUTPATIENT
Start: 2023-07-31 | End: 2023-08-02 | Stop reason: HOSPADM

## 2023-07-31 RX ORDER — BUMETANIDE 0.25 MG/ML
2 INJECTION INTRAMUSCULAR; INTRAVENOUS 2 TIMES DAILY
Status: DISCONTINUED | OUTPATIENT
Start: 2023-07-31 | End: 2023-08-02

## 2023-07-31 RX ADMIN — POTASSIUM CHLORIDE 20 MEQ: 750 TABLET, FILM COATED, EXTENDED RELEASE ORAL at 21:33

## 2023-07-31 RX ADMIN — POTASSIUM CHLORIDE 20 MEQ: 750 TABLET, FILM COATED, EXTENDED RELEASE ORAL at 08:54

## 2023-07-31 RX ADMIN — BUPROPION HYDROCHLORIDE 150 MG: 150 TABLET, EXTENDED RELEASE ORAL at 08:54

## 2023-07-31 RX ADMIN — SODIUM CHLORIDE, PRESERVATIVE FREE 10 ML: 5 INJECTION INTRAVENOUS at 08:59

## 2023-07-31 RX ADMIN — AMLODIPINE BESYLATE 10 MG: 5 TABLET ORAL at 08:54

## 2023-07-31 RX ADMIN — GABAPENTIN 100 MG: 100 CAPSULE ORAL at 21:33

## 2023-07-31 RX ADMIN — SERTRALINE HYDROCHLORIDE 150 MG: 50 TABLET ORAL at 21:33

## 2023-07-31 RX ADMIN — APIXABAN 5 MG: 5 TABLET, FILM COATED ORAL at 08:54

## 2023-07-31 RX ADMIN — BUMETANIDE 2 MG: 0.25 INJECTION INTRAMUSCULAR; INTRAVENOUS at 08:54

## 2023-07-31 RX ADMIN — ALLOPURINOL 100 MG: 100 TABLET ORAL at 08:54

## 2023-07-31 RX ADMIN — SERTRALINE HYDROCHLORIDE 150 MG: 50 TABLET ORAL at 04:13

## 2023-07-31 RX ADMIN — BUMETANIDE 2 MG: 0.25 INJECTION INTRAMUSCULAR; INTRAVENOUS at 21:33

## 2023-07-31 RX ADMIN — SODIUM CHLORIDE, PRESERVATIVE FREE 10 ML: 5 INJECTION INTRAVENOUS at 21:38

## 2023-07-31 RX ADMIN — CALCIUM CARBONATE (ANTACID) CHEW TAB 500 MG 500 MG: 500 CHEW TAB at 23:02

## 2023-07-31 RX ADMIN — APIXABAN 5 MG: 5 TABLET, FILM COATED ORAL at 21:33

## 2023-07-31 NOTE — ED NOTES
Bedside and Verbal shift change report given to Stephanie (oncoming nurse) by Zelalem Guzman (offgoing nurse). Report included the following information Nurse Handoff Report, Index, ED Encounter Summary, ED SBAR, and Adult Overview.        Jluis Dumont RN  07/30/23 2025

## 2023-07-31 NOTE — NURSE NAVIGATOR
HEART FAILURE NURSE NAVIGATOR NOTE  [facility]    Patient chart was reviewed by Heart Failure (HF) Nurse Navigators for compliance of prescribed treatment with guidelines directed medical therapy (GDMT) and HF database completed. Please, review beneath recommendations for symptomatic patients with HF with Preserved Ejection Fraction ? 50% (HFpEF) for your consideration when taking care of this patient. Current HF Medical Therapy:      Name [place]    1945   LVEF 50/55%   NYHA Functional Class Documentation requested   ARNi/ACEi/ARB    Aldosterone Antagonist    SGLT2 inhibitor    Consulting Cardiologist consulted     Recommendations for HF Management:    For patients with HFpEF ? 50%, consider adding the following GDMT, if appropriate:  SGLT2 inhibitor [Class 2a]  ARNi or ARB [Class 2b]  Aldosterone antagonist [Class 2b]  Adjust antihypertensive therapy [Class 1]  Adjust diuretic dose at discharge if hospitalized for volume overload [Class 1]  For patient with hyperkalemia while on RAASi > 5.5, consider adding potassium binders (patiromer, sodium zirconium cycosilicate) [Class 2a]    Patients with suspected cardiac amyloidosis (older > 61years old with LVH > 1.2cm and/or any other signs of amyloidosis) should be offered screening labs and imaging [Class 1]: (a) serum gammopathy profile and UPEP with immunofixation, and (b) PYP test. If PYP test is positive patient should have genetic testing done for inherited ATTR amyloidosis. If any findings are positive or you need genetic testing ordered, please, consider in-patient consultation or referral to [address] [place] [address]. Note that the following medications may be potentially harmful in heart failure [Class 3].   Calcium channel blockers (doxazosin, diltiazem, verapamil, nifedipine)  Antiarrhythmics (flecanide, disopyrimide, sotalol, dronedarone)  Diabetes medications (thiasolidinediones,

## 2023-07-31 NOTE — ED NOTES
TRANSFER - OUT REPORT:    Verbal report given to DWAYNE Sheets  on [place]  being transferred to [facility] for routine progression of patient care       Report consisted of patient's Situation, Background, Assessment and   Recommendations(SBAR). Information from the following report(s) Nurse Handoff Report, Index, Adult Overview, Intake/Output, MAR, Recent Results, and Cardiac Rhythm Ventricular paced  was reviewed with the receiving nurse. Lines:   Peripheral IV 07/30/23 Right Antecubital (Active)        Opportunity for questions and clarification was provided.       Patient transported with:  Monitor and Registered Nurse           Jannette Alba RN  07/31/23 3537

## 2023-07-31 NOTE — CONSULTS
Cardiology Consult Note    CC: SOB  Reason for consult:  CHF  Requesting MD:  Dr. Milton Boyd     Subjective:      Date of  Admission: 7/30/2023  7:13 PM     Admission type:Emergency    [place] is a 77 y.o. female admitted for Hypoxia [R09.02]  Anemia, unspecified type [D64.9]  Chronic kidney disease, unspecified CKD stage [N18.9]  Acute on chronic HFrEF (heart failure with reduced ejection fraction) ([facility]) [I50.23]  Acute on chronic congestive heart failure, unspecified heart failure type ([address]) [I50.9]. Patient complains of SOB/GEE over three days prior to admission. She was seen by me one month ago with acute on chronic diastolic HF, echo then showing normal EF 55% and moderate MR. Over last week she noted some increased weight and MAITE. Yesterday am she felt SOB and noted gaining weight over 4 pounds and thus presented back to ER. Her BNP is over 2,000. The night before she did feel chest tightness discomfort but no recent exertional chest tightness. Her other past cardiac data include PAF and s/p successful Afib ablation and pacemaker in 5/2022. She has h/o CAD dating back in 1996 but she does not remember the detail.      Patient Active Problem List    Diagnosis Date Noted    Complete heart block ([address]) 05/24/2022    Acute on chronic HFrEF (heart failure with reduced ejection fraction) ([address]) 07/30/2023    CHF (congestive heart failure), NYHA class I, acute on chronic, combined ([address]) 06/21/2023    Osteoarthritis of left knee, unspecified osteoarthritis type 06/05/2023    Insulin long-term use ([address]) 08/24/2022    Stage 3a chronic kidney disease ([address]) 07/08/2022    Senile purpura ([address]) 05/13/2022    Secondary hypercoagulable state ([address]) 01/11/2022    Asthma     Recurrent depression ([address]) 01/09/2018    Type 2 diabetes mellitus with diabetic neuropathy ([address]) 01/09/2018    Encounter for long-term (current) drug use 10/19/2017    Diabetic polyneuropathy associated with type 2 diabetes mellitus ([address]) 07/05/2017

## 2023-07-31 NOTE — H&P
Normal bowel sounds. CNS:  Alert and oriented x3. No gross focal neurological deficit. EXTREMITIES:  Edema 2+. Pulses 2+ bilaterally. MUSCULOSKELETAL SYSTEM:  No obvious joint deformity and swelling. SKIN:  No active skin lesions seen in the exposed part of the body. PSYCHIATRY:  Normal mood and affect. LYMPHATIC SYSTEM:  No cervical lymphadenopathy. DIAGNOSTIC DATA:  The EKG shows pacemaker rhythm and no significant ST and T-waves abnormalities. Chest x-ray shows interstitial edema pattern. LABORATORY DATA:  Hematology; WBC 8.9, hemoglobin 9.0, hematocrit 29.5, and platelets 578. Pro-BNP level 2075. Chemistry; sodium 139, potassium 4.4, chloride 106, CO2 is 26, glucose 170, BUN 38, creatinine 1.51, calcium 9.5, total bilirubin 0.6, ALT 30, AST 32, alkaline phosphatase 88, total protein 7.8, albumin level 3.6, and globulin 4.2. ASSESSMENT:  1. Acute on chronic heart failure with reduced ejection fraction. 2.  Morbid obesity. 3.  Anemia. 4.  Type 2 diabetes. 5.  Hypertension. 6.  Chronic kidney disease, stage III. 7.  Status post pacemaker insertion secondary to complete heart block. 8.  Paroxysmal atrial fibrillation. 9.  Obstructive sleep apnea. PLAN:  1. Acute on chronic heart failure with reduced ejection fraction. We will admit the patient for further evaluation and treatment. We will continue with Bumex. We will check serial cardiac markers to rule out acute myocardial infarction. The patient is already on Eliquis for anticoagulation for atrial fibrillation making pulmonary embolism less likely as a possible cause of the patient's shortness of breath. The patient's Cardiology group will be consulted to assist in further evaluation and treatment. 2.  Morbid obesity. The patient presented with BMI of 42.43. The patient would benefit from lifestyle modification including diet and exercise. 3.  Anemia. This is most likely due to chronic disease.   We will carry out anemia

## 2023-08-01 LAB
ANION GAP SERPL CALC-SCNC: 9 MMOL/L (ref 5–15)
BUN SERPL-MCNC: 35 MG/DL (ref 6–20)
BUN/CREAT SERPL: 22 (ref 12–20)
CALCIUM SERPL-MCNC: 9.6 MG/DL (ref 8.5–10.1)
CHLORIDE SERPL-SCNC: 108 MMOL/L (ref 97–108)
CO2 SERPL-SCNC: 23 MMOL/L (ref 21–32)
CREAT SERPL-MCNC: 1.56 MG/DL (ref 0.55–1.02)
ECHO BSA: 2.41 M2
ERYTHROCYTE [DISTWIDTH] IN BLOOD BY AUTOMATED COUNT: 17.2 % (ref 11.5–14.5)
GLUCOSE BLD STRIP.AUTO-MCNC: 123 MG/DL (ref 65–117)
GLUCOSE BLD STRIP.AUTO-MCNC: 153 MG/DL (ref 65–117)
GLUCOSE BLD STRIP.AUTO-MCNC: 171 MG/DL (ref 65–117)
GLUCOSE BLD STRIP.AUTO-MCNC: 224 MG/DL (ref 65–117)
GLUCOSE SERPL-MCNC: 129 MG/DL (ref 65–100)
HCT VFR BLD AUTO: 28.6 % (ref 35–47)
HGB BLD-MCNC: 8.9 G/DL (ref 11.5–16)
MAGNESIUM SERPL-MCNC: 2.4 MG/DL (ref 1.6–2.4)
MCH RBC QN AUTO: 30.5 PG (ref 26–34)
MCHC RBC AUTO-ENTMCNC: 31.1 G/DL (ref 30–36.5)
MCV RBC AUTO: 97.9 FL (ref 80–99)
NRBC # BLD: 0 K/UL (ref 0–0.01)
NRBC BLD-RTO: 0 PER 100 WBC
PLATELET # BLD AUTO: 223 K/UL (ref 150–400)
PMV BLD AUTO: 9.5 FL (ref 8.9–12.9)
POTASSIUM SERPL-SCNC: 4 MMOL/L (ref 3.5–5.1)
RBC # BLD AUTO: 2.92 M/UL (ref 3.8–5.2)
SERVICE CMNT-IMP: ABNORMAL
SODIUM SERPL-SCNC: 140 MMOL/L (ref 136–145)
WBC # BLD AUTO: 9.1 K/UL (ref 3.6–11)

## 2023-08-01 PROCEDURE — 97110 THERAPEUTIC EXERCISES: CPT

## 2023-08-01 PROCEDURE — C1725 CATH, TRANSLUMIN NON-LASER: HCPCS | Performed by: INTERNAL MEDICINE

## 2023-08-01 PROCEDURE — C1887 CATHETER, GUIDING: HCPCS | Performed by: INTERNAL MEDICINE

## 2023-08-01 PROCEDURE — 6360000004 HC RX CONTRAST MEDICATION: Performed by: INTERNAL MEDICINE

## 2023-08-01 PROCEDURE — 4A023N7 MEASUREMENT OF CARDIAC SAMPLING AND PRESSURE, LEFT HEART, PERCUTANEOUS APPROACH: ICD-10-PCS | Performed by: INTERNAL MEDICINE

## 2023-08-01 PROCEDURE — 2580000003 HC RX 258: Performed by: INTERNAL MEDICINE

## 2023-08-01 PROCEDURE — 82962 GLUCOSE BLOOD TEST: CPT

## 2023-08-01 PROCEDURE — 85027 COMPLETE CBC AUTOMATED: CPT

## 2023-08-01 PROCEDURE — 99153 MOD SED SAME PHYS/QHP EA: CPT | Performed by: INTERNAL MEDICINE

## 2023-08-01 PROCEDURE — C1769 GUIDE WIRE: HCPCS | Performed by: INTERNAL MEDICINE

## 2023-08-01 PROCEDURE — 027034Z DILATION OF CORONARY ARTERY, ONE ARTERY WITH DRUG-ELUTING INTRALUMINAL DEVICE, PERCUTANEOUS APPROACH: ICD-10-PCS | Performed by: INTERNAL MEDICINE

## 2023-08-01 PROCEDURE — 92921 HC PRQ CARDIAC ANGIO ADDL ART: CPT | Performed by: INTERNAL MEDICINE

## 2023-08-01 PROCEDURE — 2060000000 HC ICU INTERMEDIATE R&B

## 2023-08-01 PROCEDURE — 97161 PT EVAL LOW COMPLEX 20 MIN: CPT

## 2023-08-01 PROCEDURE — 97116 GAIT TRAINING THERAPY: CPT

## 2023-08-01 PROCEDURE — C1713 ANCHOR/SCREW BN/BN,TIS/BN: HCPCS | Performed by: INTERNAL MEDICINE

## 2023-08-01 PROCEDURE — C1874 STENT, COATED/COV W/DEL SYS: HCPCS | Performed by: INTERNAL MEDICINE

## 2023-08-01 PROCEDURE — 99152 MOD SED SAME PHYS/QHP 5/>YRS: CPT | Performed by: INTERNAL MEDICINE

## 2023-08-01 PROCEDURE — 2709999900 HC NON-CHARGEABLE SUPPLY: Performed by: INTERNAL MEDICINE

## 2023-08-01 PROCEDURE — 94660 CPAP INITIATION&MGMT: CPT

## 2023-08-01 PROCEDURE — 2700000000 HC OXYGEN THERAPY PER DAY

## 2023-08-01 PROCEDURE — 6360000002 HC RX W HCPCS: Performed by: INTERNAL MEDICINE

## 2023-08-01 PROCEDURE — B2151ZZ FLUOROSCOPY OF LEFT HEART USING LOW OSMOLAR CONTRAST: ICD-10-PCS | Performed by: INTERNAL MEDICINE

## 2023-08-01 PROCEDURE — 2500000003 HC RX 250 WO HCPCS: Performed by: INTERNAL MEDICINE

## 2023-08-01 PROCEDURE — 80048 BASIC METABOLIC PNL TOTAL CA: CPT

## 2023-08-01 PROCEDURE — 83735 ASSAY OF MAGNESIUM: CPT

## 2023-08-01 PROCEDURE — 6370000000 HC RX 637 (ALT 250 FOR IP): Performed by: INTERNAL MEDICINE

## 2023-08-01 PROCEDURE — 93005 ELECTROCARDIOGRAM TRACING: CPT | Performed by: INTERNAL MEDICINE

## 2023-08-01 PROCEDURE — C1894 INTRO/SHEATH, NON-LASER: HCPCS | Performed by: INTERNAL MEDICINE

## 2023-08-01 PROCEDURE — 36415 COLL VENOUS BLD VENIPUNCTURE: CPT

## 2023-08-01 PROCEDURE — 93458 L HRT ARTERY/VENTRICLE ANGIO: CPT | Performed by: INTERNAL MEDICINE

## 2023-08-01 PROCEDURE — C9600 PERC DRUG-EL COR STENT SING: HCPCS | Performed by: INTERNAL MEDICINE

## 2023-08-01 PROCEDURE — B2111ZZ FLUOROSCOPY OF MULTIPLE CORONARY ARTERIES USING LOW OSMOLAR CONTRAST: ICD-10-PCS | Performed by: INTERNAL MEDICINE

## 2023-08-01 DEVICE — STENT ONYXNG30008UX ONYX 3.00X08RX
Type: IMPLANTABLE DEVICE | Status: FUNCTIONAL
Brand: ONYX FRONTIER™

## 2023-08-01 RX ORDER — BIVALIRUDIN 250 MG/5ML
INJECTION, POWDER, LYOPHILIZED, FOR SOLUTION INTRAVENOUS PRN
Status: DISCONTINUED | OUTPATIENT
Start: 2023-08-01 | End: 2023-08-01 | Stop reason: HOSPADM

## 2023-08-01 RX ORDER — CLOPIDOGREL 300 MG/1
TABLET, FILM COATED ORAL PRN
Status: DISCONTINUED | OUTPATIENT
Start: 2023-08-01 | End: 2023-08-01 | Stop reason: HOSPADM

## 2023-08-01 RX ORDER — MIDAZOLAM HYDROCHLORIDE 1 MG/ML
INJECTION INTRAMUSCULAR; INTRAVENOUS PRN
Status: DISCONTINUED | OUTPATIENT
Start: 2023-08-01 | End: 2023-08-01 | Stop reason: HOSPADM

## 2023-08-01 RX ORDER — VERAPAMIL HYDROCHLORIDE 2.5 MG/ML
INJECTION, SOLUTION INTRAVENOUS PRN
Status: DISCONTINUED | OUTPATIENT
Start: 2023-08-01 | End: 2023-08-01 | Stop reason: HOSPADM

## 2023-08-01 RX ORDER — FENTANYL CITRATE 50 UG/ML
INJECTION, SOLUTION INTRAMUSCULAR; INTRAVENOUS PRN
Status: DISCONTINUED | OUTPATIENT
Start: 2023-08-01 | End: 2023-08-01 | Stop reason: HOSPADM

## 2023-08-01 RX ADMIN — BUPROPION HYDROCHLORIDE 150 MG: 150 TABLET, EXTENDED RELEASE ORAL at 09:18

## 2023-08-01 RX ADMIN — AMLODIPINE BESYLATE 10 MG: 5 TABLET ORAL at 09:17

## 2023-08-01 RX ADMIN — SERTRALINE HYDROCHLORIDE 150 MG: 50 TABLET ORAL at 21:30

## 2023-08-01 RX ADMIN — APIXABAN 5 MG: 5 TABLET, FILM COATED ORAL at 09:17

## 2023-08-01 RX ADMIN — POTASSIUM CHLORIDE 20 MEQ: 750 TABLET, FILM COATED, EXTENDED RELEASE ORAL at 21:30

## 2023-08-01 RX ADMIN — SODIUM CHLORIDE, PRESERVATIVE FREE 10 ML: 5 INJECTION INTRAVENOUS at 21:35

## 2023-08-01 RX ADMIN — ALLOPURINOL 100 MG: 100 TABLET ORAL at 09:17

## 2023-08-01 RX ADMIN — APIXABAN 5 MG: 5 TABLET, FILM COATED ORAL at 21:30

## 2023-08-01 RX ADMIN — GABAPENTIN 100 MG: 100 CAPSULE ORAL at 21:30

## 2023-08-01 RX ADMIN — BUMETANIDE 2 MG: 0.25 INJECTION INTRAMUSCULAR; INTRAVENOUS at 09:17

## 2023-08-01 RX ADMIN — SODIUM CHLORIDE, PRESERVATIVE FREE 10 ML: 5 INJECTION INTRAVENOUS at 09:17

## 2023-08-01 RX ADMIN — BUMETANIDE 2 MG: 0.25 INJECTION INTRAMUSCULAR; INTRAVENOUS at 21:30

## 2023-08-01 RX ADMIN — POTASSIUM CHLORIDE 20 MEQ: 750 TABLET, FILM COATED, EXTENDED RELEASE ORAL at 09:17

## 2023-08-01 NOTE — CARE COORDINATION
Care Management Initial Assessment       RUR: 20% - high  Readmission? No  1st IM letter given? Yes - 7/30/23  1st  letter given: No    CM spoke with patient re initial assessment and discharge planning. Patient resides in [facility]. She utilizes a standing rollator and \"regular\" rollator for mobility. Daughter transports patient to all appointments. Patient is agreeable to home health with [facility] at time of discharge. CM will continue to follow. 08/01/23 1129   Service Assessment   Patient Orientation Alert and Oriented   Cognition Alert   History Provided By Patient   Primary [facility]   Patient's Healthcare Decision Maker is: Named in [address] [facility]   PCP Verified by CM Yes   Last Visit to PCP Within last 3 months   Prior Functional Level Assistance with the following:;Mobility   Current Functional Level Assistance with the following:;Mobility   Can patient return to prior living arrangement Yes   Ability to make needs known: Good   Family able to assist with home care needs: No   Would you like for me to discuss the discharge plan with any other family members/significant others, and if so, who? No   Financial Resources Medicare   Community Resources None   Social/Functional History   Lives With Alone   Type of Home Condo   Home Equipment Reacher;Cane;Rollator;Sock aid   [address] Help From Family   ADL Assistance Independent   Homemaking Assistance Needs assistance   Homemaking Responsibilities Yes   Ambulation Assistance Independent   Transfer Assistance Independent   Active  No   Patient's  Info daughter transports patient   Occupation Retired   Discharge Planning   Type of [address] Prior To Admission C-pap;Oxygen [address]   DME Ordered?  No   Potential Assistance Purchasing Medications No   Type of Home Care

## 2023-08-02 VITALS
HEIGHT: 67 IN | RESPIRATION RATE: 17 BRPM | TEMPERATURE: 97.8 F | DIASTOLIC BLOOD PRESSURE: 53 MMHG | WEIGHT: 264.55 LBS | BODY MASS INDEX: 41.52 KG/M2 | OXYGEN SATURATION: 94 % | SYSTOLIC BLOOD PRESSURE: 143 MMHG | HEART RATE: 71 BPM

## 2023-08-02 DIAGNOSIS — Z95.5 STENTED CORONARY ARTERY: Primary | ICD-10-CM

## 2023-08-02 LAB
ANION GAP SERPL CALC-SCNC: 2 MMOL/L (ref 5–15)
BUN SERPL-MCNC: 32 MG/DL (ref 6–20)
BUN/CREAT SERPL: 20 (ref 12–20)
CALCIUM SERPL-MCNC: 9.1 MG/DL (ref 8.5–10.1)
CHLORIDE SERPL-SCNC: 105 MMOL/L (ref 97–108)
CO2 SERPL-SCNC: 27 MMOL/L (ref 21–32)
CREAT SERPL-MCNC: 1.63 MG/DL (ref 0.55–1.02)
EKG ATRIAL RATE: 70 BPM
EKG DIAGNOSIS: NORMAL
EKG Q-T INTERVAL: 494 MS
EKG QRS DURATION: 158 MS
EKG QTC CALCULATION (BAZETT): 533 MS
EKG R AXIS: -31 DEGREES
EKG T AXIS: 162 DEGREES
EKG VENTRICULAR RATE: 70 BPM
ERYTHROCYTE [DISTWIDTH] IN BLOOD BY AUTOMATED COUNT: 17.2 % (ref 11.5–14.5)
GLUCOSE BLD STRIP.AUTO-MCNC: 182 MG/DL (ref 65–117)
GLUCOSE BLD STRIP.AUTO-MCNC: 186 MG/DL (ref 65–117)
GLUCOSE BLD STRIP.AUTO-MCNC: 213 MG/DL (ref 65–117)
GLUCOSE SERPL-MCNC: 166 MG/DL (ref 65–100)
HCT VFR BLD AUTO: 28.2 % (ref 35–47)
HGB BLD-MCNC: 8.9 G/DL (ref 11.5–16)
MAGNESIUM SERPL-MCNC: 2.3 MG/DL (ref 1.6–2.4)
MCH RBC QN AUTO: 30.6 PG (ref 26–34)
MCHC RBC AUTO-ENTMCNC: 31.6 G/DL (ref 30–36.5)
MCV RBC AUTO: 96.9 FL (ref 80–99)
NRBC # BLD: 0 K/UL (ref 0–0.01)
NRBC BLD-RTO: 0 PER 100 WBC
PLATELET # BLD AUTO: 196 K/UL (ref 150–400)
PMV BLD AUTO: 9.3 FL (ref 8.9–12.9)
POTASSIUM SERPL-SCNC: 3.7 MMOL/L (ref 3.5–5.1)
RBC # BLD AUTO: 2.91 M/UL (ref 3.8–5.2)
SERVICE CMNT-IMP: ABNORMAL
SODIUM SERPL-SCNC: 134 MMOL/L (ref 136–145)
WBC # BLD AUTO: 9 K/UL (ref 3.6–11)

## 2023-08-02 PROCEDURE — 2700000000 HC OXYGEN THERAPY PER DAY

## 2023-08-02 PROCEDURE — 80048 BASIC METABOLIC PNL TOTAL CA: CPT

## 2023-08-02 PROCEDURE — 97116 GAIT TRAINING THERAPY: CPT

## 2023-08-02 PROCEDURE — 82962 GLUCOSE BLOOD TEST: CPT

## 2023-08-02 PROCEDURE — 2580000003 HC RX 258: Performed by: INTERNAL MEDICINE

## 2023-08-02 PROCEDURE — 97530 THERAPEUTIC ACTIVITIES: CPT

## 2023-08-02 PROCEDURE — 83735 ASSAY OF MAGNESIUM: CPT

## 2023-08-02 PROCEDURE — 6370000000 HC RX 637 (ALT 250 FOR IP): Performed by: STUDENT IN AN ORGANIZED HEALTH CARE EDUCATION/TRAINING PROGRAM

## 2023-08-02 PROCEDURE — 97110 THERAPEUTIC EXERCISES: CPT

## 2023-08-02 PROCEDURE — 36415 COLL VENOUS BLD VENIPUNCTURE: CPT

## 2023-08-02 PROCEDURE — 97535 SELF CARE MNGMENT TRAINING: CPT

## 2023-08-02 PROCEDURE — 85027 COMPLETE CBC AUTOMATED: CPT

## 2023-08-02 PROCEDURE — 6370000000 HC RX 637 (ALT 250 FOR IP): Performed by: INTERNAL MEDICINE

## 2023-08-02 PROCEDURE — 2500000003 HC RX 250 WO HCPCS: Performed by: INTERNAL MEDICINE

## 2023-08-02 PROCEDURE — 94660 CPAP INITIATION&MGMT: CPT

## 2023-08-02 RX ORDER — CLOPIDOGREL BISULFATE 75 MG/1
75 TABLET ORAL DAILY
Status: DISCONTINUED | OUTPATIENT
Start: 2023-08-02 | End: 2023-08-02 | Stop reason: HOSPADM

## 2023-08-02 RX ORDER — POTASSIUM CHLORIDE 750 MG/1
20 TABLET, FILM COATED, EXTENDED RELEASE ORAL DAILY
Status: DISCONTINUED | OUTPATIENT
Start: 2023-08-03 | End: 2023-08-02 | Stop reason: HOSPADM

## 2023-08-02 RX ORDER — CLOPIDOGREL BISULFATE 75 MG/1
75 TABLET ORAL DAILY
Qty: 30 TABLET | Refills: 0 | Status: SHIPPED | OUTPATIENT
Start: 2023-08-02 | End: 2023-09-01

## 2023-08-02 RX ORDER — BUMETANIDE 1 MG/1
2 TABLET ORAL 2 TIMES DAILY
Status: DISCONTINUED | OUTPATIENT
Start: 2023-08-02 | End: 2023-08-02 | Stop reason: HOSPADM

## 2023-08-02 RX ADMIN — SODIUM CHLORIDE, PRESERVATIVE FREE 10 ML: 5 INJECTION INTRAVENOUS at 08:38

## 2023-08-02 RX ADMIN — POTASSIUM CHLORIDE 20 MEQ: 750 TABLET, FILM COATED, EXTENDED RELEASE ORAL at 08:35

## 2023-08-02 RX ADMIN — APIXABAN 5 MG: 5 TABLET, FILM COATED ORAL at 08:36

## 2023-08-02 RX ADMIN — BUPROPION HYDROCHLORIDE 150 MG: 150 TABLET, EXTENDED RELEASE ORAL at 08:35

## 2023-08-02 RX ADMIN — Medication 2 UNITS: at 17:11

## 2023-08-02 RX ADMIN — BUMETANIDE 2 MG: 0.25 INJECTION INTRAMUSCULAR; INTRAVENOUS at 08:35

## 2023-08-02 RX ADMIN — ALLOPURINOL 100 MG: 100 TABLET ORAL at 08:36

## 2023-08-02 RX ADMIN — AMLODIPINE BESYLATE 10 MG: 5 TABLET ORAL at 08:36

## 2023-08-02 RX ADMIN — CLOPIDOGREL BISULFATE 75 MG: 75 TABLET, FILM COATED ORAL at 15:27

## 2023-08-02 NOTE — DISCHARGE SUMMARY
signs  Chest: Clear to auscultation bilaterally   CVS: S1 S2 heard, Capillary refill less than 2 seconds  Abd: soft/ Non tender, non distended, BS physiological,   Ext: no clubbing, no cyanosis, no edema, brisk 2+ DP pulses  Neuro/Psych: pleasant mood and affect, CN 2-12 grossly intact, sensory grossly within normal limit, Strength 5/5 in all extremities  Skin: warm     CHRONIC MEDICAL DIAGNOSES:  Principal Problem:    Acute on chronic HFrEF (heart failure with reduced ejection fraction) ([facility])  Resolved Problems:    * No resolved hospital problems.  *        Greater than 31 minutes were spent with the patient on counseling and coordination of care    Signed:   Jason Sinha MD  8/2/2023  3:12 PM

## 2023-08-02 NOTE — CARE COORDINATION
Transition of Care Plan:    RUR: 21% - high  Prior Level of Functioning: rollator; baseline oxygen  Disposition: home health  Follow up appointments: PCP; Cardiology  DME needed: none  Transportation at discharge: daughter  IM/MARÍA Medicare/Tracey letter given: 7/31/23  Caregiver Contact: reagan Barry - [phone]   Discharge Caregiver contacted prior to discharge? no  Care Conference needed? no  Barriers to discharge: none    CM notes discharge order placed. Home health referral and order sent to [facility] for review. Provided update that patient will discharge this afternoon. Personal care agency list provided to patient.     Disposition Plan:  Home Health: under review with [facility]  Transportation: Family Phillip Mark, [facility]  Available via [employer] or

## 2023-08-02 NOTE — PLAN OF CARE
1930 Bedside shift change report given to Adolfo Huerta (oncoming nurse) by 3630 Jagdish Garcia (offgoing nurse). Report included the following information Nurse Handoff Report, Adult Overview, Intake/Output, MAR, Recent Results, and Cardiac Rhythm Vpaced . 0730 Bedside shift change report given to Nyasia Oskar (oncoming nurse) by Adolfo Huerta (offgoing nurse). Report included the following information Nurse Handoff Report, Adult Overview, Intake/Output, MAR, Recent Results, and Cardiac Rhythm Vpaced .      Care Plan:  Problem: Discharge Planning  Goal: Discharge to home or other facility with appropriate resources  8/1/2023 0128 by Guillermina Marie RN  Outcome: Progressing  Flowsheets (Taken 7/31/2023 2000)  Discharge to home or other facility with appropriate resources: Identify barriers to discharge with patient and caregiver  7/31/2023 1157 by Donna Silvestre RN  Outcome: Progressing  Problem: Safety - Adult  Goal: Free from fall injury  8/1/2023 0128 by Guillermina Marie RN  Outcome: Progressing  7/31/2023 1157 by Donna Silvestre RN  Outcome: Progressing  Problem: Cardiovascular - Adult  Goal: Maintains optimal cardiac output and hemodynamic stability  8/1/2023 0128 by Guillermina Marie RN  Outcome: Progressing  Flowsheets (Taken 7/31/2023 2000)  Maintains optimal cardiac output and hemodynamic stability: Monitor blood pressure and heart rate  7/31/2023 1157 by Donna Silvestre RN  Outcome: Progressing  Goal: Absence of cardiac dysrhythmias or at baseline  8/1/2023 0128 by Guillermina Marie RN  Outcome: Progressing  [address] (Taken 7/31/2023 2000)  Absence of cardiac dysrhythmias or at baseline: Monitor cardiac rate and rhythm  7/31/2023 1157 by Donna Silvestre RN  Outcome: Progressing  Problem: Metabolic/Fluid and Electrolytes - Adult  Goal: Hemodynamic stability and optimal renal function maintained  8/1/2023 0128 by Guillermina Marie RN  Outcome: Progressing  Flowsheets (Taken 7/31/2023 2000)  Hemodynamic stability and optimal renal
Patient AOx4 and interactive in admission and assessment process including review of safety features, hospital orientation, communication, pain management, medications, clinical orders, history, and expected course. Some GEE noted with ambulation, patient otherwise with no c/o pain or discomfort at this time. Pt on 2L nc (with 1L baseline since her last discharge approximately 4 weeks ago). Noted weight gain by patient prior to hospital admission. Assessment otherwise as charted. Will continue to assess and monitor.     Problem: Discharge Planning  Goal: Discharge to home or other facility with appropriate resources  Outcome: Progressing     Problem: Safety - Adult  Goal: Free from fall injury  Outcome: Progressing     Problem: Cardiovascular - Adult  Goal: Maintains optimal cardiac output and hemodynamic stability  Outcome: Progressing  Goal: Absence of cardiac dysrhythmias or at baseline  Outcome: Progressing     Problem: Metabolic/Fluid and Electrolytes - Adult  Goal: Hemodynamic stability and optimal renal function maintained  Outcome: Progressing     Problem: Hematologic - Adult  Goal: Maintains hematologic stability  Outcome: Progressing
Problem: Occupational Therapy - Adult  Goal: By Discharge: Performs self-care activities at highest level of function for planned discharge setting. See evaluation for individualized goals. Description: FUNCTIONAL STATUS PRIOR TO ADMISSION:  Pt was mod I using cane and rollator for amb and adaptive equipment for ADLs. Pt was recently discharged home on 6/26/23 on 1L O2 per pt and receives [facility] PT for her back pain and mobility issues. Receives Help From: Family, ADL Assistance: Independent, Homemaking Assistance: Needs assistance, Ambulation Assistance: Independent, Transfer Assistance: Independent, Active : No     HOME SUPPORT: Patient lived alone with daughter local to provide assistance. Occupational Therapy Goals:  Initiated 7/31/2023  1. Patient will perform grooming standing at sink with Modified [place] within 7 day(s). 2.  Patient will perform lower body dressing using adaptive equipment with Modified [place] within 7 day(s). 3.  Patient will perform toilet transfers with Modified [place]  within 7 day(s). 4.  Patient will perform all aspects of toileting with Modified [place] within 7 day(s). 5.  Patient will participate in upper extremity therapeutic exercise/activities with [place] for 10 minutes within 7 day(s). 6.  Patient will utilize energy conservation techniques during functional activities with verbal and visual cues within 7 day(s).    Outcome: Progressing     OCCUPATIONAL THERAPY EVALUATION    Patient: [place] (77 y.o. female)  Date: 7/31/2023  Primary Diagnosis: Hypoxia [R09.02]  Anemia, unspecified type [D64.9]  Chronic kidney disease, unspecified CKD stage [N18.9]  Acute on chronic HFrEF (heart failure with reduced ejection fraction) ([facility]) [I50.23]  Acute on chronic congestive heart failure, unspecified heart failure type ([address]) [I50.9]         Precautions: Fall Risk                  ASSESSMENT :  The patient is limited by decreased
Problem: Occupational Therapy - Adult  Goal: By Discharge: Performs self-care activities at highest level of function for planned discharge setting. See evaluation for individualized goals. Description: FUNCTIONAL STATUS PRIOR TO ADMISSION:  Pt was mod I using cane and rollator for amb and adaptive equipment for ADLs. Pt was recently discharged home on 6/26/23 on 1L O2 per pt and receives [facility] PT for her back pain and mobility issues. Receives Help From: Family, ADL Assistance: Independent, Homemaking Assistance: Needs assistance, Ambulation Assistance: Independent, Transfer Assistance: Independent, Active : No     HOME SUPPORT: Patient lived alone with daughter local to provide assistance. Occupational Therapy Goals:  Initiated 7/31/2023  1. Patient will perform grooming standing at sink with Modified [place] within 7 day(s). 2.  Patient will perform lower body dressing using adaptive equipment with Modified [place] within 7 day(s). 3.  Patient will perform toilet transfers with Modified [place]  within 7 day(s). 4.  Patient will perform all aspects of toileting with Modified [place] within 7 day(s). 5.  Patient will participate in upper extremity therapeutic exercise/activities with [place] for 10 minutes within 7 day(s). 6.  Patient will utilize energy conservation techniques during functional activities with verbal and visual cues within 7 day(s).    Outcome: Progressing   OCCUPATIONAL THERAPY TREATMENT  Patient: [place] (77 y.o. female)  Date: 8/1/2023  Primary Diagnosis: Hypoxia [R09.02]  Anemia, unspecified type [D64.9]  Chronic kidney disease, unspecified CKD stage [N18.9]  Acute on chronic HFrEF (heart failure with reduced ejection fraction) ([facility]) [I50.23]  Acute on chronic congestive heart failure, unspecified heart failure type ([address] ) [I50.9]  Procedure(s) (LRB):  Left heart cath / coronary angiography (N/A) Day of Surgery   Precautions: Fall Risk
Problem: Occupational Therapy - Adult  Goal: By Discharge: Performs self-care activities at highest level of function for planned discharge setting. See evaluation for individualized goals. Description: FUNCTIONAL STATUS PRIOR TO ADMISSION:  Pt was mod I using cane and rollator for amb and adaptive equipment for ADLs. Pt was recently discharged home on 6/26/23 on 1L O2 per pt and receives [facility] PT for her back pain and mobility issues. Receives Help From: Family, ADL Assistance: Independent, Homemaking Assistance: Needs assistance, Ambulation Assistance: Independent, Transfer Assistance: Independent, Active : No     HOME SUPPORT: Patient lived alone with daughter local to provide assistance. Occupational Therapy Goals:  Initiated 7/31/2023  1. Patient will perform grooming standing at sink with Modified [place] within 7 day(s). 2.  Patient will perform lower body dressing using adaptive equipment with Modified [place] within 7 day(s). 3.  Patient will perform toilet transfers with Modified [place]  within 7 day(s). 4.  Patient will perform all aspects of toileting with Modified [place] within 7 day(s). 5.  Patient will participate in upper extremity therapeutic exercise/activities with [place] for 10 minutes within 7 day(s). 6.  Patient will utilize energy conservation techniques during functional activities with verbal and visual cues within 7 day(s).    Outcome: Progressing   OCCUPATIONAL THERAPY TREATMENT  Patient: [place] (77 y.o. female)  Date: 8/2/2023  Primary Diagnosis: Hypoxia [R09.02]  Anemia, unspecified type [D64.9]  Chronic kidney disease, unspecified CKD stage [N18.9]  Acute on chronic HFrEF (heart failure with reduced ejection fraction) ([facility]) [I50.23]  Acute on chronic congestive heart failure, unspecified heart failure type ([address] ) [I50.9]  Procedure(s) (LRB):  Left heart cath / coronary angiography (N/A)  Percutaneous coronary intervention (N/A) 1
Problem: Physical Therapy - Adult  Goal: By Discharge: Performs mobility at highest level of function for planned discharge setting. See evaluation for individualized goals. Description: FUNCTIONAL STATUS PRIOR TO ADMISSION: Patient was modified independent using a rollator for functional mobility. HOME SUPPORT PRIOR TO ADMISSION: The patient lived alone with daughter to provide assistance as needed. Physical Therapy Goals  Initiated 8/1/2023  1. Patient will move from supine to sit and sit to supine, scoot up and down, and roll side to side in bed with independence within 7 day(s). 2.  Patient will perform sit to stand with modified independence within 7 day(s). 3.  Patient will transfer from bed to chair and chair to bed with modified independence using the least restrictive device within 7 day(s). 4.  Patient will ambulate with modified independence for 150 feet with the least restrictive device within 7 day(s). 8/2/2023 1526 by Cristian Baumann PT  Outcome: Progressing     PHYSICAL THERAPY TREATMENT    Patient: Alfredo Macias (77 y.o. female)  Date: 8/2/2023  Diagnosis: Hypoxia [R09.02]  Anemia, unspecified type [D64.9]  Chronic kidney disease, unspecified CKD stage [N18.9]  Acute on chronic HFrEF (heart failure with reduced ejection fraction) ([facility]) [I50.23]  Acute on chronic congestive heart failure, unspecified heart failure type ([address]) [I50.9] Acute on chronic HFrEF (heart failure with reduced ejection fraction) ([facility])  Procedure(s) (LRB):  Left heart cath / coronary angiography (N/A)  Percutaneous coronary intervention (N/A) 1 Day Post-Op  Precautions: Fall Risk                    ASSESSMENT:  Patient continues to benefit from skilled PT services and is progressing towards goals. Pt ambulated x 60 feet with RW today, but did desaturate to 85% on RA. Pt was able to maintain SPO2 88% and > with 1 L O2.  Pt reports relief from previous GEE prior to admission, and reports she has no concerns
vascular access sites hourly  3. Every 4-6 hours minimum:  Change oxygen saturation probe site  4. Every 4-6 hours:  If on nasal continuous positive airway pressure, respiratory therapy assess nares and determine need for appliance change or resting period. Outcome: Progressing  Problem: Physical Therapy - Adult  Goal: By Discharge: Performs mobility at highest level of function for planned discharge setting. See evaluation for individualized goals. Description: FUNCTIONAL STATUS PRIOR TO ADMISSION: Patient was modified independent using a rollator for functional mobility. HOME SUPPORT PRIOR TO ADMISSION: The patient lived alone with daughter to provide assistance as needed. Physical Therapy Goals  Initiated 8/1/2023  1. Patient will move from supine to sit and sit to supine, scoot up and down, and roll side to side in bed with independence within 7 day(s). 2.  Patient will perform sit to stand with modified independence within 7 day(s). 3.  Patient will transfer from bed to chair and chair to bed with modified independence using the least restrictive device within 7 day(s). 4.  Patient will ambulate with modified independence for 150 feet with the least restrictive device within 7 day(s).    8/1/2023 1414 by Steffen Andrade PT  Outcome: Progressing
Phys Ther. 2021 4;101(4):mwfc481. doi: 10.1093/ptj/umes968. PMID: 75851071. [address] [facility],[address], [place] MOTTA, Martinez Turcios, Kuldip AMADOR. Activity Measure for Post-Acute Care \"6-Clicks\" Basic Mobility Scores Predict Discharge Destination After Acute Care Hospitalization in Select Patient Groups: A Retrospective, Observational Study. Arch Rehabil Res Clin Transl. 2022 16;4(3):156754. doi: 10.1016/j.arrct. 7945.001249. PMID: 09860607; PMCID: [id number]. 4. Duncan Hopper, Lindsey W, Ashley KITCHEN. AM-PAC Short Forms Manual 4.0. Revised 2020. Pain Ratin/10   Pain Intervention(s):   Pt reports no pain at this time     Activity Tolerance:   Fair  and requires rest breaks  On 2 L o2 via NC    After treatment:   Pt left on transport stretcher with RN aware and transport staff present     COMMUNICATION/EDUCATION:   The patient's plan of care was discussed with: occupational therapist and registered nurse    Patient Education  Education Given To: Patient; Family  Education Provided: Role of Therapy;Plan of Care; Fall Prevention Strategies  Education Method: Verbal  Barriers to Learning: None  Education Outcome: Verbalized understanding;Continued education needed    Thank you for this referral.  Mathilda Sacks, PT  Minutes: 10      Physical Therapy Evaluation Charge Determination   History Examination Presentation Decision-Making   LOW Complexity : Zero comorbidities / personal factors that will impact the outcome / POC LOW Complexity : 1-2 Standardized tests and measures addressing body structure, function, activity limitation and / or participation in recreation  LOW Complexity : Stable, uncomplicated  AM-PAC  LOW    Based on the above components, the patient evaluation is determined to be of the

## 2023-08-02 NOTE — NURSE NAVIGATOR
Heart Failure Nurse Navigator rounds completed. HF NN provided introduction to self and nurse navigator role. Living With Heart Failure booklet given to patient, along with weight calendar, and magnet. Introduced patient to Preparing for Successful Discharge - Heart Failure check list and explained that knowledge of these topics will help facilitate successful self management upon discharge. Patient shared with HF NN that she had her cath yesterday and got one stent. Patient states that Dr. Kwan Conrad told her she would need to stay in the hospital a few more days, which she is not too happy about. Patient reports that she developed increased shortness of breath with minimal activity over the weekend, along with her weight creeping up and fullness feeling in her abdomen. Patient states she called her daughter and asked her to bring her to [facility] as she recognized these as signs of worsening heart failure. HF NN gave patient reassurance that she did indeed recognize the warning signs and did the right thing by seeking medical attention. Discussed importance of daily weights, which patient is already doing. Advised that if she notices gain of 2-3 lbs overnight or 5 lbs in a week she could call Dr. Fitzpatrick Hawkins office (providing it is within weekday office hours). Patient reports that she is aware of this and came to the ED as it was the weekend and she knew that she needed IV diuretic at that point. Patient states she tries to follow low sodium diet. Encouraged patient to read labels and try to limit salt/sodium intake to 1500 -2000 mg per day. Patient expressed concern about the oxygen she was discharged with prior admission. She states she does not want oxygen, as it is a bit cumbersome. However, she does state that she will be on oxygen if Dr. Kwan Conrad says she needs it. Of note, patient is lying comfortably in bed on room air. O2 sats on monitor 92-93% while conversing.   HF NN advised patient that PT

## 2023-08-02 NOTE — CARE COORDINATION
MELLO call received from [facility], RN informed patient is out of O2 portable has 3 empty tanks . Requesting assistance for name of O2 provider for tanks. CM able to locate [address] [facility] [phone]. Spoke w/ patient informed has been using O2 since 6/25. Attempted to reach daughter Ceferino Flor VM left . Ms. Loly Obrien states she lives short distance from hospital to home [address] [place] [address] and no steps involved per patient   \" I will be safe that distance. \" This writer to communicate w/ Nursing and informed it would be MD to determine. Call placed to [facility]  spoke w/ Rolo informed of the need for portable O2 and discharged this evening. This writer uncertain of return call back. 1720 Call Received from Leny Cuelalr informed patient can tolerate ride home by daughter home. Patient Asmita Quinonez will need to contact Provider Maria E Laughlin and request new tanks 8/3/23.      1800 Call received from on call  Cedrick Flores informed patient/family will need to call Provider in the morning at 0830 (opens for operation ) [phone]. Unable to input in system after hours. [address] Call placed to Ms. Loly Obrien requested patient/ daughter to call in the morning. Patient in agreement .

## 2023-08-03 ENCOUNTER — HOME HEALTH ADMISSION (OUTPATIENT)
Dept: HOME HEALTH SERVICES | Facility: HOME HEALTH | Age: 78
End: 2023-08-03
Payer: MEDICARE

## 2023-08-03 NOTE — NURSE NAVIGATOR
HF NN called [facility] and scheduled follow up appointment with Dr. Iza Garcia on 8/8/23 at 10:45 AM at the [facility] office location. This information was relayed to DAVION Cortes and requested patient be notified of this scheduled follow up.

## 2023-08-05 ENCOUNTER — HOME CARE VISIT (OUTPATIENT)
Facility: HOME HEALTH | Age: 78
End: 2023-08-05

## 2023-08-05 PROCEDURE — 0221000100 HH NO PAY CLAIM PROCEDURE

## 2023-08-05 PROCEDURE — G0299 HHS/HOSPICE OF RN EA 15 MIN: HCPCS

## 2023-08-06 VITALS
RESPIRATION RATE: 18 BRPM | SYSTOLIC BLOOD PRESSURE: 124 MMHG | DIASTOLIC BLOOD PRESSURE: 64 MMHG | TEMPERATURE: 98.6 F | OXYGEN SATURATION: 96 % | HEART RATE: 80 BPM

## 2023-08-06 ASSESSMENT — ENCOUNTER SYMPTOMS
DYSPNEA ACTIVITY LEVEL: AT REST
PAIN LOCATION - PAIN QUALITY: ACHY

## 2023-08-07 ENCOUNTER — HOME CARE VISIT (OUTPATIENT)
Facility: HOME HEALTH | Age: 78
End: 2023-08-07

## 2023-08-07 VITALS
TEMPERATURE: 98.3 F | SYSTOLIC BLOOD PRESSURE: 118 MMHG | DIASTOLIC BLOOD PRESSURE: 68 MMHG | HEART RATE: 63 BPM | OXYGEN SATURATION: 93 %

## 2023-08-07 PROCEDURE — G0152 HHCP-SERV OF OT,EA 15 MIN: HCPCS

## 2023-08-07 PROCEDURE — G0299 HHS/HOSPICE OF RN EA 15 MIN: HCPCS

## 2023-08-07 NOTE — HOME HEALTH
home.  Patient verbalized agreement with discharge planning. Clinical Assessment (What this means for the patient overall and need for ongoing skilled care):   Patient presents ambulating in home with rollator with difficulty in managing oxygen tubing and limited activity tolerance with reports of back and knee pain with mobility tasks. She reports being able to perform basic self care needs although would benefit from additional DME in tub/shower for energy conservation and decreased fall risk. Patient hopes to wean from use of oxygen at least during day hours for ease of activity and return to community tasks. Recommend HHOT to follow 2w3 toward stated goals to advance self care independence and safety, proficiency in IADLs using oxygen prn and to build activity tolerance for standing tasks. Patient receptive to [address] [place] [address],[address] service and reports she does not think she will be a candidate for cardiac rehab due to her knee and back pain which would limit her ability to participate in cardiovascular exercise. Written Teaching Material Utilized:  lizzeth    Specific plan for next visit:  IADL training, standing tolerance, safe use of oxygen tubing  Plan of care and admission to home health status called to attending physician.      Interdisciplinary communication with:   lizzeth  PCP:   Dr. Johnny Ortiz  Next scheduled doctor appointment:   Dr. Iza Garcia 8.8.23, Dr. Johnny Ortiz 8.14.23, Dr. Janae Carreon 5.22.65

## 2023-08-08 ENCOUNTER — HOME CARE VISIT (OUTPATIENT)
Facility: HOME HEALTH | Age: 78
End: 2023-08-08

## 2023-08-08 VITALS
DIASTOLIC BLOOD PRESSURE: 70 MMHG | SYSTOLIC BLOOD PRESSURE: 120 MMHG | OXYGEN SATURATION: 99 % | HEART RATE: 70 BPM | TEMPERATURE: 98.2 F | RESPIRATION RATE: 16 BRPM

## 2023-08-08 VITALS
DIASTOLIC BLOOD PRESSURE: 72 MMHG | RESPIRATION RATE: 18 BRPM | SYSTOLIC BLOOD PRESSURE: 138 MMHG | TEMPERATURE: 98.1 F | HEART RATE: 87 BPM | OXYGEN SATURATION: 94 %

## 2023-08-08 PROCEDURE — G0151 HHCP-SERV OF PT,EA 15 MIN: HCPCS

## 2023-08-08 ASSESSMENT — ENCOUNTER SYMPTOMS
DYSPNEA ACTIVITY LEVEL: AFTER AMBULATING 10 - 20 FT
PAIN LOCATION - PAIN QUALITY: ACHY
CONSTIPATION: 1
STOOL DESCRIPTION: FORMED

## 2023-08-08 NOTE — HOME HEALTH
Subjective: Patient states she is doing better than she has been. Falls since last visit No(if yes complete the Fall Tracking Form and include bsrifallreport):   Caregiver involvement changes: N/a  Home health supplies by type and quantity ordered/delivered this visit include: N/A    Clinician asked if patient has had any physician contact since last home care visit and patient states: NO  Clinician asked if patient has any new or changed medications and patient states:  N/A   If Yes, were medications reconciled? N/A   Was the certifying physician notified of changes in medications? N/A no    Clinical assessment (what this visit means for the patient overall and need for ongoing skilled care) and progress or lack of progress towards SPECIFIC goals: Patient is progressing. No swelling in lower extremities-patient states she always retains fluid in her abdomen. Is on 2-3Lpm O2 most of the time. Uses Sp02 meter effectively. Has adequate transportation to and from MD appointments. Is at risk for falls and rehospitalization/CHF ex. Educated on s/s of CHF ex. and when to notify HHA and MD. Patient able to repeat these back with 100% accuracy. Written Teaching Material Utilized: N/A    Interdisciplinary communication with: N/A    Discharge planning as follows:  Will discharge when the patient has reached their maximum functional potential and maximum safety in their home    Specific plan for next visit: Educate on CHF disease process

## 2023-08-09 ENCOUNTER — HOME CARE VISIT (OUTPATIENT)
Dept: HOME HEALTH SERVICES | Facility: HOME HEALTH | Age: 78
End: 2023-08-09

## 2023-08-10 ENCOUNTER — HOME CARE VISIT (OUTPATIENT)
Facility: HOME HEALTH | Age: 78
End: 2023-08-10

## 2023-08-10 VITALS
SYSTOLIC BLOOD PRESSURE: 138 MMHG | TEMPERATURE: 97.8 F | HEART RATE: 75 BPM | OXYGEN SATURATION: 94 % | DIASTOLIC BLOOD PRESSURE: 66 MMHG | BODY MASS INDEX: 40.81 KG/M2 | RESPIRATION RATE: 16 BRPM | WEIGHT: 260.6 LBS

## 2023-08-10 VITALS
DIASTOLIC BLOOD PRESSURE: 70 MMHG | SYSTOLIC BLOOD PRESSURE: 120 MMHG | TEMPERATURE: 97.8 F | OXYGEN SATURATION: 95 % | HEART RATE: 71 BPM | RESPIRATION RATE: 16 BRPM

## 2023-08-10 PROCEDURE — G0151 HHCP-SERV OF PT,EA 15 MIN: HCPCS

## 2023-08-10 PROCEDURE — G0300 HHS/HOSPICE OF LPN EA 15 MIN: HCPCS

## 2023-08-10 ASSESSMENT — ENCOUNTER SYMPTOMS
CONSTIPATION: 1
PAIN LOCATION - PAIN QUALITY: ACHY
DYSPNEA ACTIVITY LEVEL: AFTER AMBULATING MORE THAN 20 FT

## 2023-08-10 NOTE — HOME HEALTH
Subjective: \"My knees are complaining a lot. \"  Falls since last visit No(if yes complete the Fall Tracking Form and include bsrifallreport):   Caregiver involvement changes: No  Home health supplies by type and quantity ordered/delivered this visit include: n/a    Clinician asked if patient has had any physician contact since last home care visit and patient states: YES  Clinician asked if patient has any new or changed medications and patient states:  YES Trelegy Ellipta inhaler 1 puff daily  If Yes, were medications reconciled? YES   Was the certifying physician notified of changes in medications? NO MD ordered    Clinical assessment (what this visit means for the patient overall and need for ongoing skilled care) and progress or lack of progress towards SPECIFIC goals: Pt at risk for rehospitalization r/t fall risk, hyper/hypoglycemia, CHF exacerbation. Written Teaching Material Utilized: N/A    Interdisciplinary communication with: N/A for the purpose of n/a    Discharge planning as follows:  When goals are met    Specific plan for next visit: Assessment, education as needed

## 2023-08-11 ENCOUNTER — HOME CARE VISIT (OUTPATIENT)
Facility: HOME HEALTH | Age: 78
End: 2023-08-11

## 2023-08-11 PROCEDURE — G0152 HHCP-SERV OF OT,EA 15 MIN: HCPCS

## 2023-08-11 NOTE — HOME HEALTH
Subjective: I am feeling good and ready to do the exercises. I Falls since last visit (if yes include. bsrifallreport): None reported by the patient/caregiver. Caregiver involvement: Yes- patients daughter will be available as needed, was not present today. Home health supplies by type and quantity ordered/delivered this visit include none today. Does the patient have any new or changed medications? No  If Yes, were medications reconciled? no.  Was the certifying physician notified of changes in medications? na.  Clinical assessment (what this visit means for the patient overall and need for ongoing skilled care): PT interventions including  Gait training, High risk med education and safety education-Patient was needed constant cues while ambulating including for heel to toe gait/not pushing the walker too far forward and not steeping on to the oxygen cord. Patient will continue to need more PT to improve her functions so that the patient can transfer with less possible assistance from the caregiver. Progress or lack of progress toward specific goals: Patient demonstrated no progress today and need more sessions at this time to meet the goals  Inter disciplinary communication: none  Discharge planning: DC when met all the goals/DC when home PT is no longer appropriate/ DC when patient is no longer home bound. Specific plan for next visit: BLE resisted exercises, gait training and transfer training    - so that the patient can ambulate/ transfer with less possible assistance from the caregiver.

## 2023-08-12 VITALS
OXYGEN SATURATION: 94 % | TEMPERATURE: 98.5 F | HEART RATE: 71 BPM | SYSTOLIC BLOOD PRESSURE: 120 MMHG | DIASTOLIC BLOOD PRESSURE: 76 MMHG

## 2023-08-12 NOTE — HOME HEALTH
Subjective:   \"I think this will help in the shower. \"  Falls since last visit :  none reported  Caregiver involvement changes:   na  Home health supplies by type and quantity ordered/delivered this visit include:   na    Clinician asked if patient has had any physician contact since last home care visit and patient states: YES  Clinician asked if patient has any new or changed medications and patient states:  YES   If Yes, were medications reconciled? YES   Was the certifying physician notified of changes in medications? N/A     Clinical assessment (what this visit means for the patient overall and need for ongoing skilled care) and progress or lack of progress towards SPECIFIC goals:   Patient presents this visit with saddened affect following cardiology and respiratory MD visits this week with oxygen increased to 3l by Dr. Fátima Benson and visit with pulmonology. Patient reports they are concerned regarding her respiratory status with some type of scan to be ordered and followup respiratory tests to be completed. Focused on energy conservation this visit including recommendation for continuous use of oxygen and use of tub seat for shower bathing. Patient states plan to order small portable oxygen tanks for easier access using rollator where she can place them in rollator basket. Patient to continue to benefit from skilled OT services 2x week toward goals and to provide support as she learns more about her medical status.     Written Teaching Material Utilized:  lizzeth    Interdisciplinary communication with:   case communication to [facility] team regarding med changes  Discharge planning as follows:   continue 2x week  Specific plan for next visit:   IADL training at walker level

## 2023-08-14 ENCOUNTER — HOME CARE VISIT (OUTPATIENT)
Facility: HOME HEALTH | Age: 78
End: 2023-08-14

## 2023-08-14 VITALS
DIASTOLIC BLOOD PRESSURE: 78 MMHG | TEMPERATURE: 98.4 F | HEART RATE: 74 BPM | OXYGEN SATURATION: 97 % | SYSTOLIC BLOOD PRESSURE: 120 MMHG

## 2023-08-14 PROCEDURE — G0152 HHCP-SERV OF OT,EA 15 MIN: HCPCS

## 2023-08-15 ENCOUNTER — HOME CARE VISIT (OUTPATIENT)
Facility: HOME HEALTH | Age: 78
End: 2023-08-15

## 2023-08-15 ENCOUNTER — HOME CARE VISIT (OUTPATIENT)
Dept: HOME HEALTH SERVICES | Facility: HOME HEALTH | Age: 78
End: 2023-08-15

## 2023-08-15 VITALS
HEART RATE: 77 BPM | OXYGEN SATURATION: 99 % | DIASTOLIC BLOOD PRESSURE: 65 MMHG | SYSTOLIC BLOOD PRESSURE: 140 MMHG | RESPIRATION RATE: 16 BRPM | TEMPERATURE: 98.3 F

## 2023-08-15 PROCEDURE — G0151 HHCP-SERV OF PT,EA 15 MIN: HCPCS

## 2023-08-15 ASSESSMENT — ENCOUNTER SYMPTOMS: PAIN LOCATION - PAIN QUALITY: ACHE, THROB

## 2023-08-15 NOTE — HOME HEALTH
Subjective: Patient states that she had a doctor's visit this morning so she is more tired. Falls since last visit No(if yes complete the Fall Tracking Form and include bsrifallreport):   Caregiver involvement changes: no change  Home health supplies by type and quantity ordered/delivered this visit include: n/a    Clinician asked if patient has had any physician contact since last home care visit and patient states: YES  Clinician asked if patient has any new or changed medications and patient states:  YES changed bumex 2mg, Dr. Nette Mead (nephrologist) increased dose to 1.5 tablets twice a day  If Yes, were medications reconciled? YES    Was the certifying physician notified of changes in medications? YES      Clinical assessment (what this visit means for the patient overall and need for ongoing skilled care) and progress or lack of progress towards SPECIFIC goals: patient had very poor activity tolerance today, and oxygen levels desaturated with minimal exertion. After ambulating 15 ft her oxygen saturation drops to 85%. She saw her nephrologist today who observed this, and increased her Bumex because of this. Patient demonstrates very poor judgment in regards to bending and reaching for things, and frequently reaches outside of her base of support without any hand support, as well as leaves her Rollator, and walks away from it. Further education and training on activity, modifications and safety is needed. Written Teaching Material Utilized: N/A    Interdisciplinary communication with: Dr. Lacey Bates OT, Tari Metzger LPN, and Mary Oneil PT for the purpose of POC collaboration    Discharge planning as follows:  Will discharge when the patient has reached their maximum functional potential and maximum safety in their home    Specific plan for next visit: progress, gait training and reinforce strategies for decreasing risk of falling

## 2023-08-15 NOTE — HOME HEALTH
Subjective:   \"I didn't even go to see Dr. Colby Carty today bc my back was hurting so much. \"  Falls since last visit :  no  Caregiver involvement changes:   na  Home health supplies by type and quantity ordered/delivered this visit include:   lizzeth    Clinician asked if patient has had any physician contact since last home care visit and patient states: NO  Clinician asked if patient has any new or changed medications and patient states:  NO   If Yes, were medications reconciled? N/A   Was the certifying physician notified of changes in medications? N/A     Clinical assessment (what this visit means for the patient overall and need for ongoing skilled care) and progress or lack of progress towards SPECIFIC goals:   Patient presents with limited ability to participate in physical tasks this visit due to increased back pain, causing patient to cancel PCP visit this am.  Focus this visit on EC techniques and providing patient with ideas for resting back and how to incorporate techniques into daily task performance. OT unable to locate patient's pulse ox this visit as she stated she had lost it and emphasized need for patient to utilize oxygen at all times. Patient expecting small portable tanks to be delivered to home this week. Patient to continue to benefit from skilled OT intervention toward stated goals.         Written Teaching Material Utilized:   lizzeth    Interdisciplinary communication with:   lizzeth    Discharge planning as follows:  continue 2x week, goals remain appropriate  Specific plan for next visit:   IADL training for access to laundry room

## 2023-08-17 ENCOUNTER — APPOINTMENT (OUTPATIENT)
Facility: HOSPITAL | Age: 78
DRG: 286 | End: 2023-08-17
Payer: MEDICARE

## 2023-08-17 ENCOUNTER — HOME CARE VISIT (OUTPATIENT)
Dept: HOME HEALTH SERVICES | Facility: HOME HEALTH | Age: 78
End: 2023-08-17

## 2023-08-17 LAB
ALBUMIN SERPL-MCNC: 3.7 G/DL (ref 3.5–5)
ALBUMIN/GLOB SERPL: 1 (ref 1.1–2.2)
ALP SERPL-CCNC: 91 U/L (ref 45–117)
ALT SERPL-CCNC: 20 U/L (ref 12–78)
ANION GAP SERPL CALC-SCNC: 8 MMOL/L (ref 5–15)
AST SERPL-CCNC: 21 U/L (ref 15–37)
BASOPHILS # BLD: 0.1 K/UL (ref 0–0.1)
BASOPHILS NFR BLD: 1 % (ref 0–1)
BILIRUB SERPL-MCNC: 0.8 MG/DL (ref 0.2–1)
BUN SERPL-MCNC: 35 MG/DL (ref 6–20)
BUN/CREAT SERPL: 25 (ref 12–20)
CALCIUM SERPL-MCNC: 9.1 MG/DL (ref 8.5–10.1)
CHLORIDE SERPL-SCNC: 106 MMOL/L (ref 97–108)
CO2 SERPL-SCNC: 26 MMOL/L (ref 21–32)
CREAT SERPL-MCNC: 1.42 MG/DL (ref 0.55–1.02)
DIFFERENTIAL METHOD BLD: ABNORMAL
EKG DIAGNOSIS: NORMAL
EKG Q-T INTERVAL: 476 MS
EKG QRS DURATION: 164 MS
EKG QTC CALCULATION (BAZETT): 528 MS
EKG R AXIS: -13 DEGREES
EKG T AXIS: 214 DEGREES
EKG VENTRICULAR RATE: 74 BPM
EOSINOPHIL # BLD: 0.4 K/UL (ref 0–0.4)
EOSINOPHIL NFR BLD: 4 % (ref 0–7)
ERYTHROCYTE [DISTWIDTH] IN BLOOD BY AUTOMATED COUNT: 17 % (ref 11.5–14.5)
GLOBULIN SER CALC-MCNC: 3.6 G/DL (ref 2–4)
GLUCOSE SERPL-MCNC: 153 MG/DL (ref 65–100)
HCT VFR BLD AUTO: 29.4 % (ref 35–47)
HGB BLD-MCNC: 9.1 G/DL (ref 11.5–16)
IMM GRANULOCYTES # BLD AUTO: 0 K/UL (ref 0–0.04)
IMM GRANULOCYTES NFR BLD AUTO: 0 % (ref 0–0.5)
LYMPHOCYTES # BLD: 1 K/UL (ref 0.8–3.5)
LYMPHOCYTES NFR BLD: 10 % (ref 12–49)
MAGNESIUM SERPL-MCNC: 2.5 MG/DL (ref 1.6–2.4)
MCH RBC QN AUTO: 29.9 PG (ref 26–34)
MCHC RBC AUTO-ENTMCNC: 31 G/DL (ref 30–36.5)
MCV RBC AUTO: 96.7 FL (ref 80–99)
MONOCYTES # BLD: 0.6 K/UL (ref 0–1)
MONOCYTES NFR BLD: 6 % (ref 5–13)
NEUTS SEG # BLD: 7.9 K/UL (ref 1.8–8)
NEUTS SEG NFR BLD: 79 % (ref 32–75)
NRBC # BLD: 0 K/UL (ref 0–0.01)
NRBC BLD-RTO: 0 PER 100 WBC
NT PRO BNP: 4601 PG/ML
PLATELET # BLD AUTO: 241 K/UL (ref 150–400)
PMV BLD AUTO: 9.4 FL (ref 8.9–12.9)
POTASSIUM SERPL-SCNC: 3.6 MMOL/L (ref 3.5–5.1)
PROT SERPL-MCNC: 7.3 G/DL (ref 6.4–8.2)
RBC # BLD AUTO: 3.04 M/UL (ref 3.8–5.2)
SODIUM SERPL-SCNC: 140 MMOL/L (ref 136–145)
TROPONIN I SERPL HS-MCNC: 36 NG/L (ref 0–51)
WBC # BLD AUTO: 10.1 K/UL (ref 3.6–11)

## 2023-08-17 PROCEDURE — 71046 X-RAY EXAM CHEST 2 VIEWS: CPT

## 2023-08-17 PROCEDURE — 83735 ASSAY OF MAGNESIUM: CPT

## 2023-08-17 PROCEDURE — 93005 ELECTROCARDIOGRAM TRACING: CPT | Performed by: STUDENT IN AN ORGANIZED HEALTH CARE EDUCATION/TRAINING PROGRAM

## 2023-08-17 PROCEDURE — 80053 COMPREHEN METABOLIC PANEL: CPT

## 2023-08-17 PROCEDURE — 85025 COMPLETE CBC W/AUTO DIFF WBC: CPT

## 2023-08-17 PROCEDURE — 36415 COLL VENOUS BLD VENIPUNCTURE: CPT

## 2023-08-17 PROCEDURE — 99285 EMERGENCY DEPT VISIT HI MDM: CPT

## 2023-08-17 PROCEDURE — 84484 ASSAY OF TROPONIN QUANT: CPT

## 2023-08-17 PROCEDURE — 83880 ASSAY OF NATRIURETIC PEPTIDE: CPT

## 2023-08-17 ASSESSMENT — PAIN - FUNCTIONAL ASSESSMENT: PAIN_FUNCTIONAL_ASSESSMENT: NONE - DENIES PAIN

## 2023-08-17 NOTE — ED TRIAGE NOTES
Pt arrived via EMS c/o SOB since yesterday at 4p. Pt had a cardiac stent placed last week. Pt is on Plavix. Pt denies CP. Pt is on 4L NC at baseline.

## 2023-08-17 NOTE — CASE COMMUNICATION
Patient called therapist this morning to cancel her PT appointment stating she wasn't doing well and was going to be admitting herself to the hospital today and would not be home. PT called [place] this afternoon to touch base with her because she did not see any documentation in the system that she was at a hospital. She is still at home. She is short of breath with talking to therapist on the phone and unable to make complete senten yanelis. She states that her oxygen is dropping to 75% when she walks to the bathroom. PT instructed her to call 911. She said that she would rather wait and have her daughter take her to the hospital and when  asked if she has a wheelchair, she said she does not. PT explained to her that if she could not walk to the bathroom, there is no way she would able to Walk the distance to get out of her apartment building to a car and that her ox ygen would drop dangerously low. She then agreed to call 911. PT called Dr. Sheldon Shell office and left a message.

## 2023-08-17 NOTE — HOME HEALTH
Patient called therapist this morning to cancel her PT appointment stating she wasn't doing well and was going to be admitting herself to the hospital today and would not be home. PT called [place] this afternoon to touch base with her because she did not see any documentation in the system that she was at a hospital. She is still at home. She is short of breath with talking to therapist on the phone and unable to make complete sentences. She states that her oxygen is dropping to 75% when she walks to the bathroom. PT instructed her to call 911. She said that she would rather wait and have her daughter take her to the hospital and when  asked if she has a wheelchair, she said she does not. PT explained to her that if she could not walk to the bathroom, there is no way she would able to Walk the distance to get out of her apartment building to a car and that her oxygen would drop dangerously low. She then agreed to call 911. PT called Dr. Madhav Baumann office and left a message.
Attending with

## 2023-08-18 ENCOUNTER — HOSPITAL ENCOUNTER (INPATIENT)
Facility: HOSPITAL | Age: 78
LOS: 4 days | Discharge: HOME HEALTH | DRG: 286 | End: 2023-08-22
Attending: EMERGENCY MEDICINE | Admitting: STUDENT IN AN ORGANIZED HEALTH CARE EDUCATION/TRAINING PROGRAM
Payer: MEDICARE

## 2023-08-18 ENCOUNTER — HOME CARE VISIT (OUTPATIENT)
Facility: HOME HEALTH | Age: 78
End: 2023-08-18

## 2023-08-18 DIAGNOSIS — I50.9 ACUTE ON CHRONIC CONGESTIVE HEART FAILURE, UNSPECIFIED HEART FAILURE TYPE (HCC): Primary | ICD-10-CM

## 2023-08-18 DIAGNOSIS — I50.23 ACUTE ON CHRONIC HFREF (HEART FAILURE WITH REDUCED EJECTION FRACTION) (HCC): ICD-10-CM

## 2023-08-18 DIAGNOSIS — R09.02 HYPOXIA: ICD-10-CM

## 2023-08-18 DIAGNOSIS — I50.9 CONGESTIVE HEART FAILURE, UNSPECIFIED HF CHRONICITY, UNSPECIFIED HEART FAILURE TYPE (HCC): ICD-10-CM

## 2023-08-18 LAB
GLUCOSE BLD STRIP.AUTO-MCNC: 143 MG/DL (ref 65–117)
GLUCOSE BLD STRIP.AUTO-MCNC: 164 MG/DL (ref 65–117)
GLUCOSE BLD STRIP.AUTO-MCNC: 170 MG/DL (ref 65–117)
GLUCOSE BLD STRIP.AUTO-MCNC: 174 MG/DL (ref 65–117)
GLUCOSE BLD STRIP.AUTO-MCNC: 188 MG/DL (ref 65–117)
SERVICE CMNT-IMP: ABNORMAL

## 2023-08-18 PROCEDURE — 97165 OT EVAL LOW COMPLEX 30 MIN: CPT

## 2023-08-18 PROCEDURE — 2500000003 HC RX 250 WO HCPCS: Performed by: EMERGENCY MEDICINE

## 2023-08-18 PROCEDURE — 6370000000 HC RX 637 (ALT 250 FOR IP): Performed by: STUDENT IN AN ORGANIZED HEALTH CARE EDUCATION/TRAINING PROGRAM

## 2023-08-18 PROCEDURE — 96374 THER/PROPH/DIAG INJ IV PUSH: CPT

## 2023-08-18 PROCEDURE — 99222 1ST HOSP IP/OBS MODERATE 55: CPT | Performed by: CLINICAL NURSE SPECIALIST

## 2023-08-18 PROCEDURE — 6370000000 HC RX 637 (ALT 250 FOR IP): Performed by: CLINICAL NURSE SPECIALIST

## 2023-08-18 PROCEDURE — 97161 PT EVAL LOW COMPLEX 20 MIN: CPT

## 2023-08-18 PROCEDURE — 97530 THERAPEUTIC ACTIVITIES: CPT

## 2023-08-18 PROCEDURE — 97535 SELF CARE MNGMENT TRAINING: CPT

## 2023-08-18 PROCEDURE — 2580000003 HC RX 258: Performed by: STUDENT IN AN ORGANIZED HEALTH CARE EDUCATION/TRAINING PROGRAM

## 2023-08-18 PROCEDURE — 2060000000 HC ICU INTERMEDIATE R&B

## 2023-08-18 PROCEDURE — 2700000000 HC OXYGEN THERAPY PER DAY

## 2023-08-18 PROCEDURE — 6360000002 HC RX W HCPCS: Performed by: STUDENT IN AN ORGANIZED HEALTH CARE EDUCATION/TRAINING PROGRAM

## 2023-08-18 PROCEDURE — 2500000003 HC RX 250 WO HCPCS: Performed by: STUDENT IN AN ORGANIZED HEALTH CARE EDUCATION/TRAINING PROGRAM

## 2023-08-18 PROCEDURE — 82962 GLUCOSE BLOOD TEST: CPT

## 2023-08-18 PROCEDURE — 94640 AIRWAY INHALATION TREATMENT: CPT

## 2023-08-18 RX ORDER — BUPROPION HYDROCHLORIDE 150 MG/1
150 TABLET, EXTENDED RELEASE ORAL DAILY
Status: DISCONTINUED | OUTPATIENT
Start: 2023-08-18 | End: 2023-08-22 | Stop reason: HOSPADM

## 2023-08-18 RX ORDER — ACETAMINOPHEN 325 MG/1
650 TABLET ORAL EVERY 6 HOURS PRN
Status: DISCONTINUED | OUTPATIENT
Start: 2023-08-18 | End: 2023-08-22 | Stop reason: HOSPADM

## 2023-08-18 RX ORDER — AMLODIPINE BESYLATE 5 MG/1
10 TABLET ORAL DAILY
Status: DISCONTINUED | OUTPATIENT
Start: 2023-08-18 | End: 2023-08-22 | Stop reason: HOSPADM

## 2023-08-18 RX ORDER — INSULIN LISPRO 100 [IU]/ML
0-4 INJECTION, SOLUTION INTRAVENOUS; SUBCUTANEOUS
Status: DISCONTINUED | OUTPATIENT
Start: 2023-08-18 | End: 2023-08-22 | Stop reason: HOSPADM

## 2023-08-18 RX ORDER — INSULIN LISPRO 100 [IU]/ML
0-4 INJECTION, SOLUTION INTRAVENOUS; SUBCUTANEOUS NIGHTLY
Status: DISCONTINUED | OUTPATIENT
Start: 2023-08-18 | End: 2023-08-22 | Stop reason: HOSPADM

## 2023-08-18 RX ORDER — SODIUM CHLORIDE 0.9 % (FLUSH) 0.9 %
5-40 SYRINGE (ML) INJECTION PRN
Status: DISCONTINUED | OUTPATIENT
Start: 2023-08-18 | End: 2023-08-22 | Stop reason: HOSPADM

## 2023-08-18 RX ORDER — POLYETHYLENE GLYCOL 3350 17 G/17G
17 POWDER, FOR SOLUTION ORAL DAILY PRN
Status: DISCONTINUED | OUTPATIENT
Start: 2023-08-18 | End: 2023-08-22 | Stop reason: HOSPADM

## 2023-08-18 RX ORDER — CLOPIDOGREL BISULFATE 75 MG/1
75 TABLET ORAL DAILY
Status: DISCONTINUED | OUTPATIENT
Start: 2023-08-18 | End: 2023-08-22 | Stop reason: HOSPADM

## 2023-08-18 RX ORDER — GABAPENTIN 100 MG/1
100 CAPSULE ORAL NIGHTLY
Status: DISCONTINUED | OUTPATIENT
Start: 2023-08-18 | End: 2023-08-22 | Stop reason: HOSPADM

## 2023-08-18 RX ORDER — TRAMADOL HYDROCHLORIDE 50 MG/1
50 TABLET ORAL EVERY 8 HOURS PRN
Status: DISCONTINUED | OUTPATIENT
Start: 2023-08-18 | End: 2023-08-22 | Stop reason: HOSPADM

## 2023-08-18 RX ORDER — SODIUM CHLORIDE 0.9 % (FLUSH) 0.9 %
5-40 SYRINGE (ML) INJECTION EVERY 12 HOURS SCHEDULED
Status: DISCONTINUED | OUTPATIENT
Start: 2023-08-18 | End: 2023-08-22 | Stop reason: HOSPADM

## 2023-08-18 RX ORDER — ACETAMINOPHEN 650 MG/1
650 SUPPOSITORY RECTAL EVERY 6 HOURS PRN
Status: DISCONTINUED | OUTPATIENT
Start: 2023-08-18 | End: 2023-08-22 | Stop reason: HOSPADM

## 2023-08-18 RX ORDER — BUMETANIDE 0.25 MG/ML
2 INJECTION INTRAMUSCULAR; INTRAVENOUS ONCE
Status: COMPLETED | OUTPATIENT
Start: 2023-08-18 | End: 2023-08-18

## 2023-08-18 RX ORDER — ALLOPURINOL 100 MG/1
200 TABLET ORAL DAILY
Status: DISCONTINUED | OUTPATIENT
Start: 2023-08-18 | End: 2023-08-22 | Stop reason: HOSPADM

## 2023-08-18 RX ORDER — ONDANSETRON 2 MG/ML
4 INJECTION INTRAMUSCULAR; INTRAVENOUS EVERY 6 HOURS PRN
Status: DISCONTINUED | OUTPATIENT
Start: 2023-08-18 | End: 2023-08-22 | Stop reason: HOSPADM

## 2023-08-18 RX ORDER — BUDESONIDE 0.5 MG/2ML
0.5 INHALANT ORAL
Status: DISCONTINUED | OUTPATIENT
Start: 2023-08-18 | End: 2023-08-19

## 2023-08-18 RX ORDER — DEXTROSE MONOHYDRATE 100 MG/ML
INJECTION, SOLUTION INTRAVENOUS CONTINUOUS PRN
Status: DISCONTINUED | OUTPATIENT
Start: 2023-08-18 | End: 2023-08-22 | Stop reason: HOSPADM

## 2023-08-18 RX ORDER — SODIUM CHLORIDE 9 MG/ML
INJECTION, SOLUTION INTRAVENOUS PRN
Status: DISCONTINUED | OUTPATIENT
Start: 2023-08-18 | End: 2023-08-22 | Stop reason: HOSPADM

## 2023-08-18 RX ORDER — ONDANSETRON 4 MG/1
4 TABLET, ORALLY DISINTEGRATING ORAL EVERY 8 HOURS PRN
Status: DISCONTINUED | OUTPATIENT
Start: 2023-08-18 | End: 2023-08-22 | Stop reason: HOSPADM

## 2023-08-18 RX ORDER — INSULIN GLARGINE 100 [IU]/ML
20 INJECTION, SOLUTION SUBCUTANEOUS DAILY
Status: DISCONTINUED | OUTPATIENT
Start: 2023-08-18 | End: 2023-08-22 | Stop reason: HOSPADM

## 2023-08-18 RX ORDER — BUMETANIDE 0.25 MG/ML
2 INJECTION INTRAMUSCULAR; INTRAVENOUS 2 TIMES DAILY
Status: DISCONTINUED | OUTPATIENT
Start: 2023-08-18 | End: 2023-08-22 | Stop reason: HOSPADM

## 2023-08-18 RX ORDER — ARFORMOTEROL TARTRATE 15 UG/2ML
15 SOLUTION RESPIRATORY (INHALATION)
Status: DISCONTINUED | OUTPATIENT
Start: 2023-08-18 | End: 2023-08-19

## 2023-08-18 RX ORDER — DEXTROSE MONOHYDRATE 100 MG/ML
INJECTION, SOLUTION INTRAVENOUS CONTINUOUS PRN
Status: DISCONTINUED | OUTPATIENT
Start: 2023-08-18 | End: 2023-08-18 | Stop reason: SDUPTHER

## 2023-08-18 RX ADMIN — BUMETANIDE 2 MG: 0.25 INJECTION INTRAMUSCULAR; INTRAVENOUS at 21:24

## 2023-08-18 RX ADMIN — BUPROPION HYDROCHLORIDE 150 MG: 150 TABLET, EXTENDED RELEASE ORAL at 09:13

## 2023-08-18 RX ADMIN — CLOPIDOGREL BISULFATE 75 MG: 75 TABLET, FILM COATED ORAL at 09:13

## 2023-08-18 RX ADMIN — IPRATROPIUM BROMIDE 0.5 MG: 0.5 SOLUTION RESPIRATORY (INHALATION) at 16:25

## 2023-08-18 RX ADMIN — AMLODIPINE BESYLATE 10 MG: 5 TABLET ORAL at 11:54

## 2023-08-18 RX ADMIN — ARFORMOTEROL TARTRATE 15 MCG: 15 SOLUTION RESPIRATORY (INHALATION) at 22:35

## 2023-08-18 RX ADMIN — ALLOPURINOL 200 MG: 100 TABLET ORAL at 09:12

## 2023-08-18 RX ADMIN — BUDESONIDE 500 MCG: 0.5 INHALANT RESPIRATORY (INHALATION) at 07:57

## 2023-08-18 RX ADMIN — SODIUM CHLORIDE, PRESERVATIVE FREE 10 ML: 5 INJECTION INTRAVENOUS at 21:24

## 2023-08-18 RX ADMIN — BUMETANIDE 2 MG: 0.25 INJECTION INTRAMUSCULAR; INTRAVENOUS at 14:37

## 2023-08-18 RX ADMIN — ARFORMOTEROL TARTRATE 15 MCG: 15 SOLUTION RESPIRATORY (INHALATION) at 07:57

## 2023-08-18 RX ADMIN — BUDESONIDE 500 MCG: 0.5 INHALANT RESPIRATORY (INHALATION) at 22:35

## 2023-08-18 RX ADMIN — IPRATROPIUM BROMIDE 0.5 MG: 0.5 SOLUTION RESPIRATORY (INHALATION) at 07:57

## 2023-08-18 RX ADMIN — SERTRALINE HYDROCHLORIDE 150 MG: 50 TABLET ORAL at 21:23

## 2023-08-18 RX ADMIN — GABAPENTIN 100 MG: 100 CAPSULE ORAL at 21:24

## 2023-08-18 RX ADMIN — APIXABAN 5 MG: 5 TABLET, FILM COATED ORAL at 21:24

## 2023-08-18 RX ADMIN — INSULIN GLARGINE 20 UNITS: 100 INJECTION, SOLUTION SUBCUTANEOUS at 11:54

## 2023-08-18 RX ADMIN — IPRATROPIUM BROMIDE 0.5 MG: 0.5 SOLUTION RESPIRATORY (INHALATION) at 22:36

## 2023-08-18 RX ADMIN — IPRATROPIUM BROMIDE 0.5 MG: 0.5 SOLUTION RESPIRATORY (INHALATION) at 11:09

## 2023-08-18 RX ADMIN — APIXABAN 5 MG: 5 TABLET, FILM COATED ORAL at 09:13

## 2023-08-18 RX ADMIN — BUMETANIDE 2 MG: 0.25 INJECTION INTRAMUSCULAR; INTRAVENOUS at 01:28

## 2023-08-18 ASSESSMENT — PAIN - FUNCTIONAL ASSESSMENT: PAIN_FUNCTIONAL_ASSESSMENT: NONE - DENIES PAIN

## 2023-08-18 ASSESSMENT — ENCOUNTER SYMPTOMS
ABDOMINAL DISTENTION: 1
SHORTNESS OF BREATH: 1
COUGH: 1

## 2023-08-18 NOTE — H&P
History & Physical    Primary Care Provider: Flores Merida MD  Source of Information: Patient AND CHART REVIEW    History of Presenting Illness:   [place] is a 77 y.o. female with past medical history significant for congestive heart failure, type 2 diabetes, chronic kidney disease, hypertension, paroxysmal atrial fibrillation; on Eliquis for anticoagulation, status post pacemaker insertion secondary to complete heart block who presented at the emergency room with shortness of breath. The patient denies any fever, chills, chest or abdominal pain, nausea, vomiting, cough, congestion, recent illness, palpitations, or dysuria. Remarkable vitals on ER Presentation: Vital signs stable  Labs Remarkable for: BNP 4601, creatinine 1.14, hemoglobin 9.1  ER Images: Chest x-ray showed interstitial edema and small right pleural effusion  ER Rx: Bumex 2 mg IV     Review of Systems:  Pertinent items are noted in the History of Present Illness.      Past Medical History:   Diagnosis Date    Arthritis 1990    Asthma     Atrial fibrillation ([address])     CAD (coronary artery disease) 1996    Cancer ([address]) 2001    UTERINE    Congestive heart failure ([address]) 2020    Pacemaker    Depression     DM (diabetes mellitus) ([address])     Heart failure ([address]) 2015    HTN (hypertension)     Hyperlipidemia     Morbid obesity (HCC)     Neuropathy     FREDERICK on CPAP       Past Surgical History:   Procedure Laterality Date    APPENDECTOMY      BACK SURGERY      KYPHOPLASTY    BARIATRIC SURGERY  2011    LAP BAND    CARDIAC PROCEDURE N/A 8/1/2023    Left heart cath / coronary angiography performed by Brenda Nava MD at [facility] CATH LAB    CARDIAC PROCEDURE N/A 8/1/2023    Percutaneous coronary intervention performed by Brenda Nava MD at [facility] CATH LAB    CATARACT REMOVAL Bilateral 2019    CERVICAL FUSION      CHOLECYSTECTOMY      COLONOSCOPY N/A 07/15/2020    COLONOSCOPY :- performed by Eloisa Hunter MD at [facility] ENDOSCOPY

## 2023-08-18 NOTE — PLAN OF CARE
Problem: Physical Therapy - Adult  Goal: By Discharge: Performs mobility at highest level of function for planned discharge setting. See evaluation for individualized goals. Description: FUNCTIONAL STATUS PRIOR TO ADMISSION:  Patient was ambulatory using a rollator. She is on 3L O2 during the day, 4L w/ CPAP at night    HOME SUPPORT: Patient lived alone with daughter/family to provide assistance as needed. Pt reports use of [employer] for assistance with grocery shopping/transportation to appointments. Physical Therapy Goals  Initiated 8/18/2023  1. Patient will move from supine to sit and sit to supine in bed with modified independence within 7 day(s). 2.  Patient will perform sit to stand with modified independence within 7 day(s). 3.  Patient will transfer from bed to chair and chair to bed with modified independence using the least restrictive device within 7 day(s). 4.  Patient will ambulate with modified independence for 150 feet with the least restrictive device within 7 day(s). Outcome: Progressing     PHYSICAL THERAPY EVALUATION    Patient: [place] (77 y.o. female)  Date: 8/18/2023  Primary Diagnosis: CHF (congestive heart failure), NYHA class I, acute on chronic, combined ([address]) [I50.43]       Precautions: Fall Risk                    ASSESSMENT :   The patient presents with impaired functional independence s/p admission with CHF. Currently her functional mobility is limited by decreased activity tolerance, increased SOB, increased O2 requirement, and generalized weakness. At baseline pt is independent ambulating with a rollator walker, on 3L NCO2 during the day, 4L through CPAP at night and lives alone. Received pt in the ER on 4L NCO2. She is generally CGA transitioning positions and walking 20 ft with a RW. Distance walked is limited by increased SOB and fatigue. Gait with the walker is steady.   O2 sat >90's on 3L throughout this session today however pt is SOB

## 2023-08-18 NOTE — ED NOTES
TRANSFER - IN REPORT:    Verbal report received from [address] on [place]  being received from [facility] for routine progression of patient care      Report consisted of patient's Situation, Background, Assessment and   Recommendations(SBAR). Information from the following report(s) Nurse Handoff Report, ED Encounter Summary, ED SBAR, Adult Overview, Intake/Output, MAR, and Recent Results was reviewed with the receiving nurse. Opportunity for questions and clarification was provided. Assessment completed upon patient's arrival to unit and care assumed.        Roxy Waite RN  08/18/23 8373

## 2023-08-18 NOTE — ED NOTES
While patient was using the restroom patient's O2 tank fell onto her right 4th toe. Patient now has a small abrasion on that toe with minimal bleeding. Nurse taking over care is aware. Will safe care the incident.       Thayne Fleischer, RN  08/18/23 8085

## 2023-08-18 NOTE — ED NOTES
Bedside and Verbal shift change report given to [facility] (oncoming nurse) by Kim Bourgeois (offgoing nurse). Report included the following information Nurse Handoff Report, ED Encounter Summary, ED SBAR, MAR, Cardiac Rhythm  , and Neuro Assessment.        Connie Ballard RN  08/18/23 7910

## 2023-08-18 NOTE — PLAN OF CARE
Problem: Occupational Therapy - Adult  Goal: By Discharge: Performs self-care activities at highest level of function for planned discharge setting. See evaluation for individualized goals. Description: FUNCTIONAL STATUS PRIOR TO ADMISSION:  Patient was ambulatory using a rollator. HOME SUPPORT: Patient lived alone with daughter/family to provide assistance as needed. Pt reports use of [employer] for assistance with grocery shopping/transportation to appointments. Occupational Therapy Goals:  Initiated 8/18/2023  1. Patient will perform standing grooming routine with Modified [place] within 7 day(s). 2.  Patient will perform upper and lower body bathing with Modified [place] within 7 day(s). 3.  Patient will perform upper and lower body dressing, using AE PRN, with Modified [place] within 7 day(s). 4.  Patient will perform toilet transfers with Modified [place]  within 7 day(s). 5.  Patient will perform all aspects of toileting with Modified [place] within 7 day(s). 6.  Patient will participate in upper extremity therapeutic exercise/activities with Supervision for 5 minutes within 7 day(s). 7.  Patient will utilize energy conservation techniques during functional activities with verbal cues within 7 day(s). Outcome: Progressing   OCCUPATIONAL THERAPY EVALUATION    Patient: Isabelle Lay (77 y.o. female)  Date: 8/18/2023  Primary Diagnosis: CHF (congestive heart failure), NYHA class I, acute on chronic, combined ([address]) [I50.43]         Precautions: Fall Risk                  ASSESSMENT :  The patient's performance of ADL/IADL tasks is limited at this time by impaired standing balance, activity tolerance, cardiopulmonary endurance, generalized weakness, BLE edema, and impaired LB reach/access s/p admission from home for SOB. Pt received supine on ED stretcher and eager to participate in therapy evaluation.  Of note, pt found with NC disconnected from wall

## 2023-08-18 NOTE — CONSULTS
[facility]  DIABETES MANAGEMENT CONSULT    Consulted by Provider for advanced nursing evaluation and care for inpatient blood glucose management. Evaluation and Action Plan   [place] is a 77 y.o. female living with controlled T2D- current A1C 5.5% as of July 31st 2023. Admitted for acute on chronic HFpEF with persistent SOB and elevated NT pro BNP at admission. Had a recent hospital admission for same 7/30-8/2. Diuresis therapy in place. BG trend data limited - fasting  today. Patient verbalized her diabetes self mgmt practices - takes Novolin N 10 units twice daily/ and regular insulin via correctional scale at lunch time when she eats her main meal of the day. Monitors BG via [address] with reader- When asked if she's had lows recently she verbalized 'yes\" but could not give me a number. A1C denotes she is having some lows at home. PO intake limited to one lunchtime meal/day and a dinner snack (apple with PB) at dinner. Given current A1C and patient's fasting BG will conservatively dose basal/correctional insulin as per below. Current PO intake not sufficient enough to add bolus insulin.  Will utilize Lantus insulin for basal vs. NPH as this is safer and has reduced hypoglycemia risk vs. NPH    Management Rationale Action Plan   Medication   Basal needs Using 0.2 units/kg/D based on BMI/ A1C    Nutritional needs NA If PO intake improves over weekend and BG >180 pre meals, ADD low dose lispro 4 units TID    Corrective insulin Using medium sensitivity based on T2D ACHS   Additional orders  Modified diet to include carb consistent 45gm        Diabetes Discharge Plan   Medication  Given current A1C and verbalized lows PTA, would consider Lantus once daily at discharge vs. Novoloin N (NPH) -cost /affordability may be why she is on Novolin N   Referral  [x]        Outpatient diabetes education-needs diet education and support    Additional
Rt worse than Lt  CV: irregular rate and rhythm. PMI not palpated. Normal S1,S2  No gallop or rubs appreciated. No murmur apprciated. Intact carotid upstroke bilaterally without appreciated bruits. Abdominal aorta not palpated; no abdominal bruit noted. Normal femoral pulses without bruits. Intact pedal pulses. 1+ peripheral edema. GI: No abd mass noted, soft; no organomegaly noted. Bowel sounds present. Muscular:  No significant kyphosis. Strength WNL for age. Ext: No cyanosis, clubbing, or stigmata of peripheral embolization. Derm: No ulcers or stasis dermatitis of lower extremities. Neuro: Alert and oriented x 3;  Grossly non-focal. Normal mood and affect.            Cardiographics    Telemetry: probable underlying Afib and V paced [id number]  ECG:  atrial fibrillation, rate 70s with V paced beats  Echocardiogram: not done    Labs:   Recent Results (from the past 24 hour(s))   EKG 12 Lead    Collection Time: 08/17/23  5:42 PM   Result Value Ref Range    Ventricular Rate 74 BPM    QRS Duration 164 ms    Q-T Interval 476 ms    QTc Calculation (Bazett) 528 ms    R Axis -13 degrees    T Axis 214 degrees    Diagnosis       Ventricular-paced rhythm  Confirmed by George Rodriguez M.D., Helena Bennett ([id number]) on 8/17/2023 6:38:09 PM     Brain Natriuretic Peptide    Collection Time: 08/17/23  5:47 PM   Result Value Ref Range    NT Pro-BNP 4,601 (H) <450 PG/ML   CBC with Auto Differential    Collection Time: 08/17/23  5:47 PM   Result Value Ref Range    WBC 10.1 3.6 - 11.0 K/uL    RBC 3.04 (L) 3.80 - 5.20 M/uL    Hemoglobin 9.1 (L) 11.5 - 16.0 g/dL    Hematocrit 29.4 (L) 35.0 - 47.0 %    MCV 96.7 80.0 - 99.0 FL    MCH 29.9 26.0 - 34.0 PG    MCHC 31.0 30.0 - 36.5 g/dL    RDW 17.0 (H) 11.5 - 14.5 %    Platelets 050 960 - 852 K/uL    MPV 9.4 8.9 - 12.9 FL    Nucleated RBCs 0.0 0  WBC    nRBC 0.00 0.00 - 0.01 K/uL    Neutrophils % 79 (H) 32 - 75 %    Lymphocytes % 10 (L) 12 - 49 %    Monocytes % 6 5 - 13 %

## 2023-08-18 NOTE — ED NOTES
Bedside and Verbal shift change report given to Toña Gould (oncoming nurse) by Trinidad Moss (offgoing nurse). Report included the following information Nurse Handoff Report, Index, ED Encounter Summary, ED SBAR, and Adult Overview.        Peyman Cabral RN  08/18/23 1112

## 2023-08-18 NOTE — ED NOTES
RN alerted by PT that patient's 4L baseline O2 was unplugged from the wall, possibly during breathing treatment that was finished.  Pt was sating in 70's, replaced 4L O2 and O2 sat increased to 95%     Nel Mccoy RN  08/18/23 9694

## 2023-08-18 NOTE — HOME HEALTH
Patient returned to [facility] 8.17.23 due to SOB. Patient contacted all [facility] staff prior to going to hospital to cancel visits. Patient to ED via EMS with admission 8.17.23.

## 2023-08-19 ENCOUNTER — APPOINTMENT (OUTPATIENT)
Facility: HOSPITAL | Age: 78
DRG: 286 | End: 2023-08-19
Payer: MEDICARE

## 2023-08-19 LAB
ANION GAP SERPL CALC-SCNC: 10 MMOL/L (ref 5–15)
BASOPHILS # BLD: 0.1 K/UL (ref 0–0.1)
BASOPHILS NFR BLD: 1 % (ref 0–1)
BUN SERPL-MCNC: 28 MG/DL (ref 6–20)
BUN/CREAT SERPL: 23 (ref 12–20)
CALCIUM SERPL-MCNC: 9 MG/DL (ref 8.5–10.1)
CHLORIDE SERPL-SCNC: 109 MMOL/L (ref 97–108)
CO2 SERPL-SCNC: 25 MMOL/L (ref 21–32)
CREAT SERPL-MCNC: 1.2 MG/DL (ref 0.55–1.02)
DIFFERENTIAL METHOD BLD: ABNORMAL
EOSINOPHIL # BLD: 0.5 K/UL (ref 0–0.4)
EOSINOPHIL NFR BLD: 5 % (ref 0–7)
ERYTHROCYTE [DISTWIDTH] IN BLOOD BY AUTOMATED COUNT: 17 % (ref 11.5–14.5)
GLUCOSE BLD STRIP.AUTO-MCNC: 150 MG/DL (ref 65–117)
GLUCOSE BLD STRIP.AUTO-MCNC: 156 MG/DL (ref 65–117)
GLUCOSE BLD STRIP.AUTO-MCNC: 156 MG/DL (ref 65–117)
GLUCOSE BLD STRIP.AUTO-MCNC: 165 MG/DL (ref 65–117)
GLUCOSE SERPL-MCNC: 134 MG/DL (ref 65–100)
HCT VFR BLD AUTO: 29.1 % (ref 35–47)
HGB BLD-MCNC: 8.9 G/DL (ref 11.5–16)
IMM GRANULOCYTES # BLD AUTO: 0.1 K/UL (ref 0–0.04)
IMM GRANULOCYTES NFR BLD AUTO: 1 % (ref 0–0.5)
LYMPHOCYTES # BLD: 1.1 K/UL (ref 0.8–3.5)
LYMPHOCYTES NFR BLD: 10 % (ref 12–49)
MAGNESIUM SERPL-MCNC: 2.5 MG/DL (ref 1.6–2.4)
MCH RBC QN AUTO: 30.1 PG (ref 26–34)
MCHC RBC AUTO-ENTMCNC: 30.6 G/DL (ref 30–36.5)
MCV RBC AUTO: 98.3 FL (ref 80–99)
MONOCYTES # BLD: 0.9 K/UL (ref 0–1)
MONOCYTES NFR BLD: 8 % (ref 5–13)
NEUTS SEG # BLD: 7.9 K/UL (ref 1.8–8)
NEUTS SEG NFR BLD: 75 % (ref 32–75)
NRBC # BLD: 0 K/UL (ref 0–0.01)
NRBC BLD-RTO: 0 PER 100 WBC
PHOSPHATE SERPL-MCNC: 3.3 MG/DL (ref 2.6–4.7)
PLATELET # BLD AUTO: 204 K/UL (ref 150–400)
PMV BLD AUTO: 9 FL (ref 8.9–12.9)
POTASSIUM SERPL-SCNC: 3.4 MMOL/L (ref 3.5–5.1)
RBC # BLD AUTO: 2.96 M/UL (ref 3.8–5.2)
SERVICE CMNT-IMP: ABNORMAL
SODIUM SERPL-SCNC: 144 MMOL/L (ref 136–145)
WBC # BLD AUTO: 10.5 K/UL (ref 3.6–11)

## 2023-08-19 PROCEDURE — 73630 X-RAY EXAM OF FOOT: CPT

## 2023-08-19 PROCEDURE — 6370000000 HC RX 637 (ALT 250 FOR IP): Performed by: CLINICAL NURSE SPECIALIST

## 2023-08-19 PROCEDURE — 85025 COMPLETE CBC W/AUTO DIFF WBC: CPT

## 2023-08-19 PROCEDURE — 2060000000 HC ICU INTERMEDIATE R&B

## 2023-08-19 PROCEDURE — 80048 BASIC METABOLIC PNL TOTAL CA: CPT

## 2023-08-19 PROCEDURE — 84100 ASSAY OF PHOSPHORUS: CPT

## 2023-08-19 PROCEDURE — 2580000003 HC RX 258: Performed by: STUDENT IN AN ORGANIZED HEALTH CARE EDUCATION/TRAINING PROGRAM

## 2023-08-19 PROCEDURE — 83735 ASSAY OF MAGNESIUM: CPT

## 2023-08-19 PROCEDURE — 94640 AIRWAY INHALATION TREATMENT: CPT

## 2023-08-19 PROCEDURE — 6370000000 HC RX 637 (ALT 250 FOR IP): Performed by: STUDENT IN AN ORGANIZED HEALTH CARE EDUCATION/TRAINING PROGRAM

## 2023-08-19 PROCEDURE — 36415 COLL VENOUS BLD VENIPUNCTURE: CPT

## 2023-08-19 PROCEDURE — 82962 GLUCOSE BLOOD TEST: CPT

## 2023-08-19 PROCEDURE — 2500000003 HC RX 250 WO HCPCS: Performed by: STUDENT IN AN ORGANIZED HEALTH CARE EDUCATION/TRAINING PROGRAM

## 2023-08-19 PROCEDURE — 6360000002 HC RX W HCPCS: Performed by: STUDENT IN AN ORGANIZED HEALTH CARE EDUCATION/TRAINING PROGRAM

## 2023-08-19 RX ORDER — ARFORMOTEROL TARTRATE 15 UG/2ML
15 SOLUTION RESPIRATORY (INHALATION)
Status: DISCONTINUED | OUTPATIENT
Start: 2023-08-19 | End: 2023-08-22 | Stop reason: HOSPADM

## 2023-08-19 RX ORDER — BUDESONIDE 0.5 MG/2ML
0.5 INHALANT ORAL
Status: DISCONTINUED | OUTPATIENT
Start: 2023-08-19 | End: 2023-08-22 | Stop reason: HOSPADM

## 2023-08-19 RX ADMIN — APIXABAN 5 MG: 5 TABLET, FILM COATED ORAL at 09:39

## 2023-08-19 RX ADMIN — ALLOPURINOL 200 MG: 100 TABLET ORAL at 09:39

## 2023-08-19 RX ADMIN — BUMETANIDE 2 MG: 0.25 INJECTION INTRAMUSCULAR; INTRAVENOUS at 09:40

## 2023-08-19 RX ADMIN — AMLODIPINE BESYLATE 10 MG: 5 TABLET ORAL at 09:39

## 2023-08-19 RX ADMIN — SODIUM CHLORIDE, PRESERVATIVE FREE 10 ML: 5 INJECTION INTRAVENOUS at 10:26

## 2023-08-19 RX ADMIN — ARFORMOTEROL TARTRATE 15 MCG: 15 SOLUTION RESPIRATORY (INHALATION) at 19:35

## 2023-08-19 RX ADMIN — BUDESONIDE 500 MCG: 0.5 INHALANT RESPIRATORY (INHALATION) at 07:08

## 2023-08-19 RX ADMIN — INSULIN GLARGINE 20 UNITS: 100 INJECTION, SOLUTION SUBCUTANEOUS at 09:54

## 2023-08-19 RX ADMIN — POTASSIUM BICARBONATE 20 MEQ: 782 TABLET, EFFERVESCENT ORAL at 09:39

## 2023-08-19 RX ADMIN — BUDESONIDE 500 MCG: 0.5 INHALANT RESPIRATORY (INHALATION) at 19:35

## 2023-08-19 RX ADMIN — IPRATROPIUM BROMIDE 0.5 MG: 0.5 SOLUTION RESPIRATORY (INHALATION) at 19:35

## 2023-08-19 RX ADMIN — BUPROPION HYDROCHLORIDE 150 MG: 150 TABLET, EXTENDED RELEASE ORAL at 09:40

## 2023-08-19 RX ADMIN — GABAPENTIN 100 MG: 100 CAPSULE ORAL at 20:53

## 2023-08-19 RX ADMIN — ARFORMOTEROL TARTRATE 15 MCG: 15 SOLUTION RESPIRATORY (INHALATION) at 07:08

## 2023-08-19 RX ADMIN — IPRATROPIUM BROMIDE 0.5 MG: 0.5 SOLUTION RESPIRATORY (INHALATION) at 07:08

## 2023-08-19 RX ADMIN — CLOPIDOGREL BISULFATE 75 MG: 75 TABLET, FILM COATED ORAL at 09:39

## 2023-08-19 RX ADMIN — BUMETANIDE 2 MG: 0.25 INJECTION INTRAMUSCULAR; INTRAVENOUS at 20:53

## 2023-08-19 RX ADMIN — APIXABAN 5 MG: 5 TABLET, FILM COATED ORAL at 20:53

## 2023-08-19 RX ADMIN — SERTRALINE HYDROCHLORIDE 150 MG: 50 TABLET ORAL at 20:53

## 2023-08-19 RX ADMIN — SODIUM CHLORIDE, PRESERVATIVE FREE 10 ML: 5 INJECTION INTRAVENOUS at 20:54

## 2023-08-19 NOTE — PLAN OF CARE
Problem: Occupational Therapy - Adult  Goal: By Discharge: Performs self-care activities at highest level of function for planned discharge setting. See evaluation for individualized goals. Description: FUNCTIONAL STATUS PRIOR TO ADMISSION:  Patient was ambulatory using a rollator. HOME SUPPORT: Patient lived alone with daughter/family to provide assistance as needed. Pt reports use of [employer] for assistance with grocery shopping/transportation to appointments. Occupational Therapy Goals:  Initiated 8/18/2023  1. Patient will perform standing grooming routine with Modified [place] within 7 day(s). 2.  Patient will perform upper and lower body bathing with Modified [place] within 7 day(s). 3.  Patient will perform upper and lower body dressing, using AE PRN, with Modified [place] within 7 day(s). 4.  Patient will perform toilet transfers with Modified [place]  within 7 day(s). 5.  Patient will perform all aspects of toileting with Modified [place] within 7 day(s). 6.  Patient will participate in upper extremity therapeutic exercise/activities with Supervision for 5 minutes within 7 day(s). 7.  Patient will utilize energy conservation techniques during functional activities with verbal cues within 7 day(s). 8/18/2023 [id number] by Jluis Lopez OT  Outcome: Progressing     Problem: Physical Therapy - Adult  Goal: By Discharge: Performs mobility at highest level of function for planned discharge setting. See evaluation for individualized goals. Description: FUNCTIONAL STATUS PRIOR TO ADMISSION:  Patient was ambulatory using a rollator. HOME SUPPORT: Patient lived alone with daughter/family to provide assistance as needed. Pt reports use of [employer] for assistance with grocery shopping/transportation to appointments. Physical Therapy Goals  Initiated 8/18/2023  1.   Patient will move from supine to sit and sit to supine in bed with modified independence

## 2023-08-20 ENCOUNTER — APPOINTMENT (OUTPATIENT)
Facility: HOSPITAL | Age: 78
DRG: 286 | End: 2023-08-20
Attending: STUDENT IN AN ORGANIZED HEALTH CARE EDUCATION/TRAINING PROGRAM
Payer: MEDICARE

## 2023-08-20 LAB
ANION GAP SERPL CALC-SCNC: 5 MMOL/L (ref 5–15)
BASOPHILS # BLD: 0.1 K/UL (ref 0–0.1)
BASOPHILS NFR BLD: 1 % (ref 0–1)
BUN SERPL-MCNC: 25 MG/DL (ref 6–20)
BUN/CREAT SERPL: 19 (ref 12–20)
CALCIUM SERPL-MCNC: 8.8 MG/DL (ref 8.5–10.1)
CHLORIDE SERPL-SCNC: 107 MMOL/L (ref 97–108)
CO2 SERPL-SCNC: 29 MMOL/L (ref 21–32)
CREAT SERPL-MCNC: 1.3 MG/DL (ref 0.55–1.02)
DIFFERENTIAL METHOD BLD: ABNORMAL
ECHO AO ROOT DIAM: 3.1 CM
ECHO AO ROOT INDEX: 1.37 CM/M2
ECHO AR MAX VEL PISA: 3.5 M/S
ECHO AV AREA PEAK VELOCITY: 1.5 CM2
ECHO AV AREA VTI: 1.3 CM2
ECHO AV AREA/BSA PEAK VELOCITY: 0.7 CM2/M2
ECHO AV AREA/BSA VTI: 0.6 CM2/M2
ECHO AV MEAN GRADIENT: 15 MMHG
ECHO AV MEAN VELOCITY: 1.9 M/S
ECHO AV PEAK GRADIENT: 26 MMHG
ECHO AV PEAK VELOCITY: 2.6 M/S
ECHO AV REGURGITANT PHT: 1525.4 MILLISECOND
ECHO AV VELOCITY RATIO: 0.46
ECHO AV VTI: 62.2 CM
ECHO BSA: 2.36 M2
ECHO EST RA PRESSURE: 15 MMHG
ECHO LA DIAMETER INDEX: 2.3 CM/M2
ECHO LA DIAMETER: 5.2 CM
ECHO LA TO AORTIC ROOT RATIO: 1.68
ECHO LA VOL 2C: 83 ML (ref 22–52)
ECHO LA VOL 2C: 83 ML (ref 22–52)
ECHO LA VOL 4C: 46 ML (ref 22–52)
ECHO LA VOL 4C: 49 ML (ref 22–52)
ECHO LA VOLUME AREA LENGTH: 67 ML
ECHO LA VOLUME INDEX AREA LENGTH: 30 ML/M2 (ref 16–34)
ECHO LV E' LATERAL VELOCITY: 10 CM/S
ECHO LV E' SEPTAL VELOCITY: 6 CM/S
ECHO LV EDV A2C: 97 ML
ECHO LV EDV A4C: 77 ML
ECHO LV EDV BP: 92 ML (ref 56–104)
ECHO LV EDV INDEX A4C: 34 ML/M2
ECHO LV EDV INDEX BP: 41 ML/M2
ECHO LV EDV NDEX A2C: 43 ML/M2
ECHO LV EJECTION FRACTION A2C: 59 %
ECHO LV EJECTION FRACTION A4C: 55 %
ECHO LV EJECTION FRACTION BIPLANE: 59 % (ref 55–100)
ECHO LV ESV A2C: 40 ML
ECHO LV ESV A4C: 35 ML
ECHO LV ESV BP: 38 ML (ref 19–49)
ECHO LV ESV INDEX A2C: 18 ML/M2
ECHO LV ESV INDEX A4C: 15 ML/M2
ECHO LV ESV INDEX BP: 17 ML/M2
ECHO LV FRACTIONAL SHORTENING: 26 % (ref 28–44)
ECHO LV INTERNAL DIMENSION DIASTOLE INDEX: 2.52 CM/M2
ECHO LV INTERNAL DIMENSION DIASTOLIC: 5.7 CM (ref 3.9–5.3)
ECHO LV INTERNAL DIMENSION SYSTOLIC INDEX: 1.86 CM/M2
ECHO LV INTERNAL DIMENSION SYSTOLIC: 4.2 CM
ECHO LV IVSD: 0.7 CM (ref 0.6–0.9)
ECHO LV MASS 2D: 183.7 G (ref 67–162)
ECHO LV MASS INDEX 2D: 81.3 G/M2 (ref 43–95)
ECHO LV POSTERIOR WALL DIASTOLIC: 1 CM (ref 0.6–0.9)
ECHO LV RELATIVE WALL THICKNESS RATIO: 0.35
ECHO LVOT AREA: 3.1 CM2
ECHO LVOT AV VTI INDEX: 0.44
ECHO LVOT DIAM: 2 CM
ECHO LVOT MEAN GRADIENT: 3 MMHG
ECHO LVOT PEAK GRADIENT: 6 MMHG
ECHO LVOT PEAK VELOCITY: 1.2 M/S
ECHO LVOT STROKE VOLUME INDEX: 37.9 ML/M2
ECHO LVOT SV: 85.7 ML
ECHO LVOT VTI: 27.3 CM
ECHO MV A VELOCITY: 0.48 M/S
ECHO MV AREA PHT: 3.6 CM2
ECHO MV E DECELERATION TIME (DT): 211.5 MS
ECHO MV E VELOCITY: 1.41 M/S
ECHO MV E/A RATIO: 2.94
ECHO MV E/E' LATERAL: 14.1
ECHO MV E/E' RATIO (AVERAGED): 18.8
ECHO MV E/E' SEPTAL: 23.5
ECHO MV PRESSURE HALF TIME (PHT): 61.3 MS
ECHO MV REGURGITANT PEAK GRADIENT: 125 MMHG
ECHO MV REGURGITANT PEAK VELOCITY: 5.6 M/S
ECHO MV REGURGITANT VTIA: 177.3 CM
ECHO PV MAX VELOCITY: 0.8 M/S
ECHO PV PEAK GRADIENT: 3 MMHG
ECHO RIGHT VENTRICULAR SYSTOLIC PRESSURE (RVSP): 70 MMHG
ECHO RV FREE WALL PEAK S': 9 CM/S
ECHO RV TAPSE: 1.6 CM (ref 1.7–?)
ECHO TV REGURGITANT MAX VELOCITY: 3.71 M/S
ECHO TV REGURGITANT PEAK GRADIENT: 55 MMHG
EOSINOPHIL # BLD: 0.3 K/UL (ref 0–0.4)
EOSINOPHIL NFR BLD: 3 % (ref 0–7)
ERYTHROCYTE [DISTWIDTH] IN BLOOD BY AUTOMATED COUNT: 16.5 % (ref 11.5–14.5)
GLUCOSE BLD STRIP.AUTO-MCNC: 140 MG/DL (ref 65–117)
GLUCOSE BLD STRIP.AUTO-MCNC: 157 MG/DL (ref 65–117)
GLUCOSE BLD STRIP.AUTO-MCNC: 167 MG/DL (ref 65–117)
GLUCOSE BLD STRIP.AUTO-MCNC: 224 MG/DL (ref 65–117)
GLUCOSE SERPL-MCNC: 146 MG/DL (ref 65–100)
HCT VFR BLD AUTO: 28.1 % (ref 35–47)
HGB BLD-MCNC: 8.7 G/DL (ref 11.5–16)
IMM GRANULOCYTES # BLD AUTO: 0 K/UL (ref 0–0.04)
IMM GRANULOCYTES NFR BLD AUTO: 0 % (ref 0–0.5)
LYMPHOCYTES # BLD: 0.9 K/UL (ref 0.8–3.5)
LYMPHOCYTES NFR BLD: 8 % (ref 12–49)
MAGNESIUM SERPL-MCNC: 2.4 MG/DL (ref 1.6–2.4)
MCH RBC QN AUTO: 30.5 PG (ref 26–34)
MCHC RBC AUTO-ENTMCNC: 31 G/DL (ref 30–36.5)
MCV RBC AUTO: 98.6 FL (ref 80–99)
MONOCYTES # BLD: 1.1 K/UL (ref 0–1)
MONOCYTES NFR BLD: 9 % (ref 5–13)
NEUTS SEG # BLD: 9.1 K/UL (ref 1.8–8)
NEUTS SEG NFR BLD: 79 % (ref 32–75)
NRBC # BLD: 0 K/UL (ref 0–0.01)
NRBC BLD-RTO: 0 PER 100 WBC
PHOSPHATE SERPL-MCNC: 3.4 MG/DL (ref 2.6–4.7)
PLATELET # BLD AUTO: 210 K/UL (ref 150–400)
PMV BLD AUTO: 9.4 FL (ref 8.9–12.9)
POTASSIUM SERPL-SCNC: 3.6 MMOL/L (ref 3.5–5.1)
RBC # BLD AUTO: 2.85 M/UL (ref 3.8–5.2)
SERVICE CMNT-IMP: ABNORMAL
SODIUM SERPL-SCNC: 141 MMOL/L (ref 136–145)
WBC # BLD AUTO: 11.6 K/UL (ref 3.6–11)

## 2023-08-20 PROCEDURE — 2580000003 HC RX 258: Performed by: STUDENT IN AN ORGANIZED HEALTH CARE EDUCATION/TRAINING PROGRAM

## 2023-08-20 PROCEDURE — 93306 TTE W/DOPPLER COMPLETE: CPT

## 2023-08-20 PROCEDURE — 82962 GLUCOSE BLOOD TEST: CPT

## 2023-08-20 PROCEDURE — 94640 AIRWAY INHALATION TREATMENT: CPT

## 2023-08-20 PROCEDURE — 6360000002 HC RX W HCPCS: Performed by: STUDENT IN AN ORGANIZED HEALTH CARE EDUCATION/TRAINING PROGRAM

## 2023-08-20 PROCEDURE — 84100 ASSAY OF PHOSPHORUS: CPT

## 2023-08-20 PROCEDURE — 80048 BASIC METABOLIC PNL TOTAL CA: CPT

## 2023-08-20 PROCEDURE — 6370000000 HC RX 637 (ALT 250 FOR IP): Performed by: STUDENT IN AN ORGANIZED HEALTH CARE EDUCATION/TRAINING PROGRAM

## 2023-08-20 PROCEDURE — 83735 ASSAY OF MAGNESIUM: CPT

## 2023-08-20 PROCEDURE — 2500000003 HC RX 250 WO HCPCS: Performed by: STUDENT IN AN ORGANIZED HEALTH CARE EDUCATION/TRAINING PROGRAM

## 2023-08-20 PROCEDURE — 85025 COMPLETE CBC W/AUTO DIFF WBC: CPT

## 2023-08-20 PROCEDURE — 36415 COLL VENOUS BLD VENIPUNCTURE: CPT

## 2023-08-20 PROCEDURE — 6370000000 HC RX 637 (ALT 250 FOR IP): Performed by: CLINICAL NURSE SPECIALIST

## 2023-08-20 PROCEDURE — 2060000000 HC ICU INTERMEDIATE R&B

## 2023-08-20 RX ORDER — SODIUM CHLORIDE 0.9 % (FLUSH) 0.9 %
5-40 SYRINGE (ML) INJECTION EVERY 12 HOURS SCHEDULED
Status: CANCELLED | OUTPATIENT
Start: 2023-08-20

## 2023-08-20 RX ADMIN — IPRATROPIUM BROMIDE 0.5 MG: 0.5 SOLUTION RESPIRATORY (INHALATION) at 19:54

## 2023-08-20 RX ADMIN — IPRATROPIUM BROMIDE 0.5 MG: 0.5 SOLUTION RESPIRATORY (INHALATION) at 08:27

## 2023-08-20 RX ADMIN — BUMETANIDE 2 MG: 0.25 INJECTION INTRAMUSCULAR; INTRAVENOUS at 21:15

## 2023-08-20 RX ADMIN — ONDANSETRON 4 MG: 2 INJECTION INTRAMUSCULAR; INTRAVENOUS at 21:55

## 2023-08-20 RX ADMIN — BUMETANIDE 2 MG: 0.25 INJECTION INTRAMUSCULAR; INTRAVENOUS at 09:28

## 2023-08-20 RX ADMIN — BUDESONIDE 500 MCG: 0.5 INHALANT RESPIRATORY (INHALATION) at 19:54

## 2023-08-20 RX ADMIN — SERTRALINE HYDROCHLORIDE 150 MG: 50 TABLET ORAL at 21:01

## 2023-08-20 RX ADMIN — APIXABAN 5 MG: 5 TABLET, FILM COATED ORAL at 09:27

## 2023-08-20 RX ADMIN — ARFORMOTEROL TARTRATE 15 MCG: 15 SOLUTION RESPIRATORY (INHALATION) at 19:54

## 2023-08-20 RX ADMIN — SODIUM CHLORIDE, PRESERVATIVE FREE 10 ML: 5 INJECTION INTRAVENOUS at 09:00

## 2023-08-20 RX ADMIN — BUDESONIDE 500 MCG: 0.5 INHALANT RESPIRATORY (INHALATION) at 08:27

## 2023-08-20 RX ADMIN — CLOPIDOGREL BISULFATE 75 MG: 75 TABLET, FILM COATED ORAL at 09:27

## 2023-08-20 RX ADMIN — BUPROPION HYDROCHLORIDE 150 MG: 150 TABLET, EXTENDED RELEASE ORAL at 09:28

## 2023-08-20 RX ADMIN — INSULIN GLARGINE 20 UNITS: 100 INJECTION, SOLUTION SUBCUTANEOUS at 09:28

## 2023-08-20 RX ADMIN — ARFORMOTEROL TARTRATE 15 MCG: 15 SOLUTION RESPIRATORY (INHALATION) at 08:27

## 2023-08-20 RX ADMIN — ALLOPURINOL 200 MG: 100 TABLET ORAL at 09:27

## 2023-08-20 RX ADMIN — AMLODIPINE BESYLATE 10 MG: 5 TABLET ORAL at 09:28

## 2023-08-20 RX ADMIN — GABAPENTIN 100 MG: 100 CAPSULE ORAL at 21:01

## 2023-08-20 NOTE — CARE COORDINATION
Care Management Initial Assessment       RUR:24%  Readmission? Yes -   1st IM letter given? Yes - 8/19  1st  letter given: No     Disposition: Home with home health, resume PT/OT/SN with [facility], referral pending in CC link    CM met with pt. She is alert and oriented x4. Demographics and insurance confirmed. She lives alone in an apartment with entry level living, and elevator access. She is independent with ADL's. She uses a cane, and rollator. She uses home oxygen at 4L/NC at baseline. Her daughter lives nearby to assist with transportation needs. She also uses a community transportation resource([employer]). She is open with [facility] home health services for PT/OT/SN. [facility] sent via CC link [facility]. Plan is to return home pending medical progression, and cardiac catheterization tomorrow 8/21. CM following.       08/20/23 1444   Service Assessment   Patient Orientation Alert and Oriented;Person;Place;Situation;Self   Cognition Alert   History Provided By Patient   Primary [address] is: Named in [address]   PCP Verified by CM Yes   Last Visit to PCP Within last 3 months   Prior Functional Level Independent in ADLs/IADLs;Mobility; Shopping   Current Functional Level Independent in ADLs/IADLs;Mobility; Shopping   Can patient return to prior living arrangement Yes   Ability to make needs known: Good   Would you like for me to discuss the discharge plan with any other family members/significant others, and if so, who? No   Financial Resources Medicare   Community Resources Transportation  ([employer])   Social/Functional History   Lives With Alone   Type of [address] One level   Home Access Elevator   Bathroom Shower/Tub Tub/Shower unit   [address] [facility] chair; Toilet raiser   [address] Cane;Rollator   Receives

## 2023-08-20 NOTE — CARE COORDINATION
08/20/23 1442   Readmission Assessment   Number of Days since last admission? 8-30 days   Previous Disposition Home with Home Health   Who is being Interviewed Patient   What was the patient's/caregiver's perception as to why they think they needed to return back to the hospital? Could not/did not make appointment with PCP; Other (Comment)   Did you visit your Primary Care Physician after you left the hospital, before you returned this time? No   Why weren't you able to visit your PCP? Other (Comment)  (had to cancel, came to hospital)   Did you see a specialist, such as Cardiac, Pulmonary, Orthopedic Physician, etc. after you left the hospital? Yes  (nephrology, pulmonary)   Who advised the patient to return to the hospital? [facility] Staff   Does the patient report anything that got in the way of taking their medications?  No     Kenton Vinson, RN BSN  Care Manager

## 2023-08-21 LAB
ANION GAP SERPL CALC-SCNC: 8 MMOL/L (ref 5–15)
BASOPHILS # BLD: 0.1 K/UL (ref 0–0.1)
BASOPHILS NFR BLD: 1 % (ref 0–1)
BUN SERPL-MCNC: 26 MG/DL (ref 6–20)
BUN/CREAT SERPL: 22 (ref 12–20)
CALCIUM SERPL-MCNC: 8.5 MG/DL (ref 8.5–10.1)
CHLORIDE SERPL-SCNC: 106 MMOL/L (ref 97–108)
CO2 SERPL-SCNC: 26 MMOL/L (ref 21–32)
CREAT SERPL-MCNC: 1.18 MG/DL (ref 0.55–1.02)
DIFFERENTIAL METHOD BLD: ABNORMAL
ECHO BSA: 2.36 M2
EOSINOPHIL # BLD: 0.3 K/UL (ref 0–0.4)
EOSINOPHIL NFR BLD: 3 % (ref 0–7)
ERYTHROCYTE [DISTWIDTH] IN BLOOD BY AUTOMATED COUNT: 16.1 % (ref 11.5–14.5)
GLUCOSE BLD STRIP.AUTO-MCNC: 131 MG/DL (ref 65–117)
GLUCOSE BLD STRIP.AUTO-MCNC: 146 MG/DL (ref 65–117)
GLUCOSE BLD STRIP.AUTO-MCNC: 151 MG/DL (ref 65–117)
GLUCOSE BLD STRIP.AUTO-MCNC: 200 MG/DL (ref 65–117)
GLUCOSE SERPL-MCNC: 132 MG/DL (ref 65–100)
HCT VFR BLD AUTO: 28.5 % (ref 35–47)
HGB BLD-MCNC: 8.7 G/DL (ref 11.5–16)
IMM GRANULOCYTES # BLD AUTO: 0.1 K/UL (ref 0–0.04)
IMM GRANULOCYTES NFR BLD AUTO: 1 % (ref 0–0.5)
LYMPHOCYTES # BLD: 1.1 K/UL (ref 0.8–3.5)
LYMPHOCYTES NFR BLD: 10 % (ref 12–49)
MAGNESIUM SERPL-MCNC: 2.2 MG/DL (ref 1.6–2.4)
MCH RBC QN AUTO: 29.8 PG (ref 26–34)
MCHC RBC AUTO-ENTMCNC: 30.5 G/DL (ref 30–36.5)
MCV RBC AUTO: 97.6 FL (ref 80–99)
MONOCYTES # BLD: 1.1 K/UL (ref 0–1)
MONOCYTES NFR BLD: 9 % (ref 5–13)
NEUTS SEG # BLD: 8.8 K/UL (ref 1.8–8)
NEUTS SEG NFR BLD: 76 % (ref 32–75)
NRBC # BLD: 0 K/UL (ref 0–0.01)
NRBC BLD-RTO: 0 PER 100 WBC
PHOSPHATE SERPL-MCNC: 3.8 MG/DL (ref 2.6–4.7)
PLATELET # BLD AUTO: 202 K/UL (ref 150–400)
PMV BLD AUTO: 9.4 FL (ref 8.9–12.9)
POTASSIUM SERPL-SCNC: 3.7 MMOL/L (ref 3.5–5.1)
RBC # BLD AUTO: 2.92 M/UL (ref 3.8–5.2)
SERVICE CMNT-IMP: ABNORMAL
SODIUM SERPL-SCNC: 140 MMOL/L (ref 136–145)
WBC # BLD AUTO: 11.5 K/UL (ref 3.6–11)

## 2023-08-21 PROCEDURE — 2700000000 HC OXYGEN THERAPY PER DAY

## 2023-08-21 PROCEDURE — 2580000003 HC RX 258: Performed by: STUDENT IN AN ORGANIZED HEALTH CARE EDUCATION/TRAINING PROGRAM

## 2023-08-21 PROCEDURE — 82962 GLUCOSE BLOOD TEST: CPT

## 2023-08-21 PROCEDURE — 85025 COMPLETE CBC W/AUTO DIFF WBC: CPT

## 2023-08-21 PROCEDURE — 2060000000 HC ICU INTERMEDIATE R&B

## 2023-08-21 PROCEDURE — 6360000002 HC RX W HCPCS: Performed by: INTERNAL MEDICINE

## 2023-08-21 PROCEDURE — 2500000003 HC RX 250 WO HCPCS: Performed by: INTERNAL MEDICINE

## 2023-08-21 PROCEDURE — 99152 MOD SED SAME PHYS/QHP 5/>YRS: CPT | Performed by: INTERNAL MEDICINE

## 2023-08-21 PROCEDURE — 93454 CORONARY ARTERY ANGIO S&I: CPT | Performed by: INTERNAL MEDICINE

## 2023-08-21 PROCEDURE — 6360000004 HC RX CONTRAST MEDICATION: Performed by: INTERNAL MEDICINE

## 2023-08-21 PROCEDURE — 2500000003 HC RX 250 WO HCPCS: Performed by: STUDENT IN AN ORGANIZED HEALTH CARE EDUCATION/TRAINING PROGRAM

## 2023-08-21 PROCEDURE — C1769 GUIDE WIRE: HCPCS | Performed by: INTERNAL MEDICINE

## 2023-08-21 PROCEDURE — B2111ZZ FLUOROSCOPY OF MULTIPLE CORONARY ARTERIES USING LOW OSMOLAR CONTRAST: ICD-10-PCS | Performed by: INTERNAL MEDICINE

## 2023-08-21 PROCEDURE — C1713 ANCHOR/SCREW BN/BN,TIS/BN: HCPCS | Performed by: INTERNAL MEDICINE

## 2023-08-21 PROCEDURE — 2709999900 HC NON-CHARGEABLE SUPPLY: Performed by: INTERNAL MEDICINE

## 2023-08-21 PROCEDURE — 4A023N7 MEASUREMENT OF CARDIAC SAMPLING AND PRESSURE, LEFT HEART, PERCUTANEOUS APPROACH: ICD-10-PCS | Performed by: INTERNAL MEDICINE

## 2023-08-21 PROCEDURE — 6370000000 HC RX 637 (ALT 250 FOR IP): Performed by: STUDENT IN AN ORGANIZED HEALTH CARE EDUCATION/TRAINING PROGRAM

## 2023-08-21 PROCEDURE — 83735 ASSAY OF MAGNESIUM: CPT

## 2023-08-21 PROCEDURE — C1894 INTRO/SHEATH, NON-LASER: HCPCS | Performed by: INTERNAL MEDICINE

## 2023-08-21 PROCEDURE — 6370000000 HC RX 637 (ALT 250 FOR IP): Performed by: INTERNAL MEDICINE

## 2023-08-21 PROCEDURE — 6370000000 HC RX 637 (ALT 250 FOR IP): Performed by: CLINICAL NURSE SPECIALIST

## 2023-08-21 PROCEDURE — 36415 COLL VENOUS BLD VENIPUNCTURE: CPT

## 2023-08-21 PROCEDURE — 84100 ASSAY OF PHOSPHORUS: CPT

## 2023-08-21 PROCEDURE — 6360000002 HC RX W HCPCS: Performed by: STUDENT IN AN ORGANIZED HEALTH CARE EDUCATION/TRAINING PROGRAM

## 2023-08-21 PROCEDURE — 94640 AIRWAY INHALATION TREATMENT: CPT

## 2023-08-21 PROCEDURE — 80048 BASIC METABOLIC PNL TOTAL CA: CPT

## 2023-08-21 RX ORDER — LIDOCAINE HYDROCHLORIDE 10 MG/ML
INJECTION, SOLUTION INFILTRATION; PERINEURAL PRN
Status: DISCONTINUED | OUTPATIENT
Start: 2023-08-21 | End: 2023-08-21 | Stop reason: HOSPADM

## 2023-08-21 RX ORDER — MIDAZOLAM HYDROCHLORIDE 1 MG/ML
INJECTION INTRAMUSCULAR; INTRAVENOUS PRN
Status: DISCONTINUED | OUTPATIENT
Start: 2023-08-21 | End: 2023-08-21 | Stop reason: HOSPADM

## 2023-08-21 RX ORDER — VERAPAMIL HYDROCHLORIDE 2.5 MG/ML
INJECTION, SOLUTION INTRAVENOUS PRN
Status: DISCONTINUED | OUTPATIENT
Start: 2023-08-21 | End: 2023-08-21 | Stop reason: HOSPADM

## 2023-08-21 RX ORDER — FENTANYL CITRATE 50 UG/ML
INJECTION, SOLUTION INTRAMUSCULAR; INTRAVENOUS PRN
Status: DISCONTINUED | OUTPATIENT
Start: 2023-08-21 | End: 2023-08-21 | Stop reason: HOSPADM

## 2023-08-21 RX ADMIN — ALLOPURINOL 200 MG: 100 TABLET ORAL at 08:45

## 2023-08-21 RX ADMIN — TRAMADOL HYDROCHLORIDE 50 MG: 50 TABLET ORAL at 23:05

## 2023-08-21 RX ADMIN — INSULIN GLARGINE 20 UNITS: 100 INJECTION, SOLUTION SUBCUTANEOUS at 08:45

## 2023-08-21 RX ADMIN — ARFORMOTEROL TARTRATE 15 MCG: 15 SOLUTION RESPIRATORY (INHALATION) at 08:06

## 2023-08-21 RX ADMIN — AMLODIPINE BESYLATE 10 MG: 5 TABLET ORAL at 08:45

## 2023-08-21 RX ADMIN — SODIUM CHLORIDE, PRESERVATIVE FREE 10 ML: 5 INJECTION INTRAVENOUS at 08:46

## 2023-08-21 RX ADMIN — EMPAGLIFLOZIN 10 MG: 10 TABLET, FILM COATED ORAL at 19:01

## 2023-08-21 RX ADMIN — BUMETANIDE 2 MG: 0.25 INJECTION INTRAMUSCULAR; INTRAVENOUS at 08:45

## 2023-08-21 RX ADMIN — SERTRALINE HYDROCHLORIDE 150 MG: 50 TABLET ORAL at 23:00

## 2023-08-21 RX ADMIN — ACETAMINOPHEN 650 MG: 325 TABLET ORAL at 23:05

## 2023-08-21 RX ADMIN — BUPROPION HYDROCHLORIDE 150 MG: 150 TABLET, EXTENDED RELEASE ORAL at 08:45

## 2023-08-21 RX ADMIN — IPRATROPIUM BROMIDE 0.5 MG: 0.5 SOLUTION RESPIRATORY (INHALATION) at 08:06

## 2023-08-21 RX ADMIN — CLOPIDOGREL BISULFATE 75 MG: 75 TABLET, FILM COATED ORAL at 08:45

## 2023-08-21 RX ADMIN — GABAPENTIN 100 MG: 100 CAPSULE ORAL at 22:59

## 2023-08-21 RX ADMIN — BUMETANIDE 2 MG: 0.25 INJECTION INTRAMUSCULAR; INTRAVENOUS at 22:59

## 2023-08-21 RX ADMIN — BUDESONIDE 500 MCG: 0.5 INHALANT RESPIRATORY (INHALATION) at 08:06

## 2023-08-21 RX ADMIN — SODIUM CHLORIDE, PRESERVATIVE FREE 10 ML: 5 INJECTION INTRAVENOUS at 23:00

## 2023-08-21 ASSESSMENT — PAIN DESCRIPTION - DESCRIPTORS
DESCRIPTORS: THROBBING;ACHING
DESCRIPTORS: THROBBING;ACHING

## 2023-08-21 ASSESSMENT — PAIN SCALES - GENERAL
PAINLEVEL_OUTOF10: 6
PAINLEVEL_OUTOF10: 5
PAINLEVEL_OUTOF10: 6

## 2023-08-21 ASSESSMENT — PAIN DESCRIPTION - LOCATION
LOCATION: HEAD
LOCATION: HEAD

## 2023-08-21 NOTE — PLAN OF CARE
Problem: Discharge Planning  Goal: Discharge to home or other facility with appropriate resources  8/21/2023 0355 by Hi Costa RN  Outcome: Progressing

## 2023-08-21 NOTE — DIABETES MGMT
[address] [facility]  DIABETES MANAGEMENT CONSULT    Consulted by Provider for advanced nursing evaluation and care for inpatient blood glucose management. Evaluation and Action Plan   [place] is a 77 y.o. female living with controlled T2D- current A1C 5.5% as of July 31st 2023. Admitted for acute on chronic HFpEF with persistent SOB and elevated NT pro BNP at admission. Had a recent hospital admission for same 7/30-8/2. Diuresis therapy in place. BG trend data limited - fasting  today. Patient verbalized her diabetes self mgmt practices - takes Novolin N 10 units twice daily/ and regular insulin via correctional scale at lunch time when she eats her main meal of the day. Monitors BG via [address] with reader- When asked if she's had lows recently she verbalized 'yes\" but could not give me a number. A1C denotes she is having some lows at home. PO intake limited to one lunchtime meal/day and a dinner snack (apple with PB) at dinner. Noted recent ECHO 50-55%- slated for cardiac cath today- BG trends over weekend in target without evidence of hypoglycemia on current basal insulin.   Would continue with currently ordered basal insulin regimen -will continue to follow     Management Rationale Action Plan   Medication   Basal needs Using 0.2 units/kg/D based on BMI/ A1C CoNTINUE Lantus 20 units daily   Nutritional needs NA If PO intake improves over weekend and BG >180 pre meals, ADD low dose lispro 4 units TID    Corrective insulin Using medium sensitivity based on T2D ACHS   Additional orders  Modified diet to include carb consistent 45gm        Diabetes Discharge Plan   Medication  Given current A1C and verbalized lows PTA, would consider Lantus once daily at discharge vs. Novoloin N (NPH) -cost /affordability may be why she is on Novolin N-she is unable to afford Lantus - would reduce NPH doses to 8 units qam and 6 units qpm.   Referral  [x]

## 2023-08-22 VITALS
RESPIRATION RATE: 20 BRPM | HEART RATE: 72 BPM | BODY MASS INDEX: 40.97 KG/M2 | DIASTOLIC BLOOD PRESSURE: 60 MMHG | TEMPERATURE: 98.6 F | HEIGHT: 67 IN | SYSTOLIC BLOOD PRESSURE: 114 MMHG | WEIGHT: 261 LBS | OXYGEN SATURATION: 97 %

## 2023-08-22 PROBLEM — I50.9 ACUTE ON CHRONIC CONGESTIVE HEART FAILURE (HCC): Status: RESOLVED | Noted: 2023-08-18 | Resolved: 2023-08-22

## 2023-08-22 LAB
ANION GAP SERPL CALC-SCNC: 8 MMOL/L (ref 5–15)
BASOPHILS # BLD: 0.1 K/UL (ref 0–0.1)
BASOPHILS NFR BLD: 1 % (ref 0–1)
BUN SERPL-MCNC: 28 MG/DL (ref 6–20)
BUN/CREAT SERPL: 22 (ref 12–20)
CALCIUM SERPL-MCNC: 8.7 MG/DL (ref 8.5–10.1)
CHLORIDE SERPL-SCNC: 105 MMOL/L (ref 97–108)
CO2 SERPL-SCNC: 28 MMOL/L (ref 21–32)
CREAT SERPL-MCNC: 1.25 MG/DL (ref 0.55–1.02)
DIFFERENTIAL METHOD BLD: ABNORMAL
EOSINOPHIL # BLD: 0.6 K/UL (ref 0–0.4)
EOSINOPHIL NFR BLD: 6 % (ref 0–7)
ERYTHROCYTE [DISTWIDTH] IN BLOOD BY AUTOMATED COUNT: 16 % (ref 11.5–14.5)
GLUCOSE BLD STRIP.AUTO-MCNC: 105 MG/DL (ref 65–117)
GLUCOSE BLD STRIP.AUTO-MCNC: 146 MG/DL (ref 65–117)
GLUCOSE BLD STRIP.AUTO-MCNC: 177 MG/DL (ref 65–117)
GLUCOSE SERPL-MCNC: 98 MG/DL (ref 65–100)
HCT VFR BLD AUTO: 30.4 % (ref 35–47)
HGB BLD-MCNC: 9.2 G/DL (ref 11.5–16)
IMM GRANULOCYTES # BLD AUTO: 0 K/UL (ref 0–0.04)
IMM GRANULOCYTES NFR BLD AUTO: 0 % (ref 0–0.5)
LYMPHOCYTES # BLD: 1.2 K/UL (ref 0.8–3.5)
LYMPHOCYTES NFR BLD: 12 % (ref 12–49)
MCH RBC QN AUTO: 29.8 PG (ref 26–34)
MCHC RBC AUTO-ENTMCNC: 30.3 G/DL (ref 30–36.5)
MCV RBC AUTO: 98.4 FL (ref 80–99)
MONOCYTES # BLD: 0.9 K/UL (ref 0–1)
MONOCYTES NFR BLD: 9 % (ref 5–13)
NEUTS SEG # BLD: 7.6 K/UL (ref 1.8–8)
NEUTS SEG NFR BLD: 72 % (ref 32–75)
NRBC # BLD: 0 K/UL (ref 0–0.01)
NRBC BLD-RTO: 0 PER 100 WBC
PLATELET # BLD AUTO: 217 K/UL (ref 150–400)
PMV BLD AUTO: 10.1 FL (ref 8.9–12.9)
POTASSIUM SERPL-SCNC: 3.4 MMOL/L (ref 3.5–5.1)
RBC # BLD AUTO: 3.09 M/UL (ref 3.8–5.2)
SERVICE CMNT-IMP: ABNORMAL
SERVICE CMNT-IMP: ABNORMAL
SERVICE CMNT-IMP: NORMAL
SODIUM SERPL-SCNC: 141 MMOL/L (ref 136–145)
WBC # BLD AUTO: 10.4 K/UL (ref 3.6–11)

## 2023-08-22 PROCEDURE — 36415 COLL VENOUS BLD VENIPUNCTURE: CPT

## 2023-08-22 PROCEDURE — 2500000003 HC RX 250 WO HCPCS: Performed by: STUDENT IN AN ORGANIZED HEALTH CARE EDUCATION/TRAINING PROGRAM

## 2023-08-22 PROCEDURE — 6370000000 HC RX 637 (ALT 250 FOR IP): Performed by: CLINICAL NURSE SPECIALIST

## 2023-08-22 PROCEDURE — 80048 BASIC METABOLIC PNL TOTAL CA: CPT

## 2023-08-22 PROCEDURE — 6370000000 HC RX 637 (ALT 250 FOR IP): Performed by: STUDENT IN AN ORGANIZED HEALTH CARE EDUCATION/TRAINING PROGRAM

## 2023-08-22 PROCEDURE — 94640 AIRWAY INHALATION TREATMENT: CPT

## 2023-08-22 PROCEDURE — 82962 GLUCOSE BLOOD TEST: CPT

## 2023-08-22 PROCEDURE — 6370000000 HC RX 637 (ALT 250 FOR IP): Performed by: INTERNAL MEDICINE

## 2023-08-22 PROCEDURE — 97530 THERAPEUTIC ACTIVITIES: CPT

## 2023-08-22 PROCEDURE — 2700000000 HC OXYGEN THERAPY PER DAY

## 2023-08-22 PROCEDURE — 2580000003 HC RX 258: Performed by: STUDENT IN AN ORGANIZED HEALTH CARE EDUCATION/TRAINING PROGRAM

## 2023-08-22 PROCEDURE — 6360000002 HC RX W HCPCS: Performed by: STUDENT IN AN ORGANIZED HEALTH CARE EDUCATION/TRAINING PROGRAM

## 2023-08-22 PROCEDURE — 85025 COMPLETE CBC W/AUTO DIFF WBC: CPT

## 2023-08-22 RX ORDER — CLOPIDOGREL BISULFATE 75 MG/1
75 TABLET ORAL DAILY
Qty: 30 TABLET | Refills: 0 | Status: SHIPPED | OUTPATIENT
Start: 2023-08-22 | End: 2023-09-21

## 2023-08-22 RX ADMIN — CLOPIDOGREL BISULFATE 75 MG: 75 TABLET, FILM COATED ORAL at 09:06

## 2023-08-22 RX ADMIN — BUMETANIDE 2 MG: 0.25 INJECTION INTRAMUSCULAR; INTRAVENOUS at 09:06

## 2023-08-22 RX ADMIN — INSULIN GLARGINE 20 UNITS: 100 INJECTION, SOLUTION SUBCUTANEOUS at 09:06

## 2023-08-22 RX ADMIN — ACETAMINOPHEN 650 MG: 325 TABLET ORAL at 06:22

## 2023-08-22 RX ADMIN — AMLODIPINE BESYLATE 10 MG: 5 TABLET ORAL at 09:06

## 2023-08-22 RX ADMIN — ALLOPURINOL 200 MG: 100 TABLET ORAL at 09:06

## 2023-08-22 RX ADMIN — APIXABAN 5 MG: 5 TABLET, FILM COATED ORAL at 09:06

## 2023-08-22 RX ADMIN — IPRATROPIUM BROMIDE 0.5 MG: 0.5 SOLUTION RESPIRATORY (INHALATION) at 08:36

## 2023-08-22 RX ADMIN — TRAMADOL HYDROCHLORIDE 50 MG: 50 TABLET ORAL at 06:21

## 2023-08-22 RX ADMIN — SODIUM CHLORIDE, PRESERVATIVE FREE 5 ML: 5 INJECTION INTRAVENOUS at 09:10

## 2023-08-22 RX ADMIN — ARFORMOTEROL TARTRATE 15 MCG: 15 SOLUTION RESPIRATORY (INHALATION) at 08:36

## 2023-08-22 RX ADMIN — ACETAMINOPHEN 650 MG: 325 TABLET ORAL at 11:44

## 2023-08-22 RX ADMIN — BUDESONIDE 500 MCG: 0.5 INHALANT RESPIRATORY (INHALATION) at 08:36

## 2023-08-22 RX ADMIN — BUPROPION HYDROCHLORIDE 150 MG: 150 TABLET, EXTENDED RELEASE ORAL at 09:06

## 2023-08-22 RX ADMIN — EMPAGLIFLOZIN 10 MG: 10 TABLET, FILM COATED ORAL at 09:06

## 2023-08-22 ASSESSMENT — PAIN SCALES - GENERAL
PAINLEVEL_OUTOF10: 6
PAINLEVEL_OUTOF10: 5
PAINLEVEL_OUTOF10: 3

## 2023-08-22 ASSESSMENT — PAIN DESCRIPTION - DESCRIPTORS: DESCRIPTORS: ACHING;THROBBING

## 2023-08-22 ASSESSMENT — PAIN DESCRIPTION - LOCATION: LOCATION: HEAD

## 2023-08-22 NOTE — NURSE NAVIGATOR
Heart Failure Nurse Navigator rounds completed. HF NN provided introduction to self and nurse navigator role. Patient had  received Living With Heart Failure booklet along with weight calendar, and magnet on last admission     Patient states she has been monitoring her weight. She is able to control her salt intake. She cooks her own food and she is able to order her groceries. She is aware of the signs and symptoms of heart failure. She said she is perplexed by the sudden fluid increase. She is hoping to have a less bulky oxygen tank that will give her more mobility in the future. She states her oxygen company has agreed to provide a more portable tank. She states she has not driven in 8 months. She uses [employer] for transportation to appointments. Patient made aware of cardiology follow up 8/24/23. Patient states her CPAP is now working and oxygen is being fed through it. Will continue to follow until discharge.         Time spent with patient discussing HF education:  15 minutes

## 2023-08-22 NOTE — PLAN OF CARE
Problem: Chronic Conditions and Co-morbidities  Goal: Patient's chronic conditions and co-morbidity symptoms are monitored and maintained or improved  8/22/2023 1152 by Meek Escobar RN  Outcome: Progressing  8/22/2023 0738 by Alexei Sheets RN  Outcome: Progressing     Problem: Safety - Adult  Goal: Free from fall injury  8/22/2023 0738 by Alexei Sheets RN  Outcome: Progressing     Problem: ABCDS Injury Assessment  Goal: Absence of physical injury  8/22/2023 3423 by Alexie Sheets RN  Outcome: Progressing     Problem: Pain  Goal: Verbalizes/displays adequate comfort level or baseline comfort level  8/22/2023 0738 by Alexei Sheets RN  Outcome: Progressing

## 2023-08-22 NOTE — DISCHARGE SUMMARY
Discharge Summary       PATIENT ID: Getachew Davies  MRN: [id number]   YOB: 1945    DATE OF ADMISSION: 8/18/2023  1:07 AM    DATE OF DISCHARGE: 8/22/23   PRIMARY CARE PROVIDER: Raydell Meigs, MD     ATTENDING PHYSICIAN: Dagoberto Holloway MD  DISCHARGING PROVIDER: Dagoberto Holloway MD    To contact this individual call [phone] and ask the  to page. If unavailable ask to be transferred the Adult Hospitalist Department. CONSULTATIONS: IP CONSULT TO CARDIOLOGY  IP CONSULT TO DIABETES MANAGEMENT  IP CONSULT TO CASE MANAGEMENT  IP CONSULT TO CASE MANAGEMENT  IP CONSULT TO ADVANCED HEART FAILURE NURSE NAVIGATOR    PROCEDURES/SURGERIES: Procedure(s):  Left heart cath / coronary angiography    ADMITTING [address] [facility] [address] COURSE:   Getachew Davies is a 77 y.o. female with past medical history significant for congestive heart failure, type 2 diabetes, chronic kidney disease, hypertension, paroxysmal atrial fibrillation; on Eliquis for anticoagulation, status post pacemaker insertion secondary to complete heart block who presented at the emergency room with shortness of breath. The patient denies any fever, chills, chest or abdominal pain, nausea, vomiting, cough, congestion, recent illness, palpitations, or dysuria.     Acute on chronic HFpEF class IV  -Continue diuresis with IV Bumex 2 mg twice daily   -strict I's and O's with daily weights  -Cardiology consult     Chronic hypoxic respiratory failure 2/2 to above   -Management of CHF as outlined above-     History of CAD  Obesity  ACD  Type 2 diabetes  Hypertension  CKD stage III  Status post PPM secondary to complete heart block  PAF  FREDERICK  Depression  - Sliding scale readjust.  - Continue home antihypertensives  - cr stable   -CPAP nightly  -Continue Eliquis  -PTOT    DISCHARGE DIAGNOSES / PLAN:      Acute on chronic HFpEF class IV  -Resume home meds  Echo shows diastolic dysfunction with a EF of 50 to

## 2023-08-23 ENCOUNTER — HOME CARE VISIT (OUTPATIENT)
Dept: HOME HEALTH SERVICES | Facility: HOME HEALTH | Age: 78
End: 2023-08-23
Payer: MEDICARE

## 2023-08-23 ENCOUNTER — HOME CARE VISIT (OUTPATIENT)
Facility: HOME HEALTH | Age: 78
End: 2023-08-23
Payer: MEDICARE

## 2023-08-23 VITALS
RESPIRATION RATE: 20 BRPM | HEART RATE: 80 BPM | DIASTOLIC BLOOD PRESSURE: 62 MMHG | TEMPERATURE: 97.3 F | OXYGEN SATURATION: 97 % | SYSTOLIC BLOOD PRESSURE: 134 MMHG

## 2023-08-23 PROCEDURE — G0299 HHS/HOSPICE OF RN EA 15 MIN: HCPCS

## 2023-08-23 ASSESSMENT — ENCOUNTER SYMPTOMS
STOOL DESCRIPTION: FORMED
DYSPNEA ACTIVITY LEVEL: AFTER AMBULATING LESS THAN 10 FT
PAIN LOCATION - PAIN QUALITY: THROB, ACHE

## 2023-08-23 NOTE — HOME HEALTH
agency phone number, and your medications in one place for easy access and in a zip lock bag to protect them. Take your Admission Handbook, written emergency preparedness plan, written medication list, necessary supplies and folder if you relocate in the event of an emergency, if possible. Call agency if you relocate so we can contact you. Patient/Caregiver verbalize knowledge of above through teach back with 100 percent accuracy.

## 2023-08-25 ENCOUNTER — HOME CARE VISIT (OUTPATIENT)
Facility: HOME HEALTH | Age: 78
End: 2023-08-25
Payer: MEDICARE

## 2023-08-25 VITALS
OXYGEN SATURATION: 96 % | HEART RATE: 85 BPM | DIASTOLIC BLOOD PRESSURE: 74 MMHG | TEMPERATURE: 97.9 F | SYSTOLIC BLOOD PRESSURE: 132 MMHG

## 2023-08-25 PROCEDURE — G0151 HHCP-SERV OF PT,EA 15 MIN: HCPCS

## 2023-08-25 PROCEDURE — G0152 HHCP-SERV OF OT,EA 15 MIN: HCPCS

## 2023-08-25 NOTE — HOME HEALTH
9.7.23 Dr. Med Herr reason for admission/summary of clinical condition:   Patient is a 68yo female initially referred to [facility] services following multiple hospitalizations with admission to [facility] 7.30-8.2. 23 with patient sp heart cath with percutaneous coronary intervention with stent placed. During [facility] episode, patient was rehospitalized at [facility] 8.18-8.22.23 with acute on chronic CHF and hypoxic RF. Patient received pacemaker in April 2022 and has been admitted for CHF. Patient now using continuous oxygen at 3l. 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 Patient reports she was to have TKR in January 2023 but had a fall and has had other medical complications that have prevented surgery to date. Patient lives alone in [facility] with elevator access and uses cane or rollator for all ambulation. Generally independent with basic ADLs and light IADLs including driving. Has dtr who lives locally but is available at limited times to assist patient. Subjective:   \"I am feeling much better. \"  \"I liked using the tub seat. \"  Caregiver:   dtr. Caregiver assists with: IADL and Transportation. Caregiver unable to assist with: Medications, ADL and IADL. Caregiver is available:  Inconsistently. Caregiver is not present at this visit. Medications unchanged from [facility] visit. Home health supplies by type and quantity ordered/delivered this visit include:   na  Patient instructed on plan of care and is agreeable to plan of care at this time. Clinician reviewed orientation to home health booklet with patient/caregiver including OT contact information and visit times for week of 8.27.23. Patient at risk for falls:  yes  Recommended requesting PT/OT orders due to fall risk :  PT/OT ordered   Patient response to recommended requesting of PT/OT orders:   na    Discharge planning discussed with patient and caregiver. Discharge planning as follows:  Will

## 2023-08-26 VITALS
RESPIRATION RATE: 16 BRPM | DIASTOLIC BLOOD PRESSURE: 87 MMHG | TEMPERATURE: 97.9 F | SYSTOLIC BLOOD PRESSURE: 132 MMHG | OXYGEN SATURATION: 96 % | HEART RATE: 71 BPM

## 2023-08-26 ASSESSMENT — ENCOUNTER SYMPTOMS
DYSPNEA ACTIVITY LEVEL: AFTER AMBULATING 10 - 20 FT
PAIN LOCATION - PAIN QUALITY: ACHY

## 2023-08-26 NOTE — HOME HEALTH
clarifications on: none    Interdisciplinary communication with: none    PCP: Ivelisse Brown MD    Next scheduled doctor appointment: TBD, Patient/Caregiver instructed to keep follow up appointment because lack of follow through with physician appointments could result in discontinuation of home care services for non-compliance. Patient/Caregiver verbalize knowledge of above through teach back with 100 percent accuracy. Emergency Preparedness: Patient/Caregiver instructed in the following:  Have one gallon of water per person for at least 3 days on hand. Have non-perishable food for at least 3 days that do not need to be cooked. Have flashlights and batteries. Charge your cell phones and any back up lithium batteries for your cell phones. Have 3+ days of back up oxygen in your home. Have a phone in your home that is hard wired and does not require power. Have medication for a week in your home. Make sure you have a caregiver in the home to provide care in case your home health nurse cannot get to your house. Make sure you have all of your paperwork i.e. written emergency preparedness plan, Identification, insurance cards, DME phone number, physician and pharmacy phone number, agency phone number, and your medications in one place for easy access and in a zip lock bag to protect them. Take your Admission Handbook, written emergency preparedness plan, written medication list and folder if you relocate in the event of an emergency, if possible. Call agency if you relocate so we can contact you.     Patient/Caregiver verbalize knowledge of above through teach back with 100% percent accuracy

## 2023-08-29 ENCOUNTER — HOME CARE VISIT (OUTPATIENT)
Facility: HOME HEALTH | Age: 78
End: 2023-08-29
Payer: MEDICARE

## 2023-08-29 VITALS
SYSTOLIC BLOOD PRESSURE: 122 MMHG | WEIGHT: 258.8 LBS | OXYGEN SATURATION: 97 % | BODY MASS INDEX: 40.62 KG/M2 | DIASTOLIC BLOOD PRESSURE: 64 MMHG | HEART RATE: 73 BPM | TEMPERATURE: 98 F | RESPIRATION RATE: 16 BRPM

## 2023-08-29 VITALS — OXYGEN SATURATION: 98 % | SYSTOLIC BLOOD PRESSURE: 132 MMHG | TEMPERATURE: 98.1 F | DIASTOLIC BLOOD PRESSURE: 76 MMHG

## 2023-08-29 PROCEDURE — G0152 HHCP-SERV OF OT,EA 15 MIN: HCPCS

## 2023-08-29 PROCEDURE — G0300 HHS/HOSPICE OF LPN EA 15 MIN: HCPCS

## 2023-08-29 ASSESSMENT — ENCOUNTER SYMPTOMS: CONSTIPATION: 1

## 2023-08-29 NOTE — HOME HEALTH
Subjective:   \"I don't feel good enough to go down the romano to the laundry room today. \"  Falls since last visit :  yes, fall report completed  Caregiver involvement changes:   na  Home health supplies by type and quantity ordered/delivered this visit include:   na    Clinician asked if patient has had any physician contact since last home care visit and patient states: NO    To see PCP 8.30.23 for appointment missed when patient rehospitalized  Clinician asked if patient has any new or changed medications and patient states:  NO   If Yes, were medications reconciled? N/A   Was the certifying physician notified of changes in medications? N/A     Clinical assessment (what this visit means for the patient overall and need for ongoing skilled care) and progress or lack of progress towards SPECIFIC goals:    Patient reports fall in bathroom due to tripping over oxygen tubing, fall report documented in fall tracking section. Addressed IADL/oxygen safety this visit to include kitchen tasks although patient was not feeling strong enough to exit apartment to access laundry room today. Patient to continue to benefit from skilled OT intervention toward remaining goals with focus on IADLs, energy conservation and oxygen safety.         Written Teaching Material Utilized:  lizzeth    Interdisciplinary communication with:   case communication to PCP and [facility] regarding recent fall  Discharge planning as follows:  continue 2x week, goals remain appropriate  Specific plan for next visit:  IADLs for laundry performance/safety

## 2023-08-30 ENCOUNTER — HOME CARE VISIT (OUTPATIENT)
Facility: HOME HEALTH | Age: 78
End: 2023-08-30
Payer: MEDICARE

## 2023-08-30 PROCEDURE — G0151 HHCP-SERV OF PT,EA 15 MIN: HCPCS

## 2023-08-30 NOTE — HOME HEALTH
Subjective: \"I can't wait until I don't need this oxygen anymore. \"  Falls since last visit No(if yes complete the Fall Tracking Form and include bsrifallreport):   Caregiver involvement changes: No  Home health supplies by type and quantity ordered/delivered this visit include: n/a    Clinician asked if patient has had any physician contact since last home care visit and patient states: NO  Clinician asked if patient has any new or changed medications and patient states:  YES Ferrous Sulfate 325mg PO daily w/ breakfast  If Yes, were medications reconciled? YES   Was the certifying physician notified of changes in medications? NO MD ordered    Clinical assessment (what this visit means for the patient overall and need for ongoing skilled care) and progress or lack of progress towards SPECIFIC goals: Pt at risk for rehospitalization r/t fall risk, hypoxia, CHF exacerbation. Written Teaching Material Utilized: N/A    Interdisciplinary communication with: N/A for the purpose of n/a    Discharge planning as follows:  When wound is 100% healed and When goals are met    Specific plan for next visit: Assessment, wound assessment, education as needed

## 2023-08-31 ENCOUNTER — HOME CARE VISIT (OUTPATIENT)
Facility: HOME HEALTH | Age: 78
End: 2023-08-31
Payer: MEDICARE

## 2023-08-31 ENCOUNTER — TRANSCRIBE ORDERS (OUTPATIENT)
Facility: HOSPITAL | Age: 78
End: 2023-08-31

## 2023-08-31 VITALS
BODY MASS INDEX: 41.03 KG/M2 | SYSTOLIC BLOOD PRESSURE: 138 MMHG | HEART RATE: 82 BPM | RESPIRATION RATE: 16 BRPM | OXYGEN SATURATION: 100 % | DIASTOLIC BLOOD PRESSURE: 70 MMHG | WEIGHT: 254.2 LBS | TEMPERATURE: 98.2 F

## 2023-08-31 DIAGNOSIS — R91.8 LUNG FIELD ABNORMAL FINDING ON EXAMINATION: Primary | ICD-10-CM

## 2023-08-31 DIAGNOSIS — R09.02 HYPOXEMIA: Primary | ICD-10-CM

## 2023-08-31 PROCEDURE — G0300 HHS/HOSPICE OF LPN EA 15 MIN: HCPCS

## 2023-08-31 ASSESSMENT — ENCOUNTER SYMPTOMS: PAIN LOCATION - PAIN QUALITY: ACHE; OCCASIONALLY SHARP

## 2023-09-01 ENCOUNTER — HOSPITAL ENCOUNTER (OUTPATIENT)
Age: 78
End: 2023-09-01
Payer: MEDICARE

## 2023-09-01 ENCOUNTER — HOME CARE VISIT (OUTPATIENT)
Facility: HOME HEALTH | Age: 78
End: 2023-09-01
Payer: MEDICARE

## 2023-09-01 VITALS
TEMPERATURE: 98.2 F | DIASTOLIC BLOOD PRESSURE: 70 MMHG | SYSTOLIC BLOOD PRESSURE: 132 MMHG | HEART RATE: 74 BPM | OXYGEN SATURATION: 96 %

## 2023-09-01 DIAGNOSIS — R91.8 LUNG FIELD ABNORMAL FINDING ON EXAMINATION: ICD-10-CM

## 2023-09-01 PROCEDURE — G0151 HHCP-SERV OF PT,EA 15 MIN: HCPCS

## 2023-09-01 PROCEDURE — 71250 CT THORAX DX C-: CPT

## 2023-09-01 PROCEDURE — G0152 HHCP-SERV OF OT,EA 15 MIN: HCPCS

## 2023-09-01 NOTE — HOME HEALTH
Subjective:   \"My knees have been hurting so bad after the doctor's appoint yesterday. \"  Falls since last visit :   no  Caregiver involvement changes:   na  Home health supplies by type and quantity ordered/delivered this visit include:   na    Clinician asked if patient has had any physician contact since last home care visit and patient states: YES  Clinician asked if patient has any new or changed medications and patient states:  NO   If Yes, were medications reconciled? N/A   Was the certifying physician notified of changes in medications? N/A     Clinical assessment (what this visit means for the patient overall and need for ongoing skilled care) and progress or lack of progress towards SPECIFIC goals:    Patient receptive to performing laundry task and working with portable oxygen tanks including regulator management today. Patient indicating increased B knee pain following increased activity for PCP visit yesterday. OT contacted [address] [place] [address] to request water bottle for concentrator due to patient reporting dry nasal passages and nose bleed at times due to oxygen use. Bottle to be shipped to patient's home via [address] [facility]. Despite pain, patient managed laundry task well and receptive to learning how to use regulator on portable tanks today. Patient to continue to benefit from continued skilled OT intervention to maximize safety during ADLs/IADLs and oxygen safety for in home and community use.         Written Teaching Material Utilized:  lizzeth  Interdisciplinary communication with:   discussed patient status with PT during overlap in visits today    Discharge planning as follows:   continue 2x week, goals remain appropriate    Specific plan for next visit:   teach oxygen safety, IADL training

## 2023-09-01 NOTE — HOME HEALTH
Subjective: \"I was so exhausted after I got home from my doctor appointment yesterday, I slept from 4 in the afternoon until 2:30 this morning. \"  Falls since last visit No(if yes complete the Fall Tracking Form and include bsrifallreport):   Caregiver involvement changes: NO  Home health supplies by type and quantity ordered/delivered this visit include: n/a    Clinician asked if patient has had any physician contact since last home care visit and patient states: YES  Clinician asked if patient has any new or changed medications and patient states:  NO   If Yes, were medications reconciled? N/A   Was the certifying physician notified of changes in medications? N/A     Clinical assessment (what this visit means for the patient overall and need for ongoing skilled care) and progress or lack of progress towards SPECIFIC goals: Pt at risk for rehospitalization r/t fall risk, hypoxia, dyspnea. Written Teaching Material Utilized: N/A    Interdisciplinary communication with: N/A for the purpose of n/a    Discharge planning as follows:  When wound is 100% healed and When goals are met    Specific plan for next visit: Assessment, wound assessment/care, education as needed

## 2023-09-02 VITALS
OXYGEN SATURATION: 98 % | TEMPERATURE: 98 F | HEART RATE: 77 BPM | RESPIRATION RATE: 16 BRPM | DIASTOLIC BLOOD PRESSURE: 70 MMHG | SYSTOLIC BLOOD PRESSURE: 132 MMHG

## 2023-09-02 ASSESSMENT — ENCOUNTER SYMPTOMS: PAIN LOCATION - PAIN QUALITY: ACHY

## 2023-09-02 NOTE — HOME HEALTH
Subjective: I am going to see my MD later today   I Falls since last visit (if yes include. bsrifallreport): None reported by the patient/caregiver. Caregiver involvement: Yes- patients daughter will be available as needed, was not present today. Home health supplies by type and quantity ordered/delivered this visit include none today. Does the patient have any new or changed medications? No  If Yes, were medications reconciled? no.  Was the certifying physician notified of changes in medications? na.  Clinical assessment (what this visit means for the patient overall and need for ongoing skilled care): PT interventions including  Gait training, daibetic foot education and safety education-Patient was able to repeat back all the diabetic foot education with 100% accuracy. Patient will continue to need more PT to improve her functions so that the patient can transfer with less possible assistance from the caregiver. Progress or lack of progress toward specific goals: Patient demonstrated no progress today and need more sessions at this time to meet the goals  Inter disciplinary communication: none  Discharge planning: DC when met all the goals/DC when home PT is no longer appropriate/ DC when patient is no longer home bound. Specific plan for next visit: BLE resisted exercises, gait training and transfer training    - so that the patient can ambulate/ transfer with less possible assistance from the caregiver.

## 2023-09-04 ENCOUNTER — HOME CARE VISIT (OUTPATIENT)
Facility: HOME HEALTH | Age: 78
End: 2023-09-04
Payer: MEDICARE

## 2023-09-04 VITALS — TEMPERATURE: 98.5 F | SYSTOLIC BLOOD PRESSURE: 130 MMHG | OXYGEN SATURATION: 96 % | DIASTOLIC BLOOD PRESSURE: 80 MMHG

## 2023-09-04 PROCEDURE — G0152 HHCP-SERV OF OT,EA 15 MIN: HCPCS

## 2023-09-04 NOTE — HOME HEALTH
Subjective:   \"I ran out of oxygen while out at my scan. \"  Falls since last visit :  none reported  Caregiver involvement changes:   lizzeth  Home health supplies by type and quantity ordered/delivered this visit include:   lizzeth    Clinician asked if patient has had any physician contact since last home care visit and patient states: YES  Clinician asked if patient has any new or changed medications and patient states:  NO   If Yes, were medications reconciled? N/A   Was the certifying physician notified of changes in medications? N/A     Clinical assessment (what this visit means for the patient overall and need for ongoing skilled care) and progress or lack of progress towards SPECIFIC goals:   Patient continues to present with considerable back and B knee pain in addition to limited activity tolerance due to respiratory status this visit. Patient not using oxygen continuously with patient reporting saturations staying in upper 90% range although without oxygen at OT arrival today saturation was 90%. Patient having difficulty with managing basic light IADLs for meal prep and expecting hired assist for cleaning to begin soon. Dtr providing transportation to appointments with patient unable to manage oxygen equipment without assist at all times. Focus this visit on energy conservation and reducing fall risk in home setting. Continue 2x week toward stated goals.          Written Teaching Material Utilized:   lizzeth    Interdisciplinary communication with:   lizzeth    Discharge planning as follows:   continue 2x week, goals remain appropriate    Specific plan for next visit:   IADL training, EC education

## 2023-09-05 ENCOUNTER — HOME CARE VISIT (OUTPATIENT)
Facility: HOME HEALTH | Age: 78
End: 2023-09-05
Payer: MEDICARE

## 2023-09-05 VITALS
TEMPERATURE: 97.8 F | DIASTOLIC BLOOD PRESSURE: 66 MMHG | RESPIRATION RATE: 16 BRPM | WEIGHT: 242.2 LBS | HEART RATE: 72 BPM | BODY MASS INDEX: 39.09 KG/M2 | SYSTOLIC BLOOD PRESSURE: 132 MMHG | OXYGEN SATURATION: 97 %

## 2023-09-05 PROCEDURE — G0300 HHS/HOSPICE OF LPN EA 15 MIN: HCPCS

## 2023-09-05 NOTE — HOME HEALTH
Subjective: \"I've been more active today than previously. \"  Falls since last visit No(if yes complete the Fall Tracking Form and include bsrifallreport):   Caregiver involvement changes: No  Home health supplies by type and quantity ordered/delivered this visit include: n/a    Clinician asked if patient has had any physician contact since last home care visit and patient states: NO  Clinician asked if patient has any new or changed medications and patient states:  NO   If Yes, were medications reconciled? N/A   Was the certifying physician notified of changes in medications? N/A     Clinical assessment (what this visit means for the patient overall and need for ongoing skilled care) and progress or lack of progress towards SPECIFIC goals: Pt at risk for rehospitalization r/t fall risk, dyspnea, CHF exacerbation. Written Teaching Material Utilized: N/A    Interdisciplinary communication with: N/A for the purpose of n/a    Discharge planning as follows:  When wound is 100% healed and When goals are met    Specific plan for next visit: Assessment, education as needed

## 2023-09-06 ENCOUNTER — HOME CARE VISIT (OUTPATIENT)
Facility: HOME HEALTH | Age: 78
End: 2023-09-06
Payer: MEDICARE

## 2023-09-07 ENCOUNTER — HOME CARE VISIT (OUTPATIENT)
Facility: HOME HEALTH | Age: 78
End: 2023-09-07
Payer: MEDICARE

## 2023-09-07 VITALS
SYSTOLIC BLOOD PRESSURE: 122 MMHG | OXYGEN SATURATION: 95 % | DIASTOLIC BLOOD PRESSURE: 68 MMHG | RESPIRATION RATE: 16 BRPM | HEART RATE: 80 BPM | TEMPERATURE: 97.8 F

## 2023-09-07 PROCEDURE — G0299 HHS/HOSPICE OF RN EA 15 MIN: HCPCS

## 2023-09-07 ASSESSMENT — ENCOUNTER SYMPTOMS
CONSTIPATION: 1
DYSPNEA ACTIVITY LEVEL: WHILE SPEAKING

## 2023-09-08 ENCOUNTER — HOME CARE VISIT (OUTPATIENT)
Facility: HOME HEALTH | Age: 78
End: 2023-09-08
Payer: MEDICARE

## 2023-09-08 VITALS — OXYGEN SATURATION: 98 % | HEART RATE: 73 BPM

## 2023-09-08 PROCEDURE — G0151 HHCP-SERV OF PT,EA 15 MIN: HCPCS

## 2023-09-08 PROCEDURE — G0152 HHCP-SERV OF OT,EA 15 MIN: HCPCS

## 2023-09-08 NOTE — HOME HEALTH
Subjective:   \"I had to go out yesterday and I was worn out. \"  Falls since last visit :  no  Caregiver involvement changes:   lizzeth  Home health supplies by type and quantity ordered/delivered this visit include:   lizzeth    Clinician asked if patient has had any physician contact since last home care visit and patient states: YES  Clinician asked if patient has any new or changed medications and patient states:  NO   If Yes, were medications reconciled? N/A   Was the certifying physician notified of changes in medications? N/A     Clinical assessment (what this visit means for the patient overall and need for ongoing skilled care) and progress or lack of progress towards SPECIFIC goals:    Patient continues to tolerate increased activity this visit with patient electing to attempt to access laundry area without use of supplemental oxygen. She is expecting hired assist for house cleaning next week and is excited to have home cleaned without doing it herself. Patient making slow, steady gains and presents with brighter affect this visit as she feels she can do more safely at this time. Saw cardiology, Dr. Sheyla Hodges, yesterday with no new medications to report.         Written Teaching Material Utilized:   lizzeth    Interdisciplinary communication with:   lizzeth  Discharge planning as follows:  continue 2x week. goals remain appropriate  Specific plan for next visit:   IADL training, standing tolerance

## 2023-09-08 NOTE — HOME HEALTH
Subjective: I have not had a bowel movement in a couple of days   Falls since last visit No(if yes complete the Fall Tracking Form and include bsrifallreport):   Caregiver involvement changes: n/a  Home health supplies by type and quantity ordered/delivered this visit include: n/a    Clinician asked if patient has had any physician contact since last home care visit and patient states: YES  Clinician asked if patient has any new or changed medications and patient states:  NO   If Yes, were medications reconciled? N/A   Was the certifying physician notified of changes in medications? N/A     Clinical assessment (what this visit means for the patient overall and need for ongoing skilled care) and progress or lack of progress towards SPECIFIC goals: patient remains at risk for rehospitalization due to continued SOB, oxygen dependence and low activity tolerance    Written Teaching Material Utilized: N/A    Interdisciplinary communication with: N/A for the purpose of n/a    Discharge planning as follows:  When goals are met    Specific plan for next visit: Instruct caregiver/patient in monitoring daily weights, medication education, regular bowel movements

## 2023-09-09 VITALS
DIASTOLIC BLOOD PRESSURE: 78 MMHG | HEART RATE: 71 BPM | OXYGEN SATURATION: 98 % | TEMPERATURE: 98 F | RESPIRATION RATE: 16 BRPM | SYSTOLIC BLOOD PRESSURE: 122 MMHG

## 2023-09-09 NOTE — HOME HEALTH
Subjective: I want to walk to the [facility] room. I Falls since last visit (if yes include. bsrifallreport): None reported by the patient/caregiver. Caregiver involvement: Yes- patients daughter will be available as needed, was not present today. Home health supplies by type and quantity ordered/delivered this visit include none today. Does the patient have any new or changed medications? No  If Yes, were medications reconciled? no.  Was the certifying physician notified of changes in medications? na.  Clinical assessment (what this visit means for the patient overall and need for ongoing skilled care): PT interventions including  Gait training, Transfer training and safety education-Patient was able to follow the proper gait pattern for few steps after the initial education, but was needed cues again for properly controlling the walker. Patient will continue to need more PT to improve her functions so that the patient can transfer with less possible assistance from the caregiver. Progress or lack of progress toward specific goals: Patient demonstrated good progress today by ambulating longer distance, but was needed CGAx1 for safety and need more sessions at this time to meet the goals  Inter disciplinary communication: none  Discharge planning: DC when met all the goals/DC when home PT is no longer appropriate/ DC when patient is no longer home bound. Specific plan for next visit: Continue with  BLE resisted exercises, gait training and transfer training    - so that the patient can ambulate/ transfer with less possible assistance from the caregiver.

## 2023-09-11 ENCOUNTER — HOME CARE VISIT (OUTPATIENT)
Facility: HOME HEALTH | Age: 78
End: 2023-09-11
Payer: MEDICARE

## 2023-09-11 VITALS
DIASTOLIC BLOOD PRESSURE: 76 MMHG | SYSTOLIC BLOOD PRESSURE: 134 MMHG | TEMPERATURE: 97.6 F | RESPIRATION RATE: 16 BRPM | OXYGEN SATURATION: 98 % | HEART RATE: 71 BPM

## 2023-09-11 PROCEDURE — G0151 HHCP-SERV OF PT,EA 15 MIN: HCPCS

## 2023-09-11 NOTE — HOME HEALTH
Subjective: I want to check my mails today. I Falls since last visit (if yes include. bsrifallreport): None reported by the patient/caregiver. Caregiver involvement: Yes- patients daughter will be available as needed, was not present today. Home health supplies by type and quantity ordered/delivered this visit include none today. Does the patient have any new or changed medications? No  If Yes, were medications reconciled? no.  Was the certifying physician notified of changes in medications? na.  Clinical assessment (what this visit means for the patient overall and need for ongoing skilled care): PT interventions including  Gait training, Transfer training and safety education-Patient was able to follow the proper gait pattern for few steps after the initial education, but was needed cues again like previous visits for properly controlling the walker. Patient will continue to need more PT to improve her functions so that the patient can transfer with less possible assistance from the caregiver. Progress or lack of progress toward specific goals: Patient demonstrated good progress today by ambulating longer distance, but was needed CGAx1 for safety like previous visits and need more sessions at this time to meet the goals  Inter disciplinary communication: none  Discharge planning: DC when met all the goals/DC when home PT is no longer appropriate/ DC when patient is no longer home bound. Specific plan for next visit: Continue with  BLE resisted exercises, gait training and transfer training    - so that the patient can ambulate/ transfer with less possible assistance from the caregiver.

## 2023-09-12 ENCOUNTER — HOME CARE VISIT (OUTPATIENT)
Facility: HOME HEALTH | Age: 78
End: 2023-09-12
Payer: MEDICARE

## 2023-09-12 VITALS
RESPIRATION RATE: 16 BRPM | SYSTOLIC BLOOD PRESSURE: 136 MMHG | HEART RATE: 71 BPM | TEMPERATURE: 97.5 F | BODY MASS INDEX: 40.35 KG/M2 | OXYGEN SATURATION: 96 % | WEIGHT: 250 LBS | DIASTOLIC BLOOD PRESSURE: 64 MMHG

## 2023-09-12 VITALS — OXYGEN SATURATION: 99 % | SYSTOLIC BLOOD PRESSURE: 110 MMHG | HEART RATE: 86 BPM | DIASTOLIC BLOOD PRESSURE: 70 MMHG

## 2023-09-12 PROCEDURE — G0300 HHS/HOSPICE OF LPN EA 15 MIN: HCPCS

## 2023-09-12 PROCEDURE — G0152 HHCP-SERV OF OT,EA 15 MIN: HCPCS

## 2023-09-12 ASSESSMENT — ENCOUNTER SYMPTOMS: PAIN LOCATION - PAIN QUALITY: ACHE

## 2023-09-12 NOTE — HOME HEALTH
Subjective:   \"I have been doing so much better. \"  Falls since last visit:  no  Caregiver involvement changes:   lizzeth  Home health supplies by type and quantity ordered/delivered this visit include:   lizzeth    Clinician asked if patient has had any physician contact since last home care visit and patient states: NO  Clinician asked if patient has any new or changed medications and patient states:  NO   If Yes, were medications reconciled? N/A   Was the certifying physician notified of changes in medications? N/A    Clinical assessment (what this visit means for the patient overall and need for ongoing skilled care) and progress or lack of progress towards SPECIFIC goals:    Patient tolerating improved activity level with stabilization in oxygen saturation even without use of supplemental oxygen although back and B knee pain limit ambulation distance. This visit focused on IADL safety and energy conservation. Patient making significant progress in all areas, ADL goal achieved this visit.         Written Teaching Material Utilized:   lizzeth  Interdisciplinary communication with:   lizzeth    Discharge planning as follows:   planned DC next visit, ADL goal achieved at this time    Specific plan for next visit:

## 2023-09-12 NOTE — HOME HEALTH
Subjective: \"My doctor is encouraging me to do without the oxygen whenever I can. \"  Falls since last visit No(if yes complete the Fall Tracking Form and include bsrifallreport):   Caregiver involvement changes: No  Home health supplies by type and quantity ordered/delivered this visit include: n/a    Clinician asked if patient has had any physician contact since last home care visit and patient states: YES  Clinician asked if patient has any new or changed medications and patient states:  NO   If Yes, were medications reconciled? N/A   Was the certifying physician notified of changes in medications? N/A     Clinical assessment (what this visit means for the patient overall and need for ongoing skilled care) and progress or lack of progress towards SPECIFIC goals: Pt at risk for rehospitalization r/t fall risk, dyspnea, hypoxia. Written Teaching Material Utilized: N/A    Interdisciplinary communication with: N/A for the purpose of n/a    Discharge planning as follows:  When goals are met    Specific plan for next visit: Assessment, education as needed

## 2023-09-14 ENCOUNTER — HOME CARE VISIT (OUTPATIENT)
Facility: HOME HEALTH | Age: 78
End: 2023-09-14
Payer: MEDICARE

## 2023-09-14 VITALS
TEMPERATURE: 98.7 F | DIASTOLIC BLOOD PRESSURE: 85 MMHG | SYSTOLIC BLOOD PRESSURE: 142 MMHG | OXYGEN SATURATION: 99 % | HEART RATE: 72 BPM

## 2023-09-14 VITALS
HEART RATE: 72 BPM | TEMPERATURE: 97.8 F | RESPIRATION RATE: 15 BRPM | SYSTOLIC BLOOD PRESSURE: 142 MMHG | OXYGEN SATURATION: 99 % | DIASTOLIC BLOOD PRESSURE: 85 MMHG

## 2023-09-14 PROCEDURE — G0151 HHCP-SERV OF PT,EA 15 MIN: HCPCS

## 2023-09-14 PROCEDURE — G0152 HHCP-SERV OF OT,EA 15 MIN: HCPCS

## 2023-09-15 ASSESSMENT — ENCOUNTER SYMPTOMS: PAIN LOCATION - PAIN QUALITY: ACHY

## 2023-09-15 NOTE — HOME HEALTH
Subjective: I want to check my mails again today   I Falls since last visit (if yes include. bsrifallreport): None reported by the patient/caregiver. Caregiver involvement: Yes- patients daughter will be available as needed, was not present today. Home health supplies by type and quantity ordered/delivered this visit include none today. Does the patient have any new or changed medications? No  If Yes, were medications reconciled? no.  Was the certifying physician notified of changes in medications? na.  Clinical assessment (what this visit means for the patient overall and need for ongoing skilled care): PT interventions including  Gait training, HEP reeducation  and safety education-Patient was able to repeat back all the HEP with 80% accuracy. Patient will continue to need more PT to improve her functions so that the patient can transfer with less possible assistance from the caregiver. Progress or lack of progress toward specific goals: Patient demonstrated no progress today compared to the previous visit  and need more sessions at this time to meet the goals  Inter disciplinary communication: none  Discharge planning: DC when met all the goals/DC when home PT is no longer appropriate/ DC when patient is no longer home bound. Specific plan for next visit: Gait training and transfer training    - so that the patient can ambulate/ transfer with less possible assistance from the caregiver.

## 2023-09-19 ENCOUNTER — HOME CARE VISIT (OUTPATIENT)
Facility: HOME HEALTH | Age: 78
End: 2023-09-19
Payer: MEDICARE

## 2023-09-19 VITALS
DIASTOLIC BLOOD PRESSURE: 78 MMHG | SYSTOLIC BLOOD PRESSURE: 127 MMHG | HEART RATE: 67 BPM | TEMPERATURE: 98 F | RESPIRATION RATE: 16 BRPM | OXYGEN SATURATION: 98 %

## 2023-09-19 PROCEDURE — G0151 HHCP-SERV OF PT,EA 15 MIN: HCPCS

## 2023-09-19 ASSESSMENT — ENCOUNTER SYMPTOMS: PAIN LOCATION - PAIN QUALITY: ACHY

## 2023-09-20 NOTE — HOME HEALTH
Subjective: I want to walk to the [address] [place] [address] since last visit (if yes include. bsrifallreport): None reported by the patient/caregiver. Caregiver involvement: Yes- patients daughter will be available as needed, was not present today. Home health supplies by type and quantity ordered/delivered this visit include none today. Does the patient have any new or changed medications? No  If Yes, were medications reconciled? no.  Was the certifying physician notified of changes in medications? na.  Clinical assessment (what this visit means for the patient overall and need for ongoing skilled care): PT interventions including  Gait training, transfer training  and safety education-Patient was to follow the proper pattern after the initial education while doing the transfers but was needed SBAx1 for safety and need more training at this time to meet the goals. Progress or lack of progress toward specific goals: Patient demonstrated good progress today in her ambulation by covering more distance, but was needed SBAx1 for safety  and need more sessions at this time to meet the goals  Inter disciplinary communication: none  Discharge planning: DC when met all the goals/DC when home PT is no longer appropriate/ DC when patient is no longer home bound. Specific plan for next visit: 30 day  reassessment Gait training and transfer training    - so that the patient can ambulate/ transfer with less possible assistance from the caregiver.

## 2023-09-21 ENCOUNTER — HOME CARE VISIT (OUTPATIENT)
Facility: HOME HEALTH | Age: 78
End: 2023-09-21
Payer: MEDICARE

## 2023-09-21 NOTE — CASE COMMUNICATION
PT visit canceled secondary to patient was not avilable when contacted prior to the visit. Patient want a call prior to the visit and PT was unable to see the patient secondary to this.  PT reassessment missed secondary to this and PT will contact the patient again next week to continue the care

## 2023-09-22 ENCOUNTER — HOME CARE VISIT (OUTPATIENT)
Facility: HOME HEALTH | Age: 78
End: 2023-09-22
Payer: MEDICARE

## 2023-09-22 VITALS
SYSTOLIC BLOOD PRESSURE: 140 MMHG | HEART RATE: 72 BPM | TEMPERATURE: 98.2 F | RESPIRATION RATE: 16 BRPM | OXYGEN SATURATION: 100 % | BODY MASS INDEX: 40.38 KG/M2 | WEIGHT: 250.2 LBS | DIASTOLIC BLOOD PRESSURE: 66 MMHG

## 2023-09-22 PROCEDURE — G0300 HHS/HOSPICE OF LPN EA 15 MIN: HCPCS

## 2023-09-22 ASSESSMENT — ENCOUNTER SYMPTOMS: PAIN LOCATION - PAIN QUALITY: TIGHTNESS

## 2023-09-22 NOTE — HOME HEALTH
Subjective: \"My knees are really bothering me. \"  Falls since last visit No(if yes complete the Fall Tracking Form and include bsrifallreport):   Caregiver involvement changes: No  Home health supplies by type and quantity ordered/delivered this visit include: n/a    Clinician asked if patient has had any physician contact since last home care visit and patient states: YES  Clinician asked if patient has any new or changed medications and patient states:  NO   If Yes, were medications reconciled? N/A   Was the certifying physician notified of changes in medications? N/A     Clinical assessment (what this visit means for the patient overall and need for ongoing skilled care) and progress or lack of progress towards SPECIFIC goals: Pt at risk for rehospitalization r/t fall risk, dyspnea, hypoxia. Written Teaching Material Utilized: N/A    Interdisciplinary communication with: N/A for the purpose of n/a    Discharge planning as follows:  When goals are met    Specific plan for next visit: Assessment, education as needed

## 2023-09-26 ENCOUNTER — HOME CARE VISIT (OUTPATIENT)
Facility: HOME HEALTH | Age: 78
End: 2023-09-26
Payer: MEDICARE

## 2023-09-26 VITALS
OXYGEN SATURATION: 95 % | SYSTOLIC BLOOD PRESSURE: 130 MMHG | RESPIRATION RATE: 16 BRPM | WEIGHT: 251 LBS | BODY MASS INDEX: 40.51 KG/M2 | DIASTOLIC BLOOD PRESSURE: 68 MMHG | TEMPERATURE: 98.5 F | HEART RATE: 84 BPM

## 2023-09-26 VITALS
HEART RATE: 84 BPM | WEIGHT: 251 LBS | DIASTOLIC BLOOD PRESSURE: 68 MMHG | BODY MASS INDEX: 40.51 KG/M2 | RESPIRATION RATE: 16 BRPM | TEMPERATURE: 98.5 F | OXYGEN SATURATION: 95 % | SYSTOLIC BLOOD PRESSURE: 130 MMHG

## 2023-09-26 PROCEDURE — G0151 HHCP-SERV OF PT,EA 15 MIN: HCPCS

## 2023-09-26 PROCEDURE — G0299 HHS/HOSPICE OF RN EA 15 MIN: HCPCS

## 2023-09-26 ASSESSMENT — ENCOUNTER SYMPTOMS
STOOL DESCRIPTION: FORMED
PAIN LOCATION - PAIN QUALITY: ACHE

## 2023-09-26 NOTE — HOME HEALTH
Subjective: upon PT arrival, pt does not have oxygen on. Pt reports she was \"fed up with my oxygen this morning. \" PT asked pt if she is supposed to use oxygen at all times and pt states yes. Pt's pulse ox is 95% upon PT arrival with pt seated however after PT instruction to hermila oxygen, pt walked to her bedroom to get her oxygen and her pulse ox dropped to 84% on room air however it immediately increased to 99% on 2L oxygen. PT instructed pt she must follow MD instruction and use oxygen per MD instruction, at all times. Manoj Huerta arrived during pt's PT treatment.   Falls since last visit: no falls   Caregiver involvement changes: none  Home health supplies by type and quantity ordered/delivered this visit include: n/a    Clinician asked if patient has had any physician contact since last home care visit and patient states: no  Clinician asked if patient has any new or changed medications and patient states: no  If Yes, were medications reconciled? n/a  Was the certifying physician notified of changes in medications? n/a    Clinical assessment (what this visit means for the patient overall and need for ongoing skilled care) and progress or lack of progress towards SPECIFIC goals: Pt understands CHF lifestyle modifications and she has achieved this goal. Pt understands DM foot management and she has achieved this goal. Pt was able to transfer x 5 reps with SBA, good technique and she has achieved this goal. Pt was able to amb in her building for gait training however she cont to require assistance for safety with ambulation as she has gait deficits increasing her fall risk, making steady progress toward her gait goal. Pt's strength is gradually improving as she was able to complete additional seated and standing ther ex, making good progress toward her strength goal. Pt cont to benefit from PT treatments to improve BLE strength, endurance, gait and balance to improve her function and safety and prevent

## 2023-09-27 NOTE — HOME HEALTH
Subjective: Patient states she is doing as well as she can and doesn't feel she needs us any longer. Falls since last visit No(if yes complete the Fall Tracking Form and include bsrifallreport):   Caregiver involvement changes: N/a  Home health supplies by type and quantity ordered/delivered this visit include: N/A    Clinician asked if patient has had any physician contact since last home care visit and patient states: NO  Clinician asked if patient has any new or changed medications and patient states:  N/A   If Yes, were medications reconciled? N/A   Was the certifying physician notified of changes in medications? N/A no    Clinical assessment (what this visit means for the patient overall and need for ongoing skilled care) and progress or lack of progress towards SPECIFIC goals: Patient met all goals and is discharged from [facility]- can repeat back all medications and uses/doses/symptoms to watch for. Can repeat back safety use with oxygen.     Written Teaching Material Utilized: N/A    Interdisciplinary communication with: N/A    Discharge planning as follows: Discharged from [facility]

## 2023-09-28 ENCOUNTER — HOME CARE VISIT (OUTPATIENT)
Facility: HOME HEALTH | Age: 78
End: 2023-09-28
Payer: MEDICARE

## 2023-09-28 PROCEDURE — G0157 HHC PT ASSISTANT EA 15: HCPCS

## 2023-09-29 VITALS
HEART RATE: 68 BPM | SYSTOLIC BLOOD PRESSURE: 128 MMHG | WEIGHT: 251 LBS | OXYGEN SATURATION: 99 % | TEMPERATURE: 98.2 F | DIASTOLIC BLOOD PRESSURE: 72 MMHG | BODY MASS INDEX: 40.51 KG/M2 | RESPIRATION RATE: 18 BRPM

## 2023-09-29 ASSESSMENT — ENCOUNTER SYMPTOMS: PAIN LOCATION - PAIN QUALITY: ACHING

## 2023-09-29 NOTE — HOME HEALTH
Subjective: Pt reported B knee pain. Falls since last visit No(if yes complete the Fall Tracking Form and include bsrifallreport): none. Caregiver involvement changes: none. Home health supplies by type and quantity ordered/delivered this visit include: none. Clinician asked if patient has had any physician contact since last home care visit and patient states: NO  Clinician asked if patient has any new or changed medications and patient states:  NO   If Yes, were medications reconciled? N/A   Was the certifying physician notified of changes in medications? N/A     Clinical assessment (what this visit means for the patient overall and need for ongoing skilled care) and progress or lack of progress towards SPECIFIC goals: Todays visit educated pt for energy conservation to keep 02 stats WNL. Written Teaching Material Utilized: N/A    Interdisciplinary communication with: N/A    Discharge planning as follows: When goals are met    Specific plan for next visit: Instruct caregiver/patient in B LE ex for strength.

## 2023-10-03 ENCOUNTER — HOME CARE VISIT (OUTPATIENT)
Facility: HOME HEALTH | Age: 78
End: 2023-10-03
Payer: MEDICARE

## 2023-10-03 VITALS
HEART RATE: 67 BPM | DIASTOLIC BLOOD PRESSURE: 77 MMHG | TEMPERATURE: 98.2 F | OXYGEN SATURATION: 98 % | RESPIRATION RATE: 16 BRPM | SYSTOLIC BLOOD PRESSURE: 122 MMHG

## 2023-10-03 PROCEDURE — G0151 HHCP-SERV OF PT,EA 15 MIN: HCPCS

## 2023-10-03 ASSESSMENT — ENCOUNTER SYMPTOMS: PAIN LOCATION - PAIN QUALITY: ACHY

## 2023-10-03 NOTE — HOME HEALTH
Subjective: I know all the exercises   I Falls since last visit (if yes include. bsrifallreport): None reported by the patient/caregiver. Caregiver involvement: Yes- patients daughter will be available as needed, was not present today. Home health supplies by type and quantity ordered/delivered this visit include none today. Does the patient have any new or changed medications? No  If Yes, were medications reconciled? no.  Was the certifying physician notified of changes in medications? na.  Clinical assessment (what this visit means for the patient overall and need for ongoing skilled care): PT interventions including  dc instructions, HEP reedcuation  and safety education-Patient was able to repeat back all the HEP with 100% accuracy.   Progress or lack of progress toward specific goals: met all the goals/patient is at max rehab potential  Inter disciplinary communication: none  Discharge planning: DC when met all the goals/DC when home PT is no longer appropriate/ DC when patient is no longer home bound/dc as of today  Specific plan for next visit: na

## 2023-11-08 ENCOUNTER — HOSPITAL ENCOUNTER (OUTPATIENT)
Facility: HOSPITAL | Age: 78
Discharge: HOME | End: 2023-11-11
Payer: MEDICARE

## 2023-11-08 VITALS
BODY MASS INDEX: 37.68 KG/M2 | WEIGHT: 240.08 LBS | TEMPERATURE: 97.9 F | DIASTOLIC BLOOD PRESSURE: 70 MMHG | SYSTOLIC BLOOD PRESSURE: 117 MMHG | HEIGHT: 67 IN | HEART RATE: 76 BPM

## 2023-11-08 LAB
ABO + RH BLD: NORMAL
ANION GAP SERPL CALC-SCNC: 10 MMOL/L (ref 5–15)
APPEARANCE UR: CLEAR
BACTERIA URNS QL MICRO: NEGATIVE /HPF
BILIRUB UR QL: NEGATIVE
BLOOD GROUP ANTIBODIES SERPL: NORMAL
BUN SERPL-MCNC: 39 MG/DL (ref 6–20)
BUN/CREAT SERPL: 21 (ref 12–20)
CALCIUM SERPL-MCNC: 9.3 MG/DL (ref 8.5–10.1)
CHLORIDE SERPL-SCNC: 102 MMOL/L (ref 97–108)
CO2 SERPL-SCNC: 24 MMOL/L (ref 21–32)
COLOR UR: ABNORMAL
CREAT SERPL-MCNC: 1.86 MG/DL (ref 0.55–1.02)
EPITH CASTS URNS QL MICRO: ABNORMAL /LPF
ERYTHROCYTE [DISTWIDTH] IN BLOOD BY AUTOMATED COUNT: 15.9 % (ref 11.5–14.5)
EST. AVERAGE GLUCOSE BLD GHB EST-MCNC: 151 MG/DL
GLUCOSE SERPL-MCNC: 172 MG/DL (ref 65–100)
GLUCOSE UR STRIP.AUTO-MCNC: 500 MG/DL
HBA1C MFR BLD: 6.9 % (ref 4–5.6)
HCT VFR BLD AUTO: 38.7 % (ref 35–47)
HGB BLD-MCNC: 11.8 G/DL (ref 11.5–16)
HGB UR QL STRIP: NEGATIVE
INR PPP: 1.1 (ref 0.9–1.1)
KETONES UR QL STRIP.AUTO: NEGATIVE MG/DL
LEUKOCYTE ESTERASE UR QL STRIP.AUTO: NEGATIVE
MCH RBC QN AUTO: 27.4 PG (ref 26–34)
MCHC RBC AUTO-ENTMCNC: 30.5 G/DL (ref 30–36.5)
MCV RBC AUTO: 90 FL (ref 80–99)
NITRITE UR QL STRIP.AUTO: NEGATIVE
NRBC # BLD: 0 K/UL (ref 0–0.01)
NRBC BLD-RTO: 0 PER 100 WBC
PH UR STRIP: 5 (ref 5–8)
PLATELET # BLD AUTO: 305 K/UL (ref 150–400)
PMV BLD AUTO: 8.7 FL (ref 8.9–12.9)
POTASSIUM SERPL-SCNC: 4.6 MMOL/L (ref 3.5–5.1)
PROT UR STRIP-MCNC: ABNORMAL MG/DL
PROTHROMBIN TIME: 11.4 SEC (ref 9–11.1)
RBC # BLD AUTO: 4.3 M/UL (ref 3.8–5.2)
RBC #/AREA URNS HPF: ABNORMAL /HPF (ref 0–5)
SODIUM SERPL-SCNC: 136 MMOL/L (ref 136–145)
SP GR UR REFRACTOMETRY: 1.01 (ref 1–1.03)
SPECIMEN EXP DATE BLD: NORMAL
URINE CULTURE IF INDICATED: ABNORMAL
UROBILINOGEN UR QL STRIP.AUTO: 0.2 EU/DL (ref 0.2–1)
WBC # BLD AUTO: 11.5 K/UL (ref 3.6–11)
WBC URNS QL MICRO: ABNORMAL /HPF (ref 0–4)

## 2023-11-08 PROCEDURE — 83036 HEMOGLOBIN GLYCOSYLATED A1C: CPT

## 2023-11-08 PROCEDURE — 80048 BASIC METABOLIC PNL TOTAL CA: CPT

## 2023-11-08 PROCEDURE — 85610 PROTHROMBIN TIME: CPT

## 2023-11-08 PROCEDURE — 86850 RBC ANTIBODY SCREEN: CPT

## 2023-11-08 PROCEDURE — 36415 COLL VENOUS BLD VENIPUNCTURE: CPT

## 2023-11-08 PROCEDURE — 81001 URINALYSIS AUTO W/SCOPE: CPT

## 2023-11-08 PROCEDURE — 85027 COMPLETE CBC AUTOMATED: CPT

## 2023-11-08 PROCEDURE — 86901 BLOOD TYPING SEROLOGIC RH(D): CPT

## 2023-11-08 PROCEDURE — 86900 BLOOD TYPING SEROLOGIC ABO: CPT

## 2023-11-08 RX ORDER — HYDROXYZINE HYDROCHLORIDE 10 MG/1
10 TABLET, FILM COATED ORAL EVERY 6 HOURS PRN
COMMUNITY

## 2023-11-08 ASSESSMENT — PAIN DESCRIPTION - LOCATION: LOCATION: KNEE

## 2023-11-08 ASSESSMENT — PAIN SCALES - GENERAL: PAINLEVEL_OUTOF10: 4

## 2023-11-08 ASSESSMENT — PAIN DESCRIPTION - PAIN TYPE: TYPE: CHRONIC PAIN

## 2023-11-08 NOTE — PERIOP NOTE
[address] INSTRUCTIONS    Surgery Date:   11/15/2023    Your surgeon's office or [facility] staff will call you between 4 PM- 8 PM the day before surgery with your arrival time. If your surgery is on a Monday, you will receive a call the preceding Friday. If your surgeon's office has given you, your arrival time then go by that time. Please report to [facility] Patient Access/Admitting on the 1st floor. Bring your insurance card, photo identification, and any copayment ( if applicable). If you are going home the same day of your surgery, you must have a responsible adult to drive you home. You need to have a responsible adult to stay with you the first 24 hours after surgery and you should not drive a car for 24 hours following your surgery. If you are being admitted to the hospital, please leave personal belongings/luggage in your car until you have an assigned hospital room number. Do NOT drink alcohol or smoke 24 hours before surgery. STOP smoking for 14 days prior as it helps with breathing and healing after surgery. Please wear comfortable clothes. Wear your glasses instead of contacts. We ask that all money, jewelry and valuables be left at home. Wear no make up, particularly mascara, the day of surgery. All body piercings, rings, and jewelry need to be removed and left at home. Remove all nail polish except for clear. Please wear your hair loose or down, no pony-tails, buns, or any metal hair accessories. You may wear deodorant, unless having breast surgery. Do not shave any body area within 24 hours of your surgery. Please follow all instructions to avoid any potential surgical cancellation. Should your physical condition change, (i.e. fever, cold, flu, etc.) please notify your surgeon as soon as possible. It is important to be on time. If a situation occurs where you may be delayed, please call:  [phone] / [phone] on the day of surgery.   The Preadmission

## 2023-11-09 LAB
BACTERIA SPEC CULT: NORMAL
BACTERIA SPEC CULT: NORMAL
SERVICE CMNT-IMP: NORMAL

## 2024-01-01 ENCOUNTER — APPOINTMENT (OUTPATIENT)
Facility: HOSPITAL | Age: 79
DRG: 871 | End: 2024-01-01
Payer: MEDICARE

## 2024-01-01 ENCOUNTER — TELEPHONE (OUTPATIENT)
Age: 79
End: 2024-01-01

## 2024-01-01 ENCOUNTER — HOSPITAL ENCOUNTER (INPATIENT)
Facility: HOSPITAL | Age: 79
LOS: 1 days | Discharge: HOSPICE | DRG: 871 | End: 2024-05-29
Attending: EMERGENCY MEDICINE | Admitting: FAMILY MEDICINE
Payer: MEDICARE

## 2024-01-01 ENCOUNTER — HOSPITAL ENCOUNTER (INPATIENT)
Facility: HOSPITAL | Age: 79
LOS: 1 days | DRG: 951 | End: 2024-05-30
Attending: FAMILY MEDICINE | Admitting: FAMILY MEDICINE
Payer: COMMERCIAL

## 2024-01-01 ENCOUNTER — HOSPICE ADMISSION (OUTPATIENT)
Age: 79
End: 2024-01-01
Payer: MEDICARE

## 2024-01-01 VITALS
OXYGEN SATURATION: 87 % | TEMPERATURE: 98.2 F | HEART RATE: 69 BPM | DIASTOLIC BLOOD PRESSURE: 36 MMHG | RESPIRATION RATE: 9 BRPM | SYSTOLIC BLOOD PRESSURE: 104 MMHG

## 2024-01-01 VITALS
RESPIRATION RATE: 20 BRPM | HEIGHT: 66 IN | DIASTOLIC BLOOD PRESSURE: 86 MMHG | BODY MASS INDEX: 35.66 KG/M2 | HEART RATE: 70 BPM | WEIGHT: 221.9 LBS | TEMPERATURE: 98.5 F | SYSTOLIC BLOOD PRESSURE: 103 MMHG | OXYGEN SATURATION: 97 %

## 2024-01-01 DIAGNOSIS — J81.0 ACUTE PULMONARY EDEMA (HCC): Primary | ICD-10-CM

## 2024-01-01 DIAGNOSIS — N17.9 AKI (ACUTE KIDNEY INJURY) (HCC): ICD-10-CM

## 2024-01-01 DIAGNOSIS — R09.02 HYPOXIA: ICD-10-CM

## 2024-01-01 LAB
ALBUMIN SERPL-MCNC: 1.5 G/DL (ref 3.5–5)
ALBUMIN SERPL-MCNC: 1.7 G/DL (ref 3.5–5)
ALBUMIN/GLOB SERPL: 0.3 (ref 1.1–2.2)
ALBUMIN/GLOB SERPL: 0.3 (ref 1.1–2.2)
ALP SERPL-CCNC: 87 U/L (ref 45–117)
ALP SERPL-CCNC: 96 U/L (ref 45–117)
ALT SERPL-CCNC: 8 U/L (ref 12–78)
ALT SERPL-CCNC: 9 U/L (ref 12–78)
ANION GAP SERPL CALC-SCNC: 12 MMOL/L (ref 5–15)
ANION GAP SERPL CALC-SCNC: 8 MMOL/L (ref 5–15)
APPEARANCE UR: ABNORMAL
ARTERIAL PATENCY WRIST A: ABNORMAL
ARTERIAL PATENCY WRIST A: POSITIVE
AST SERPL-CCNC: 23 U/L (ref 15–37)
AST SERPL-CCNC: 37 U/L (ref 15–37)
BACTERIA URNS QL MICRO: ABNORMAL /HPF
BASE DEFICIT BLD-SCNC: 9.3 MMOL/L
BASE DEFICIT BLD-SCNC: 9.7 MMOL/L
BASOPHILS # BLD: 0 K/UL (ref 0–0.1)
BASOPHILS # BLD: 0 K/UL (ref 0–0.1)
BASOPHILS NFR BLD: 0 % (ref 0–1)
BASOPHILS NFR BLD: 0 % (ref 0–1)
BDY SITE: ABNORMAL
BDY SITE: ABNORMAL
BILIRUB SERPL-MCNC: 0.4 MG/DL (ref 0.2–1)
BILIRUB SERPL-MCNC: 0.5 MG/DL (ref 0.2–1)
BILIRUB UR QL: NEGATIVE
BUN SERPL-MCNC: 54 MG/DL (ref 6–20)
BUN SERPL-MCNC: 58 MG/DL (ref 6–20)
BUN/CREAT SERPL: 15 (ref 12–20)
BUN/CREAT SERPL: 15 (ref 12–20)
C3 SERPL-MCNC: 135 MG/DL (ref 82–167)
C4 SERPL-MCNC: 42 MG/DL (ref 12–38)
CALCIUM SERPL-MCNC: 9 MG/DL (ref 8.5–10.1)
CALCIUM SERPL-MCNC: 9 MG/DL (ref 8.5–10.1)
CHLORIDE SERPL-SCNC: 108 MMOL/L (ref 97–108)
CHLORIDE SERPL-SCNC: 110 MMOL/L (ref 97–108)
CO2 SERPL-SCNC: 17 MMOL/L (ref 21–32)
CO2 SERPL-SCNC: 19 MMOL/L (ref 21–32)
COLOR UR: ABNORMAL
COMMENT:: NORMAL
COMMENT:: NORMAL
CREAT SERPL-MCNC: 3.6 MG/DL (ref 0.55–1.02)
CREAT SERPL-MCNC: 3.98 MG/DL (ref 0.55–1.02)
DIFFERENTIAL METHOD BLD: ABNORMAL
DIFFERENTIAL METHOD BLD: ABNORMAL
EOSINOPHIL # BLD: 0 K/UL (ref 0–0.4)
EOSINOPHIL # BLD: 0.8 K/UL (ref 0–0.4)
EOSINOPHIL NFR BLD: 0 % (ref 0–7)
EOSINOPHIL NFR BLD: 6 % (ref 0–7)
EPITH CASTS URNS QL MICRO: ABNORMAL /LPF
ERYTHROCYTE [DISTWIDTH] IN BLOOD BY AUTOMATED COUNT: 20.7 % (ref 11.5–14.5)
ERYTHROCYTE [DISTWIDTH] IN BLOOD BY AUTOMATED COUNT: 21.1 % (ref 11.5–14.5)
FLUAV RNA SPEC QL NAA+PROBE: NOT DETECTED
FLUBV RNA SPEC QL NAA+PROBE: NOT DETECTED
GAS FLOW.O2 O2 DELIVERY SYS: ABNORMAL
GAS FLOW.O2 O2 DELIVERY SYS: ABNORMAL
GLOBULIN SER CALC-MCNC: 4.8 G/DL (ref 2–4)
GLOBULIN SER CALC-MCNC: 5.5 G/DL (ref 2–4)
GLUCOSE BLD STRIP.AUTO-MCNC: 100 MG/DL (ref 65–117)
GLUCOSE BLD STRIP.AUTO-MCNC: 107 MG/DL (ref 65–117)
GLUCOSE BLD STRIP.AUTO-MCNC: 72 MG/DL (ref 65–117)
GLUCOSE SERPL-MCNC: 154 MG/DL (ref 65–100)
GLUCOSE SERPL-MCNC: 99 MG/DL (ref 65–100)
GLUCOSE UR STRIP.AUTO-MCNC: NEGATIVE MG/DL
HCO3 BLD-SCNC: 16.1 MMOL/L (ref 22–26)
HCO3 BLD-SCNC: 16.2 MMOL/L (ref 22–26)
HCT VFR BLD AUTO: 27.5 % (ref 35–47)
HCT VFR BLD AUTO: 31.5 % (ref 35–47)
HGB BLD-MCNC: 8.3 G/DL (ref 11.5–16)
HGB BLD-MCNC: 9.6 G/DL (ref 11.5–16)
HGB UR QL STRIP: ABNORMAL
IMM GRANULOCYTES # BLD AUTO: 0 K/UL
IMM GRANULOCYTES # BLD AUTO: 0.2 K/UL (ref 0–0.04)
IMM GRANULOCYTES NFR BLD AUTO: 0 %
IMM GRANULOCYTES NFR BLD AUTO: 1 % (ref 0–0.5)
IPAP/PIP/HIGH PEEP: 10
KETONES UR QL STRIP.AUTO: ABNORMAL MG/DL
LACTATE SERPL-SCNC: 1.1 MMOL/L (ref 0.4–2)
LEUKOCYTE ESTERASE UR QL STRIP.AUTO: ABNORMAL
LYMPHOCYTES # BLD: 0.8 K/UL (ref 0.8–3.5)
LYMPHOCYTES # BLD: 0.8 K/UL (ref 0.8–3.5)
LYMPHOCYTES NFR BLD: 5 % (ref 12–49)
LYMPHOCYTES NFR BLD: 6 % (ref 12–49)
MAGNESIUM SERPL-MCNC: 2.2 MG/DL (ref 1.6–2.4)
MCH RBC QN AUTO: 27.7 PG (ref 26–34)
MCH RBC QN AUTO: 28.3 PG (ref 26–34)
MCHC RBC AUTO-ENTMCNC: 30.2 G/DL (ref 30–36.5)
MCHC RBC AUTO-ENTMCNC: 30.5 G/DL (ref 30–36.5)
MCV RBC AUTO: 91.7 FL (ref 80–99)
MCV RBC AUTO: 92.9 FL (ref 80–99)
MONOCYTES # BLD: 0.6 K/UL (ref 0–1)
MONOCYTES # BLD: 1 K/UL (ref 0–1)
MONOCYTES NFR BLD: 4 % (ref 5–13)
MONOCYTES NFR BLD: 6 % (ref 5–13)
MUCOUS THREADS URNS QL MICRO: ABNORMAL /LPF
NEUTS SEG # BLD: 11.7 K/UL (ref 1.8–8)
NEUTS SEG # BLD: 13.9 K/UL (ref 1.8–8)
NEUTS SEG NFR BLD: 84 % (ref 32–75)
NEUTS SEG NFR BLD: 88 % (ref 32–75)
NITRITE UR QL STRIP.AUTO: NEGATIVE
NRBC # BLD: 0 K/UL (ref 0–0.01)
NRBC # BLD: 0 K/UL (ref 0–0.01)
NRBC BLD-RTO: 0 PER 100 WBC
NRBC BLD-RTO: 0 PER 100 WBC
NT PRO BNP: ABNORMAL PG/ML
O2/TOTAL GAS SETTING VFR VENT: 35 %
O2/TOTAL GAS SETTING VFR VENT: 40 %
PCO2 BLD: 33.1 MMHG (ref 35–45)
PCO2 BLD: 34 MMHG (ref 35–45)
PEEP RESPIRATORY: 5 CMH2O
PEEP RESPIRATORY: 5 CMH2O
PH BLD: 7.28 (ref 7.35–7.45)
PH BLD: 7.3 (ref 7.35–7.45)
PH UR STRIP: 5.5 (ref 5–8)
PLATELET # BLD AUTO: 187 K/UL (ref 150–400)
PLATELET # BLD AUTO: 231 K/UL (ref 150–400)
PMV BLD AUTO: 9.2 FL (ref 8.9–12.9)
PMV BLD AUTO: 9.6 FL (ref 8.9–12.9)
PO2 BLD: 104 MMHG (ref 80–100)
PO2 BLD: 94 MMHG (ref 80–100)
POTASSIUM SERPL-SCNC: 3.1 MMOL/L (ref 3.5–5.1)
POTASSIUM SERPL-SCNC: 3.8 MMOL/L (ref 3.5–5.1)
PRESSURE SUPPORT SETTING VENT: 10 CMH2O
PRESSURE SUPPORT SETTING VENT: 5 CMH2O
PROCALCITONIN SERPL-MCNC: 0.53 NG/ML
PROT SERPL-MCNC: 6.3 G/DL (ref 6.4–8.2)
PROT SERPL-MCNC: 7.2 G/DL (ref 6.4–8.2)
PROT UR STRIP-MCNC: 100 MG/DL
PROT UR-MCNC: 271 MG/DL (ref 0–11.9)
RBC # BLD AUTO: 3 M/UL (ref 3.8–5.2)
RBC # BLD AUTO: 3.39 M/UL (ref 3.8–5.2)
RBC #/AREA URNS HPF: ABNORMAL /HPF (ref 0–5)
RBC MORPH BLD: ABNORMAL
RBC MORPH BLD: ABNORMAL
SAO2 % BLD: 96.3 % (ref 92–97)
SAO2 % BLD: 97.4 % (ref 92–97)
SARS-COV-2 RNA RESP QL NAA+PROBE: NOT DETECTED
SERVICE CMNT-IMP: NORMAL
SODIUM SERPL-SCNC: 135 MMOL/L (ref 136–145)
SODIUM SERPL-SCNC: 139 MMOL/L (ref 136–145)
SP GR UR REFRACTOMETRY: 1.01 (ref 1–1.03)
SPECIMEN HOLD: NORMAL
SPECIMEN HOLD: NORMAL
SPECIMEN TYPE: ABNORMAL
SPECIMEN TYPE: ABNORMAL
TROPONIN I SERPL HS-MCNC: 39 NG/L (ref 0–51)
UROBILINOGEN UR QL STRIP.AUTO: 0.2 EU/DL (ref 0.2–1)
VANCOMYCIN SERPL-MCNC: 28.7 UG/ML
VENTILATION MODE VENT: ABNORMAL
WBC # BLD AUTO: 13.9 K/UL (ref 3.6–11)
WBC # BLD AUTO: 15.9 K/UL (ref 3.6–11)
WBC URNS QL MICRO: >100 /HPF (ref 0–4)
YEAST URNS QL MICRO: PRESENT

## 2024-01-01 PROCEDURE — 36600 WITHDRAWAL OF ARTERIAL BLOOD: CPT

## 2024-01-01 PROCEDURE — 5A09457 ASSISTANCE WITH RESPIRATORY VENTILATION, 24-96 CONSECUTIVE HOURS, CONTINUOUS POSITIVE AIRWAY PRESSURE: ICD-10-PCS | Performed by: HOSPITALIST

## 2024-01-01 PROCEDURE — 6370000000 HC RX 637 (ALT 250 FOR IP): Performed by: HOSPITALIST

## 2024-01-01 PROCEDURE — 83880 ASSAY OF NATRIURETIC PEPTIDE: CPT

## 2024-01-01 PROCEDURE — 6360000002 HC RX W HCPCS: Performed by: NURSE PRACTITIONER

## 2024-01-01 PROCEDURE — 76770 US EXAM ABDO BACK WALL COMP: CPT

## 2024-01-01 PROCEDURE — 1100000000 HC RM PRIVATE

## 2024-01-01 PROCEDURE — 2580000003 HC RX 258: Performed by: NURSE PRACTITIONER

## 2024-01-01 PROCEDURE — 80202 ASSAY OF VANCOMYCIN: CPT

## 2024-01-01 PROCEDURE — 80053 COMPREHEN METABOLIC PANEL: CPT

## 2024-01-01 PROCEDURE — 81001 URINALYSIS AUTO W/SCOPE: CPT

## 2024-01-01 PROCEDURE — 87186 SC STD MICRODIL/AGAR DIL: CPT

## 2024-01-01 PROCEDURE — 6360000002 HC RX W HCPCS: Performed by: HOSPITALIST

## 2024-01-01 PROCEDURE — 94660 CPAP INITIATION&MGMT: CPT

## 2024-01-01 PROCEDURE — 82803 BLOOD GASES ANY COMBINATION: CPT

## 2024-01-01 PROCEDURE — 6360000002 HC RX W HCPCS: Performed by: INTERNAL MEDICINE

## 2024-01-01 PROCEDURE — 83605 ASSAY OF LACTIC ACID: CPT

## 2024-01-01 PROCEDURE — 85025 COMPLETE CBC W/AUTO DIFF WBC: CPT

## 2024-01-01 PROCEDURE — 76937 US GUIDE VASCULAR ACCESS: CPT

## 2024-01-01 PROCEDURE — 2060000000 HC ICU INTERMEDIATE R&B

## 2024-01-01 PROCEDURE — 6360000002 HC RX W HCPCS: Performed by: FAMILY MEDICINE

## 2024-01-01 PROCEDURE — 87077 CULTURE AEROBIC IDENTIFY: CPT

## 2024-01-01 PROCEDURE — 99285 EMERGENCY DEPT VISIT HI MDM: CPT

## 2024-01-01 PROCEDURE — 0656 HSPC GENERAL INPATIENT

## 2024-01-01 PROCEDURE — 87070 CULTURE OTHR SPECIMN AEROBIC: CPT

## 2024-01-01 PROCEDURE — 94640 AIRWAY INHALATION TREATMENT: CPT

## 2024-01-01 PROCEDURE — 6370000000 HC RX 637 (ALT 250 FOR IP): Performed by: NURSE PRACTITIONER

## 2024-01-01 PROCEDURE — 84156 ASSAY OF PROTEIN URINE: CPT

## 2024-01-01 PROCEDURE — 86160 COMPLEMENT ANTIGEN: CPT

## 2024-01-01 PROCEDURE — 87636 SARSCOV2 & INF A&B AMP PRB: CPT

## 2024-01-01 PROCEDURE — 84484 ASSAY OF TROPONIN QUANT: CPT

## 2024-01-01 PROCEDURE — 71045 X-RAY EXAM CHEST 1 VIEW: CPT

## 2024-01-01 PROCEDURE — 36415 COLL VENOUS BLD VENIPUNCTURE: CPT

## 2024-01-01 PROCEDURE — 87040 BLOOD CULTURE FOR BACTERIA: CPT

## 2024-01-01 PROCEDURE — 2700000000 HC OXYGEN THERAPY PER DAY

## 2024-01-01 PROCEDURE — 87205 SMEAR GRAM STAIN: CPT

## 2024-01-01 PROCEDURE — 84145 PROCALCITONIN (PCT): CPT

## 2024-01-01 PROCEDURE — 82962 GLUCOSE BLOOD TEST: CPT

## 2024-01-01 PROCEDURE — P9047 ALBUMIN (HUMAN), 25%, 50ML: HCPCS | Performed by: INTERNAL MEDICINE

## 2024-01-01 PROCEDURE — 83735 ASSAY OF MAGNESIUM: CPT

## 2024-01-01 PROCEDURE — 99222 1ST HOSP IP/OBS MODERATE 55: CPT | Performed by: INTERNAL MEDICINE

## 2024-01-01 RX ORDER — GUAIFENESIN 600 MG/1
1200 TABLET, EXTENDED RELEASE ORAL 2 TIMES DAILY
COMMUNITY

## 2024-01-01 RX ORDER — IPRATROPIUM BROMIDE AND ALBUTEROL SULFATE 2.5; .5 MG/3ML; MG/3ML
1 SOLUTION RESPIRATORY (INHALATION)
Status: DISCONTINUED | OUTPATIENT
Start: 2024-01-01 | End: 2024-01-01 | Stop reason: HOSPADM

## 2024-01-01 RX ORDER — SODIUM CHLORIDE 9 MG/ML
INJECTION, SOLUTION INTRAVENOUS PRN
Status: DISCONTINUED | OUTPATIENT
Start: 2024-01-01 | End: 2024-01-01 | Stop reason: HOSPADM

## 2024-01-01 RX ORDER — BISACODYL 10 MG
10 SUPPOSITORY, RECTAL RECTAL DAILY PRN
Status: DISCONTINUED | OUTPATIENT
Start: 2024-01-01 | End: 2024-01-01 | Stop reason: HOSPADM

## 2024-01-01 RX ORDER — GLYCOPYRROLATE 0.2 MG/ML
0.2 INJECTION INTRAMUSCULAR; INTRAVENOUS EVERY 4 HOURS PRN
Status: DISCONTINUED | OUTPATIENT
Start: 2024-01-01 | End: 2024-01-01 | Stop reason: HOSPADM

## 2024-01-01 RX ORDER — POLYETHYLENE GLYCOL 3350 17 G/17G
17 POWDER, FOR SOLUTION ORAL DAILY PRN
Status: DISCONTINUED | OUTPATIENT
Start: 2024-01-01 | End: 2024-01-01 | Stop reason: HOSPADM

## 2024-01-01 RX ORDER — GLYCOPYRROLATE 0.2 MG/ML
0.2 INJECTION INTRAMUSCULAR; INTRAVENOUS EVERY 4 HOURS
Status: DISCONTINUED | OUTPATIENT
Start: 2024-01-01 | End: 2024-01-01 | Stop reason: HOSPADM

## 2024-01-01 RX ORDER — ONDANSETRON 2 MG/ML
4 INJECTION INTRAMUSCULAR; INTRAVENOUS EVERY 6 HOURS PRN
Status: DISCONTINUED | OUTPATIENT
Start: 2024-01-01 | End: 2024-01-01 | Stop reason: HOSPADM

## 2024-01-01 RX ORDER — BUMETANIDE 0.25 MG/ML
2 INJECTION INTRAMUSCULAR; INTRAVENOUS 2 TIMES DAILY
Status: DISCONTINUED | OUTPATIENT
Start: 2024-01-01 | End: 2024-01-01 | Stop reason: HOSPADM

## 2024-01-01 RX ORDER — HYDROMORPHONE HYDROCHLORIDE 1 MG/ML
0.5 INJECTION, SOLUTION INTRAMUSCULAR; INTRAVENOUS; SUBCUTANEOUS
Status: COMPLETED | OUTPATIENT
Start: 2024-01-01 | End: 2024-01-01

## 2024-01-01 RX ORDER — HYDROMORPHONE HYDROCHLORIDE 1 MG/ML
1 INJECTION, SOLUTION INTRAMUSCULAR; INTRAVENOUS; SUBCUTANEOUS EVERY 4 HOURS
Status: DISCONTINUED | OUTPATIENT
Start: 2024-01-01 | End: 2024-01-01 | Stop reason: HOSPADM

## 2024-01-01 RX ORDER — ALBUMIN (HUMAN) 12.5 G/50ML
25 SOLUTION INTRAVENOUS EVERY 6 HOURS
Status: DISCONTINUED | OUTPATIENT
Start: 2024-01-01 | End: 2024-01-01

## 2024-01-01 RX ORDER — ACETAMINOPHEN 325 MG/1
650 TABLET ORAL EVERY 6 HOURS PRN
Status: DISCONTINUED | OUTPATIENT
Start: 2024-01-01 | End: 2024-01-01 | Stop reason: HOSPADM

## 2024-01-01 RX ORDER — SODIUM CHLORIDE 0.9 % (FLUSH) 0.9 %
5-40 SYRINGE (ML) INJECTION PRN
Status: DISCONTINUED | OUTPATIENT
Start: 2024-01-01 | End: 2024-01-01 | Stop reason: HOSPADM

## 2024-01-01 RX ORDER — ONDANSETRON 4 MG/1
4 TABLET, ORALLY DISINTEGRATING ORAL EVERY 8 HOURS PRN
Status: DISCONTINUED | OUTPATIENT
Start: 2024-01-01 | End: 2024-01-01 | Stop reason: HOSPADM

## 2024-01-01 RX ORDER — FERROUS GLUCONATE 324(38)MG
324 TABLET ORAL
COMMUNITY

## 2024-01-01 RX ORDER — SODIUM CHLORIDE 0.9 % (FLUSH) 0.9 %
5-40 SYRINGE (ML) INJECTION EVERY 12 HOURS SCHEDULED
Status: DISCONTINUED | OUTPATIENT
Start: 2024-01-01 | End: 2024-01-01

## 2024-01-01 RX ORDER — ACETAMINOPHEN 650 MG/1
650 SUPPOSITORY RECTAL EVERY 4 HOURS PRN
Status: DISCONTINUED | OUTPATIENT
Start: 2024-01-01 | End: 2024-01-01 | Stop reason: HOSPADM

## 2024-01-01 RX ORDER — KETOROLAC TROMETHAMINE 30 MG/ML
30 INJECTION, SOLUTION INTRAMUSCULAR; INTRAVENOUS EVERY 6 HOURS PRN
Status: DISCONTINUED | OUTPATIENT
Start: 2024-01-01 | End: 2024-01-01 | Stop reason: HOSPADM

## 2024-01-01 RX ORDER — LORAZEPAM 2 MG/ML
1 INJECTION INTRAMUSCULAR EVERY 6 HOURS PRN
Status: DISCONTINUED | OUTPATIENT
Start: 2024-01-01 | End: 2024-01-01 | Stop reason: HOSPADM

## 2024-01-01 RX ORDER — CEFEPIME HYDROCHLORIDE 2 G/1
2 INJECTION, POWDER, FOR SOLUTION INTRAVENOUS EVERY 12 HOURS
COMMUNITY

## 2024-01-01 RX ORDER — HYDROMORPHONE HYDROCHLORIDE 1 MG/ML
1 INJECTION, SOLUTION INTRAMUSCULAR; INTRAVENOUS; SUBCUTANEOUS
Status: DISCONTINUED | OUTPATIENT
Start: 2024-01-01 | End: 2024-01-01 | Stop reason: HOSPADM

## 2024-01-01 RX ORDER — BENZONATATE 100 MG/1
100 CAPSULE ORAL 3 TIMES DAILY PRN
COMMUNITY

## 2024-01-01 RX ORDER — GLYCOPYRROLATE 0.2 MG/ML
0.2 INJECTION INTRAMUSCULAR; INTRAVENOUS EVERY 4 HOURS PRN
Status: DISCONTINUED | OUTPATIENT
Start: 2024-01-01 | End: 2024-01-01

## 2024-01-01 RX ORDER — LORAZEPAM 2 MG/ML
1 INJECTION INTRAMUSCULAR EVERY 4 HOURS
Status: DISCONTINUED | OUTPATIENT
Start: 2024-01-01 | End: 2024-01-01 | Stop reason: HOSPADM

## 2024-01-01 RX ORDER — IPRATROPIUM BROMIDE AND ALBUTEROL SULFATE 2.5; .5 MG/3ML; MG/3ML
1 SOLUTION RESPIRATORY (INHALATION)
Status: DISCONTINUED | OUTPATIENT
Start: 2024-01-01 | End: 2024-01-01

## 2024-01-01 RX ORDER — POTASSIUM CHLORIDE 750 MG/1
10 TABLET, EXTENDED RELEASE ORAL 2 TIMES DAILY
COMMUNITY

## 2024-01-01 RX ORDER — FUROSEMIDE 10 MG/ML
40 INJECTION INTRAMUSCULAR; INTRAVENOUS ONCE
Status: DISCONTINUED | OUTPATIENT
Start: 2024-01-01 | End: 2024-01-01

## 2024-01-01 RX ORDER — ACETAMINOPHEN 650 MG/1
650 SUPPOSITORY RECTAL EVERY 6 HOURS PRN
Status: DISCONTINUED | OUTPATIENT
Start: 2024-01-01 | End: 2024-01-01 | Stop reason: HOSPADM

## 2024-01-01 RX ADMIN — CEFEPIME 1000 MG: 1 INJECTION, POWDER, FOR SOLUTION INTRAMUSCULAR; INTRAVENOUS at 20:47

## 2024-01-01 RX ADMIN — LORAZEPAM 1 MG: 2 INJECTION, SOLUTION INTRAMUSCULAR; INTRAVENOUS at 21:32

## 2024-01-01 RX ADMIN — ARFORMOTEROL TARTRATE: 15 SOLUTION RESPIRATORY (INHALATION) at 07:58

## 2024-01-01 RX ADMIN — GLYCOPYRROLATE 0.2 MG: 0.2 INJECTION INTRAMUSCULAR; INTRAVENOUS at 18:44

## 2024-01-01 RX ADMIN — HYDROMORPHONE HYDROCHLORIDE 1 MG: 1 INJECTION, SOLUTION INTRAMUSCULAR; INTRAVENOUS; SUBCUTANEOUS at 21:32

## 2024-01-01 RX ADMIN — SODIUM CHLORIDE, PRESERVATIVE FREE 10 ML: 5 INJECTION INTRAVENOUS at 08:30

## 2024-01-01 RX ADMIN — IPRATROPIUM BROMIDE AND ALBUTEROL SULFATE 1 DOSE: .5; 3 SOLUTION RESPIRATORY (INHALATION) at 21:13

## 2024-01-01 RX ADMIN — BUMETANIDE 2 MG: 0.25 INJECTION INTRAMUSCULAR; INTRAVENOUS at 14:47

## 2024-01-01 RX ADMIN — IPRATROPIUM BROMIDE AND ALBUTEROL SULFATE 1 DOSE: .5; 3 SOLUTION RESPIRATORY (INHALATION) at 07:58

## 2024-01-01 RX ADMIN — SODIUM CHLORIDE, PRESERVATIVE FREE 10 ML: 5 INJECTION INTRAVENOUS at 20:48

## 2024-01-01 RX ADMIN — GLYCOPYRROLATE 0.2 MG: 0.2 INJECTION INTRAMUSCULAR; INTRAVENOUS at 15:31

## 2024-01-01 RX ADMIN — LORAZEPAM 1 MG: 2 INJECTION, SOLUTION INTRAMUSCULAR; INTRAVENOUS at 15:32

## 2024-01-01 RX ADMIN — HYDROMORPHONE HYDROCHLORIDE 1 MG: 1 INJECTION, SOLUTION INTRAMUSCULAR; INTRAVENOUS; SUBCUTANEOUS at 18:44

## 2024-01-01 RX ADMIN — HYDROMORPHONE HYDROCHLORIDE 1 MG: 1 INJECTION, SOLUTION INTRAMUSCULAR; INTRAVENOUS; SUBCUTANEOUS at 15:32

## 2024-01-01 RX ADMIN — HYDROMORPHONE HYDROCHLORIDE 1 MG: 1 INJECTION, SOLUTION INTRAMUSCULAR; INTRAVENOUS; SUBCUTANEOUS at 16:53

## 2024-01-01 RX ADMIN — LORAZEPAM 1 MG: 2 INJECTION, SOLUTION INTRAMUSCULAR; INTRAVENOUS at 18:43

## 2024-01-01 RX ADMIN — SODIUM CHLORIDE, PRESERVATIVE FREE 10 ML: 5 INJECTION INTRAVENOUS at 21:37

## 2024-01-01 RX ADMIN — ALBUMIN (HUMAN) 25 G: 0.25 INJECTION, SOLUTION INTRAVENOUS at 12:44

## 2024-01-01 RX ADMIN — GLYCOPYRROLATE 0.2 MG: 0.2 INJECTION INTRAMUSCULAR; INTRAVENOUS at 21:32

## 2024-01-01 RX ADMIN — HYDROMORPHONE HYDROCHLORIDE 0.5 MG: 1 INJECTION, SOLUTION INTRAMUSCULAR; INTRAVENOUS; SUBCUTANEOUS at 13:48

## 2024-01-01 RX ADMIN — CEFEPIME 1000 MG: 1 INJECTION, POWDER, FOR SOLUTION INTRAMUSCULAR; INTRAVENOUS at 08:29

## 2024-01-01 RX ADMIN — ARFORMOTEROL TARTRATE: 15 SOLUTION RESPIRATORY (INHALATION) at 21:13

## 2024-01-01 RX ADMIN — LORAZEPAM 1 MG: 2 INJECTION, SOLUTION INTRAMUSCULAR; INTRAVENOUS at 13:21

## 2024-01-01 ASSESSMENT — PAIN DESCRIPTION - DESCRIPTORS: DESCRIPTORS: DISCOMFORT

## 2024-01-01 ASSESSMENT — ENCOUNTER SYMPTOMS: SHORTNESS OF BREATH: 1

## 2024-01-01 ASSESSMENT — PAIN DESCRIPTION - LOCATION: LOCATION: GENERALIZED

## 2024-02-12 PROBLEM — M54.42 ACUTE BACK PAIN WITH SCIATICA, LEFT: Status: ACTIVE | Noted: 2024-02-12

## 2024-02-12 PROBLEM — M43.16 SPONDYLOLISTHESIS OF LUMBAR REGION: Status: ACTIVE | Noted: 2024-02-12

## 2024-02-12 PROBLEM — M48.062 SPINAL STENOSIS, LUMBAR REGION, WITH NEUROGENIC CLAUDICATION: Status: ACTIVE | Noted: 2024-02-12

## 2024-02-28 ENCOUNTER — HOSPITAL ENCOUNTER (OUTPATIENT)
Facility: HOSPITAL | Age: 79
Discharge: HOME | End: 2024-03-02
Payer: MEDICARE

## 2024-02-28 VITALS
TEMPERATURE: 98.5 F | HEIGHT: 66 IN | DIASTOLIC BLOOD PRESSURE: 74 MMHG | BODY MASS INDEX: 37.61 KG/M2 | HEART RATE: 74 BPM | SYSTOLIC BLOOD PRESSURE: 123 MMHG | WEIGHT: 234 LBS

## 2024-02-28 LAB
ABO + RH BLD: NORMAL
ANION GAP SERPL CALC-SCNC: 3 MMOL/L (ref 5–15)
APPEARANCE UR: CLEAR
BACTERIA URNS QL MICRO: ABNORMAL /HPF
BILIRUB UR QL: NEGATIVE
BLOOD GROUP ANTIBODIES SERPL: NORMAL
BUN SERPL-MCNC: 48 MG/DL (ref 6–20)
BUN/CREAT SERPL: 25 (ref 12–20)
CALCIUM SERPL-MCNC: 9.2 MG/DL (ref 8.5–10.1)
CAOX CRY URNS QL MICRO: ABNORMAL
CHLORIDE SERPL-SCNC: 102 MMOL/L (ref 97–108)
CO2 SERPL-SCNC: 28 MMOL/L (ref 21–32)
COLOR UR: ABNORMAL
CREAT SERPL-MCNC: 1.89 MG/DL (ref 0.55–1.02)
EKG ATRIAL RATE: 66 BPM
EKG DIAGNOSIS: NORMAL
EKG Q-T INTERVAL: 494 MS
EKG QRS DURATION: 162 MS
EKG QTC CALCULATION (BAZETT): 536 MS
EKG R AXIS: -39 DEGREES
EKG T AXIS: 141 DEGREES
EKG VENTRICULAR RATE: 71 BPM
EPITH CASTS URNS QL MICRO: ABNORMAL /LPF
ERYTHROCYTE [DISTWIDTH] IN BLOOD BY AUTOMATED COUNT: 15.9 % (ref 11.5–14.5)
EST. AVERAGE GLUCOSE BLD GHB EST-MCNC: 166 MG/DL
GLUCOSE SERPL-MCNC: 191 MG/DL (ref 65–100)
GLUCOSE UR STRIP.AUTO-MCNC: 500 MG/DL
HBA1C MFR BLD: 7.4 % (ref 4–5.6)
HCT VFR BLD AUTO: 37.5 % (ref 35–47)
HGB BLD-MCNC: 12.3 G/DL (ref 11.5–16)
HGB UR QL STRIP: NEGATIVE
HYALINE CASTS URNS QL MICRO: ABNORMAL /LPF (ref 0–5)
INR PPP: 1 (ref 0.9–1.1)
KETONES UR QL STRIP.AUTO: NEGATIVE MG/DL
LEUKOCYTE ESTERASE UR QL STRIP.AUTO: ABNORMAL
MCH RBC QN AUTO: 30.4 PG (ref 26–34)
MCHC RBC AUTO-ENTMCNC: 32.8 G/DL (ref 30–36.5)
MCV RBC AUTO: 92.6 FL (ref 80–99)
NITRITE UR QL STRIP.AUTO: NEGATIVE
NRBC # BLD: 0 K/UL (ref 0–0.01)
NRBC BLD-RTO: 0 PER 100 WBC
PH UR STRIP: 5 (ref 5–8)
PLATELET # BLD AUTO: 246 K/UL (ref 150–400)
PMV BLD AUTO: 9.7 FL (ref 8.9–12.9)
POTASSIUM SERPL-SCNC: 4.4 MMOL/L (ref 3.5–5.1)
PROT UR STRIP-MCNC: ABNORMAL MG/DL
PROTHROMBIN TIME: 10.9 SEC (ref 9–11.1)
RBC # BLD AUTO: 4.05 M/UL (ref 3.8–5.2)
RBC #/AREA URNS HPF: ABNORMAL /HPF (ref 0–5)
SODIUM SERPL-SCNC: 133 MMOL/L (ref 136–145)
SP GR UR REFRACTOMETRY: 1.01 (ref 1–1.03)
SPECIMEN EXP DATE BLD: NORMAL
URINE CULTURE IF INDICATED: ABNORMAL
UROBILINOGEN UR QL STRIP.AUTO: 0.2 EU/DL (ref 0.2–1)
WBC # BLD AUTO: 9.1 K/UL (ref 3.6–11)
WBC URNS QL MICRO: ABNORMAL /HPF (ref 0–4)

## 2024-02-28 PROCEDURE — 86850 RBC ANTIBODY SCREEN: CPT

## 2024-02-28 PROCEDURE — 81001 URINALYSIS AUTO W/SCOPE: CPT

## 2024-02-28 PROCEDURE — 80048 BASIC METABOLIC PNL TOTAL CA: CPT

## 2024-02-28 PROCEDURE — 86901 BLOOD TYPING SEROLOGIC RH(D): CPT

## 2024-02-28 PROCEDURE — 83036 HEMOGLOBIN GLYCOSYLATED A1C: CPT

## 2024-02-28 PROCEDURE — 86900 BLOOD TYPING SEROLOGIC ABO: CPT

## 2024-02-28 PROCEDURE — 36415 COLL VENOUS BLD VENIPUNCTURE: CPT

## 2024-02-28 PROCEDURE — 85610 PROTHROMBIN TIME: CPT

## 2024-02-28 PROCEDURE — 85027 COMPLETE CBC AUTOMATED: CPT

## 2024-02-28 PROCEDURE — 87086 URINE CULTURE/COLONY COUNT: CPT

## 2024-02-28 RX ORDER — OMEGA-3 FATTY ACIDS/FISH OIL 300-1000MG
1000 CAPSULE ORAL DAILY
COMMUNITY

## 2024-02-28 ASSESSMENT — PAIN SCALES - GENERAL: PAINLEVEL_OUTOF10: 3

## 2024-02-28 ASSESSMENT — PAIN DESCRIPTION - LOCATION: LOCATION: BACK

## 2024-02-28 NOTE — PERIOP NOTE
2-28 PAT @ 8;00 /CLASS:10:00  
PAT Nutrition Screen    1. Has the patient had an unplanned weight loss of 10% of body weight or more in the last 6 months? NO   2. Has the patient been eating less than 50% of their normal diet in the preceding week? NO       Patient instructed on DM pre-operative nutrition plan to start 5 days prior to surgery. Patient given 10 shakes and 1 pre-surgery drinks. Opportunity given for questions and all questions answered.    
PATIENT GIVEN SURGICAL SITE INFECTION FAQ HANDOUT AND HAND WASHING TIP SHEET. PREOP INSTRUCTIONS REVIEWED AND PATIENT VERBALIZES UNDERSTANDING OF INSTRUCTIONS. PATIENT HAS BEEN GIVEN THE OPPORTUNITY TO ASK ADDITIONAL QUESTIONS.    CALLED DR. MAGDIEL DUARTE OFFICE (CARDIO) [phone] FOR LAST EKG AND OV NOTE. SPOKE WITH ROAS.   
O[employer]ens or store brand) at a pharmacy or grocery store.  Wash your hair with your normal shampoo and your body with regular soap and rinse well to remove shampoo and soap from your skin.  Wet a clean washcloth and turn off the shower.  Put CHG soap on washcloth and apply to your entire body from the neck down. Do not use on your head, face or private parts(genitals). Do not use CHG soap on open sores, wounds or areas of skin irritation.  Wash your body gently for 5 minutes. Do not wash your skin too hard. This soap does not create lather. Pay special attention to your underarms and from your belly button to your feet.  Turn the shower back on and rinse well to get CHG soap off your body.  Pat your skin dry with a clean, dry towel. Do not apply lotions or moisturizer.  Put on clean clothes and sleep on fresh bed sheets the night before surgery. Do not allow pets to sleep with you.        Day of Procedure    Please park in the parking deck or any designated visitor parking lot.   Enter the facility through the Main Entrance of the hospital.  On the day of surgery, please provide the cell phone number of the person who will be waiting for you to the Patient Access representative at the time of registration.  Masks are highly recommended in the hospital, but not required.  Once your procedure and the immediate recovery period is completed, a nurse in the recovery area will contact your designated visitor to inform them of your room number or to otherwise review other pertinent information regarding your care.    Social distancing practices are strongly encouraged in waiting areas and the cafeteria.       The patient was contacted in person.   She verbalized understanding of all instructions does not need reinforcement.

## 2024-02-29 LAB
BACTERIA SPEC CULT: NORMAL
BACTERIA SPEC CULT: NORMAL
SERVICE CMNT-IMP: NORMAL

## 2024-02-29 NOTE — PROGRESS NOTES
potassium chloride (KLOR-CON) 10 MEQ extended release tablet TAKE 2 TABS BY MOUTH TWO TIMES A DAY.    empagliflozin (JARDIANCE) 10 MG tablet Take 1 tablet by mouth daily (Patient taking differently: Take 1 tablet by mouth nightly)    apixaban (ELIQUIS) 5 MG TABS tablet Take 1 tablet by mouth 2 times daily    albuterol sulfate HFA (PROVENTIL;VENTOLIN;PROAIR) 108 (90 Base) MCG/ACT inhaler Inhale 2 puffs into the lungs every 4 hours as needed for Wheezing or Shortness of Breath    amLODIPine (NORVASC) 10 MG tablet Take 1 tablet by mouth daily (Patient taking differently: Take 1 tablet by mouth every morning)    bumetanide (BUMEX) 2 MG tablet Take 1 tablet by mouth 2 times daily (Patient taking differently: Take 2 tablets by mouth 2 times daily taking two 2mg tablets twice daily)    cyclobenzaprine (FLEXERIL) 10 MG tablet Take 1 tablet by mouth 3 times daily as needed (Patient taking differently: Take 1 tablet by mouth 3 times daily as needed muscle spasm)    allopurinol (ZYLOPRIM) 100 MG tablet TAKE 2 TABLETS BY MOUTH EVERY DAY FOR GOUT (Patient taking differently: Take 2 tablets by mouth every morning TAKE 2 TABLETS BY MOUTH EVERY DAY FOR GOUT)    Cholecalciferol 50 MCG (2000 UT) TABS Take 1 tablet by mouth daily    insulin regular (HUMULIN R;NOVOLIN R) 100 UNIT/ML injection Inject into the skin 3 times daily (before meals) PER SLIDING SCALE, THREE TIMES PER DAY  100-150 2 UNIT, 151-200= 4 UNITS, 201-250=6 units, 251-300=8 units, 301-350=10 units, 151-> call MD and 4 units every 2 hours until reach md    ALSO DOES CARB COUNTING     No current facility-administered medications for this encounter.      ____________________________________________  ALLERGIES  Allergies   Allergen Reactions    Latex Itching    Oxycodone Itching    Codeine Rash    Lisinopril Other (See Comments)     Cough, Visual disturbances    Morphine Itching    Statins Other (See Comments)     Muscle enzyme problems

## 2024-03-01 LAB
BACTERIA SPEC CULT: NORMAL
CC UR VC: NORMAL
SERVICE CMNT-IMP: NORMAL

## 2024-03-04 PROBLEM — I50.32 CHRONIC DIASTOLIC CONGESTIVE HEART FAILURE (HCC): Status: ACTIVE | Noted: 2024-03-04

## 2024-03-04 PROBLEM — I50.43 CHF (CONGESTIVE HEART FAILURE), NYHA CLASS I, ACUTE ON CHRONIC, COMBINED (HCC): Status: RESOLVED | Noted: 2023-06-21 | Resolved: 2024-03-04

## 2024-03-04 PROBLEM — I50.23 ACUTE ON CHRONIC HFREF (HEART FAILURE WITH REDUCED EJECTION FRACTION) (HCC): Status: RESOLVED | Noted: 2023-07-30 | Resolved: 2024-03-04

## 2024-03-05 PROBLEM — Z01.818 ENCOUNTER FOR PREADMISSION TESTING: Status: ACTIVE | Noted: 2024-03-05

## 2024-03-13 ENCOUNTER — HOSPITAL ENCOUNTER (OUTPATIENT)
Facility: HOSPITAL | Age: 79
Discharge: HOME | End: 2024-03-16
Payer: MEDICARE

## 2024-03-13 DIAGNOSIS — M54.42 ACUTE BACK PAIN WITH SCIATICA, LEFT: ICD-10-CM

## 2024-03-13 DIAGNOSIS — M43.16 SPONDYLOLISTHESIS OF LUMBAR REGION: ICD-10-CM

## 2024-03-13 DIAGNOSIS — M48.062 SPINAL STENOSIS, LUMBAR REGION, WITH NEUROGENIC CLAUDICATION: ICD-10-CM

## 2024-03-13 PROCEDURE — 72082 X-RAY EXAM ENTIRE SPI 2/3 VW: CPT

## 2024-03-18 ENCOUNTER — ANESTHESIA EVENT (OUTPATIENT)
Facility: HOSPITAL | Age: 79
End: 2024-03-18
Payer: MEDICARE

## 2024-03-18 ENCOUNTER — APPOINTMENT (OUTPATIENT)
Facility: HOSPITAL | Age: 79
End: 2024-03-18
Attending: ORTHOPAEDIC SURGERY
Payer: MEDICARE

## 2024-03-18 ENCOUNTER — HOSPITAL ENCOUNTER (INPATIENT)
Facility: HOSPITAL | Age: 79
LOS: 4 days | Discharge: NURSING, REHAB OR LONG-TERM CARE | End: 2024-03-22
Attending: ORTHOPAEDIC SURGERY | Admitting: ORTHOPAEDIC SURGERY
Payer: MEDICARE

## 2024-03-18 ENCOUNTER — ANESTHESIA (OUTPATIENT)
Facility: HOSPITAL | Age: 79
End: 2024-03-18
Payer: MEDICARE

## 2024-03-18 PROBLEM — M48.062 LUMBAR STENOSIS WITH NEUROGENIC CLAUDICATION: Status: ACTIVE | Noted: 2024-03-18

## 2024-03-18 PROBLEM — M47.9: Status: ACTIVE | Noted: 2024-03-18

## 2024-03-18 PROBLEM — M41.50: Status: ACTIVE | Noted: 2024-03-18

## 2024-03-18 LAB
ABO + RH BLD: NORMAL
BLOOD GROUP ANTIBODIES SERPL: NORMAL
GLUCOSE BLD STRIP.AUTO-MCNC: 186 MG/DL (ref 65–117)
GLUCOSE BLD STRIP.AUTO-MCNC: 190 MG/DL (ref 65–117)
GLUCOSE BLD STRIP.AUTO-MCNC: 220 MG/DL (ref 65–117)
GLUCOSE BLD STRIP.AUTO-MCNC: 260 MG/DL (ref 65–117)
SERVICE CMNT-IMP: ABNORMAL
SPECIMEN EXP DATE BLD: NORMAL

## 2024-03-18 PROCEDURE — 2580000003 HC RX 258: Performed by: STUDENT IN AN ORGANIZED HEALTH CARE EDUCATION/TRAINING PROGRAM

## 2024-03-18 PROCEDURE — 2720000010 HC SURG SUPPLY STERILE: Performed by: ORTHOPAEDIC SURGERY

## 2024-03-18 PROCEDURE — 0QU03JZ SUPPLEMENT LUMBAR VERTEBRA WITH SYNTHETIC SUBSTITUTE, PERCUTANEOUS APPROACH: ICD-10-PCS | Performed by: ORTHOPAEDIC SURGERY

## 2024-03-18 PROCEDURE — 6370000000 HC RX 637 (ALT 250 FOR IP): Performed by: ORTHOPAEDIC SURGERY

## 2024-03-18 PROCEDURE — 22610 ARTHRD PST TQ 1NTRSPC THRC: CPT | Performed by: ORTHOPAEDIC SURGERY

## 2024-03-18 PROCEDURE — 63046 LAM FACETEC & FORAMOT THRC: CPT | Performed by: ORTHOPAEDIC SURGERY

## 2024-03-18 PROCEDURE — 63048 LAM FACETEC &FORAMOT EA ADDL: CPT | Performed by: STUDENT IN AN ORGANIZED HEALTH CARE EDUCATION/TRAINING PROGRAM

## 2024-03-18 PROCEDURE — P9045 ALBUMIN (HUMAN), 5%, 250 ML: HCPCS | Performed by: NURSE ANESTHETIST, CERTIFIED REGISTERED

## 2024-03-18 PROCEDURE — 2500000003 HC RX 250 WO HCPCS: Performed by: NURSE ANESTHETIST, CERTIFIED REGISTERED

## 2024-03-18 PROCEDURE — 22843 INSERT SPINE FIXATION DEVICE: CPT | Performed by: ORTHOPAEDIC SURGERY

## 2024-03-18 PROCEDURE — 2709999900 HC NON-CHARGEABLE SUPPLY: Performed by: ORTHOPAEDIC SURGERY

## 2024-03-18 PROCEDURE — 0RG70K1 FUSION OF 2 TO 7 THORACIC VERTEBRAL JOINTS WITH NONAUTOLOGOUS TISSUE SUBSTITUTE, POSTERIOR APPROACH, POSTERIOR COLUMN, OPEN APPROACH: ICD-10-PCS | Performed by: ORTHOPAEDIC SURGERY

## 2024-03-18 PROCEDURE — 22848 INSERT PELV FIXATION DEVICE: CPT | Performed by: STUDENT IN AN ORGANIZED HEALTH CARE EDUCATION/TRAINING PROGRAM

## 2024-03-18 PROCEDURE — 61783 SCAN PROC SPINAL: CPT | Performed by: ORTHOPAEDIC SURGERY

## 2024-03-18 PROCEDURE — 0SG10K1 FUSION OF 2 OR MORE LUMBAR VERTEBRAL JOINTS WITH NONAUTOLOGOUS TISSUE SUBSTITUTE, POSTERIOR APPROACH, POSTERIOR COLUMN, OPEN APPROACH: ICD-10-PCS | Performed by: ORTHOPAEDIC SURGERY

## 2024-03-18 PROCEDURE — 86850 RBC ANTIBODY SCREEN: CPT

## 2024-03-18 PROCEDURE — 22843 INSERT SPINE FIXATION DEVICE: CPT | Performed by: STUDENT IN AN ORGANIZED HEALTH CARE EDUCATION/TRAINING PROGRAM

## 2024-03-18 PROCEDURE — 0SG30K1 FUSION OF LUMBOSACRAL JOINT WITH NONAUTOLOGOUS TISSUE SUBSTITUTE, POSTERIOR APPROACH, POSTERIOR COLUMN, OPEN APPROACH: ICD-10-PCS | Performed by: ORTHOPAEDIC SURGERY

## 2024-03-18 PROCEDURE — 6360000002 HC RX W HCPCS: Performed by: NURSE ANESTHETIST, CERTIFIED REGISTERED

## 2024-03-18 PROCEDURE — 86900 BLOOD TYPING SEROLOGIC ABO: CPT

## 2024-03-18 PROCEDURE — 22614 ARTHRD PST TQ 1NTRSPC EA ADD: CPT | Performed by: STUDENT IN AN ORGANIZED HEALTH CARE EDUCATION/TRAINING PROGRAM

## 2024-03-18 PROCEDURE — 3600000014 HC SURGERY LEVEL 4 ADDTL 15MIN: Performed by: ORTHOPAEDIC SURGERY

## 2024-03-18 PROCEDURE — 8E0W0CZ ROBOTIC ASSISTED PROCEDURE OF TRUNK REGION, OPEN APPROACH: ICD-10-PCS | Performed by: ORTHOPAEDIC SURGERY

## 2024-03-18 PROCEDURE — 0RGA0K1 FUSION OF THORACOLUMBAR VERTEBRAL JOINT WITH NONAUTOLOGOUS TISSUE SUBSTITUTE, POSTERIOR APPROACH, POSTERIOR COLUMN, OPEN APPROACH: ICD-10-PCS | Performed by: ORTHOPAEDIC SURGERY

## 2024-03-18 PROCEDURE — 36415 COLL VENOUS BLD VENIPUNCTURE: CPT

## 2024-03-18 PROCEDURE — A4216 STERILE WATER/SALINE, 10 ML: HCPCS | Performed by: STUDENT IN AN ORGANIZED HEALTH CARE EDUCATION/TRAINING PROGRAM

## 2024-03-18 PROCEDURE — 82962 GLUCOSE BLOOD TEST: CPT

## 2024-03-18 PROCEDURE — 2580000003 HC RX 258: Performed by: NURSE ANESTHETIST, CERTIFIED REGISTERED

## 2024-03-18 PROCEDURE — 1100000000 HC RM PRIVATE

## 2024-03-18 PROCEDURE — 22848 INSERT PELV FIXATION DEVICE: CPT | Performed by: ORTHOPAEDIC SURGERY

## 2024-03-18 PROCEDURE — 6360000002 HC RX W HCPCS: Performed by: STUDENT IN AN ORGANIZED HEALTH CARE EDUCATION/TRAINING PROGRAM

## 2024-03-18 PROCEDURE — 22212 INCIS 1 VERTEBRAL SEG THORAC: CPT | Performed by: STUDENT IN AN ORGANIZED HEALTH CARE EDUCATION/TRAINING PROGRAM

## 2024-03-18 PROCEDURE — 27280 ARTHR SI JT OPN B1GRF INSTRM: CPT | Performed by: ORTHOPAEDIC SURGERY

## 2024-03-18 PROCEDURE — 22212 INCIS 1 VERTEBRAL SEG THORAC: CPT | Performed by: ORTHOPAEDIC SURGERY

## 2024-03-18 PROCEDURE — 6370000000 HC RX 637 (ALT 250 FOR IP): Performed by: STUDENT IN AN ORGANIZED HEALTH CARE EDUCATION/TRAINING PROGRAM

## 2024-03-18 PROCEDURE — 3700000001 HC ADD 15 MINUTES (ANESTHESIA): Performed by: ORTHOPAEDIC SURGERY

## 2024-03-18 PROCEDURE — 01NB0ZZ RELEASE LUMBAR NERVE, OPEN APPROACH: ICD-10-PCS | Performed by: ORTHOPAEDIC SURGERY

## 2024-03-18 PROCEDURE — 22216 INCIS ADDL SPINE SEGMENT: CPT | Performed by: ORTHOPAEDIC SURGERY

## 2024-03-18 PROCEDURE — 22216 INCIS ADDL SPINE SEGMENT: CPT | Performed by: STUDENT IN AN ORGANIZED HEALTH CARE EDUCATION/TRAINING PROGRAM

## 2024-03-18 PROCEDURE — 63046 LAM FACETEC & FORAMOT THRC: CPT | Performed by: STUDENT IN AN ORGANIZED HEALTH CARE EDUCATION/TRAINING PROGRAM

## 2024-03-18 PROCEDURE — 6370000000 HC RX 637 (ALT 250 FOR IP): Performed by: PHYSICIAN ASSISTANT

## 2024-03-18 PROCEDURE — 3700000000 HC ANESTHESIA ATTENDED CARE: Performed by: ORTHOPAEDIC SURGERY

## 2024-03-18 PROCEDURE — 6360000002 HC RX W HCPCS: Performed by: ORTHOPAEDIC SURGERY

## 2024-03-18 PROCEDURE — 86901 BLOOD TYPING SEROLOGIC RH(D): CPT

## 2024-03-18 PROCEDURE — 7100000000 HC PACU RECOVERY - FIRST 15 MIN: Performed by: ORTHOPAEDIC SURGERY

## 2024-03-18 PROCEDURE — C1713 ANCHOR/SCREW BN/BN,TIS/BN: HCPCS | Performed by: ORTHOPAEDIC SURGERY

## 2024-03-18 PROCEDURE — 63048 LAM FACETEC &FORAMOT EA ADDL: CPT | Performed by: ORTHOPAEDIC SURGERY

## 2024-03-18 PROCEDURE — 27280 ARTHR SI JT OPN B1GRF INSTRM: CPT | Performed by: STUDENT IN AN ORGANIZED HEALTH CARE EDUCATION/TRAINING PROGRAM

## 2024-03-18 PROCEDURE — 3600000004 HC SURGERY LEVEL 4 BASE: Performed by: ORTHOPAEDIC SURGERY

## 2024-03-18 PROCEDURE — 2580000003 HC RX 258: Performed by: PHYSICIAN ASSISTANT

## 2024-03-18 PROCEDURE — 22614 ARTHRD PST TQ 1NTRSPC EA ADD: CPT | Performed by: ORTHOPAEDIC SURGERY

## 2024-03-18 PROCEDURE — 0QS03ZZ REPOSITION LUMBAR VERTEBRA, PERCUTANEOUS APPROACH: ICD-10-PCS | Performed by: ORTHOPAEDIC SURGERY

## 2024-03-18 PROCEDURE — 7100000001 HC PACU RECOVERY - ADDTL 15 MIN: Performed by: ORTHOPAEDIC SURGERY

## 2024-03-18 PROCEDURE — 01NR0ZZ RELEASE SACRAL NERVE, OPEN APPROACH: ICD-10-PCS | Performed by: ORTHOPAEDIC SURGERY

## 2024-03-18 PROCEDURE — 2500000003 HC RX 250 WO HCPCS: Performed by: STUDENT IN AN ORGANIZED HEALTH CARE EDUCATION/TRAINING PROGRAM

## 2024-03-18 PROCEDURE — 2500000003 HC RX 250 WO HCPCS: Performed by: ORTHOPAEDIC SURGERY

## 2024-03-18 PROCEDURE — 6360000002 HC RX W HCPCS: Performed by: PHYSICIAN ASSISTANT

## 2024-03-18 PROCEDURE — 22610 ARTHRD PST TQ 1NTRSPC THRC: CPT | Performed by: STUDENT IN AN ORGANIZED HEALTH CARE EDUCATION/TRAINING PROGRAM

## 2024-03-18 DEVICE — SET SCREW 5540030 5.5 TI NS BRK OFF
Type: IMPLANTABLE DEVICE | Site: SPINE LUMBAR | Status: FUNCTIONAL
Brand: CD HORIZON® SPINAL SYSTEM

## 2024-03-18 DEVICE — SIGNAFUSE BIOACTIVE BONE GRAFT IS A BIOACTIVE BONE GRAFT SUBSTITUTE COMPRISING BIPHASIC MINERAL GRANULES AND 45S5 BIOACTIVE GLASS SUSPENDED IN A POROUS TYPE I COLLAGEN MATRIX. THE DEVICE IS PROVIDED STERILE AND IS TO BE COMBINED WITH AUTOLOGOUS BONE MARROW ASPIRATE PRIOR TO USE TO FACILITATE PACKING INTO BONY DEFECTS. THE DEVICE PROVIDES AN OSTEOCONDUCTIVE SCAFFOLD THAT RESORBS AND GUIDES HOST BONE REGENERATION DURING THE HEALING PROCESS.
Type: IMPLANTABLE DEVICE | Site: SPINE LUMBAR | Status: FUNCTIONAL
Brand: SIGNAFUSE BIOACTIVE BONE GRAFT

## 2024-03-18 DEVICE — ROD SPNL 4 LEVEL 5.5 MM TI NS UNID EXP LTX: Type: IMPLANTABLE DEVICE | Site: SPINE LUMBAR | Status: FUNCTIONAL

## 2024-03-18 DEVICE — GRAFT BNE SUB L CANC FRZN MORSELIZED W/ VIABLE CELL TRINITY: Type: IMPLANTABLE DEVICE | Site: SPINE LUMBAR | Status: FUNCTIONAL

## 2024-03-18 DEVICE — 10.0MM X 65MM  IFUSE-TORQ
Type: IMPLANTABLE DEVICE | Site: SPINE LUMBAR | Status: FUNCTIONAL
Brand: IFUSE TORQ IMPLANT SYSTEM

## 2024-03-18 RX ORDER — ALBUMIN, HUMAN INJ 5% 5 %
SOLUTION INTRAVENOUS PRN
Status: DISCONTINUED | OUTPATIENT
Start: 2024-03-18 | End: 2024-03-18 | Stop reason: SDUPTHER

## 2024-03-18 RX ORDER — INSULIN LISPRO 100 [IU]/ML
0-4 INJECTION, SOLUTION INTRAVENOUS; SUBCUTANEOUS NIGHTLY
Status: DISCONTINUED | OUTPATIENT
Start: 2024-03-18 | End: 2024-03-22 | Stop reason: HOSPADM

## 2024-03-18 RX ORDER — ALBUTEROL SULFATE 2.5 MG/3ML
2.5 SOLUTION RESPIRATORY (INHALATION) EVERY 4 HOURS PRN
Status: DISCONTINUED | OUTPATIENT
Start: 2024-03-18 | End: 2024-03-22 | Stop reason: HOSPADM

## 2024-03-18 RX ORDER — BUMETANIDE 1 MG/1
2 TABLET ORAL 2 TIMES DAILY
Status: DISCONTINUED | OUTPATIENT
Start: 2024-03-19 | End: 2024-03-22 | Stop reason: HOSPADM

## 2024-03-18 RX ORDER — FERROUS SULFATE 325(65) MG
325 TABLET ORAL
Status: DISCONTINUED | OUTPATIENT
Start: 2024-03-19 | End: 2024-03-22 | Stop reason: HOSPADM

## 2024-03-18 RX ORDER — PROCHLORPERAZINE EDISYLATE 5 MG/ML
10 INJECTION INTRAMUSCULAR; INTRAVENOUS EVERY 6 HOURS PRN
Status: DISCONTINUED | OUTPATIENT
Start: 2024-03-18 | End: 2024-03-22 | Stop reason: HOSPADM

## 2024-03-18 RX ORDER — POLYETHYLENE GLYCOL 3350 17 G/17G
17 POWDER, FOR SOLUTION ORAL DAILY
Status: DISCONTINUED | OUTPATIENT
Start: 2024-03-18 | End: 2024-03-22 | Stop reason: HOSPADM

## 2024-03-18 RX ORDER — HYDROXYZINE HYDROCHLORIDE 10 MG/1
10 TABLET, FILM COATED ORAL EVERY 6 HOURS PRN
Status: DISCONTINUED | OUTPATIENT
Start: 2024-03-18 | End: 2024-03-22 | Stop reason: HOSPADM

## 2024-03-18 RX ORDER — ROCURONIUM BROMIDE 10 MG/ML
INJECTION, SOLUTION INTRAVENOUS PRN
Status: DISCONTINUED | OUTPATIENT
Start: 2024-03-18 | End: 2024-03-18 | Stop reason: SDUPTHER

## 2024-03-18 RX ORDER — DEXAMETHASONE SODIUM PHOSPHATE 4 MG/ML
INJECTION, SOLUTION INTRA-ARTICULAR; INTRALESIONAL; INTRAMUSCULAR; INTRAVENOUS; SOFT TISSUE PRN
Status: DISCONTINUED | OUTPATIENT
Start: 2024-03-18 | End: 2024-03-18 | Stop reason: SDUPTHER

## 2024-03-18 RX ORDER — SODIUM CHLORIDE 9 MG/ML
INJECTION, SOLUTION INTRAVENOUS PRN
Status: DISCONTINUED | OUTPATIENT
Start: 2024-03-18 | End: 2024-03-22 | Stop reason: HOSPADM

## 2024-03-18 RX ORDER — FAMOTIDINE 20 MG/1
20 TABLET, FILM COATED ORAL 2 TIMES DAILY
Status: DISCONTINUED | OUTPATIENT
Start: 2024-03-18 | End: 2024-03-18

## 2024-03-18 RX ORDER — SODIUM CHLORIDE 9 MG/ML
INJECTION, SOLUTION INTRAVENOUS PRN
Status: DISCONTINUED | OUTPATIENT
Start: 2024-03-18 | End: 2024-03-18 | Stop reason: HOSPADM

## 2024-03-18 RX ORDER — AMLODIPINE BESYLATE 5 MG/1
10 TABLET ORAL DAILY
Status: DISCONTINUED | OUTPATIENT
Start: 2024-03-19 | End: 2024-03-22 | Stop reason: HOSPADM

## 2024-03-18 RX ORDER — POTASSIUM CHLORIDE 750 MG/1
10 TABLET, FILM COATED, EXTENDED RELEASE ORAL 2 TIMES DAILY
Status: DISCONTINUED | OUTPATIENT
Start: 2024-03-18 | End: 2024-03-22 | Stop reason: HOSPADM

## 2024-03-18 RX ORDER — MIDAZOLAM HYDROCHLORIDE 2 MG/2ML
2 INJECTION, SOLUTION INTRAMUSCULAR; INTRAVENOUS
Status: DISCONTINUED | OUTPATIENT
Start: 2024-03-18 | End: 2024-03-18 | Stop reason: HOSPADM

## 2024-03-18 RX ORDER — HYDROMORPHONE HYDROCHLORIDE 1 MG/ML
0.5 INJECTION, SOLUTION INTRAMUSCULAR; INTRAVENOUS; SUBCUTANEOUS
Status: DISCONTINUED | OUTPATIENT
Start: 2024-03-18 | End: 2024-03-21

## 2024-03-18 RX ORDER — SODIUM CHLORIDE 0.9 % (FLUSH) 0.9 %
5-40 SYRINGE (ML) INJECTION EVERY 12 HOURS SCHEDULED
Status: DISCONTINUED | OUTPATIENT
Start: 2024-03-18 | End: 2024-03-22 | Stop reason: HOSPADM

## 2024-03-18 RX ORDER — FENTANYL CITRATE 50 UG/ML
INJECTION, SOLUTION INTRAMUSCULAR; INTRAVENOUS PRN
Status: DISCONTINUED | OUTPATIENT
Start: 2024-03-18 | End: 2024-03-18 | Stop reason: SDUPTHER

## 2024-03-18 RX ORDER — ACETAMINOPHEN 500 MG
1000 TABLET ORAL EVERY 6 HOURS
Status: DISCONTINUED | OUTPATIENT
Start: 2024-03-18 | End: 2024-03-22 | Stop reason: HOSPADM

## 2024-03-18 RX ORDER — PROCHLORPERAZINE MALEATE 10 MG
10 TABLET ORAL EVERY 6 HOURS PRN
Status: DISCONTINUED | OUTPATIENT
Start: 2024-03-18 | End: 2024-03-22 | Stop reason: HOSPADM

## 2024-03-18 RX ORDER — SODIUM CHLORIDE 0.9 % (FLUSH) 0.9 %
5-40 SYRINGE (ML) INJECTION EVERY 12 HOURS SCHEDULED
Status: DISCONTINUED | OUTPATIENT
Start: 2024-03-18 | End: 2024-03-18 | Stop reason: HOSPADM

## 2024-03-18 RX ORDER — NALOXONE HYDROCHLORIDE 0.4 MG/ML
INJECTION, SOLUTION INTRAMUSCULAR; INTRAVENOUS; SUBCUTANEOUS PRN
Status: DISCONTINUED | OUTPATIENT
Start: 2024-03-18 | End: 2024-03-18 | Stop reason: HOSPADM

## 2024-03-18 RX ORDER — ONDANSETRON 2 MG/ML
INJECTION INTRAMUSCULAR; INTRAVENOUS PRN
Status: DISCONTINUED | OUTPATIENT
Start: 2024-03-18 | End: 2024-03-18 | Stop reason: SDUPTHER

## 2024-03-18 RX ORDER — ALLOPURINOL 100 MG/1
100 TABLET ORAL DAILY
Status: DISCONTINUED | OUTPATIENT
Start: 2024-03-19 | End: 2024-03-22 | Stop reason: HOSPADM

## 2024-03-18 RX ORDER — HYDROMORPHONE HYDROCHLORIDE 4 MG/1
4 TABLET ORAL EVERY 4 HOURS PRN
Status: DISCONTINUED | OUTPATIENT
Start: 2024-03-18 | End: 2024-03-19

## 2024-03-18 RX ORDER — LIDOCAINE HYDROCHLORIDE 20 MG/ML
INJECTION, SOLUTION EPIDURAL; INFILTRATION; INTRACAUDAL; PERINEURAL PRN
Status: DISCONTINUED | OUTPATIENT
Start: 2024-03-18 | End: 2024-03-18 | Stop reason: SDUPTHER

## 2024-03-18 RX ORDER — SENNA AND DOCUSATE SODIUM 50; 8.6 MG/1; MG/1
1 TABLET, FILM COATED ORAL 2 TIMES DAILY
Status: DISCONTINUED | OUTPATIENT
Start: 2024-03-18 | End: 2024-03-22 | Stop reason: HOSPADM

## 2024-03-18 RX ORDER — SODIUM CHLORIDE 0.9 % (FLUSH) 0.9 %
5-40 SYRINGE (ML) INJECTION PRN
Status: DISCONTINUED | OUTPATIENT
Start: 2024-03-18 | End: 2024-03-22 | Stop reason: HOSPADM

## 2024-03-18 RX ORDER — DIPHENHYDRAMINE HYDROCHLORIDE 50 MG/ML
25 INJECTION INTRAMUSCULAR; INTRAVENOUS EVERY 6 HOURS PRN
Status: DISCONTINUED | OUTPATIENT
Start: 2024-03-18 | End: 2024-03-22 | Stop reason: HOSPADM

## 2024-03-18 RX ORDER — ACETAMINOPHEN 500 MG
1000 TABLET ORAL ONCE
Status: COMPLETED | OUTPATIENT
Start: 2024-03-18 | End: 2024-03-18

## 2024-03-18 RX ORDER — FENTANYL CITRATE 50 UG/ML
25 INJECTION, SOLUTION INTRAMUSCULAR; INTRAVENOUS EVERY 5 MIN PRN
Status: DISCONTINUED | OUTPATIENT
Start: 2024-03-18 | End: 2024-03-18 | Stop reason: HOSPADM

## 2024-03-18 RX ORDER — CYCLOBENZAPRINE HCL 10 MG
10 TABLET ORAL 3 TIMES DAILY
Status: DISCONTINUED | OUTPATIENT
Start: 2024-03-18 | End: 2024-03-21

## 2024-03-18 RX ORDER — GABAPENTIN 300 MG/1
600 CAPSULE ORAL EVERY EVENING
Status: DISCONTINUED | OUTPATIENT
Start: 2024-03-18 | End: 2024-03-22 | Stop reason: HOSPADM

## 2024-03-18 RX ORDER — SODIUM CHLORIDE 0.9 % (FLUSH) 0.9 %
5-40 SYRINGE (ML) INJECTION PRN
Status: DISCONTINUED | OUTPATIENT
Start: 2024-03-18 | End: 2024-03-18 | Stop reason: HOSPADM

## 2024-03-18 RX ORDER — BUDESONIDE 0.25 MG/2ML
0.25 INHALANT ORAL
Status: DISCONTINUED | OUTPATIENT
Start: 2024-03-19 | End: 2024-03-19

## 2024-03-18 RX ORDER — BUPROPION HYDROCHLORIDE 150 MG/1
150 TABLET, EXTENDED RELEASE ORAL DAILY
Status: DISCONTINUED | OUTPATIENT
Start: 2024-03-19 | End: 2024-03-22 | Stop reason: HOSPADM

## 2024-03-18 RX ORDER — FENTANYL CITRATE 50 UG/ML
100 INJECTION, SOLUTION INTRAMUSCULAR; INTRAVENOUS
Status: DISCONTINUED | OUTPATIENT
Start: 2024-03-18 | End: 2024-03-18 | Stop reason: HOSPADM

## 2024-03-18 RX ORDER — ONDANSETRON 2 MG/ML
4 INJECTION INTRAMUSCULAR; INTRAVENOUS
Status: DISCONTINUED | OUTPATIENT
Start: 2024-03-18 | End: 2024-03-18 | Stop reason: HOSPADM

## 2024-03-18 RX ORDER — DEXTROSE MONOHYDRATE 100 MG/ML
INJECTION, SOLUTION INTRAVENOUS CONTINUOUS PRN
Status: DISCONTINUED | OUTPATIENT
Start: 2024-03-18 | End: 2024-03-22 | Stop reason: HOSPADM

## 2024-03-18 RX ORDER — ACETAMINOPHEN 500 MG
1000 TABLET ORAL ONCE
Status: DISCONTINUED | OUTPATIENT
Start: 2024-03-18 | End: 2024-03-18 | Stop reason: HOSPADM

## 2024-03-18 RX ORDER — SODIUM CHLORIDE 9 MG/ML
INJECTION, SOLUTION INTRAVENOUS CONTINUOUS
Status: DISCONTINUED | OUTPATIENT
Start: 2024-03-18 | End: 2024-03-19

## 2024-03-18 RX ORDER — HYDRALAZINE HYDROCHLORIDE 20 MG/ML
10 INJECTION INTRAMUSCULAR; INTRAVENOUS
Status: DISCONTINUED | OUTPATIENT
Start: 2024-03-18 | End: 2024-03-18 | Stop reason: HOSPADM

## 2024-03-18 RX ORDER — CYCLOBENZAPRINE HCL 10 MG
10 TABLET ORAL EVERY 12 HOURS PRN
Status: DISCONTINUED | OUTPATIENT
Start: 2024-03-18 | End: 2024-03-22 | Stop reason: HOSPADM

## 2024-03-18 RX ORDER — NALOXONE HYDROCHLORIDE 0.4 MG/ML
INJECTION, SOLUTION INTRAMUSCULAR; INTRAVENOUS; SUBCUTANEOUS PRN
Status: DISCONTINUED | OUTPATIENT
Start: 2024-03-18 | End: 2024-03-19

## 2024-03-18 RX ORDER — INSULIN LISPRO 100 [IU]/ML
0-8 INJECTION, SOLUTION INTRAVENOUS; SUBCUTANEOUS
Status: DISCONTINUED | OUTPATIENT
Start: 2024-03-18 | End: 2024-03-22 | Stop reason: HOSPADM

## 2024-03-18 RX ORDER — TRANEXAMIC ACID 100 MG/ML
INJECTION, SOLUTION INTRAVENOUS PRN
Status: DISCONTINUED | OUTPATIENT
Start: 2024-03-18 | End: 2024-03-18 | Stop reason: SDUPTHER

## 2024-03-18 RX ORDER — FAMOTIDINE 20 MG/1
20 TABLET, FILM COATED ORAL
Status: DISCONTINUED | OUTPATIENT
Start: 2024-03-18 | End: 2024-03-22 | Stop reason: HOSPADM

## 2024-03-18 RX ORDER — HYDROMORPHONE HYDROCHLORIDE 1 MG/ML
0.5 INJECTION, SOLUTION INTRAMUSCULAR; INTRAVENOUS; SUBCUTANEOUS EVERY 5 MIN PRN
Status: DISCONTINUED | OUTPATIENT
Start: 2024-03-18 | End: 2024-03-18 | Stop reason: HOSPADM

## 2024-03-18 RX ORDER — PREGABALIN 75 MG/1
75 CAPSULE ORAL ONCE
Status: COMPLETED | OUTPATIENT
Start: 2024-03-18 | End: 2024-03-18

## 2024-03-18 RX ORDER — PROPOFOL 10 MG/ML
INJECTION, EMULSION INTRAVENOUS PRN
Status: DISCONTINUED | OUTPATIENT
Start: 2024-03-18 | End: 2024-03-18 | Stop reason: SDUPTHER

## 2024-03-18 RX ORDER — SUCCINYLCHOLINE/SOD CL,ISO/PF 200MG/10ML
SYRINGE (ML) INTRAVENOUS PRN
Status: DISCONTINUED | OUTPATIENT
Start: 2024-03-18 | End: 2024-03-18 | Stop reason: SDUPTHER

## 2024-03-18 RX ORDER — SODIUM CHLORIDE, SODIUM LACTATE, POTASSIUM CHLORIDE, CALCIUM CHLORIDE 600; 310; 30; 20 MG/100ML; MG/100ML; MG/100ML; MG/100ML
INJECTION, SOLUTION INTRAVENOUS CONTINUOUS
Status: DISCONTINUED | OUTPATIENT
Start: 2024-03-18 | End: 2024-03-18 | Stop reason: HOSPADM

## 2024-03-18 RX ORDER — DIPHENHYDRAMINE HCL 25 MG
25 CAPSULE ORAL EVERY 6 HOURS PRN
Status: DISCONTINUED | OUTPATIENT
Start: 2024-03-18 | End: 2024-03-22 | Stop reason: HOSPADM

## 2024-03-18 RX ORDER — PHENYLEPHRINE HCL IN 0.9% NACL 0.4MG/10ML
SYRINGE (ML) INTRAVENOUS PRN
Status: DISCONTINUED | OUTPATIENT
Start: 2024-03-18 | End: 2024-03-18 | Stop reason: SDUPTHER

## 2024-03-18 RX ORDER — LIDOCAINE HYDROCHLORIDE 10 MG/ML
1 INJECTION, SOLUTION EPIDURAL; INFILTRATION; INTRACAUDAL; PERINEURAL
Status: DISCONTINUED | OUTPATIENT
Start: 2024-03-18 | End: 2024-03-18 | Stop reason: HOSPADM

## 2024-03-18 RX ORDER — ARFORMOTEROL TARTRATE 15 UG/2ML
15 SOLUTION RESPIRATORY (INHALATION)
Status: DISCONTINUED | OUTPATIENT
Start: 2024-03-19 | End: 2024-03-19

## 2024-03-18 RX ORDER — PROCHLORPERAZINE EDISYLATE 5 MG/ML
5 INJECTION INTRAMUSCULAR; INTRAVENOUS
Status: DISCONTINUED | OUTPATIENT
Start: 2024-03-18 | End: 2024-03-18 | Stop reason: HOSPADM

## 2024-03-18 RX ORDER — HYDROMORPHONE HYDROCHLORIDE 2 MG/1
2 TABLET ORAL EVERY 4 HOURS PRN
Status: DISCONTINUED | OUTPATIENT
Start: 2024-03-18 | End: 2024-03-21

## 2024-03-18 RX ORDER — VANCOMYCIN HYDROCHLORIDE 1 G/20ML
INJECTION, POWDER, LYOPHILIZED, FOR SOLUTION INTRAVENOUS PRN
Status: DISCONTINUED | OUTPATIENT
Start: 2024-03-18 | End: 2024-03-18 | Stop reason: HOSPADM

## 2024-03-18 RX ADMIN — SENNOSIDES AND DOCUSATE SODIUM 1 TABLET: 8.6; 5 TABLET ORAL at 22:29

## 2024-03-18 RX ADMIN — POTASSIUM CHLORIDE 10 MEQ: 750 TABLET, EXTENDED RELEASE ORAL at 22:29

## 2024-03-18 RX ADMIN — FENTANYL CITRATE 50 MCG: 0.05 INJECTION, SOLUTION INTRAMUSCULAR; INTRAVENOUS at 08:40

## 2024-03-18 RX ADMIN — FENTANYL CITRATE 50 MCG: 0.05 INJECTION, SOLUTION INTRAMUSCULAR; INTRAVENOUS at 13:01

## 2024-03-18 RX ADMIN — SERTRALINE HYDROCHLORIDE 150 MG: 50 TABLET ORAL at 22:29

## 2024-03-18 RX ADMIN — SODIUM CHLORIDE: 9 INJECTION, SOLUTION INTRAVENOUS at 14:23

## 2024-03-18 RX ADMIN — ONDANSETRON 4 MG: 2 INJECTION INTRAMUSCULAR; INTRAVENOUS at 13:02

## 2024-03-18 RX ADMIN — LIDOCAINE HYDROCHLORIDE 60 MG: 20 INJECTION, SOLUTION EPIDURAL; INFILTRATION; INTRACAUDAL; PERINEURAL at 08:47

## 2024-03-18 RX ADMIN — INSULIN LISPRO 2 UNITS: 100 INJECTION, SOLUTION INTRAVENOUS; SUBCUTANEOUS at 17:46

## 2024-03-18 RX ADMIN — Medication 140 MG: at 08:48

## 2024-03-18 RX ADMIN — PHENYLEPHRINE HYDROCHLORIDE 40 MCG/MIN: 10 INJECTION INTRAVENOUS at 09:00

## 2024-03-18 RX ADMIN — CYCLOBENZAPRINE 10 MG: 10 TABLET, FILM COATED ORAL at 22:29

## 2024-03-18 RX ADMIN — WATER 2000 MG: 1 INJECTION INTRAMUSCULAR; INTRAVENOUS; SUBCUTANEOUS at 09:15

## 2024-03-18 RX ADMIN — HYDROMORPHONE HYDROCHLORIDE 0.5 MG: 1 INJECTION, SOLUTION INTRAMUSCULAR; INTRAVENOUS; SUBCUTANEOUS at 13:12

## 2024-03-18 RX ADMIN — DEXAMETHASONE SODIUM PHOSPHATE 4 MG: 4 INJECTION INTRA-ARTICULAR; INTRALESIONAL; INTRAMUSCULAR; INTRAVENOUS; SOFT TISSUE at 08:58

## 2024-03-18 RX ADMIN — PREGABALIN 75 MG: 75 CAPSULE ORAL at 08:08

## 2024-03-18 RX ADMIN — Medication 100 MCG: at 08:47

## 2024-03-18 RX ADMIN — SODIUM CHLORIDE, POTASSIUM CHLORIDE, SODIUM LACTATE AND CALCIUM CHLORIDE: 600; 310; 30; 20 INJECTION, SOLUTION INTRAVENOUS at 08:07

## 2024-03-18 RX ADMIN — ACETAMINOPHEN 1000 MG: 500 TABLET ORAL at 17:47

## 2024-03-18 RX ADMIN — POLYETHYLENE GLYCOL 3350 17 G: 17 POWDER, FOR SOLUTION ORAL at 17:53

## 2024-03-18 RX ADMIN — Medication: at 14:30

## 2024-03-18 RX ADMIN — FENTANYL CITRATE 50 MCG: 0.05 INJECTION, SOLUTION INTRAMUSCULAR; INTRAVENOUS at 08:47

## 2024-03-18 RX ADMIN — TRANEXAMIC ACID 1000 MG: 100 INJECTION, SOLUTION INTRAVENOUS at 09:22

## 2024-03-18 RX ADMIN — SODIUM CHLORIDE: 9 INJECTION, SOLUTION INTRAVENOUS at 09:00

## 2024-03-18 RX ADMIN — GABAPENTIN 600 MG: 300 CAPSULE ORAL at 17:47

## 2024-03-18 RX ADMIN — PROPOFOL 100 MG: 10 INJECTION, EMULSION INTRAVENOUS at 08:47

## 2024-03-18 RX ADMIN — FENTANYL CITRATE 100 MCG: 0.05 INJECTION, SOLUTION INTRAMUSCULAR; INTRAVENOUS at 09:22

## 2024-03-18 RX ADMIN — CEFAZOLIN 2000 MG: 1 INJECTION, POWDER, FOR SOLUTION INTRAMUSCULAR; INTRAVENOUS at 22:28

## 2024-03-18 RX ADMIN — FAMOTIDINE 20 MG: 10 INJECTION, SOLUTION INTRAVENOUS at 22:28

## 2024-03-18 RX ADMIN — ACETAMINOPHEN 1000 MG: 500 TABLET ORAL at 08:08

## 2024-03-18 RX ADMIN — INSULIN LISPRO 4 UNITS: 100 INJECTION, SOLUTION INTRAVENOUS; SUBCUTANEOUS at 14:17

## 2024-03-18 RX ADMIN — ACETAMINOPHEN 1000 MG: 500 TABLET ORAL at 22:29

## 2024-03-18 RX ADMIN — SODIUM CHLORIDE: 9 INJECTION, SOLUTION INTRAVENOUS at 19:43

## 2024-03-18 RX ADMIN — SODIUM CHLORIDE, POTASSIUM CHLORIDE, SODIUM LACTATE AND CALCIUM CHLORIDE: 600; 310; 30; 20 INJECTION, SOLUTION INTRAVENOUS at 13:18

## 2024-03-18 RX ADMIN — ALBUMIN (HUMAN) 250 G: 12.5 INJECTION, SOLUTION INTRAVENOUS at 10:32

## 2024-03-18 RX ADMIN — WATER 2000 MG: 1 INJECTION INTRAMUSCULAR; INTRAVENOUS; SUBCUTANEOUS at 13:15

## 2024-03-18 RX ADMIN — PROPOFOL 50 MG: 10 INJECTION, EMULSION INTRAVENOUS at 09:56

## 2024-03-18 RX ADMIN — HYDROMORPHONE HYDROCHLORIDE 0.5 MG: 1 INJECTION, SOLUTION INTRAMUSCULAR; INTRAVENOUS; SUBCUTANEOUS at 13:49

## 2024-03-18 RX ADMIN — PROPOFOL 100 MCG/KG/MIN: 10 INJECTION, EMULSION INTRAVENOUS at 09:06

## 2024-03-18 RX ADMIN — ROCURONIUM BROMIDE 10 MG: 10 INJECTION, SOLUTION INTRAVENOUS at 08:47

## 2024-03-18 ASSESSMENT — PAIN SCALES - GENERAL
PAINLEVEL_OUTOF10: 8
PAINLEVEL_OUTOF10: 0

## 2024-03-18 ASSESSMENT — PAIN - FUNCTIONAL ASSESSMENT
PAIN_FUNCTIONAL_ASSESSMENT: 0-10
PAIN_FUNCTIONAL_ASSESSMENT: NONE - DENIES PAIN

## 2024-03-18 ASSESSMENT — PAIN DESCRIPTION - ORIENTATION: ORIENTATION: LOWER

## 2024-03-18 ASSESSMENT — PAIN DESCRIPTION - LOCATION: LOCATION: INCISION

## 2024-03-18 NOTE — PROGRESS NOTES
Added to sterile field:    FLOSEAL  Ref #: [id number]  Lot #: [id number]  Exp: 01-    SURGIFOAM:  Ref #: [id number]  Lot #: [id number]  Exp: 05-

## 2024-03-18 NOTE — PROGRESS NOTES
TRANSFER - OUT REPORT:    Verbal report given to DWAYNE Vela on Leny Barnes  being transferred to [facility] for routine post-op       Report consisted of patient's Situation, Background, Assessment and   Recommendations(SBAR).     Information from the following report(s) Adult Overview, Surgery Report, Intake/Output, MAR, and Recent Results was reviewed with the receiving nurse.           Lines:   Peripheral IV 03/18/24 Left;Posterior Wrist (Active)   Site Assessment Clean, dry & intact 03/18/24 1348   Line Status Flushed;Capped 03/18/24 1348   Line Care Cap changed;Connections checked and tightened 03/18/24 1348   Phlebitis Assessment No symptoms 03/18/24 1348   Infiltration Assessment 0 03/18/24 1348   Alcohol Cap Used Yes 03/18/24 1348   Dressing Status Clean, dry & intact 03/18/24 1348   Dressing Type Transparent 03/18/24 1348       Peripheral IV 03/18/24 Right Forearm (Active)   Site Assessment Clean, dry & intact 03/18/24 1348   Line Status Flushed;Infusing 03/18/24 1348   Line Care Cap changed;Connections checked and tightened 03/18/24 1348   Phlebitis Assessment No symptoms 03/18/24 1348   Infiltration Assessment 0 03/18/24 1348   Alcohol Cap Used Yes 03/18/24 1348   Dressing Status Clean, dry & intact 03/18/24 1348   Dressing Type Transparent 03/18/24 1348        Opportunity for questions and clarification was provided.      Patient transported with:  Tech

## 2024-03-18 NOTE — ANESTHESIA PRE PROCEDURE
186*       Coags:   Lab Results   Component Value Date/Time    PROTIME 10.9 02/28/2024 08:29 AM    INR 1.0 02/28/2024 08:29 AM    INR 2.1 04/21/2023 11:49 AM    APTT 32.5 06/21/2023 11:59 AM       HCG (If Applicable): No results found for: \"PREGTESTUR\", \"PREGSERUM\", \"HCG\", \"HCGQUANT\"     ABGs:   Lab Results   Component Value Date/Time    PHART 7.365 02/19/2020 04:12 AM    PO2ART 73 02/19/2020 04:12 AM    TND0KSH 40.1 02/19/2020 04:12 AM    ZKQ3LVG 22.9 02/19/2020 04:12 AM    BEART 1 12/13/2019 12:57 PM        Type & Screen (If Applicable):  No results found for: \"LABABO\", \"LABRH\"    Drug/Infectious Status (If Applicable):  No results found for: \"HIV\", \"HEPCAB\"    COVID-19 Screening (If Applicable):   Lab Results   Component Value Date/Time    COVID19 Not Detected 07/11/2020 07:10 AM           Anesthesia Evaluation  Patient summary reviewed and Nursing notes reviewed  : History of anesthetic complications: On home O2, 2lpm at night only.  Airway: Mallampati: I  TM distance: >3 FB   Neck ROM: full  Mouth opening: > = 3 FB   Dental:    (+) poor dentition      Pulmonary: breath sounds clear to auscultation  (+)     sleep apnea: on CPAP,       asthma: seasonal asthma,                            Cardiovascular:  Exercise tolerance: good (>4 METS)  (+) hypertension:, pacemaker: pacemaker, CAD: no interval change, CABG/stent: no interval change, dysrhythmias: atrial fibrillation, CHF: systolic, hyperlipidemia        Rhythm: regular  Rate: normal                 ROS comment: PPM for CHB     PE comment: Paced   Neuro/Psych:   (+) psychiatric history: stable with treatment            GI/Hepatic/Renal:   (+) renal disease: CRI          Endo/Other:    (+) DiabetesType II DM, : arthritis: OA..                 Abdominal:             Vascular: negative vascular ROS.         Other Findings:             Anesthesia Plan      general     ASA 3       Induction: intravenous.  arterial line  MIPS: Postoperative opioids intended and

## 2024-03-18 NOTE — ANESTHESIA POSTPROCEDURE EVALUATION
Department of Anesthesiology  Postprocedure Note    Patient: Leny Barnes  MRN: [id number]  YOB: 1945  Date of evaluation: 3/18/2024    Procedure Summary       Date: 03/18/24 Room / Location: [facility] MAIN OR [address] [place] [address] MAIN OR    Anesthesia Start: 0840 Anesthesia Stop: 1351    Procedure: T10-T11 LUMBAR LAMINECTOMY, L2-S1 LUMBAR LAMINECTOMY, T10 TO ILIUM FUSION (Spine Lumbar) Diagnosis:       Spinal stenosis, lumbar region, with neurogenic claudication      Spondylolisthesis of lumbar region      Acute back pain with sciatica, left      (Spinal stenosis, lumbar region, with neurogenic claudication [M48.062])      (Spondylolisthesis of lumbar region [M43.16])      (Acute back pain with sciatica, left [M54.42])    Providers: Grant Royal MD Responsible Provider: Osito Small MD    Anesthesia Type: General ASA Status: 3            Anesthesia Type: General    Natalia Phase I: Natalia Score: 10    Natalia Phase II:      Anesthesia Post Evaluation    Patient location during evaluation: PACU  Patient participation: complete - patient participated  Level of consciousness: awake  Airway patency: patent  Nausea & Vomiting: no nausea  Cardiovascular status: blood pressure returned to baseline and hemodynamically stable  Respiratory status: acceptable  Hydration status: stable  Multimodal analgesia pain management approach    No notable events documented.

## 2024-03-18 NOTE — BRIEF OP NOTE
Brief Postoperative Note      Patient: Leny Barnes  YOB: 1945  MRN: [id number]    Date of Procedure: 3/18/2024    Pre-Op Diagnosis Codes:     * Spinal stenosis, lumbar region, with neurogenic claudication [M48.062]     * Spondylolisthesis of lumbar region [M43.16]     * Acute back pain with sciatica, left [M54.42]    Post-Op Diagnosis: Same       Procedure(s):  T10-T11 LUMBAR LAMINECTOMY, L2-S1 LUMBAR LAMINECTOMY, T10 TO ILIUM FUSION    Surgeon(s):  Grant Royal MD    Assistant:  Physician Assistant: Nicole Naik PA    Anesthesia: General    Estimated Blood Loss (mL): 500, 1 unit of cellsaver given back     Complications: None    Specimens:   * No specimens in log *    Implants:  Implant Name Type Inv. Item Serial No.  Lot No. LRB No. Used Action   THERESA SPNL 4 LEVEL 5.5 MM TI NS UNID EXP LTX - SNA  THERESA SPNL 4 LEVEL 5.5 MM TI NS UNID EXP LTX [facility] [id number] N/A 2 Implanted   GRAFT BNE SUB L CANC FRZN MORSELIZED W/ VIABLE CELL TAMMY - [id number]  GRAFT BNE SUB L CANC FRZN MORSELIZED W/ VIABLE CELL TAMMY [id number] [facility] TRANSPLANT [facility] NA N/A 1 Implanted   GRAFT BNE SUB L CANC FRZN MORSELIZED W/ VIABLE CELL TAMMY - [id number]  GRAFT BNE SUB L CANC FRZN MORSELIZED W/ VIABLE CELL TAMMY [id number] [facility] TRANSPLANT [facility] NA N/A 1 Implanted   GRAFT BNE SUB L CANC FRZN MORSELIZED W/ VIABLE CELL TAMMY - [id number]  GRAFT BNE SUB L CANC FRZN MORSELIZED W/ VIABLE CELL TAMMY [id number] [facility] TRANSPLANT [facility] NA N/A 1 Implanted   GRAFT BNE SUB L CANC FRZN MORSELIZED W/ VIABLE CELL TAMMY - [id number]  GRAFT BNE SUB L CANC FRZN MORSELIZED W/ VIABLE CELL TAMMY [id number] [facility] NA N/A 1 Implanted   GRAFT BNE SUB L CANC FRZN MORSELIZED W/ VIABLE CELL TAMMY - [id number]  GRAFT BNE  SUB L CANC FRZN MORSELIZED W/ VIABLE CELL TAMMY [id number] [facility] TRANSPLANT [facility] NA N/A 1 Implanted   GRAFT BNE SUB L CANC FRZN MORSELIZED W/ VIABLE CELL TAMMY - [id number]  GRAFT BNE SUB L CANC FRZN MORSELIZED W/ VIABLE CELL TAMMY [id number] [facility] TRANSPLANT [facility] NA N/A 1 Implanted   GRAFT BNE SUB L CANC FRZN MORSELIZED W/ VIABLE CELL TAMMY - [id number]  GRAFT BNE SUB L CANC FRZN MORSELIZED W/ VIABLE CELL TAMMY [id number] [facility] TRANSPLANT [facility] NA N/A 1 Implanted   GRAFT BNE SUB L CANC FRZN MORSELIZED W/ VIABLE CELL TAMMY - [id number]  GRAFT BNE SUB L CANC FRZN MORSELIZED W/ VIABLE CELL TAMMY [id number] [facility] TRANSPLANT [facility] NA N/A 1 Implanted   JOINT SACROILIAC 10X65 MM IFUSE-TORQ - SNA  JOINT SACROILIAC 10X65 MM IFUSE-TORQ NA [employer] [id number] N/A 1 Implanted   JOINT SACROILIAC 10X65 MM IFUSE-TORQ - SNA  JOINT SACROILIAC 10X65 MM IFUSE-TORQ NA [employer] [id number] N/A 1 Implanted   SCREW SPNL L80MM [id number] NONSTERILE CLS MULTIAXIAL CANC T C - SNA  SCREW SPNL L80MM [id number] NONSTERILE CLS MULTIAXIAL CANC T C NA [employer] DANE[employer]WD NA N/A 2 Implanted   SCREW SPNL L40MM DIA7.5MM POST THORACOLUMBOSACRAL CO CHROM - SNA  SCREW SPNL L40MM DIA7.5MM POST THORACOLUMBOSACRAL CO CHROM NA [employer] SOFAMOR DANEK-WD NA N/A 2 Implanted   SCREW SPNL L50MM DIA6.5MM POST THORACOLUMBOSACRAL CO CHROM - SNA  SCREW SPNL L50MM DIA6.5MM POST THORACOLUMBOSACRAL CO CHROM NA [employer] SOFAMOR DANEK-WD NA N/A 8 Implanted   SCREW SPNL L45MM DIA6.5MM POST THORACOLUMBOSACRAL CO CHROM - SNA  SCREW SPNL L45MM DIA6.5MM POST THORACOLUMBOSACRAL CO CHROM NA [employer] SOFAMOR DANEK-WD NA N/A 6 Implanted   SET SCR SPNL L6MM DIA5.5MM TI BRK OFF OH W/ DETACH [facility] - SNA  SET SCR SPNL L6MM DIA5.5MM TI BRK OFF [place] W/ DETACH [facility] NA [employer] SOFAMOR DANEK-WD NA N/A 18 Implanted   GRAFT BNE [id number]

## 2024-03-18 NOTE — ANESTHESIA PROCEDURE NOTES
Arterial Line:    An arterial line was placed using surface landmarks, in the pre-op for the following indication(s): continuous blood pressure monitoring and blood sampling needed.    A 20 gauge (size) (length), Arrow (type) catheter was placed, Seldinger technique used, into the left radial artery, secured by Tegaderm.  Anesthesia type: Local  Local infiltration: Injection    Events:  patient tolerated procedure well with no complications.3/18/2024 8:55 AM  Anesthesiologist: Alberta Vazquez DO  Performed: Anesthesiologist   Preanesthetic Checklist  Completed: patient identified, IV checked, site marked, risks and benefits discussed, surgical/procedural consents, equipment checked, pre-op evaluation, timeout performed, anesthesia consent given, oxygen available, monitors applied/VS acknowledged and fire risk safety assessment completed and verbalized

## 2024-03-18 NOTE — PERIOP NOTE
Louise, OR nurse made aware of patients healing bilateral heel decubiti . No LORA hose placed on patient. Louise also made aware of the Hibiclens shower causing a itchy, red rash on R arm, No CHG wipes completed in Holding Area,

## 2024-03-18 NOTE — OP NOTE
[address]  [address]                            OPERATIVE REPORT      PATIENT NAME: MIYA SEALS           : 1945  MED REC NO: [id number]                       ROOM: 534  ACCOUNT NO: [id number]                       ADMIT DATE: 2024  PROVIDER: Grant Royal MD    DATE OF SERVICE:  2024    PREOPERATIVE DIAGNOSES:       1. Thoracolumbar kyphoscoliosis.     2. Lumbar scoliosis.     3. Lumbar stenosis, L2-S1.     4. Lumbar spondylolisthesis, L5-S1.     5. Compression deformity T12 with kyphosis and stenosis.    POSTOPERATIVE DIAGNOSES:       1. Thoracolumbar kyphoscoliosis.     2. Lumbar scoliosis.     3. Lumbar stenosis, L2-S1.     4. Lumbar spondylolisthesis, L5-S1.     5. Compression deformity T12 with kyphosis and stenosis.    PROCEDURES PERFORMED:  Lumbar laminectomy at T10-T11, T11-T12, and L1-L2 kyphoplasty with facetectomies at T11-T12 and T12-L1 with Alison osteotomies bilaterally.  Also, a lumbar laminectomy at L2-S1 bilaterally with bilateral facet resection for decompression of L2, L3, L4, L5, and S1 nerve roots.  Posterior thoracolumbar fusion from T10 down to the ilium, bilateral SI joint fusion.  Robotic guidance to place a pedicle screw instrumentation.    SURGEON:  Grant Royal MD    ASSISTANT:  Nicole Naik PA-C    ANESTHESIA:  General.    ESTIMATED BLOOD LOSS:  450.    One unit of Cell Saver given back to the patient.    SPECIMENS REMOVED:  None    INTRAOPERATIVE FINDINGS:  Kyphoscoliosis and stenosis      COMPLICATIONS:  None    IMPLANTS:  Medtronic Solera pedicle screws and Medtronic  Ballast screws pelvic fixation bilaterally, and bilateral SI torque implants with SI joints.    INDICATIONS:  This is a pleasant 79-year-old female with chronic lower back pain.  Bilateral leg pain.  Neurogenic claudication.  History of previous T12 kyphoplasty with kyphosis.  As well as stenosis at L2-3,  and S1 nerve roots.  Wounds were thoroughly irrigated.  We then moved to the T10-11 area and started with the laminectomy with interspinous ligament removal at T10-11 and T11-T12 as well as at L1-L2.  Central laminectomy  and removal of  ligamentum for decompression of lamina.  At T11T12, we did Alison osteotomy bilateral through the pars regions  as well as T12-L1 for decompression for reduction of the kyphosis at the kyphoplasty level.  The decompression at T10-T11 with  bilateral  undercutting the T10 and T11 lamina mainaly in the central canal until removed the ligamentum for further decompression of the central canal.  Neuromonitoring was stable.  Wounds were irrigated.  We decorticated the transverse processes from T10 down all the way to the ilium. We had premeasured, precontoured rods placed onto the set screws and reduction maneuver was performed with good reduction of the deformity.  Set screws were tightened.  Compression across the T11-T12 and T12-L1 Alison osteotomy sites, Neuromonitoring was stable.  Wounds were irrigated.  Final tightening performed.  Final x-rays demonstrated stable fixation.  We did dilute Betadine solution for 3 minutes followed by pulse irrigation, followed by placement of the bone graft into posterolateral gutters bilaterally.  One gram vancomycin in deep wound.  Mini Hemovac placed.  #1 Stratafix running on the fascia layer, 2-0 Stratafix on subcutaneous layer.  Pain solution injected.  A 3-0 Stratafix on the skin.  The patient had Steri-Strips placed. The patient was extubated and then returned to the PACU in stable condition.    SPECIMENS:  None.    LI Brown was present for the entirety of the procedure and vital for the performance of the procedure. LI Brown assisted with positioning, prepping, draping of the patient before the procedure and instrument manipulation/placement, spinal repositioning and navigation/robotics/flouroscopic operation during

## 2024-03-19 PROBLEM — E11.65 TYPE 2 DIABETES MELLITUS WITH HYPERGLYCEMIA (HCC): Status: ACTIVE | Noted: 2024-03-19

## 2024-03-19 LAB
ANION GAP SERPL CALC-SCNC: 7 MMOL/L (ref 5–15)
BUN SERPL-MCNC: 62 MG/DL (ref 6–20)
BUN/CREAT SERPL: 27 (ref 12–20)
CALCIUM SERPL-MCNC: 8.4 MG/DL (ref 8.5–10.1)
CHLORIDE SERPL-SCNC: 108 MMOL/L (ref 97–108)
CO2 SERPL-SCNC: 23 MMOL/L (ref 21–32)
CREAT SERPL-MCNC: 2.26 MG/DL (ref 0.55–1.02)
GLUCOSE BLD STRIP.AUTO-MCNC: 145 MG/DL (ref 65–117)
GLUCOSE BLD STRIP.AUTO-MCNC: 181 MG/DL (ref 65–117)
GLUCOSE BLD STRIP.AUTO-MCNC: 186 MG/DL (ref 65–117)
GLUCOSE BLD STRIP.AUTO-MCNC: 221 MG/DL (ref 65–117)
GLUCOSE SERPL-MCNC: 155 MG/DL (ref 65–100)
HCT VFR BLD AUTO: 22.9 % (ref 35–47)
HGB BLD-MCNC: 7.6 G/DL (ref 11.5–16)
POTASSIUM SERPL-SCNC: 4.7 MMOL/L (ref 3.5–5.1)
SERVICE CMNT-IMP: ABNORMAL
SODIUM SERPL-SCNC: 138 MMOL/L (ref 136–145)

## 2024-03-19 PROCEDURE — 6360000002 HC RX W HCPCS: Performed by: STUDENT IN AN ORGANIZED HEALTH CARE EDUCATION/TRAINING PROGRAM

## 2024-03-19 PROCEDURE — 99024 POSTOP FOLLOW-UP VISIT: CPT | Performed by: PHYSICIAN ASSISTANT

## 2024-03-19 PROCEDURE — 6370000000 HC RX 637 (ALT 250 FOR IP): Performed by: CLINICAL NURSE SPECIALIST

## 2024-03-19 PROCEDURE — 6370000000 HC RX 637 (ALT 250 FOR IP): Performed by: PHYSICIAN ASSISTANT

## 2024-03-19 PROCEDURE — 1100000000 HC RM PRIVATE

## 2024-03-19 PROCEDURE — 80048 BASIC METABOLIC PNL TOTAL CA: CPT

## 2024-03-19 PROCEDURE — 2580000003 HC RX 258: Performed by: STUDENT IN AN ORGANIZED HEALTH CARE EDUCATION/TRAINING PROGRAM

## 2024-03-19 PROCEDURE — 6370000000 HC RX 637 (ALT 250 FOR IP): Performed by: STUDENT IN AN ORGANIZED HEALTH CARE EDUCATION/TRAINING PROGRAM

## 2024-03-19 PROCEDURE — 97530 THERAPEUTIC ACTIVITIES: CPT

## 2024-03-19 PROCEDURE — 6360000002 HC RX W HCPCS: Performed by: ORTHOPAEDIC SURGERY

## 2024-03-19 PROCEDURE — 85018 HEMOGLOBIN: CPT

## 2024-03-19 PROCEDURE — 97535 SELF CARE MNGMENT TRAINING: CPT

## 2024-03-19 PROCEDURE — 97165 OT EVAL LOW COMPLEX 30 MIN: CPT

## 2024-03-19 PROCEDURE — 36415 COLL VENOUS BLD VENIPUNCTURE: CPT

## 2024-03-19 PROCEDURE — 82962 GLUCOSE BLOOD TEST: CPT

## 2024-03-19 PROCEDURE — 97116 GAIT TRAINING THERAPY: CPT

## 2024-03-19 PROCEDURE — 99221 1ST HOSP IP/OBS SF/LOW 40: CPT | Performed by: CLINICAL NURSE SPECIALIST

## 2024-03-19 PROCEDURE — 97161 PT EVAL LOW COMPLEX 20 MIN: CPT

## 2024-03-19 PROCEDURE — 85014 HEMATOCRIT: CPT

## 2024-03-19 PROCEDURE — 94640 AIRWAY INHALATION TREATMENT: CPT

## 2024-03-19 RX ORDER — CLOPIDOGREL BISULFATE 75 MG/1
75 TABLET ORAL DAILY
Status: DISCONTINUED | OUTPATIENT
Start: 2024-03-19 | End: 2024-03-22 | Stop reason: HOSPADM

## 2024-03-19 RX ORDER — ARFORMOTEROL TARTRATE 15 UG/2ML
15 SOLUTION RESPIRATORY (INHALATION)
Status: DISCONTINUED | OUTPATIENT
Start: 2024-03-19 | End: 2024-03-22 | Stop reason: HOSPADM

## 2024-03-19 RX ORDER — BUDESONIDE 0.25 MG/2ML
0.25 INHALANT ORAL
Status: DISCONTINUED | OUTPATIENT
Start: 2024-03-19 | End: 2024-03-22 | Stop reason: HOSPADM

## 2024-03-19 RX ADMIN — AMLODIPINE BESYLATE 10 MG: 5 TABLET ORAL at 10:01

## 2024-03-19 RX ADMIN — BUDESONIDE 250 MCG: 0.25 INHALANT RESPIRATORY (INHALATION) at 21:03

## 2024-03-19 RX ADMIN — IPRATROPIUM BROMIDE 0.5 MG: 0.5 SOLUTION RESPIRATORY (INHALATION) at 07:52

## 2024-03-19 RX ADMIN — ARFORMOTEROL TARTRATE 15 MCG: 15 SOLUTION RESPIRATORY (INHALATION) at 07:51

## 2024-03-19 RX ADMIN — FAMOTIDINE 20 MG: 20 TABLET ORAL at 21:00

## 2024-03-19 RX ADMIN — BUMETANIDE 2 MG: 1 TABLET ORAL at 10:02

## 2024-03-19 RX ADMIN — CYCLOBENZAPRINE 10 MG: 10 TABLET, FILM COATED ORAL at 10:02

## 2024-03-19 RX ADMIN — INSULIN LISPRO 2 UNITS: 100 INJECTION, SOLUTION INTRAVENOUS; SUBCUTANEOUS at 17:43

## 2024-03-19 RX ADMIN — HYDROMORPHONE HYDROCHLORIDE 4 MG: 4 TABLET ORAL at 07:02

## 2024-03-19 RX ADMIN — ACETAMINOPHEN 1000 MG: 500 TABLET ORAL at 22:37

## 2024-03-19 RX ADMIN — GABAPENTIN 600 MG: 300 CAPSULE ORAL at 17:43

## 2024-03-19 RX ADMIN — SERTRALINE HYDROCHLORIDE 150 MG: 50 TABLET ORAL at 21:01

## 2024-03-19 RX ADMIN — SODIUM CHLORIDE, PRESERVATIVE FREE 10 ML: 5 INJECTION INTRAVENOUS at 21:01

## 2024-03-19 RX ADMIN — CYCLOBENZAPRINE 10 MG: 10 TABLET, FILM COATED ORAL at 21:01

## 2024-03-19 RX ADMIN — CLOPIDOGREL BISULFATE 75 MG: 75 TABLET, FILM COATED ORAL at 12:33

## 2024-03-19 RX ADMIN — SODIUM CHLORIDE: 9 INJECTION, SOLUTION INTRAVENOUS at 03:51

## 2024-03-19 RX ADMIN — CEFAZOLIN 2000 MG: 1 INJECTION, POWDER, FOR SOLUTION INTRAMUSCULAR; INTRAVENOUS at 07:02

## 2024-03-19 RX ADMIN — POTASSIUM CHLORIDE 10 MEQ: 750 TABLET, EXTENDED RELEASE ORAL at 21:00

## 2024-03-19 RX ADMIN — IPRATROPIUM BROMIDE 0.5 MG: 0.5 SOLUTION RESPIRATORY (INHALATION) at 21:03

## 2024-03-19 RX ADMIN — SODIUM CHLORIDE: 9 INJECTION, SOLUTION INTRAVENOUS at 14:11

## 2024-03-19 RX ADMIN — ARFORMOTEROL TARTRATE 15 MCG: 15 SOLUTION RESPIRATORY (INHALATION) at 21:03

## 2024-03-19 RX ADMIN — ALLOPURINOL 100 MG: 100 TABLET ORAL at 10:02

## 2024-03-19 RX ADMIN — BUMETANIDE 2 MG: 1 TABLET ORAL at 21:00

## 2024-03-19 RX ADMIN — INSULIN HUMAN 8 UNITS: 100 INJECTION, SUSPENSION SUBCUTANEOUS at 17:44

## 2024-03-19 RX ADMIN — SENNOSIDES AND DOCUSATE SODIUM 1 TABLET: 8.6; 5 TABLET ORAL at 10:02

## 2024-03-19 RX ADMIN — BUPROPION HYDROCHLORIDE 150 MG: 150 TABLET, FILM COATED, EXTENDED RELEASE ORAL at 10:02

## 2024-03-19 RX ADMIN — ACETAMINOPHEN 1000 MG: 500 TABLET ORAL at 12:33

## 2024-03-19 RX ADMIN — SENNOSIDES AND DOCUSATE SODIUM 1 TABLET: 8.6; 5 TABLET ORAL at 21:00

## 2024-03-19 RX ADMIN — POTASSIUM CHLORIDE 10 MEQ: 750 TABLET, EXTENDED RELEASE ORAL at 10:01

## 2024-03-19 RX ADMIN — POLYETHYLENE GLYCOL 3350 17 G: 17 POWDER, FOR SOLUTION ORAL at 10:01

## 2024-03-19 RX ADMIN — FERROUS SULFATE TAB 325 MG (65 MG ELEMENTAL FE) 325 MG: 325 (65 FE) TAB at 07:02

## 2024-03-19 RX ADMIN — ACETAMINOPHEN 1000 MG: 500 TABLET ORAL at 07:02

## 2024-03-19 RX ADMIN — HYDROMORPHONE HYDROCHLORIDE 2 MG: 2 TABLET ORAL at 19:16

## 2024-03-19 RX ADMIN — BUDESONIDE 250 MCG: 0.25 INHALANT RESPIRATORY (INHALATION) at 07:51

## 2024-03-19 ASSESSMENT — PAIN DESCRIPTION - ORIENTATION
ORIENTATION: LOWER;UPPER
ORIENTATION: LOWER;UPPER
ORIENTATION: MID

## 2024-03-19 ASSESSMENT — PAIN DESCRIPTION - LOCATION
LOCATION: BACK

## 2024-03-19 ASSESSMENT — PAIN DESCRIPTION - DESCRIPTORS
DESCRIPTORS: ACHING
DESCRIPTORS: THROBBING
DESCRIPTORS: ACHING

## 2024-03-19 ASSESSMENT — PAIN SCALES - GENERAL
PAINLEVEL_OUTOF10: 5
PAINLEVEL_OUTOF10: 8
PAINLEVEL_OUTOF10: 5

## 2024-03-19 NOTE — PLAN OF CARE
Problem: Chronic Conditions and Co-morbidities  Goal: Patient's chronic conditions and co-morbidity symptoms are monitored and maintained or improved  3/18/2024 2103 by Mirian Squires LPN  Outcome: Progressing  Flowsheets  Taken 3/18/2024 1600 by Mirian Squires LPN  Care Plan - Patient's Chronic Conditions and Co-Morbidity Symptoms are Monitored and Maintained or Improved:   Monitor and assess patient's chronic conditions and comorbid symptoms for stability, deterioration, or improvement   Update acute care plan with appropriate goals if chronic or comorbid symptoms are exacerbated and prevent overall improvement and discharge  Taken 3/18/2024 0754 by Megan Lowry, RN  Care Plan - Patient's Chronic Conditions and Co-Morbidity Symptoms are Monitored and Maintained or Improved:   Monitor and assess patient's chronic conditions and comorbid symptoms for stability, deterioration, or improvement   Collaborate with multidisciplinary team to address chronic and comorbid conditions and prevent exacerbation or deterioration   Update acute care plan with appropriate goals if chronic or comorbid symptoms are exacerbated and prevent overall improvement and discharge     Problem: Safety - Adult  Goal: Free from fall injury  3/19/2024 0119 by Rubio Pierce, RN  Outcome: Progressing  3/18/2024 2103 by Mirian Squires LPN  Outcome: Progressing  Flowsheets (Taken 3/18/2024 1600)  Free From Fall Injury:   Instruct family/caregiver on patient safety   Based on caregiver fall risk screen, instruct family/caregiver to ask for assistance with transferring infant if caregiver noted to have fall risk factors     Problem: Pain  Goal: Verbalizes/displays adequate comfort level or baseline comfort level  Outcome: Progressing     Problem: Discharge Planning  Goal: Discharge to home or other facility with appropriate resources  3/18/2024 2103 by Mirian Squires LPN  Outcome: Progressing  Flowsheets (Taken 3/18/2024

## 2024-03-19 NOTE — CONSULTS
[facility]  [facility]  DIABETES MANAGEMENT CONSULT    Consulted by Provider, Grant Royal MD  for advanced nursing evaluation and care for inpatient blood glucose management.    Evaluation and Action Plan   Leny Barnes is a 79 y.o. female s/p spinal surgery on 3/18 - post op progressing.      3/18: Unable to have face to face interaction with patient today after 3 attempts. Will initiate low dose basal insulin as per below to keep -180.     Management Rationale Action Plan   Medication   Basal needs Using equivalent NPH dosing minus 20% from PTA dose NPH 8 units BID with meals   Nutritional needs Covering for carb load with meals  NA   Corrective insulin Using MEDIUM sensitivity based on = ACHS   Additional orders  Adjusted diet to carb consistent 60gm        Diabetes Discharge Plan   Medication     Referral  []        Outpatient diabetes education   Additional orders            Initial Presentation   Leny Barnes is a 79 y.o. female admitted s/p T10-T11 LUMBAR LAMINECTOMY, L2-S1 LUMBAR LAMINECTOMY, T10 TO ILIUM FUSION for spinal stneosis/ spondylolisthesis of lumbar region/ acute back pain    HX:   Past Medical History:   Diagnosis Date    Arthritis 1990    Asthma     Atrial fibrillation ([facility])     NONE SINCE PACEMAKER    CAD (coronary artery disease) 1996    Cancer ([facility]) 2001    UTERINE    Chronic kidney disease (CKD), stage III (moderate) ([facility]) 2023    Compression fracture of L1 vertebra ([facility]) 10/2023    FELL AT HOME    Congestive heart failure ([facility]) 2020    Pacemaker    Depression     DM (diabetes mellitus) ([facility])     HTN (hypertension)     Hyperlipidemia     Morbid obesity ([facility])     Neuropathy     FREDERICK on CPAP     WEARS 02 2LNC AT NIGHT        INITIAL DX: Spinal stenosis, lumbar region, with neurogenic claudication [M48.062]  Spondylolisthesis of lumbar region [M43.16]  Acute back pain with sciatica, left [M54.42]  Lumbar stenosis with neurogenic claudication [M48.062]  personally reviewed the hospitalization record, including notes, laboratory & diagnostic data and current medications, and examined the patient at the bedside (circumstances permitting) before determining care. More than fifty (50) percent of the time was spent in patient counseling and/or care coordination.  Total minutes: 40    SHAYNE BURGOS - CNS  Diabetes Clinical Nurse Specialist  [facility]  Access via Online [employer]

## 2024-03-19 NOTE — PROGRESS NOTES
Spiritual Care Partner Volunteer visited patient at [facility] in [facility] 5W1 ORTHO SPINE on 3/19/2024    Chaplain Huseyin, MDiv, [facility]  Spiritual Health Services  Paging service: [phone] ([facility]ON)

## 2024-03-19 NOTE — PROGRESS NOTES
Physical Therapy    Attempted to see patient for afternoon session.  Spoke with RN who reports patient has been fully asleep since morning session, just woke up to eat and still very lethargic. Received patient up in bed with food pushed to side, sleeping lightly.  Roused upon arrival.  Reporting feeling too fatigued for OOB activity.  Encouraged maybe just getting up to EOB but patient struggling to maintain eyes open with therapist.  Spoke with RN again who reports patient had lots of sedating meds in last 12 hours with PCA pump and muscle relaxers.  Will defer this afternoon and follow tomorrow am.    Nikki Beck, MS, PT

## 2024-03-19 NOTE — PLAN OF CARE
Problem: Physical Therapy - Adult  Goal: By Discharge: Performs mobility at highest level of function for planned discharge setting.  See evaluation for individualized goals.  Description: FUNCTIONAL STATUS PRIOR TO ADMISSION: Patient was modified independent using a rollator for functional mobility.    HOME SUPPORT PRIOR TO ADMISSION: The patient lived with her daughter but was indep with mobility and all ADLs except for showering.  She reports being on 2L O2 via her Cpap at night.    Physical Therapy Goals  Initiated 3/19/2024  1.  Patient will move from supine to sit and sit to supine, scoot up and down, and roll side to side in bed with contact guard assist within 7 day(s).    2.  Patient will perform sit to stand with contact guard assist within 7 day(s).  3.  Patient will transfer from bed to chair and chair to bed with contact guard assist using the least restrictive device within 7 day(s).  4.  Patient will ambulate with contact guard assist for 50 feet with the least restrictive device within 7 day(s).   5.  Patient will ascend/descend 4 stairs with 2 handrail(s) with minimal assistance within 7 day(s).  6. Patient will verbalize and demonstrate understanding of spinal precautions (No bending, lifting greater than 5 lbs, or twisting; log-roll technique; frequent repositioning as instructed) within 4 days.    Outcome: Progressing   PHYSICAL THERAPY EVALUATION    Patient: Leny Barnes (79 y.o. female)  Date: 3/19/2024  Primary Diagnosis: Spinal stenosis, lumbar region, with neurogenic claudication [M48.062]  Spondylolisthesis of lumbar region [M43.16]  Acute back pain with sciatica, left [M54.42]  Lumbar stenosis with neurogenic claudication [M48.062]  Procedure(s) (LRB):  T10-T11 LUMBAR LAMINECTOMY, L2-S1 LUMBAR LAMINECTOMY, T10 TO ILIUM FUSION (N/A) 1 Day Post-Op   Precautions: Required Braces or Orthoses?: Yes Required Braces or Orthoses?: Yes       Spinal Precautions: No Bending, No Lifting, No

## 2024-03-19 NOTE — PROGRESS NOTES
Orthopedic Spine Progress Note  Post Op day: 1 Day Post-Op    2024 7:42 AM   Admit Date: 3/18/2024  Procedure: Procedure(s):  T10-T11 LUMBAR LAMINECTOMY, L2-S1 LUMBAR LAMINECTOMY, T10 TO ILIUM FUSION    Subjective:     Leny Barnes states moderate back pain otherwise W/O complaint. Tolerating diet. + flatus. Denies N/V, CP, SOB.    Objective:          Physical Exam:  General:  Alert and oriented. No acute distress.   Heart:  Respirations unlabored.   Abdomen:   Extremities: Soft, non-tender.  No evidence of cyanosis. Pulses palpable in both upper and lower extremities.       Neurologic:  Musculoskeletal:  No new motor deficits. Neurovascular exam within normal limits.  Sensation stable.  Motor: unchanged C5-T1 and L2-S1.   Olivia's sign negative in bilateral lower extremities.  Calves soft, nontender upon palpation.  Moves both upper and lower extremities.   Incision: clean, dry, and intact.  No significant erythema or swelling.  No active drainage noted.        Vital Signs:    Blood pressure (!) 113/53, pulse 70, temperature 98 °F (36.7 °C), temperature source Oral, resp. rate 16, height 1.676 m (5' 6\"), weight 106.1 kg (234 lb), SpO2 98 %.  Temp (24hrs), Av.9 °F (36.6 °C), Min:96.9 °F (36.1 °C), Max:98.6 °F (37 °C)      LAB:    Recent Labs     24  0356   HCT 22.9*   HGB 7.6*     Lab Results   Component Value Date/Time     2024 03:56 AM    K 4.7 2024 03:56 AM     2024 03:56 AM    CO2 23 2024 03:56 AM    BUN 62 2024 03:56 AM       Intake/Output:  No intake/output data recorded.   1901 -  0700  In: 2281.9 [I.V.:2156.9]  Out: 1030 [Urine:500; Drains:30]    PT/OT:   Gait:                    Assessment:   Patient is 1 Day Post-Op s/p Procedure(s):  T10-T11 LUMBAR LAMINECTOMY, L2-S1 LUMBAR LAMINECTOMY, T10 TO ILIUM FUSION    Plan:     1.  Continue PT/OT - Mobilize as tolerated, spine precautions.   2.  Continue established methods of pain

## 2024-03-19 NOTE — CONSULTS
drink     Frequency of Binge Drinking: Never   Financial Resource Strain: Not on file   Food Insecurity: Not on file   Transportation Needs: No Transportation Needs (10/3/2023)    OASIS : Transportation     Lack of Transportation (Medical): No     Lack of Transportation (Non-Medical): No     Patient Unable or Declines to Respond: No   Physical Activity: Not on file   Stress: Not on file   Social Connections: Feeling Socially Integrated (10/3/2023)    OASIS : Social Isolation     Frequency of experiencing loneliness or isolation: Never   Intimate Partner Violence: Not on file   Depression: Not at risk (4/21/2023)    PHQ-2     PHQ-2 Score: 0   Housing Stability: Not on file   Interpersonal Safety: Not on file   Utilities: Not on file      Medications were reconciled to the best of my ability given all available resources at the time of admission. Route is PO if not otherwise noted.     Family and social history were personally reviewed, all pertinent and relevant details are outlined as above.    Objective:   BP (!) 118/50   Pulse 65   Temp 97.5 °F (36.4 °C)   Resp 12   Ht 1.676 m (5' 6\")   Wt 106.1 kg (234 lb)   SpO2 99%   BMI 37.77 kg/m²         PHYSICAL EXAM:     Vital Signs: Blood pressure 108/59 heart rate 72 oxygen sats 97% on 3 L nasal cannula temp 98.4  General: Older  female lying in bed in no acute distress, drowsy and groggy.  She was cooperative with exam.  HEENT: EOMI, dry mucus membranes  Neck: Trachea midline  Chest: Fine crackles noted in bilateral bases.  Oxygen sats 97% on 3 L nasal cannula.  CVS: RRR, no murmur noted.  Heart rate 74.    Abd: Soft, non-tender, non-distended, +bowel sounds   Ext: WALL. No edema  Neuro/Psych: Pleasant mood and affect, sensory grossly within normal limit, Strength 5/5 in all extremities  Skin: Drain is in place.     Data Review:   I have independently reviewed and interpreted patient's lab and all other diagnostic data    Abnormal Labs Reviewed    BASIC METABOLIC PANEL - Abnormal; Notable for the following components:       Result Value    Glucose 155 (*)     BUN 62 (*)     Creatinine 2.26 (*)     Bun/Cre Ratio 27 (*)     Est, Glom Filt Rate 21 (*)     Calcium 8.4 (*)     All other components within normal limits   HEMOGLOBIN AND HEMATOCRIT - Abnormal; Notable for the following components:    Hemoglobin 7.6 (*)     Hematocrit 22.9 (*)     All other components within normal limits   POCT GLUCOSE - Abnormal; Notable for the following components:    POC Glucose 186 (*)     All other components within normal limits   POCT GLUCOSE - Abnormal; Notable for the following components:    POC Glucose 260 (*)     All other components within normal limits   POCT GLUCOSE - Abnormal; Notable for the following components:    POC Glucose 220 (*)     All other components within normal limits   POCT GLUCOSE - Abnormal; Notable for the following components:    POC Glucose 190 (*)     All other components within normal limits   POCT GLUCOSE - Abnormal; Notable for the following components:    POC Glucose 145 (*)     All other components within normal limits     IMAGING:   NC XR TECHNOLOGIST SERVICE   Final Result      NC XR TECHNOLOGIST SERVICE   Final Result         Notes reviewed from all clinical/nonclinical/nursing services involved in patient's clinical care. Care coordination discussions were held with appropriate clinical/nonclinical/ nursing providers based on care coordination needs.     Assessment /Plan:     Lumbar stenosis with neurogenic claudication  Spondylolithiasis of lumbar  Acute back pain with left sciatica  Kyphoscoliosis  - On 3/18 s/p T10-T11 lumbar laminectomy, L2-S1 lumbar laminectomy and a T10 to ilium fusion  - post op mgmt per Orthopedics  - holding eliquis for now (low Hgb and ooze from incision)  - PT/OT  - Pain management: Had 4 mg of Dilaudid this morning, caused excessive drowsiness-this dosage was stopped.  Will monitor with available 2 mg of

## 2024-03-19 NOTE — PROGRESS NOTES
Attempted to ambulate patient at 1915.  Pt too sleepy to move.  Anesthesia end time given to on coming nurse, to attempt to retry ambulation.

## 2024-03-19 NOTE — PROGRESS NOTES
Spine Surgery Progress Note    Admit Date: 3/18/2024   LOS: 1 day      Daily Progress Note: 3/19/2024    POD:1 Day Post-Op    S/P: Procedure(s):  T10-T11 LUMBAR LAMINECTOMY, L2-S1 LUMBAR LAMINECTOMY, T10 TO ILIUM FUSION    Vitals:    03/19/24 1043   BP: (!) 108/59   Pulse: 73   Resp: 16   Temp: 98.4 °F (36.9 °C)   SpO2: 99%      Lab Results   Component Value Date/Time    HGB 7.6 03/19/2024 03:56 AM    INR 1.0 02/28/2024 08:29 AM    INR 2.1 04/21/2023 11:49 AM     Very drowsy. No nausea or vomiting.  Pain well controlled on current medications.  No complaints. On O2, patient states she is on 2L of O2 at night at home. She has extensive cardiac history. On Plavix and Eliquis.  Progressing with PT/OT   Holly has not been removed yet.  Denies nausea, vomiting, fever, chills, chest pain, dyspnea, headache.    Calves soft/NTTP Bilaterally.  Full  strength, full ankle DF/PF.   Negative drift.  Neurocirculatory exam WNL.  Motor and sensation intact.   Incision w bloody drainage -- HVAC not functioning well    Plan:  -Pain control - scheduled tylenol; PRN dilaudid - use narcotics sparingly for drowsiness  -PT/OT; in TLSO brace  -D/c holly followed by bladder checks  -Diabetic diet  -Diabetes mgmt following  -Hospitalist consulted for assistance with medical mgmt  -Bowel regimen  -Cont home meds as ordered; Incision bleeding so hold eliquis; pt needs to be on plavix for recent stent placement. Monitor closely as HVAC not working well  -Dressing changes to incision PRN  -CM consult for HH vs SNF vs IPR    Discharge pending.   All questions were answered. Follow up in Dr. Royal's office in 2 weeks, sooner if needed.    CLIFF Wheat

## 2024-03-19 NOTE — PLAN OF CARE
Problem: Occupational Therapy - Adult  Goal: By Discharge: Performs self-care activities at highest level of function for planned discharge setting.  See evaluation for individualized goals.  Description: FUNCTIONAL STATUS PRIOR TO ADMISSION:  Patient reports modified independence with ADLs, uses AE for LB dressing.   Receives Help From: Family, ADL Assistance: Independent, Homemaking Assistance: Independent, Ambulation Assistance: Independent, Transfer Assistance: Independent, Active : No     HOME SUPPORT: Patient lived alone with daughter to provide assistance.    Occupational Therapy Goals:  Initiated 3/19/2024    1.  Patient will perform lower body dressing with Moderate Assist using AE PRN within 7 days.  2.  Patient will perform toileting with Moderate Assist using most appropriate DME within 7 days.    3.  Patient will perform standing grooming at Minimal Assist within 7 days.  4.  Patient will don/doff back brace at Moderate Assist within 7 days.  5.  Patient will verbalize/demonstrate 3/3 back precautions during ADL tasks without cues within 7 days.       Outcome: Progressing     OCCUPATIONAL THERAPY EVALUATION    Patient: Leny Barnes (79 y.o. female)  Date: 3/19/2024  Primary Diagnosis: Spinal stenosis, lumbar region, with neurogenic claudication [M48.062]  Spondylolisthesis of lumbar region [M43.16]  Acute back pain with sciatica, left [M54.42]  Lumbar stenosis with neurogenic claudication [M48.062]  Procedure(s) (LRB):  T10-T11 LUMBAR LAMINECTOMY, L2-S1 LUMBAR LAMINECTOMY, T10 TO ILIUM FUSION (N/A) 1 Day Post-Op     Precautions:           Spinal Precautions: No Bending, No Lifting, No Twisting        ASSESSMENT :  The patient is limited by decreased functional mobility, independence in ADLs, high-level IADLs, decreased ROM/strength, impaired L hand sensation (postop onset), activity tolerance, endurance, cognition (difficulty word finding, insight, safety, problem solving, command  Moderate assistance;Minimal assistance  Functional Mobility Skilled Clinical Factors: RW         ADL Intervention and task modifications:    Pt educated on safe transfer techniques, with specific emphasis on proper hand placement to push up from seated surface rather than attempt to pull self up, fully positioning self in-front of desired seated location, feeling chair on back of legs and reaching back with 1-2 UE to slowly lower self to seated position.      The patient recalled and demonstrated 1/3 back precautions. Reviewed all 3 with patient.    Patient instructed and indicated understanding the benefits of maintaining activity tolerance, functional mobility, and independence with self-care tasks during acute stay  to ensure safe return home and to baseline. Encouraged patient to increase frequency and duration OOB, not sitting longer than 30 mins without marching/walking with staff, be out of bed for all meals, perform daily ADLs (as approved by RN/MD regarding bathing etc.), and performing functional mobility to/from bathroom. Patient instruction and indicated understanding on body mechanics, ergonomics and gravitational force on the spine during different body positions to plan activities in prep for return home to complete basic ADLs, instrumental ADLs and back to work safely.    Dressing brace: Patient instructed and demonstrated to don/doff Velcro on brace using dominant side, keeping non-dominant side intact. Patient instructed on either standing with back against wall to don/doff brace or to don/doff seated using lap and bed/chair surface to support brace while manipulating.    Dressing lower body: patient unable to demonstrate tailor sit for LB dressing, uses AE at baseline      [facility] AM-PACTM \"6 Clicks\"                                                       Daily Activity Inpatient Short Form  AM-PAC Daily Activity - Inpatient   How much help is needed for putting on and taking off regular

## 2024-03-19 NOTE — PLAN OF CARE
Problem: Chronic Conditions and Co-morbidities  Goal: Patient's chronic conditions and co-morbidity symptoms are monitored and maintained or improved  Outcome: Progressing  Flowsheets (Taken 3/19/2024 0820)  Care Plan - Patient's Chronic Conditions and Co-Morbidity Symptoms are Monitored and Maintained or Improved: Monitor and assess patient's chronic conditions and comorbid symptoms for stability, deterioration, or improvement     Problem: Safety - Adult  Goal: Free from fall injury  Outcome: Progressing  Flowsheets (Taken 3/19/2024 0820)  Free From Fall Injury: Instruct family/caregiver on patient safety     Problem: Pain  Goal: Verbalizes/displays adequate comfort level or baseline comfort level  Outcome: Progressing     Problem: Discharge Planning  Goal: Discharge to home or other facility with appropriate resources  Outcome: Progressing  Flowsheets (Taken 3/19/2024 0820)  Discharge to home or other facility with appropriate resources: Identify barriers to discharge with patient and caregiver     Problem: Skin/Tissue Integrity - Adult  Goal: Incisions, wounds, or drain sites healing without S/S of infection  Outcome: Progressing     Problem: Musculoskeletal - Adult  Goal: Return mobility to safest level of function  Outcome: Progressing  Goal: Maintain proper alignment of affected body part  Outcome: Progressing  Goal: Return ADL status to a safe level of function  Outcome: Progressing     Problem: Physical Therapy - Adult  Goal: By Discharge: Performs mobility at highest level of function for planned discharge setting.  See evaluation for individualized goals.  Description: FUNCTIONAL STATUS PRIOR TO ADMISSION: Patient was modified independent using a rollator for functional mobility.    HOME SUPPORT PRIOR TO ADMISSION: The patient lived with her daughter but was indep with mobility and all ADLs except for showering.  She reports being on 2L O2 via her Cpap at night.    Physical Therapy Goals  Initiated

## 2024-03-19 NOTE — PLAN OF CARE
Problem: Chronic Conditions and Co-morbidities  Goal: Patient's chronic conditions and co-morbidity symptoms are monitored and maintained or improved  Outcome: Progressing  Flowsheets  Taken 3/18/2024 1600 by Mirian Squires LPN  Care Plan - Patient's Chronic Conditions and Co-Morbidity Symptoms are Monitored and Maintained or Improved:   Monitor and assess patient's chronic conditions and comorbid symptoms for stability, deterioration, or improvement   Update acute care plan with appropriate goals if chronic or comorbid symptoms are exacerbated and prevent overall improvement and discharge  Taken 3/18/2024 0754 by Megan Lowry, RN  Care Plan - Patient's Chronic Conditions and Co-Morbidity Symptoms are Monitored and Maintained or Improved:   Monitor and assess patient's chronic conditions and comorbid symptoms for stability, deterioration, or improvement   Collaborate with multidisciplinary team to address chronic and comorbid conditions and prevent exacerbation or deterioration   Update acute care plan with appropriate goals if chronic or comorbid symptoms are exacerbated and prevent overall improvement and discharge     Problem: Safety - Adult  Goal: Free from fall injury  Outcome: Progressing  Flowsheets (Taken 3/18/2024 1600)  Free From Fall Injury:   Instruct family/caregiver on patient safety   Based on caregiver fall risk screen, instruct family/caregiver to ask for assistance with transferring infant if caregiver noted to have fall risk factors     Problem: Discharge Planning  Goal: Discharge to home or other facility with appropriate resources  Outcome: Progressing  Flowsheets (Taken 3/18/2024 1600)  Discharge to home or other facility with appropriate resources:   Identify barriers to discharge with patient and caregiver   Identify discharge learning needs (meds, wound care, etc)   Arrange for needed discharge resources and transportation as appropriate   Refer to discharge planning if patient  needs post-hospital services based on physician order or complex needs related to functional status, cognitive ability or social support system

## 2024-03-20 LAB
ANION GAP SERPL CALC-SCNC: 7 MMOL/L (ref 5–15)
BUN SERPL-MCNC: 66 MG/DL (ref 6–20)
BUN/CREAT SERPL: 27 (ref 12–20)
CALCIUM SERPL-MCNC: 8.1 MG/DL (ref 8.5–10.1)
CHLORIDE SERPL-SCNC: 106 MMOL/L (ref 97–108)
CO2 SERPL-SCNC: 22 MMOL/L (ref 21–32)
CREAT SERPL-MCNC: 2.43 MG/DL (ref 0.55–1.02)
GLUCOSE BLD STRIP.AUTO-MCNC: 161 MG/DL (ref 65–117)
GLUCOSE BLD STRIP.AUTO-MCNC: 169 MG/DL (ref 65–117)
GLUCOSE BLD STRIP.AUTO-MCNC: 170 MG/DL (ref 65–117)
GLUCOSE BLD STRIP.AUTO-MCNC: 209 MG/DL (ref 65–117)
GLUCOSE SERPL-MCNC: 173 MG/DL (ref 65–100)
HCT VFR BLD AUTO: 22.8 % (ref 35–47)
HGB BLD-MCNC: 7.6 G/DL (ref 11.5–16)
POTASSIUM SERPL-SCNC: 4.5 MMOL/L (ref 3.5–5.1)
SERVICE CMNT-IMP: ABNORMAL
SODIUM SERPL-SCNC: 135 MMOL/L (ref 136–145)

## 2024-03-20 PROCEDURE — 6370000000 HC RX 637 (ALT 250 FOR IP): Performed by: CLINICAL NURSE SPECIALIST

## 2024-03-20 PROCEDURE — 85014 HEMATOCRIT: CPT

## 2024-03-20 PROCEDURE — 94760 N-INVAS EAR/PLS OXIMETRY 1: CPT

## 2024-03-20 PROCEDURE — 2700000000 HC OXYGEN THERAPY PER DAY

## 2024-03-20 PROCEDURE — 97530 THERAPEUTIC ACTIVITIES: CPT

## 2024-03-20 PROCEDURE — 36415 COLL VENOUS BLD VENIPUNCTURE: CPT

## 2024-03-20 PROCEDURE — 6370000000 HC RX 637 (ALT 250 FOR IP): Performed by: PHYSICIAN ASSISTANT

## 2024-03-20 PROCEDURE — 51701 INSERT BLADDER CATHETER: CPT

## 2024-03-20 PROCEDURE — 94640 AIRWAY INHALATION TREATMENT: CPT

## 2024-03-20 PROCEDURE — 85018 HEMOGLOBIN: CPT

## 2024-03-20 PROCEDURE — 80048 BASIC METABOLIC PNL TOTAL CA: CPT

## 2024-03-20 PROCEDURE — 2580000003 HC RX 258: Performed by: STUDENT IN AN ORGANIZED HEALTH CARE EDUCATION/TRAINING PROGRAM

## 2024-03-20 PROCEDURE — 6360000002 HC RX W HCPCS: Performed by: ORTHOPAEDIC SURGERY

## 2024-03-20 PROCEDURE — 6370000000 HC RX 637 (ALT 250 FOR IP): Performed by: STUDENT IN AN ORGANIZED HEALTH CARE EDUCATION/TRAINING PROGRAM

## 2024-03-20 PROCEDURE — 1100000000 HC RM PRIVATE

## 2024-03-20 PROCEDURE — 97116 GAIT TRAINING THERAPY: CPT

## 2024-03-20 PROCEDURE — 82962 GLUCOSE BLOOD TEST: CPT

## 2024-03-20 PROCEDURE — 97535 SELF CARE MNGMENT TRAINING: CPT

## 2024-03-20 RX ADMIN — CYCLOBENZAPRINE 10 MG: 10 TABLET, FILM COATED ORAL at 10:16

## 2024-03-20 RX ADMIN — ACETAMINOPHEN 1000 MG: 500 TABLET ORAL at 04:02

## 2024-03-20 RX ADMIN — CYCLOBENZAPRINE 10 MG: 10 TABLET, FILM COATED ORAL at 04:02

## 2024-03-20 RX ADMIN — INSULIN HUMAN 8 UNITS: 100 INJECTION, SUSPENSION SUBCUTANEOUS at 06:42

## 2024-03-20 RX ADMIN — INSULIN HUMAN 8 UNITS: 100 INJECTION, SUSPENSION SUBCUTANEOUS at 16:34

## 2024-03-20 RX ADMIN — BUPROPION HYDROCHLORIDE 150 MG: 150 TABLET, FILM COATED, EXTENDED RELEASE ORAL at 10:11

## 2024-03-20 RX ADMIN — ARFORMOTEROL TARTRATE 15 MCG: 15 SOLUTION RESPIRATORY (INHALATION) at 08:07

## 2024-03-20 RX ADMIN — FERROUS SULFATE TAB 325 MG (65 MG ELEMENTAL FE) 325 MG: 325 (65 FE) TAB at 06:43

## 2024-03-20 RX ADMIN — CYCLOBENZAPRINE 10 MG: 10 TABLET, FILM COATED ORAL at 20:50

## 2024-03-20 RX ADMIN — CYCLOBENZAPRINE 10 MG: 10 TABLET, FILM COATED ORAL at 14:25

## 2024-03-20 RX ADMIN — SENNOSIDES AND DOCUSATE SODIUM 1 TABLET: 8.6; 5 TABLET ORAL at 10:11

## 2024-03-20 RX ADMIN — SENNOSIDES AND DOCUSATE SODIUM 1 TABLET: 8.6; 5 TABLET ORAL at 20:50

## 2024-03-20 RX ADMIN — POTASSIUM CHLORIDE 10 MEQ: 750 TABLET, EXTENDED RELEASE ORAL at 10:12

## 2024-03-20 RX ADMIN — ACETAMINOPHEN 1000 MG: 500 TABLET ORAL at 10:11

## 2024-03-20 RX ADMIN — CLOPIDOGREL BISULFATE 75 MG: 75 TABLET, FILM COATED ORAL at 10:12

## 2024-03-20 RX ADMIN — FAMOTIDINE 20 MG: 20 TABLET ORAL at 20:50

## 2024-03-20 RX ADMIN — POTASSIUM CHLORIDE 10 MEQ: 750 TABLET, EXTENDED RELEASE ORAL at 20:50

## 2024-03-20 RX ADMIN — SODIUM CHLORIDE, PRESERVATIVE FREE 10 ML: 5 INJECTION INTRAVENOUS at 20:50

## 2024-03-20 RX ADMIN — APIXABAN 2.5 MG: 2.5 TABLET, FILM COATED ORAL at 20:50

## 2024-03-20 RX ADMIN — POLYETHYLENE GLYCOL 3350 17 G: 17 POWDER, FOR SOLUTION ORAL at 10:12

## 2024-03-20 RX ADMIN — ALLOPURINOL 100 MG: 100 TABLET ORAL at 10:11

## 2024-03-20 RX ADMIN — IPRATROPIUM BROMIDE 0.5 MG: 0.5 SOLUTION RESPIRATORY (INHALATION) at 08:08

## 2024-03-20 RX ADMIN — GABAPENTIN 600 MG: 300 CAPSULE ORAL at 17:03

## 2024-03-20 RX ADMIN — SERTRALINE HYDROCHLORIDE 150 MG: 50 TABLET ORAL at 20:50

## 2024-03-20 RX ADMIN — ARFORMOTEROL TARTRATE 15 MCG: 15 SOLUTION RESPIRATORY (INHALATION) at 19:38

## 2024-03-20 RX ADMIN — BUDESONIDE 250 MCG: 0.25 INHALANT RESPIRATORY (INHALATION) at 19:38

## 2024-03-20 RX ADMIN — BUDESONIDE 250 MCG: 0.25 INHALANT RESPIRATORY (INHALATION) at 08:07

## 2024-03-20 RX ADMIN — ACETAMINOPHEN 1000 MG: 500 TABLET ORAL at 16:35

## 2024-03-20 RX ADMIN — IPRATROPIUM BROMIDE 0.5 MG: 0.5 SOLUTION RESPIRATORY (INHALATION) at 19:38

## 2024-03-20 ASSESSMENT — PAIN SCALES - GENERAL
PAINLEVEL_OUTOF10: 5
PAINLEVEL_OUTOF10: 6
PAINLEVEL_OUTOF10: 6

## 2024-03-20 ASSESSMENT — PAIN DESCRIPTION - LOCATION
LOCATION: BACK
LOCATION: BACK

## 2024-03-20 ASSESSMENT — PAIN DESCRIPTION - DESCRIPTORS
DESCRIPTORS: ACHING
DESCRIPTORS: ACHING

## 2024-03-20 ASSESSMENT — PAIN DESCRIPTION - PAIN TYPE: TYPE: SURGICAL PAIN

## 2024-03-20 ASSESSMENT — PAIN DESCRIPTION - FREQUENCY: FREQUENCY: CONTINUOUS

## 2024-03-20 ASSESSMENT — PAIN - FUNCTIONAL ASSESSMENT: PAIN_FUNCTIONAL_ASSESSMENT: ACTIVITIES ARE NOT PREVENTED

## 2024-03-20 ASSESSMENT — PAIN DESCRIPTION - ORIENTATION
ORIENTATION: LOWER
ORIENTATION: POSTERIOR

## 2024-03-20 ASSESSMENT — PAIN DESCRIPTION - ONSET: ONSET: ON-GOING

## 2024-03-20 NOTE — PLAN OF CARE
know I'm still here? Like if my daughter calls...\"    OBJECTIVE DATA SUMMARY:   Critical Behavior:   Groggy, decreased alertness, agreeable/cooperative       Functional Mobility Training:  Bed Mobility:  Bed Mobility Training  Bed Mobility Training: Yes  Interventions: Verbal cues;Safety awareness training;Tactile cues;Manual cues  Rolling: Minimum assistance;Additional time;Adaptive equipment (HOB flat and rail used on R)  Supine to Sit: Moderate assistance;Assist X2;Additional time;Maximum assistance  Sit to Supine: Moderate assistance;Assist X2;Additional time;Maximum assistance (increased assistance for BLE lift onto bed)  Scooting: Minimum assistance;Additional time (cues for feet flat/scoot fwd)  Transfers:  Transfer Training  Sit to Stand: Contact-guard assistance;Assist X2;Additional time;Minimum assistance (EOB elevated for ease)  Stand to Sit: Contact-guard assistance;Additional time;Assist X2 (cues for backing to support surface/safety)  Balance:  Balance  Sitting: Impaired  Sitting - Static: Fair (occasional) (uneven mattress- improved with moving to HOB)  Sitting - Dynamic: Poor (constant support)  Standing: Impaired  Standing - Static: Constant support;Fair  Standing - Dynamic: Fair;Constant support   Ambulation/Gait Training:  Gait  Overall Level of Assistance: Minimum assistance;Moderate assistance;Assist X2;Additional time (brief side steps to R/HOB) x4 ft  Assistive Device: Gait belt;Walker, rolling  Interventions: Safety awareness training;Verbal cues  Speed/Analy: Pace decreased (< 100 feet/min);Slow  Step Length: Right shortened;Left shortened  Gait Abnormalities: Decreased step clearance;Trunk sway increased (L knee softening  initially- manual cues for extension and improvement noted with increased activity)    Pain Rating:  NT    Activity Tolerance:   Fair , Poor, observed shortness of breath on exertion, and desaturates with activity and requires oxygen    After treatment:    Patient left in no apparent distress in bed, Call bell within reach, Side rails x3, and Heels elevated for pressure relief      COMMUNICATION/EDUCATION:   The patient's plan of care was discussed with: registered nurse    Patient Education  Education Given To: Patient  Education Provided: Role of Therapy;Plan of Care;Precautions;Transfer Training;Fall Prevention Strategies  Education Provided Comments: Pt educated on sitting up for drinking/HOB elevated and spinal precautions  Education Method: Demonstration;Verbal  Barriers to Learning: Other (Comment) (arousal/grogginess)  Education Outcome: Verbalized understanding;Continued education needed      DIVYA PURI, PT  Minutes: 24

## 2024-03-20 NOTE — CONSULTS
[facility]  [facility] DIABETES [facility]ALTH  DIABETES MANAGEMENT CONSULT    Consulted by Provider, Grant Royal MD  for advanced nursing evaluation and care for inpatient blood glucose management.    Evaluation and Action Plan   Leny Barnes is a 79 y.o. female s/p spinal surgery on 3/18 - post op progressing.      3/20: Unable to have face to face interaction with patient today as patient was soundly sleeping at time of visit.  Fasting / and prandial BG prior to lunch 169. today after initiating BID NPH insulin.      Management Rationale Action Plan   Medication   Basal needs Using equivalent NPH dosing minus 20% from PTA dose Continue NPH 8 units BID with meals   Nutritional needs Covering for carb load with meals  NA   Corrective insulin Using MEDIUM sensitivity based on = ACHS   Additional orders  Adjusted diet to carb consistent 60gm        Diabetes Discharge Plan   Medication     Referral  []        Outpatient diabetes education   Additional orders            Initial Presentation   Leny Barnes is a 79 y.o. female admitted s/p T10-T11 LUMBAR LAMINECTOMY, L2-S1 LUMBAR LAMINECTOMY, T10 TO ILIUM FUSION for spinal stneosis/ spondylolisthesis of lumbar region/ acute back pain    HX:   Past Medical History:   Diagnosis Date    Arthritis 1990    Asthma     Atrial fibrillation ([facility])     NONE SINCE PACEMAKER    CAD (coronary artery disease) 1996    Cancer ([facility]) 2001    UTERINE    Chronic kidney disease (CKD), stage III (moderate) ([facility]) 2023    Compression fracture of L1 vertebra ([facility]) 10/2023    FELL AT HOME    Congestive heart failure ([facility]) 2020    Pacemaker    Depression     DM (diabetes mellitus) ([facility])     HTN (hypertension)     Hyperlipidemia     Morbid obesity ([facility])     Neuropathy     FREDERICK on CPAP     WEARS 02 2LNC AT NIGHT        INITIAL DX: Spinal stenosis, lumbar region, with neurogenic claudication [M48.062]  Spondylolisthesis of lumbar region [M43.16]  Acute back pain with sciatica, left

## 2024-03-20 NOTE — PLAN OF CARE
Problem: Physical Therapy - Adult  Goal: By Discharge: Performs mobility at highest level of function for planned discharge setting.  See evaluation for individualized goals.  Description: FUNCTIONAL STATUS PRIOR TO ADMISSION: Patient was modified independent using a rollator for functional mobility.    HOME SUPPORT PRIOR TO ADMISSION: The patient lived with her daughter but was indep with mobility and all ADLs except for showering.  She reports being on 2L O2 via her Cpap at night.    Physical Therapy Goals  Initiated 3/19/2024  1.  Patient will move from supine to sit and sit to supine, scoot up and down, and roll side to side in bed with contact guard assist within 7 day(s).    2.  Patient will perform sit to stand with contact guard assist within 7 day(s).  3.  Patient will transfer from bed to chair and chair to bed with contact guard assist using the least restrictive device within 7 day(s).  4.  Patient will ambulate with contact guard assist for 50 feet with the least restrictive device within 7 day(s).   5.  Patient will ascend/descend 4 stairs with 2 handrail(s) with minimal assistance within 7 day(s).  6. Patient will verbalize and demonstrate understanding of spinal precautions (No bending, lifting greater than 5 lbs, or twisting; log-roll technique; frequent repositioning as instructed) within 4 days.    Outcome: Not Progressing   PHYSICAL THERAPY TREATMENT    Patient: Leny Barnes (79 y.o. female)  Date: 3/20/2024  Diagnosis: Spinal stenosis, lumbar region, with neurogenic claudication [M48.062]  Spondylolisthesis of lumbar region [M43.16]  Acute back pain with sciatica, left [M54.42]  Lumbar stenosis with neurogenic claudication [M48.062] Lumbar stenosis with neurogenic claudication  Procedure(s) (LRB):  T10-T11 LUMBAR LAMINECTOMY, L2-S1 LUMBAR LAMINECTOMY, T10 TO ILIUM FUSION (N/A) 2 Days Post-Op  Precautions:           Spinal Precautions: No Bending, No Lifting, No Twisting     Required  upper arm   BP Method Automatic Automatic Automatic   Patient Position Semi fowlers Sitting  (EOB) Sitting  (bed in chair position (legs elevated for breakfast))        PLAN:  Patient continues to benefit from skilled intervention to address the above impairments.  Continue treatment per established plan of care.    Recommendation for discharge: (in order for the patient to meet his/her long term goals): Therapy 3 hours/day 5-7 days/week    Other factors to consider for discharge: patient's current support system is unable to meet their requirements for physical assistance and concern for safely navigating or managing the home environment    IF patient discharges home will need the following DME: continuing to assess with progress       SUBJECTIVE:   Patient stated, \"I must have been really out of it...\" Pt oriented to situation, place, and month.    OBJECTIVE DATA SUMMARY:   Critical Behavior:   Calm, groggy, agreeable       Functional Mobility Training:  Bed Mobility:  Bed Mobility Training  Interventions: Verbal cues;Safety awareness training;Tactile cues;Manual cues  Rolling: Moderate assistance;Additional time  Supine to Sit: Assist X2;Additional time;Maximum assistance  Sit to Supine: Moderate assistance;Assist X2;Additional time  Scooting: Maximum assistance;Minimum assistance;Additional time;Assist X2 (initially maxA for balance and hip advancement, decreased assist for final scoots to EOB/feet flat with increased time and cues)  Transfers:  Transfer Training  Sit to Stand: Assist X2;Minimum assistance;Adaptive equipment;Additional time (from elevated surface)  Stand to Sit: Minimum assistance;Assist X2;Additional time;Adaptive equipment  Balance:  Balance  Sitting: Impaired  Sitting - Static: Poor (constant support);Fair (occasional) (improvement from poor to poor/fair with time and positioning)  Sitting - Dynamic: Poor (constant support)  Standing: Impaired  Standing - Static: Constant

## 2024-03-20 NOTE — PROGRESS NOTES
[facility] Adult  Hospitalist Group                                                                                          Hospitalist Progress Note  SHAYNE Damon NP  Office Phone: [phone]        Date of Service:  3/20/2024  NAME:  Leny Barnes  :  1945  MRN:  [id number]       Admission Summary:   Leny Barnes is a 79 y.o. female who was admitted postoperatively from elective back surgery.  Per H&P by orthospine, patient has suffered from lower back pain after a fall in .  She tried medication management, had a formal course of physical therapy and 2 rounds of DANN in the last year.  Her pain has persisted.  On 3/18, she underwent a T10-T11 lumbar laminectomy, L2-S1 lumbar laminectomy and a T10 to ilium fusion.     Hospitalists were consulted for medical management postoperatively.  Patient was seen and examined alongside orthospine PA this morning.  Patient was lying in bed asleep, easily awoken to voice but reported that she was having a hard time staying awake and felt groggy.  Medication review reveals that patient received 4 mg of Dilaudid p.o. this morning ~7 AM.  She reports that she feels as though the pain medication dose is too much for her and is agreeable to a lower dose.  She denies any headaches or dizziness, no chest pain or pressure, shortness of breath or cough.  She reports she is on 2L oxygen at night along with her CPAP.  Denies any GI complaints such as nausea, vomiting, diarrhea or constipation.  No verbalized dysuria though she has a Smith catheter in.     Reviewed medications with patient and Ortho spine PA.  Will continue to hold Eliquis for now as she has had a hemoglobin drop postoperatively and her incision has some bloody drainage.  Questionable if her drain is draining appropriately - orthospine PA addressing. Plavix restarted (stents placed in August).     Interval history / Subjective:      POD#2  Cr 2.43,  baseline ~ 1.8, hold     prochlorperazine (COMPAZINE) injection 10 mg  10 mg IntraVENous Q6H PRN    polyethylene glycol (GLYCOLAX) packet 17 g  17 g Oral Daily    sennosides-docusate sodium (SENOKOT-S) 8.6-50 MG tablet 1 tablet  1 tablet Oral BID    cyclobenzaprine (FLEXERIL) tablet 10 mg  10 mg Oral Q12H PRN    Benzocaine-Menthol (CEPACOL) 1 lozenge  1 lozenge Oral Q2H PRN    phenol 1.4 % mouth spray 1 spray  1 spray Mouth/Throat Q2H PRN    insulin lispro (HUMALOG) injection vial 0-8 Units  0-8 Units SubCUTAneous TID WC    insulin lispro (HUMALOG) injection vial 0-4 Units  0-4 Units SubCUTAneous Nightly    HYDROmorphone (DILAUDID) tablet 2 mg  2 mg Oral Q4H PRN    HYDROmorphone HCl PF (DILAUDID) injection 0.5 mg  0.5 mg IntraVENous Q3H PRN    diphenhydrAMINE (BENADRYL) capsule 25 mg  25 mg Oral Q6H PRN    Or    diphenhydrAMINE (BENADRYL) injection 25 mg  25 mg IntraVENous Q6H PRN    glucose chewable tablet 16 g  4 tablet Oral PRN    dextrose bolus 10% 125 mL  125 mL IntraVENous PRN    Or    dextrose bolus 10% 250 mL  250 mL IntraVENous PRN    glucagon injection 1 mg  1 mg SubCUTAneous PRN    dextrose 10 % infusion   IntraVENous Continuous PRN    famotidine (PEPCID) tablet 20 mg  20 mg Oral QHS    Or    famotidine (PEPCID) 20 mg in sodium chloride (PF) 0.9 % 10 mL injection  20 mg IntraVENous QHS    [Held by provider] apixaban (ELIQUIS) tablet 2.5 mg  2.5 mg Oral BID     ______________________________________________________________________  EXPECTED LENGTH OF STAY: 4  ACTUAL LENGTH OF STAY:          2                 Jaylyn Whitmore, SHAYNE - NP

## 2024-03-20 NOTE — PLAN OF CARE
Problem: Chronic Conditions and Co-morbidities  Goal: Patient's chronic conditions and co-morbidity symptoms are monitored and maintained or improved  3/19/2024 2322 by Eleonora Mccord RN  Outcome: Progressing  Flowsheets (Taken 3/19/2024 2000)  Care Plan - Patient's Chronic Conditions and Co-Morbidity Symptoms are Monitored and Maintained or Improved: Monitor and assess patient's chronic conditions and comorbid symptoms for stability, deterioration, or improvement  3/19/2024 1604 by Maranda Cox RN  Outcome: Progressing  Flowsheets (Taken 3/19/2024 0820)  Care Plan - Patient's Chronic Conditions and Co-Morbidity Symptoms are Monitored and Maintained or Improved: Monitor and assess patient's chronic conditions and comorbid symptoms for stability, deterioration, or improvement     Problem: Safety - Adult  Goal: Free from fall injury  3/19/2024 2322 by Eleonora Mccord RN  Outcome: Progressing  3/19/2024 1604 by Maranda Cox RN  Outcome: Progressing  Flowsheets (Taken 3/19/2024 0820)  Free From Fall Injury: Instruct family/caregiver on patient safety     Problem: Pain  Goal: Verbalizes/displays adequate comfort level or baseline comfort level  3/19/2024 2322 by Eleonora Mccord RN  Outcome: Progressing  Flowsheets (Taken 3/19/2024 2015)  Verbalizes/displays adequate comfort level or baseline comfort level: Assess pain using appropriate pain scale  3/19/2024 1604 by Maarnda Cox RN  Outcome: Progressing     Problem: Pain  Goal: Verbalizes/displays adequate comfort level or baseline comfort level  3/19/2024 2322 by Eleonora Mccord RN  Outcome: Progressing  Flowsheets (Taken 3/19/2024 2015)  Verbalizes/displays adequate comfort level or baseline comfort level: Assess pain using appropriate pain scale  3/19/2024 1604 by Maranda Cox RN  Outcome: Progressing

## 2024-03-20 NOTE — PROGRESS NOTES
Spiritual Care Assessment/Progress Note  [facility]    Name: Leny Barnes MRN: [id number]    Age: 79 y.o.     Sex: female   Language: English     Date: 3/20/2024            Total Time Calculated: 11 min              Spiritual Assessment begun in [facility] 5W1 ORTHO SPINE  Service Provided For:: Patient  Referral/Consult From:: Nurse  Encounter Overview/Reason : Spiritual/Emotional Needs    Spiritual beliefs:      [x] Involved in a cherelle tradition/spiritual practice:      [] Supported by a cherelle community:      [] Claims no spiritual orientation:      [] Seeking spiritual identity:           [] Adheres to an individual form of spirituality:      [] Not able to assess:                Identified resources for coping and support system:   Support System: Children       [x] Prayer                  [x] Devotional reading               [] Music                  [] Guided Imagery     [] Pet visits                                        [] Other: (COMMENT)     Specific area/focus of visit   Encounter:    Crisis:    Spiritual/Emotional needs: Type: Spiritual Distress  Ritual, Rites and Sacraments:    Grief, Loss, and Adjustments:    Ethics/Mediation:    Behavioral Health:    Palliative Care:    Advance Care Planning:      Plan/Referrals: Continue Support (comment) (As needed.)    Narrative:     This visit is in response to a consult from a nurse for patient in 5W because of spiritual distress. The patient was awake and for awhile a nurse was there to provide bedside care. Patient seemed to be calm and not agitated. She had a soft voice. From time to time she managed to smile. She was engaged during the visit but not too communicative. She mentioned the support of her daughters. Patient hoped to to leave the hospital soon because she felt more comfortable where she lived.  said he would pray for that. Patient asked for prayer, and  provided prayers.  invited the patient to add her prayers, which  she did.  asked the patient if she wanted reading materials like a bible. Patient loved it and so the  provided it to her and another devotional reading. Patient was informed about the availability of chaplains. Patient expressed gratitude for the visit.  will continue to support the patient as needed. Call 's office for further referrals.    Chaplain Rafa Be a  [phone]

## 2024-03-20 NOTE — DISCHARGE INSTRUCTIONS
After Hospital Care Plan:  Discharge Instructions Lumbar Fusion Surgery   Dr. Cristobal   Patient Name: Leny Barnes    Date of procedure: 3/18/24     Procedure: Procedure(s):  T10-T11 LUMBAR LAMINECTOMY, L2-S1 LUMBAR LAMINECTOMY, T10 TO ILIUM FUSION      Follow up appointments  -follow up with Dr. Cristobal in 2 weeks.  Call [phone] to make an appointment as soon as you get home from the hospital.    Home Health Agency: ____________________   phone: _______________________  The agency will contact you to arrange dates/times for visits.  Please call them if you do not hear from them within 24 hours after you are discharged  Physical therapy 3 times a week for 3 weeks  Nursing-initial assessment and as needed    When to call your Orthopaedic Surgeon:  -Signs of infection-if your incision is red; continues to have drainage; drainage has a foul odor or if you have a persistent fever over 101 degrees for 24 hours  -Nausea or vomiting, severe headache  -Loss of bowel or bladder function, inability to urinate  -Changes in sensation in your arms or legs (numbness, tingling, loss of color)  -Increased weakness-greater than before your surgery  -Severe pain or pain not relieved by medications  -Signs of a blood clot in your leg-calf pain, tenderness, redness, swelling of lower leg    When to call your Primary Care Physician:  -Concerns about medical conditions such as diabetes, high blood pressure, asthma, congestive heart failure  -Call if blood sugars are elevated, persistent headache or dizziness, coughing or congestion, constipation or diarrhea, burning with urination, abnormal heart rate    When to call 911 and go to the nearest emergency room:  -Acute onset of chest pain, shortness of breath, difficulty breathing    Activity  -You are going home a well person, be as active as possible.  Your only exercise should be walking.  Start with short frequent walks and increase your walking distance each  allow the tape to come in contact with the steri-strips.     Do not rub or apply any lotions or ointments to your incision site. Do not soak or scrub your wound.    The steri-strips will be removed during your two week follow-up appointment. If you experience drainage leaking from underneath the steri-strips or if it peels off before 2 weeks, please contact your orthopedic surgeon’s office.    Showering  -You may shower in approximately 4 days after your surgery.    -Leave the dressing on during your shower.  Do NOT allow the water to run directly onto your dressing.   Once you get out of the shower, gently pat the dressing dry.  -Reminder- your brace can be removed while showering. Remember to not bend or twist while your brace is off.    -Do not take a tub bath.    Preventing blood clots  -You have been given T.E.D. stockings to wear.  Continue to wear these for 7 days after your discharge.  Put them on in the morning and take them off at night.    -They are used to increase your circulation and prevent blood clots from forming in your legs  -T.E.D. stockings can be machine washed, temperature not to exceed 160° F (71°C) and machine dried for 15 to 20 minutes, temperature not to exceed 250° F (121°C).    Pain management  -Take pain medication as prescribed; decrease the amount you use as your pain lessens  -DO not wait until you are in extreme pain to take your medication.  -Avoid alcoholic beverages while taking pain medication    Pain Medication Safety  DO:  -Read the Medication Guide   -Take your medicine exactly as prescribed   -Store your medicine away from children and in a safe place   -Flush unused medicine down the toilet   -Call your healthcare provider for medical advice about side effects. You may report side effects to FDA at 1-582-FDA-0888.   -Please be aware that many medications contain Tylenol.  We do not want you to over medicate so please read the information below as a guide.  Do not take more

## 2024-03-20 NOTE — PROGRESS NOTES
Bedside and Verbal shift change report given to DWAYNE Hopkins (oncoming nurse) by DWAYNE Crews (offgoing nurse). Report included the following information Nurse Handoff Report, Index, ED Encounter Summary, ED SBAR, Adult Overview, Surgery Report, Intake/Output, MAR, Quality Measures, Neuro Assessment, and Event Log.

## 2024-03-20 NOTE — PLAN OF CARE
Problem: Physical Therapy - Adult  Goal: By Discharge: Performs mobility at highest level of function for planned discharge setting.  See evaluation for individualized goals.  Description: FUNCTIONAL STATUS PRIOR TO ADMISSION: Patient was modified independent using a rollator for functional mobility.    HOME SUPPORT PRIOR TO ADMISSION: The patient lived with her daughter but was indep with mobility and all ADLs except for showering.  She reports being on 2L O2 via her Cpap at night.    Physical Therapy Goals  Initiated 3/19/2024  1.  Patient will move from supine to sit and sit to supine, scoot up and down, and roll side to side in bed with contact guard assist within 7 day(s).    2.  Patient will perform sit to stand with contact guard assist within 7 day(s).  3.  Patient will transfer from bed to chair and chair to bed with contact guard assist using the least restrictive device within 7 day(s).  4.  Patient will ambulate with contact guard assist for 50 feet with the least restrictive device within 7 day(s).   5.  Patient will ascend/descend 4 stairs with 2 handrail(s) with minimal assistance within 7 day(s).  6. Patient will verbalize and demonstrate understanding of spinal precautions (No bending, lifting greater than 5 lbs, or twisting; log-roll technique; frequent repositioning as instructed) within 4 days.    3/20/2024 1407 by Camilo Mchugh, PT  Outcome: Progressing  3/20/2024 1023 by Camilo Mchugh, PT  Outcome: Not Progressing

## 2024-03-20 NOTE — PLAN OF CARE
Problem: Occupational Therapy - Adult  Goal: By Discharge: Performs self-care activities at highest level of function for planned discharge setting.  See evaluation for individualized goals.  Description: FUNCTIONAL STATUS PRIOR TO ADMISSION:  Patient reports modified independence with ADLs, uses AE for LB dressing.   Receives Help From: Family, ADL Assistance: Independent, Homemaking Assistance: Independent, Ambulation Assistance: Independent, Transfer Assistance: Independent, Active : No     HOME SUPPORT: Patient lived alone with daughter to provide assistance.    Occupational Therapy Goals:  Initiated 3/19/2024    1.  Patient will perform lower body dressing with Moderate Assist using AE PRN within 7 days.  2.  Patient will perform toileting with Moderate Assist using most appropriate DME within 7 days.    3.  Patient will perform standing grooming at Minimal Assist within 7 days.  4.  Patient will don/doff back brace at Moderate Assist within 7 days.  5.  Patient will verbalize/demonstrate 3/3 back precautions during ADL tasks without cues within 7 days.       Outcome: Progressing      OCCUPATIONAL THERAPY TREATMENT  Patient: Leny Barnes (79 y.o. female)  Date: 3/20/2024  Primary Diagnosis: Spinal stenosis, lumbar region, with neurogenic claudication [M48.062]  Spondylolisthesis of lumbar region [M43.16]  Acute back pain with sciatica, left [M54.42]  Lumbar stenosis with neurogenic claudication [M48.062]  Procedure(s) (LRB):  T10-T11 LUMBAR LAMINECTOMY, L2-S1 LUMBAR LAMINECTOMY, T10 TO ILIUM FUSION (N/A) 2 Days Post-Op   Precautions:           Spinal Precautions: No Bending, No Lifting, No Twisting      Chart, occupational therapy assessment, plan of care, and goals were reviewed.    ASSESSMENT  Patient continues to benefit from skilled OT services and is slowly progressing towards goals. Pt received supine in bed, sleepy and difficult to arouse.  Per nurse and NP, pt was over medicated and not  X2;Additional time;Adaptive equipment           Balance:  Standing: Impaired  Balance  Sitting: Impaired  Sitting - Static: Poor (constant support);Fair (occasional) (improvement from poor to poor/fair with time and positioning)  Sitting - Dynamic: Poor (constant support)  Standing: Impaired  Standing - Static: Constant support;Fair  Standing - Dynamic: Fair;Constant support      ADL Intervention:         Feeding: Setup         Grooming: Minimal assistance        UE Bathing: Maximum assistance       LE Bathing: Dependent/Total       UE Dressing: Maximum assistance       LE Dressing: Dependent/Total       Toileting: Dependent/Total              Functional Mobility: Moderate assistance;Maximum assistance          Pain Ratin/10   Pain Intervention(s):   nursing notified and addressing      Activity Tolerance:   Poor  Please refer to the flowsheet for vital signs taken during this treatment.    After treatment:   Patient left in no apparent distress in bed and Call bell within reach    COMMUNICATION/EDUCATION:   The patient's plan of care was discussed with: physical therapist and registered nurse         Thank you for this referral.  Shy Lim, OT  Minutes: 51

## 2024-03-20 NOTE — PROGRESS NOTES
Orthopedic Spine Progress Note  Post Op day: 2 Days Post-Op    2024 8:06 AM   Admit Date: 3/18/2024  Procedure: Procedure(s):  T10-T11 LUMBAR LAMINECTOMY, L2-S1 LUMBAR LAMINECTOMY, T10 TO ILIUM FUSION    Subjective:     Leny Barnes has complaints of increased expected low back pain .   Tolerating diet.  No N/V.    Pain Control:        Objective:          Physical Exam:  General:  Alert and oriented. No acute distress.   Heart:  Respirations unlabored.   Abdomen:   Extremities: Soft, non-tender.  No evidence of cyanosis. Pulses palpable in both upper and lower extremities.       Neurologic:  Musculoskeletal:  No new motor deficits. Neurovascular exam within normal limits.  Sensation stable.  Motor: unchanged C5-T1 and L2-S1.   Olivia's sign negative in bilateral lower extremities.  Calves soft, nontender upon palpation and with passive twitch.  Moves both upper and lower extremities.   Incision: clean, dry, and intact.  No significant erythema or swelling.  No active drainage noted.         Vital Signs:    Blood pressure (!) 128/50, pulse 70, temperature 98.8 °F (37.1 °C), temperature source Oral, resp. rate 16, height 1.676 m (5' 6\"), weight 106.1 kg (234 lb), SpO2 100 %.  Temp (24hrs), Av.2 °F (36.8 °C), Min:97.5 °F (36.4 °C), Max:98.8 °F (37.1 °C)      LAB:    Recent Labs     24  0402   HCT 22.8*   HGB 7.6*     Lab Results   Component Value Date/Time     2024 04:02 AM    K 4.5 2024 04:02 AM     2024 04:02 AM    CO2 22 2024 04:02 AM    BUN 66 2024 04:02 AM       Intake/Output:No intake/output data recorded.   1901 -  0700  In: 656.9 [I.V.:656.9]  Out: 1525 [Urine:1475; Drains:50]    PT/OT:   Gait:                    Assessment:   Patient is 2 Days Post-Op s/p Procedure(s):  T10-T11 LUMBAR LAMINECTOMY, L2-S1 LUMBAR LAMINECTOMY, T10 TO ILIUM FUSION    Plan:     1.  Continue PT/OT  2.  Continue established methods of pain control  3.  VTE

## 2024-03-21 LAB
ANION GAP SERPL CALC-SCNC: 10 MMOL/L (ref 5–15)
BASOPHILS # BLD: 0 K/UL (ref 0–0.1)
BASOPHILS NFR BLD: 0 % (ref 0–1)
BUN SERPL-MCNC: 66 MG/DL (ref 6–20)
BUN/CREAT SERPL: 32 (ref 12–20)
CALCIUM SERPL-MCNC: 8.6 MG/DL (ref 8.5–10.1)
CHLORIDE SERPL-SCNC: 106 MMOL/L (ref 97–108)
CO2 SERPL-SCNC: 21 MMOL/L (ref 21–32)
CREAT SERPL-MCNC: 2.06 MG/DL (ref 0.55–1.02)
DIFFERENTIAL METHOD BLD: ABNORMAL
EOSINOPHIL # BLD: 0.1 K/UL (ref 0–0.4)
EOSINOPHIL NFR BLD: 1 % (ref 0–7)
ERYTHROCYTE [DISTWIDTH] IN BLOOD BY AUTOMATED COUNT: 15.7 % (ref 11.5–14.5)
GLUCOSE BLD STRIP.AUTO-MCNC: 172 MG/DL (ref 65–117)
GLUCOSE BLD STRIP.AUTO-MCNC: 194 MG/DL (ref 65–117)
GLUCOSE BLD STRIP.AUTO-MCNC: 229 MG/DL (ref 65–117)
GLUCOSE BLD STRIP.AUTO-MCNC: 232 MG/DL (ref 65–117)
GLUCOSE SERPL-MCNC: 210 MG/DL (ref 65–100)
HCT VFR BLD AUTO: 22.3 % (ref 35–47)
HGB BLD-MCNC: 7.4 G/DL (ref 11.5–16)
IMM GRANULOCYTES # BLD AUTO: 0.1 K/UL (ref 0–0.04)
IMM GRANULOCYTES NFR BLD AUTO: 1 % (ref 0–0.5)
LYMPHOCYTES # BLD: 0.5 K/UL (ref 0.8–3.5)
LYMPHOCYTES NFR BLD: 4 % (ref 12–49)
MCH RBC QN AUTO: 30.7 PG (ref 26–34)
MCHC RBC AUTO-ENTMCNC: 33.2 G/DL (ref 30–36.5)
MCV RBC AUTO: 92.5 FL (ref 80–99)
MONOCYTES # BLD: 1 K/UL (ref 0–1)
MONOCYTES NFR BLD: 8 % (ref 5–13)
NEUTS SEG # BLD: 10.9 K/UL (ref 1.8–8)
NEUTS SEG NFR BLD: 86 % (ref 32–75)
NRBC # BLD: 0 K/UL (ref 0–0.01)
NRBC BLD-RTO: 0 PER 100 WBC
PLATELET # BLD AUTO: 169 K/UL (ref 150–400)
PMV BLD AUTO: 10.1 FL (ref 8.9–12.9)
POTASSIUM SERPL-SCNC: 4.5 MMOL/L (ref 3.5–5.1)
RBC # BLD AUTO: 2.41 M/UL (ref 3.8–5.2)
RBC MORPH BLD: ABNORMAL
SERVICE CMNT-IMP: ABNORMAL
SODIUM SERPL-SCNC: 137 MMOL/L (ref 136–145)
WBC # BLD AUTO: 12.6 K/UL (ref 3.6–11)

## 2024-03-21 PROCEDURE — 94760 N-INVAS EAR/PLS OXIMETRY 1: CPT

## 2024-03-21 PROCEDURE — 97535 SELF CARE MNGMENT TRAINING: CPT

## 2024-03-21 PROCEDURE — 97530 THERAPEUTIC ACTIVITIES: CPT

## 2024-03-21 PROCEDURE — 6370000000 HC RX 637 (ALT 250 FOR IP): Performed by: PHYSICIAN ASSISTANT

## 2024-03-21 PROCEDURE — 6360000002 HC RX W HCPCS: Performed by: STUDENT IN AN ORGANIZED HEALTH CARE EDUCATION/TRAINING PROGRAM

## 2024-03-21 PROCEDURE — 99024 POSTOP FOLLOW-UP VISIT: CPT | Performed by: PHYSICIAN ASSISTANT

## 2024-03-21 PROCEDURE — 1100000000 HC RM PRIVATE

## 2024-03-21 PROCEDURE — 82962 GLUCOSE BLOOD TEST: CPT

## 2024-03-21 PROCEDURE — 6370000000 HC RX 637 (ALT 250 FOR IP): Performed by: NURSE PRACTITIONER

## 2024-03-21 PROCEDURE — 6370000000 HC RX 637 (ALT 250 FOR IP): Performed by: STUDENT IN AN ORGANIZED HEALTH CARE EDUCATION/TRAINING PROGRAM

## 2024-03-21 PROCEDURE — 6370000000 HC RX 637 (ALT 250 FOR IP): Performed by: CLINICAL NURSE SPECIALIST

## 2024-03-21 PROCEDURE — 2580000003 HC RX 258: Performed by: STUDENT IN AN ORGANIZED HEALTH CARE EDUCATION/TRAINING PROGRAM

## 2024-03-21 PROCEDURE — 6360000002 HC RX W HCPCS: Performed by: ORTHOPAEDIC SURGERY

## 2024-03-21 PROCEDURE — 80048 BASIC METABOLIC PNL TOTAL CA: CPT

## 2024-03-21 PROCEDURE — 36415 COLL VENOUS BLD VENIPUNCTURE: CPT

## 2024-03-21 PROCEDURE — 94640 AIRWAY INHALATION TREATMENT: CPT

## 2024-03-21 PROCEDURE — 2700000000 HC OXYGEN THERAPY PER DAY

## 2024-03-21 PROCEDURE — 85025 COMPLETE CBC W/AUTO DIFF WBC: CPT

## 2024-03-21 RX ORDER — CYCLOBENZAPRINE HCL 10 MG
5 TABLET ORAL 3 TIMES DAILY
Status: DISCONTINUED | OUTPATIENT
Start: 2024-03-21 | End: 2024-03-22 | Stop reason: HOSPADM

## 2024-03-21 RX ORDER — HYDROMORPHONE HYDROCHLORIDE 2 MG/1
1 TABLET ORAL EVERY 4 HOURS PRN
Status: DISCONTINUED | OUTPATIENT
Start: 2024-03-21 | End: 2024-03-22 | Stop reason: HOSPADM

## 2024-03-21 RX ADMIN — GABAPENTIN 600 MG: 300 CAPSULE ORAL at 17:15

## 2024-03-21 RX ADMIN — CLOPIDOGREL BISULFATE 75 MG: 75 TABLET, FILM COATED ORAL at 09:00

## 2024-03-21 RX ADMIN — POTASSIUM CHLORIDE 10 MEQ: 750 TABLET, EXTENDED RELEASE ORAL at 09:00

## 2024-03-21 RX ADMIN — APIXABAN 2.5 MG: 2.5 TABLET, FILM COATED ORAL at 09:00

## 2024-03-21 RX ADMIN — SENNOSIDES AND DOCUSATE SODIUM 1 TABLET: 8.6; 5 TABLET ORAL at 09:00

## 2024-03-21 RX ADMIN — FAMOTIDINE 20 MG: 20 TABLET ORAL at 21:01

## 2024-03-21 RX ADMIN — BUDESONIDE 250 MCG: 0.25 INHALANT RESPIRATORY (INHALATION) at 20:14

## 2024-03-21 RX ADMIN — FERROUS SULFATE TAB 325 MG (65 MG ELEMENTAL FE) 325 MG: 325 (65 FE) TAB at 06:54

## 2024-03-21 RX ADMIN — ALLOPURINOL 100 MG: 100 TABLET ORAL at 09:00

## 2024-03-21 RX ADMIN — ACETAMINOPHEN 1000 MG: 500 TABLET ORAL at 11:33

## 2024-03-21 RX ADMIN — AMLODIPINE BESYLATE 10 MG: 5 TABLET ORAL at 09:00

## 2024-03-21 RX ADMIN — POLYETHYLENE GLYCOL 3350 17 G: 17 POWDER, FOR SOLUTION ORAL at 09:00

## 2024-03-21 RX ADMIN — ARFORMOTEROL TARTRATE 15 MCG: 15 SOLUTION RESPIRATORY (INHALATION) at 08:09

## 2024-03-21 RX ADMIN — APIXABAN 2.5 MG: 2.5 TABLET, FILM COATED ORAL at 21:01

## 2024-03-21 RX ADMIN — IPRATROPIUM BROMIDE 0.5 MG: 0.5 SOLUTION RESPIRATORY (INHALATION) at 08:09

## 2024-03-21 RX ADMIN — CYCLOBENZAPRINE 5 MG: 10 TABLET, FILM COATED ORAL at 13:23

## 2024-03-21 RX ADMIN — INSULIN LISPRO 2 UNITS: 100 INJECTION, SOLUTION INTRAVENOUS; SUBCUTANEOUS at 11:33

## 2024-03-21 RX ADMIN — CYCLOBENZAPRINE 10 MG: 10 TABLET, FILM COATED ORAL at 09:00

## 2024-03-21 RX ADMIN — ACETAMINOPHEN 1000 MG: 500 TABLET ORAL at 17:15

## 2024-03-21 RX ADMIN — INSULIN HUMAN 10 UNITS: 100 INJECTION, SUSPENSION SUBCUTANEOUS at 17:16

## 2024-03-21 RX ADMIN — SODIUM CHLORIDE, PRESERVATIVE FREE 10 ML: 5 INJECTION INTRAVENOUS at 21:02

## 2024-03-21 RX ADMIN — INSULIN LISPRO 2 UNITS: 100 INJECTION, SOLUTION INTRAVENOUS; SUBCUTANEOUS at 17:16

## 2024-03-21 RX ADMIN — ARFORMOTEROL TARTRATE 15 MCG: 15 SOLUTION RESPIRATORY (INHALATION) at 20:14

## 2024-03-21 RX ADMIN — BUDESONIDE 250 MCG: 0.25 INHALANT RESPIRATORY (INHALATION) at 08:09

## 2024-03-21 RX ADMIN — CYCLOBENZAPRINE 5 MG: 10 TABLET, FILM COATED ORAL at 21:01

## 2024-03-21 RX ADMIN — HYDROMORPHONE HYDROCHLORIDE 1 MG: 2 TABLET ORAL at 18:38

## 2024-03-21 RX ADMIN — SODIUM CHLORIDE, PRESERVATIVE FREE 10 ML: 5 INJECTION INTRAVENOUS at 09:01

## 2024-03-21 RX ADMIN — INSULIN HUMAN 8 UNITS: 100 INJECTION, SUSPENSION SUBCUTANEOUS at 06:54

## 2024-03-21 RX ADMIN — IPRATROPIUM BROMIDE 0.5 MG: 0.5 SOLUTION RESPIRATORY (INHALATION) at 20:14

## 2024-03-21 RX ADMIN — HYDROMORPHONE HYDROCHLORIDE 0.5 MG: 1 INJECTION, SOLUTION INTRAMUSCULAR; INTRAVENOUS; SUBCUTANEOUS at 05:40

## 2024-03-21 RX ADMIN — POTASSIUM CHLORIDE 10 MEQ: 750 TABLET, EXTENDED RELEASE ORAL at 21:01

## 2024-03-21 RX ADMIN — ACETAMINOPHEN 1000 MG: 500 TABLET ORAL at 05:40

## 2024-03-21 RX ADMIN — SENNOSIDES AND DOCUSATE SODIUM 1 TABLET: 8.6; 5 TABLET ORAL at 21:01

## 2024-03-21 RX ADMIN — SERTRALINE HYDROCHLORIDE 150 MG: 50 TABLET ORAL at 21:01

## 2024-03-21 RX ADMIN — HYDROMORPHONE HYDROCHLORIDE 1 MG: 2 TABLET ORAL at 13:23

## 2024-03-21 RX ADMIN — BUPROPION HYDROCHLORIDE 150 MG: 150 TABLET, FILM COATED, EXTENDED RELEASE ORAL at 09:00

## 2024-03-21 ASSESSMENT — PAIN SCALES - GENERAL
PAINLEVEL_OUTOF10: 8
PAINLEVEL_OUTOF10: 5
PAINLEVEL_OUTOF10: 9
PAINLEVEL_OUTOF10: 8
PAINLEVEL_OUTOF10: 3
PAINLEVEL_OUTOF10: 3

## 2024-03-21 ASSESSMENT — PAIN DESCRIPTION - DESCRIPTORS
DESCRIPTORS: ACHING

## 2024-03-21 ASSESSMENT — PAIN DESCRIPTION - LOCATION
LOCATION: BACK

## 2024-03-21 NOTE — PROGRESS NOTES
[facility] Adult  Hospitalist Group                                                                                          Hospitalist Progress Note  SHAYNE Damon NP  Office Phone: [phone]        Date of Service:  3/21/2024  NAME:  Leny Barnes  :  1945  MRN:  [id number]       Admission Summary:   Leny Barnes is a 79 y.o. female who was admitted postoperatively from elective back surgery.  Per H&P by orthospine, patient has suffered from lower back pain after a fall in .  She tried medication management, had a formal course of physical therapy and 2 rounds of DANN in the last year.  Her pain has persisted.  On 3/18, she underwent a T10-T11 lumbar laminectomy, L2-S1 lumbar laminectomy and a T10 to ilium fusion.     Hospitalists were consulted for medical management postoperatively.  Patient was seen and examined alongside orthospine PA this morning.  Patient was lying in bed asleep, easily awoken to voice but reported that she was having a hard time staying awake and felt groggy.  Medication review reveals that patient received 4 mg of Dilaudid p.o. this morning ~7 AM.  She reports that she feels as though the pain medication dose is too much for her and is agreeable to a lower dose.  She denies any headaches or dizziness, no chest pain or pressure, shortness of breath or cough.  She reports she is on 2L oxygen at night along with her CPAP.  Denies any GI complaints such as nausea, vomiting, diarrhea or constipation.  No verbalized dysuria though she has a Smith catheter in.     Reviewed medications with patient and Ortho spine PA.  Will continue to hold Eliquis for now as she has had a hemoglobin drop postoperatively and her incision has some bloody drainage.  Questionable if her drain is draining appropriately - orthospine PA addressing. Plavix restarted (stents placed in August).     Interval history / Subjective:      POD#3  Cr improved to 2.06 from

## 2024-03-21 NOTE — DIABETES MGMT
[facility]  PROGRAM FOR DIABETES HEALTH  DIABETES MANAGEMENT CONSULT    Consulted by Provider, Grant Royal MD  for advanced nursing evaluation and care for inpatient blood glucose management.    Evaluation and Action Plan   Leny Barnes is a 79 y.o. female s/p spinal surgery on 3/18 - post op progressing.      3/21: Unable to have face to face interaction with patient today as patient was soundly sleeping at time of visit.  Fasting / Will adjust NPH insulin as per below to improve BG to 140-180.   Management Rationale Action Plan   Medication   Basal needs Using equivalent NPH dosing minus 20% from PTA dose ADVANCE  NPH 10 units BID with meals   Nutritional needs Covering for carb load with meals  NA   Corrective insulin Using MEDIUM sensitivity based on = ACHS   Additional orders  Adjusted diet to carb consistent 60gm        Diabetes Discharge Plan   Medication     Referral  []        Outpatient diabetes education   Additional orders            Initial Presentation   Leny Barnes is a 79 y.o. female admitted s/p T10-T11 LUMBAR LAMINECTOMY, L2-S1 LUMBAR LAMINECTOMY, T10 TO ILIUM FUSION for spinal stneosis/ spondylolisthesis of lumbar region/ acute back pain    HX:   Past Medical History:   Diagnosis Date    Arthritis 1990    Asthma     Atrial fibrillation ([facility])     NONE SINCE PACEMAKER    CAD (coronary artery disease) 1996    Cancer ([facility]) 2001    UTERINE    Chronic kidney disease (CKD), stage III (moderate) ([facility]) 2023    Compression fracture of L1 vertebra ([facility]) 10/2023    FELL AT HOME    Congestive heart failure ([facility]) 2020    Pacemaker    Depression     DM (diabetes mellitus) ([facility])     HTN (hypertension)     Hyperlipidemia     Morbid obesity ([facility])     Neuropathy     FREDERICK on CPAP     WEARS 02 2LNC AT NIGHT        INITIAL DX: Spinal stenosis, lumbar region, with neurogenic claudication [M48.062]  Spondylolisthesis of lumbar region [M43.16]  Acute back pain with sciatica, left  physical activity in improving insulin sensitivity and action  Procedure for blood glucose monitoring & options for low-cost products  Medications plan at discharge     Billing Code(s)    No charge    Before making these care recommendations, I personally reviewed the hospitalization record, including notes, laboratory & diagnostic data and current medications, and examined the patient at the bedside (circumstances permitting) before determining care. More than fifty (50) percent of the time was spent in patient counseling and/or care coordination.  Total minutes: <25    SHAYNE BURGOS - CNS  Diabetes Clinical Nurse Specialist  Program for Diabetes Health  Access via [employer]

## 2024-03-21 NOTE — PROGRESS NOTES
Spine Surgery Progress Note    Admit Date: 3/18/2024   LOS: 3 days      Daily Progress Note: 3/21/2024    POD:3 Days Post-Op    S/P: Procedure(s):  T10-T11 LUMBAR LAMINECTOMY, L2-S1 LUMBAR LAMINECTOMY, T10 TO ILIUM FUSION    Vitals:    03/21/24 0900   BP:    Pulse:    Resp: 18   Temp:    SpO2:       Lab Results   Component Value Date/Time    HGB 7.4 03/21/2024 11:35 AM    INR 1.0 02/28/2024 08:29 AM    INR 2.1 04/21/2023 11:49 AM     Less drowsy but still a little sleepy today. No nausea or vomiting. Pain increased today as narcotics have been limited.  No complaints. Intermittently on supplemental O2  Progressing with PT/OT   Voiding without issue  Denies nausea, vomiting, fever, chills, chest pain, dyspnea, headache.    Calves soft/NTTP Bilaterally.  Full  strength, full ankle DF/PF.   Negative drift.  Neurocirculatory exam WNL.  Motor and sensation intact.   Incision C/D/I    Plan:  -Pain control - scheduled tylenol; PRN dilaudid - use narcotics sparingly for drowsiness  -PT/OT; in TLSO brace  -Diabetic diet  -Diabetes mgmt following  -Hospitalist consulted for assistance with medical mgmt  -Bowel regimen  -Eliquis and plavix restarted per Dr. Royal  -Dressing changes to incision PRN  -CM consult for IPR    Discharge pending acceptance to [facility] - OK to d/c to IPR when auth received. Discussed with hospitalist who states she is medically stable.  All questions were answered. Follow up in Dr. Royal's office in 2 weeks, sooner if needed.    CLIFF Wheat

## 2024-03-21 NOTE — PLAN OF CARE
Problem: Occupational Therapy - Adult  Goal: By Discharge: Performs self-care activities at highest level of function for planned discharge setting.  See evaluation for individualized goals.  Description: FUNCTIONAL STATUS PRIOR TO ADMISSION:  Patient reports modified independence with ADLs, uses AE for LB dressing.   Receives Help From: Family, ADL Assistance: Independent, Homemaking Assistance: Independent, Ambulation Assistance: Independent, Transfer Assistance: Independent, Active : No     HOME SUPPORT: Patient lived alone with daughter to provide assistance.    Occupational Therapy Goals:  Initiated 3/19/2024    1.  Patient will perform lower body dressing with Moderate Assist using AE PRN within 7 days.  2.  Patient will perform toileting with Moderate Assist using most appropriate DME within 7 days.    3.  Patient will perform standing grooming at Minimal Assist within 7 days.  4.  Patient will don/doff back brace at Moderate Assist within 7 days.  5.  Patient will verbalize/demonstrate 3/3 back precautions during ADL tasks without cues within 7 days.       Outcome: Progressing   OCCUPATIONAL THERAPY TREATMENT  Patient: Leny Barnes (79 y.o. female)  Date: 3/21/2024  Primary Diagnosis: Spinal stenosis, lumbar region, with neurogenic claudication [M48.062]  Spondylolisthesis of lumbar region [M43.16]  Acute back pain with sciatica, left [M54.42]  Lumbar stenosis with neurogenic claudication [M48.062]  Procedure(s) (LRB):  T10-T11 LUMBAR LAMINECTOMY, L2-S1 LUMBAR LAMINECTOMY, T10 TO ILIUM FUSION (N/A) 3 Days Post-Op   Precautions:           Spinal Precautions: No Bending, No Lifting, No Twisting      Chart, occupational therapy assessment, plan of care, and goals were reviewed.    ASSESSMENT  Patient continues to benefit from skilled OT services and is slowly progressing towards goals. Pt continues to present with drowsiness, confusion (stated it was January), impaired balance, L hip pain,

## 2024-03-21 NOTE — PLAN OF CARE
Problem: Physical Therapy - Adult  Goal: By Discharge: Performs mobility at highest level of function for planned discharge setting.  See evaluation for individualized goals.  Description: FUNCTIONAL STATUS PRIOR TO ADMISSION: Patient was modified independent using a rollator for functional mobility.    HOME SUPPORT PRIOR TO ADMISSION: The patient lived with her daughter but was indep with mobility and all ADLs except for showering.  She reports being on 2L O2 via her Cpap at night.    Physical Therapy Goals  Initiated 3/19/2024  1.  Patient will move from supine to sit and sit to supine, scoot up and down, and roll side to side in bed with contact guard assist within 7 day(s).    2.  Patient will perform sit to stand with contact guard assist within 7 day(s).  3.  Patient will transfer from bed to chair and chair to bed with contact guard assist using the least restrictive device within 7 day(s).  4.  Patient will ambulate with contact guard assist for 50 feet with the least restrictive device within 7 day(s).   5.  Patient will ascend/descend 4 stairs with 2 handrail(s) with minimal assistance within 7 day(s).  6. Patient will verbalize and demonstrate understanding of spinal precautions (No bending, lifting greater than 5 lbs, or twisting; log-roll technique; frequent repositioning as instructed) within 4 days.  Outcome: Progressing  PHYSICAL THERAPY TREATMENT    Patient: Leny Barnes (79 y.o. female)  Date: 3/21/2024  Diagnosis: Spinal stenosis, lumbar region, with neurogenic claudication [M48.062]  Spondylolisthesis of lumbar region [M43.16]  Acute back pain with sciatica, left [M54.42]  Lumbar stenosis with neurogenic claudication [M48.062] Lumbar stenosis with neurogenic claudication  Procedure(s) (LRB):  T10-T11 LUMBAR LAMINECTOMY, L2-S1 LUMBAR LAMINECTOMY, T10 TO ILIUM FUSION (N/A) 3 Days Post-Op  Precautions:           Spinal Precautions: No Bending, No Lifting, No Twisting     Required Braces or  assistance;Additional time  Supine to Sit: Moderate assistance;Assist X2;Additional time  Sit to Supine: Moderate assistance;Maximum assistance;Assist X2;Additional time  Transfers:  Transfer Training  Interventions: Safety awareness training;Verbal cues;Tactile cues;Manual cues;Visual cues  Sit to Stand: Minimum assistance;Assist X2;Additional time;Adaptive equipment  Stand to Sit: Minimum assistance;Assist X2  Bed to Chair: Moderate assistance;Assist X2;Additional time;Adaptive equipment  Balance:  Balance  Sitting: Impaired  Sitting - Static: Fair (occasional);Poor (constant support)  Sitting - Dynamic: Poor (constant support)  Standing: Impaired  Standing - Static: Poor;Constant support  Standing - Dynamic: Poor;Constant support   Ambulation/Gait Training:  Gait  Overall Level of Assistance: Moderate assistance;Assist X2;Additional time;Adaptive equipment  Distance (ft): 3 Feet (x3 reps)  Assistive Device: Gait belt;Walker, rolling;Brace/splint  Interventions: Safety awareness training;Verbal cues;Visual cues;Tactile cues  Base of Support: Narrowed;Shift to left  Speed/Analy: Slow;Shuffled  Step Length: Right shortened;Left shortened  Gait Abnormalities: Decreased step clearance;Shuffling gait    Activity Tolerance:   Poor    After treatment:   Patient left in no apparent distress in bed, Call bell within reach, Bed/ chair alarm activated, and Side rails x3      COMMUNICATION/EDUCATION:   The patient's plan of care was discussed with: occupational therapist and registered nurse      Darline Odell, PT, DPT  Minutes: 30

## 2024-03-21 NOTE — PROGRESS NOTES
Occupational Therapy    Assisted Physical Therapy with transferring pt BSC.  Pt performed bed mobility with mod to max assist x 2.  Pt continues to present with impaired sit balance, demonstrates posterior lean and constant support.  Transferred from bed to BSC with mod assist x 2.  Increase verbal/tactile/verbal cues for hand placement and managing RW to safely access BSC.  In standing, pt able to assist with hygiene.  Pt placed in recliner chair.  Total assist with donning TLSO.  Overall, pt still limited by balance, drowsiness, activity tolerance and mild confusion.  Will  con't to follow.    SANDOVAL Chappell  Total Time: 25 min.

## 2024-03-21 NOTE — PLAN OF CARE
Problem: Physical Therapy - Adult  Goal: By Discharge: Performs mobility at highest level of function for planned discharge setting.  See evaluation for individualized goals.  Description: FUNCTIONAL STATUS PRIOR TO ADMISSION: Patient was modified independent using a rollator for functional mobility.    HOME SUPPORT PRIOR TO ADMISSION: The patient lived with her daughter but was indep with mobility and all ADLs except for showering.  She reports being on 2L O2 via her Cpap at night.    Physical Therapy Goals  Initiated 3/19/2024  1.  Patient will move from supine to sit and sit to supine, scoot up and down, and roll side to side in bed with contact guard assist within 7 day(s).    2.  Patient will perform sit to stand with contact guard assist within 7 day(s).  3.  Patient will transfer from bed to chair and chair to bed with contact guard assist using the least restrictive device within 7 day(s).  4.  Patient will ambulate with contact guard assist for 50 feet with the least restrictive device within 7 day(s).   5.  Patient will ascend/descend 4 stairs with 2 handrail(s) with minimal assistance within 7 day(s).  6. Patient will verbalize and demonstrate understanding of spinal precautions (No bending, lifting greater than 5 lbs, or twisting; log-roll technique; frequent repositioning as instructed) within 4 days.  Outcome: Progressing  PHYSICAL THERAPY TREATMENT    Patient: Leny Barnes (79 y.o. female)  Date: 3/21/2024  Diagnosis: Spinal stenosis, lumbar region, with neurogenic claudication [M48.062]  Spondylolisthesis of lumbar region [M43.16]  Acute back pain with sciatica, left [M54.42]  Lumbar stenosis with neurogenic claudication [M48.062] Lumbar stenosis with neurogenic claudication  Procedure(s) (LRB):  T10-T11 LUMBAR LAMINECTOMY, L2-S1 LUMBAR LAMINECTOMY, T10 TO ILIUM FUSION (N/A) 3 Days Post-Op  Precautions:           Spinal Precautions: No Bending, No Lifting, No Twisting     Required Braces or  X2;Additional time  Sit to Supine: Moderate assistance;Maximum assistance;Assist X2;Additional time  Transfers:  Transfer Training  Interventions: Safety awareness training;Verbal cues;Tactile cues;Manual cues;Visual cues  Sit to Stand: Minimum assistance;Assist X2;Additional time;Adaptive equipment  Stand to Sit: Minimum assistance;Assist X2  Bed to Chair: Moderate assistance;Assist X2;Additional time;Adaptive equipment  Balance:  Balance  Sitting: Impaired  Sitting - Static: Fair (occasional);Poor (constant support)  Sitting - Dynamic: Poor (constant support)  Standing: Impaired  Standing - Static: Poor;Constant support  Standing - Dynamic: Poor;Constant support   Ambulation/Gait Training:  Gait  Overall Level of Assistance: Moderate assistance;Assist X2;Additional time;Adaptive equipment  Distance (ft): 3 Feet (x3 reps)  Assistive Device: Gait belt;Walker, rolling;Brace/splint  Interventions: Safety awareness training;Verbal cues;Visual cues;Tactile cues  Base of Support: Narrowed;Shift to left  Speed/Analy: Slow;Shuffled  Step Length: Right shortened;Left shortened  Gait Abnormalities: Decreased step clearance;Shuffling gait    Activity Tolerance:   Poor    After treatment:   Patient left in no apparent distress sitting up in chair, Call bell within reach, and Bed/ chair alarm activated      COMMUNICATION/EDUCATION:   The patient's plan of care was discussed with: occupational therapist and registered nurse    Darline Odell, PT, DPT  Minutes: 29

## 2024-03-21 NOTE — PROGRESS NOTES
Orthopedic Spine Progress Note  Post Op day: 3 Days Post-Op    2024 9:01 AM   Admit Date: 3/18/2024  Procedure: Procedure(s):  T10-T11 LUMBAR LAMINECTOMY, L2-S1 LUMBAR LAMINECTOMY, T10 TO ILIUM FUSION    Subjective:     Leny Barnes has no complaints. Resting comfortably in bed.   Eliquis restarted yesterday. Nursing reports they just changed dressing and incision C/D/I.    Tolerating diet.  No N/V.    Pain Control:        Objective:          Physical Exam:  General:  Alert and oriented. No acute distress.   Heart:  Respirations unlabored.   Abdomen:   Extremities: Soft, non-tender.  No evidence of cyanosis. Pulses palpable in both upper and lower extremities.       Neurologic:  Musculoskeletal:  No new motor deficits. Neurovascular exam within normal limits.  Sensation stable.  Motor: unchanged C5-T1 and L2-S1.   Olivia's sign negative in bilateral lower extremities.  Calves soft, nontender upon palpation and with passive twitch.  Moves both upper and lower extremities.   Incision: clean, dry, and intact.  No significant erythema or swelling.  No active drainage noted.         Vital Signs:    Blood pressure (!) 134/58, pulse 73, temperature 97.3 °F (36.3 °C), temperature source Axillary, resp. rate 10, height 1.676 m (5' 6\"), weight 106.1 kg (234 lb), SpO2 100 %.  Temp (24hrs), Av.1 °F (36.7 °C), Min:97.3 °F (36.3 °C), Max:98.8 °F (37.1 °C)      LAB:    Recent Labs     24  0402   HCT 22.8*   HGB 7.6*     Lab Results   Component Value Date/Time     2024 07:42 AM    K 4.5 2024 07:42 AM     2024 07:42 AM    CO2 21 2024 07:42 AM    BUN 66 2024 07:42 AM       Intake/Output:No intake/output data recorded.   1901 -  0700  In: -   Out: 1470 [Urine:1450; Drains:20]    PT/OT:   Gait:                    Assessment:   Patient is 3 Days Post-Op s/p Procedure(s):  T10-T11 LUMBAR LAMINECTOMY, L2-S1 LUMBAR LAMINECTOMY, T10 TO ILIUM FUSION    Plan:     1.

## 2024-03-21 NOTE — PLAN OF CARE
Problem: Chronic Conditions and Co-morbidities  Goal: Patient's chronic conditions and co-morbidity symptoms are monitored and maintained or improved  3/21/2024 0109 by Mirta Jose, RN  Outcome: Progressing  3/20/2024 1623 by Kellie Merida, RN  Outcome: Progressing  Flowsheets (Taken 3/20/2024 0823)  Care Plan - Patient's Chronic Conditions and Co-Morbidity Symptoms are Monitored and Maintained or Improved: Monitor and assess patient's chronic conditions and comorbid symptoms for stability, deterioration, or improvement     Problem: Safety - Adult  Goal: Free from fall injury  Outcome: Progressing     Problem: Pain  Goal: Verbalizes/displays adequate comfort level or baseline comfort level  Outcome: Progressing     Problem: Discharge Planning  Goal: Discharge to home or other facility with appropriate resources  Outcome: Progressing

## 2024-03-22 VITALS
HEIGHT: 66 IN | DIASTOLIC BLOOD PRESSURE: 44 MMHG | BODY MASS INDEX: 37.61 KG/M2 | OXYGEN SATURATION: 97 % | SYSTOLIC BLOOD PRESSURE: 110 MMHG | TEMPERATURE: 98.4 F | RESPIRATION RATE: 20 BRPM | WEIGHT: 234 LBS | HEART RATE: 69 BPM

## 2024-03-22 LAB
ANION GAP SERPL CALC-SCNC: 8 MMOL/L (ref 5–15)
BASOPHILS # BLD: 0 K/UL (ref 0–0.1)
BASOPHILS NFR BLD: 0 % (ref 0–1)
BUN SERPL-MCNC: 65 MG/DL (ref 6–20)
BUN/CREAT SERPL: 37 (ref 12–20)
CALCIUM SERPL-MCNC: 9 MG/DL (ref 8.5–10.1)
CHLORIDE SERPL-SCNC: 106 MMOL/L (ref 97–108)
CO2 SERPL-SCNC: 22 MMOL/L (ref 21–32)
CREAT SERPL-MCNC: 1.78 MG/DL (ref 0.55–1.02)
DIFFERENTIAL METHOD BLD: ABNORMAL
EOSINOPHIL # BLD: 0.2 K/UL (ref 0–0.4)
EOSINOPHIL NFR BLD: 1 % (ref 0–7)
ERYTHROCYTE [DISTWIDTH] IN BLOOD BY AUTOMATED COUNT: 15.9 % (ref 11.5–14.5)
GLUCOSE BLD STRIP.AUTO-MCNC: 161 MG/DL (ref 65–117)
GLUCOSE BLD STRIP.AUTO-MCNC: 178 MG/DL (ref 65–117)
GLUCOSE SERPL-MCNC: 163 MG/DL (ref 65–100)
HCT VFR BLD AUTO: 23.9 % (ref 35–47)
HGB BLD-MCNC: 7.7 G/DL (ref 11.5–16)
IMM GRANULOCYTES # BLD AUTO: 0.2 K/UL (ref 0–0.04)
IMM GRANULOCYTES NFR BLD AUTO: 1 % (ref 0–0.5)
LYMPHOCYTES # BLD: 0.5 K/UL (ref 0.8–3.5)
LYMPHOCYTES NFR BLD: 3 % (ref 12–49)
MCH RBC QN AUTO: 30.8 PG (ref 26–34)
MCHC RBC AUTO-ENTMCNC: 32.2 G/DL (ref 30–36.5)
MCV RBC AUTO: 95.6 FL (ref 80–99)
MONOCYTES # BLD: 1.2 K/UL (ref 0–1)
MONOCYTES NFR BLD: 8 % (ref 5–13)
NEUTS SEG # BLD: 13.5 K/UL (ref 1.8–8)
NEUTS SEG NFR BLD: 87 % (ref 32–75)
NRBC # BLD: 0.02 K/UL (ref 0–0.01)
NRBC BLD-RTO: 0.1 PER 100 WBC
PLATELET # BLD AUTO: 212 K/UL (ref 150–400)
PMV BLD AUTO: 9.6 FL (ref 8.9–12.9)
POTASSIUM SERPL-SCNC: 4.5 MMOL/L (ref 3.5–5.1)
RBC # BLD AUTO: 2.5 M/UL (ref 3.8–5.2)
RBC MORPH BLD: ABNORMAL
RBC MORPH BLD: ABNORMAL
SERVICE CMNT-IMP: ABNORMAL
SERVICE CMNT-IMP: ABNORMAL
SODIUM SERPL-SCNC: 136 MMOL/L (ref 136–145)
WBC # BLD AUTO: 15.6 K/UL (ref 3.6–11)

## 2024-03-22 PROCEDURE — 99024 POSTOP FOLLOW-UP VISIT: CPT | Performed by: PHYSICIAN ASSISTANT

## 2024-03-22 PROCEDURE — 94640 AIRWAY INHALATION TREATMENT: CPT

## 2024-03-22 PROCEDURE — 82962 GLUCOSE BLOOD TEST: CPT

## 2024-03-22 PROCEDURE — 36415 COLL VENOUS BLD VENIPUNCTURE: CPT

## 2024-03-22 PROCEDURE — 97535 SELF CARE MNGMENT TRAINING: CPT

## 2024-03-22 PROCEDURE — 80048 BASIC METABOLIC PNL TOTAL CA: CPT

## 2024-03-22 PROCEDURE — 85025 COMPLETE CBC W/AUTO DIFF WBC: CPT

## 2024-03-22 PROCEDURE — 6370000000 HC RX 637 (ALT 250 FOR IP): Performed by: NURSE PRACTITIONER

## 2024-03-22 PROCEDURE — 6370000000 HC RX 637 (ALT 250 FOR IP): Performed by: CLINICAL NURSE SPECIALIST

## 2024-03-22 PROCEDURE — 2580000003 HC RX 258: Performed by: STUDENT IN AN ORGANIZED HEALTH CARE EDUCATION/TRAINING PROGRAM

## 2024-03-22 PROCEDURE — 6370000000 HC RX 637 (ALT 250 FOR IP): Performed by: PHYSICIAN ASSISTANT

## 2024-03-22 PROCEDURE — 97530 THERAPEUTIC ACTIVITIES: CPT

## 2024-03-22 PROCEDURE — 6370000000 HC RX 637 (ALT 250 FOR IP): Performed by: STUDENT IN AN ORGANIZED HEALTH CARE EDUCATION/TRAINING PROGRAM

## 2024-03-22 PROCEDURE — 6360000002 HC RX W HCPCS: Performed by: ORTHOPAEDIC SURGERY

## 2024-03-22 RX ORDER — BUMETANIDE 2 MG/1
4 TABLET ORAL 2 TIMES DAILY
Qty: 1 TABLET | Refills: 0 | Status: SHIPPED
Start: 2024-03-22

## 2024-03-22 RX ORDER — ACETAMINOPHEN 500 MG
1000 TABLET ORAL EVERY 6 HOURS
Qty: 60 TABLET | Refills: 0 | Status: SHIPPED
Start: 2024-03-22

## 2024-03-22 RX ADMIN — ACETAMINOPHEN 1000 MG: 500 TABLET ORAL at 09:07

## 2024-03-22 RX ADMIN — INSULIN HUMAN 10 UNITS: 100 INJECTION, SUSPENSION SUBCUTANEOUS at 06:46

## 2024-03-22 RX ADMIN — BUDESONIDE 250 MCG: 0.25 INHALANT RESPIRATORY (INHALATION) at 07:40

## 2024-03-22 RX ADMIN — APIXABAN 2.5 MG: 2.5 TABLET, FILM COATED ORAL at 11:14

## 2024-03-22 RX ADMIN — CLOPIDOGREL BISULFATE 75 MG: 75 TABLET, FILM COATED ORAL at 09:07

## 2024-03-22 RX ADMIN — SENNOSIDES AND DOCUSATE SODIUM 1 TABLET: 8.6; 5 TABLET ORAL at 09:08

## 2024-03-22 RX ADMIN — ARFORMOTEROL TARTRATE 15 MCG: 15 SOLUTION RESPIRATORY (INHALATION) at 07:40

## 2024-03-22 RX ADMIN — CYCLOBENZAPRINE 5 MG: 10 TABLET, FILM COATED ORAL at 09:08

## 2024-03-22 RX ADMIN — ALLOPURINOL 100 MG: 100 TABLET ORAL at 09:08

## 2024-03-22 RX ADMIN — POTASSIUM CHLORIDE 10 MEQ: 750 TABLET, EXTENDED RELEASE ORAL at 09:07

## 2024-03-22 RX ADMIN — IPRATROPIUM BROMIDE 0.5 MG: 0.5 SOLUTION RESPIRATORY (INHALATION) at 07:40

## 2024-03-22 RX ADMIN — FERROUS SULFATE TAB 325 MG (65 MG ELEMENTAL FE) 325 MG: 325 (65 FE) TAB at 06:20

## 2024-03-22 RX ADMIN — SODIUM CHLORIDE, PRESERVATIVE FREE 10 ML: 5 INJECTION INTRAVENOUS at 09:09

## 2024-03-22 RX ADMIN — ACETAMINOPHEN 1000 MG: 500 TABLET ORAL at 06:20

## 2024-03-22 RX ADMIN — POLYETHYLENE GLYCOL 3350 17 G: 17 POWDER, FOR SOLUTION ORAL at 09:09

## 2024-03-22 RX ADMIN — BUPROPION HYDROCHLORIDE 150 MG: 150 TABLET, FILM COATED, EXTENDED RELEASE ORAL at 09:06

## 2024-03-22 ASSESSMENT — PAIN DESCRIPTION - DESCRIPTORS
DESCRIPTORS: ACHING
DESCRIPTORS: ACHING

## 2024-03-22 ASSESSMENT — PAIN DESCRIPTION - ORIENTATION
ORIENTATION: MID
ORIENTATION: MID

## 2024-03-22 ASSESSMENT — PAIN DESCRIPTION - LOCATION
LOCATION: BACK
LOCATION: BACK

## 2024-03-22 ASSESSMENT — PAIN SCALES - GENERAL
PAINLEVEL_OUTOF10: 6
PAINLEVEL_OUTOF10: 7

## 2024-03-22 ASSESSMENT — PAIN - FUNCTIONAL ASSESSMENT
PAIN_FUNCTIONAL_ASSESSMENT: PREVENTS OR INTERFERES SOME ACTIVE ACTIVITIES AND ADLS
PAIN_FUNCTIONAL_ASSESSMENT: PREVENTS OR INTERFERES SOME ACTIVE ACTIVITIES AND ADLS

## 2024-03-22 NOTE — PROGRESS NOTES
Orthopedic Spine Progress Note  Post Op day: 4 Days Post-Op    2024 7:35 AM   Admit Date: 3/18/2024  Procedure: Procedure(s):  T10-T11 LUMBAR LAMINECTOMY, L2-S1 LUMBAR LAMINECTOMY, T10 TO ILIUM FUSION    Subjective:     Leny Barnes C/O post op back pain. Pain control adequate.+ void. Denies flatus, N/V, CP, SOB.    Objective:          Physical Exam:  General:  Alert and oriented. No acute distress.   Heart:  Respirations unlabored.   Abdomen:   Extremities: Soft, non-tender.  No evidence of cyanosis. Pulses palpable in both upper and lower extremities.       Neurologic:  Musculoskeletal:  No new motor deficits. Neurovascular exam within normal limits.  Sensation stable.  Motor: unchanged C5-T1 and L2-S1.   Olivia's sign negative in bilateral lower extremities.  Calves soft, nontender upon palpation.  Moves both upper and lower extremities.   Incision: clean, dry, and intact.  No significant erythema or swelling.  No active drainage noted.        Vital Signs:    Blood pressure (!) 131/48, pulse 72, temperature 98.1 °F (36.7 °C), temperature source Axillary, resp. rate 18, height 1.676 m (5' 6\"), weight 106.1 kg (234 lb), SpO2 92 %.  Temp (24hrs), Av.8 °F (36.6 °C), Min:97.3 °F (36.3 °C), Max:98.2 °F (36.8 °C)      LAB:    Recent Labs     24  0433   HCT 23.9*   HGB 7.7*        Lab Results   Component Value Date/Time     2024 04:33 AM    K 4.5 2024 04:33 AM     2024 04:33 AM    CO2 22 2024 04:33 AM    BUN 65 2024 04:33 AM       Intake/Output:  No intake/output data recorded.   1901 -  0700  In: -   Out: 275 [Urine:275]    PT/OT:   Gait:                    Assessment:   Patient is 4 Days Post-Op s/p Procedure(s):  T10-T11 LUMBAR LAMINECTOMY, L2-S1 LUMBAR LAMINECTOMY, T10 TO ILIUM FUSION    Plan:     1.  Continue PT/OT - Mobilize as tolerated, spine precautions.   2.  Continue established methods of pain control.  3.  VTE Prophylaxes -

## 2024-03-22 NOTE — DISCHARGE SUMMARY
Spine Discharge Summary    Patient ID:  Leny Barnes  [id number]  female  79 y.o.  1945    Admit date: 3/18/2024    Discharge date: 3/22/2024    Admitting Physician: Grant Royal MD     Consulting Physician(s):   Treatment Team: Attending Provider: Grant Royal MD; Surgeon: Grant Royal MD; Utilization Reviewer: Belkys Bosch, RN; Consulting Physician: Shobha Abebe APRN - NP; : Raymond Darden; Physician: ALINE Rose MD; Registered Nurse: Kellie Merida RN; Nurse Practitioner: Corina Pitts APRN - NP; Physical Therapist: Darline Odell PT; Occupational Therapist: Shy Lim OT; Registered Nurse: Kinsey Schreiber RN    Date of Surgery:   3/18/24    Preoperative Diagnosis:  Spinal stenosis, lumbar region, with neurogenic claudication [M48.062]  Spondylolisthesis of lumbar region [M43.16]  Acute back pain with sciatica, left [M54.42]    Postoperative Diagnosis:  same    Procedure(s):  T10-T11 LUMBAR LAMINECTOMY, L2-S1 LUMBAR LAMINECTOMY, T10 TO ILIUM FUSION     Anesthesia Type:   General     Surgeon: Grant Royal MD                            HPI:  Pt is a 79 y.o. female who has a history of Spinal stenosis, lumbar region, with neurogenic claudication [M48.062]  Spondylolisthesis of lumbar region [M43.16]  Acute back pain with sciatica, left [M54.42]  with pain and limitations of activities of daily living who presents at this time for a T10-T11 LUMBAR LAMINECTOMY, L2-S1 LUMBAR LAMINECTOMY, T10 TO ILIUM FUSION  following the failure of conservative management.    PMH:   Past Medical History:   Diagnosis Date    Arthritis 1990    Asthma     Atrial fibrillation (HCC)     NONE SINCE PACEMAKER    CAD (coronary artery disease) 1996    Cancer ([facility]) 2001    UTERINE    Chronic kidney disease (CKD), stage III (moderate) ([facility]) 2023    Compression fracture of L1 vertebra ([facility]) 10/2023    FELL AT HOME    Congestive heart failure ([facility]) 2020

## 2024-03-22 NOTE — PLAN OF CARE
demonstrate understanding of spinal precautions (No bending, lifting greater than 5 lbs, or twisting; log-roll technique; frequent repositioning as instructed) within 4 days.  Outcome: Progressing  PHYSICAL THERAPY TREATMENT: WEEKLY REASSESSMENT    Patient: Leny Barnes (79 y.o. female)  Date: 3/22/2024  Primary Diagnosis: Spinal stenosis, lumbar region, with neurogenic claudication [M48.062]  Spondylolisthesis of lumbar region [M43.16]  Acute back pain with sciatica, left [M54.42]  Lumbar stenosis with neurogenic claudication [M48.062]  Procedure(s) (LRB):  T10-T11 LUMBAR LAMINECTOMY, L2-S1 LUMBAR LAMINECTOMY, T10 TO ILIUM FUSION (N/A) 4 Days Post-Op   Precautions:           Spinal Precautions: No Bending, No Lifting, No Twisting     Required Braces or Orthoses  Spinal: Thoracic Lumbar Sacral Orthotics      ASSESSMENT :  Patient continues to with PT services and is making minimal progress toward goals.  Continues to present with drowsiness, impaired cognition, delayed processing, decreased command following, poor initiation/sequencing/motor planning, poor safety awareness, poor activity tolerance, and overall decline in functional mobility.  Transferred supine>sit with modA x2 and max cuing for adhering to log roll technique.  Continues to require assist in sitting d/t posterior lean.  Pt transferred sit<>stand with Haley x2 and took a few steps bed>chair with modA x2 and use of RW. Pt with poor initiation and required cuing for BLE advancement and totalA for RW management. Ended session in chair however pt tearful and only able to tolerate ~5 minutes before returning to the bed.  Recommending IPR upon discharge.         Of note, pt with poor fitting TLSO and cleared for LSO brace for comfort by PA.  Will provide next session.      Functional Outcome Measure:  The patient scored 12/24 on the [facility] outcome measure.          PLAN :  Goals have been updated based on progression since last assessment.  Patient  MARJORIE Hay MD at [facility] CARDIAC CATH LAB    CATARACT REMOVAL Bilateral 2019    CERVICAL FUSION      CHOLECYSTECTOMY      COLONOSCOPY N/A 07/15/2020    COLONOSCOPY :- performed by Salome Shaikh MD at [facility] ENDOSCOPY    COLONOSCOPY Left 10/28/2019    COLONOSCOPY AND EGD performed by Salome Shaikh MD at [facility] ENDOSCOPY    DILATION AND CURETTAGE OF UTERUS      GASTRIC BYPASS SURGERY      Jejunal bypass with subsequent reversal..    IR CHEST TUBE INSERTION      KYPHOSIS SURGERY  02/2023    LUMBAR SPINE SURGERY N/A 3/18/2024    T10-T11 LUMBAR LAMINECTOMY, L2-S1 LUMBAR LAMINECTOMY, T10 TO ILIUM FUSION performed by Grant Royal MD at [facility] MAIN OR    PACEMAKER  05/04/2022    KEI AND BSO (CERVIX REMOVED)      For uterine CA       Home Situation:  Social/Functional History  Lives With: Daughter  Type of Home: House  Home Layout: Two level  Home Access: Stairs to enter with rails  Entrance Stairs - Number of Steps: 3  Bathroom Shower/Tub: Tub/Shower unit  Bathroom Toilet: Standard  Bathroom Equipment: Toilet raiser, Tub transfer bench  Home Equipment: Walker, rolling, Rollator, Cane, Cane, quad  Has the patient had two or more falls in the past year or any fall with injury in the past year?: Yes  Receives Help From: Family  ADL Assistance: Independent  Homemaking Assistance: Independent  Ambulation Assistance: Independent  Transfer Assistance: Independent  Active : No  Patient's  Info: Breonna Barnes (Child)  [phone]  Mode of Transportation: Family    Hearing:   Hearing  Hearing: Within functional limits    Functional Mobility:  Bed Mobility:  Bed Mobility Training  Interventions: Safety awareness training;Verbal cues;Tactile cues;Visual cues;Manual cues  Rolling: Moderate assistance;Additional time  Supine to Sit: Moderate assistance;Assist X2;Additional time  Scooting: Moderate assistance;Additional time (seated)  Transfers:  Transfer Training  Interventions: Safety awareness training;Verbal

## 2024-03-22 NOTE — CARE COORDINATION
Transition of Care Plan:    RUR: 20%   Prior Level of Functioning: Independent   Disposition: IPR   If SNF or IPR: Date FOC offered: 3/19  Date FOC received: 3/19   Accepting facility: [facility]   Report: [phone]   Date authorization started with reference number: N/A   Date authorization received and expires: N/a  Follow up appointments: PCP   DME needed: None   Transportation at discharge: BLS [facility]2H 12:30pm   IM/IMM Medicare/ letter given: Received   Caregiver Contact: Breonna Barnes (Child)  [phone]   Discharge Caregiver contacted prior to discharge? Yes   Care Conference needed? No       Inpatient Rehab Transition of Care Note /Discharge Note     Accepting Physician: Dr. Huertas      Discharging Physician: Dr. Royal     Accepting Representative: Trinh Massey    Accepting Facility: [facility]  RN call to report: [phone]    Transport: H2H      time: 12:30pm     Ambulance packet at bedside chart.     The attending physician and the primary nurse were notified of the plan.

## 2024-03-22 NOTE — PLAN OF CARE
Problem: Occupational Therapy - Adult  Goal: By Discharge: Performs self-care activities at highest level of function for planned discharge setting.  See evaluation for individualized goals.  Description: FUNCTIONAL STATUS PRIOR TO ADMISSION:  Patient reports modified independence with ADLs, uses AE for LB dressing.   Receives Help From: Family, ADL Assistance: Independent, Homemaking Assistance: Independent, Ambulation Assistance: Independent, Transfer Assistance: Independent, Active : No     HOME SUPPORT: Patient lived alone with daughter to provide assistance.    Occupational Therapy Goals:  Initiated 3/19/2024    1.  Patient will perform lower body dressing with Moderate Assist using AE PRN within 7 days.  2.  Patient will perform toileting with Moderate Assist using most appropriate DME within 7 days.    3.  Patient will perform standing grooming at Minimal Assist within 7 days.  4.  Patient will don/doff back brace at Moderate Assist within 7 days.  5.  Patient will verbalize/demonstrate 3/3 back precautions during ADL tasks without cues within 7 days.       Outcome: Progressing   OCCUPATIONAL THERAPY TREATMENT  Patient: Leny Barnes (79 y.o. female)  Date: 3/22/2024  Primary Diagnosis: Spinal stenosis, lumbar region, with neurogenic claudication [M48.062]  Spondylolisthesis of lumbar region [M43.16]  Acute back pain with sciatica, left [M54.42]  Lumbar stenosis with neurogenic claudication [M48.062]  Procedure(s) (LRB):  T10-T11 LUMBAR LAMINECTOMY, L2-S1 LUMBAR LAMINECTOMY, T10 TO ILIUM FUSION (N/A) 4 Days Post-Op   Precautions:           Spinal Precautions: No Bending, No Lifting, No Twisting      Chart, occupational therapy assessment, plan of care, and goals were reviewed.    ASSESSMENT  Patient continues to benefit from skilled OT services and is progressing towards goals. Pt received supine in bed, sleeping.  Arousable, however, continues to be drowsy.  Pt was able to verbalize 2/3 back

## 2024-03-29 ENCOUNTER — HOSPITAL ENCOUNTER (OUTPATIENT)
Facility: HOSPITAL | Age: 79
Setting detail: SPECIMEN
Discharge: HOME | End: 2024-04-01

## 2024-03-29 LAB — HISTORY CHECK: NORMAL

## 2024-03-29 PROCEDURE — 86920 COMPATIBILITY TEST SPIN: CPT

## 2024-03-29 PROCEDURE — 86901 BLOOD TYPING SEROLOGIC RH(D): CPT

## 2024-03-29 PROCEDURE — 86900 BLOOD TYPING SEROLOGIC ABO: CPT

## 2024-03-29 PROCEDURE — 86850 RBC ANTIBODY SCREEN: CPT

## 2024-03-29 PROCEDURE — 36415 COLL VENOUS BLD VENIPUNCTURE: CPT

## 2024-04-01 ENCOUNTER — HOSPITAL ENCOUNTER (INPATIENT)
Facility: HOSPITAL | Age: 79
LOS: 4 days | Discharge: NURSING, REHAB OR LONG-TERM CARE | End: 2024-04-05
Attending: STUDENT IN AN ORGANIZED HEALTH CARE EDUCATION/TRAINING PROGRAM | Admitting: FAMILY MEDICINE
Payer: MEDICARE

## 2024-04-01 ENCOUNTER — APPOINTMENT (OUTPATIENT)
Facility: HOSPITAL | Age: 79
End: 2024-04-01
Payer: MEDICARE

## 2024-04-01 DIAGNOSIS — T81.40XA POSTOPERATIVE INFECTION, UNSPECIFIED TYPE, INITIAL ENCOUNTER: Primary | ICD-10-CM

## 2024-04-01 DIAGNOSIS — E11.42 DIABETIC POLYNEUROPATHY ASSOCIATED WITH TYPE 2 DIABETES MELLITUS (HCC): ICD-10-CM

## 2024-04-01 PROBLEM — T81.49XA SURGICAL WOUND INFECTION: Status: ACTIVE | Noted: 2024-04-01

## 2024-04-01 LAB
ALBUMIN SERPL-MCNC: 2.1 G/DL (ref 3.5–5)
ALBUMIN/GLOB SERPL: 0.5 (ref 1.1–2.2)
ALP SERPL-CCNC: 208 U/L (ref 45–117)
ALT SERPL-CCNC: 20 U/L (ref 12–78)
ANION GAP SERPL CALC-SCNC: 8 MMOL/L (ref 5–15)
APPEARANCE UR: CLEAR
AST SERPL-CCNC: 31 U/L (ref 15–37)
BACTERIA URNS QL MICRO: NEGATIVE /HPF
BASOPHILS # BLD: 0 K/UL (ref 0–0.1)
BASOPHILS NFR BLD: 0 % (ref 0–1)
BILIRUB SERPL-MCNC: 0.8 MG/DL (ref 0.2–1)
BILIRUB UR QL: NEGATIVE
BUN SERPL-MCNC: 75 MG/DL (ref 6–20)
BUN/CREAT SERPL: 34 (ref 12–20)
CALCIUM SERPL-MCNC: 8.8 MG/DL (ref 8.5–10.1)
CHLORIDE SERPL-SCNC: 99 MMOL/L (ref 97–108)
CO2 SERPL-SCNC: 21 MMOL/L (ref 21–32)
COLOR UR: ABNORMAL
CREAT SERPL-MCNC: 2.18 MG/DL (ref 0.55–1.02)
DIFFERENTIAL METHOD BLD: ABNORMAL
EKG ATRIAL RATE: 76 BPM
EKG DIAGNOSIS: NORMAL
EKG Q-T INTERVAL: 458 MS
EKG QRS DURATION: 164 MS
EKG QTC CALCULATION (BAZETT): 494 MS
EKG R AXIS: 91 DEGREES
EKG T AXIS: 153 DEGREES
EKG VENTRICULAR RATE: 70 BPM
EOSINOPHIL # BLD: 0.1 K/UL (ref 0–0.4)
EOSINOPHIL NFR BLD: 1 % (ref 0–7)
EPITH CASTS URNS QL MICRO: ABNORMAL /LPF
ERYTHROCYTE [DISTWIDTH] IN BLOOD BY AUTOMATED COUNT: 16.6 % (ref 11.5–14.5)
GLOBULIN SER CALC-MCNC: 4.1 G/DL (ref 2–4)
GLUCOSE BLD STRIP.AUTO-MCNC: 159 MG/DL (ref 65–117)
GLUCOSE SERPL-MCNC: 228 MG/DL (ref 65–100)
GLUCOSE UR STRIP.AUTO-MCNC: NEGATIVE MG/DL
HCT VFR BLD AUTO: 23.1 % (ref 35–47)
HGB BLD-MCNC: 7.3 G/DL (ref 11.5–16)
HGB UR QL STRIP: NEGATIVE
IMM GRANULOCYTES # BLD AUTO: 0.1 K/UL (ref 0–0.04)
IMM GRANULOCYTES NFR BLD AUTO: 1 % (ref 0–0.5)
KETONES UR QL STRIP.AUTO: ABNORMAL MG/DL
LACTATE SERPL-SCNC: 0.8 MMOL/L (ref 0.4–2)
LEUKOCYTE ESTERASE UR QL STRIP.AUTO: NEGATIVE
LYMPHOCYTES # BLD: 0.3 K/UL (ref 0.8–3.5)
LYMPHOCYTES NFR BLD: 2 % (ref 12–49)
MCH RBC QN AUTO: 29.4 PG (ref 26–34)
MCHC RBC AUTO-ENTMCNC: 31.6 G/DL (ref 30–36.5)
MCV RBC AUTO: 93.1 FL (ref 80–99)
MONOCYTES # BLD: 0.8 K/UL (ref 0–1)
MONOCYTES NFR BLD: 6 % (ref 5–13)
NEUTS SEG # BLD: 12 K/UL (ref 1.8–8)
NEUTS SEG NFR BLD: 90 % (ref 32–75)
NITRITE UR QL STRIP.AUTO: NEGATIVE
NRBC # BLD: 0.04 K/UL (ref 0–0.01)
NRBC BLD-RTO: 0.3 PER 100 WBC
PH UR STRIP: 5 (ref 5–8)
PLATELET # BLD AUTO: 283 K/UL (ref 150–400)
PMV BLD AUTO: 9.7 FL (ref 8.9–12.9)
POTASSIUM SERPL-SCNC: 4 MMOL/L (ref 3.5–5.1)
PROCALCITONIN SERPL-MCNC: 1.21 NG/ML
PROT SERPL-MCNC: 6.2 G/DL (ref 6.4–8.2)
PROT UR STRIP-MCNC: NEGATIVE MG/DL
RBC # BLD AUTO: 2.48 M/UL (ref 3.8–5.2)
RBC #/AREA URNS HPF: ABNORMAL /HPF (ref 0–5)
RBC MORPH BLD: ABNORMAL
SERVICE CMNT-IMP: ABNORMAL
SODIUM SERPL-SCNC: 128 MMOL/L (ref 136–145)
SP GR UR REFRACTOMETRY: 1.02 (ref 1–1.03)
TROPONIN I SERPL HS-MCNC: 14 NG/L (ref 0–51)
URINE CULTURE IF INDICATED: ABNORMAL
UROBILINOGEN UR QL STRIP.AUTO: 0.2 EU/DL (ref 0.2–1)
WBC # BLD AUTO: 13.3 K/UL (ref 3.6–11)
WBC URNS QL MICRO: ABNORMAL /HPF (ref 0–4)
YEAST BUDDING URNS QL: PRESENT

## 2024-04-01 PROCEDURE — 93005 ELECTROCARDIOGRAM TRACING: CPT | Performed by: STUDENT IN AN ORGANIZED HEALTH CARE EDUCATION/TRAINING PROGRAM

## 2024-04-01 PROCEDURE — 36415 COLL VENOUS BLD VENIPUNCTURE: CPT

## 2024-04-01 PROCEDURE — 81001 URINALYSIS AUTO W/SCOPE: CPT

## 2024-04-01 PROCEDURE — 6360000002 HC RX W HCPCS: Performed by: STUDENT IN AN ORGANIZED HEALTH CARE EDUCATION/TRAINING PROGRAM

## 2024-04-01 PROCEDURE — 2580000003 HC RX 258: Performed by: FAMILY MEDICINE

## 2024-04-01 PROCEDURE — 86900 BLOOD TYPING SEROLOGIC ABO: CPT

## 2024-04-01 PROCEDURE — 99221 1ST HOSP IP/OBS SF/LOW 40: CPT | Performed by: STUDENT IN AN ORGANIZED HEALTH CARE EDUCATION/TRAINING PROGRAM

## 2024-04-01 PROCEDURE — 84145 PROCALCITONIN (PCT): CPT

## 2024-04-01 PROCEDURE — 1100000000 HC RM PRIVATE

## 2024-04-01 PROCEDURE — 96374 THER/PROPH/DIAG INJ IV PUSH: CPT

## 2024-04-01 PROCEDURE — 71045 X-RAY EXAM CHEST 1 VIEW: CPT

## 2024-04-01 PROCEDURE — 85025 COMPLETE CBC W/AUTO DIFF WBC: CPT

## 2024-04-01 PROCEDURE — 86850 RBC ANTIBODY SCREEN: CPT

## 2024-04-01 PROCEDURE — 83605 ASSAY OF LACTIC ACID: CPT

## 2024-04-01 PROCEDURE — 84484 ASSAY OF TROPONIN QUANT: CPT

## 2024-04-01 PROCEDURE — 82962 GLUCOSE BLOOD TEST: CPT

## 2024-04-01 PROCEDURE — 94640 AIRWAY INHALATION TREATMENT: CPT

## 2024-04-01 PROCEDURE — 99285 EMERGENCY DEPT VISIT HI MDM: CPT

## 2024-04-01 PROCEDURE — 86923 COMPATIBILITY TEST ELECTRIC: CPT

## 2024-04-01 PROCEDURE — 2580000003 HC RX 258: Performed by: STUDENT IN AN ORGANIZED HEALTH CARE EDUCATION/TRAINING PROGRAM

## 2024-04-01 PROCEDURE — 80053 COMPREHEN METABOLIC PANEL: CPT

## 2024-04-01 PROCEDURE — 6370000000 HC RX 637 (ALT 250 FOR IP): Performed by: FAMILY MEDICINE

## 2024-04-01 PROCEDURE — 2580000003 HC RX 258: Performed by: NURSE PRACTITIONER

## 2024-04-01 PROCEDURE — 6360000002 HC RX W HCPCS: Performed by: FAMILY MEDICINE

## 2024-04-01 PROCEDURE — 87040 BLOOD CULTURE FOR BACTERIA: CPT

## 2024-04-01 PROCEDURE — 86901 BLOOD TYPING SEROLOGIC RH(D): CPT

## 2024-04-01 RX ORDER — SODIUM CHLORIDE 9 MG/ML
INJECTION, SOLUTION INTRAVENOUS PRN
Status: DISCONTINUED | OUTPATIENT
Start: 2024-04-01 | End: 2024-04-05 | Stop reason: HOSPADM

## 2024-04-01 RX ORDER — SODIUM CHLORIDE 0.9 % (FLUSH) 0.9 %
5-40 SYRINGE (ML) INJECTION PRN
Status: DISCONTINUED | OUTPATIENT
Start: 2024-04-01 | End: 2024-04-05 | Stop reason: HOSPADM

## 2024-04-01 RX ORDER — HYDROXYZINE HYDROCHLORIDE 10 MG/1
10 TABLET, FILM COATED ORAL EVERY 6 HOURS PRN
Status: DISCONTINUED | OUTPATIENT
Start: 2024-04-01 | End: 2024-04-05 | Stop reason: HOSPADM

## 2024-04-01 RX ORDER — ONDANSETRON 4 MG/1
4 TABLET, ORALLY DISINTEGRATING ORAL EVERY 8 HOURS PRN
Status: DISCONTINUED | OUTPATIENT
Start: 2024-04-01 | End: 2024-04-05 | Stop reason: HOSPADM

## 2024-04-01 RX ORDER — CYCLOBENZAPRINE HCL 10 MG
10 TABLET ORAL 3 TIMES DAILY PRN
Status: DISCONTINUED | OUTPATIENT
Start: 2024-04-01 | End: 2024-04-05 | Stop reason: HOSPADM

## 2024-04-01 RX ORDER — SODIUM CHLORIDE, SODIUM LACTATE, POTASSIUM CHLORIDE, AND CALCIUM CHLORIDE .6; .31; .03; .02 G/100ML; G/100ML; G/100ML; G/100ML
1000 INJECTION, SOLUTION INTRAVENOUS ONCE
Status: COMPLETED | OUTPATIENT
Start: 2024-04-01 | End: 2024-04-01

## 2024-04-01 RX ORDER — SODIUM CHLORIDE 0.9 % (FLUSH) 0.9 %
5-40 SYRINGE (ML) INJECTION EVERY 12 HOURS SCHEDULED
Status: DISCONTINUED | OUTPATIENT
Start: 2024-04-01 | End: 2024-04-05 | Stop reason: HOSPADM

## 2024-04-01 RX ORDER — AMLODIPINE BESYLATE 5 MG/1
10 TABLET ORAL DAILY
Status: DISCONTINUED | OUTPATIENT
Start: 2024-04-01 | End: 2024-04-05 | Stop reason: HOSPADM

## 2024-04-01 RX ORDER — INSULIN LISPRO 100 [IU]/ML
0-4 INJECTION, SOLUTION INTRAVENOUS; SUBCUTANEOUS NIGHTLY
Status: DISCONTINUED | OUTPATIENT
Start: 2024-04-01 | End: 2024-04-05 | Stop reason: HOSPADM

## 2024-04-01 RX ORDER — FERROUS SULFATE 325(65) MG
325 TABLET ORAL
Status: DISCONTINUED | OUTPATIENT
Start: 2024-04-02 | End: 2024-04-05 | Stop reason: HOSPADM

## 2024-04-01 RX ORDER — ONDANSETRON 2 MG/ML
4 INJECTION INTRAMUSCULAR; INTRAVENOUS EVERY 6 HOURS PRN
Status: DISCONTINUED | OUTPATIENT
Start: 2024-04-01 | End: 2024-04-05 | Stop reason: HOSPADM

## 2024-04-01 RX ORDER — POLYETHYLENE GLYCOL 3350 17 G/17G
17 POWDER, FOR SOLUTION ORAL DAILY PRN
Status: DISCONTINUED | OUTPATIENT
Start: 2024-04-01 | End: 2024-04-05 | Stop reason: HOSPADM

## 2024-04-01 RX ORDER — BUPROPION HYDROCHLORIDE 150 MG/1
150 TABLET, EXTENDED RELEASE ORAL DAILY
Status: DISCONTINUED | OUTPATIENT
Start: 2024-04-01 | End: 2024-04-05 | Stop reason: HOSPADM

## 2024-04-01 RX ORDER — DEXTROSE MONOHYDRATE 100 MG/ML
INJECTION, SOLUTION INTRAVENOUS CONTINUOUS PRN
Status: DISCONTINUED | OUTPATIENT
Start: 2024-04-01 | End: 2024-04-05 | Stop reason: HOSPADM

## 2024-04-01 RX ORDER — SODIUM CHLORIDE 9 MG/ML
INJECTION, SOLUTION INTRAVENOUS CONTINUOUS
Status: DISCONTINUED | OUTPATIENT
Start: 2024-04-01 | End: 2024-04-05 | Stop reason: HOSPADM

## 2024-04-01 RX ORDER — ALLOPURINOL 100 MG/1
100 TABLET ORAL DAILY
Status: DISCONTINUED | OUTPATIENT
Start: 2024-04-01 | End: 2024-04-05 | Stop reason: HOSPADM

## 2024-04-01 RX ORDER — GABAPENTIN 300 MG/1
600 CAPSULE ORAL EVERY EVENING
Status: DISCONTINUED | OUTPATIENT
Start: 2024-04-01 | End: 2024-04-05 | Stop reason: HOSPADM

## 2024-04-01 RX ORDER — HYDROMORPHONE HYDROCHLORIDE 1 MG/ML
0.25 INJECTION, SOLUTION INTRAMUSCULAR; INTRAVENOUS; SUBCUTANEOUS
Status: COMPLETED | OUTPATIENT
Start: 2024-04-01 | End: 2024-04-01

## 2024-04-01 RX ORDER — INSULIN LISPRO 100 [IU]/ML
0-4 INJECTION, SOLUTION INTRAVENOUS; SUBCUTANEOUS
Status: DISCONTINUED | OUTPATIENT
Start: 2024-04-02 | End: 2024-04-05 | Stop reason: HOSPADM

## 2024-04-01 RX ORDER — ACETAMINOPHEN 650 MG/1
650 SUPPOSITORY RECTAL EVERY 6 HOURS PRN
Status: DISCONTINUED | OUTPATIENT
Start: 2024-04-01 | End: 2024-04-01

## 2024-04-01 RX ORDER — ACETAMINOPHEN 500 MG
1000 TABLET ORAL EVERY 6 HOURS
Status: DISCONTINUED | OUTPATIENT
Start: 2024-04-01 | End: 2024-04-05 | Stop reason: HOSPADM

## 2024-04-01 RX ORDER — ACETAMINOPHEN 325 MG/1
650 TABLET ORAL EVERY 6 HOURS PRN
Status: DISCONTINUED | OUTPATIENT
Start: 2024-04-01 | End: 2024-04-01

## 2024-04-01 RX ADMIN — HYDROMORPHONE HYDROCHLORIDE 0.25 MG: 1 INJECTION, SOLUTION INTRAMUSCULAR; INTRAVENOUS; SUBCUTANEOUS at 16:00

## 2024-04-01 RX ADMIN — SODIUM CHLORIDE, PRESERVATIVE FREE 10 ML: 5 INJECTION INTRAVENOUS at 22:23

## 2024-04-01 RX ADMIN — ALLOPURINOL 100 MG: 100 TABLET ORAL at 22:43

## 2024-04-01 RX ADMIN — SODIUM CHLORIDE: 9 INJECTION, SOLUTION INTRAVENOUS at 22:21

## 2024-04-01 RX ADMIN — AMLODIPINE BESYLATE 10 MG: 5 TABLET ORAL at 22:31

## 2024-04-01 RX ADMIN — SERTRALINE HYDROCHLORIDE 150 MG: 50 TABLET ORAL at 22:31

## 2024-04-01 RX ADMIN — ACETAMINOPHEN 1000 MG: 500 TABLET ORAL at 22:30

## 2024-04-01 RX ADMIN — GABAPENTIN 600 MG: 100 CAPSULE ORAL at 22:31

## 2024-04-01 RX ADMIN — PIPERACILLIN AND TAZOBACTAM 4500 MG: 4; .5 INJECTION, POWDER, FOR SOLUTION INTRAVENOUS at 18:05

## 2024-04-01 RX ADMIN — BUPROPION HYDROCHLORIDE 150 MG: 150 TABLET, EXTENDED RELEASE ORAL at 22:31

## 2024-04-01 RX ADMIN — ARFORMOTEROL TARTRATE: 15 SOLUTION RESPIRATORY (INHALATION) at 23:20

## 2024-04-01 RX ADMIN — SODIUM CHLORIDE, POTASSIUM CHLORIDE, SODIUM LACTATE AND CALCIUM CHLORIDE 1000 ML: 600; 310; 30; 20 INJECTION, SOLUTION INTRAVENOUS at 15:48

## 2024-04-01 RX ADMIN — IPRATROPIUM BROMIDE 0.5 MG: 0.5 SOLUTION RESPIRATORY (INHALATION) at 23:20

## 2024-04-01 ASSESSMENT — PAIN - FUNCTIONAL ASSESSMENT
PAIN_FUNCTIONAL_ASSESSMENT: PREVENTS OR INTERFERES SOME ACTIVE ACTIVITIES AND ADLS
PAIN_FUNCTIONAL_ASSESSMENT: 0-10

## 2024-04-01 ASSESSMENT — PAIN DESCRIPTION - ONSET: ONSET: ON-GOING

## 2024-04-01 ASSESSMENT — PAIN DESCRIPTION - FREQUENCY: FREQUENCY: CONTINUOUS

## 2024-04-01 ASSESSMENT — PAIN SCALES - GENERAL
PAINLEVEL_OUTOF10: 0
PAINLEVEL_OUTOF10: 4
PAINLEVEL_OUTOF10: 0
PAINLEVEL_OUTOF10: 8

## 2024-04-01 ASSESSMENT — PAIN DESCRIPTION - DESCRIPTORS: DESCRIPTORS: ACHING

## 2024-04-01 ASSESSMENT — PAIN DESCRIPTION - LOCATION: LOCATION: BACK

## 2024-04-01 ASSESSMENT — PAIN DESCRIPTION - PAIN TYPE: TYPE: ACUTE PAIN

## 2024-04-01 ASSESSMENT — PAIN DESCRIPTION - ORIENTATION: ORIENTATION: LOWER

## 2024-04-01 NOTE — CONSULTS
SPINE CONSULT    Subjective:     Date of Consultation:  April 1, 2024    Leny Barnes is a 79 y.o.  female who is being seen for wound drainage. She is s/p a T10-ileum fusion on 3/18/23. She was discharged to [facility] on 3/22. She reports that incisional drainage began on Thursday (3/28) or Friday (3/29) of last week. On Thursday afternoon she states she did leg raises in the gym with PT and subsequently felt increased leg weakness the following day. On 3/29 she had an assisted fall in her room while working with PT. Daughter states xrays were obtained and they were told hardware was OK. Daughter states that on Friday night the patient was experiencing significant chills. She was told at that time the patient was not having any fevers. Patient states she has had increased fatigue since 3/29. Reported WBC was around 18 over the weekend and vancomycin and zosyn were started. WBC 13 today. Reports increased drainage from incision over last few days. Rates her back pain as a 4-6/10 today. No acute loss bowel or bladder control.     Patient Active Problem List    Diagnosis Date Noted    Complete heart block (HCC) 05/24/2022    Type 2 diabetes mellitus with hyperglycemia ([facility]) 03/19/2024    Lumbar stenosis with neurogenic claudication 03/18/2024    Kyphoscoliosis due to degeneration of spine 03/18/2024    Encounter for preadmission testing 03/05/2024    Chronic diastolic congestive heart failure (HCC) 03/04/2024    Spinal stenosis, lumbar region, with neurogenic claudication 02/12/2024    Spondylolisthesis of lumbar region 02/12/2024    Acute back pain with sciatica, left 02/12/2024    Osteoarthritis of left knee, unspecified osteoarthritis type 06/05/2023    Insulin long-term use ([facility]) 08/24/2022    Stage 3a chronic kidney disease ([facility]) 07/08/2022    Senile purpura ([facility]) 05/13/2022    Secondary hypercoagulable state ([facility]) 01/11/2022    Asthma     Recurrent depression ([facility]) 01/09/2018    Type 2

## 2024-04-01 NOTE — SUBJECTIVE & OBJECTIVE
Subjective:     ***    Objective:     Vitals:    04/01/24 1107 04/01/24 1633   BP: (!) 110/53 (!) 103/50   Pulse: 74    Resp: 18    Temp: 98.2 °F (36.8 °C)    TempSrc: Oral    SpO2: 99% 99%   Weight: 120 kg (264 lb 8.8 oz)    Height: 1.676 m (5' 6\")         Intake andOutput:  Current Shift: No intake/output data recorded.  Last three shifts: No intake/output data recorded.     Physical Exam      Medications:  Current Facility-Administered Medications   Medication Dose Route Frequency    piperacillin-tazobactam (ZOSYN) 4,500 mg in sodium chloride 0.9 % 100 mL IVPB (mini-bag)  4,500 mg IntraVENous NOW     Current Outpatient Medications   Medication Sig    bumetanide (BUMEX) 2 MG tablet Take 2 tablets by mouth 2 times daily    acetaminophen (TYLENOL) 500 MG tablet Take 2 tablets by mouth every 6 hours    sertraline (ZOLOFT) 100 MG tablet Take 1.5 tablets by mouth nightly    gabapentin (NEURONTIN) 300 MG capsule Take 2 capsules by mouth every evening for 360 days. Max Daily Amount: 600 mg    buPROPion (WELLBUTRIN SR) 150 MG extended release tablet Take 1 tablet by mouth daily    hydrOXYzine HCl (ATARAX) 10 MG tablet Take 1 tablet by mouth every 6 hours as needed for Itching    Omalizumab (XOLAIR SC) Inject into the skin 3 INJECTIONS EVERY TWO WEEKS    ferrous sulfate (IRON 325) 325 (65 Fe) MG tablet Take 1 tablet by mouth daily (with breakfast)    insulin NPH (HUMULIN N;NOVOLIN N) 100 UNIT/ML injection vial Inject 15 Units into the skin 2 times daily (before meals)    clopidogrel (PLAVIX) 75 MG tablet Take 1 tablet by mouth daily    fluticasone-umeclidin-vilant (TRELEGY ELLIPTA) 200-62.5-25 MCG/ACT AEPB inhaler Inhale 1 puff into the lungs every morning    potassium chloride (KLOR-CON) 10 MEQ extended release tablet TAKE 2 TABS BY MOUTH TWO TIMES A DAY.    empagliflozin (JARDIANCE) 10 MG tablet Take 1 tablet by mouth daily (Patient taking differently: Take 2 tablets by mouth daily)    apixaban (ELIQUIS) 5 MG TABS

## 2024-04-01 NOTE — CARE COORDINATION
ED Care Manager - Received call from Trinh Massey ([phone]), liaison from [facility] earlier today. Patient sent to [place] ED from [facility]. Patient will be okay to return to facility if she is here less than 3 nights. If she is here after April 4, [facility] will need to complete new assessment. Patient has traditional Medicare.    HELIO ANTONY

## 2024-04-01 NOTE — H&P
breakfast) 8/17/23   Umu Alejandro, APRN - CNP   insulin NPH (HUMULIN N;NOVOLIN N) 100 UNIT/ML injection vial Inject 15 Units into the skin 2 times daily (before meals) 8/22/23   Rakesh Alex MD   clopidogrel (PLAVIX) 75 MG tablet Take 1 tablet by mouth daily 8/22/23 3/18/24  Rakesh Alex MD   fluticasone-umeclidin-vilant (TRELEGY ELLIPTA) 200-62.5-25 MCG/ACT AEPB inhaler Inhale 1 puff into the lungs every morning 8/9/23   Rolo Frank MD   potassium chloride (KLOR-CON) 10 MEQ extended release tablet TAKE 2 TABS BY MOUTH TWO TIMES A DAY. 8/3/23   ALINE Rose MD   empagliflozin (JARDIANCE) 10 MG tablet Take 1 tablet by mouth daily  Patient taking differently: Take 2 tablets by mouth daily 8/3/23 1/12/24  Aleah March MD   apixaban (ELIQUIS) 5 MG TABS tablet Take 1 tablet by mouth 2 times daily 7/28/23   ALINE Rose MD   albuterol sulfate HFA (PROVENTIL;VENTOLIN;PROAIR) 108 (90 Base) MCG/ACT inhaler Inhale 2 puffs into the lungs every 4 hours as needed for Wheezing or Shortness of Breath 6/27/23   Loan Reid MD   amLODIPine (NORVASC) 10 MG tablet Take 1 tablet by mouth daily 6/27/23   Loan Reid MD   cyclobenzaprine (FLEXERIL) 10 MG tablet Take 1 tablet by mouth 3 times daily as needed 6/2/23   ALINE Rose MD   allopurinol (ZYLOPRIM) 100 MG tablet TAKE 2 TABLETS BY MOUTH EVERY DAY FOR GOUT 5/12/23   ALINE Rose MD   insulin regular (HUMULIN R;NOVOLIN R) 100 UNIT/ML injection Inject into the skin 3 times daily (before meals) PER SLIDING SCALE, THREE TIMES PER DAY  100-150 2 UNIT, 151-200= 4 UNITS, 201-250=6 units, 251-300=8 units, 301-350=10 units, 151-> call MD and 4 units every 2 hours until reach md    ALSO DOES CARB COUNTING    Automatic Reconciliation, Ar     Allergies   Allergen Reactions    Latex Itching    Oxycodone Itching    Codeine Rash    Lisinopril Other (See Comments)     Cough, Visual disturbances    Morphine Itching     wound vac  -vanc and zosyn  -culture fluid, and follow blood cultures  -Wound care consulted    #anemia  -Hgb 7.7 at discharge, now 7.3  -on eliquis, and plavix for afib and stent in 8/2023  -hold pending anemia w/u  -Continue ferrous sulfate    #CKD III  -BUN 75, creatinine 2.18 (b/l 1.7-2)  -avoid renal toxins, and renally dose medications  -Hold IV Bumex as patient is clinically dry  -monitor    #Hypertension  #dyslipidemia  #a-fib  #CAD  #HFreF  -hold plavix and eliquis pending anemia w/u    #FREDERICK  -cpap and supplemental O2 qhs.    #Asthma  - Continue home Trelegy    #Gout  - Continue home allopurinol    #Depression  - Continue home Zoloft, and Wellbutrin    #DM2  - ISS    DIET: ADULT DIET; Regular; 4 carb choices (60 gm/meal); Low Fat/Low Chol/High Fiber/2 gm Na   ISOLATION PRECAUTIONS: No active isolations  CODE STATUS: full code   DVT PROPHYLAXIS: SCD's or Sequential Compression Device  ANTICIPATED DISCHARGE: 2-3 days  ANTICIPATED DISPOSITION: LTAC    Signed By: Deb Higgins MD     April 1, 2024         Please note that this dictation may have been completed with Dragon, the computer voice recognition software.  Quite often unanticipated grammatical, syntax, homophones, and other interpretive errors are inadvertently transcribed by the computer software.  Please disregard these errors.  Please excuse any errors that have escaped final proofreading.

## 2024-04-01 NOTE — ED PROVIDER NOTES
[facility] EMERGENCY DEP  EMERGENCY DEPARTMENT ENCOUNTER      Pt Name: Leny Barnes  MRN: [id number]  Birthdate 1945  Date of evaluation: 4/1/2024  Provider: Esmer Cam MD    CHIEF COMPLAINT       Chief Complaint   Patient presents with    Wound Dehiscence    Back Pain         HISTORY OF PRESENT ILLNESS    Review of Medical Records: Discharge summary from 3/18/2024 and documentation from [facility] reviewed and notable for below    Nursing Triage Notes were reviewed.    HPI    Leny Barnes is a 79 y.o. female with a history of lumbar spinal stenosis with neurogenic claudication, spondylolisthesis status post T10-T11 laminectomy, L2-S1 lumbar laminectomy, T10 to ilium fusion on 3/18 with Dr. Morales who presents to the emergency department for evaluation of surgical site dehiscence and concern for surgical site infection. Patient has been at [facility] since discharge from hospital. WBC climbing over the weekend to 18.9, patient started on vancomycin and zosyn. Hgb 6.9 at facility - patient with hx of acute blood loss anemia during admission. Dehiscence this AM per facility with purulent drainage.  Patient reports she was unable to see the area of the drainage, however she was told that there was an \" enormous glob of something that came out of my wound\" this morning during wound care.  Patient complains that she has still been having severe 7 or 8 out of 10 pain despite Advil and hydrocodone at her facility.  Complains of she has felt mentally slower since starting pain medication and more fatigued than usual over the weekend.        PAST MEDICAL HISTORY     Past Medical History:   Diagnosis Date    Arthritis 1990    Asthma     Atrial fibrillation ([facility])     NONE SINCE PACEMAKER    CAD (coronary artery disease) 1996    Cancer ([facility]) 2001    UTERINE    Chronic kidney disease (CKD), stage III (moderate) ([facility]) 2023    Compression fracture of L1 vertebra ([facility]) 10/2023    FELL  PM    ED Room Number: ER24/24  Patient Name and age:  Leny Barnes 79 y.o.  female  Working Diagnosis:   1. Postoperative infection, unspecified type, initial encounter        COVID-19 Suspicion: No  Sepsis present:  No  Reassessment needed: Yes  Code Status:  Full Code  Readmission: Yes  Isolation Requirements: no  Recommended Level of Care: med/surg  Department: [facility] Adult ED - [phone]  Consulting Provider: Esmer Cam    Other:  79 y.o. female with a history of lumbar spinal stenosis with neurogenic claudication, spondylolisthesis status post T10-T11 laminectomy, L2-S1 lumbar laminectomy, T10 to ilium fusion on 3/18 with Dr. Morales who presents to the emergency department for evaluation of surgical site dehiscence and concern for surgical site infection. Patient has been at [facility] since discharge from hospital. WBC climbing over the weekend to 18.9, patient started on vancomycin and zosyn Friday.  Evaluated by orthopedic surgery team in the emergency department, who reported they did not plan on a surgical intervention at this time, recommended wound VAC placement and have placed a consult for wound care here.  Discussed with patient's physician from [facility] who reported that the patient was transferred here this morning because fluid was pouring out of her surgical wound\" despite patient being on IV antibiotics since Friday. Worsening kidney function since initiation of vanc and Zosyn. Recommend admission to hospitalist service as they feel patient is inappropriate for their facility and should be monitored for progression of infection by her surgical team.       (Please note that portions of this note were completed with a voice recognition program.  Efforts were made to edit the dictations but occasionally words are mis-transcribed.)    Esmer Cam MD (electronically signed)  Emergency Attending Physician       Esmer Cam MD  04/01/24 4696

## 2024-04-01 NOTE — ED TRIAGE NOTES
Patient was [facility] after back surgery with wound on lower back opened. Bleeding controlled. Surgery on 3/14/24    Facility reported WBS 18.5.

## 2024-04-02 LAB
ABO + RH BLD: NORMAL
ANION GAP SERPL CALC-SCNC: 10 MMOL/L (ref 5–15)
BASOPHILS # BLD: 0.1 K/UL (ref 0–0.1)
BASOPHILS NFR BLD: 1 % (ref 0–1)
BLD PROD TYP BPU: NORMAL
BLOOD BANK DISPENSE STATUS: NORMAL
BLOOD GROUP ANTIBODIES SERPL: NORMAL
BPU ID: NORMAL
BUN SERPL-MCNC: 74 MG/DL (ref 6–20)
BUN/CREAT SERPL: 39 (ref 12–20)
CALCIUM SERPL-MCNC: 7.7 MG/DL (ref 8.5–10.1)
CHLORIDE SERPL-SCNC: 104 MMOL/L (ref 97–108)
CO2 SERPL-SCNC: 20 MMOL/L (ref 21–32)
COMMENT:: NORMAL
CREAT SERPL-MCNC: 1.88 MG/DL (ref 0.55–1.02)
CROSSMATCH RESULT: NORMAL
DIFFERENTIAL METHOD BLD: ABNORMAL
EOSINOPHIL # BLD: 0.2 K/UL (ref 0–0.4)
EOSINOPHIL NFR BLD: 2 % (ref 0–7)
ERYTHROCYTE [DISTWIDTH] IN BLOOD BY AUTOMATED COUNT: 16.7 % (ref 11.5–14.5)
EST. AVERAGE GLUCOSE BLD GHB EST-MCNC: 163 MG/DL
GLUCOSE BLD STRIP.AUTO-MCNC: 178 MG/DL (ref 65–117)
GLUCOSE BLD STRIP.AUTO-MCNC: 182 MG/DL (ref 65–117)
GLUCOSE BLD STRIP.AUTO-MCNC: 216 MG/DL (ref 65–117)
GLUCOSE BLD STRIP.AUTO-MCNC: 226 MG/DL (ref 65–117)
GLUCOSE SERPL-MCNC: 152 MG/DL (ref 65–100)
HBA1C MFR BLD: 7.3 % (ref 4–5.6)
HCT VFR BLD AUTO: 22 % (ref 35–47)
HCT VFR BLD AUTO: 25.3 % (ref 35–47)
HGB BLD-MCNC: 6.7 G/DL (ref 11.5–16)
HGB BLD-MCNC: 8 G/DL (ref 11.5–16)
HISTORY CHECK: NORMAL
IMM GRANULOCYTES # BLD AUTO: 0.1 K/UL (ref 0–0.04)
IMM GRANULOCYTES NFR BLD AUTO: 1 % (ref 0–0.5)
LYMPHOCYTES # BLD: 0.5 K/UL (ref 0.8–3.5)
LYMPHOCYTES NFR BLD: 4 % (ref 12–49)
MCH RBC QN AUTO: 28.6 PG (ref 26–34)
MCHC RBC AUTO-ENTMCNC: 30.5 G/DL (ref 30–36.5)
MCV RBC AUTO: 94 FL (ref 80–99)
MONOCYTES # BLD: 1.2 K/UL (ref 0–1)
MONOCYTES NFR BLD: 10 % (ref 5–13)
NEUTS SEG # BLD: 9.5 K/UL (ref 1.8–8)
NEUTS SEG NFR BLD: 82 % (ref 32–75)
NRBC # BLD: 0.05 K/UL (ref 0–0.01)
NRBC BLD-RTO: 0.4 PER 100 WBC
PLATELET # BLD AUTO: 254 K/UL (ref 150–400)
PMV BLD AUTO: 9.8 FL (ref 8.9–12.9)
POTASSIUM SERPL-SCNC: 4.3 MMOL/L (ref 3.5–5.1)
RBC # BLD AUTO: 2.34 M/UL (ref 3.8–5.2)
RBC MORPH BLD: ABNORMAL
SERVICE CMNT-IMP: ABNORMAL
SODIUM SERPL-SCNC: 134 MMOL/L (ref 136–145)
SPECIMEN EXP DATE BLD: NORMAL
SPECIMEN HOLD: NORMAL
UNIT DIVISION: 0
VANCOMYCIN SERPL-MCNC: 19.5 UG/ML
WBC # BLD AUTO: 11.6 K/UL (ref 3.6–11)

## 2024-04-02 PROCEDURE — 6360000002 HC RX W HCPCS: Performed by: NURSE PRACTITIONER

## 2024-04-02 PROCEDURE — 6370000000 HC RX 637 (ALT 250 FOR IP): Performed by: FAMILY MEDICINE

## 2024-04-02 PROCEDURE — 2580000003 HC RX 258: Performed by: NURSE PRACTITIONER

## 2024-04-02 PROCEDURE — 85014 HEMATOCRIT: CPT

## 2024-04-02 PROCEDURE — 85018 HEMOGLOBIN: CPT

## 2024-04-02 PROCEDURE — P9016 RBC LEUKOCYTES REDUCED: HCPCS

## 2024-04-02 PROCEDURE — 2700000000 HC OXYGEN THERAPY PER DAY

## 2024-04-02 PROCEDURE — 82962 GLUCOSE BLOOD TEST: CPT

## 2024-04-02 PROCEDURE — 36415 COLL VENOUS BLD VENIPUNCTURE: CPT

## 2024-04-02 PROCEDURE — 80048 BASIC METABOLIC PNL TOTAL CA: CPT

## 2024-04-02 PROCEDURE — 6360000002 HC RX W HCPCS

## 2024-04-02 PROCEDURE — 36430 TRANSFUSION BLD/BLD COMPNT: CPT

## 2024-04-02 PROCEDURE — 83036 HEMOGLOBIN GLYCOSYLATED A1C: CPT

## 2024-04-02 PROCEDURE — 6360000002 HC RX W HCPCS: Performed by: FAMILY MEDICINE

## 2024-04-02 PROCEDURE — 94640 AIRWAY INHALATION TREATMENT: CPT

## 2024-04-02 PROCEDURE — 94760 N-INVAS EAR/PLS OXIMETRY 1: CPT

## 2024-04-02 PROCEDURE — 99024 POSTOP FOLLOW-UP VISIT: CPT | Performed by: PHYSICIAN ASSISTANT

## 2024-04-02 PROCEDURE — 2580000003 HC RX 258: Performed by: FAMILY MEDICINE

## 2024-04-02 PROCEDURE — 80202 ASSAY OF VANCOMYCIN: CPT

## 2024-04-02 PROCEDURE — 1100000000 HC RM PRIVATE

## 2024-04-02 PROCEDURE — 85025 COMPLETE CBC W/AUTO DIFF WBC: CPT

## 2024-04-02 PROCEDURE — 2580000003 HC RX 258

## 2024-04-02 PROCEDURE — 97607 NEG PRS WND THR NDME<=50SQCM: CPT

## 2024-04-02 RX ORDER — SODIUM CHLORIDE 9 MG/ML
INJECTION, SOLUTION INTRAVENOUS PRN
Status: DISCONTINUED | OUTPATIENT
Start: 2024-04-02 | End: 2024-04-05 | Stop reason: HOSPADM

## 2024-04-02 RX ADMIN — SODIUM CHLORIDE: 9 INJECTION, SOLUTION INTRAVENOUS at 23:11

## 2024-04-02 RX ADMIN — ACETAMINOPHEN 1000 MG: 500 TABLET ORAL at 09:01

## 2024-04-02 RX ADMIN — ARFORMOTEROL TARTRATE: 15 SOLUTION RESPIRATORY (INHALATION) at 21:12

## 2024-04-02 RX ADMIN — WATER 1000 MG: 1 INJECTION INTRAMUSCULAR; INTRAVENOUS; SUBCUTANEOUS at 10:36

## 2024-04-02 RX ADMIN — ACETAMINOPHEN 1000 MG: 500 TABLET ORAL at 04:30

## 2024-04-02 RX ADMIN — ARFORMOTEROL TARTRATE: 15 SOLUTION RESPIRATORY (INHALATION) at 09:43

## 2024-04-02 RX ADMIN — SODIUM CHLORIDE, PRESERVATIVE FREE 10 ML: 5 INJECTION INTRAVENOUS at 23:09

## 2024-04-02 RX ADMIN — IPRATROPIUM BROMIDE 0.5 MG: 0.5 SOLUTION RESPIRATORY (INHALATION) at 09:43

## 2024-04-02 RX ADMIN — FERROUS SULFATE TAB 325 MG (65 MG ELEMENTAL FE) 325 MG: 325 (65 FE) TAB at 09:01

## 2024-04-02 RX ADMIN — ALLOPURINOL 100 MG: 100 TABLET ORAL at 09:01

## 2024-04-02 RX ADMIN — SODIUM CHLORIDE, PRESERVATIVE FREE 10 ML: 5 INJECTION INTRAVENOUS at 09:01

## 2024-04-02 RX ADMIN — SERTRALINE HYDROCHLORIDE 150 MG: 50 TABLET ORAL at 22:34

## 2024-04-02 RX ADMIN — VANCOMYCIN HYDROCHLORIDE 1000 MG: 1 INJECTION, POWDER, LYOPHILIZED, FOR SOLUTION INTRAVENOUS at 00:22

## 2024-04-02 RX ADMIN — IPRATROPIUM BROMIDE 0.5 MG: 0.5 SOLUTION RESPIRATORY (INHALATION) at 12:54

## 2024-04-02 RX ADMIN — GABAPENTIN 600 MG: 100 CAPSULE ORAL at 17:51

## 2024-04-02 RX ADMIN — BUPROPION HYDROCHLORIDE 150 MG: 150 TABLET, EXTENDED RELEASE ORAL at 09:01

## 2024-04-02 RX ADMIN — ACETAMINOPHEN 1000 MG: 500 TABLET ORAL at 22:34

## 2024-04-02 RX ADMIN — ACETAMINOPHEN 1000 MG: 500 TABLET ORAL at 16:52

## 2024-04-02 RX ADMIN — IPRATROPIUM BROMIDE 0.5 MG: 0.5 SOLUTION RESPIRATORY (INHALATION) at 16:15

## 2024-04-02 RX ADMIN — IPRATROPIUM BROMIDE 0.5 MG: 0.5 SOLUTION RESPIRATORY (INHALATION) at 21:13

## 2024-04-02 ASSESSMENT — PAIN DESCRIPTION - LOCATION
LOCATION: BACK
LOCATION: BACK

## 2024-04-02 ASSESSMENT — PAIN DESCRIPTION - DESCRIPTORS
DESCRIPTORS: ACHING
DESCRIPTORS: ACHING

## 2024-04-02 ASSESSMENT — PAIN SCALES - GENERAL
PAINLEVEL_OUTOF10: 3
PAINLEVEL_OUTOF10: 3

## 2024-04-02 NOTE — PROGRESS NOTES
Spiritual Care Assessment/Progress Note  [facility]    Name: Leny Barnes MRN: [id number]    Age: 79 y.o.     Sex: female   Language: English     Date: 4/2/2024            Total Time Calculated: 11 min              Spiritual Assessment begun in [facility] 5S1 ORTHO JOINT  Service Provided For:: Patient  Referral/Consult From:: Rounding  Encounter Overview/Reason : Initial Encounter    Spiritual beliefs:      [x] Involved in a cherelle tradition/spiritual practice:      [] Supported by a cherelle community:      [] Claims no spiritual orientation:      [] Seeking spiritual identity:           [] Adheres to an individual form of spirituality:      [] Not able to assess:                Identified resources for coping and support system:   Support System: Children       [x] Prayer                  [] Devotional reading               [] Music                  [] Guided Imagery     [] Pet visits                                        [] Other: (COMMENT)     Specific area/focus of visit   Encounter:    Crisis:    Spiritual/Emotional needs: Type: Spiritual Distress  Ritual, Rites and Sacraments:    Grief, Loss, and Adjustments:    Ethics/Mediation:    Behavioral Health:    Palliative Care:    Advance Care Planning:      Plan/Referrals: Continue Support (comment) (Will respond when requested or when available.)    Narrative:     I visited this patient in 5S. She said she recognized me. She was tearful because she was still feeling not well. She is a prayerful person. She asked for prayers. She help my hand and I offered a prayer for her. She is supported by her daughter. I informed her that there is always a  here if she needs more spiritual care. I assured her of my continued prayers. She felt relaxed. Please consult the 's office for further referrals.     Rafa cummins  [phone]

## 2024-04-02 NOTE — WOUND CARE
WOCN Note:     New consult to place Prevena incisional wound VAC.    Chart reviewed.  Assessed in .    Leny Barnes is a 79 y.o. y/o female who presented for Surgical wound infection  Postoperative infection  Admitted on 2024    Past Medical History:   Diagnosis Date    Arthritis     Asthma     Atrial fibrillation ([facility])     NONE SINCE PACEMAKER    CAD (coronary artery disease)     Cancer ([facility])     UTERINE    Chronic kidney disease (CKD), stage III (moderate) ([facility])     Compression fracture of L1 vertebra ([facility]) 10/2023    FELL AT HOME    Congestive heart failure ([facility])     Pacemaker    Depression     DM (diabetes mellitus) ([facility])     HTN (hypertension)     Hyperlipidemia     Morbid obesity ([facility])     Neuropathy     FREDERICK on CPAP     WEARS 02 2LNC AT NIGHT     Lab Results   Component Value Date/Time    WBC 11.6 (H) 2024 05:12 AM    LABA1C 7.3 (H) 2024 05:12 AM    POCGLU 216 (H) 2024 11:17 AM    POCGLU 178 (H) 2024 09:28 AM    HGB 6.7 (L) 2024 05:12 AM    HCT 22.0 (L) 2024 05:12 AM     2024 05:12 AM        Tobacco Use      Smoking status: Former        Packs/day: 0.00        Types: Cigarettes        Quit date: 1992        Years since quittin.2      Smokeless tobacco: Never     ADULT DIET; Regular; 4 carb choices (60 gm/meal); Low Fat/Low Chol/High Fiber/2 gm Na     Assessment:   Patient is drowsy, communicative and requires assist with repositioning.    Bed: jovani gel  Patient reports no pain.   Patient repositioned on left side with pillow.  Heels offloaded with pillows.  Heels intact without erythema.   Sacrum and buttocks intact without erythema.      Wound Assessment  POA Back incision s/p T10-ileum fusion on 3.18.24.  Serosang drainage to dressing (half saturated) but no active draining during the visit.  Steri strips to superior end of the incision.    Tx:  Cleansed with saline; Applied Prevena VAC dressing; Connected to the

## 2024-04-02 NOTE — PROGRESS NOTES
Occupational Therapy  04/02/24    Orders rec'd and chart review completed in preparation for OT eval. Pt with strict bed rest orders at this time due to surgical wound and pending wound care consult. Message left with MD regarding bed rest order progression. Pt's HGB currently subtherapeutic at 6.7 with blood transfusion pending. Will hold therapy today and follow up with pt tomorrow as able and appropriate.     Thank you  Joy Freitas, OTR/L

## 2024-04-02 NOTE — CARE COORDINATION
04/02/24 1317   Readmission Assessment   Number of Days since last admission? 1-7 days   Previous Disposition Acute Rehab   Who is being Interviewed Caregiver;Patient   What was the patient's/caregiver's perception as to why they think they needed to return back to the hospital? Other (Comment)  (suture continued to leak, resulting in alarm by providers at [facility]. Daughter was pleased with level of attention given to the problem by [facility] providers.)   Did you visit your Primary Care Physician after you left the hospital, before you returned this time? No  (n/a went to [facility])   Why weren't you able to visit your PCP? Other (Comment)  (n/a went to [facility])   Did you see a specialist, such as Cardiac, Pulmonary, Orthopedic Physician, etc. after you left the hospital? Yes   Who advised the patient to return to the hospital? Physician;Acute Rehab   Does the patient report anything that got in the way of taking their medications? No   In our efforts to provide the best possible care to you and others like you, can you think of anything that we could have done to help you after you left the hospital the first time, so that you might not have needed to return so soon? Other (Comment)  (suture continued to leak, resulting in alarm by providers at [facility]. Daughter was pleased with level of attention given to the problem by [facility] providers.)

## 2024-04-02 NOTE — PROGRESS NOTES
Pharmacist Note - Vancomycin Dosing    Consult provided for this 79 y.o. female for indication of surgical site infection.  Antibiotic regimen(s): Vancomycin  Patient on vancomycin PTA? YES - last dose given 3/31 at 2320 per Megan at [facility]    Recent Labs     24  1236   WBC 13.3*   CREATININE 2.18*   BUN 75*     Frequency of BMP: x3  Height: 167.6 cm  Weight: 120 kg  Est CrCl: 27 ml/min; UO: - ml/kg/hr  Temp (24hrs), Av.4 °F (36.9 °C), Min:98.2 °F (36.8 °C), Max:98.6 °F (37 °C)    Cultures:   BC x 2: NG<24 h Preliminary    MRSA Swab ordered (if applicable)? N/A    The plan below is expected to result in a target range of AUC/LANE 400-600    Patient presents from [facility] previously on vancomycin, regimen of 1000 mg IV q 24 hours. Vancomycin random lab ordered not collected at time of note writing. Ordered vancomycin 1 g IV x 1 for now, plan to collect vancomycin random with AM labs for future dose adjustments if needed. Pharmacy will continue to monitor/adjust doses as applicable.    Thank you,  Luan Mendez, PharmD    *Vancomycin has been dosed used Bayesian kinetics software to target an AUC/LANE of 400-600, which provides adequate exposure for an assumed infection due to MRSA with an LANE of 1 or less while reducing the risk of nephrotoxicity as seen with traditional trough based dosing goals.

## 2024-04-02 NOTE — PROGRESS NOTES
2200 Missy Manjarrez NP perfect served d/t patient BP = 111/46 and Diastolic BP has been in the 40's.

## 2024-04-02 NOTE — PROGRESS NOTES
Physical Therapy    Received consult and order acknowledged, pt is currently on bed rest, discussed with OT who sent perfect serve message to physician regarding bedrest order, Hgb 6.7 and pt to receive transfusion today, will follow up tomorrow when pt medically appropriate to participate with evaluation and mobility training  HANNAH PENNY, PT

## 2024-04-02 NOTE — ED NOTES
ED TO INPATIENT SBAR HANDOFF    Patient Name: Leny Barnes   :  1945  79 y.o.   MRN:  [id number]  ED Room #:  ER24/24  Family/Caregiver Present no   Restraints no   Sitter no   Sepsis Risk Score Sepsis Risk Score: 4.55    Situation  Code Status: Full Code     Allergies: Latex, Oxycodone, Codeine, Lisinopril, Morphine, and Statins  Weight: Patient Vitals for the past 96 hrs (Last 3 readings):   Weight   24 1107 120 kg (264 lb 8.8 oz)     Arrived from: nursing home  Chief Complaint:   Chief Complaint   Patient presents with    Wound Dehiscence    Back Pain     Hospital Problem/Diagnosis:  Principal Problem:    Surgical wound infection  Resolved Problems:    * No resolved hospital problems. *    Imaging:   XR CHEST PORTABLE   Final Result   Grossly clear lungs.           Abnormal labs:   Abnormal Labs Reviewed   CBC WITH AUTO DIFFERENTIAL - Abnormal; Notable for the following components:       Result Value    WBC 13.3 (*)     RBC 2.48 (*)     Hemoglobin 7.3 (*)     Hematocrit 23.1 (*)     RDW 16.6 (*)     Nucleated RBCs 0.3 (*)     nRBC 0.04 (*)     Neutrophils % 90 (*)     Lymphocytes % 2 (*)     Immature Granulocytes 1 (*)     Neutrophils Absolute 12.0 (*)     Lymphocytes Absolute 0.3 (*)     Absolute Immature Granulocyte 0.1 (*)     All other components within normal limits   COMPREHENSIVE METABOLIC PANEL - Abnormal; Notable for the following components:    Sodium 128 (*)     Glucose 228 (*)     BUN 75 (*)     Creatinine 2.18 (*)     Bun/Cre Ratio 34 (*)     Est, Glom Filt Rate 22 (*)     Alk Phosphatase 208 (*)     Total Protein 6.2 (*)     Albumin 2.1 (*)     Globulin 4.1 (*)     Albumin/Globulin Ratio 0.5 (*)     All other components within normal limits   URINALYSIS WITH REFLEX TO CULTURE - Abnormal; Notable for the following components:    Ketones, Urine TRACE (*)     Budding Yeast PRESENT (*)     All other components within normal limits   POCT GLUCOSE - Abnormal; Notable for the following

## 2024-04-02 NOTE — PROGRESS NOTES
[id number]  This Nurse called to give report to Nurse getting this patient.  Charge Nurse stated that she will have to call back.

## 2024-04-02 NOTE — CARE COORDINATION
Transition of Care:   Disposition at discharge is to be determined. Pt is readmit from [facility] and has a bed there for approximately 72 hours. Per sofi, Pt has been at [facility], [facility], and at home with HH (through [facility] well as [facility]) in the past. The Pt now lives with her daughter and has personal care through [facility] [employer] and assistance through [employer]. There are 4 steps to enter the home and a lift to access the 2nd floor. Pt has a rollator, walker, elev toilet seat, TTB, and walker.     The Pt reports being afraid of the changes in her health and wants to d/c home when medically stable. Daughter understands that multiple levels of care will be explored based on Pt needs at d/c. With both Pt and sofi, CM reviewed IM letter and notated the document with permission by both parties.     CM confirmed face sheet with Pt and sofi and provided update to liaision at [facility].     Pt's sofi is primary decision maker and remarked that she is concerned that her mother has not been eating due to the painful sores in her mouth. The Pt's sofi was able to apprise the NP of this concern earlier today.     Wound care following, ID following, if returning to [facility] after more than 72 hours, a new referral will be necessary.         Care Management Initial Assessment       RUR: 24%  Readmission? Yes - 3/18 - 3/22  1st IM letter given? Yes - 4/2 04/02/24 1320   Service Assessment   Patient Orientation Alert and Oriented   Cognition Alert   History Provided By Patient;Child/Family   Primary Caregiver Family   Support Systems Children;Home Care Staff   PCP Verified by CM Yes   Last Visit to PCP Within last 3 months   Prior Functional Level Assistance with the following:;Bathing;Dressing;Cooking;Housework;Shopping;Mobility   Current Functional Level Assistance with the following:;Bathing;Dressing;Toileting;Feeding;Cooking;Housework;Shopping;Mobility   Can patient return to prior living  arrangement Unknown at present   Ability to make needs known: Good   Family able to assist with home care needs: Yes   Would you like for me to discuss the discharge plan with any other family members/significant others, and if so, who? Yes  (sofi)   Discharge Planning   Patient expects to be discharged to: Unknown  (Pt wants to go home, but IPR or SNF might be needed at d/c)   Lift Chair Available Yes   Condition of Participation: Discharge Planning   The Plan for Transition of Care is related to the following treatment goals: IPR or SNF or HH   The Patient and/or Patient Representative was provided with a Choice of Provider? Patient;Patient Representative   Name of the Patient Representative who was provided with the Choice of Provider and agrees with the Discharge Plan?  Daughter Breonna   The Patient and/Or Patient Representative agree with the Discharge Plan? Yes   Freedom of Choice list was provided with basic dialogue that supports the patient's individualized plan of care/goals, treatment preferences, and shares the quality data associated with the providers?  Yes

## 2024-04-02 NOTE — PROGRESS NOTES
Pharmacist Note - Vancomycin Dosing  Therapy day 5  Indication: surgical site infection  Current regimen: 1g q24h    Recent Labs     04/01/24  1236 04/02/24  0512   WBC 13.3* 11.6*   CREATININE 2.18* 1.88*   BUN 75* 74*       A random vancomycin level of 19.5 mcg/mL was obtained and from this level, the patient's AUC24 is calculated to be 737 with the current regimen.     Goal target range AUC/LANE 400-600      Plan: Change to 750 mg IV q24h . Pharmacy will continue to monitor this patient daily for changes in clinical status and renal function.    *Random vancomycin levels are used to calculate AUC/LANE, this level should not be interpreted as a trough. Vancomycin has been dosed using Bayesian kinetics software to target an AUC24:LANE of 400-600, which provides adequate exposure for as assumed infection due to MRSA with an LANE of 1 or less while reducing the risk of nephrotoxicity as seen with traditional trough based dosing goals.

## 2024-04-02 NOTE — PROGRESS NOTES
Spine surgery progress note    Pt seen and examined with wound care RN. Significant amount of bloody drainage this morning saturated multiple bandages and leading to drop in Hgb. RN attempted to apply steri-strips. At time of my visit there is no active bleeding though dressing is about half saturated and majority of steri-strips have come off due to drainage. Discussed with surgery team who recommend proceeding with wound vac. Getting 1 unit PRBC now. Appreciate hospitalist. See wound care note for photo/details.    CLIFF Wheat

## 2024-04-03 LAB
ABO + RH BLD: NORMAL
ANION GAP SERPL CALC-SCNC: 7 MMOL/L (ref 5–15)
BASOPHILS # BLD: 0 K/UL (ref 0–0.1)
BASOPHILS NFR BLD: 0 % (ref 0–1)
BLD PROD TYP BPU: NORMAL
BLOOD BANK BLOOD PRODUCT EXPIRATION DATE: NORMAL
BLOOD BANK DISPENSE STATUS: NORMAL
BLOOD BANK ISBT PRODUCT BLOOD TYPE: 6200
BLOOD BANK PRODUCT CODE: NORMAL
BLOOD BANK UNIT TYPE AND RH: NORMAL
BLOOD GROUP ANTIBODIES SERPL: NORMAL
BPU ID: NORMAL
BUN SERPL-MCNC: 68 MG/DL (ref 6–20)
BUN/CREAT SERPL: 42 (ref 12–20)
CALCIUM SERPL-MCNC: 8.8 MG/DL (ref 8.5–10.1)
CHLORIDE SERPL-SCNC: 104 MMOL/L (ref 97–108)
CO2 SERPL-SCNC: 20 MMOL/L (ref 21–32)
CREAT SERPL-MCNC: 1.61 MG/DL (ref 0.55–1.02)
CROSSMATCH RESULT: NORMAL
DIFFERENTIAL METHOD BLD: ABNORMAL
EOSINOPHIL # BLD: 0.3 K/UL (ref 0–0.4)
EOSINOPHIL NFR BLD: 2 % (ref 0–7)
ERYTHROCYTE [DISTWIDTH] IN BLOOD BY AUTOMATED COUNT: 16.8 % (ref 11.5–14.5)
GLUCOSE BLD STRIP.AUTO-MCNC: 160 MG/DL (ref 65–117)
GLUCOSE BLD STRIP.AUTO-MCNC: 185 MG/DL (ref 65–117)
GLUCOSE BLD STRIP.AUTO-MCNC: 228 MG/DL (ref 65–117)
GLUCOSE BLD STRIP.AUTO-MCNC: 395 MG/DL (ref 65–117)
GLUCOSE SERPL-MCNC: 197 MG/DL (ref 65–100)
HCT VFR BLD AUTO: 25.7 % (ref 35–47)
HGB BLD-MCNC: 8.5 G/DL (ref 11.5–16)
IMM GRANULOCYTES # BLD AUTO: 0.1 K/UL (ref 0–0.04)
IMM GRANULOCYTES NFR BLD AUTO: 1 % (ref 0–0.5)
LYMPHOCYTES # BLD: 0.6 K/UL (ref 0.8–3.5)
LYMPHOCYTES NFR BLD: 4 % (ref 12–49)
MCH RBC QN AUTO: 29.6 PG (ref 26–34)
MCHC RBC AUTO-ENTMCNC: 33.1 G/DL (ref 30–36.5)
MCV RBC AUTO: 89.5 FL (ref 80–99)
MONOCYTES # BLD: 1.2 K/UL (ref 0–1)
MONOCYTES NFR BLD: 8 % (ref 5–13)
NEUTS SEG # BLD: 12.3 K/UL (ref 1.8–8)
NEUTS SEG NFR BLD: 85 % (ref 32–75)
NRBC # BLD: 0.06 K/UL (ref 0–0.01)
NRBC BLD-RTO: 0.4 PER 100 WBC
PLATELET # BLD AUTO: 302 K/UL (ref 150–400)
PMV BLD AUTO: 9.8 FL (ref 8.9–12.9)
POTASSIUM SERPL-SCNC: 3.8 MMOL/L (ref 3.5–5.1)
RBC # BLD AUTO: 2.87 M/UL (ref 3.8–5.2)
RBC MORPH BLD: ABNORMAL
RBC MORPH BLD: ABNORMAL
SERVICE CMNT-IMP: ABNORMAL
SODIUM SERPL-SCNC: 131 MMOL/L (ref 136–145)
SPECIMEN EXP DATE BLD: NORMAL
UNIT DIVISION: 0
UNIT ISSUE DATE/TIME: NORMAL
WBC # BLD AUTO: 14.5 K/UL (ref 3.6–11)

## 2024-04-03 PROCEDURE — 82962 GLUCOSE BLOOD TEST: CPT

## 2024-04-03 PROCEDURE — 36415 COLL VENOUS BLD VENIPUNCTURE: CPT

## 2024-04-03 PROCEDURE — 6370000000 HC RX 637 (ALT 250 FOR IP): Performed by: FAMILY MEDICINE

## 2024-04-03 PROCEDURE — 85025 COMPLETE CBC W/AUTO DIFF WBC: CPT

## 2024-04-03 PROCEDURE — 6370000000 HC RX 637 (ALT 250 FOR IP): Performed by: INTERNAL MEDICINE

## 2024-04-03 PROCEDURE — 2580000003 HC RX 258: Performed by: FAMILY MEDICINE

## 2024-04-03 PROCEDURE — 97530 THERAPEUTIC ACTIVITIES: CPT | Performed by: PHYSICAL THERAPIST

## 2024-04-03 PROCEDURE — 97161 PT EVAL LOW COMPLEX 20 MIN: CPT | Performed by: PHYSICAL THERAPIST

## 2024-04-03 PROCEDURE — 2580000003 HC RX 258: Performed by: NURSE PRACTITIONER

## 2024-04-03 PROCEDURE — 97530 THERAPEUTIC ACTIVITIES: CPT

## 2024-04-03 PROCEDURE — 6360000002 HC RX W HCPCS: Performed by: FAMILY MEDICINE

## 2024-04-03 PROCEDURE — 97166 OT EVAL MOD COMPLEX 45 MIN: CPT

## 2024-04-03 PROCEDURE — 6360000002 HC RX W HCPCS: Performed by: NURSE PRACTITIONER

## 2024-04-03 PROCEDURE — 94640 AIRWAY INHALATION TREATMENT: CPT

## 2024-04-03 PROCEDURE — 80048 BASIC METABOLIC PNL TOTAL CA: CPT

## 2024-04-03 PROCEDURE — 97607 NEG PRS WND THR NDME<=50SQCM: CPT

## 2024-04-03 PROCEDURE — 1100000000 HC RM PRIVATE

## 2024-04-03 RX ADMIN — ARFORMOTEROL TARTRATE: 15 SOLUTION RESPIRATORY (INHALATION) at 09:42

## 2024-04-03 RX ADMIN — INSULIN LISPRO 4 UNITS: 100 INJECTION, SOLUTION INTRAVENOUS; SUBCUTANEOUS at 11:43

## 2024-04-03 RX ADMIN — ACETAMINOPHEN 1000 MG: 500 TABLET ORAL at 21:58

## 2024-04-03 RX ADMIN — SODIUM CHLORIDE, PRESERVATIVE FREE 10 ML: 5 INJECTION INTRAVENOUS at 22:03

## 2024-04-03 RX ADMIN — IPRATROPIUM BROMIDE 0.5 MG: 0.5 SOLUTION RESPIRATORY (INHALATION) at 14:16

## 2024-04-03 RX ADMIN — SODIUM CHLORIDE: 9 INJECTION, SOLUTION INTRAVENOUS at 10:18

## 2024-04-03 RX ADMIN — ALLOPURINOL 100 MG: 100 TABLET ORAL at 10:12

## 2024-04-03 RX ADMIN — SODIUM CHLORIDE, PRESERVATIVE FREE 10 ML: 5 INJECTION INTRAVENOUS at 10:12

## 2024-04-03 RX ADMIN — ACETAMINOPHEN 1000 MG: 500 TABLET ORAL at 10:12

## 2024-04-03 RX ADMIN — WATER 1000 MG: 1 INJECTION INTRAMUSCULAR; INTRAVENOUS; SUBCUTANEOUS at 11:44

## 2024-04-03 RX ADMIN — ACETAMINOPHEN 1000 MG: 500 TABLET ORAL at 03:30

## 2024-04-03 RX ADMIN — GABAPENTIN 600 MG: 100 CAPSULE ORAL at 17:39

## 2024-04-03 RX ADMIN — NYSTATIN 500000 UNITS: 100000 SUSPENSION ORAL at 21:59

## 2024-04-03 RX ADMIN — FERROUS SULFATE TAB 325 MG (65 MG ELEMENTAL FE) 325 MG: 325 (65 FE) TAB at 10:12

## 2024-04-03 RX ADMIN — IPRATROPIUM BROMIDE 0.5 MG: 0.5 SOLUTION RESPIRATORY (INHALATION) at 19:31

## 2024-04-03 RX ADMIN — IPRATROPIUM BROMIDE 0.5 MG: 0.5 SOLUTION RESPIRATORY (INHALATION) at 09:43

## 2024-04-03 RX ADMIN — SERTRALINE HYDROCHLORIDE 150 MG: 50 TABLET ORAL at 21:58

## 2024-04-03 RX ADMIN — BUPROPION HYDROCHLORIDE 150 MG: 150 TABLET, EXTENDED RELEASE ORAL at 10:11

## 2024-04-03 RX ADMIN — AMLODIPINE BESYLATE 10 MG: 5 TABLET ORAL at 10:12

## 2024-04-03 RX ADMIN — ARFORMOTEROL TARTRATE: 15 SOLUTION RESPIRATORY (INHALATION) at 19:31

## 2024-04-03 NOTE — PROGRESS NOTES
Dose  1 each Other RX Placeholder    0.9 % sodium chloride infusion   IntraVENous PRN    cefTRIAXone (ROCEPHIN) 1,000 mg in sterile water 10 mL IV syringe  1,000 mg IntraVENous Q24H    [START ON 4/3/2024] vancomycin (VANCOCIN) 750 mg in sodium chloride 0.9 % 250 mL IVPB ([id number])  750 mg IntraVENous Q24H    sodium chloride flush 0.9 % injection 5-40 mL  5-40 mL IntraVENous 2 times per day    sodium chloride flush 0.9 % injection 5-40 mL  5-40 mL IntraVENous PRN    0.9 % sodium chloride infusion   IntraVENous PRN    ondansetron (ZOFRAN-ODT) disintegrating tablet 4 mg  4 mg Oral Q8H PRN    Or    ondansetron (ZOFRAN) injection 4 mg  4 mg IntraVENous Q6H PRN    polyethylene glycol (GLYCOLAX) packet 17 g  17 g Oral Daily PRN    sertraline (ZOLOFT) tablet 150 mg  150 mg Oral QHS    hydrOXYzine HCl (ATARAX) tablet 10 mg  10 mg Oral Q6H PRN    gabapentin (NEURONTIN) capsule 600 mg  600 mg Oral QPM    ferrous sulfate (IRON 325) tablet 325 mg  325 mg Oral Daily with breakfast    cyclobenzaprine (FLEXERIL) tablet 10 mg  10 mg Oral TID PRN    buPROPion (WELLBUTRIN SR) extended release tablet 150 mg  150 mg Oral Daily    amLODIPine (NORVASC) tablet 10 mg  10 mg Oral Daily    allopurinol (ZYLOPRIM) tablet 100 mg  100 mg Oral Daily    acetaminophen (TYLENOL) tablet 1,000 mg  1,000 mg Oral Q6H    arformoterol 15 mcg-budesonide 0.5 mg neb solution   Nebulization BID RT    ipratropium (ATROVENT) 0.02 % nebulizer solution 0.5 mg  0.5 mg Nebulization 4x Daily RT    glucose chewable tablet 16 g  4 tablet Oral PRN    dextrose bolus 10% 125 mL  125 mL IntraVENous PRN    Or    dextrose bolus 10% 250 mL  250 mL IntraVENous PRN    glucagon injection 1 mg  1 mg SubCUTAneous PRN    dextrose 10 % infusion   IntraVENous Continuous PRN    insulin lispro (HUMALOG) injection vial 0-4 Units  0-4 Units SubCUTAneous TID WC    insulin lispro (HUMALOG) injection vial 0-4 Units  0-4 Units SubCUTAneous Nightly    0.9 % sodium chloride infusion

## 2024-04-03 NOTE — PLAN OF CARE
LAB    CARDIAC PROCEDURE N/A 08/01/2023    Percutaneous coronary intervention performed by Dewey Hay MD at [facility] CARDIAC CATH LAB    CARDIAC PROCEDURE N/A 08/21/2023    Left heart cath / coronary angiography performed by Dewey Hay MD at [facility] CARDIAC CATH LAB    CATARACT REMOVAL Bilateral 2019    CERVICAL FUSION      CHOLECYSTECTOMY      COLONOSCOPY N/A 07/15/2020    COLONOSCOPY :- performed by Salome Shaikh MD at [facility] ENDOSCOPY    COLONOSCOPY Left 10/28/2019    COLONOSCOPY AND EGD performed by Salome Shaikh MD at [facility] ENDOSCOPY    DILATION AND CURETTAGE OF UTERUS      GASTRIC BYPASS SURGERY      Jejunal bypass with subsequent reversal..    IR CHEST TUBE INSERTION      KYPHOSIS SURGERY  02/2023    LUMBAR SPINE SURGERY N/A 3/18/2024    T10-T11 LUMBAR LAMINECTOMY, L2-S1 LUMBAR LAMINECTOMY, T10 TO ILIUM FUSION performed by Grant Royal MD at [facility] MAIN OR    PACEMAKER  05/04/2022    KEI AND BSO (CERVIX REMOVED)      For uterine CA          Expanded or extensive additional review of patient history:   Social/Functional History  Lives With: Family  Type of Home: House  Home Layout: Two level, Bed/Bath upstairs (chair lift to second floor)  Home Access: Stairs to enter with rails  Entrance Stairs - Number of Steps: 5  Entrance Stairs - Rails: Both (Uses L rail - cannot reach both at same time)  Bathroom Shower/Tub: Tub/Shower unit  Bathroom Toilet: Handicap height  Bathroom Equipment: Tub transfer bench, Grab bars in shower  Home Equipment: Rollator, Walker, rolling, Cane, Reacher, Sock aid (\"stand up tall rollator\", pt reports she has multiple reachers)  ADL Assistance:  (pt reports her dtr assists with washing her feet and backside.)      Hand Dominance: unknown     EXAMINATION OF PERFORMANCE DEFICITS:    Cognitive/Behavioral Status:  Orientation  Overall Orientation Status: Within Functional Limits  Orientation Level: Oriented X4  Cognition  Overall Cognitive Status: WFL    Skin: wound vac to spine  c/d/i    Edema: none    Hearing:   Hearing  Hearing: Within functional limits    Vision/Perceptual:          Vision  Vision: Within Functional Limits       Range of Motion:   AROM: Generally decreased, functional (BUEs)  PROM: Generally decreased, functional      Strength:  Strength: Generally decreased, functional      Coordination:  Coordination: Generally decreased, functional     Coordination: Within functional limits      Tone & Sensation:   Tone: Normal  Sensation: Intact  Functional Mobility and Transfers for ADLs:    Bed Mobility:     Bed Mobility Training  Bed Mobility Training: Yes  Overall Level of Assistance: Maximum assistance;Assist X2  Rolling: Maximum assistance;Assist X2  Supine to Sit: Maximum assistance;Assist X2  Sit to Supine: Maximum assistance;Assist X2  Scooting: Total assistance;Assist X2    Transfers:      Transfer Training  Transfer Training:  (not tested)                     Balance:   Standing:  (not tested)  Balance  Sitting: Impaired  Sitting - Static: Poor (constant support)  Sitting - Dynamic: Poor (constant support)  Standing:  (not tested)      ADL Assessment:     Feeding: Setup       Grooming: Moderate assistance       UE Bathing: Moderate assistance       LE Bathing: Dependent/Total       UE Dressing: Maximum assistance       LE Dressing: Dependent/Total       Toileting: Dependent/Total       ADL Intervention and task modifications:  The patient recalled and demonstrated 0/3 back precautions. Reviewed all 3 with patient.    Transfer Training  Transfer Training:  (not tested)    [facility] AM-PACTM \"6 Clicks\"                                                       Daily Activity Inpatient Short Form  AM-PAC Daily Activity - Inpatient   How much help is needed for putting on and taking off regular lower body clothing?: Total  How much help is needed for bathing (which includes washing, rinsing, drying)?: A Lot  How much help is needed for toileting (which includes using

## 2024-04-03 NOTE — PROGRESS NOTES
Hospitalist Progress Note  Emily Olivera MD  Answering service: [phone] OR [phone] from in house phone        Date of Service:  4/3/2024  NAME:  Leny Barnes  :  1945  MRN:  [id number]      Admission Summary:     Leny Barnes is a 79 y.o. female with a pmhx asthma, atrial fibrillation, past uterine cancer,, HFrEF, PPM, DM II, HTN, dyslipidemia, and FREDERICK on Cpap who presents from [facility] with post surgical wound dehiscence, and infection.  She is s/p T10-11, and L2-S1 laminectomy, and S02-[id number] fusion on 3/18.  She was subsequently discharged to [facility].  Per patient, she had noted bloody drainage from the wound, and was advised to return to the ED per [facility] physician.        Interval history / Subjective:     4/3: Patient feels slightly better, but still somnolent;  Transfused 1 unit of packed red blood cells yesterday for hemoglobin of 6.7;  Wound VAC applied to lumbar incision;      :  Patient with significant lethargy this morning on exam. Hypotensive with BP noted to be 89/33. Hemoglobin 6.7. Blood transfusion ordered. BP improved s/p transfusion. Follow up H/H shows Hgb 8.0.     Significant amount of bleeding from back wound this morning, has slowed down this afternoon.  Surgery team opted to place wound vac over wound today.     Patient with significant nose bleed this afternoon. Currently stopped. Eliquis on hold. Recheck H/H in AM. Would need rhino rocket for bleeding source control if re-starts or continues. Discussed with dayshift nursing staff.     I called and discussed the plan of care with patients daughter, Breonna, who would like to be updated daily. I also asked Dr. Coe to call the patients daughter as well.      Assessment & Plan:        Lumbar stenosis with claudication s/p T10-11, and L2-S1 laminectomy, and H43-[id number] fusion on 3/18  -sent from [facility] for wound dehiscence,

## 2024-04-03 NOTE — CONSENT
Informed Consent for Blood Component Transfusion Note    I have discussed with the patient and daughter the rationale for blood component transfusion; its benefits in treating or preventing fatigue, organ damage, or death; and its risk which includes mild transfusion reactions, rare risk of blood borne infection, or more serious but rare reactions. I have discussed the alternatives to transfusion, including the risk and consequences of not receiving transfusion. The patient and daughter had an opportunity to ask questions and had agreed to proceed with transfusion of blood components.    Electronically signed by SHAYNE Meadows NP on 4/2/24 at 9:48 PM EDT

## 2024-04-03 NOTE — CARE COORDINATION
Transition of Care:   Disposition at discharge: IPR v. SNF. Referral sent back to [facility] today. SNF list sent to daughter [email or web address] for review, [facility] choice is [facility] if going to SNF - other choices pending list review. IPR liaison reported that Pt could be on 7-day plan to allow for building energy back while making most of IPR admission.    Pt is readmit from [facility]. CM messaged Sharyn Bates and spoke with hospitalist to confirm sending referral back to [facility].     In the past, Pt has been at [facility], [facility], and at home with HH (through [facility] well as [facility]) in the past. The Pt now lives with her daughter and has personal care through [facility] [employer] and assistance through [employer]. There are 4 steps to enter the home and a lift to access the 2nd floor. Pt has a rollator, walker, elev toilet seat, TTB, and walker.     Wound care following, ID following, wound vac in place.     Transition of Care Plan:    RUR: 23%  Prior Level of Functioning: IPR, prior at home with support  Disposition: tbd  If SNF or IPR: Date FOC offered: 4/4  Date FOC received: 4/4  Accepting facility:   Date authorization started with reference number: n/a  Date authorization received and expires:   Follow up appointments: SNF or IPR  DME needed: n/a  Transportation at discharge:   IM/IMM Medicare/Tracey letter given: y  Caregiver Contact: Latoya Barnes, [phone]   Discharge Caregiver contacted prior to discharge? y  Care Conference needed?   Barriers to discharge:  clinical and dispo

## 2024-04-03 NOTE — PLAN OF CARE
Problem: Physical Therapy - Adult  Goal: By Discharge: Performs mobility at highest level of function for planned discharge setting.  See evaluation for individualized goals.  Description: FUNCTIONAL STATUS PRIOR TO ADMISSION: At baseline patient ambulates with a rollator walker.  Daughter helps with dressing and bathing and other ADLs.    HOME SUPPORT PRIOR TO ADMISSION: The patient lived with daughter and son in law but did not require assistance.    Physical Therapy Goals  Initiated 4/3/2024  1.  Patient will move from supine to sit and sit to supine in bed with moderate assistance within 7 day(s).    2.  Patient will perform sit to stand with moderate assistance within 7 day(s).  3.  Patient will transfer from bed to chair and chair to bed with moderate assistance using the least restrictive device within 7 day(s).  4.  Patient will ambulate with moderate assistance for 5 feet with the least restrictive device within 7 day(s).   5. Patient will verbalize and demonstrate understanding of spinal precautions (No bending, lifting greater than 5 lbs, or twisting; log-roll technique; frequent repositioning as instructed) within 7 days.    Outcome: Progressing   PHYSICAL THERAPY EVALUATION    Patient: Leny Barnes (79 y.o. female)  Date: 4/3/2024  Primary Diagnosis: Surgical wound infection [T81.49XA]  Postoperative infection, unspecified type, initial encounter [T81.40XA]       Precautions: Restrictions/Precautions:  (Spinal precautions, brace OOB)                      ASSESSMENT :   DEFICITS/IMPAIRMENTS:   The patient is limited by severe pain, impaired balance, inability to stand or ambulate and decreased activity tolerance.  Patient overall maxA x 2 to roll in the bed and rolled better to the L than the right.  Supine to sit with maxA-totalA x 2 with very high pain levels.  Unable to hold herself in sitting and requires maxA to maintain upright.  Unable to mobilize further secondary to severe pain and

## 2024-04-04 LAB
ANION GAP SERPL CALC-SCNC: 5 MMOL/L (ref 5–15)
BASOPHILS # BLD: 0.1 K/UL (ref 0–0.1)
BASOPHILS NFR BLD: 0 % (ref 0–1)
BUN SERPL-MCNC: 64 MG/DL (ref 6–20)
BUN/CREAT SERPL: 43 (ref 12–20)
CALCIUM SERPL-MCNC: 8.6 MG/DL (ref 8.5–10.1)
CHLORIDE SERPL-SCNC: 106 MMOL/L (ref 97–108)
CO2 SERPL-SCNC: 21 MMOL/L (ref 21–32)
CREAT SERPL-MCNC: 1.5 MG/DL (ref 0.55–1.02)
DIFFERENTIAL METHOD BLD: ABNORMAL
EOSINOPHIL # BLD: 0.2 K/UL (ref 0–0.4)
EOSINOPHIL NFR BLD: 2 % (ref 0–7)
ERYTHROCYTE [DISTWIDTH] IN BLOOD BY AUTOMATED COUNT: 16.9 % (ref 11.5–14.5)
GLUCOSE BLD STRIP.AUTO-MCNC: 165 MG/DL (ref 65–117)
GLUCOSE BLD STRIP.AUTO-MCNC: 183 MG/DL (ref 65–117)
GLUCOSE BLD STRIP.AUTO-MCNC: 215 MG/DL (ref 65–117)
GLUCOSE BLD STRIP.AUTO-MCNC: 232 MG/DL (ref 65–117)
GLUCOSE SERPL-MCNC: 237 MG/DL (ref 65–100)
HCT VFR BLD AUTO: 25.1 % (ref 35–47)
HGB BLD-MCNC: 8.2 G/DL (ref 11.5–16)
IMM GRANULOCYTES # BLD AUTO: 0.3 K/UL (ref 0–0.04)
IMM GRANULOCYTES NFR BLD AUTO: 2 % (ref 0–0.5)
LYMPHOCYTES # BLD: 0.8 K/UL (ref 0.8–3.5)
LYMPHOCYTES NFR BLD: 5 % (ref 12–49)
MCH RBC QN AUTO: 29.3 PG (ref 26–34)
MCHC RBC AUTO-ENTMCNC: 32.7 G/DL (ref 30–36.5)
MCV RBC AUTO: 89.6 FL (ref 80–99)
MONOCYTES # BLD: 1.4 K/UL (ref 0–1)
MONOCYTES NFR BLD: 9 % (ref 5–13)
NEUTS SEG # BLD: 12.1 K/UL (ref 1.8–8)
NEUTS SEG NFR BLD: 82 % (ref 32–75)
NRBC # BLD: 0.07 K/UL (ref 0–0.01)
NRBC BLD-RTO: 0.5 PER 100 WBC
PLATELET # BLD AUTO: 278 K/UL (ref 150–400)
PMV BLD AUTO: 9.5 FL (ref 8.9–12.9)
POTASSIUM SERPL-SCNC: 3.8 MMOL/L (ref 3.5–5.1)
RBC # BLD AUTO: 2.8 M/UL (ref 3.8–5.2)
SERVICE CMNT-IMP: ABNORMAL
SODIUM SERPL-SCNC: 132 MMOL/L (ref 136–145)
WBC # BLD AUTO: 14.8 K/UL (ref 3.6–11)

## 2024-04-04 PROCEDURE — 6370000000 HC RX 637 (ALT 250 FOR IP): Performed by: FAMILY MEDICINE

## 2024-04-04 PROCEDURE — 6370000000 HC RX 637 (ALT 250 FOR IP)

## 2024-04-04 PROCEDURE — 82962 GLUCOSE BLOOD TEST: CPT

## 2024-04-04 PROCEDURE — 85025 COMPLETE CBC W/AUTO DIFF WBC: CPT

## 2024-04-04 PROCEDURE — 97530 THERAPEUTIC ACTIVITIES: CPT

## 2024-04-04 PROCEDURE — 36415 COLL VENOUS BLD VENIPUNCTURE: CPT

## 2024-04-04 PROCEDURE — 2580000003 HC RX 258: Performed by: FAMILY MEDICINE

## 2024-04-04 PROCEDURE — 6370000000 HC RX 637 (ALT 250 FOR IP): Performed by: INTERNAL MEDICINE

## 2024-04-04 PROCEDURE — 2580000003 HC RX 258: Performed by: NURSE PRACTITIONER

## 2024-04-04 PROCEDURE — 97116 GAIT TRAINING THERAPY: CPT | Performed by: PHYSICAL THERAPIST

## 2024-04-04 PROCEDURE — 6360000002 HC RX W HCPCS: Performed by: NURSE PRACTITIONER

## 2024-04-04 PROCEDURE — 6360000002 HC RX W HCPCS: Performed by: FAMILY MEDICINE

## 2024-04-04 PROCEDURE — 80048 BASIC METABOLIC PNL TOTAL CA: CPT

## 2024-04-04 PROCEDURE — 1100000000 HC RM PRIVATE

## 2024-04-04 PROCEDURE — 97530 THERAPEUTIC ACTIVITIES: CPT | Performed by: PHYSICAL THERAPIST

## 2024-04-04 PROCEDURE — 94640 AIRWAY INHALATION TREATMENT: CPT

## 2024-04-04 PROCEDURE — 6370000000 HC RX 637 (ALT 250 FOR IP): Performed by: NURSE PRACTITIONER

## 2024-04-04 RX ORDER — BUMETANIDE 1 MG/1
1 TABLET ORAL DAILY
Status: DISCONTINUED | OUTPATIENT
Start: 2024-04-04 | End: 2024-04-05 | Stop reason: HOSPADM

## 2024-04-04 RX ADMIN — ACETAMINOPHEN 1000 MG: 500 TABLET ORAL at 16:29

## 2024-04-04 RX ADMIN — NYSTATIN 500000 UNITS: 100000 SUSPENSION ORAL at 12:01

## 2024-04-04 RX ADMIN — SODIUM CHLORIDE, PRESERVATIVE FREE 10 ML: 5 INJECTION INTRAVENOUS at 21:48

## 2024-04-04 RX ADMIN — Medication 5 ML: at 02:59

## 2024-04-04 RX ADMIN — FERROUS SULFATE TAB 325 MG (65 MG ELEMENTAL FE) 325 MG: 325 (65 FE) TAB at 07:07

## 2024-04-04 RX ADMIN — SERTRALINE HYDROCHLORIDE 150 MG: 50 TABLET ORAL at 21:47

## 2024-04-04 RX ADMIN — IPRATROPIUM BROMIDE 0.5 MG: 0.5 SOLUTION RESPIRATORY (INHALATION) at 08:31

## 2024-04-04 RX ADMIN — BUMETANIDE 1 MG: 1 TABLET ORAL at 13:00

## 2024-04-04 RX ADMIN — SODIUM CHLORIDE: 9 INJECTION, SOLUTION INTRAVENOUS at 21:54

## 2024-04-04 RX ADMIN — NYSTATIN 500000 UNITS: 100000 SUSPENSION ORAL at 16:29

## 2024-04-04 RX ADMIN — INSULIN LISPRO 1 UNITS: 100 INJECTION, SOLUTION INTRAVENOUS; SUBCUTANEOUS at 09:07

## 2024-04-04 RX ADMIN — ARFORMOTEROL TARTRATE: 15 SOLUTION RESPIRATORY (INHALATION) at 19:29

## 2024-04-04 RX ADMIN — INSULIN LISPRO 2 UNITS: 100 INJECTION, SOLUTION INTRAVENOUS; SUBCUTANEOUS at 12:01

## 2024-04-04 RX ADMIN — GABAPENTIN 600 MG: 100 CAPSULE ORAL at 16:29

## 2024-04-04 RX ADMIN — BUPROPION HYDROCHLORIDE 150 MG: 150 TABLET, EXTENDED RELEASE ORAL at 09:06

## 2024-04-04 RX ADMIN — ACETAMINOPHEN 1000 MG: 500 TABLET ORAL at 09:06

## 2024-04-04 RX ADMIN — ACETAMINOPHEN 1000 MG: 500 TABLET ORAL at 21:47

## 2024-04-04 RX ADMIN — WATER 1000 MG: 1 INJECTION INTRAMUSCULAR; INTRAVENOUS; SUBCUTANEOUS at 12:02

## 2024-04-04 RX ADMIN — NYSTATIN 500000 UNITS: 100000 SUSPENSION ORAL at 09:06

## 2024-04-04 RX ADMIN — ALLOPURINOL 100 MG: 100 TABLET ORAL at 09:06

## 2024-04-04 RX ADMIN — IPRATROPIUM BROMIDE 0.5 MG: 0.5 SOLUTION RESPIRATORY (INHALATION) at 19:29

## 2024-04-04 RX ADMIN — ARFORMOTEROL TARTRATE: 15 SOLUTION RESPIRATORY (INHALATION) at 08:31

## 2024-04-04 NOTE — CARE COORDINATION
Transition of Care:   Disposition at discharge: IPR v. SNF. Referral sent back to [facility] today. SNF list sent to daughter [email or web address] for review, [facility]st choice is [facility] if going to SNF - other choices pending list review. IPR liaison reported that Pt could be on 7-day plan to allow for building energy back while making most of IPR admission.     Pt is readmit from [facility]. CM messaged Sharyn Bates and spoke with hospitalist to confirm sending referral back to [facility].     In the past, Pt has been at [facility], [facility], and at home with HH (through [facility] well as [facility]) in the past. The Pt now lives with her daughter and has personal care through [facility] [employer] and assistance through [employer]. There are 4 steps to enter the home and a lift to access the 2nd floor. Pt has a rollator, walker, elev toilet seat, TTB, and walker.     Wound care following, ID following, wound vac in place.     Transition of Care Plan:    RUR: 23%  Prior Level of Functioning: IPR, prior at home with support  Disposition: tbd  If SNF or IPR: Date FOC offered: 4/4  Date FOC received: 4/4  Accepting facility:   Date authorization started with reference number: n/a  Date authorization received and expires:   Follow up appointments: SNF or IPR  DME needed: n/a  Transportation at discharge:   IM/IMM Medicare/Tracey letter given: y  Caregiver Contact: Latoya Barnes, [phone]   Discharge Caregiver contacted prior to discharge? y  Care Conference needed?   Barriers to discharge:  clinical and dispo

## 2024-04-04 NOTE — PROGRESS NOTES
Orthopedic Spine Progress Note  Post Op day: * No surgery found *    2024 10:10 AM   Admit Date: 2024  Procedure: * No surgery found *    Subjective:     Leny Barnes  resting comfortably in bed . Reports she is feeling like she has more energy, discouraged as she was not able to sleep well last night.    Tolerating diet.  No N/V.    Pain Control:        Objective:          Physical Exam:  General:  Alert and oriented. No acute distress.   Heart:  Respirations unlabored.   Abdomen:   Extremities: Soft, non-tender.  No evidence of cyanosis. Pulses palpable in both upper and lower extremities.       Neurologic:  Musculoskeletal:  No new motor deficits. Neurovascular exam within normal limits.  Sensation stable.  Motor: unchanged C5-T1 and L2-S1.   Olivia's sign negative in bilateral lower extremities.  Calves soft, nontender upon palpation and with passive twitch.  Moves both upper and lower extremities.   Incision: clean, dry, and intact.  No significant erythema or swelling.  No active drainage noted.         Vital Signs:    Blood pressure (!) 113/44, pulse 69, temperature 98.2 °F (36.8 °C), temperature source Axillary, resp. rate 17, height 1.676 m (5' 6\"), weight 120 kg (264 lb 8.8 oz), SpO2 94 %.  Temp (24hrs), Av.2 °F (36.8 °C), Min:97.9 °F (36.6 °C), Max:98.4 °F (36.9 °C)      LAB:    Recent Labs     24  0319   HCT 25.1*   HGB 8.2*        Lab Results   Component Value Date/Time     2024 03:19 AM    K 3.8 2024 03:19 AM     2024 03:19 AM    CO2 21 2024 03:19 AM    BUN 64 2024 03:19 AM       Intake/Output:No intake/output data recorded.  No intake/output data recorded.    PT/OT:   Gait:                    Assessment:   Patient is * No surgery found * s/p * No surgery found *    Plan:     1.  Continue PT/OT  2.  Continue established methods of pain control  3.  Wound vac in place. Approximately 125 mL of drainage in cannister, does not

## 2024-04-04 NOTE — PROGRESS NOTES
Hospitalist Progress Note  SHAYNE Ventura - CNP  Answering service: [phone] OR [phone] from in house phone        Date of Service:  2024  NAME:  Leny Barnes  :  1945  MRN:  [id number]      Admission Summary:     Leny Barnes is a 79 y.o. female with a pmhx asthma, atrial fibrillation, past uterine cancer,, HFrEF, PPM, DM II, HTN, dyslipidemia, and FREDERICK on Cpap who presents from [facility] with post surgical wound dehiscence, and infection.  She is s/p T10-11, and L2-S1 laminectomy, and H69-pdoze fusion on 3/18.  She was subsequently discharged to [facility].  Per patient, she had noted bloody drainage from the wound, and was advised to return to the ED per [facility] physician.        Interval history / Subjective:       patient seen and examined on rounds, attests to some shortness of breath, concerned that her diuretics have been held, denies fever chest pain no n/v/d    4/3: Patient feels slightly better, but still somnolent;  Transfused 1 unit of packed red blood cells yesterday for hemoglobin of 6.7;  Wound VAC applied to lumbar incision;      :  Patient with significant lethargy this morning on exam. Hypotensive with BP noted to be 89/33. Hemoglobin 6.7. Blood transfusion ordered. BP improved s/p transfusion. Follow up H/H shows Hgb 8.0.     Significant amount of bleeding from back wound this morning, has slowed down this afternoon.  Surgery team opted to place wound vac over wound today.     Patient with significant nose bleed this afternoon. Currently stopped. Eliquis on hold. Recheck H/H in AM. Would need rhino rocket for bleeding source control if re-starts or continues. Discussed with dayshift nursing staff.     I called and discussed the plan of care with patients daughter, Breonna, who would like to be updated daily. I also asked Dr. Coe to call the patients daughter as well.   tablet 16 g  4 tablet Oral PRN    dextrose bolus 10% 125 mL  125 mL IntraVENous PRN    Or    dextrose bolus 10% 250 mL  250 mL IntraVENous PRN    glucagon injection 1 mg  1 mg SubCUTAneous PRN    dextrose 10 % infusion   IntraVENous Continuous PRN    insulin lispro (HUMALOG) injection vial 0-4 Units  0-4 Units SubCUTAneous TID WC    insulin lispro (HUMALOG) injection vial 0-4 Units  0-4 Units SubCUTAneous Nightly    0.9 % sodium chloride infusion   IntraVENous Continuous     ______________________________________________________________________  EXPECTED LENGTH OF STAY: Unable to retrieve estimated LOS  ACTUAL LENGTH OF STAY:          3        Critical Care Attestation:  This patient is unstable and critically ill. Due to a high probability of clinically significant, life threatening deterioration, the patient required my highest level of preparedness to intervene emergently and I personally spent this critical care time directly and personally managing the patient. This critical care time included obtaining a history; examining the patient; pulse oximetry; ordering and review of studies; arranging urgent treatment with development of a management plan; evaluation of patient's response to treatment; frequent reassessment; and, discussions with other providers and/or family. This critical care time was performed to assess and manage the high probability of imminent, life-threatening deterioration that could result in multi-organ failure and death. It was exclusive of separately billable procedures, treating other patients, and teaching time.      Time Spent:     I personally spent 65 minutes in providing critical care time.    SHAYNE Ventura CNP  4/4/2024  1:01 PM                SHAYNE Ventura CNP

## 2024-04-04 NOTE — PLAN OF CARE
Problem: Physical Therapy - Adult  Goal: By Discharge: Performs mobility at highest level of function for planned discharge setting.  See evaluation for individualized goals.  Description: FUNCTIONAL STATUS PRIOR TO ADMISSION: At baseline patient ambulates with a rollator walker.  Daughter helps with dressing and bathing and other ADLs.    HOME SUPPORT PRIOR TO ADMISSION: The patient lived with daughter and son in law but did not require assistance.    Physical Therapy Goals  Initiated 4/3/2024  1.  Patient will move from supine to sit and sit to supine in bed with moderate assistance within 7 day(s).    2.  Patient will perform sit to stand with moderate assistance within 7 day(s).  3.  Patient will transfer from bed to chair and chair to bed with moderate assistance using the least restrictive device within 7 day(s).  4.  Patient will ambulate with moderate assistance for 5 feet with the least restrictive device within 7 day(s).   5. Patient will verbalize and demonstrate understanding of spinal precautions (No bending, lifting greater than 5 lbs, or twisting; log-roll technique; frequent repositioning as instructed) within 7 days.    Outcome: Progressing   PHYSICAL THERAPY TREATMENT    Patient: Leny Barnes (79 y.o. female)  Date: 4/4/2024  Diagnosis: Surgical wound infection [T81.49XA]  Postoperative infection, unspecified type, initial encounter [T81.40XA] Surgical wound infection      Precautions: Fall Risk                      ASSESSMENT:  Patient continues to benefit from skilled PT services and is slowly progressing towards goals. Patient overall limited by pain, gait instability, generalized weakness and decreased activity tolerance.  Patient is more alert today as compared to yesterday.  Overall rolls with modA x 2 and maxA x 2 to come to sit.  Has fair sitting balance on EOB and needs physical assistance to scoot to get feet on the floor.  Jennifer x 2 to come to stand and take a few steps to the

## 2024-04-04 NOTE — PLAN OF CARE
Problem: Occupational Therapy - Adult  Goal: By Discharge: Performs self-care activities at highest level of function for planned discharge setting.  See evaluation for individualized goals.  Description: FUNCTIONAL STATUS PRIOR TO ADMISSION: Patient was Mod (I) with ADLs, uses AE for LB dressing.     HOME SUPPORT: Pt lives alone with dtr to provide assistance as needed.    Occupational Therapy Goals:  Initiated 4/3/2024    1.  Patient will perform lower body dressing with Moderate Assist using AE PRN within 7 days.  2.  Patient will perform toileting with Moderate Assist using most appropriate DME within 7 days.    3.  Patient will perform toilet transfer at Moderate Assist within 7 days.  4.  Patient will don/doff back brace at Moderate Assist within 7 days.  5.  Patient will verbalize/demonstrate 3/3 back precautions during ADL tasks without cues within 7 days.       Outcome: Progressing   OCCUPATIONAL THERAPY TREATMENT  Patient: Leny Barnes (79 y.o. female)  Date: 4/4/2024  Primary Diagnosis: Surgical wound infection [T81.49XA]  Postoperative infection, unspecified type, initial encounter [T81.40XA]       Precautions: Fall Risk                Chart, occupational therapy assessment, plan of care, and goals were reviewed.    ASSESSMENT  Pt rec'd semi-reclined in bed asleep, agreeable to OT/PT tx once awake. Pt more alert today and demo'd improved BP, activity tolerance, functional mobility, and dec'd pain compared to yesterday's session. Pt completed bed mobility with max A x 2 and bed>chair transfer with min A x 2 using RW. Pt with some SOB during activity, however, O2 saturation >93% on RA throughout session. Pt does not like to wear back brace, but demo's good adherence to spinal precautions. Patient continues to benefit from skilled OT services and is progressing towards goals. Pt left sitting up in chair at end of session with needs met, call light in reach, and chair alarm on.     Continue to

## 2024-04-04 NOTE — PLAN OF CARE
greater than 5 lbs, or twisting; log-roll technique; frequent repositioning as instructed) within 7 days.    4/3/2024 1503 by Missy Toth PT  Outcome: Progressing     Problem: Occupational Therapy - Adult  Goal: By Discharge: Performs self-care activities at highest level of function for planned discharge setting.  See evaluation for individualized goals.  Description: FUNCTIONAL STATUS PRIOR TO ADMISSION: Patient was Mod (I) with ADLs, uses AE for LB dressing.     HOME SUPPORT: Pt lives alone with dtr to provide assistance as needed.    Occupational Therapy Goals:  Initiated 4/3/2024    1.  Patient will perform lower body dressing with Moderate Assist using AE PRN within 7 days.  2.  Patient will perform toileting with Moderate Assist using most appropriate DME within 7 days.    3.  Patient will perform toilet transfer at Moderate Assist within 7 days.  4.  Patient will don/doff back brace at Moderate Assist within 7 days.  5.  Patient will verbalize/demonstrate 3/3 back precautions during ADL tasks without cues within 7 days.       4/3/2024 1622 by Joy Freitas, OT  Outcome: Progressing

## 2024-04-05 VITALS
BODY MASS INDEX: 42.52 KG/M2 | DIASTOLIC BLOOD PRESSURE: 55 MMHG | WEIGHT: 264.55 LBS | TEMPERATURE: 98.4 F | HEIGHT: 66 IN | HEART RATE: 75 BPM | SYSTOLIC BLOOD PRESSURE: 125 MMHG | RESPIRATION RATE: 20 BRPM | OXYGEN SATURATION: 95 %

## 2024-04-05 LAB
GLUCOSE BLD STRIP.AUTO-MCNC: 167 MG/DL (ref 65–117)
GLUCOSE BLD STRIP.AUTO-MCNC: 184 MG/DL (ref 65–117)
SERVICE CMNT-IMP: ABNORMAL
SERVICE CMNT-IMP: ABNORMAL

## 2024-04-05 PROCEDURE — 6370000000 HC RX 637 (ALT 250 FOR IP): Performed by: FAMILY MEDICINE

## 2024-04-05 PROCEDURE — 6360000002 HC RX W HCPCS: Performed by: FAMILY MEDICINE

## 2024-04-05 PROCEDURE — 6370000000 HC RX 637 (ALT 250 FOR IP): Performed by: INTERNAL MEDICINE

## 2024-04-05 PROCEDURE — 94640 AIRWAY INHALATION TREATMENT: CPT

## 2024-04-05 PROCEDURE — 6370000000 HC RX 637 (ALT 250 FOR IP): Performed by: NURSE PRACTITIONER

## 2024-04-05 PROCEDURE — 82962 GLUCOSE BLOOD TEST: CPT

## 2024-04-05 RX ORDER — GABAPENTIN 300 MG/1
600 CAPSULE ORAL EVERY EVENING
Qty: 180 CAPSULE | Refills: 3 | Status: SHIPPED | OUTPATIENT
Start: 2024-04-05 | End: 2025-03-31

## 2024-04-05 RX ORDER — BUMETANIDE 1 MG/1
1 TABLET ORAL DAILY
Qty: 30 TABLET | Refills: 3 | Status: SHIPPED | OUTPATIENT
Start: 2024-04-06 | End: 2024-04-05

## 2024-04-05 RX ORDER — BUMETANIDE 1 MG/1
2 TABLET ORAL DAILY
Qty: 30 TABLET | Refills: 3 | Status: SHIPPED | OUTPATIENT
Start: 2024-04-06

## 2024-04-05 RX ADMIN — ACETAMINOPHEN 1000 MG: 500 TABLET ORAL at 14:24

## 2024-04-05 RX ADMIN — IPRATROPIUM BROMIDE 0.5 MG: 0.5 SOLUTION RESPIRATORY (INHALATION) at 09:13

## 2024-04-05 RX ADMIN — FERROUS SULFATE TAB 325 MG (65 MG ELEMENTAL FE) 325 MG: 325 (65 FE) TAB at 07:12

## 2024-04-05 RX ADMIN — ACETAMINOPHEN 1000 MG: 500 TABLET ORAL at 11:00

## 2024-04-05 RX ADMIN — NYSTATIN 500000 UNITS: 100000 SUSPENSION ORAL at 14:24

## 2024-04-05 RX ADMIN — BUPROPION HYDROCHLORIDE 150 MG: 150 TABLET, EXTENDED RELEASE ORAL at 11:01

## 2024-04-05 RX ADMIN — ALLOPURINOL 100 MG: 100 TABLET ORAL at 11:01

## 2024-04-05 RX ADMIN — BUMETANIDE 1 MG: 1 TABLET ORAL at 11:01

## 2024-04-05 RX ADMIN — AMLODIPINE BESYLATE 10 MG: 5 TABLET ORAL at 11:01

## 2024-04-05 RX ADMIN — NYSTATIN 500000 UNITS: 100000 SUSPENSION ORAL at 11:02

## 2024-04-05 RX ADMIN — ARFORMOTEROL TARTRATE: 15 SOLUTION RESPIRATORY (INHALATION) at 09:13

## 2024-04-05 RX ADMIN — ACETAMINOPHEN 1000 MG: 500 TABLET ORAL at 04:55

## 2024-04-05 ASSESSMENT — PAIN SCALES - GENERAL
PAINLEVEL_OUTOF10: 0

## 2024-04-05 NOTE — DISCHARGE SUMMARY
Discharge Summary       PATIENT ID: Leny Barnes  MRN: [id number]   YOB: 1945    DATE OF ADMISSION: 4/1/2024  2:49 PM    DATE OF DISCHARGE: 4/5/24   PRIMARY CARE PROVIDER: ALINE Rose MD     ATTENDING PHYSICIAN: Dr. Angel Perez  DISCHARGING PROVIDER: SHAYNE Ventura CNP    To contact this individual call [phone] and ask the  to page.  If unavailable ask to be transferred the Adult Hospitalist Department.    CONSULTATIONS: IP CONSULT TO ORTHOPEDIC SURGERY  IP WOUND CARE NURSE CONSULT TO EVAL  IP CONSULT TO PHARMACY    PROCEDURES/SURGERIES: * No surgery found *     ADMITTING DIAGNOSES & HOSPITAL COURSE:       Leny Barnes is a 79 y.o. female with a pmhx asthma, atrial fibrillation, past uterine cancer,, HFrEF, PPM, DM II, HTN, dyslipidemia, and FREDERICK on Cpap who presents from [facility] with post surgical wound dehiscence, and infection.  She is s/p T10-11, and L2-S1 laminectomy, and I47-oikfw fusion on 3/18.  She was subsequently discharged to [facility].  Per patient, she had noted bloody drainage from the wound, and was advised to return to the ED per [facility] physician.     /4  patient seen and examined on rounds, attests to some shortness of breath, concerned that her diuretics have been held, denies fever chest pain no n/v/d     4/3: Patient feels slightly better, but still somnolent;  Transfused 1 unit of packed red blood cells yesterday for hemoglobin of 6.7;  Wound VAC applied to lumbar incision;        4/2:  Patient with significant lethargy this morning on exam. Hypotensive with BP noted to be 89/33. Hemoglobin 6.7. Blood transfusion ordered. BP improved s/p transfusion. Follow up H/H shows Hgb 8.0.      Significant amount of bleeding from back wound this morning, has slowed down this afternoon.  Surgery team opted to place wound vac over wound today.      Patient with significant nose bleed this afternoon. Currently stopped. Eliquis on hold.

## 2024-04-05 NOTE — PROGRESS NOTES
Orthopedic Spine Progress Note  Post Op day: * No surgery found *    2024 8:00 AM   Admit Date: 2024  Procedure: * No surgery found *    Subjective:     Leny Barnes c/o moderate back pain this AM.   Tolerating diet. + flatus/+voiding. Denies HA, N/V, CP, SOB.    Objective:          Physical Exam:  General:  Alert and oriented. No acute distress.   Heart:  Respirations unlabored.   Abdomen:   Extremities: Soft, non-tender.  No evidence of cyanosis. Pulses palpable in both upper and lower extremities.       Neurologic:  Musculoskeletal:  No new motor deficits. Neurovascular exam within normal limits.  Sensation stable.  Motor: unchanged C5-T1 and L2-S1.   Olivia's sign negative in bilateral lower extremities.  Calves soft, nontender upon palpation.  Moves both upper and lower extremities.   Incision: clean, dry, and intact.  Wound vac in place. 300 ml blood in canister. No significant erythema or swelling.  No active drainage noted.        Vital Signs:    Blood pressure (!) 135/50, pulse 70, temperature 98.2 °F (36.8 °C), temperature source Oral, resp. rate 16, height 1.676 m (5' 6\"), weight 120 kg (264 lb 8.8 oz), SpO2 96 %.  Temp (24hrs), Av.2 °F (36.8 °C), Min:98.2 °F (36.8 °C), Max:98.2 °F (36.8 °C)      LAB:    Recent Labs     24  0319   HCT 25.1*   HGB 8.2*        Lab Results   Component Value Date/Time     2024 03:19 AM    K 3.8 2024 03:19 AM     2024 03:19 AM    CO2 21 2024 03:19 AM    BUN 64 2024 03:19 AM       Intake/Output:  No intake/output data recorded.  1901 -  0700  In: -   Out: 1000 [Urine:1000]    PT/OT:   Gait:                    Assessment:     s/p T10-11, and L2-S1 laminectomy, and N85-hgzbh fusion on 3/18 with post op wound dehiscence/drainage     Plan:     1.  Continue PT/OT - Mobilize as tolerated, spine precautions.   2.  Continue established methods of pain control.  3.  VTE Prophylaxes - TEDS &/or SCDs,

## 2024-04-05 NOTE — PROGRESS NOTES
Pt exit via stretcher with 02 LT.    Shift summary:     Patient rested in bed with call light within reach. Up with use of scooter as needed. Voiding adequate amounts of radha urine. IV fluids dc'd. Pt. Eating well. Pain managed. Dressing to Right Lower extremity changed per MD orders. Needs Idoform packing next change. ANDER drain intact and draining sanguinous output (10 ml) throughout shift. Pt. Refused all Humalog coverage stating her BS drops very low easily when given fast acting, states her high BS are mainly due to the antibiotics.      Patient Vitals for the past 12 hrs:   Temp Pulse Resp BP SpO2   10/15/17 2027 98.2 °F (36.8 °C) 75 20 153/86 100 %   10/15/17 1630 98.9 °F (37.2 °C) (!) 103 20 (!) 139/100 91 %   10/15/17 1138 97.7 °F (36.5 °C) 76 20 138/69 90 %   10/15/17 1016 - - - - 98 %

## 2024-04-05 NOTE — PROGRESS NOTES
Pt will be discharge to a SNF- IV and telemetry dc. Wound vac disconnected and a dry dressing place over the area that is draining. Will be calling report and asking from WC nurse to place a wound vac. EMS is here. Pt's belongings were pack and ready for transport. Scripts given with other documents to be tacking by EMS to the facility for her admission.

## 2024-04-05 NOTE — DISCHARGE INSTRUCTIONS
Discharge Instructions       PATIENT ID: Leny Barnes  MRN: [id number]   YOB: 1945    DATE OF ADMISSION: 4/1/2024   DATE OF DISCHARGE: 4/5/2024    PRIMARY CARE PROVIDER: ALINE Rose     ATTENDING PHYSICIAN: Angel Perez MD   DISCHARGING PROVIDER: SHAYNE Ventura CNP    To contact this individual call [phone] and ask the  to page.   If unavailable ask to be transferred the Adult Hospitalist Department.    DISCHARGE DIAGNOSES post op wound dehiscence    CONSULTATIONS: [unfilled]    PROCEDURES/SURGERIES: * No surgery found *    PENDING TEST RESULTS:   At the time of discharge the following test results are still pending: none    [facility]MENTS[facility]   [email or web address]     ADDITIONAL CARE RECOMMENDATIONS: inpatient rehab     DIET: regular diet  Oral Nutritional Supplements: Ensure High Protwice a day    ACTIVITY: per spine surgery team    WOUND CARE: keep clean and dry    EQUIPMENT needed: none      DISCHARGE MEDICATIONS:   See Medication Reconciliation Form    It is important that you take the medication exactly as they are prescribed.   Keep your medication in the bottles provided by the pharmacist and keep a list of the medication names, dosages, and times to be taken in your wallet.   Do not take other medications without consulting your doctor.       NOTIFY YOUR PHYSICIAN FOR ANY OF THE FOLLOWING:   Fever over 101 degrees for 24 hours.   Chest pain, shortness of breath, fever, chills, nausea, vomiting, diarrhea, change in mentation, falling, weakness, bleeding. Severe pain or pain not relieved by medications.  Or, any other signs or symptoms that you may have questions about.      DISPOSITION:    Home With:   OT  PT  HH  RN      x SNF/Inpatient Rehab/LTAC    Independent/assisted living    Hospice    Other:     CDMP Checked:   Yes y     PROBLEM LIST Updated:  Yes y       Signed:   SHAYNE Ventura CNP  4/5/2024  12:09 PM     After Hospital  morning and take them off at night.    -They are used to increase your circulation and prevent blood clots from forming in your legs  -T.E.D. stockings can be machine washed, temperature not to exceed 160° F (71°C) and machine dried for 15 to 20 minutes, temperature not to exceed 250° F (121°C).    Pain management  -Take pain medication as prescribed; decrease the amount you use as your pain lessens  -DO not wait until you are in extreme pain to take your medication.  -Avoid alcoholic beverages while taking pain medication    Pain Medication Safety  DO:  -Read the Medication Guide   -Take your medicine exactly as prescribed   -Store your medicine away from children and in a safe place   -Flush unused medicine down the toilet   -Call your healthcare provider for medical advice about side effects. You may report side effects to FDA at 2-230-FDA-5249.   -Please be aware that many medications contain Tylenol.  We do not want you to over medicate so please read the information below as a guide.  Do not take more than 4 Grams of Tylenol in a 24 hour period.  (There are 1000 milligrams in one Gram)                                                                                                                                                                                                                                                Percocet contains 325 mg of Tylenol per tablet (do not take more than 12 tablets in 24 hours)  Lortab contains 500 mg of Tylenol per tablet (do not take more than 8 tablets in 24 hours)  Norco contains 325 mg of Tylenol per tablet (do not take more than 12 tablets in 24 hours).  DO NOT:  -Do not give your medicine to others   -Do not take medicine unless it was prescribed for you   -Do not stop taking your medicine without talking to your healthcare provider   -Do not break, chew, crush, dissolve, or inject your medicine. If you cannot swallow your medicine whole, talk to your healthcare

## 2024-04-06 LAB
BACTERIA SPEC CULT: NORMAL
BACTERIA SPEC CULT: NORMAL
SERVICE CMNT-IMP: NORMAL
SERVICE CMNT-IMP: NORMAL

## 2024-04-12 NOTE — PROGRESS NOTES
Physician Progress Note      PATIENT:               MIYA SEALS  CSN #:                  [id number]  :                       1945  ADMIT DATE:       2024 2:49 PM  DISCH DATE:        2024 4:03 PM  RESPONDING  PROVIDER #:        GABRIELLE ROBERTS          QUERY TEXT:    Pt admitted with surgical site infection. Pt noted to have significant   lethargy documented in hospitalist progress note on . The patient was   transfused and given IVF with subsequent documentation of patient feels better   but still somnolent and has more energy. After study, can the significant   lethargy be further specified as:    The medical record reflects the following:    Risk Factors: Hypotension, Anemia    Clinical Indicators:  Hosp :  Patient with significant lethargy this morning on exam. Hypotensive with BP   noted to be 89/33. Hemoglobin 6.7. Blood transfusion ordered. BP improved s/p   transfusion.    Hosp 4/3: Patient feels slightly better, but still somnolent. alert x 3, awake    Treatment: IVF, Transfusion      Thank you,  BOO Felix,RN  Clinical Documentation  [email or web address]  Ph: [phone]  or via [place]  Options provided:  -- Metabolic Encephalopathy due to ABLA  -- Acute Lethargy due to ABLA:This patient has Acute Lethargy due to ABLA.  -- Other - I will add my own diagnosis  -- Disagree - Not applicable / Not valid  -- Disagree - Clinically unable to determine / Unknown  -- Refer to Clinical Documentation Reviewer    PROVIDER RESPONSE TEXT:    Acute Lethargy due to ABLA:This patient has Acute Lethargy due to ABLA.    Query created by: Leia Bernardo on 4/10/2024 8:20 AM      Electronically signed by:  GABRIELLE ROBERTS 2024 7:24 AM

## 2024-04-15 ENCOUNTER — APPOINTMENT (OUTPATIENT)
Facility: HOSPITAL | Age: 79
End: 2024-04-15
Payer: MEDICARE

## 2024-04-15 ENCOUNTER — HOSPITAL ENCOUNTER (INPATIENT)
Facility: HOSPITAL | Age: 79
LOS: 18 days | Discharge: NURSING, REHAB OR LONG-TERM CARE | End: 2024-05-03
Attending: EMERGENCY MEDICINE | Admitting: HOSPITALIST
Payer: MEDICARE

## 2024-04-15 DIAGNOSIS — I50.32 CHRONIC DIASTOLIC CONGESTIVE HEART FAILURE (HCC): ICD-10-CM

## 2024-04-15 DIAGNOSIS — Z79.4 TYPE 2 DIABETES MELLITUS WITH DIABETIC NEUROPATHY, WITH LONG-TERM CURRENT USE OF INSULIN (HCC): Primary | ICD-10-CM

## 2024-04-15 DIAGNOSIS — T14.8XXA WOUND INFECTION: ICD-10-CM

## 2024-04-15 DIAGNOSIS — E11.40 TYPE 2 DIABETES MELLITUS WITH DIABETIC NEUROPATHY, WITH LONG-TERM CURRENT USE OF INSULIN (HCC): Primary | ICD-10-CM

## 2024-04-15 DIAGNOSIS — L08.9 WOUND INFECTION: ICD-10-CM

## 2024-04-15 PROBLEM — T81.30XA WOUND DEHISCENCE: Status: ACTIVE | Noted: 2024-04-15

## 2024-04-15 LAB
ALBUMIN SERPL-MCNC: 1.8 G/DL (ref 3.5–5)
ALBUMIN/GLOB SERPL: 0.4 (ref 1.1–2.2)
ALP SERPL-CCNC: 137 U/L (ref 45–117)
ALT SERPL-CCNC: 9 U/L (ref 12–78)
ANION GAP SERPL CALC-SCNC: 9 MMOL/L (ref 5–15)
APPEARANCE UR: CLEAR
AST SERPL-CCNC: 19 U/L (ref 15–37)
BACTERIA URNS QL MICRO: NEGATIVE /HPF
BASOPHILS # BLD: 0.2 K/UL (ref 0–0.1)
BASOPHILS NFR BLD: 1 % (ref 0–1)
BILIRUB SERPL-MCNC: 0.6 MG/DL (ref 0.2–1)
BILIRUB UR QL: NEGATIVE
BUN SERPL-MCNC: 38 MG/DL (ref 6–20)
BUN/CREAT SERPL: 26 (ref 12–20)
CALCIUM SERPL-MCNC: 8.3 MG/DL (ref 8.5–10.1)
CHLORIDE SERPL-SCNC: 105 MMOL/L (ref 97–108)
CO2 SERPL-SCNC: 19 MMOL/L (ref 21–32)
COLOR UR: ABNORMAL
COMMENT:: NORMAL
COMMENT:: NORMAL
CREAT SERPL-MCNC: 1.44 MG/DL (ref 0.55–1.02)
CRP SERPL-MCNC: 25.4 MG/DL (ref 0–0.3)
DIFFERENTIAL METHOD BLD: ABNORMAL
EKG ATRIAL RATE: 468 BPM
EKG DIAGNOSIS: NORMAL
EKG Q-T INTERVAL: 450 MS
EKG QRS DURATION: 158 MS
EKG QTC CALCULATION (BAZETT): 486 MS
EKG R AXIS: -27 DEGREES
EKG T AXIS: 211 DEGREES
EKG VENTRICULAR RATE: 70 BPM
EOSINOPHIL # BLD: 0 K/UL (ref 0–0.4)
EOSINOPHIL NFR BLD: 0 % (ref 0–7)
EPITH CASTS URNS QL MICRO: ABNORMAL /LPF
ERYTHROCYTE [DISTWIDTH] IN BLOOD BY AUTOMATED COUNT: 16.7 % (ref 11.5–14.5)
ERYTHROCYTE [SEDIMENTATION RATE] IN BLOOD: 107 MM/HR (ref 0–30)
FLUAV RNA SPEC QL NAA+PROBE: NOT DETECTED
FLUBV RNA SPEC QL NAA+PROBE: NOT DETECTED
GLOBULIN SER CALC-MCNC: 4.6 G/DL (ref 2–4)
GLUCOSE BLD STRIP.AUTO-MCNC: 187 MG/DL (ref 65–117)
GLUCOSE BLD STRIP.AUTO-MCNC: 258 MG/DL (ref 65–117)
GLUCOSE SERPL-MCNC: 179 MG/DL (ref 65–100)
GLUCOSE UR STRIP.AUTO-MCNC: NEGATIVE MG/DL
HCT VFR BLD AUTO: 23.6 % (ref 35–47)
HGB BLD-MCNC: 7.4 G/DL (ref 11.5–16)
HGB UR QL STRIP: NEGATIVE
HYALINE CASTS URNS QL MICRO: ABNORMAL /LPF (ref 0–5)
IMM GRANULOCYTES # BLD AUTO: 0.2 K/UL (ref 0–0.04)
IMM GRANULOCYTES NFR BLD AUTO: 1 % (ref 0–0.5)
KETONES UR QL STRIP.AUTO: NEGATIVE MG/DL
LACTATE SERPL-SCNC: 1 MMOL/L (ref 0.4–2)
LEUKOCYTE ESTERASE UR QL STRIP.AUTO: ABNORMAL
LYMPHOCYTES # BLD: 0.5 K/UL (ref 0.8–3.5)
LYMPHOCYTES NFR BLD: 3 % (ref 12–49)
MCH RBC QN AUTO: 27.7 PG (ref 26–34)
MCHC RBC AUTO-ENTMCNC: 31.4 G/DL (ref 30–36.5)
MCV RBC AUTO: 88.4 FL (ref 80–99)
MONOCYTES # BLD: 0.9 K/UL (ref 0–1)
MONOCYTES NFR BLD: 5 % (ref 5–13)
NEUTS SEG # BLD: 15.4 K/UL (ref 1.8–8)
NEUTS SEG NFR BLD: 90 % (ref 32–75)
NITRITE UR QL STRIP.AUTO: NEGATIVE
NRBC # BLD: 0.02 K/UL (ref 0–0.01)
NRBC BLD-RTO: 0.1 PER 100 WBC
PH UR STRIP: 5 (ref 5–8)
PLATELET # BLD AUTO: 271 K/UL (ref 150–400)
PMV BLD AUTO: 9.3 FL (ref 8.9–12.9)
POTASSIUM SERPL-SCNC: 4.2 MMOL/L (ref 3.5–5.1)
PROT SERPL-MCNC: 6.4 G/DL (ref 6.4–8.2)
PROT UR STRIP-MCNC: NEGATIVE MG/DL
RBC # BLD AUTO: 2.67 M/UL (ref 3.8–5.2)
RBC #/AREA URNS HPF: ABNORMAL /HPF (ref 0–5)
RBC MORPH BLD: ABNORMAL
SARS-COV-2 RNA RESP QL NAA+PROBE: NOT DETECTED
SERVICE CMNT-IMP: ABNORMAL
SERVICE CMNT-IMP: ABNORMAL
SODIUM SERPL-SCNC: 133 MMOL/L (ref 136–145)
SP GR UR REFRACTOMETRY: 1.02 (ref 1–1.03)
SPECIMEN HOLD: NORMAL
SPECIMEN HOLD: NORMAL
URINE CULTURE IF INDICATED: ABNORMAL
UROBILINOGEN UR QL STRIP.AUTO: 0.2 EU/DL (ref 0.2–1)
VANCOMYCIN SERPL-MCNC: <0.8 UG/ML
WBC # BLD AUTO: 17.2 K/UL (ref 3.6–11)
WBC URNS QL MICRO: ABNORMAL /HPF (ref 0–4)

## 2024-04-15 PROCEDURE — 81001 URINALYSIS AUTO W/SCOPE: CPT

## 2024-04-15 PROCEDURE — 83605 ASSAY OF LACTIC ACID: CPT

## 2024-04-15 PROCEDURE — 71045 X-RAY EXAM CHEST 1 VIEW: CPT

## 2024-04-15 PROCEDURE — 86140 C-REACTIVE PROTEIN: CPT

## 2024-04-15 PROCEDURE — 99285 EMERGENCY DEPT VISIT HI MDM: CPT

## 2024-04-15 PROCEDURE — 87077 CULTURE AEROBIC IDENTIFY: CPT

## 2024-04-15 PROCEDURE — 87154 CUL TYP ID BLD PTHGN 6+ TRGT: CPT

## 2024-04-15 PROCEDURE — 6360000002 HC RX W HCPCS: Performed by: EMERGENCY MEDICINE

## 2024-04-15 PROCEDURE — 93005 ELECTROCARDIOGRAM TRACING: CPT | Performed by: EMERGENCY MEDICINE

## 2024-04-15 PROCEDURE — 82962 GLUCOSE BLOOD TEST: CPT

## 2024-04-15 PROCEDURE — 2580000003 HC RX 258: Performed by: EMERGENCY MEDICINE

## 2024-04-15 PROCEDURE — 85025 COMPLETE CBC W/AUTO DIFF WBC: CPT

## 2024-04-15 PROCEDURE — 87040 BLOOD CULTURE FOR BACTERIA: CPT

## 2024-04-15 PROCEDURE — 87186 SC STD MICRODIL/AGAR DIL: CPT

## 2024-04-15 PROCEDURE — 80053 COMPREHEN METABOLIC PANEL: CPT

## 2024-04-15 PROCEDURE — 85652 RBC SED RATE AUTOMATED: CPT

## 2024-04-15 PROCEDURE — 80202 ASSAY OF VANCOMYCIN: CPT

## 2024-04-15 PROCEDURE — 2580000003 HC RX 258: Performed by: HOSPITALIST

## 2024-04-15 PROCEDURE — 6370000000 HC RX 637 (ALT 250 FOR IP): Performed by: EMERGENCY MEDICINE

## 2024-04-15 PROCEDURE — 1100000000 HC RM PRIVATE

## 2024-04-15 PROCEDURE — 6370000000 HC RX 637 (ALT 250 FOR IP): Performed by: HOSPITALIST

## 2024-04-15 PROCEDURE — 87636 SARSCOV2 & INF A&B AMP PRB: CPT

## 2024-04-15 PROCEDURE — 36415 COLL VENOUS BLD VENIPUNCTURE: CPT

## 2024-04-15 RX ORDER — GABAPENTIN 300 MG/1
600 CAPSULE ORAL EVERY EVENING
Status: DISCONTINUED | OUTPATIENT
Start: 2024-04-15 | End: 2024-04-23

## 2024-04-15 RX ORDER — POLYETHYLENE GLYCOL 3350 17 G/17G
17 POWDER, FOR SOLUTION ORAL DAILY PRN
Status: DISCONTINUED | OUTPATIENT
Start: 2024-04-15 | End: 2024-05-03 | Stop reason: HOSPADM

## 2024-04-15 RX ORDER — UMECLIDINIUM 62.5 UG/1
1 AEROSOL, POWDER ORAL DAILY
COMMUNITY

## 2024-04-15 RX ORDER — ONDANSETRON 4 MG/1
4 TABLET, ORALLY DISINTEGRATING ORAL EVERY 8 HOURS PRN
Status: DISCONTINUED | OUTPATIENT
Start: 2024-04-15 | End: 2024-05-03 | Stop reason: HOSPADM

## 2024-04-15 RX ORDER — SODIUM CHLORIDE 9 MG/ML
INJECTION, SOLUTION INTRAVENOUS CONTINUOUS
Status: DISCONTINUED | OUTPATIENT
Start: 2024-04-15 | End: 2024-04-16

## 2024-04-15 RX ORDER — POTASSIUM CHLORIDE 750 MG/1
40 TABLET, FILM COATED, EXTENDED RELEASE ORAL PRN
Status: DISCONTINUED | OUTPATIENT
Start: 2024-04-15 | End: 2024-05-03 | Stop reason: HOSPADM

## 2024-04-15 RX ORDER — AMLODIPINE BESYLATE 5 MG/1
5 TABLET ORAL DAILY
COMMUNITY

## 2024-04-15 RX ORDER — AMLODIPINE BESYLATE 5 MG/1
10 TABLET ORAL DAILY
Status: DISCONTINUED | OUTPATIENT
Start: 2024-04-15 | End: 2024-04-15 | Stop reason: DRUGHIGH

## 2024-04-15 RX ORDER — ALLOPURINOL 100 MG/1
100 TABLET ORAL ONCE
Status: COMPLETED | OUTPATIENT
Start: 2024-04-15 | End: 2024-04-15

## 2024-04-15 RX ORDER — ALLOPURINOL 100 MG/1
100 TABLET ORAL DAILY
Status: DISCONTINUED | OUTPATIENT
Start: 2024-04-15 | End: 2024-05-03 | Stop reason: HOSPADM

## 2024-04-15 RX ORDER — INSULIN LISPRO 100 [IU]/ML
0-8 INJECTION, SOLUTION INTRAVENOUS; SUBCUTANEOUS
Status: DISCONTINUED | OUTPATIENT
Start: 2024-04-15 | End: 2024-05-03 | Stop reason: HOSPADM

## 2024-04-15 RX ORDER — VALACYCLOVIR HYDROCHLORIDE 500 MG/1
1000 TABLET, FILM COATED ORAL 2 TIMES DAILY
Status: ON HOLD | COMMUNITY
Start: 2024-04-11 | End: 2024-05-02

## 2024-04-15 RX ORDER — CLOPIDOGREL BISULFATE 75 MG/1
75 TABLET ORAL DAILY
Status: ON HOLD | COMMUNITY
End: 2024-05-02 | Stop reason: HOSPADM

## 2024-04-15 RX ORDER — ACETAMINOPHEN 325 MG/1
650 TABLET ORAL ONCE
Status: COMPLETED | OUTPATIENT
Start: 2024-04-15 | End: 2024-04-15

## 2024-04-15 RX ORDER — ONDANSETRON 2 MG/ML
4 INJECTION INTRAMUSCULAR; INTRAVENOUS EVERY 6 HOURS PRN
Status: DISCONTINUED | OUTPATIENT
Start: 2024-04-15 | End: 2024-05-03 | Stop reason: HOSPADM

## 2024-04-15 RX ORDER — MAGNESIUM SULFATE IN WATER 40 MG/ML
2000 INJECTION, SOLUTION INTRAVENOUS PRN
Status: DISCONTINUED | OUTPATIENT
Start: 2024-04-15 | End: 2024-05-03 | Stop reason: HOSPADM

## 2024-04-15 RX ORDER — POTASSIUM CHLORIDE 7.45 MG/ML
10 INJECTION INTRAVENOUS PRN
Status: DISCONTINUED | OUTPATIENT
Start: 2024-04-15 | End: 2024-05-03 | Stop reason: HOSPADM

## 2024-04-15 RX ORDER — BUMETANIDE 1 MG/1
2 TABLET ORAL DAILY
Status: DISCONTINUED | OUTPATIENT
Start: 2024-04-16 | End: 2024-04-16

## 2024-04-15 RX ORDER — ALLOPURINOL 100 MG/1
100 TABLET ORAL DAILY
COMMUNITY

## 2024-04-15 RX ORDER — BUPROPION HYDROCHLORIDE 150 MG/1
150 TABLET, EXTENDED RELEASE ORAL DAILY
Status: DISCONTINUED | OUTPATIENT
Start: 2024-04-15 | End: 2024-05-03 | Stop reason: HOSPADM

## 2024-04-15 RX ORDER — INSULIN LISPRO 100 [IU]/ML
0-4 INJECTION, SOLUTION INTRAVENOUS; SUBCUTANEOUS NIGHTLY
Status: DISCONTINUED | OUTPATIENT
Start: 2024-04-15 | End: 2024-05-03 | Stop reason: HOSPADM

## 2024-04-15 RX ORDER — SODIUM CHLORIDE 0.9 % (FLUSH) 0.9 %
5-40 SYRINGE (ML) INJECTION EVERY 12 HOURS SCHEDULED
Status: DISCONTINUED | OUTPATIENT
Start: 2024-04-15 | End: 2024-05-03 | Stop reason: HOSPADM

## 2024-04-15 RX ORDER — FLUTICASONE FUROATE AND VILANTEROL 100; 25 UG/1; UG/1
1 POWDER RESPIRATORY (INHALATION) DAILY
COMMUNITY

## 2024-04-15 RX ORDER — HYDROXYZINE HYDROCHLORIDE 10 MG/1
10 TABLET, FILM COATED ORAL EVERY 6 HOURS PRN
Status: DISCONTINUED | OUTPATIENT
Start: 2024-04-15 | End: 2024-05-03 | Stop reason: HOSPADM

## 2024-04-15 RX ORDER — ENOXAPARIN SODIUM 100 MG/ML
30 INJECTION SUBCUTANEOUS 2 TIMES DAILY
Status: DISCONTINUED | OUTPATIENT
Start: 2024-04-15 | End: 2024-04-15

## 2024-04-15 RX ORDER — ACETAMINOPHEN 325 MG/1
650 TABLET ORAL EVERY 6 HOURS PRN
Status: DISCONTINUED | OUTPATIENT
Start: 2024-04-15 | End: 2024-05-03 | Stop reason: HOSPADM

## 2024-04-15 RX ORDER — 0.9 % SODIUM CHLORIDE 0.9 %
1000 INTRAVENOUS SOLUTION INTRAVENOUS ONCE
Status: COMPLETED | OUTPATIENT
Start: 2024-04-15 | End: 2024-04-15

## 2024-04-15 RX ORDER — SODIUM CHLORIDE 9 MG/ML
INJECTION, SOLUTION INTRAVENOUS PRN
Status: DISCONTINUED | OUTPATIENT
Start: 2024-04-15 | End: 2024-05-03 | Stop reason: HOSPADM

## 2024-04-15 RX ORDER — FERROUS SULFATE 325(65) MG
325 TABLET ORAL
Status: DISCONTINUED | OUTPATIENT
Start: 2024-04-16 | End: 2024-05-03 | Stop reason: HOSPADM

## 2024-04-15 RX ORDER — AMLODIPINE BESYLATE 5 MG/1
5 TABLET ORAL DAILY
Status: DISCONTINUED | OUTPATIENT
Start: 2024-04-16 | End: 2024-04-25

## 2024-04-15 RX ORDER — ACETAMINOPHEN 500 MG
1000 TABLET ORAL EVERY 6 HOURS
Status: DISCONTINUED | OUTPATIENT
Start: 2024-04-15 | End: 2024-05-03 | Stop reason: HOSPADM

## 2024-04-15 RX ORDER — SODIUM CHLORIDE 0.9 % (FLUSH) 0.9 %
5-40 SYRINGE (ML) INJECTION PRN
Status: DISCONTINUED | OUTPATIENT
Start: 2024-04-15 | End: 2024-05-03 | Stop reason: HOSPADM

## 2024-04-15 RX ORDER — BUMETANIDE 1 MG/1
2 TABLET ORAL ONCE
Status: COMPLETED | OUTPATIENT
Start: 2024-04-15 | End: 2024-04-15

## 2024-04-15 RX ORDER — ACETAMINOPHEN 650 MG/1
650 SUPPOSITORY RECTAL EVERY 6 HOURS PRN
Status: DISCONTINUED | OUTPATIENT
Start: 2024-04-15 | End: 2024-05-03 | Stop reason: HOSPADM

## 2024-04-15 RX ORDER — AMOXICILLIN AND CLAVULANATE POTASSIUM 875; 125 MG/1; MG/1
1 TABLET, FILM COATED ORAL 2 TIMES DAILY
Status: ON HOLD | COMMUNITY
End: 2024-05-02 | Stop reason: HOSPADM

## 2024-04-15 RX ADMIN — ACETAMINOPHEN 1000 MG: 325 TABLET ORAL at 22:21

## 2024-04-15 RX ADMIN — ALLOPURINOL 100 MG: 100 TABLET ORAL at 22:21

## 2024-04-15 RX ADMIN — GABAPENTIN 600 MG: 300 CAPSULE ORAL at 19:31

## 2024-04-15 RX ADMIN — BUPROPION HYDROCHLORIDE 150 MG: 150 TABLET, EXTENDED RELEASE ORAL at 22:43

## 2024-04-15 RX ADMIN — INSULIN HUMAN 15 UNITS: 100 INJECTION, SUSPENSION SUBCUTANEOUS at 20:50

## 2024-04-15 RX ADMIN — ACETAMINOPHEN 650 MG: 325 TABLET ORAL at 16:45

## 2024-04-15 RX ADMIN — CEFEPIME 2000 MG: 2 INJECTION, POWDER, FOR SOLUTION INTRAVENOUS at 17:58

## 2024-04-15 RX ADMIN — SODIUM CHLORIDE, PRESERVATIVE FREE 10 ML: 5 INJECTION INTRAVENOUS at 19:41

## 2024-04-15 RX ADMIN — SERTRALINE HYDROCHLORIDE 100 MG: 50 TABLET ORAL at 22:21

## 2024-04-15 RX ADMIN — APIXABAN 5 MG: 5 TABLET, FILM COATED ORAL at 20:50

## 2024-04-15 RX ADMIN — SODIUM CHLORIDE 1000 ML: 9 INJECTION, SOLUTION INTRAVENOUS at 16:57

## 2024-04-15 RX ADMIN — INSULIN LISPRO 4 UNITS: 100 INJECTION, SOLUTION INTRAVENOUS; SUBCUTANEOUS at 19:39

## 2024-04-15 RX ADMIN — VANCOMYCIN HYDROCHLORIDE 1750 MG: 10 INJECTION, POWDER, LYOPHILIZED, FOR SOLUTION INTRAVENOUS at 19:38

## 2024-04-15 RX ADMIN — BUMETANIDE 2 MG: 1 TABLET ORAL at 22:21

## 2024-04-15 RX ADMIN — SODIUM CHLORIDE: 9 INJECTION, SOLUTION INTRAVENOUS at 19:35

## 2024-04-15 ASSESSMENT — PAIN - FUNCTIONAL ASSESSMENT
PAIN_FUNCTIONAL_ASSESSMENT: 0-10
PAIN_FUNCTIONAL_ASSESSMENT: PREVENTS OR INTERFERES WITH ALL ACTIVE AND SOME PASSIVE ACTIVITIES

## 2024-04-15 ASSESSMENT — PAIN DESCRIPTION - DESCRIPTORS: DESCRIPTORS: BURNING;SHARP;SORE

## 2024-04-15 ASSESSMENT — PAIN DESCRIPTION - ONSET: ONSET: ON-GOING

## 2024-04-15 ASSESSMENT — PAIN DESCRIPTION - FREQUENCY: FREQUENCY: INTERMITTENT

## 2024-04-15 ASSESSMENT — PAIN DESCRIPTION - ORIENTATION: ORIENTATION: LOWER

## 2024-04-15 ASSESSMENT — PAIN SCALES - GENERAL
PAINLEVEL_OUTOF10: 7
PAINLEVEL_OUTOF10: 2

## 2024-04-15 ASSESSMENT — PAIN DESCRIPTION - PAIN TYPE: TYPE: CHRONIC PAIN

## 2024-04-15 ASSESSMENT — PAIN DESCRIPTION - LOCATION: LOCATION: BACK

## 2024-04-15 NOTE — PROGRESS NOTES
Admission Medication Reconciliation:    Information obtained from:  Facility  RxQuery data available¹:  Yes    Comments/Recommendations: Updated PTA meds/reviewed patient's allergies.    1)  Reviewed patient's medication orders from [facility] and clarified with their nurse Kellie. Patient has been given Augmentin over the weekend, last dose this morning. Patient was ordered cefepime vancomycin but they were not administered prior to the transfer. Patient's amlodipine and IV lasix were held this morning as her blood pressure was trending low. Per notes patient's Eliquis and Plavix were also held.    2)  Medication changes (since last review):  Added  - Incruse Ellipta 62.5 mcg/actuation inhaler 1puff inhalation daily  - Valacyclovir 500mg 2tabs po twice daily    Adjusted  - Amlodipine dose has been decreased to 5mg po daily    Removed  - Apixaban 5mg twice daily (Held)  - Bumetanide 1mg daily  - Cephalexin 500mg four times daily  - Clopidogrel 75mg daily  - Cyclobenzaprine 10mg three times daily as needed  - Insulin NPH  - Omalizumab injection  - Potassium chloride 10meq 2tabs twice daily     ¹RxQuery pharmacy benefit data reflects medications filled and processed through the patient's insurance, however   this data does NOT capture whether the medication was picked up or is currently being taken by the patient.    Allergies:  Latex, Oxycodone, Codeine, Lisinopril, Morphine, and Statins    Significant PMH/Disease States:   Past Medical History:   Diagnosis Date    Arthritis 1990    Asthma     Atrial fibrillation ([facility])     NONE SINCE PACEMAKER    CAD (coronary artery disease) 1996    Cancer ([facility]) 2001    UTERINE    Chronic kidney disease (CKD), stage III (moderate) ([facility]) 2023    Compression fracture of L1 vertebra ([facility]) 10/2023    FELL AT HOME    Congestive heart failure ([facility]) 2020    Pacemaker    Depression     DM (diabetes mellitus) ([facility])     HTN (hypertension)     Hyperlipidemia     Morbid obesity ([facility])

## 2024-04-15 NOTE — PROGRESS NOTES
Pharmacist Note - Vancomycin Dosing    Consult provided for this 79 y.o. female for indication of SSTI.     - s/p lumbar laminectomy with wound dehiscence     - hospitalized - and appears to have been discharged on IV vanc, Zosyn, and ceftriaxone     - facility paperwork indicates she has been on vanc and cefepime but time of last doses is unknown  Antibiotic regimen(s): vancomycin + cefepime  Patient on vancomycin PTA? YES     Recent Labs     04/15/24  1505 04/15/24  1648   WBC  --  17.2*   CREATININE 1.44*  --    BUN 38*  --      Frequency of BMP: once on   Height: 167.6 cm  Weight: 119.7 kg  Est CrCl: 41 ml/min; UO: -- ml/kg/hr  Temp (24hrs), Av.1 °F (37.8 °C), Min:98.2 °F (36.8 °C), Max:102 °F (38.9 °C)    Cultures:  4/15 blood - NGTD    MRSA Swab ordered (if applicable)? N/A    The plan below is expected to result in a target range of AUC/LANE 400-500    STAT vancomycin level ordered as patient is reportedly on vancomycin but time of the last dose is unknown.  Therapy will be initiated and dose will be determined when results of vancomycin level are available.  Pharmacy to follow patient daily and order levels / make dose adjustments as appropriate.    *Vancomycin has been dosed used Bayesian kinetics software to target an AUC/LANE of 400-600, which provides adequate exposure for an assumed infection due to MRSA with an LANE of 1 or less while reducing the risk of nephrotoxicity as seen with traditional trough based dosing goals.  a

## 2024-04-15 NOTE — ED TRIAGE NOTES
Pt arrives via EMS from [facility] w/ c/o chronic lower back wound. Per EMS, staff concerned for worsening wound and possible infection. Pt febrile w/ EMS. Pt found to by 80% on RA at facility and placed on 3L, currently sating 97%.       w/ EMS.

## 2024-04-15 NOTE — PROGRESS NOTES
Lovenox Monitoring  Indication: DVT Prophylaxis  No results for input(s): \"HGB\", \"PLT\", \"INR\", \"CREA\" in the last 72 hours.  Current Weight: 119.7 kg  Est. CrCl = 41 ml/min  Current Dose: 40 mg subcutaneously every 24 hours.  Plan: Change to 30 mg BID with respect to weight 101-150.9 kg and CrCl > 30 mL/min    (CLOSE I-vent after review and verification)

## 2024-04-15 NOTE — H&P
(HUMULIN R;NOVOLIN R) 100 UNIT/ML injection Inject into the skin 3 times daily (before meals) PER SLIDING SCALE, THREE TIMES PER DAY  100-150 2 UNIT, 151-200= 4 UNITS, 201-250=6 units, 251-300=8 units, 301-350=10 units, 151-> call MD and 4 units every 2 hours until reach md    ALSO DOES CARB COUNTING    Automatic Reconciliation, Ar     Allergies   Allergen Reactions    Latex Itching    Oxycodone Itching    Codeine Rash    Lisinopril Other (See Comments)     Cough, Visual disturbances    Morphine Itching    Statins Other (See Comments)     Muscle enzyme problems      Family History   Problem Relation Age of Onset    Stroke Mother     Osteoarthritis Mother     Hypertension Mother     Psychiatric Disorder Mother         Situational Depression late in life    Gall Bladder Disease Mother     Heart Disease Father         congestive heart failure    Alcohol Abuse Father     Asthma Father     Hypertension Father     Kidney Disease Father     Anesth Problems Sister         NAUSEA, VOMITING    Hypertension Sister     Atrial Fibrillation Sister     Kidney Disease Sister     Cancer Brother         LEUKEMIA    Alcohol Abuse Maternal Aunt     Diabetes Maternal Grandmother     Diabetes Other     Heart Disease Other       Social History:  reports that she quit smoking about 32 years ago. Her smoking use included cigarettes. She has never used smokeless tobacco. She reports current alcohol use. She reports that she does not use drugs.   Social Determinants of Health     Tobacco Use: Medium Risk (4/12/2024)    Patient History     Smoking Tobacco Use: Former     Smokeless Tobacco Use: Never     Passive Exposure: Not on file   Alcohol Use: Not At Risk (7/30/2023)    AUDIT-C     Frequency of Alcohol Consumption: Never     Average Number of Drinks: Patient does not drink     Frequency of Binge Drinking: Never   Financial Resource Strain: Not on file   Food Insecurity: No Food Insecurity (3/19/2024)    Hunger Vital Sign     Worried About  than 3 seconds  Pulses: 2+, symmetric in all extremities  Skin: Warm, dry, without rashes or lesions    Data Review:   I have independently reviewed and interpreted patient's lab and all other diagnostic data    Abnormal Labs Reviewed   COMPREHENSIVE METABOLIC PANEL - Abnormal; Notable for the following components:       Result Value    Sodium 133 (*)     CO2 19 (*)     Glucose 179 (*)     BUN 38 (*)     Creatinine 1.44 (*)     Bun/Cre Ratio 26 (*)     Est, Glom Filt Rate 37 (*)     Calcium 8.3 (*)     ALT 9 (*)     Alk Phosphatase 137 (*)     Albumin 1.8 (*)     Globulin 4.6 (*)     Albumin/Globulin Ratio 0.4 (*)     All other components within normal limits       Results       Procedure Component Value Units Date/Time    Culture, Blood 1 [[id number]]     Order Status: Sent Specimen: Blood     COVID-19 & Influenza Combo [[id number]] Collected: 04/15/24 1539    Order Status: Completed Specimen: Nasopharyngeal Updated: 04/15/24 1717     SARS-CoV-2, PCR Not detected        Comment: Not Detected results do not preclude SARS-CoV-2 infection and should not be used as the sole basis for patient management decisions.Results must be combined with clinical observations, patient history, and epidemiological information.        Rapid Influenza A By PCR Not detected        Rapid Influenza B By PCR Not detected        Comment: Testing was performed using nolberto Desirae SARS-CoV-2 and Influenza A/B nucleic acid assay.  This test is a multiplex Real-Time Reverse Transcriptase Polymerase Chain Reaction (RT-PCR) based in vitro diagnostic test intended for the qualitative detection of nucleic acids from SARS-CoV-2, Influenza A, and Influenza B in nasopharyngeal and nasal swab specimens for use under the FDA's Emergency Use Authorization (EUA) only.     Fact sheet for Patients: https://www.fda.gov/media/732556/download  Fact sheet for Healthcare Providers: https://www.fda.fov/media/931659/download         Culture, Blood 1 [[id number]]

## 2024-04-15 NOTE — ED PROVIDER NOTES
[facility] EMERGENCY DEP  EMERGENCY DEPARTMENT ENCOUNTER      Pt Name: Leny Barnes  MRN: [id number]  Birthdate 1945  Date of evaluation: 4/15/2024  Provider: Angela Myles DO    CHIEF COMPLAINT       Chief Complaint   Patient presents with    Wound Check         HISTORY OF PRESENT ILLNESS   (Location/Symptom, Timing/Onset, Context/Setting, Quality, Duration, Modifying Factors, Severity)  Note limiting factors.   HPI      Review of External Medical Records:     Nursing Notes were reviewed.    REVIEW OF SYSTEMS    (2-9 systems for level 4, 10 or more for level 5)     Review of Systems    Except as noted above the remainder of the review of systems was reviewed and negative.       PAST MEDICAL HISTORY     Past Medical History:   Diagnosis Date    Arthritis 1990    Asthma     Atrial fibrillation (HCC)     NONE SINCE PACEMAKER    CAD (coronary artery disease) 1996    Cancer ([facility]) 2001    UTERINE    Chronic kidney disease (CKD), stage III (moderate) ([facility]) 2023    Compression fracture of L1 vertebra ([facility]) 10/2023    FELL AT HOME    Congestive heart failure (HCC) 2020    Pacemaker    Depression     DM (diabetes mellitus) ([facility])     HTN (hypertension)     Hyperlipidemia     Morbid obesity (HCC)     Neuropathy     FREDERICK on CPAP     WEARS 02 2LNC AT NIGHT         SURGICAL HISTORY       Past Surgical History:   Procedure Laterality Date    APPENDECTOMY      BARIATRIC SURGERY  2011    LAP BAND    CARDIAC PROCEDURE N/A 08/01/2023    Left heart cath / coronary angiography performed by Dewey Hay MD at [facility] CARDIAC CATH LAB    CARDIAC PROCEDURE N/A 08/01/2023    Percutaneous coronary intervention performed by Dewey Hay MD at [facility] CARDIAC CATH LAB    CARDIAC PROCEDURE N/A 08/21/2023    Left heart cath / coronary angiography performed by Dewey Hay MD at [facility] CARDIAC CATH LAB    CATARACT REMOVAL Bilateral 2019    CERVICAL FUSION      CHOLECYSTECTOMY      COLONOSCOPY N/A 07/15/2020    COLONOSCOPY :- performed by Hawa   Chemistry panel within normal limits except for some mild renal insufficiency.  Chest x-ray shows mild edema.  Awaiting CBC.  This had to be redrawn.  Viviana is aware the patient will be admitted.  They have requested that sed rate and CRP be ordered.  Wrote for the antibiotics in the emergency department.  Patient is resting comfortably at this time    Perfect Serve Consult for Admission  5:09 PM    ED Room Number: ER06/06  Patient Name and age:  Leny Barnes 79 y.o.  female  Working Diagnosis:   1. Wound infection        COVID-19 Suspicion: Yes  Sepsis present:  No  Reassessment needed: Yes  Code Status:  Full Code  Readmission: No  Isolation Requirements: yes -   Recommended Level of Care: telemetry  Department: [facility] Adult ED - [phone]  Consulting Provider:     Other:             FINAL IMPRESSION      1. Wound infection          DISPOSITION/PLAN   DISPOSITION Decision To Admit 04/15/2024 04:57:05 PM      PATIENT REFERRED TO:  No follow-up provider specified.    DISCHARGE MEDICATIONS:  New Prescriptions    No medications on file         (Please note that portions of this note were completed with a voice recognition program.  Efforts were made to edit the dictations but occasionally words are mis-transcribed.)    Angela Myles DO (electronically signed)  Emergency Attending Physician / Physician Assistant / Nurse Practitioner             Angela Myles DO  04/15/24 5648

## 2024-04-16 ENCOUNTER — APPOINTMENT (OUTPATIENT)
Facility: HOSPITAL | Age: 79
End: 2024-04-16
Payer: MEDICARE

## 2024-04-16 PROBLEM — Z51.5 PALLIATIVE CARE ENCOUNTER: Status: ACTIVE | Noted: 2024-03-05

## 2024-04-16 PROBLEM — R06.02 SHORTNESS OF BREATH: Status: ACTIVE | Noted: 2024-04-16

## 2024-04-16 PROBLEM — R53.1 GENERALIZED WEAKNESS: Status: ACTIVE | Noted: 2024-04-16

## 2024-04-16 LAB
ACCESSION NUMBER, LLC1M: ABNORMAL
ACINETOBACTER CALCOAC BAUMANNII COMPLEX BY PCR: NOT DETECTED
ANION GAP SERPL CALC-SCNC: 4 MMOL/L (ref 5–15)
ANION GAP SERPL CALC-SCNC: 6 MMOL/L (ref 5–15)
B FRAGILIS DNA BLD POS QL NAA+NON-PROBE: NOT DETECTED
BASOPHILS # BLD: 0 K/UL (ref 0–0.1)
BASOPHILS NFR BLD: 0 % (ref 0–1)
BIOFIRE TEST COMMENT: ABNORMAL
BLACTX-M ISLT/SPM QL: NOT DETECTED
BLAIMP ISLT/SPM QL: NOT DETECTED
BLAKPC BLD POS QL NAA+NON-PROBE: NOT DETECTED
BLAOXA-48-LIKE ISLT/SPM QL: NOT DETECTED
BLAVIM ISLT/SPM QL: NOT DETECTED
BUN SERPL-MCNC: 41 MG/DL (ref 6–20)
BUN SERPL-MCNC: 43 MG/DL (ref 6–20)
BUN/CREAT SERPL: 26 (ref 12–20)
BUN/CREAT SERPL: 29 (ref 12–20)
C ALBICANS DNA BLD POS QL NAA+NON-PROBE: NOT DETECTED
C AURIS DNA BLD POS QL NAA+NON-PROBE: NOT DETECTED
C GATTII+NEOFOR DNA BLD POS QL NAA+N-PRB: NOT DETECTED
C GLABRATA DNA BLD POS QL NAA+NON-PROBE: NOT DETECTED
C KRUSEI DNA BLD POS QL NAA+NON-PROBE: NOT DETECTED
C PARAP DNA BLD POS QL NAA+NON-PROBE: NOT DETECTED
C TROPICLS DNA BLD POS QL NAA+NON-PROBE: NOT DETECTED
CALCIUM SERPL-MCNC: 7.9 MG/DL (ref 8.5–10.1)
CALCIUM SERPL-MCNC: 8.4 MG/DL (ref 8.5–10.1)
CHLORIDE SERPL-SCNC: 107 MMOL/L (ref 97–108)
CHLORIDE SERPL-SCNC: 107 MMOL/L (ref 97–108)
CO2 SERPL-SCNC: 22 MMOL/L (ref 21–32)
CO2 SERPL-SCNC: 23 MMOL/L (ref 21–32)
CREAT SERPL-MCNC: 1.49 MG/DL (ref 0.55–1.02)
CREAT SERPL-MCNC: 1.58 MG/DL (ref 0.55–1.02)
DIFFERENTIAL METHOD BLD: ABNORMAL
E CLOAC COMP DNA BLD POS NAA+NON-PROBE: NOT DETECTED
E COLI DNA BLD POS QL NAA+NON-PROBE: NOT DETECTED
E FAECALIS DNA BLD POS QL NAA+NON-PROBE: NOT DETECTED
E FAECIUM DNA BLD POS QL NAA+NON-PROBE: NOT DETECTED
ENTEROBACTERALES DNA BLD POS NAA+N-PRB: DETECTED
EOSINOPHIL # BLD: 0.2 K/UL (ref 0–0.4)
EOSINOPHIL NFR BLD: 1 % (ref 0–7)
ERYTHROCYTE [DISTWIDTH] IN BLOOD BY AUTOMATED COUNT: 16.8 % (ref 11.5–14.5)
FERRITIN SERPL-MCNC: 191 NG/ML (ref 8–252)
FOLATE SERPL-MCNC: 2.4 NG/ML (ref 5–21)
GLUCOSE BLD STRIP.AUTO-MCNC: 114 MG/DL (ref 65–117)
GLUCOSE BLD STRIP.AUTO-MCNC: 128 MG/DL (ref 65–117)
GLUCOSE BLD STRIP.AUTO-MCNC: 135 MG/DL (ref 65–117)
GLUCOSE BLD STRIP.AUTO-MCNC: 182 MG/DL (ref 65–117)
GLUCOSE SERPL-MCNC: 118 MG/DL (ref 65–100)
GLUCOSE SERPL-MCNC: 144 MG/DL (ref 65–100)
GP B STREP DNA BLD POS QL NAA+NON-PROBE: NOT DETECTED
HAEM INFLU DNA BLD POS QL NAA+NON-PROBE: NOT DETECTED
HCT VFR BLD AUTO: 25.1 % (ref 35–47)
HGB BLD-MCNC: 8 G/DL (ref 11.5–16)
IMM GRANULOCYTES # BLD AUTO: 0.2 K/UL (ref 0–0.04)
IMM GRANULOCYTES NFR BLD AUTO: 1 % (ref 0–0.5)
IRON SATN MFR SERPL: 5 % (ref 20–50)
IRON SERPL-MCNC: 8 UG/DL (ref 35–150)
K OXYTOCA DNA BLD POS QL NAA+NON-PROBE: NOT DETECTED
KLEBSIELLA SP DNA BLD POS QL NAA+NON-PRB: NOT DETECTED
KLEBSIELLA SP DNA BLD POS QL NAA+NON-PRB: NOT DETECTED
L MONOCYTOG DNA BLD POS QL NAA+NON-PROBE: NOT DETECTED
LYMPHOCYTES # BLD: 0.5 K/UL (ref 0.8–3.5)
LYMPHOCYTES NFR BLD: 3 % (ref 12–49)
MCH RBC QN AUTO: 28 PG (ref 26–34)
MCHC RBC AUTO-ENTMCNC: 31.9 G/DL (ref 30–36.5)
MCV RBC AUTO: 87.8 FL (ref 80–99)
MONOCYTES # BLD: 1 K/UL (ref 0–1)
MONOCYTES NFR BLD: 6 % (ref 5–13)
N MEN DNA BLD POS QL NAA+NON-PROBE: NOT DETECTED
NEUTS SEG # BLD: 14.6 K/UL (ref 1.8–8)
NEUTS SEG NFR BLD: 89 % (ref 32–75)
NRBC # BLD: 0.02 K/UL (ref 0–0.01)
NRBC BLD-RTO: 0.1 PER 100 WBC
P AERUGINOSA DNA BLD POS NAA+NON-PROBE: NOT DETECTED
PHOSPHATE SERPL-MCNC: 3.5 MG/DL (ref 2.6–4.7)
PLATELET # BLD AUTO: 250 K/UL (ref 150–400)
PMV BLD AUTO: 9.5 FL (ref 8.9–12.9)
POTASSIUM SERPL-SCNC: 3.8 MMOL/L (ref 3.5–5.1)
POTASSIUM SERPL-SCNC: 3.9 MMOL/L (ref 3.5–5.1)
PROTEUS SP DNA BLD POS QL NAA+NON-PROBE: NOT DETECTED
RBC # BLD AUTO: 2.86 M/UL (ref 3.8–5.2)
RBC MORPH BLD: ABNORMAL
RBC MORPH BLD: ABNORMAL
RESISTANT GENE NDM BY PCR: NOT DETECTED
RESISTANT GENE TARGETS: ABNORMAL
S AUREUS DNA BLD POS QL NAA+NON-PROBE: NOT DETECTED
S AUREUS+CONS DNA BLD POS NAA+NON-PROBE: NOT DETECTED
S EPIDERMIDIS DNA BLD POS QL NAA+NON-PRB: NOT DETECTED
S LUGDUNENSIS DNA BLD POS QL NAA+NON-PRB: NOT DETECTED
S MALTOPHILIA DNA BLD POS QL NAA+NON-PRB: NOT DETECTED
S MARCESCENS DNA BLD POS NAA+NON-PROBE: DETECTED
S PNEUM DNA BLD POS QL NAA+NON-PROBE: NOT DETECTED
S PYO DNA BLD POS QL NAA+NON-PROBE: NOT DETECTED
SALMONELLA DNA BLD POS QL NAA+NON-PROBE: NOT DETECTED
SERVICE CMNT-IMP: ABNORMAL
SERVICE CMNT-IMP: NORMAL
SODIUM SERPL-SCNC: 134 MMOL/L (ref 136–145)
SODIUM SERPL-SCNC: 135 MMOL/L (ref 136–145)
STREPTOCOCCUS DNA BLD POS NAA+NON-PROBE: NOT DETECTED
TIBC SERPL-MCNC: 164 UG/DL (ref 250–450)
VIT B12 SERPL-MCNC: 1218 PG/ML (ref 193–986)
WBC # BLD AUTO: 16.5 K/UL (ref 3.6–11)

## 2024-04-16 PROCEDURE — 72158 MRI LUMBAR SPINE W/O & W/DYE: CPT

## 2024-04-16 PROCEDURE — 6360000002 HC RX W HCPCS: Performed by: HOSPITALIST

## 2024-04-16 PROCEDURE — 99221 1ST HOSP IP/OBS SF/LOW 40: CPT | Performed by: CLINICAL NURSE SPECIALIST

## 2024-04-16 PROCEDURE — 71045 X-RAY EXAM CHEST 1 VIEW: CPT

## 2024-04-16 PROCEDURE — 2580000003 HC RX 258: Performed by: HOSPITALIST

## 2024-04-16 PROCEDURE — 1100000000 HC RM PRIVATE

## 2024-04-16 PROCEDURE — 6370000000 HC RX 637 (ALT 250 FOR IP): Performed by: HOSPITALIST

## 2024-04-16 PROCEDURE — 84100 ASSAY OF PHOSPHORUS: CPT

## 2024-04-16 PROCEDURE — A9579 GAD-BASE MR CONTRAST NOS,1ML: HCPCS | Performed by: EMERGENCY MEDICINE

## 2024-04-16 PROCEDURE — 83550 IRON BINDING TEST: CPT

## 2024-04-16 PROCEDURE — 51798 US URINE CAPACITY MEASURE: CPT

## 2024-04-16 PROCEDURE — 82728 ASSAY OF FERRITIN: CPT

## 2024-04-16 PROCEDURE — 82607 VITAMIN B-12: CPT

## 2024-04-16 PROCEDURE — 51701 INSERT BLADDER CATHETER: CPT

## 2024-04-16 PROCEDURE — 80048 BASIC METABOLIC PNL TOTAL CA: CPT

## 2024-04-16 PROCEDURE — 6370000000 HC RX 637 (ALT 250 FOR IP): Performed by: NURSE PRACTITIONER

## 2024-04-16 PROCEDURE — 6360000004 HC RX CONTRAST MEDICATION: Performed by: EMERGENCY MEDICINE

## 2024-04-16 PROCEDURE — 82746 ASSAY OF FOLIC ACID SERUM: CPT

## 2024-04-16 PROCEDURE — 36415 COLL VENOUS BLD VENIPUNCTURE: CPT

## 2024-04-16 PROCEDURE — 99222 1ST HOSP IP/OBS MODERATE 55: CPT | Performed by: PHYSICAL MEDICINE & REHABILITATION

## 2024-04-16 PROCEDURE — 6360000002 HC RX W HCPCS: Performed by: NURSE PRACTITIONER

## 2024-04-16 PROCEDURE — 94640 AIRWAY INHALATION TREATMENT: CPT

## 2024-04-16 PROCEDURE — 83540 ASSAY OF IRON: CPT

## 2024-04-16 PROCEDURE — 82962 GLUCOSE BLOOD TEST: CPT

## 2024-04-16 PROCEDURE — 94760 N-INVAS EAR/PLS OXIMETRY 1: CPT

## 2024-04-16 PROCEDURE — 85025 COMPLETE CBC W/AUTO DIFF WBC: CPT

## 2024-04-16 PROCEDURE — P9047 ALBUMIN (HUMAN), 25%, 50ML: HCPCS | Performed by: HOSPITALIST

## 2024-04-16 PROCEDURE — 99223 1ST HOSP IP/OBS HIGH 75: CPT | Performed by: INTERNAL MEDICINE

## 2024-04-16 PROCEDURE — 2700000000 HC OXYGEN THERAPY PER DAY

## 2024-04-16 RX ORDER — FOLIC ACID 1 MG/1
1 TABLET ORAL DAILY
Status: DISCONTINUED | OUTPATIENT
Start: 2024-04-16 | End: 2024-05-03 | Stop reason: HOSPADM

## 2024-04-16 RX ORDER — DEXTROSE MONOHYDRATE 100 MG/ML
INJECTION, SOLUTION INTRAVENOUS CONTINUOUS PRN
Status: DISCONTINUED | OUTPATIENT
Start: 2024-04-16 | End: 2024-05-03 | Stop reason: HOSPADM

## 2024-04-16 RX ORDER — ENOXAPARIN SODIUM 100 MG/ML
30 INJECTION SUBCUTANEOUS EVERY 12 HOURS
Status: DISCONTINUED | OUTPATIENT
Start: 2024-04-17 | End: 2024-04-21 | Stop reason: SDUPTHER

## 2024-04-16 RX ORDER — BUMETANIDE 0.25 MG/ML
2 INJECTION INTRAMUSCULAR; INTRAVENOUS ONCE
Status: DISCONTINUED | OUTPATIENT
Start: 2024-04-16 | End: 2024-04-16

## 2024-04-16 RX ORDER — BUMETANIDE 0.25 MG/ML
2 INJECTION INTRAMUSCULAR; INTRAVENOUS DAILY
Status: DISCONTINUED | OUTPATIENT
Start: 2024-04-17 | End: 2024-04-17

## 2024-04-16 RX ORDER — ALBUMIN (HUMAN) 12.5 G/50ML
25 SOLUTION INTRAVENOUS EVERY 6 HOURS
Status: COMPLETED | OUTPATIENT
Start: 2024-04-16 | End: 2024-04-16

## 2024-04-16 RX ADMIN — BUMETANIDE 2 MG: 1 TABLET ORAL at 09:53

## 2024-04-16 RX ADMIN — GABAPENTIN 600 MG: 300 CAPSULE ORAL at 17:28

## 2024-04-16 RX ADMIN — WATER 2000 MG: 1 INJECTION INTRAMUSCULAR; INTRAVENOUS; SUBCUTANEOUS at 11:05

## 2024-04-16 RX ADMIN — IPRATROPIUM BROMIDE 0.5 MG: 0.5 SOLUTION RESPIRATORY (INHALATION) at 08:22

## 2024-04-16 RX ADMIN — SERTRALINE HYDROCHLORIDE 150 MG: 50 TABLET ORAL at 21:30

## 2024-04-16 RX ADMIN — APIXABAN 5 MG: 5 TABLET, FILM COATED ORAL at 08:45

## 2024-04-16 RX ADMIN — ACETAMINOPHEN 1000 MG: 325 TABLET ORAL at 11:30

## 2024-04-16 RX ADMIN — CEFEPIME 2000 MG: 2 INJECTION, POWDER, FOR SOLUTION INTRAVENOUS at 21:35

## 2024-04-16 RX ADMIN — Medication 1 MG: at 11:07

## 2024-04-16 RX ADMIN — ALBUMIN (HUMAN) 25 G: 0.25 INJECTION, SOLUTION INTRAVENOUS at 10:25

## 2024-04-16 RX ADMIN — FERROUS SULFATE TAB 325 MG (65 MG ELEMENTAL FE) 325 MG: 325 (65 FE) TAB at 06:40

## 2024-04-16 RX ADMIN — ALLOPURINOL 100 MG: 100 TABLET ORAL at 08:45

## 2024-04-16 RX ADMIN — IPRATROPIUM BROMIDE 0.5 MG: 0.5 SOLUTION RESPIRATORY (INHALATION) at 11:44

## 2024-04-16 RX ADMIN — ARFORMOTEROL TARTRATE: 15 SOLUTION RESPIRATORY (INHALATION) at 21:39

## 2024-04-16 RX ADMIN — ALBUMIN (HUMAN) 25 G: 0.25 INJECTION, SOLUTION INTRAVENOUS at 17:12

## 2024-04-16 RX ADMIN — BUPROPION HYDROCHLORIDE 150 MG: 150 TABLET, EXTENDED RELEASE ORAL at 08:44

## 2024-04-16 RX ADMIN — GADOTERIDOL 20 ML: 279.3 INJECTION, SOLUTION INTRAVENOUS at 16:28

## 2024-04-16 RX ADMIN — SODIUM CHLORIDE: 9 INJECTION, SOLUTION INTRAVENOUS at 07:17

## 2024-04-16 RX ADMIN — ACETAMINOPHEN 1000 MG: 325 TABLET ORAL at 17:28

## 2024-04-16 RX ADMIN — IPRATROPIUM BROMIDE 0.5 MG: 0.5 SOLUTION RESPIRATORY (INHALATION) at 21:39

## 2024-04-16 RX ADMIN — ARFORMOTEROL TARTRATE: 15 SOLUTION RESPIRATORY (INHALATION) at 08:22

## 2024-04-16 RX ADMIN — ACETAMINOPHEN 1000 MG: 325 TABLET ORAL at 06:40

## 2024-04-16 ASSESSMENT — PAIN SCALES - GENERAL
PAINLEVEL_OUTOF10: 0
PAINLEVEL_OUTOF10: 5

## 2024-04-16 ASSESSMENT — PAIN DESCRIPTION - FREQUENCY: FREQUENCY: INTERMITTENT

## 2024-04-16 ASSESSMENT — PAIN DESCRIPTION - LOCATION: LOCATION: ABDOMEN;BACK

## 2024-04-16 ASSESSMENT — PAIN DESCRIPTION - ORIENTATION: ORIENTATION: POSTERIOR;ANTERIOR

## 2024-04-16 ASSESSMENT — PAIN DESCRIPTION - DESCRIPTORS: DESCRIPTORS: ACHING;DISCOMFORT

## 2024-04-16 ASSESSMENT — PAIN DESCRIPTION - DIRECTION: RADIATING_TOWARDS: BACK

## 2024-04-16 NOTE — CONSULTS
Infectious Disease Consult Note    Reason for Consult: GNR Bacteremia, surgical site infection  Date of Consultation: April 16, 2024  Date of Admission: 4/15/2024  Referring Physician: Dr. Chapman    HPI:    80 y/o female with T10-T1 laminectomy, L2-S1 laminectomy with fusion from [id number]-[id number] on 3/18/24 admitted 4/1-4/5/24 from [facility] for dehiscence and drainage with anemia Hb 6.9 and WBC 18.9.  Noted purulent drainage per family and wound vac applied at that time and drainage had stopped and patient discharged.  During this admission, noted to have Klebsiella on urine culture.  Antibiotics Vanco/Zosyn and Meropenem at one point changed to Ceftriaxone and antibiotics were discontinued prior to discharge.  Re-admitted to [facility] 4/15/24 due to worsening of post-operative wound without SOB but hypoxia @ rehab placed on 3L O2 per NC.    Found during admission to have fever to 102 and to have 4/4 bottles on blood culture with GNR pending ID and started on Cefepime along with Vancomycin.  Does not appear to have a wound vac at this time denies significant pain around surgical wound which appears to have dehisced with mild drainage mostly inferiorly.    History significant for CHF, A-fib s/p ppm and on AC, CKD, HTN, HLD, obesity, FREDERICK on CPAP.    Past Medical History:  Past Medical History:   Diagnosis Date    Arthritis 1990    Asthma     Atrial fibrillation ([facility])     NONE SINCE PACEMAKER    CAD (coronary artery disease) 1996    Cancer ([facility]) 2001    UTERINE    Chronic kidney disease (CKD), stage III (moderate) ([facility]) 2023    Compression fracture of L1 vertebra ([facility]) 10/2023    FELL AT HOME    Congestive heart failure (HCC) 2020    Pacemaker    Depression     DM (diabetes mellitus) ([facility])     HTN (hypertension)     Hyperlipidemia     Morbid obesity ([facility])     Neuropathy     FREDERICK on CPAP     WEARS 02 2LNC AT NIGHT         Surgical History:  Past Surgical History:   Procedure Laterality Date    APPENDECTOMY       mg/dL    BUN 41 (H) 6 - 20 MG/DL    Creatinine 1.58 (H) 0.55 - 1.02 MG/DL    Bun/Cre Ratio 26 (H) 12 - 20      Est, Glom Filt Rate 33 (L) >60 ml/min/1.73m2    Calcium 8.4 (L) 8.5 - 10.1 MG/DL   CBC with Auto Differential    Collection Time: 04/16/24  5:36 AM   Result Value Ref Range    WBC 16.5 (H) 3.6 - 11.0 K/uL    RBC 2.86 (L) 3.80 - 5.20 M/uL    Hemoglobin 8.0 (L) 11.5 - 16.0 g/dL    Hematocrit 25.1 (L) 35.0 - 47.0 %    MCV 87.8 80.0 - 99.0 FL    MCH 28.0 26.0 - 34.0 PG    MCHC 31.9 30.0 - 36.5 g/dL    RDW 16.8 (H) 11.5 - 14.5 %    Platelets 250 150 - 400 K/uL    MPV 9.5 8.9 - 12.9 FL    Nucleated RBCs 0.1 (H) 0  WBC    nRBC 0.02 (H) 0.00 - 0.01 K/uL    Neutrophils % 89 (H) 32 - 75 %    Lymphocytes % 3 (L) 12 - 49 %    Monocytes % 6 5 - 13 %    Eosinophils % 1 0 - 7 %    Basophils % 0 0 - 1 %    Immature Granulocytes % 1 (H) 0.0 - 0.5 %    Neutrophils Absolute 14.6 (H) 1.8 - 8.0 K/UL    Lymphocytes Absolute 0.5 (L) 0.8 - 3.5 K/UL    Monocytes Absolute 1.0 0.0 - 1.0 K/UL    Eosinophils Absolute 0.2 0.0 - 0.4 K/UL    Basophils Absolute 0.0 0.0 - 0.1 K/UL    Immature Granulocytes Absolute 0.2 (H) 0.00 - 0.04 K/UL    Differential Type SMEAR SCANNED      RBC Comment ANISOCYTOSIS  1+        RBC Comment OVALOCYTES  PRESENT       POCT Glucose    Collection Time: 04/16/24  6:54 AM   Result Value Ref Range    POC Glucose 128 (H) 65 - 117 mg/dL    Performed by: SOREN Asher        Microbiology Data:       Blood: GNR in 4/4 bottles pending ID/sensitivity      Urine: UA benign, with holly catheter now    Assessment / Plan:      78 y/o female with multiple medical comorbidities including obesity s/p [id number] fusion re-admitted for persistent wound dehiscence and drainage with 4/4 GNR bacteremia.    Cefepime to continue.    Follow ID and sensitivity of GNR isolate on blood culture and will adjust antibiotics accordingly.    Needs I/D in OR of dehiscence, exploration of wound, potential involvement deeper to fascia

## 2024-04-16 NOTE — PROGRESS NOTES
MRI safety note:    Leny Barnes  has a Medtronic Shannon XT SR MRI SureScan device model # W1SR01 with RV lead model # 912344 placed by Dr. Benjamin High on 5/3/2022. This system is MR conditional on 1.5T magnet.      MRI for patients with cardiac rhythm management devices requires a set process, based on  policy. It is not possible to complete immediately but will be performed as soon as is possible, when all necessary steps are completed.    Patients with Medtronic cardiac devices will have a recent Chest X-ray viewed by a Radiologist to rule out any abandoned leads for patient safety before MRI can be done.  Dr. Neri Barboza, Radiologist, viewed this patient's recent Chest X-ray and said that there appeared to be a gap between the device and lead.  He said he could not be sure that the lead was attached.  It could be hardware blocking the view but he requested another Chest X-ray to be done before MRI can be done.    Called patient's nurse, DWAYNE Londono, to let her know and asked her to get a portable Chest X-ray ordered ASAP.   Tentative time for MRI can be today at 2:30 p.m if Chest X-ray is completed and read and there are no abandoned leads.

## 2024-04-16 NOTE — PROGRESS NOTES
Hospitalist Progress Note  Bonnie Chapman MD  Answering service: [phone] OR [phone] from in house phone        Date of Service:  2024  NAME:  Leny Barnes  :  1945  MRN:  [id number]      Admission Summary:   Leny Barnes is a 79 y.o. female who presents with wound dehiscence. Patient is a 79-year-old female returns from [facility] where she was discharged on  after being treated for wound dehiscence at that time as well he had lumbar laminectomy on 3/8.  Initially patient was discharged from [facility] to [facility] rehab on 3/22 she was then noted to have bloody drainage wound dehiscence with acute blood loss anemia and was readmitted to emergency department on  and then redischarged to [facility] back on . The patient was given IV antibiotics and a wound VAC was placed over the wound by wound care.  Patient also was treated with Klebsiella UTI and placed on vancomycin and Zosyn. The patient returns today by ambulance with significant wound dehiscence and bloody drainage.         Interval history / Subjective:   Follow-up for sepsis bacteremia wound dehiscence patient seen and examined  Patient seen by ID and antibiotics adjusted for bacteremia patient seen by orthopedic surgery plan for getting MRI and probably have to be taken to the OR discussed plan of care with the patient Eliquis being held for possible OR patient may need blood transfusion     Assessment & Plan:     Sepsis from wound infection wound dehiscence with bacteremia  Sepsis supported by high fever of 102 WBC count of 17.4  Lumbar stenosis with claudication s/p T10-11, and L2-S1 laminectomy, and D23-[id number] fusion on 3/18  -sent from [facility] for wound dehiscence, and drainage  -abx with vanc and cefepime , culture positive for Serratia ESR CRP elevated wound culture ID consult appreciate recommendation  -ortho

## 2024-04-16 NOTE — ACP (ADVANCE CARE PLANNING)
Advance Care Planning     Advance Care Planning (ACP) Physician/NP/PA Conversation    Date of Conversation: 4/15/2024  Conducted with: Patient with Decision Making Capacity    Healthcare Decision Maker:    Primary Decision Maker: Breonna Barnes - Child - [phone]    Secondary Decision Maker: Darline Barnes - Child - [phone]  Click here to complete Healthcare Decision Makers including selection of the Healthcare Decision Maker Relationship (ie \"Primary\").     Today we referred to ACP Clinical Specialist for assistance.    Care Preferences:    Hospitalization:  \"If your health worsens and it becomes clear that your chance of recovery is unlikely, what would be your preference regarding hospitalization?\"  The patient would prefer hospitalization.    Ventilation:  \"If you were unable to breath on your own and your chance of recovery was unlikely, what would be your preference about the use of a ventilator (breathing machine) if it was available to you?\"  The patient is unsure.    Resuscitation:  \"In the event your heart stopped as a result of an underlying serious health condition, would you want attempts made to restart your heart, or would you prefer a natural death?\"  Yes, attempt to resuscitate.    treatment goals, benefit/burden of treatment options, artificial nutrition, ventilation preferences, hospitalization preferences, and resuscitation preferences    Conversation Outcomes / Follow-Up Plan:  ACP incomplete - refer to ACP Clinical Specialist  Reviewed DNR/DNI and patient elects Full Code (Attempt Resuscitation)    Length of Voluntary ACP Conversation in minutes:  16 minutes    Bonnie Chapman MD

## 2024-04-16 NOTE — CONSULTS
Palliative Medicine  Patient Name: Leny Barnes  YOB: 1945  MRN: [id number]  Age: 79 y.o.  Gender: female    Date of Initial Consult: 4/16/24  Date of Service: 4/16/2024  Time: 3:10 PM  Provider: Marisol Martin MD  Hospital Day: 2  Admit Date: 4/15/2024  Referring Provider: Dr Chapman       Reasons for Consultation:  Goals of Care    HISTORY OF PRESENT ILLNESS (HPI):   Leny Barnes is a 79 y.o. female with a past medical history of a fib on Xarelto, asthma, hx uterine cancer, CHF, CKD, pacemaker, HTN, FREDERICK on CPAP, low back pain s/p T10-11, L2-S1 laminectomy and [id number] fusion w/ Dr Coe on 3/18/24. She was admitted back to [facility] for admission 4/1- 4/15with wound dehiscence/drainage, acute blood loss anemia and UTI- sent back to [facility] with IV abx and wound Vac. Admitted again 4/15/2024 from [facility] with wound dehiscence/drainage/wound vac faiulre. Septic upon admission w/ Tm of 102, WBC 17.4. +Bcx GNR 4/15. Pulm edema.Has MRI pending, may need surgical I&D.     Psychosocial: Pt not , has 2 children- daughters Breonna and Kellie. Pt lives w/ Breonna locally in [place]. Kellie living in [place]. Prior to surgery, put used rolling walker. Needed help w/ dressing and bathing.     PALLIATIVE DIAGNOSES:    Generalized weakness  Chronic low back pain- improved  Pain w/ movement  Shortness of breath, pulm edema   Bacteremia  Palliative care encounter.     ASSESSMENT AND PLAN:   Meet w/ pt who is alert, engaging. Discuss my team's involvement for support, sx management as needed.   Pain: Has had low back pain for ~ 30 years. Was getting worse as she aged and found that her functional status was declining- which is why she sought out surgery. States that she has had less post-op pain than she anticipated.   Controlled at rest, does have \"soreness\" when turning, with wound care and sometimes when working w/ therapies.   Feels that Tylenol adequate for now.   Goals of care: Motivated

## 2024-04-16 NOTE — PROGRESS NOTES
This RN callled  MRI staff Mary Alice for patient order at 0200. MRI staff said patient procedure will be done during the day time to be determine.

## 2024-04-16 NOTE — PLAN OF CARE
Problem: Safety - Adult  Goal: Free from fall injury  Outcome: Progressing     Problem: Discharge Planning  Goal: Discharge to home or other facility with appropriate resources  Outcome: Progressing  Flowsheets (Taken 4/16/2024 0203)  Discharge to home or other facility with appropriate resources: Identify barriers to discharge with patient and caregiver     Problem: Pain  Goal: Verbalizes/displays adequate comfort level or baseline comfort level  Outcome: Progressing

## 2024-04-16 NOTE — WOUND CARE
Consulted for spinal wound dehiscence.  Patient is off of the floor - will see tomorrow.   Discussed with RN: pack open wound with saline-damp gauze and cover with dry dressing.   EVERTON Angelo

## 2024-04-16 NOTE — ED NOTES
components within normal limits   C-REACTIVE PROTEIN - Abnormal; Notable for the following components:    CRP 25.40 (*)     All other components within normal limits   POCT GLUCOSE - Abnormal; Notable for the following components:    POC Glucose 258 (*)     All other components within normal limits   POCT GLUCOSE - Abnormal; Notable for the following components:    POC Glucose 187 (*)     All other components within normal limits     Abnormal Assessment Findings: wound    SAFETY    Mobility: limited transfer mobility   ED Fall Risk: No data recorded   Restraints no   Sitter no     Background  History:   Past Medical History:   Diagnosis Date    Arthritis 1990    Asthma     Atrial fibrillation ([facility])     NONE SINCE PACEMAKER    CAD (coronary artery disease) 1996    Cancer ([facility]) 2001    UTERINE    Chronic kidney disease (CKD), stage III (moderate) ([facility]) 2023    Compression fracture of L1 vertebra ([facility]) 10/2023    FELL AT HOME    Congestive heart failure ([facility]) 2020    Pacemaker    Depression     DM (diabetes mellitus) ([facility])     HTN (hypertension)     Hyperlipidemia     Morbid obesity ([facility])     Neuropathy     FREDERICK on CPAP     WEARS 02 2LNC AT NIGHT       Assessment    Vitals/MEWS: MEWS Score: 4  Level of Consciousness: Alert (0)   Vitals:    04/15/24 1730 04/15/24 1745 04/15/24 1803 04/15/24 2221   BP: 116/63  106/74 (!) 142/118   Pulse: 70 70 71    Resp: 23 17 23    Temp:  98.2 °F (36.8 °C)     TempSrc:  Oral     SpO2: 91% 94% 94%      DI:   Predictive Model Details          45 (Caution)  Factor Value    Calculated 4/15/2024 23:34 28% Age 79 years old    Deterioration Index Model 25% Supplemental oxygen Nasal cannula     20% Respiratory rate 23     8% WBC count abnormal (17.2 K/uL)     6% Hematocrit abnormal (23.6 %)     5% Sodium abnormal (133 mmol/L)     4% Systolic 142     2% BUN abnormal (38 MG/DL)     2% Potassium 4.2 mmol/L     1% Pulse oximetry 94 %     0% Temperature 98.2 °F (36.8 °C)     0% Pulse 71       FiO2  N/A  Antibiotic Given: No  Fluid Resuscitation: Total needed N/A, Status other N/A    VS x 2 post-fluid resuscitation: N/A    Recommendation    Pending orders REFER TO CHART  Consults ordered: IP CONSULT TO HOSPITALIST  IP CONSULT TO INFECTIOUS DISEASES  IP CONSULT TO PHARMACY  IP CONSULT TO PHARMACY    Consulted provider:      Plan for next 24 hours:    Additional Comments:       If any further questions, please call Sending RN at [phone]  Electronically signed by: Electronically signed by Myesha Javier RN on 4/15/2024 at 11:34 PM

## 2024-04-16 NOTE — PROGRESS NOTES
Spine Surgery Progress Note  Nickie Baez PA-C    Admit Date: 4/15/2024   LOS: 1 day      Daily Progress Note: 2024    Subjective:     Ms. Barnes is a pleasant 80yo female with a PMH of CAD w stents, CHF, CKD, morbid obesity, FREDERICK, asthma, uterine cancer, TN, hyperlipidemia, afib on eliquis. She underwent T10-11 laminectomy, L2-S1 laminectomy, and [id number] fusion surgery on 3/18/24. She was discharged to [facility] on 3/22/24. Patient was transferred back to the hospital from [facility] on 24 with bloody wound drainage and post op anemia. She was treated with abx and wound care and returned to [facility]. On  patient was seen in Dr. Royal's office for wound dehiscence. She was again treated with a wound vac and abx. She presented to the [facility] ED on 4/15 with worsening wound dehiscence.   Patient states she is very weak and has not been able to get around much. She endorses a productive cough and fatigue. Experiencing urinary retention. She denies chest pain, nausea, vomiting, headache.  Pt resting comfortably in bed.    Objective:     Vital signs  Temp (24hrs), Av.1 °F (37.3 °C), Min:98.2 °F (36.8 °C), Max:102 °F (38.9 °C)   No intake/output data recorded.   1901 -  0700  In: -   Out: 500 [Urine:500]    /61   Pulse 73   Temp 98.8 °F (37.1 °C) (Oral)   Resp 18   SpO2 98%            Physical Exam:  Gen: No acute distress.   Neuro: A&Ox3. Follows commands. Speech clear. Drowsy.  WALL spontaneously. Strength 5/5 bilat ankle DF/PF.  Sensation intact.  Gait deferred.  Calves soft and supple; No pain with passive stretch  Skin: distal end of incision dehiscence w drainage on bandage    24 hour results:    Recent Results (from the past 24 hour(s))   Comprehensive Metabolic Panel    Collection Time: 04/15/24  3:05 PM   Result Value Ref Range    Sodium 133 (L) 136 - 145 mmol/L    Potassium 4.2 3.5 - 5.1 mmol/L    Chloride 105 97 - 108 mmol/L    CO2 19 (L) 21 - 32 mmol/L    Anion Gap 9  results may rarely occur. Correlate with clinical,epidemiologic, and other laboratory findings   EKG 12 Lead    Collection Time: 04/15/24  3:26 PM   Result Value Ref Range    Ventricular Rate 70 BPM    Atrial Rate 468 BPM    QRS Duration 158 ms    Q-T Interval 450 ms    QTc Calculation (Bazett) 486 ms    R Axis -27 degrees    T Axis 211 degrees    Diagnosis       Ventricular-paced rhythm  Abnormal ECG  When compared with ECG of 01-APR-2024 12:20,  No significant change was found  Confirmed by Leif Reyes ([id number]) on 4/15/2024 10:57:23 PM     COVID-19 & Influenza Combo    Collection Time: 04/15/24  3:39 PM    Specimen: Nasopharyngeal   Result Value Ref Range    SARS-CoV-2, PCR Not detected NOTD      Rapid Influenza A By PCR Not detected NOTD      Rapid Influenza B By PCR Not detected NOTD     CBC with Auto Differential    Collection Time: 04/15/24  4:48 PM   Result Value Ref Range    WBC 17.2 (H) 3.6 - 11.0 K/uL    RBC 2.67 (L) 3.80 - 5.20 M/uL    Hemoglobin 7.4 (L) 11.5 - 16.0 g/dL    Hematocrit 23.6 (L) 35.0 - 47.0 %    MCV 88.4 80.0 - 99.0 FL    MCH 27.7 26.0 - 34.0 PG    MCHC 31.4 30.0 - 36.5 g/dL    RDW 16.7 (H) 11.5 - 14.5 %    Platelets 271 150 - 400 K/uL    MPV 9.3 8.9 - 12.9 FL    Nucleated RBCs 0.1 (H) 0  WBC    nRBC 0.02 (H) 0.00 - 0.01 K/uL    Neutrophils % 90 (H) 32 - 75 %    Lymphocytes % 3 (L) 12 - 49 %    Monocytes % 5 5 - 13 %    Eosinophils % 0 0 - 7 %    Basophils % 1 0 - 1 %    Immature Granulocytes % 1 (H) 0.0 - 0.5 %    Neutrophils Absolute 15.4 (H) 1.8 - 8.0 K/UL    Lymphocytes Absolute 0.5 (L) 0.8 - 3.5 K/UL    Monocytes Absolute 0.9 0.0 - 1.0 K/UL    Eosinophils Absolute 0.0 0.0 - 0.4 K/UL    Basophils Absolute 0.2 (H) 0.0 - 0.1 K/UL    Immature Granulocytes Absolute 0.2 (H) 0.00 - 0.04 K/UL    Differential Type SMEAR SCANNED      RBC Comment ANISOCYTOSIS  1+       Extra Tubes Hold    Collection Time: 04/15/24  4:48 PM   Result Value Ref Range    Specimen HOld 1RED,1BLU     Comment:

## 2024-04-16 NOTE — PROGRESS NOTES
Most recent Chest X-ray viewed by Dr.Samarth Barboza, Radiologist.  He said everything looks good, no abandoned lead, it is attached to the device as it should be.   Patient is scheduled for MRI at 2:30 p.m today.  Called to inform patient's nurse, spoke with Kirsty, she will inform Spring.

## 2024-04-16 NOTE — DIABETES MGMT
[facility]ALTH  DIABETES MANAGEMENT CONSULT    Consulted by Provider for advanced nursing evaluation and care for inpatient blood glucose management.    Evaluation and Action Plan   This 79 year old  female was admitted 4/15/24 from ER after experiencing wound dehiscence, post lumbar laminectomy (3/8/24), and acute blood loss anemia. Multiple services consulted. On Abx. May require re-exploration of operative site. No need for RRT now.     As for Bg control, patient has known Type 2 diabetes treated with insulin therapy PTA. Patient cannot afford Jardiance so she has not been taking. She uses a continuous glucose monitoring to assess BG control. Her admission BG 4/15/24 was in target range and she was given her usual NPH insulin dose that evening. She did not receive insulin throughout 4/16/24. Will use corrective insulin today & re-evaluate tomorrow.    Management Rationale Action Plan   Medication   Basal needs Based on  []        Blood glucose pattern  []        Weight & BMI  []        Kidney dysfunction  []        Home diabetes regimen     Not at this time   Nutritional needs Based on  []        Blood glucose pattern  []        CHO load  []        Enteral feeding CHO load  []        TPN/PPN dextrose load     Not at this time   Corrective insulin Based on sensitivity  []        Low   [x]        Medium  []        High      Additional orders   60 gram carb-controlled meals     Diabetes Discharge Plan   Medication  TBD     Referral  []        Outpatient diabetes education   Additional orders            Initial Presentation   Virginia ÁNGELA Barnes is a 79 y.o. female admitted 4/15/24 from ER after experiencing wound dehiscence, post lumbar laminectomy (3/8/24), and acute blood loss anemia. Came back to [facility] on 4/1/24 before going back to [facility].   LAB: COVID & Flu negative. Blood cultures negative. Hyponatremia. PERRY/eGFR 37. . CRP 25.4. Albumin 1.8. Normal liver enzymes. WBC

## 2024-04-16 NOTE — CONSULTS
[facility]      Nephrology Progress Note  Leny Barnes  Date of Admission : 4/15/2024    CC:  Follow up for CKD       Assessment and Plan     CKD 3a/b 2/2 DM/ HTN- followed by Dr. Khan outpatient  - Presenting Creatinine: 1.4 mg/dl    Baseline creatinine: 1.5-1.7 mg/dl since 12/2022  -  Current Cr 1.49 mg/dl  -  continue with IV albumin- will add Bumex 2 mg IV  - stop IVF  - FR and low salt diet  - bladder scan PRN for UOP <240 cc in 8 hrs.  - Avoid nephrotoxic agents  - No need for urgent RRT  - strict I&O  - daily labs     Hx Chronic diastolic HF  - 4/15 CXR showing mild/ moderate pulmonary edema- repeat CXR pending   - continue with diuresis     Anemia suspect 2/2 CD  - Hgb 8.0  - repeat iron studies/ b12 / folate  - will monitor need for JOSHUA    Sepsis Bacteremia  -3/18  s/p Laminectomy with dehiscence of wound  - 4/15 BC (+) serratia  - ID following  and adjusting abx  - Plans for MRI  - recommends I&D with wound exploration    HTN  - BP improving  - avoid ACE (I)/ ARB at this time     CAD  PAF s/p ablation and pacemaker  DM  FREDERICK on CPAP  Obesity  HLD         Interval History:  This is 79 female with PMH to include  CHF, HTN,DM, FREDERICK on CPAP, HLD, PAF s.p Pacemaker/ ablation, obesity, and CKD since 2019.  The patient  was sent to ER from [facility] for evaluation of wound.  According to the notes she underwent Lumbar laminectomy readmitted to [facility] 2/2  wound dehiscence/ anemia She required abx and wound vac. In the ER she was noted to be SOB along with having temp 102 and per sepsis protocol she was given IVF and anbx coverage (Vanc). Blood cultures (+) and ID is following. The patient is followed outpatient by Dr. Khan last seen in the office 10/2023. Recently her baseline creatinine 1.5-1.7 mg/dl.  Her CXR in ER showing mild/ moderate pulmonary edema - 4/1 CXR (-). Labs from this AM are showing mild hyponatremia , stable creatinine with anemia.    Nephrology was consulted for

## 2024-04-16 NOTE — PROGRESS NOTES
Wound care already gone for the day when patient arrived back to floor from MRI. This RN packed wound with saline damp gauze and covered with dry dressing.

## 2024-04-16 NOTE — PROGRESS NOTES
Lovenox Monitoring  Indication: DVT Prophylaxis/afib (apixaban held for surgery/wound drainage)   Recent Labs     04/15/24  1648 04/16/24  0536   HGB 7.4* 8.0*    250     Current Weight: 119.7 kg  Est. CrCl = 40 ml/min  Current Dose: 40 mg subcutaneously every 24 hours.  Plan: Change to 30 mg subcutaneously every 12 hours for patient weight 101-150.9 kg per protocol.

## 2024-04-17 PROBLEM — B99.9 INFECTION AS COMPLICATION OF MEDICAL CARE: Status: ACTIVE | Noted: 2024-04-17

## 2024-04-17 PROBLEM — L08.9 WOUND INFECTION: Status: ACTIVE | Noted: 2024-04-01

## 2024-04-17 PROBLEM — A41.9 SEPSIS WITHOUT ACUTE ORGAN DYSFUNCTION (HCC): Status: ACTIVE | Noted: 2024-04-17

## 2024-04-17 PROBLEM — R78.81 BACTEREMIA DUE TO GRAM-NEGATIVE BACTERIA: Status: ACTIVE | Noted: 2024-04-17

## 2024-04-17 PROBLEM — T81.42XA DEEP INCISIONAL SURGICAL SITE INFECTION: Status: ACTIVE | Noted: 2024-04-17

## 2024-04-17 PROBLEM — T88.8XXA INFECTION AS COMPLICATION OF MEDICAL CARE: Status: ACTIVE | Noted: 2024-04-17

## 2024-04-17 PROBLEM — T14.8XXA WOUND INFECTION: Status: ACTIVE | Noted: 2024-04-01

## 2024-04-17 LAB
ANION GAP SERPL CALC-SCNC: 7 MMOL/L (ref 5–15)
BUN SERPL-MCNC: 40 MG/DL (ref 6–20)
BUN/CREAT SERPL: 27 (ref 12–20)
CALCIUM SERPL-MCNC: 8.3 MG/DL (ref 8.5–10.1)
CHLORIDE SERPL-SCNC: 109 MMOL/L (ref 97–108)
CO2 SERPL-SCNC: 18 MMOL/L (ref 21–32)
CREAT SERPL-MCNC: 1.48 MG/DL (ref 0.55–1.02)
ERYTHROCYTE [DISTWIDTH] IN BLOOD BY AUTOMATED COUNT: 16.8 % (ref 11.5–14.5)
GLUCOSE BLD STRIP.AUTO-MCNC: 141 MG/DL (ref 65–117)
GLUCOSE BLD STRIP.AUTO-MCNC: 149 MG/DL (ref 65–117)
GLUCOSE BLD STRIP.AUTO-MCNC: 153 MG/DL (ref 65–117)
GLUCOSE BLD STRIP.AUTO-MCNC: 165 MG/DL (ref 65–117)
GLUCOSE SERPL-MCNC: 153 MG/DL (ref 65–100)
HCT VFR BLD AUTO: 23.6 % (ref 35–47)
HGB BLD-MCNC: 7.3 G/DL (ref 11.5–16)
MCH RBC QN AUTO: 27.5 PG (ref 26–34)
MCHC RBC AUTO-ENTMCNC: 30.9 G/DL (ref 30–36.5)
MCV RBC AUTO: 89.1 FL (ref 80–99)
NRBC # BLD: 0.03 K/UL (ref 0–0.01)
NRBC BLD-RTO: 0.2 PER 100 WBC
PLATELET # BLD AUTO: 254 K/UL (ref 150–400)
PMV BLD AUTO: 9.6 FL (ref 8.9–12.9)
POTASSIUM SERPL-SCNC: 3.7 MMOL/L (ref 3.5–5.1)
RBC # BLD AUTO: 2.65 M/UL (ref 3.8–5.2)
SERVICE CMNT-IMP: ABNORMAL
SODIUM SERPL-SCNC: 134 MMOL/L (ref 136–145)
WBC # BLD AUTO: 14.8 K/UL (ref 3.6–11)

## 2024-04-17 PROCEDURE — 6370000000 HC RX 637 (ALT 250 FOR IP): Performed by: HOSPITALIST

## 2024-04-17 PROCEDURE — 6360000002 HC RX W HCPCS: Performed by: PHYSICIAN ASSISTANT

## 2024-04-17 PROCEDURE — 94760 N-INVAS EAR/PLS OXIMETRY 1: CPT

## 2024-04-17 PROCEDURE — P9047 ALBUMIN (HUMAN), 25%, 50ML: HCPCS | Performed by: NURSE PRACTITIONER

## 2024-04-17 PROCEDURE — 99233 SBSQ HOSP IP/OBS HIGH 50: CPT | Performed by: INTERNAL MEDICINE

## 2024-04-17 PROCEDURE — 6360000002 HC RX W HCPCS: Performed by: NURSE PRACTITIONER

## 2024-04-17 PROCEDURE — 82962 GLUCOSE BLOOD TEST: CPT

## 2024-04-17 PROCEDURE — 36415 COLL VENOUS BLD VENIPUNCTURE: CPT

## 2024-04-17 PROCEDURE — 2700000000 HC OXYGEN THERAPY PER DAY

## 2024-04-17 PROCEDURE — 1100000000 HC RM PRIVATE

## 2024-04-17 PROCEDURE — 2580000003 HC RX 258: Performed by: HOSPITALIST

## 2024-04-17 PROCEDURE — 85027 COMPLETE CBC AUTOMATED: CPT

## 2024-04-17 PROCEDURE — 6360000002 HC RX W HCPCS: Performed by: HOSPITALIST

## 2024-04-17 PROCEDURE — 6370000000 HC RX 637 (ALT 250 FOR IP): Performed by: NURSE PRACTITIONER

## 2024-04-17 PROCEDURE — 80048 BASIC METABOLIC PNL TOTAL CA: CPT

## 2024-04-17 PROCEDURE — 2500000003 HC RX 250 WO HCPCS

## 2024-04-17 PROCEDURE — 94640 AIRWAY INHALATION TREATMENT: CPT

## 2024-04-17 PROCEDURE — 6370000000 HC RX 637 (ALT 250 FOR IP): Performed by: PHYSICIAN ASSISTANT

## 2024-04-17 RX ORDER — GUAIFENESIN 200 MG/10ML
200 LIQUID ORAL EVERY 6 HOURS PRN
Status: DISCONTINUED | OUTPATIENT
Start: 2024-04-17 | End: 2024-05-03 | Stop reason: HOSPADM

## 2024-04-17 RX ORDER — BUMETANIDE 0.25 MG/ML
2 INJECTION INTRAMUSCULAR; INTRAVENOUS 2 TIMES DAILY
Status: DISCONTINUED | OUTPATIENT
Start: 2024-04-17 | End: 2024-04-19

## 2024-04-17 RX ORDER — HYDROMORPHONE HYDROCHLORIDE 1 MG/ML
0.25 INJECTION, SOLUTION INTRAMUSCULAR; INTRAVENOUS; SUBCUTANEOUS
Status: COMPLETED | OUTPATIENT
Start: 2024-04-17 | End: 2024-04-17

## 2024-04-17 RX ORDER — ALBUMIN (HUMAN) 12.5 G/50ML
25 SOLUTION INTRAVENOUS 2 TIMES DAILY
Status: COMPLETED | OUTPATIENT
Start: 2024-04-17 | End: 2024-04-18

## 2024-04-17 RX ADMIN — NYSTATIN 500000 UNITS: 100000 SUSPENSION ORAL at 12:09

## 2024-04-17 RX ADMIN — ACETAMINOPHEN 1000 MG: 325 TABLET ORAL at 17:17

## 2024-04-17 RX ADMIN — Medication 5 ML: at 12:08

## 2024-04-17 RX ADMIN — ALBUMIN (HUMAN) 25 G: 0.25 INJECTION, SOLUTION INTRAVENOUS at 21:27

## 2024-04-17 RX ADMIN — GUAIFENESIN 200 MG: 200 SOLUTION ORAL at 22:43

## 2024-04-17 RX ADMIN — NYSTATIN 500000 UNITS: 100000 SUSPENSION ORAL at 21:13

## 2024-04-17 RX ADMIN — ARFORMOTEROL TARTRATE: 15 SOLUTION RESPIRATORY (INHALATION) at 20:50

## 2024-04-17 RX ADMIN — SERTRALINE HYDROCHLORIDE 150 MG: 50 TABLET ORAL at 21:14

## 2024-04-17 RX ADMIN — IPRATROPIUM BROMIDE 0.5 MG: 0.5 SOLUTION RESPIRATORY (INHALATION) at 07:35

## 2024-04-17 RX ADMIN — BUMETANIDE 2 MG: 0.25 INJECTION INTRAMUSCULAR; INTRAVENOUS at 09:22

## 2024-04-17 RX ADMIN — AMLODIPINE BESYLATE 5 MG: 5 TABLET ORAL at 09:22

## 2024-04-17 RX ADMIN — CEFEPIME 2000 MG: 2 INJECTION, POWDER, FOR SOLUTION INTRAVENOUS at 21:44

## 2024-04-17 RX ADMIN — ARFORMOTEROL TARTRATE: 15 SOLUTION RESPIRATORY (INHALATION) at 07:34

## 2024-04-17 RX ADMIN — ACETAMINOPHEN 1000 MG: 325 TABLET ORAL at 12:09

## 2024-04-17 RX ADMIN — CEFEPIME 2000 MG: 2 INJECTION, POWDER, FOR SOLUTION INTRAVENOUS at 09:36

## 2024-04-17 RX ADMIN — Medication 1 MG: at 09:22

## 2024-04-17 RX ADMIN — ENOXAPARIN SODIUM 30 MG: 100 INJECTION SUBCUTANEOUS at 09:23

## 2024-04-17 RX ADMIN — IRON SUCROSE 200 MG: 20 INJECTION, SOLUTION INTRAVENOUS at 12:08

## 2024-04-17 RX ADMIN — Medication 5 ML: at 17:26

## 2024-04-17 RX ADMIN — BUMETANIDE 2 MG: 0.25 INJECTION INTRAMUSCULAR; INTRAVENOUS at 17:18

## 2024-04-17 RX ADMIN — IPRATROPIUM BROMIDE 0.5 MG: 0.5 SOLUTION RESPIRATORY (INHALATION) at 20:50

## 2024-04-17 RX ADMIN — SODIUM CHLORIDE, PRESERVATIVE FREE 10 ML: 5 INJECTION INTRAVENOUS at 21:15

## 2024-04-17 RX ADMIN — GABAPENTIN 600 MG: 300 CAPSULE ORAL at 17:18

## 2024-04-17 RX ADMIN — ENOXAPARIN SODIUM 30 MG: 100 INJECTION SUBCUTANEOUS at 21:13

## 2024-04-17 RX ADMIN — NYSTATIN 500000 UNITS: 100000 SUSPENSION ORAL at 17:17

## 2024-04-17 RX ADMIN — FERROUS SULFATE TAB 325 MG (65 MG ELEMENTAL FE) 325 MG: 325 (65 FE) TAB at 09:22

## 2024-04-17 RX ADMIN — ACETAMINOPHEN 1000 MG: 325 TABLET ORAL at 00:29

## 2024-04-17 RX ADMIN — BUPROPION HYDROCHLORIDE 150 MG: 150 TABLET, EXTENDED RELEASE ORAL at 09:23

## 2024-04-17 RX ADMIN — ALLOPURINOL 100 MG: 100 TABLET ORAL at 09:25

## 2024-04-17 RX ADMIN — ALBUMIN (HUMAN) 25 G: 0.25 INJECTION, SOLUTION INTRAVENOUS at 09:31

## 2024-04-17 RX ADMIN — HYDROMORPHONE HYDROCHLORIDE 0.25 MG: 1 INJECTION, SOLUTION INTRAMUSCULAR; INTRAVENOUS; SUBCUTANEOUS at 17:21

## 2024-04-17 ASSESSMENT — PAIN DESCRIPTION - DESCRIPTORS: DESCRIPTORS: ACHING

## 2024-04-17 ASSESSMENT — PAIN SCALES - GENERAL
PAINLEVEL_OUTOF10: 6
PAINLEVEL_OUTOF10: 0
PAINLEVEL_OUTOF10: 2

## 2024-04-17 ASSESSMENT — PAIN DESCRIPTION - LOCATION: LOCATION: BACK;CHEST

## 2024-04-17 ASSESSMENT — PAIN DESCRIPTION - ORIENTATION: ORIENTATION: ANTERIOR;POSTERIOR

## 2024-04-17 NOTE — PROGRESS NOTES
Orthopedic Spine Progress Note  Post Op day: * No surgery found *    2024 8:23 AM   Admit Date: 4/15/2024  Procedure: * No surgery found *    Subjective:     Leny Barnes has complaints of low back pain and dyspnea .   Tolerating diet.  No N/V.    Pain Control:        Objective:          Physical Exam:  General:  Alert and oriented. No acute distress.   Heart:  Respirations unlabored.   Abdomen:   Extremities: Soft, non-tender.  No evidence of cyanosis. Pulses palpable in both upper and lower extremities.       Neurologic:  Musculoskeletal:  No new motor deficits. Neurovascular exam within normal limits.  Sensation stable.  Motor: unchanged C5-T1 and L2-S1.   Olivia's sign negative in bilateral lower extremities.  Calves soft, nontender upon palpation and with passive twitch.  Moves both upper and lower extremities.   Incision: Diffuse dehiscence noted along course of lumbar incision, most significant at inferior portion of incision, where there is packing in the open wound. Erythema at wound edges. Moderate serosanguineous appearing drainage soaking patient's bandage.        Vital Signs:    Blood pressure 113/65, pulse 71, temperature 97.9 °F (36.6 °C), temperature source Axillary, resp. rate 18, SpO2 97 %.  Temp (24hrs), Av.9 °F (36.6 °C), Min:97.3 °F (36.3 °C), Max:98.8 °F (37.1 °C)      LAB:    Recent Labs     24  0029   HCT 23.6*   HGB 7.3*        Lab Results   Component Value Date/Time     2024 12:29 AM    K 3.7 2024 12:29 AM     2024 12:29 AM    CO2 18 2024 12:29 AM    BUN 40 2024 12:29 AM       Intake/Output: 07 -  190  In: 2758.6 [I.V.:2659]  Out: -   04/15 190 -  0700  In: -   Out: 1350 [Urine:1350]    PT/OT:   Gait:                    Assessment:   Patient is * No surgery found * s/p * No surgery found *    Plan:     Postoperative wound dehiscence and drainage. Pt with sepsis and positive blood cx. MRI reviewed

## 2024-04-17 NOTE — PROGRESS NOTES
Infectious Disease Progress Note     Subjective:      SOB, afebrile    Objective:    Vitals:   Patient Vitals for the past 24 hrs:   Temp Pulse Resp BP SpO2   04/17/24 0753 97.9 °F (36.6 °C) 71 18 113/65 97 %   04/17/24 0735 -- 71 16 -- 98 %   04/17/24 0610 98.2 °F (36.8 °C) 69 -- (!) 130/50 99 %   04/16/24 2139 -- -- 18 -- --   04/16/24 2000 97.3 °F (36.3 °C) 69 16 (!) 110/48 --   04/16/24 1957 97.3 °F (36.3 °C) 69 16 -- 98 %   04/16/24 1638 -- 80 -- -- 98 %   04/16/24 1620 -- 79 -- -- 100 %   04/16/24 1605 -- 81 -- -- 100 %   04/16/24 1602 -- 80 -- -- 98 %       Physical Exam:  Gen: SOB  HEENT:  Normocephalic, atraumatic + JVP elevation  CV: normal rate  Lungs: decreased bs b/l bases  Abdomen: soft, non tender, non distended  Genitourinary: no holly catheter   Skin: dehiscence at surgical site on back  Psych: good affect, good eye contact, non tearful  Neuro: alert, oriented to time,  place, and situation, moves all extremities to commands, verbal  Musculoskeletal:  No joint edema, erythema or tenderness noted     Central Line:      Medications:    Current Facility-Administered Medications:     bumetanide (BUMEX) injection 2 mg, 2 mg, IntraVENous, BID, Liz Anaya APRN - NP, 2 mg at 04/17/24 0922    albumin human 25% IV solution 25 g, 25 g, IntraVENous, BID, Liz Anaya APRN - NP, Stopped at 04/17/24 1020    ipratropium (ATROVENT) 0.02 % nebulizer solution 0.5 mg, 0.5 mg, Nebulization, BID RT, Susan Morocho MD magic (miracle) mouthwash, 5 mL, Swish & Spit, 4x Daily PRN, Nickie Baez PA, 5 mL at 04/17/24 1208    nystatin (MYCOSTATIN) 680708 UNIT/ML suspension 500,000 Units, 5 mL, Oral, 4x Daily, Nickie Baez PA, 500,000 Units at 04/17/24 1209    HYDROmorphone HCl PF (DILAUDID) injection 0.25 mg, 0.25 mg, IntraVENous, Once PRN, Nickie Baez PA    iron sucrose (VENOFER) injection 200 mg, 200 mg, IntraVENous, Q24H, Liz Anaya APRN - NP, 200 mg at 04/17/24 1208    sertraline

## 2024-04-17 NOTE — WOUND CARE
Discussed patient with DWAYNE Ortez, who reports slight redness to buttocks and has a sacral foam in place. Will add Venelex.   She is resting on a Wes gel mattress and I have ordered an air pump to be delivered to create a CHRIS surface.   Neelima reports a return to the OR for debridement and CLIFF Fu, note supports this.   OK to continue packing daily with saline-damp gauze and cover with dry dressing until debridement.  Will follow along for support after debridement unless primarily closed.  Offload heels.   EVERTON Angelo

## 2024-04-17 NOTE — PROGRESS NOTES
[facility]      Nephrology Progress Note  Leny Barnes  Date of Admission : 4/15/2024    CC:  Follow up for CKD 3b with volume overload       Assessment and Plan       CKD 3a/b 2/2 DM/ HTN- followed by Dr. Khan outpatient  - Presenting Creatinine: 1.4 mg/dl    Baseline creatinine: 1.5-1.7 mg/dl since 12/2022  -  Current Cr 1.50 mg/dl  -  continue with IV albumin/ Bumex 2 mg IV BID  - FR and low salt diet  - continue with holly for accurate I&O  - Avoid nephrotoxic agents  - No need for urgent RRT  - strict I&O  - daily labs     Hx Chronic diastolic HF  - 4/16 CXR  with increase pulmonary edema   - continue with diuresis     Anemia suspect 2/2 CD  - Hgb 7.3  - tsat 5% with Ferritin 191- Give IV Fe x 3 doses  - b12 stable/ folate low on folic acid/ Fe Sulfate  - will monitor need for JOSHUA  - transfuse for Hgb< 7     Sepsis Bacteremia  -3/18  s/p Laminectomy with dehiscence of wound  - 4/15 BC (+) serratia  - ID following  and adjusting abx  - 4/16 MRI (+) fluid/ gas  - Plans for surgery pending OR schedule     HTN  - BP stable  - avoid ACE (I)/ ARB at this time     CAD  PAF s/p ablation and pacemaker  DM  FREDERICK on CPAP  Obesity  HLD          Interval History:  Seen and examined this AM. She continues with SOB and feels her breathing is more labored. Her O2 requirements have increase overnight.    Current Medications: all current  Medications have been eviewed in EPIC  Review of Systems: Pertinent items are noted in HPI.    Objective:  Vitals:    Vitals:    04/16/24 2000 04/16/24 2139 04/17/24 0610 04/17/24 0753   BP: (!) 110/48  (!) 130/50 113/65   Pulse: 69  69 71   Resp: 16 18 18   Temp: 97.3 °F (36.3 °C)  98.2 °F (36.8 °C) 97.9 °F (36.6 °C)   TempSrc: Oral  Oral Axillary   SpO2:   99% 97%     Intake and Output:  04/17 0701 - 04/17 1900  In: 2758.6 [I.V.:2659]  Out: -   04/15 1901 - 04/17 0700  In: -   Out: 1350 [Urine:1350]    Physical Examination:  General: NAD,Conversant

## 2024-04-17 NOTE — CARE COORDINATION
04/17/24 1618   Readmission Assessment   Number of Days since last admission? 8-30 days   Previous Disposition Acute Rehab   Who is being Interviewed Caregiver;Patient   What was the patient's/caregiver's perception as to why they think they needed to return back to the hospital? Other (Comment)  (Pending Procedure)   Did you visit your Primary Care Physician after you left the hospital, before you returned this time? No   Why weren't you able to visit your PCP? Did not have an appointment   Did you see a specialist, such as Cardiac, Pulmonary, Orthopedic Physician, etc. after you left the hospital? No   Who advised the patient to return to the hospital? Acute Rehab   Does the patient report anything that got in the way of taking their medications? No   In our efforts to provide the best possible care to you and others like you, can you think of anything that we could have done to help you after you left the hospital the first time, so that you might not have needed to return so soon? Discharge instructions that are concise, clear, and non contradictory;Additional Community resources available for illness support;Education on how to continue taking medications upon discharge

## 2024-04-17 NOTE — PROGRESS NOTES
Spiritual Care Assessment/Progress Note  [facility]    Name: Leny Barnes MRN: [id number]    Age: 79 y.o.     Sex: female   Language: English     Date: 4/17/2024            Total Time Calculated: 15 min              Spiritual Assessment begun in [facility] 5W1 ORTHO SPINE  Service Provided For:: Patient not available  Referral/Consult From:: Palliative Care  Encounter Overview/Reason : Palliative Care    Spiritual beliefs:      [] Involved in a cherelle tradition/spiritual practice:      [] Supported by a cherelle community:      [] Claims no spiritual orientation:      [] Seeking spiritual identity:           [] Adheres to an individual form of spirituality:      [x] Not able to assess:                Identified resources for coping and support system:   Support System: Children, Palliative Care       [] Prayer                  [] Devotional reading               [] Music                  [] Guided Imagery     [] Pet visits                                        [] Other: (COMMENT)     Specific area/focus of visit   Encounter:    Crisis:    Spiritual/Emotional needs:    Ritual, Rites and Sacraments:    Grief, Loss, and Adjustments:    Ethics/Mediation:    Behavioral Health:    Palliative Care: Type: Palliative Care, Initial/Spiritual Assessment  Advance Care Planning:      I attempted to visit Leny Barnes for a palliative initial spiritual assessment. Patient's chart was consulted. Unable to complete assessment at this time, as patient was sleeping and did not awake. No family present.  Chaplain Shonda, [place], MS, BCC

## 2024-04-17 NOTE — PROGRESS NOTES
Spine Surgery Progress Note  Nickie Baez PA-C    Admit Date: 4/15/2024   LOS: 2 days      Daily Progress Note: 2024    Subjective:     Ms. Barnes is a pleasant 80yo female with a PMH of CAD w stents, CHF, CKD, morbid obesity, FREDERICK, asthma, uterine cancer, TN, hyperlipidemia, afib on eliquis. She underwent T10-11 laminectomy, L2-S1 laminectomy, and [id number]-[id number] fusion surgery on 3/18/24. She was discharged to [facility] on 3/22/24. Patient was transferred back to the hospital from [facility] on 24 with bloody wound drainage and post op anemia. She was treated with abx and wound care and returned to [facility]. On  patient was seen in Dr. Royal's office for wound dehiscence. She was again treated with a wound vac and abx. She presented to the [facility] ED on 4/15 with worsening wound dehiscence.   Patient states she is very weak and has not been able to get around much. She endorses a productive cough and fatigue. Experiencing urinary retention.   Smith placed on .  Today, she continues to have a cough that she is concerned about.   She also endorses mouth sores that make it difficult to eat and swallow. She denies chest pain, nausea, vomiting, headache.  Pt resting comfortably in bed.    Objective:     Vital signs  Temp (24hrs), Av.7 °F (36.5 °C), Min:97.3 °F (36.3 °C), Max:98.2 °F (36.8 °C)   701 -  190  In: 2758.6 [I.V.:2659]  Out: -   04/15 190 -  0700  In: -   Out: 1350 [Urine:1350]    /65   Pulse 71   Temp 97.9 °F (36.6 °C) (Axillary)   Resp 18   SpO2 97%            Physical Exam:  Gen: No acute distress.   Neuro: A&Ox3. Follows commands. Speech clear. Drowsy.  WALL spontaneously. Generalized weakness. Strength 4+/5 bilat ankle DF/PF.  Sensation intact.  Gait deferred.  Calves soft and supple; No pain with passive stretch  Skin: distal end of incision dehiscence w drainage on bandage  Erythema and white lesions on tongue and back of mouth/throat    24 hour

## 2024-04-17 NOTE — PROGRESS NOTES
Hospitalist Progress Note  Bonnie Chapman MD  Answering service: [phone] OR [phone] from in house phone        Date of Service:  2024  NAME:  Leny Barnes  :  1945  MRN:  [id number]      Admission Summary:   Leny Barnes is a 79 y.o. female who presents with wound dehiscence. Patient is a 79-year-old female returns from [facility] where she was discharged on  after being treated for wound dehiscence at that time as well he had lumbar laminectomy on 3/8.  Initially patient was discharged from  to [facility] rehab on 3/22 she was then noted to have bloody drainage wound dehiscence with acute blood loss anemia and was readmitted to emergency department on  and then redischarged to [facility] back on . The patient was given IV antibiotics and a wound VAC was placed over the wound by wound care.  Patient also was treated with Klebsiella UTI and placed on vancomycin and Zosyn. The patient returns today by ambulance with significant wound dehiscence and bloody drainage.         Interval history / Subjective:   Follow-up for sepsis bacteremia wound dehiscence   Gram-negative rods 4 x 4 in blood culture possible sepsis from wound dehiscence wound infection MRI showing new collection patient will be taken to the OR timing to be determined as per orthopedic surgery     Assessment & Plan:     Sepsis from wound infection wound dehiscence with bacteremia  Sepsis supported by high fever of 102 WBC count of 17.4  Lumbar stenosis with claudication s/p T10-11, and L2-S1 laminectomy, and [id number] fusion on 3/18  -sent from [facility] for wound dehiscence, and drainage  -abx with vanc and cefepime , culture positive for Serratia ESR CRP elevated wound culture ID consult appreciate recommendation-- on cefepime  -ortho consulted and recommend IV abx and wound vac MRI -- new collection need  Most recent are:  BP (!) 127/50   Pulse 70   Temp 98.2 °F (36.8 °C) (Oral)   Resp 20   SpO2 99%        Intake/Output Summary (Last 24 hours) at 4/17/2024 1546  Last data filed at 4/17/2024 0728  Gross per 24 hour   Intake 2758.64 ml   Output 850 ml   Net 1908.64 ml          Physical Examination:     I had a face to face encounter with this patient and independently examined them on 4/17/2024 as outlined below:          General : alert x 3, awake, no acute distress,   HEENT: PEERL, EOMI, moist mucus membrane  Neck: supple, no JVD, no meningeal signs  Chest: Clear to auscultation bilaterally   CVS: S1 S2 heard, Capillary refill less than 2 seconds  Abd: soft/ non tender, non distended, BS physiological,   Ext: no clubbing, no cyanosis, no edema, brisk 2+ DP pulses  Neuro/Psych: pleasant mood and affect, CN 2-12 grossly intact, sensory grossly within normal limit, Strength 5/5 in all extremities  Skin: warm            Data Review:    I personally reviewed  Image and LABS    I have independently reviewed and interpreted patient's lab and all other diagnostic data    Notes reviewed from all clinical/nonclinical/nursing services involved in patient's clinical care. Care coordination discussions were held with appropriate clinical/nonclinical/ nursing providers based on care coordination needs.     Labs:     Recent Labs     04/16/24  0536 04/17/24  0029   WBC 16.5* 14.8*   HGB 8.0* 7.3*   HCT 25.1* 23.6*    254       Recent Labs     04/16/24  0536 04/16/24  0958 04/17/24  0029   * 134* 134*   K 3.9 3.8 3.7    107 109*   CO2 22 23 18*   BUN 41* 43* 40*   PHOS  --  3.5  --        Recent Labs     04/15/24  1505   ALT 9*   GLOB 4.6*       No results for input(s): \"INR\", \"APTT\" in the last 72 hours.    Invalid input(s): \"PTP\"   Recent Labs     04/16/24  0958   TIBC 164*        No results found for: \"FOL\", \"RBCF\"   No results for input(s): \"PH\", \"PCO2\", \"PO2\" in the last 72 hours.  No results for input(s):

## 2024-04-17 NOTE — CARE COORDINATION
Transition of Care Plan:    RUR: 26%   Prior Level of Functioning: Independent   Disposition: IPR vs. SNF \"Awaiting updated therapy recs post procedure\"   The pt admitted from [facility]   Follow up appointments: PCP   DME needed: None   Transportation at discharge: BLS   IM/IMM Medicare/ letter given: Received  Caregiver Contact: Breonna Barnes (Child)  [phone]  Discharge Caregiver contacted prior to discharge? N/A  Care Conference needed? N/A   Barriers to discharge: Pending Procedure-Friday Tentative plans for I&D surgery; awaiting final time and date, ABX, Supplemental O2,     Following: Wound Care, Neurosurgery, ID, Nephro,    04/16/24 5943   Service Assessment   Patient Orientation Alert and Oriented   Cognition Alert   History Provided By Patient;Child/Family   Primary Caregiver Family   Support Systems Children   Patient's Healthcare Decision Maker is: Named in Scanned ACP Document   PCP Verified by CM Yes   Last Visit to PCP Within last 3 months   Prior Functional Level Assistance with the following:;Mobility;Shopping;Housework   Current Functional Level Assistance with the following:;Housework;Mobility;Shopping   Can patient return to prior living arrangement Unknown at present   Ability to make needs known: Good   Family able to assist with home care needs: Yes   Financial Resources Medicare   Social/Functional History   Lives With Family   Type of Home House   Home Layout Two level;Bed/Bath upstairs   Home Access Stairs to enter with rails   Entrance Stairs - Number of Steps 5   Entrance Stairs - Rails Both   Bathroom Shower/Tub Tub/Shower unit   Bathroom Toilet Handicap height   Bathroom Equipment Tub transfer bench;Grab bars in shower   Bathroom Accessibility Accessible   Home Equipment Walker, rolling;Cane;Reacher;Sock aid   Receives Help From Family   Homemaking Assistance Independent   Ambulation Assistance Independent   Transfer Assistance Independent   Active  No

## 2024-04-18 ENCOUNTER — APPOINTMENT (OUTPATIENT)
Facility: HOSPITAL | Age: 79
End: 2024-04-18
Payer: MEDICARE

## 2024-04-18 LAB
ANION GAP SERPL CALC-SCNC: 6 MMOL/L (ref 5–15)
BACTERIA SPEC CULT: ABNORMAL
BUN SERPL-MCNC: 39 MG/DL (ref 6–20)
BUN/CREAT SERPL: 28 (ref 12–20)
CALCIUM SERPL-MCNC: 8.6 MG/DL (ref 8.5–10.1)
CHLORIDE SERPL-SCNC: 108 MMOL/L (ref 97–108)
CO2 SERPL-SCNC: 22 MMOL/L (ref 21–32)
CREAT SERPL-MCNC: 1.41 MG/DL (ref 0.55–1.02)
ERYTHROCYTE [DISTWIDTH] IN BLOOD BY AUTOMATED COUNT: 17 % (ref 11.5–14.5)
GLUCOSE BLD STRIP.AUTO-MCNC: 126 MG/DL (ref 65–117)
GLUCOSE BLD STRIP.AUTO-MCNC: 131 MG/DL (ref 65–117)
GLUCOSE BLD STRIP.AUTO-MCNC: 153 MG/DL (ref 65–117)
GLUCOSE BLD STRIP.AUTO-MCNC: 155 MG/DL (ref 65–117)
GLUCOSE SERPL-MCNC: 129 MG/DL (ref 65–100)
HCT VFR BLD AUTO: 23.9 % (ref 35–47)
HGB BLD-MCNC: 7.5 G/DL (ref 11.5–16)
MCH RBC QN AUTO: 28.1 PG (ref 26–34)
MCHC RBC AUTO-ENTMCNC: 31.4 G/DL (ref 30–36.5)
MCV RBC AUTO: 89.5 FL (ref 80–99)
NRBC # BLD: 0.06 K/UL (ref 0–0.01)
NRBC BLD-RTO: 0.5 PER 100 WBC
PLATELET # BLD AUTO: 261 K/UL (ref 150–400)
PMV BLD AUTO: 9.5 FL (ref 8.9–12.9)
POTASSIUM SERPL-SCNC: 3.6 MMOL/L (ref 3.5–5.1)
RBC # BLD AUTO: 2.67 M/UL (ref 3.8–5.2)
SERVICE CMNT-IMP: ABNORMAL
SODIUM SERPL-SCNC: 136 MMOL/L (ref 136–145)
WBC # BLD AUTO: 12.6 K/UL (ref 3.6–11)

## 2024-04-18 PROCEDURE — 80048 BASIC METABOLIC PNL TOTAL CA: CPT

## 2024-04-18 PROCEDURE — 94760 N-INVAS EAR/PLS OXIMETRY 1: CPT

## 2024-04-18 PROCEDURE — 6370000000 HC RX 637 (ALT 250 FOR IP): Performed by: INTERNAL MEDICINE

## 2024-04-18 PROCEDURE — 6360000002 HC RX W HCPCS: Performed by: HOSPITALIST

## 2024-04-18 PROCEDURE — 6360000002 HC RX W HCPCS: Performed by: NURSE PRACTITIONER

## 2024-04-18 PROCEDURE — 87040 BLOOD CULTURE FOR BACTERIA: CPT

## 2024-04-18 PROCEDURE — 6360000002 HC RX W HCPCS: Performed by: INTERNAL MEDICINE

## 2024-04-18 PROCEDURE — 2580000003 HC RX 258: Performed by: HOSPITALIST

## 2024-04-18 PROCEDURE — 6370000000 HC RX 637 (ALT 250 FOR IP): Performed by: NURSE PRACTITIONER

## 2024-04-18 PROCEDURE — 6370000000 HC RX 637 (ALT 250 FOR IP): Performed by: PHYSICIAN ASSISTANT

## 2024-04-18 PROCEDURE — 2700000000 HC OXYGEN THERAPY PER DAY

## 2024-04-18 PROCEDURE — 1100000000 HC RM PRIVATE

## 2024-04-18 PROCEDURE — 36415 COLL VENOUS BLD VENIPUNCTURE: CPT

## 2024-04-18 PROCEDURE — 99233 SBSQ HOSP IP/OBS HIGH 50: CPT | Performed by: INTERNAL MEDICINE

## 2024-04-18 PROCEDURE — 94640 AIRWAY INHALATION TREATMENT: CPT

## 2024-04-18 PROCEDURE — 2500000003 HC RX 250 WO HCPCS

## 2024-04-18 PROCEDURE — 82962 GLUCOSE BLOOD TEST: CPT

## 2024-04-18 PROCEDURE — 85027 COMPLETE CBC AUTOMATED: CPT

## 2024-04-18 PROCEDURE — 6360000002 HC RX W HCPCS: Performed by: PHYSICIAN ASSISTANT

## 2024-04-18 PROCEDURE — 6370000000 HC RX 637 (ALT 250 FOR IP): Performed by: HOSPITALIST

## 2024-04-18 PROCEDURE — 71045 X-RAY EXAM CHEST 1 VIEW: CPT

## 2024-04-18 PROCEDURE — P9047 ALBUMIN (HUMAN), 25%, 50ML: HCPCS | Performed by: NURSE PRACTITIONER

## 2024-04-18 PROCEDURE — 51702 INSERT TEMP BLADDER CATH: CPT

## 2024-04-18 RX ORDER — METOLAZONE 5 MG/1
5 TABLET ORAL ONCE
Status: COMPLETED | OUTPATIENT
Start: 2024-04-18 | End: 2024-04-18

## 2024-04-18 RX ADMIN — ACETAMINOPHEN 1000 MG: 325 TABLET ORAL at 07:12

## 2024-04-18 RX ADMIN — METOLAZONE 5 MG: 5 TABLET ORAL at 11:40

## 2024-04-18 RX ADMIN — NYSTATIN 500000 UNITS: 100000 SUSPENSION ORAL at 17:57

## 2024-04-18 RX ADMIN — EPOETIN ALFA-EPBX 20000 UNITS: 20000 INJECTION, SOLUTION INTRAVENOUS; SUBCUTANEOUS at 17:56

## 2024-04-18 RX ADMIN — AMLODIPINE BESYLATE 5 MG: 5 TABLET ORAL at 08:35

## 2024-04-18 RX ADMIN — FERROUS SULFATE TAB 325 MG (65 MG ELEMENTAL FE) 325 MG: 325 (65 FE) TAB at 08:35

## 2024-04-18 RX ADMIN — GUAIFENESIN 200 MG: 200 SOLUTION ORAL at 04:42

## 2024-04-18 RX ADMIN — ACETAMINOPHEN 1000 MG: 325 TABLET ORAL at 11:40

## 2024-04-18 RX ADMIN — BUPROPION HYDROCHLORIDE 150 MG: 150 TABLET, EXTENDED RELEASE ORAL at 08:35

## 2024-04-18 RX ADMIN — SERTRALINE HYDROCHLORIDE 150 MG: 50 TABLET ORAL at 20:46

## 2024-04-18 RX ADMIN — IRON SUCROSE 200 MG: 20 INJECTION, SOLUTION INTRAVENOUS at 11:42

## 2024-04-18 RX ADMIN — BUMETANIDE 2 MG: 0.25 INJECTION INTRAMUSCULAR; INTRAVENOUS at 17:57

## 2024-04-18 RX ADMIN — ENOXAPARIN SODIUM 30 MG: 100 INJECTION SUBCUTANEOUS at 10:11

## 2024-04-18 RX ADMIN — IPRATROPIUM BROMIDE 0.5 MG: 0.5 SOLUTION RESPIRATORY (INHALATION) at 19:11

## 2024-04-18 RX ADMIN — ALBUMIN (HUMAN) 25 G: 0.25 INJECTION, SOLUTION INTRAVENOUS at 10:10

## 2024-04-18 RX ADMIN — CEFEPIME 2000 MG: 2 INJECTION, POWDER, FOR SOLUTION INTRAVENOUS at 22:14

## 2024-04-18 RX ADMIN — ARFORMOTEROL TARTRATE: 15 SOLUTION RESPIRATORY (INHALATION) at 08:22

## 2024-04-18 RX ADMIN — GABAPENTIN 600 MG: 300 CAPSULE ORAL at 17:57

## 2024-04-18 RX ADMIN — Medication 1 MG: at 08:35

## 2024-04-18 RX ADMIN — IPRATROPIUM BROMIDE 0.5 MG: 0.5 SOLUTION RESPIRATORY (INHALATION) at 08:22

## 2024-04-18 RX ADMIN — CEFEPIME 2000 MG: 2 INJECTION, POWDER, FOR SOLUTION INTRAVENOUS at 10:36

## 2024-04-18 RX ADMIN — NYSTATIN 500000 UNITS: 100000 SUSPENSION ORAL at 23:35

## 2024-04-18 RX ADMIN — ALBUMIN (HUMAN) 25 G: 0.25 INJECTION, SOLUTION INTRAVENOUS at 20:39

## 2024-04-18 RX ADMIN — SODIUM CHLORIDE, PRESERVATIVE FREE 10 ML: 5 INJECTION INTRAVENOUS at 08:35

## 2024-04-18 RX ADMIN — NYSTATIN 500000 UNITS: 100000 SUSPENSION ORAL at 08:35

## 2024-04-18 RX ADMIN — ARFORMOTEROL TARTRATE: 15 SOLUTION RESPIRATORY (INHALATION) at 19:11

## 2024-04-18 RX ADMIN — ALLOPURINOL 100 MG: 100 TABLET ORAL at 08:35

## 2024-04-18 RX ADMIN — ACETAMINOPHEN 1000 MG: 325 TABLET ORAL at 17:57

## 2024-04-18 RX ADMIN — BUMETANIDE 2 MG: 0.25 INJECTION INTRAMUSCULAR; INTRAVENOUS at 09:43

## 2024-04-18 RX ADMIN — SODIUM CHLORIDE, PRESERVATIVE FREE 10 ML: 5 INJECTION INTRAVENOUS at 20:46

## 2024-04-18 ASSESSMENT — PAIN DESCRIPTION - DESCRIPTORS: DESCRIPTORS: PRESSURE

## 2024-04-18 ASSESSMENT — PAIN SCALES - GENERAL
PAINLEVEL_OUTOF10: 0
PAINLEVEL_OUTOF10: 2
PAINLEVEL_OUTOF10: 5

## 2024-04-18 ASSESSMENT — PAIN DESCRIPTION - LOCATION
LOCATION: BACK
LOCATION: BACK;ABDOMEN

## 2024-04-18 NOTE — PROGRESS NOTES
Infectious Disease Progress Note     Subjective:      Still SOB and coughing, did not sleep well due to respiratory issues    Objective:    Vitals:   Patient Vitals for the past 24 hrs:   Temp Pulse Resp BP SpO2   04/18/24 0800 97.5 °F (36.4 °C) 70 -- (!) 123/108 93 %   04/18/24 0315 -- -- -- 109/68 --   04/18/24 0309 98.2 °F (36.8 °C) 71 18 (!) 116/46 90 %   04/17/24 2130 98.2 °F (36.8 °C) 69 22 (!) 120/40 95 %   04/17/24 2051 -- -- 22 -- --   04/17/24 1453 98.2 °F (36.8 °C) 70 20 (!) 127/50 99 %         Physical Exam:  Gen: SOB  HEENT:  Normocephalic, atraumatic + JVP elevation persistent  CV: normal rate  Lungs: decreased bs b/l bases and insp crackles b/l  Abdomen: soft, non tender, non distended  Genitourinary: no holly catheter   Skin: dehiscence at surgical site on back  Psych: good affect, good eye contact, non tearful  Neuro: alert, oriented to time,  place, and situation, moves all extremities to commands, verbal  Musculoskeletal:  No joint edema, erythema or tenderness noted     Central Line:      Medications:    Current Facility-Administered Medications:     metOLazone (ZAROXOLYN) tablet 5 mg, 5 mg, Oral, Once, Thomas Khan MD    Epoetin Jimy-epbx (RETACRIT) injection 20,000 Units, 20,000 Units, SubCUTAneous, Once per day on Tue Thu Sat, Thomas Khan MD    bumetanide (BUMEX) injection 2 mg, 2 mg, IntraVENous, BID, Liz Anaya APRN - NP, 2 mg at 04/18/24 0943    albumin human 25% IV solution 25 g, 25 g, IntraVENous, BID, Liz Anaya APRN - NP, Last Rate: 100 mL/hr at 04/18/24 1010, 25 g at 04/18/24 1010    ipratropium (ATROVENT) 0.02 % nebulizer solution 0.5 mg, 0.5 mg, Nebulization, BID RT, Susan Morocho MD, 0.5 mg at 04/18/24 0822    magic (miracle) mouthwash, 5 mL, Swish & Spit, 4x Daily PRN, Nickie Baez, PA, 5 mL at 04/17/24 1726    nystatin (MYCOSTATIN) 511357 UNIT/ML suspension 500,000 Units, 5 mL, Oral, 4x Daily, Nickie Baez PA, 500,000 Units at  mEq, Oral, PRN **OR** potassium chloride 10 mEq/100 mL IVPB (Peripheral Line), 10 mEq, IntraVENous, PRN, Bonnie Chapman MD    magnesium sulfate 2000 mg in 50 mL IVPB premix, 2,000 mg, IntraVENous, PRN, Bonnie Chapman MD    ondansetron (ZOFRAN-ODT) disintegrating tablet 4 mg, 4 mg, Oral, Q8H PRN **OR** ondansetron (ZOFRAN) injection 4 mg, 4 mg, IntraVENous, Q6H PRN, Bonnie Chapman MD    polyethylene glycol (GLYCOLAX) packet 17 g, 17 g, Oral, Daily PRN, Bonnie Chapman MD    acetaminophen (TYLENOL) tablet 650 mg, 650 mg, Oral, Q6H PRN **OR** acetaminophen (TYLENOL) suppository 650 mg, 650 mg, Rectal, Q6H PRN, Bonnie Chapman MD    acetaminophen (TYLENOL) tablet 1,000 mg, 1,000 mg, Oral, Q6H, Bonnie Chapman MD, 1,000 mg at 04/18/24 0712    allopurinol (ZYLOPRIM) tablet 100 mg, 100 mg, Oral, Daily, Bonnie Chapman MD, 100 mg at 04/18/24 0835    buPROPion (WELLBUTRIN SR) extended release tablet 150 mg, 150 mg, Oral, Daily, Bonnie Chapman MD, 150 mg at 04/18/24 0835    ferrous sulfate (IRON 325) tablet 325 mg, 325 mg, Oral, Daily with breakfast, Bonnie Chapman MD, 325 mg at 04/18/24 0835    gabapentin (NEURONTIN) capsule 600 mg, 600 mg, Oral, QPM, Bonnie Chapman MD, 600 mg at 04/17/24 1718    hydrOXYzine HCl (ATARAX) tablet 10 mg, 10 mg, Oral, Q6H PRN, Bonnie Chapman MD    [Held by provider] insulin NPH (HumuLIN N;NovoLIN N) injection vial 15 Units, 15 Units, SubCUTAneous, BID AC, Bonnie Chapman MD, 15 Units at 04/15/24 2050    insulin lispro (HUMALOG) injection vial 0-8 Units, 0-8 Units, SubCUTAneous, TID WC, Bonnie Chapman MD, 4 Units at 04/15/24 1939    insulin lispro (HUMALOG) injection vial 0-4 Units, 0-4 Units, SubCUTAneous, Nightly, Bonnie Chapman MD    amLODIPine (NORVASC) tablet 5 mg, 5 mg, Oral, Daily, Missy Manjarrez APRN - NP, 5 mg at 04/18/24 0835      Labs:  Recent Results (from the past 24 hour(s))   POCT Glucose    Collection Time: 04/17/24 11:35 AM   Result Value Ref Range

## 2024-04-18 NOTE — PROGRESS NOTES
Acknowledged order to verify consent for patient's spinal Incision and Drainage. Patient states that she has not spoken with the surgeon and still has questions about her procedure.   Filled out procedure consent form, and left it in the chart. Patient states she would like her questions answered before signing consent.     Will notify oncoming RN.

## 2024-04-18 NOTE — PROGRESS NOTES
[facility]                                                                                          Hospitalist Progress Note  Bonita Dangelo PA-C  Answering service: [phone] OR [phone] from in house phone        Date of Service:  2024  NAME:  Leny Barnes  :  1945  MRN:  [id number]       Admission Summary:   79 y.o. female who presents with wound dehiscence. Patient returns from [facility] where she was discharged on  after being treated for wound dehiscence at that time as well as she had lumbar laminectomy on 3/8. Intially patient was discharged from [facility] to [facility] rehab on 3/22 she was then noted to have bloody drainage wound dehiscence with acute blood loss anemia and was readmitted to emergency department on  and then redischarged to [facility] back on . The patient was given IV antibiotics and a wound VA was placed over the wound by wound care. Patient also was treated with Klebsiella UTI and placed on vanc and Zosyn. The patient returns today by ambulance with significant wound dehiscence and bloody drainage.     Interval history / Subjective:   Upon seeing the patient on rounds this morning, patient was receiving a DuoNeb. When I came back I found the patient to be dyspneic using accessory muscles with a non-productive cough, SOB and bilateral wheezing with diffuse crackles and rales. Patient states the cough started 3 days ago and has been worsening. Patient denies her legs being more swollen than baseline. Patient denies any recent sick contacts. I placed patient on 2L NC. I spoke with nurse and we will coordinate wound care later today so I can visualize the wound on her back. Patient denies CP, abdominal pain, fever, N/V.     Assessment & Plan:     Sepsis from wound infection wound dehiscence with bacteremia  Sepsis supported by high fever of 102 WBC count of 17.4  Lumbar stenosis with claudication s/p T10-11, and  and independently reviewed all pertinent labs, diagnostic studies, imaging, and have provided independent interpretation of the same.     Labs:     Recent Labs     04/17/24  0029 04/18/24  0410   WBC 14.8* 12.6*   HGB 7.3* 7.5*   HCT 23.6* 23.9*    261     Recent Labs     04/16/24  0958 04/17/24  0029 04/18/24  0411   * 134* 136   K 3.8 3.7 3.6    109* 108   CO2 23 18* 22   BUN 43* 40* 39*   PHOS 3.5  --   --      Recent Labs     04/15/24  1505   ALT 9*   GLOB 4.6*     No results for input(s): \"INR\", \"APTT\" in the last 72 hours.    Invalid input(s): \"PTP\"   Recent Labs     04/16/24  0958   TIBC 164*      No results found for: \"FOL\", \"RBCF\"   No results for input(s): \"PH\", \"PCO2\", \"PO2\" in the last 72 hours.  No results for input(s): \"CPK\" in the last 72 hours.    Invalid input(s): \"CPKMB\", \"CKNDX\", \"TROIQ\"  Lab Results   Component Value Date/Time    CHOL 202 11/29/2023 11:04 AM    CHOL 207 04/21/2023 11:40 AM    HDL 49 11/29/2023 11:04 AM     No results found for: \"GLUCPOC\"  [unfilled]    Notes reviewed from all clinical/nonclinical/nursing services involved in patient's clinical care. Care coordination discussions were held with appropriate clinical/nonclinical/ nursing providers based on care coordination needs.     Patients current active Medications were reviewed, considered, added and adjusted based on the clinical condition today.      Home Medications were reconciled to the best of my ability given all available resources at the time of admission. Route is PO if not otherwise noted.    Admission Status:[id number]:::1}      Medications Reviewed:     Current Facility-Administered Medications   Medication Dose Route Frequency    bumetanide (BUMEX) injection 2 mg  2 mg IntraVENous BID    albumin human 25% IV solution 25 g  25 g IntraVENous BID    ipratropium (ATROVENT) 0.02 % nebulizer solution 0.5 mg  0.5 mg Nebulization BID RT    magic (miracle) mouthwash  5 mL Swish & Spit 4x Daily PRN

## 2024-04-18 NOTE — PLAN OF CARE
Problem: Safety - Adult  Goal: Free from fall injury  4/18/2024 1135 by Mt Patel, RN  Outcome: Progressing  4/17/2024 2215 by Beverly Carr, RN  Outcome: Progressing

## 2024-04-18 NOTE — CONSULTS
Cardiology Consult Note    CC: sepsis  Reason for consult:  PAF/CAD  Requesting MD:  Dr. Rose     Subjective:      Date of  Admission: 4/15/2024  3:04 PM     Admission type:Emergency    Leny Barnes is a 79 y.o. female admitted for Wound infection [T14.8XXA, L08.9]  Wound dehiscence [T81.30XA].Patient came in with sepsis and bacteremia and has dehisced wound in back that needs debridement. I was asked to see her as she needs surgery and also manage his PAF and CAD. She also has CHF, chronic diastolic HF as EF 55% on echo during this admission. She does feel ok with no chest pain or SOB. Her EKG this month shows probable sinus with V paced beats. She is s/p pacer([employer]) in 5/2022 for CHB. She is also s/p stent in LAD and POBA of D1 in 7/2023. Since then no ischemic sx.    Patient Active Problem List    Diagnosis Date Noted    Complete heart block (HCC) 05/24/2022    Infection as complication of medical care 04/17/2024    Bacteremia due to Gram-negative bacteria 04/17/2024    Sepsis without acute organ dysfunction (HCC) 04/17/2024    Deep incisional surgical site infection 04/17/2024    Shortness of breath 04/16/2024    Generalized weakness 04/16/2024    Wound dehiscence 04/15/2024    Wound infection 04/01/2024    Inadequately controlled diabetes mellitus (HCC) 03/19/2024    Lumbar stenosis with neurogenic claudication 03/18/2024    Kyphoscoliosis due to degeneration of spine 03/18/2024    Palliative care encounter 03/05/2024    Chronic diastolic congestive heart failure (HCC) 03/04/2024    Spinal stenosis, lumbar region, with neurogenic claudication 02/12/2024    Spondylolisthesis of lumbar region 02/12/2024    Acute back pain with sciatica, left 02/12/2024    Osteoarthritis of left knee, unspecified osteoarthritis type 06/05/2023    Insulin long-term use (HCC) 08/24/2022    Stage 3a chronic kidney disease (HCC) 07/08/2022    Senile purpura (HCC) 05/13/2022    Secondary hypercoagulable state (HCC)

## 2024-04-18 NOTE — PROGRESS NOTES
Spine Surgery Progress Note  Nickie Baez PA-C    Admit Date: 4/15/2024   LOS: 3 days      Daily Progress Note: 2024    Subjective:     Ms. Barnes is a pleasant 78yo female with a PMH of CAD w stents, CHF, CKD, morbid obesity, FREDERICK, asthma, uterine cancer, TN, hyperlipidemia, afib on eliquis. She underwent T10-11 laminectomy, L2-S1 laminectomy, and K35-[id number] fusion surgery on 3/18/24. She was discharged to [facility] on 3/22/24. Patient was transferred back to the hospital from [facility] on 24 with bloody wound drainage and post op anemia. She was treated with abx and wound care and returned to [facility]. On  patient was seen in Dr. Royal's office for wound dehiscence. She was again treated with a wound vac and abx. She presented to the [facility] ED on 4/15 with worsening wound dehiscence.   Patient states she is very weak and has not been able to get around much. She endorses a productive cough and fatigue. Experiencing urinary retention.   Smith placed on .  Cough is worsening and patient is endorsing increased work to breathe. CXR worsening. Cardiology consulted. Will need cardiac clearance for surgery tomorrow. She denies chest pain, nausea, vomiting, headache.  Pt resting comfortably in bed.    Objective:     Vital signs  Temp (24hrs), Av °F (36.7 °C), Min:97.5 °F (36.4 °C), Max:98.2 °F (36.8 °C)   701 - 1900  In: 272.8   Out: 50 [Urine:50]  1901 -  07  In: 2758.6 [I.V.:2659]  Out:  [Urine:2000]    BP (!) 123/108   Pulse 70   Temp 97.5 °F (36.4 °C) (Oral)   Resp 18   SpO2 93%            Physical Exam:  Gen: No acute distress.   Neuro: A&Ox3. Follows commands. Speech clear. Drowsy.  WALL spontaneously. Generalized weakness. Strength 4+/5 bilat ankle DF/PF.  Sensation intact.  Gait deferred.  Calves soft and supple; No pain with passive stretch  Skin: distal end of incision dehiscence w drainage on bandage  Erythema and white lesions on tongue and back of

## 2024-04-18 NOTE — PROGRESS NOTES
[address], [place] [address]  Phone: [phone]   [phone]    [email or web address]     Nephrology Progress Note    Patient Name : Leny Barnes      : 1945     MRN : [id number]  Date of Admission : 4/15/2024  Date of Servive : 24    CC:  Follow up for CKD    Assessment and Plan   CKD 3a:  - from DM2 and HTN  - baseline Cr around 1.5 to 1.7  - cont 2mg IV bumex BID  - metolazone x 1 today  - daily labs and I/Os     Hx Chronic diastolic HF  -  CXR  with increase pulmonary edema   - continue with diuresis as above     Anemia suspect 2/2 CD  - Hgb 7.5  - tsat 5% with Ferritin 191- Give IV Fe x 3 doses  - b12 stable/ folate low on folic acid/ Fe Sulfate  - start JOSHUA TTS     Sepsis Bacteremia  -3/18  s/p Laminectomy with dehiscence of wound  - 4/15 BC (+) serratia  - ID following  and adjusting abx  -  MRI (+) fluid/ gas  - Plans for surgery pending OR schedule     HTN  - BP stable  - avoid ACE (I)/ ARB at this time     CAD  PAF s/p ablation and pacemaker  DM  FREDERICK on CPAP  Obesity  HLD     Interval History:  Seen and examined.  Cr at baseline.  Voiding ok.  On breathing treatment this AM.  SOB improving some.  No cp, n/v/d    Review of Systems: Pertinent items are noted in HPI.    Current Medications:   Current Facility-Administered Medications   Medication Dose Route Frequency    bumetanide (BUMEX) injection 2 mg  2 mg IntraVENous BID    albumin human 25% IV solution 25 g  25 g IntraVENous BID    ipratropium (ATROVENT) 0.02 % nebulizer solution 0.5 mg  0.5 mg Nebulization BID RT    magic (miracle) mouthwash  5 mL Swish & Spit 4x Daily PRN    nystatin (MYCOSTATIN) [id number] UNIT/ML suspension 500,000 Units  5 mL Oral 4x Daily    iron sucrose (VENOFER) injection 200 mg  200 mg IntraVENous Q24H    guaiFENesin (ROBITUSSIN) 100 MG/5ML liquid 200 mg  200 mg Oral Q6H PRN    sertraline (ZOLOFT) tablet 150 mg  150 mg Oral QHS     arformoterol 15 mcg-budesonide 0.25 mg neb solution   Nebulization BID RT    folic acid (FOLVITE) tablet 1 mg  1 mg Oral Daily    ceFEPIme (MAXIPIME) 2,000 mg in sodium chloride 0.9 % 100 mL IVPB ([id number])  2,000 mg IntraVENous Q12H    [Held by provider] apixaban (ELIQUIS) tablet 2.5 mg  2.5 mg Oral BID    enoxaparin Sodium (LOVENOX) injection 30 mg  30 mg SubCUTAneous Q12H    glucose chewable tablet 16 g  4 tablet Oral PRN    dextrose bolus 10% 125 mL  125 mL IntraVENous PRN    Or    dextrose bolus 10% 250 mL  250 mL IntraVENous PRN    glucagon injection 1 mg  1 mg SubCUTAneous PRN    dextrose 10 % infusion   IntraVENous Continuous PRN    sodium chloride flush 0.9 % injection 5-40 mL  5-40 mL IntraVENous 2 times per day    sodium chloride flush 0.9 % injection 5-40 mL  5-40 mL IntraVENous PRN    0.9 % sodium chloride infusion   IntraVENous PRN    potassium chloride (KLOR-CON) extended release tablet 40 mEq  40 mEq Oral PRN    Or    potassium bicarb-citric acid (EFFER-K) effervescent tablet 40 mEq  40 mEq Oral PRN    Or    potassium chloride 10 mEq/100 mL IVPB (Peripheral Line)  10 mEq IntraVENous PRN    magnesium sulfate 2000 mg in 50 mL IVPB premix  2,000 mg IntraVENous PRN    ondansetron (ZOFRAN-ODT) disintegrating tablet 4 mg  4 mg Oral Q8H PRN    Or    ondansetron (ZOFRAN) injection 4 mg  4 mg IntraVENous Q6H PRN    polyethylene glycol (GLYCOLAX) packet 17 g  17 g Oral Daily PRN    acetaminophen (TYLENOL) tablet 650 mg  650 mg Oral Q6H PRN    Or    acetaminophen (TYLENOL) suppository 650 mg  650 mg Rectal Q6H PRN    acetaminophen (TYLENOL) tablet 1,000 mg  1,000 mg Oral Q6H    allopurinol (ZYLOPRIM) tablet 100 mg  100 mg Oral Daily    buPROPion (WELLBUTRIN SR) extended release tablet 150 mg  150 mg Oral Daily    ferrous sulfate (IRON 325) tablet 325 mg  325 mg Oral Daily with breakfast    gabapentin (NEURONTIN) capsule 600 mg  600 mg Oral QPM    hydrOXYzine HCl (ATARAX) tablet 10 mg  10 mg Oral Q6H PRN

## 2024-04-19 ENCOUNTER — APPOINTMENT (OUTPATIENT)
Facility: HOSPITAL | Age: 79
End: 2024-04-19
Payer: MEDICARE

## 2024-04-19 LAB
ANION GAP SERPL CALC-SCNC: 5 MMOL/L (ref 5–15)
ARTERIAL PATENCY WRIST A: POSITIVE
ARTERIAL PATENCY WRIST A: POSITIVE
BASE DEFICIT BLD-SCNC: 4.2 MMOL/L
BASE DEFICIT BLD-SCNC: 5.9 MMOL/L
BDY SITE: ABNORMAL
BDY SITE: ABNORMAL
BUN SERPL-MCNC: 42 MG/DL (ref 6–20)
BUN/CREAT SERPL: 29 (ref 12–20)
CALCIUM SERPL-MCNC: 9.3 MG/DL (ref 8.5–10.1)
CHLORIDE SERPL-SCNC: 107 MMOL/L (ref 97–108)
CO2 SERPL-SCNC: 22 MMOL/L (ref 21–32)
CREAT SERPL-MCNC: 1.44 MG/DL (ref 0.55–1.02)
ERYTHROCYTE [DISTWIDTH] IN BLOOD BY AUTOMATED COUNT: 16.9 % (ref 11.5–14.5)
GAS FLOW.O2 O2 DELIVERY SYS: ABNORMAL
GAS FLOW.O2 O2 DELIVERY SYS: ABNORMAL
GLUCOSE BLD STRIP.AUTO-MCNC: 171 MG/DL (ref 65–117)
GLUCOSE BLD STRIP.AUTO-MCNC: 181 MG/DL (ref 65–117)
GLUCOSE BLD STRIP.AUTO-MCNC: 190 MG/DL (ref 65–117)
GLUCOSE BLD STRIP.AUTO-MCNC: 200 MG/DL (ref 65–117)
GLUCOSE SERPL-MCNC: 205 MG/DL (ref 65–100)
HCO3 BLD-SCNC: 19.8 MMOL/L (ref 22–26)
HCO3 BLD-SCNC: 21.2 MMOL/L (ref 22–26)
HCT VFR BLD AUTO: 24.4 % (ref 35–47)
HGB BLD-MCNC: 7.4 G/DL (ref 11.5–16)
INR PPP: 1.4 (ref 0.9–1.1)
MAGNESIUM SERPL-MCNC: 2.1 MG/DL (ref 1.6–2.4)
MCH RBC QN AUTO: 27.2 PG (ref 26–34)
MCHC RBC AUTO-ENTMCNC: 30.3 G/DL (ref 30–36.5)
MCV RBC AUTO: 89.7 FL (ref 80–99)
NRBC # BLD: 0.05 K/UL (ref 0–0.01)
NRBC BLD-RTO: 0.4 PER 100 WBC
O2/TOTAL GAS SETTING VFR VENT: 15 %
PCO2 BLD: 39.1 MMHG (ref 35–45)
PCO2 BLD: 39.2 MMHG (ref 35–45)
PH BLD: 7.31 (ref 7.35–7.45)
PH BLD: 7.34 (ref 7.35–7.45)
PLATELET # BLD AUTO: 271 K/UL (ref 150–400)
PMV BLD AUTO: 9.1 FL (ref 8.9–12.9)
PO2 BLD: 66 MMHG (ref 80–100)
PO2 BLD: 70 MMHG (ref 80–100)
POTASSIUM SERPL-SCNC: 3.5 MMOL/L (ref 3.5–5.1)
PROTHROMBIN TIME: 14.6 SEC (ref 9–11.1)
RBC # BLD AUTO: 2.72 M/UL (ref 3.8–5.2)
SAO2 % BLD: 91.7 % (ref 92–97)
SAO2 % BLD: 92.2 % (ref 92–97)
SERVICE CMNT-IMP: ABNORMAL
SODIUM SERPL-SCNC: 134 MMOL/L (ref 136–145)
SPECIMEN TYPE: ABNORMAL
SPECIMEN TYPE: ABNORMAL
WBC # BLD AUTO: 11.7 K/UL (ref 3.6–11)

## 2024-04-19 PROCEDURE — 6360000002 HC RX W HCPCS: Performed by: PHYSICIAN ASSISTANT

## 2024-04-19 PROCEDURE — 94660 CPAP INITIATION&MGMT: CPT

## 2024-04-19 PROCEDURE — 82803 BLOOD GASES ANY COMBINATION: CPT

## 2024-04-19 PROCEDURE — 2580000003 HC RX 258: Performed by: HOSPITALIST

## 2024-04-19 PROCEDURE — 36415 COLL VENOUS BLD VENIPUNCTURE: CPT

## 2024-04-19 PROCEDURE — 6370000000 HC RX 637 (ALT 250 FOR IP): Performed by: ANESTHESIOLOGY

## 2024-04-19 PROCEDURE — 85610 PROTHROMBIN TIME: CPT

## 2024-04-19 PROCEDURE — 6360000002 HC RX W HCPCS: Performed by: INTERNAL MEDICINE

## 2024-04-19 PROCEDURE — 6370000000 HC RX 637 (ALT 250 FOR IP): Performed by: HOSPITALIST

## 2024-04-19 PROCEDURE — 6360000002 HC RX W HCPCS: Performed by: NURSE PRACTITIONER

## 2024-04-19 PROCEDURE — 83735 ASSAY OF MAGNESIUM: CPT

## 2024-04-19 PROCEDURE — 2580000003 HC RX 258: Performed by: INTERNAL MEDICINE

## 2024-04-19 PROCEDURE — 2060000000 HC ICU INTERMEDIATE R&B

## 2024-04-19 PROCEDURE — 74176 CT ABD & PELVIS W/O CONTRAST: CPT

## 2024-04-19 PROCEDURE — 6370000000 HC RX 637 (ALT 250 FOR IP): Performed by: NURSE PRACTITIONER

## 2024-04-19 PROCEDURE — 2500000003 HC RX 250 WO HCPCS

## 2024-04-19 PROCEDURE — 71045 X-RAY EXAM CHEST 1 VIEW: CPT

## 2024-04-19 PROCEDURE — 71250 CT THORAX DX C-: CPT

## 2024-04-19 PROCEDURE — 94760 N-INVAS EAR/PLS OXIMETRY 1: CPT

## 2024-04-19 PROCEDURE — 2700000000 HC OXYGEN THERAPY PER DAY

## 2024-04-19 PROCEDURE — 6370000000 HC RX 637 (ALT 250 FOR IP): Performed by: PHYSICIAN ASSISTANT

## 2024-04-19 PROCEDURE — 85027 COMPLETE CBC AUTOMATED: CPT

## 2024-04-19 PROCEDURE — 94640 AIRWAY INHALATION TREATMENT: CPT

## 2024-04-19 PROCEDURE — 82962 GLUCOSE BLOOD TEST: CPT

## 2024-04-19 PROCEDURE — 6360000002 HC RX W HCPCS: Performed by: HOSPITALIST

## 2024-04-19 PROCEDURE — 36600 WITHDRAWAL OF ARTERIAL BLOOD: CPT

## 2024-04-19 PROCEDURE — 80048 BASIC METABOLIC PNL TOTAL CA: CPT

## 2024-04-19 RX ORDER — IPRATROPIUM BROMIDE AND ALBUTEROL SULFATE 2.5; .5 MG/3ML; MG/3ML
1 SOLUTION RESPIRATORY (INHALATION) ONCE
Status: COMPLETED | OUTPATIENT
Start: 2024-04-19 | End: 2024-04-19

## 2024-04-19 RX ORDER — BUMETANIDE 0.25 MG/ML
2 INJECTION INTRAMUSCULAR; INTRAVENOUS ONCE
Status: DISCONTINUED | OUTPATIENT
Start: 2024-04-19 | End: 2024-04-19

## 2024-04-19 RX ORDER — SODIUM CHLORIDE 9 MG/ML
INJECTION, SOLUTION INTRAVENOUS PRN
Status: DISCONTINUED | OUTPATIENT
Start: 2024-04-19 | End: 2024-04-19 | Stop reason: HOSPADM

## 2024-04-19 RX ORDER — SODIUM CHLORIDE 0.9 % (FLUSH) 0.9 %
5-40 SYRINGE (ML) INJECTION EVERY 12 HOURS SCHEDULED
Status: CANCELLED | OUTPATIENT
Start: 2024-04-19

## 2024-04-19 RX ORDER — HYDROMORPHONE HYDROCHLORIDE 1 MG/ML
0.5 INJECTION, SOLUTION INTRAMUSCULAR; INTRAVENOUS; SUBCUTANEOUS EVERY 5 MIN PRN
Status: CANCELLED | OUTPATIENT
Start: 2024-04-19

## 2024-04-19 RX ORDER — NALOXONE HYDROCHLORIDE 0.4 MG/ML
INJECTION, SOLUTION INTRAMUSCULAR; INTRAVENOUS; SUBCUTANEOUS PRN
Status: CANCELLED | OUTPATIENT
Start: 2024-04-19

## 2024-04-19 RX ORDER — DIPHENHYDRAMINE HYDROCHLORIDE 50 MG/ML
12.5 INJECTION INTRAMUSCULAR; INTRAVENOUS
Status: CANCELLED | OUTPATIENT
Start: 2024-04-19 | End: 2024-04-20

## 2024-04-19 RX ORDER — LIDOCAINE HYDROCHLORIDE 10 MG/ML
1 INJECTION, SOLUTION EPIDURAL; INFILTRATION; INTRACAUDAL; PERINEURAL
Status: CANCELLED | OUTPATIENT
Start: 2024-04-19 | End: 2024-04-20

## 2024-04-19 RX ORDER — SODIUM CHLORIDE 0.9 % (FLUSH) 0.9 %
5-40 SYRINGE (ML) INJECTION PRN
Status: DISCONTINUED | OUTPATIENT
Start: 2024-04-19 | End: 2024-04-19 | Stop reason: HOSPADM

## 2024-04-19 RX ORDER — SODIUM CHLORIDE 0.9 % (FLUSH) 0.9 %
5-40 SYRINGE (ML) INJECTION PRN
Status: CANCELLED | OUTPATIENT
Start: 2024-04-19

## 2024-04-19 RX ORDER — MIDAZOLAM HYDROCHLORIDE 2 MG/2ML
2 INJECTION, SOLUTION INTRAMUSCULAR; INTRAVENOUS
Status: CANCELLED | OUTPATIENT
Start: 2024-04-19 | End: 2024-04-20

## 2024-04-19 RX ORDER — HYDRALAZINE HYDROCHLORIDE 20 MG/ML
10 INJECTION INTRAMUSCULAR; INTRAVENOUS
Status: CANCELLED | OUTPATIENT
Start: 2024-04-19

## 2024-04-19 RX ORDER — LORAZEPAM 2 MG/ML
0.5 INJECTION INTRAMUSCULAR
Status: CANCELLED | OUTPATIENT
Start: 2024-04-19 | End: 2024-04-20

## 2024-04-19 RX ORDER — IPRATROPIUM BROMIDE AND ALBUTEROL SULFATE 2.5; .5 MG/3ML; MG/3ML
1 SOLUTION RESPIRATORY (INHALATION)
Status: CANCELLED | OUTPATIENT
Start: 2024-04-19 | End: 2024-04-20

## 2024-04-19 RX ORDER — BUMETANIDE 0.25 MG/ML
2 INJECTION INTRAMUSCULAR; INTRAVENOUS 2 TIMES DAILY
Status: DISCONTINUED | OUTPATIENT
Start: 2024-04-19 | End: 2024-04-19

## 2024-04-19 RX ORDER — SODIUM CHLORIDE, SODIUM LACTATE, POTASSIUM CHLORIDE, CALCIUM CHLORIDE 600; 310; 30; 20 MG/100ML; MG/100ML; MG/100ML; MG/100ML
INJECTION, SOLUTION INTRAVENOUS CONTINUOUS
Status: CANCELLED | OUTPATIENT
Start: 2024-04-19

## 2024-04-19 RX ORDER — FENTANYL CITRATE 50 UG/ML
100 INJECTION, SOLUTION INTRAMUSCULAR; INTRAVENOUS
Status: CANCELLED | OUTPATIENT
Start: 2024-04-19 | End: 2024-04-20

## 2024-04-19 RX ORDER — FENTANYL CITRATE 50 UG/ML
25 INJECTION, SOLUTION INTRAMUSCULAR; INTRAVENOUS EVERY 5 MIN PRN
Status: CANCELLED | OUTPATIENT
Start: 2024-04-19

## 2024-04-19 RX ORDER — SODIUM CHLORIDE 9 MG/ML
INJECTION, SOLUTION INTRAVENOUS PRN
Status: CANCELLED | OUTPATIENT
Start: 2024-04-19

## 2024-04-19 RX ORDER — ONDANSETRON 2 MG/ML
4 INJECTION INTRAMUSCULAR; INTRAVENOUS
Status: CANCELLED | OUTPATIENT
Start: 2024-04-19 | End: 2024-04-20

## 2024-04-19 RX ORDER — PROCHLORPERAZINE EDISYLATE 5 MG/ML
5 INJECTION INTRAMUSCULAR; INTRAVENOUS
Status: CANCELLED | OUTPATIENT
Start: 2024-04-19 | End: 2024-04-20

## 2024-04-19 RX ORDER — ACETAMINOPHEN 500 MG
1000 TABLET ORAL ONCE
Status: CANCELLED | OUTPATIENT
Start: 2024-04-19 | End: 2024-04-19

## 2024-04-19 RX ORDER — SODIUM CHLORIDE 0.9 % (FLUSH) 0.9 %
5-40 SYRINGE (ML) INJECTION EVERY 12 HOURS SCHEDULED
Status: DISCONTINUED | OUTPATIENT
Start: 2024-04-19 | End: 2024-04-19 | Stop reason: HOSPADM

## 2024-04-19 RX ADMIN — BUMETANIDE 2 MG: 0.25 INJECTION INTRAMUSCULAR; INTRAVENOUS at 08:11

## 2024-04-19 RX ADMIN — AMLODIPINE BESYLATE 5 MG: 5 TABLET ORAL at 08:11

## 2024-04-19 RX ADMIN — ARFORMOTEROL TARTRATE: 15 SOLUTION RESPIRATORY (INHALATION) at 08:03

## 2024-04-19 RX ADMIN — SODIUM CHLORIDE, PRESERVATIVE FREE 10 ML: 5 INJECTION INTRAVENOUS at 23:43

## 2024-04-19 RX ADMIN — GUAIFENESIN 200 MG: 200 SOLUTION ORAL at 00:10

## 2024-04-19 RX ADMIN — GABAPENTIN 600 MG: 300 CAPSULE ORAL at 18:38

## 2024-04-19 RX ADMIN — SODIUM CHLORIDE, PRESERVATIVE FREE 10 ML: 5 INJECTION INTRAVENOUS at 08:12

## 2024-04-19 RX ADMIN — BUMETANIDE 0.5 MG/HR: 0.25 INJECTION INTRAMUSCULAR; INTRAVENOUS at 09:45

## 2024-04-19 RX ADMIN — ACETAMINOPHEN 1000 MG: 325 TABLET ORAL at 00:10

## 2024-04-19 RX ADMIN — SODIUM CHLORIDE, PRESERVATIVE FREE 10 ML: 5 INJECTION INTRAVENOUS at 20:32

## 2024-04-19 RX ADMIN — IPRATROPIUM BROMIDE 0.5 MG: 0.5 SOLUTION RESPIRATORY (INHALATION) at 19:09

## 2024-04-19 RX ADMIN — ALLOPURINOL 100 MG: 100 TABLET ORAL at 08:10

## 2024-04-19 RX ADMIN — BUMETANIDE 0.5 MG/HR: 0.25 INJECTION INTRAMUSCULAR; INTRAVENOUS at 21:23

## 2024-04-19 RX ADMIN — ENOXAPARIN SODIUM 30 MG: 100 INJECTION SUBCUTANEOUS at 20:19

## 2024-04-19 RX ADMIN — ACETAMINOPHEN 1000 MG: 325 TABLET ORAL at 18:38

## 2024-04-19 RX ADMIN — ARFORMOTEROL TARTRATE: 15 SOLUTION RESPIRATORY (INHALATION) at 19:09

## 2024-04-19 RX ADMIN — Medication 1 MG: at 08:11

## 2024-04-19 RX ADMIN — IRON SUCROSE 200 MG: 20 INJECTION, SOLUTION INTRAVENOUS at 20:20

## 2024-04-19 RX ADMIN — ENOXAPARIN SODIUM 30 MG: 100 INJECTION SUBCUTANEOUS at 09:11

## 2024-04-19 RX ADMIN — WATER 2000 MG: 1 INJECTION INTRAMUSCULAR; INTRAVENOUS; SUBCUTANEOUS at 09:11

## 2024-04-19 RX ADMIN — IPRATROPIUM BROMIDE AND ALBUTEROL SULFATE 1 DOSE: 2.5; .5 SOLUTION RESPIRATORY (INHALATION) at 07:04

## 2024-04-19 RX ADMIN — BUPROPION HYDROCHLORIDE 150 MG: 150 TABLET, EXTENDED RELEASE ORAL at 08:10

## 2024-04-19 RX ADMIN — ACETAMINOPHEN 1000 MG: 325 TABLET ORAL at 12:35

## 2024-04-19 RX ADMIN — IPRATROPIUM BROMIDE 0.5 MG: 0.5 SOLUTION RESPIRATORY (INHALATION) at 08:04

## 2024-04-19 RX ADMIN — FERROUS SULFATE TAB 325 MG (65 MG ELEMENTAL FE) 325 MG: 325 (65 FE) TAB at 08:11

## 2024-04-19 NOTE — PERIOP NOTE
Patient arrived to pre-op area at 0600, short of breath, breathless with speech, oxygen on at 4 liters for saturation 93%, respiratory rate 30, coarse sounds throughout lungs. Smith catheter draining pink urine. Dr. Bello at bedside, ordered duoneb inhaler. Les on [facility] [address] notified of above, report given, surgery cancelled today. Dr. Bello (anesthesia) also talked with Dr. Royal regarding patient's status. To return back to [facility].

## 2024-04-19 NOTE — PROGRESS NOTES
Patient was transported to PACU at approximately 0600 for a procedure scheduled this morning. Call received from PACU nurse Laure that patient was unable to continue with the procedure due to difficulty breathing.Breathing treatment provided in PACU to improve patient breathing. Procedure reschedule. Patient was brought back to the room. Patient was on 4L NC with O2 SAT between 85% - 88%. NO improvement observed in patient at this time. RRT called. NP, Supervisor and the two primary nurses at bedside. Patient placed on non-re breather. O2 improved. However, patient look lethargic, chest x-ray done. New order placed for patient to be transfer to higher level of care, IMCU.

## 2024-04-19 NOTE — PROGRESS NOTES
Orthopedic Spine Progress Note  Post Op day: Day of Surgery    2024 8:48 AM   Admit Date: 4/15/2024  Procedure: Procedure(s) with comments:  BACK WOUND INCISION AND DRAINAGE (EIP 0730) - This patient is in house we are requesting this start at 7:30 am    Subjective:     Leny Barnes surgery cancelled this AM due to ongoing pulmonary issues. Rapid response called this AM due to SOB.      Objective:          Physical Exam:  General:  Alert and oriented. No acute distress.   Heart:  Respirations unlabored.   Abdomen:   Extremities: Soft, non-tender.  No evidence of cyanosis. Pulses palpable in both upper and lower extremities.       Neurologic:  Musculoskeletal:  No new motor deficits. Neurovascular exam within normal limits.  Sensation stable.  Motor: unchanged C5-T1 and L2-S1.   Olivia's sign negative in bilateral lower extremities.  Calves soft, nontender upon palpation.  Moves both upper and lower extremities.   Incision: Unable to assess this AM. Wound dehiscence noted on previous exam.       Vital Signs:    Blood pressure (!) 142/41, pulse 70, temperature 98 °F (36.7 °C), temperature source Oral, resp. rate 28, SpO2 96 %.  Temp (24hrs), Av.9 °F (36.6 °C), Min:97.5 °F (36.4 °C), Max:98.6 °F (37 °C)      LAB:    Recent Labs     24  0103   HCT 24.4*   HGB 7.4*        Lab Results   Component Value Date/Time     2024 04:11 AM    K 3.6 2024 04:11 AM     2024 04:11 AM    CO2 22 2024 04:11 AM    BUN 39 2024 04:11 AM       Intake/Output:  No intake/output data recorded.   1901 -  0700  In: 522.8 [P.O.:250]  Out: 2975 [Urine:2975]    PT/OT:   Gait:                    Assessment:   Patient is Day of Surgery s/p Procedure(s) with comments:  Surgery cancelled this AM due to worsening of pulmonary status.      Plan:     1.  Continue PT/OT - as able.  2.  Continue established methods of pain control.  3.  VTE Prophylaxes - TEDS &/or SCDs,

## 2024-04-19 NOTE — PLAN OF CARE
Problem: Safety - Adult  Goal: Free from fall injury  4/18/2024 2304 by Mirta Jose RN  Outcome: Progressing  4/18/2024 1135 by Mt Patel RN  Outcome: Progressing     Problem: Discharge Planning  Goal: Discharge to home or other facility with appropriate resources  4/18/2024 2304 by Mirta Jose RN  Outcome: Progressing  4/18/2024 1135 by Mt Patel, RN  Outcome: Progressing     Problem: Pain  Goal: Verbalizes/displays adequate comfort level or baseline comfort level  4/18/2024 2304 by Mirta Jose RN  Outcome: Progressing  4/18/2024 1135 by Mt Patel, RN  Outcome: Progressing

## 2024-04-19 NOTE — SIGNIFICANT EVENT
Rapid Response Note     04/19/24 at 0749    A rapid response was called on this patient for Shortness of breath.    Response    Rapid Response Team at the bedside for evaluation.    Brayan Fitch NP responding at the bedside.    Mirta RICE and Mt RN are the primary nurses during this episode.    Situation    0749- The patient had just arrived back from preop with shortness of breath. Her lung fields have crackles. Oxygen saturations are 90% on the nasal cannula 6 lpm. A non-rebreather has been applied and the saturations are improved. The patient appears slightly drowsy.    0800- Brayan Fitch NP at the bedside. Orders a nebulizer treatment and diuretics and will reevaluate.    The patient was left in the care of her primary nursing team.    Rapid Response end time approximately 0810        Sepsis Screening  Are two or more SIRS criteria present? No    Is the patient's history suggestive of a new infection? No    Communication with provider:    Was a Code Sepsis called at this encounter? No

## 2024-04-19 NOTE — CARE COORDINATION
Transition of Care Plan:    RUR: 26%  Prior Level of Functioning: Independent   Disposition: [facility]  Possible return  SNF plan initiated for back up:  List provided to daughterBreonna via Email. Pending top three choices at this time.   If SNF or IPR: Date FOC offered: n/a  Date FOC received: n/a  Accepting facility: n/a  Date authorization started with reference number: n/a  Date authorization received and expires: n/a  Follow up appointments: PCP, Specialty   DME needed: none  Transportation at discharge: BLS  IM/IMM Medicare/[facility] [employer] letter given: 4/19/24  Caregiver Contact: Breonna Barnes / Child / [phone]   Discharge Caregiver contacted prior to discharge? N/a  Care Conference needed? N/a  Barriers to discharge: I&D pending

## 2024-04-19 NOTE — PROGRESS NOTES
[facility]                                                                                          Hospitalist Progress Note  Brayan Fitch, SHAYNE - CNP  Answering service: [phone] OR [phone] from in house phone        Date of Service:  2024  NAME:  Leny Barnes  :  1945  MRN:  [id number]       Admission Summary:   79 y.o. female who presents with wound dehiscence. Patient returns from [facility] where she was discharged on  after being treated for wound dehiscence at that time as well as she had lumbar laminectomy on 3/8. Intially patient was discharged from [facility] to [facility] rehab on 3/22 she was then noted to have bloody drainage wound dehiscence with acute blood loss anemia and was readmitted to emergency department on  and then redischarged to [facility] back on . The patient was given IV antibiotics and a wound VA was placed over the wound by wound care. Patient also was treated with Klebsiella UTI and placed on vanc and Zosyn. The patient returns today by ambulance with significant wound dehiscence and bloody drainage.        Interval history / Subjective:     called to the bedside for RR.  Patient scheduled for debridement this am but was cancelled by Anesthesia for increased work of breathing and increasing oxygen requirement.  On exam patient with coarse wheezing and crackles bilateral with pursed lip breathing and patient unable to finish sentences,  cxr worsened since yesterday with increasing pulmonary edema, abg 7.31/70/39/19.8, nonrebreather mask with 10L,  O2 sat in the 90's with RR in 30's, transfer patient to [facility], start bumex gtt, CT of chest/abd/pelvis ordered.  New hematuria in holly noted discussed with nephrology, discussed with ID about broadening abx coverage, will continue with ceftriaxone at this time.  Will transfer patient to [facility]    CRITICAL CARE ATTESTATION:  I had a face to face encounter

## 2024-04-19 NOTE — PROGRESS NOTES
Pt came to pre-op today for wound washout. She is very SOB, cannot speak or complete a sentence, O2 dependent. B/L crackles and full body volume overload.     I have given her a tori neb, ordered another chest xray, spoken with the primary team.     I would recommend further diuresis, bipap, possible broadening of antibiotics. Would like to tune her up some before surgery to try and prevent post operative ventilation.    Jonatan Bello MD  Anesthesia

## 2024-04-19 NOTE — PROGRESS NOTES
[address], [place] [address]  Phone: [phone]   [phone]    [email or web address]     Nephrology Progress Note    Patient Name : Leny Barnes      : 1945     MRN : [id number]  Date of Admission : 4/15/2024  Date of Servive : 24    CC:  Follow up for CKD    Assessment and Plan   CKD 3a:  - from DM2 and HTN  - baseline Cr around 1.5 to 1.7  - creatinine stable  -  agree with starting Bumex drip   - strict I&O  - daily labs and I/Os    Hyponatremia 2/2 volume overload     Hx Chronic diastolic HF  -  CXR  with worsening pulmonary edema    Anemia suspect 2/2 CD  - Hgb 7.4  - tsat 5% with Ferritin 191- Give IV Fe x 3 doses  - b12 stable/ folate low on folic acid/ Fe Sulfate  - on JOSHUA TTS 20K     Sepsis Bacteremia  -3/18  s/p Laminectomy with dehiscence of wound  - 4/15 BC (+) serratia  - ID following  and adjusting abx  -  MRI (+) fluid/ gas  - Surgery on hold plans for Monday      HTN  - BP stable  - avoid ACE (I)/ ARB at this time     CAD  PAF s/p ablation and pacemaker  DM  FREDERICK on CPAP  Obesity  HLD     Interval History:  RR called earlier this AM. She is noticeable SOB.   Dose of Metolazone given yesterday with mildly improved UOP .  Cr at baseline.  Scant hematuria noted in holly.    Review of Systems: Pertinent items are noted in HPI.    Current Medications:   Current Facility-Administered Medications   Medication Dose Route Frequency    cefTRIAXone (ROCEPHIN) 2,000 mg in sterile water 20 mL IV syringe  2,000 mg IntraVENous Q24H    bumetanide (BUMEX) injection 2 mg  2 mg IntraVENous Once    bumetanide (BUMEX) injection 2 mg  2 mg IntraVENous BID    Epoetin Jimy-epbx (RETACRIT) injection 20,000 Units  20,000 Units SubCUTAneous Once per day on  Sat    ipratropium (ATROVENT) 0.02 % nebulizer solution 0.5 mg  0.5 mg Nebulization BID RT    magic (miracle) mouthwash  5 mL Swish & Spit 4x Daily PRN    nystatin  14.8* 12.6* 11.7*   RBC 2.65* 2.67* 2.72*   HGB 7.3* 7.5* 7.4*   HCT 23.6* 23.9* 24.4*   MCV 89.1 89.5 89.7   MCH 27.5 28.1 27.2   MCHC 30.9 31.4 30.3   RDW 16.8* 17.0* 16.9*    261 271   MPV 9.6 9.5 9.1       No results for input(s): \"TP\", \"ALB\", \"GLOB\", \"AML\" in the last 72 hours.    Invalid input(s): \"SGOT\", \"GPT\", \"AP\", \"TBIL\", \"AMYP\", \"LPSE\", \"LAC\"    No results for input(s): \"INR\", \"APTT\" in the last 72 hours.    Invalid input(s): \"PTP\"   No results for input(s): \"CPK\", \"CKMB\", \"TROPONINI\" in the last 72 hours.    Invalid input(s): \"B-NP\"  Invalid input(s): \"PHI\", \"PCO2I\", \"PO2I\", \"FIO2I\"     Ventilator:       Microbiology:  No results found for: \"SDES\"  No components found for: \"CULT\"      I have reviewed the flowsheets.  Chart and Pertinent Notes have been reviewed.   No change in PMH ,family and social history from Consult note.      Liz Anaya APRN - NP  [facility]

## 2024-04-19 NOTE — PROGRESS NOTES
Palliative Medicine    Have been following peripherally as goals clear for I&D, recovery from surgery and to get back being as active as possible.     Events from this AM noted and reached out to Brayan Fitch NP.     Plan on seeing pt Monday, see how she recovers from acute resp failure, now on bumex ggt.

## 2024-04-19 NOTE — PROGRESS NOTES
Cardiology Progress Note                                        Admit Date: 4/15/2024    Assessment/Plan:     CHF; acute on chronic diastolic HF; she is feeling a little bit better this afternoon after RR and IV Bumex drip  Pacer; is a single chamber placed for CHB; it can not tell atrial activity; off AC for surgery  Debridement surgery postponed; glad it was cancelled this am  CKD; also followed by renal team    Leny Barnes is a 79 y.o. female with     PROBLEM LIST:  Patient Active Problem List    Diagnosis Date Noted    Complete heart block (HCC) 05/24/2022    Infection as complication of medical care 04/17/2024    Bacteremia due to Gram-negative bacteria 04/17/2024    Sepsis without acute organ dysfunction (HCC) 04/17/2024    Deep incisional surgical site infection 04/17/2024    Shortness of breath 04/16/2024    Generalized weakness 04/16/2024    Wound dehiscence 04/15/2024    Wound infection 04/01/2024    Inadequately controlled diabetes mellitus ([facility]) 03/19/2024    Lumbar stenosis with neurogenic claudication 03/18/2024    Kyphoscoliosis due to degeneration of spine 03/18/2024    Palliative care encounter 03/05/2024    Chronic diastolic congestive heart failure (HCC) 03/04/2024    Spinal stenosis, lumbar region, with neurogenic claudication 02/12/2024    Spondylolisthesis of lumbar region 02/12/2024    Acute back pain with sciatica, left 02/12/2024    Osteoarthritis of left knee, unspecified osteoarthritis type 06/05/2023    Insulin long-term use ([facility]) 08/24/2022    Stage 3a chronic kidney disease (HCC) 07/08/2022    Senile purpura ([facility]) 05/13/2022    Secondary hypercoagulable state (HCC) 01/11/2022    Asthma     Recurrent depression ([facility]) 01/09/2018    Type 2 diabetes mellitus with diabetic neuropathy ([facility]) 01/09/2018    Encounter for long-term (current) drug use 10/19/2017    Diabetic polyneuropathy associated with type 2 diabetes mellitus ([facility]) 07/05/2017    Thoracic disc disorder with myelopathy

## 2024-04-20 ENCOUNTER — APPOINTMENT (OUTPATIENT)
Facility: HOSPITAL | Age: 79
End: 2024-04-20
Payer: MEDICARE

## 2024-04-20 LAB
ALBUMIN SERPL-MCNC: 2.5 G/DL (ref 3.5–5)
ALBUMIN/GLOB SERPL: 0.5 (ref 1.1–2.2)
ALP SERPL-CCNC: 159 U/L (ref 45–117)
ALT SERPL-CCNC: 10 U/L (ref 12–78)
ANION GAP SERPL CALC-SCNC: 4 MMOL/L (ref 5–15)
AST SERPL-CCNC: 22 U/L (ref 15–37)
BASOPHILS # BLD: 0 K/UL (ref 0–0.1)
BASOPHILS NFR BLD: 0 % (ref 0–1)
BILIRUB SERPL-MCNC: 0.5 MG/DL (ref 0.2–1)
BUN SERPL-MCNC: 44 MG/DL (ref 6–20)
BUN/CREAT SERPL: 30 (ref 12–20)
CALCIUM SERPL-MCNC: 9.4 MG/DL (ref 8.5–10.1)
CHLORIDE SERPL-SCNC: 108 MMOL/L (ref 97–108)
CO2 SERPL-SCNC: 24 MMOL/L (ref 21–32)
CREAT SERPL-MCNC: 1.48 MG/DL (ref 0.55–1.02)
DIFFERENTIAL METHOD BLD: ABNORMAL
EOSINOPHIL # BLD: 0 K/UL (ref 0–0.4)
EOSINOPHIL NFR BLD: 0 % (ref 0–7)
ERYTHROCYTE [DISTWIDTH] IN BLOOD BY AUTOMATED COUNT: 17.3 % (ref 11.5–14.5)
GLOBULIN SER CALC-MCNC: 4.6 G/DL (ref 2–4)
GLUCOSE BLD STRIP.AUTO-MCNC: 145 MG/DL (ref 65–117)
GLUCOSE BLD STRIP.AUTO-MCNC: 157 MG/DL (ref 65–117)
GLUCOSE BLD STRIP.AUTO-MCNC: 161 MG/DL (ref 65–117)
GLUCOSE BLD STRIP.AUTO-MCNC: 221 MG/DL (ref 65–117)
GLUCOSE SERPL-MCNC: 166 MG/DL (ref 65–100)
HCT VFR BLD AUTO: 27.5 % (ref 35–47)
HGB BLD-MCNC: 8.1 G/DL (ref 11.5–16)
IMM GRANULOCYTES # BLD AUTO: 0 K/UL
IMM GRANULOCYTES NFR BLD AUTO: 0 %
LYMPHOCYTES # BLD: 0.4 K/UL (ref 0.8–3.5)
LYMPHOCYTES NFR BLD: 3 % (ref 12–49)
MAGNESIUM SERPL-MCNC: 2 MG/DL (ref 1.6–2.4)
MCH RBC QN AUTO: 26.7 PG (ref 26–34)
MCHC RBC AUTO-ENTMCNC: 29.5 G/DL (ref 30–36.5)
MCV RBC AUTO: 90.8 FL (ref 80–99)
METAMYELOCYTES NFR BLD MANUAL: 1 %
MONOCYTES # BLD: 0.3 K/UL (ref 0–1)
MONOCYTES NFR BLD: 2 % (ref 5–13)
NEUTS BAND NFR BLD MANUAL: 1 % (ref 0–6)
NEUTS SEG # BLD: 12 K/UL (ref 1.8–8)
NEUTS SEG NFR BLD: 93 % (ref 32–75)
NRBC # BLD: 0.1 K/UL (ref 0–0.01)
NRBC BLD-RTO: 0.8 PER 100 WBC
PLATELET # BLD AUTO: 329 K/UL (ref 150–400)
PMV BLD AUTO: 8.8 FL (ref 8.9–12.9)
POTASSIUM SERPL-SCNC: 3.1 MMOL/L (ref 3.5–5.1)
PROT SERPL-MCNC: 7.1 G/DL (ref 6.4–8.2)
RBC # BLD AUTO: 3.03 M/UL (ref 3.8–5.2)
RBC MORPH BLD: ABNORMAL
SERVICE CMNT-IMP: ABNORMAL
SODIUM SERPL-SCNC: 136 MMOL/L (ref 136–145)
WBC # BLD AUTO: 12.8 K/UL (ref 3.6–11)
WBC MORPH BLD: ABNORMAL

## 2024-04-20 PROCEDURE — 6360000002 HC RX W HCPCS: Performed by: PHYSICIAN ASSISTANT

## 2024-04-20 PROCEDURE — 6370000000 HC RX 637 (ALT 250 FOR IP): Performed by: PHYSICIAN ASSISTANT

## 2024-04-20 PROCEDURE — 82962 GLUCOSE BLOOD TEST: CPT

## 2024-04-20 PROCEDURE — 6370000000 HC RX 637 (ALT 250 FOR IP): Performed by: NURSE PRACTITIONER

## 2024-04-20 PROCEDURE — 71045 X-RAY EXAM CHEST 1 VIEW: CPT

## 2024-04-20 PROCEDURE — 51702 INSERT TEMP BLADDER CATH: CPT

## 2024-04-20 PROCEDURE — 85025 COMPLETE CBC W/AUTO DIFF WBC: CPT

## 2024-04-20 PROCEDURE — 6360000002 HC RX W HCPCS: Performed by: HOSPITALIST

## 2024-04-20 PROCEDURE — 36415 COLL VENOUS BLD VENIPUNCTURE: CPT

## 2024-04-20 PROCEDURE — 80053 COMPREHEN METABOLIC PANEL: CPT

## 2024-04-20 PROCEDURE — 2060000000 HC ICU INTERMEDIATE R&B

## 2024-04-20 PROCEDURE — 2580000003 HC RX 258: Performed by: INTERNAL MEDICINE

## 2024-04-20 PROCEDURE — 6360000002 HC RX W HCPCS: Performed by: INTERNAL MEDICINE

## 2024-04-20 PROCEDURE — 2700000000 HC OXYGEN THERAPY PER DAY

## 2024-04-20 PROCEDURE — 94760 N-INVAS EAR/PLS OXIMETRY 1: CPT

## 2024-04-20 PROCEDURE — 2580000003 HC RX 258: Performed by: HOSPITALIST

## 2024-04-20 PROCEDURE — 6370000000 HC RX 637 (ALT 250 FOR IP): Performed by: HOSPITALIST

## 2024-04-20 PROCEDURE — 83735 ASSAY OF MAGNESIUM: CPT

## 2024-04-20 PROCEDURE — 6360000002 HC RX W HCPCS: Performed by: NURSE PRACTITIONER

## 2024-04-20 PROCEDURE — 94640 AIRWAY INHALATION TREATMENT: CPT

## 2024-04-20 RX ORDER — BUMETANIDE 0.25 MG/ML
2 INJECTION INTRAMUSCULAR; INTRAVENOUS 2 TIMES DAILY
Status: DISCONTINUED | OUTPATIENT
Start: 2024-04-20 | End: 2024-04-20

## 2024-04-20 RX ORDER — ACETYLCYSTEINE 200 MG/ML
600 SOLUTION ORAL; RESPIRATORY (INHALATION)
Status: DISCONTINUED | OUTPATIENT
Start: 2024-04-20 | End: 2024-04-24

## 2024-04-20 RX ADMIN — ENOXAPARIN SODIUM 30 MG: 100 INJECTION SUBCUTANEOUS at 09:01

## 2024-04-20 RX ADMIN — GABAPENTIN 600 MG: 300 CAPSULE ORAL at 17:48

## 2024-04-20 RX ADMIN — IPRATROPIUM BROMIDE 0.5 MG: 0.5 SOLUTION RESPIRATORY (INHALATION) at 16:08

## 2024-04-20 RX ADMIN — ACETAMINOPHEN 1000 MG: 325 TABLET ORAL at 17:49

## 2024-04-20 RX ADMIN — BUMETANIDE 1 MG/HR: 0.25 INJECTION INTRAMUSCULAR; INTRAVENOUS at 17:56

## 2024-04-20 RX ADMIN — NYSTATIN 500000 UNITS: 100000 SUSPENSION ORAL at 12:36

## 2024-04-20 RX ADMIN — IPRATROPIUM BROMIDE 0.5 MG: 0.5 SOLUTION RESPIRATORY (INHALATION) at 07:09

## 2024-04-20 RX ADMIN — SODIUM CHLORIDE, PRESERVATIVE FREE 10 ML: 5 INJECTION INTRAVENOUS at 21:13

## 2024-04-20 RX ADMIN — ALLOPURINOL 100 MG: 100 TABLET ORAL at 09:01

## 2024-04-20 RX ADMIN — WATER 2000 MG: 1 INJECTION INTRAMUSCULAR; INTRAVENOUS; SUBCUTANEOUS at 09:01

## 2024-04-20 RX ADMIN — Medication 1 MG: at 09:01

## 2024-04-20 RX ADMIN — IPRATROPIUM BROMIDE 0.5 MG: 0.5 SOLUTION RESPIRATORY (INHALATION) at 20:40

## 2024-04-20 RX ADMIN — ACETAMINOPHEN 1000 MG: 325 TABLET ORAL at 12:36

## 2024-04-20 RX ADMIN — SERTRALINE HYDROCHLORIDE 150 MG: 50 TABLET ORAL at 21:10

## 2024-04-20 RX ADMIN — NYSTATIN 500000 UNITS: 100000 SUSPENSION ORAL at 21:10

## 2024-04-20 RX ADMIN — AMLODIPINE BESYLATE 5 MG: 5 TABLET ORAL at 09:01

## 2024-04-20 RX ADMIN — FERROUS SULFATE TAB 325 MG (65 MG ELEMENTAL FE) 325 MG: 325 (65 FE) TAB at 09:01

## 2024-04-20 RX ADMIN — IPRATROPIUM BROMIDE 0.5 MG: 0.5 SOLUTION RESPIRATORY (INHALATION) at 10:51

## 2024-04-20 RX ADMIN — SODIUM CHLORIDE, PRESERVATIVE FREE 10 ML: 5 INJECTION INTRAVENOUS at 09:02

## 2024-04-20 RX ADMIN — ARFORMOTEROL TARTRATE: 15 SOLUTION RESPIRATORY (INHALATION) at 07:09

## 2024-04-20 RX ADMIN — NYSTATIN 500000 UNITS: 100000 SUSPENSION ORAL at 09:01

## 2024-04-20 RX ADMIN — ACETYLCYSTEINE 600 MG: 200 INHALANT RESPIRATORY (INHALATION) at 10:51

## 2024-04-20 RX ADMIN — ARFORMOTEROL TARTRATE: 15 SOLUTION RESPIRATORY (INHALATION) at 20:40

## 2024-04-20 RX ADMIN — ENOXAPARIN SODIUM 30 MG: 100 INJECTION SUBCUTANEOUS at 21:10

## 2024-04-20 RX ADMIN — NYSTATIN 500000 UNITS: 100000 SUSPENSION ORAL at 17:49

## 2024-04-20 RX ADMIN — EPOETIN ALFA-EPBX 20000 UNITS: 20000 INJECTION, SOLUTION INTRAVENOUS; SUBCUTANEOUS at 17:49

## 2024-04-20 RX ADMIN — BUPROPION HYDROCHLORIDE 150 MG: 150 TABLET, EXTENDED RELEASE ORAL at 09:01

## 2024-04-20 RX ADMIN — CHLOROTHIAZIDE SODIUM 500 MG: 500 INJECTION, POWDER, LYOPHILIZED, FOR SOLUTION INTRAVENOUS at 12:36

## 2024-04-20 RX ADMIN — ACETYLCYSTEINE 600 MG: 200 INHALANT RESPIRATORY (INHALATION) at 20:40

## 2024-04-20 ASSESSMENT — PAIN DESCRIPTION - LOCATION: LOCATION: BACK

## 2024-04-20 ASSESSMENT — PAIN DESCRIPTION - PAIN TYPE: TYPE: CHRONIC PAIN

## 2024-04-20 ASSESSMENT — PAIN SCALES - GENERAL
PAINLEVEL_OUTOF10: 2
PAINLEVEL_OUTOF10: 0

## 2024-04-20 ASSESSMENT — PAIN DESCRIPTION - ORIENTATION: ORIENTATION: POSTERIOR

## 2024-04-20 NOTE — PROGRESS NOTES
Cardiology Progress Note                                        Admit Date: 4/15/2024    Assessment/Plan:     CHF; acute on chronic diastolic HF; chronic diastolic HF as EF 55% on echo during this admission   Net 4.5 out   over last 2 days   On bumex ggt  continue as still wheezes and rales   2. MedtronicPacer; is a single chamber placed for CHB; it can not tell atrial activity;    3. Debridement surgery pending  10-T1 laminectomy, L2-S1 laminectomy with fusion from [id number] on 3/18/24 admitted 4/1-4/5/24 from [facility] for dehiscence and drainage   4. CKD; also followed by renal team  5. CAD:  s/p stent in LAD and POBA of D1 in 7/2023.   6. AFIB:   rate controlled paced  needs   anticoag when ok with surgery  eliquis  5 bid     7.  Anemia    Leny Barnes is a 79 y.o. female with     PROBLEM LIST:  Patient Active Problem List    Diagnosis Date Noted    Complete heart block (HCC) 05/24/2022    Infection as complication of medical care 04/17/2024    Bacteremia due to Gram-negative bacteria 04/17/2024    Sepsis without acute organ dysfunction (HCC) 04/17/2024    Deep incisional surgical site infection 04/17/2024    Shortness of breath 04/16/2024    Generalized weakness 04/16/2024    Wound dehiscence 04/15/2024    Wound infection 04/01/2024    Inadequately controlled diabetes mellitus (HCC) 03/19/2024    Lumbar stenosis with neurogenic claudication 03/18/2024    Kyphoscoliosis due to degeneration of spine 03/18/2024    Palliative care encounter 03/05/2024    Chronic diastolic congestive heart failure (HCC) 03/04/2024    Spinal stenosis, lumbar region, with neurogenic claudication 02/12/2024    Spondylolisthesis of lumbar region 02/12/2024    Acute back pain with sciatica, left 02/12/2024    Osteoarthritis of left knee, unspecified osteoarthritis type 06/05/2023    Insulin long-term use (HCC) 08/24/2022    Stage 3a chronic kidney disease (HCC) 07/08/2022    Senile purpura (HCC) 05/13/2022    Secondary  Daily    guaiFENesin (ROBITUSSIN) 100 MG/5ML liquid 200 mg  200 mg Oral Q6H PRN    sertraline (ZOLOFT) tablet 150 mg  150 mg Oral QHS    arformoterol 15 mcg-budesonide 0.25 mg neb solution   Nebulization BID RT    folic acid (FOLVITE) tablet 1 mg  1 mg Oral Daily    [Held by provider] apixaban (ELIQUIS) tablet 2.5 mg  2.5 mg Oral BID    enoxaparin Sodium (LOVENOX) injection 30 mg  30 mg SubCUTAneous Q12H    glucose chewable tablet 16 g  4 tablet Oral PRN    dextrose bolus 10% 125 mL  125 mL IntraVENous PRN    Or    dextrose bolus 10% 250 mL  250 mL IntraVENous PRN    glucagon injection 1 mg  1 mg SubCUTAneous PRN    dextrose 10 % infusion   IntraVENous Continuous PRN    sodium chloride flush 0.9 % injection 5-40 mL  5-40 mL IntraVENous 2 times per day    sodium chloride flush 0.9 % injection 5-40 mL  5-40 mL IntraVENous PRN    0.9 % sodium chloride infusion   IntraVENous PRN    potassium chloride (KLOR-CON) extended release tablet 40 mEq  40 mEq Oral PRN    Or    potassium bicarb-citric acid (EFFER-K) effervescent tablet 40 mEq  40 mEq Oral PRN    Or    potassium chloride 10 mEq/100 mL IVPB (Peripheral Line)  10 mEq IntraVENous PRN    magnesium sulfate 2000 mg in 50 mL IVPB premix  2,000 mg IntraVENous PRN    ondansetron (ZOFRAN-ODT) disintegrating tablet 4 mg  4 mg Oral Q8H PRN    Or    ondansetron (ZOFRAN) injection 4 mg  4 mg IntraVENous Q6H PRN    polyethylene glycol (GLYCOLAX) packet 17 g  17 g Oral Daily PRN    acetaminophen (TYLENOL) tablet 650 mg  650 mg Oral Q6H PRN    Or    acetaminophen (TYLENOL) suppository 650 mg  650 mg Rectal Q6H PRN    acetaminophen (TYLENOL) tablet 1,000 mg  1,000 mg Oral Q6H    allopurinol (ZYLOPRIM) tablet 100 mg  100 mg Oral Daily    buPROPion (WELLBUTRIN SR) extended release tablet 150 mg  150 mg Oral Daily    ferrous sulfate (IRON 325) tablet 325 mg  325 mg Oral Daily with breakfast    gabapentin (NEURONTIN) capsule 600 mg  600 mg Oral QPM    hydrOXYzine HCl (ATARAX) tablet 10

## 2024-04-20 NOTE — PROGRESS NOTES
[address], [facility] [place] [address]  Phone: [phone]   [phone]    [email or web address]     Nephrology Progress Note    Patient Name : Leny Barnes      : 1945     MRN : [id number]  Date of Admission : 4/15/2024  Date of Servive : 24    CC:  Follow up for CKD    Assessment and Plan   CKD 3a:  - from DM2 and HTN  - baseline Cr around 1.5 to 1.7  - creatinine stable    Hyponatremia 2/2 volume overload     Hx Chronic diastolic HF  -  CXR  with worsening pulmonary edema  -  CXR with significant pulmonary edema  - Change Bumex to 1 mg/h gtt  - Add Diuril 500 mg IV    Anemia suspect 2/2 CD  - Hgb 7.4  - tsat 5% with Ferritin 191- Give IV Fe x 3 doses  - b12 stable/ folate low on folic acid/ Fe Sulfate  - on JOSHUA TTS 20K     Sepsis Bacteremia  -3/18  s/p Laminectomy with dehiscence of wound  - 4/15 BC (+) serratia  - ID following  and adjusting abx  -  MRI (+) fluid/ gas  - Surgery on hold plans for Monday      HTN  - BP stable  - avoid ACE (I)/ ARB at this time     CAD  PAF s/p ablation and pacemaker  DM  FREDERICK on CPAP  Obesity  HLD     Interval History:  Seen and examined today  Significantly short of breath  Currently on 8 L with sats at 92%  3.3 to urine output    Review of Systems: Pertinent items are noted in HPI.    Current Medications:   Current Facility-Administered Medications   Medication Dose Route Frequency    ipratropium (ATROVENT) 0.02 % nebulizer solution 0.5 mg  0.5 mg Nebulization 4x Daily RT    acetylcysteine (MUCOMYST) 20 % solution 600 mg  600 mg Inhalation BID RT    bumetanide (BUMEX) 12.5 mg in 50 mL infusion  1 mg/hr IntraVENous Continuous    chlorothiazide (DIURIL) 500 mg in sterile water 18 mL injection  500 mg IntraVENous Once    cefTRIAXone (ROCEPHIN) 2,000 mg in sterile water 20 mL IV syringe  2,000 mg IntraVENous Q24H    Epoetin Jimy-epbx (RETACRIT) injection 20,000 Units  20,000 Units  SubCUTAneous Once per day on Tue Thu Sat    magic (miracle) mouthwash  5 mL Swish & Spit 4x Daily PRN    nystatin (MYCOSTATIN) 366533 UNIT/ML suspension 500,000 Units  5 mL Oral 4x Daily    guaiFENesin (ROBITUSSIN) 100 MG/5ML liquid 200 mg  200 mg Oral Q6H PRN    sertraline (ZOLOFT) tablet 150 mg  150 mg Oral QHS    arformoterol 15 mcg-budesonide 0.25 mg neb solution   Nebulization BID RT    folic acid (FOLVITE) tablet 1 mg  1 mg Oral Daily    [Held by provider] apixaban (ELIQUIS) tablet 2.5 mg  2.5 mg Oral BID    enoxaparin Sodium (LOVENOX) injection 30 mg  30 mg SubCUTAneous Q12H    glucose chewable tablet 16 g  4 tablet Oral PRN    dextrose bolus 10% 125 mL  125 mL IntraVENous PRN    Or    dextrose bolus 10% 250 mL  250 mL IntraVENous PRN    glucagon injection 1 mg  1 mg SubCUTAneous PRN    dextrose 10 % infusion   IntraVENous Continuous PRN    sodium chloride flush 0.9 % injection 5-40 mL  5-40 mL IntraVENous 2 times per day    sodium chloride flush 0.9 % injection 5-40 mL  5-40 mL IntraVENous PRN    0.9 % sodium chloride infusion   IntraVENous PRN    potassium chloride (KLOR-CON) extended release tablet 40 mEq  40 mEq Oral PRN    Or    potassium bicarb-citric acid (EFFER-K) effervescent tablet 40 mEq  40 mEq Oral PRN    Or    potassium chloride 10 mEq/100 mL IVPB (Peripheral Line)  10 mEq IntraVENous PRN    magnesium sulfate 2000 mg in 50 mL IVPB premix  2,000 mg IntraVENous PRN    ondansetron (ZOFRAN-ODT) disintegrating tablet 4 mg  4 mg Oral Q8H PRN    Or    ondansetron (ZOFRAN) injection 4 mg  4 mg IntraVENous Q6H PRN    polyethylene glycol (GLYCOLAX) packet 17 g  17 g Oral Daily PRN    acetaminophen (TYLENOL) tablet 650 mg  650 mg Oral Q6H PRN    Or    acetaminophen (TYLENOL) suppository 650 mg  650 mg Rectal Q6H PRN    acetaminophen (TYLENOL) tablet 1,000 mg  1,000 mg Oral Q6H    allopurinol (ZYLOPRIM) tablet 100 mg  100 mg Oral Daily    buPROPion (WELLBUTRIN SR) extended release tablet 150 mg  150 mg Oral

## 2024-04-20 NOTE — PLAN OF CARE
Problem: Safety - Adult  Goal: Free from fall injury  Outcome: Progressing     Problem: Discharge Planning  Goal: Discharge to home or other facility with appropriate resources  Outcome: Progressing     Problem: Pain  Goal: Verbalizes/displays adequate comfort level or baseline comfort level  Outcome: Progressing     Problem: ABCDS Injury Assessment  Goal: Absence of physical injury  Outcome: Progressing     Problem: Chronic Conditions and Co-morbidities  Goal: Patient's chronic conditions and co-morbidity symptoms are monitored and maintained or improved  Outcome: Progressing     Problem: Skin/Tissue Integrity  Goal: Absence of new skin breakdown  Description: 1.  Monitor for areas of redness and/or skin breakdown  2.  Assess vascular access sites hourly  3.  Every 4-6 hours minimum:  Change oxygen saturation probe site  4.  Every 4-6 hours:  If on nasal continuous positive airway pressure, respiratory therapy assess nares and determine need for appliance change or resting period.  Outcome: Progressing

## 2024-04-20 NOTE — PROGRESS NOTES
[facility]                                                                                          Hospitalist Progress Note  Brayan Fitch, SHAYNE - CNP  Answering service: [phone] OR [phone] from in house phone        Date of Service:  2024  NAME:  Leny Barnes  :  1945  MRN:  [id number]       Admission Summary:   79 y.o. female who presents with wound dehiscence. Patient returns from [facility] where she was discharged on  after being treated for wound dehiscence at that time as well as she had lumbar laminectomy on 3/8. Intially patient was discharged from [facility] to [facility] rehab on 3/22 she was then noted to have bloody drainage wound dehiscence with acute blood loss anemia and was readmitted to emergency department on  and then redischarged to [facility] back on . The patient was given IV antibiotics and a wound [facility] was placed over the wound by wound care. Patient also was treated with Klebsiella UTI and placed on vanc and Zosyn. The patient returns today by ambulance with significant wound dehiscence and bloody drainage.        Interval history / Subjective:     called to the bedside for RR.  Patient scheduled for debridement this am but was cancelled by Anesthesia for increased work of breathing and increasing oxygen requirement.  On exam patient with coarse wheezing and crackles bilateral with pursed lip breathing and patient unable to finish sentences,  cxr worsened since yesterday with increasing pulmonary edema, abg 7.31/70/39/19.8, nonrebreather mask with 10L,  O2 sat in the 90's with RR in 30's, transfer patient to [facility], start bumex gtt, CT of chest/abd/pelvis ordered.  New hematuria in holly noted discussed with nephrology, discussed with ID about broadening abx coverage, will continue with ceftriaxone at this time.  Will transfer patient to [facility]       Assessment & Plan:      Sepsis from wound infection

## 2024-04-21 ENCOUNTER — APPOINTMENT (OUTPATIENT)
Facility: HOSPITAL | Age: 79
End: 2024-04-21
Payer: MEDICARE

## 2024-04-21 LAB
ANION GAP SERPL CALC-SCNC: 5 MMOL/L (ref 5–15)
APTT PPP: 42.4 SEC (ref 22.1–31)
ARTERIAL PATENCY WRIST A: POSITIVE
ARTERIAL PATENCY WRIST A: POSITIVE
BASE EXCESS BLD CALC-SCNC: 0.5 MMOL/L
BASE EXCESS BLD CALC-SCNC: 0.9 MMOL/L
BDY SITE: ABNORMAL
BDY SITE: ABNORMAL
BUN SERPL-MCNC: 46 MG/DL (ref 6–20)
BUN/CREAT SERPL: 29 (ref 12–20)
CALCIUM SERPL-MCNC: 9.2 MG/DL (ref 8.5–10.1)
CHLORIDE SERPL-SCNC: 107 MMOL/L (ref 97–108)
CO2 SERPL-SCNC: 24 MMOL/L (ref 21–32)
CREAT SERPL-MCNC: 1.6 MG/DL (ref 0.55–1.02)
ERYTHROCYTE [DISTWIDTH] IN BLOOD BY AUTOMATED COUNT: 17.7 % (ref 11.5–14.5)
GAS FLOW.O2 O2 DELIVERY SYS: ABNORMAL
GAS FLOW.O2 O2 DELIVERY SYS: ABNORMAL
GLUCOSE BLD STRIP.AUTO-MCNC: 109 MG/DL (ref 65–117)
GLUCOSE BLD STRIP.AUTO-MCNC: 152 MG/DL (ref 65–117)
GLUCOSE BLD STRIP.AUTO-MCNC: 161 MG/DL (ref 65–117)
GLUCOSE BLD STRIP.AUTO-MCNC: 194 MG/DL (ref 65–117)
GLUCOSE SERPL-MCNC: 156 MG/DL (ref 65–100)
HBV SURFACE AB SER QL: NONREACTIVE
HBV SURFACE AB SER-ACNC: <3.1 MIU/ML
HBV SURFACE AG SER QL: <0.1 INDEX
HBV SURFACE AG SER QL: NEGATIVE
HCO3 BLD-SCNC: 26.4 MMOL/L (ref 22–26)
HCO3 BLD-SCNC: 26.5 MMOL/L (ref 22–26)
HCT VFR BLD AUTO: 26 % (ref 35–47)
HGB BLD-MCNC: 7.6 G/DL (ref 11.5–16)
INR PPP: 1.5 (ref 0.9–1.1)
LACTATE SERPL-SCNC: 1.1 MMOL/L (ref 0.4–2)
MAGNESIUM SERPL-MCNC: 2.1 MG/DL (ref 1.6–2.4)
MCH RBC QN AUTO: 27.6 PG (ref 26–34)
MCHC RBC AUTO-ENTMCNC: 29.2 G/DL (ref 30–36.5)
MCV RBC AUTO: 94.5 FL (ref 80–99)
NRBC # BLD: 0.13 K/UL (ref 0–0.01)
NRBC BLD-RTO: 0.9 PER 100 WBC
NT PRO BNP: ABNORMAL PG/ML
O2/TOTAL GAS SETTING VFR VENT: 50 %
O2/TOTAL GAS SETTING VFR VENT: 60 %
PCO2 BLD: 46.2 MMHG (ref 35–45)
PCO2 BLD: 48.2 MMHG (ref 35–45)
PEEP RESPIRATORY: 8 CMH2O
PH BLD: 7.35 (ref 7.35–7.45)
PH BLD: 7.37 (ref 7.35–7.45)
PLATELET # BLD AUTO: 313 K/UL (ref 150–400)
PMV BLD AUTO: 9.1 FL (ref 8.9–12.9)
PO2 BLD: 107 MMHG (ref 80–100)
PO2 BLD: 159 MMHG (ref 80–100)
POTASSIUM SERPL-SCNC: 2.8 MMOL/L (ref 3.5–5.1)
PRESSURE SUPPORT SETTING VENT: 6 CMH2O
PROCALCITONIN SERPL-MCNC: 0.67 NG/ML
PROTHROMBIN TIME: 15.2 SEC (ref 9–11.1)
RBC # BLD AUTO: 2.75 M/UL (ref 3.8–5.2)
SAO2 % BLD: 97.9 % (ref 92–97)
SAO2 % BLD: 99.3 % (ref 92–97)
SERVICE CMNT-IMP: ABNORMAL
SERVICE CMNT-IMP: NORMAL
SODIUM SERPL-SCNC: 136 MMOL/L (ref 136–145)
SPECIMEN TYPE: ABNORMAL
SPECIMEN TYPE: ABNORMAL
THERAPEUTIC RANGE: ABNORMAL SECS (ref 58–77)
VENTILATION MODE VENT: ABNORMAL
WBC # BLD AUTO: 14.7 K/UL (ref 3.6–11)

## 2024-04-21 PROCEDURE — 80048 BASIC METABOLIC PNL TOTAL CA: CPT

## 2024-04-21 PROCEDURE — 6360000002 HC RX W HCPCS: Performed by: INTERNAL MEDICINE

## 2024-04-21 PROCEDURE — 70450 CT HEAD/BRAIN W/O DYE: CPT

## 2024-04-21 PROCEDURE — 83880 ASSAY OF NATRIURETIC PEPTIDE: CPT

## 2024-04-21 PROCEDURE — 83735 ASSAY OF MAGNESIUM: CPT

## 2024-04-21 PROCEDURE — 94640 AIRWAY INHALATION TREATMENT: CPT

## 2024-04-21 PROCEDURE — 2580000003 HC RX 258: Performed by: INTERNAL MEDICINE

## 2024-04-21 PROCEDURE — 6360000002 HC RX W HCPCS: Performed by: NURSE PRACTITIONER

## 2024-04-21 PROCEDURE — 6370000000 HC RX 637 (ALT 250 FOR IP): Performed by: SURGERY

## 2024-04-21 PROCEDURE — 36415 COLL VENOUS BLD VENIPUNCTURE: CPT

## 2024-04-21 PROCEDURE — 5A1D70Z PERFORMANCE OF URINARY FILTRATION, INTERMITTENT, LESS THAN 6 HOURS PER DAY: ICD-10-PCS | Performed by: ORTHOPAEDIC SURGERY

## 2024-04-21 PROCEDURE — 6370000000 HC RX 637 (ALT 250 FOR IP): Performed by: HOSPITALIST

## 2024-04-21 PROCEDURE — 36600 WITHDRAWAL OF ARTERIAL BLOOD: CPT

## 2024-04-21 PROCEDURE — 84145 PROCALCITONIN (PCT): CPT

## 2024-04-21 PROCEDURE — 90935 HEMODIALYSIS ONE EVALUATION: CPT

## 2024-04-21 PROCEDURE — 93308 TTE F-UP OR LMTD: CPT

## 2024-04-21 PROCEDURE — 2000000000 HC ICU R&B

## 2024-04-21 PROCEDURE — 6370000000 HC RX 637 (ALT 250 FOR IP): Performed by: PHYSICIAN ASSISTANT

## 2024-04-21 PROCEDURE — 86706 HEP B SURFACE ANTIBODY: CPT

## 2024-04-21 PROCEDURE — 2580000003 HC RX 258: Performed by: NURSE PRACTITIONER

## 2024-04-21 PROCEDURE — 82962 GLUCOSE BLOOD TEST: CPT

## 2024-04-21 PROCEDURE — 6360000002 HC RX W HCPCS: Performed by: HOSPITALIST

## 2024-04-21 PROCEDURE — 02HV33Z INSERTION OF INFUSION DEVICE INTO SUPERIOR VENA CAVA, PERCUTANEOUS APPROACH: ICD-10-PCS | Performed by: HOSPITALIST

## 2024-04-21 PROCEDURE — 6360000002 HC RX W HCPCS: Performed by: SURGERY

## 2024-04-21 PROCEDURE — 2580000003 HC RX 258: Performed by: HOSPITALIST

## 2024-04-21 PROCEDURE — 85610 PROTHROMBIN TIME: CPT

## 2024-04-21 PROCEDURE — 85027 COMPLETE CBC AUTOMATED: CPT

## 2024-04-21 PROCEDURE — 82803 BLOOD GASES ANY COMBINATION: CPT

## 2024-04-21 PROCEDURE — 85730 THROMBOPLASTIN TIME PARTIAL: CPT

## 2024-04-21 PROCEDURE — 6360000002 HC RX W HCPCS: Performed by: PHYSICIAN ASSISTANT

## 2024-04-21 PROCEDURE — 87340 HEPATITIS B SURFACE AG IA: CPT

## 2024-04-21 PROCEDURE — 83605 ASSAY OF LACTIC ACID: CPT

## 2024-04-21 PROCEDURE — 71045 X-RAY EXAM CHEST 1 VIEW: CPT

## 2024-04-21 RX ORDER — HYDROMORPHONE HYDROCHLORIDE 1 MG/ML
0.25 INJECTION, SOLUTION INTRAMUSCULAR; INTRAVENOUS; SUBCUTANEOUS EVERY 4 HOURS PRN
Status: DISCONTINUED | OUTPATIENT
Start: 2024-04-21 | End: 2024-05-03 | Stop reason: HOSPADM

## 2024-04-21 RX ORDER — SODIUM CHLORIDE 9 MG/ML
INJECTION, SOLUTION INTRAVENOUS PRN
Status: DISCONTINUED | OUTPATIENT
Start: 2024-04-21 | End: 2024-04-21 | Stop reason: SDUPTHER

## 2024-04-21 RX ORDER — OXYCODONE HYDROCHLORIDE 5 MG/1
5 TABLET ORAL EVERY 4 HOURS PRN
Status: CANCELLED | OUTPATIENT
Start: 2024-04-21

## 2024-04-21 RX ORDER — HEPARIN SODIUM 10000 [USP'U]/100ML
5-30 INJECTION, SOLUTION INTRAVENOUS CONTINUOUS
Status: DISCONTINUED | OUTPATIENT
Start: 2024-04-21 | End: 2024-05-01 | Stop reason: ALTCHOICE

## 2024-04-21 RX ORDER — HEPARIN SODIUM 1000 [USP'U]/ML
4000 INJECTION, SOLUTION INTRAVENOUS; SUBCUTANEOUS PRN
Status: DISCONTINUED | OUTPATIENT
Start: 2024-04-21 | End: 2024-05-01 | Stop reason: ALTCHOICE

## 2024-04-21 RX ORDER — SODIUM CHLORIDE 0.9 % (FLUSH) 0.9 %
5-40 SYRINGE (ML) INJECTION EVERY 12 HOURS SCHEDULED
Status: DISCONTINUED | OUTPATIENT
Start: 2024-04-21 | End: 2024-04-21 | Stop reason: SDUPTHER

## 2024-04-21 RX ORDER — GABAPENTIN 100 MG/1
100 CAPSULE ORAL EVERY 8 HOURS SCHEDULED
Status: CANCELLED | OUTPATIENT
Start: 2024-04-21

## 2024-04-21 RX ORDER — CASTOR OIL AND BALSAM, PERU 788; 87 MG/G; MG/G
OINTMENT TOPICAL 2 TIMES DAILY
Status: DISCONTINUED | OUTPATIENT
Start: 2024-04-21 | End: 2024-05-03 | Stop reason: HOSPADM

## 2024-04-21 RX ORDER — SODIUM CHLORIDE 0.9 % (FLUSH) 0.9 %
5-40 SYRINGE (ML) INJECTION PRN
Status: DISCONTINUED | OUTPATIENT
Start: 2024-04-21 | End: 2024-04-21 | Stop reason: SDUPTHER

## 2024-04-21 RX ORDER — ASPIRIN 300 MG/1
300 SUPPOSITORY RECTAL DAILY
Status: DISCONTINUED | OUTPATIENT
Start: 2024-04-21 | End: 2024-05-02 | Stop reason: ALTCHOICE

## 2024-04-21 RX ORDER — MIDAZOLAM HYDROCHLORIDE 2 MG/2ML
2 INJECTION, SOLUTION INTRAMUSCULAR; INTRAVENOUS ONCE
Status: COMPLETED | OUTPATIENT
Start: 2024-04-21 | End: 2024-04-21

## 2024-04-21 RX ORDER — MIDAZOLAM HYDROCHLORIDE 1 MG/ML
INJECTION INTRAMUSCULAR; INTRAVENOUS
Status: DISPENSED
Start: 2024-04-21 | End: 2024-04-22

## 2024-04-21 RX ORDER — ASPIRIN 81 MG/1
81 TABLET, CHEWABLE ORAL DAILY
Status: DISCONTINUED | OUTPATIENT
Start: 2024-04-21 | End: 2024-05-02 | Stop reason: ALTCHOICE

## 2024-04-21 RX ORDER — OXYCODONE HYDROCHLORIDE 5 MG/1
10 TABLET ORAL EVERY 4 HOURS PRN
Status: CANCELLED | OUTPATIENT
Start: 2024-04-21

## 2024-04-21 RX ORDER — HEPARIN SODIUM 1000 [USP'U]/ML
2000 INJECTION, SOLUTION INTRAVENOUS; SUBCUTANEOUS PRN
Status: DISCONTINUED | OUTPATIENT
Start: 2024-04-21 | End: 2024-05-01 | Stop reason: ALTCHOICE

## 2024-04-21 RX ADMIN — WATER 2000 MG: 1 INJECTION INTRAMUSCULAR; INTRAVENOUS; SUBCUTANEOUS at 10:25

## 2024-04-21 RX ADMIN — ACETYLCYSTEINE 600 MG: 200 INHALANT RESPIRATORY (INHALATION) at 20:49

## 2024-04-21 RX ADMIN — Medication: at 21:00

## 2024-04-21 RX ADMIN — BUMETANIDE 1 MG/HR: 0.25 INJECTION INTRAMUSCULAR; INTRAVENOUS at 13:24

## 2024-04-21 RX ADMIN — ARFORMOTEROL TARTRATE: 15 SOLUTION RESPIRATORY (INHALATION) at 20:49

## 2024-04-21 RX ADMIN — IPRATROPIUM BROMIDE 0.5 MG: 0.5 SOLUTION RESPIRATORY (INHALATION) at 08:03

## 2024-04-21 RX ADMIN — NYSTATIN 500000 UNITS: 100000 SUSPENSION ORAL at 22:50

## 2024-04-21 RX ADMIN — IPRATROPIUM BROMIDE 0.5 MG: 0.5 SOLUTION RESPIRATORY (INHALATION) at 17:22

## 2024-04-21 RX ADMIN — POTASSIUM CHLORIDE 10 MEQ: 7.46 INJECTION, SOLUTION INTRAVENOUS at 16:28

## 2024-04-21 RX ADMIN — ACETAMINOPHEN 1000 MG: 325 TABLET ORAL at 00:28

## 2024-04-21 RX ADMIN — ACETAMINOPHEN 1000 MG: 325 TABLET ORAL at 07:16

## 2024-04-21 RX ADMIN — ENOXAPARIN SODIUM 30 MG: 100 INJECTION SUBCUTANEOUS at 10:25

## 2024-04-21 RX ADMIN — IPRATROPIUM BROMIDE 0.5 MG: 0.5 SOLUTION RESPIRATORY (INHALATION) at 12:40

## 2024-04-21 RX ADMIN — HEPARIN SODIUM 8 UNITS/KG/HR: 10000 INJECTION, SOLUTION INTRAVENOUS at 13:27

## 2024-04-21 RX ADMIN — BUMETANIDE 1 MG/HR: 0.25 INJECTION INTRAMUSCULAR; INTRAVENOUS at 05:22

## 2024-04-21 RX ADMIN — MIDAZOLAM 2 MG: 1 INJECTION INTRAMUSCULAR; INTRAVENOUS at 16:37

## 2024-04-21 RX ADMIN — IPRATROPIUM BROMIDE 0.5 MG: 0.5 SOLUTION RESPIRATORY (INHALATION) at 20:49

## 2024-04-21 RX ADMIN — POTASSIUM CHLORIDE 10 MEQ: 7.46 INJECTION, SOLUTION INTRAVENOUS at 18:31

## 2024-04-21 RX ADMIN — ARFORMOTEROL TARTRATE: 15 SOLUTION RESPIRATORY (INHALATION) at 08:03

## 2024-04-21 RX ADMIN — ACETYLCYSTEINE 600 MG: 200 INHALANT RESPIRATORY (INHALATION) at 08:04

## 2024-04-21 RX ADMIN — SODIUM CHLORIDE, PRESERVATIVE FREE 10 ML: 5 INJECTION INTRAVENOUS at 10:26

## 2024-04-21 RX ADMIN — POTASSIUM CHLORIDE 10 MEQ: 7.46 INJECTION, SOLUTION INTRAVENOUS at 17:18

## 2024-04-21 RX ADMIN — SODIUM CHLORIDE, PRESERVATIVE FREE 10 ML: 5 INJECTION INTRAVENOUS at 10:40

## 2024-04-21 ASSESSMENT — PAIN SCALES - GENERAL
PAINLEVEL_OUTOF10: 0
PAINLEVEL_OUTOF10: 0

## 2024-04-21 NOTE — PROGRESS NOTES
Cardiology Progress Note                                        Admit Date: 4/15/2024    Assessment/Plan:     CHF; acute on chronic diastolic HF; chronic diastolic HF as EF 55% on echo during this admission   Net  5l since 4/20  On bumex ggt  continue as still wheezes and rales      Consider pulm eval  for reacative airway disease   check btnp   2. MedtronicPacer; is a single chamber placed for CHB; it can not tell atrial activity;    3. Debridement surgery pending  10-T1 laminectomy, L2-S1 laminectomy with fusion from [id number] on 3/18/24 admitted 4/1-4/5/24 from [facility] for dehiscence and drainage   4. CKD; also followed by renal team  5. CAD:  s/p stent in LAD and POBA of D1 in 7/2023.   6. AFIB:   rate controlled paced  needs   anticoag when ok with surgery  eliquis  5 bid     7.  Anemia  8. Hypokalemia    Cr now 1.6   9.  Poss cva:  still  draining but no active bleeding       Rec IV heparin     This am  acute problem with word finding  Teleneuro exam. Patient could not tolerate laying flat. Her oxygen saturations dropped to 65% on 15LPM mid flow cannula. Only able to complete a non-contrast study.     Consider IV heparin ggt     Leny Barnes is a 79 y.o. female with     PROBLEM LIST:  Patient Active Problem List    Diagnosis Date Noted    Complete heart block (HCC) 05/24/2022    Infection as complication of medical care 04/17/2024    Bacteremia due to Gram-negative bacteria 04/17/2024    Sepsis without acute organ dysfunction (HCC) 04/17/2024    Deep incisional surgical site infection 04/17/2024    Shortness of breath 04/16/2024    Generalized weakness 04/16/2024    Wound dehiscence 04/15/2024    Wound infection 04/01/2024    Inadequately controlled diabetes mellitus (HCC) 03/19/2024    Lumbar stenosis with neurogenic claudication 03/18/2024    Kyphoscoliosis due to degeneration of spine 03/18/2024    Palliative care encounter 03/05/2024    Chronic diastolic congestive heart failure (HCC)

## 2024-04-21 NOTE — PLAN OF CARE
Problem: Safety - Adult  Goal: Free from fall injury  Flowsheets (Taken 4/20/2024 2034)  Free From Fall Injury: Instruct family/caregiver on patient safety     Problem: Discharge Planning  Goal: Discharge to home or other facility with appropriate resources  Flowsheets  Taken 4/20/2024 2034  Discharge to home or other facility with appropriate resources: Identify barriers to discharge with patient and caregiver  Taken 4/20/2024 0830  Discharge to home or other facility with appropriate resources: Identify barriers to discharge with patient and caregiver     Problem: Pain  Goal: Verbalizes/displays adequate comfort level or baseline comfort level  Flowsheets  Taken 4/20/2024 2034  Verbalizes/displays adequate comfort level or baseline comfort level: Encourage patient to monitor pain and request assistance  Taken 4/20/2024 1230  Verbalizes/displays adequate comfort level or baseline comfort level:   Encourage patient to monitor pain and request assistance   Assess pain using appropriate pain scale  Taken 4/20/2024 0830  Verbalizes/displays adequate comfort level or baseline comfort level:   Encourage patient to monitor pain and request assistance   Assess pain using appropriate pain scale     Problem: Chronic Conditions and Co-morbidities  Goal: Patient's chronic conditions and co-morbidity symptoms are monitored and maintained or improved  Recent Flowsheet Documentation  Taken 4/20/2024 0830 by Dorota Tong, RN  Care Plan - Patient's Chronic Conditions and Co-Morbidity Symptoms are Monitored and Maintained or Improved: Monitor and assess patient's chronic conditions and comorbid symptoms for stability, deterioration, or improvement     Problem: Skin/Tissue Integrity  Goal: Absence of new skin breakdown  Description: 1.  Monitor for areas of redness and/or skin breakdown  2.  Assess vascular access sites hourly  3.  Every 4-6 hours minimum:  Change oxygen saturation probe site  4.  Every 4-6 hours:  If on nasal  continuous positive airway pressure, respiratory therapy assess nares and determine need for appliance change or resting period.  Outcome: Progressing

## 2024-04-21 NOTE — PROGRESS NOTES
Report given to Ann Montanez. Transfer orders to take patient to CCU. Patient switched from bipap to 15L midflow. Currently oriented, with delayed responses. Report consisted of patient's situation, background, assessment, plans and recommendation. Patient transported via bed on Heparin drip at 8units/kg/hr (9.6 mL/hr) and Bumex drip 1mg (4mL/hr).

## 2024-04-21 NOTE — PROGRESS NOTES
[facility]                                                                                          Hospitalist Progress Note  Brayan Fitch, SHAYNE - CNP  Answering service: [phone] OR [phone] from in house phone        Date of Service:  2024  NAME:  Leny Barnes  :  1945  MRN:  [id number]       Admission Summary:   79 y.o. female who presents with wound dehiscence. Patient returns from [facility] where she was discharged on  after being treated for wound dehiscence at that time as well as she had lumbar laminectomy on 3/8. Intially patient was discharged from [facility] to [facility] rehab on 3/22 she was then noted to have bloody drainage wound dehiscence with acute blood loss anemia and was readmitted to emergency department on  and then redischarged to [facility] back on . The patient was given IV antibiotics and a wound VA was placed over the wound by wound care. Patient also was treated with Klebsiella UTI and placed on vanc and Zosyn. The patient returns today by ambulance with significant wound dehiscence and bloody drainage.        Interval history / Subjective:     called to the bedside for RR.  Patient scheduled for debridement this am but was cancelled by Anesthesia for increased work of breathing and increasing oxygen requirement.  On exam patient with coarse wheezing and crackles bilateral with pursed lip breathing and patient unable to finish sentences,  cxr worsened since yesterday with increasing pulmonary edema, abg 7.31/70/39/19.8, nonrebreather mask with 10L,  O2 sat in the 90's with RR in 30's, transfer patient to [facility], start bumex gtt, CT of chest/abd/pelvis ordered.  New hematuria in holly noted discussed with nephrology, discussed with ID about broadening abx coverage, will continue with ceftriaxone at this time.  Will transfer patient to [facility]      Patient with word finding difficulty but did c/o bad  headache, drooping left eye lid and pupil slightly larger on left, code stroke called.  , /84, last well known time was at shift change 0745. Ct of head completed but unable to do cta head/neck because patient desaturated on table to 64%, placed on bipap when returned to the floor, neurology consulted, asa started  Discussed care with Dr. Chapman and ICU physician- heparin gtt started, will repeat abg at 1500.  Do not see an ECHO this admission, will order stat. Consult to pulmonology as well.    CRITICAL CARE ATTESTATION:  I had a face to face encounter with the patient, reviewed and interpreted patient data including clinical events, labs, images, vital signs, I/O's, and examined patient.  I have discussed the case and the plan and management of the patient's care with the consulting services, the bedside nurses and necessary ancillary providers.       NOTE OF PERSONAL INVOLVEMENT IN CARE   This patient has a high probability of imminent, clinically significant deterioration, which requires the highest level of preparedness to intervene urgently. I participated in the decision-making and personally managed or directed the management of the following life and organ supporting interventions that required my frequent assessment to treat or prevent imminent deterioration.     I personally spent 44 minutes of critical care time.  This is time spent at this critically ill patient's bedside actively involved in patient care as well as the coordination of care and discussions with the patient's family.  This does not include any procedural time which has been billed separately.      Assessment & Plan:      Sepsis from wound infection wound dehiscence with bacteremia  Sepsis supported by high fever of 102 WBC count of 17.4  Lumbar stenosis with claudication s/p T10-11, and L2-S1 laminectomy, and [id number]-[id number] fusion on 3/18  Bacteremia (Serratia marcescens and enterobacter)  -Sent from [facility] for wound

## 2024-04-21 NOTE — PROGRESS NOTES
[address], [place] [address]  Phone: [phone]   [phone]    [email or web address]     Nephrology Progress Note    Patient Name : Leny Barnes      : 1945     MRN : [id number]  Date of Admission : 4/15/2024  Date of Servive : 24    CC:  Follow up for CKD    Assessment and Plan    Hx Chronic diastolic HF  Persistent volume overload  Worsening pulmonary edema  -  CXR  with worsening pulmonary edema  -  CXR with significant pulmonary edema  -  CXR persistent bilateral pulmonary edema  -Not meaningful urine output despite of Bumex gtt. and Diuril combination.  -Plan to initiate hemodialysis with ultrafiltration today  -Requested ICU team to accept patient for higher level of care.(Appreciate their assistance)  -Notified Jovani for urgent hemodialysis today and tomorrow.  -Continue with Bumex gtt. for now until hemodialysis.      CKD 3a:  - from DM2 and HTN  - baseline Cr around 1.5 to 1.7  - creatinine stable    Hyponatremia 2/2 volume overload      Anemia suspect 2/2 CD  - Hgb 7.4  - tsat 5% with Ferritin 191- Give IV Fe x 3 doses  - b12 stable/ folate low on folic acid/ Fe Sulfate  - on JOSHUA TTS 20K     Sepsis Bacteremia  -3/18  s/p Laminectomy with dehiscence of wound  - 4/15 BC (+) serratia  - ID following  and adjusting abx  -  MRI (+) fluid/ gas  - Surgery on hold plans for Monday      HTN  - BP stable  - avoid ACE (I)/ ARB at this time     CAD  PAF s/p ablation and pacemaker  DM  FREDERICK on CPAP  Obesity  HLD     Interval History:  Seen and examined today  Significantly short of breath  Now placed on BiPAP for persistent hypoxia and hypercarbia  Creatinine stable  Urine output 3.3  Remains on  Bumex gtt. and Diuril combination    Review of Systems: Pertinent items are noted in HPI.    Current Medications:   Current Facility-Administered Medications   Medication Dose Route Frequency    iopamidol ([id number])  Axillary     SpO2: 100% 100%  100%     Intake and Output:  04/21 0701 - 04/21 1900  In: -   Out: 750 [Urine:750]  04/19 1901 - 04/21 0700  In: -   Out: 5275 [Urine:5275]    Physical Examination:  General: Dyspnea noted  Neck:  Supple, no mass  Resp:  Crackles b/l bases  CV:  RRR,  no murmur or rub, 2+ LE edema  GI:  Soft, NT, + BS, no HS megaly  Neurologic:  Non focal  Psych:             AAO x 3 appropriate affect   Skin:  No Rash  :  Smith +     []    High complexity decision making was performed  []    Patient is at high-risk of decompensation with multiple organ involvement    Lab Data Personally Reviewed: I have reviewed all the pertinent labs, microbiology data and radiology studies during assessment.    Labs:  Recent Labs     04/19/24  0903 04/20/24  0854 04/21/24  0509   * 136 136   K 3.5 3.1* 2.8*    108 107   CO2 22 24 24   GLUCOSE 205* 166* 156*   BUN 42* 44* 46*   CREATININE 1.44* 1.48* 1.60*   CALCIUM 9.3 9.4 9.2         Recent Labs     04/19/24  0103 04/20/24  0533 04/21/24  0509   WBC 11.7* 12.8* 14.7*   RBC 2.72* 3.03* 2.75*   HGB 7.4* 8.1* 7.6*   HCT 24.4* 27.5* 26.0*   MCV 89.7 90.8 94.5   MCH 27.2 26.7 27.6   MCHC 30.3 29.5* 29.2*   RDW 16.9* 17.3* 17.7*    329 313   MPV 9.1 8.8* 9.1       Recent Labs     04/20/24  0854   GLOB 4.6*       Recent Labs     04/19/24  0903   INR 1.4*        No results for input(s): \"CPK\", \"CKMB\", \"TROPONINI\" in the last 72 hours.    Invalid input(s): \"B-NP\"  Invalid input(s): \"PHI\", \"PCO2I\", \"PO2I\", \"FIO2I\"     Ventilator:       Microbiology:  No results found for: \"SDES\"  No components found for: \"CULT\"      I have reviewed the flowsheets.  Chart and Pertinent Notes have been reviewed.   No change in PMH ,family and social history from Consult note.      Mikhail Coy MD  [facility]

## 2024-04-21 NOTE — H&P
CRITICAL CARE NOTE    Name: Leny Barnes   : 1945   MRN: [id number]   Date: 2024      REASON FOR ICU ADMISSION: Pulmonary edema, hemodialysis    PRINCIPAL ICU DIAGNOSIS   Bacteremia  Pulmonary edema  Intermittent hemodialysis    BRIEF PATIENT SUMMARY   79 y.o. female who presents with wound dehiscence. Patient returns from [facility] where she was discharged on  after being treated for wound dehiscence at that time as well as she had lumbar laminectomy on 3/8. Intially patient was discharged from [facility] to [facility] rehab on 3/22 she was then noted to have bloody drainage wound dehiscence with acute blood loss anemia and was readmitted to emergency department on  and then redischarged to [facility] back on . The patient was given IV antibiotics and a wound VA was placed over the wound by wound care. Patient also was treated with Klebsiella UTI and placed on vanc and Zosyn. The patient returns today by ambulance with significant wound dehiscence and bloody drainage.     called to the bedside for RR.  Patient scheduled for debridement this am but was cancelled by Anesthesia for increased work of breathing and increasing oxygen requirement.  On exam patient with coarse wheezing and crackles bilateral with pursed lip breathing and patient unable to finish sentences,  cxr worsened since yesterday with increasing pulmonary edema, abg 7.31/70/39/19.8, nonrebreather mask with 10L,  O2 sat in the 90's with RR in 30's, transfer patient to [facility], start bumex gtt, CT of chest/abd/pelvis ordered.  New hematuria in holly noted discussed with nephrology, discussed with ID about broadening abx coverage, will continue with ceftriaxone at this time.  Will transfer patient to [facility]  Transferred to ICU for acute hypoxic respiratory distress and placement of a dialysis catheter in preparation for hemodialysis.       COMPREHENSIVE ASSESSMENT & PLAN:SYSTEM BASED     24 HOUR EVENTS: As

## 2024-04-21 NOTE — PROGRESS NOTES
1520 TRANSFER - IN REPORT:    Verbal report received from Dorota RICE on Leny Barnes  being received from [facility] for change in patient condition (Increase 02 need)      Report consisted of patient's Situation, Background, Assessment and   Recommendations(SBAR).     Information from the following report(s) Nurse Handoff Report, Index, Intake/Output, MAR, Recent Results, and Cardiac Rhythm   was reviewed with the receiving nurse.    Opportunity for questions and clarification was provided.      Assessment completed upon patient's arrival to unit and care assumed.     1600 Pt arrived to CCU 18 on MFNC. Pt placed back on Bipap by RT  Heparin gtt off upon arrival. Per NP Jovanny leave off for Mendez line placement   Bumex gtt 1mg/hr    ABG collected by RT     1630 ICU NP with NP iris Gracia at bedside placing Mendez     1625 K noted to be 2.8 w/ AM labs, Potassium repletion began per protocol.     1640 Pt given 1x dose 2mg Versed for line placement    1710 CXR complete at this time confirming placement of Rt mendez. Per ICU NP, line in good position, okay to use    1720 Wound care provided, wound care consulted, see skin note     1900 Procalcitonin and Lactic acid sent per order.    1930 Bedside shift change report given to Nicholas RICE (oncoming nurse) by Ann RICE (offgoing nurse). Report included the following information Nurse Handoff Report, Index, Intake/Output, MAR, Recent Results, and Cardiac Rhythm  .

## 2024-04-21 NOTE — PROGRESS NOTES
4 Eyes Skin Assessment     NAME:  Leny Barnes  YOB: 1945  MEDICAL RECORD NUMBER:  [id number]    The patient is being assessed for  Transfer to New Unit    I agree that at least one RN has performed a thorough Head to Toe Skin Assessment on the patient. ALL assessment sites listed below have been assessed.      Areas assessed by both nurses:    Head, Face, Ears, Shoulders, Back, Chest, Arms, Elbows, Hands, Sacrum. Buttock, Coccyx, Ischium, Legs. Feet and Heels, and Under Medical Devices         Does the Patient have a Wound? Yes wound(s) were present on assessment. LDA wound assessment was Initiated and completed by RN       Luke Prevention initiated by RN: Yes  Wound Care Orders initiated by RN: Yes    Pressure Injury (Stage 3,4, Unstageable, DTI, NWPT, and Complex wounds) if present, place Wound referral order by RN under : Yes  L heel DTI, sacrum    New Ostomies, if present place, Ostomy referral order under : No     Nurse 1 eSignature: Electronically signed by Ann Gonsalves RN on 4/21/24 at 7:26 PM EDT    **SHARE this note so that the co-signing nurse can place an eSignature**    Nurse 2 eSignature: Electronically signed by Concepción Castanon RN on 4/21/24 at 7:28 PM EDT

## 2024-04-21 NOTE — PROGRESS NOTES
POD 2 Days Post-Op    Procedure:  Procedure(s) with comments:  BACK WOUND INCISION AND DRAINAGE (EIP 0730) - This patient is in house we are requesting this start at 7:30 am    Subjective:     Patient receiving breathing treatment on arrival.  Patient showing pressured speech and signs of shortness of breath during the treatment.  She continues to complain of low back pain and frustration with her dehiscence.    Objective:     Blood pressure (!) 128/47, pulse 73, temperature 98.9 °F (37.2 °C), temperature source Axillary, resp. rate 18, SpO2 100 %.    Temp (24hrs), Av.3 °F (36.8 °C), Min:97.9 °F (36.6 °C), Max:98.9 °F (37.2 °C)      Physical Exam: Unable to examine lumbar incision or dressing today.  Dehiscence of the lumbar aspect of the incision previously noted.  Motor and sensation to light touch is intact to L4-S1 bilaterally. Brisk capillary refill.     Labs:   Lab Results   Component Value Date/Time    HGB 7.6 2024 05:09 AM    INR 1.4 2024 09:03 AM    INR 2.1 2023 11:49 AM         Assessment:     Principal Problem:    Wound dehiscence  Active Problems:    Palliative care encounter    Inadequately controlled diabetes mellitus (HCC)    Wound infection    Shortness of breath    Generalized weakness    Infection as complication of medical care    Bacteremia due to Gram-negative bacteria    Sepsis without acute organ dysfunction (HCC)    Deep incisional surgical site infection  Resolved Problems:    * No resolved hospital problems. *      Plan/Recommendations/Medical Decision Makin.  Continue PT/OT - as able.  2.  Continue established methods of pain control.  3.  VTE Prophylaxes - TEDS &/or SCDs, mobilize, holding chemoprophylaxis for surgery.    4.  Daily dressing changes.   5.  Possible OR tomorrow for lumbar I&D pending medical clearance  6. NPO at midnight, please hold anticoagulation for tomorrow

## 2024-04-21 NOTE — PROGRESS NOTES
Speech therapy   Asked by evaluate this patient but she is currently on BIPAP. We will hold eval for now. She should be NPO. Offer meds an alternative route.   Keily Posadas, SLP

## 2024-04-21 NOTE — PROCEDURES
Procedure Note - Temporary Non-Tunneled HD Catheter (Mendez Catheter):   Performed by Samia Olivera, [facility] NP Student  Supervised by [email or web address] Thrasher-BC  Diagnosis: HF, volume overload  Insertion Date: 04/21/24  Time:5:06 PM   Obtained Consent? yes; informed   Procedure Location:  CCU.      Immediately prior to the procedure, the patient was reevaluated and found suitable for the planned procedure and any planned medications.  Immediately prior to the procedure a time out was called to verify the correct patient, procedure, equipment, staff, and marking as appropriate.    Central line Bundle:  Full sterile barrier precautions used.  7-Step Sterility Protocol followed.  (cap, mask sterile gown, sterile gloves, large sterile sheet, hand hygiene, 2% chlorhexidine for cutaneous antisepsis)  5 mL 1% Lidocaine placed at insertion site.      Patient positioned in Trendelenburg?yes   The site was prepped with ChloraPrep.   Catheter inserted into a new site.     Using Seldinger technique a Hemodialysis Catheter was placed in the Right, Internal Jugular Vein via direct cannulation with 1 number of attempts for Blood Drawing, IV Access, and Dialysis.    Ultrasound Guidance was utilized.    There was good dark, non-pulsatile blood return in all ports.  Femoral Site? no. If Yes, reason femoral site was chosen: N/A  Catheter secured. Biopatch/CHG bio-occlusive dressing in place? yes.   The following complications were encountered: None.  A follow-up chest x-ray was ordered post procedure.    The procedure was tolerated well.    Note drafted by Samia Olivera, [facility] NP Student, under my direct supervision.     BUSHRA ThrasherMiddlesex Hospital  Critical Care Medicine  [facility]

## 2024-04-21 NOTE — SIGNIFICANT EVENT
Rapid Response Team responding to Code Stroke    0945- Patient noted by staff to have acute changes in pupils and word finding.    Pupilometer:    NPI    Right 4.3  Left 1.0  Diff 3.3    Size  Right 5.75  Left 6.38  Diff 0.63    Results shared with Neuro team      0953- Down for CT scan.    1018- Back to patient room after Teleneuro exam. Patient could not tolerate laying flat. Her oxygen saturations dropped to 65% on 15LPM mid flow cannula. Only able to complete a non-contrast study.

## 2024-04-21 NOTE — PROGRESS NOTES
Occupational Therapy Note:     Received therapy orders.  Pt not appropriate for therapy this date. Pt with RR this morning for possible stroke.  Pt also with respiratory distress and desating to the 64% per notes.  Will defer and follow up as appropriate.       Doris Mendez, OTR/L

## 2024-04-21 NOTE — PROGRESS NOTES
Neurocritical Care Code Stroke Documentation      Symptoms: Left eye droop, word finding difficulites   Baseline mRS:   4   Last Known Well: 0745   Medical hx: Past Medical History:   Diagnosis Date    Arthritis     Asthma     Atrial fibrillation ([facility])     NONE SINCE PACEMAKER    CAD (coronary artery disease)     Cancer ([facility])     UTERINE    Chronic kidney disease (CKD), stage III (moderate) ([facility])     Compression fracture of L1 vertebra ([facility]) 10/2023    FELL AT HOME    Congestive heart failure ([facility])     Pacemaker    Depression     DM (diabetes mellitus) ([facility])     HTN (hypertension)     Hyperlipidemia     Morbid obesity ([facility])     Neuropathy     FREDERICK on CPAP     WEARS 02 2LNC AT NIGHT      Vitals: Vitals:    24 0811   BP:    Pulse: 73   Resp:    Temp:    SpO2:       Anticoagulation:  Eliquis (held since 2024)   VAN:   Negative   NIHSS:   1a-LOC: 0    1b-Month/Age: 0    1c-Open/Close Hand: 0    2-Best Gaze: 0    3-Visual Fields: 0    4-Facial Palsy: 0    5a-Left Arm: 0    5b-Right Arm: 0    6a-Left Le    6b-Right Le    7-Limb Ataxia: 0    8-Sensory: 0    9-Best Language: 0    10-Dysarthria: 2    11-Extinction/Inattention: 0  TOTAL SCORE:   Imaging (personally reviewed):   CT:  No acute process    CTA:  *held    CTP:  *Held   Plan:   TNK Candidate: NO-non disabling symptom    Mechanical thrombectomy Candidate: NO    Pt needs routine stroke work up including MRI when able to lie flat    *Perform dysphagia screening prior to any PO intake*     CTA held as pt desaturated to 64% on 15L O2 while flat for dry CT and she had not evidence of LVO.     Discussed with: STEPHEN Mayer NP        Arrival time: 950    I have spent 40 of critical care time involved in lab review, consultations with specialist, family decision making and documentation. During this entire length of time I was immediately available to the patient.    SHAYNE Summers - NP  Neurocritical Care Nurse

## 2024-04-21 NOTE — PROGRESS NOTES
Orders acknowledged, chart reviewed, spoke with RN in prep for PT eval. Per chart and RN, pt with RR initiated this morning for possible stroke work up. Attempted CT scan but was unable to complete 2/2 desaturation in supine (as low as 65% per chart) and now on PAP. Pt not appropriate for PT eval at this time and RN in agreement. Will f/u tomorrow.     Osito Boggs, PT

## 2024-04-21 NOTE — PLAN OF CARE
Problem: Safety - Adult  Goal: Free from fall injury  4/21/2024 0813 by Shayan Rosenthal RN  Outcome: Progressing  4/20/2024 2034 by Dorota Tong RN  Flowsheets (Taken 4/20/2024 2034)  Free From Fall Injury: Instruct family/caregiver on patient safety     Problem: Discharge Planning  Goal: Discharge to home or other facility with appropriate resources  4/21/2024 0813 by Shayan Rosenthal RN  Outcome: Progressing  4/20/2024 2034 by Dorota Tong RN  Flowsheets  Taken 4/20/2024 2034  Discharge to home or other facility with appropriate resources: Identify barriers to discharge with patient and caregiver  Taken 4/20/2024 0830  Discharge to home or other facility with appropriate resources: Identify barriers to discharge with patient and caregiver     Problem: Pain  Goal: Verbalizes/displays adequate comfort level or baseline comfort level  4/21/2024 0813 by Shayan Rosenthal RN  Outcome: Progressing  4/20/2024 2034 by Dorota Tong RN  Flowsheets  Taken 4/20/2024 2034 by Dorota Tong RN  Verbalizes/displays adequate comfort level or baseline comfort level: Encourage patient to monitor pain and request assistance  Taken 4/20/2024 2000 by Shayan Rosenthal RN  Verbalizes/displays adequate comfort level or baseline comfort level: Encourage patient to monitor pain and request assistance  Taken 4/20/2024 1230 by Dorota Tong RN  Verbalizes/displays adequate comfort level or baseline comfort level:   Encourage patient to monitor pain and request assistance   Assess pain using appropriate pain scale  Taken 4/20/2024 0830 by Dorota Tong RN  Verbalizes/displays adequate comfort level or baseline comfort level:   Encourage patient to monitor pain and request assistance   Assess pain using appropriate pain scale     Problem: ABCDS Injury Assessment  Goal: Absence of physical injury  Outcome: Progressing     Problem: Chronic Conditions and Co-morbidities  Goal: Patient's chronic conditions and co-morbidity symptoms are monitored

## 2024-04-22 ENCOUNTER — APPOINTMENT (OUTPATIENT)
Facility: HOSPITAL | Age: 79
End: 2024-04-22
Payer: MEDICARE

## 2024-04-22 ENCOUNTER — APPOINTMENT (OUTPATIENT)
Facility: HOSPITAL | Age: 79
End: 2024-04-22
Attending: SURGERY
Payer: MEDICARE

## 2024-04-22 ENCOUNTER — ANESTHESIA EVENT (OUTPATIENT)
Facility: HOSPITAL | Age: 79
End: 2024-04-22
Payer: MEDICARE

## 2024-04-22 ENCOUNTER — ANESTHESIA (OUTPATIENT)
Facility: HOSPITAL | Age: 79
End: 2024-04-22
Payer: MEDICARE

## 2024-04-22 PROBLEM — I48.11 LONGSTANDING PERSISTENT ATRIAL FIBRILLATION (HCC): Status: ACTIVE | Noted: 2024-04-22

## 2024-04-22 PROBLEM — R47.1 DYSARTHRIA: Status: ACTIVE | Noted: 2024-04-22

## 2024-04-22 LAB
ABO + RH BLD: NORMAL
ANION GAP SERPL CALC-SCNC: 5 MMOL/L (ref 5–15)
ANION GAP SERPL CALC-SCNC: 6 MMOL/L (ref 5–15)
APTT PPP: 68.3 SEC (ref 22.1–31)
APTT PPP: 71.6 SEC (ref 22.1–31)
BASE EXCESS BLD CALC-SCNC: 3.9 MMOL/L
BLOOD GROUP ANTIBODIES SERPL: NORMAL
BUN SERPL-MCNC: 26 MG/DL (ref 6–20)
BUN SERPL-MCNC: 27 MG/DL (ref 6–20)
BUN/CREAT SERPL: 23 (ref 12–20)
BUN/CREAT SERPL: 24 (ref 12–20)
CALCIUM SERPL-MCNC: 8 MG/DL (ref 8.5–10.1)
CALCIUM SERPL-MCNC: 8.2 MG/DL (ref 8.5–10.1)
CHLORIDE SERPL-SCNC: 104 MMOL/L (ref 97–108)
CHLORIDE SERPL-SCNC: 104 MMOL/L (ref 97–108)
CHOLEST SERPL-MCNC: 84 MG/DL
CO2 SERPL-SCNC: 30 MMOL/L (ref 21–32)
CO2 SERPL-SCNC: 31 MMOL/L (ref 21–32)
CREAT SERPL-MCNC: 1.09 MG/DL (ref 0.55–1.02)
CREAT SERPL-MCNC: 1.2 MG/DL (ref 0.55–1.02)
ECHO AO ROOT DIAM: 3.1 CM
ECHO AO ROOT INDEX: 1.38 CM/M2
ECHO AV AREA PEAK VELOCITY: 1.2 CM2
ECHO AV AREA VTI: 1.2 CM2
ECHO AV AREA/BSA PEAK VELOCITY: 0.5 CM2/M2
ECHO AV AREA/BSA VTI: 0.5 CM2/M2
ECHO AV MEAN GRADIENT: 22 MMHG
ECHO AV MEAN VELOCITY: 2.2 M/S
ECHO AV PEAK GRADIENT: 43 MMHG
ECHO AV PEAK VELOCITY: 3.3 M/S
ECHO AV VELOCITY RATIO: 0.39
ECHO AV VTI: 68.3 CM
ECHO BSA: 2.36 M2
ECHO LA DIAMETER INDEX: 1.96 CM/M2
ECHO LA DIAMETER: 4.4 CM
ECHO LA TO AORTIC ROOT RATIO: 1.42
ECHO LV EDV A2C: 80 ML
ECHO LV EDV A4C: 69 ML
ECHO LV EDV BP: 75 ML (ref 56–104)
ECHO LV EDV INDEX A4C: 31 ML/M2
ECHO LV EDV INDEX BP: 33 ML/M2
ECHO LV EDV NDEX A2C: 36 ML/M2
ECHO LV EJECTION FRACTION A2C: 43 %
ECHO LV EJECTION FRACTION A4C: 42 %
ECHO LV EJECTION FRACTION BIPLANE: 41 % (ref 55–100)
ECHO LV ESV A2C: 45 ML
ECHO LV ESV A4C: 40 ML
ECHO LV ESV BP: 44 ML (ref 19–49)
ECHO LV ESV INDEX A2C: 20 ML/M2
ECHO LV ESV INDEX A4C: 18 ML/M2
ECHO LV ESV INDEX BP: 20 ML/M2
ECHO LVOT AREA: 3.1 CM2
ECHO LVOT AV VTI INDEX: 0.38
ECHO LVOT DIAM: 2 CM
ECHO LVOT MEAN GRADIENT: 3 MMHG
ECHO LVOT PEAK GRADIENT: 6 MMHG
ECHO LVOT PEAK VELOCITY: 1.3 M/S
ECHO LVOT STROKE VOLUME INDEX: 36.4 ML/M2
ECHO LVOT SV: 82 ML
ECHO LVOT VTI: 26.1 CM
ECHO MV REGURGITANT PEAK GRADIENT: 77 MMHG
ECHO MV REGURGITANT PEAK VELOCITY: 4.4 M/S
ECHO PV MAX VELOCITY: 1.2 M/S
ECHO PV PEAK GRADIENT: 6 MMHG
ECHO TV REGURGITANT MAX VELOCITY: 4.15 M/S
ECHO TV REGURGITANT PEAK GRADIENT: 69 MMHG
ERYTHROCYTE [DISTWIDTH] IN BLOOD BY AUTOMATED COUNT: 17.7 % (ref 11.5–14.5)
GLUCOSE BLD STRIP.AUTO-MCNC: 106 MG/DL (ref 65–117)
GLUCOSE BLD STRIP.AUTO-MCNC: 109 MG/DL (ref 65–117)
GLUCOSE BLD STRIP.AUTO-MCNC: 113 MG/DL (ref 65–117)
GLUCOSE BLD STRIP.AUTO-MCNC: 122 MG/DL (ref 65–117)
GLUCOSE BLD STRIP.AUTO-MCNC: 139 MG/DL (ref 65–117)
GLUCOSE SERPL-MCNC: 110 MG/DL (ref 65–100)
GLUCOSE SERPL-MCNC: 112 MG/DL (ref 65–100)
HCO3 BLD-SCNC: 30 MMOL/L (ref 22–26)
HCT VFR BLD AUTO: 25 % (ref 35–47)
HDLC SERPL-MCNC: 16 MG/DL
HDLC SERPL: 5.3 (ref 0–5)
HGB BLD-MCNC: 7.6 G/DL (ref 11.5–16)
LACTATE SERPL-SCNC: 0.9 MMOL/L (ref 0.4–2)
LDLC SERPL CALC-MCNC: 48.4 MG/DL (ref 0–100)
MAGNESIUM SERPL-MCNC: 1.9 MG/DL (ref 1.6–2.4)
MCH RBC QN AUTO: 27.3 PG (ref 26–34)
MCHC RBC AUTO-ENTMCNC: 30.4 G/DL (ref 30–36.5)
MCV RBC AUTO: 89.9 FL (ref 80–99)
NRBC # BLD: 0.13 K/UL (ref 0–0.01)
NRBC BLD-RTO: 0.9 PER 100 WBC
PCO2 BLD: 52.2 MMHG (ref 35–45)
PH BLD: 7.37 (ref 7.35–7.45)
PLATELET # BLD AUTO: 284 K/UL (ref 150–400)
PMV BLD AUTO: 9 FL (ref 8.9–12.9)
PO2 BLD: 476 MMHG (ref 80–100)
POTASSIUM SERPL-SCNC: 2.9 MMOL/L (ref 3.5–5.1)
POTASSIUM SERPL-SCNC: 3.1 MMOL/L (ref 3.5–5.1)
PROCALCITONIN SERPL-MCNC: 0.72 NG/ML
RBC # BLD AUTO: 2.78 M/UL (ref 3.8–5.2)
SAO2 % BLD: 100 % (ref 92–97)
SERVICE CMNT-IMP: ABNORMAL
SERVICE CMNT-IMP: ABNORMAL
SERVICE CMNT-IMP: NORMAL
SODIUM SERPL-SCNC: 140 MMOL/L (ref 136–145)
SODIUM SERPL-SCNC: 140 MMOL/L (ref 136–145)
SPECIMEN EXP DATE BLD: NORMAL
SPECIMEN TYPE: ABNORMAL
T4 FREE SERPL-MCNC: 0.7 NG/DL (ref 0.8–1.5)
THERAPEUTIC RANGE: ABNORMAL SECS (ref 58–77)
THERAPEUTIC RANGE: ABNORMAL SECS (ref 58–77)
TRIGL SERPL-MCNC: 98 MG/DL
TSH SERPL DL<=0.05 MIU/L-ACNC: 2.11 UIU/ML (ref 0.36–3.74)
VLDLC SERPL CALC-MCNC: 19.6 MG/DL
WBC # BLD AUTO: 14.7 K/UL (ref 3.6–11)

## 2024-04-22 PROCEDURE — 99223 1ST HOSP IP/OBS HIGH 75: CPT | Performed by: PSYCHIATRY & NEUROLOGY

## 2024-04-22 PROCEDURE — 94640 AIRWAY INHALATION TREATMENT: CPT

## 2024-04-22 PROCEDURE — 6360000002 HC RX W HCPCS: Performed by: INTERNAL MEDICINE

## 2024-04-22 PROCEDURE — 87186 SC STD MICRODIL/AGAR DIL: CPT

## 2024-04-22 PROCEDURE — 2580000003 HC RX 258: Performed by: NURSE ANESTHETIST, CERTIFIED REGISTERED

## 2024-04-22 PROCEDURE — 36620 INSERTION CATHETER ARTERY: CPT

## 2024-04-22 PROCEDURE — 84145 PROCALCITONIN (PCT): CPT

## 2024-04-22 PROCEDURE — 3600000012 HC SURGERY LEVEL 2 ADDTL 15MIN: Performed by: ORTHOPAEDIC SURGERY

## 2024-04-22 PROCEDURE — 2000000000 HC ICU R&B

## 2024-04-22 PROCEDURE — 36415 COLL VENOUS BLD VENIPUNCTURE: CPT

## 2024-04-22 PROCEDURE — 6360000002 HC RX W HCPCS: Performed by: NURSE ANESTHETIST, CERTIFIED REGISTERED

## 2024-04-22 PROCEDURE — 80061 LIPID PANEL: CPT

## 2024-04-22 PROCEDURE — C1713 ANCHOR/SCREW BN/BN,TIS/BN: HCPCS | Performed by: ORTHOPAEDIC SURGERY

## 2024-04-22 PROCEDURE — 2580000003 HC RX 258: Performed by: HOSPITALIST

## 2024-04-22 PROCEDURE — 87205 SMEAR GRAM STAIN: CPT

## 2024-04-22 PROCEDURE — 84443 ASSAY THYROID STIM HORMONE: CPT

## 2024-04-22 PROCEDURE — 71045 X-RAY EXAM CHEST 1 VIEW: CPT

## 2024-04-22 PROCEDURE — 86900 BLOOD TYPING SEROLOGIC ABO: CPT

## 2024-04-22 PROCEDURE — 86850 RBC ANTIBODY SCREEN: CPT

## 2024-04-22 PROCEDURE — 2500000003 HC RX 250 WO HCPCS: Performed by: NURSE ANESTHETIST, CERTIFIED REGISTERED

## 2024-04-22 PROCEDURE — 6360000002 HC RX W HCPCS: Performed by: ORTHOPAEDIC SURGERY

## 2024-04-22 PROCEDURE — 3600000002 HC SURGERY LEVEL 2 BASE: Performed by: ORTHOPAEDIC SURGERY

## 2024-04-22 PROCEDURE — 2700000000 HC OXYGEN THERAPY PER DAY

## 2024-04-22 PROCEDURE — 2709999900 HC NON-CHARGEABLE SUPPLY: Performed by: ORTHOPAEDIC SURGERY

## 2024-04-22 PROCEDURE — 87077 CULTURE AEROBIC IDENTIFY: CPT

## 2024-04-22 PROCEDURE — 2580000003 HC RX 258: Performed by: INTERNAL MEDICINE

## 2024-04-22 PROCEDURE — 83605 ASSAY OF LACTIC ACID: CPT

## 2024-04-22 PROCEDURE — 84439 ASSAY OF FREE THYROXINE: CPT

## 2024-04-22 PROCEDURE — 6370000000 HC RX 637 (ALT 250 FOR IP): Performed by: SURGERY

## 2024-04-22 PROCEDURE — 94660 CPAP INITIATION&MGMT: CPT

## 2024-04-22 PROCEDURE — 87075 CULTR BACTERIA EXCEPT BLOOD: CPT

## 2024-04-22 PROCEDURE — 87070 CULTURE OTHR SPECIMN AEROBIC: CPT

## 2024-04-22 PROCEDURE — 6360000002 HC RX W HCPCS: Performed by: NURSE PRACTITIONER

## 2024-04-22 PROCEDURE — 83735 ASSAY OF MAGNESIUM: CPT

## 2024-04-22 PROCEDURE — 82803 BLOOD GASES ANY COMBINATION: CPT

## 2024-04-22 PROCEDURE — 85027 COMPLETE CBC AUTOMATED: CPT

## 2024-04-22 PROCEDURE — 99232 SBSQ HOSP IP/OBS MODERATE 35: CPT | Performed by: PHYSICAL MEDICINE & REHABILITATION

## 2024-04-22 PROCEDURE — 87102 FUNGUS ISOLATION CULTURE: CPT

## 2024-04-22 PROCEDURE — 82962 GLUCOSE BLOOD TEST: CPT

## 2024-04-22 PROCEDURE — 99233 SBSQ HOSP IP/OBS HIGH 50: CPT | Performed by: INTERNAL MEDICINE

## 2024-04-22 PROCEDURE — 03HY32Z INSERTION OF MONITORING DEVICE INTO UPPER ARTERY, PERCUTANEOUS APPROACH: ICD-10-PCS | Performed by: HOSPITALIST

## 2024-04-22 PROCEDURE — 6370000000 HC RX 637 (ALT 250 FOR IP): Performed by: PHYSICIAN ASSISTANT

## 2024-04-22 PROCEDURE — 3700000000 HC ANESTHESIA ATTENDED CARE: Performed by: ORTHOPAEDIC SURGERY

## 2024-04-22 PROCEDURE — 80048 BASIC METABOLIC PNL TOTAL CA: CPT

## 2024-04-22 PROCEDURE — 85730 THROMBOPLASTIN TIME PARTIAL: CPT

## 2024-04-22 PROCEDURE — 86901 BLOOD TYPING SEROLOGIC RH(D): CPT

## 2024-04-22 PROCEDURE — 3700000001 HC ADD 15 MINUTES (ANESTHESIA): Performed by: ORTHOPAEDIC SURGERY

## 2024-04-22 PROCEDURE — 2580000003 HC RX 258: Performed by: STUDENT IN AN ORGANIZED HEALTH CARE EDUCATION/TRAINING PROGRAM

## 2024-04-22 PROCEDURE — 51702 INSERT TEMP BLADDER CATH: CPT

## 2024-04-22 PROCEDURE — 6360000002 HC RX W HCPCS: Performed by: STUDENT IN AN ORGANIZED HEALTH CARE EDUCATION/TRAINING PROGRAM

## 2024-04-22 PROCEDURE — C1763 CONN TISS, NON-HUMAN: HCPCS | Performed by: ORTHOPAEDIC SURGERY

## 2024-04-22 PROCEDURE — 36556 INSERT NON-TUNNEL CV CATH: CPT

## 2024-04-22 PROCEDURE — APPNB60 APP NON BILLABLE TIME 46-60 MINS

## 2024-04-22 DEVICE — STIMULAN® RAPID CURE PROVIDED STERILE FOR SINGLE PATIENT USE. STIMULAN® RAPID CURE CONTAINS CALCIUM SULFATE POWDER AND MIXING SOLUTION IN PRE-MEASURED QUANTITIES SO THAT WHEN MIXED TOGETHER IN A STERILE MIXING BOWL, THE RESULTANT PASTE IS TO BE DIGITALLY PACKED INTO OPEN BONE VOID/GAP TO SET INSITU OR PLACED INTO THE MOULD PROVIDED, THE MIXTURE SETS TO FORM BEADS. THE BIODEGRADABLE, RADIOPAQUE BEADS ARE RESORBED IN APPROXIMATELY 30 – 60 DAYS WHEN USED IN ACCORDANCE WITH THE DEVICE LABELLING. STIMULAN® RAPID CURE IS MANUFACTURED FROM SYNTHETIC IMPLANT GRADE CALCIUM SULFATE DIHYDRATE(CASO4.2H2O) THAT RESORBS AND IS REPLACED WITH BONE DURING THE HEALING PROCESS. ALSO, AS THE BONE VOID FILLER BEADS ARE BIODEGRADABLE AND BIOCOMPATIBLE, THEY MAY BE USED AT AN INFECTED SITE.
Type: IMPLANTABLE DEVICE | Site: BACK | Status: FUNCTIONAL
Brand: STIMULAN® RAPID CURE

## 2024-04-22 RX ORDER — FENTANYL CITRATE 50 UG/ML
INJECTION, SOLUTION INTRAMUSCULAR; INTRAVENOUS PRN
Status: DISCONTINUED | OUTPATIENT
Start: 2024-04-22 | End: 2024-04-22 | Stop reason: SDUPTHER

## 2024-04-22 RX ORDER — DEXMEDETOMIDINE HYDROCHLORIDE 4 UG/ML
INJECTION, SOLUTION INTRAVENOUS
Status: DISPENSED
Start: 2024-04-22 | End: 2024-04-23

## 2024-04-22 RX ORDER — EPHEDRINE SULFATE 50 MG/ML
INJECTION INTRAVENOUS PRN
Status: DISCONTINUED | OUTPATIENT
Start: 2024-04-22 | End: 2024-04-22 | Stop reason: SDUPTHER

## 2024-04-22 RX ORDER — VANCOMYCIN HYDROCHLORIDE 1 G/20ML
INJECTION, POWDER, LYOPHILIZED, FOR SOLUTION INTRAVENOUS PRN
Status: DISCONTINUED | OUTPATIENT
Start: 2024-04-22 | End: 2024-04-22 | Stop reason: HOSPADM

## 2024-04-22 RX ORDER — ROCURONIUM BROMIDE 10 MG/ML
INJECTION, SOLUTION INTRAVENOUS PRN
Status: DISCONTINUED | OUTPATIENT
Start: 2024-04-22 | End: 2024-04-22 | Stop reason: SDUPTHER

## 2024-04-22 RX ORDER — PROPOFOL 10 MG/ML
5-50 INJECTION, EMULSION INTRAVENOUS CONTINUOUS
Status: DISCONTINUED | OUTPATIENT
Start: 2024-04-22 | End: 2024-04-29 | Stop reason: ALTCHOICE

## 2024-04-22 RX ORDER — PROPOFOL 10 MG/ML
INJECTION, EMULSION INTRAVENOUS PRN
Status: DISCONTINUED | OUTPATIENT
Start: 2024-04-22 | End: 2024-04-22 | Stop reason: SDUPTHER

## 2024-04-22 RX ORDER — GENTAMICIN 40 MG/ML
INJECTION, SOLUTION INTRAMUSCULAR; INTRAVENOUS PRN
Status: DISCONTINUED | OUTPATIENT
Start: 2024-04-22 | End: 2024-04-22 | Stop reason: HOSPADM

## 2024-04-22 RX ORDER — LIDOCAINE HYDROCHLORIDE 20 MG/ML
INJECTION, SOLUTION EPIDURAL; INFILTRATION; INTRACAUDAL; PERINEURAL PRN
Status: DISCONTINUED | OUTPATIENT
Start: 2024-04-22 | End: 2024-04-22 | Stop reason: SDUPTHER

## 2024-04-22 RX ORDER — DEXMEDETOMIDINE HYDROCHLORIDE 100 UG/ML
INJECTION, SOLUTION INTRAVENOUS PRN
Status: DISCONTINUED | OUTPATIENT
Start: 2024-04-22 | End: 2024-04-22 | Stop reason: SDUPTHER

## 2024-04-22 RX ORDER — POTASSIUM CHLORIDE 7.45 MG/ML
10 INJECTION INTRAVENOUS
Status: COMPLETED | OUTPATIENT
Start: 2024-04-22 | End: 2024-04-22

## 2024-04-22 RX ADMIN — ARFORMOTEROL TARTRATE: 15 SOLUTION RESPIRATORY (INHALATION) at 08:16

## 2024-04-22 RX ADMIN — IPRATROPIUM BROMIDE 0.5 MG: 0.5 SOLUTION RESPIRATORY (INHALATION) at 21:48

## 2024-04-22 RX ADMIN — NYSTATIN 500000 UNITS: 100000 SUSPENSION ORAL at 20:15

## 2024-04-22 RX ADMIN — DEXMEDETOMIDINE 10 MCG: 100 INJECTION, SOLUTION INTRAVENOUS at 15:22

## 2024-04-22 RX ADMIN — POTASSIUM CHLORIDE 10 MEQ: 7.46 INJECTION, SOLUTION INTRAVENOUS at 08:19

## 2024-04-22 RX ADMIN — PHENYLEPHRINE HYDROCHLORIDE 25 MCG/MIN: 10 INJECTION INTRAVENOUS at 17:01

## 2024-04-22 RX ADMIN — FENTANYL CITRATE 50 MCG: 50 INJECTION, SOLUTION INTRAMUSCULAR; INTRAVENOUS at 14:56

## 2024-04-22 RX ADMIN — PHENYLEPHRINE HYDROCHLORIDE 100 MCG: 10 INJECTION INTRAVENOUS at 14:56

## 2024-04-22 RX ADMIN — ACETYLCYSTEINE 600 MG: 200 INHALANT RESPIRATORY (INHALATION) at 21:48

## 2024-04-22 RX ADMIN — PROPOFOL 80 MG: 10 INJECTION, EMULSION INTRAVENOUS at 14:56

## 2024-04-22 RX ADMIN — POTASSIUM CHLORIDE 10 MEQ: 7.46 INJECTION, SOLUTION INTRAVENOUS at 09:50

## 2024-04-22 RX ADMIN — HYDROMORPHONE HYDROCHLORIDE 0.25 MG: 1 INJECTION, SOLUTION INTRAMUSCULAR; INTRAVENOUS; SUBCUTANEOUS at 11:13

## 2024-04-22 RX ADMIN — LIDOCAINE HYDROCHLORIDE 25 MG: 20 INJECTION, SOLUTION EPIDURAL; INFILTRATION; INTRACAUDAL; PERINEURAL at 14:56

## 2024-04-22 RX ADMIN — ROCURONIUM BROMIDE 30 MG: 10 SOLUTION INTRAVENOUS at 14:57

## 2024-04-22 RX ADMIN — IPRATROPIUM BROMIDE 0.5 MG: 0.5 SOLUTION RESPIRATORY (INHALATION) at 12:03

## 2024-04-22 RX ADMIN — ACETYLCYSTEINE 600 MG: 200 INHALANT RESPIRATORY (INHALATION) at 08:16

## 2024-04-22 RX ADMIN — WATER 2000 MG: 1 INJECTION INTRAMUSCULAR; INTRAVENOUS; SUBCUTANEOUS at 08:41

## 2024-04-22 RX ADMIN — EPHEDRINE SULFATE 10 MG: 50 INJECTION INTRAVENOUS at 14:56

## 2024-04-22 RX ADMIN — Medication: at 20:15

## 2024-04-22 RX ADMIN — POTASSIUM CHLORIDE 10 MEQ: 7.46 INJECTION, SOLUTION INTRAVENOUS at 07:19

## 2024-04-22 RX ADMIN — SODIUM CHLORIDE, PRESERVATIVE FREE 10 ML: 5 INJECTION INTRAVENOUS at 08:44

## 2024-04-22 RX ADMIN — SODIUM CHLORIDE: 9 INJECTION, SOLUTION INTRAVENOUS at 14:52

## 2024-04-22 RX ADMIN — ARFORMOTEROL TARTRATE: 15 SOLUTION RESPIRATORY (INHALATION) at 21:48

## 2024-04-22 RX ADMIN — BUMETANIDE 1 MG/HR: 0.25 INJECTION INTRAMUSCULAR; INTRAVENOUS at 02:41

## 2024-04-22 RX ADMIN — PROPOFOL 30 MCG/KG/MIN: 10 INJECTION, EMULSION INTRAVENOUS at 15:51

## 2024-04-22 RX ADMIN — FENTANYL CITRATE 50 MCG: 50 INJECTION, SOLUTION INTRAMUSCULAR; INTRAVENOUS at 15:50

## 2024-04-22 RX ADMIN — POTASSIUM CHLORIDE 10 MEQ: 7.46 INJECTION, SOLUTION INTRAVENOUS at 06:14

## 2024-04-22 RX ADMIN — HYDROMORPHONE HYDROCHLORIDE 0.25 MG: 1 INJECTION, SOLUTION INTRAMUSCULAR; INTRAVENOUS; SUBCUTANEOUS at 06:06

## 2024-04-22 RX ADMIN — SUGAMMADEX 200 MG: 100 INJECTION, SOLUTION INTRAVENOUS at 16:25

## 2024-04-22 RX ADMIN — NYSTATIN 500000 UNITS: 100000 SUSPENSION ORAL at 17:46

## 2024-04-22 RX ADMIN — Medication: at 08:32

## 2024-04-22 RX ADMIN — PROPOFOL 40 MG: 10 INJECTION, EMULSION INTRAVENOUS at 15:50

## 2024-04-22 RX ADMIN — IPRATROPIUM BROMIDE 0.5 MG: 0.5 SOLUTION RESPIRATORY (INHALATION) at 08:17

## 2024-04-22 ASSESSMENT — PAIN SCALES - GENERAL
PAINLEVEL_OUTOF10: 8
PAINLEVEL_OUTOF10: 0

## 2024-04-22 ASSESSMENT — ENCOUNTER SYMPTOMS: SHORTNESS OF BREATH: 1

## 2024-04-22 ASSESSMENT — PAIN DESCRIPTION - LOCATION: LOCATION: BACK

## 2024-04-22 NOTE — PROGRESS NOTES
[address], [place] [address]  Phone: [phone]   [phone]    [email or web address]     Nephrology Progress Note    Patient Name : Leny Barnes      : 1945     MRN : [id number]  Date of Admission : 4/15/2024  Date of Servive : 24    CC:  Follow up for CKD    Assessment and Plan   Severe Hypoxic Resp Failure   Acute HFpEF   Asp PNA  Pleural effusions, edema   - s/p emergent HD w/ UF on . 2.5L removed   - hold Bumex gtt   - repeat CXR and decide on additional UF today     Hypokalemia :  - replete tomorrow     CKD 3a:  - from DM2 and HTN  - baseline Cr around 1.5 to 1.7    Hyponatremia 2/2 volume overload  - resolving     Anemia 2/2 CKD  - continue JOSHUA     Sepsis Bacteremia  -3 /18  s/p Laminectomy with dehiscence of wound  - 4/15 BC (+) serratia  - ID following  and adjusting abx  -  MRI (+) fluid/ gas  - Surgery on hold      HTN  - BP stable  - avoid ACE (I)/ ARB at this time     CAD  PAF s/p ablation and pacemaker  DM  FERDERICK on CPAP  Obesity  HLD     Interval History:  Seen and examined. Weekend events noted. UOP 1.8L, UF w/ HD yesterday. Remains om BIPAP. K low and being replaced     Review of Systems: Review of systems not obtained due to patient factors.    Current Medications:   Current Facility-Administered Medications   Medication Dose Route Frequency    potassium chloride 10 mEq/100 mL IVPB (Peripheral Line)  10 mEq IntraVENous Q1H    iopamidol (ISOVUE-370) 76 % injection 100 mL  100 mL IntraVENous ONCE PRN    iopamidol (ISOVUE-370) 76 % injection 100 mL  100 mL IntraVENous ONCE PRN    aspirin chewable tablet 81 mg  81 mg Oral Daily    Or    aspirin suppository 300 mg  300 mg Rectal Daily    [Held by provider] bumetanide (BUMEX) 12.5 mg in 50 mL infusion  1 mg/hr IntraVENous Continuous    heparin (porcine) injection 4,000 Units  4,000 Units IntraVENous PRN    heparin (porcine) injection 2,000 Units  2,000  PRN    ondansetron (ZOFRAN-ODT) disintegrating tablet 4 mg  4 mg Oral Q8H PRN    Or    ondansetron (ZOFRAN) injection 4 mg  4 mg IntraVENous Q6H PRN    polyethylene glycol (GLYCOLAX) packet 17 g  17 g Oral Daily PRN    acetaminophen (TYLENOL) tablet 650 mg  650 mg Oral Q6H PRN    Or    acetaminophen (TYLENOL) suppository 650 mg  650 mg Rectal Q6H PRN    acetaminophen (TYLENOL) tablet 1,000 mg  1,000 mg Oral Q6H    allopurinol (ZYLOPRIM) tablet 100 mg  100 mg Oral Daily    buPROPion (WELLBUTRIN SR) extended release tablet 150 mg  150 mg Oral Daily    ferrous sulfate (IRON 325) tablet 325 mg  325 mg Oral Daily with breakfast    gabapentin (NEURONTIN) capsule 600 mg  600 mg Oral QPM    hydrOXYzine HCl (ATARAX) tablet 10 mg  10 mg Oral Q6H PRN    [Held by provider] insulin NPH (HumuLIN N;NovoLIN N) injection vial 15 Units  15 Units SubCUTAneous BID AC    insulin lispro (HUMALOG) injection vial 0-8 Units  0-8 Units SubCUTAneous TID WC    insulin lispro (HUMALOG) injection vial 0-4 Units  0-4 Units SubCUTAneous Nightly    amLODIPine (NORVASC) tablet 5 mg  5 mg Oral Daily      Allergies   Allergen Reactions    Latex Itching    Oxycodone Itching    Codeine Rash    Lisinopril Other (See Comments)     Cough, Visual disturbances    Morphine Itching    Statins Other (See Comments)     Muscle enzyme problems       Objective:  Vitals:    Vitals:    04/22/24 0400 04/22/24 0500 04/22/24 0600 04/22/24 0700   BP: (!) 132/47 (!) 133/54 (!) 138/51 (!) 126/43   Pulse: 70 69 70 70   Resp: 18 19 23 14   Temp: 99 °F (37.2 °C)      TempSrc: Axillary      SpO2: 97% 99% 98% 100%   Weight:       Height:         Intake and Output:  No intake/output data recorded.  04/20 1901 - 04/22 0700  In: 504.8 [I.V.:104.8]  Out: 6540 [Urine:3690]    Physical Examination:  General: On BIPAP  Neck:  RIJ patricia   Resp:  Crackles b/l bases  CV:  RRR,  no murmur or rub, 2+ LE edema  GI:  Soft, NT, + BS, no HS megaly  Neurologic:  Confused   :  Smith +     []

## 2024-04-22 NOTE — PROGRESS NOTES
Physical Therapy:  04/22/24     Orders received and chart reviewed in preparation for PT evaluation. Spoke to RN who reports patient is not appropriate for PT intervention at this time 2/2 need for continuous BiPAP support and SpO2 desaturation with minimal activity. Will hold evaluation pending improved pulmonary stability.     Thank you,  Liz Schultz, PT, DPT

## 2024-04-22 NOTE — ANESTHESIA PRE PROCEDURE
04/22/2024 06:05 AM    CO2 30 04/22/2024 06:05 AM    BUN 27 04/22/2024 06:05 AM    CREATININE 1.20 04/22/2024 06:05 AM    GFRAA 45 05/03/2022 08:23 AM    AGRATIO 0.5 04/20/2024 08:54 AM    AGRATIO 1.6 11/29/2023 11:04 AM    LABGLOM 46 04/22/2024 06:05 AM    LABGLOM 28 03/04/2024 10:47 AM    GLUCOSE 112 04/22/2024 06:05 AM    GLUCOSE 180 03/04/2024 10:47 AM    PROT 7.1 04/20/2024 08:54 AM    CALCIUM 8.2 04/22/2024 06:05 AM    BILITOT 0.5 04/20/2024 08:54 AM    ALKPHOS 159 04/20/2024 08:54 AM    ALKPHOS 119 11/29/2023 11:04 AM    AST 22 04/20/2024 08:54 AM    ALT 10 04/20/2024 08:54 AM       POC Tests:   Recent Labs     04/22/24  1226   POCGLU 109       Coags:   Lab Results   Component Value Date/Time    PROTIME 15.2 04/21/2024 01:15 PM    INR 1.5 04/21/2024 01:15 PM    INR 2.1 04/21/2023 11:49 AM    APTT 71.6 04/22/2024 06:05 AM       HCG (If Applicable): No results found for: \"PREGTESTUR\", \"PREGSERUM\", \"HCG\", \"HCGQUANT\"     ABGs:   Lab Results   Component Value Date/Time    PHART 7.365 02/19/2020 04:12 AM    PO2ART 73 02/19/2020 04:12 AM    RZJ6SLQ 40.1 02/19/2020 04:12 AM    HMJ7UEC 22.9 02/19/2020 04:12 AM    BEART 1 12/13/2019 12:57 PM        Type & Screen (If Applicable):  No results found for: \"LABABO\", \"LABRH\"    Drug/Infectious Status (If Applicable):  No results found for: \"HIV\", \"HEPCAB\"    COVID-19 Screening (If Applicable):   Lab Results   Component Value Date/Time    COVID19 Not detected 04/15/2024 03:39 PM           Anesthesia Evaluation  Patient summary reviewed and Nursing notes reviewed  Airway: Mallampati: II  TM distance: >3 FB   Neck ROM: full  Mouth opening: > = 3 FB   Dental: normal exam         Pulmonary:normal exam    (+)   shortness of breath:   sleep apnea:       asthma:                           ROS comment: Continuous BiPAP   Cardiovascular:  Exercise tolerance: no interval change  (+) hypertension:, pacemaker:, CAD:, dysrhythmias: atrial fibrillation, CHF:               ROS comment: LVEF

## 2024-04-22 NOTE — ANESTHESIA POSTPROCEDURE EVALUATION
Post-Anesthesia Evaluation and Assessment    Patient: Leny Barnes MRN: [id number]  SSN: [id number]    YOB: 1945  Age: 79 y.o.  Sex: female      I have evaluated the patient and they are stable and ready for discharge from the PACU.     Cardiovascular Function/Vital Signs  Visit Vitals  BP (!) 125/35   Pulse 69   Temp 98.7 °F (37.1 °C) (Axillary)   Resp 14   Ht 1.676 m (5' 5.98\")   Wt 120.4 kg (265 lb 6.9 oz)   SpO2 97%   BMI 42.86 kg/m²       Patient is status post General anesthesia for Procedure(s) with comments:  BACK WOUND INCISION AND DRAINAGE (EIP 0730) - This patient is in house we are requesting this start at 7:30 am.    Nausea/Vomiting: None    Postoperative hydration reviewed and adequate.    Pain:      Managed    Neurological Status:       At baseline    Mental Status, Level of Consciousness: sedated  Pulmonary Status: intubated and ventilated      Adequate oxygenation and airway patent    Complications related to anesthesia: None    Post-anesthesia assessment completed. No concerns    Signed By: Tima Ayon MD     April 22, 2024            Department of Anesthesiology  Postprocedure Note    Patient: Leny Barnes  MRN: [id number]  YOB: 1945  Date of evaluation: 4/22/2024    Procedure Summary       Date: 04/22/24 Room / Location: [facility] MAIN OR [address] [place] [address] MAIN OR    Anesthesia Start: 1450 Anesthesia Stop: 1653    Procedure: BACK WOUND INCISION AND DRAINAGE (EIP 0730) (Back) Diagnosis:       Wound dehiscence      (Wound dehiscence [T81.30XA])    Providers: Grant Royal MD Responsible Provider: Tima Ayon MD    Anesthesia Type: General ASA Status: 4            Anesthesia Type: General    Natalia Phase I:      Natalia Phase II:      Anesthesia Post Evaluation    No notable events documented.

## 2024-04-22 NOTE — PROGRESS NOTES
This  participated in the interdisciplinary rounds where patient care was discussed.     We will continue to follow and visit for spiritual care when appropriate.     Please contact  paging service for any immediate needs.     Rev. Zaynab Gan MDiv, MSW  Chaplain Resident

## 2024-04-22 NOTE — PROGRESS NOTES
SLP Contact Note    Noted pt remains on BiPAP and is not appropriate for SLP at this time. Will hold for now.      Suzanne Crowley M.Ed, PhD(c), CCC-SLP  Speech-Language Pathologist

## 2024-04-22 NOTE — CONSULTS
Pulmonary     Discussed with attending and Intensivist. We will be available to see once out of the ICU if needed. Thanks    Mitesh Rebollar PA-C

## 2024-04-22 NOTE — PROGRESS NOTES
Infectious Disease Progress Note     Subjective:      Afebrile, on BiPAP, Mendez placed for possible UF    Objective:    Vitals:   Patient Vitals for the past 24 hrs:   Temp Pulse Resp BP SpO2   04/22/24 1200 98.7 °F (37.1 °C) 70 15 (!) 118/59 99 %   04/22/24 1100 -- 70 17 (!) 128/48 95 %   04/22/24 1000 -- 70 15 (!) 128/50 96 %   04/22/24 0900 -- 71 22 (!) 129/107 96 %   04/22/24 0800 98.4 °F (36.9 °C) 84 25 (!) 138/52 95 %   04/22/24 0700 -- 70 14 (!) 126/43 100 %   04/22/24 0600 -- 70 23 (!) 138/51 98 %   04/22/24 0500 -- 69 19 (!) 133/54 99 %   04/22/24 0400 99 °F (37.2 °C) 70 18 (!) 132/47 97 %   04/22/24 0300 -- 71 19 (!) 125/52 100 %   04/22/24 0200 -- 73 17 (!) 132/43 --   04/22/24 0100 -- 72 16 (!) 123/111 96 %   04/22/24 0000 98.8 °F (37.1 °C) 71 20 (!) 120/40 95 %   04/21/24 2315 98.7 °F (37.1 °C) 72 16 (!) 114/40 96 %   04/21/24 2300 -- 71 19 (!) 94/52 95 %   04/21/24 2245 -- 71 16 (!) 105/47 95 %   04/21/24 2230 -- 71 16 (!) 113/42 96 %   04/21/24 2215 -- 74 18 (!) 111/42 95 %   04/21/24 2200 -- 72 18 (!) 117/47 96 %   04/21/24 2145 -- 75 15 (!) 113/40 96 %   04/21/24 2133 -- 72 18 (!) 115/43 96 %   04/21/24 2130 -- 70 18 (!) 101/57 96 %   04/21/24 2115 -- 71 17 (!) 126/45 96 %   04/21/24 2100 -- 71 19 (!) 122/41 95 %   04/21/24 2045 -- 71 16 (!) 121/40 96 %   04/21/24 2030 -- 71 21 (!) 118/41 95 %   04/21/24 2015 -- 72 21 (!) 125/40 96 %   04/21/24 2005 -- 70 18 (!) 126/40 96 %   04/21/24 2000 99.8 °F (37.7 °C) 71 23 (!) 134/39 95 %   04/21/24 1945 99.4 °F (37.4 °C) 72 17 (!) 125/37 95 %   04/21/24 1930 -- 70 17 (!) 120/55 96 %   04/21/24 1900 -- 70 20 (!) 117/40 96 %   04/21/24 1800 -- 70 21 (!) 118/55 96 %   04/21/24 1700 -- 70 18 (!) 115/46 94 %   04/21/24 1606 -- 72 (!) 34 -- 92 %   04/21/24 1600 98.6 °F (37 °C) 71 18 (!) 117/45 96 %   04/21/24 1400 -- 70 -- -- --   04/21/24 1354 -- -- -- (!) 133/52 --         Physical Exam:  Gen: Less SOB on BIPAP  HEENT:  Normocephalic, atraumatic + JVP  multiple medical comorbidities including obesity s/p [id number] fusion re-admitted for persistent wound dehiscence and drainage with 4/4 GNR bacteremia.     Continue Ceftriaxone for Serratia.    Volume status optimization - appreciate nephrology input, cardiology.    Needs I/D in OR of dehiscence, exploration of wound, potential involvement deeper to fascia and/or hardware involvement causing persistent dehiscence when appropriate.        Thank you Dr. Macias for the opportunity to participate in the care of this patient.   Please contact with questions or concerns.      Gilberto Hagan MD    A total time of 50 minutes was spent on today's encounter.  Greater than 50% of the time was spent on the following:  Preparing for visit and chart review.  Obtaining and/or reviewing separately obtained history  Performing a medically appropriate exam and/or evaluation  Counseling and educating a patient/family/caregiver as noted above  Referring and communicating with other professionals (not separately reported)  Independently interpreting results (not separately reported) and communicating results to the patient/family/caregiver  Care coordination (not separately reported) as noted above  Documenting clinical information in the electronic health records (e.g. problem list, visit note) on the day of the encounter

## 2024-04-22 NOTE — PROGRESS NOTES
Referral source:  initiated follow-up visit to Leny Barnes \"Gishanika\" in [facility]. Reviewed the patient's medical record prior to this encounter. Daughter Breonna was present.     Assessment: Introduced self to [place] who did not respond as she appeared sleepy.  Daughter was experiencing spiritual concern as evidenced by her tearfulness and fearfulness around her mother's well-being in light of an upcoming procedure.  Provided active listening and compassionate presence.  Provided ministry of presence as patient and daughter briefly met with the anaesthesiologist.  Breonna stated that her mother would benefit from prayer later today before her procedure. Prayer is a spiritual resource for the patient.  Encouraged daughter to request a  when she wanted a prayer.       Outcome: Family expressed their needs and concerns and appeared to experience connection from the spiritual health visit.     Plan of Care:  Family is aware of 's availability and was encouraged to request support as needed. For additional spiritual care, please page the  on-call at [phone]).     Rev. Zaynab Gan MDiv, MSW  Chaplain Resident

## 2024-04-22 NOTE — PROGRESS NOTES
5.98\"), weight 120.4 kg (265 lb 6.9 oz), SpO2 96 %.    PHYSICAL ASSESSMENT:   General: [] Oriented x3  [] Well appearing  [] Intubated  []Ill appearing  [x]Other: fatigued, on BIPAP  Mental Status: [] Normal mental status exam  [x] Drowsy  [] Confused  []Other:  Cardiovascular: [] Regular rate/rhythm  [] Arrhythmia  [] Other:  Chest: [x] Effort normal[]Lungs clear  [] Respiratory distress  []Tachypnea  [] Other:  Abdomen: [] Soft/non-tender  [] Normal appearance  [] Distended  [] Ascites  [] Other:  Neurological: [] Normal speech  [] Normal sensation  []Deficits present:  Extremity: [] Normal skin color/temp  [] Clubbing/cyanosis  [] No edema  [] Other:    Wt Readings from Last 15 Encounters:   04/22/24 120.4 kg (265 lb 6.9 oz)   04/12/24 119.7 kg (264 lb)   04/01/24 120 kg (264 lb 8.8 oz)   03/18/24 106.1 kg (234 lb)   03/04/24 110.7 kg (244 lb)   02/28/24 106.1 kg (234 lb)   11/08/23 108.9 kg (240 lb 1.3 oz)   09/28/23 113.9 kg (251 lb)   09/26/23 113.9 kg (251 lb)   09/26/23 113.9 kg (251 lb)   09/22/23 113.5 kg (250 lb 3.2 oz)   09/12/23 113.4 kg (250 lb)   09/05/23 109.9 kg (242 lb 3.2 oz)   08/31/23 115.3 kg (254 lb 3.2 oz)   08/30/23 117.9 kg (260 lb)        Current Diet: Diet NPO       PSYCHOSOCIAL/SPIRITUAL SCREENING:   Palliative IDT has assessed this patient for cultural preferences / practices and a referral made as appropriate to needs (Cultural Services, Patient Advocacy, Ethics, etc.)    Spiritual Affiliation: Baptism    Any spiritual / Islam concerns:  [] Yes /  [x] No   If \"Yes\" to discuss with pastoral care during IDT     Does caregiver feel burdened by caring for their loved one:   [] Yes /  [x] No /  [] No Caregiver Present/Available [] No Caregiver [] Pt Lives at Facility  If \"Yes\" to discuss with social work during IDT    Anticipatory grief assessment:   [x] Normal  / [] Maladaptive     If \"Maladaptive\" to discuss with social work during IDT    ESAS Anxiety:      ESAS Depression:

## 2024-04-22 NOTE — CONSULTS
person.  Unable to obtain further orientation due to continuous BIPAP in place, and when BIPAP is removed patient can only say \"yes\".  She appears confused when asked to repeat anything and appears to struggle to catch her breath despite oxygen saturations above 90 and RR below 20.  BIPAP mask was replaced.  Attention appears normal.  Unable to assess memory.  She will follow simple one step commands to smile or wiggle toes, but when asked to give a thumbs up appears confused even when shown what to do.   Cranial Nerves:   Unable to assess visual fields, pupils are unequal with the left pupil larger than right (this appears to be her baseline) and left pupil  is sluggish in reaction, EOMI, no nystagmus, mild left eyelid ptosis. . Facial movement appeared symmetric the short time the BIPAP mask was removed.  Palate is midline. Tongue is midline. Hearing is intact   Motor:  4/5 strength in upper and lower proximal and distal muscles. Normal bulk and tone. No PD. No tremors   Reflexes:   Deferred.   Sensory:   Intact to LT  and withdraws in all 4 extremities to noxious stimuli   Gait:  Deferred.   Cerebellar:  ROMEL, only following simple one step commands.         NIHSS:      1a-LOC:0    1b-Month/Age:1    1c-Open/Close Hand:1    2-Best Gaze:0    3-Visual Fields:0    4-Facial Palsy:0    5a-Left Arm:1    5b-Right Arm:1    6a-Left Leg:3    6b-Right Leg:3    7-Limb Ataxia:0    8-Sensory:0    9-Best Language:2    10-Dysarthria:1    11-Extinction/Inattention:0  TOTAL SCORE: 13 (scoring complicated by current illness presentation of overall weakness and confusion)    STUDIES AND REPORTS:  Recent Results (from the past 24 hour(s))   CBC    Collection Time: 04/21/24  5:09 AM   Result Value Ref Range    WBC 14.7 (H) 3.6 - 11.0 K/uL    RBC 2.75 (L) 3.80 - 5.20 M/uL    Hemoglobin 7.6 (L) 11.5 - 16.0 g/dL    Hematocrit 26.0 (L) 35.0 - 47.0 %    MCV 94.5 80.0 - 99.0 FL    MCH 27.6 26.0 - 34.0 PG    MCHC 29.2 (L) 30.0 - 36.5 g/dL     do not promote good quality sleep.  - Causes or potential contributors to hospital delirium can include but are not limited to the following: prolonged hospitalization, lack of adequate sleep, infection, recent surgery, use of brain impairing medications (opioids, benzodiazepines), elderly patient population, baseline history of memory/cognitive disorders.      Signed:SHAYNE Jay - NP

## 2024-04-22 NOTE — PROGRESS NOTES
SLP Contact Note    Discussed with DWAYNE Amado, appreciate his expertise. Patient is about to be intubated. Will sign off for now. Please reconsult should SLP be of any assistance.    Suzanne Crowley M.Ed, PhD(c), CCC-SLP  Speech-Language Pathologist

## 2024-04-22 NOTE — PROGRESS NOTES
1930 - Bedside shift change report given to DWAYNE Hendrickson (oncoming nurse) by DWAYNE Juarez (offgoing nurse). Report included the following information Nurse Handoff Report, Adult Overview, Intake/Output, MAR, Recent Results, Cardiac Rhythm V-Paced, Alarm Parameters, Neuro Assessment, and Event Log.      2330 - Dialysis complete, 2.5L removed.    2345 - Neuro NP at bedside to do neuro exam.    0730 - Bedside shift change report given to DWAYNE Amado (oncoming nurse) by DWAYNE Hendrickson (offgoing nurse). Report included the following information Nurse Handoff Report, Adult Overview, Intake/Output, MAR, Recent Results, Cardiac Rhythm V Paced, Alarm Parameters, Neuro Assessment, and Event Log.

## 2024-04-22 NOTE — FLOWSHEET NOTE
04/21/24 2315   Vital Signs   BP (!) 114/40   Temp 98.7 °F (37.1 °C)   Pulse 72   Respirations 16   SpO2 96 %   Pain Assessment   Pain Assessment None - Denies Pain   Post-Hemodialysis Assessment   Post-Treatment Procedures Blood returned;Catheter Capped, clamped with Saline x2 ports   Machine Disinfection Process Acid/Vinegar Clean;Heat Disinfect;Exterior Machine Disinfection   Rinseback Volume (ml) 300 ml   Blood Volume Processed (Liters) 52.9 L   Dialyzer Clearance Lightly streaked   Duration of Treatment (minutes) 180 minutes   Heparin Amount Administered During Treatment (mL) 0 mL   Hemodialysis Intake (ml) 350 ml   Hemodialysis Output (ml) 2850 ml   NET Removed (ml) 2500   Tolerated Treatment Good   Patient Response to Treatment tolerated treatment without incident, UF goal achieved   Bilateral Breath Sounds Diminished   Edema Generalized   RLE Edema +4;Pitting   LLE Edema +4;Pitting   Physician Notified Yes   Time Off 2306   Patient Disposition Remain in ICU/ED   Observations & Evaluations   Level of Consciousness 0   Oriented X 1   Heart Rhythm Irregular   Respiratory Quality/Effort Dyspnea with exertion   O2 Device PAP (positive airway pressure)   Skin Color Other (comment)  (appropriate for ethnicity)   Skin Condition/Temp Dry;Warm   Abdomen Inspection Soft     Primary RN SBAR: EVERETT Young, RN  Comments: Patient tolerated treatment without incident, UF goal achieved. At end of treatment patient is awake and alert. CVC limbs cleaned, all possible blood returned to patient. CVC limbs flushed with 10cc NS, clamped, and secured with new clear guard caps. Bed wheels locked, bed in low position, call light within reach.

## 2024-04-22 NOTE — BRIEF OP NOTE
Brief Postoperative Note      Patient: Leny Barnes  YOB: 1945  MRN: [id number]    Date of Procedure: 4/22/2024    Pre-Op Diagnosis Codes:     * Wound dehiscence [T81.30XA]    Post-Op Diagnosis: Same       Procedure(s):  BACK WOUND INCISION AND DRAINAGE (EIP 0730)    Surgeon(s):  Grant Royal MD    Assistant:  * No surgical staff found *    Anesthesia: General    Estimated Blood Loss (mL): less than 50     Complications: None    Specimens:   ID Type Source Tests Collected by Time Destination   A : DEEP LUMBAR WOUND Swab Back CULTURE, ANAEROBIC, CULTURE, FUNGUS, CULTURE, WOUND Grnat Royal MD 4/22/2024 1613        Implants:  Implant Name Type Inv. Item Serial No.  Lot No. LRB No. Used Action   GRAFT BNE SUB 5CC CA SULF STIMULAN RAP CURE - [id number]  GRAFT BNE SUB 5CC CA SULF STIMULAN RAP CURE  [employer] [id number] N/A 1 Implanted         Drains:   Closed/Suction Drain Right Back Accordion (Active)       Urinary Catheter 04/16/24 Smith (Active)   $ Urethral catheter insertion $ Not inserted for procedure 04/20/24 1845   Catheter Indications Urinary retention (acute or chronic), continuous bladder irrigation or bladder outlet obstruction 04/22/24 1200   Site Assessment No urethral drainage 04/22/24 1200   Urine Color Yellow 04/22/24 1200   Urine Appearance Clear 04/22/24 1200   Urine Odor Other (Comment) 04/20/24 0957   Collection Container Standard 04/22/24 1200   Securement Method Securing device (Describe) 04/22/24 1200   Catheter Care  Perineal wipes 04/22/24 0800   Catheter Best Practices  Drainage tube clipped to bed;Catheter secured to thigh;Tamper seal intact;Bag below bladder;Bag not on floor;Lack of dependent loop in tubing;Drainage bag less than half full 04/22/24 1200   Status Draining;Patent 04/22/24 1200   Output (mL) 75 mL 04/22/24 1200       [REMOVED] Closed/Suction Drain Inferior;Right;Medial Back Accordion (Removed)   Site Description Clean, dry

## 2024-04-22 NOTE — PROGRESS NOTES
Palliative Medicine      Code Status: Full Code    Advance Care Planning:    The patient has AMD on file listing her daughter Breonna as primary MPOA, secondary is daughter Darline    Patient / Family Encounter Documentation    Participants (names): Dr. MartinBreonna     Narrative: The Palliative Medicine SW met with the patient's daughter Breonna at bedside, patient unable to participate, along with Dr. Martin-     Our team has been following along, brief check-in today with patient's daughter for support. We discuss that our team involvement initially to help with symptom management or if she declined, but also here especially now given her changes over the weekend and complex hospital stay we are here to help the family navigate hospitalization and overall support.     Breonna does not have any questions currently, plan for I&D today in OR with hopes to help patient w/ infection- goals are clear for restorative measures. Breonna does share that her mother appears more calm today compared to agitation she was experiencing last night.     Family has our contact information- our team will follow along with you. The patient does have AMD on file listing Breonna as primary MPOA, Darline as secondary- and it also shares that at end of life would not want aggressive interventions if no chance of meaningful recovery.     Psychosocial Issues Identified/ Resilience Factors: The patient lives at home with Breonna- this is a fairly recent move, prior to January the patient was living independently in her own apartment off [facility]. The patient ambulates with a walking at baseline, she could not walk long distances due to pain. The patient enjoys music, reading non-fiction. The patient enjoys staying up-to-date on current affairs, supportive family. The patient's daughter Breonna lives locally and Darline lives in [place]     Caregiver [place]:   Does the caregiver feel confident administering medication? No

## 2024-04-22 NOTE — WOUND CARE
WOCN Note:     New consult for sacrum, left heel, and toe.   Seen in 4219 with DWAYNE Amado.    79 y.o. y/o female admitted on 4/15/2024   Admitted for spinal dehiscence with surgical debridement planned for today.      Past Medical History:   Diagnosis Date    Arthritis 1990    Asthma     Atrial fibrillation ([facility])     NONE SINCE PACEMAKER    CAD (coronary artery disease) 1996    Cancer ([facility]) 2001    UTERINE    Chronic kidney disease (CKD), stage III (moderate) ([facility]) 2023    Compression fracture of L1 vertebra ([facility]) 10/2023    FELL AT HOME    Congestive heart failure ([facility]) 2020    Pacemaker    Depression     DM (diabetes mellitus) ([facility])     HTN (hypertension)     Hyperlipidemia     Morbid obesity ([facility])     Neuropathy     FREDERICK on CPAP     WEARS 02 2LNC AT NIGHT     WBC = 14.7  On Rocephin  Diet: Diet NPO           Assessment:   Communicative around BiPAP; foam under BiPAP for prevention  Reports back pain.   Requires full assists in repositioning.    Has a Smith.    Surface: CHRIS mattress    Heels offloaded with pillows.  Stable deflated dark area on right lateral toe left open to air    1.  scattered dark areas to buttocks with dry desquamation  Venelex and foam in use       Recommendations:    Buttocks: continue venelex twice daily and cover with foam  Turn/reposition approximately every 2 hours  Offload heels with heels hanging off end of pillow at all times while in bed. Venelex twice daily  Low Air Loss mattress: Use only flat sheet and one incontinence pad.     Transition of Care: Plan to follow weekly and as needed while admitted to hospital.     BOO Angelo, RN, CWOCN  Certified Wound, Ostomy, Continence Nurse  office [phone]  Available via [employer]

## 2024-04-22 NOTE — PROGRESS NOTES
0730 Bedside and Verbal shift change report given to Aneudy (oncoming nurse) by Nicholas (offgoing nurse). Report included the following information Nurse Handoff Report, Index, ED Encounter Summary, Adult Overview, Intake/Output, MAR, and Recent Results.     1430 pt down to OR      1700 Pt back on unit.    1735 Pt extubated to 10L  Midflow.

## 2024-04-22 NOTE — PROGRESS NOTES
CRITICAL CARE NOTE    Name: Leny Barnes   : 1945   MRN: [id number]   Date: 2024      REASON FOR ICU ADMISSION: Pulmonary edema, hemodialysis    PRINCIPAL ICU DIAGNOSIS   Acute hypoxemic respiratory failure  PERRY  serratia bacteremia     BRIEF PATIENT SUMMARY   79 y.o. female who presents with recurrent wound dehiscence, and initially admitted to hospitalist service for abx and spine surgery eval. Pt w/ several recent admissions for similar presentation. Re-discharged to [facility] on  and re-admitted to [facility] 4/15 with significant wound dehiscence and bloody drainage. Pt w/ planne debridement , however deferred due to increased WOB requiring BiPAP. Labs w/ worsening PERRY. Pt transferred to ICU  for acute hypoxic respiratory distress and placement of a dialysis catheter in preparation for hemodialysis.     COMPREHENSIVE ASSESSMENT & PLAN:SYSTEM BASED     24 HOUR EVENTS: No acute events o/n. Remains on continuous BiPAP. Possible OR this PM. HD yesterday and today, on lasix gtt w/ nearly 4L net negative.     NEUROLOGICAL:   Mentation grossly intact  Protecting airway  Delirium precautions    PULMONOLOGY: Acute hypoxic respiratory failure, pulmonary edema, obstructive sleep apnea, obesity   Continue to monitor oxygenation status  Wean BiPAP as tolerated  DuoNebs as needed  Pulmonary toilet  Pulmonary hygiene    CARDIOVASCULAR: History of hypertension, history of coronary artery disease, history of paroxysmal atrial tachycardia, Afib   Continue to monitor hemodynamic status  No vasopressor support  Continue antihypertensive medications  Echocardiogram pending result  Hold lasix    GASTROINTESTINAL: History of hyperlipidemia   N.p.o.  GI prophylaxis    RENAL/ELECTROLYTE/FLUIDS: Oliguria, CKD stage III, hemodialysis, status post dialysis catheter placement   Continue monitor urine output  HD per nephrology  Lasix held  Mg/phos/K >/3/4    ENDOCRINE: History diabetes mellitus   Goal  coordination of care.  This does not include any procedural time which has been billed separately.    Ramakrishna Macias MD   Critical Care Medicine  [facility]

## 2024-04-23 ENCOUNTER — APPOINTMENT (OUTPATIENT)
Facility: HOSPITAL | Age: 79
End: 2024-04-23
Payer: MEDICARE

## 2024-04-23 LAB
ANION GAP SERPL CALC-SCNC: 7 MMOL/L (ref 5–15)
APTT PPP: 39.7 SEC (ref 22.1–31)
APTT PPP: 46.5 SEC (ref 22.1–31)
APTT PPP: 67.7 SEC (ref 22.1–31)
APTT PPP: 80 SEC (ref 22.1–31)
BACTERIA SPEC CULT: NORMAL
BACTERIA SPEC CULT: NORMAL
BASE EXCESS BLD CALC-SCNC: 2.5 MMOL/L
BASE EXCESS BLD CALC-SCNC: 3.5 MMOL/L
BASE EXCESS BLD CALC-SCNC: 3.8 MMOL/L
BUN SERPL-MCNC: 33 MG/DL (ref 6–20)
BUN/CREAT SERPL: 23 (ref 12–20)
CALCIUM SERPL-MCNC: 8.1 MG/DL (ref 8.5–10.1)
CHLORIDE SERPL-SCNC: 105 MMOL/L (ref 97–108)
CO2 SERPL-SCNC: 28 MMOL/L (ref 21–32)
CREAT SERPL-MCNC: 1.45 MG/DL (ref 0.55–1.02)
CREAT UR-MCNC: 67.7 MG/DL
ERYTHROCYTE [DISTWIDTH] IN BLOOD BY AUTOMATED COUNT: 18.6 % (ref 11.5–14.5)
GLUCOSE BLD STRIP.AUTO-MCNC: 100 MG/DL (ref 65–117)
GLUCOSE BLD STRIP.AUTO-MCNC: 88 MG/DL (ref 65–117)
GLUCOSE BLD STRIP.AUTO-MCNC: 96 MG/DL (ref 65–117)
GLUCOSE BLD STRIP.AUTO-MCNC: 98 MG/DL (ref 65–117)
GLUCOSE BLD STRIP.AUTO-MCNC: 99 MG/DL (ref 65–117)
GLUCOSE SERPL-MCNC: 98 MG/DL (ref 65–100)
HCO3 BLD-SCNC: 26.9 MMOL/L (ref 22–26)
HCO3 BLD-SCNC: 28.3 MMOL/L (ref 22–26)
HCO3 BLD-SCNC: 28.4 MMOL/L (ref 22–26)
HCT VFR BLD AUTO: 24.1 % (ref 35–47)
HGB BLD-MCNC: 7.5 G/DL (ref 11.5–16)
LACTATE BLD-SCNC: 0.66 MMOL/L (ref 0.4–2)
LACTATE SERPL-SCNC: 1.1 MMOL/L (ref 0.4–2)
MAGNESIUM SERPL-MCNC: 2 MG/DL (ref 1.6–2.4)
MCH RBC QN AUTO: 27.8 PG (ref 26–34)
MCHC RBC AUTO-ENTMCNC: 31.1 G/DL (ref 30–36.5)
MCV RBC AUTO: 89.3 FL (ref 80–99)
MICROALBUMIN UR-MCNC: 99 MG/DL
MICROALBUMIN/CREAT UR-RTO: 1462 MG/G (ref 0–30)
NRBC # BLD: 0.17 K/UL (ref 0–0.01)
NRBC BLD-RTO: 0.9 PER 100 WBC
PCO2 BLD: 40.2 MMHG (ref 35–45)
PCO2 BLD: 41.5 MMHG (ref 35–45)
PCO2 BLD: 43.8 MMHG (ref 35–45)
PH BLD: 7.42 (ref 7.35–7.45)
PH BLD: 7.43 (ref 7.35–7.45)
PH BLD: 7.44 (ref 7.35–7.45)
PLATELET # BLD AUTO: 299 K/UL (ref 150–400)
PMV BLD AUTO: 9.1 FL (ref 8.9–12.9)
PO2 BLD: 77 MMHG (ref 80–100)
PO2 BLD: 85 MMHG (ref 80–100)
PO2 BLD: 90 MMHG (ref 80–100)
POTASSIUM SERPL-SCNC: 3.1 MMOL/L (ref 3.5–5.1)
PROCALCITONIN SERPL-MCNC: 0.44 NG/ML
RBC # BLD AUTO: 2.7 M/UL (ref 3.8–5.2)
SAO2 % BLD: 95.4 % (ref 92–97)
SAO2 % BLD: 96.7 % (ref 92–97)
SAO2 % BLD: 97.3 % (ref 92–97)
SERVICE CMNT-IMP: ABNORMAL
SERVICE CMNT-IMP: NORMAL
SODIUM SERPL-SCNC: 140 MMOL/L (ref 136–145)
SPECIMEN TYPE: ABNORMAL
THERAPEUTIC RANGE: ABNORMAL SECS (ref 58–77)
WBC # BLD AUTO: 18.3 K/UL (ref 3.6–11)

## 2024-04-23 PROCEDURE — 90935 HEMODIALYSIS ONE EVALUATION: CPT

## 2024-04-23 PROCEDURE — 6360000002 HC RX W HCPCS: Performed by: NURSE PRACTITIONER

## 2024-04-23 PROCEDURE — 70498 CT ANGIOGRAPHY NECK: CPT

## 2024-04-23 PROCEDURE — 6360000004 HC RX CONTRAST MEDICATION: Performed by: RADIOLOGY

## 2024-04-23 PROCEDURE — 2000000000 HC ICU R&B

## 2024-04-23 PROCEDURE — 97116 GAIT TRAINING THERAPY: CPT

## 2024-04-23 PROCEDURE — 6360000002 HC RX W HCPCS: Performed by: INTERNAL MEDICINE

## 2024-04-23 PROCEDURE — 85730 THROMBOPLASTIN TIME PARTIAL: CPT

## 2024-04-23 PROCEDURE — 6360000002 HC RX W HCPCS: Performed by: ANESTHESIOLOGY

## 2024-04-23 PROCEDURE — 94640 AIRWAY INHALATION TREATMENT: CPT

## 2024-04-23 PROCEDURE — 97535 SELF CARE MNGMENT TRAINING: CPT | Performed by: OCCUPATIONAL THERAPIST

## 2024-04-23 PROCEDURE — 6370000000 HC RX 637 (ALT 250 FOR IP): Performed by: PHYSICIAN ASSISTANT

## 2024-04-23 PROCEDURE — 0JB70ZZ EXCISION OF BACK SUBCUTANEOUS TISSUE AND FASCIA, OPEN APPROACH: ICD-10-PCS | Performed by: ORTHOPAEDIC SURGERY

## 2024-04-23 PROCEDURE — 82043 UR ALBUMIN QUANTITATIVE: CPT

## 2024-04-23 PROCEDURE — 97530 THERAPEUTIC ACTIVITIES: CPT | Performed by: OCCUPATIONAL THERAPIST

## 2024-04-23 PROCEDURE — 83605 ASSAY OF LACTIC ACID: CPT

## 2024-04-23 PROCEDURE — P9047 ALBUMIN (HUMAN), 25%, 50ML: HCPCS | Performed by: INTERNAL MEDICINE

## 2024-04-23 PROCEDURE — 99233 SBSQ HOSP IP/OBS HIGH 50: CPT | Performed by: INTERNAL MEDICINE

## 2024-04-23 PROCEDURE — 83735 ASSAY OF MAGNESIUM: CPT

## 2024-04-23 PROCEDURE — 84145 PROCALCITONIN (PCT): CPT

## 2024-04-23 PROCEDURE — 97161 PT EVAL LOW COMPLEX 20 MIN: CPT

## 2024-04-23 PROCEDURE — 2580000003 HC RX 258: Performed by: INTERNAL MEDICINE

## 2024-04-23 PROCEDURE — APPNB45 APP NON BILLABLE 31-45 MINUTES

## 2024-04-23 PROCEDURE — 97530 THERAPEUTIC ACTIVITIES: CPT

## 2024-04-23 PROCEDURE — 2580000003 HC RX 258: Performed by: STUDENT IN AN ORGANIZED HEALTH CARE EDUCATION/TRAINING PROGRAM

## 2024-04-23 PROCEDURE — 94660 CPAP INITIATION&MGMT: CPT

## 2024-04-23 PROCEDURE — 85027 COMPLETE CBC AUTOMATED: CPT

## 2024-04-23 PROCEDURE — 99232 SBSQ HOSP IP/OBS MODERATE 35: CPT | Performed by: PSYCHIATRY & NEUROLOGY

## 2024-04-23 PROCEDURE — 6360000002 HC RX W HCPCS: Performed by: STUDENT IN AN ORGANIZED HEALTH CARE EDUCATION/TRAINING PROGRAM

## 2024-04-23 PROCEDURE — 10180 I&D COMPLEX PO WOUND INFCTJ: CPT | Performed by: ORTHOPAEDIC SURGERY

## 2024-04-23 PROCEDURE — 6370000000 HC RX 637 (ALT 250 FOR IP): Performed by: HOSPITALIST

## 2024-04-23 PROCEDURE — 6370000000 HC RX 637 (ALT 250 FOR IP): Performed by: NURSE PRACTITIONER

## 2024-04-23 PROCEDURE — 82803 BLOOD GASES ANY COMBINATION: CPT

## 2024-04-23 PROCEDURE — 2580000003 HC RX 258: Performed by: HOSPITALIST

## 2024-04-23 PROCEDURE — 97165 OT EVAL LOW COMPLEX 30 MIN: CPT | Performed by: OCCUPATIONAL THERAPIST

## 2024-04-23 PROCEDURE — 82570 ASSAY OF URINE CREATININE: CPT

## 2024-04-23 PROCEDURE — 82962 GLUCOSE BLOOD TEST: CPT

## 2024-04-23 PROCEDURE — 80048 BASIC METABOLIC PNL TOTAL CA: CPT

## 2024-04-23 PROCEDURE — 36415 COLL VENOUS BLD VENIPUNCTURE: CPT

## 2024-04-23 PROCEDURE — 70450 CT HEAD/BRAIN W/O DYE: CPT

## 2024-04-23 RX ORDER — ALBUMIN (HUMAN) 12.5 G/50ML
50 SOLUTION INTRAVENOUS
Status: ACTIVE | OUTPATIENT
Start: 2024-04-23 | End: 2024-04-23

## 2024-04-23 RX ORDER — GABAPENTIN 300 MG/1
300 CAPSULE ORAL EVERY EVENING
Status: DISCONTINUED | OUTPATIENT
Start: 2024-04-23 | End: 2024-05-03 | Stop reason: HOSPADM

## 2024-04-23 RX ORDER — ALBUMIN (HUMAN) 12.5 G/50ML
25 SOLUTION INTRAVENOUS PRN
Status: DISCONTINUED | OUTPATIENT
Start: 2024-04-23 | End: 2024-05-03 | Stop reason: HOSPADM

## 2024-04-23 RX ORDER — POTASSIUM CHLORIDE 7.45 MG/ML
10 INJECTION INTRAVENOUS
Status: DISCONTINUED | OUTPATIENT
Start: 2024-04-23 | End: 2024-04-23

## 2024-04-23 RX ORDER — BUMETANIDE 0.25 MG/ML
2 INJECTION INTRAMUSCULAR; INTRAVENOUS 2 TIMES DAILY
Status: DISCONTINUED | OUTPATIENT
Start: 2024-04-23 | End: 2024-04-28

## 2024-04-23 RX ORDER — POTASSIUM CHLORIDE 29.8 MG/ML
20 INJECTION INTRAVENOUS
Status: DISCONTINUED | OUTPATIENT
Start: 2024-04-23 | End: 2024-04-23

## 2024-04-23 RX ADMIN — IPRATROPIUM BROMIDE 0.5 MG: 0.5 SOLUTION RESPIRATORY (INHALATION) at 21:16

## 2024-04-23 RX ADMIN — HYDROMORPHONE HYDROCHLORIDE 0.25 MG: 1 INJECTION, SOLUTION INTRAMUSCULAR; INTRAVENOUS; SUBCUTANEOUS at 02:28

## 2024-04-23 RX ADMIN — WATER 2000 MG: 1 INJECTION INTRAMUSCULAR; INTRAVENOUS; SUBCUTANEOUS at 11:50

## 2024-04-23 RX ADMIN — NYSTATIN 500000 UNITS: 100000 SUSPENSION ORAL at 20:44

## 2024-04-23 RX ADMIN — HEPARIN SODIUM 9 UNITS/KG/HR: 10000 INJECTION, SOLUTION INTRAVENOUS at 20:41

## 2024-04-23 RX ADMIN — Medication: at 20:44

## 2024-04-23 RX ADMIN — SODIUM CHLORIDE, PRESERVATIVE FREE 10 ML: 5 INJECTION INTRAVENOUS at 21:19

## 2024-04-23 RX ADMIN — Medication: at 09:00

## 2024-04-23 RX ADMIN — IPRATROPIUM BROMIDE 0.5 MG: 0.5 SOLUTION RESPIRATORY (INHALATION) at 08:06

## 2024-04-23 RX ADMIN — PHENYLEPHRINE HYDROCHLORIDE 55 MCG/MIN: 10 INJECTION INTRAVENOUS at 12:11

## 2024-04-23 RX ADMIN — HEPARIN SODIUM 1100 UNITS: 1000 INJECTION INTRAVENOUS; SUBCUTANEOUS at 10:35

## 2024-04-23 RX ADMIN — ARFORMOTEROL TARTRATE: 15 SOLUTION RESPIRATORY (INHALATION) at 21:16

## 2024-04-23 RX ADMIN — HYDROMORPHONE HYDROCHLORIDE 0.25 MG: 1 INJECTION, SOLUTION INTRAMUSCULAR; INTRAVENOUS; SUBCUTANEOUS at 15:53

## 2024-04-23 RX ADMIN — HEPARIN SODIUM 2000 UNITS: 1000 INJECTION INTRAVENOUS; SUBCUTANEOUS at 06:44

## 2024-04-23 RX ADMIN — BUMETANIDE 2 MG: 0.25 INJECTION INTRAMUSCULAR; INTRAVENOUS at 11:51

## 2024-04-23 RX ADMIN — NYSTATIN 500000 UNITS: 100000 SUSPENSION ORAL at 11:50

## 2024-04-23 RX ADMIN — IPRATROPIUM BROMIDE 0.5 MG: 0.5 SOLUTION RESPIRATORY (INHALATION) at 11:45

## 2024-04-23 RX ADMIN — ARFORMOTEROL TARTRATE: 15 SOLUTION RESPIRATORY (INHALATION) at 08:06

## 2024-04-23 RX ADMIN — IPRATROPIUM BROMIDE 0.5 MG: 0.5 SOLUTION RESPIRATORY (INHALATION) at 16:06

## 2024-04-23 RX ADMIN — ACETYLCYSTEINE 600 MG: 200 INHALANT RESPIRATORY (INHALATION) at 08:06

## 2024-04-23 RX ADMIN — HEPARIN SODIUM 1400 UNITS: 1000 INJECTION INTRAVENOUS; SUBCUTANEOUS at 10:35

## 2024-04-23 RX ADMIN — ACETYLCYSTEINE 600 MG: 200 INHALANT RESPIRATORY (INHALATION) at 21:15

## 2024-04-23 RX ADMIN — POTASSIUM CHLORIDE 10 MEQ: 7.46 INJECTION, SOLUTION INTRAVENOUS at 06:54

## 2024-04-23 RX ADMIN — ACETAMINOPHEN 650 MG: 650 SUPPOSITORY RECTAL at 02:40

## 2024-04-23 RX ADMIN — IOPAMIDOL 100 ML: 755 INJECTION, SOLUTION INTRAVENOUS at 15:17

## 2024-04-23 RX ADMIN — SODIUM CHLORIDE, PRESERVATIVE FREE 10 ML: 5 INJECTION INTRAVENOUS at 09:00

## 2024-04-23 RX ADMIN — BUMETANIDE 2 MG: 0.25 INJECTION INTRAMUSCULAR; INTRAVENOUS at 18:47

## 2024-04-23 RX ADMIN — ALBUMIN (HUMAN) 25 G: 0.25 INJECTION, SOLUTION INTRAVENOUS at 07:17

## 2024-04-23 RX ADMIN — SODIUM CHLORIDE: 9 INJECTION, SOLUTION INTRAVENOUS at 06:51

## 2024-04-23 RX ADMIN — ASPIRIN 300 MG: 300 SUPPOSITORY RECTAL at 11:51

## 2024-04-23 ASSESSMENT — PAIN DESCRIPTION - DESCRIPTORS
DESCRIPTORS: ACHING

## 2024-04-23 ASSESSMENT — PAIN DESCRIPTION - LOCATION
LOCATION: BACK

## 2024-04-23 ASSESSMENT — PAIN SCALES - GENERAL
PAINLEVEL_OUTOF10: 1
PAINLEVEL_OUTOF10: 1
PAINLEVEL_OUTOF10: 5
PAINLEVEL_OUTOF10: 10
PAINLEVEL_OUTOF10: 0

## 2024-04-23 ASSESSMENT — PAIN DESCRIPTION - PAIN TYPE
TYPE: ACUTE PAIN
TYPE: ACUTE PAIN

## 2024-04-23 ASSESSMENT — PAIN DESCRIPTION - ORIENTATION
ORIENTATION: POSTERIOR
ORIENTATION: POSTERIOR

## 2024-04-23 NOTE — PROGRESS NOTES
Neurology Staff Addendum:  I have personally seen and examined the patient. I have personally reviewed the chart and images. Elements of my examination included history of present illness, review of systems, review of past medical and surgical history, review of medications, and physical and neurological examination. I have personally reviewed the findings and impressions with the nurse practitioner and am in agreement with their note with changes below.    Pt is an 78yo female with h/o left and right trigeminal neuralgia, diabetes, diabetic PN, morbid obesity, afib, CAD, FREDERICK on CPAP, CHF, PM, HTN, HLD, CKD, Compression fx L1 due to fall, uterine cancer s/p KEI/BSO, asthma, OA, gastric lap band, gastric bypass, bilateral cataract removal, cervical fusion, 3/18/24 she underwent lumbar laminectomy at T10-T11, T11-T12, and L1-L2 kyphoplasty with facetectomies at T11-T12 and T12-L1 with Alison osteotomies bilaterally, as well as lumbar laminectomy at L2-S1 bilaterally with bilateral facet resection for decompression of L2, L3, L4, L5, and S1 nerve roots, and posterior thoracolumbar fusion from T10 down to the ilium, bilateral SI joint fusion. She was d/c to [facility] 3/22/24, but readmitted 4/1/24 due to wound dehiscence/infection. Discharged back to [facility] 4/5/24, but again readmitted 4/15/24 with ongoing wound infection and fevers.  On 4/21/24 she had a spell of dysarthria.  CTH neg.   She was scheduled to have wound debridement on 4/21/24, but cancelled due to worsened respiratory status. She had been on Plavix and Eliquis for CAD with MI and stent placement and Afib, both held for surgery.  Pt was started on heparin gtt 4/21/24 after the Code S. Pt is seen with RN at beside. She is extubated. Pt was working with PT today, able to ambulate, and had facial droop that was alternating from side to side. CTH and CTA H/N without acute changes, no LVO, no significant stenosis, no aneurysm, incidental multilobar PNA seen.     acetaminophen (TYLENOL) tablet 1,000 mg  1,000 mg Oral Q6H    allopurinol (ZYLOPRIM) tablet 100 mg  100 mg Oral Daily    buPROPion (WELLBUTRIN SR) extended release tablet 150 mg  150 mg Oral Daily    ferrous sulfate (IRON 325) tablet 325 mg  325 mg Oral Daily with breakfast    gabapentin (NEURONTIN) capsule 600 mg  600 mg Oral QPM    hydrOXYzine HCl (ATARAX) tablet 10 mg  10 mg Oral Q6H PRN    [Held by provider] insulin NPH (HumuLIN N;NovoLIN N) injection vial 15 Units  15 Units SubCUTAneous BID AC    insulin lispro (HUMALOG) injection vial 0-8 Units  0-8 Units SubCUTAneous TID WC    insulin lispro (HUMALOG) injection vial 0-4 Units  0-4 Units SubCUTAneous Nightly    amLODIPine (NORVASC) tablet 5 mg  5 mg Oral Daily       BP (!) 133/93   Pulse 72   Temp 97.9 °F (36.6 °C) (Axillary)   Resp 17   Ht 1.676 m (5' 5.98\")   Wt 120.4 kg (265 lb 6.9 oz)   SpO2 100%   BMI 42.86 kg/m²   Temp (24hrs), Av.3 °F (36.8 °C), Min:97.6 °F (36.4 °C), Max:99 °F (37.2 °C)      1901 -  0700  In: -   Out: 540 [Urine:540]   07 -  190  In: 1181.6 [I.V.:622.9]  Out: 5265 [Urine:2415]      Physical Exam:  General: Well developed well nourished patient in no apparent distress.   Pulmonary: On BiPAP, clear to auscultation anteriorly  Cardiac: Rate controlled atrial fib  Extremities: 2+ Radial pulses, no cyanosis. Pitting edema noted to extremities x4.     Neurological Exam:  Mental Status: Oriented to person and \"hospital.\" Difficult to obtain assessment of speech/language in setting of Bipap/ Shakes head yes and no appropriately. Follows simple commands   Cranial Nerves:   Unable to assess visual fields. Right pupil 4mm and briskly reactive. Left is 5mm and minimally reactive which is consistent with previous notes/assessments as her reported baseline.  EOMI, no nystagmus, L eye ptosis noted. Facial sensation is normal. Facial movement unable to be adequately assessed with Bipap mask.  Palate is midline.

## 2024-04-23 NOTE — PLAN OF CARE
Problem: Physical Therapy - Adult  Goal: By Discharge: Performs mobility at highest level of function for planned discharge setting.  See evaluation for individualized goals.  Description: FUNCTIONAL STATUS PRIOR TO ADMISSION: Patient was modified independent using a rollator for functional mobility prior to initial spinal surgery 3/18. Patient has been back and forth between [facility] hospital 2x following initial surgery 2/2 ongoing issues with surgical wound dehiscence and infection. Patient has been ambulating at rehab with RW per chart review.     HOME SUPPORT PRIOR TO ADMISSION: The patient lived with daughter and required minimal assistance for self-care/ ADLs following initial spinal surgery 3/18.    Physical Therapy Goals  Initiated 4/23/2024  1.  Patient will move from supine to sit and sit to supine in bed with minimal assistance within 7 day(s).    2.  Patient will perform sit to stand with minimal assistance within 7 day(s).  3.  Patient will transfer from bed to chair and chair to bed with minimal assistance using the least restrictive device within 7 day(s).  4.  Patient will ambulate with minimal assistance for 15 feet with the least restrictive device within 7 day(s).   5.  Patient will ascend/descend 5 stairs with bilateral handrail(s) with moderate assistance within 7 day(s).    Outcome: Progressing   PHYSICAL THERAPY EVALUATION    Patient: Leny Barnes (79 y.o. female)  Date: 4/23/2024  Primary Diagnosis: Wound infection [T14.8XXA, L08.9]  Wound dehiscence [T81.30XA]  Procedure(s) (LRB):  BACK WOUND INCISION AND DRAINAGE (EIP 0730) (N/A) 1 Day Post-Op   Precautions: Restrictions/Precautions: Fall Risk         Spinal Precautions: No Bending, No Lifting, No Twisting (approximately 6 weeks out from a lumbar laminectomy and fusion from T10 to pelvis)            ASSESSMENT :   DEFICITS/IMPAIRMENTS:   The patient is limited by decreased functional mobility, independence in ADLs, ROM, strength,  Exceptions  Arousal/Alertness: Delayed responses to stimuli  Following Commands: Follows multistep commands with repitition;Follows one step commands with increased time  Attention Span: Difficulty dividing attention;Difficulty attending to directions;Attends with cues to redirect  Memory: Decreased long term memory;Decreased short term memory;Decreased recall of recent events;Decreased recall of precautions;Decreased recall of biographical Information  Safety Judgement: Decreased awareness of need for assistance;Decreased awareness of need for safety  Problem Solving: Decreased awareness of errors;Assistance required to generate solutions;Assistance required to implement solutions;Assistance required to identify errors made;Assistance required to correct errors made  Insights: Not aware of deficits  Initiation: Requires cues for all  Sequencing: Requires cues for all  Cognition Comment: L inattention but tracks to L with cues, alternating facial droop?, decreased initiation to use left hand    Skin: intact where visible, spinal dressing intact with no strikethrough     Edema: none noted    Hearing:        Vision/Perceptual:    Vision - Basic Assessment  Prior Vision: Wears glasses all the time (not in hospital)        Perception  Overall Perceptual Status: Impaired  Unilateral Attention: Cues to attend to left side of body;Cues to attend left visual field  Initiation: Cues to initiate tasks (noted to wiggle in bed due to back discomfort)    Strength:    Strength: Generally decreased, functional    Tone & Sensation:   Tone: Normal  Sensation: Impaired    Coordination:  Coordination: Generally decreased, functional    Range Of Motion:  AROM: Generally decreased, functional       Functional Mobility:  Bed Mobility:     Bed Mobility Training  Bed Mobility Training: Yes  Rolling: Maximum assistance;Assist X2  Supine to Sit: Maximum assistance;Assist X2  Sit to Supine: Maximum assistance;Assist X2  Scooting: Moderate

## 2024-04-23 NOTE — PROGRESS NOTES
1930 - Bedside shift change report given to Nahed/DWAYNE Plascencia (oncoming nurse) by DWAYNE Amado (offgoing nurse). Report included the following information Nurse Handoff Report, Adult Overview, Surgery Report, Intake/Output, Recent Results, Cardiac Rhythm V-Paced, Alarm Parameters, Neuro Assessment, and Event Log. Drips: Jose at 35.    0240 - PRN Tylenol given for fever (100.6f).    0420 - Pt restless, blood glucose checked (96), neuro exam repeated, patient's L pupil now appearing larger and oval shaped. Dr. Rai called and notified of change, will assess patient.    0730 - Bedside shift change report given to DWAYNE Amado (oncoming nurse) by DWAYNE Hendrickson (offgoing nurse). Report included the following information Nurse Handoff Report, Adult Overview, Intake/Output, MAR, Recent Results, Cardiac Rhythm V-Paced w/PVCs, Alarm Parameters, Neuro Assessment, and Event Log. Drips: Jose at 65; Heparin at 10.

## 2024-04-23 NOTE — OP NOTE
[address]  [address]                            OPERATIVE REPORT      PATIENT NAME: MIYA SEALS           : 1945  MED REC NO: [id number]                       ROOM: 4218  ACCOUNT NO: [id number]                       ADMIT DATE: 04/15/2024  PROVIDER: Grant Royal MD    DATE OF SERVICE:  2024    PREOPERATIVE DIAGNOSES:  Wound dehiscence, deep lumbar wound collection.    POSTOPERATIVE DIAGNOSES:  Wound dehiscence, deep lumbar wound collection.    PROCEDURES PERFORMED:  Lumbar wound irrigation and debridement, deep postoperatively.    SURGEON:  Grant Royal MD    ASSISTANT:  Nicole Naik PA-C.    ANESTHESIA:  General    ESTIMATED BLOOD LOSS:  Minimal.    SPECIMENS REMOVED:  Included anaerobic and aerobic cultures.    INTRAOPERATIVE FINDINGS:  Lumbar wound dehiscence     COMPLICATIONS:  None.    IMPLANTS:  None. Antibiotic beads      INDICATIONS:  This is an 79-year-old female, who is approximately 6 weeks out from a lumbar laminectomy and fusion from T10 to pelvis, initially doing well, but has had 2 subsequent admissions and subsequently has had sepsis as well as wound dehiscence of the lower part of her wound.  She has been doing wound care.  She is for irrigation, debridement, and repeat closure.    DESCRIPTION OF PROCEDURE:  The patient was identified, brought to the operative suite, intubated without difficulty, placed in the prone position on the Truman frame with all bony prominences well padded.  Back was prepped and draped sterilely.  At which time, the packing was removed by the wound care team.  We then removed the remainder of the nylon stitches and we opened up the fascia.  Postoperative seroma was noted.  This was cultured. No obvious signs of purulence noted.  We then irrigated with 3000 mL of pulse irrigation, followed by a 3-minute Betadine soak, followed by pulse irrigation of another 3000  mL.  Antibiotic beads were then made with gentamicin and vancomycin and placed in the deep wound.  Medium Hemovac placed.  We did interrupted #1 Stratafix on the fascial layer, 2-0 Stratafix on the subcutaneous layer, and then 3-0 Monocryl on the skin, and then interrupted sutures with #1 nylon for reinforcement of the soft tissues.  Dressing placed.  The patient returned to the PACU in stable condition.    LI Brown was present for the entirety of the procedure and vital for the performance of the procedure. LI Brown assisted with positioning, prepping, draping of the patient before the procedure and instrument manipulation/placement, spinal repositioning and navigation/robotics/flouroscopic operation during the procedure as well as wound closure, dressing application and brace application after the procedure. Please note that no intern, resident, or other hospital staff was available to assist during the surgery     MD MARA POWELL/AQS  D:  04/23/2024 08:52:14  T:  04/23/2024 11:17:54  JOB #:  [id number]

## 2024-04-23 NOTE — PROGRESS NOTES
0730 Bedside and Verbal shift change report given to Aneudy (oncoming nurse) by Nicholas (offgoing nurse). Report included the following information Nurse Handoff Report, Index, Intake/Output, MAR, and Recent Results.      1400 Pt having occasional facial drooping alternating affected side.  Intensivist notified, orders for CT/CTA. Nephrologist notified, OK for CTA.   lorelei on admission/done

## 2024-04-23 NOTE — PROGRESS NOTES
[address], [place] [address]  Phone: [phone]   [phone]    [email or web address]     Nephrology Progress Note    Patient Name : Leny Barnes      : 1945     MRN : [id number]  Date of Admission : 4/15/2024  Date of Servive : 24    CC:  Follow up for CKD    Assessment and Plan   PERRY on CKD  - ATN vs ATIN vs post infectious GN   - Oliguric --> now non oliguric   - s/p Mendez and emergent HD on   - HD # 2 today w/ reduced UF goal   - resume Bumex 2 mg BID    Hypokalemia   - replacement ordered  - adjust dialysis K bath     Severe Hypoxic Resp Failure   Acute HFpEF   Asp PNA  Pleural effusions, edema   - per CCM    CKD 3a:  - from DM2 and HTN  - baseline Cr around 1.5 to 1.7     Sepsis w/ Bacteremia  -3/18  s/p Laminectomy with dehiscence of wound  - 4/15 BC (+) serratia  - ID following  and adjusting abx  -  MRI (+) fluid/ gas, s/p I+D on      CAD  PAF s/p ablation and pacemaker  DM  FREDERICK on CPAP       Interval History:  Seen and examined. Underwent I+D yesterday, extubated post op to BIPAP, confused now. Hypotensive overnight needing increasing doses of Jose. Hb stable. UOP 1.2L, K being replaced.     Review of Systems: Review of systems not obtained due to patient factors.    Current Medications:   Current Facility-Administered Medications   Medication Dose Route Frequency    albumin human 25% IV solution 25 g  25 g IntraVENous PRN    albumin human 25% IV solution 50 g  50 g IntraVENous Once in dialysis    bumetanide (BUMEX) injection 2 mg  2 mg IntraVENous BID    ALTEplase (CATHFLO) 1 mg in sterile water 1 mL injection  1 mg IntraCATHeter PRN    propofol infusion  5-50 mcg/kg/min (Order-Specific) IntraVENous Continuous    phenylephrine (JOSE-SYNEPHRINE) 50 mg in sodium chloride 0.9 % 250 mL infusion ([id number])   mcg/min IntraVENous Continuous    iopamidol (ISOVUE-370) 76 % injection 100 mL  100 mL

## 2024-04-23 NOTE — PROGRESS NOTES
CRITICAL CARE NOTE    Name: Leny Barnes   : 1945   MRN: [id number]   Date: 2024      REASON FOR ICU ADMISSION: Pulmonary edema, hemodialysis    PRINCIPAL ICU DIAGNOSIS   Acute hypoxemic respiratory failure  PERRY  serratia bacteremia     BRIEF PATIENT SUMMARY   79 y.o. female who presents with recurrent wound dehiscence, and initially admitted to hospitalist service for abx and spine surgery eval. Pt w/ several recent admissions for similar presentation. Re-discharged to [facility] on  and re-admitted to [facility] 4/15 with significant wound dehiscence and bloody drainage. Pt w/ planne debridement , however deferred due to increased WOB requiring BiPAP. Labs w/ worsening PERRY. Pt transferred to ICU  for acute hypoxic respiratory distress and placement of a dialysis catheter in preparation for hemodialysis. Pt went to OR  for I&D of spinal wound.      COMPREHENSIVE ASSESSMENT & PLAN:SYSTEM BASED     24 HOUR EVENTS: Received from OR intubated, on phenylephrine. Extubated yesterday PM. Remains on phenylephrine. No acute events o/n, remained on BiPAP o/n. Net negative 500ml over past 24hrs, HD this AM and diuretics resumed.     NEUROLOGICAL:   Mentation grossly intact  Protecting airway  Delirium precautions    PULMONOLOGY: Acute hypoxic respiratory failure, pulmonary edema, obstructive sleep apnea, obesity   Continue to monitor oxygenation status  Wean BiPAP as tolerated  DuoNebs as needed  Pulmonary toilet  Pulmonary hygiene    CARDIOVASCULAR: History of hypertension, history of coronary artery disease, history of paroxysmal atrial tachycardia, Afib   Phenylephrine, goal MAP >65  Continue to monitor hemodynamic status  Continue antihypertensive medications  Echocardiogram w/ preserved LVEF, Moderate RV dysfunction w/ pulm HTN  Bumex BID    GASTROINTESTINAL: History of hyperlipidemia   ADAT  GI prophylaxis    RENAL/ELECTROLYTE/FLUIDS: Oliguria, CKD stage III, hemodialysis, status post  35 minutes of critical care time.  This is time spent at this critically ill patient's bedside actively involved in patient care as well as the coordination of care.  This does not include any procedural time which has been billed separately.    Ramakrishna Macias MD   Critical Care Medicine  [facility]

## 2024-04-23 NOTE — PROGRESS NOTES
Renal Dosing/Monitoring  Medication: gabapentin   Current regimen:  500 mg PO every 24 hr  Recent Labs     04/22/24  0023 04/22/24  0605 04/23/24  0454   CREATININE 1.09* 1.20* 1.45*   BUN 26* 27* 33*     Estimated CrCl:  41 ml/min (PERRY on HD)  Plan: Change to 300 mg daily for HD patient

## 2024-04-23 NOTE — FLOWSHEET NOTE
PRE HD: 04/23/24 0745   Vital Signs   BP (!) 156/46  (arterial line)   Temp 100 °F (37.8 °C)   Pulse 72   Respirations 19   SpO2 98 %   Pain Assessment   Pain Assessment None - Denies Pain   Treatment   Time On 0745   Time Off 1045   Treatment Goal 1L UF, 3hr   Observations & Evaluations   Level of Consciousness 0   Oriented X 2   Heart Rhythm   (ICU Monitoring)   Respiratory Quality/Effort Unlabored   O2 Device PAP (positive airway pressure)   Bilateral Breath Sounds Diminished   Skin Condition/Temp Warm;Dry   Abdomen Inspection Obese;Soft   Edema Right lower extremity;Left lower extremity   RLE Edema +2;Pitting   LLE Edema +2;Pitting   Technical Checks   Dialysis Machine No. 06   RO Machine Number [id number]   Dialyzer Lot No. [id number]   Tubing Lot Number [id number]   All Connections Secure Yes   Saline Line Double Clamped Yes   Dialyzer Revaclear 300   Prime Volume (mL) 200 mL   ICEBOAT I;C;E;B;O;A;T   RO Machine Log Sheet Completed Yes   Machine Alarm Self Test Completed;Passed   Air Foam Detector Tested;Proper Function   Extracorporeal Circuit Tested for Integrity Yes   Machine Conductivity 13.4   Manual Conductivity 13.6   Manual Ph 7.2   Bleach Test (Neg) Yes   Bath Temperature 96.8 °F (36 °C)   Dialysis Bath   K+ (Potassium) 4   Ca+ (Calcium) 2.5   Na+ (Sodium) 138   HCO3 (Bicarb) 38   Bicarbonate Concentrate Lot No. [id number]   Acid Concentrate Lot No. [id number]   Treatment Initiation   Dialyze Hours 3   Treatment  Initiation [place] Precautions maintained;Connections secured;Prime given;Venous Parameters set;Arterial Parameters set;Air foam detector engaged;Dialysate;Saline line double clamped;Revaclear Dialyzer   Hemodialysis Central Access Right Neck   Placement Date: 04/21/24   Orientation: Right  Access Location: Neck   Continued need for line? Yes   Site Assessment Clean, dry & intact   Venous Lumen Status Brisk blood return;Flushed;Infusing  (lines reversed for achieve ordered BFR)   Arterial Lumen

## 2024-04-23 NOTE — PROGRESS NOTES
Occupational Therapy  Order received and chart reviewed, noted patient having dialysis at this time. Will follow up this afternoon as able and appropriate.   Candace Galvez OTR/L

## 2024-04-23 NOTE — PROGRESS NOTES
Orthopedic Spine Progress Note  Post Op day: 1 Day Post-Op    2024 7:47 AM   Admit Date: 4/15/2024  Procedure: Procedure(s) with comments:  BACK WOUND INCISION AND DRAINAGE     Subjective:     Leny Barnes has no complaints. Pain control adequate. Currently receiving dialysis. Denies N/V, CP, + mild SOB.    Objective:          Physical Exam:  General:  Alert and oriented. No acute distress.   Heart:  Respirations unlabored.   Abdomen:   Extremities: Soft, non-tender.  No evidence of cyanosis. Pulses palpable in both upper and lower extremities.       Neurologic:  Musculoskeletal:  No new motor deficits. Neurovascular exam within normal limits.  Sensation stable.  Motor: unchanged C5-T1 and L2-S1.   Olivia's sign negative in bilateral lower extremities.  Calves soft, nontender upon palpation.  Moves both upper and lower extremities.   Incision: Unable to visualize at this time. Slight drainage per nursing. No significant erythema or swelling.  No active drainage noted.        Vital Signs:    Blood pressure (!) 139/39, pulse 70, temperature 100.1 °F (37.8 °C), temperature source Axillary, resp. rate 22, height 1.676 m (5' 5.98\"), weight 120 kg (264 lb 8.8 oz), SpO2 98 %.  Temp (24hrs), Av °F (37.2 °C), Min:97.6 °F (36.4 °C), Max:100.6 °F (38.1 °C)      LAB:    Recent Labs     24  0454   HCT 24.1*   HGB 7.5*        Lab Results   Component Value Date/Time     2024 04:54 AM    K 3.1 2024 04:54 AM     2024 04:54 AM    CO2 28 2024 04:54 AM    BUN 33 2024 04:54 AM       Intake/Output:  No intake/output data recorded.   1901 -  0700  In: 1377.8 [I.V.:819.1]  Out: 4742 [Urine:1792; Drains:100]    PT/OT:   Gait:                    Assessment:   Patient is 1 Day Post-Op s/p Procedure(s) with comments:  BACK WOUND INCISION AND DRAINAGE (EIP 0730) - This patient is in house we are requesting this start at 7:30 am    Plan:     1.  Continue PT/OT -

## 2024-04-23 NOTE — PROGRESS NOTES
Cardiology Progress Note                                        Admit Date: 4/15/2024    Assessment/Plan:     CHF; acute on chronic diastolic; improving  Acute on chronic respiratory failure with hypoxia; was intubated and now on BiPAP  CVA; stable  PERRY/CKD; due to ATN; HD but today non-oliguric  PAF; will need AC back    Leny Barnes is a 79 y.o. female with     PROBLEM LIST:  Patient Active Problem List    Diagnosis Date Noted    Complete heart block (HCC) 05/24/2022    Dysarthria 04/22/2024    Longstanding persistent atrial fibrillation (HCC) 04/22/2024    Infection as complication of medical care 04/17/2024    Bacteremia due to Gram-negative bacteria 04/17/2024    Sepsis without acute organ dysfunction (HCC) 04/17/2024    Deep incisional surgical site infection 04/17/2024    Shortness of breath 04/16/2024    Generalized weakness 04/16/2024    Wound dehiscence 04/15/2024    Wound infection 04/01/2024    Inadequately controlled diabetes mellitus (HCC) 03/19/2024    Lumbar stenosis with neurogenic claudication 03/18/2024    Kyphoscoliosis due to degeneration of spine 03/18/2024    Palliative care encounter 03/05/2024    Chronic diastolic congestive heart failure (HCC) 03/04/2024    Spinal stenosis, lumbar region, with neurogenic claudication 02/12/2024    Spondylolisthesis of lumbar region 02/12/2024    Acute back pain with sciatica, left 02/12/2024    Osteoarthritis of left knee, unspecified osteoarthritis type 06/05/2023    Insulin long-term use ([facility]) 08/24/2022    Stage 3a chronic kidney disease (HCC) 07/08/2022    Senile purpura ([facility]) 05/13/2022    Secondary hypercoagulable state ([facility]) 01/11/2022    Asthma     Recurrent depression ([facility]) 01/09/2018    Type 2 diabetes mellitus with diabetic neuropathy ([facility]) 01/09/2018    Encounter for long-term (current) drug use 10/19/2017    Diabetic polyneuropathy associated with type 2 diabetes mellitus ([facility]) 07/05/2017    Thoracic disc disorder with myelopathy  POC HCO3 30.0 (H) 22 - 26 MMOL/L    POC O2 .0 (H) 92 - 97 %    Base Excess 3.9 mmol/L    Specimen type: ARTERIAL     POCT Glucose    Collection Time: 04/22/24  8:11 PM   Result Value Ref Range    POC Glucose 106 65 - 117 mg/dL    Performed by: RAY Blackburn    APTT    Collection Time: 04/22/24 11:05 PM   Result Value Ref Range    APTT 39.7 (H) 22.1 - 31.0 sec    Therapeutic Range   58.0 - 77.0 SECS   POCT Glucose    Collection Time: 04/23/24  3:58 AM   Result Value Ref Range    POC Glucose 96 65 - 117 mg/dL    Performed by: RAY Blackburn    Basic Metabolic Panel w/ Reflex to MG    Collection Time: 04/23/24  4:54 AM   Result Value Ref Range    Sodium 140 136 - 145 mmol/L    Potassium 3.1 (L) 3.5 - 5.1 mmol/L    Chloride 105 97 - 108 mmol/L    CO2 28 21 - 32 mmol/L    Anion Gap 7 5 - 15 mmol/L    Glucose 98 65 - 100 mg/dL    BUN 33 (H) 6 - 20 MG/DL    Creatinine 1.45 (H) 0.55 - 1.02 MG/DL    Bun/Cre Ratio 23 (H) 12 - 20      Est, Glom Filt Rate 36 (L) >60 ml/min/1.73m2    Calcium 8.1 (L) 8.5 - 10.1 MG/DL   CBC    Collection Time: 04/23/24  4:54 AM   Result Value Ref Range    WBC 18.3 (H) 3.6 - 11.0 K/uL    RBC 2.70 (L) 3.80 - 5.20 M/uL    Hemoglobin 7.5 (L) 11.5 - 16.0 g/dL    Hematocrit 24.1 (L) 35.0 - 47.0 %    MCV 89.3 80.0 - 99.0 FL    MCH 27.8 26.0 - 34.0 PG    MCHC 31.1 30.0 - 36.5 g/dL    RDW 18.6 (H) 11.5 - 14.5 %    Platelets 299 150 - 400 K/uL    MPV 9.1 8.9 - 12.9 FL    Nucleated RBCs 0.9 (H) 0  WBC    nRBC 0.17 (H) 0.00 - 0.01 K/uL   Procalcitonin    Collection Time: 04/23/24  4:54 AM   Result Value Ref Range    Procalcitonin 0.44 ng/mL   APTT    Collection Time: 04/23/24  4:54 AM   Result Value Ref Range    APTT 46.5 (H) 22.1 - 31.0 sec    Therapeutic Range   58.0 - 77.0 SECS   Magnesium    Collection Time: 04/23/24  4:54 AM   Result Value Ref Range    Magnesium 2.0 1.6 - 2.4 mg/dL   POC CG4:BLOOD GAS,LACTATE    Collection Time: 04/23/24  4:56 AM   Result Value Ref Range    POC pH 7.42

## 2024-04-23 NOTE — PROGRESS NOTES
Infectious Disease Progress Note     Subjective:      Underwent debridement of underlying seroma and dehiscence yesterday    Objective:    Vitals:   Patient Vitals for the past 24 hrs:   Temp Pulse Resp BP SpO2   04/23/24 1200 99 °F (37.2 °C) -- -- -- --   04/23/24 1045 99 °F (37.2 °C) 70 12 (!) 155/45 100 %   04/23/24 1030 -- 71 -- -- --   04/23/24 1015 -- 71 -- -- --   04/23/24 1000 -- 71 -- 123/76 --   04/23/24 0945 -- 71 -- -- --   04/23/24 0930 -- 69 -- -- --   04/23/24 0915 -- 70 -- -- --   04/23/24 0900 -- 72 -- -- --   04/23/24 0845 -- 70 -- -- --   04/23/24 0830 -- 70 -- -- --   04/23/24 0815 -- 69 -- -- --   04/23/24 0807 -- 71 20 -- 100 %   04/23/24 0800 -- 70 -- (!) 121/41 --   04/23/24 0745 100 °F (37.8 °C) 72 19 (!) 156/46 98 %   04/23/24 0700 -- 70 22 (!) 139/39 98 %   04/23/24 0600 -- 73 15 (!) 129/42 96 %   04/23/24 0507 -- 72 17 -- 98 %   04/23/24 0500 -- 74 17 (!) 122/38 96 %   04/23/24 0400 100.1 °F (37.8 °C) 74 16 (!) 104/34 96 %   04/23/24 0300 -- 70 14 (!) 148/46 100 %   04/23/24 0230 (!) 100.6 °F (38.1 °C) -- -- -- --   04/23/24 0200 -- 71 22 (!) 135/55 97 %   04/23/24 0147 -- 74 16 -- 100 %   04/23/24 0100 -- 72 23 132/63 100 %   04/23/24 0000 100 °F (37.8 °C) 71 20 (!) 148/44 100 %   04/22/24 2300 -- 70 20 (!) 130/100 100 %   04/22/24 2249 -- 72 17 -- 100 %   04/22/24 2200 -- 70 19 (!) 134/49 100 %   04/22/24 2100 -- 70 23 (!) 114/52 100 %   04/22/24 2000 97.9 °F (36.6 °C) 70 16 (!) 136/46 100 %   04/22/24 1800 -- 70 16 (!) 133/93 100 %   04/22/24 1652 -- 69 14 (!) 125/35 97 %   04/22/24 1645 97.6 °F (36.4 °C) 74 14 (!) 132/36 97 %   04/22/24 1400 -- 71 16 118/85 91 %         Physical Exam:  Gen: on BIPAP  HEENT:  Normocephalic, atraumatic   CV: normal rate  Lungs: decreased bs b/l bases and insp crackles b/l  Abdomen: soft, non tender, non distended  Genitourinary: holly catheter present  Skin: spinal surgical site dressed  Psych: follows commands  Neuro: moving all extremities  MD Bonnie    [Held by provider] insulin NPH (HumuLIN N;NovoLIN N) injection vial 15 Units, 15 Units, SubCUTAneous, BID AC, Bonnie Chapman MD, 15 Units at 04/15/24 2050    insulin lispro (HUMALOG) injection vial 0-8 Units, 0-8 Units, SubCUTAneous, TID WC, Bonnie Chapman MD, 4 Units at 04/15/24 1939    insulin lispro (HUMALOG) injection vial 0-4 Units, 0-4 Units, SubCUTAneous, Nightly, Bonnie Chapman MD    [Held by provider] amLODIPine (NORVASC) tablet 5 mg, 5 mg, Oral, Daily, Missy Manjarrez APRN - NP, 5 mg at 04/20/24 0901      Labs:  Recent Results (from the past 24 hour(s))   Culture, Wound    Collection Time: 04/22/24  4:13 PM    Specimen: Lumbar    DEEP WOUND   Result Value Ref Range    Special Requests NO SPECIAL REQUESTS      Gram Stain NO WBC'S SEEN      Gram Stain NO ORGANISMS SEEN      Culture CULTURE IN PROGRESS,FURTHER UPDATES TO FOLLOW     POCT Glucose    Collection Time: 04/22/24  5:47 PM   Result Value Ref Range    POC Glucose 122 (H) 65 - 117 mg/dL    Performed by: THU PLATT    Arterial Blood Gas, POC    Collection Time: 04/22/24  8:08 PM   Result Value Ref Range    POC pH 7.37 7.35 - 7.45      POC pCO2 52.2 (H) 35.0 - 45.0 MMHG    POC PO2 476 (H) 80 - 100 MMHG    POC HCO3 30.0 (H) 22 - 26 MMOL/L    POC O2 .0 (H) 92 - 97 %    Base Excess 3.9 mmol/L    Specimen type: ARTERIAL     POCT Glucose    Collection Time: 04/22/24  8:11 PM   Result Value Ref Range    POC Glucose 106 65 - 117 mg/dL    Performed by: RAY Blackburn    APTT    Collection Time: 04/22/24 11:05 PM   Result Value Ref Range    APTT 39.7 (H) 22.1 - 31.0 sec    Therapeutic Range   58.0 - 77.0 SECS   POCT Glucose    Collection Time: 04/23/24  3:58 AM   Result Value Ref Range    POC Glucose 96 65 - 117 mg/dL    Performed by: RAY Blackburn    Basic Metabolic Panel w/ Reflex to MG    Collection Time: 04/23/24  4:54 AM   Result Value Ref Range    Sodium 140 136 - 145 mmol/L    Potassium 3.1 (L) 3.5 - 5.1 mmol/L    Chloride

## 2024-04-23 NOTE — DIABETES MGMT
Diabetes Management Team to sign off at this point as patient's blood glucose remains stable without need for insulin therapy.  Please re-consult us if patient needs change.  Thank you for including us in their care.        SHAYNE BURGOS - [facility]   Program for Diabetes [place]

## 2024-04-23 NOTE — PLAN OF CARE
Problem: Occupational Therapy - Adult  Goal: By Discharge: Performs self-care activities at highest level of function for planned discharge setting.  See evaluation for individualized goals.  Description: FUNCTIONAL STATUS PRIOR TO ADMISSION:   Patient with several recent admissions for similar presentation. Re-discharged to [facility] on 4/5 and re-admitted to [facility] 4/15 with significant wound dehiscence and bloody drainage. Patient with planned debridement 4/19, however deferred due to increased WOB requiring BiPAP. Patient transferred to ICU 4/21 for acute hypoxic respiratory distress and placement of a dialysis catheter in preparation for hemodialysis. Patient went to OR 4/22 for I&D of spinal wound. Prior to sx 3/19/2024 she was modified independent for basic ADL's and used adaptive equipment for LE dressing    Receives Help From: Family,  ,  ,  ,  ,  ,  , Homemaking Assistance: Independent, Ambulation Assistance: Independent, Transfer Assistance: Independent, Active : No,   HOME SUPPORT: Patient lived alone with daughter to provide assistance.    Occupational Therapy Goals:  Initiated 4/23/2024  1.  Patient will perform left UE AROM elbow flexion to > 110 degrees in supine within 7 day(s).  2.  Patient will attend to left side > or = 1 minute within 7 day(s).  3.  Patient will wash her face with right hand seated edge of bed with Supervision within 7 day(s).  4.  Patient will initiate use of bilateral hands on bed to assist with static sitting balance and scooting with Supervision  within 7 day(s).  5.  Patient will perform stand pivot transfer to bedside commode with  Minimal Assist  x's 2 within 7 day(s).  6.  Patient will participate in upper extremity therapeutic exercise/activities with Supervision for > or = 5  minutes within 7 day(s).    Outcome: Progressing     OCCUPATIONAL THERAPY EVALUATION    Patient: Leny Barnes (79 y.o. female)  Date: 4/23/2024  Primary Diagnosis: Wound infection  assistance;Assist X2  Scooting: Moderate assistance;Assist X2    Transfers:      Transfer Training  Transfer Training: Yes  Interventions: Safety awareness training;Verbal cues;Tactile cues;Manual cues;Visual cues  Sit to Stand: Moderate assistance;Assist X2  Stand to Sit: Moderate assistance;Assist X2  Toilet Transfer: Total assistance (unable to tolerate)          Functional Mobility: Dependent/Total          Balance:   Standing: Impaired  Balance  Sitting: Impaired  Sitting - Static: Fair (occasional) (poor initially progressing to fair with VCs and TCs for positioning of UEs)  Sitting - Dynamic: Poor (constant support)  Standing: Impaired  Standing - Static: Fair;Constant support  Standing - Dynamic: Fair;Constant support      ADL Assessment:          Feeding: NPO  Feeding Skilled Clinical Factors: did not pass SLP    Grooming: Dependent/Total  Grooming Skilled Clinical Factors: placed washcloth in hand and without initiation to wash face due to fatigue after sitting edge of bed and standing    UE Bathing: Dependent/Total       Skin Care: Chlorhexidine wipes    LE Bathing: Dependent/Total       UE Dressing: Maximum assistance  UE Dressing Skilled Clinical Factors: seated edge of bed    LE Dressing: Dependent/Total       Toileting: Dependent/Total  Toileting Skilled Clinical Factors: holly in place         Functional Mobility: Dependent/Total  Functional Mobility Skilled Clinical Factors: non-ambulatory, side step 2 steps and request to sit         ADL Intervention and task modifications:    Educated on role of OT, re-educated on back precautions, use of call bell and demonstrated ability to call for assist with standard call bell    In supine facilitated turning head to visually scan to the left    Total assist to wash face, initiated moving head around when washcloth placed on face and moving mouth to assist with washing lips. Agreeable to ointment on lips due to dryness noted    Multi-modal cues and physical

## 2024-04-24 ENCOUNTER — APPOINTMENT (OUTPATIENT)
Facility: HOSPITAL | Age: 79
End: 2024-04-24
Payer: MEDICARE

## 2024-04-24 PROBLEM — H57.04: Status: ACTIVE | Noted: 2024-04-24

## 2024-04-24 LAB
ANION GAP SERPL CALC-SCNC: 12 MMOL/L (ref 5–15)
APTT PPP: 59.3 SEC (ref 22.1–31)
BACTERIA SPEC CULT: NORMAL
BASOPHILS # BLD: 0 K/UL (ref 0–0.1)
BASOPHILS NFR BLD: 0 % (ref 0–1)
BUN SERPL-MCNC: 22 MG/DL (ref 6–20)
BUN/CREAT SERPL: 15 (ref 12–20)
CALCIUM SERPL-MCNC: 8.8 MG/DL (ref 8.5–10.1)
CHLORIDE SERPL-SCNC: 101 MMOL/L (ref 97–108)
CO2 SERPL-SCNC: 26 MMOL/L (ref 21–32)
CREAT SERPL-MCNC: 1.51 MG/DL (ref 0.55–1.02)
DIFFERENTIAL METHOD BLD: ABNORMAL
EOSINOPHIL # BLD: 0.2 K/UL (ref 0–0.4)
EOSINOPHIL NFR BLD: 1 % (ref 0–7)
ERYTHROCYTE [DISTWIDTH] IN BLOOD BY AUTOMATED COUNT: 18.8 % (ref 11.5–14.5)
GLUCOSE BLD STRIP.AUTO-MCNC: 166 MG/DL (ref 65–117)
GLUCOSE BLD STRIP.AUTO-MCNC: 167 MG/DL (ref 65–117)
GLUCOSE BLD STRIP.AUTO-MCNC: 177 MG/DL (ref 65–117)
GLUCOSE BLD STRIP.AUTO-MCNC: 220 MG/DL (ref 65–117)
GLUCOSE SERPL-MCNC: 107 MG/DL (ref 65–100)
HCT VFR BLD AUTO: 24.4 % (ref 35–47)
HGB BLD-MCNC: 7.3 G/DL (ref 11.5–16)
IMM GRANULOCYTES # BLD AUTO: 0 K/UL
IMM GRANULOCYTES NFR BLD AUTO: 0 %
LYMPHOCYTES # BLD: 1.6 K/UL (ref 0.8–3.5)
LYMPHOCYTES NFR BLD: 8 % (ref 12–49)
MAGNESIUM SERPL-MCNC: 2.1 MG/DL (ref 1.6–2.4)
MCH RBC QN AUTO: 27.1 PG (ref 26–34)
MCHC RBC AUTO-ENTMCNC: 29.9 G/DL (ref 30–36.5)
MCV RBC AUTO: 90.7 FL (ref 80–99)
MONOCYTES # BLD: 1.2 K/UL (ref 0–1)
MONOCYTES NFR BLD: 6 % (ref 5–13)
NEUTS SEG # BLD: 17 K/UL (ref 1.8–8)
NEUTS SEG NFR BLD: 85 % (ref 32–75)
NRBC # BLD: 0.12 K/UL (ref 0–0.01)
NRBC BLD-RTO: 0.6 PER 100 WBC
PHOSPHATE SERPL-MCNC: 2.2 MG/DL (ref 2.6–4.7)
PLATELET # BLD AUTO: 295 K/UL (ref 150–400)
PMV BLD AUTO: 9.3 FL (ref 8.9–12.9)
POTASSIUM SERPL-SCNC: 3 MMOL/L (ref 3.5–5.1)
RBC # BLD AUTO: 2.69 M/UL (ref 3.8–5.2)
RBC MORPH BLD: ABNORMAL
SERVICE CMNT-IMP: ABNORMAL
SERVICE CMNT-IMP: NORMAL
SODIUM SERPL-SCNC: 139 MMOL/L (ref 136–145)
THERAPEUTIC RANGE: ABNORMAL SECS (ref 58–77)
WBC # BLD AUTO: 20 K/UL (ref 3.6–11)

## 2024-04-24 PROCEDURE — 97535 SELF CARE MNGMENT TRAINING: CPT | Performed by: OCCUPATIONAL THERAPIST

## 2024-04-24 PROCEDURE — 6360000002 HC RX W HCPCS: Performed by: HOSPITALIST

## 2024-04-24 PROCEDURE — 6370000000 HC RX 637 (ALT 250 FOR IP): Performed by: NURSE PRACTITIONER

## 2024-04-24 PROCEDURE — 6370000000 HC RX 637 (ALT 250 FOR IP): Performed by: STUDENT IN AN ORGANIZED HEALTH CARE EDUCATION/TRAINING PROGRAM

## 2024-04-24 PROCEDURE — 83735 ASSAY OF MAGNESIUM: CPT

## 2024-04-24 PROCEDURE — 6360000002 HC RX W HCPCS: Performed by: INTERNAL MEDICINE

## 2024-04-24 PROCEDURE — 2000000000 HC ICU R&B

## 2024-04-24 PROCEDURE — 6360000002 HC RX W HCPCS: Performed by: NURSE PRACTITIONER

## 2024-04-24 PROCEDURE — 6360000002 HC RX W HCPCS: Performed by: STUDENT IN AN ORGANIZED HEALTH CARE EDUCATION/TRAINING PROGRAM

## 2024-04-24 PROCEDURE — 94660 CPAP INITIATION&MGMT: CPT

## 2024-04-24 PROCEDURE — 85025 COMPLETE CBC W/AUTO DIFF WBC: CPT

## 2024-04-24 PROCEDURE — 36415 COLL VENOUS BLD VENIPUNCTURE: CPT

## 2024-04-24 PROCEDURE — 97530 THERAPEUTIC ACTIVITIES: CPT | Performed by: OCCUPATIONAL THERAPIST

## 2024-04-24 PROCEDURE — 85730 THROMBOPLASTIN TIME PARTIAL: CPT

## 2024-04-24 PROCEDURE — 2580000003 HC RX 258: Performed by: INTERNAL MEDICINE

## 2024-04-24 PROCEDURE — 80048 BASIC METABOLIC PNL TOTAL CA: CPT

## 2024-04-24 PROCEDURE — 70551 MRI BRAIN STEM W/O DYE: CPT

## 2024-04-24 PROCEDURE — 84100 ASSAY OF PHOSPHORUS: CPT

## 2024-04-24 PROCEDURE — 2580000003 HC RX 258: Performed by: STUDENT IN AN ORGANIZED HEALTH CARE EDUCATION/TRAINING PROGRAM

## 2024-04-24 PROCEDURE — 97116 GAIT TRAINING THERAPY: CPT

## 2024-04-24 PROCEDURE — 6370000000 HC RX 637 (ALT 250 FOR IP): Performed by: PHYSICIAN ASSISTANT

## 2024-04-24 PROCEDURE — 97530 THERAPEUTIC ACTIVITIES: CPT

## 2024-04-24 PROCEDURE — 82962 GLUCOSE BLOOD TEST: CPT

## 2024-04-24 PROCEDURE — 94640 AIRWAY INHALATION TREATMENT: CPT

## 2024-04-24 PROCEDURE — 2580000003 HC RX 258: Performed by: HOSPITALIST

## 2024-04-24 PROCEDURE — L0464 TLSO 4MOD SACRO-SCAP PRE: HCPCS

## 2024-04-24 PROCEDURE — 99231 SBSQ HOSP IP/OBS SF/LOW 25: CPT | Performed by: NURSE PRACTITIONER

## 2024-04-24 PROCEDURE — 6370000000 HC RX 637 (ALT 250 FOR IP): Performed by: HOSPITALIST

## 2024-04-24 RX ORDER — MIDODRINE HYDROCHLORIDE 5 MG/1
5 TABLET ORAL
Status: DISCONTINUED | OUTPATIENT
Start: 2024-04-24 | End: 2024-04-25

## 2024-04-24 RX ADMIN — ARFORMOTEROL TARTRATE: 15 SOLUTION RESPIRATORY (INHALATION) at 20:54

## 2024-04-24 RX ADMIN — Medication: at 08:55

## 2024-04-24 RX ADMIN — POTASSIUM CHLORIDE 10 MEQ: 7.46 INJECTION, SOLUTION INTRAVENOUS at 16:14

## 2024-04-24 RX ADMIN — PHENYLEPHRINE HYDROCHLORIDE 45 MCG/MIN: 10 INJECTION INTRAVENOUS at 05:57

## 2024-04-24 RX ADMIN — ALLOPURINOL 100 MG: 100 TABLET ORAL at 09:03

## 2024-04-24 RX ADMIN — MIDODRINE HYDROCHLORIDE 5 MG: 5 TABLET ORAL at 09:01

## 2024-04-24 RX ADMIN — IPRATROPIUM BROMIDE 0.5 MG: 0.5 SOLUTION RESPIRATORY (INHALATION) at 17:06

## 2024-04-24 RX ADMIN — GABAPENTIN 300 MG: 300 CAPSULE ORAL at 17:35

## 2024-04-24 RX ADMIN — POTASSIUM CHLORIDE 10 MEQ: 7.46 INJECTION, SOLUTION INTRAVENOUS at 08:41

## 2024-04-24 RX ADMIN — POTASSIUM CHLORIDE 10 MEQ: 7.46 INJECTION, SOLUTION INTRAVENOUS at 12:10

## 2024-04-24 RX ADMIN — ACETAMINOPHEN 1000 MG: 325 TABLET ORAL at 12:11

## 2024-04-24 RX ADMIN — Medication 1 MG: at 09:03

## 2024-04-24 RX ADMIN — ACETAMINOPHEN 1000 MG: 325 TABLET ORAL at 17:36

## 2024-04-24 RX ADMIN — NYSTATIN 500000 UNITS: 100000 SUSPENSION ORAL at 12:11

## 2024-04-24 RX ADMIN — ARFORMOTEROL TARTRATE: 15 SOLUTION RESPIRATORY (INHALATION) at 07:53

## 2024-04-24 RX ADMIN — MIDODRINE HYDROCHLORIDE 5 MG: 5 TABLET ORAL at 17:36

## 2024-04-24 RX ADMIN — ACETAMINOPHEN 650 MG: 650 SUPPOSITORY RECTAL at 03:24

## 2024-04-24 RX ADMIN — MIDODRINE HYDROCHLORIDE 5 MG: 5 TABLET ORAL at 12:11

## 2024-04-24 RX ADMIN — POTASSIUM CHLORIDE 10 MEQ: 7.46 INJECTION, SOLUTION INTRAVENOUS at 10:20

## 2024-04-24 RX ADMIN — BUMETANIDE 2 MG: 0.25 INJECTION INTRAMUSCULAR; INTRAVENOUS at 17:35

## 2024-04-24 RX ADMIN — BUPROPION HYDROCHLORIDE 150 MG: 150 TABLET, EXTENDED RELEASE ORAL at 09:03

## 2024-04-24 RX ADMIN — POTASSIUM CHLORIDE 10 MEQ: 7.46 INJECTION, SOLUTION INTRAVENOUS at 06:59

## 2024-04-24 RX ADMIN — BUMETANIDE 2 MG: 0.25 INJECTION INTRAMUSCULAR; INTRAVENOUS at 08:41

## 2024-04-24 RX ADMIN — POTASSIUM CHLORIDE 10 MEQ: 7.46 INJECTION, SOLUTION INTRAVENOUS at 13:12

## 2024-04-24 RX ADMIN — HEPARIN SODIUM 9 UNITS/KG/HR: 10000 INJECTION, SOLUTION INTRAVENOUS at 18:32

## 2024-04-24 RX ADMIN — NYSTATIN 500000 UNITS: 100000 SUSPENSION ORAL at 09:00

## 2024-04-24 RX ADMIN — ASPIRIN 81 MG CHEWABLE TABLET 81 MG: 81 TABLET CHEWABLE at 08:59

## 2024-04-24 RX ADMIN — NYSTATIN 500000 UNITS: 100000 SUSPENSION ORAL at 20:30

## 2024-04-24 RX ADMIN — Medication: at 20:30

## 2024-04-24 RX ADMIN — NYSTATIN 500000 UNITS: 100000 SUSPENSION ORAL at 17:35

## 2024-04-24 RX ADMIN — SERTRALINE HYDROCHLORIDE 150 MG: 50 TABLET ORAL at 20:30

## 2024-04-24 RX ADMIN — SODIUM CHLORIDE, PRESERVATIVE FREE 10 ML: 5 INJECTION INTRAVENOUS at 08:42

## 2024-04-24 RX ADMIN — WATER 2000 MG: 1 INJECTION INTRAMUSCULAR; INTRAVENOUS; SUBCUTANEOUS at 08:41

## 2024-04-24 RX ADMIN — HYDROMORPHONE HYDROCHLORIDE 0.25 MG: 1 INJECTION, SOLUTION INTRAMUSCULAR; INTRAVENOUS; SUBCUTANEOUS at 13:31

## 2024-04-24 RX ADMIN — ACETYLCYSTEINE 600 MG: 200 INHALANT RESPIRATORY (INHALATION) at 07:52

## 2024-04-24 RX ADMIN — SODIUM CHLORIDE, PRESERVATIVE FREE 10 ML: 5 INJECTION INTRAVENOUS at 20:30

## 2024-04-24 RX ADMIN — SODIUM CHLORIDE: 9 INJECTION, SOLUTION INTRAVENOUS at 06:58

## 2024-04-24 RX ADMIN — FERROUS SULFATE TAB 325 MG (65 MG ELEMENTAL FE) 325 MG: 325 (65 FE) TAB at 09:03

## 2024-04-24 RX ADMIN — IPRATROPIUM BROMIDE 0.5 MG: 0.5 SOLUTION RESPIRATORY (INHALATION) at 07:53

## 2024-04-24 RX ADMIN — IPRATROPIUM BROMIDE 0.5 MG: 0.5 SOLUTION RESPIRATORY (INHALATION) at 20:54

## 2024-04-24 ASSESSMENT — PAIN SCALES - GENERAL
PAINLEVEL_OUTOF10: 0
PAINLEVEL_OUTOF10: 0

## 2024-04-24 NOTE — PLAN OF CARE
Problem: Physical Therapy - Adult  Goal: By Discharge: Performs mobility at highest level of function for planned discharge setting.  See evaluation for individualized goals.  Description: FUNCTIONAL STATUS PRIOR TO ADMISSION: Patient was modified independent using a rollator for functional mobility prior to initial spinal surgery 3/18. Patient has been back and forth between [facility] <> hospital 2x following initial surgery 2/2 ongoing issues with surgical wound dehiscence and infection. Patient has been ambulating at rehab with RW per chart review.     HOME SUPPORT PRIOR TO ADMISSION: The patient lived with daughter and required minimal assistance for self-care/ ADLs following initial spinal surgery 3/18.    Physical Therapy Goals  Initiated 4/23/2024  1.  Patient will move from supine to sit and sit to supine in bed with minimal assistance within 7 day(s).    2.  Patient will perform sit to stand with minimal assistance within 7 day(s).  3.  Patient will transfer from bed to chair and chair to bed with minimal assistance using the least restrictive device within 7 day(s).  4.  Patient will ambulate with minimal assistance for 15 feet with the least restrictive device within 7 day(s).   5.  Patient will ascend/descend 5 stairs with bilateral handrail(s) with moderate assistance within 7 day(s).    Outcome: Progressing   PHYSICAL THERAPY TREATMENT    Patient: Leny Barnes (79 y.o. female)  Date: 4/24/2024  Diagnosis: Wound infection [T14.8XXA, L08.9]  Wound dehiscence [T81.30XA] Wound dehiscence  Procedure(s) (LRB):  BACK WOUND INCISION AND DRAINAGE (EIP 0730) (N/A) 2 Days Post-Op  Precautions: Fall Risk         Spinal Precautions: No Bending, No Lifting, No Twisting            ASSESSMENT:  Patient continues to benefit from skilled PT services and is progressing towards goals. Patient demonstrates improving alertness today with much improved speech, initiation, and sequencing - though continues to demonstrate  Education  Education Given To: Patient  Education Provided: Energy Conservation;Plan of Care;Transfer Training;Equipment;Fall Prevention Strategies;Orientation;Precautions  Education Provided Comments: BLT spinal precautions  Education Method: Verbal;Demonstration  Barriers to Learning: Cognition  Education Outcome: Verbalized understanding;Continued education needed      Liz Schultz PT, DPT  Minutes: 39

## 2024-04-24 NOTE — PROGRESS NOTES
[address], [place] [address]  Phone: [phone]   [phone]    [email or web address]     Nephrology Progress Note    Patient Name : Leny Barnes      : 1945     MRN : [id number]  Date of Admission : 4/15/2024  Date of Servive : 24    CC:  Follow up for CKD    Assessment and Plan   PERRY on CKD  - ATN vs ATIN vs post infectious GN   - Oliguric --> now non oliguric   - s/p Mendez and emergent HD on   - s/PHD #X 2.   - continue HD TTS for now   - continue Bumex 2 mg BID  - please do not remove holly     Hypokalemia   - replacement ordered    Severe Hypoxic Resp Failure   Acute HFpEF   Asp PNA  Pleural effusions, edema   encephalopathy  - per [facility]    CKD 3a:  - from DM2 and HTN  - baseline Cr around 1.5 to 1.7     Sepsis w/ Bacteremia  -3/18  s/p Laminectomy with dehiscence of wound  - 4/15 BC (+) serratia  - ID following  and adjusting abx  -  MRI (+) fluid/ gas, s/p I+D on      CAD  PAF s/p ablation and pacemaker  DM  FREDERICK on CPAP       Interval History:  Seen and examined. CTA findings noted. Tolerated HD,  cc    Review of Systems: Review of systems not obtained due to patient factors.    Current Medications:   Current Facility-Administered Medications   Medication Dose Route Frequency    albumin human 25% IV solution 25 g  25 g IntraVENous PRN    bumetanide (BUMEX) injection 2 mg  2 mg IntraVENous BID    heparin (porcine) 1000 UNIT/ML injection 1,400 Units  1,400 Units IntraCATHeter PRN    And    heparin (porcine) 1000 UNIT/ML injection 1,100 Units  1,100 Units IntraCATHeter PRN    gabapentin (NEURONTIN) capsule 300 mg  300 mg Oral QPM    ALTEplase (CATHFLO) 1 mg in sterile water 1 mL injection  1 mg IntraCATHeter PRN    propofol infusion  5-50 mcg/kg/min (Order-Specific) IntraVENous Continuous    phenylephrine (GILDARDO-SYNEPHRINE) 50 mg in sodium chloride 0.9 % 250 mL infusion ([id number])   mcg/min

## 2024-04-24 NOTE — PLAN OF CARE
stimuli  Following Commands: Follows one step commands with increased time;Follows one step commands with repetition  Attention Span: Difficulty dividing attention;Difficulty attending to directions;Attends with cues to redirect  Memory: Decreased recall of recent events;Decreased recall of precautions  Safety Judgement: Decreased awareness of need for assistance;Decreased awareness of need for safety  Problem Solving: Decreased awareness of errors;Assistance required to identify errors made;Assistance required to correct errors made  Insights: Decreased awareness of deficits  Initiation: Requires cues for some  Sequencing: Requires cues for some  Cognition Comment: increased arousal, improved affect, smiling appropriately, less frustrated, improved initiation and command following, noted left eyelid drooping after transfer to chair/with fatigue    Functional Mobility and Transfers for ADLs:  Bed Mobility:  Bed Mobility Training  Bed Mobility Training: Yes  Rolling: Maximum assistance;Assist X1;Additional time  Supine to Sit: Maximum assistance;Assist X2  Scooting: Moderate assistance;Assist X2     Transfers:   Transfer Training  Transfer Training: Yes  Interventions: Safety awareness training;Verbal cues;Tactile cues;Manual cues;Visual cues  Sit to Stand: Minimum assistance;Assist X2  Stand to Sit: Minimum assistance;Assist X2  Bed to Chair: Minimum assistance;Assist X2       With rolling walker, multi-modal cues and increased time    Patient educated on safe transfer techniques, with specific emphasis on proper hand placement to push up from seated surface rather than attempt to pull self up, fully positioning self in-front of desired seated location, feeling chair on back of legs and reaching back with 1-2 UE to slowly lower self to seated position.       Balance:  Standing: Impaired  Balance  Sitting: Impaired  Sitting - Static: Fair (occasional)  Sitting - Dynamic: Fair (occasional);Poor (constant  support)  Standing: Impaired  Standing - Static: Fair;Constant support  Standing - Dynamic: Fair;Constant support      ADL Intervention:         Feeding: Beverage management;Supervision;Setup   Feeding Skilled Clinical Factors: using left hand to bring cup to mouth     Grooming: Minimal assistance   Grooming Skilled Clinical Factors: wash face with set-up in supine with head of bed elevated, assist required for combing hair       UE Dressing: Maximum assistance  UE Dressing Skilled Clinical Factors: in supine raising right and left UE to 90 degrees and holding for therapist to doff and don gown/manage lines and leads    LE Dressing: Dependent/Total  LE Dressing Skilled Clinical Factors: in supine able to hold left and right LE in air in full extension for therapist to don sock with one verbal cue to initiate task    Toileting: Maximum assistance  Toileting Skilled Clinical Factors: holly in place, patient reporte having BM on arrival, rolled to left and no BM noted, this date able to stand and pivot to bedside commode or stand and have commode placed behind her    Educated on positioning in supine to increase comfort and positioning in chair to increase back support, due to fatigue after transfer placed pillow behind head for increased support    Re-educated on use of call bell, initiate to turn television on and requested glasses, unable to locate in room     Noted cell phone  and asked if she wanted her phone charged, plugged in for patient and noted to initiate picking phone up and look at phone      Pain Rating:  Not rated and without overt complaint   Pain Intervention(s):   repositioning      Activity Tolerance:   Fair  and requires rest breaks  Please refer to the flowsheet for vital signs taken during this treatment.    After treatment:   Patient left in no apparent distress sitting up in chair and Call bell within reach    COMMUNICATION/EDUCATION:   The patient's plan of care was discussed with:

## 2024-04-24 NOTE — PROGRESS NOTES
0730 Report received from DWAYNE Plascencia.     0800 Pt passed swallow screen. Pt requesting medications in applesauce. No signs of aspiration noted, all pills taken without difficulty.     1400 Pt taken to MRI. PRN Dilaudid IVP administered prior to transport.     1700 60mEq of Potassium (6 runs of 10mEq) completed per protocol.    1730 Updated pt's daughter.     1800 Pt on 15mcg/min of Jose, unable to titrate lower due to low diastolic / MAP <65 on arterial line and cuff pressure. MD aware. Midodrine TID on board.    1930 Bedside and Verbal shift change report given to DWAYNE Perez (oncoming nurse) by DWAYNE Beebe (offgoing nurse). Report included the following information Nurse Handoff Report, Intake/Output, MAR, Recent Results, and Cardiac Rhythm V Paced .

## 2024-04-24 NOTE — PROGRESS NOTES
Neurology Progress Note     NAME: Leny Barnes   :  1945   MRN:  [id number]   DATE:  2024    Assessment:     Principal Problem:    Wound dehiscence  Active Problems:    Palliative care encounter    Inadequately controlled diabetes mellitus (HCC)    Wound infection    Shortness of breath    Generalized weakness    Infection as complication of medical care    Bacteremia due to Gram-negative bacteria    Sepsis without acute organ dysfunction (HCC)    Deep incisional surgical site infection    Dysarthria    Longstanding persistent atrial fibrillation (HCC)    Fixed mydriasis  Resolved Problems:    * No resolved hospital problems. *    Patient is an 79 year-old female with history of L and R trigeminal neuralgia, DM, diabetic peripheral neuropathy, morbid obesity, afib, CAD, FREDERICK on CPAP, CHF, PM, HTN, HLD, CKD, compression fracture L1 due to fall, uterine cancer s/p EKI/BSO, asthma, OA, gastric lap band, gastric bypass, bilateral cataract removal, cervical fusion, 3/18/24 she underwent lumbar laminectomy at T10-T11, T11-T12, and L1-L2 kyphoplasty with facetectomies at T11-T12 and T12-L1 with Alison osteotomies bilaterally, as well as lumbar laminectomy at L2-S1 bilaterally with bilateral facet resection for decompression of L2, L3, L4, L5, and S1 nerve roots, and posterior thoracolumbar fusion from T10 down to the ilium, bilateral SI joint fusion who was admitted 4/15/24 with ongoing wound infection and fevers. She was scheduled to have wound debridement on 24, but cancelled due to worsened respiratory status. She had been on Plavix and Eliquis for CAD with MI and stent placement and Afib, both held for surgery. She was subsequently placed on a heparin infusion after having a Code S on 24. She was working with PT yesterday and had a facial droop that

## 2024-04-24 NOTE — PROGRESS NOTES
Referral source:   Leny Barnes at [facility] in [facility] CORONARY CARE.  attended rounds in the CCU as part of the Interdisciplinary team where the patient's ongoing care was discussed. I reviewed the medical record as part of this encounter.     Outcome: Interdisciplinary team are aware of  availability and were encouraged to request Spiritual Health support as needed.      The  on-call can be reached at [phone]).     Rev. Freida Dominguez MDiv, [facility]  Staff

## 2024-04-24 NOTE — PROGRESS NOTES
Orthopedic Spine Progress Note  Post Op day: 2 Days Post-Op    2024 8:48 AM   Admit Date: 4/15/2024  Procedure: Procedure(s) with comments:  BACK WOUND INCISION AND DRAINAGE (EIP 0730) - This patient is in house we are requesting this start at 7:30 am    Subjective:     Leny Barnes has no complaints. Resting comfortably in bed.   Tolerating diet.  No N/V.    Pain Control:        Objective:          Physical Exam:  General:  Alert and oriented. No acute distress.   Heart:  Respirations unlabored.   Abdomen:   Extremities: Soft, non-tender.  No evidence of cyanosis. Pulses palpable in both upper and lower extremities.       Neurologic:  Musculoskeletal:  No new motor deficits. Neurovascular exam within normal limits.  Sensation stable.  Motor: unchanged C5-T1 and L2-S1.   Olivia's sign negative in bilateral lower extremities.  Calves soft, nontender upon palpation and with passive twitch.  Moves both upper and lower extremities.   Incision: clean, dry, and intact.  No significant erythema or swelling.  No active drainage noted.         Vital Signs:    Blood pressure (!) 136/34, pulse 80, temperature 99.8 °F (37.7 °C), temperature source Axillary, resp. rate 23, height 1.676 m (5' 5.98\"), weight 119.7 kg (263 lb 14.3 oz), SpO2 100 %.  Temp (24hrs), Av.3 °F (37.4 °C), Min:97.9 °F (36.6 °C), Max:100.8 °F (38.2 °C)      LAB:    Recent Labs     24  0535   HCT 24.4*   HGB 7.3*        Lab Results   Component Value Date/Time     2024 05:35 AM    K 3.0 2024 05:35 AM     2024 05:35 AM    CO2 26 2024 05:35 AM    BUN 22 2024 05:35 AM       Intake/Output:No intake/output data recorded.   1901 -  0700  In: 1374.3 [I.V.:874.1]  Out: 4332 [Urine:1522; Drains:310]    PT/OT:   Gait:                    Assessment:   Patient is 2 Days Post-Op s/p Procedure(s) with comments:  BACK WOUND INCISION AND DRAINAGE (EIP 3421) - This patient is in house we are  requesting this start at 7:30 am    Plan:     Continue with medical team for continued care.  Cultures showing scant serratia marcescens today.   Continue with pain management as current. Monitor drain output and continue with daily wound care.         Signed By: CLIFF Álvarez

## 2024-04-24 NOTE — PROGRESS NOTES
CRITICAL CARE NOTE    Name: Leny Barnes   : 1945   MRN: [id number]   Date: 2024      REASON FOR ICU ADMISSION: Pulmonary edema, hemodialysis    PRINCIPAL ICU DIAGNOSIS   Acute hypoxemic respiratory failure  PERRY  serratia bacteremia     BRIEF PATIENT SUMMARY   79 y.o. female who presents with recurrent wound dehiscence, and initially admitted to hospitalist service for abx and spine surgery eval. Pt w/ several recent admissions for similar presentation. Re-discharged to [facility] on  and re-admitted to [facility] 4/15 with significant wound dehiscence and bloody drainage. Pt w/ planne debridement , however deferred due to increased WOB requiring BiPAP. Labs w/ worsening PERRY. Pt transferred to ICU  for acute hypoxic respiratory distress and placement of a dialysis catheter in preparation for hemodialysis. Pt went to OR  for I&D of spinal wound.      COMPREHENSIVE ASSESSMENT & PLAN:SYSTEM BASED     24 HOUR EVENTS: No acute events, BiPAP o/n, NC during day. Mentation still somewhat slowed, but able to take PO. MRI B pending read. Remains on low dose phenylephrine.       NEUROLOGICAL:   Mentation improving  Delirium precautions  PRN pain regimen  Zoloft  Wound management per spine  TLSO brace  PT/OT    PULMONOLOGY: Acute hypoxic respiratory failure, pulmonary edema, obstructive sleep apnea, obesity   Continue to monitor oxygenation status  O2 per NC, BiPAP qHS  DuoNebs as needed, scheduled  Pulmonary toilet  Pulmonary hygiene    CARDIOVASCULAR: History of hypertension, history of coronary artery disease, history of paroxysmal atrial tachycardia, Afib   Phenylephrine, goal MAP >65  Started midodrine  Continue to monitor hemodynamic status  Continue antihypertensive medications  Echocardiogram w/ preserved LVEF, Moderate RV dysfunction w/ pulm HTN  Bumex BID    GASTROINTESTINAL: History of hyperlipidemia   ADAT  GI prophylaxis    RENAL/ELECTROLYTE/FLUIDS: Oliguria, CKD stage III,    Phlebitis Assessment No symptoms 04/21/24 1327   Infiltration Assessment 0 04/21/24 1327   Alcohol Cap Used Yes 04/21/24 1327   Dressing Status Clean, dry & intact 04/21/24 1327   Dressing Type Transparent 04/21/24 1327   Drain(s):    Urinary Catheter 04/16/24 Smith (Active)   $ Urethral catheter insertion $ Not inserted for procedure 04/20/24 1845   Catheter Indications Urinary retention (acute or chronic), continuous bladder irrigation or bladder outlet obstruction 04/20/24 1845   Site Assessment No urethral drainage 04/20/24 1845   Urine Color Yellow 04/20/24 1845   Urine Appearance Clear 04/20/24 1845   Urine Odor Other (Comment) 04/20/24 0957   Collection Container Standard 04/20/24 1845   Securement Method Securing device (Describe) 04/20/24 1845   Catheter Care  Perineal wipes 04/20/24 0957   Catheter Best Practices  Bag below bladder;Drainage tube clipped to bed 04/20/24 0957   Status Draining;Patent 04/19/24 0900   Output (mL) 750 mL 04/21/24 1242       SUBJECTIVE   Review of Systems   Negative except as noted above    OBJECTIVE   Physical Exam  Vitals reviewed.   Constitutional:       General: She is not in acute distress.     Appearance: Normal appearance. She is obese.   HENT:      Head: Normocephalic and atraumatic.      Mouth/Throat:      Mouth: Mucous membranes are moist.      Pharynx: Oropharynx is clear. No oropharyngeal exudate.   Eyes:      General: No scleral icterus.     Extraocular Movements: Extraocular movements intact.      Conjunctiva/sclera: Conjunctivae normal.      Pupils: Pupils are equal, round, and reactive to light.   Cardiovascular:      Rate and Rhythm: Normal rate and regular rhythm.      Pulses: Normal pulses.      Heart sounds: Normal heart sounds.   Pulmonary:      Effort: Pulmonary effort is normal. No respiratory distress.      Breath sounds: Normal breath sounds.   Abdominal:      General: Abdomen is flat.      Palpations: Abdomen is soft.      Tenderness: There is no

## 2024-04-24 NOTE — PROGRESS NOTES
1930 - Bedside shift change report given to DWAYNE Hendrickson (oncoming nurse) by DWAYNE Amado (offgoing nurse). Report included the following information Nurse Handoff Report, Adult Overview, Intake/Output, MAR, Recent Results, Cardiac Rhythm V-Paced w/PVCs, Alarm Parameters, Neuro Assessment, and Event Log.      0204 - Pt placed on BiPAP overnight per MD due to worsening pulmonary edema.    0700 - Taken off BiPAP and placed back on midflow at 10LPM, tolerating well.    0730 - Bedside shift change report given to DWAYNE Beebe (oncoming nurse) by DWAYNE Hendrickson (offgoing nurse). Report included the following information Nurse Handoff Report, Adult Overview, Intake/Output, Recent Results, Cardiac Rhythm V-Paced w/PVCs, Alarm Parameters, Neuro Assessment, and Event Log.

## 2024-04-24 NOTE — PROGRESS NOTES
Comprehensive Nutrition Assessment    Type and Reason for Visit: NPO/Clear Liquid, Initial, RD Nutrition Re-Screen/LOS    Nutrition Recommendations/Plan:     -Advance diet to Regular 3 Carb choices and ONS BID    -Replace potassium and phosphorus per nephrology         Malnutrition Assessment:  Malnutrition Status:  At risk for malnutrition (Comment) (04/24/24 1327)    Context:  Acute Illness     Findings of the 6 clinical characteristics of malnutrition:  Energy Intake:  Mild decrease in energy intake (Comment) (NPO x 4 days)  Weight Loss:  Unable to assess (d/t edema)     Body Fat Loss:  No significant body fat loss     Muscle Mass Loss:  No significant muscle mass loss    Fluid Accumulation:  Mild Generalized, Extremities   Strength:  Not Performed       Nutrition Assessment:    Pt admitted with Wound dehiscence. PMHx: Asthma, At fib, Uterine cancer, HFrEF, PPM, DM 2, HTN, Dyslipidemia, FREDERICK, s/p laminectomy and spinal fusion 3/18; readmitted 4/1 and 4/15 with Wound dehiscence. Sepsis from wound infection with bacteremia. Debridement cancelled 4/19 d/t respiratory distress (pulmonary edema per CXR). Transferred to CCU 4/21 d/t worsening pulmonary edema. PERRY on CKD-emergent HD 4/22 and scheduled TTS for now. I&D back wound 4/22.     Discussed during IDR. Pt passed bedside swallow evaluation and diet advanced. RD added ONS to increase protein intake for healing (see above). Visited with pt briefly this morning before having a BM in her bed.    Noted negative fluid balance of 14 liters since admission (?admit weight-first available weight 4/21). Potassium and phosphorus BNL. May need to resume NPH if BG trend up with oral intake.      Nutritionally Significant Medications:  Jose @ 20, Bumex, ferrous sulfate, folic acid, Humalog, NPH on hold      Estimated Daily Nutrient Needs:  Energy Requirements Based On: Formula  Weight Used for Energy Requirements: Current (120 kg)  Energy (kcal/day): 2025 (MSJ x 1 x 1.2 or 34

## 2024-04-25 LAB
ANION GAP SERPL CALC-SCNC: 6 MMOL/L (ref 5–15)
BACTERIA SPEC CULT: ABNORMAL
BUN SERPL-MCNC: 28 MG/DL (ref 6–20)
BUN/CREAT SERPL: 14 (ref 12–20)
CALCIUM SERPL-MCNC: 8.8 MG/DL (ref 8.5–10.1)
CHLORIDE SERPL-SCNC: 102 MMOL/L (ref 97–108)
CO2 SERPL-SCNC: 31 MMOL/L (ref 21–32)
CREAT SERPL-MCNC: 1.99 MG/DL (ref 0.55–1.02)
ERYTHROCYTE [DISTWIDTH] IN BLOOD BY AUTOMATED COUNT: 19 % (ref 11.5–14.5)
GLUCOSE BLD STRIP.AUTO-MCNC: 145 MG/DL (ref 65–117)
GLUCOSE BLD STRIP.AUTO-MCNC: 165 MG/DL (ref 65–117)
GLUCOSE BLD STRIP.AUTO-MCNC: 175 MG/DL (ref 65–117)
GLUCOSE BLD STRIP.AUTO-MCNC: 177 MG/DL (ref 65–117)
GLUCOSE SERPL-MCNC: 152 MG/DL (ref 65–100)
GRAM STN SPEC: ABNORMAL
GRAM STN SPEC: ABNORMAL
HCT VFR BLD AUTO: 22.3 % (ref 35–47)
HCT VFR BLD AUTO: 23 % (ref 35–47)
HGB BLD-MCNC: 7 G/DL (ref 11.5–16)
HGB BLD-MCNC: 7 G/DL (ref 11.5–16)
MAGNESIUM SERPL-MCNC: 2.1 MG/DL (ref 1.6–2.4)
MCH RBC QN AUTO: 27 PG (ref 26–34)
MCHC RBC AUTO-ENTMCNC: 30.4 G/DL (ref 30–36.5)
MCV RBC AUTO: 88.8 FL (ref 80–99)
NRBC # BLD: 0.04 K/UL (ref 0–0.01)
NRBC BLD-RTO: 0.2 PER 100 WBC
PLATELET # BLD AUTO: 257 K/UL (ref 150–400)
PMV BLD AUTO: 10.1 FL (ref 8.9–12.9)
POTASSIUM SERPL-SCNC: 3.2 MMOL/L (ref 3.5–5.1)
RBC # BLD AUTO: 2.59 M/UL (ref 3.8–5.2)
SERVICE CMNT-IMP: ABNORMAL
SODIUM SERPL-SCNC: 139 MMOL/L (ref 136–145)
UFH PPP CHRO-ACNC: 0.13 IU/ML
UFH PPP CHRO-ACNC: 0.21 IU/ML
UFH PPP CHRO-ACNC: 0.31 IU/ML
WBC # BLD AUTO: 18.3 K/UL (ref 3.6–11)

## 2024-04-25 PROCEDURE — 36415 COLL VENOUS BLD VENIPUNCTURE: CPT

## 2024-04-25 PROCEDURE — 6370000000 HC RX 637 (ALT 250 FOR IP): Performed by: STUDENT IN AN ORGANIZED HEALTH CARE EDUCATION/TRAINING PROGRAM

## 2024-04-25 PROCEDURE — 6360000002 HC RX W HCPCS: Performed by: NURSE PRACTITIONER

## 2024-04-25 PROCEDURE — 6370000000 HC RX 637 (ALT 250 FOR IP): Performed by: NURSE PRACTITIONER

## 2024-04-25 PROCEDURE — 2700000000 HC OXYGEN THERAPY PER DAY

## 2024-04-25 PROCEDURE — 85027 COMPLETE CBC AUTOMATED: CPT

## 2024-04-25 PROCEDURE — 80048 BASIC METABOLIC PNL TOTAL CA: CPT

## 2024-04-25 PROCEDURE — 94640 AIRWAY INHALATION TREATMENT: CPT

## 2024-04-25 PROCEDURE — 85520 HEPARIN ASSAY: CPT

## 2024-04-25 PROCEDURE — 97530 THERAPEUTIC ACTIVITIES: CPT

## 2024-04-25 PROCEDURE — 99231 SBSQ HOSP IP/OBS SF/LOW 25: CPT | Performed by: NURSE PRACTITIONER

## 2024-04-25 PROCEDURE — 2000000000 HC ICU R&B

## 2024-04-25 PROCEDURE — 6360000002 HC RX W HCPCS: Performed by: STUDENT IN AN ORGANIZED HEALTH CARE EDUCATION/TRAINING PROGRAM

## 2024-04-25 PROCEDURE — 6370000000 HC RX 637 (ALT 250 FOR IP): Performed by: INTERNAL MEDICINE

## 2024-04-25 PROCEDURE — 97116 GAIT TRAINING THERAPY: CPT

## 2024-04-25 PROCEDURE — 37799 UNLISTED PX VASCULAR SURGERY: CPT

## 2024-04-25 PROCEDURE — 99233 SBSQ HOSP IP/OBS HIGH 50: CPT | Performed by: INTERNAL MEDICINE

## 2024-04-25 PROCEDURE — P9045 ALBUMIN (HUMAN), 5%, 250 ML: HCPCS | Performed by: INTERNAL MEDICINE

## 2024-04-25 PROCEDURE — 85014 HEMATOCRIT: CPT

## 2024-04-25 PROCEDURE — 6360000002 HC RX W HCPCS: Performed by: INTERNAL MEDICINE

## 2024-04-25 PROCEDURE — 2580000003 HC RX 258: Performed by: INTERNAL MEDICINE

## 2024-04-25 PROCEDURE — 97535 SELF CARE MNGMENT TRAINING: CPT

## 2024-04-25 PROCEDURE — 6370000000 HC RX 637 (ALT 250 FOR IP): Performed by: PHYSICIAN ASSISTANT

## 2024-04-25 PROCEDURE — 99231 SBSQ HOSP IP/OBS SF/LOW 25: CPT | Performed by: CLINICAL NURSE SPECIALIST

## 2024-04-25 PROCEDURE — 83735 ASSAY OF MAGNESIUM: CPT

## 2024-04-25 PROCEDURE — 85018 HEMOGLOBIN: CPT

## 2024-04-25 PROCEDURE — 2580000003 HC RX 258: Performed by: HOSPITALIST

## 2024-04-25 PROCEDURE — 82962 GLUCOSE BLOOD TEST: CPT

## 2024-04-25 PROCEDURE — 6370000000 HC RX 637 (ALT 250 FOR IP): Performed by: HOSPITALIST

## 2024-04-25 RX ORDER — MIDODRINE HYDROCHLORIDE 5 MG/1
10 TABLET ORAL
Status: DISCONTINUED | OUTPATIENT
Start: 2024-04-25 | End: 2024-04-30

## 2024-04-25 RX ORDER — ALBUMIN, HUMAN INJ 5% 5 %
12.5 SOLUTION INTRAVENOUS ONCE
Status: COMPLETED | OUTPATIENT
Start: 2024-04-25 | End: 2024-04-25

## 2024-04-25 RX ORDER — CALCIUM CARBONATE 500 MG/1
1000 TABLET, CHEWABLE ORAL 3 TIMES DAILY PRN
Status: DISCONTINUED | OUTPATIENT
Start: 2024-04-25 | End: 2024-05-03 | Stop reason: HOSPADM

## 2024-04-25 RX ADMIN — SODIUM CHLORIDE, PRESERVATIVE FREE 10 ML: 5 INJECTION INTRAVENOUS at 08:29

## 2024-04-25 RX ADMIN — HEPARIN SODIUM 2000 UNITS: 1000 INJECTION INTRAVENOUS; SUBCUTANEOUS at 12:57

## 2024-04-25 RX ADMIN — MIDODRINE HYDROCHLORIDE 10 MG: 5 TABLET ORAL at 16:55

## 2024-04-25 RX ADMIN — IPRATROPIUM BROMIDE 0.5 MG: 0.5 SOLUTION RESPIRATORY (INHALATION) at 07:24

## 2024-04-25 RX ADMIN — NYSTATIN 500000 UNITS: 100000 SUSPENSION ORAL at 20:30

## 2024-04-25 RX ADMIN — MIDODRINE HYDROCHLORIDE 10 MG: 5 TABLET ORAL at 08:28

## 2024-04-25 RX ADMIN — GABAPENTIN 300 MG: 300 CAPSULE ORAL at 16:55

## 2024-04-25 RX ADMIN — HEPARIN SODIUM 2000 UNITS: 1000 INJECTION INTRAVENOUS; SUBCUTANEOUS at 05:00

## 2024-04-25 RX ADMIN — ACETAMINOPHEN 1000 MG: 325 TABLET ORAL at 11:52

## 2024-04-25 RX ADMIN — ALBUMIN (HUMAN) 12.5 G: 12.5 INJECTION, SOLUTION INTRAVENOUS at 08:25

## 2024-04-25 RX ADMIN — BUMETANIDE 2 MG: 0.25 INJECTION INTRAMUSCULAR; INTRAVENOUS at 16:54

## 2024-04-25 RX ADMIN — ARFORMOTEROL TARTRATE: 15 SOLUTION RESPIRATORY (INHALATION) at 07:24

## 2024-04-25 RX ADMIN — WATER 2000 MG: 1 INJECTION INTRAMUSCULAR; INTRAVENOUS; SUBCUTANEOUS at 08:26

## 2024-04-25 RX ADMIN — Medication: at 20:32

## 2024-04-25 RX ADMIN — CALCIUM CARBONATE (ANTACID) CHEW TAB 500 MG 1000 MG: 500 CHEW TAB at 14:19

## 2024-04-25 RX ADMIN — ARFORMOTEROL TARTRATE: 15 SOLUTION RESPIRATORY (INHALATION) at 20:11

## 2024-04-25 RX ADMIN — IPRATROPIUM BROMIDE 0.5 MG: 0.5 SOLUTION RESPIRATORY (INHALATION) at 12:00

## 2024-04-25 RX ADMIN — ASPIRIN 81 MG CHEWABLE TABLET 81 MG: 81 TABLET CHEWABLE at 08:29

## 2024-04-25 RX ADMIN — NYSTATIN 500000 UNITS: 100000 SUSPENSION ORAL at 16:54

## 2024-04-25 RX ADMIN — SODIUM CHLORIDE, PRESERVATIVE FREE 10 ML: 5 INJECTION INTRAVENOUS at 20:30

## 2024-04-25 RX ADMIN — IPRATROPIUM BROMIDE 0.5 MG: 0.5 SOLUTION RESPIRATORY (INHALATION) at 20:11

## 2024-04-25 RX ADMIN — MIDODRINE HYDROCHLORIDE 10 MG: 5 TABLET ORAL at 11:52

## 2024-04-25 RX ADMIN — NYSTATIN 500000 UNITS: 100000 SUSPENSION ORAL at 08:29

## 2024-04-25 RX ADMIN — BUMETANIDE 2 MG: 0.25 INJECTION INTRAMUSCULAR; INTRAVENOUS at 08:25

## 2024-04-25 RX ADMIN — Medication 1 MG: at 08:29

## 2024-04-25 RX ADMIN — POTASSIUM BICARBONATE 40 MEQ: 782 TABLET, EFFERVESCENT ORAL at 05:05

## 2024-04-25 RX ADMIN — ACETAMINOPHEN 1000 MG: 325 TABLET ORAL at 05:05

## 2024-04-25 RX ADMIN — HEPARIN SODIUM 13 UNITS/KG/HR: 10000 INJECTION, SOLUTION INTRAVENOUS at 15:22

## 2024-04-25 RX ADMIN — SERTRALINE HYDROCHLORIDE 150 MG: 50 TABLET ORAL at 20:30

## 2024-04-25 RX ADMIN — FERROUS SULFATE TAB 325 MG (65 MG ELEMENTAL FE) 325 MG: 325 (65 FE) TAB at 08:29

## 2024-04-25 RX ADMIN — POTASSIUM CHLORIDE 40 MEQ: 750 TABLET, FILM COATED, EXTENDED RELEASE ORAL at 16:55

## 2024-04-25 RX ADMIN — BUPROPION HYDROCHLORIDE 150 MG: 150 TABLET, EXTENDED RELEASE ORAL at 08:29

## 2024-04-25 RX ADMIN — IPRATROPIUM BROMIDE 0.5 MG: 0.5 SOLUTION RESPIRATORY (INHALATION) at 15:53

## 2024-04-25 RX ADMIN — Medication: at 09:00

## 2024-04-25 ASSESSMENT — PAIN SCALES - GENERAL
PAINLEVEL_OUTOF10: 0
PAINLEVEL_OUTOF10: 0

## 2024-04-25 NOTE — PROGRESS NOTES
Orthopedic Spine Progress Note  Post Op day: 3 Days Post-Op    2024 9:37 AM   Admit Date: 4/15/2024  Procedure: Procedure(s) with comments:  BACK WOUND INCISION AND DRAINAGE (EIP 0730) - This patient is in house we are requesting this start at 7:30 am    Subjective:     Leny Barnes resting comfortably in bed. Alert and oriented to person, place and time.   Denies any back pain.   Discussed washout on Monday as patient states she does not remember that surgery.   Tolerating diet.  No N/V.    Pain Control:        Objective:          Physical Exam:  General:  Alert and oriented. No acute distress.   Heart:  Respirations unlabored.   Abdomen:   Extremities: Soft, non-tender.  No evidence of cyanosis. Pulses palpable in both upper and lower extremities.       Neurologic:  Musculoskeletal:  No new motor deficits. Neurovascular exam within normal limits.  Sensation stable.  Motor: unchanged C5-T1 and L2-S1.   Olivia's sign negative in bilateral lower extremities.  Calves soft, nontender upon palpation and with passive twitch.  Moves both upper and lower extremities.   Incision: clean, dry, and intact.  No significant erythema or swelling.  No active drainage noted.         Vital Signs:    Blood pressure (!) 117/47, pulse 71, temperature 98.6 °F (37 °C), temperature source Oral, resp. rate 18, height 1.676 m (5' 5.98\"), weight 119.7 kg (263 lb 14.3 oz), SpO2 96 %.  Temp (24hrs), Av.4 °F (36.9 °C), Min:97.7 °F (36.5 °C), Max:99 °F (37.2 °C)      LAB:    Recent Labs     24  0311   HCT 23.0*   HGB 7.0*        Lab Results   Component Value Date/Time     2024 03:11 AM    K 3.2 2024 03:11 AM     2024 03:11 AM    CO2 31 2024 03:11 AM    BUN 28 2024 03:11 AM       Intake/Output:No intake/output data recorded.   1901 -  0700  In: 1427.4 [P.O.:120; I.V.:780.1]  Out: 1080 [Urine:990; Drains:90]    PT/OT:   Gait:                    Assessment:

## 2024-04-25 NOTE — PLAN OF CARE
Problem: Safety - Adult  Goal: Free from fall injury  Outcome: Progressing  Flowsheets (Taken 4/24/2024 2000)  Free From Fall Injury:   Instruct family/caregiver on patient safety   Based on caregiver fall risk screen, instruct family/caregiver to ask for assistance with transferring infant if caregiver noted to have fall risk factors     Problem: Discharge Planning  Goal: Discharge to home or other facility with appropriate resources  Outcome: Progressing     Problem: Pain  Goal: Verbalizes/displays adequate comfort level or baseline comfort level  Outcome: Progressing  Flowsheets (Taken 4/24/2024 2000)  Verbalizes/displays adequate comfort level or baseline comfort level:   Encourage patient to monitor pain and request assistance   Assess pain using appropriate pain scale   Administer analgesics based on type and severity of pain and evaluate response   Implement non-pharmacological measures as appropriate and evaluate response   Consider cultural and social influences on pain and pain management   Notify Licensed Independent Practitioner if interventions unsuccessful or patient reports new pain     Problem: ABCDS Injury Assessment  Goal: Absence of physical injury  Outcome: Progressing  Flowsheets (Taken 4/24/2024 2000)  Absence of Physical Injury: Implement safety measures based on patient assessment     Problem: Chronic Conditions and Co-morbidities  Goal: Patient's chronic conditions and co-morbidity symptoms are monitored and maintained or improved  Outcome: Progressing     Problem: Skin/Tissue Integrity  Goal: Absence of new skin breakdown  Description: 1.  Monitor for areas of redness and/or skin breakdown  2.  Assess vascular access sites hourly  3.  Every 4-6 hours minimum:  Change oxygen saturation probe site  4.  Every 4-6 hours:  If on nasal continuous positive airway pressure, respiratory therapy assess nares and determine need for appliance change or resting period.  Outcome: Progressing

## 2024-04-25 NOTE — PROGRESS NOTES
CRITICAL CARE NOTE    Name: Leny Barnes   : 1945   MRN: [id number]   Date: 2024      REASON FOR ICU ADMISSION: Pulmonary edema, hemodialysis    PRINCIPAL ICU DIAGNOSIS   Acute hypoxemic respiratory failure  PERRY  serratia bacteremia     BRIEF PATIENT SUMMARY   79 y.o. female who presents with recurrent wound dehiscence, and initially admitted to hospitalist service for abx and spine surgery eval. Pt w/ several recent admissions for similar presentation. Re-discharged to [facility] on  and re-admitted to [facility] 4/15 with significant wound dehiscence and bloody drainage. Pt w/ planne debridement , however deferred due to increased WOB requiring BiPAP. Labs w/ worsening PERRY. Pt transferred to ICU  for acute hypoxic respiratory distress and placement of a dialysis catheter in preparation for hemodialysis. Pt went to OR  for I&D of spinal wound. MRI B without acute findings. Weaning vasopressors.      COMPREHENSIVE ASSESSMENT & PLAN:SYSTEM BASED     24 HOUR EVENTS: No acute events, remains on low dose midodrine. Mentation improving, remains somewhat confused.     NEUROLOGICAL:   Mentation improving  Delirium precautions  PRN pain regimen  Zoloft  Wound management per spine  TLSO brace  PT/OT    PULMONOLOGY: Acute hypoxic respiratory failure, pulmonary edema, obstructive sleep apnea, obesity   Continue to monitor oxygenation status  O2 per NC, BiPAP qHS  DuoNebs as needed, scheduled  Pulmonary toilet  Pulmonary hygiene    CARDIOVASCULAR: History of hypertension, history of coronary artery disease, history of paroxysmal atrial tachycardia, Afib   Phenylephrine, goal MAP >65  C/w midodrine, adjust dose  Continue to monitor hemodynamic status  Echocardiogram w/ preserved LVEF, Moderate RV dysfunction w/ pulm HTN  Bumex BID    GASTROINTESTINAL: History of hyperlipidemia   ADAT  GI prophylaxis    RENAL/ELECTROLYTE/FLUIDS: Oliguria, CKD stage III, hemodialysis, status post dialysis catheter  prevent imminent deterioration.    I personally spent 35 minutes of critical care time.  This is time spent at this critically ill patient's bedside actively involved in patient care as well as the coordination of care.  This does not include any procedural time which has been billed separately.    Ramakrishna Macias MD   Critical Care Medicine  [facility]

## 2024-04-25 NOTE — PROGRESS NOTES
[address], [place] [address]  Phone: [phone]   [phone]    [email or web address]     Nephrology Progress Note    Patient Name : Leny Barnes      : 1945     MRN : [id number]  Date of Admission : 4/15/2024  Date of Servive : 24    CC:  Follow up for CKD    Assessment and Plan   PERRY on CKD  - ATN vs ATIN vs post infectious GN   - Oliguric --> now non oliguric   - s/p Mendez and emergent HD on   - s/p HD #X 2.   - hold further HD  - continue Bumex 2 mg BID  - Voiding trial tomorrow     Hypokalemia   - replacement ordered    Severe Hypoxic Resp Failure   Acute HFpEF   Asp PNA  Pleural effusions, edema   encephalopathy  - per [facility]    CKD 3a:  - from DM2 and HTN  - baseline Cr around 1.5 to 1.7     Sepsis w/ Bacteremia  -3/18  s/p Laminectomy with dehiscence of wound  - 4/15 BC (+) serratia  - ID following  and adjusting abx  -  MRI (+) fluid/ gas, s/p I+D on      CAD  PAF s/p ablation and pacemaker  DM  FREDERICK on CPAP       Interval History:  Seen and examined. MS much improved. UOP still down after IV contrast , cleared swallow, getting Kcl orally, on nita for hypotension ,  cc    Review of Systems: A comprehensive review of systems was negative.    Current Medications:   Current Facility-Administered Medications   Medication Dose Route Frequency    midodrine (PROAMATINE) tablet 10 mg  10 mg Oral TID WC    albumin human 25% IV solution 25 g  25 g IntraVENous PRN    bumetanide (BUMEX) injection 2 mg  2 mg IntraVENous BID    heparin (porcine) 1000 UNIT/ML injection 1,400 Units  1,400 Units IntraCATHeter PRN    And    heparin (porcine) 1000 UNIT/ML injection 1,100 Units  1,100 Units IntraCATHeter PRN    gabapentin (NEURONTIN) capsule 300 mg  300 mg Oral QPM    ALTEplase (CATHFLO) 1 mg in sterile water 1 mL injection  1 mg IntraCATHeter PRN    propofol infusion  5-50 mcg/kg/min (Order-Specific) IntraVENous  2135.2 [P.O.:120; I.V.:887.8]  Out: 3730 [Urine:990; Drains:240]    Physical Examination:  General: On NC  Neck:  RIJ patricia   Resp:  Crackles b/l bases  CV:  RRR,  no murmur or rub, 2+ LE edema  GI:  Soft, NT, + BS, no HS megaly  Neurologic:  Aao x 3, non focal   :  Smith +     []    High complexity decision making was performed  []    Patient is at high-risk of decompensation with multiple organ involvement    Lab Data Personally Reviewed: I have reviewed all the pertinent labs, microbiology data and radiology studies during assessment.    Labs:  Recent Labs     04/23/24 0454 04/24/24 0535 04/25/24 0311    139 139   K 3.1* 3.0* 3.2*    101 102   CO2 28 26 31   GLUCOSE 98 107* 152*   BUN 33* 22* 28*   CREATININE 1.45* 1.51* 1.99*   CALCIUM 8.1* 8.8 8.8         Recent Labs     04/23/24 0454 04/24/24  0535 04/25/24  0311   WBC 18.3* 20.0* 18.3*   RBC 2.70* 2.69* 2.59*   HGB 7.5* 7.3* 7.0*   HCT 24.1* 24.4* 23.0*   MCV 89.3 90.7 88.8   MCH 27.8 27.1 27.0   MCHC 31.1 29.9* 30.4   RDW 18.6* 18.8* 19.0*    295 257   MPV 9.1 9.3 10.1       No results for input(s): \"TP\", \"ALB\", \"GLOB\", \"AML\" in the last 72 hours.    Invalid input(s): \"SGOT\", \"GPT\", \"AP\", \"TBIL\", \"AMYP\", \"LPSE\", \"LAC\"    Recent Labs     04/23/24  1220 04/23/24 2035 04/24/24  0301   APTT 80.0* 67.7* 59.3*        No results for input(s): \"CPK\", \"CKMB\", \"TROPONINI\" in the last 72 hours.    Invalid input(s): \"B-NP\"  Invalid input(s): \"PHI\", \"PCO2I\", \"PO2I\", \"FIO2I\"     Ventilator:       Microbiology:  No results found for: \"SDES\"  No components found for: \"CULT\"      I have reviewed the flowsheets.  Chart and Pertinent Notes have been reviewed.   No change in PMH ,family and social history from Consult note.      MD [facility]

## 2024-04-25 NOTE — PROGRESS NOTES
Neurology Progress Note     NAME: Leny Barnes   :  1945   MRN:  [id number]   DATE:  2024    Assessment:     Principal Problem:    Wound dehiscence  Active Problems:    Palliative care encounter    Inadequately controlled diabetes mellitus (HCC)    Wound infection    Shortness of breath    Generalized weakness    Infection as complication of medical care    Bacteremia due to Gram-negative bacteria    Sepsis without acute organ dysfunction (HCC)    Deep incisional surgical site infection    Dysarthria and anarthria    Longstanding persistent atrial fibrillation (HCC)    Mydriasis  Resolved Problems:    * No resolved hospital problems. *    Patient is an 79 year-old female with history of L and R trigeminal neuralgia, DM, diabetic peripheral neuropathy, morbid obesity, afib, CAD, FREDERICK on CPAP, CHF, PM, HTN, HLD, CKD, compression fracture L1 due to fall, uterine cancer s/p KEI/BSO, asthma, OA, gastric lap band, gastric bypass, bilateral cataract removal, cervical fusion, 3/18/24 she underwent lumbar laminectomy at T10-T11, T11-T12, and L1-L2 kyphoplasty with facetectomies at T11-T12 and T12-L1 with Alison osteotomies bilaterally, as well as lumbar laminectomy at L2-S1 bilaterally with bilateral facet resection for decompression of L2, L3, L4, L5, and S1 nerve roots, and posterior thoracolumbar fusion from T10 down to the ilium, bilateral SI joint fusion who was admitted 4/15/24 with ongoing wound infection and fevers. She was scheduled to have wound debridement on 24, but cancelled due to worsened respiratory status. She had been on Plavix and Eliquis for CAD with MI and stent placement and Afib, both held for surgery. She was subsequently placed on a heparin infusion after having a Code S on 24. She was working with PT and had a facial droop that

## 2024-04-25 NOTE — DIABETES MGMT
[facility]  DIABETES MANAGEMENT CONSULT    Consulted by Provider for advanced nursing evaluation and care for inpatient blood glucose management.    Evaluation and Action Plan   This 79 year old  female was admitted 4/15/24 from ER after experiencing wound dehiscence, post lumbar laminectomy (3/8/24), and acute blood loss anemia. Multiple services consulted. On Abx. Required re-exploration of operative site. Moved to ICU 4/21/24. Recovering.    As for Bg control, patient has known Type 2 diabetes treated with insulin therapy PTA. Patient cannot afford Jardiance so she has not been taking. She uses a continuous glucose monitoring to assess BG control. Her admission BG 4/15/24 was in target range and she was given her usual NPH insulin dose that evening. She has not received insulin throughout 4/16-25/24. BG did rise into the 200s a few times during that time frame. Would stay with corrective insulin for now.    Management Rationale Action Plan   Medication   Corrective insulin Based on sensitivity  []        Low   [x]        Medium  []        High      Additional orders   60 gram carb-controlled meals     Diabetes Discharge Plan   Medication  TBD     Referral  []        Outpatient diabetes education   Additional orders            Initial Presentation   Leny Barnes is a 79 y.o. female admitted 4/15/24 from ER after experiencing wound dehiscence, post lumbar laminectomy (3/8/24), and acute blood loss anemia. Came back to [facility] on 4/1/24 before going back to [facility].   LAB: COVID & Flu negative. Blood cultures negative. Hyponatremia. PERRY/eGFR 37. . CRP 25.4. Albumin 1.8. Normal liver enzymes. WBC 17.2. Low RBC, H&H. Normal platelets  CXR: There is mild to moderate pulmonary interstitial edema.     HX:  Past Medical History:   Diagnosis Date    Arthritis 1990    Asthma     Atrial fibrillation (HCC)     NONE SINCE PACEMAKER    CAD (coronary artery disease) 1996    Cancer  Cecelia Jimenez, Attending Physician: 4yF who is EXTREMELY WELL APPEARING here for possible VOC but mom is reporting it is more abdominal pain that is consistent with constipation however no concern for rectal obstruction at this time. Recommend outpatient constipation management. No fevers. +URI symptoms however in absence of fever, very little concern for acute chest syndrome at this time. Will obtain screening labs however hold off on abx at this time given afebrile and well appearing. Will pain control, although mom admits inconsistent with VOC at this time.

## 2024-04-25 NOTE — PROGRESS NOTES
Palliative Medicine     Following peripherally, pt making gains.  Pt and dtr have our contact info.   Signing off a this time, but please reconsult with specific questions/concerns .Thank you for allowing us to assist in pt's care.

## 2024-04-25 NOTE — PROGRESS NOTES
Infectious Disease Progress Note     Subjective:      Somnolent in ICU, afebrile    Objective:    Vitals:   Patient Vitals for the past 24 hrs:   Temp Pulse Resp BP SpO2   04/25/24 1400 -- 70 20 128/68 99 %   04/25/24 1300 -- 70 23 139/82 93 %   04/25/24 1200 98.1 °F (36.7 °C) 70 18 (!) 129/50 98 %   04/25/24 1100 -- 70 24 (!) 122/45 99 %   04/25/24 1000 -- 70 15 (!) 148/50 98 %   04/25/24 0900 -- 70 21 (!) 108/40 96 %   04/25/24 0800 98 °F (36.7 °C) 70 17 (!) 115/46 100 %   04/25/24 0730 -- -- -- -- 96 %   04/25/24 0700 -- 71 18 (!) 117/47 100 %   04/25/24 0600 -- 70 20 (!) 114/47 100 %   04/25/24 0500 -- 70 20 (!) 124/50 99 %   04/25/24 0400 98.6 °F (37 °C) 71 19 (!) 120/44 100 %   04/25/24 0300 -- 71 19 113/75 100 %   04/25/24 0200 -- 70 24 (!) 107/51 98 %   04/25/24 0100 -- 71 19 -- 99 %   04/25/24 0000 98 °F (36.7 °C) 71 21 -- 99 %   04/24/24 2300 -- 70 19 -- 100 %   04/24/24 2200 -- 70 20 (!) 118/42 100 %   04/24/24 2100 -- 70 19 (!) 114/37 100 %   04/24/24 2000 97.7 °F (36.5 °C) 70 17 (!) 120/52 98 %   04/24/24 1900 -- 71 18 (!) 110/37 99 %   04/24/24 1800 -- 71 18 134/63 100 %   04/24/24 1700 -- 70 14 (!) 117/44 92 %   04/24/24 1600 99 °F (37.2 °C) 70 15 96/65 100 %   04/24/24 1500 -- 70 15 (!) 116/41 99 %         Physical Exam:  Gen: on venturi mask  HEENT:  Normocephalic, atraumatic   CV: normal rate  Lungs: decreased bs b/l though limited patient effort  Abdomen: soft, non tender, non distended  Genitourinary: holly catheter present  Skin: spinal surgical site dressed  Psych: follows commands  Neuro: moving all extremities   Musculoskeletal:  No joint edema, erythema or tenderness noted     Central Line:      Medications:    Current Facility-Administered Medications:     midodrine (PROAMATINE) tablet 10 mg, 10 mg, Oral, TID WC, Juan Toledo MD, 10 mg at 04/25/24 1152    potassium bicarb-citric acid (EFFER-K) effervescent tablet 40 mEq, 40 mEq, Oral, Daily, Juan Toledo MD, 40 mEq  insulin lispro (HUMALOG) injection vial 0-4 Units, 0-4 Units, SubCUTAneous, Nightly, Bonnie Chapman MD      Labs:  Recent Results (from the past 24 hour(s))   POCT Glucose    Collection Time: 04/24/24  5:01 PM   Result Value Ref Range    POC Glucose 167 (H) 65 - 117 mg/dL    Performed by: Randi MARISCAL    POCT Glucose    Collection Time: 04/24/24  8:34 PM   Result Value Ref Range    POC Glucose 220 (H) 65 - 117 mg/dL    Performed by: DIDI Perez    CBC    Collection Time: 04/25/24  3:11 AM   Result Value Ref Range    WBC 18.3 (H) 3.6 - 11.0 K/uL    RBC 2.59 (L) 3.80 - 5.20 M/uL    Hemoglobin 7.0 (L) 11.5 - 16.0 g/dL    Hematocrit 23.0 (L) 35.0 - 47.0 %    MCV 88.8 80.0 - 99.0 FL    MCH 27.0 26.0 - 34.0 PG    MCHC 30.4 30.0 - 36.5 g/dL    RDW 19.0 (H) 11.5 - 14.5 %    Platelets 257 150 - 400 K/uL    MPV 10.1 8.9 - 12.9 FL    Nucleated RBCs 0.2 (H) 0  WBC    nRBC 0.04 (H) 0.00 - 0.01 K/uL   Heparin, Anti-XA    Collection Time: 04/25/24  3:11 AM   Result Value Ref Range    Heparin Xa,LMWH and Unfrac 0.13 IU/mL   Basic Metabolic Panel w/ Reflex to MG    Collection Time: 04/25/24  3:11 AM   Result Value Ref Range    Sodium 139 136 - 145 mmol/L    Potassium 3.2 (L) 3.5 - 5.1 mmol/L    Chloride 102 97 - 108 mmol/L    CO2 31 21 - 32 mmol/L    Anion Gap 6 5 - 15 mmol/L    Glucose 152 (H) 65 - 100 mg/dL    BUN 28 (H) 6 - 20 MG/DL    Creatinine 1.99 (H) 0.55 - 1.02 MG/DL    Bun/Cre Ratio 14 12 - 20      Est, Glom Filt Rate 25 (L) >60 ml/min/1.73m2    Calcium 8.8 8.5 - 10.1 MG/DL   Magnesium    Collection Time: 04/25/24  3:11 AM   Result Value Ref Range    Magnesium 2.1 1.6 - 2.4 mg/dL   POCT Glucose    Collection Time: 04/25/24  8:35 AM   Result Value Ref Range    POC Glucose 177 (H) 65 - 117 mg/dL    Performed by: DIANA Beebe    POCT Glucose    Collection Time: 04/25/24 11:49 AM   Result Value Ref Range    POC Glucose 175 (H) 65 - 117 mg/dL    Performed by: DIANA Beebe    Heparin, Anti-XA    Collection

## 2024-04-25 NOTE — PLAN OF CARE
Problem: Occupational Therapy - Adult  Goal: By Discharge: Performs self-care activities at highest level of function for planned discharge setting.  See evaluation for individualized goals.  Description: FUNCTIONAL STATUS PRIOR TO ADMISSION:   Patient with several recent admissions for similar presentation. Re-discharged to [facility] on 4/5 and re-admitted to [facility] 4/15 with significant wound dehiscence and bloody drainage. Patient with planned debridement 4/19, however deferred due to increased WOB requiring BiPAP. Patient transferred to ICU 4/21 for acute hypoxic respiratory distress and placement of a dialysis catheter in preparation for hemodialysis. Patient went to OR 4/22 for I&D of spinal wound. Prior to sx 3/19/2024 she was modified independent for basic ADL's and used adaptive equipment for LE dressing    Receives Help From: Family,  ,  ,  ,  ,  ,  , Homemaking Assistance: Independent, Ambulation Assistance: Independent, Transfer Assistance: Independent, Active : No,   HOME SUPPORT: Patient lived alone with daughter to provide assistance.    Occupational Therapy Goals:  Initiated 4/23/2024  1.  Patient will perform left UE AROM elbow flexion to > 110 degrees in supine within 7 day(s).  2.  Patient will attend to left side > or = 1 minute within 7 day(s).  3.  Patient will wash her face with right hand seated edge of bed with Supervision within 7 day(s).  4.  Patient will initiate use of bilateral hands on bed to assist with static sitting balance and scooting with Supervision  within 7 day(s).  5.  Patient will perform stand pivot transfer to bedside commode with  Minimal Assist  x's 2 within 7 day(s).  6.  Patient will participate in upper extremity therapeutic exercise/activities with Supervision for > or = 5  minutes within 7 day(s).    Outcome: Progressing     OCCUPATIONAL THERAPY TREATMENT  Patient: Leny Barnes (79 y.o. female)  Date: 4/25/2024  Primary Diagnosis: Wound infection  assistance;Maximum assistance;Assist X2           Balance:  Standing: Impaired  Balance  Sitting: Impaired  Sitting - Static: Fair (occasional)  Sitting - Dynamic: Fair (occasional);Poor (constant support)  Standing: Impaired  Standing - Static: Fair;Constant support  Standing - Dynamic: Fair;Poor;Constant support      ADL Intervention:         Feeding: Beverage management;Supervision;Setup   Feeding Skilled Clinical Factors: using BUE to bring cup to mouth with noted tremors and limmited strength            UE Dressing: Dependent/Total  UE Dressing Skilled Clinical Factors: seated EOB, able to raise BUE to ~80* and maintain for ~10 seconds to don/doff brace with increasingly impaired sitting balance/posterior lean, improves with cues and repositioning            Toileting: Dependent/Total  Toileting Skilled Clinical Factors: BSC for BM, total A for bowel hygiene with constant support for standing balance           Functional Mobility: Moderate assistance;Maximum assistance  Functional Mobility Skilled Clinical Factors: x2 stand pivot to BSC then chair          Pt educated on safe transfer techniques, with specific emphasis on proper hand placement to push up from seated surface rather than attempt to pull self up, fully positioning self in-front of desired seated location, feeling chair on back of legs and reaching back with 1-2 UE to slowly lower self to seated position.        The patient recalled and demonstrated 2/3 back precautions with cues for recall, initially recalled 0/3. Reviewed all 3 with patient.    Dressing brace: Patient instructed on benefit of wearing brace when OOB, instructed to raise arms to assist with donning, requires repetition to follow commands.       Pain Rating:  Did not interfere with session   Pain Intervention(s):   rest and repositioning      Activity Tolerance:   Fair , requires frequent rest breaks, and SpO2 88-97% on 4L and recovers quickly, soft BP with MAP 60-70, on nita

## 2024-04-25 NOTE — PROGRESS NOTES
0730 Report received from DWAYNE Perez.     1030 Pt working with PT/OT.     1130 Pt returned from chair to bed with max assist and gait belt.     1230 Jose gtt weaned off. NIVBP MAP >65.     1300 Anti-Xa not therapeutic. Administered 2000 unit bolus per protocol and increased gtt. Heparin now infusing @13 u/kg/hr. Next Anti-Xa due @1900.    1700 Pt incontinent of a large dark black/green loose BM. MD aware. Hgb/Hct obtained and sent.    1800 Hgb 7.0    1930 Bedside and Verbal shift change report given to DWAYNE Perez (oncoming nurse) by DWAYNE Beebe (offgoing nurse). Report included the following information Nurse Handoff Report, Intake/Output, MAR, Recent Results, and Cardiac Rhythm Paced .

## 2024-04-25 NOTE — PLAN OF CARE
Problem: Physical Therapy - Adult  Goal: By Discharge: Performs mobility at highest level of function for planned discharge setting.  See evaluation for individualized goals.  Description: FUNCTIONAL STATUS PRIOR TO ADMISSION: Patient was modified independent using a rollator for functional mobility prior to initial spinal surgery 3/18. Patient has been back and forth between [facility] <> hospital 2x following initial surgery 2/2 ongoing issues with surgical wound dehiscence and infection. Patient has been ambulating at rehab with RW per chart review.     HOME SUPPORT PRIOR TO ADMISSION: The patient lived with daughter and required minimal assistance for self-care/ ADLs following initial spinal surgery 3/18.    Physical Therapy Goals  Initiated 4/23/2024  1.  Patient will move from supine to sit and sit to supine in bed with minimal assistance within 7 day(s).    2.  Patient will perform sit to stand with minimal assistance within 7 day(s).  3.  Patient will transfer from bed to chair and chair to bed with minimal assistance using the least restrictive device within 7 day(s).  4.  Patient will ambulate with minimal assistance for 15 feet with the least restrictive device within 7 day(s).   5.  Patient will ascend/descend 5 stairs with bilateral handrail(s) with moderate assistance within 7 day(s).    Outcome: Progressing   PHYSICAL THERAPY TREATMENT    Patient: Leny Barnes (79 y.o. female)  Date: 4/25/2024  Diagnosis: Wound infection [T14.8XXA, L08.9]  Wound dehiscence [T81.30XA] Wound dehiscence  Procedure(s) (LRB):  BACK WOUND INCISION AND DRAINAGE (EIP 0730) (N/A) 3 Days Post-Op  Precautions: Fall Risk         Spinal Precautions: No Bending, No Lifting, No Twisting            ASSESSMENT:  Patient continues to benefit from skilled PT services and is progressing towards goals. Patient received semi-fowlers in bed, on 4 LPM O2 via NC, on low dose nita, and agreeable to PT. Noted head MRI negative for acute  Conservation;Plan of Care;Transfer Training;Equipment;Fall Prevention Strategies;Orientation;Precautions  Education Provided Comments: BLT spinal precautions  Education Method: Verbal;Demonstration  Barriers to Learning: Cognition  Education Outcome: Verbalized understanding;Continued education needed      Liz Schultz PT, DPT  Minutes: 50 + 9 minutes

## 2024-04-26 ENCOUNTER — APPOINTMENT (OUTPATIENT)
Facility: HOSPITAL | Age: 79
End: 2024-04-26
Payer: MEDICARE

## 2024-04-26 LAB
ANION GAP SERPL CALC-SCNC: 8 MMOL/L (ref 5–15)
BUN SERPL-MCNC: 28 MG/DL (ref 6–20)
BUN/CREAT SERPL: 14 (ref 12–20)
CALCIUM SERPL-MCNC: 8.8 MG/DL (ref 8.5–10.1)
CHLORIDE SERPL-SCNC: 104 MMOL/L (ref 97–108)
CO2 SERPL-SCNC: 29 MMOL/L (ref 21–32)
CREAT SERPL-MCNC: 1.97 MG/DL (ref 0.55–1.02)
ERYTHROCYTE [DISTWIDTH] IN BLOOD BY AUTOMATED COUNT: 19.2 % (ref 11.5–14.5)
GLUCOSE BLD STRIP.AUTO-MCNC: 125 MG/DL (ref 65–117)
GLUCOSE BLD STRIP.AUTO-MCNC: 126 MG/DL (ref 65–117)
GLUCOSE BLD STRIP.AUTO-MCNC: 137 MG/DL (ref 65–117)
GLUCOSE BLD STRIP.AUTO-MCNC: 179 MG/DL (ref 65–117)
GLUCOSE SERPL-MCNC: 103 MG/DL (ref 65–100)
HCT VFR BLD AUTO: 22.3 % (ref 35–47)
HCT VFR BLD AUTO: 26 % (ref 35–47)
HGB BLD-MCNC: 6.8 G/DL (ref 11.5–16)
HGB BLD-MCNC: 8.2 G/DL (ref 11.5–16)
HISTORY CHECK: NORMAL
MCH RBC QN AUTO: 27.2 PG (ref 26–34)
MCHC RBC AUTO-ENTMCNC: 30.5 G/DL (ref 30–36.5)
MCV RBC AUTO: 89.2 FL (ref 80–99)
NRBC # BLD: 0.02 K/UL (ref 0–0.01)
NRBC BLD-RTO: 0.1 PER 100 WBC
PLATELET # BLD AUTO: 243 K/UL (ref 150–400)
PMV BLD AUTO: 9.5 FL (ref 8.9–12.9)
POTASSIUM SERPL-SCNC: 3.8 MMOL/L (ref 3.5–5.1)
RBC # BLD AUTO: 2.5 M/UL (ref 3.8–5.2)
SERVICE CMNT-IMP: ABNORMAL
SODIUM SERPL-SCNC: 141 MMOL/L (ref 136–145)
UFH PPP CHRO-ACNC: 0.32 IU/ML
WBC # BLD AUTO: 18.4 K/UL (ref 3.6–11)

## 2024-04-26 PROCEDURE — 2580000003 HC RX 258: Performed by: STUDENT IN AN ORGANIZED HEALTH CARE EDUCATION/TRAINING PROGRAM

## 2024-04-26 PROCEDURE — 97535 SELF CARE MNGMENT TRAINING: CPT

## 2024-04-26 PROCEDURE — 2580000003 HC RX 258: Performed by: HOSPITALIST

## 2024-04-26 PROCEDURE — 86923 COMPATIBILITY TEST ELECTRIC: CPT

## 2024-04-26 PROCEDURE — 86850 RBC ANTIBODY SCREEN: CPT

## 2024-04-26 PROCEDURE — 6370000000 HC RX 637 (ALT 250 FOR IP): Performed by: PHYSICIAN ASSISTANT

## 2024-04-26 PROCEDURE — 80048 BASIC METABOLIC PNL TOTAL CA: CPT

## 2024-04-26 PROCEDURE — 2700000000 HC OXYGEN THERAPY PER DAY

## 2024-04-26 PROCEDURE — 6370000000 HC RX 637 (ALT 250 FOR IP): Performed by: INTERNAL MEDICINE

## 2024-04-26 PROCEDURE — 6360000002 HC RX W HCPCS: Performed by: INTERNAL MEDICINE

## 2024-04-26 PROCEDURE — 97116 GAIT TRAINING THERAPY: CPT

## 2024-04-26 PROCEDURE — 85014 HEMATOCRIT: CPT

## 2024-04-26 PROCEDURE — 6360000002 HC RX W HCPCS: Performed by: STUDENT IN AN ORGANIZED HEALTH CARE EDUCATION/TRAINING PROGRAM

## 2024-04-26 PROCEDURE — 94640 AIRWAY INHALATION TREATMENT: CPT

## 2024-04-26 PROCEDURE — 86901 BLOOD TYPING SEROLOGIC RH(D): CPT

## 2024-04-26 PROCEDURE — 6360000002 HC RX W HCPCS: Performed by: NURSE PRACTITIONER

## 2024-04-26 PROCEDURE — 85018 HEMOGLOBIN: CPT

## 2024-04-26 PROCEDURE — 6370000000 HC RX 637 (ALT 250 FOR IP): Performed by: NURSE PRACTITIONER

## 2024-04-26 PROCEDURE — 36415 COLL VENOUS BLD VENIPUNCTURE: CPT

## 2024-04-26 PROCEDURE — A4216 STERILE WATER/SALINE, 10 ML: HCPCS | Performed by: STUDENT IN AN ORGANIZED HEALTH CARE EDUCATION/TRAINING PROGRAM

## 2024-04-26 PROCEDURE — 94660 CPAP INITIATION&MGMT: CPT

## 2024-04-26 PROCEDURE — 6370000000 HC RX 637 (ALT 250 FOR IP): Performed by: HOSPITALIST

## 2024-04-26 PROCEDURE — 85027 COMPLETE CBC AUTOMATED: CPT

## 2024-04-26 PROCEDURE — 2580000003 HC RX 258: Performed by: INTERNAL MEDICINE

## 2024-04-26 PROCEDURE — 85520 HEPARIN ASSAY: CPT

## 2024-04-26 PROCEDURE — 2060000000 HC ICU INTERMEDIATE R&B

## 2024-04-26 PROCEDURE — 82962 GLUCOSE BLOOD TEST: CPT

## 2024-04-26 PROCEDURE — 2500000003 HC RX 250 WO HCPCS

## 2024-04-26 PROCEDURE — C9113 INJ PANTOPRAZOLE SODIUM, VIA: HCPCS | Performed by: STUDENT IN AN ORGANIZED HEALTH CARE EDUCATION/TRAINING PROGRAM

## 2024-04-26 PROCEDURE — 36430 TRANSFUSION BLD/BLD COMPNT: CPT

## 2024-04-26 PROCEDURE — 99231 SBSQ HOSP IP/OBS SF/LOW 25: CPT | Performed by: CLINICAL NURSE SPECIALIST

## 2024-04-26 PROCEDURE — 86900 BLOOD TYPING SEROLOGIC ABO: CPT

## 2024-04-26 PROCEDURE — 71045 X-RAY EXAM CHEST 1 VIEW: CPT

## 2024-04-26 PROCEDURE — 99232 SBSQ HOSP IP/OBS MODERATE 35: CPT | Performed by: INTERNAL MEDICINE

## 2024-04-26 PROCEDURE — 97530 THERAPEUTIC ACTIVITIES: CPT

## 2024-04-26 PROCEDURE — 30233N1 TRANSFUSION OF NONAUTOLOGOUS RED BLOOD CELLS INTO PERIPHERAL VEIN, PERCUTANEOUS APPROACH: ICD-10-PCS | Performed by: HOSPITALIST

## 2024-04-26 PROCEDURE — P9016 RBC LEUKOCYTES REDUCED: HCPCS

## 2024-04-26 RX ORDER — SODIUM CHLORIDE 9 MG/ML
INJECTION, SOLUTION INTRAVENOUS PRN
Status: DISCONTINUED | OUTPATIENT
Start: 2024-04-26 | End: 2024-05-03 | Stop reason: HOSPADM

## 2024-04-26 RX ADMIN — Medication 1 MG: at 08:46

## 2024-04-26 RX ADMIN — Medication: at 08:48

## 2024-04-26 RX ADMIN — WATER 2000 MG: 1 INJECTION INTRAMUSCULAR; INTRAVENOUS; SUBCUTANEOUS at 08:46

## 2024-04-26 RX ADMIN — BUMETANIDE 2 MG: 0.25 INJECTION INTRAMUSCULAR; INTRAVENOUS at 11:46

## 2024-04-26 RX ADMIN — BUMETANIDE 2 MG: 0.25 INJECTION INTRAMUSCULAR; INTRAVENOUS at 17:20

## 2024-04-26 RX ADMIN — PANTOPRAZOLE SODIUM 40 MG: 40 INJECTION, POWDER, FOR SOLUTION INTRAVENOUS at 21:48

## 2024-04-26 RX ADMIN — FERROUS SULFATE TAB 325 MG (65 MG ELEMENTAL FE) 325 MG: 325 (65 FE) TAB at 08:46

## 2024-04-26 RX ADMIN — ARFORMOTEROL TARTRATE: 15 SOLUTION RESPIRATORY (INHALATION) at 07:13

## 2024-04-26 RX ADMIN — HEPARIN SODIUM 13 UNITS/KG/HR: 10000 INJECTION, SOLUTION INTRAVENOUS at 08:21

## 2024-04-26 RX ADMIN — SODIUM CHLORIDE, PRESERVATIVE FREE 10 ML: 5 INJECTION INTRAVENOUS at 21:55

## 2024-04-26 RX ADMIN — IPRATROPIUM BROMIDE 0.5 MG: 0.5 SOLUTION RESPIRATORY (INHALATION) at 20:39

## 2024-04-26 RX ADMIN — ACETAMINOPHEN 1000 MG: 325 TABLET ORAL at 11:46

## 2024-04-26 RX ADMIN — SERTRALINE HYDROCHLORIDE 150 MG: 50 TABLET ORAL at 21:47

## 2024-04-26 RX ADMIN — MIDODRINE HYDROCHLORIDE 10 MG: 5 TABLET ORAL at 08:46

## 2024-04-26 RX ADMIN — MIDODRINE HYDROCHLORIDE 10 MG: 5 TABLET ORAL at 11:46

## 2024-04-26 RX ADMIN — PANTOPRAZOLE SODIUM 40 MG: 40 INJECTION, POWDER, FOR SOLUTION INTRAVENOUS at 08:46

## 2024-04-26 RX ADMIN — ACETAMINOPHEN 1000 MG: 325 TABLET ORAL at 05:37

## 2024-04-26 RX ADMIN — ACETAMINOPHEN 1000 MG: 325 TABLET ORAL at 17:20

## 2024-04-26 RX ADMIN — Medication: at 21:46

## 2024-04-26 RX ADMIN — ARFORMOTEROL TARTRATE: 15 SOLUTION RESPIRATORY (INHALATION) at 20:39

## 2024-04-26 RX ADMIN — IPRATROPIUM BROMIDE 0.5 MG: 0.5 SOLUTION RESPIRATORY (INHALATION) at 07:13

## 2024-04-26 RX ADMIN — POTASSIUM BICARBONATE 20 MEQ: 782 TABLET, EFFERVESCENT ORAL at 08:46

## 2024-04-26 RX ADMIN — GABAPENTIN 300 MG: 300 CAPSULE ORAL at 17:20

## 2024-04-26 RX ADMIN — ASPIRIN 81 MG CHEWABLE TABLET 81 MG: 81 TABLET CHEWABLE at 08:46

## 2024-04-26 RX ADMIN — SODIUM CHLORIDE, PRESERVATIVE FREE 10 ML: 5 INJECTION INTRAVENOUS at 08:47

## 2024-04-26 RX ADMIN — NYSTATIN 500000 UNITS: 100000 SUSPENSION ORAL at 08:46

## 2024-04-26 RX ADMIN — ACETAMINOPHEN 1000 MG: 325 TABLET ORAL at 23:25

## 2024-04-26 RX ADMIN — NYSTATIN 500000 UNITS: 100000 SUSPENSION ORAL at 16:13

## 2024-04-26 RX ADMIN — NYSTATIN 500000 UNITS: 100000 SUSPENSION ORAL at 12:58

## 2024-04-26 RX ADMIN — BUPROPION HYDROCHLORIDE 150 MG: 150 TABLET, EXTENDED RELEASE ORAL at 08:46

## 2024-04-26 RX ADMIN — MIDODRINE HYDROCHLORIDE 10 MG: 5 TABLET ORAL at 16:13

## 2024-04-26 RX ADMIN — NYSTATIN 500000 UNITS: 100000 SUSPENSION ORAL at 21:48

## 2024-04-26 ASSESSMENT — PAIN SCALES - GENERAL
PAINLEVEL_OUTOF10: 0

## 2024-04-26 NOTE — PLAN OF CARE
Problem: Occupational Therapy - Adult  Goal: By Discharge: Performs self-care activities at highest level of function for planned discharge setting.  See evaluation for individualized goals.  Description: FUNCTIONAL STATUS PRIOR TO ADMISSION:   Patient with several recent admissions for similar presentation. Re-discharged to [facility] on 4/5 and re-admitted to [facility] 4/15 with significant wound dehiscence and bloody drainage. Patient with planned debridement 4/19, however deferred due to increased WOB requiring BiPAP. Patient transferred to ICU 4/21 for acute hypoxic respiratory distress and placement of a dialysis catheter in preparation for hemodialysis. Patient went to OR 4/22 for I&D of spinal wound. Prior to sx 3/19/2024 she was modified independent for basic ADL's and used adaptive equipment for LE dressing    Receives Help From: Family,  ,  ,  ,  ,  ,  , Homemaking Assistance: Independent, Ambulation Assistance: Independent, Transfer Assistance: Independent, Active : No,   HOME SUPPORT: Patient lived alone with daughter to provide assistance.    Occupational Therapy Goals:  Initiated 4/23/2024  1.  Patient will perform left UE AROM elbow flexion to > 110 degrees in supine within 7 day(s).  2.  Patient will attend to left side > or = 1 minute within 7 day(s).  3.  Patient will wash her face with right hand seated edge of bed with Supervision within 7 day(s).  4.  Patient will initiate use of bilateral hands on bed to assist with static sitting balance and scooting with Supervision  within 7 day(s).  5.  Patient will perform stand pivot transfer to bedside commode with  Minimal Assist  x's 2 within 7 day(s).  6.  Patient will participate in upper extremity therapeutic exercise/activities with Supervision for > or = 5  minutes within 7 day(s).    Outcome: Progressing     OCCUPATIONAL THERAPY TREATMENT  Patient: Leny Barnes (79 y.o. female)  Date: 4/26/2024  Primary Diagnosis: Wound infection  grooming in seated, LB dressing    Recommendation for discharge: (in order for the patient to meet his/her long term goals): Therapy 3 hours/day 5-7 days/week    Other factors to consider for discharge: high risk for falls, not safe to be alone, concern for safely navigating or managing the home environment, and spinal precautions    IF patient discharges home will need the following DME: continuing to assess with progress       SUBJECTIVE:   Patient stated “I have only been waiting three days.” re: oral care    OBJECTIVE DATA SUMMARY:   Cognitive/Behavioral Status:  Orientation  Overall Orientation Status: Within Normal Limits  Orientation Level: Oriented X4  Cognition  Overall Cognitive Status: WFL    Functional Mobility and Transfers for ADLs:  Bed Mobility:  Bed Mobility Training  Bed Mobility Training: Yes  Interventions: Safety awareness training;Verbal cues;Tactile cues;Visual cues;Manual cues  Rolling: Moderate assistance;Assist X2  Supine to Sit: Maximum assistance;Assist X2  Scooting: Moderate assistance;Assist X2     Transfers:   Transfer Training  Transfer Training: Yes  Interventions: Safety awareness training;Verbal cues;Tactile cues;Manual cues;Visual cues  Sit to Stand: Minimum assistance;Moderate assistance;Assist X2 (min A x 2 from elevated height of bed, mod A x 2 from recliner chair)  Stand to Sit: Moderate assistance;Assist X2  Bed to Chair: Moderate assistance;Assist X2 (most assist for sequencing of backwards stepping to chair)           Balance:  Standing: Impaired  Balance  Sitting: Impaired  Sitting - Static: Fair (occasional);Good (unsupported)  Sitting - Dynamic: Fair (occasional)  Standing: Impaired  Standing - Static: Fair;Good;Constant support  Standing - Dynamic: Fair;Constant support      ADL Intervention:    Grooming: .  - Oral Care: Minimal Assistance and Seated in chair    Toileting: .  - Bowel Hygiene : Total Assistance and Standing  - Clothing Management : Total Assistance and

## 2024-04-26 NOTE — PROGRESS NOTES
UNIT/ML injection 1,400 Units  1,400 Units IntraCATHeter PRN    And    heparin (porcine) 1000 UNIT/ML injection 1,100 Units  1,100 Units IntraCATHeter PRN    gabapentin (NEURONTIN) capsule 300 mg  300 mg Oral QPM    ALTEplase (CATHFLO) 1 mg in sterile water 1 mL injection  1 mg IntraCATHeter PRN    propofol infusion  5-50 mcg/kg/min (Order-Specific) IntraVENous Continuous    phenylephrine (GILDARDO-SYNEPHRINE) 50 mg in sodium chloride 0.9 % 250 mL infusion (Thog0Pwd)   mcg/min IntraVENous Continuous    iopamidol (ISOVUE-370) 76 % injection 100 mL  100 mL IntraVENous ONCE PRN    aspirin chewable tablet 81 mg  81 mg Oral Daily    Or    aspirin suppository 300 mg  300 mg Rectal Daily    heparin (porcine) injection 4,000 Units  4,000 Units IntraVENous PRN    heparin (porcine) injection 2,000 Units  2,000 Units IntraVENous PRN    heparin 25,000 units in dextrose 5% 250 mL (premix) infusion  5-30 Units/kg/hr (Order-Specific) IntraVENous Continuous    balsum peru-castor oil (VENELEX) ointment   Topical BID    HYDROmorphone HCl PF (DILAUDID) injection 0.25 mg  0.25 mg IntraVENous Q4H PRN    ipratropium (ATROVENT) 0.02 % nebulizer solution 0.5 mg  0.5 mg Nebulization 4x Daily RT    cefTRIAXone (ROCEPHIN) 2,000 mg in sterile water 20 mL IV syringe  2,000 mg IntraVENous Q24H    magic (miracle) mouthwash  5 mL Swish & Spit 4x Daily PRN    nystatin (MYCOSTATIN) 913134 UNIT/ML suspension 500,000 Units  5 mL Oral 4x Daily    guaiFENesin (ROBITUSSIN) 100 MG/5ML liquid 200 mg  200 mg Oral Q6H PRN    sertraline (ZOLOFT) tablet 150 mg  150 mg Oral QHS    arformoterol 15 mcg-budesonide 0.25 mg neb solution   Nebulization BID RT    folic acid (FOLVITE) tablet 1 mg  1 mg Oral Daily    [Held by provider] apixaban (ELIQUIS) tablet 2.5 mg  2.5 mg Oral BID    glucose chewable tablet 16 g  4 tablet Oral PRN    dextrose bolus 10% 125 mL  125 mL IntraVENous PRN    Or    dextrose bolus 10% 250 mL  250 mL IntraVENous PRN    glucagon injection 1  Pertinent Notes have been reviewed.   No change in PMH ,family and social history from Consult note.      MD [facility]

## 2024-04-26 NOTE — PLAN OF CARE
Problem: Physical Therapy - Adult  Goal: By Discharge: Performs mobility at highest level of function for planned discharge setting.  See evaluation for individualized goals.  Description: FUNCTIONAL STATUS PRIOR TO ADMISSION: Patient was modified independent using a rollator for functional mobility prior to initial spinal surgery 3/18. Patient has been back and forth between [facility] <> hospital 2x following initial surgery 2/2 ongoing issues with surgical wound dehiscence and infection. Patient has been ambulating at rehab with RW per chart review.     HOME SUPPORT PRIOR TO ADMISSION: The patient lived with daughter and required minimal assistance for self-care/ ADLs following initial spinal surgery 3/18.    Physical Therapy Goals  Initiated 4/23/2024  1.  Patient will move from supine to sit and sit to supine in bed with minimal assistance within 7 day(s).    2.  Patient will perform sit to stand with minimal assistance within 7 day(s).  3.  Patient will transfer from bed to chair and chair to bed with minimal assistance using the least restrictive device within 7 day(s).  4.  Patient will ambulate with minimal assistance for 15 feet with the least restrictive device within 7 day(s).   5.  Patient will ascend/descend 5 stairs with bilateral handrail(s) with moderate assistance within 7 day(s).    Outcome: Progressing   PHYSICAL THERAPY TREATMENT    Patient: Leny Barnes (79 y.o. female)  Date: 4/26/2024  Diagnosis: Wound infection [T14.8XXA, L08.9]  Wound dehiscence [T81.30XA] Wound dehiscence  Procedure(s) (LRB):  BACK WOUND INCISION AND DRAINAGE (EIP 0730) (N/A) 4 Days Post-Op  Precautions: Fall Risk         Spinal Precautions: No Bending, No Lifting, No Twisting            ASSESSMENT:  Patient continues to benefit from skilled PT services and is progressing towards goals. Patient received semi-fowlers in bed, on 4 LPM O2, and agreeable to PT. Excellent recall of move in the tube precautions verbalized by  \"Was I really that messed up?\" Referring to period of AMS post-op     OBJECTIVE DATA SUMMARY:   Critical Behavior:  Orientation  Overall Orientation Status: Within Normal Limits  Orientation Level: Oriented X4  Cognition  Overall Cognitive Status: WFL    Functional Mobility Training:  Bed Mobility:  Bed Mobility Training  Bed Mobility Training: Yes  Interventions: Safety awareness training;Verbal cues;Tactile cues;Visual cues;Manual cues  Rolling: Moderate assistance;Assist X2  Supine to Sit: Maximum assistance;Assist X2  Scooting: Moderate assistance;Assist X2  Transfers:  Transfer Training  Transfer Training: Yes  Interventions: Safety awareness training;Verbal cues;Tactile cues;Manual cues;Visual cues  Sit to Stand: Minimum assistance;Moderate assistance;Assist X2 (min A x 2 from elevated height of bed, mod A x 2 from recliner chair)  Stand to Sit: Moderate assistance;Assist X2  Bed to Chair: Moderate assistance;Assist X2 (most assist for sequencing of backwards stepping to chair)  Balance:  Balance  Sitting: Impaired  Sitting - Static: Fair (occasional);Good (unsupported)  Sitting - Dynamic: Fair (occasional)  Standing: Impaired  Standing - Static: Fair;Good;Constant support  Standing - Dynamic: Fair;Constant support   Ambulation/Gait Training:     Gait  Gait Training: Yes  Overall Level of Assistance: Minimum assistance;Assist X2  Distance (ft): 8 Feet  Assistive Device: Walker, rolling;Gait belt  Interventions: Safety awareness training;Verbal cues;Visual cues;Tactile cues  Base of Support: Widened  Speed/Analy: Slow;Shuffled  Step Length: Left shortened;Right shortened  Stance: Weight shift  Gait Abnormalities: Step to gait;Shuffling gait;Trunk sway increased  Neuro Re-Education:

## 2024-04-26 NOTE — CONSENT
Informed Consent for Blood Component Transfusion Note    I have discussed with the patient the rationale for blood component transfusion; its benefits in treating or preventing fatigue, organ damage, or death; and its risk which includes mild transfusion reactions, rare risk of blood borne infection, or more serious but rare reactions. I have discussed the alternatives to transfusion, including the risk and consequences of not receiving transfusion. The patient had an opportunity to ask questions and had agreed to proceed with transfusion of blood components.    Electronically signed by Ramakrishna Macias MD on 4/26/24 at 12:49 PM EDT

## 2024-04-26 NOTE — PROGRESS NOTES
..TRANSFER - OUT REPORT:    Verbal report given to DWAYNE barth on Leny Barnes  being transferred to [place] [id number] for routine progression of patient care       Report consisted of patient's Situation, Background, Assessment and   Recommendations(SBAR).     Information from the following report(s) Nurse Handoff Report, Index, ED SBAR, Surgery Report, Intake/Output, MAR, Recent Results, Med Rec Status, Cardiac Rhythm Ventricular paced, Quality Measures, Neuro Assessment, and Event Log was reviewed with the receiving nurse.           Lines:   Peripheral IV 04/16/24 Posterior;Right Forearm (Active)   Site Assessment Clean, dry & intact 04/26/24 1600   Line Status Flushed;Capped 04/26/24 1600   Line Care Connections checked and tightened 04/26/24 1600   Phlebitis Assessment No symptoms 04/26/24 1600   Infiltration Assessment 0 04/26/24 1600   Alcohol Cap Used Yes 04/26/24 1600   Dressing Status Clean, dry & intact 04/26/24 1600   Dressing Type Transparent 04/26/24 1600   Dressing Intervention Dressing changed 04/26/24 0400       Peripheral IV 04/19/24 Left Arm (Active)   Site Assessment Clean, dry & intact 04/26/24 1600   Line Status Flushed;Infusing 04/26/24 1600   Line Care Connections checked and tightened 04/26/24 1600   Phlebitis Assessment No symptoms 04/26/24 1600   Infiltration Assessment 0 04/26/24 1600   Alcohol Cap Used Yes 04/26/24 1600   Dressing Status Clean, dry & intact 04/26/24 1600   Dressing Type Transparent 04/26/24 1600       Peripheral IV 04/23/24 Left Forearm (Active)   Site Assessment Clean, dry & intact 04/26/24 1600   Line Status Flushed;Infusing 04/26/24 1600   Line Care Connections checked and tightened 04/26/24 1600   Phlebitis Assessment No symptoms 04/26/24 1600   Infiltration Assessment 0 04/26/24 1600   Alcohol Cap Used Yes 04/26/24 1600   Dressing Status Clean, dry & intact 04/26/24 1600   Dressing Type Transparent 04/26/24 1600   Dressing Intervention New 04/23/24 1600

## 2024-04-26 NOTE — PROGRESS NOTES
Infectious Disease Progress Note     Subjective:      Feeling better overall not much back pain, breathing improved    Objective:    Vitals:   Patient Vitals for the past 24 hrs:   Temp Pulse Resp BP SpO2   04/26/24 1342 -- 79 -- (!) 128/51 100 %   04/26/24 1336 -- 86 -- (!) 126/57 96 %   04/26/24 1329 -- 71 -- 116/89 97 %   04/26/24 1322 -- 70 -- (!) 100/58 100 %   04/26/24 1300 -- 70 16 (!) 128/51 100 %   04/26/24 1215 98.4 °F (36.9 °C) 70 20 (!) 126/52 --   04/26/24 1200 98.4 °F (36.9 °C) 70 21 (!) 122/44 --   04/26/24 1100 -- 70 16 (!) 135/59 100 %   04/26/24 1030 -- 70 16 -- 100 %   04/26/24 1000 -- 70 17 (!) 115/51 100 %   04/26/24 0930 97.9 °F (36.6 °C) 70 20 (!) 127/34 99 %   04/26/24 0915 97.7 °F (36.5 °C) 70 16 (!) 131/36 99 %   04/26/24 0900 -- 72 16 (!) 103/59 96 %   04/26/24 0800 97.7 °F (36.5 °C) 71 17 108/80 96 %   04/26/24 0715 -- 72 19 -- 100 %   04/26/24 0700 -- 70 18 (!) 129/111 100 %   04/26/24 0600 -- 70 20 (!) 113/46 95 %   04/26/24 0500 -- 71 14 (!) 117/47 94 %   04/26/24 0400 97.6 °F (36.4 °C) 70 16 (!) 119/47 100 %   04/26/24 0300 -- 70 17 110/75 100 %   04/26/24 0200 -- 70 16 (!) 113/48 100 %   04/26/24 0100 -- 70 18 (!) 127/48 100 %   04/26/24 0000 98 °F (36.7 °C) 70 17 112/73 100 %   04/25/24 2300 -- 71 19 (!) 112/44 100 %   04/25/24 2200 -- 70 18 109/70 100 %   04/25/24 2121 -- 72 -- -- 100 %   04/25/24 2100 -- 72 20 (!) 125/45 94 %   04/25/24 2000 97.6 °F (36.4 °C) 70 19 (!) 128/50 98 %   04/25/24 1900 -- 70 19 (!) 135/51 99 %   04/25/24 1800 -- 70 19 (!) 120/46 100 %   04/25/24 1700 -- 70 21 (!) 129/50 98 %   04/25/24 1600 -- 70 16 (!) 131/49 100 %   04/25/24 1500 -- 70 22 (!) 130/55 94 %   04/25/24 1400 -- 70 20 128/68 99 %         Physical Exam:  Gen: on NC, NAD  HEENT:  Normocephalic, atraumatic   CV: normal rate  Lungs: improved effort  Abdomen: soft, non tender, non distended  Genitourinary: holly catheter present  Skin: spinal surgical site dressed  Psych: follows

## 2024-04-26 NOTE — PROGRESS NOTES
ICU Transfer/Accept Summary     This patient is being transferred OUT OF THE ICU  DATE OF TRANSFER: 4/26/2024       PATIENT ID: Leny Barnes  MRN: [id number]   YOB: 1945    PRIMARY CARE PROVIDER: ALINE Rose MD   DATE OF ADMISSION: 4/15/2024  3:04 PM    ATTENDING PHYSICIAN: Susan Morocho MD  CONSULTATIONS:   IP CONSULT TO HOSPITALIST  IP CONSULT TO INFECTIOUS DISEASES  IP CONSULT TO PHARMACY  IP WOUND CARE NURSE CONSULT TO EVAL  IP CONSULT TO DIABETES MANAGEMENT  IP CONSULT TO NEPHROLOGY  IP CONSULT TO CARDIOLOGY  IP CONSULT TO NEUROLOGY  IP CONSULT TO PULMONOLOGY  IP WOUND CARE NURSE CONSULT TO EVAL    PROCEDURES/SURGERIES:   Procedure(s) with comments:  BACK WOUND INCISION AND DRAINAGE (EIP 0730) - This patient is in house we are requesting this start at 7:30 am    REASON FOR ADMISSION: Wound dehiscence     HOSPITAL PROBLEM LIST:  Patient Active Problem List   Diagnosis    Cervical stenosis of spine    Neuropathy    Anemia    Encounter for long-term (current) drug use    Hemoptysis    Recurrent depression (HCC)    Hyperlipidemia    Diabetic polyneuropathy associated with type 2 diabetes mellitus (HCC)    Chronic atrial fibrillation (HCC)    Depression    Dizziness    Thoracic disc disorder with myelopathy    Asthma    Hematoma of neck    Gait abnormality    Microalbuminuria    Morbid obesity (HCC)    Type 2 diabetes mellitus with diabetic neuropathy (HCC)    Secondary hypercoagulable state (HCC)    Hypertension complicating diabetes (HCC)    Senile purpura (HCC)    Complete heart block (HCC)    Stage 3a chronic kidney disease (HCC)    Insulin long-term use (HCC)    Osteoarthritis of left knee, unspecified osteoarthritis type    Spinal stenosis, lumbar region, with neurogenic claudication    Spondylolisthesis of lumbar region    Acute back pain with sciatica, left    Chronic diastolic congestive heart failure (HCC)    Palliative care encounter    Lumbar stenosis with

## 2024-04-26 NOTE — PROGRESS NOTES
Occupational Therapy  04/26/24    Chart reviewed. Attempted to see for OT treatment however receiving pRBC. Will follow up later today as able/appropriate.     Thank you,   Louise So OTÁNGELA, OTR/L

## 2024-04-26 NOTE — PROGRESS NOTES
Pt arrived to floor 1715    A&Ox4  VSS on 2L NC     Denies pain    Dressing c/d/I    Hep gtt:13u/kg/hr

## 2024-04-26 NOTE — PROGRESS NOTES
Orthopedic Spine Progress Note  Post Op day: 4 Days Post-Op    2024 1:31 PM   Admit Date: 4/15/2024  Procedure: Procedure(s) with comments:  BACK WOUND INCISION AND DRAINAGE   Subjective:     Leny Barnes states she feels better today but has developed a cough. Appears to be breathing much better today.  Denies any back pain. Denies N/V, CP.    Objective:          Physical Exam:  General:  Alert and oriented. No acute distress.   Heart:  Respirations unlabored.   Abdomen:   Extremities: Soft, non-tender.  No evidence of cyanosis. Pulses palpable in both upper and lower extremities.       Neurologic:  Musculoskeletal:  No new motor deficits. Neurovascular exam within normal limits.  Sensation stable.  Motor: unchanged C5-T1 and L2-S1.   Olivia's sign negative in bilateral lower extremities.  Calves soft, nontender upon palpation.  Moves both upper and lower extremities.   Incision: Intact. No significant erythema or swelling.  No active drainage noted.        Vital Signs:    Blood pressure (!) 128/51, pulse 70, temperature 98.4 °F (36.9 °C), temperature source Oral, resp. rate 16, height 1.676 m (5' 5.98\"), weight 119.2 kg (262 lb 12.6 oz), SpO2 100 %.  Temp (24hrs), Av.9 °F (36.6 °C), Min:97.6 °F (36.4 °C), Max:98.4 °F (36.9 °C)      LAB:    Recent Labs     24  0417 24  1222   HCT 22.3* 26.0*   HGB 6.8* 8.2*     --      Lab Results   Component Value Date/Time     2024 04:17 AM    K 3.8 2024 04:17 AM     2024 04:17 AM    CO2 29 2024 04:17 AM    BUN 28 2024 04:17 AM       Intake/Output:   07 - 1900  In: 633.6 [I.V.:123.6]  Out: 255 [Urine:255]  1901 -  0700  In: 984.9 [I.V.:742.1]  Out: 1095 [Urine:1035; Drains:60]    PT/OT:   Gait:                    Assessment:   Patient is 4 Days Post-Op s/p Procedure(s) with comments:  BACK WOUND INCISION AND DRAINAGE     Plan:     1.  Continue PT/OT - Mobilize as able.  2.   Continue established methods of pain control.  3.  VTE Prophylaxes - TEDS &/or SCDs, ASA, heparin.  4.  Daily dressing changes.   6.  Continue Med, Nephrology and ID management. Will continue to follow.       Signed By: Joshua Mccauley PA-C

## 2024-04-26 NOTE — PROGRESS NOTES
CRITICAL CARE NOTE    Name: Leny Barnes   : 1945   MRN: [id number]   Date: 2024      REASON FOR ICU ADMISSION: Pulmonary edema, hemodialysis    PRINCIPAL ICU DIAGNOSIS   Acute hypoxemic respiratory failure  PERRY  serratia bacteremia     BRIEF PATIENT SUMMARY   79 y.o. female who presents with recurrent wound dehiscence, and initially admitted to hospitalist service for abx and spine surgery eval. Pt w/ several recent admissions for similar presentation. Re-discharged to [facility] on  and re-admitted to [facility] 4/15 with significant wound dehiscence and bloody drainage. Pt w/ planne debridement , however deferred due to increased WOB requiring BiPAP. Labs w/ worsening PERRY. Pt transferred to ICU  for acute hypoxic respiratory distress and placement of a dialysis catheter in preparation for hemodialysis. Pt went to OR  for I&D of spinal wound. MRI B without acute findings. Weaned off vasopressors.      COMPREHENSIVE ASSESSMENT & PLAN:SYSTEM BASED     24 HOUR EVENTS: Pt w/ dark stools o/n and hgb slowly drifting <7. 1u pRBC no furtherdark/bloody BMS. Started on PPI BID. GI consult if repeat dark stool, and hgb downtrend. Will hold transition to eliquis from heparin gtt given this. Good UOP, may get HD line removed over weekend per nephrology.     Can transfer out of ICU if hgb stable and remains VSS.     NEUROLOGICAL:   Mentation improving  Delirium precautions  PRN pain regimen  Zoloft  Wound management per spine  TLSO brace  PT/OT    PULMONOLOGY: Acute hypoxic respiratory failure, pulmonary edema, obstructive sleep apnea, obesity   Continue to monitor oxygenation status  O2 per NC, BiPAP qHS  DuoNebs as needed, scheduled  Pulmonary toilet  Pulmonary hygiene    CARDIOVASCULAR: History of hypertension, history of coronary artery disease, history of paroxysmal atrial tachycardia, Afib   Off vasopressors  goal MAP >65  C/w midodrine  Continue to monitor hemodynamic status  Echocardiogram

## 2024-04-26 NOTE — PROGRESS NOTES
0915: x1 PRBC initiated   1215: post transfusion h/h collected and sent  Hgb 8.2    1315: PT/OT @bedside    1330: Drain discontinued. Okay for RN to pull drain at bedside per Neuro.     1400: Hospitalist rounding @Bedside. Will accept pt for downgrade at this time.     1430: disposition: IMCU [id number]    1500: Christina and drain pulled    1530: CXR @bedside    1555: SBAR out report given    1618 patient weaned to room air o2 sats 94%

## 2024-04-26 NOTE — DIABETES MGMT
[facility]  PROGRAM FOR DIABETES HEALTH  DIABETES MANAGEMENT CONSULT    Consulted by Provider for advanced nursing evaluation and care for inpatient blood glucose management.    Evaluation and Action Plan   This 79 year old  female was admitted 4/15/24 from ER after experiencing wound dehiscence, post lumbar laminectomy (3/8/24), and acute blood loss anemia. Multiple services consulted. On Abx. Required re-exploration of operative site. Moved to ICU 4/21/24. Blood transfusion+ with dark bloody stools. On Heparin infusion.    As for Bg control, patient has known Type 2 diabetes treated with insulin therapy PTA. Patient cannot afford Jardiance so she has not been taking. She uses a continuous glucose monitoring to assess BG control. Her admission BG 4/15/24 was in target range and she was given her usual NPH insulin dose that evening. She has not received insulin throughout 4/16-25/24. BG did rise into the 200s a few times during that time frame. Would stay with corrective insulin for now.    Management Rationale Action Plan   Medication   Corrective insulin Based on sensitivity  []        Low   [x]        Medium  []        High      Additional orders   60 gram carb-controlled meals     Diabetes Discharge Plan   Medication  TBD     Referral  []        Outpatient diabetes education   Additional orders            Initial Presentation   Leny Barnes is a 79 y.o. female admitted 4/15/24 from ER after experiencing wound dehiscence, post lumbar laminectomy (3/8/24), and acute blood loss anemia. Came back to [facility] on 4/1/24 before going back to [facility].   LAB: COVID & Flu negative. Blood cultures negative. Hyponatremia. PERRY/eGFR 37. . CRP 25.4. Albumin 1.8. Normal liver enzymes. WBC 17.2. Low RBC, H&H. Normal platelets  CXR: There is mild to moderate pulmonary interstitial edema.     HX:  Past Medical History:   Diagnosis Date    Arthritis 1990    Asthma     Atrial fibrillation (HCC)     NONE SINCE  PACEMAKER    CAD (coronary artery disease) 1996    Cancer ([facility]) 2001    UTERINE    Chronic kidney disease (CKD), stage III (moderate) ([facility]) 2023    Compression fracture of L1 vertebra ([facility]) 10/2023    FELL AT HOME    Congestive heart failure ([facility]) 2020    Pacemaker    Depression     DM (diabetes mellitus) ([facility])     HTN (hypertension)     Hyperlipidemia     Morbid obesity ([facility])     Neuropathy     FREDERICK on CPAP     WEARS 02 2LNC AT NIGHT      INITIAL DX: Wound infection [T14.8XXA, L08.9]  Wound dehiscence [T81.30XA]     Current Treatment     TX: 4/21/24 I&D spinal wound    [x]        BP control  [x]        Clot prevention  [x]        GI prophylaxis  [x]        Scheduled pain medications    Hospital Course   Clinical progress has been uncomplicated.   4/16/24 ID consult: 4/4 GNR bacteremia. Cefepime to continue. Follow ID and sensitivity of GNR isolate on blood culture and will adjust antibiotics accordingly. Needs I/D in OR of dehiscence, exploration of wound, potential involvement deeper to fascia and/or hardware involvement causing persistent dehiscence.  Nephrology consult: Continue with IV albumin- will add Bumex 2 mg IV. Stop IVF. FR and low salt diet. Bladder scan PRN for UOP <240 cc in 8 hrs. Avoid nephrotoxic agents. No need for urgent RRT.  Neurosurgery consult: Patient with lumbar wound dehiscence one month after thoracolumbar fusion surgery. Blood cx growing serratia marcescens. ID following. MRI pending; pt has pacemaker. May end up needing I&D surgery, will hold eliquis and switch to short acting (lovenox vs heparin) - will discuss with pharmacist and hospitalist given renal hx. Medical mgmt per primary team. Will update plan after Dr. Royal has a chance to look at MRI.   Palliative consult: Goal of care is recovery from acute illness.  MRI:  1. Fluid and gas around the left pedicle screws. This either indicates recent surgery or gas-forming infection.  2. Fluid and gas in the laminectomy defects

## 2024-04-27 ENCOUNTER — APPOINTMENT (OUTPATIENT)
Facility: HOSPITAL | Age: 79
End: 2024-04-27
Payer: MEDICARE

## 2024-04-27 LAB
ABO + RH BLD: NORMAL
ANION GAP SERPL CALC-SCNC: 6 MMOL/L (ref 5–15)
BLD PROD TYP BPU: NORMAL
BLOOD BANK BLOOD PRODUCT EXPIRATION DATE: NORMAL
BLOOD BANK DISPENSE STATUS: NORMAL
BLOOD BANK ISBT PRODUCT BLOOD TYPE: 6200
BLOOD BANK PRODUCT CODE: NORMAL
BLOOD BANK UNIT TYPE AND RH: NORMAL
BLOOD GROUP ANTIBODIES SERPL: NORMAL
BPU ID: NORMAL
BUN SERPL-MCNC: 31 MG/DL (ref 6–20)
BUN/CREAT SERPL: 16 (ref 12–20)
CALCIUM SERPL-MCNC: 9.3 MG/DL (ref 8.5–10.1)
CHLORIDE SERPL-SCNC: 103 MMOL/L (ref 97–108)
CO2 SERPL-SCNC: 27 MMOL/L (ref 21–32)
CREAT SERPL-MCNC: 1.99 MG/DL (ref 0.55–1.02)
CROSSMATCH RESULT: NORMAL
ERYTHROCYTE [DISTWIDTH] IN BLOOD BY AUTOMATED COUNT: 19 % (ref 11.5–14.5)
GLUCOSE BLD STRIP.AUTO-MCNC: 121 MG/DL (ref 65–117)
GLUCOSE BLD STRIP.AUTO-MCNC: 123 MG/DL (ref 65–117)
GLUCOSE BLD STRIP.AUTO-MCNC: 134 MG/DL (ref 65–117)
GLUCOSE BLD STRIP.AUTO-MCNC: 136 MG/DL (ref 65–117)
GLUCOSE SERPL-MCNC: 148 MG/DL (ref 65–100)
HCT VFR BLD AUTO: 29.3 % (ref 35–47)
HGB BLD-MCNC: 8.6 G/DL (ref 11.5–16)
MCH RBC QN AUTO: 27.4 PG (ref 26–34)
MCHC RBC AUTO-ENTMCNC: 29.4 G/DL (ref 30–36.5)
MCV RBC AUTO: 93.3 FL (ref 80–99)
NRBC # BLD: 0.02 K/UL (ref 0–0.01)
NRBC BLD-RTO: 0.1 PER 100 WBC
PLATELET # BLD AUTO: 245 K/UL (ref 150–400)
PMV BLD AUTO: 9.1 FL (ref 8.9–12.9)
POTASSIUM SERPL-SCNC: 3.8 MMOL/L (ref 3.5–5.1)
RBC # BLD AUTO: 3.14 M/UL (ref 3.8–5.2)
SERVICE CMNT-IMP: ABNORMAL
SODIUM SERPL-SCNC: 136 MMOL/L (ref 136–145)
SPECIMEN EXP DATE BLD: NORMAL
UFH PPP CHRO-ACNC: 0.27 IU/ML
UFH PPP CHRO-ACNC: 0.45 IU/ML
UFH PPP CHRO-ACNC: 0.53 IU/ML
UNIT DIVISION: 0
UNIT ISSUE DATE/TIME: NORMAL
WBC # BLD AUTO: 17.3 K/UL (ref 3.6–11)

## 2024-04-27 PROCEDURE — 6360000002 HC RX W HCPCS: Performed by: STUDENT IN AN ORGANIZED HEALTH CARE EDUCATION/TRAINING PROGRAM

## 2024-04-27 PROCEDURE — 6360000002 HC RX W HCPCS: Performed by: NURSE PRACTITIONER

## 2024-04-27 PROCEDURE — 71045 X-RAY EXAM CHEST 1 VIEW: CPT

## 2024-04-27 PROCEDURE — 2060000000 HC ICU INTERMEDIATE R&B

## 2024-04-27 PROCEDURE — 94660 CPAP INITIATION&MGMT: CPT

## 2024-04-27 PROCEDURE — 82962 GLUCOSE BLOOD TEST: CPT

## 2024-04-27 PROCEDURE — 36415 COLL VENOUS BLD VENIPUNCTURE: CPT

## 2024-04-27 PROCEDURE — 85520 HEPARIN ASSAY: CPT

## 2024-04-27 PROCEDURE — 2700000000 HC OXYGEN THERAPY PER DAY

## 2024-04-27 PROCEDURE — 85027 COMPLETE CBC AUTOMATED: CPT

## 2024-04-27 PROCEDURE — 2580000003 HC RX 258: Performed by: INTERNAL MEDICINE

## 2024-04-27 PROCEDURE — 94640 AIRWAY INHALATION TREATMENT: CPT

## 2024-04-27 PROCEDURE — 2580000003 HC RX 258: Performed by: HOSPITALIST

## 2024-04-27 PROCEDURE — 6360000002 HC RX W HCPCS: Performed by: INTERNAL MEDICINE

## 2024-04-27 PROCEDURE — 6370000000 HC RX 637 (ALT 250 FOR IP): Performed by: NURSE PRACTITIONER

## 2024-04-27 PROCEDURE — 80048 BASIC METABOLIC PNL TOTAL CA: CPT

## 2024-04-27 PROCEDURE — C9113 INJ PANTOPRAZOLE SODIUM, VIA: HCPCS | Performed by: STUDENT IN AN ORGANIZED HEALTH CARE EDUCATION/TRAINING PROGRAM

## 2024-04-27 PROCEDURE — 6370000000 HC RX 637 (ALT 250 FOR IP): Performed by: HOSPITALIST

## 2024-04-27 PROCEDURE — 6370000000 HC RX 637 (ALT 250 FOR IP): Performed by: PHYSICIAN ASSISTANT

## 2024-04-27 PROCEDURE — 2500000003 HC RX 250 WO HCPCS

## 2024-04-27 PROCEDURE — A4216 STERILE WATER/SALINE, 10 ML: HCPCS | Performed by: STUDENT IN AN ORGANIZED HEALTH CARE EDUCATION/TRAINING PROGRAM

## 2024-04-27 PROCEDURE — 6370000000 HC RX 637 (ALT 250 FOR IP): Performed by: INTERNAL MEDICINE

## 2024-04-27 PROCEDURE — 2580000003 HC RX 258: Performed by: STUDENT IN AN ORGANIZED HEALTH CARE EDUCATION/TRAINING PROGRAM

## 2024-04-27 RX ADMIN — SERTRALINE HYDROCHLORIDE 150 MG: 50 TABLET ORAL at 20:19

## 2024-04-27 RX ADMIN — Medication 1 MG: at 08:31

## 2024-04-27 RX ADMIN — NYSTATIN 500000 UNITS: 100000 SUSPENSION ORAL at 17:27

## 2024-04-27 RX ADMIN — IPRATROPIUM BROMIDE 0.5 MG: 0.5 SOLUTION RESPIRATORY (INHALATION) at 08:09

## 2024-04-27 RX ADMIN — BUMETANIDE 2 MG: 0.25 INJECTION INTRAMUSCULAR; INTRAVENOUS at 17:28

## 2024-04-27 RX ADMIN — HEPARIN SODIUM 13 UNITS/KG/HR: 10000 INJECTION, SOLUTION INTRAVENOUS at 01:57

## 2024-04-27 RX ADMIN — Medication: at 08:30

## 2024-04-27 RX ADMIN — GABAPENTIN 300 MG: 300 CAPSULE ORAL at 17:28

## 2024-04-27 RX ADMIN — IPRATROPIUM BROMIDE 0.5 MG: 0.5 SOLUTION RESPIRATORY (INHALATION) at 21:03

## 2024-04-27 RX ADMIN — BUMETANIDE 2 MG: 0.25 INJECTION INTRAMUSCULAR; INTRAVENOUS at 08:30

## 2024-04-27 RX ADMIN — NYSTATIN 500000 UNITS: 100000 SUSPENSION ORAL at 13:21

## 2024-04-27 RX ADMIN — ASPIRIN 81 MG CHEWABLE TABLET 81 MG: 81 TABLET CHEWABLE at 08:30

## 2024-04-27 RX ADMIN — MIDODRINE HYDROCHLORIDE 10 MG: 5 TABLET ORAL at 13:21

## 2024-04-27 RX ADMIN — WATER 2000 MG: 1 INJECTION INTRAMUSCULAR; INTRAVENOUS; SUBCUTANEOUS at 08:31

## 2024-04-27 RX ADMIN — SODIUM CHLORIDE, PRESERVATIVE FREE 10 ML: 5 INJECTION INTRAVENOUS at 20:20

## 2024-04-27 RX ADMIN — FERROUS SULFATE TAB 325 MG (65 MG ELEMENTAL FE) 325 MG: 325 (65 FE) TAB at 08:32

## 2024-04-27 RX ADMIN — MIDODRINE HYDROCHLORIDE 10 MG: 5 TABLET ORAL at 17:28

## 2024-04-27 RX ADMIN — ACETAMINOPHEN 1000 MG: 325 TABLET ORAL at 13:21

## 2024-04-27 RX ADMIN — SODIUM CHLORIDE, PRESERVATIVE FREE 10 ML: 5 INJECTION INTRAVENOUS at 08:33

## 2024-04-27 RX ADMIN — BUPROPION HYDROCHLORIDE 150 MG: 150 TABLET, EXTENDED RELEASE ORAL at 08:31

## 2024-04-27 RX ADMIN — POTASSIUM BICARBONATE 20 MEQ: 782 TABLET, EFFERVESCENT ORAL at 08:29

## 2024-04-27 RX ADMIN — PANTOPRAZOLE SODIUM 40 MG: 40 INJECTION, POWDER, FOR SOLUTION INTRAVENOUS at 20:19

## 2024-04-27 RX ADMIN — MIDODRINE HYDROCHLORIDE 10 MG: 5 TABLET ORAL at 08:32

## 2024-04-27 RX ADMIN — ACETAMINOPHEN 1000 MG: 325 TABLET ORAL at 17:27

## 2024-04-27 RX ADMIN — NYSTATIN 500000 UNITS: 100000 SUSPENSION ORAL at 20:19

## 2024-04-27 RX ADMIN — ARFORMOTEROL TARTRATE: 15 SOLUTION RESPIRATORY (INHALATION) at 08:09

## 2024-04-27 RX ADMIN — HEPARIN SODIUM 15 UNITS/KG/HR: 10000 INJECTION, SOLUTION INTRAVENOUS at 17:27

## 2024-04-27 RX ADMIN — Medication: at 20:20

## 2024-04-27 RX ADMIN — PANTOPRAZOLE SODIUM 40 MG: 40 INJECTION, POWDER, FOR SOLUTION INTRAVENOUS at 08:33

## 2024-04-27 RX ADMIN — NYSTATIN 500000 UNITS: 100000 SUSPENSION ORAL at 08:32

## 2024-04-27 RX ADMIN — HEPARIN SODIUM 2000 UNITS: 1000 INJECTION INTRAVENOUS; SUBCUTANEOUS at 03:28

## 2024-04-27 RX ADMIN — ARFORMOTEROL TARTRATE: 15 SOLUTION RESPIRATORY (INHALATION) at 21:03

## 2024-04-27 RX ADMIN — GUAIFENESIN 200 MG: 200 SOLUTION ORAL at 01:01

## 2024-04-27 ASSESSMENT — PAIN SCALES - GENERAL
PAINLEVEL_OUTOF10: 0

## 2024-04-27 NOTE — PLAN OF CARE
Problem: Safety - Adult  Goal: Free from fall injury  Outcome: Progressing     Problem: Discharge Planning  Goal: Discharge to home or other facility with appropriate resources  Outcome: Progressing     Problem: Pain  Goal: Verbalizes/displays adequate comfort level or baseline comfort level  Outcome: Progressing     Problem: ABCDS Injury Assessment  Goal: Absence of physical injury  Outcome: Progressing     Problem: Chronic Conditions and Co-morbidities  Goal: Patient's chronic conditions and co-morbidity symptoms are monitored and maintained or improved  Outcome: Progressing     Problem: Skin/Tissue Integrity  Goal: Absence of new skin breakdown  Description: 1.  Monitor for areas of redness and/or skin breakdown  2.  Assess vascular access sites hourly  3.  Every 4-6 hours minimum:  Change oxygen saturation probe site  4.  Every 4-6 hours:  If on nasal continuous positive airway pressure, respiratory therapy assess nares and determine need for appliance change or resting period.  Outcome: Progressing     Problem: Occupational Therapy - Adult  Goal: By Discharge: Performs self-care activities at highest level of function for planned discharge setting.  See evaluation for individualized goals.  Description: FUNCTIONAL STATUS PRIOR TO ADMISSION:   Patient with several recent admissions for similar presentation. Re-discharged to [facility] on 4/5 and re-admitted to [facility] 4/15 with significant wound dehiscence and bloody drainage. Patient with planned debridement 4/19, however deferred due to increased WOB requiring BiPAP. Patient transferred to ICU 4/21 for acute hypoxic respiratory distress and placement of a dialysis catheter in preparation for hemodialysis. Patient went to OR 4/22 for I&D of spinal wound. Prior to sx 3/19/2024 she was modified independent for basic ADL's and used adaptive equipment for LE dressing    Receives Help From: Family,  ,  ,  ,  ,  ,  , Homemaking Assistance: Independent, Ambulation

## 2024-04-27 NOTE — PROCEDURES
Order for Mendez removal verified. Explained the procedure to the patient, who verbalized understanding.  Patient  was placed in the supine position. The catheter was removed without difficulty, tip intact. Pressure was applied with gauze the moment the line came out for 5 minutes. No bleeding noted. Hemostasis at 1225. The site was cleaned with ChloraPrep and an occlusive dressing was applied. Instructions for 60 minutes of flat bedrest given. Pt tolerated the procedure well.    Report given to Daniel RICE

## 2024-04-27 NOTE — PLAN OF CARE
Problem: Safety - Adult  Goal: Free from fall injury  4/27/2024 1516 by Daniel Simms, RN  Outcome: Progressing  4/27/2024 0430 by Nelson Khan RN  Outcome: Progressing     Problem: Pain  Goal: Verbalizes/displays adequate comfort level or baseline comfort level  4/27/2024 1516 by Daniel Simms, RN  Outcome: Progressing  4/27/2024 0430 by Nelson Khan RN  Outcome: Progressing     Problem: Chronic Conditions and Co-morbidities  Goal: Patient's chronic conditions and co-morbidity symptoms are monitored and maintained or improved  4/27/2024 1516 by Daniel Simms, RN  Outcome: Progressing  4/27/2024 0430 by Nelson Khan RN  Outcome: Progressing

## 2024-04-27 NOTE — PROGRESS NOTES
Hospitalist Progress Note  Chet Stevens MD  Answering service: [phone]        Date of Service:  2024  NAME:  Leny Barnes  :  1945  MRN:  [id number]      Admission Summary:   Patient returned to [facility] from [facility] for wound dehiscence on 4/15/2022.  She was recently discharged from here to [facility] on 3/22.  She had lumbar laminectomy on 3/18.  She was initially admitted to the floor under  services for sepsis from infected wound with dehiscence, PERRY on CKD.  Patient was scheduled for debridement but canceled by anesthesia due to respiratory difficulty on   On  patient was noticed to have word finding difficulty, drooping left eyelid  On , she was transferred to ICU for acute hypoxic respiratory failure.  On , she was started on urgent hemodialysis for persistent volume overload.   patient deemed to be clinically and hemodynamically stable to transfer out of the ICU.  She has been off of vasopressors since .\  At the time of transfer out of ICU patient was alert and oriented x 3, Reports dry cough x past few days        Interval history / Subjective:   Patient lying in the bed , comfortable   Reports dry cough   Sats well on 3 lpm     Assessment & Plan:      Septic shock, persistent noted maintenance and drainage, Serratia marcescens bacteremia  -Off vasopressors since , remained hemodynamically stable.  -Continue IV ceftriaxone.  ID following.  -Ortho following.     Acute hypoxic respiratory failure due to persistent volume overload  Acute HFpEF  Aspiration pneumonia  -Improved, fluid overload treated with hemodialysis.  -Currently on oxygen via nasal cannula 3- 4 L/min SpO2 100%.  -Continue to wean down oxygen, respiratory hygiene.  - Persistent cough, CXR  stable     CAD, PAF status post ablation and pacemaker  -Anticoagulated with apixaban, held.  IV

## 2024-04-27 NOTE — PROGRESS NOTES
Bedside shift change report given to DWAYNE Sanchez (oncoming nurse) by DWAYNE Damon/DWAYNE Godoy (offgoing nurse). Report included the following information Nurse Handoff Report, Intake/Output, MAR, Recent Results, Cardiac Rhythm V-paced, Quality Measures, and Neuro Assessment.

## 2024-04-27 NOTE — PROGRESS NOTES
tablet 1,000 mg  1,000 mg Oral TID PRN    albumin human 25% IV solution 25 g  25 g IntraVENous PRN    bumetanide (BUMEX) injection 2 mg  2 mg IntraVENous BID    heparin (porcine) 1000 UNIT/ML injection 1,400 Units  1,400 Units IntraCATHeter PRN    And    heparin (porcine) 1000 UNIT/ML injection 1,100 Units  1,100 Units IntraCATHeter PRN    gabapentin (NEURONTIN) capsule 300 mg  300 mg Oral QPM    ALTEplase (CATHFLO) 1 mg in sterile water 1 mL injection  1 mg IntraCATHeter PRN    propofol infusion  5-50 mcg/kg/min (Order-Specific) IntraVENous Continuous    phenylephrine (GILDARDO-SYNEPHRINE) 50 mg in sodium chloride 0.9 % 250 mL infusion ([id number])   mcg/min IntraVENous Continuous    iopamidol (ISOVUE-370) 76 % injection 100 mL  100 mL IntraVENous ONCE PRN    aspirin chewable tablet 81 mg  81 mg Oral Daily    Or    aspirin suppository 300 mg  300 mg Rectal Daily    heparin (porcine) injection 4,000 Units  4,000 Units IntraVENous PRN    heparin (porcine) injection 2,000 Units  2,000 Units IntraVENous PRN    heparin 25,000 units in dextrose 5% 250 mL (premix) infusion  5-30 Units/kg/hr (Order-Specific) IntraVENous Continuous    balsum peru-castor oil (VENELEX) ointment   Topical BID    HYDROmorphone HCl PF (DILAUDID) injection 0.25 mg  0.25 mg IntraVENous Q4H PRN    cefTRIAXone (ROCEPHIN) 2,000 mg in sterile water 20 mL IV syringe  2,000 mg IntraVENous Q24H    magic (miracle) mouthwash  5 mL Swish & Spit 4x Daily PRN    nystatin (MYCOSTATIN) 503807 UNIT/ML suspension 500,000 Units  5 mL Oral 4x Daily    guaiFENesin (ROBITUSSIN) 100 MG/5ML liquid 200 mg  200 mg Oral Q6H PRN    sertraline (ZOLOFT) tablet 150 mg  150 mg Oral QHS    arformoterol 15 mcg-budesonide 0.25 mg neb solution   Nebulization BID RT    folic acid (FOLVITE) tablet 1 mg  1 mg Oral Daily    [Held by provider] apixaban (ELIQUIS) tablet 2.5 mg  2.5 mg Oral BID    glucose chewable tablet 16 g  4 tablet Oral PRN    dextrose bolus 10% 125 mL  125 mL  input(s): \"B-NP\"  Invalid input(s): \"PHI\", \"PCO2I\", \"PO2I\", \"FIO2I\"     Ventilator:       Microbiology:  No results found for: \"SDES\"  No components found for: \"CULT\"      I have reviewed the flowsheets.  Chart and Pertinent Notes have been reviewed.   No change in PMH ,family and social history from Consult note.      Luigi Gloria MD  [facility]

## 2024-04-28 LAB
ALBUMIN SERPL-MCNC: 2 G/DL (ref 3.5–5)
ALBUMIN/GLOB SERPL: 0.4 (ref 1.1–2.2)
ALP SERPL-CCNC: 94 U/L (ref 45–117)
ALT SERPL-CCNC: 9 U/L (ref 12–78)
ANION GAP SERPL CALC-SCNC: 6 MMOL/L (ref 5–15)
AST SERPL-CCNC: 16 U/L (ref 15–37)
BILIRUB SERPL-MCNC: 0.4 MG/DL (ref 0.2–1)
BUN SERPL-MCNC: 34 MG/DL (ref 6–20)
BUN/CREAT SERPL: 20 (ref 12–20)
CALCIUM SERPL-MCNC: 9 MG/DL (ref 8.5–10.1)
CHLORIDE SERPL-SCNC: 103 MMOL/L (ref 97–108)
CO2 SERPL-SCNC: 29 MMOL/L (ref 21–32)
CREAT SERPL-MCNC: 1.7 MG/DL (ref 0.55–1.02)
ERYTHROCYTE [DISTWIDTH] IN BLOOD BY AUTOMATED COUNT: 19.4 % (ref 11.5–14.5)
GLOBULIN SER CALC-MCNC: 4.7 G/DL (ref 2–4)
GLUCOSE BLD STRIP.AUTO-MCNC: 117 MG/DL (ref 65–117)
GLUCOSE BLD STRIP.AUTO-MCNC: 125 MG/DL (ref 65–117)
GLUCOSE BLD STRIP.AUTO-MCNC: 133 MG/DL (ref 65–117)
GLUCOSE BLD STRIP.AUTO-MCNC: 137 MG/DL (ref 65–117)
GLUCOSE BLD STRIP.AUTO-MCNC: 161 MG/DL (ref 65–117)
GLUCOSE SERPL-MCNC: 104 MG/DL (ref 65–100)
HCT VFR BLD AUTO: 27.5 % (ref 35–47)
HGB BLD-MCNC: 8.4 G/DL (ref 11.5–16)
MAGNESIUM SERPL-MCNC: 1.6 MG/DL (ref 1.6–2.4)
MCH RBC QN AUTO: 27.7 PG (ref 26–34)
MCHC RBC AUTO-ENTMCNC: 30.5 G/DL (ref 30–36.5)
MCV RBC AUTO: 90.8 FL (ref 80–99)
NRBC # BLD: 0 K/UL (ref 0–0.01)
NRBC BLD-RTO: 0 PER 100 WBC
PLATELET # BLD AUTO: 250 K/UL (ref 150–400)
PMV BLD AUTO: 10 FL (ref 8.9–12.9)
POTASSIUM SERPL-SCNC: 3.5 MMOL/L (ref 3.5–5.1)
PROT SERPL-MCNC: 6.7 G/DL (ref 6.4–8.2)
RBC # BLD AUTO: 3.03 M/UL (ref 3.8–5.2)
SERVICE CMNT-IMP: ABNORMAL
SERVICE CMNT-IMP: NORMAL
SODIUM SERPL-SCNC: 138 MMOL/L (ref 136–145)
UFH PPP CHRO-ACNC: 0.49 IU/ML
WBC # BLD AUTO: 16.6 K/UL (ref 3.6–11)

## 2024-04-28 PROCEDURE — 94761 N-INVAS EAR/PLS OXIMETRY MLT: CPT

## 2024-04-28 PROCEDURE — 94660 CPAP INITIATION&MGMT: CPT

## 2024-04-28 PROCEDURE — 2700000000 HC OXYGEN THERAPY PER DAY

## 2024-04-28 PROCEDURE — 85520 HEPARIN ASSAY: CPT

## 2024-04-28 PROCEDURE — 6360000002 HC RX W HCPCS: Performed by: INTERNAL MEDICINE

## 2024-04-28 PROCEDURE — 2500000003 HC RX 250 WO HCPCS

## 2024-04-28 PROCEDURE — 80053 COMPREHEN METABOLIC PANEL: CPT

## 2024-04-28 PROCEDURE — 2580000003 HC RX 258: Performed by: INTERNAL MEDICINE

## 2024-04-28 PROCEDURE — 6360000002 HC RX W HCPCS: Performed by: STUDENT IN AN ORGANIZED HEALTH CARE EDUCATION/TRAINING PROGRAM

## 2024-04-28 PROCEDURE — 85027 COMPLETE CBC AUTOMATED: CPT

## 2024-04-28 PROCEDURE — A4216 STERILE WATER/SALINE, 10 ML: HCPCS | Performed by: STUDENT IN AN ORGANIZED HEALTH CARE EDUCATION/TRAINING PROGRAM

## 2024-04-28 PROCEDURE — 82962 GLUCOSE BLOOD TEST: CPT

## 2024-04-28 PROCEDURE — 2580000003 HC RX 258: Performed by: HOSPITALIST

## 2024-04-28 PROCEDURE — 94640 AIRWAY INHALATION TREATMENT: CPT

## 2024-04-28 PROCEDURE — 36415 COLL VENOUS BLD VENIPUNCTURE: CPT

## 2024-04-28 PROCEDURE — 6370000000 HC RX 637 (ALT 250 FOR IP): Performed by: PHYSICIAN ASSISTANT

## 2024-04-28 PROCEDURE — 94760 N-INVAS EAR/PLS OXIMETRY 1: CPT

## 2024-04-28 PROCEDURE — 6370000000 HC RX 637 (ALT 250 FOR IP): Performed by: NURSE PRACTITIONER

## 2024-04-28 PROCEDURE — 2060000000 HC ICU INTERMEDIATE R&B

## 2024-04-28 PROCEDURE — C9113 INJ PANTOPRAZOLE SODIUM, VIA: HCPCS | Performed by: STUDENT IN AN ORGANIZED HEALTH CARE EDUCATION/TRAINING PROGRAM

## 2024-04-28 PROCEDURE — 83735 ASSAY OF MAGNESIUM: CPT

## 2024-04-28 PROCEDURE — 2580000003 HC RX 258: Performed by: STUDENT IN AN ORGANIZED HEALTH CARE EDUCATION/TRAINING PROGRAM

## 2024-04-28 PROCEDURE — 6370000000 HC RX 637 (ALT 250 FOR IP): Performed by: STUDENT IN AN ORGANIZED HEALTH CARE EDUCATION/TRAINING PROGRAM

## 2024-04-28 PROCEDURE — 6370000000 HC RX 637 (ALT 250 FOR IP): Performed by: HOSPITALIST

## 2024-04-28 PROCEDURE — 6370000000 HC RX 637 (ALT 250 FOR IP): Performed by: INTERNAL MEDICINE

## 2024-04-28 PROCEDURE — 6360000002 HC RX W HCPCS: Performed by: NURSE PRACTITIONER

## 2024-04-28 RX ORDER — BUMETANIDE 0.25 MG/ML
2 INJECTION INTRAMUSCULAR; INTRAVENOUS 3 TIMES DAILY
Status: DISCONTINUED | OUTPATIENT
Start: 2024-04-28 | End: 2024-04-29

## 2024-04-28 RX ORDER — DOXYCYCLINE HYCLATE 100 MG
100 TABLET ORAL EVERY 12 HOURS SCHEDULED
Status: DISCONTINUED | OUTPATIENT
Start: 2024-04-28 | End: 2024-05-03 | Stop reason: HOSPADM

## 2024-04-28 RX ADMIN — PANTOPRAZOLE SODIUM 40 MG: 40 INJECTION, POWDER, FOR SOLUTION INTRAVENOUS at 08:05

## 2024-04-28 RX ADMIN — MIDODRINE HYDROCHLORIDE 10 MG: 5 TABLET ORAL at 08:06

## 2024-04-28 RX ADMIN — BUMETANIDE 2 MG: 0.25 INJECTION INTRAMUSCULAR; INTRAVENOUS at 14:47

## 2024-04-28 RX ADMIN — ARFORMOTEROL TARTRATE: 15 SOLUTION RESPIRATORY (INHALATION) at 19:40

## 2024-04-28 RX ADMIN — NYSTATIN 500000 UNITS: 100000 SUSPENSION ORAL at 20:35

## 2024-04-28 RX ADMIN — ASPIRIN 81 MG CHEWABLE TABLET 81 MG: 81 TABLET CHEWABLE at 08:05

## 2024-04-28 RX ADMIN — IPRATROPIUM BROMIDE 0.5 MG: 0.5 SOLUTION RESPIRATORY (INHALATION) at 09:32

## 2024-04-28 RX ADMIN — NYSTATIN 500000 UNITS: 100000 SUSPENSION ORAL at 12:24

## 2024-04-28 RX ADMIN — ARFORMOTEROL TARTRATE: 15 SOLUTION RESPIRATORY (INHALATION) at 09:32

## 2024-04-28 RX ADMIN — SERTRALINE HYDROCHLORIDE 150 MG: 50 TABLET ORAL at 20:35

## 2024-04-28 RX ADMIN — GABAPENTIN 300 MG: 300 CAPSULE ORAL at 17:47

## 2024-04-28 RX ADMIN — SODIUM CHLORIDE, PRESERVATIVE FREE 10 ML: 5 INJECTION INTRAVENOUS at 08:07

## 2024-04-28 RX ADMIN — HEPARIN SODIUM 15 UNITS/KG/HR: 10000 INJECTION, SOLUTION INTRAVENOUS at 08:17

## 2024-04-28 RX ADMIN — FERROUS SULFATE TAB 325 MG (65 MG ELEMENTAL FE) 325 MG: 325 (65 FE) TAB at 08:06

## 2024-04-28 RX ADMIN — MIDODRINE HYDROCHLORIDE 10 MG: 5 TABLET ORAL at 12:24

## 2024-04-28 RX ADMIN — BUPROPION HYDROCHLORIDE 150 MG: 150 TABLET, EXTENDED RELEASE ORAL at 08:07

## 2024-04-28 RX ADMIN — NYSTATIN 500000 UNITS: 100000 SUSPENSION ORAL at 17:48

## 2024-04-28 RX ADMIN — POTASSIUM BICARBONATE 20 MEQ: 782 TABLET, EFFERVESCENT ORAL at 08:04

## 2024-04-28 RX ADMIN — Medication 1 MG: at 08:07

## 2024-04-28 RX ADMIN — MIDODRINE HYDROCHLORIDE 10 MG: 5 TABLET ORAL at 17:47

## 2024-04-28 RX ADMIN — Medication: at 08:06

## 2024-04-28 RX ADMIN — WATER 2000 MG: 1 INJECTION INTRAMUSCULAR; INTRAVENOUS; SUBCUTANEOUS at 08:05

## 2024-04-28 RX ADMIN — GUAIFENESIN 200 MG: 200 SOLUTION ORAL at 10:11

## 2024-04-28 RX ADMIN — BUMETANIDE 2 MG: 0.25 INJECTION INTRAMUSCULAR; INTRAVENOUS at 08:04

## 2024-04-28 RX ADMIN — PANTOPRAZOLE SODIUM 40 MG: 40 INJECTION, POWDER, FOR SOLUTION INTRAVENOUS at 20:36

## 2024-04-28 RX ADMIN — ACETAMINOPHEN 1000 MG: 325 TABLET ORAL at 17:48

## 2024-04-28 RX ADMIN — SODIUM CHLORIDE, PRESERVATIVE FREE 10 ML: 5 INJECTION INTRAVENOUS at 20:45

## 2024-04-28 RX ADMIN — IPRATROPIUM BROMIDE 0.5 MG: 0.5 SOLUTION RESPIRATORY (INHALATION) at 19:41

## 2024-04-28 RX ADMIN — ACETAMINOPHEN 1000 MG: 325 TABLET ORAL at 12:24

## 2024-04-28 RX ADMIN — NYSTATIN 500000 UNITS: 100000 SUSPENSION ORAL at 08:04

## 2024-04-28 RX ADMIN — Medication: at 20:45

## 2024-04-28 RX ADMIN — BUMETANIDE 2 MG: 0.25 INJECTION INTRAMUSCULAR; INTRAVENOUS at 20:35

## 2024-04-28 RX ADMIN — HYDROMORPHONE HYDROCHLORIDE 0.25 MG: 1 INJECTION, SOLUTION INTRAMUSCULAR; INTRAVENOUS; SUBCUTANEOUS at 10:07

## 2024-04-28 RX ADMIN — DOXYCYCLINE HYCLATE 100 MG: 100 TABLET, COATED ORAL at 20:35

## 2024-04-28 ASSESSMENT — PAIN DESCRIPTION - LOCATION: LOCATION: ABDOMEN;CHEST

## 2024-04-28 ASSESSMENT — PAIN SCALES - GENERAL
PAINLEVEL_OUTOF10: 0
PAINLEVEL_OUTOF10: 0

## 2024-04-28 ASSESSMENT — PAIN DESCRIPTION - DESCRIPTORS: DESCRIPTORS: ACHING

## 2024-04-28 NOTE — PROGRESS NOTES
enzyme problems       Objective:  Vitals:    Vitals:    04/28/24 0800 04/28/24 0932 04/28/24 0957 04/28/24 1037   BP: (!) 130/55      Pulse: 80 70 70 71   Resp: 13 18 22    Temp: 98 °F (36.7 °C)      TempSrc: Oral      SpO2: 99% 98% 100%    Weight:       Height:         Intake and Output:  04/28 0701 - 04/28 1900  In: -   Out: 150 [Urine:150]  04/26 1901 - 04/28 0700  In: -   Out: 1330 [Urine:1330]    Physical Examination:  General: On NC  Neck:  No more patricia  Resp:  Crackles b/l bases  CV:  RRR,  no murmur or rub, 1+ LE edema  GI:  Soft, NT, + BS, no HS megaly  Neurologic:  Aao x 3, non focal   :  Smith +     []    High complexity decision making was performed  []    Patient is at high-risk of decompensation with multiple organ involvement    Lab Data Personally Reviewed: I have reviewed all the pertinent labs, microbiology data and radiology studies during assessment.    Labs:  Recent Labs     04/26/24  0417 04/27/24  0114 04/28/24  0235    136 138   K 3.8 3.8 3.5    103 103   CO2 29 27 29   GLUCOSE 103* 148* 104*   BUN 28* 31* 34*   CREATININE 1.97* 1.99* 1.70*   CALCIUM 8.8 9.3 9.0       Recent Labs     04/26/24  0417 04/26/24  1222 04/27/24  0114 04/28/24  0235   WBC 18.4*  --  17.3* 16.6*   RBC 2.50*  --  3.14* 3.03*   HGB 6.8* 8.2* 8.6* 8.4*   HCT 22.3* 26.0* 29.3* 27.5*   MCV 89.2  --  93.3 90.8   MCH 27.2  --  27.4 27.7   MCHC 30.5  --  29.4* 30.5   RDW 19.2*  --  19.0* 19.4*     --  245 250   MPV 9.5  --  9.1 10.0     Recent Labs     04/28/24  0235   GLOB 4.7*       No results for input(s): \"INR\", \"APTT\" in the last 72 hours.    Invalid input(s): \"PTP\"   No results for input(s): \"CPK\", \"CKMB\", \"TROPONINI\" in the last 72 hours.    Invalid input(s): \"B-NP\"  Invalid input(s): \"PHI\", \"PCO2I\", \"PO2I\", \"FIO2I\"     Ventilator:       Microbiology:  No results found for: \"SDES\"  No components found for: \"CULT\"      I have reviewed the flowsheets.  Chart and Pertinent Notes have been

## 2024-04-28 NOTE — PLAN OF CARE
Problem: Safety - Adult  Goal: Free from fall injury  4/28/2024 0018 by Nelson Khan RN  Outcome: Progressing  4/27/2024 1516 by Daniel Simms RN  Outcome: Progressing     Problem: Discharge Planning  Goal: Discharge to home or other facility with appropriate resources  Outcome: Progressing     Problem: Pain  Goal: Verbalizes/displays adequate comfort level or baseline comfort level  4/28/2024 0018 by Nelson Khan RN  Outcome: Progressing  4/27/2024 1516 by Daniel Simms RN  Outcome: Progressing     Problem: ABCDS Injury Assessment  Goal: Absence of physical injury  Outcome: Progressing     Problem: Chronic Conditions and Co-morbidities  Goal: Patient's chronic conditions and co-morbidity symptoms are monitored and maintained or improved  4/28/2024 0018 by Nelson Khan RN  Outcome: Progressing  4/27/2024 1516 by Daniel Simms RN  Outcome: Progressing     Problem: Skin/Tissue Integrity  Goal: Absence of new skin breakdown  Description: 1.  Monitor for areas of redness and/or skin breakdown  2.  Assess vascular access sites hourly  3.  Every 4-6 hours minimum:  Change oxygen saturation probe site  4.  Every 4-6 hours:  If on nasal continuous positive airway pressure, respiratory therapy assess nares and determine need for appliance change or resting period.  Outcome: Progressing     Problem: Nutrition Deficit:  Goal: Optimize nutritional status  Outcome: Progressing

## 2024-04-28 NOTE — PROGRESS NOTES
pulses  Neuro/Psych: pleasant mood and affect, nonfocal on brief neuroexam  Skin: warm            Data Review:    Review and/or order of clinical lab test    I have independently reviewed and interpreted patient's lab and all other diagnostic data    Notes reviewed from all clinical/nonclinical/nursing services involved in patient's clinical care. Care coordination discussions were held with appropriate clinical/nonclinical/ nursing providers based on care coordination needs.     Labs:     Recent Labs     04/27/24  0114 04/28/24  0235   WBC 17.3* 16.6*   HGB 8.6* 8.4*   HCT 29.3* 27.5*    250     Recent Labs     04/26/24  0417 04/27/24  0114 04/28/24  0235    136 138   K 3.8 3.8 3.5    103 103   CO2 29 27 29   BUN 28* 31* 34*   MG  --   --  1.6     Recent Labs     04/28/24  0235   ALT 9*   GLOB 4.7*     No results for input(s): \"INR\", \"APTT\" in the last 72 hours.    Invalid input(s): \"PTP\"   No results for input(s): \"TIBC\", \"FERR\" in the last 72 hours.    Invalid input(s): \"FE\", \"PSAT\"   No results found for: \"FOL\", \"RBCF\"   No results for input(s): \"PH\", \"PCO2\", \"PO2\" in the last 72 hours.  No results for input(s): \"CPK\" in the last 72 hours.    Invalid input(s): \"CPKMB\", \"CKNDX\", \"TROIQ\"  Lab Results   Component Value Date/Time    CHOL 84 04/22/2024 06:05 AM    HDL 16 04/22/2024 06:05 AM     No results found for: \"GLUCPOC\"  [unfilled]      Medications Reviewed:     Current Facility-Administered Medications   Medication Dose Route Frequency    bumetanide (BUMEX) injection 2 mg  2 mg IntraVENous TID    doxycycline hyclate (VIBRA-TABS) tablet 100 mg  100 mg Oral 2 times per day    0.9 % sodium chloride infusion   IntraVENous PRN    potassium bicarb-citric acid (EFFER-K) effervescent tablet 20 mEq  20 mEq Oral Daily    pantoprazole (PROTONIX) 40 mg in sodium chloride (PF) 0.9 % 10 mL injection  40 mg IntraVENous Q12H    ipratropium (ATROVENT) 0.02 % nebulizer solution 0.5 mg  0.5 mg Nebulization BID RT  dextrose bolus 10% 125 mL  125 mL IntraVENous PRN    Or    dextrose bolus 10% 250 mL  250 mL IntraVENous PRN    glucagon injection 1 mg  1 mg SubCUTAneous PRN    dextrose 10 % infusion   IntraVENous Continuous PRN    sodium chloride flush 0.9 % injection 5-40 mL  5-40 mL IntraVENous 2 times per day    sodium chloride flush 0.9 % injection 5-40 mL  5-40 mL IntraVENous PRN    0.9 % sodium chloride infusion   IntraVENous PRN    potassium chloride (KLOR-CON) extended release tablet 40 mEq  40 mEq Oral PRN    Or    potassium bicarb-citric acid (EFFER-K) effervescent tablet 40 mEq  40 mEq Oral PRN    Or    potassium chloride 10 mEq/100 mL IVPB (Peripheral Line)  10 mEq IntraVENous PRN    magnesium sulfate 2000 mg in 50 mL IVPB premix  2,000 mg IntraVENous PRN    ondansetron (ZOFRAN-ODT) disintegrating tablet 4 mg  4 mg Oral Q8H PRN    Or    ondansetron (ZOFRAN) injection 4 mg  4 mg IntraVENous Q6H PRN    polyethylene glycol (GLYCOLAX) packet 17 g  17 g Oral Daily PRN    acetaminophen (TYLENOL) tablet 650 mg  650 mg Oral Q6H PRN    Or    acetaminophen (TYLENOL) suppository 650 mg  650 mg Rectal Q6H PRN    acetaminophen (TYLENOL) tablet 1,000 mg  1,000 mg Oral Q6H    [Held by provider] allopurinol (ZYLOPRIM) tablet 100 mg  100 mg Oral Daily    buPROPion (WELLBUTRIN SR) extended release tablet 150 mg  150 mg Oral Daily    ferrous sulfate (IRON 325) tablet 325 mg  325 mg Oral Daily with breakfast    hydrOXYzine HCl (ATARAX) tablet 10 mg  10 mg Oral Q6H PRN    [Held by provider] insulin NPH (HumuLIN N;NovoLIN N) injection vial 15 Units  15 Units SubCUTAneous BID AC    insulin lispro (HUMALOG) injection vial 0-8 Units  0-8 Units SubCUTAneous TID WC    insulin lispro (HUMALOG) injection vial 0-4 Units  0-4 Units SubCUTAneous Nightly     ______________________________________________________________________  EXPECTED LENGTH OF STAY: 10  ACTUAL LENGTH OF STAY:          13                 Chte Stevens MD

## 2024-04-28 NOTE — PLAN OF CARE
Problem: Safety - Adult  Goal: Free from fall injury  4/28/2024 1037 by Daniel Simms RN  Outcome: Progressing  4/28/2024 0018 by Nelson Khan RN  Outcome: Progressing     Problem: Discharge Planning  Goal: Discharge to home or other facility with appropriate resources  4/28/2024 0018 by Nelson Khan RN  Outcome: Progressing     Problem: Pain  Goal: Verbalizes/displays adequate comfort level or baseline comfort level  4/28/2024 1037 by Daniel Simms RN  Outcome: Progressing  4/28/2024 0018 by Nelson Khan RN  Outcome: Progressing     Problem: ABCDS Injury Assessment  Goal: Absence of physical injury  4/28/2024 0018 by Nelson Khan RN  Outcome: Progressing     Problem: Chronic Conditions and Co-morbidities  Goal: Patient's chronic conditions and co-morbidity symptoms are monitored and maintained or improved  4/28/2024 1037 by Daniel Simms RN  Outcome: Progressing  4/28/2024 0018 by Nelson Khan RN  Outcome: Progressing     Problem: Skin/Tissue Integrity  Goal: Absence of new skin breakdown  Description: 1.  Monitor for areas of redness and/or skin breakdown  2.  Assess vascular access sites hourly  3.  Every 4-6 hours minimum:  Change oxygen saturation probe site  4.  Every 4-6 hours:  If on nasal continuous positive airway pressure, respiratory therapy assess nares and determine need for appliance change or resting period.  4/28/2024 1037 by Daniel Simms RN  Outcome: Progressing  4/28/2024 0018 by Nelson Khan RN  Outcome: Progressing     Problem: Nutrition Deficit:  Goal: Optimize nutritional status  4/28/2024 0018 by Nelson Khan RN  Outcome: Progressing

## 2024-04-29 LAB
BACTERIA SPEC CULT: NORMAL
COMMENT:: NORMAL
GLUCOSE BLD STRIP.AUTO-MCNC: 108 MG/DL (ref 65–117)
GLUCOSE BLD STRIP.AUTO-MCNC: 121 MG/DL (ref 65–117)
GLUCOSE BLD STRIP.AUTO-MCNC: 136 MG/DL (ref 65–117)
GLUCOSE BLD STRIP.AUTO-MCNC: 139 MG/DL (ref 65–117)
HCT VFR BLD AUTO: 28.1 % (ref 35–47)
HGB BLD-MCNC: 8.7 G/DL (ref 11.5–16)
SERVICE CMNT-IMP: ABNORMAL
SERVICE CMNT-IMP: NORMAL
SERVICE CMNT-IMP: NORMAL
SPECIMEN HOLD: NORMAL
UFH PPP CHRO-ACNC: 0.42 IU/ML

## 2024-04-29 PROCEDURE — 6370000000 HC RX 637 (ALT 250 FOR IP): Performed by: NURSE PRACTITIONER

## 2024-04-29 PROCEDURE — 6370000000 HC RX 637 (ALT 250 FOR IP): Performed by: INTERNAL MEDICINE

## 2024-04-29 PROCEDURE — 6360000002 HC RX W HCPCS: Performed by: NURSE PRACTITIONER

## 2024-04-29 PROCEDURE — 36415 COLL VENOUS BLD VENIPUNCTURE: CPT

## 2024-04-29 PROCEDURE — 85014 HEMATOCRIT: CPT

## 2024-04-29 PROCEDURE — 6360000002 HC RX W HCPCS: Performed by: STUDENT IN AN ORGANIZED HEALTH CARE EDUCATION/TRAINING PROGRAM

## 2024-04-29 PROCEDURE — 6370000000 HC RX 637 (ALT 250 FOR IP): Performed by: STUDENT IN AN ORGANIZED HEALTH CARE EDUCATION/TRAINING PROGRAM

## 2024-04-29 PROCEDURE — 6360000002 HC RX W HCPCS: Performed by: INTERNAL MEDICINE

## 2024-04-29 PROCEDURE — 2060000000 HC ICU INTERMEDIATE R&B

## 2024-04-29 PROCEDURE — 97530 THERAPEUTIC ACTIVITIES: CPT

## 2024-04-29 PROCEDURE — 97535 SELF CARE MNGMENT TRAINING: CPT

## 2024-04-29 PROCEDURE — 6370000000 HC RX 637 (ALT 250 FOR IP): Performed by: HOSPITALIST

## 2024-04-29 PROCEDURE — 85018 HEMOGLOBIN: CPT

## 2024-04-29 PROCEDURE — 97605 NEG PRS WND THER DME<=50SQCM: CPT

## 2024-04-29 PROCEDURE — C9113 INJ PANTOPRAZOLE SODIUM, VIA: HCPCS | Performed by: STUDENT IN AN ORGANIZED HEALTH CARE EDUCATION/TRAINING PROGRAM

## 2024-04-29 PROCEDURE — 2500000003 HC RX 250 WO HCPCS

## 2024-04-29 PROCEDURE — 97116 GAIT TRAINING THERAPY: CPT

## 2024-04-29 PROCEDURE — 99232 SBSQ HOSP IP/OBS MODERATE 35: CPT | Performed by: CLINICAL NURSE SPECIALIST

## 2024-04-29 PROCEDURE — 85520 HEPARIN ASSAY: CPT

## 2024-04-29 PROCEDURE — 99232 SBSQ HOSP IP/OBS MODERATE 35: CPT | Performed by: INTERNAL MEDICINE

## 2024-04-29 PROCEDURE — 82962 GLUCOSE BLOOD TEST: CPT

## 2024-04-29 PROCEDURE — 94640 AIRWAY INHALATION TREATMENT: CPT

## 2024-04-29 PROCEDURE — 2580000003 HC RX 258: Performed by: HOSPITALIST

## 2024-04-29 PROCEDURE — 2580000003 HC RX 258: Performed by: INTERNAL MEDICINE

## 2024-04-29 PROCEDURE — 2700000000 HC OXYGEN THERAPY PER DAY

## 2024-04-29 PROCEDURE — 6370000000 HC RX 637 (ALT 250 FOR IP): Performed by: PHYSICIAN ASSISTANT

## 2024-04-29 PROCEDURE — 2580000003 HC RX 258: Performed by: STUDENT IN AN ORGANIZED HEALTH CARE EDUCATION/TRAINING PROGRAM

## 2024-04-29 PROCEDURE — A4216 STERILE WATER/SALINE, 10 ML: HCPCS | Performed by: STUDENT IN AN ORGANIZED HEALTH CARE EDUCATION/TRAINING PROGRAM

## 2024-04-29 RX ORDER — BUMETANIDE 1 MG/1
2 TABLET ORAL 2 TIMES DAILY
Status: DISCONTINUED | OUTPATIENT
Start: 2024-04-29 | End: 2024-05-03 | Stop reason: HOSPADM

## 2024-04-29 RX ADMIN — Medication: at 09:51

## 2024-04-29 RX ADMIN — NYSTATIN 500000 UNITS: 100000 SUSPENSION ORAL at 17:57

## 2024-04-29 RX ADMIN — BUMETANIDE 2 MG: 1 TABLET ORAL at 17:57

## 2024-04-29 RX ADMIN — DOXYCYCLINE HYCLATE 100 MG: 100 TABLET, COATED ORAL at 09:53

## 2024-04-29 RX ADMIN — GABAPENTIN 300 MG: 300 CAPSULE ORAL at 17:56

## 2024-04-29 RX ADMIN — GUAIFENESIN 200 MG: 200 SOLUTION ORAL at 05:31

## 2024-04-29 RX ADMIN — FERROUS SULFATE TAB 325 MG (65 MG ELEMENTAL FE) 325 MG: 325 (65 FE) TAB at 09:53

## 2024-04-29 RX ADMIN — ASPIRIN 81 MG CHEWABLE TABLET 81 MG: 81 TABLET CHEWABLE at 09:53

## 2024-04-29 RX ADMIN — SERTRALINE HYDROCHLORIDE 150 MG: 50 TABLET ORAL at 21:16

## 2024-04-29 RX ADMIN — MIDODRINE HYDROCHLORIDE 10 MG: 5 TABLET ORAL at 17:56

## 2024-04-29 RX ADMIN — ARFORMOTEROL TARTRATE: 15 SOLUTION RESPIRATORY (INHALATION) at 07:37

## 2024-04-29 RX ADMIN — POTASSIUM BICARBONATE 20 MEQ: 782 TABLET, EFFERVESCENT ORAL at 09:53

## 2024-04-29 RX ADMIN — Medication 1 MG: at 09:53

## 2024-04-29 RX ADMIN — MIDODRINE HYDROCHLORIDE 10 MG: 5 TABLET ORAL at 13:53

## 2024-04-29 RX ADMIN — SODIUM CHLORIDE, PRESERVATIVE FREE 10 ML: 5 INJECTION INTRAVENOUS at 21:16

## 2024-04-29 RX ADMIN — NYSTATIN 500000 UNITS: 100000 SUSPENSION ORAL at 09:52

## 2024-04-29 RX ADMIN — Medication: at 21:16

## 2024-04-29 RX ADMIN — MIDODRINE HYDROCHLORIDE 10 MG: 5 TABLET ORAL at 09:53

## 2024-04-29 RX ADMIN — WATER 2000 MG: 1 INJECTION INTRAMUSCULAR; INTRAVENOUS; SUBCUTANEOUS at 09:52

## 2024-04-29 RX ADMIN — SODIUM CHLORIDE, PRESERVATIVE FREE 10 ML: 5 INJECTION INTRAVENOUS at 09:51

## 2024-04-29 RX ADMIN — HEPARIN SODIUM 15 UNITS/KG/HR: 10000 INJECTION, SOLUTION INTRAVENOUS at 17:00

## 2024-04-29 RX ADMIN — ACETAMINOPHEN 1000 MG: 325 TABLET ORAL at 13:53

## 2024-04-29 RX ADMIN — DOXYCYCLINE HYCLATE 100 MG: 100 TABLET, COATED ORAL at 21:16

## 2024-04-29 RX ADMIN — NYSTATIN 500000 UNITS: 100000 SUSPENSION ORAL at 21:16

## 2024-04-29 RX ADMIN — PANTOPRAZOLE SODIUM 40 MG: 40 INJECTION, POWDER, FOR SOLUTION INTRAVENOUS at 09:51

## 2024-04-29 RX ADMIN — NYSTATIN 500000 UNITS: 100000 SUSPENSION ORAL at 13:53

## 2024-04-29 RX ADMIN — IPRATROPIUM BROMIDE 0.5 MG: 0.5 SOLUTION RESPIRATORY (INHALATION) at 07:37

## 2024-04-29 RX ADMIN — HEPARIN SODIUM 15 UNITS/KG/HR: 10000 INJECTION, SOLUTION INTRAVENOUS at 01:39

## 2024-04-29 RX ADMIN — IPRATROPIUM BROMIDE 0.5 MG: 0.5 SOLUTION RESPIRATORY (INHALATION) at 20:34

## 2024-04-29 RX ADMIN — ACETAMINOPHEN 1000 MG: 325 TABLET ORAL at 17:56

## 2024-04-29 RX ADMIN — BUPROPION HYDROCHLORIDE 150 MG: 150 TABLET, EXTENDED RELEASE ORAL at 09:53

## 2024-04-29 RX ADMIN — BUMETANIDE 2 MG: 0.25 INJECTION INTRAMUSCULAR; INTRAVENOUS at 09:54

## 2024-04-29 RX ADMIN — ARFORMOTEROL TARTRATE: 15 SOLUTION RESPIRATORY (INHALATION) at 20:34

## 2024-04-29 ASSESSMENT — PAIN SCALES - GENERAL
PAINLEVEL_OUTOF10: 0
PAINLEVEL_OUTOF10: 0

## 2024-04-29 NOTE — CARE COORDINATION
Signed         Transition of Care Plan:     RUR: 26%  Prior Level of Functioning: Assistance with ADL's  Disposition: [facility] vs SNF  Possible return to [facility] IPR  SNF plan initiated for back up:  *[facility] and [facility] are the choices.    If SNF or IPR: Date FOC offered: 4/29/24  Date FOC received: 4/29/24  Accepting facility: [facility]  Date authorization started with reference number: n/a  Date authorization received and expires: n/a  Follow up appointments: PCP, Specialty   DME needed: none  Transportation at discharge: BLS  IM/IMM Medicare/ letter given: 4/19/24  Caregiver Contact: Breonna Barnes / Child / [phone]   Discharge Caregiver contacted prior to discharge? N/a  Care Conference needed? N/a  Barriers to discharge: I&D pending    Adiel spoke with pt's daughter, Breonna, this morning. Choices for IPR and SNF discussed and choice offered. She would like a referral for IPR to be sent back to [facility] and back up SNF choices are [facility] and [facility]. Adiel sent referrals to all three facilities via [facility] and [employer].Deborah Patton, BSW,ACM-SW

## 2024-04-29 NOTE — PLAN OF CARE
Oriented X4  Cognition  Overall Cognitive Status: WFL    Functional Mobility Training:  Bed Mobility:  Bed Mobility Training  Bed Mobility Training: Yes  Rolling: Moderate assistance;Assist X2  Supine to Sit: Moderate assistance;Assist X2  Sit to Supine: Maximum assistance;Assist X2  Transfers:  Transfer Training  Transfer Training: Yes  Interventions: Safety awareness training;Verbal cues;Tactile cues;Manual cues;Visual cues  Sit to Stand: Moderate assistance;Assist X2  Stand to Sit: Moderate assistance;Assist X2  Bed to Chair: Minimum assistance;Assist X2  Toilet Transfer: Minimum assistance;Assist X2  Balance:  Balance  Sitting: Impaired  Sitting - Static: Good (unsupported);Fair (occasional)  Sitting - Dynamic: Fair (occasional)  Standing: Impaired  Standing - Static: Fair;Good;Constant support  Standing - Dynamic: Fair;Constant support   Ambulation/Gait Training:     Gait  Gait Training: Yes  Overall Level of Assistance: Minimum assistance;Assist X2  Distance (ft): 3 Feet (side steps bed <> BSC)  Assistive Device: Walker, rolling;Gait belt  Interventions: Safety awareness training;Verbal cues;Visual cues;Tactile cues  Base of Support: Widened;Center of gravity altered  Speed/Analy: Slow;Shuffled  Step Length: Left shortened;Right shortened  Gait Abnormalities: Step to gait;Shuffling gait;Trunk sway increased (posterior lean)  Neuro Re-Education:                                                                                                                                                                                                                                         Intervention/Education specific to: \"Spinal fusion or laminectomy\"    Educated on and reviewed log roll technique for bed mobility.  The patient stated 3/3 spinal precautions when prompted. Reviewed all 3 precautions, encouraged gait as tolerated at discharge, and discussed sitting for approximately 30 minutes before repositioning.                                                                                                                                                                                                                               Pain Ratin/10   Pain Intervention(s):   pain is at a level acceptable to the patient    Activity Tolerance:   Good, requires rest breaks, observed shortness of breath on exertion, and desaturates with activity and requires oxygen    After treatment:   Patient left in no apparent distress in bed and placed in chair position., Call bell within reach, Bed/ chair alarm activated, Side rails x3, and Heels elevated for pressure relief      COMMUNICATION/EDUCATION:   The patient's plan of care was discussed with: occupational therapist and registered nurse    Patient Education  Education Given To: Patient  Education Provided: Energy Conservation;Plan of Care;Transfer Training;Equipment;Fall Prevention Strategies;Orientation;Precautions  Education Provided Comments: BLT spinal precautions  Education Method: Verbal;Demonstration  Barriers to Learning: None  Education Outcome: Verbalized understanding;Demonstrated understanding;Continued education needed      Liz Schultz PT, DPT  Minutes: 45

## 2024-04-29 NOTE — PROGRESS NOTES
Spine Surgery Progress Note  Nickie Baez PA-C          Admit Date: 4/15/2024   LOS: 14 days        Daily Progress Note: 2024    POD:7 Days Post-Op    S/P: Procedure(s):  BACK WOUND INCISION AND DRAINAGE (EIP 0730)    Subjective:     Pt seen and examined. She denies back pain but does state she has not been out of bed yet. She is able to move her legs in bed and roll herself over.   Incision is draining.   Her main complaint today is chest congestion. Back pain is well-controlled; she denies radicular symptoms. She denies numbness, tingling, leg pain, nausea, vomiting, headache, fever, chills.  Pt resting comfortably in bed.    Objective:     Vital signs  Temp (24hrs), Av.5 °F (36.4 °C), Min:97.4 °F (36.3 °C), Max:97.8 °F (36.6 °C)   No intake/output data recorded.   1901 -  0700  In: -   Out: 1830 [Urine:1830]    BP (!) 120/52   Pulse 71   Temp 97.4 °F (36.3 °C) (Oral)   Resp 16   Ht 1.676 m (5' 5.98\")   Wt 103.8 kg (228 lb 14.4 oz)   SpO2 92%   BMI 36.96 kg/m²            Physical Exam:  Gen: No acute distress.   Neuro: A&Ox3. Follows commands. Speech clear. Affect normal.  WALL spontaneously. 4+/5 strength BLE ankle DF/PF. Able to briefly lift right leg off bed. Difficulty lifting left leg off bed.  Sensation intact.  Gait deferred.  Calves soft and supple; No pain with passive stretch  Skin: incision intact with nylon sutures; it is erythematous and has yellow drainage actively draining from two spots, one proximally and one in the middle    24 hour results:    Recent Results (from the past 24 hour(s))   POCT Glucose    Collection Time: 24 12:22 PM   Result Value Ref Range    POC Glucose 161 (H) 65 - 117 mg/dL    Performed by: CLAUDIA Negrita   PCT    POCT Glucose    Collection Time: 24  5:14 PM   Result Value Ref Range    POC Glucose 137 (H) 65 - 117 mg/dL    Performed by: CLAUDIA Negrita   PCT    POCT Glucose    Collection Time: 24  8:36 PM   Result Value Ref Range

## 2024-04-29 NOTE — PROGRESS NOTES
Hospitalist Progress Note  Terry Akhtar MD  Answering service: [phone]        Date of Service:  2024  NAME:  Leny Barnes  :  1945  MRN:  [id number]      Admission Summary:   Patient returned to [facility] from [facility] for wound dehiscence on 4/15/2022.  She was recently discharged from here to [facility] on 3/22.  She had lumbar laminectomy on 3/18.  She was initially admitted to the floor under  services for sepsis from infected wound with dehiscence, PERRY on CKD.  Patient was scheduled for debridement but canceled by anesthesia due to respiratory difficulty on   On  patient was noticed to have word finding difficulty, drooping left eyelid  On , she was transferred to ICU for acute hypoxic respiratory failure.  On , she was started on urgent hemodialysis for persistent volume overload.   patient deemed to be clinically and hemodynamically stable to transfer out of the ICU.  She has been off of vasopressors since .\  At the time of transfer out of ICU patient was alert and oriented x 3, Reports dry cough x past few days        Interval history / Subjective:   Follow up patient with GNR(Serratia Marcescens) bacteremia/AHRF volume overload PERRY on CKD,acute on chronic anemia s/p 1U PRBC ( )  Laying on her left side  Looks slightly SOB but feels fine  Dy cough  On 2l NC  No acute complaints          Assessment & Plan:      Septic shock, persistent noted maintenance and drainage, Serratia marcescens bacteremia  -Off vasopressors since , remained hemodynamically stable.  -Continue IV ceftriaxone for bacteremia   -Appreciate ID, needs 6 weeks of IV antibiotics and then indefinite PO suppression afterwards  -Appreciate Ortho, could benefit from wound vac     Acute hypoxic respiratory failure due to persistent volume overload  Acute HFpEF  Aspiration pneumonia/Multilobar  airspace disease  -Improved, fluid overload treated with hemodialysis, last HD 04/23  -Currently on oxygen via nasal cannula , down to 2 L/min  -Continue to wean down oxygen, respiratory hygiene.  -CXR 04/26 stable     CAD, PAF status post ablation and pacemaker  -Anticoagulated with apixaban, held.  IV heparin.  -On midodrine.     PERRY on CKD--improving  -Status post emergent dialysis initiated on 4/22, showing renal recovery and dialysis on hold, continue oral Bumex with decreased dose.    -Mendez removed 4/27  -Continue Smith for strict input and output monitoring and plan to remove Mendez over the weekend or Monday if creatinine/fluid status remained stable.     DM2, insulin-dependent  -Long-acting insulin on hold.  Hyperglycemic management with AC&HS, Humalog correction, hypoglycemic protocol     Acute on chronic severe anemia.  Hb stable 8.4  S/p status post 1 unit of PRBC 04/26 o overt blood loss.   stool occult ordered, Apixaban on hold, on heparin drip.  -Switch to Apixaban at discharge     Strokelike symptoms noticed on 4/21.  Neuroimaging including MRI negative for acute stroke or other acute processes.  Neurology on board.     FREDERICK on CPAP        Code status: Full  Prophylaxis: Heparin drip    Plan: continue antibiotics, likely discharge 1-2 days to rehab on IV antibiotics, would need emy  Care Plan discussed with: Patient and RN  Anticipated Disposition: IPR versus SNF  Central Line:                Review of Systems:   Pertinent items are noted in HPI.         Vital Signs:    Last 24hrs VS reviewed since prior progress note. Most recent are:  Vitals:    04/29/24 1820   BP:    Pulse: 71   Resp:    Temp:    SpO2:          Intake/Output Summary (Last 24 hours) at 4/29/2024 1820  Last data filed at 4/29/2024 0500  Gross per 24 hour   Intake --   Output 1250 ml   Net -1250 ml          Physical Examination:     I had a face to face encounter with this patient and independently examined them on 4/29/2024 as

## 2024-04-29 NOTE — PROGRESS NOTES
Referral source:   Leny Barnes at [facility] in [facility] 4 IMCU 2.  attended rounds in the IMCU as part of the Interdisciplinary team where the patient's ongoing care was discussed. I reviewed the medical record as part of this encounter.     Outcome: Interdisciplinary team are aware of  availability and were encouraged to request support as needed.      Advised nurse to contact [facility] for any further referrals.The  on-call can be reached at [phone]).     Rev. Freida Dominguez MDiv, [facility]  Staff

## 2024-04-29 NOTE — PROGRESS NOTES
[address], [place] [address]  Phone: [phone]   [phone]    [email or web address]     Nephrology Progress Note    Patient Name : Leny Barnes      : 1945     MRN : [id number]  Date of Admission : 4/15/2024  Date of Servive : 24    CC:  Follow up for CKD    Assessment and Plan   PERRY on CKD  - ATN vs ATIN vs post infectious GN   - s/p Mendez and emergent HD on   - s/p HD X 2. Last HD 4/23 x 2 treatments, Mendez removed:  - Creatinine stable non-oliguric 1.4L UOP  - decrease Bumex 2 mg BID orally  - continue with holly    Hypokalemia   - resolved    Severe anemia   - s/p PRBC   - Hb stable    Severe Hypoxic Resp Failure   Acute HFpEF   Asp PNA  Pleural effusions, edema   encephalopathy  - per [facility]    CKD 3a:  - from DM2 and HTN  - baseline Cr around 1.5 to 1.7     Sepsis w/ Bacteremia  -3/18  s/p Laminectomy with dehiscence of wound  - 4/15 BC (+) serratia  - ID following  and adjusting abx  -  MRI (+) fluid/ gas, s/p I+D on      CAD  PAF s/p ablation and pacemaker  DM  FREDERICK on CPAP       Interval History:  Seen and examined in NAD- continues with good UOP- Creatinine stable. Denies discomfort.    Review of Systems: A comprehensive review of systems was negative.    Current Medications:   Current Facility-Administered Medications   Medication Dose Route Frequency    bumetanide (BUMEX) injection 2 mg  2 mg IntraVENous TID    doxycycline hyclate (VIBRA-TABS) tablet 100 mg  100 mg Oral 2 times per day    0.9 % sodium chloride infusion   IntraVENous PRN    potassium bicarb-citric acid (EFFER-K) effervescent tablet 20 mEq  20 mEq Oral Daily    pantoprazole (PROTONIX) 40 mg in sodium chloride (PF) 0.9 % 10 mL injection  40 mg IntraVENous Q12H    ipratropium (ATROVENT) 0.02 % nebulizer solution 0.5 mg  0.5 mg Nebulization BID RT    midodrine (PROAMATINE) tablet 10 mg  10 mg Oral TID WC    calcium carbonate (TUMS)

## 2024-04-29 NOTE — PLAN OF CARE
Problem: Safety - Adult  Goal: Free from fall injury  Outcome: Progressing     Problem: Discharge Planning  Goal: Discharge to home or other facility with appropriate resources  Outcome: Progressing     Problem: Pain  Goal: Verbalizes/displays adequate comfort level or baseline comfort level  Outcome: Progressing     Problem: ABCDS Injury Assessment  Goal: Absence of physical injury  Outcome: Progressing     Problem: Chronic Conditions and Co-morbidities  Goal: Patient's chronic conditions and co-morbidity symptoms are monitored and maintained or improved  Outcome: Progressing     Problem: Skin/Tissue Integrity  Goal: Absence of new skin breakdown  Description: 1.  Monitor for areas of redness and/or skin breakdown  2.  Assess vascular access sites hourly  3.  Every 4-6 hours minimum:  Change oxygen saturation probe site  4.  Every 4-6 hours:  If on nasal continuous positive airway pressure, respiratory therapy assess nares and determine need for appliance change or resting period.  Outcome: Progressing     Problem: Nutrition Deficit:  Goal: Optimize nutritional status  Outcome: Progressing

## 2024-04-29 NOTE — PLAN OF CARE
Problem: Occupational Therapy - Adult  Goal: By Discharge: Performs self-care activities at highest level of function for planned discharge setting.  See evaluation for individualized goals.  Description: FUNCTIONAL STATUS PRIOR TO ADMISSION:   Patient with several recent admissions for similar presentation. Re-discharged to [facility] on 4/5 and re-admitted to [facility] 4/15 with significant wound dehiscence and bloody drainage. Patient with planned debridement 4/19, however deferred due to increased WOB requiring BiPAP. Patient transferred to ICU 4/21 for acute hypoxic respiratory distress and placement of a dialysis catheter in preparation for hemodialysis. Patient went to OR 4/22 for I&D of spinal wound. Prior to sx 3/19/2024 she was modified independent for basic ADL's and used adaptive equipment for LE dressing    Receives Help From: Family,  ,  ,  ,  ,  ,  , Homemaking Assistance: Independent, Ambulation Assistance: Independent, Transfer Assistance: Independent, Active : No,   HOME SUPPORT: Patient lived alone with daughter to provide assistance.    Occupational Therapy Goals:  Initiated 4/23/2024  1.  Patient will perform left UE AROM elbow flexion to > 110 degrees in supine within 7 day(s).  2.  Patient will attend to left side > or = 1 minute within 7 day(s).  3.  Patient will wash her face with right hand seated edge of bed with Supervision within 7 day(s).  4.  Patient will initiate use of bilateral hands on bed to assist with static sitting balance and scooting with Supervision  within 7 day(s).  5.  Patient will perform stand pivot transfer to bedside commode with  Minimal Assist  x's 2 within 7 day(s).  6.  Patient will participate in upper extremity therapeutic exercise/activities with Supervision for > or = 5  minutes within 7 day(s).    Outcome: Progressing     OCCUPATIONAL THERAPY TREATMENT  Patient: Leny Barnes (79 y.o. female)  Date: 4/29/2024  Primary Diagnosis: Wound infection

## 2024-04-29 NOTE — PROGRESS NOTES
Infectious Disease Progress Note     Subjective:      Afebrile, feels better, c/o dry cough and Mendez since removed    Objective:    Vitals:   Patient Vitals for the past 24 hrs:   Temp Pulse Resp BP SpO2   04/29/24 1210 -- 70 -- -- --   04/29/24 1100 97.4 °F (36.3 °C) 70 18 (!) 136/58 95 %   04/29/24 1043 -- 71 -- -- --   04/29/24 0954 -- -- -- (!) 120/52 --   04/29/24 0600 -- 71 -- -- --   04/29/24 0530 97.4 °F (36.3 °C) 70 16 129/68 92 %   04/29/24 0359 -- 70 -- -- --   04/29/24 0302 97.5 °F (36.4 °C) 71 17 (!) 120/55 96 %   04/29/24 0200 -- 70 -- -- --   04/28/24 2321 97.8 °F (36.6 °C) 71 18 138/69 96 %   04/28/24 2200 -- 70 -- -- --   04/28/24 2035 97.6 °F (36.4 °C) 70 20 (!) 130/53 90 %   04/28/24 2000 -- 71 -- -- --   04/28/24 1901 97.5 °F (36.4 °C) 70 19 (!) 128/51 95 %   04/28/24 1838 -- 70 -- -- --   04/28/24 1518 97.4 °F (36.3 °C) 70 17 (!) 125/48 95 %   04/28/24 1430 -- 70 -- -- --         Physical Exam:  Gen: on NC, NAD  HEENT:  Normocephalic, atraumatic   CV: normal rate  Lungs: improved effort  Abdomen: soft, non tender, non distended  Genitourinary: holly catheter present clear urine  Skin: spinal surgical site dressed  Psych: follows commands  Neuro: moving all extremities   Musculoskeletal:  much improved b/l LE edema    Central Line:   none, Mendez catheter removed, dressing over former RIJ dsite    Medications:    Current Facility-Administered Medications:     bumetanide (BUMEX) injection 2 mg, 2 mg, IntraVENous, TID, Luigi Gloria MD, 2 mg at 04/29/24 0954    doxycycline hyclate (VIBRA-TABS) tablet 100 mg, 100 mg, Oral, 2 times per day, Chet tSevens MD, 100 mg at 04/29/24 0953    0.9 % sodium chloride infusion, , IntraVENous, PRN, Tracy Corona MD    potassium bicarb-citric acid (EFFER-K) effervescent tablet 20 mEq, 20 mEq, Oral, Daily, Juan Toledo MD, 20 mEq at 04/29/24 0953    pantoprazole (PROTONIX) 40 mg in sodium chloride (PF) 0.9 % 10 mL injection, 40

## 2024-04-29 NOTE — WOUND CARE
WOCN Note:     Follow up for sacrum and to initiate NPWT to spine.  Seen in 422    79 y.o. y/o female admitted on 4/15/2024   Admitted for spinal dehiscence with surgical debridement 4/23/24 with primary closure    Past Medical History:   Diagnosis Date    Arthritis 1990    Asthma     Atrial fibrillation ([facility])     NONE SINCE PACEMAKER    CAD (coronary artery disease) 1996    Cancer ([facility]) 2001    UTERINE    Chronic kidney disease (CKD), stage III (moderate) ([facility]) 2023    Compression fracture of L1 vertebra ([facility]) 10/2023    FELL AT HOME    Congestive heart failure ([facility]) 2020    Pacemaker    Depression     DM (diabetes mellitus) ([facility])     HTN (hypertension)     Hyperlipidemia     Morbid obesity ([facility])     Neuropathy     FREDERICK on CPAP     WEARS 02 2LNC AT NIGHT     WBC = 16.6  On Rocephin  Diet: ADULT DIET; Regular; 3 carb choices (45 gm/meal)  ADULT ORAL NUTRITION SUPPLEMENT; Lunch, Dinner; Low Calorie/High Protein Oral Supplement           Assessment:   Communicative and tolerates wound care well.  Requires full assists in repositioning.    Has a Smith.    Surface: CHRIS mattress    Heels offloaded with pillows.  Stable deflated dark area on right lateral toe left open to air    1.  scattered dark areas to buttocks with dry desquamation now resolving   Venelex and foam in use       2. Spinal incision  Large amount of creamy tan exudate from middle of incision  Incision covered fully with Mepitel One and then black sponge which was bridged to her upper back.  125 mmHg suction reached    Recommendations:    Buttocks: continue venelex twice daily and cover with foam  Spine: maintain VAC as ordered @ 125 mmHg suction with twice weekly dressing changes  Turn/reposition approximately every 2 hours  Offload heels with heels hanging off end of pillow at all times while in bed. Venelex twice daily  Low Air Loss mattress: Use only flat sheet and one incontinence pad.     Transition of Care: Plan to follow weekly and as needed while

## 2024-04-29 NOTE — DIABETES MGMT
[facility]  PROGRAM FOR DIABETES HEALTH  DIABETES MANAGEMENT CONSULT    Consulted by Provider for advanced nursing evaluation and care for inpatient blood glucose management.    Evaluation and Action Plan   This 79 year old  female was admitted 4/15/24 from ER after experiencing wound dehiscence, post lumbar laminectomy (3/8/24), and acute blood loss anemia. Multiple services consulted. On Abx. Required re-exploration of operative site. Moved to ICU 4/21/24. Blood transfusion+ with dark bloody stools. On Heparin infusion. Now with congested cough without expectorant. CXR (4/26/24): Stable diffuse interstitial infiltrates status post extubation     As for Bg control, patient has known Type 2 diabetes treated with insulin therapy PTA. Patient cannot afford Jardiance so she has not been taking. She uses a continuous glucose monitoring to assess BG control. Her admission BG 4/15/24 was in target range and she was given her usual NPH insulin dose that evening. She has not received insulin since that time. Minimal oral intake. Will continue to use corrective insulin. When she transitions off oxygen therapy, could use low-dose metformin or a DPP-4 agent at discharge.    Management Rationale Action Plan   Medication   Corrective insulin Based on sensitivity  []        Low   [x]        Medium  []        High      Additional orders   45 gram carb-controlled meals     Diabetes Discharge Plan   Medication  TBD     Referral  []        Outpatient diabetes education   Additional orders            Initial Presentation   Virginia ÁNGELA Barnes is a 79 y.o. female admitted 4/15/24 from ER after experiencing wound dehiscence, post lumbar laminectomy (3/8/24), and acute blood loss anemia. Came back to [facility] on 4/1/24 before going back to [facility].   LAB: COVID & Flu negative. Blood cultures negative. Hyponatremia. PERRY/eGFR 37. . CRP 25.4. Albumin 1.8. Normal liver enzymes. WBC 17.2. Low RBC, H&H. Normal platelets  CXR: There  Zoloft   A1cs inaccurate going forward X3 months  Weight gain increases insulin resistance   Pain Scheduled Neurontin    Infection Rocephin QD  Vibra-Tabs Q12 hrs  Nystatin to mouth Afebrile  WBC elevated   Other:   Kidney function  Liver function   PERRY  Normal liver enzymes      Blood glucose pattern      Significant diabetes-related events  4/15/24 Admission  => NPH insulin 15 units given plus corrective. PERRY  4/16/24 . PERRY. No insulin yesterday  4/17/24   4/25/24 Bgs rising into the high 100s - low 200s  4/26/24 Bgs in 100-180 range  4/29/24 Not eating much. Not requiring corrective isnulin. Bgs in 100-160s    Assessment and Nursing Intervention   Nursing Diagnosis Risk for unstable blood glucose pattern   Nursing Intervention Domain 5250 Decision-making Support   Nursing Interventions Examined current inpatient diabetes/blood glucose control   Explored factors facilitating and impeding inpatient management  Explored corrective strategies with patient and responsible inpatient provider   Informed patient of rational for insulin strategy while hospitalized     Billing Code(s)   [] 47520 IP subsequent hospital care - 50 minutes    [x] 09674 IP subsequent hospital care - 35 minutes   [] 87672 IP subsequent hospital care - 25 minutes  [] 22651 IP initial hospital care - 55 minutes  [] 31721 IP initial hospital care - 40 minutes      Before making these care recommendations, I personally reviewed the hospitalization record, including notes, laboratory & diagnostic data and current medications, and examined the patient at the bedside  Total minutes: 35    SHAYNE Suarez - CNS  Clinical Nurse Specialist - Diabetes & endocrine disorders  Program for Diabetes Health  Access via Apparent

## 2024-04-30 ENCOUNTER — APPOINTMENT (OUTPATIENT)
Facility: HOSPITAL | Age: 79
End: 2024-04-30
Payer: MEDICARE

## 2024-04-30 LAB
ALBUMIN SERPL-MCNC: 2 G/DL (ref 3.5–5)
ALBUMIN/GLOB SERPL: 0.4 (ref 1.1–2.2)
ALP SERPL-CCNC: 99 U/L (ref 45–117)
ALT SERPL-CCNC: 8 U/L (ref 12–78)
ANION GAP SERPL CALC-SCNC: 9 MMOL/L (ref 5–15)
AST SERPL-CCNC: 12 U/L (ref 15–37)
BILIRUB SERPL-MCNC: 0.3 MG/DL (ref 0.2–1)
BUN SERPL-MCNC: 33 MG/DL (ref 6–20)
BUN/CREAT SERPL: 24 (ref 12–20)
CALCIUM SERPL-MCNC: 8.8 MG/DL (ref 8.5–10.1)
CHLORIDE SERPL-SCNC: 102 MMOL/L (ref 97–108)
CO2 SERPL-SCNC: 29 MMOL/L (ref 21–32)
CREAT SERPL-MCNC: 1.37 MG/DL (ref 0.55–1.02)
ERYTHROCYTE [DISTWIDTH] IN BLOOD BY AUTOMATED COUNT: 19.9 % (ref 11.5–14.5)
GLOBULIN SER CALC-MCNC: 4.5 G/DL (ref 2–4)
GLUCOSE BLD STRIP.AUTO-MCNC: 138 MG/DL (ref 65–117)
GLUCOSE BLD STRIP.AUTO-MCNC: 159 MG/DL (ref 65–117)
GLUCOSE BLD STRIP.AUTO-MCNC: 186 MG/DL (ref 65–117)
GLUCOSE SERPL-MCNC: 99 MG/DL (ref 65–100)
HCT VFR BLD AUTO: 26.6 % (ref 35–47)
HGB BLD-MCNC: 7.9 G/DL (ref 11.5–16)
MCH RBC QN AUTO: 27.7 PG (ref 26–34)
MCHC RBC AUTO-ENTMCNC: 29.7 G/DL (ref 30–36.5)
MCV RBC AUTO: 93.3 FL (ref 80–99)
NRBC # BLD: 0 K/UL (ref 0–0.01)
NRBC BLD-RTO: 0 PER 100 WBC
PLATELET # BLD AUTO: 273 K/UL (ref 150–400)
PMV BLD AUTO: 10 FL (ref 8.9–12.9)
POTASSIUM SERPL-SCNC: 3.3 MMOL/L (ref 3.5–5.1)
PROT SERPL-MCNC: 6.5 G/DL (ref 6.4–8.2)
RBC # BLD AUTO: 2.85 M/UL (ref 3.8–5.2)
SERVICE CMNT-IMP: ABNORMAL
SODIUM SERPL-SCNC: 140 MMOL/L (ref 136–145)
UFH PPP CHRO-ACNC: 0.62 IU/ML
WBC # BLD AUTO: 16.3 K/UL (ref 3.6–11)

## 2024-04-30 PROCEDURE — 2700000000 HC OXYGEN THERAPY PER DAY

## 2024-04-30 PROCEDURE — 6370000000 HC RX 637 (ALT 250 FOR IP): Performed by: NURSE PRACTITIONER

## 2024-04-30 PROCEDURE — 6370000000 HC RX 637 (ALT 250 FOR IP): Performed by: HOSPITALIST

## 2024-04-30 PROCEDURE — 2500000003 HC RX 250 WO HCPCS

## 2024-04-30 PROCEDURE — 85027 COMPLETE CBC AUTOMATED: CPT

## 2024-04-30 PROCEDURE — 97530 THERAPEUTIC ACTIVITIES: CPT

## 2024-04-30 PROCEDURE — 94640 AIRWAY INHALATION TREATMENT: CPT

## 2024-04-30 PROCEDURE — 97535 SELF CARE MNGMENT TRAINING: CPT

## 2024-04-30 PROCEDURE — 36415 COLL VENOUS BLD VENIPUNCTURE: CPT

## 2024-04-30 PROCEDURE — 6360000002 HC RX W HCPCS: Performed by: NURSE PRACTITIONER

## 2024-04-30 PROCEDURE — 97116 GAIT TRAINING THERAPY: CPT

## 2024-04-30 PROCEDURE — 6370000000 HC RX 637 (ALT 250 FOR IP): Performed by: INTERNAL MEDICINE

## 2024-04-30 PROCEDURE — 85520 HEPARIN ASSAY: CPT

## 2024-04-30 PROCEDURE — 6370000000 HC RX 637 (ALT 250 FOR IP): Performed by: STUDENT IN AN ORGANIZED HEALTH CARE EDUCATION/TRAINING PROGRAM

## 2024-04-30 PROCEDURE — 80053 COMPREHEN METABOLIC PANEL: CPT

## 2024-04-30 PROCEDURE — 6360000002 HC RX W HCPCS: Performed by: INTERNAL MEDICINE

## 2024-04-30 PROCEDURE — 97110 THERAPEUTIC EXERCISES: CPT

## 2024-04-30 PROCEDURE — 2580000003 HC RX 258: Performed by: INTERNAL MEDICINE

## 2024-04-30 PROCEDURE — 94760 N-INVAS EAR/PLS OXIMETRY 1: CPT

## 2024-04-30 PROCEDURE — 99232 SBSQ HOSP IP/OBS MODERATE 35: CPT | Performed by: INTERNAL MEDICINE

## 2024-04-30 PROCEDURE — 2580000003 HC RX 258: Performed by: HOSPITALIST

## 2024-04-30 PROCEDURE — 71045 X-RAY EXAM CHEST 1 VIEW: CPT

## 2024-04-30 PROCEDURE — 2060000000 HC ICU INTERMEDIATE R&B

## 2024-04-30 PROCEDURE — 6360000002 HC RX W HCPCS: Performed by: STUDENT IN AN ORGANIZED HEALTH CARE EDUCATION/TRAINING PROGRAM

## 2024-04-30 PROCEDURE — 82962 GLUCOSE BLOOD TEST: CPT

## 2024-04-30 PROCEDURE — 6370000000 HC RX 637 (ALT 250 FOR IP): Performed by: PHYSICIAN ASSISTANT

## 2024-04-30 RX ORDER — MIDODRINE HYDROCHLORIDE 5 MG/1
5 TABLET ORAL
Status: DISCONTINUED | OUTPATIENT
Start: 2024-04-30 | End: 2024-05-01 | Stop reason: ALTCHOICE

## 2024-04-30 RX ADMIN — SODIUM CHLORIDE, PRESERVATIVE FREE 10 ML: 5 INJECTION INTRAVENOUS at 20:28

## 2024-04-30 RX ADMIN — NYSTATIN 500000 UNITS: 100000 SUSPENSION ORAL at 13:30

## 2024-04-30 RX ADMIN — BUMETANIDE 2 MG: 1 TABLET ORAL at 18:18

## 2024-04-30 RX ADMIN — GUAIFENESIN 200 MG: 200 SOLUTION ORAL at 06:02

## 2024-04-30 RX ADMIN — IPRATROPIUM BROMIDE 0.5 MG: 0.5 SOLUTION RESPIRATORY (INHALATION) at 08:32

## 2024-04-30 RX ADMIN — DOXYCYCLINE HYCLATE 100 MG: 100 TABLET, COATED ORAL at 10:14

## 2024-04-30 RX ADMIN — FERROUS SULFATE TAB 325 MG (65 MG ELEMENTAL FE) 325 MG: 325 (65 FE) TAB at 10:16

## 2024-04-30 RX ADMIN — ACETAMINOPHEN 1000 MG: 325 TABLET ORAL at 00:31

## 2024-04-30 RX ADMIN — MIDODRINE HYDROCHLORIDE 5 MG: 5 TABLET ORAL at 18:18

## 2024-04-30 RX ADMIN — ACETAMINOPHEN 1000 MG: 325 TABLET ORAL at 18:18

## 2024-04-30 RX ADMIN — Medication: at 20:28

## 2024-04-30 RX ADMIN — GUAIFENESIN 200 MG: 200 SOLUTION ORAL at 10:21

## 2024-04-30 RX ADMIN — ARFORMOTEROL TARTRATE: 15 SOLUTION RESPIRATORY (INHALATION) at 21:33

## 2024-04-30 RX ADMIN — BUPROPION HYDROCHLORIDE 150 MG: 150 TABLET, EXTENDED RELEASE ORAL at 10:14

## 2024-04-30 RX ADMIN — ASPIRIN 81 MG CHEWABLE TABLET 81 MG: 81 TABLET CHEWABLE at 10:14

## 2024-04-30 RX ADMIN — MIDODRINE HYDROCHLORIDE 10 MG: 5 TABLET ORAL at 10:14

## 2024-04-30 RX ADMIN — IPRATROPIUM BROMIDE 0.5 MG: 0.5 SOLUTION RESPIRATORY (INHALATION) at 21:34

## 2024-04-30 RX ADMIN — Medication 1 MG: at 10:14

## 2024-04-30 RX ADMIN — SERTRALINE HYDROCHLORIDE 150 MG: 50 TABLET ORAL at 20:27

## 2024-04-30 RX ADMIN — NYSTATIN 500000 UNITS: 100000 SUSPENSION ORAL at 20:27

## 2024-04-30 RX ADMIN — ARFORMOTEROL TARTRATE: 15 SOLUTION RESPIRATORY (INHALATION) at 08:32

## 2024-04-30 RX ADMIN — NYSTATIN 500000 UNITS: 100000 SUSPENSION ORAL at 18:18

## 2024-04-30 RX ADMIN — POTASSIUM BICARBONATE 20 MEQ: 782 TABLET, EFFERVESCENT ORAL at 10:13

## 2024-04-30 RX ADMIN — POTASSIUM BICARBONATE 20 MEQ: 782 TABLET, EFFERVESCENT ORAL at 20:27

## 2024-04-30 RX ADMIN — Medication: at 10:16

## 2024-04-30 RX ADMIN — ONDANSETRON 4 MG: 4 TABLET, ORALLY DISINTEGRATING ORAL at 20:40

## 2024-04-30 RX ADMIN — HYDROMORPHONE HYDROCHLORIDE 0.25 MG: 1 INJECTION, SOLUTION INTRAMUSCULAR; INTRAVENOUS; SUBCUTANEOUS at 20:28

## 2024-04-30 RX ADMIN — NYSTATIN 500000 UNITS: 100000 SUSPENSION ORAL at 10:15

## 2024-04-30 RX ADMIN — ACETAMINOPHEN 1000 MG: 325 TABLET ORAL at 06:02

## 2024-04-30 RX ADMIN — SODIUM CHLORIDE, PRESERVATIVE FREE 10 ML: 5 INJECTION INTRAVENOUS at 10:17

## 2024-04-30 RX ADMIN — GABAPENTIN 300 MG: 300 CAPSULE ORAL at 18:18

## 2024-04-30 RX ADMIN — HEPARIN SODIUM 15 UNITS/KG/HR: 10000 INJECTION, SOLUTION INTRAVENOUS at 18:31

## 2024-04-30 RX ADMIN — BUMETANIDE 2 MG: 1 TABLET ORAL at 10:14

## 2024-04-30 RX ADMIN — WATER 2000 MG: 1 INJECTION INTRAMUSCULAR; INTRAVENOUS; SUBCUTANEOUS at 10:16

## 2024-04-30 RX ADMIN — DOXYCYCLINE HYCLATE 100 MG: 100 TABLET, COATED ORAL at 20:27

## 2024-04-30 RX ADMIN — POTASSIUM BICARBONATE 40 MEQ: 782 TABLET, EFFERVESCENT ORAL at 10:13

## 2024-04-30 ASSESSMENT — PAIN DESCRIPTION - LOCATION: LOCATION: BACK

## 2024-04-30 ASSESSMENT — PAIN SCALES - GENERAL: PAINLEVEL_OUTOF10: 5

## 2024-04-30 NOTE — WOUND CARE
Plan to place Prevena incisional wound manager to spine tomorrow, 5/1, prior to discharge to facility.  SABA AngeloN

## 2024-04-30 NOTE — CARE COORDINATION
Transition Of Care:    PM & R pending with Dr. Dwight Cyr. CM called and left message and perfect served. [facility] requesting PM & R. Patient's daughterBreonna prefers patient return to [facility] if possible. Back up SNF choices are [facility] (accepted) and [facility] (declined referral).    RUR: 27%  Prior Level of Functioning: Assistance with ADL's  Disposition: [facility] vs SNF  Possible return to [facility]  SNF plan initiated for back up:  *[facility] has accepted and [facility] declined.   If SNF or IPR: Date FOC offered: 4/29/24  Date FOC received: 4/29/24  Accepting facility: [facility]  Date authorization started with reference number: n/a  Date authorization received and expires: n/a  Follow up appointments: PCP, Specialty   DME needed: none  Transportation at discharge: BLS  IM/IMM Medicare/Tracey letter given: 4/19/24  Caregiver Contact: Breonna Barnes / Child / [phone]   Discharge Caregiver contacted prior to discharge? N/a  Care Conference needed? N/a  Barriers to discharge: I&D pending     Nickie Gustafson RN/CRM  [phone]

## 2024-04-30 NOTE — PLAN OF CARE
Problem: Occupational Therapy - Adult  Goal: By Discharge: Performs self-care activities at highest level of function for planned discharge setting.  See evaluation for individualized goals.  Description: FUNCTIONAL STATUS PRIOR TO ADMISSION:   Patient with several recent admissions for similar presentation. Re-discharged to [facility] on 4/5 and re-admitted to [facility] 4/15 with significant wound dehiscence and bloody drainage. Patient with planned debridement 4/19, however deferred due to increased WOB requiring BiPAP. Patient transferred to ICU 4/21 for acute hypoxic respiratory distress and placement of a dialysis catheter in preparation for hemodialysis. Patient went to OR 4/22 for I&D of spinal wound. Prior to sx 3/19/2024 she was modified independent for basic ADL's and used adaptive equipment for LE dressing    Receives Help From: Family,  ,  ,  ,  ,  ,  , Homemaking Assistance: Independent, Ambulation Assistance: Independent, Transfer Assistance: Independent, Active : No,   HOME SUPPORT: Patient lived alone with daughter to provide assistance.    Occupational Therapy Goals:  Initiated 4/23/2024; Weekly RA 04/30/24   1.  Patient will perform left UE AROM elbow flexion to > 110 degrees in supine within 7 day(s). (Cont. 04/30)  2.  Patient will attend to left side > or = 1 minute within 7 day(s). (Met 04/30/24, discontinue)  3.  Patient will wash her face with right hand seated edge of bed with Supervision within 7 day(s). (Cont. 04/30)  4.  Patient will initiate use of bilateral hands on bed to assist with static sitting balance and scooting with Supervision  within 7 day(s). (Cont. 04/30)  5.  Patient will perform stand pivot transfer to bedside commode with  Minimal Assist  x's 2 within 7 day(s). (Cont. 04/30)  6.  Patient will participate in upper extremity therapeutic exercise/activities with Supervision for > or = 5  minutes within 7 day(s).  (Cont. 04/30)  Outcome: Progressing     OCCUPATIONAL THERAPY

## 2024-04-30 NOTE — PROGRESS NOTES
[place] [id number] [facility], [place] [address]  Phone: [phone]   [phone]    [email or web address]     Nephrology Progress Note    Patient Name : Leny Barnes      : 1945     MRN : [id number]  Date of Admission : 4/15/2024  Date of Servive : 24    CC:  Follow up for CKD    Assessment and Plan   PERRY on CKD  - ATN vs ATIN vs post infectious GN   - s/p Mendez and emergent HD on   - s/p HD X 2. Last HD 4/23 x 2 treatments, Mendez removed:  - Creatinine at baseline non-oliguric 1 L UOP  - continue Bumex 2 mg BID orally  - continue with holly    Hypokalemia   - increased KCL to BID dosing     Severe anemia   - s/p PRBC   - Hb stable  - on oral Fe    Severe Hypoxic Resp Failure   Acute HFpEF   Asp PNA  Pleural effusions, edema   encephalopathy  - per [facility]    CKD 3a:  - from DM2 and HTN  - baseline Cr around 1.5 to 1.7     Sepsis w/ Bacteremia  -3/18  s/p Laminectomy with dehiscence of wound  - 4/15 BC (+) serratia  - ID following  and adjusting abx  -  MRI (+) fluid/ gas, s/p I+D on     Hypotension  - BP improving if SBP>140 or DBP> 85 would decrease midodrine dose      CAD  PAF s/p ablation and pacemaker  DM  FREDERICK on CPAP       Interval History:  Seen and examined in NAD remains on 2L NC . BP improved on midodrine. States she continues with cough but overall breathing has improved.    Review of Systems: A comprehensive review of systems was negative.    Current Medications:   Current Facility-Administered Medications   Medication Dose Route Frequency    bumetanide (BUMEX) tablet 2 mg  2 mg Oral BID    doxycycline hyclate (VIBRA-TABS) tablet 100 mg  100 mg Oral 2 times per day    0.9 % sodium chloride infusion   IntraVENous PRN    potassium bicarb-citric acid (EFFER-K) effervescent tablet 20 mEq  20 mEq Oral Daily    ipratropium (ATROVENT) 0.02 % nebulizer solution 0.5 mg  0.5 mg Nebulization BID RT    midodrine (PROAMATINE)

## 2024-04-30 NOTE — PROGRESS NOTES
Orthopedic Spine Progress Note  Post Op day: 8 Days Post-Op    2024 8:13 AM   Admit Date: 4/15/2024  Procedure: Procedure(s) with comments:  BACK WOUND INCISION AND DRAINAGE (EIP 0730)     Subjective:     Leny Barnes has no c/o this AM. Pain control adequate. Denies HA, N/V, CP, SOB.    Objective:          Physical Exam:  General:  Alert and oriented. No acute distress.   Heart:  Respirations unlabored.   Abdomen:   Extremities: Soft, non-tender.  No evidence of cyanosis. Pulses palpable in both upper and lower extremities.       Neurologic:  Musculoskeletal:  No new motor deficits. Neurovascular exam within normal limits.  Sensation stable.  Motor: unchanged C5-T1 and L2-S1.   Olivia's sign negative in bilateral lower extremities.  Calves soft, nontender upon palpation.  Moves both upper and lower extremities.   Incision:  No active drainage noted.        Vital Signs:    Blood pressure (!) 119/51, pulse 72, temperature 98 °F (36.7 °C), temperature source Oral, resp. rate 20, height 1.676 m (5' 5.98\"), weight 103.8 kg (228 lb 14.4 oz), SpO2 97 %.  Temp (24hrs), Av.8 °F (36.6 °C), Min:97.4 °F (36.3 °C), Max:98 °F (36.7 °C)      LAB:    Recent Labs     24  0347   HCT 26.6*   HGB 7.9*        Lab Results   Component Value Date/Time     2024 03:47 AM    K 3.3 2024 03:47 AM     2024 03:47 AM    CO2 29 2024 03:47 AM    BUN 33 2024 03:47 AM       Intake/Output:  No intake/output data recorded.   1901 -  0700  In: -   Out: 2250 [Urine:2250]    PT/OT:   Gait:                    Assessment:   Patient is 8 Days Post-Op s/p Procedure(s) with comments:  BACK WOUND INCISION AND DRAINAGE (EIP 0730) - This patient is in house we are requesting this start at 7:30 am    Plan:     1.  Continue PT/OT - Mobilize as tolerated, spine precautions.   2.  Continue established methods of pain control.  3.  VTE Prophylaxes - TEDS &/or SCDs, mobilize, heparin,  ASA.  4.  Wound care for wound management.   5.  Case management for discharge planning. Will continue to monitor progress.       Signed By: Joshua Mccauley PA-C

## 2024-04-30 NOTE — PLAN OF CARE
Problem: Physical Therapy - Adult  Goal: By Discharge: Performs mobility at highest level of function for planned discharge setting.  See evaluation for individualized goals.  Description: FUNCTIONAL STATUS PRIOR TO ADMISSION: Patient was modified independent using a rollator for functional mobility prior to initial spinal surgery 3/18. Patient has been back and forth between [facility] <> hospital 2x following initial surgery 2/2 ongoing issues with surgical wound dehiscence and infection. Patient has been ambulating at rehab with RW per chart review.     HOME SUPPORT PRIOR TO ADMISSION: The patient lived with daughter and required minimal assistance for self-care/ ADLs following initial spinal surgery 3/18.    Physical Therapy Goals  Initiated 4/23/2024 - progress made, continue all goals 4/30/2024  1.  Patient will move from supine to sit and sit to supine in bed with minimal assistance within 7 day(s).    2.  Patient will perform sit to stand with minimal assistance within 7 day(s).  3.  Patient will transfer from bed to chair and chair to bed with minimal assistance using the least restrictive device within 7 day(s).  4.  Patient will ambulate with minimal assistance for 15 feet with the least restrictive device within 7 day(s).   5.  Patient will ascend/descend 5 stairs with bilateral handrail(s) with moderate assistance within 7 day(s).    Outcome: Progressing   PHYSICAL THERAPY TREATMENT: WEEKLY REASSESSMENT    Patient: Leny Barnes (79 y.o. female)  Date: 4/30/2024  Primary Diagnosis: Wound infection [T14.8XXA, L08.9]  Wound dehiscence [T81.30XA]  Procedure(s) (LRB):  BACK WOUND INCISION AND DRAINAGE (EIP 0730) (N/A) 8 Days Post-Op   Precautions: Fall Risk, Surgical Protocols         Spinal Precautions: No Bending, No Lifting, No Twisting            ASSESSMENT :  Patient continues to benefit from skilled PT services and is progressing towards goals. Patient continues to be limited by generalized weakness  Activity Measure for Post-Acute Care \"6-Clicks\" Basic Mobility Scores Predict Discharge Destination After Acute Care Hospitalization in Select Patient Groups: A Retrospective, Observational Study. Arch Rehabil Res Clin Transl. 2022;4(3):592662. doi: 10.1016/j.arrct..579282. PMID: 76429014; PMCID: URJ4582946.  4. Darci MARCIAL, Nathalie S, Lindsey W, Ashley KITCHEN. AM-PAC Short Forms Manual 4.0. Revised 2020.                                                                                                                                                                                                                               Pain Ratin/10   Pain Intervention(s):   pain is at a level acceptable to the patient    Activity Tolerance:   Good, requires frequent rest breaks, observed shortness of breath on exertion, and desaturates with activity and requires oxygen    After treatment:   Patient left in no apparent distress sitting up in chair, Call bell within reach, and Bed/ chair alarm activated    COMMUNICATION/EDUCATION:   The patient's plan of care was discussed with: occupational therapist and registered nurse    Patient Education  Education Given To: Patient  Education Provided: Energy Conservation;Plan of Care;Transfer Training;Equipment;Fall Prevention Strategies;Orientation;Precautions  Education Provided Comments: BLT spinal precautions, use of IS, use of eddie bear for bracing  Education Method: Verbal;Demonstration;Teach Back  Barriers to Learning: None  Education Outcome: Verbalized understanding;Demonstrated understanding;Continued education needed    Thank you for this referral.  Liz Schultz PT, DPT  Minutes: 54

## 2024-04-30 NOTE — PROGRESS NOTES
Comprehensive Nutrition Assessment    Type and Reason for Visit: Reassess    Nutrition Recommendations/Plan:     Continue consistent CHO 45 gm/meal diet  Adjusted ONS to Glucerna TID with meals         Malnutrition Assessment:  Malnutrition Status:  Moderate malnutrition (04/30/24 1631)    Context:  Acute Illness     Findings of the 6 clinical characteristics of malnutrition:  Energy Intake:  50% or less of estimated energy requirements for 5 or more days  Weight Loss:  Unable to assess (d/t edema)     Body Fat Loss:  No significant body fat loss     Muscle Mass Loss:  No significant muscle mass loss    Fluid Accumulation:  Mild Generalized, Extremities   Strength:  Not Performed       Nutrition Assessment:    Pt admitted with Wound dehiscence. PMHx: Asthma, At fib, Uterine cancer, HFrEF, PPM, DM 2, HTN, Dyslipidemia, FREDERICK, s/p laminectomy and spinal fusion 3/18; readmitted 4/1 and 4/15 with Wound dehiscence. Sepsis from wound infection with bacteremia. Debridement cancelled 4/19 d/t respiratory distress (pulmonary edema per CXR). Transferred to CCU 4/21 d/t worsening pulmonary edema. PERRY on CKD-emergent HD 4/22 and scheduled TTS for now. I&D back wound 4/22.       4/30: follow-up.  Visited with pt.  Reports poor appetite, but improving.  Eating a little with 1-2 meals over past couple days, but states she drinks the shake if doesn't eat.    Likes Glucerna - ordered for Ensure High Protein, but has an Enlive ONS on tray.  Pt states she didn't know there was a difference, but doesn't want the one with lots of sugar d/t her diabetes.  Will adjust to Glucerna to decrease errors.  Likes strawberry or chocolate.  C/o cold oatmeal this AM, but crumbed her kennedy in it and was able to eat some.  Prefers cheesy grits.  C/o sore in mouth - states she is told it was thrush and now being treated for it, so improving.  Noted wound VAC to back placed yesterday.      4/24: Discussed during IDR. Pt passed bedside swallow

## 2024-05-01 ENCOUNTER — APPOINTMENT (OUTPATIENT)
Facility: HOSPITAL | Age: 79
End: 2024-05-01
Payer: MEDICARE

## 2024-05-01 LAB
ANION GAP SERPL CALC-SCNC: 7 MMOL/L (ref 5–15)
BUN SERPL-MCNC: 33 MG/DL (ref 6–20)
BUN/CREAT SERPL: 22 (ref 12–20)
CALCIUM SERPL-MCNC: 8.8 MG/DL (ref 8.5–10.1)
CHLORIDE SERPL-SCNC: 100 MMOL/L (ref 97–108)
CO2 SERPL-SCNC: 32 MMOL/L (ref 21–32)
CREAT SERPL-MCNC: 1.47 MG/DL (ref 0.55–1.02)
ERYTHROCYTE [DISTWIDTH] IN BLOOD BY AUTOMATED COUNT: 20.1 % (ref 11.5–14.5)
GLUCOSE BLD STRIP.AUTO-MCNC: 121 MG/DL (ref 65–117)
GLUCOSE BLD STRIP.AUTO-MCNC: 137 MG/DL (ref 65–117)
GLUCOSE BLD STRIP.AUTO-MCNC: 147 MG/DL (ref 65–117)
GLUCOSE BLD STRIP.AUTO-MCNC: 151 MG/DL (ref 65–117)
GLUCOSE BLD STRIP.AUTO-MCNC: 157 MG/DL (ref 65–117)
GLUCOSE BLD STRIP.AUTO-MCNC: 157 MG/DL (ref 65–117)
GLUCOSE SERPL-MCNC: 97 MG/DL (ref 65–100)
HCT VFR BLD AUTO: 27.1 % (ref 35–47)
HGB BLD-MCNC: 8.4 G/DL (ref 11.5–16)
MCH RBC QN AUTO: 28 PG (ref 26–34)
MCHC RBC AUTO-ENTMCNC: 31 G/DL (ref 30–36.5)
MCV RBC AUTO: 90.3 FL (ref 80–99)
NRBC # BLD: 0 K/UL (ref 0–0.01)
NRBC BLD-RTO: 0 PER 100 WBC
PLATELET # BLD AUTO: 265 K/UL (ref 150–400)
PMV BLD AUTO: 9 FL (ref 8.9–12.9)
POTASSIUM SERPL-SCNC: 3.9 MMOL/L (ref 3.5–5.1)
RBC # BLD AUTO: 3 M/UL (ref 3.8–5.2)
SERVICE CMNT-IMP: ABNORMAL
SODIUM SERPL-SCNC: 139 MMOL/L (ref 136–145)
UFH PPP CHRO-ACNC: 0.67 IU/ML
WBC # BLD AUTO: 13.9 K/UL (ref 3.6–11)

## 2024-05-01 PROCEDURE — 6360000002 HC RX W HCPCS: Performed by: STUDENT IN AN ORGANIZED HEALTH CARE EDUCATION/TRAINING PROGRAM

## 2024-05-01 PROCEDURE — 94640 AIRWAY INHALATION TREATMENT: CPT

## 2024-05-01 PROCEDURE — 2060000000 HC ICU INTERMEDIATE R&B

## 2024-05-01 PROCEDURE — 6370000000 HC RX 637 (ALT 250 FOR IP): Performed by: HOSPITALIST

## 2024-05-01 PROCEDURE — C1751 CATH, INF, PER/CENT/MIDLINE: HCPCS

## 2024-05-01 PROCEDURE — 71045 X-RAY EXAM CHEST 1 VIEW: CPT

## 2024-05-01 PROCEDURE — 82962 GLUCOSE BLOOD TEST: CPT

## 2024-05-01 PROCEDURE — 85027 COMPLETE CBC AUTOMATED: CPT

## 2024-05-01 PROCEDURE — 2700000000 HC OXYGEN THERAPY PER DAY

## 2024-05-01 PROCEDURE — 76937 US GUIDE VASCULAR ACCESS: CPT

## 2024-05-01 PROCEDURE — 2709999900 HC NON-CHARGEABLE SUPPLY

## 2024-05-01 PROCEDURE — 2500000003 HC RX 250 WO HCPCS

## 2024-05-01 PROCEDURE — 2580000003 HC RX 258: Performed by: INTERNAL MEDICINE

## 2024-05-01 PROCEDURE — 80048 BASIC METABOLIC PNL TOTAL CA: CPT

## 2024-05-01 PROCEDURE — 6370000000 HC RX 637 (ALT 250 FOR IP): Performed by: NURSE PRACTITIONER

## 2024-05-01 PROCEDURE — 6370000000 HC RX 637 (ALT 250 FOR IP): Performed by: PHYSICIAN ASSISTANT

## 2024-05-01 PROCEDURE — 85520 HEPARIN ASSAY: CPT

## 2024-05-01 PROCEDURE — 97530 THERAPEUTIC ACTIVITIES: CPT

## 2024-05-01 PROCEDURE — 6360000002 HC RX W HCPCS: Performed by: NURSE PRACTITIONER

## 2024-05-01 PROCEDURE — 36415 COLL VENOUS BLD VENIPUNCTURE: CPT

## 2024-05-01 PROCEDURE — 6360000002 HC RX W HCPCS: Performed by: INTERNAL MEDICINE

## 2024-05-01 PROCEDURE — 2580000003 HC RX 258: Performed by: HOSPITALIST

## 2024-05-01 PROCEDURE — 6370000000 HC RX 637 (ALT 250 FOR IP): Performed by: STUDENT IN AN ORGANIZED HEALTH CARE EDUCATION/TRAINING PROGRAM

## 2024-05-01 RX ADMIN — SODIUM CHLORIDE, PRESERVATIVE FREE 10 ML: 5 INJECTION INTRAVENOUS at 09:36

## 2024-05-01 RX ADMIN — ASPIRIN 81 MG CHEWABLE TABLET 81 MG: 81 TABLET CHEWABLE at 09:34

## 2024-05-01 RX ADMIN — ARFORMOTEROL TARTRATE: 15 SOLUTION RESPIRATORY (INHALATION) at 10:00

## 2024-05-01 RX ADMIN — Medication: at 21:52

## 2024-05-01 RX ADMIN — FERROUS SULFATE TAB 325 MG (65 MG ELEMENTAL FE) 325 MG: 325 (65 FE) TAB at 09:35

## 2024-05-01 RX ADMIN — IPRATROPIUM BROMIDE 0.5 MG: 0.5 SOLUTION RESPIRATORY (INHALATION) at 10:00

## 2024-05-01 RX ADMIN — Medication 1 MG: at 09:46

## 2024-05-01 RX ADMIN — HEPARIN SODIUM 15 UNITS/KG/HR: 10000 INJECTION, SOLUTION INTRAVENOUS at 07:49

## 2024-05-01 RX ADMIN — Medication 1 MG: at 09:35

## 2024-05-01 RX ADMIN — POTASSIUM BICARBONATE 20 MEQ: 782 TABLET, EFFERVESCENT ORAL at 09:34

## 2024-05-01 RX ADMIN — BUMETANIDE 2 MG: 1 TABLET ORAL at 17:40

## 2024-05-01 RX ADMIN — GUAIFENESIN 200 MG: 200 SOLUTION ORAL at 22:02

## 2024-05-01 RX ADMIN — SERTRALINE HYDROCHLORIDE 150 MG: 50 TABLET ORAL at 21:51

## 2024-05-01 RX ADMIN — WATER 2000 MG: 1 INJECTION INTRAMUSCULAR; INTRAVENOUS; SUBCUTANEOUS at 09:48

## 2024-05-01 RX ADMIN — MIDODRINE HYDROCHLORIDE 5 MG: 5 TABLET ORAL at 09:35

## 2024-05-01 RX ADMIN — DOXYCYCLINE HYCLATE 100 MG: 100 TABLET, COATED ORAL at 21:52

## 2024-05-01 RX ADMIN — BUPROPION HYDROCHLORIDE 150 MG: 150 TABLET, EXTENDED RELEASE ORAL at 09:47

## 2024-05-01 RX ADMIN — FERROUS SULFATE TAB 325 MG (65 MG ELEMENTAL FE) 325 MG: 325 (65 FE) TAB at 09:48

## 2024-05-01 RX ADMIN — WATER 2000 MG: 1 INJECTION INTRAMUSCULAR; INTRAVENOUS; SUBCUTANEOUS at 09:31

## 2024-05-01 RX ADMIN — DOXYCYCLINE HYCLATE 100 MG: 100 TABLET, COATED ORAL at 09:46

## 2024-05-01 RX ADMIN — ASPIRIN 81 MG CHEWABLE TABLET 81 MG: 81 TABLET CHEWABLE at 09:46

## 2024-05-01 RX ADMIN — FERROUS SULFATE TAB 325 MG (65 MG ELEMENTAL FE) 325 MG: 325 (65 FE) TAB at 09:47

## 2024-05-01 RX ADMIN — ACETAMINOPHEN 1000 MG: 325 TABLET ORAL at 12:36

## 2024-05-01 RX ADMIN — DOXYCYCLINE HYCLATE 100 MG: 100 TABLET, COATED ORAL at 09:36

## 2024-05-01 RX ADMIN — IPRATROPIUM BROMIDE 0.5 MG: 0.5 SOLUTION RESPIRATORY (INHALATION) at 20:56

## 2024-05-01 RX ADMIN — NYSTATIN 500000 UNITS: 100000 SUSPENSION ORAL at 12:39

## 2024-05-01 RX ADMIN — POTASSIUM BICARBONATE 20 MEQ: 782 TABLET, EFFERVESCENT ORAL at 09:45

## 2024-05-01 RX ADMIN — SODIUM CHLORIDE, PRESERVATIVE FREE 10 ML: 5 INJECTION INTRAVENOUS at 21:53

## 2024-05-01 RX ADMIN — BUPROPION HYDROCHLORIDE 150 MG: 150 TABLET, EXTENDED RELEASE ORAL at 09:35

## 2024-05-01 RX ADMIN — ACETAMINOPHEN 1000 MG: 325 TABLET ORAL at 17:41

## 2024-05-01 RX ADMIN — POTASSIUM BICARBONATE 20 MEQ: 782 TABLET, EFFERVESCENT ORAL at 21:51

## 2024-05-01 RX ADMIN — Medication: at 09:43

## 2024-05-01 RX ADMIN — APIXABAN 5 MG: 5 TABLET, FILM COATED ORAL at 21:51

## 2024-05-01 RX ADMIN — ARFORMOTEROL TARTRATE: 15 SOLUTION RESPIRATORY (INHALATION) at 20:56

## 2024-05-01 RX ADMIN — NYSTATIN 500000 UNITS: 100000 SUSPENSION ORAL at 21:50

## 2024-05-01 RX ADMIN — NYSTATIN 500000 UNITS: 100000 SUSPENSION ORAL at 17:40

## 2024-05-01 RX ADMIN — GABAPENTIN 300 MG: 300 CAPSULE ORAL at 17:40

## 2024-05-01 ASSESSMENT — PAIN DESCRIPTION - ORIENTATION
ORIENTATION: ANTERIOR
ORIENTATION: ANTERIOR

## 2024-05-01 ASSESSMENT — PAIN SCALES - GENERAL
PAINLEVEL_OUTOF10: 0
PAINLEVEL_OUTOF10: 3
PAINLEVEL_OUTOF10: 2
PAINLEVEL_OUTOF10: 0

## 2024-05-01 ASSESSMENT — PAIN DESCRIPTION - LOCATION
LOCATION: HEAD
LOCATION: HEAD

## 2024-05-01 ASSESSMENT — PAIN DESCRIPTION - DESCRIPTORS
DESCRIPTORS: ACHING
DESCRIPTORS: ACHING

## 2024-05-01 NOTE — PROGRESS NOTES
Orthopedic Spine Progress Note  Post Op day: 9 Days Post-Op    May 1, 2024 12:09 PM   Admit Date: 4/15/2024  Procedure: Procedure(s) with comments:  BACK WOUND INCISION AND DRAINAGE (EIP 0730) -     Subjective:     Leny Barnes has no complaints today. Pain control good. PICC line in place.    +BM. + HA today, not positional. Denies photophobia, N/V, CP.+mild SOB baseline.    Objective:          Physical Exam:  General:  Alert and oriented. No acute distress.   Heart:  Respirations unlabored.   Abdomen:   Extremities: Soft, non-tender.  No evidence of cyanosis. Pulses palpable in both upper and lower extremities.       Neurologic:  Musculoskeletal:  No new motor deficits. Neurovascular exam within normal limits.  Sensation stable.  Motor: unchanged C5-T1 and L2-S1.   Olivia's sign negative in bilateral lower extremities.  Calves soft, nontender upon palpation.  Moves both upper and lower extremities.   Incision: Wound vac in place, good seal maintained.       Vital Signs:    Blood pressure (!) 124/49, pulse 70, temperature 98.2 °F (36.8 °C), temperature source Oral, resp. rate 20, height 1.676 m (5' 6\"), weight 103.8 kg (228 lb 14.4 oz), SpO2 95 %.  Temp (24hrs), Av.5 °F (36.4 °C), Min:97 °F (36.1 °C), Max:98.2 °F (36.8 °C)      LAB:    Recent Labs     24  0425   HCT 27.1*   HGB 8.4*        Lab Results   Component Value Date/Time     2024 04:25 AM    K 3.9 2024 04:25 AM     2024 04:25 AM    CO2 32 2024 04:25 AM    BUN 33 2024 04:25 AM       Intake/Output:  No intake/output data recorded.  1901 -  0700  In: 1342.9 [I.V.:1342.9]  Out: 1275 [Urine:1175; Drains:100]    PT/OT:   Gait:                    Assessment:   Patient is 9 Days Post-Op s/p Procedure(s) with comments:  BACK WOUND INCISION AND DRAINAGE (EIP 1286)     Plan:     1.  Continue PT/OT - Mobilize as able.  2.  Continue established methods of pain control.  3.  VTE Prophylaxes -

## 2024-05-01 NOTE — PROGRESS NOTES
Hospitalist Progress Note  Terry Akhtar MD  Answering service: [phone]        Date of Service:  2024  NAME:  Leny Barnes  :  1945  MRN:  [id number]      Admission Summary:   Patient returned to [facility] from [facility] for wound dehiscence on 4/15/2022.  She was recently discharged from here to [facility] on 3/22.  She had lumbar laminectomy on 3/18.  She was initially admitted to the floor under  services for sepsis from infected wound with dehiscence, PERRY on CKD.  Patient was scheduled for debridement but canceled by anesthesia due to respiratory difficulty on   On  patient was noticed to have word finding difficulty, drooping left eyelid  On , she was transferred to ICU for acute hypoxic respiratory failure.  On , she was started on urgent hemodialysis for persistent volume overload.   patient deemed to be clinically and hemodynamically stable to transfer out of the ICU.  She has been off of vasopressors since .\  At the time of transfer out of ICU patient was alert and oriented x 3, Reports dry cough x past few days        Interval history / Subjective:   Follow up patient with GNR(Serratia Marcescens) bacteremia/AHRF volume overload PERRY on CKD,acute on chronic anemia s/p 1U PRBC ( )  Still complaining of dry cough         Assessment & Plan:      Septic shock, persistent noted maintenance and drainage, Serratia marcescens bacteremia  -Off vasopressors since , remained hemodynamically stable.  -Continue IV ceftriaxone for bacteremia   -Appreciate discussion with ID, needs 6 weeks of IV antibiotics and then indefinite PO suppression afterwards  -Appreciate discussion with [facility] to get PICC  -Appreciate Ortho, could benefit from wound vac     Acute hypoxic respiratory failure due to persistent volume overload  Acute HFpEF  Aspiration pneumonia/Multilobar

## 2024-05-01 NOTE — PLAN OF CARE
Problem: Physical Therapy - Adult  Goal: By Discharge: Performs mobility at highest level of function for planned discharge setting.  See evaluation for individualized goals.  Description: FUNCTIONAL STATUS PRIOR TO ADMISSION: Patient was modified independent using a rollator for functional mobility prior to initial spinal surgery 3/18. Patient has been back and forth between [facility] <> hospital 2x following initial surgery 2/2 ongoing issues with surgical wound dehiscence and infection. Patient has been ambulating at rehab with RW per chart review.     HOME SUPPORT PRIOR TO ADMISSION: The patient lived with daughter and required minimal assistance for self-care/ ADLs following initial spinal surgery 3/18.    Physical Therapy Goals  Initiated 4/23/2024 - progress made, continue all goals 4/30/2024  1.  Patient will move from supine to sit and sit to supine in bed with minimal assistance within 7 day(s).    2.  Patient will perform sit to stand with minimal assistance within 7 day(s).  3.  Patient will transfer from bed to chair and chair to bed with minimal assistance using the least restrictive device within 7 day(s).  4.  Patient will ambulate with minimal assistance for 15 feet with the least restrictive device within 7 day(s).   5.  Patient will ascend/descend 5 stairs with bilateral handrail(s) with moderate assistance within 7 day(s).    Outcome: Progressing   PHYSICAL THERAPY TREATMENT    Patient: Leny Barnes (79 y.o. female)  Date: 5/1/2024  Diagnosis: Wound infection [T14.8XXA, L08.9]  Wound dehiscence [T81.30XA] Wound dehiscence  Procedure(s) (LRB):  BACK WOUND INCISION AND DRAINAGE (EIP 0730) (N/A) 9 Days Post-Op  Precautions: Fall Risk, Surgical Protocols         Spinal Precautions: No Bending, No Lifting, No Twisting            ASSESSMENT:  Patient continues to benefit from skilled PT services and is slowly progressing towards goals. Upon arrival pt sleeping soundly, lunch untouched.  Pt eager to  participate with PT.  Performed AROM and AAROM exercises bilateral legs.  Pt demonstrated improved ease of coming to sitting - requiring only min assist x 1 - log roll technique.  Pt sat edge of bed x 5 mins - with intermittent assist and verbal cues to lean forward.  Pt suddenly became fearful - shaky - asking for blood sugar to be checked.  After assisting pt back to bed - pt with difficulty maintaining sats > 88 % with HOB elevated 30 degrees.  Pt re-positioned higher in bed with further increase HOB -- breathing improved.  IPR planned for 5/2.    Per RN - blood sugar 156     PLAN:  Patient continues to benefit from skilled intervention to address the above impairments.  Continue treatment per established plan of care.    Recommendation for discharge: (in order for the patient to meet his/her long term goals): Therapy 3 hours/day 5-7 days/week        IF patient discharges home will need the following DME:  n/a       SUBJECTIVE:   Patient stated, \"I\"m just too shaky - put me back - I think my blood sugar is low.\"    OBJECTIVE DATA SUMMARY:   Critical Behavior:  Orientation  Overall Orientation Status: Within Normal Limits  Orientation Level: Oriented X4  Cognition  Overall Cognitive Status: WFL  Arousal/Alertness: Appropriate responses to stimuli  Following Commands: Follows multistep commands with repitition  Attention Span: Difficulty attending to directions;Difficulty dividing attention  Memory: Decreased recall of recent events;Decreased recall of precautions  Safety Judgement: Decreased awareness of need for assistance;Decreased awareness of need for safety  Problem Solving: Decreased awareness of errors  Insights: Decreased awareness of deficits  Initiation: Requires cues for some  Sequencing: Requires cues for some  Cognition Comment: pt expressing fear/anxiety    Functional Mobility Training:  Bed Mobility:  Bed Mobility Training  Bed Mobility Training: Yes  Interventions: Safety awareness training;Verbal

## 2024-05-01 NOTE — PLAN OF CARE
INFECTIOUS DISEASE discharge plan:    Diagnosis: Spinal deep surgical site infection with Serratia    Antibiotic: Ceftriaxone 2g IV q24hrs through 6/4/24, inclusive.    PICC line insertion for outpatient antibiotic.     Routine PICC/ Robin/ Portacath Care including PRN catheter flow management    Weekly labs with results fax to # [phone]. Call critical lab values to [phone].   [x] CBC w/diff  [x] BUN/Creatinine  [x] AST, ALT  [x] CRP    Home Health to pull PICC line after last dose or send to IR for Robin removal    May send to IR for line evaluation or replacement PRN Phone # [phone]    Follow up with Dr. Hagan in 4 weeks.

## 2024-05-01 NOTE — PROGRESS NOTES
Referral source:   Leny Barnes at [facility] in [facility] 4 IMCU 2.  attended rounds in the IMCU as part of the Interdisciplinary team where the patient's ongoing care was discussed. I reviewed the medical record as part of this encounter.     Outcome: Interdisciplinary team are aware of  availability and were encouraged to request support as needed.      Advised nurse to contact [facility] for any further referrals.The  on-call can be reached at [phone]).     Rev. Freida Dominguez MDiv, [facility]  Staff

## 2024-05-01 NOTE — CARE COORDINATION
Transition Of Care:     [facility] accepted patient back and can admit on Thursday 5/2/24 per Adelaida payne ([phone]) once patient off heparin and eliquis resumed. Pharmacist informed attending. No auth required with Medicare. BLS transport.     Back up SNF choices are [facility] (accepted) and [facility] (declined referral).     RUR: 26%  Prior Level of Functioning: Assistance with ADL's  Disposition: [facility] vs SNF  Possible return to [facility] IPR  SNF plan initiated for back up:  *[facility] has accepted and [facility] declined.   If SNF or IPR: Date FOC offered: 4/29/24  Date FOC received: 4/29/24  Accepting facility: [facility]  Date authorization started with reference number: n/a  Date authorization received and expires: n/a  Follow up appointments: PCP, Specialty   DME needed: none  Transportation at discharge: BLS  IM/IMM Medicare/Tracey letter given: 4/19/24, 4/29/24, 5/1/24  Caregiver Contact: Breonna Barnes / Child / [phone]   Discharge Caregiver contacted prior to discharge? N/a  Care Conference needed? N/a  Barriers to discharge: I&D pending     Nickie Gustafson RN/CRM  [phone]

## 2024-05-01 NOTE — PROGRESS NOTES
Infectious Disease Progress Note     Subjective:      Still with dry cough, sitting upright in chair and OOB, afebrile, has wound vac on back    Objective:    Vitals:   Patient Vitals for the past 24 hrs:   Temp Pulse Resp BP SpO2   04/30/24 1958 -- 70 -- -- --   04/30/24 1904 97.7 °F (36.5 °C) 70 20 (!) 126/49 100 %   04/30/24 1800 -- 70 18 (!) 124/50 100 %   04/30/24 1700 -- 70 21 (!) 104/91 --   04/30/24 1600 -- 70 21 120/66 100 %   04/30/24 1500 -- 72 17 (!) 123/54 99 %   04/30/24 1400 -- 71 23 (!) 124/46 99 %   04/30/24 1300 -- 70 21 (!) 119/45 99 %   04/30/24 1200 -- 79 21 118/67 95 %   04/30/24 1100 -- 70 17 (!) 122/48 99 %   04/30/24 1000 97.6 °F (36.4 °C) 73 15 (!) 126/106 92 %   04/30/24 0900 -- 70 16 (!) 115/44 100 %   04/30/24 0832 -- 70 17 -- 96 %   04/30/24 0800 -- 70 16 (!) 124/48 95 %   04/30/24 0700 -- 70 17 (!) 111/44 99 %   04/30/24 0636 98 °F (36.7 °C) 72 20 (!) 119/51 97 %   04/30/24 0600 -- 70 -- -- --   04/30/24 0400 -- 72 -- -- --   04/30/24 0345 98 °F (36.7 °C) 73 17 103/65 97 %   04/30/24 0200 -- 72 -- -- --   04/29/24 2202 98 °F (36.7 °C) 70 20 117/67 94 %   04/29/24 2200 -- 70 -- -- --         Physical Exam:  Gen: on NC, NAD  HEENT:  Normocephalic, atraumatic   CV: normal rate  Lungs: improved effort  Abdomen: soft, non tender, non distended  Genitourinary: holly catheter present mild sediment in urine  Skin: spinal surgical site with wound vac with serosanguinous contents  Psych: follows commands  Neuro: moving all extremities   Musculoskeletal:  much improved b/l LE edema    Central Line:   none, Mendez catheter removed, dressing over former RIJ dsite    Medications:    Current Facility-Administered Medications:     potassium bicarb-citric acid (EFFER-K) effervescent tablet 20 mEq, 20 mEq, Oral, BID, Liz Anaya APRN - NP, 20 mEq at 04/30/24 2027    midodrine (PROAMATINE) tablet 5 mg, 5 mg, Oral, TID SHAE, Terry Akhtar MD, 5 mg at 04/30/24 1818    bumetanide (BUMEX) tablet 2 mg,  2 mg, Oral, BID, Liz Anaya APRN - NP, 2 mg at 04/30/24 1818    doxycycline hyclate (VIBRA-TABS) tablet 100 mg, 100 mg, Oral, 2 times per day, Chet Stevens MD, 100 mg at 04/30/24 2027    0.9 % sodium chloride infusion, , IntraVENous, PRN, Tracy Corona MD    ipratropium (ATROVENT) 0.02 % nebulizer solution 0.5 mg, 0.5 mg, Nebulization, BID RT, Brayan Fitch APRN - CNP, 0.5 mg at 04/30/24 2134    calcium carbonate (TUMS) chewable tablet 1,000 mg, 1,000 mg, Oral, TID PRN, Ramakrishna Macias MD, 1,000 mg at 04/25/24 1419    albumin human 25% IV solution 25 g, 25 g, IntraVENous, PRN, Juan Toledo MD, Stopped at 04/23/24 1022    heparin (porcine) 1000 UNIT/ML injection 1,400 Units, 1,400 Units, IntraCATHeter, PRN, 1,400 Units at 04/23/24 1035 **AND** heparin (porcine) 1000 UNIT/ML injection 1,100 Units, 1,100 Units, IntraCATHeter, PRN, Juan Toledo MD, 1,100 Units at 04/23/24 1035    gabapentin (NEURONTIN) capsule 300 mg, 300 mg, Oral, QPM, Bonnie Chapman MD, 300 mg at 04/30/24 1818    ALTEplase (CATHFLO) 1 mg in sterile water 1 mL injection, 1 mg, IntraCATHeter, PRN, Ramakrishna Macias MD    aspirin chewable tablet 81 mg, 81 mg, Oral, Daily, 81 mg at 04/30/24 1014 **OR** aspirin suppository 300 mg, 300 mg, Rectal, Daily, Brayan Fitch APRN - CNP, 300 mg at 04/23/24 1151    heparin (porcine) injection 4,000 Units, 4,000 Units, IntraVENous, PRN, Little, Temple, APRN - CNP    heparin (porcine) injection 2,000 Units, 2,000 Units, IntraVENous, PRN, Brayan Fitch, SHAYNE - CNP, 2,000 Units at 04/27/24 0328    heparin 25,000 units in dextrose 5% 250 mL (premix) infusion, 5-30 Units/kg/hr (Order-Specific), IntraVENous, Continuous, Ramakrishna Macias MD, Last Rate: 18 mL/hr at 04/30/24 1831, 15 Units/kg/hr at 04/30/24 1831    balsum peru-castor oil (VENELEX) ointment, , Topical, BID, Ramakrishna Macias MD, Given at 04/30/24 2028    HYDROmorphone HCl PF

## 2024-05-01 NOTE — DISCHARGE INSTRUCTIONS
After Hospital Care Plan:  Discharge Instructions Lumbar I & D Surgery   Dr. Cristobal   Patient Name: Leny Barnes        Follow up appointments  -follow up with Dr. Cristobal in 2 weeks.  Call [phone] to make an appointment as soon as you get home from the hospital.    Home Health Agency: ____________________   phone: _______________________  The agency will contact you to arrange dates/times for visits.  Please call them if you do not hear from them within 24 hours after you are discharged  Physical therapy 3 times a week for 3 weeks  Nursing-initial assessment and as needed    When to call your Orthopaedic Surgeon:  -Signs of infection-if your incision is red; continues to have drainage; drainage has a foul odor or if you have a persistent fever over 101 degrees for 24 hours  -Nausea or vomiting, severe headache  -Loss of bowel or bladder function, inability to urinate  -Changes in sensation in your arms or legs (numbness, tingling, loss of color)  -Increased weakness-greater than before your surgery  -Severe pain or pain not relieved by medications  -Signs of a blood clot in your leg-calf pain, tenderness, redness, swelling of lower leg    When to call your Primary Care Physician:  -Concerns about medical conditions such as diabetes, high blood pressure, asthma, congestive heart failure  -Call if blood sugars are elevated, persistent headache or dizziness, coughing or congestion, constipation or diarrhea, burning with urination, abnormal heart rate    When to call 911 and go to the nearest emergency room:  -Acute onset of chest pain, shortness of breath, difficulty breathing    Activity  -You are going home a well person, be as active as possible.  Your only exercise should be walking.  Start with short frequent walks and increase your walking distance each day.  -Limit the amount of time you sit to 20-30 minute intervals.  Sitting for prolonged periods of time will be uncomfortable for you

## 2024-05-01 NOTE — PROCEDURES
Ultrasound-guided Bedside PICC placement:    Consent obtained from pt after risks of possible DVT, air embolism, arterial puncture, infection and catheter malposition are explained and all questions answered.    PRE-PROCEDURE VERIFICATION  Correct Procedure: yes  Correct Site: yes  Temperature: Temp: 97.4 °F (36.3 °C), Temperature Source:    @cultures@  Recent Labs     05/01/24  0425   BUN 33*      WBC 13.9*     Allergies: Latex, Oxycodone, Codeine, Lisinopril, Morphine, and Statins  Education materials, including PICC Booklet, for PICC Care given to patient: yes.   See Patient Education activity for further details.    PROCEDURE DETAIL  PICC placed using modified Seldinger method.  double lumen PICC line was started for Home IV Therapy. The following documentation is in addition to the PICC properties in the lines/airways flowsheet:  Lot #: [id number]  Was xylocaine 1% used intradermally: yes  Catheter to vein ratio: 36%  Arm Circumference 10 cm above AC: 33 (cm)  Total Catheter Length: 40 (cm)  External Catheter Length: 0 (cm)  Vein Selection for PICC: right brachial  Central Line Bundle followed: yes  Complication Related to Insertion: none    The placement was verified by CXR technology: The tip location is on the right side and the tip is in the atrial/caval superior vena cava. See CXR results for PICC tip placement.    Report given to nurse.    Line is okay to use.      KOSTA JORDAN RN

## 2024-05-01 NOTE — PROGRESS NOTES
[facility]   [address], [place] [address]  Phone: [phone]   [phone]    [email or web address]     Nephrology Progress Note    Patient Name : Leny Barnes      : 1945     MRN : [id number]  Date of Admission : 4/15/2024  Date of Servive : 24    CC:  Follow up for CKD    Assessment and Plan   PERRY on CKD  - ATN vs ATIN vs post infectious GN   - s/p Mendez and emergent HD on   - s/p HD X 2. Last HD 4/23 x 2 treatments, Mendez removed:  - Creatinine at baseline non-oliguric 1 L UOP  - continue Bumex 2 mg BID orally at discharge  - ok to d/c holly  - Stable for discharge from renal standpoint, patient will need repeat labs in 14 days to be sent to [facility] office [phone]    Hypokalemia   - increased KCL to BID dosing     Severe anemia   - s/p PRBC   - Hb stable  - on oral Fe    Severe Hypoxic Resp Failure   Acute HFpEF   Asp PNA  Pleural effusions, edema   encephalopathy  - per [facility]    CKD 3a:  - from DM2 and HTN  - baseline Cr around 1.5 to 1.7     Sepsis w/ Bacteremia  -3/18  s/p Laminectomy with dehiscence of wound  - 4/15 BC (+) serratia  - ID following  and adjusting abx  -  MRI (+) fluid/ gas, s/p I+D on     Hypotension  - BP improving if SBP>140 or DBP> 85 would decrease midodrine dose      CAD  PAF s/p ablation and pacemaker  DM  FREDERICK on CPAP       Interval History:  Seen and examined in NAD . She is feeling better with improved breathing. Slight decrease in UOP.    Review of Systems: A comprehensive review of systems was negative.    Current Medications:   Current Facility-Administered Medications   Medication Dose Route Frequency    potassium bicarb-citric acid (EFFER-K) effervescent tablet 20 mEq  20 mEq Oral BID    midodrine (PROAMATINE) tablet 5 mg  5 mg Oral TID WC    bumetanide (BUMEX) tablet 2 mg  2 mg Oral BID    doxycycline hyclate (VIBRA-TABS) tablet 100 mg  100 mg Oral 2 times per day    0.9 % sodium  chloride infusion   IntraVENous PRN    ipratropium (ATROVENT) 0.02 % nebulizer solution 0.5 mg  0.5 mg Nebulization BID RT    calcium carbonate (TUMS) chewable tablet 1,000 mg  1,000 mg Oral TID PRN    albumin human 25% IV solution 25 g  25 g IntraVENous PRN    heparin (porcine) 1000 UNIT/ML injection 1,400 Units  1,400 Units IntraCATHeter PRN    And    heparin (porcine) 1000 UNIT/ML injection 1,100 Units  1,100 Units IntraCATHeter PRN    gabapentin (NEURONTIN) capsule 300 mg  300 mg Oral QPM    ALTEplase (CATHFLO) 1 mg in sterile water 1 mL injection  1 mg IntraCATHeter PRN    aspirin chewable tablet 81 mg  81 mg Oral Daily    Or    aspirin suppository 300 mg  300 mg Rectal Daily    heparin (porcine) injection 4,000 Units  4,000 Units IntraVENous PRN    heparin (porcine) injection 2,000 Units  2,000 Units IntraVENous PRN    heparin 25,000 units in dextrose 5% 250 mL (premix) infusion  5-30 Units/kg/hr (Order-Specific) IntraVENous Continuous    balsum peru-castor oil (VENELEX) ointment   Topical BID    HYDROmorphone HCl PF (DILAUDID) injection 0.25 mg  0.25 mg IntraVENous Q4H PRN    cefTRIAXone (ROCEPHIN) 2,000 mg in sterile water 20 mL IV syringe  2,000 mg IntraVENous Q24H    magic (miracle) mouthwash  5 mL Swish & Spit 4x Daily PRN    nystatin (MYCOSTATIN) 008019 UNIT/ML suspension 500,000 Units  5 mL Oral 4x Daily    guaiFENesin (ROBITUSSIN) 100 MG/5ML liquid 200 mg  200 mg Oral Q6H PRN    sertraline (ZOLOFT) tablet 150 mg  150 mg Oral QHS    arformoterol 15 mcg-budesonide 0.25 mg neb solution   Nebulization BID RT    folic acid (FOLVITE) tablet 1 mg  1 mg Oral Daily    [Held by provider] apixaban (ELIQUIS) tablet 2.5 mg  2.5 mg Oral BID    glucose chewable tablet 16 g  4 tablet Oral PRN    dextrose bolus 10% 125 mL  125 mL IntraVENous PRN    Or    dextrose bolus 10% 250 mL  250 mL IntraVENous PRN    glucagon injection 1 mg  1 mg SubCUTAneous PRN    dextrose 10 % infusion   IntraVENous Continuous PRN    sodium

## 2024-05-02 ENCOUNTER — APPOINTMENT (OUTPATIENT)
Facility: HOSPITAL | Age: 79
End: 2024-05-02
Payer: MEDICARE

## 2024-05-02 LAB
ANION GAP SERPL CALC-SCNC: 8 MMOL/L (ref 5–15)
BUN SERPL-MCNC: 35 MG/DL (ref 6–20)
BUN/CREAT SERPL: 23 (ref 12–20)
CALCIUM SERPL-MCNC: 8.8 MG/DL (ref 8.5–10.1)
CHLORIDE SERPL-SCNC: 100 MMOL/L (ref 97–108)
CO2 SERPL-SCNC: 32 MMOL/L (ref 21–32)
CREAT SERPL-MCNC: 1.53 MG/DL (ref 0.55–1.02)
ERYTHROCYTE [DISTWIDTH] IN BLOOD BY AUTOMATED COUNT: 19.9 % (ref 11.5–14.5)
GLUCOSE BLD STRIP.AUTO-MCNC: 107 MG/DL (ref 65–117)
GLUCOSE BLD STRIP.AUTO-MCNC: 118 MG/DL (ref 65–117)
GLUCOSE BLD STRIP.AUTO-MCNC: 124 MG/DL (ref 65–117)
GLUCOSE BLD STRIP.AUTO-MCNC: 170 MG/DL (ref 65–117)
GLUCOSE SERPL-MCNC: 120 MG/DL (ref 65–100)
HCT VFR BLD AUTO: 27.1 % (ref 35–47)
HGB BLD-MCNC: 8.1 G/DL (ref 11.5–16)
MCH RBC QN AUTO: 27.4 PG (ref 26–34)
MCHC RBC AUTO-ENTMCNC: 29.9 G/DL (ref 30–36.5)
MCV RBC AUTO: 91.6 FL (ref 80–99)
NRBC # BLD: 0 K/UL (ref 0–0.01)
NRBC BLD-RTO: 0 PER 100 WBC
PLATELET # BLD AUTO: 282 K/UL (ref 150–400)
PMV BLD AUTO: 9.6 FL (ref 8.9–12.9)
POTASSIUM SERPL-SCNC: 4.1 MMOL/L (ref 3.5–5.1)
RBC # BLD AUTO: 2.96 M/UL (ref 3.8–5.2)
SARS-COV-2 RDRP RESP QL NAA+PROBE: NOT DETECTED
SERVICE CMNT-IMP: ABNORMAL
SERVICE CMNT-IMP: NORMAL
SODIUM SERPL-SCNC: 140 MMOL/L (ref 136–145)
SOURCE: NORMAL
WBC # BLD AUTO: 14.1 K/UL (ref 3.6–11)

## 2024-05-02 PROCEDURE — 6370000000 HC RX 637 (ALT 250 FOR IP): Performed by: NURSE PRACTITIONER

## 2024-05-02 PROCEDURE — 6370000000 HC RX 637 (ALT 250 FOR IP): Performed by: PHYSICIAN ASSISTANT

## 2024-05-02 PROCEDURE — 80048 BASIC METABOLIC PNL TOTAL CA: CPT

## 2024-05-02 PROCEDURE — 97605 NEG PRS WND THER DME<=50SQCM: CPT

## 2024-05-02 PROCEDURE — 71045 X-RAY EXAM CHEST 1 VIEW: CPT

## 2024-05-02 PROCEDURE — 6360000002 HC RX W HCPCS: Performed by: NURSE PRACTITIONER

## 2024-05-02 PROCEDURE — 87635 SARS-COV-2 COVID-19 AMP PRB: CPT

## 2024-05-02 PROCEDURE — 94640 AIRWAY INHALATION TREATMENT: CPT

## 2024-05-02 PROCEDURE — 6360000002 HC RX W HCPCS: Performed by: INTERNAL MEDICINE

## 2024-05-02 PROCEDURE — 82962 GLUCOSE BLOOD TEST: CPT

## 2024-05-02 PROCEDURE — 6370000000 HC RX 637 (ALT 250 FOR IP): Performed by: STUDENT IN AN ORGANIZED HEALTH CARE EDUCATION/TRAINING PROGRAM

## 2024-05-02 PROCEDURE — 6370000000 HC RX 637 (ALT 250 FOR IP): Performed by: HOSPITALIST

## 2024-05-02 PROCEDURE — 2580000003 HC RX 258: Performed by: HOSPITALIST

## 2024-05-02 PROCEDURE — 36415 COLL VENOUS BLD VENIPUNCTURE: CPT

## 2024-05-02 PROCEDURE — 2700000000 HC OXYGEN THERAPY PER DAY

## 2024-05-02 PROCEDURE — 2580000003 HC RX 258: Performed by: INTERNAL MEDICINE

## 2024-05-02 PROCEDURE — 85027 COMPLETE CBC AUTOMATED: CPT

## 2024-05-02 PROCEDURE — 2060000000 HC ICU INTERMEDIATE R&B

## 2024-05-02 RX ORDER — DOXYCYCLINE HYCLATE 100 MG
100 TABLET ORAL EVERY 12 HOURS SCHEDULED
Qty: 5 TABLET | Refills: 0 | Status: SHIPPED
Start: 2024-05-02 | End: 2024-05-05

## 2024-05-02 RX ORDER — GUAIFENESIN 600 MG/1
600 TABLET, EXTENDED RELEASE ORAL 2 TIMES DAILY
Status: DISCONTINUED | OUTPATIENT
Start: 2024-05-02 | End: 2024-05-03 | Stop reason: HOSPADM

## 2024-05-02 RX ORDER — CASTOR OIL AND BALSAM, PERU 788; 87 MG/G; MG/G
OINTMENT TOPICAL 2 TIMES DAILY
Qty: 5 G | Refills: 0 | Status: SHIPPED
Start: 2024-05-02 | End: 2024-05-16

## 2024-05-02 RX ORDER — BUMETANIDE 2 MG/1
2 TABLET ORAL 2 TIMES DAILY
Qty: 60 TABLET | Refills: 0 | Status: SHIPPED
Start: 2024-05-02 | End: 2024-06-01

## 2024-05-02 RX ORDER — VALACYCLOVIR HYDROCHLORIDE 500 MG/1
1000 TABLET, FILM COATED ORAL 2 TIMES DAILY
Qty: 28 TABLET | Refills: 0 | Status: SHIPPED | OUTPATIENT
Start: 2024-05-02 | End: 2024-05-09

## 2024-05-02 RX ORDER — BENZONATATE 100 MG/1
100 CAPSULE ORAL 3 TIMES DAILY PRN
Status: DISCONTINUED | OUTPATIENT
Start: 2024-05-02 | End: 2024-05-03 | Stop reason: HOSPADM

## 2024-05-02 RX ORDER — FOLIC ACID 1 MG/1
1 TABLET ORAL DAILY
Qty: 30 TABLET | Refills: 0 | Status: SHIPPED
Start: 2024-05-03 | End: 2024-06-02

## 2024-05-02 RX ADMIN — APIXABAN 2.5 MG: 2.5 TABLET, FILM COATED ORAL at 22:01

## 2024-05-02 RX ADMIN — NYSTATIN 500000 UNITS: 100000 SUSPENSION ORAL at 22:40

## 2024-05-02 RX ADMIN — Medication: at 09:27

## 2024-05-02 RX ADMIN — GUAIFENESIN 600 MG: 600 TABLET, EXTENDED RELEASE ORAL at 22:01

## 2024-05-02 RX ADMIN — FERROUS SULFATE TAB 325 MG (65 MG ELEMENTAL FE) 325 MG: 325 (65 FE) TAB at 09:23

## 2024-05-02 RX ADMIN — IPRATROPIUM BROMIDE 0.5 MG: 0.5 SOLUTION RESPIRATORY (INHALATION) at 20:40

## 2024-05-02 RX ADMIN — ARFORMOTEROL TARTRATE: 15 SOLUTION RESPIRATORY (INHALATION) at 08:05

## 2024-05-02 RX ADMIN — SODIUM CHLORIDE, PRESERVATIVE FREE 10 ML: 5 INJECTION INTRAVENOUS at 09:28

## 2024-05-02 RX ADMIN — BUPROPION HYDROCHLORIDE 150 MG: 150 TABLET, EXTENDED RELEASE ORAL at 09:23

## 2024-05-02 RX ADMIN — POTASSIUM BICARBONATE 20 MEQ: 782 TABLET, EFFERVESCENT ORAL at 09:19

## 2024-05-02 RX ADMIN — IPRATROPIUM BROMIDE 0.5 MG: 0.5 SOLUTION RESPIRATORY (INHALATION) at 08:05

## 2024-05-02 RX ADMIN — GABAPENTIN 300 MG: 300 CAPSULE ORAL at 17:44

## 2024-05-02 RX ADMIN — BUMETANIDE 2 MG: 1 TABLET ORAL at 17:44

## 2024-05-02 RX ADMIN — POTASSIUM BICARBONATE 20 MEQ: 782 TABLET, EFFERVESCENT ORAL at 22:41

## 2024-05-02 RX ADMIN — SODIUM CHLORIDE, PRESERVATIVE FREE 10 ML: 5 INJECTION INTRAVENOUS at 22:41

## 2024-05-02 RX ADMIN — NYSTATIN 500000 UNITS: 100000 SUSPENSION ORAL at 09:17

## 2024-05-02 RX ADMIN — DOXYCYCLINE HYCLATE 100 MG: 100 TABLET, COATED ORAL at 22:01

## 2024-05-02 RX ADMIN — GUAIFENESIN 600 MG: 600 TABLET, EXTENDED RELEASE ORAL at 13:01

## 2024-05-02 RX ADMIN — WATER 2000 MG: 1 INJECTION INTRAMUSCULAR; INTRAVENOUS; SUBCUTANEOUS at 09:18

## 2024-05-02 RX ADMIN — APIXABAN 5 MG: 5 TABLET, FILM COATED ORAL at 09:19

## 2024-05-02 RX ADMIN — ARFORMOTEROL TARTRATE: 15 SOLUTION RESPIRATORY (INHALATION) at 20:40

## 2024-05-02 RX ADMIN — DOXYCYCLINE HYCLATE 100 MG: 100 TABLET, COATED ORAL at 09:23

## 2024-05-02 RX ADMIN — Medication 1 MG: at 09:23

## 2024-05-02 RX ADMIN — ASPIRIN 81 MG CHEWABLE TABLET 81 MG: 81 TABLET CHEWABLE at 09:20

## 2024-05-02 RX ADMIN — NYSTATIN 500000 UNITS: 100000 SUSPENSION ORAL at 17:44

## 2024-05-02 RX ADMIN — Medication: at 22:39

## 2024-05-02 RX ADMIN — SERTRALINE HYDROCHLORIDE 150 MG: 50 TABLET ORAL at 22:01

## 2024-05-02 RX ADMIN — NYSTATIN 500000 UNITS: 100000 SUSPENSION ORAL at 13:01

## 2024-05-02 ASSESSMENT — PAIN SCALES - GENERAL
PAINLEVEL_OUTOF10: 6
PAINLEVEL_OUTOF10: 0
PAINLEVEL_OUTOF10: 6
PAINLEVEL_OUTOF10: 0

## 2024-05-02 ASSESSMENT — PAIN DESCRIPTION - ORIENTATION
ORIENTATION: LOWER
ORIENTATION: LOWER

## 2024-05-02 ASSESSMENT — PAIN DESCRIPTION - DESCRIPTORS
DESCRIPTORS: ACHING
DESCRIPTORS: ACHING

## 2024-05-02 ASSESSMENT — PAIN DESCRIPTION - PAIN TYPE
TYPE: SURGICAL PAIN
TYPE: SURGICAL PAIN

## 2024-05-02 ASSESSMENT — PAIN DESCRIPTION - FREQUENCY
FREQUENCY: CONTINUOUS
FREQUENCY: CONTINUOUS

## 2024-05-02 ASSESSMENT — PAIN DESCRIPTION - LOCATION
LOCATION: BACK
LOCATION: BACK

## 2024-05-02 NOTE — CARE COORDINATION
Transition Of Care:     CM cancelled discharge today due to medical stability. [facility] liaison, Adelaida has accepted with approval from Dr. Grazyna Huertas. CM placed BLS transport with [facility] transport on will call for Friday 5/3/24. CM to coordinate discharge efforts for tomorrow.     RUR: 27%  Prior Level of Functioning: Assistance with ADL's  Disposition: [facility] vs [facility]F  Return to [facility]  SNF plan initiated for back up:  *[facility] has accepted and [facility] declined.   If SNF or IPR: Date FOC offered: 4/29/24  Date FOC received: 4/29/24  Accepting facility: [facility]  Date authorization started with reference number: n/a  Date authorization received and expires: n/a  Follow up appointments: PCP, Specialty   DME needed: none  Transportation at discharge: BLS  IM/IMM Medicare/Tracey letter given: 4/19/24, 4/29/24, 5/1/24  Caregiver Contact: Breonna Barnes / Child / [phone]   Discharge Caregiver contacted prior to discharge? N/a  Care Conference needed? N/a  Barriers to discharge: I&D pending     Nickie Gustafson RN/CRM  [phone]

## 2024-05-02 NOTE — PLAN OF CARE
Problem: Safety - Adult  Goal: Free from fall injury  Outcome: Progressing     Problem: Discharge Planning  Goal: Discharge to home or other facility with appropriate resources  Outcome: Progressing     Problem: Pain  Goal: Verbalizes/displays adequate comfort level or baseline comfort level  Outcome: Progressing     Problem: ABCDS Injury Assessment  Goal: Absence of physical injury  Outcome: Progressing     Problem: Chronic Conditions and Co-morbidities  Goal: Patient's chronic conditions and co-morbidity symptoms are monitored and maintained or improved  Outcome: Progressing     Problem: Skin/Tissue Integrity  Goal: Absence of new skin breakdown  Description: 1.  Monitor for areas of redness and/or skin breakdown  2.  Assess vascular access sites hourly  3.  Every 4-6 hours minimum:  Change oxygen saturation probe site  4.  Every 4-6 hours:  If on nasal continuous positive airway pressure, respiratory therapy assess nares and determine need for appliance change or resting period.  Outcome: Progressing     Problem: Physical Therapy - Adult  Goal: By Discharge: Performs mobility at highest level of function for planned discharge setting.  See evaluation for individualized goals.  Description: FUNCTIONAL STATUS PRIOR TO ADMISSION: Patient was modified independent using a rollator for functional mobility prior to initial spinal surgery 3/18. Patient has been back and [facility] hospital 2x following initial surgery 2/2 ongoing issues with surgical wound dehiscence and infection. Patient has been ambulating at rehab with RW per chart review.     HOME SUPPORT PRIOR TO ADMISSION: The patient lived with daughter and required minimal assistance for self-care/ ADLs following initial spinal surgery 3/18.    Physical Therapy Goals  Initiated 4/23/2024 - progress made, continue all goals 4/30/2024  1.  Patient will move from supine to sit and sit to supine in bed with minimal assistance within 7 day(s).    2.

## 2024-05-02 NOTE — PROGRESS NOTES
Hospitalist Progress Note  Aelah March MD  Answering service: [phone]        Date of Service:  2024  NAME:  Leny Barnes  :  1945  MRN:  [id number]      Admission Summary:   Patient returned to [facility] from [facility] for wound dehiscence on 4/15/2022.  She was recently discharged from here to [facility] on 3/22.  She had lumbar laminectomy on 3/18.  She was initially admitted to the floor under  services for sepsis from infected wound with dehiscence, PERRY on CKD.  Patient was scheduled for debridement but canceled by anesthesia due to respiratory difficulty on   On  patient was noticed to have word finding difficulty, drooping left eyelid  On , she was transferred to ICU for acute hypoxic respiratory failure.  On , she was started on urgent hemodialysis for persistent volume overload.   patient deemed to be clinically and hemodynamically stable to transfer out of the ICU.  She has been off of vasopressors since .\  At the time of transfer out of ICU patient was alert and oriented x 3, Reports dry cough x past few days        Interval history / Subjective:   Patient seen examined at bedside still having persistent cough with mucus production feels slightly more short of breath but O2 stable on 2 L, discussed with ortho pa will observe 1 more day        Assessment & Plan:      Septic shock, persistent noted maintenance and drainage, Serratia marcescens bacteremia  -Off vasopressors since , remained hemodynamically stable.  -Continue IV ceftriaxone for bacteremia   -Appreciate discussion with ID, needs 6 weeks of IV antibiotics and then indefinite PO suppression afterwards  -Appreciate discussion with nephro, stable for  picc   -Appreciate Ortho, wound vac      Acute hypoxic respiratory failure due to persistent volume overload  Acute HFpEF  Aspiration

## 2024-05-02 NOTE — CONSULTS
.Rehab Consult    Date [unfilled]     Patient: Leny Barnes MRN: [id number]  SSN: [id number]    YOB: 1945  Age: 79 y.o.  Sex: female      Reason for consult: Evaluate for rehab needs     Admit date: 4/15/2024  LOS (days): 16    Subjective      Chief Complaint  Chief Complaint   Patient presents with    Wound Check       This is a 79 y.o. female who was found to have    Functional History  Baseline: Independent with mobility/transfers/ADLs.     Current:    Living situation:     Past Medical History:   Diagnosis Date    Arthritis 1990    Asthma     Atrial fibrillation ([facility])     NONE SINCE PACEMAKER    CAD (coronary artery disease) 1996    Cancer ([facility]) 2001    UTERINE    Chronic kidney disease (CKD), stage III (moderate) ([facility]) 2023    Compression fracture of L1 vertebra ([facility]) 10/2023    FELL AT HOME    Congestive heart failure ([facility]) 2020    Pacemaker    Depression     DM (diabetes mellitus) ([facility])     HTN (hypertension)     Hyperlipidemia     Morbid obesity ([facility])     Neuropathy     FREDERICK on CPAP     WEARS 02 2LNC AT NIGHT     Past Surgical History:   Procedure Laterality Date    APPENDECTOMY      BACK SURGERY N/A 4/22/2024    BACK WOUND INCISION AND DRAINAGE (EIP 0730) performed by Grant Royal MD at [facility] MAIN OR    BARIATRIC SURGERY  2011    LAP BAND    CARDIAC PROCEDURE N/A 08/01/2023    Left heart cath / coronary angiography performed by Dewey Hay MD at [facility] CARDIAC CATH LAB    CARDIAC PROCEDURE N/A 08/01/2023    Percutaneous coronary intervention performed by Dewey Hay MD at [facility] CARDIAC CATH LAB    CARDIAC PROCEDURE N/A 08/21/2023    Left heart cath / coronary angiography performed by Dewey Hay MD at [facility] CARDIAC CATH LAB    CATARACT REMOVAL Bilateral 2019    CERVICAL FUSION      CHOLECYSTECTOMY      COLONOSCOPY N/A 07/15/2020    COLONOSCOPY :- performed by Salome Shaikh MD at [facility] ENDOSCOPY    COLONOSCOPY Left 10/28/2019    COLONOSCOPY AND EGD performed by Salome Shaikh MD at [facility]  ENDOSCOPY    DILATION AND CURETTAGE OF UTERUS      GASTRIC BYPASS SURGERY      Jejunal bypass with subsequent reversal..    IR CHEST TUBE INSERTION      KYPHOSIS SURGERY  2023    LUMBAR SPINE SURGERY N/A 3/18/2024    T10-T11 LUMBAR LAMINECTOMY, L2-S1 LUMBAR LAMINECTOMY, T10 TO ILIUM FUSION performed by Grant Royal MD at [facility] MAIN OR    PACEMAKER  2022    KEI AND BSO (CERVIX REMOVED)      For uterine CA      Family History   Problem Relation Age of Onset    Stroke Mother     Osteoarthritis Mother     Hypertension Mother     Psychiatric Disorder Mother         Situational Depression late in life    Gall Bladder Disease Mother     Heart Disease Father         congestive heart failure    Alcohol Abuse Father     Asthma Father     Hypertension Father     Kidney Disease Father     Anesth Problems Sister         NAUSEA, VOMITING    Hypertension Sister     Atrial Fibrillation Sister     Kidney Disease Sister     Cancer Brother         LEUKEMIA    Alcohol Abuse Maternal Aunt     Diabetes Maternal Grandmother     Diabetes Other     Heart Disease Other      Social History     Tobacco Use    Smoking status: Former     Current packs/day: 0.00     Types: Cigarettes     Quit date: 1992     Years since quittin.3    Smokeless tobacco: Never   Substance Use Topics    Alcohol use: Yes     Comment: RARE      [unfilled]     Allergies   Allergen Reactions    Latex Itching    Oxycodone Itching    Codeine Rash    Lisinopril Other (See Comments)     Cough, Visual disturbances    Morphine Itching    Statins Other (See Comments)     Muscle enzyme problems       Review of Systems:  Positive for fatigue. Otherwise a complete review of systems was negative except as noted above in HPI.     Objective     Vitals:    24 2159   BP: (!) 123/55      Pulse: 69  70 70   Resp: 19  20    Temp:  97.8 °F (36.6 °C)     TempSrc:  Oral     SpO2:   98%    Weight:       Height:

## 2024-05-02 NOTE — PROGRESS NOTES
Orthopedic Spine Progress Note  Post Op day: 10 Days Post-Op    May 2, 2024 11:45 AM   Admit Date: 4/15/2024  Procedure: Procedure(s) with comments:  BACK WOUND INCISION AND DRAINAGE (EIP 0730) -     Subjective:     Leny Barnes states increasing SOB. Pain control adequate. The patient states that she feels like her breathing is worsening and she does not want to be D/C'd until her breathing is improved. +BM. +SOB. Denies N/V, CP.    Objective:          Physical Exam:  General:  Alert and oriented. No acute distress.   Heart:  Respirations unlabored.   Abdomen:   Extremities: Soft, non-tender.  No evidence of cyanosis. Pulses palpable in both upper and lower extremities.       Neurologic:  Musculoskeletal:  No new motor deficits. Neurovascular exam within normal limits.  Sensation stable.  Motor: unchanged C5-T1 and L2-S1.   Olivia's sign negative in bilateral lower extremities.  Calves soft, nontender upon palpation.  Moves both upper and lower extremities.   Incision: Intact, copious amount of cloudy serosanguinous wound drainage expressed from the wound.       Vital Signs:    Blood pressure (!) 115/49, pulse 70, temperature 97.5 °F (36.4 °C), temperature source Oral, resp. rate 20, height 1.676 m (5' 6\"), weight 99.7 kg (219 lb 14.4 oz), SpO2 100 %.  Temp (24hrs), Av.7 °F (36.5 °C), Min:97.3 °F (36.3 °C), Max:98.3 °F (36.8 °C)      LAB:      Lab Results   Component Value Date/Time     2024 03:17 AM    K 4.1 2024 03:17 AM     2024 03:17 AM    CO2 32 2024 03:17 AM    BUN 35 2024 03:17 AM     Lab Results   Component Value Date    WBC 14.1 (H) 2024    HGB 8.1 (L) 2024    HCT 27.1 (L) 2024    MCV 91.6 2024     2024      Intake/Output:   0701 -  1900  In: 120 [P.O.:120]  Out: 50 [Drains:50]  1901 -  0700  In: 480 [P.O.:470; I.V.:10]  Out: 650 [Urine:650]    PT/OT:   Gait:                    Assessment:   Patient

## 2024-05-02 NOTE — WOUND CARE
WOCN Note:     Follow up for sacrum and to initiate NPWT to spine.  Seen in 422 with CLIFF Villalobos.    79 y.o. y/o female admitted on 4/15/2024   Admitted for spinal dehiscence with surgical debridement 4/23/24 with primary closure    Past Medical History:   Diagnosis Date    Arthritis 1990    Asthma     Atrial fibrillation ([facility])     NONE SINCE PACEMAKER    CAD (coronary artery disease) 1996    Cancer ([facility]) 2001    UTERINE    Chronic kidney disease (CKD), stage III (moderate) ([facility]) 2023    Compression fracture of L1 vertebra ([facility]) 10/2023    FELL AT HOME    Congestive heart failure ([facility]) 2020    Pacemaker    Depression     DM (diabetes mellitus) ([facility])     HTN (hypertension)     Hyperlipidemia     Morbid obesity ([facility])     Neuropathy     FREDERICK on CPAP     WEARS 02 2LNC AT NIGHT     WBC = 14.1 trending up  On Rocephin  Diet: ADULT DIET; Regular; 3 carb choices (45 gm/meal)  ADULT ORAL NUTRITION SUPPLEMENT; Breakfast, Lunch, Dinner; Diabetic Oral Supplement  DIET ONE TIME MESSAGE;           Assessment:   Communicative and tolerates wound care well.  Requires minimal assists in repositioning.    Surface: CHRIS mattress    Heels offloaded with pillows.  Stable deflated dark area on right lateral toe left open to air     Spinal incision  Large amount of thin tan exudate from middle of incision  Incision covered fully with Mepitel One and then black sponge which was bridged to her upper back.  125 mmHg suction reached    Recommendations:    Buttocks: continue venelex twice daily and cover with foam  Spine: maintain VAC as ordered @ 125 mmHg suction with twice weekly dressing changes  Turn/reposition approximately every 2 hours  Offload heels with heels hanging off end of pillow at all times while in bed. Venelex twice daily  Low Air Loss mattress: Use only flat sheet and one incontinence pad.     Transition of Care: Plan to follow weekly and as needed while admitted to hospital.     Kristen Moreno, BSN, RN, CWOCN  Certified

## 2024-05-03 ENCOUNTER — APPOINTMENT (OUTPATIENT)
Facility: HOSPITAL | Age: 79
End: 2024-05-03
Payer: MEDICARE

## 2024-05-03 VITALS
HEART RATE: 70 BPM | RESPIRATION RATE: 19 BRPM | DIASTOLIC BLOOD PRESSURE: 43 MMHG | BODY MASS INDEX: 36.38 KG/M2 | TEMPERATURE: 97.6 F | WEIGHT: 226.4 LBS | HEIGHT: 66 IN | OXYGEN SATURATION: 100 % | SYSTOLIC BLOOD PRESSURE: 118 MMHG

## 2024-05-03 LAB
ANION GAP SERPL CALC-SCNC: 3 MMOL/L (ref 5–15)
BUN SERPL-MCNC: 37 MG/DL (ref 6–20)
BUN/CREAT SERPL: 25 (ref 12–20)
CALCIUM SERPL-MCNC: 8.7 MG/DL (ref 8.5–10.1)
CHLORIDE SERPL-SCNC: 99 MMOL/L (ref 97–108)
CO2 SERPL-SCNC: 33 MMOL/L (ref 21–32)
CREAT SERPL-MCNC: 1.47 MG/DL (ref 0.55–1.02)
ERYTHROCYTE [DISTWIDTH] IN BLOOD BY AUTOMATED COUNT: 20 % (ref 11.5–14.5)
GLUCOSE BLD STRIP.AUTO-MCNC: 134 MG/DL (ref 65–117)
GLUCOSE BLD STRIP.AUTO-MCNC: 135 MG/DL (ref 65–117)
GLUCOSE SERPL-MCNC: 129 MG/DL (ref 65–100)
HCT VFR BLD AUTO: 25.6 % (ref 35–47)
HGB BLD-MCNC: 7.9 G/DL (ref 11.5–16)
MCH RBC QN AUTO: 28.2 PG (ref 26–34)
MCHC RBC AUTO-ENTMCNC: 30.9 G/DL (ref 30–36.5)
MCV RBC AUTO: 91.4 FL (ref 80–99)
NRBC # BLD: 0 K/UL (ref 0–0.01)
NRBC BLD-RTO: 0 PER 100 WBC
PLATELET # BLD AUTO: 253 K/UL (ref 150–400)
PMV BLD AUTO: 9.6 FL (ref 8.9–12.9)
POTASSIUM SERPL-SCNC: 4.1 MMOL/L (ref 3.5–5.1)
RBC # BLD AUTO: 2.8 M/UL (ref 3.8–5.2)
SERVICE CMNT-IMP: ABNORMAL
SERVICE CMNT-IMP: ABNORMAL
SODIUM SERPL-SCNC: 135 MMOL/L (ref 136–145)
WBC # BLD AUTO: 11.5 K/UL (ref 3.6–11)

## 2024-05-03 PROCEDURE — 2700000000 HC OXYGEN THERAPY PER DAY

## 2024-05-03 PROCEDURE — 6360000002 HC RX W HCPCS: Performed by: INTERNAL MEDICINE

## 2024-05-03 PROCEDURE — 36415 COLL VENOUS BLD VENIPUNCTURE: CPT

## 2024-05-03 PROCEDURE — 97607 NEG PRS WND THR NDME<=50SQCM: CPT

## 2024-05-03 PROCEDURE — 2580000003 HC RX 258: Performed by: INTERNAL MEDICINE

## 2024-05-03 PROCEDURE — 6370000000 HC RX 637 (ALT 250 FOR IP): Performed by: HOSPITALIST

## 2024-05-03 PROCEDURE — 6370000000 HC RX 637 (ALT 250 FOR IP): Performed by: NURSE PRACTITIONER

## 2024-05-03 PROCEDURE — 80048 BASIC METABOLIC PNL TOTAL CA: CPT

## 2024-05-03 PROCEDURE — 85027 COMPLETE CBC AUTOMATED: CPT

## 2024-05-03 PROCEDURE — 71045 X-RAY EXAM CHEST 1 VIEW: CPT

## 2024-05-03 PROCEDURE — 6370000000 HC RX 637 (ALT 250 FOR IP): Performed by: STUDENT IN AN ORGANIZED HEALTH CARE EDUCATION/TRAINING PROGRAM

## 2024-05-03 PROCEDURE — 6360000002 HC RX W HCPCS: Performed by: NURSE PRACTITIONER

## 2024-05-03 PROCEDURE — 94640 AIRWAY INHALATION TREATMENT: CPT

## 2024-05-03 PROCEDURE — 82962 GLUCOSE BLOOD TEST: CPT

## 2024-05-03 PROCEDURE — 2580000003 HC RX 258: Performed by: HOSPITALIST

## 2024-05-03 RX ORDER — GUAIFENESIN 600 MG/1
600 TABLET, EXTENDED RELEASE ORAL 2 TIMES DAILY
Qty: 14 TABLET | Refills: 0 | Status: SHIPPED
Start: 2024-05-03 | End: 2024-05-10

## 2024-05-03 RX ADMIN — DOXYCYCLINE HYCLATE 100 MG: 100 TABLET, COATED ORAL at 09:08

## 2024-05-03 RX ADMIN — ACETAMINOPHEN 650 MG: 325 TABLET ORAL at 01:00

## 2024-05-03 RX ADMIN — POTASSIUM BICARBONATE 20 MEQ: 782 TABLET, EFFERVESCENT ORAL at 09:06

## 2024-05-03 RX ADMIN — ACETAMINOPHEN 1000 MG: 325 TABLET ORAL at 07:11

## 2024-05-03 RX ADMIN — ARFORMOTEROL TARTRATE: 15 SOLUTION RESPIRATORY (INHALATION) at 07:48

## 2024-05-03 RX ADMIN — GUAIFENESIN 600 MG: 600 TABLET, EXTENDED RELEASE ORAL at 09:07

## 2024-05-03 RX ADMIN — WATER 2000 MG: 1 INJECTION INTRAMUSCULAR; INTRAVENOUS; SUBCUTANEOUS at 09:08

## 2024-05-03 RX ADMIN — BUMETANIDE 2 MG: 1 TABLET ORAL at 09:07

## 2024-05-03 RX ADMIN — Medication: at 09:05

## 2024-05-03 RX ADMIN — APIXABAN 2.5 MG: 2.5 TABLET, FILM COATED ORAL at 09:08

## 2024-05-03 RX ADMIN — FERROUS SULFATE TAB 325 MG (65 MG ELEMENTAL FE) 325 MG: 325 (65 FE) TAB at 09:07

## 2024-05-03 RX ADMIN — SODIUM CHLORIDE, PRESERVATIVE FREE 10 ML: 5 INJECTION INTRAVENOUS at 09:09

## 2024-05-03 RX ADMIN — IPRATROPIUM BROMIDE 0.5 MG: 0.5 SOLUTION RESPIRATORY (INHALATION) at 07:48

## 2024-05-03 RX ADMIN — BUPROPION HYDROCHLORIDE 150 MG: 150 TABLET, EXTENDED RELEASE ORAL at 09:18

## 2024-05-03 RX ADMIN — Medication 1 MG: at 09:08

## 2024-05-03 ASSESSMENT — PAIN DESCRIPTION - DESCRIPTORS
DESCRIPTORS: ACHING
DESCRIPTORS: ACHING

## 2024-05-03 ASSESSMENT — PAIN SCALES - GENERAL
PAINLEVEL_OUTOF10: 6
PAINLEVEL_OUTOF10: 5

## 2024-05-03 ASSESSMENT — PAIN DESCRIPTION - LOCATION
LOCATION: BACK
LOCATION: BACK

## 2024-05-03 ASSESSMENT — PAIN DESCRIPTION - ORIENTATION
ORIENTATION: POSTERIOR
ORIENTATION: LOWER

## 2024-05-03 ASSESSMENT — PAIN DESCRIPTION - FREQUENCY: FREQUENCY: CONTINUOUS

## 2024-05-03 ASSESSMENT — PAIN DESCRIPTION - PAIN TYPE: TYPE: SURGICAL PAIN

## 2024-05-03 NOTE — PLAN OF CARE
Problem: Safety - Adult  Goal: Free from fall injury  Outcome: Progressing     Problem: Discharge Planning  Goal: Discharge to home or other facility with appropriate resources  5/3/2024 0326 by Pamella Cates RN  Outcome: Progressing  5/2/2024 2042 by Vandana Sharp RN  Outcome: Progressing     Problem: Pain  Goal: Verbalizes/displays adequate comfort level or baseline comfort level  Outcome: Progressing  Flowsheets (Taken 5/2/2024 2059)  Verbalizes/displays adequate comfort level or baseline comfort level:   Administer analgesics based on type and severity of pain and evaluate response   Consider cultural and social influences on pain and pain management     Problem: ABCDS Injury Assessment  Goal: Absence of physical injury  Outcome: Progressing     Problem: Chronic Conditions and Co-morbidities  Goal: Patient's chronic conditions and co-morbidity symptoms are monitored and maintained or improved  Outcome: Progressing

## 2024-05-03 NOTE — PROGRESS NOTES
Physical therapy services attempted @ 1:38PM. Per cursory review of medical chart, Pt pending dc to [facility] with no apparent PT services/documentation need prior to dc from [facility]. PT Team will follow.    Mayela Lewis, PT, DPT

## 2024-05-03 NOTE — PROGRESS NOTES
Seen and examined independently, labs/ records reviewed for the past 24 hours. Patient became hypoxic overnight requiring increase O2 supplement. CXR showing no overall change. Plans for IPR. She will need to f/u outpatient with RNA.      [facility]   [address], [place] [address]  Phone: [phone]   [phone]    [email or web address]     Nephrology Progress Note    Patient Name : Leny Barnes      : 1945     MRN : [id number]  Date of Admission : 4/15/2024  Date of Servive : 24    CC:  Follow up for CKD    Assessment and Plan   PERRY on CKD  - ATN vs ATIN vs post infectious GN   - s/p Mendez and emergent HD on   - s/p HD X 2. Last HD 4/23 x 2 treatments, Mendez removed:  - Creatinine at baseline non-oliguric   - continue Bumex 2 mg BID orally at discharge  - s/p holly removal, continue to have poor UOP but stable  - Bladder scan, replace holly for >PVR 300ml   - Stable for discharge from renal standpoint, patient will need repeat labs in 14 days to be sent to RNA office [phone]    Hypokalemia: improved   -cont on KCL BID dosing while on Bumex    Severe anemia   - s/p PRBC   - Hb stable  - on oral Fe     CKD 3a:  - from DM2 and HTN  - baseline Cr around 1.5 to 1.7     Sepsis w/ Bacteremia  -3/18  s/p Laminectomy with dehiscence of wound  - 4/15 BC (+) serratia  - ID following  and adjusting abx  -  MRI (+) fluid/ gas, s/p I+D on     Hypotension  - BP improving if SBP>140 or DBP> 85 would decrease midodrine dose      CAD  PAF s/p ablation and pacemaker  DM  FREDERICK on CPAP  Severe Hypoxic Resp Failure   Acute HFpEF   Asp PNA  Pleural effusions, edema   encephalopathy  - per CCM     Interval History:  Patient seen at the bedside, denies major distress, continue to have mild SOB seems to be infectious etiology, persistent crackles, repeat c-x-ray this am for increase oxygen, made minimum UOP overnight.       Review of

## 2024-05-03 NOTE — CARE COORDINATION
Care Management - Received call from hospitalist. Patient discharged to [facility] earlier today. She said she needs to change the dose of Eliquis. Since labs have resulted, patient is now able to do the regular does of 5 mg BID. She has updated this on the AVS. She asked that CM get this information to [facility].     Called [facility] nurse's station  [phone]. Spoke with nursing. She said fax number to the nurse's station is [phone]. She asked that CM speak with their pharmacist. Spoke with pharmacist - Mandy. Updated her on the dosage change. She confirmed the best fax number is to the nurse's station. She will get the AVS from the fax machine once it arrives.     HELIO ANTONY

## 2024-05-03 NOTE — WOUND CARE
WOC Note    Follow up visit for application of Prevena VAC for discharge.  Patient is going to [facility].    Treatment:  Removed standard incisional vac (mepitel one and 1 black foam) and cleansed with saline; applied Prevena VAC using 1 continuous purple foam and applied track pad at the proximal end of incision.    Provided patient with 2 extra canisters.    HERBERT HerreraN RN [facility]  Available on [facility]  Office [phone]

## 2024-05-03 NOTE — CARE COORDINATION
Inpatient Rehab/ Hospital to Hospital Transition of Care Note /Discharge Note     Accepting Physician: Dr. Grazyna Huertas    Discharging Physician: Dr. Aleah March    Accepting Representative: Adelaida with [facility]    Accepting Facility: [facility] RN call to report: [phone]    Transport: Hospital to Home bls.     Ambulance packet at bedside chart.        Family notified:  called and spoke with family member Breonna Barnes and they are in agreement with the discharge plan.       The attending physician and the primary nurse were notified of the plan.    Nickie Gustafson RN/CRM  [phone]

## 2024-05-03 NOTE — DISCHARGE SUMMARY
Discharge Summary       PATIENT ID: Leny Barnes  MRN: [id number]   YOB: 1945    DATE OF ADMISSION: 4/15/2024  3:04 PM    DATE OF DISCHARGE: 05/03/24    PRIMARY CARE PROVIDER: ALINE Rose MD     ATTENDING PHYSICIAN: Aleah March MD   DISCHARGING PROVIDER: Aleah March MD    To contact this individual call [phone] and ask the  to page.  If unavailable ask to be transferred the Adult Hospitalist Department.    CONSULTATIONS: IP CONSULT TO HOSPITALIST  IP CONSULT TO INFECTIOUS DISEASES  IP CONSULT TO PHARMACY  IP WOUND CARE NURSE CONSULT TO EVAL  IP CONSULT TO DIABETES MANAGEMENT  IP CONSULT TO NEPHROLOGY  IP CONSULT TO CARDIOLOGY  IP CONSULT TO NEUROLOGY  IP CONSULT TO PULMONOLOGY  IP WOUND CARE NURSE CONSULT TO EVAL  IP WOUND CARE NURSE CONSULT TO EVAL  IP CONSULT TO PHYSICAL MEDICINE REHAB  IP WOUND CARE NURSE CONSULT TO EVAL    PROCEDURES/SURGERIES: Procedure(s) with comments:  BACK WOUND INCISION AND DRAINAGE (EIP 0730) - This patient is in house we are requesting this start at 7:30 am     ADMITTING DIAGNOSES & HOSPITAL COURSE:   HPI:\"Patient returned to [facility] from [facility] for wound dehiscence on 4/15/2022.  She was recently discharged from [facility] to [facility] on 3/22.  She had lumbar laminectomy on 3/18.  She was initially admitted to the floor under  services for sepsis from infected wound with dehiscence, PERRY on CKD.  Patient was scheduled for debridement but canceled by anesthesia due to respiratory difficulty on 4/19  On 4/21 patient was noticed to have word finding difficulty, drooping left eyelid  On 4/21, she was transferred to ICU for acute hypoxic respiratory failure.  On 4/21, she was started on urgent hemodialysis for persistent volume overload.  4/26 patient deemed to be clinically and hemodynamically stable to transfer out of the ICU.  She has been off of vasopressors since 4/25.\  At the time of transfer out of ICU patient was alert and  Details   guaiFENesin (MUCINEX) 600 MG extended release tablet Take 1 tablet by mouth 2 times daily for 7 days  Qty: 14 tablet, Refills: 0  Start date: 5/3/2024, End date: 5/10/2024      doxycycline hyclate (VIBRA-TABS) 100 MG tablet Take 1 tablet by mouth every 12 hours for 5 doses  Qty: 5 tablet, Refills: 0  Start date: 5/2/2024, End date: 5/5/2024      folic acid (FOLVITE) 1 MG tablet Take 1 tablet by mouth daily  Qty: 30 tablet, Refills: 0  Start date: 5/3/2024, End date: 6/2/2024      bumetanide (BUMEX) 2 MG tablet Take 1 tablet by mouth in the morning and 1 tablet in the evening.  Qty: 60 tablet, Refills: 0  Start date: 5/2/2024, End date: 6/1/2024      balsum peru-castor oil (VENELEX) OINT ointment Apply topically 2 times daily for 14 days  Qty: 5 g, Refills: 0  Start date: 5/2/2024, End date: 5/16/2024           CONTINUE these medications which have CHANGED    Details   apixaban (ELIQUIS) 5 MG TABS tablet Take 1 tablet by mouth 2 times daily  Qty: 60 tablet, Refills: 0  Start date: 5/3/2024, End date: 6/2/2024      valACYclovir (VALTREX) 500 MG tablet Take 2 tablets by mouth 2 times daily for 7 days  Qty: 28 tablet, Refills: 0  Start date: 5/2/2024, End date: 5/9/2024           CONTINUE these medications which have NOT CHANGED    Details   amLODIPine (NORVASC) 5 MG tablet Take 1 tablet by mouth daily      allopurinol (ZYLOPRIM) 100 MG tablet Take 1 tablet by mouth daily      fluticasone furoate-vilanterol (BREO ELLIPTA) 100-25 MCG/ACT inhaler Inhale 1 puff into the lungs daily      insulin regular (HUMULIN R;NOVOLIN R) 100 UNIT/ML injection Inject into the skin See Admin Instructions Sliding Scale      umeclidinium bromide (INCRUSE ELLIPTA) 62.5 MCG/ACT inhaler Inhale 1 puff into the lungs daily      sertraline (ZOLOFT) 100 MG tablet TAKE 1 AND 1/2 TABLETS BY MOUTH NIGHTLY  Qty: 135 tablet, Refills: 2      gabapentin (NEURONTIN) 300 MG capsule Take 2 capsules by mouth every evening for 360 days. Max Daily

## 2024-05-06 LAB
BACTERIA SPEC CULT: NORMAL
SERVICE CMNT-IMP: NORMAL

## 2024-05-06 NOTE — PROGRESS NOTES
Apixaban Order Review  New start? No  Home dosing: apixaban 5 mg BID  Indication: atrial fibrillation   Current Dose: 2.5 mg BID  Current Labs:  No results for input(s): \"CREATININE\", \"HGB\", \"INR\", \"PLT\" in the last 72 hours.     Latest Reference Range & Units 05/02/24 03:17 05/03/24 03:54   Creatinine 0.55 - 1.02 MG/DL 1.53 (H) 1.47 (H)           (H): Data is abnormally high    Est CrCL: 37 ml/min    Drug Interactions: NA        Plan: Pharmacy recommends resuming home dosing.  Scr has been labile, but at the time of discharge patient is > 60 kg and Scr < 1.5.  Given patient already has 5 mg tablets at home and meets criteria for higher dosing based on FDA labeling, recommend Eliquis 5 mg BID.     [facility] DOAC Dosing Guide     (CLOSE I-vent after review and verification)

## 2024-05-13 LAB
BACTERIA SPEC CULT: NORMAL
SERVICE CMNT-IMP: NORMAL

## 2024-05-20 LAB
BACTERIA SPEC CULT: NORMAL
SERVICE CMNT-IMP: NORMAL

## 2024-05-28 PROBLEM — J96.01 ACUTE RESPIRATORY FAILURE WITH HYPOXIA (HCC): Status: ACTIVE | Noted: 2024-01-01

## 2024-05-28 NOTE — H&P
History and Physical    Date of Service:  5/28/2024  Primary Care Provider: ALINE Rose MD  Source of information: The patient, Chart review, and Spouse/family member    Chief Complaint: Shortness of Breath and Altered Mental Status    History of Presenting Illness:   Leny Barnes is a 79 y.o. female with h/o CHF, CAD, uterine cancer, CKD and DM (with a recent complex admit for sepsis due to wound infection and dehiscence following lumbar fusion admit 4/15-5/3) who presented to the ED today from [facility] with AMS, increased work of breathing. Per EMS, Sats were 80's on 3 L NC and she was placed on NRB. She was placed on BiPAP by ED and Hospitalists consulted for admit.     Chart was reviewed, patient had a very complicated and complex admission last month. Pt was seen and examined this afternoon, she was lying in bed on BiPAP in no acute distress - opens eye to voice and mouthed \"hi\" but quickly closes eyes. Follows some commands but overall very weak.  Unable to perform review of systems.  Lungs with crackles throughout lung fields sats 99% on BiPAP - bumex 2 mg BID ordered.     Daughter (Breonna) was present at bedside.  Breonna reports that patient looked okay through the weekend, started looking ill yesterday and condition seemed to just worsen.  Patient has advanced directives that are available in the computer naming her and her sister as her medical decision makers but does not have a DDNR on file.  Started the code discussion with Catrachita and offered to palliative care in which she was very receptive.  Patient has had long hospitalizations, has not been home since March and has overall declined.    Medication reconciliation completed with the assistance of medication list at bedside from [facility].  On discharge from the hospital last month, patient was ordered ceftriaxone to complete course through 6/8.  Now it appears as though patient has been on vancomycin and  bromide (INCRUSE ELLIPTA) 62.5 MCG/ACT inhaler Inhale 1 puff into the lungs daily    Provider, MD Merna   sertraline (ZOLOFT) 100 MG tablet TAKE 1 AND 1/2 TABLETS BY MOUTH NIGHTLY 4/11/24   ALINE Rose MD   gabapentin (NEURONTIN) 300 MG capsule Take 2 capsules by mouth every evening for 360 days. Max Daily Amount: 600 mg 4/5/24 3/31/25  Brayan Fitch APRN - CNP   acetaminophen (TYLENOL) 500 MG tablet Take 2 tablets by mouth every 6 hours 3/22/24   Nickie Baez, PA   buPROPion (WELLBUTRIN SR) 150 MG extended release tablet Take 1 tablet by mouth daily 1/18/24   ALINE Rose MD   hydrOXYzine HCl (ATARAX) 10 MG tablet Take 1 tablet by mouth every 6 hours as needed for Itching    Provider, MD Merna   ferrous sulfate (IRON 325) 325 (65 Fe) MG tablet Take 1 tablet by mouth daily (with breakfast) 8/17/23   Umu Alejandro APRN - CNP   albuterol sulfate HFA (PROVENTIL;VENTOLIN;PROAIR) 108 (90 Base) MCG/ACT inhaler Inhale 2 puffs into the lungs every 4 hours as needed for Wheezing or Shortness of Breath 6/27/23   Loan Reid MD     Allergies   Allergen Reactions    Latex Itching    Clindamycin Hives    Oxycodone Itching    Codeine Rash    Lisinopril Other (See Comments)     Cough, Visual disturbances    Morphine Itching    Statins Other (See Comments)     Muscle enzyme problems      Family History   Problem Relation Age of Onset    Stroke Mother     Osteoarthritis Mother     Hypertension Mother     Psychiatric Disorder Mother         Situational Depression late in life    Gall Bladder Disease Mother     Heart Disease Father         congestive heart failure    Alcohol Abuse Father     Asthma Father     Hypertension Father     Kidney Disease Father     Anesth Problems Sister         NAUSEA, VOMITING    Hypertension Sister     Atrial Fibrillation Sister     Kidney Disease Sister     Cancer Brother         LEUKEMIA    Alcohol Abuse Maternal Aunt     Diabetes Maternal Grandmother

## 2024-05-28 NOTE — ACP (ADVANCE CARE PLANNING)
Advance Care Planning     General Advance Care Planning (ACP) Conversation    Date of Conversation: 5/28/2024  Conducted with: Healthcare Decision Maker  Other persons present: Daughter Breonna Barnes    Healthcare Decision Maker:   Primary Decision Maker: Breonna Barnes - Child - [phone]    Secondary Decision Maker: Darline Barnes - Child - [phone]  Click here to complete Healthcare Decision Makers including selection of the Healthcare Decision Maker Relationship (ie \"Primary\").     Content/Action Overview:  Has ACP document(s) on file - reflects the patient's care preferences  Reviewed DNR/DNI and patient elects Full Code (Attempt Resuscitation) at this time    Advanced directive is on file.  Briefly discussed CODE STATUS today, patient has had several hospitalizations and a decline in her overall physical and medical condition.  Daughter was open to palliative care consultation and further discussion about DNR.  Her sister lives in [place], but would like to be involved in the conversation as well.  Consult placed to palliative care.    Length of Voluntary ACP Conversation in minutes:  <16 minutes (Non-Billable)    SHAYNE Gallardo - NP

## 2024-05-28 NOTE — CONSULTS
stability, which may vary by analyte.   COVID-19 & Influenza Combo    Collection Time: 05/28/24 11:31 AM    Specimen: Nasopharyngeal   Result Value Ref Range    SARS-CoV-2, PCR Not detected NOTD      Rapid Influenza A By PCR Not detected NOTD      Rapid Influenza B By PCR Not detected NOTD     Culture, Blood 2    Collection Time: 05/28/24 11:32 AM    Specimen: Blood   Result Value Ref Range    Special Requests NO SPECIAL REQUESTS  RIGHT        Culture NO GROWTH <24 HRS     Arterial Blood Gas, POC    Collection Time: 05/28/24 11:53 AM   Result Value Ref Range    FIO2 40 %    POC pH 7.30 (L) 7.35 - 7.45      POC pCO2 33.1 (L) 35.0 - 45.0 MMHG    POC PO2 104 (H) 80 - 100 MMHG    POC HCO3 16.2 (L) 22 - 26 MMOL/L    POC O2 SAT 97.4 (H) 92 - 97 %    Base Deficit 9.3 mmol/L    Site RIGHT RADIAL      DEVICE BIPAP MASK      POC PEEP 5 cmH2O    POC Pressure Support 10 cmH2O    POC Kumar's Test Positive      Specimen type: ARTERIAL           Urine dipstick:   No results found for: \"COLOR\", \"CASTS\"    I have reviewed the following:   All pertinent labs, microbiology data, radiology imaging for my assessment     Medications list Personally Reviewed   [x]      Yes     []               No       Medications:  Prior to Admission medications    Medication Sig Start Date End Date Taking? Authorizing Provider   ceFEPIme (MAXIPIME) 2 g injection Inject 2 g into the muscle in the morning and 2 g in the evening.   Yes Merna Lenz MD   guaiFENesin (MUCINEX) 600 MG extended release tablet Take 2 tablets by mouth 2 times daily   Yes Merna Lenz MD   benzonatate (TESSALON) 100 MG capsule Take 1 capsule by mouth 3 times daily as needed for Cough   Yes Merna Lenz MD   sodium chloride 0.9 % SOLN 250 mL with vancomycin 1 g SOLR 1,000 mg, [id number] 1 Device Infuse 1,000 mg intravenously   Yes Merna Lenz MD   potassium chloride (KLOR-CON M) 10 MEQ extended release tablet Take 1 tablet by mouth 2 times daily    Yes Merna Lenz MD   ferrous gluconate (FERGON) 324 (38 Fe) MG tablet Take 1 tablet by mouth daily (with breakfast)   Yes Merna Lenz MD   apixaban (ELIQUIS) 5 MG TABS tablet Take 1 tablet by mouth 2 times daily 5/3/24 6/2/24  Aleah March MD   folic acid (FOLVITE) 1 MG tablet Take 1 tablet by mouth daily 5/3/24 6/2/24  Aleah March MD   bumetanide (BUMEX) 2 MG tablet Take 1 tablet by mouth in the morning and 1 tablet in the evening. 5/2/24 6/1/24  Aleah March MD   amLODIPine (NORVASC) 5 MG tablet Take 1 tablet by mouth daily    Merna Lenz MD   allopurinol (ZYLOPRIM) 100 MG tablet Take 1 tablet by mouth daily    Merna Lenz MD   fluticasone furoate-vilanterol (BREO ELLIPTA) 100-25 MCG/ACT inhaler Inhale 1 puff into the lungs daily    Merna Lenz MD   insulin regular (HUMULIN R;NOVOLIN R) 100 UNIT/ML injection Inject into the skin See Admin Instructions Sliding Scale    Merna Lenz MD   umeclidinium bromide (INCRUSE ELLIPTA) 62.5 MCG/ACT inhaler Inhale 1 puff into the lungs daily    Merna Lenz MD   sertraline (ZOLOFT) 100 MG tablet TAKE 1 AND 1/2 TABLETS BY MOUTH NIGHTLY 4/11/24   ALINE Rose MD   gabapentin (NEURONTIN) 300 MG capsule Take 2 capsules by mouth every evening for 360 days. Max Daily Amount: 600 mg 4/5/24 3/31/25  Brayan Fitch APRN - CNP   acetaminophen (TYLENOL) 500 MG tablet Take 2 tablets by mouth every 6 hours 3/22/24   Nickie Baez, PA   buPROPion (WELLBUTRIN SR) 150 MG extended release tablet Take 1 tablet by mouth daily 1/18/24   ALINE Rose MD   hydrOXYzine HCl (ATARAX) 10 MG tablet Take 1 tablet by mouth every 6 hours as needed for Itching    Merna Lenz MD   ferrous sulfate (IRON 325) 325 (65 Fe) MG tablet Take 1 tablet by mouth daily (with breakfast) 8/17/23   Polliard, Umu T, APRN - CNP   albuterol sulfate HFA (PROVENTIL;VENTOLIN;PROAIR) 108 (90 Base)

## 2024-05-28 NOTE — ED TRIAGE NOTES
Brought by EMS from [facility] for AMS and SOB. Hypoxic to 80% on 3L upon EMS arrival.

## 2024-05-28 NOTE — PROGRESS NOTES
Day #20 of Cefepime (appears to have started )  Indication:  SSTI/bloodstream infection  -being treated for serratia bacteremia previously with ceftriaxone  Current regimen:  2G IV q12h  Abx regimen: Cefepime  Recent Labs     24  1032   WBC 15.9*   CREATININE 3.60*   BUN 54*     Est CrCl: 15 ml/min; UO: -- ml/kg/hr  Temp (24hrs), Av.8 °F (37.1 °C), Min:98.8 °F (37.1 °C), Max:98.8 °F (37.1 °C)    Cultures:      Plan: Change to 1000 mg q12h for CrCl 11-29 ml/min. Last dose at facility reported to be at 0800 today.

## 2024-05-28 NOTE — ED PROVIDER NOTES
[facility] EMERGENCY DEP  EMERGENCY DEPARTMENT ENCOUNTER      Pt Name: Leny Barnes  MRN: [id number]  Birthdate 1945  Date of evaluation: 5/28/2024  Provider: Rosemary Robertson MD    CHIEF COMPLAINT       Chief Complaint   Patient presents with    Shortness of Breath    Altered Mental Status         HISTORY OF PRESENT ILLNESS    Leny Barnes is an 78 yo F with h/o CHF, CAD, uterine cancer, CKD and DM who was recently admitted for sepsis due to wound infection and dehiscence following lumbar fusion.  She was discharged to [facility] for rehab. Today staff noted AMS with increased work of breathing.  When EMS arrived she was on 3L NC with O2 saturation of 80% and they started her on a NRB.            Additional history from independent historians:     Review of External Medical Records:     Nursing Notes were reviewed.    REVIEW OF SYSTEMS       Review of Systems   Respiratory:  Positive for shortness of breath.    Psychiatric/Behavioral:  Positive for confusion.        Except as noted above the remainder of the review of systems was reviewed and negative.       PAST MEDICAL HISTORY     Past Medical History:   Diagnosis Date    Arthritis 1990    Asthma     Atrial fibrillation (HCC)     NONE SINCE PACEMAKER    CAD (coronary artery disease) 1996    Cancer ([facility]) 2001    UTERINE    Chronic kidney disease (CKD), stage III (moderate) ([facility]) 2023    Compression fracture of L1 vertebra ([facility]) 10/2023    FELL AT HOME    Congestive heart failure (HCC) 2020    Pacemaker    Depression     DM (diabetes mellitus) ([facility])     HTN (hypertension)     Hyperlipidemia     Morbid obesity ([facility])     Neuropathy     FREDERICK on CPAP     WEARS 02 2LNC AT NIGHT         SURGICAL HISTORY       Past Surgical History:   Procedure Laterality Date    APPENDECTOMY      BACK SURGERY N/A 4/22/2024    BACK WOUND INCISION AND DRAINAGE (EIP 0730) performed by Grant Royal MD at [facility] MAIN OR    BARIATRIC SURGERY  2011    LAP BAND    CARDIAC  dictation.    EMERGENCY DEPARTMENT COURSE and DIFFERENTIAL DIAGNOSIS/MDM:   Vitals:    Vitals:    05/28/24 1018   BP: 115/89   Pulse: 81   Resp: 23   Temp: 98.8 °F (37.1 °C)   TempSrc: Axillary   SpO2: 97%   Weight: 101.8 kg (224 lb 6.9 oz)   Height: 1.676 m (5' 6\")           Medical Decision Making  Amount and/or Complexity of Data Reviewed  Labs: ordered.  Radiology: ordered.  ECG/medicine tests: ordered.    Risk  Decision regarding hospitalization.            REASSESSMENT        CRITICAL CARE TIME   Total Critical Care time was 35 minutes, excluding separately reportable procedures.     CONSULTS:  None    PROCEDURES:  Unless otherwise noted below, none     Procedures        FINAL IMPRESSION      1. Acute pulmonary edema (HCC)    2. Hypoxia    3. PERRY (acute kidney injury) (HCC)          DISPOSITION/PLAN   DISPOSITION      Perfect Serve Consult for Admission  11:55 AM    ED Room Number: ER22/22  Patient Name and age:  Leny Barnes 79 y.o.  female  Working Diagnosis:   1. Acute pulmonary edema (HCC)    2. Hypoxia    3. PERRY (acute kidney injury) (HCC)        COVID-19 Suspicion: No  Sepsis present:  No  Reassessment needed: No  Code Status:  Full Code  Readmission: Yes  Isolation Requirements: no  Recommended Level of Care: step down  Department: [facility] Adult ED - [phone]  Consulting Provider: Moni    Other:  h/o  CHF, CAD, CKD DM and recent admission for sepsis with wound dehiscence of lumbar spine fusion.  Increased confusion with hypoxia today.  O2 sat was 80% on 3 L at EMS arrival at SNF.  Was on NRB with rales on arrival in ED and started on BiPAP which improved work of breathing.  Cr 3.6      PATIENT REFERRED TO:  No follow-up provider specified.    DISCHARGE MEDICATIONS:  New Prescriptions    No medications on file         (Please note that portions of this note were completed with a voice recognition program.  Efforts were made to edit the dictations but occasionally words are

## 2024-05-28 NOTE — PROGRESS NOTES
Order for PICC line evaluation received. Patient had PICC line placed 5/1/24 for LT antibiotic therapy through 6/4/24. Line retracted and CXR shows tip in subclavian vein. BC's drawn and patient admitted. Spoke w/H. DONTAE Abebe re: same. Agreeable to remove PICC and obtain PIV access. VAT will place PICC, if needed, once BC clear for 48 hours. DWAYNE Workman aware.

## 2024-05-28 NOTE — PROGRESS NOTES
1533- Pt brought up from the ED with nurse and respiratory therapist on BIPAP. Pt alert and oriented x 3. Dual skin assessment done and pt has a midline incision on her back with serous drainage and small open spot toward the bottom of her back. Wound care consulted and meplex dressing in place.     1635- Infectious disease came by and assessed pt back incision and the drainage.     1930- Pt had a BM. Back dressing changed to a wet to dry dressing.

## 2024-05-28 NOTE — ED NOTES
Pt arrived to room 22 on neb and non-rebreather, stool removed from our rectal area, placed on monitor times three, PICC line present and appears she has been receiving Cefepime with an end date of 6/4/24, head of bed elevated, wound vac removed prior to transport- daughter bedside discussing plan of care with family

## 2024-05-28 NOTE — CONSULTS
Infectious Disease Consult Note    Reason for Consult: Wound infection  Date of Consultation: May 28, 2024  Date of Admission: 5/28/2024  Referring Physician: Shobha Johns    HPI:    78 y/o female with recent prolonged admission 4/15-5/3/24 with lumbar spinal deep surgical site and hardware infection s/p I/D with Serratia along with bacteremia complicated by PERRY/cardiorenal syndrome requiring HD and prolonged course with eventual improvement and weaned off HD and treated with Ceftriaxone with plan to complete course on 6/4/24, inclusive.  Discharged to [facility] rehab then to [facility].  For reasons that are not quite clear, was treated in addition to Ceftriaxone with Doxycycline 100mg PO BID for ? Wound dehiscence?  Then later was transitioned from 5/7 - current with Vancomycin and Cefepime?  Per conversation with pharmacy and record review from [facility].    Transferred to [facility] for admission due to SOB/respiratory distress with O2 sat in 80% range and CXR notable for pulmonary edema requiring BiPAP and PERRY with Cr up to 3.6.  NT-pro-BNP quite elevated.    Noted to have wound dehiscence on evaluation with serous drainage.    Past Medical History:  Past Medical History:   Diagnosis Date    Arthritis 1990    Asthma     Atrial fibrillation ([facility])     NONE SINCE PACEMAKER    CAD (coronary artery disease) 1996    Cancer ([facility]) 2001    UTERINE    Chronic kidney disease (CKD), stage III (moderate) ([facility]) 2023    Compression fracture of L1 vertebra ([facility]) 10/2023    FELL AT HOME    Congestive heart failure ([facility]) 2020    Pacemaker    COPD (chronic obstructive pulmonary disease) ([facility])     Depression     DM (diabetes mellitus) ([facility])     HTN (hypertension)     Hyperlipidemia     Morbid obesity ([facility])     Neuropathy     FREDERICK on CPAP     WEARS 02 2LNC AT NIGHT         Surgical History:  Past Surgical History:   Procedure Laterality Date    APPENDECTOMY      BACK SURGERY N/A 4/22/2024    BACK WOUND INCISION AND DRAINAGE (EIP  100 %   05/28/24 1145 -- 71 16 (!) 143/56 100 %   05/28/24 1130 -- 70 17 (!) 124/44 100 %   05/28/24 1039 -- 70 21 -- 99 %   05/28/24 1030 -- 71 (!) 33 122/86 98 %   05/28/24 1018 98.8 °F (37.1 °C) 81 23 115/89 97 %     GEN: SOB  HEENT: Normocephalic, atraumatic, JVP elevated  CV: normal rate  Lungs: insp crackles b/l  Abdomen: obese  Genitourinary: no holly  Extremities: +2 gluteal edema  Neuro: moving all extremities  Skin: lumbar inferior spinal wound with dehiscence and serous drainage  Psych: follows some commands    Central Line:        Labs:   Recent Results (from the past 24 hour(s))   EKG 12 Lead    Collection Time: 05/28/24 10:25 AM   Result Value Ref Range    Ventricular Rate 73 BPM    Atrial Rate 73 BPM    QRS Duration 166 ms    Q-T Interval 466 ms    QTc Calculation (Bazett) 513 ms    R Axis -28 degrees    T Axis 194 degrees    Diagnosis       Ventricular-paced rhythm  Abnormal ECG  When compared with ECG of 15-APR-2024 15:26,  Vent. rate has increased BY   3 BPM     CBC with Auto Differential    Collection Time: 05/28/24 10:32 AM   Result Value Ref Range    WBC 15.9 (H) 3.6 - 11.0 K/uL    RBC 3.39 (L) 3.80 - 5.20 M/uL    Hemoglobin 9.6 (L) 11.5 - 16.0 g/dL    Hematocrit 31.5 (L) 35.0 - 47.0 %    MCV 92.9 80.0 - 99.0 FL    MCH 28.3 26.0 - 34.0 PG    MCHC 30.5 30.0 - 36.5 g/dL    RDW 21.1 (H) 11.5 - 14.5 %    Platelets 231 150 - 400 K/uL    MPV 9.6 8.9 - 12.9 FL    Nucleated RBCs 0.0 0  WBC    nRBC 0.00 0.00 - 0.01 K/uL    Neutrophils % 88 (H) 32 - 75 %    Lymphocytes % 5 (L) 12 - 49 %    Monocytes % 6 5 - 13 %    Eosinophils % 0 0 - 7 %    Basophils % 0 0 - 1 %    Immature Granulocytes % 1 (H) 0.0 - 0.5 %    Neutrophils Absolute 13.9 (H) 1.8 - 8.0 K/UL    Lymphocytes Absolute 0.8 0.8 - 3.5 K/UL    Monocytes Absolute 1.0 0.0 - 1.0 K/UL    Eosinophils Absolute 0.0 0.0 - 0.4 K/UL    Basophils Absolute 0.0 0.0 - 0.1 K/UL    Immature Granulocytes Absolute 0.2 (H) 0.00 - 0.04 K/UL    Differential Type  (not separately reported)  Independently interpreting results (not separately reported) and communicating results to the patient/family/caregiver  Care coordination (not separately reported) as noted above  Documenting clinical information in the electronic health records (e.g. problem list, visit note) on the day of the encounter

## 2024-05-28 NOTE — PROCEDURES
PROCEDURE NOTE  Date: 5/28/2024   Name: Leny Barnes  YOB: 1945    Procedures    Ultrasound guided peripheral IV placed on left mid forearm. RN made aware.  See LDA.    Telma Murillo RN  VAT

## 2024-05-29 PROBLEM — A41.9 SEPSIS (HCC): Status: ACTIVE | Noted: 2024-01-01

## 2024-05-29 NOTE — PROGRESS NOTES
Palliative Medicine   [place] [phone] (COPE)        [facility] [phone] (COPE)      Palliative Medicine consultation received and appreciated, our team actually recently met with patient and family during her recent admission- goals have been clear for restorative measures.     Our team will be available tomorrow, Thursday, 5/30 to provide support to patient and family.       Thank you for including Palliative Medicine in the care of Bertha Barnes         Karlie Villarreal LCSW

## 2024-05-29 NOTE — PROGRESS NOTES
Spiritual Care Assessment/Progress Note  [facility]    Name: Leny Barnes MRN: [id number]    Age: 79 y.o.     Sex: female   Language: English     Date: 5/29/2024            Total Time Calculated: 15 min              Spiritual Assessment begun in [facility]  Service Provided For: Patient not available  Referral/Consult From: Palliative Care  Encounter Overview/Reason: Palliative Care    Spiritual beliefs:      [] Involved in a cherelle tradition/spiritual practice:      [] Supported by a cherelle community:      [] Claims no spiritual orientation:      [] Seeking spiritual identity:           [] Adheres to an individual form of spirituality:      [x] Not able to assess:                Identified resources for coping and support system:   Support System: Family members, Palliative Care       [] Prayer                  [] Devotional reading               [] Music                  [] Guided Imagery     [] Pet visits                                        [] Other: (COMMENT)     Specific area/focus of visit   Encounter:    Crisis:    Spiritual/Emotional needs:    Ritual, Rites and Sacraments:    Grief, Loss, and Adjustments:    Ethics/Mediation:    Behavioral Health:    Palliative Care: Type: Palliative Care, Initial/Spiritual Assessment  Advance Care Planning:      I attempted to visit Leny Barnes for a palliative initial spiritual assessment. Patient's chart was consulted. Unable to complete assessment at this time, as patient was sleeping and did not awake. No family was present.  Chaplain Shonda, [place], MS, BCC

## 2024-05-29 NOTE — ACP (ADVANCE CARE PLANNING)
Advance Care Planning     Advance Care Planning (ACP) Physician/NP/PA Conversation    Date of Conversation: 5/28/2024  Conducted with: Healthcare Decision Maker and Legal next of kin  Other persons present: Daughter  Ms Dawn    Healthcare Decision Maker:   Primary Decision Maker: Breonna Barnes - Child - [phone]    Secondary Decision Maker: Darline Barnes - Child - [phone]  Click here to complete Healthcare Decision Makers including selection of the Healthcare Decision Maker Relationship (ie \"Primary\").   Today we documented Decision Maker(s) consistent with Legal Next of Kin hierarchy.    Care Preferences:    Hospitalization:  \"If your health worsens and it becomes clear that your chance of recovery is unlikely, what would be your preference regarding hospitalization?\"  The patient would prefer comfort-focused treatment without hospitalization.    Ventilation:  \"If you were unable to breath on your own and your chance of recovery was unlikely, what would be your preference about the use of a ventilator (breathing machine) if it was available to you?\"  The patient would NOT desire the use of a ventilator.    Resuscitation:  \"In the event your heart stopped as a result of an underlying serious health condition, would you want attempts made to restart your heart, or would you prefer a natural death?\"  No, do NOT attempt to resuscitate.    treatment goals, benefit/burden of treatment options, artificial nutrition, ventilation preferences, hospitalization preferences, resuscitation preferences, end of life care preferences (vegetative state/imminent death), and hospice care    Conversation Outcomes / Follow-Up Plan:  ACP incomplete - refer to ACP Clinical Specialist  Reviewed DNR/DNI and patient elects DNR order - completed portable DNR form & placed order    Length of Voluntary ACP Conversation in minutes:  20 minutes    Bonnie Chapman MD

## 2024-05-29 NOTE — PLAN OF CARE
Problem: Discharge Planning  Goal: Discharge to home or other facility with appropriate resources  Outcome: Progressing  Flowsheets (Taken 5/28/2024 2049)  Discharge to home or other facility with appropriate resources:   Identify barriers to discharge with patient and caregiver   Identify discharge learning needs (meds, wound care, etc)     Problem: ABCDS Injury Assessment  Goal: Absence of physical injury  Outcome: Progressing     Problem: Skin/Tissue Integrity  Goal: Absence of new skin breakdown  Description: 1.  Monitor for areas of redness and/or skin breakdown  2.  Assess vascular access sites hourly  3.  Every 4-6 hours minimum:  Change oxygen saturation probe site  4.  Every 4-6 hours:  If on nasal continuous positive airway pressure, respiratory therapy assess nares and determine need for appliance change or resting period.  Outcome: Progressing     Problem: Safety - Adult  Goal: Free from fall injury  Outcome: Progressing

## 2024-05-29 NOTE — PROGRESS NOTES
Orthopedic Spine Progress Note  Post Op day: * No surgery found *    May 29, 2024 10:03 AM   Admit Date: 2024  Procedure: * No surgery found *    Subjective:     Leny Barnes has no complaints. Patient wearing bipap. Minimal nonverbal cues noted during examination upon questioning.   Tolerating diet.  No N/V.    Pain Control:        Objective:          Physical Exam:  General:  Alert and oriented. No acute distress.   Heart:  Respirations unlabored.   Abdomen:   Extremities: Soft, non-tender.  No evidence of cyanosis. Pulses palpable in both upper and lower extremities.       Neurologic:  Musculoskeletal:  No new motor deficits. Neurovascular exam within normal limits.  Sensation stable.  Motor: unchanged C5-T1 and L2-S1.   Olivia's sign negative in bilateral lower extremities.  Calves soft, nontender upon palpation and with passive twitch.  Moves both upper and lower extremities.   Incision: Nylon sutures remain intact. Mild generalized erythema to incision. Inferior portion of the incision, approximately 2-3cm with superficial dehiscence and small amount of spontansous serous drainage emanating from wound. Moderate to large amount of serous drainage on inferior portion of the patient's bandage.       Vital Signs:    Blood pressure (!) 113/41, pulse 74, temperature 98.3 °F (36.8 °C), temperature source Oral, resp. rate 23, height 1.676 m (5' 6\"), weight 100.7 kg (221 lb 14.4 oz), SpO2 100 %.  Temp (24hrs), Av °F (36.7 °C), Min:97.7 °F (36.5 °C), Max:98.8 °F (37.1 °C)      LAB:    Recent Labs     24  0100   HCT 27.5*   HGB 8.3*        Lab Results   Component Value Date/Time     2024 01:00 AM    K 3.1 2024 01:00 AM     2024 01:00 AM    CO2 17 2024 01:00 AM    BUN 58 2024 01:00 AM       Intake/Output:No intake/output data recorded.   1901 -  0700  In: -   Out: 350 [Urine:350]    PT/OT:   Gait:                    Assessment:   Patient is *  No surgery found * s/p * No surgery found *    Plan:     1.  Continue PT/OT  2.  Continue established methods of pain control  3.  VTE Prophylaxes - TEDS &/or SCDs   4. Continue with care per primary team and per ID  5. Recommend wound care consultation for application of wound vac.   6. Will continue to follow.      Signed By: CLIFF Álvarez

## 2024-05-29 NOTE — DISCHARGE SUMMARY
Discharge Summary       PATIENT ID: Leny Barnes  MRN: [id number]   YOB: 1945    DATE OF ADMISSION: 5/28/2024 10:06 AM    DATE OF DISCHARGE: 5/29/24   PRIMARY CARE PROVIDER: ALINE Rose MD     ATTENDING PHYSICIAN: bonnie chapman  DISCHARGING PROVIDER: Bonnie Chapman MD    To contact this individual call [phone] and ask the  to page.  If unavailable ask to be transferred the Adult Hospitalist Department.    CONSULTATIONS: IP CONSULT TO HOSPITALIST  IP CONSULT TO NEPHROLOGY  IP WOUND CARE NURSE CONSULT TO EVAL  IP CONSULT TO PALLIATIVE CARE  IP CONSULT TO INFECTIOUS DISEASES  IP WOUND CARE NURSE CONSULT TO EVAL  IP CONSULT TO ORTHOPEDIC SURGERY  IP WOUND CARE NURSE CONSULT TO EVAL  IP CONSULT TO HOSPICE    PROCEDURES/SURGERIES: * No surgery found *    ADMITTING DIAGNOSES & HOSPITAL COURSE:     Leny Barnes is a 79 y.o. female with h/o CHF, CAD, uterine cancer, CKD and DM (with a recent complex admit for sepsis due to wound infection and dehiscence following lumbar fusion admit 4/15-5/3) who presented to the ED today from [facility] with AMS, increased work of breathing. Per EMS, Sats were 80's on 3 L NC and she was placed on NRB. She was placed on BiPAP by ED and Hospitalists consulted for admit.      Chart was reviewed, patient had a very complicated and complex admission last month. Pt was seen and examined this afternoon, she was lying in bed on BiPAP in no acute distress - opens eye to voice and mouthed \"hi\" but quickly closes eyes. Follows some commands but overall very weak.  Unable to perform review of systems.  Lungs with crackles throughout lung fields sats 99% on BiPAP - bumex 2 mg BID ordered.      Daughter (Breonna) was present at bedside.  Breonna reports that patient looked okay through the weekend, started looking ill yesterday and condition seemed to just worsen.  Patient has advanced directives that are available in the computer naming her  and her sister as her medical decision makers but does not have a DDNR on file.  Started the code discussion with Catrachita and offered to palliative care in which she was very receptive.  Patient has had long hospitalizations, has not been home since March and has overall declined.       See today's note advance care planning after discussion with medical power of  comfort care focused treatment was decided hospice consulted patient was admitted to [facility] inpatient hospice        DISCHARGE DIAGNOSES / PLAN:       D/c to hospice       PENDING TEST RESULTS:   At the time of discharge the following test results are still pending:     FOLLOW UP APPOINTMENTS:    [unfilled]     ADDITIONAL CARE RECOMMENDATIONS:     DIET:     Oral Nutritional Supplements:     ACTIVITY: bedrest    WOUND CARE:     EQUIPMENT needed:       DISCHARGE MEDICATIONS:     Medication List        ASK your doctor about these medications      acetaminophen 500 MG tablet  Commonly known as: TYLENOL  Take 2 tablets by mouth every 6 hours     albuterol sulfate  (90 Base) MCG/ACT inhaler  Commonly known as: PROVENTIL;VENTOLIN;PROAIR  Inhale 2 puffs into the lungs every 4 hours as needed for Wheezing or Shortness of Breath     allopurinol 100 MG tablet  Commonly known as: ZYLOPRIM  TAKE 2 TABLETS BY MOUTH EVERY DAY FOR GOUT  Ask about: Which instructions should I use?     amLODIPine 5 MG tablet  Commonly known as: NORVASC     apixaban 5 MG Tabs tablet  Commonly known as: ELIQUIS  Take 1 tablet by mouth 2 times daily     benzonatate 100 MG capsule  Commonly known as: TESSALON     Breo Ellipta 100-25 MCG/ACT inhaler  Generic drug: fluticasone furoate-vilanterol     bumetanide 2 MG tablet  Commonly known as: BUMEX  Take 1 tablet by mouth in the morning and 1 tablet in the evening.     buPROPion 150 MG extended release tablet  Commonly known as: WELLBUTRIN SR  Take 1 tablet by mouth daily     ceFEPIme 2 g injection  Commonly known as: MAXIPIME

## 2024-05-29 NOTE — PROGRESS NOTES
Hospitalist Progress Note  Bonnie Chapman MD  Answering service: [phone] OR [phone] from in house phone        Date of Service:  2024  NAME:  Leny Barnes  :  1945  MRN:  [id number]      Admission Summary:   Leny Barnes is a 79 y.o. female with h/o CHF, CAD, uterine cancer, CKD and DM (with a recent complex admit for sepsis due to wound infection and dehiscence following lumbar fusion admit 4/15-5/3) who presented to the ED today from [facility] with AMS, increased work of breathing. Per EMS, Sats were 80's on 3 L NC and she was placed on NRB. She was placed on BiPAP by ED and Hospitalists consulted for admit.      Chart was reviewed, patient had a very complicated and complex admission last month. Pt was seen and examined this afternoon, she was lying in bed on BiPAP in no acute distress - opens eye to voice and mouthed \"hi\" but quickly closes eyes. Follows some commands but overall very weak.  Unable to perform review of systems.  Lungs with crackles throughout lung fields sats 99% on BiPAP - bumex 2 mg BID ordered.      Daughter (Breonna) was present at bedside.  Breonna reports that patient looked okay through the weekend, started looking ill yesterday and condition seemed to just worsen.  Patient has advanced directives that are available in the computer naming her and her sister as her medical decision makers but does not have a DDNR on file.  Started the code discussion with Catrachita and offered to palliative care in which she was very receptive.  Patient has had long hospitalizations, has not been home since March and has overall declined.       Interval history / Subjective:   Patient seen and examined on BiPAP discussed with daughter and nephrologist after long discussion elected for comfort focused BiPAP removed on nasal cannula oxygen comfort care order placed consulted palliative  now)  - V paced on telemetry, rate controlled     PERRY on CKD  - Status post emergent dialysis initiated on 4/22 - last admit  - Cr up to  3.6 (1.47 last admit)  - consult to nephrology     DM2, insulin-dependent  - hold home meds for now, NPO   - monitor glucose/start insulin and SSI as indicated     Acute on chronic severe anemia.   - received PRBC 4/26 on last admit  - Hgb today looks stable and better from PTA     Hx FREDERICK on CPAP     Code status: DNR  Prophylaxis: SCD  Care Plan discussed with: Medical power of  daughter, nephrology  and RN, CM hospice consult placed  Anticipated Disposition: TBD      Principal Problem:    Acute respiratory failure with hypoxia (HCC)  Resolved Problems:    * No resolved hospital problems. *            Review of Systems:   Review of systems not obtained due to patient factors.         Vital Signs:    Last 24hrs VS reviewed since prior progress note. Most recent are:  /86   Pulse 85   Temp 98.5 °F (36.9 °C) (Axillary)   Resp 20   Ht 1.676 m (5' 6\")   Wt 100.7 kg (221 lb 14.4 oz)   SpO2 97%   BMI 35.82 kg/m²        Intake/Output Summary (Last 24 hours) at 5/29/2024 1342  Last data filed at 5/29/2024 1253  Gross per 24 hour   Intake 114.59 ml   Output 350 ml   Net -235.41 ml        Physical Examination:     I had a face to face encounter with this patient and independently examined them on 5/29/2024 as outlined below:          General :  no acute distress,   HEENT:  moist mucus membrane  Neck:  no meningeal signs  Chest: Clear to auscultation bilaterally   CVS: S1 S2 heard,  paced rhythm  Abd: soft/ non tender, non distended  Ext: no clubbing, no cyanosis, no edema, brisk 2+ DP pulses  Neuro/Psych: pleasant mood and affect,   Pt has back wound            Data Review:    I personally reviewed  Image and labs    I have independently reviewed and interpreted patient's lab and all other diagnostic data    Notes reviewed from all clinical/nonclinical/nursing services  involved in patient's clinical care. Care coordination discussions were held with appropriate clinical/nonclinical/ nursing providers based on care coordination needs.     Labs:     Recent Labs     05/28/24  1032 05/29/24  0100   WBC 15.9* 13.9*   HGB 9.6* 8.3*   HCT 31.5* 27.5*    187     Recent Labs     05/28/24  1032 05/29/24  0100   * 139   K 3.8 3.1*    110*   CO2 19* 17*   BUN 54* 58*   MG 2.2  --      Recent Labs     05/28/24  1032 05/29/24  0100   ALT 8* 9*   GLOB 5.5* 4.8*     No results for input(s): \"INR\", \"APTT\" in the last 72 hours.    Invalid input(s): \"PTP\"   No results for input(s): \"TIBC\" in the last 72 hours.    Invalid input(s): \"FE\", \"PSAT\", \"FERR\"   No results found for: \"RBCF\"   No results for input(s): \"PH\", \"PCO2\", \"PO2\" in the last 72 hours.  No results for input(s): \"CPK\" in the last 72 hours.    Invalid input(s): \"CPKMB\", \"CKNDX\", \"TROIQ\"  Lab Results   Component Value Date/Time    CHOL 84 04/22/2024 06:05 AM    HDL 16 04/22/2024 06:05 AM    LDL 48.4 04/22/2024 06:05 AM     No results found for: \"GLUCPOC\"        Medications Reviewed:     Current Facility-Administered Medications   Medication Dose Route Frequency    ipratropium 0.5 mg-albuterol 2.5 mg (DUONEB) nebulizer solution 1 Dose  1 Dose Inhalation Q6H WA RT    glycopyrrolate (ROBINUL) injection 0.2 mg  0.2 mg IntraVENous Q4H PRN    ondansetron (ZOFRAN) injection 4 mg  4 mg IntraVENous Q6H PRN    LORazepam (ATIVAN) injection 1 mg  1 mg IntraVENous Q6H PRN    acetaminophen (TYLENOL) suppository 650 mg  650 mg Rectal Q4H PRN    sodium chloride flush 0.9 % injection 5-40 mL  5-40 mL IntraVENous PRN    0.9 % sodium chloride infusion   IntraVENous PRN    ondansetron (ZOFRAN-ODT) disintegrating tablet 4 mg  4 mg Oral Q8H PRN    Or    ondansetron (ZOFRAN) injection 4 mg  4 mg IntraVENous Q6H PRN    polyethylene glycol (GLYCOLAX) packet 17 g  17 g Oral Daily PRN    acetaminophen (TYLENOL) tablet 650 mg  650 mg Oral Q6H PRN

## 2024-05-29 NOTE — DISCHARGE INSTRUCTIONS
To access the [employer]'s Interactive Workbook \"Healthier Living with Heart Failure - Managing Symptoms and Reducing Risk\"  Scan the QR code below.

## 2024-05-29 NOTE — WOUND CARE
WOCN Note:     New consult for spinal inicision.   Seen in 411/01 with DONTAE Lopez, Inf Dis    79 y.o. y/o female admitted on 5/28/2024 from [facility]  Admitted for pulmonary edema, hypoxia, PERRY, respiratory failure   History of fall at home, COPD, compression fracture of L1, CHF, DM, morbid obesity     WBC = 13.9  On Maxipime  Wound culture obtained on 5/29  On Maxipime  Diet: Diet NPO           Assessment:   Non-communicative and eyes do not focus.   Wearing PiPAP  Requires full assists in repositioning to assess sacrum and spine.   Smith  Surface: versacare foam mattress    Bilateral heels intact and without erythema.  Heels offloaded with pillows.    Buttocks and sacrum intact with blanching erythema and slight maceration; sacral foam in use.    1. POA dehiscence of spinal incision  1 wound with bridge of skin  6 x 1.8 x 2.8 cm  Soft, yellow devitalized tissue noted in base  Clear, serous exudate; no malodor or purulence  Periwound with maculopapular erythematous rash with satellite lesions consistent with candidiasis; will use Vashe to tamp this down rather than antifungal powder or cream in order to maintain dressing  Tx: clean with Vashe and covered with dry gauze until findings discussed with ortho       Recommendations:    Spine: clean with Vashe, cover with dry dressing.  Change daily and as needed with drainage.    Plan to place VAC tomorrow - ortho is aware    Turn/reposition approximately every 2 hours  Offload heels with heels hanging off end of pillow at all times while in bed.  Sacral Foam dressing: lift to assess regularly; change as needed. Discontinue if incontinence is frequently soiling dressing.     Low Air Loss mattress: Use only flat sheet and one incontinence pad.     Discussed with CLIFF Palencia, ortho.    Transition of Care: Plan to follow weekly and as needed while admitted to hospital.     Kristen Moreno, HERBERTN, RN, CWOCN  Certified Wound, Ostomy, Continence Nurse  office  [phone]  Available via [employer]

## 2024-05-29 NOTE — NURSE NAVIGATOR
HEART FAILURE NURSE NAVIGATOR NOTE  [facility]    Patient chart was reviewed by Heart Failure (HF) Nurse Navigators for compliance of prescribed treatment with guidelines directed medical therapy (GDMT) and HF database completed.     Please, review beneath recommendations for symptomatic patients with HF with Preserved Ejection Fraction ? 50% (HFpEF) for your consideration when taking care of this patient.    Current HF Medical Therapy:      Name Leny Barnes    1945   LVEF 55/60   NYHA Functional Class Documentation needed   ARNi/ACEi/ARB    Aldosterone Antagonist    SGLT2 inhibitor    Consulting Cardiologist Not yet consulted     Recommendations for HF Management:    For patients with HFpEF ? 50%, consider adding the following GDMT, if appropriate:  SGLT2 inhibitor [Class 2a]  ARNi or ARB [Class 2b]  Aldosterone antagonist [Class 2b]  Adjust antihypertensive therapy [Class 1]  Adjust diuretic dose at discharge if hospitalized for volume overload [Class 1]  For patient with hyperkalemia while on RAASi > 5.5, consider adding potassium binders (patiromer, sodium zirconium cycosilicate) [Class 2a]    Patients with suspected cardiac amyloidosis (older > 60 years old with LVH > 1.2cm and/or any other signs of amyloidosis) should be offered screening labs and imaging [Class 1]: (a) serum gammopathy profile and UPEP with immunofixation, and (b) PYP test. If PYP test is positive patient should have genetic testing done for inherited ATTR amyloidosis. If any findings are positive or you need genetic testing ordered, please, consider in-patient consultation or referral to [facility].      Note that the following medications may be potentially harmful in heart failure [Class 3].  Calcium channel blockers (doxazosin, diltiazem, verapamil, nifedipine)  Antiarrhythmics (flecanide, disopyrimide, sotalol, dronedarone)  Diabetes medications

## 2024-05-29 NOTE — PROGRESS NOTES
Nephrology Progress Note  ESTELA ETIENNE [facility] / [place] Office  [address]  [place] [address]  Phone - [phone]  Fax - [phone]                 Patient: Leny Barnes                     YOB: 1945        Date- 5/29/2024                                     Admit Date: 5/28/2024   CC: Follow up for PERRY on CKD          IMPRESSION & PLAN:   Stage 3 PERRY on CKD 3b  NAGMA  Volume overload  Acute hypercarbic respiratory failure  Pulmonary edema  Septic shock(wound dehiscence)  CAD  PAF s/p ablation and pacemaker  DM  FREDERICK on CPAP    Plan:  -Spoke to daughter in person  -Told her about declining renal functions, ongoing acidosis, volume overload, pulmonary edema.  -Also mentioned about hypotension and which limits the use of RRT.  -She acknowledges and is requesting that her mother be made comfortable.  -We will hold off for any preparation for RRT  -Will notify hospitalist to call palliative care and placed consult for hospice.  -Spoke to RN at bedside     Subjective:  Interval History:   -Seen and examined  -Noticed agonal breathing  -Patient is confused    Objective:   Vitals:    05/29/24 0826 05/29/24 0830 05/29/24 1000 05/29/24 1115   BP: (!) 113/41   (!) 116/42   Pulse: 74 78 86 70   Resp: 23   15   Temp: 98.3 °F (36.8 °C)   98.5 °F (36.9 °C)   TempSrc: Oral   Axillary   SpO2: 100%   97%   Weight:       Height:          I/O last 3 completed shifts:  In: -   Out: 350 [Urine:350]  No intake/output data recorded.      Physical exam:    GEN: Severe respiratory distress  NECK- no mass  RESP: Bilateral coarse rales  CVS: Tachycardic  NEURO: Unable to assess  EXT: No Edema       Chart reviewed.         Pertinent Notes reviewed.     Data Review :  Lab Results   Component Value Date/Time     05/29/2024 01:00 AM    K 3.1 05/29/2024 01:00 AM     05/29/2024 01:00 AM    CO2 17 05/29/2024 01:00 AM    BUN 58 05/29/2024 01:00 AM

## 2024-05-29 NOTE — H&P
pallor.         REVIEW OF SYSTEMS      The following systems were: [] reviewed  [x] unable to be reviewed     Positive ROS include:  Constitutional: fatigue, weakness, in pain, short of breath  Ears/nose/mouth/throat: increased airway secretions  Respiratory:shortness of breath, wheezing  Gastrointestinal:poor appetite, nausea, vomiting, abdominal pain, constipation, diarrhea  Musculoskeletal:pain, deformities, swelling legs  Neurologic:confusion, hallucinations, weakness  Psychiatric:anxiety, feeling depressed, poor sleep  Endocrine:      Adult Non-Verbal Pain Assessment Score: 0     Face  [x] 0   No particular expression or smile  [] 1   Occasional grimace, tearing, frowning, wrinkled forehead  [] 2   Frequent grimace, tearing, frowning, wrinkled forehead     Activity (movement)  [x] 0   Lying quietly, normal position  [] 1   Seeking attention through movement or slow, cautious movement  [] 2   Restless, excessive activity and/or withdrawal reflexes     Guarding  [x] 0   Lying quietly, no positioning of hands over areas of body  [] 1   Splinting areas of the body, tense  [] 2   Rigid, stiff     Physiology (vital signs)  [x] 0   Stable vital signs  [] 1   Change in any of the following: SBP > 20mm Hg; HR > 20/minute  [] 2   Change in any of the following: SBP > 30mm Hg; HR > 25/minute     Respiratory  [x] 0   Baseline RR/SpO2, compliant with ventilator  [] 1   RR > 10 above baseline, or 5% drop SpO2, mild asynchrony with ventilator  [] 2   RR > 20 above baseline, or 10% drop SpO2, asynchrony with ventilator      FUNCTIONAL ASSESSMENT      Palliative Performance Scale (PPS): 10      HISTORY     Past Medical History:   Diagnosis Date    Arthritis 1990    Asthma     Atrial fibrillation (HCC)     NONE SINCE PACEMAKER    CAD (coronary artery disease) 1996    Cancer ([facility]) 2001    UTERINE    Chronic kidney disease (CKD), stage III (moderate) ([facility]) 2023    Compression fracture of L1 vertebra ([facility]) 10/2023    FELL AT HOME

## 2024-05-29 NOTE — PLAN OF CARE
Problem: Discharge Planning  Goal: Discharge to home or other facility with appropriate resources  Outcome: Progressing     Problem: Chronic Conditions and Co-morbidities  Goal: Patient's chronic conditions and co-morbidity symptoms are monitored and maintained or improved  Outcome: Progressing     Problem: Safety - Adult  Goal: Free from fall injury  Outcome: Progressing     Problem: Skin/Tissue Integrity  Goal: Absence of new skin breakdown  Description: 1.  Monitor for areas of redness and/or skin breakdown  2.  Assess vascular access sites hourly  3.  Every 4-6 hours minimum:  Change oxygen saturation probe site  4.  Every 4-6 hours:  If on nasal continuous positive airway pressure, respiratory therapy assess nares and determine need for appliance change or resting period.  Outcome: Progressing     Problem: Pain  Goal: Verbalizes/displays adequate comfort level or baseline comfort level  Outcome: Progressing     Problem: Hospice Orientation  Goal: Demonstrate understanding of hospice philosophy, plan of care, and inpatient hospice program  Description: The patient/family/caregiver will demonstrate understanding of hospice philosophy, plan of care and the inpatient hospice program as evidenced by participation in meeting the patient's psychosocial, spiritual, medical, and physical needs inclusive of medical supplies/equipment focusing on symptoms.  Outcome: Progressing     Problem: Potential for Alteration in Skin Integrity  Goal: Monitor skin for areas of alteration in skin integrity  Description: Patient [unfilled] will remain free from alterations of or worsening skin integrity as evidenced by no changes to skin during assessment each shift during the inpatient hospice stay.  Outcome: Progressing     Problem: Pain  Goal: Control of acute pain  Description: Patient  will exhibit a decrease in pain as evidenced by a pain rating less than 1 on the Adult Nonverbal Pain  Problem: Anticipatory Grief  Goal: Explore

## 2024-05-29 NOTE — PROGRESS NOTES
Referral source:   Leny Barnes at [facility] in [facility]U.  attended rounds on the CV Services Unit as part of the Interdisciplinary team where the patient's ongoing care was discussed. I reviewed the medical record as part of this encounter.     Outcome: Interdisciplinary team are aware of  availability and were encouraged to request Spiritual Health support as needed.      The  on-call can be reached at ([phone]).     Rev. Freida Dominguez MDiv, [facility]  Staff

## 2024-05-29 NOTE — PROGRESS NOTES
[facility]  Good Help to Those in Need  [phone]    Patient Name: Leny Barnes  YOB: 1945  Age: 79 y.o.    [facility] Hospice RN Note:  Hospice consult noted. Chart reviewed. Plan of care discussed with patients nurse & care manager.   In to meet with daughter, Breonna.   Discussed Hospice philosophy, general plan of care, levels of care, services and on call procedures.  Family information packet provided & reviewed with Breonna.  Patient with eyes open, minimally responsive, labored breathing and non-verbal signs of pain. She is hypotensive. Breonna agrees at this time for [facility] hospice to manage her care with focus on end of life comfort.  CTI from Dr Sanchez for Sepsis  Discharge order from Dr Chapman  Hospice orders from Mindy Biswas NP    Thank you for the opportunity to be of service to this patient.     Jennifer Eddy RN  Clinical Nurse Liaison  [facility]  [phone] Mobile  [phone] Office   Available on [employer]

## 2024-05-30 NOTE — DEATH NOTES
Death Pronouncement Note  Patient's Name: Leny Barnes   Patient's YOB: 1945  MRN Number: [id number]    Admitting Provider: Elvin Sanchez MD  Attending Provider: Elvin Sanchez MD    Patient was examined and the following were absent: Pulses, Blood Pressure, and Respiratory effort    I declared the patient dead on 5/30/2024 at 3:10 AM    Preliminary Cause of Death: Wound dehiscence     Electronically signed by SHAYNE Shaikh NP on 5/30/24 at 3:16 AM EDT

## 2024-05-31 NOTE — DISCHARGE SUMMARY
Hospice Discharge Summary    [facility]  Good Help to Those in Need        Date of Admission: 5/29/2024  Date of Discharge: 5/30/2024    Leny Barnes is a 79 y.o. year old who was admitted to [facility] at [facility] with a Hospice diagnosis of Sepsis (HCC) [A41.9].      The patient's care was focused on comfort and the patient passed away on 5/30/2024.

## 2024-05-31 NOTE — PROGRESS NOTES
[facility]  Good Help to Those in Need  [phone]    Inpatient Nursing Admission   Patient Name: Leny Barnes  YOB: 1945  Age: 79 y.o.       Date of Hospice Admission: 5/29/2024  Hospice Attending Elected by Patient: Elvin Sanchez MD  Primary Care Physician: ALINE Rose MD  Admitting RN: Jennifer Eddy RN  : Charity Castaneda LMSW     Level of Care (GIP/Routine/Respite): [facility]  Facility of Care: [facility]  Patient Room: 411/     HOSPICE SUMMARY   ER Visits/ Hospitalizations in past year: 8  Hospice Diagnosis: Sepsis (HCC) [A41.9]  Onset Date of Hospice Diagnosis: 5/29/2024  Summary of Disease Progression Leading to Hospice Diagnosis: Kena  Per Mindy Biswas NP Hospice:   HOSPICE SUMMARY   Leny Barnes is a 78 yo  female admitted to [facility] on GIP level of care at [facility].     HPI and course of hospitalization as summarized in Dr. Chapman's discharge note:   Leny Barnes is a 79 y.o. female with h/o CHF, CAD, uterine cancer, CKD and DM (with a recent complex admit for sepsis due to wound infection and dehiscence following lumbar fusion admit 4/15-5/3) who presented to the ED today from [facility] with AMS, increased work of breathing. Per EMS, Sats were 80's on 3 L NC and she was placed on NRB. She was placed on BiPAP by ED and Hospitalists consulted for admit.      Chart was reviewed, patient had a very complicated and complex admission last month. Pt was seen and examined this afternoon, she was lying in bed on BiPAP in no acute distress - opens eye to voice and mouthed \"hi\" but quickly closes eyes. Follows some commands but overall very weak.  Unable to perform review of systems.  Lungs with crackles throughout lung fields sats 99% on BiPAP - bumex 2 mg BID ordered.      Daughter (Breonna) was present at bedside.  Breonna reports that patient looked okay through the weekend, started looking ill yesterday and condition seemed 
[facility]  Good Help to Those in Need  [phone]    Discharge/Death Nursing Note   Patient Name: Leny Barnes  YOB: 1945  Age: 79 y.o.    Date of Death: 24  Admitted Date: 2024  Time of Death: 310    Facility of Care: [facility]  Level of Care: [facility]  Patient Room: [facility]     Hospice Attending: Dr. Elvin Sanchez  Hospice Diagnosis: Sepsis (HCC) [A41.9]    Death Pronouncement     Agency staff WAS NOT present at the time of death    At the time of death the patient was documented as and Pronouncement of death completed by:   Death Pronouncement Note  Patient's Name: Leny Barnes   Patient's YOB: 1945  MRN Number: [id number]      Admitting Provider: Elvin Sanchez MD  Attending Provider: Elvin Sanchez MD     Patient was examined and the following were absent: Pulses, Blood Pressure, and Respiratory effort     I declared the patient dead on 2024 at 3:10 AM     Preliminary Cause of Death: Wound dehiscence  Electronically signed by SHAYNE Shaikh NP on 24 at 3:16 AM EDT      The pt  within [facility]     The following were notified of the patient's death: Hospice Team, Hospital , Hospital Nurse Supervisor, Family was bedside        Confirmed  Home:          Discharge Summary   Discharge Reason: Death    Summary of Care Provided:    [x] Post mortem care provided by [facility] Nursing Staff  [x] Notification of  home by [facility]  Nursing Staff  [] Referrals/Community resources provided:   [x] Goals completed  [] Durable Medical Equipment vendor notified     Disciplines involved: [x] RN [] SW []  [] MENJIVAR [] Vol [] PT [] OT [] ST [] BC    [] IDT communication/notification    Attending Physician, Dr. Sanchez and Hospice Team notified of death    Bereaved           2024     3:35 PM   Demographics   Marital Status        Jaclyn Bello RN, [facility]  Hospice Nurse Liaison  [phone] Mobile  [phone] 
In pt's room to admin scheduled medications and pt lying in bed supine position, HOB 20 degrees elevated daughter at bedside. Visualized pt's RR 0. Upon assessment, no lung sounds ausculted. Nikky Campos NP notified to confirm time of death. Daughter refused  visit at this time.   
Infectious Disease Services Note    Transition to comfort care noted.     ID team signing off. Please reach out with any questions.        Jessica BELLA-NP   
[facility] SW Bereavement/Condolence Call:      This LMSW called pt's daughter Breonna to offer condolences and support.  No answer.  LMSW left voicemail with contact information and offered [facility] Bereavement Services information for ongoing support.       HELIO Ayala  [facility] Social Worker   [phone]   
Spiritual Care Assessment/Progress Note  [facility]    Name: Leny Barnes MRN: [id number]    Age: 79 y.o.     Sex: female   Language: English     Date: 5/30/2024            Total Time Calculated: 15 min              Spiritual Assessment begun in [facility] 6E MED ONCOLOGY  Service Provided For: Patient not available  Referral/Consult From: Nurse  Encounter Overview/Reason: Follow-up    Spiritual beliefs:      [] Involved in a cherelle tradition/spiritual practice:      [] Supported by a cherelle community:      [] Claims no spiritual orientation:      [] Seeking spiritual identity:           [] Adheres to an individual form of spirituality:      [x] Not able to assess:                Identified resources for coping and support system:   Support System: Children       [] Prayer                  [] Devotional reading               [] Music                  [] Guided Imagery     [] Pet visits                                        [] Other: (COMMENT)     Specific area/focus of visit   Encounter:    Crisis:    Spiritual/Emotional needs:    Ritual, Rites and Sacraments:    Grief, Loss, and Adjustments: Type: Death  Ethics/Mediation:    Behavioral Health:    Palliative Care:    Advance Care Planning:      I responded to notification of Leny Barnes's death. Consulted with her nurse who indicated that the  is not needed.   Chaplain Shonda, MDiv, MS, BCC    
Spiritual Care Partner Volunteer visited patient at [facility] in [facility] 4 IMCU on 5/29/2024   Documented by:  Dallas Meyers MDIV, BCC  
[phone]       Radiology:     Medications:        Current Facility-Administered Medications   Medication Dose Route Frequency    HYDROmorphone HCl PF (DILAUDID) injection 1 mg  1 mg IntraVENous Q4H    LORazepam (ATIVAN) injection 1 mg  1 mg IntraVENous Q4H    sodium chloride flush 0.9 % injection 5-40 mL  5-40 mL IntraVENous PRN    HYDROmorphone HCl PF (DILAUDID) injection 1 mg  1 mg IntraVENous Q15 Min PRN    ketorolac (TORADOL) injection 30 mg  30 mg IntraVENous Q6H PRN    bisacodyl (DULCOLAX) suppository 10 mg  10 mg Rectal Daily PRN    glycopyrrolate (ROBINUL) injection 0.2 mg  0.2 mg IntraVENous Q4H          Antibiotics:    cefepime      Case discussed with:    Dr. Hagan, patient, wound care team      SHAYNE Tay - NP

## 2024-05-31 NOTE — PROGRESS NOTES
Physician Progress Note      PATIENT:               MIYA SEALS  CSN #:                  [id number]  :                       1945  ADMIT DATE:       2024 10:06 AM  DISCH DATE:        2024 2:10 PM  RESPONDING  PROVIDER #:        Bonnie Chapman MD          QUERY TEXT:    Pt admitted with Resp Failure and CHF exac, with superficial wound dehiscence   documented.  Noted documentation of severe Sepsis per the Renal consultant.    If possible, please document in progress notes and discharge summary:    The medical record reflects the following:  Risk Factors: wound dehiscence with recent admission for sepsis    Clinical Indicators:   10:18 98.8, 81, 23, 115/89, 97 per mask  10:30 71, 33, 122/86  15:36 97.9, 77, 21, 85/65   00:00 71, 16, 114/38, MAP 59  12:00 90, 26, 84/57     WBC 15.9, Lactate-1.1, Procal-0.53  B/C neg  Wound C/S- few serratia    ER- SOB, AMS, recent admission for sepsis d/t wound infection and dehiscence   following lumbar fusion. Not in acute distress    H&P- Acute hypoxic respiratory failure due to persistent volume overload  Acute HFpEF  Serratia marcescens bacteremia  Wound dehiscence- patient had VAC prior to admission     Renal consult- PERRY on CKD 3b, likely severe sepsis     ID consult- hardware infection to T/L spine seen for evaluation for   leukocytosis and wound dehiscence in the setting of leukocytosis and   decompensated heart failure, cardiorenal syndrome.     Ortho consult- Nylon sutures remain intact. Mild generalized erythema to   incision. Inferior portion of the incision, approximately 2-3cm with   superficial dehiscence and small amount of spontaneous serous drainage   emanating from wound.     Renal- Stage 3 PERRY on CKD 3b  Septic shock(wound dehiscence)- hypotension and which limits the use of RRT.     D/C Summ: made comfort care and discharged to hospice    Treatment: Maxipime IV, Albumin, Ortho/ ID/ WC consults    Thank

## 2024-06-01 LAB
BACTERIA SPEC CULT: ABNORMAL
BACTERIA SPEC CULT: ABNORMAL
EKG ATRIAL RATE: 73 BPM
EKG DIAGNOSIS: NORMAL
EKG Q-T INTERVAL: 466 MS
EKG QRS DURATION: 166 MS
EKG QTC CALCULATION (BAZETT): 513 MS
EKG R AXIS: -28 DEGREES
EKG T AXIS: 194 DEGREES
EKG VENTRICULAR RATE: 73 BPM
GRAM STN SPEC: ABNORMAL
GRAM STN SPEC: ABNORMAL
SERVICE CMNT-IMP: ABNORMAL

## 2024-06-04 NOTE — PROGRESS NOTES
Physician Progress Note      PATIENT:               MIYA SEALS  CSN #:                  [id number]  :                       1945  ADMIT DATE:       2024 10:06 AM  DISCH DATE:        2024 2:10 PM  RESPONDING  PROVIDER #:        Bonnie Leal MD          QUERY TEXT:    Pt noted to have Sepsis, with documentation of superficial wound dehiscence.   If possible, please document in progress notes and discharge summary the   relationship, if any, between sepsis and wound dehiscence.    The medical record reflects the following:  Risk Factors: wound dehiscence with recent admission for sepsis    Clinical Indicators:   10:18 98.8, 81, 23, 115/89, 97 per mask  10:30 71, 33, 122/86  15:36 97.9, 77, 21, 85/65   00:00 71, 16, 114/38, MAP 59  12:00 90, 26, 84/57     WBC 15.9, Lactate-1.1, Procal-0.53  B/C neg  Wound C/S- few serratia    ER- recent admission for sepsis d/t wound infection and dehiscence following   lumbar fusion.    H&P- Acute hypoxic respiratory failure due to persistent volume overload/   Acute HFpEF  Serratia marcescens bacteremia  Wound dehiscence- patient had VAC prior to admission     ID consult- hardware infection to T/L spine seen for evaluation for   leukocytosis and wound dehiscence in the setting of leukocytosis and   decompensated heart failure, cardiorenal syndrome.     Ortho consult- Nylon sutures remain intact. Mild generalized erythema to   incision. Inferior portion of the incision, approximately 2-3cm with   superficial dehiscence and small amount of spontaneous serous drainage   emanating from wound.     Wound care consult- POA dehiscence of spinal incision  1 wound with bridge of skin  6 x 1.8 x 2.8 cm  Soft, yellow devitalized tissue noted in base  Clear, serous exudate; no malodor or purulence     D/C Summ: made comfort care and discharged to hospice    Treatment: Maxipime IV, Albumin, Ortho/ ID/ WC consults    Thank  you,  Ellen Sim RN  Clinical Documentation  [phone], or via [employer]  Options provided:  -- Sepsis due to wound dehiscence  -- Sepsis unrelated to wound dehiscence  -- Other - I will add my own diagnosis  -- Disagree - Not applicable / Not valid  -- Disagree - Clinically unable to determine / Unknown  -- Refer to Clinical Documentation Reviewer    PROVIDER RESPONSE TEXT:    This patient has sepsis due to wound dehiscence.    Query created by: Ellen Sim on 6/3/2024 10:54 AM      Electronically signed by:  Bonnie Leal MD 6/4/2024 9:45 AM

## 2024-09-10 NOTE — PROGRESS NOTES
TRANSFER - IN REPORT:    Verbal report received from Deysi(name) on [facility]  being received from ED(unit) for routine progression of care      Report consisted of patients Situation, Background, Assessment and   Recommendations(SBAR). Information from the following report(s) SBAR, Intake/Output, MAR, Recent Results and Cardiac Rhythm Afib was reviewed with the receiving nurse. Opportunity for questions and clarification was provided. Assessment completed upon patients arrival to unit and care assumed. Yes approved see other messages

## (undated) DEVICE — HEART CATH-MRMC: Brand: MEDLINE INDUSTRIES, INC.

## (undated) DEVICE — Device

## (undated) DEVICE — CATHETER DIAG 5FR L100CM LUMN ID0.047IN JL3.5 CRV 0 SIDE H

## (undated) DEVICE — POUCH LIQUID IODINE POVIDONE APLICARE 0.75 OZ

## (undated) DEVICE — BANDAGE COMPR M W6INXL10YD WHT BGE VELC E MTRX HK AND LOOP

## (undated) DEVICE — SOLUTION SURG PREP 26 CC PURPREP

## (undated) DEVICE — SPONGE LAPAROTOMY W18XL18IN WHITE STRUNG RADIOPAQUE STERILE

## (undated) DEVICE — SYRINGE 20ML LL S/C 50

## (undated) DEVICE — 4-PORT MANIFOLD: Brand: NEPTUNE 2

## (undated) DEVICE — KIT HND CTRL 3 W STPCOCK ROT END 54IN PREM HI PRSS TBNG AT

## (undated) DEVICE — SUTURE VCRL SZ 2-0 L36IN ABSRB UD L40MM CT 1/2 CIR J957H

## (undated) DEVICE — CONTAINER,SPECIMEN,4OZ,OR STRL: Brand: MEDLINE

## (undated) DEVICE — TOTAL JOINT - SMH: Brand: MEDLINE INDUSTRIES, INC.

## (undated) DEVICE — HI-TORQUE VERSACORE MODIFIED J GUIDE WIRE SYSTEM 260 CM: Brand: HI-TORQUE VERSACORE

## (undated) DEVICE — GLOVE SURG SZ 65 L12IN FNGR THK94MIL STD WHT LTX FREE

## (undated) DEVICE — KIT MED IMAG CNTRST AGNT W/ IOPAMIDOL REUSE

## (undated) DEVICE — ADHESIVE LIQ 2OZ ADJUNCT FOR DSG MASTISOL

## (undated) DEVICE — GLOVE SURG SZ 65 L12IN FNGR THK79MIL GRN LTX FREE

## (undated) DEVICE — SOLUTION IV 250ML 0.9% SOD CHL CLR INJ FLX BG CONT PRT CLSR

## (undated) DEVICE — TUBING, SUCTION, 1/4" X 12', STRAIGHT: Brand: MEDLINE

## (undated) DEVICE — CATHETER ABLATN FJ CRV 1-4-1 MM 4 MM 8 FRX115 CM FLEXABILITY

## (undated) DEVICE — CATHETER DIAG 5FR L100CM LUMN ID0.047IN JR4 CRV 0 SIDE H

## (undated) DEVICE — SPONGE GZ W4XL4IN COT 12 PLY TYP VII WVN C FLD DSGN STERILE

## (undated) DEVICE — PIN DRL 4X125 MM ROBOTIC-ASSISTED SOLUTION ARRY VELYS

## (undated) DEVICE — PAD GROUNDING ADLT ADH FOIL 9FT CORD UNIV

## (undated) DEVICE — SLING ORTHOPEDIC PCH UNIV 19.5X9 IN 2-39 IN ARM W/ FOAM STRP

## (undated) DEVICE — BAND COMPR L24CM REG CLR PLAS HEMSTAT EXT HK AND LOOP RETEN

## (undated) DEVICE — COVER,TABLE,HEAVY DUTY,60"X90",STRL: Brand: MEDLINE

## (undated) DEVICE — PADDING CAST W6INXL4YD NONSTERILE COT RAYON MICROPLEATED

## (undated) DEVICE — GLIDESHEATH SLENDER STAINLESS STEEL KIT: Brand: GLIDESHEATH SLENDER

## (undated) DEVICE — SUTURE NONABSORBABLE MONOFILAMENT 5-0 CV-6 TTC-9 24 IN GORTX 6K02A

## (undated) DEVICE — INTRO SHTH HEMO 9FR 18G 13CM -- PRELUDE SNAP PEEL-AWAY

## (undated) DEVICE — DRAPE EQUIP ROBOT STRL VELYS 20/PK ORDER IN MULTIIPLES OF 20 EACH

## (undated) DEVICE — HOOD, PEEL-AWAY: Brand: FLYTE

## (undated) DEVICE — SUTURE MONOCRYL STRATAFIX SPRL + SZ 3-0 L24IN ABSRB UD PS-2 SXMP1B108

## (undated) DEVICE — HANDLE LT SNAP ON ULT DURABLE LENS FOR TRUMPF ALC DISPOSABLE

## (undated) DEVICE — FORCEPS BX L240CM JAW DIA2.8MM L CAP W/ NDL MIC MESH TOOTH

## (undated) DEVICE — SUT SLK 0 30IN CT1 BLK --

## (undated) DEVICE — AGENT HEMSTAT 10ML MTRX W/ MALL TRIM TIP FLOSEAL

## (undated) DEVICE — SUTURE STRATAFIX SYMMETRIC PDS + SZ 1 L18IN ABSRB VLT L48MM SXPP1A400

## (undated) DEVICE — TOOL MR8-31TD3030 MR8 TWST DRIL 3MMX30MM: Brand: MIDAS REX

## (undated) DEVICE — SOLUTION IRRIG 3000ML 0.9% SOD CHL USP UROMATIC PLAS CONT

## (undated) DEVICE — NEEDLE ANGIO 18GAX7CM SECURELOC

## (undated) DEVICE — SUTURE STRATAFIX SYMMETRIC SZ 1 L18IN ABSRB VLT CT1 L36CM SXPP1A404

## (undated) DEVICE — PACK PROCEDURE SURG HRT CATH

## (undated) DEVICE — POSITIONER HD REST FOAM CMFRT TCH

## (undated) DEVICE — X-RAY DETECTABLE SPONGES,16 PLY: Brand: VISTEC

## (undated) DEVICE — HI-TORQUE VERSACORE MODIFIED J GUIDE WIRE SYSTEM 145 CM: Brand: HI-TORQUE VERSACORE

## (undated) DEVICE — DRAPE,EXTREMITY,89X128,STERILE: Brand: MEDLINE

## (undated) DEVICE — 3M™ TEGADERM™ TRANSPARENT FILM DRESSING FRAME STYLE, 1626W, 4 IN X 4-3/4 IN (10 CM X 12 CM), 50/CT 4CT/CASE: Brand: 3M™ TEGADERM™

## (undated) DEVICE — PACEMAKER SETUP: Brand: MEDLINE INDUSTRIES, INC.

## (undated) DEVICE — ANGIOGRAPHY KIT

## (undated) DEVICE — SUTURE DEV SZ 3-0 V-LOC 90 L12IN TO L18IN CV-23 VLT VLOCM0844

## (undated) DEVICE — GLOVE ORTHO 8   MSG9480

## (undated) DEVICE — SCRUB DRY SURG EZ SCRUB BRUSH PREOPERATIVE GRN

## (undated) DEVICE — STERILE-Z SURGICAL PATIENT COVERS CLEAR POLYETHYLENE STERILE UNIVERSAL FIT 10 PER CASE: Brand: STERILE-Z

## (undated) DEVICE — SPECIAL PROCEDURE DRAPE 32" X 34": Brand: SPECIAL PROCEDURE DRAPE

## (undated) DEVICE — C-ARM: Brand: UNBRANDED

## (undated) DEVICE — GUIDEWIRE VASC STR 3 CM 0.014 INX190 CM HI TORQ WHISPER MS

## (undated) DEVICE — LAMINECTOMY-SMH: Brand: MEDLINE INDUSTRIES, INC.

## (undated) DEVICE — TR BAND RADIAL ARTERY COMPRESSION DEVICE: Brand: TR BAND

## (undated) DEVICE — SUTURE ABS MF 2-0 CT1 27IN STRATAFIX PDS+ SXPP1B412

## (undated) DEVICE — 3M™ IOBAN™ 2 ANTIMICROBIAL INCISE DRAPE 6650EZ: Brand: IOBAN™ 2

## (undated) DEVICE — CATHETER GUID 5FR DIA0.058IN SHFT NYL STD JL 3.5 CRV ENH

## (undated) DEVICE — MEDI-TRACE CADENCE ADULT, DEFIBRILLATION ELECTRODE -RTS  (10 PR/PK) - PHYSIO-CONTROL: Brand: MEDI-TRACE CADENCE

## (undated) DEVICE — BIPOLAR SEALER 23-112-1 AQM 6.0: Brand: AQUAMANTYS ®

## (undated) DEVICE — TRANSFER SET 3": Brand: MEDLINE INDUSTRIES, INC.

## (undated) DEVICE — TOOL 14BA50 LEGEND 14CM 5MM BA: Brand: MIDAS REX ™

## (undated) DEVICE — SUTURE MCRYL SZ 3-0 L27IN ABSRB UD L19MM PS-2 3/8 CIR PRIM Y427H

## (undated) DEVICE — GLOVE SURG SZ 75 L12IN FNGR THK94MIL STD WHT LTX FREE

## (undated) DEVICE — PADDING CAST W6INXL4YD ST COT RAYON MICROPLEATED HIGHLY

## (undated) DEVICE — SPHERE STRL PASSIVE MARKER 60

## (undated) DEVICE — SYSTEM CLOSURE 6-12 FR VEN VASC VASCADE MVP

## (undated) DEVICE — SUTURE ABSRB L30CM 2-0 VLT SPRL PDS + STRATAFIX SXPP1B410

## (undated) DEVICE — DRAPE EQUIP SATELLITE STN STRL VELYS

## (undated) DEVICE — DRESSING HYDROCOLLOID BORDER 35X10 IN ALUM PRIMASEAL

## (undated) DEVICE — STRIP,CLOSURE,WOUND,MEDI-STRIP,1/2X4: Brand: MEDLINE

## (undated) DEVICE — CABLE ABLAT CATH L250CM MOD 1641 IBI-1500T SER GENRTR FOR

## (undated) DEVICE — HANDPIECE SET WITH BONE CLEANING TIP AND SUCTION TUBE: Brand: INTERPULSE

## (undated) DEVICE — APPLICATOR MEDICATED 26 CC SOLUTION HI LT ORNG CHLORAPREP

## (undated) DEVICE — PROVE COVER: Brand: UNBRANDED

## (undated) DEVICE — KIT ROBOTIC ORTHOPAEDIC X-SCAN PLAN DISP MAZORX

## (undated) DEVICE — PAD,ABDOMINAL,5"X9",ST,LF,25/BX: Brand: MEDLINE INDUSTRIES, INC.

## (undated) DEVICE — MICROPUNCTURE INTRODUCER SET SILHOUETTE TRANSITIONLESS WITH STAINLESS STEEL WIRE GUIDE: Brand: MICROPUNCTURE

## (undated) DEVICE — WASTE KIT - ST MARY: Brand: MEDLINE INDUSTRIES, INC.

## (undated) DEVICE — SPLINT WR VELC FOAM NEUT POS DISP FOR RAD ART ACC SFT STRP

## (undated) DEVICE — DRESSING ANTIMIC FOAM OPTIFOAM POSTOP ADH 4 X 6 IN

## (undated) DEVICE — BLADE SAW OSCILLATING 85X19X2 MM ROBOTIC-ASSISTED VELYS

## (undated) DEVICE — HYPODERMIC SAFETY NEEDLE: Brand: MAGELLAN

## (undated) DEVICE — GLOVE SURG SZ 8 L12IN FNGR THK79MIL GRN LTX FREE

## (undated) DEVICE — CUSTOM KT PTCA INFL DEV K05 00052M

## (undated) DEVICE — CATH BLLN ANGIO 2.50X12MM SC EUPHORA RX

## (undated) DEVICE — SET TBNG L260CM IRRIG RF THER COOL PNT

## (undated) DEVICE — SYRINGE IRRIG 60ML SFT PLIABLE BLB EZ TO GRP 1 HND USE W/

## (undated) DEVICE — HEMO INTRO 8.5F 60CM SR0 --

## (undated) DEVICE — BIPOLAR IRRIGATOR INTEGRATED TUBING AND BIPOLAR CORD SET, DISPOSABLE

## (undated) DEVICE — SUTURE ABS ANTIBACT 1-0 CTX 24IN STRATAFIX PDS+ SXPP1A445

## (undated) DEVICE — KIT MFLD ISOLATN NACL CNTRST PRT TBNG SPIK W/ PRSS TRNSDUC

## (undated) DEVICE — REM POLYHESIVE ADULT PATIENT RETURN ELECTRODE: Brand: VALLEYLAB

## (undated) DEVICE — GUIDEWIRE VASC L150CM DIA0.025IN TIP L7CM J RAD 3MM PTFE

## (undated) DEVICE — KIT AT-X65 ANGIOTOUCH HAND CONTROLLER

## (undated) DEVICE — CONNEXT SURGICAL MATRIX - X-LARGE BELLOW: Brand: CONNEXT SURGICAL MATRIX

## (undated) DEVICE — SUTURE VCRL 1 L27IN ABSRB CT BRAID COAT UD J281H

## (undated) DEVICE — SUTURE STRATAFIX SPRL MCRYL + SZ 3 0 L27IN ABSRB UD SH L26MM SXMP1B428

## (undated) DEVICE — GOWN,SIRUS,NONRNF,SETINSLV,2XL,18/CS: Brand: MEDLINE

## (undated) DEVICE — ZIMMER® STERILE DISPOSABLE TOURNIQUET CUFF WITH PLC, DUAL PORT, SINGLE BLADDER, 34 IN. (86 CM)

## (undated) DEVICE — SUTURE STRATAFIX SPRL MCRYL + SZ 3-0 L24IN ABSRB UD PS-2 SXMP1B108

## (undated) DEVICE — BONE WAX WHITE: Brand: BONE WAX WHITE

## (undated) DEVICE — ELECTRODE PT RET AD L9FT HI MOIST COND ADH HYDRGEL CORDED

## (undated) DEVICE — GLOVE SURG SZ 8 L12IN FNGR THK94MIL STD WHT LTX FREE

## (undated) DEVICE — TUBING SUCT 12FR MAL ALUM SHFT FN CAP VENT UNIV CONN W/ OBT

## (undated) DEVICE — SYSTEN PATIENT SPECIFIC UNID TECHNOLOGY

## (undated) DEVICE — SUTURE PERMAHAND SZ 0 L18IN NONABSORBABLE BLK SILK BRAID A186H

## (undated) DEVICE — SUTURE VCRL SZ 1 L36IN ABSRB UD L36MM CT-1 1/2 CIR J947H

## (undated) DEVICE — BANDAGE COMPR W6INXL12FT SMOOTH FOR LIMB EXSANG ESMARCH

## (undated) DEVICE — CATHETER GUID 5FR L100CM DIA0.058IN NYL SHFT EBU3.0 W/O

## (undated) DEVICE — TUBING HYDR IRR --

## (undated) DEVICE — INTRODUCER SHTH 8FR L12CM DBL DST GWIRE L50CM 0.038IN

## (undated) DEVICE — CATH BLLN ANGIO 1.50X6MM SC EUPHORA RX